# Patient Record
Sex: MALE | Race: WHITE | Employment: OTHER | URBAN - METROPOLITAN AREA
[De-identification: names, ages, dates, MRNs, and addresses within clinical notes are randomized per-mention and may not be internally consistent; named-entity substitution may affect disease eponyms.]

---

## 2016-03-30 LAB — COLONOSCOPY, EXTERNAL: NORMAL

## 2017-01-25 ENCOUNTER — HOSPITAL ENCOUNTER (OUTPATIENT)
Dept: CT IMAGING | Age: 82
Discharge: HOME | End: 2017-01-25
Attending: INTERNAL MEDICINE
Payer: MEDICARE

## 2017-01-25 DIAGNOSIS — R91.1 LUNG NODULE: ICD-10-CM

## 2017-01-25 LAB — CREAT BLD-MCNC: 1.4 MG/DL (ref 0.6–1.3)

## 2017-01-25 PROCEDURE — 74011250636 HC RX REV CODE- 250/636: Performed by: INTERNAL MEDICINE

## 2017-01-25 PROCEDURE — 71260 CT THORAX DX C+: CPT

## 2017-01-25 PROCEDURE — 82565 ASSAY OF CREATININE: CPT

## 2017-01-25 PROCEDURE — 74011636320 HC RX REV CODE- 636/320: Performed by: INTERNAL MEDICINE

## 2017-01-25 RX ORDER — SODIUM CHLORIDE 9 MG/ML
50 INJECTION, SOLUTION INTRAVENOUS
Status: COMPLETED | OUTPATIENT
Start: 2017-01-25 | End: 2017-01-25

## 2017-01-25 RX ORDER — SODIUM CHLORIDE 0.9 % (FLUSH) 0.9 %
10 SYRINGE (ML) INJECTION
Status: COMPLETED | OUTPATIENT
Start: 2017-01-25 | End: 2017-01-25

## 2017-01-25 RX ADMIN — SODIUM CHLORIDE 50 ML/HR: 900 INJECTION, SOLUTION INTRAVENOUS at 14:41

## 2017-01-25 RX ADMIN — IOPAMIDOL 70 ML: 612 INJECTION, SOLUTION INTRAVENOUS at 14:40

## 2017-01-25 RX ADMIN — Medication 10 ML: at 14:40

## 2017-04-10 LAB — HEMOCCULT STL QL: NORMAL

## 2017-05-23 DIAGNOSIS — R91.1 NODULE OF RIGHT LUNG: ICD-10-CM

## 2017-05-23 DIAGNOSIS — R74.8 ELEVATED CPK: ICD-10-CM

## 2017-05-23 DIAGNOSIS — R79.89 ELEVATED LFTS: ICD-10-CM

## 2017-05-23 DIAGNOSIS — I25.10 ASCVD (ARTERIOSCLEROTIC CARDIOVASCULAR DISEASE): ICD-10-CM

## 2017-05-23 LAB — PSA, EXTERNAL: NORMAL

## 2017-09-13 RX ORDER — DICLOFENAC SODIUM 75 MG/1
TABLET, DELAYED RELEASE ORAL
Qty: 180 TAB | Refills: 3 | Status: SHIPPED | OUTPATIENT
Start: 2017-09-13 | End: 2018-09-09 | Stop reason: SDUPTHER

## 2017-09-13 NOTE — TELEPHONE ENCOUNTER
Requested Prescriptions     Pending Prescriptions Disp Refills    diclofenac EC (VOLTAREN) 75 mg EC tablet [Pharmacy Med Name: DICLOFENAC SOD EC 75 MG TAB] 180 Tab 3     Sig: TAKE 1 TABLET BY MOUTH TWICE A DAY

## 2017-09-20 PROBLEM — N18.9 CKD (CHRONIC KIDNEY DISEASE): Status: ACTIVE | Noted: 2017-09-20

## 2017-09-20 PROBLEM — J30.9 ALLERGIC RHINITIS: Status: ACTIVE | Noted: 2017-09-20

## 2017-09-20 PROBLEM — R97.20 ELEVATED PSA: Status: ACTIVE | Noted: 2017-09-20

## 2017-09-20 PROBLEM — I12.9 HYPERTENSION WITH RENAL DISEASE: Status: ACTIVE | Noted: 2017-09-20

## 2017-09-20 PROBLEM — R60.9 EDEMA: Status: ACTIVE | Noted: 2017-09-20

## 2017-09-20 PROBLEM — E78.5 HYPERLIPIDEMIA: Status: ACTIVE | Noted: 2017-09-20

## 2017-09-20 PROBLEM — R91.1 NODULE OF RIGHT LUNG: Status: ACTIVE | Noted: 2017-09-20

## 2017-09-20 PROBLEM — N40.0 BPH (BENIGN PROSTATIC HYPERPLASIA): Status: ACTIVE | Noted: 2017-09-20

## 2017-09-20 PROBLEM — R74.8 ELEVATED CPK: Status: ACTIVE | Noted: 2017-09-20

## 2017-09-20 PROBLEM — N52.9 ED (ERECTILE DYSFUNCTION): Status: ACTIVE | Noted: 2017-09-20

## 2017-09-20 PROBLEM — M54.9 BACK PAIN: Status: ACTIVE | Noted: 2017-09-20

## 2017-09-20 PROBLEM — I25.10 ASCVD (ARTERIOSCLEROTIC CARDIOVASCULAR DISEASE): Status: ACTIVE | Noted: 2017-09-20

## 2017-09-20 PROBLEM — M19.90 DJD (DEGENERATIVE JOINT DISEASE): Status: ACTIVE | Noted: 2017-09-20

## 2017-09-20 PROBLEM — I50.9 CHF (CONGESTIVE HEART FAILURE) (HCC): Status: ACTIVE | Noted: 2017-09-20

## 2017-09-20 PROBLEM — M35.3 POLYMYALGIA (HCC): Status: ACTIVE | Noted: 2017-09-20

## 2017-09-20 PROBLEM — Z79.899 ON STATIN THERAPY: Status: ACTIVE | Noted: 2017-09-20

## 2017-09-20 PROBLEM — R79.89 ELEVATED LFTS: Status: ACTIVE | Noted: 2017-09-20

## 2017-09-20 PROBLEM — E11.9 DM (DIABETES MELLITUS) (HCC): Status: ACTIVE | Noted: 2017-09-20

## 2017-09-20 RX ORDER — PREDNISONE 5 MG/1
5 TABLET ORAL
COMMUNITY
End: 2017-12-12 | Stop reason: SDUPTHER

## 2017-09-20 RX ORDER — GLUCOSAMINE SULFATE 1500 MG
1000 POWDER IN PACKET (EA) ORAL DAILY
COMMUNITY

## 2017-09-20 RX ORDER — TADALAFIL 5 MG/1
5 TABLET ORAL
COMMUNITY
End: 2018-01-05 | Stop reason: ALTCHOICE

## 2017-09-20 RX ORDER — SILDENAFIL CITRATE 20 MG/1
20 TABLET ORAL
COMMUNITY
End: 2017-12-07 | Stop reason: SDUPTHER

## 2017-10-30 ENCOUNTER — OFFICE VISIT (OUTPATIENT)
Dept: INTERNAL MEDICINE CLINIC | Age: 82
End: 2017-10-30

## 2017-10-30 VITALS
BODY MASS INDEX: 27.95 KG/M2 | DIASTOLIC BLOOD PRESSURE: 82 MMHG | SYSTOLIC BLOOD PRESSURE: 128 MMHG | OXYGEN SATURATION: 98 % | WEIGHT: 224.8 LBS | RESPIRATION RATE: 16 BRPM | HEIGHT: 75 IN | TEMPERATURE: 97.6 F | HEART RATE: 64 BPM

## 2017-10-30 DIAGNOSIS — Z00.00 MEDICARE ANNUAL WELLNESS VISIT, INITIAL: ICD-10-CM

## 2017-10-30 DIAGNOSIS — E11.9 TYPE 2 DIABETES MELLITUS WITHOUT COMPLICATION, WITHOUT LONG-TERM CURRENT USE OF INSULIN (HCC): ICD-10-CM

## 2017-10-30 DIAGNOSIS — I12.9 HYPERTENSION WITH RENAL DISEASE: Primary | ICD-10-CM

## 2017-10-30 DIAGNOSIS — N18.2 STAGE 2 CHRONIC KIDNEY DISEASE: ICD-10-CM

## 2017-10-30 DIAGNOSIS — I25.10 ASCVD (ARTERIOSCLEROTIC CARDIOVASCULAR DISEASE): ICD-10-CM

## 2017-10-30 DIAGNOSIS — E78.2 MIXED HYPERLIPIDEMIA: ICD-10-CM

## 2017-10-30 DIAGNOSIS — M15.9 PRIMARY OSTEOARTHRITIS INVOLVING MULTIPLE JOINTS: ICD-10-CM

## 2017-10-30 DIAGNOSIS — N40.0 BENIGN PROSTATIC HYPERPLASIA WITHOUT LOWER URINARY TRACT SYMPTOMS: ICD-10-CM

## 2017-10-30 DIAGNOSIS — J30.89 CHRONIC NONSEASONAL ALLERGIC RHINITIS DUE TO OTHER ALLERGEN: ICD-10-CM

## 2017-10-30 DIAGNOSIS — Z23 ENCOUNTER FOR IMMUNIZATION: ICD-10-CM

## 2017-10-30 LAB
ALBUMIN SERPL-MCNC: 4 G/DL (ref 3.9–5.4)
ALKALINE PHOS POC: 86 U/L (ref 38–126)
ALT SERPL-CCNC: 30 U/L (ref 9–52)
AST SERPL-CCNC: 29 U/L (ref 14–36)
BUN BLD-MCNC: 25 MG/DL (ref 9–20)
CALCIUM BLD-MCNC: 9.2 MG/DL (ref 8.4–10.2)
CHLORIDE BLD-SCNC: 105 MMOL/L (ref 98–107)
CHOLEST SERPL-MCNC: 176 MG/DL (ref 0–200)
CK (CPK) POC: 325 U/L (ref 30–135)
CO2 POC: 29 MMOL/L (ref 22–32)
CREAT BLD-MCNC: 1.1 MG/DL (ref 0.8–1.5)
EGFR (POC): 61.3
GLUCOSE POC: 112 MG/DL (ref 75–110)
HBA1C MFR BLD HPLC: 6.3 % (ref 4.5–5.7)
HDLC SERPL-MCNC: 48 MG/DL (ref 35–130)
LDL CHOLESTEROL POC: 83.8 MG/DL (ref 0–130)
MICROALBUMIN UR TEST STR-MCNC: NORMAL MG/L (ref 0–20)
POTASSIUM SERPL-SCNC: 4.3 MMOL/L (ref 3.6–5)
PROT SERPL-MCNC: 6.9 G/DL (ref 6.3–8.2)
SODIUM SERPL-SCNC: 144 MMOL/L (ref 137–145)
TCHOL/HDL RATIO (POC): 3.7 (ref 0–4)
TOTAL BILIRUBIN POC: 0.4 MG/DL (ref 0.2–1.3)
TRIGL SERPL-MCNC: 221 MG/DL (ref 0–200)
VLDLC SERPL CALC-MCNC: 44.2 MG/DL

## 2017-10-30 NOTE — PROGRESS NOTES
Chief Complaint   Patient presents with    Hypertension     160/85    Ankle swelling     Diff.  Hearing Rt Ear

## 2017-10-30 NOTE — PATIENT INSTRUCTIONS

## 2017-10-30 NOTE — PROGRESS NOTES
This is an Initial Medicare Annual Wellness Exam (AWV) (Performed 12 months after IPPE or effective date of Medicare Part B enrollment, Once in a lifetime)    I have reviewed the patient's medical history in detail and updated the computerized patient record. He presents today for his initial annual Medicare wellness examination screening questionnaire. He is also here in follow-up of his medical problems include hypertension, diabetes, hyperlipidemia, chronic kidney disease, DJD, ASCVD PVD, allergic rhinitis, BPH, and history congestive heart failure compensated. He is taking his medications trying to follow his diet and trying to remain active and get some exercise. He denies any chest pain shortness of breath palpitations PND orthopnea or any other cardiorespiratory complaints. He denies any headaches or neurologic complaints. He denies any GI/ complaints. There are no other complaints on complete review of systems. History     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    Back pain 9/20/2017    BPH (benign prostatic hyperplasia) 9/20/2017    CHF (congestive heart failure) ([address] 9/20/2017    CKD (chronic kidney disease) 9/20/2017    DM (diabetes mellitus) ([address] 9/20/2017    Story: Diet Controlled    ED (erectile dysfunction) 9/20/2017    Edema 9/20/2017    Elevated CPK 9/20/2017    Comments: History of    Elevated LFTs 9/20/2017    Comments: History of    Elevated PSA 9/20/2017    Hypercholesteremia     Hyperlipidemia 9/20/2017    Hypertension     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    Nodule of right lung 9/20/2017    Story: Right    Polymyalgia ([address] 9/20/2017    Prostate enlargement       Past Surgical History:   Procedure Laterality Date    ABDOMEN SURGERY PROC UNLISTED      hernia repair     Current Outpatient Prescriptions   Medication Sig Dispense Refill    sildenafil (REVATIO) 20 mg tablet Take 20 mg by mouth.  tadalafil (CIALIS) 5 mg tablet Take 5 mg by mouth.  predniSONE (DELTASONE) 5 mg tablet Take 5 mg by mouth.  cholecalciferol (VITAMIN D3) 1,000 unit cap Take 1,000 Units by mouth daily.  DOCOSAHEXANOIC ACID/EPA (FISH OIL PO) Take 1,200 mg by mouth two (2) times a day.  diclofenac EC (VOLTAREN) 75 mg EC tablet TAKE 1 TABLET BY MOUTH TWICE A  Tab 3    furosemide (LASIX) 40 mg tablet Take 40 mg by mouth daily.  simvastatin (ZOCOR) 20 mg tablet Take 20 mg by mouth nightly.  lisinopril (PRINIVIL, ZESTRIL) 5 mg tablet Take 5 mg by mouth daily.  terazosin (HYTRIN) 5 mg capsule Take 5 mg by mouth nightly.  fexofenadine-pseudoephedrine (ALLEGRA-D 24) 180-240 mg per tablet Take 1 Tab by mouth daily.  acetaminophen (TYLENOL) 500 mg tablet Take 500 mg by mouth every six (6) hours as needed. No Known Allergies  History reviewed. No pertinent family history.   Social History   Substance Use Topics    Smoking status: Never Smoker    Smokeless tobacco: Never Used    Alcohol use No     Patient Active Problem List   Diagnosis Code    Elevated LFTs R79.89    Elevated CPK R74.8    Elevated PSA R97.20    ASCVD (arteriosclerotic cardiovascular disease) I25.10    Hyperlipidemia E78.5    DM (diabetes mellitus) ([address] E11.9    Polymyalgia ([facility] M35.3    Hypertension with renal disease I12.9    On statin therapy Z79.899    CHF (congestive heart failure) (HCC) I50.9    CKD (chronic kidney disease) N18.9    DJD (degenerative joint disease) M19.90    Nodule of right lung R91.1    Allergic rhinitis J30.9    ED (erectile dysfunction) N52.9    Back pain M54.9    BPH (benign prostatic hyperplasia) N40.0    Medicare annual wellness visit, initial Z00.00       Depression Risk Factor Screening:     PHQ over the last two weeks 10/30/2017   Little interest or pleasure in doing things Not at all   Feeling down, depressed or hopeless Not at all   Total Score PHQ 2 0     Alcohol Risk Factor Screening: You do not drink alcohol or very rarely. Functional Ability and Level of Safety:     Hearing Loss  Hearing is good. Whisper Test done with abnormal results. Activities of Daily Living  The home contains: no safety equipment. Patient does total self care    Fall Risk  Fall Risk Assessment, last 12 mths 10/30/2017   Able to walk? Yes   Fall in past 12 months? Yes   Fall with injury? No   Number of falls in past 12 months 1   Fall Risk Score 1       Abuse Screen  Patient is not abused    Cognitive Screening   Evaluation of Cognitive Function:  Has your family/caregiver stated any concerns about your memory: no  Normal     ROS:    Constitutional: He denies fevers, weight loss, sweats. Eyes: No blurred or double vision. ENT: No difficulty with swallowing, taste, speech or smell. Neck: no stiffness or swelling  Respiratory: No cough wheezing or shortness of breath. Cardiovascular: Denies chest pain, palpitations, unexplained indigestion or syncope. Gastrointestinal:  No changes in bowel movements, no abdominal pain, no bloating. Genitourinary:  He denies frequency, nocturia or stranguria. Extremities: No joint pain, stiffness or swelling. Neurological:  No numbness, tingling, burring paresthesias or loss of motor strength. No syncope, dizziness or frequent headache  Lymphatic: no adenopathy noted  Hematologic: no easy bruising or bleeding gums  Skin:  No recent rashes or mole changes. Psychiatric/Behavioral:  Negative for depression.       Vitals:    10/30/17 0942 10/30/17 1008   BP: 160/85 128/82   Pulse: 64    Resp: 16    Temp: 97.6 °F (36.4 °C)    TempSrc: Oral    SpO2: 98%    Weight: 224 lb 12.8 oz (102 kg)    Height: 6' 3\" (1.905 m)    PainSc:   5    PainLoc: Ankle         PHYSICAL EXAM:    General appearance - alert, well appearing, and in no distress  Mental status - alert, oriented to person, place, and time  HEENT:  Ears - bilateral TM's and external ear canals clear  Eyes - pupillary responses were normal.  Extraocular muscle function intact. Lids and conjunctiva not injected. Fundoscopic exam revealed sharp disc margins. eye movements intact  Pharynx- clear with teeth in good repair. No masses were noted  Neck - supple without thyromegaly or burit. No JVD noted  Lungs - clear to auscultation and percussion  Cardiac- normal rate, regular rhythm without murmurs. PMI not displaced. No gallop, rub or click  Abdomen - flat, soft, non-tender without palpable organomegaly or mass. No pulsatile mass was felt, and not bruit was heard. Bowel sounds were active  : Circumcised, Testes descended w/o masses  Rectal: Deferred having been done earlier this year  Extremities -  no clubbing cyanosis or edema. No diabetic foot changes noted  Lymphatics - no palpable lymphadenopathy, no hepatosplenomegaly  Hematologic: no petechiae or purpura  Peripheral vascular -Femoral, Dorsalis pedis and posterior tibial pulses felt without difficulty  Skin - no rash or unusual mole change noted  Neurological - Cranial nerves II-XII grossly intact. Motor strength 5/5. DTR's 2+ and symmetric. Station and gait normal.  Normal distal sensation and proprioception all toes both feet  Back exam - full range of motion, no tenderness, palpable spasm or pain on motion  Musculoskeletal - no joint tenderness, deformity or swelling      Patient Care Team   Patient Care Team:  Ivy Siemens, MD as PCP - General (Internal Medicine)    Assessment/Plan   Education and counseling provided:  Are appropriate based on today's review and evaluation    ASSESSMENT:   1. Hypertension with renal disease    2. Type 2 diabetes mellitus without complication, without long-term current use of insulin (HCC)    3. Stage 2 chronic kidney disease    4. Mixed hyperlipidemia    5.  ASCVD (arteriosclerotic cardiovascular disease) 6. Primary osteoarthritis involving multiple joints    7. Chronic nonseasonal allergic rhinitis due to other allergen    8. Benign prostatic hyperplasia without lower urinary tract symptoms    9. Encounter for immunization    10. Medicare annual wellness visit, initial      Impression  1. Hypertension that is controlled we will continue current therapy was initially about the nurse but repeat by me with a manual cuff was normal  2. Diabetes we will see what that status is make further recommendations and adjustments as necessary  3. Chronic kidney disease last numbers reviewed repeat status pending  4. Hyperlipidemia that status is pending we reviewed prior numbers with him  5. ASCVD clinically stable  6. DJD currently stable on present regimen  7. Allergic rhinitis stable  8. BPH currently stable  Medicare annual wellness examination screening questionnaire completed today. The results reviewed with him and his questions were answered. Lifestyle recommendations and modifications discussed and made. Flu shot given today. Labs are pending as noted will make further recommendation based on those. Follow stable continue same and follow-up in 3 months or sooner should there be a problem. PLAN:  .  Orders Placed This Encounter    Influenza virus vaccine ([address]) 72 years and older (26716)    AMB POC LIPID PROFILE    AMB POC HEMOGLOBIN A1C    AMB POC COMPREHENSIVE METABOLIC PANEL    AMB POC CK (CPK)    AMB POC URINE, MICROALBUMIN, SEMIQUANT (1 RESULT)         ATTENTION:   This medical record was transcribed using an electronic medical records system. Although proofread, it may and can contain electronic and spelling errors. Other human spelling and other errors may be present. Corrections may be executed at a later time. Please feel free to contact us for any clarifications as needed. Follow-up Disposition:  Return in about 3 months (around 1/30/2018).       Leann Lowry MD Health Maintenance Due   Topic Date Due    EYE EXAM RETINAL OR DILATED Q1  02/20/1943    DTaP/Tdap/Td series (1 - Tdap) 02/20/1954    ZOSTER VACCINE AGE 60>  12/20/1992    GLAUCOMA SCREENING Q2Y  02/20/1998

## 2017-10-30 NOTE — MR AVS SNAPSHOT
Visit Information Date & Time Provider Department Dept. Phone Encounter #  
 10/30/2017  9:00 AM Sandi Shea, [address] [place] [address] [facility] [phone] [id number] Follow-up Instructions Return in about 3 months (around 1/30/2018). Upcoming Health Maintenance Date Due  
 EYE EXAM RETINAL OR DILATED Q1 [birth year]/20/1943 DTaP/Tdap/Td series (1 - Tdap) 2/20/1954 ZOSTER VACCINE AGE 60> 12/20/1992 GLAUCOMA SCREENING Q2Y 2/20/1998 HEMOGLOBIN A1C Q6M 4/30/2018 FOOT EXAM Q1 10/30/2018 MICROALBUMIN Q1 10/30/2018 LIPID PANEL Q1 10/30/2018 MEDICARE YEARLY EXAM 10/31/2018 COLONOSCOPY 3/30/2026 Allergies as of 10/30/2017  Review Complete On: 10/30/2017 By: Sandi Shea MD  
 No Known Allergies Current Immunizations  Never Reviewed Name Date Influenza High Dose Vaccine PF 10/30/2017 Influenza Vaccine 10/7/2016 Pneumococcal Conjugate (PCV-13) 1/7/2005 Pneumococcal Vaccine (Unspecified Type) 1/7/2005 Rabies Vaccine 3/11/2009 Not reviewed this visit You Were Diagnosed With   
  
 Codes Comments Hypertension with renal disease    -  Primary ICD-10-CM: I12.9 ICD-9-CM: 403.90 Type 2 diabetes mellitus without complication, without long-term current use of insulin (HCC)     ICD-10-CM: E11.9 ICD-9-CM: 250.00 Stage 2 chronic kidney disease     ICD-10-CM: N18.2 ICD-9-CM: 339. 2 Mixed hyperlipidemia     ICD-10-CM: E78.2 ICD-9-CM: 272.2 ASCVD (arteriosclerotic cardiovascular disease)     ICD-10-CM: I25.10 ICD-9-CM: 429.2, 440.9 Primary osteoarthritis involving multiple joints     ICD-10-CM: M15.0 ICD-9-CM: 715.09 Chronic nonseasonal allergic rhinitis due to other allergen     ICD-10-CM: J30.89 ICD-9-CM: 477.8 Benign prostatic hyperplasia without lower urinary tract symptoms     ICD-10-CM: N40.0 ICD-9-CM: 600.00 Encounter for immunization     ICD-10-CM: C59 ICD-9-CM: V03.89   
 Medicare annual wellness visit, initial     ICD-10-CM: Z00.00 ICD-9-CM: V70.0 Vitals BP Pulse Temp Resp Height(growth percentile) Weight(growth percentile) 128/82 64 97.6 °F (36.4 °C) (Oral) 16 6' 3\" (1.905 m) 224 lb 12.8 oz (102 kg) SpO2 BMI Smoking Status 98% 28.1 kg/m2 Never Smoker Vitals History BMI and BSA Data Body Mass Index Body Surface Area  
 28.1 kg/m 2 2.32 m 2 Preferred Pharmacy Pharmacy Name Phone [id number] [address]/ [address] [facility] [phone] Your Updated Medication List  
  
   
This list is accurate as of: 10/30/17 10:11 AM.  Always use your most recent med list.  
  
  
  
  
 acetaminophen 500 mg tablet Commonly known as:  TYLENOL Take 500 mg by mouth every six (6) hours as needed. CIALIS 5 mg tablet Generic drug:  tadalafil Take 5 mg by mouth. diclofenac EC 75 mg EC tablet Commonly known as:  VOLTAREN  
TAKE 1 TABLET BY MOUTH TWICE A DAY  
  
 fexofenadine-pseudoephedrine 180-240 mg per tablet Commonly known as:  ALLEGRA-D 24 Take 1 Tab by mouth daily. FISH OIL PO Take 1,200 mg by mouth two (2) times a day. furosemide 40 mg tablet Commonly known as:  LASIX Take 40 mg by mouth daily. lisinopril 5 mg tablet Commonly known as:  Alexus Loss Take 5 mg by mouth daily. predniSONE 5 mg tablet Commonly known as:  Berle Pouch Take 5 mg by mouth.  
  
 sildenafil 20 mg tablet Commonly known as:  REVATIO Take 20 mg by mouth. simvastatin 20 mg tablet Commonly known as:  ZOCOR Take 20 mg by mouth nightly. terazosin 5 mg capsule Commonly known as:  HYTRIN Take 5 mg by mouth nightly. VITAMIN D3 1,000 unit Cap Generic drug:  cholecalciferol Take 1,000 Units by mouth daily. We Performed the Following ADMIN INFLUENZA VIRUS VAC [ HCPCS] AMB POC CK (CPK) [03471 CPT(R)] AMB POC COMPREHENSIVE METABOLIC PANEL [71380 CPT(R)] AMB POC HEMOGLOBIN A1C [55702 CPT(R)] AMB POC LIPID PROFILE [93211 CPT(R)] AMB POC URINE, MICROALBUMIN, SEMIQUANT (1 RESULT) [93507 CPT(R)] INFLUENZA VIRUS VACCINE, HIGH DOSE SEASONAL, PRESERVATIVE FREE [38071 CPT(R)] Follow-up Instructions Return in about 3 months (around 1/30/2018). Patient Instructions Influenza (Flu) Vaccine (Inactivated or Recombinant): What You Need to Know Why get vaccinated? Influenza (\"flu\") is a contagious disease that spreads around the [facility] every winter, usually between October and May. Flu is caused by influenza viruses and is spread mainly by coughing, sneezing, and close contact. Anyone can get flu. Flu strikes suddenly and can last several days. Symptoms vary by age, but can include: · Fever/chills. · Sore throat. · Muscle aches. · Fatigue. · Cough. · Headache. · Runny or stuffy nose. Flu can also lead to pneumonia and blood infections, and cause diarrhea and seizures in children. If you have a medical condition, such as heart or lung disease, flu can make it worse. Flu is more dangerous for some people. Infants and young children, people 72years of age and older, pregnant women, and people with certain health conditions or a weakened immune system are at greatest risk. Each year thousands of people in the [facility] die from flu, and many more are hospitalized. Flu vaccine can: · Keep you from getting flu. · Make flu less severe if you do get it. · Keep you from spreading flu to your family and other people. Inactivated and recombinant flu vaccines A dose of flu vaccine is recommended every flu season. Children 6 months through 6years of age may need two doses during the same flu season. Everyone else needs only one dose each flu season.  
Some inactivated flu vaccines contain a very small amount of a mercury-based preservative called thimerosal. Studies have not shown thimerosal in vaccines to be harmful, but flu vaccines that do not contain thimerosal are available. There is no live flu virus in flu shots. They cannot cause the flu. There are many flu viruses, and they are always changing. Each year a new flu vaccine is made to protect against three or four viruses that are likely to cause disease in the upcoming flu season. But even when the vaccine doesn't exactly match these viruses, it may still provide some protection. Flu vaccine cannot prevent: · Flu that is caused by a virus not covered by the vaccine. · Illnesses that look like flu but are not. Some people should not get this vaccine Tell the person who is giving you the vaccine: · If you have any severe (life-threatening) allergies. If you ever had a life-threatening allergic reaction after a dose of flu vaccine, or have a severe allergy to any part of this vaccine, you may be advised not to get vaccinated. Most, but not all, types of flu vaccine contain a small amount of egg protein. · If you ever had Guillain-Barré syndrome (also called GBS) Some people with a history of GBS should not get this vaccine. This should be discussed with your doctor. · If you are not feeling well. It is usually okay to get flu vaccine when you have a mild illness, but you might be asked to come back when you feel better. Risks of a vaccine reaction With any medicine, including vaccines, there is a chance of reactions. These are usually mild and go away on their own, but serious reactions are also possible. Most people who get a flu shot do not have any problems with it. Minor problems following a flu shot include: · Soreness, redness, or swelling where the shot was given · Hoarseness · Sore, red or itchy eyes · Cough · Fever · Aches · Headache · Itching · Fatigue If these problems occur, they usually begin soon after the shot and last 1 or 2 days. More serious problems following a flu shot can include the following: · There may be a small increased risk of Guillain-Barré Syndrome (GBS) after inactivated flu vaccine. This risk has been estimated at 1 or 2 additional cases per million people vaccinated. This is much lower than the risk of severe complications from flu, which can be prevented by flu vaccine. · Duard Amabile children who get the flu shot along with pneumococcal vaccine (PCV13) and/or DTaP vaccine at the same time might be slightly more likely to have a seizure caused by fever. Ask your doctor for more information. Tell your doctor if a child who is getting flu vaccine has ever had a seizure Problems that could happen after any injected vaccine: · People sometimes faint after a medical procedure, including vaccination. Sitting or lying down for about 15 minutes can help prevent fainting, and injuries caused by a fall. Tell your doctor if you feel dizzy, or have vision changes or ringing in the ears. · Some people get severe pain in the shoulder and have difficulty moving the arm where a shot was given. This happens very rarely. · Any medication can cause a severe allergic reaction. Such reactions from a vaccine are very rare, estimated at about 1 in a million doses, and would happen within a few minutes to a few hours after the vaccination. As with any medicine, there is a very remote chance of a vaccine causing a serious injury or death. The safety of vaccines is always being monitored. For more information, visit: www.cdc.gov/vaccinesafety/. What if there is a serious reaction? What should I look for? · Look for anything that concerns you, such as signs of a severe allergic reaction, very high fever, or unusual behavior.  
Signs of a severe allergic reaction can include hives, swelling of the face and throat, difficulty breathing, a fast heartbeat, dizziness, and weakness - usually within a few minutes to a few hours after the vaccination. What should I do? · If you think it is a severe allergic reaction or other emergency that can't wait, call  and get the person to the nearest hospital. Otherwise, call your doctor. · Reactions should be reported to the \"Vaccine Adverse Event Reporting System\" (VAERS). Your doctor should file this report, or you can do it yourself through the VAERS website at www.vaers. [facility].gov, or by calling 6-195.628.7318. VAERS does not give medical advice. The National Vaccine Injury Compensation Program 
The National Vaccine Injury Compensation Program (VICP) is a federal program that was created to compensate people who may have been injured by certain vaccines. Persons who believe they may have been injured by a vaccine can learn about the program and about filing a claim by calling 7-621.844.9913 or visiting the [employer] website at www.[employer].gov/vaccinecompensation. There is a time limit to file a claim for compensation. How can I learn more? · Ask your healthcare provider. He or she can give you the vaccine package insert or suggest other sources of information. · Call your local or state health department. · Contact the Centers for Disease Control and Prevention (CDC): 
¨ Call 7-331.871.9191 (1-800-CDC-INFO) or ¨ Visit CDC's website at www.cdc.gov/flu Vaccine Information Statement Inactivated Influenza Vaccine 2015) [address] Walters 404AC-20 [employer] and [employer] Centers for Disease Control and Prevention Many Vaccine Information Statements are available in [place] and other languages. See www.immunize.org/vis. Muchas hojas de información sobre vacunas están disponibles en español y en otros idiomas. Visite www.immunize.org/vis. Care instructions adapted under license by [employer] (which disclaims liability or warranty for this information).  If you have questions about a medical condition or this instruction, always ask your healthcare professional. [place] [address] any warranty or liability for your use of this information. Introducing [facility]! Paris Yepez introduces OrderWithMe patient portal. Now you can access parts of your medical record, email your doctor's office, and request medication refills online. 1. In your internet browser, go to [email or web address] 2. Click on the First Time User? Click Here link in the Sign In box. You will see the New Member Sign Up page. 3. Enter your OrderWithMe Access Code exactly as it appears below. You will not need to use this code after youve completed the sign-up process. If you do not sign up before the expiration date, you must request a new code. · OrderWithMe Access Code: [email or web address] Expires: 1/28/2018  9:05 AM 
 
4. Enter the last four digits of your Social Security Number (xxxx) and Date of Birth (mm/dd/yyyy) as indicated and click Submit. You will be taken to the next sign-up page. 5. Create a OrderWithMe ID. This will be your OrderWithMe login ID and cannot be changed, so think of one that is secure and easy to remember. 6. Create a OrderWithMe password. You can change your password at any time. 7. Enter your Password Reset Question and Answer. This can be used at a later time if you forget your password. 8. Enter your e-mail address. You will receive e-mail notification when new information is available in [address] 9. Click Sign Up. You can now view and download portions of your medical record. 10. Click the Download Summary menu link to download a portable copy of your medical information. If you have questions, please visit the Frequently Asked Questions section of the OrderWithMe website. Remember, OrderWithMe is NOT to be used for urgent needs. For medical emergencies, dial 911. Now available from your iPhone and Android! Please provide this summary of care documentation to your next provider. Your primary care clinician is listed as Arpan. If you have any questions after today's visit, please call [phone].

## 2017-11-06 NOTE — PROGRESS NOTES
Labs are good to continue current treatment. Patient informed. Discussed borderline FBS and Glyco.  Continue to work on diet.

## 2017-12-07 DIAGNOSIS — N52.9 ERECTILE DYSFUNCTION, UNSPECIFIED ERECTILE DYSFUNCTION TYPE: Primary | ICD-10-CM

## 2017-12-08 RX ORDER — SILDENAFIL CITRATE 20 MG/1
TABLET ORAL
Qty: 20 TAB | Refills: 11 | Status: SHIPPED | OUTPATIENT
Start: 2017-12-08 | End: 2019-01-08 | Stop reason: SDUPTHER

## 2017-12-08 NOTE — TELEPHONE ENCOUNTER
Requested Prescriptions     Pending Prescriptions Disp Refills    sildenafil, antihypertensive, (REVATIO) 20 mg tablet [Pharmacy Med Name: *SILDENAFIL 20MG] 20 Tab 11     Sig: TAKE 1 TABLET, ORAL, AS DIRECTED FOR SEXUAL ACTIVITY

## 2017-12-12 DIAGNOSIS — M35.3 POLYMYALGIA RHEUMATICA (HCC): Primary | ICD-10-CM

## 2017-12-12 RX ORDER — PREDNISONE 5 MG/1
TABLET ORAL
Qty: 90 TAB | Refills: 1 | Status: SHIPPED | OUTPATIENT
Start: 2017-12-12 | End: 2018-05-22 | Stop reason: DRUGHIGH

## 2018-01-05 ENCOUNTER — OFFICE VISIT (OUTPATIENT)
Dept: INTERNAL MEDICINE CLINIC | Age: 83
End: 2018-01-05

## 2018-01-05 VITALS
HEART RATE: 98 BPM | OXYGEN SATURATION: 99 % | SYSTOLIC BLOOD PRESSURE: 132 MMHG | HEIGHT: 75 IN | DIASTOLIC BLOOD PRESSURE: 80 MMHG | WEIGHT: 223.2 LBS | BODY MASS INDEX: 27.75 KG/M2

## 2018-01-05 DIAGNOSIS — I25.10 ASCVD (ARTERIOSCLEROTIC CARDIOVASCULAR DISEASE): ICD-10-CM

## 2018-01-05 DIAGNOSIS — I48.0 PAROXYSMAL ATRIAL FIBRILLATION (HCC): ICD-10-CM

## 2018-01-05 DIAGNOSIS — I12.9 HYPERTENSION WITH RENAL DISEASE: ICD-10-CM

## 2018-01-05 DIAGNOSIS — R42 DIZZINESS: Primary | ICD-10-CM

## 2018-01-05 DIAGNOSIS — E11.9 TYPE 2 DIABETES MELLITUS WITHOUT COMPLICATION, WITHOUT LONG-TERM CURRENT USE OF INSULIN (HCC): ICD-10-CM

## 2018-01-05 LAB
BUN BLD-MCNC: 46 MG/DL (ref 9–20)
CALCIUM BLD-MCNC: 9.3 MG/DL (ref 8.4–10.2)
CHLORIDE BLD-SCNC: 103 MMOL/L (ref 98–107)
CO2 POC: 28 MMOL/L (ref 22–32)
CREAT BLD-MCNC: 1.7 MG/DL (ref 0.8–1.5)
EGFR (POC): 36.2
GLUCOSE POC: 141 MG/DL (ref 75–110)
GRAN# POC: 5.3 K/UL (ref 2–7.8)
GRAN% POC: 82.6 % (ref 37–92)
HCT VFR BLD CALC: 37.6 % (ref 37–51)
HGB BLD-MCNC: 12.6 G/DL (ref 12–18)
LY# POC: 0.9 K/UL (ref 0.6–4.1)
LY% POC: 15 % (ref 10–58.5)
MCH RBC QN: 32 PG (ref 26–32)
MCHC RBC-ENTMCNC: 33.5 G/DL (ref 30–36)
MCV RBC: 96 FL (ref 80–97)
MID #, POC: 0.1 K/UL (ref 0–1.8)
MID% POC: 2.4 % (ref 0.1–24)
PLATELET # BLD: 150 K/UL (ref 140–440)
POTASSIUM SERPL-SCNC: 4.5 MMOL/L (ref 3.6–5)
RBC # BLD: 3.93 M/UL (ref 4.2–6.3)
SODIUM SERPL-SCNC: 141 MMOL/L (ref 137–145)
WBC # BLD: 6.3 K/UL (ref 4.1–10.9)

## 2018-01-05 RX ORDER — WARFARIN SODIUM 5 MG/1
5 TABLET ORAL DAILY
Qty: 30 TAB | Status: SHIPPED | OUTPATIENT
Start: 2018-01-05 | End: 2018-02-16 | Stop reason: SDUPTHER

## 2018-01-05 RX ORDER — DIGOXIN 125 MCG
0.12 TABLET ORAL DAILY
Qty: 30 TAB | Status: SHIPPED | OUTPATIENT
Start: 2018-01-05 | End: 2018-02-14 | Stop reason: SDUPTHER

## 2018-01-05 NOTE — MR AVS SNAPSHOT
Visit Information Date & Time Provider Department Dept. Phone Encounter #  
 1/5/2018  3:20 PM Kerline BernalHelen [id number] Follow-up Instructions Return in about 4 days (around 1/9/2018). Follow-up and Disposition History Your Appointments 1/9/2018  1:50 PM  
FOLLOW UP 10 with MD DENY [facility] ([facility] [place]) Appt Note: follow up  a-fib [address] [address] [address] [address] 2/14/2018  8:50 AM  
FOLLOW UP 10 with MD DENY [facility] ([facility] [place]) Appt Note: [address] [address] [phone] Upcoming Health Maintenance Date Due  
 EYE EXAM RETINAL OR DILATED Q1 2/20/1943 DTaP/Tdap/Td series (1 - Tdap) 2/20/1954 ZOSTER VACCINE AGE 60> 12/20/1992 GLAUCOMA SCREENING Q2Y 2/20/1998 HEMOGLOBIN A1C Q6M 4/30/2018 FOOT EXAM Q1 10/30/2018 MICROALBUMIN Q1 10/30/2018 LIPID PANEL Q1 10/30/2018 MEDICARE YEARLY EXAM 10/31/2018 COLONOSCOPY 3/30/2026 Allergies as of 1/5/2018  Review Complete On: 1/5/2018 By: Kerline Bernal MD  
 No Known Allergies Current Immunizations  Never Reviewed Name Date Influenza High Dose Vaccine PF 10/30/2017 Influenza Vaccine 10/7/2016 Pneumococcal Conjugate (PCV-13) 1/7/2005 Pneumococcal Vaccine (Unspecified Type) 1/7/2005 Rabies Vaccine 3/11/2009 Not reviewed this visit You Were Diagnosed With   
  
 Codes Comments Dizziness    -  Primary ICD-10-CM: U65 ICD-9-CM: 780.4 ASCVD (arteriosclerotic cardiovascular disease)     ICD-10-CM: I25.10 ICD-9-CM: 429.2, 440.9 Type 2 diabetes mellitus without complication, without long-term current use of insulin (HCC)     ICD-10-CM: E11.9 ICD-9-CM: 250.00 Hypertension with renal disease     ICD-10-CM: I12.9 ICD-9-CM: 403.90 Paroxysmal atrial fibrillation (HCC)     ICD-10-CM: I48.0 ICD-9-CM: 427.31 Vitals BP Pulse Height(growth percentile) Weight(growth percentile) SpO2 BMI  
 132/80 (BP 1 Location: Left arm, BP Patient Position: Sitting) 98 6' 3\" (1.905 m) 223 lb 3.2 oz (101.2 kg) 99% 27.9 kg/m2 Smoking Status Never Smoker BMI and BSA Data Body Mass Index Body Surface Area  
 27.9 kg/m 2 2.31 m 2 Preferred Pharmacy Pharmacy Name Phone [facility]/PHARMACY #[id number] [facility] [phone] Your Updated Medication List  
  
   
This list is accurate as of: 1/5/18  4:50 PM.  Always use your most recent med list.  
  
  
  
  
 acetaminophen 500 mg tablet Commonly known as:  TYLENOL Take 500 mg by mouth every six (6) hours as needed. diclofenac EC 75 mg EC tablet Commonly known as:  VOLTAREN  
TAKE 1 TABLET BY MOUTH TWICE A DAY  
  
 digoxin 0.125 mg tablet Commonly known as:  LANOXIN Take 1 Tab by mouth daily. fexofenadine-pseudoephedrine 180-240 mg per tablet Commonly known as:  ALLEGRA-D 24 Take 1 Tab by mouth daily. FISH OIL PO Take 1,200 mg by mouth two (2) times a day. furosemide 40 mg tablet Commonly known as:  LASIX Take 40 mg by mouth daily. lisinopril 5 mg tablet Commonly known as:  Carmona Damion Take 5 mg by mouth daily. predniSONE 5 mg tablet Commonly known as:  DELTASONE  
TAKE 1 TABLET DAILY AS DIRECTED  
  
 sildenafil (antihypertensive) 20 mg tablet Commonly known as:  REVATIO  
TAKE 1 TABLET, ORAL, AS DIRECTED FOR SEXUAL ACTIVITY  
  
 simvastatin 20 mg tablet Commonly known as:  ZOCOR Take 20 mg by mouth nightly. terazosin 5 mg capsule Commonly known as:  HYTRIN Take 5 mg by mouth nightly. VITAMIN D3 1,000 unit Cap Generic drug:  cholecalciferol Take 1,000 Units by mouth daily. warfarin 5 mg tablet Commonly known as:  COUMADIN Take 1 Tab by mouth daily. Prescriptions Sent to Pharmacy Refills  
 warfarin (COUMADIN) 5 mg tablet prn Sig: Take 1 Tab by mouth daily. Class: Normal  
 Pharmacy: [facility]/pharmacy #[id number] [address] Ph #: [phone] Route: Oral  
 digoxin (LANOXIN) 0.125 mg tablet prn Sig: Take 1 Tab by mouth daily. Class: Normal  
 Pharmacy: [facility]/pharmacy #[id number] - [address] Ph #: [phone] Route: Oral  
  
We Performed the Following AMB POC BASIC METABOLIC PANEL [62915 CPT(R)] AMB POC COMPLETE CBC,AUTOMATED ENTER [id number] CPT(R)] AMB POC EKG 24HR MONITORING [38956 CPT(R)] AMB POC EKG ROUTINE W/ 12 LEADS, INTER & REP [35535 CPT(R)] Follow-up Instructions Return in about 4 days (around 1/9/2018). To-Do List   
 01/05/2018 ECHO:  2D ECHO COMPLETE ADULT (TTE) W OR WO CONTR   
  
 01/12/2018 2:00 PM  
(Arrive by 1:30 PM) Appointment with ECHOCARDIOGRAM ROOM [facility]; ECHOCARDIOGRAM ROOM [facility] NON-INVASIVE CARD ([phone]) Please be prepared to remove everything from the waist up and put on a gown. Introducing [facility]! [facility] introduces [employer] patient portal. Now you can access parts of your medical record, email your doctor's office, and request medication refills online. 1. In your internet browser, go to [email or web address] 2. Click on the First Time User? Click Here link in the Sign In box. You will see the New Member Sign Up page. 3. Enter your [employer] Access Code exactly as it appears below. You will not need to use this code after youve completed the sign-up process. If you do not sign up before the expiration date, you must request a new code. · [employer] Access Code: [email or web address] Expires: 1/28/2018  8:05 AM 
 
4. Enter the last four digits of your Social Security Number (xxxx) and Date of Birth (mm/dd/yyyy) as indicated and click Submit. You will be taken to the next sign-up page. 5. Create a [employer] ID. This will be your [employer] login ID and cannot be changed, so think of one that is secure and easy to remember. 6. Create a [employer] password. You can change your password at any time. 7. Enter your Password Reset Question and Answer. This can be used at a later time if you forget your password. 8. Enter your e-mail address. You will receive e-mail notification when new information is available in [address] 9. Click Sign Up. You can now view and download portions of your medical record. 10. Click the Download Summary menu link to download a portable copy of your medical information. If you have questions, please visit the Frequently Asked Questions section of the [employer] website. Remember, [employer] is NOT to be used for urgent needs. For medical emergencies, dial 911. Now available from your iPhone and Android! Please provide this summary of care documentation to your next provider. Your primary care clinician is listed as Arpan. If you have any questions after today's visit, please call [phone].

## 2018-01-05 NOTE — PROGRESS NOTES
Chief Complaint   Patient presents with    Dizziness       SUBJECTIVE:    Lori Vargas is a 80 y.o. male who presents today for evaluation of several episodes of dizziness. A few days before Christmas he was cooking in his kitchen became lightheaded diaphoretic and very dizzy and actually had to sit down with a cool washcloth on his forehead for about 20 minutes before he fell back to normal and then he fell off to sleep for a while. He noted that he had not been in any period without eating prior to this occurring and actually had eaten he thinks within the hour or 2 before it started. He noted no associated chest pain palpitations or other cardiorespiratory complaints with this he denied any other associated complaints. He then happened another episode some what after [place] when he felt a little dizzy which was just for a few minutes and it went away but was not associated with movement that he could associate. 2 days ago he had a third episode of significant dizziness and apparently he had his blood sugar checked which was shortly after eating and his blood sugar was greater than 400. He notes no associated visual changes and has no polyuria polydipsia. He has been taking his medications and following his diet and is noted no other complaints. None of these episodes of dizziness have been associated with any chest pain, chest tightness or palpitations. None of been associated with arm or leg weakness. None of been associated with visual symptoms other than the fact that he felt like he was in the past that when he had the first episode was most prolonged episode. He has had no other complaints on complete review of systems. Current Outpatient Prescriptions   Medication Sig Dispense Refill    warfarin (COUMADIN) 5 mg tablet Take 1 Tab by mouth daily. 30 Tab prn    digoxin (LANOXIN) 0.125 mg tablet Take 1 Tab by mouth daily.  30 Tab prn    predniSONE (DELTASONE) 5 mg tablet TAKE 1 TABLET DAILY AS DIRECTED 90 Tab 1    sildenafil, antihypertensive, (REVATIO) 20 mg tablet TAKE 1 TABLET, ORAL, AS DIRECTED FOR SEXUAL ACTIVITY 20 Tab 11    cholecalciferol (VITAMIN D3) 1,000 unit cap Take 1,000 Units by mouth daily.  DOCOSAHEXANOIC ACID/EPA (FISH OIL PO) Take 1,200 mg by mouth two (2) times a day.  diclofenac EC (VOLTAREN) 75 mg EC tablet TAKE 1 TABLET BY MOUTH TWICE A  Tab 3    furosemide (LASIX) 40 mg tablet Take 40 mg by mouth daily.  simvastatin (ZOCOR) 20 mg tablet Take 20 mg by mouth nightly.  lisinopril (PRINIVIL, ZESTRIL) 5 mg tablet Take 5 mg by mouth daily.  terazosin (HYTRIN) 5 mg capsule Take 5 mg by mouth nightly.  fexofenadine-pseudoephedrine (ALLEGRA-D 24) 180-240 mg per tablet Take 1 Tab by mouth daily.  acetaminophen (TYLENOL) 500 mg tablet Take 500 mg by mouth every six (6) hours as needed. 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    Back pain 9/20/2017    BPH (benign prostatic hyperplasia) 9/20/2017    CHF (congestive heart failure) ([facility] 9/20/2017    CKD (chronic kidney disease) 9/20/2017    DM (diabetes mellitus) ([address] 9/20/2017    Story: Diet Controlled    ED (erectile dysfunction) 9/20/2017    Edema 9/20/2017    Elevated CPK 9/20/2017    Comments: History of    Elevated LFTs 9/20/2017    Comments: History of    Elevated PSA 9/20/2017    Hypercholesteremia     Hyperlipidemia 9/20/2017    Hypertension     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    Nodule of right lung 9/20/2017    Story: Right    Polymyalgia ([address] 9/20/2017    Prostate enlargement      Past Surgical History:   Procedure Laterality Date    ABDOMEN SURGERY PROC UNLISTED      hernia repair     No Known Allergies    REVIEW OF SYSTEMS:  General: negative for - chills or fever, or weight loss or gain  ENT: negative for - headaches, nasal congestion or tinnitus  Eyes: no blurred or visual changes  Neck: No stiffness or swollen nodes  Respiratory: negative for - cough, hemoptysis, shortness of breath or wheezing  Cardiovascular : negative for - chest pain, edema, palpitations or shortness of breath  Gastrointestinal: negative for - abdominal pain, blood in stools, heartburn or nausea/vomiting  Genito-Urinary: no dysuria, trouble voiding, or hematuria  Musculoskeletal: negative for - gait disturbance, joint pain, joint stiffness or joint swelling  Neurological: no TIA or stroke symptoms. Episodes of dizziness as described above  Hematologic: no bruises, no bleeding  Lymphatic: no swollen glands  Integument: no lumps, mole changes, nail changes or rash  Endocrine:no malaise/lethargy poly uria or polydipsia or unexpected weight changes        Social History     Social History    Marital status:      Spouse name: N/A    Number of children: N/A    Years of education: N/A     Social History Main Topics    Smoking status: Never Smoker    Smokeless tobacco: Never Used    Alcohol use No    Drug use: No    Sexual activity: No     Other Topics Concern    None     Social History Narrative     History reviewed. No pertinent family history. OBJECTIVE:     Visit Vitals    /80 (BP 1 Location: Left arm, BP Patient Position: Sitting)    Pulse 98    Ht 6' 3\" (1.905 m)    Wt 223 lb 3.2 oz (101.2 kg)    SpO2 99%    BMI 27.9 kg/m2     CONSTITUTIONAL:   well nourished, appears age appropriate  EYES: sclera anicteric, PERRL, EOMI  ENMT:nars clear, moist mucous membranes, pharynx clear  NECK: supple.  Thyroid normal, No JVD or bruits  RESPIRATORY: Chest: clear to ascultation and percussion, normal inspiratory effort  CARDIOVASCULAR: Heart: Irregularly irregular rate and rhythm no murmurs, rubs or gallops, PMI not displaced, No thrills  GASTROINTESTINAL: Abdomen: non distended, soft, non tender, bowel sounds normal  HEMATOLOGIC: no purpura, petechiae or bruising  LYMPHATIC: No lymph node enlargemant  MUSCULOSKELETAL: Extremities: no edema or active synovitis, pulse 1+   INTEGUMENT: No unusual rashes or suspicious skin lesions noted. Nails appear normal.  PERIPHERAL VASCULAR: normal pulses femoral, PT and DP  NEUROLOGIC: non-focal exam, A & O X 3  PSYCHIATRIC:, appropriate affect     ASSESSMENT:   1. Dizziness    2. ASCVD (arteriosclerotic cardiovascular disease)    3. Type 2 diabetes mellitus without complication, without long-term current use of insulin ([address]    4. Hypertension with renal disease    5. Paroxysmal atrial fibrillation (HCC)      Impression  1. Dizziness intermittent episodes as noted probably related to his new onset atrial fibrillation although I cannot be certain that his blood sugar did not play some role in this. 2.  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 Diabetes question whether that could be playing some role in his dizziness but does not seem like his hypoglycemia since these episodes occurred after eating and his blood sugar on one occasion was above 400 by a fingerstick check although his symptoms do not sound consistent with his diabetes being out of control either  4. Hypertension his blood pressures controlled  5. New onset atrial fibrillation I think that is probably because of his problem and based upon this and will evaluate further by checking a 2D echocardiogram as well as schedule a 24-hour Holter for rate but I will go ahead today and place him on Lanoxin 0.125 daily at a low dose and also placing him on Coumadin 5 mg daily I will get him back in 4 days to reevaluate him I did tell him that Coumadin should be taken between 5 and 6 in the afternoon and to start taking that as well as his digoxin today.   I will see him sooner if there is a problem. PLAN:  .  Orders Placed This Encounter    AMB POC COMPLETE CBC,AUTOMATED ENTER    AMB POC BASIC METABOLIC PANEL    AMB POC EKG ROUTINE W/ 12 LEADS, INTER & REP    AMB POC EKG 24HR MONITORING    2D ECHO COMPLETE ADULT (TTE) W OR WO CONTR    warfarin (COUMADIN) 5 mg tablet    digoxin (LANOXIN) 0.125 mg tablet         ATTENTION:   This medical record was transcribed using an electronic medical records system. Although proofread, it may and can contain electronic and spelling errors. Other human spelling and other errors may be present. Corrections may be executed at a later time. Please feel free to contact us for any clarifications as needed. Follow-up Disposition:  Return in about 4 days (around 1/9/2018). Results for orders placed or performed in visit on 01/05/18   AMB POC COMPLETE CBC,AUTOMATED ENTER   Result Value Ref Range    WBC (POC)  4.1 - 10.9 K/uL    RBC (POC)  4.20 - 6.30 M/uL    HGB (POC)  12.0 - 18.0 g/dL    HCT (POC)  37.0 - 51.0 %    MCV (POC)  80.0 - 97.0 fL    MCH (POC)  26.0 - 32.0 pg    MCHC (POC)  30.0 - 36.0 g/dL    PLATELET (POC)  458.8 - 440.0 K/uL    ABS. LYMPHS (POC)  0.6 - 4.1 K/uL    LYMPHOCYTES (POC)  10.0 - 58.5 %    Mid # (POC)  0.0 - 1.8 K/uL    MID% POC  0.1 - 24.0 %    ABS. GRANS (POC)  2.0 - 7.8 K/uL    GRANULOCYTES (POC)  37.0 - 92.0 %   AMB POC BASIC METABOLIC PANEL   Result Value Ref Range    GLUCOSE  75 - 110 mg/dL    BUN  9 - 20 mg/dL    Creatinine (POC)  0.8 - 1.5 mg/dL    Sodium (POC)  137 - 145 MMOL/L    Potassium (POC)  3.6 - 5.0 MMOL/L    CHLORIDE  98 - 107 MMOL/L    CO2  22 - 32 MMOL/L    CALCIUM  8.4 - 10.2 mg/dL    eGFR (POC)         Juvenal Duenas MD    The patient verbalized understanding of the problems and plans as explained.

## 2018-01-08 PROBLEM — N18.30 STAGE 3 CHRONIC KIDNEY DISEASE (HCC): Status: ACTIVE | Noted: 2017-09-20

## 2018-01-08 PROBLEM — M15.9 PRIMARY OSTEOARTHRITIS INVOLVING MULTIPLE JOINTS: Status: ACTIVE | Noted: 2017-09-20

## 2018-01-08 PROBLEM — J30.89 CHRONIC NON-SEASONAL ALLERGIC RHINITIS: Status: ACTIVE | Noted: 2017-09-20

## 2018-01-08 PROBLEM — N18.30 CONTROLLED TYPE 2 DIABETES MELLITUS WITH STAGE 3 CHRONIC KIDNEY DISEASE, WITHOUT LONG-TERM CURRENT USE OF INSULIN (HCC): Status: ACTIVE | Noted: 2017-09-20

## 2018-01-08 PROBLEM — E11.22 CONTROLLED TYPE 2 DIABETES MELLITUS WITH STAGE 3 CHRONIC KIDNEY DISEASE, WITHOUT LONG-TERM CURRENT USE OF INSULIN (HCC): Status: ACTIVE | Noted: 2017-09-20

## 2018-01-08 PROBLEM — E78.2 MIXED HYPERLIPIDEMIA: Status: ACTIVE | Noted: 2017-09-20

## 2018-01-08 NOTE — PROGRESS NOTES
After he is due to see me tomorrow anyway. Scheduled to see Dr. Saleem Cooper 1-9-2018.   Will need STAT BMP and PT.

## 2018-01-08 NOTE — PROGRESS NOTES
Kidney values are significantly worse we will get him back in follow-up this week and draw a stat BMP once he comes in the dorsal I will have the results back to see him.

## 2018-01-09 ENCOUNTER — OFFICE VISIT (OUTPATIENT)
Dept: INTERNAL MEDICINE CLINIC | Age: 83
End: 2018-01-09

## 2018-01-09 VITALS
WEIGHT: 223 LBS | SYSTOLIC BLOOD PRESSURE: 130 MMHG | BODY MASS INDEX: 27.73 KG/M2 | HEIGHT: 75 IN | HEART RATE: 70 BPM | OXYGEN SATURATION: 98 % | RESPIRATION RATE: 18 BRPM | DIASTOLIC BLOOD PRESSURE: 70 MMHG | TEMPERATURE: 98 F

## 2018-01-09 DIAGNOSIS — I48.91 ATRIAL FIBRILLATION, UNSPECIFIED TYPE (HCC): Primary | ICD-10-CM

## 2018-01-09 DIAGNOSIS — N18.30 STAGE 3 CHRONIC KIDNEY DISEASE (HCC): ICD-10-CM

## 2018-01-09 DIAGNOSIS — I12.9 HYPERTENSION WITH RENAL DISEASE: ICD-10-CM

## 2018-01-09 DIAGNOSIS — N18.9 CHRONIC KIDNEY DISEASE, UNSPECIFIED CKD STAGE: ICD-10-CM

## 2018-01-09 DIAGNOSIS — I48.0 PAROXYSMAL ATRIAL FIBRILLATION (HCC): ICD-10-CM

## 2018-01-09 DIAGNOSIS — I25.10 ASCVD (ARTERIOSCLEROTIC CARDIOVASCULAR DISEASE): ICD-10-CM

## 2018-01-09 LAB
ALBUMIN SERPL-MCNC: 4 G/DL (ref 3.9–5.4)
ALKALINE PHOS POC: 50 U/L (ref 38–126)
ALT SERPL-CCNC: 32 U/L (ref 9–52)
AST SERPL-CCNC: 29 U/L (ref 14–36)
BUN BLD-MCNC: 33 MG/DL (ref 9–20)
CALCIUM BLD-MCNC: 9.7 MG/DL (ref 8.4–10.2)
CHLORIDE BLD-SCNC: 102 MMOL/L (ref 98–107)
CO2 POC: 28 MMOL/L (ref 22–32)
CREAT BLD-MCNC: 1.5 MG/DL (ref 0.8–1.5)
EGFR (POC): 42.1
GLUCOSE POC: 135 MG/DL (ref 75–110)
GRAN# POC: 7.6 K/UL (ref 2–7.8)
GRAN% POC: 80.8 % (ref 37–92)
HCT VFR BLD CALC: 36.9 % (ref 37–51)
HGB BLD-MCNC: 12.2 G/DL (ref 12–18)
INR BLD: 1.1
LY# POC: 1.4 K/UL (ref 0.6–4.1)
LY% POC: 16.2 % (ref 10–58.5)
MCH RBC QN: 31.5 PG (ref 26–32)
MCHC RBC-ENTMCNC: 33.2 G/DL (ref 30–36)
MCV RBC: 95 FL (ref 80–97)
MID #, POC: 0.2 K/UL (ref 0–1.8)
MID% POC: 3 % (ref 0.1–24)
PLATELET # BLD: 208 K/UL (ref 140–440)
POTASSIUM SERPL-SCNC: 5.1 MMOL/L (ref 3.6–5)
PROT SERPL-MCNC: 6.9 G/DL (ref 6.3–8.2)
PT POC: 14.3 SECONDS
RBC # BLD: 3.88 M/UL (ref 4.2–6.3)
SODIUM SERPL-SCNC: 142 MMOL/L (ref 137–145)
TOTAL BILIRUBIN POC: 0.6 MG/DL (ref 0.2–1.3)
VALID INTERNAL CONTROL?: YES
WBC # BLD: 9.2 K/UL (ref 4.1–10.9)

## 2018-01-09 RX ORDER — FUROSEMIDE 80 MG/1
TABLET ORAL
Refills: 4 | COMMUNITY
Start: 2018-01-04 | End: 2018-01-09 | Stop reason: ALTCHOICE

## 2018-01-09 NOTE — MR AVS SNAPSHOT
Visit Information Date & Time Provider Department Dept. Phone Encounter #  
 1/9/2018  1:50 PM Ant Rodriguez MD [address] [place] [address] [facility] [phone] [id number] Follow-up Instructions Return in about 1 week (around 1/16/2018). Follow-up and Disposition History Your Appointments 2/14/2018  8:50 AM  
FOLLOW UP 10 with MD PREMA [facility] ([facility] Appt Note: [address] [address] [address] [facility] [address] Upcoming Health Maintenance Date Due  
 EYE EXAM RETINAL OR DILATED Q1 2/20/1943 DTaP/Tdap/Td series (1 - Tdap) 2/20/1954 ZOSTER VACCINE AGE 60> 12/20/1992 GLAUCOMA SCREENING Q2Y 2/20/1998 HEMOGLOBIN A1C Q6M 4/30/2018 FOOT EXAM Q1 10/30/2018 MICROALBUMIN Q1 10/30/2018 LIPID PANEL Q1 10/30/2018 MEDICARE YEARLY EXAM 10/31/2018 COLONOSCOPY 3/30/2026 Allergies as of 1/9/2018  Review Complete On: 1/9/2018 By: Ant Rodriguez MD  
 No Known Allergies Current Immunizations  Never Reviewed Name Date Influenza High Dose Vaccine PF 10/30/2017 Influenza Vaccine 10/7/2016 Pneumococcal Conjugate (PCV-13) 1/7/2005 Pneumococcal Vaccine (Unspecified Type) 1/7/2005 Rabies Vaccine 3/11/2009 Not reviewed this visit You Were Diagnosed With   
  
 Codes Comments Atrial fibrillation, unspecified type ([facility]    -  Primary ICD-10-CM: I48.91 
ICD-9-CM: 427.31 Chronic kidney disease, unspecified CKD stage     ICD-10-CM: N18.9 ICD-9-CM: 901. 9 Paroxysmal atrial fibrillation (HCC)     ICD-10-CM: I48.0 ICD-9-CM: 427.31 Stage 3 chronic kidney disease     ICD-10-CM: N18.3 ICD-9-CM: 585.3 ASCVD (arteriosclerotic cardiovascular disease)     ICD-10-CM: I25.10 ICD-9-CM: 429.2, 440.9 Hypertension with renal disease     ICD-10-CM: I12.9 ICD-9-CM: 403.90 Vitals BP Pulse Temp Resp Height(growth percentile) Weight(growth percentile) 130/70 (BP 1 Location: Right arm, BP Patient Position: Sitting) 70 98 °F (36.7 °C) (Oral) 18 6' 3\" (1.905 m) 223 lb (101.2 kg) SpO2 BMI Smoking Status 98% 27.87 kg/m2 Never Smoker BMI and BSA Data Body Mass Index Body Surface Area  
 27.87 kg/m 2 2.31 m 2 Preferred Pharmacy Pharmacy Name Phone [facility]/PHARMACY #[id number] [facility] [place]/ [facility] [phone] Your Updated Medication List  
  
   
This list is accurate as of: 1/9/18  3:40 PM.  Always use your most recent med list.  
  
  
  
  
 acetaminophen 500 mg tablet Commonly known as:  TYLENOL Take 500 mg by mouth every six (6) hours as needed. diclofenac EC 75 mg EC tablet Commonly known as:  VOLTAREN  
TAKE 1 TABLET BY MOUTH TWICE A DAY  
  
 digoxin 0.125 mg tablet Commonly known as:  LANOXIN Take 1 Tab by mouth daily. fexofenadine-pseudoephedrine 180-240 mg per tablet Commonly known as:  ALLEGRA-D 24 Take 1 Tab by mouth daily. FISH OIL PO Take 1,200 mg by mouth two (2) times a day. furosemide 40 mg tablet Commonly known as:  LASIX Take 40 mg by mouth daily. lisinopril 5 mg tablet Commonly known as:  [employer] Take 5 mg by mouth daily. predniSONE 5 mg tablet Commonly known as:  DELTASONE  
TAKE 1 TABLET DAILY AS DIRECTED  
  
 sildenafil (antihypertensive) 20 mg tablet Commonly known as:  REVATIO  
TAKE 1 TABLET, ORAL, AS DIRECTED FOR SEXUAL ACTIVITY  
  
 simvastatin 20 mg tablet Commonly known as:  ZOCOR Take 20 mg by mouth nightly. terazosin 5 mg capsule Commonly known as:  HYTRIN Take 5 mg by mouth nightly. VITAMIN D3 1,000 unit Cap Generic drug:  cholecalciferol Take 1,000 Units by mouth daily. warfarin 5 mg tablet Commonly known as:  COUMADIN  
 Take 1 Tab by mouth daily. We Performed the Following AMB POC COMPLETE CBC,AUTOMATED ENTER [id number] CPT(R)] AMB POC COMPREHENSIVE METABOLIC PANEL [00236 CPT(R)] AMB POC EKG ROUTINE W/ 12 LEADS, INTER & REP [69162 CPT(R)] AMB POC PT/INR [57395 CPT(R)] REFERRAL TO CARDIOLOGY [CNM18 Custom] Comments:  
 Does not need formal consult. Please schedule 2D echocardiogram and 24-hour Holter monitor. Diagnosis paroxysmal atrial fibrillation Follow-up Instructions Return in about 1 week (around 1/16/2018). To-Do List   
 01/12/2018 2:00 PM  
(Arrive by 1:30 PM) Appointment with ECH[facility]ARDIOGRAM ROOM [facility]; [facility] ([phone]) Please be prepared to remove everything from the waist up and put on a gown. Referral Information Referral ID Referred By Referred To  
  
 [id number] Abdiaziz Hinton MD   
   [address] [facility] [address] Phone: [phone] Fax: [phone] Visits Status Start Date End Date 1 New Request 1/9/18 1/9/19 If your referral has a status of pending review or denied, additional information will be sent to support the outcome of this decision. Introducing [facility]! Noy Epstein introduces [email or web address] patient portal. Now you can access parts of your medical record, email your doctor's office, and request medication refills online. 1. In your internet browser, go to [email or web address] 2. Click on the First Time User? Click Here link in the Sign In box. You will see the New Member Sign Up page. 3. Enter your [employer] Access Code exactly as it appears below. You will not need to use this code after youve completed the sign-up process. If you do not sign up before the expiration date, you must request a new code. · [email or web address] Access Code: [email or web address] Expires: 1/28/2018  8:05 AM 
 
 4. Enter the last four digits of your Social Security Number (xxxx) and Date of Birth (mm/dd/yyyy) as indicated and click Submit. You will be taken to the next sign-up page. 5. Create a [employer] ID. This will be your [employer] login ID and cannot be changed, so think of one that is secure and easy to remember. 6. Create a [employer] password. You can change your password at any time. 7. Enter your Password Reset Question and Answer. This can be used at a later time if you forget your password. 8. Enter your e-mail address. You will receive e-mail notification when new information is available in [address] 9. Click Sign Up. You can now view and download portions of your medical record. 10. Click the Download Summary menu link to download a portable copy of your medical information. If you have questions, please visit the Frequently Asked Questions section of the [employer] website. Remember, [employer] is NOT to be used for urgent needs. For medical emergencies, dial 911. Now available from your iPhone and Android! Please provide this summary of care documentation to your next provider. Your primary care clinician is listed as Arpan. If you have any questions after today's visit, please call [phone].

## 2018-01-09 NOTE — PROGRESS NOTES
Chief Complaint   Patient presents with    Irregular Heart Beat     f/u for afib       SUBJECTIVE:    Live Nettles is a 80 y.o. male who returns in follow-up for his new onset atrial fibrillation, hypertension, CKD with slight deterioration on last check, CHF, and ASCVD. He is taking his medications and actually says he feels a little better since we started the medication he denies any chest pain shortness of breath or cardiorespiratory complaints. He is noted no palpitations. There have been no other cardiovascular complaints. He notes that the swelling in his ankles has now resolved. He has no lightheadedness nor dizziness no other neurologic complaints. And he has no other complaints on complete review of systems. Current Outpatient Prescriptions   Medication Sig Dispense Refill    warfarin (COUMADIN) 5 mg tablet Take 1 Tab by mouth daily. 30 Tab prn    digoxin (LANOXIN) 0.125 mg tablet Take 1 Tab by mouth daily. 30 Tab prn    predniSONE (DELTASONE) 5 mg tablet TAKE 1 TABLET DAILY AS DIRECTED 90 Tab 1    sildenafil, antihypertensive, (REVATIO) 20 mg tablet TAKE 1 TABLET, ORAL, AS DIRECTED FOR SEXUAL ACTIVITY 20 Tab 11    cholecalciferol (VITAMIN D3) 1,000 unit cap Take 1,000 Units by mouth daily.  DOCOSAHEXANOIC ACID/EPA (FISH OIL PO) Take 1,200 mg by mouth two (2) times a day.  diclofenac EC (VOLTAREN) 75 mg EC tablet TAKE 1 TABLET BY MOUTH TWICE A  Tab 3    furosemide (LASIX) 40 mg tablet Take 40 mg by mouth daily.  simvastatin (ZOCOR) 20 mg tablet Take 20 mg by mouth nightly.  lisinopril (PRINIVIL, ZESTRIL) 5 mg tablet Take 5 mg by mouth daily.  terazosin (HYTRIN) 5 mg capsule Take 5 mg by mouth nightly.  fexofenadine-pseudoephedrine (ALLEGRA-D 24) 180-240 mg per tablet Take 1 Tab by mouth daily.  acetaminophen (TYLENOL) 500 mg tablet Take 500 mg by mouth every six (6) hours as needed.          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    Back pain 9/20/2017    BPH (benign prostatic hyperplasia) 9/20/2017    CHF (congestive heart failure) ([facility] 9/20/2017    CKD (chronic kidney disease) 9/20/2017    DM (diabetes mellitus) ([facility] 9/20/2017    Story: Diet Controlled    ED (erectile dysfunction) 9/20/2017    Edema 9/20/2017    Elevated CPK 9/20/2017    Comments: History of    Elevated LFTs 9/20/2017    Comments: History of    Elevated PSA 9/20/2017    Hypercholesteremia     Hyperlipidemia 9/20/2017    Hypertension     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    Nodule of right lung 9/20/2017    Story: Right    Polymyalgia ([facility] 9/20/2017    Prostate enlargement      Past Surgical History:   Procedure Laterality Date    ABDOMEN SURGERY PROC UNLISTED      hernia repair     No Known Allergies    REVIEW OF SYSTEMS:  General: negative for - chills or fever, or weight loss or gain  ENT: negative for - headaches, nasal congestion or tinnitus  Eyes: no blurred or visual changes  Neck: No stiffness or swollen nodes  Respiratory: negative for - cough, hemoptysis, shortness of breath or wheezing  Cardiovascular : negative for - chest pain, edema, palpitations or shortness of breath  Gastrointestinal: negative for - abdominal pain, blood in stools, heartburn or nausea/vomiting  Genito-Urinary: no dysuria, trouble voiding, or hematuria  Musculoskeletal: negative for - gait disturbance, joint pain, joint stiffness or joint swelling  Neurological: no TIA or stroke symptoms  Hematologic: no bruises, no bleeding  Lymphatic: no swollen glands  Integument: no lumps, mole changes, nail changes or rash  Endocrine:no malaise/lethargy poly uria or polydipsia or unexpected weight changes        Social History     Social History    Marital status:      Spouse name: N/A    Number of children: N/A    Years of education: N/A     Social History Main Topics    Smoking status: Never Smoker    Smokeless tobacco: Never Used    Alcohol use No    Drug use: No    Sexual activity: No     Other Topics Concern    None     Social History Narrative     History reviewed. No pertinent family history. OBJECTIVE:     Visit Vitals    /70 (BP 1 Location: Right arm, BP Patient Position: Sitting)    Pulse 70    Temp 98 °F (36.7 °C) (Oral)    Resp 18    Ht 6' 3\" (1.905 m)    Wt 223 lb (101.2 kg)    SpO2 98%    BMI 27.87 kg/m2     CONSTITUTIONAL:   well nourished, appears age appropriate  EYES: sclera anicteric, PERRL, EOMI  ENMT:nars clear, moist mucous membranes, pharynx clear  NECK: supple. Thyroid normal, No JVD or bruits  RESPIRATORY: Chest: clear to ascultation and percussion, normal inspiratory effort  CARDIOVASCULAR: Heart: regular rate and rhythm no murmurs, rubs or gallops, PMI not displaced, No thrills  GASTROINTESTINAL: Abdomen: non distended, soft, non tender, bowel sounds normal  HEMATOLOGIC: no purpura, petechiae or bruising  LYMPHATIC: No lymph node enlargemant  MUSCULOSKELETAL: Extremities: no edema or active synovitis, pulse 1+   INTEGUMENT: No unusual rashes or suspicious skin lesions noted. Nails appear normal.  PERIPHERAL VASCULAR: normal pulses femoral, PT and DP  NEUROLOGIC: non-focal exam, A & O X 3  PSYCHIATRIC:, appropriate affect     ASSESSMENT:   1. Atrial fibrillation, unspecified type ([address]    2. Chronic kidney disease, unspecified CKD stage    3. Paroxysmal atrial fibrillation (HCC)    4. Stage 3 chronic kidney disease    5. ASCVD (arteriosclerotic cardiovascular disease)    6. Hypertension with renal disease      Impression  1. Atrial fibrillation he is now converted to sinus rhythm EKG confirmation of that done today with rate of 60. We will continue Coumadin and digoxin at same dose Coumadin 5 mg daily and digoxin 0.125 daily I will schedule a 2D echocardiogram and 24-hour Holter monitor  2.   CKD that level repeated stat today is improved  3. ASCVD clinically stable  4. Hypertension is controlled  I recheck in 1 week and hopefully will have those results back by then    PLAN:  .  Orders Placed This Encounter    AMB POC PT/INR    AMB POC COMPREHENSIVE METABOLIC PANEL    AMB POC COMPLETE CBC,AUTOMATED ENTER    AMB POC EKG ROUTINE W/ 12 LEADS, INTER & REP    DISCONTD: furosemide (LASIX) 80 mg tablet         ATTENTION:   This medical record was transcribed using an electronic medical records system. Although proofread, it may and can contain electronic and spelling errors. Other human spelling and other errors may be present. Corrections may be executed at a later time. Please feel free to contact us for any clarifications as needed. Follow-up Disposition:  Return in about 1 week (around 1/16/2018). Results for orders placed or performed in visit on 01/09/18   AMB POC PT/INR   Result Value Ref Range    VALID INTERNAL CONTROL POC Yes     Prothrombin time (POC) 14.3 seconds    INR POC 1.1    AMB POC COMPREHENSIVE METABOLIC PANEL   Result Value Ref Range    GLUCOSE  75 - 110 mg/dL    BUN  9 - 20 mg/dL    Creatinine (POC)  0.8 - 1.5 mg/dL    Sodium (POC)  137 - 145 MMOL/L    Potassium (POC)  3.6 - 5.0 MMOL/L    CHLORIDE  98 - 107 MMOL/L    CO2  22 - 32 MMOL/L    CALCIUM  8.4 - 10.2 mg/dL    TOTAL PROTEIN  6.3 - 8.2 g/dL    ALBUMIN  3.9 - 5.4 g/dL    AST (POC)  14 - 36 U/L    ALT (POC)  9 - 52 U/L    ALKALINE PHOS  38 - 126 U/L    TOTAL BILIRUBIN  0.2 - 1.3 mg/dL    eGFR (POC)         Ana Emmanuel MD    The patient verbalized understanding of the problems and plans as explained.

## 2018-01-09 NOTE — PROGRESS NOTES
Kidney values are significantly worse we will get him back in follow-up this week and draw a stat BMP once he comes in the dorsal I will have the results back to see him.   Patient scheduled for f/up 1-9-2018

## 2018-01-09 NOTE — PROGRESS NOTES
Chief Complaint   Patient presents with    Irregular Heart Beat     f/u     1. Have you been to the ER, urgent care clinic since your last visit? Hospitalized since your last visit? No    2. Have you seen or consulted any other health care providers outside of the [address] [place] [address] since your last visit? Include any pap smears or colon screening.  No

## 2018-01-16 ENCOUNTER — OFFICE VISIT (OUTPATIENT)
Dept: INTERNAL MEDICINE CLINIC | Age: 83
End: 2018-01-16

## 2018-01-16 VITALS
DIASTOLIC BLOOD PRESSURE: 72 MMHG | OXYGEN SATURATION: 98 % | SYSTOLIC BLOOD PRESSURE: 138 MMHG | BODY MASS INDEX: 27.13 KG/M2 | HEART RATE: 61 BPM | WEIGHT: 218.2 LBS | RESPIRATION RATE: 16 BRPM | TEMPERATURE: 97.9 F | HEIGHT: 75 IN

## 2018-01-16 DIAGNOSIS — I12.9 HYPERTENSION WITH RENAL DISEASE: ICD-10-CM

## 2018-01-16 DIAGNOSIS — I48.0 PAROXYSMAL ATRIAL FIBRILLATION (HCC): Primary | ICD-10-CM

## 2018-01-16 LAB
INR BLD: 1.1
PT POC: 15.5 SECONDS
VALID INTERNAL CONTROL?: YES

## 2018-01-16 NOTE — PROGRESS NOTES
1. Have you been to the ER, urgent care clinic since your last visit? Hospitalized since your last visit? No    2. Have you seen or consulted any other health care providers outside of the [address] [place] [address] since your last visit? Include any pap smears or colon screening.  No     Chief Complaint   Patient presents with    Irregular Heart Beat     1 week f/u       Not Fasting

## 2018-01-16 NOTE — MR AVS SNAPSHOT
[address] [facility] [address] [address] [phone] Patient: Espinoza Tamayo MRN: [id number] Visit Information Date & Time Provider Department Dept. Phone Encounter #  
 1/16/2018  1:50 PM Cecilia Goldberg MD [address] [place] [address] [phone] [id number] Follow-up Instructions Return in about 8 days (around 1/24/2018). Your Appointments 2/14/2018  8:50 AM  
FOLLOW UP 10 with Cecilia Goldberg MD  
[facility] ([facility]) Appt Note: [address] [address] [address] [facility] [address] Upcoming Health Maintenance Date Due  
 EYE EXAM RETINAL OR DILATED Q1 2/20/1943 DTaP/Tdap/Td series (1 - Tdap) 2/20/1954 ZOSTER VACCINE AGE 60> 12/20/1992 GLAUCOMA SCREENING Q2Y 2/20/1998 HEMOGLOBIN A1C Q6M 4/30/2018 FOOT EXAM Q1 10/30/2018 MICROALBUMIN Q1 10/30/2018 LIPID PANEL Q1 10/30/2018 MEDICARE YEARLY EXAM 10/31/2018 COLONOSCOPY 3/30/2026 Allergies as of 1/16/2018  Review Complete On: 1/16/2018 By: Cecilia Goldberg MD  
 No Known Allergies Current Immunizations  Never Reviewed Name Date Influenza High Dose Vaccine PF 10/30/2017 Influenza Vaccine 10/7/2016 Pneumococcal Conjugate (PCV-13) 1/7/2005 Pneumococcal Vaccine (Unspecified Type) 1/7/2005 Rabies Vaccine 3/11/2009 Not reviewed this visit You Were Diagnosed With   
  
 Codes Comments Paroxysmal atrial fibrillation (HCC)    -  Primary ICD-10-CM: I48.0 ICD-9-CM: 427.31 Hypertension with renal disease     ICD-10-CM: I12.9 ICD-9-CM: 403.90 Vitals BP Pulse Temp Resp Height(growth percentile) Weight(growth percentile) 138/72 61 97.9 °F (36.6 °C) (Oral) 16 6' 3\" (1.905 m) 218 lb 3.2 oz (99 kg) SpO2 BMI Smoking Status Pt made aware   Verbalized understanding    98% 27.27 kg/m2 Never Smoker Vitals History BMI and BSA Data Body Mass Index Body Surface Area  
 27.27 kg/m 2 2.29 m 2 Preferred Pharmacy Pharmacy Name Phone [id number] [phone] Your Updated Medication List  
  
   
This list is accurate as of: 1/16/18  2:49 PM.  Always use your most recent med list.  
  
  
  
  
 acetaminophen 500 mg tablet Commonly known as:  TYLENOL Take 500 mg by mouth every six (6) hours as needed. diclofenac EC 75 mg EC tablet Commonly known as:  VOLTAREN  
TAKE 1 TABLET BY MOUTH TWICE A DAY  
  
 digoxin 0.125 mg tablet Commonly known as:  LANOXIN Take 1 Tab by mouth daily. fexofenadine-pseudoephedrine 180-240 mg per tablet Commonly known as:  ALLEGRA-D 24 Take 1 Tab by mouth daily. FISH OIL PO Take 1,200 mg by mouth two (2) times a day. furosemide 40 mg tablet Commonly known as:  LASIX Take 40 mg by mouth daily. lisinopril 5 mg tablet Commonly known as:  [place] Take 5 mg by mouth daily. predniSONE 5 mg tablet Commonly known as:  DELTASONE  
TAKE 1 TABLET DAILY AS DIRECTED  
  
 sildenafil (antihypertensive) 20 mg tablet Commonly known as:  REVATIO  
TAKE 1 TABLET, ORAL, AS DIRECTED FOR SEXUAL ACTIVITY  
  
 simvastatin 20 mg tablet Commonly known as:  ZOCOR Take 20 mg by mouth nightly. terazosin 5 mg capsule Commonly known as:  HYTRIN Take 5 mg by mouth nightly. VITAMIN D3 1,000 unit Cap Generic drug:  cholecalciferol Take 1,000 Units by mouth daily. warfarin 5 mg tablet Commonly known as:  COUMADIN Take 1 Tab by mouth daily. We Performed the Following AMB POC PT/INR [80260 CPT(R)] DIGOXIN [55467 CPT(R)] Follow-up Instructions Return in about 8 days (around 1/24/2018). Patient Instructions Atrial Fibrillation: Care Instructions Your Care Instructions Atrial fibrillation is an irregular and often fast heartbeat. Treating this condition is important for several reasons. It can cause blood clots, which can travel from your heart to your brain and cause a stroke. If you have a fast heartbeat, you may feel lightheaded, dizzy, and weak. An irregular heartbeat can also increase your risk for heart failure. Atrial fibrillation is often the result of another heart condition, such as high blood pressure or coronary artery disease. Making changes to improve your heart condition will help you stay healthy and active. Follow-up care is a key part of your treatment and safety. Be sure to make and go to all appointments, and call your doctor if you are having problems. It's also a good idea to know your test results and keep a list of the medicines you take. How can you care for yourself at home? Medicines ? · Take your medicines exactly as prescribed. Call your doctor if you think you are having a problem with your medicine. You will get more details on the specific medicines your doctor prescribes. ? · If your doctor has given you a blood thinner to prevent a stroke, be sure you get instructions about how to take your medicine safely. Blood thinners can cause serious bleeding problems. ? · Do not take any vitamins, over-the-counter drugs, or herbal products without talking to your doctor first. ? Lifestyle changes ? · Do not smoke. Smoking can increase your chance of a stroke and heart attack. If you need help quitting, talk to your doctor about stop-smoking programs and medicines. These can increase your chances of quitting for good. ? · Eat a heart-healthy diet. ? · Stay at a healthy weight. Lose weight if you need to.  
? · Limit alcohol to 2 drinks a day for men and 1 drink a day for women. Too much alcohol can cause health problems. ? · Avoid colds and flu. Get a pneumococcal vaccine shot. If you have had one before, ask your doctor whether you need another dose. Get a flu shot every year. If you must be around people with colds or flu, wash your hands often. Activity ? · If your doctor recommends it, get more exercise. Walking is a good choice. Bit by bit, increase the amount you walk every day. Try for at least 30 minutes on most days of the week. You also may want to swim, bike, or do other activities. Your doctor may suggest that you join a cardiac rehabilitation program so that you can have help increasing your physical activity safely. ? · Start light exercise if your doctor says it is okay. Even a small amount will help you get stronger, have more energy, and manage stress. Walking is an easy way to get exercise. Start out by walking a little more than you did in the hospital. Gradually increase the amount you walk. ? · When you exercise, watch for signs that your heart is working too hard. You are pushing too hard if you cannot talk while you are exercising. If you become short of breath or dizzy or have chest pain, sit down and rest immediately. ? · Check your pulse regularly. Place two fingers on the artery at the palm side of your wrist, in line with your thumb. If your heartbeat seems uneven or fast, talk to your doctor. When should you call for help? Call 911 anytime you think you may need emergency care. For example, call if: 
? · You have symptoms of a heart attack. These may include: ¨ Chest pain or pressure, or a strange feeling in the chest. 
¨ Sweating. ¨ Shortness of breath. ¨ Nausea or vomiting. ¨ Pain, pressure, or a strange feeling in the back, neck, jaw, or upper belly or in one or both shoulders or arms. ¨ Lightheadedness or sudden weakness. ¨ A fast or irregular heartbeat.  
After you call 911, the  may tell you to chew 1 adult-strength or 2 to 4 low-dose aspirin. Wait for an ambulance. Do not try to drive yourself. ? · You have symptoms of a stroke. These may include: 
¨ Sudden numbness, tingling, weakness, or loss of movement in your face, arm, or leg, especially on only one side of your body. ¨ Sudden vision changes. ¨ Sudden trouble speaking. ¨ Sudden confusion or trouble understanding simple statements. ¨ Sudden problems with walking or balance. ¨ A sudden, severe headache that is different from past headaches. ? · You passed out (lost consciousness). ?Call your doctor now or seek immediate medical care if: 
? · You have new or increased shortness of breath. ? · You feel dizzy or lightheaded, or you feel like you may faint. ? · Your heart rate becomes irregular. ? · You can feel your heart flutter in your chest or skip heartbeats. Tell your doctor if these symptoms are new or worse. ? Watch closely for changes in your health, and be sure to contact your doctor if you have any problems. Where can you learn more? Go to http://isra-rhonda.info/. Enter U020 in the search box to learn more about \"Atrial Fibrillation: Care Instructions. \" Current as of: September 21, 2016 Content Version: 11.4 © 0031-6362 [employer]. Care instructions adapted under license by [employer] (which disclaims liability or warranty for this information). If you have questions about a medical condition or this instruction, always ask your healthcare professional. [place] [address] any warranty or liability for your use of this information. Introducing [facility]! [facility] introduces [employer] patient portal. Now you can access parts of your medical record, email your doctor's office, and request medication refills online. 1. In your internet browser, go to [email or web address] 2. Click on the First Time User? Click Here link in the Sign In box.  You will see the New Member Sign Up page. 3. Enter your [employer] Access Code exactly as it appears below. You will not need to use this code after youve completed the sign-up process. If you do not sign up before the expiration date, you must request a new code. · [employer] Access Code: [email or web address] Expires: 1/28/2018  8:05 AM 
 
4. Enter the last four digits of your Social Security Number (xxxx) and Date of Birth (mm/dd/yyyy) as indicated and click Submit. You will be taken to the next sign-up page. 5. Create a [employer] ID. This will be your [employer] login ID and cannot be changed, so think of one that is secure and easy to remember. 6. Create a [employer] password. You can change your password at any time. 7. Enter your Password Reset Question and Answer. This can be used at a later time if you forget your password. 8. Enter your e-mail address. You will receive e-mail notification when new information is available in [address] 9. Click Sign Up. You can now view and download portions of your medical record. 10. Click the Download Summary menu link to download a portable copy of your medical information. If you have questions, please visit the Frequently Asked Questions section of the [employer] website. Remember, [employer] is NOT to be used for urgent needs. For medical emergencies, dial 911. Now available from your iPhone and Android! Please provide this summary of care documentation to your next provider. Your primary care clinician is listed as [facility]. If you have any questions after today's visit, please call [phone].

## 2018-01-16 NOTE — PROGRESS NOTES
Chief Complaint   Patient presents with    Irregular Heart Beat     1 week f/u    Chronic Kidney Disease     1 week f/u       SUBJECTIVE:    Colby Acuna is a 80 y.o. male who returns in follow-up of his paroxysmal atrial fibrillation hypertension. He has been taken the Coumadin and the digoxin has had no palpitations, lightheadedness, dizziness, chest pain, shortness of breath other cardiorespiratory or neurologic complaints. He denies any other complaints and seems to be doing well. He is scheduled to get the echocardiogram and Holter monitor later this week. Current Outpatient Prescriptions   Medication Sig Dispense Refill    warfarin (COUMADIN) 5 mg tablet Take 1 Tab by mouth daily. 30 Tab prn    digoxin (LANOXIN) 0.125 mg tablet Take 1 Tab by mouth daily. 30 Tab prn    predniSONE (DELTASONE) 5 mg tablet TAKE 1 TABLET DAILY AS DIRECTED 90 Tab 1    sildenafil, antihypertensive, (REVATIO) 20 mg tablet TAKE 1 TABLET, ORAL, AS DIRECTED FOR SEXUAL ACTIVITY 20 Tab 11    cholecalciferol (VITAMIN D3) 1,000 unit cap Take 1,000 Units by mouth daily.  DOCOSAHEXANOIC ACID/EPA (FISH OIL PO) Take 1,200 mg by mouth two (2) times a day.  diclofenac EC (VOLTAREN) 75 mg EC tablet TAKE 1 TABLET BY MOUTH TWICE A  Tab 3    furosemide (LASIX) 40 mg tablet Take 40 mg by mouth daily.  simvastatin (ZOCOR) 20 mg tablet Take 20 mg by mouth nightly.  lisinopril (PRINIVIL, ZESTRIL) 5 mg tablet Take 5 mg by mouth daily.  terazosin (HYTRIN) 5 mg capsule Take 5 mg by mouth nightly.  fexofenadine-pseudoephedrine (ALLEGRA-D 24) 180-240 mg per tablet Take 1 Tab by mouth daily.  acetaminophen (TYLENOL) 500 mg tablet Take 500 mg by mouth every six (6) hours as needed. 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    Back pain 9/20/2017    BPH (benign prostatic hyperplasia) 9/20/2017    CHF (congestive heart failure) ([facility] 9/20/2017    CKD (chronic kidney disease) 9/20/2017    DM (diabetes mellitus) ([facility] 9/20/2017    Story: Diet Controlled    ED (erectile dysfunction) 9/20/2017    Edema 9/20/2017    Elevated CPK 9/20/2017    Comments: History of    Elevated LFTs 9/20/2017    Comments: History of    Elevated PSA 9/20/2017    Hypercholesteremia     Hyperlipidemia 9/20/2017    Hypertension     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    Nodule of right lung 9/20/2017    Story: Right    Polymyalgia ([facility] 9/20/2017    Prostate enlargement      Past Surgical History:   Procedure Laterality Date    ABDOMEN SURGERY PROC UNLISTED      hernia repair     No Known Allergies    REVIEW OF SYSTEMS:  General: negative for - chills or fever, or weight loss or gain  ENT: negative for - headaches, nasal congestion or tinnitus  Eyes: no blurred or visual changes  Neck: No stiffness or swollen nodes  Respiratory: negative for - cough, hemoptysis, shortness of breath or wheezing  Cardiovascular : negative for - chest pain, edema, palpitations or shortness of breath  Gastrointestinal: negative for - abdominal pain, blood in stools, heartburn or nausea/vomiting  Genito-Urinary: no dysuria, trouble voiding, or hematuria  Musculoskeletal: negative for - gait disturbance, joint pain, joint stiffness or joint swelling  Neurological: no TIA or stroke symptoms  Hematologic: no bruises, no bleeding  Lymphatic: no swollen glands  Integument: no lumps, mole changes, nail changes or rash  Endocrine:no malaise/lethargy poly uria or polydipsia or unexpected weight changes        Social History     Social History    Marital status:      Spouse name: N/A    Number of children: N/A    Years of education: N/A     Social History Main Topics    Smoking status: Never Smoker    Smokeless tobacco: Never Used    Alcohol use No    Drug use: No    Sexual activity: No     Other Topics Concern    None     Social History Narrative     History reviewed. No pertinent family history. OBJECTIVE:     Visit Vitals    /72    Pulse 61    Temp 97.9 °F (36.6 °C) (Oral)    Resp 16    Ht 6' 3\" (1.905 m)    Wt 218 lb 3.2 oz (99 kg)    SpO2 98%    BMI 27.27 kg/m2     CONSTITUTIONAL:   well nourished, appears age appropriate  EYES: sclera anicteric, PERRL, EOMI  ENMT:nars clear, moist mucous membranes, pharynx clear  NECK: supple. Thyroid normal, No JVD or bruits  RESPIRATORY: Chest: clear to ascultation and percussion, normal inspiratory effort  CARDIOVASCULAR: Heart: regular rate and rhythm no murmurs, rubs or gallops, PMI not displaced, No thrills  GASTROINTESTINAL: Abdomen: non distended, soft, non tender, bowel sounds normal  HEMATOLOGIC: no purpura, petechiae or bruising  LYMPHATIC: No lymph node enlargemant  MUSCULOSKELETAL: Extremities: no edema or active synovitis, pulse 1+   INTEGUMENT: No unusual rashes or suspicious skin lesions noted. Nails appear normal.  PERIPHERAL VASCULAR: normal pulses femoral, PT and DP  NEUROLOGIC: non-focal exam, A & O X 3  PSYCHIATRIC:, appropriate affect     ASSESSMENT:   1. Paroxysmal atrial fibrillation (HCC)    2. Hypertension with renal disease      Impression  1. PAF currently seems to be in sinus rhythm  2. Hypertension seems to be control  We will check his digoxin level and his pro time today. Since he is not due to get the Holter monitor and echocardiogram done until later this week I will check him back again in 8 days at which time we can reevaluate his atrial fibrillation and hopefully review his results of his tests. PLAN:  .  Orders Placed This Encounter    DIGOXIN    AMB POC PT/INR         ATTENTION:   This medical record was transcribed using an electronic medical records system. Although proofread, it may and can contain electronic and spelling errors. Other human spelling and other errors may be present. Corrections may be executed at a later time. Please feel free to contact us for any clarifications as needed. Follow-up Disposition:  Return in about 8 days (around 1/24/2018). No results found for any visits on 01/16/18. Nancy Peña MD    The patient verbalized understanding of the problems and plans as explained.

## 2018-01-16 NOTE — PATIENT INSTRUCTIONS
Atrial Fibrillation: Care Instructions  Your Care Instructions    Atrial fibrillation is an irregular and often fast heartbeat. Treating this condition is important for several reasons. It can cause blood clots, which can travel from your heart to your brain and cause a stroke. If you have a fast heartbeat, you may feel lightheaded, dizzy, and weak. An irregular heartbeat can also increase your risk for heart failure. Atrial fibrillation is often the result of another heart condition, such as high blood pressure or coronary artery disease. Making changes to improve your heart condition will help you stay healthy and active. Follow-up care is a key part of your treatment and safety. Be sure to make and go to all appointments, and call your doctor if you are having problems. It's also a good idea to know your test results and keep a list of the medicines you take. How can you care for yourself at home? Medicines  ? · Take your medicines exactly as prescribed. Call your doctor if you think you are having a problem with your medicine. You will get more details on the specific medicines your doctor prescribes. ? · If your doctor has given you a blood thinner to prevent a stroke, be sure you get instructions about how to take your medicine safely. Blood thinners can cause serious bleeding problems. ? · Do not take any vitamins, over-the-counter drugs, or herbal products without talking to your doctor first.   ? Lifestyle changes  ? · Do not smoke. Smoking can increase your chance of a stroke and heart attack. If you need help quitting, talk to your doctor about stop-smoking programs and medicines. These can increase your chances of quitting for good. ? · Eat a heart-healthy diet. ? · Stay at a healthy weight. Lose weight if you need to.   ? · Limit alcohol to 2 drinks a day for men and 1 drink a day for women. Too much alcohol can cause health problems. ? · Avoid colds and flu.  Get a pneumococcal vaccine shot. If you have had one before, ask your doctor whether you need another dose. Get a flu shot every year. If you must be around people with colds or flu, wash your hands often. Activity  ? · If your doctor recommends it, get more exercise. Walking is a good choice. Bit by bit, increase the amount you walk every day. Try for at least 30 minutes on most days of the week. You also may want to swim, bike, or do other activities. Your doctor may suggest that you join a cardiac rehabilitation program so that you can have help increasing your physical activity safely. ? · Start light exercise if your doctor says it is okay. Even a small amount will help you get stronger, have more energy, and manage stress. Walking is an easy way to get exercise. Start out by walking a little more than you did in the hospital. Gradually increase the amount you walk. ? · When you exercise, watch for signs that your heart is working too hard. You are pushing too hard if you cannot talk while you are exercising. If you become short of breath or dizzy or have chest pain, sit down and rest immediately. ? · Check your pulse regularly. Place two fingers on the artery at the palm side of your wrist, in line with your thumb. If your heartbeat seems uneven or fast, talk to your doctor. When should you call for help? Call 911 anytime you think you may need emergency care. For example, call if:  ? · You have symptoms of a heart attack. These may include:  ¨ Chest pain or pressure, or a strange feeling in the chest.  ¨ Sweating. ¨ Shortness of breath. ¨ Nausea or vomiting. ¨ Pain, pressure, or a strange feeling in the back, neck, jaw, or upper belly or in one or both shoulders or arms. ¨ Lightheadedness or sudden weakness. ¨ A fast or irregular heartbeat. After you call 911, the  may tell you to chew 1 adult-strength or 2 to 4 low-dose aspirin. Wait for an ambulance. Do not try to drive yourself.    ? · You have symptoms of a stroke. These may include:  ¨ Sudden numbness, tingling, weakness, or loss of movement in your face, arm, or leg, especially on only one side of your body. ¨ Sudden vision changes. ¨ Sudden trouble speaking. ¨ Sudden confusion or trouble understanding simple statements. ¨ Sudden problems with walking or balance. ¨ A sudden, severe headache that is different from past headaches. ? · You passed out (lost consciousness). ?Call your doctor now or seek immediate medical care if:  ? · You have new or increased shortness of breath. ? · You feel dizzy or lightheaded, or you feel like you may faint. ? · Your heart rate becomes irregular. ? · You can feel your heart flutter in your chest or skip heartbeats. Tell your doctor if these symptoms are new or worse. ? Watch closely for changes in your health, and be sure to contact your doctor if you have any problems. Where can you learn more? Go to http://isra-rhonda.info/. Enter U020 in the search box to learn more about \"Atrial Fibrillation: Care Instructions. \"  Current as of: September 21, 2016  Content Version: 11.4  © 0572-1824 [employer]. Care instructions adapted under license by [employer] (which disclaims liability or warranty for this information). If you have questions about a medical condition or this instruction, always ask your healthcare professional. [address] any warranty or liability for your use of this information.

## 2018-01-17 ENCOUNTER — TELEPHONE ANTICOAG (OUTPATIENT)
Dept: INTERNAL MEDICINE CLINIC | Age: 83
End: 2018-01-17

## 2018-01-17 LAB — DIGOXIN SERPL-MCNC: 0.6 NG/ML (ref 0.5–0.9)

## 2018-01-17 NOTE — PROGRESS NOTES
Anticoagulation Episode Summary     Current INR goal    Next INR check 1/24/2018   INR from last check 1.1 (1/16/2018)   Weekly max dose    Target end date    INR check location    Preferred lab    Send INR reminders to     Indications   Paroxysmal atrial fibrillation ([facility] (Primary) [I48.0]           Comments            Informed patient that INR was low at 1.1. Dr. Daniela Jones recommends increasing the dose to 7.5 mg on Monday, Wednesday and Friday ; and 5 mg all other days. Recheck 1- as previously scheduled.

## 2018-01-17 NOTE — PROGRESS NOTES
INR is very low so as he still taken the Coumadin and if so what dose she taken daily. Discussed with patient. Will increase his dose per Dr. SILVERMAN [facility] to 7.5 mg on Monday, Wednesday and Friday; 5 mg all other days. F/up 1- as scheduled.

## 2018-01-24 ENCOUNTER — OFFICE VISIT (OUTPATIENT)
Dept: INTERNAL MEDICINE CLINIC | Age: 83
End: 2018-01-24

## 2018-01-24 VITALS
OXYGEN SATURATION: 97 % | SYSTOLIC BLOOD PRESSURE: 122 MMHG | DIASTOLIC BLOOD PRESSURE: 72 MMHG | RESPIRATION RATE: 16 BRPM | TEMPERATURE: 97.7 F | HEART RATE: 61 BPM | HEIGHT: 75 IN | WEIGHT: 217.4 LBS | BODY MASS INDEX: 27.03 KG/M2

## 2018-01-24 DIAGNOSIS — E11.22 CONTROLLED TYPE 2 DIABETES MELLITUS WITH STAGE 3 CHRONIC KIDNEY DISEASE, WITHOUT LONG-TERM CURRENT USE OF INSULIN (HCC): ICD-10-CM

## 2018-01-24 DIAGNOSIS — I12.9 HYPERTENSION WITH RENAL DISEASE: Primary | ICD-10-CM

## 2018-01-24 DIAGNOSIS — I48.0 PAROXYSMAL ATRIAL FIBRILLATION (HCC): ICD-10-CM

## 2018-01-24 DIAGNOSIS — N18.30 CONTROLLED TYPE 2 DIABETES MELLITUS WITH STAGE 3 CHRONIC KIDNEY DISEASE, WITHOUT LONG-TERM CURRENT USE OF INSULIN (HCC): ICD-10-CM

## 2018-01-24 DIAGNOSIS — N18.30 STAGE 3 CHRONIC KIDNEY DISEASE (HCC): ICD-10-CM

## 2018-01-24 LAB
INR BLD: 1.2
PT POC: 16.1 SECONDS
VALID INTERNAL CONTROL?: YES

## 2018-01-24 NOTE — PATIENT INSTRUCTIONS
Atrial Fibrillation: Care Instructions  Your Care Instructions    Atrial fibrillation is an irregular and often fast heartbeat. Treating this condition is important for several reasons. It can cause blood clots, which can travel from your heart to your brain and cause a stroke. If you have a fast heartbeat, you may feel lightheaded, dizzy, and weak. An irregular heartbeat can also increase your risk for heart failure. Atrial fibrillation is often the result of another heart condition, such as high blood pressure or coronary artery disease. Making changes to improve your heart condition will help you stay healthy and active. Follow-up care is a key part of your treatment and safety. Be sure to make and go to all appointments, and call your doctor if you are having problems. It's also a good idea to know your test results and keep a list of the medicines you take. How can you care for yourself at home? Medicines  ? · Take your medicines exactly as prescribed. Call your doctor if you think you are having a problem with your medicine. You will get more details on the specific medicines your doctor prescribes. ? · If your doctor has given you a blood thinner to prevent a stroke, be sure you get instructions about how to take your medicine safely. Blood thinners can cause serious bleeding problems. ? · Do not take any vitamins, over-the-counter drugs, or herbal products without talking to your doctor first.   ? Lifestyle changes  ? · Do not smoke. Smoking can increase your chance of a stroke and heart attack. If you need help quitting, talk to your doctor about stop-smoking programs and medicines. These can increase your chances of quitting for good. ? · Eat a heart-healthy diet. ? · Stay at a healthy weight. Lose weight if you need to.   ? · Limit alcohol to 2 drinks a day for men and 1 drink a day for women. Too much alcohol can cause health problems. ? · Avoid colds and flu.  Get a pneumococcal vaccine shot. If you have had one before, ask your doctor whether you need another dose. Get a flu shot every year. If you must be around people with colds or flu, wash your hands often. Activity  ? · If your doctor recommends it, get more exercise. Walking is a good choice. Bit by bit, increase the amount you walk every day. Try for at least 30 minutes on most days of the week. You also may want to swim, bike, or do other activities. Your doctor may suggest that you join a cardiac rehabilitation program so that you can have help increasing your physical activity safely. ? · Start light exercise if your doctor says it is okay. Even a small amount will help you get stronger, have more energy, and manage stress. Walking is an easy way to get exercise. Start out by walking a little more than you did in the hospital. Gradually increase the amount you walk. ? · When you exercise, watch for signs that your heart is working too hard. You are pushing too hard if you cannot talk while you are exercising. If you become short of breath or dizzy or have chest pain, sit down and rest immediately. ? · Check your pulse regularly. Place two fingers on the artery at the palm side of your wrist, in line with your thumb. If your heartbeat seems uneven or fast, talk to your doctor. When should you call for help? Call 911 anytime you think you may need emergency care. For example, call if:  ? · You have symptoms of a heart attack. These may include:  ¨ Chest pain or pressure, or a strange feeling in the chest.  ¨ Sweating. ¨ Shortness of breath. ¨ Nausea or vomiting. ¨ Pain, pressure, or a strange feeling in the back, neck, jaw, or upper belly or in one or both shoulders or arms. ¨ Lightheadedness or sudden weakness. ¨ A fast or irregular heartbeat. After you call 911, the  may tell you to chew 1 adult-strength or 2 to 4 low-dose aspirin. Wait for an ambulance. Do not try to drive yourself.    ? · You have symptoms of a stroke. These may include:  ¨ Sudden numbness, tingling, weakness, or loss of movement in your face, arm, or leg, especially on only one side of your body. ¨ Sudden vision changes. ¨ Sudden trouble speaking. ¨ Sudden confusion or trouble understanding simple statements. ¨ Sudden problems with walking or balance. ¨ A sudden, severe headache that is different from past headaches. ? · You passed out (lost consciousness). ?Call your doctor now or seek immediate medical care if:  ? · You have new or increased shortness of breath. ? · You feel dizzy or lightheaded, or you feel like you may faint. ? · Your heart rate becomes irregular. ? · You can feel your heart flutter in your chest or skip heartbeats. Tell your doctor if these symptoms are new or worse. ? Watch closely for changes in your health, and be sure to contact your doctor if you have any problems. Where can you learn more? Go to http://isra-rhonda.info/. Enter U020 in the search box to learn more about \"Atrial Fibrillation: Care Instructions. \"  Current as of: September 21, 2016  Content Version: 11.4  © 8242-9914 [employer]. Care instructions adapted under license by [employer] (which disclaims liability or warranty for this information). If you have questions about a medical condition or this instruction, always ask your healthcare professional. [address] any warranty or liability for your use of this information.

## 2018-01-24 NOTE — PROGRESS NOTES
1. Have you been to the ER, urgent care clinic since your last visit? Hospitalized since your last visit? No    2. Have you seen or consulted any other health care providers outside of the [address] [place] [address] since your last visit? Include any pap smears or colon screening.  No     Chief Complaint   Patient presents with    Irregular Heart Beat     1 week f/u       Not fasting

## 2018-01-24 NOTE — PROGRESS NOTES
Chief Complaint   Patient presents with    Irregular Heart Beat     1 week f/u       SUBJECTIVE:    Marisabel Goddard is a 80 y.o. male who returns in follow-up for his atrial fibrillation, hypertension, chronic kidney disease and other medical problems. He actually feels quite well since he has been on the medication for his atrial fibrillation and he is noted no palpitations, lightheadedness, dizziness, chest pain, shortness of breath other cardiorespiratory neurologic complaints. He is taking increased dose of Coumadin now. He does note his sugars real high after eating his Catherine meal but he has been watching his diet since then has lost quite a bit of weight and was know what his sugars done. He denies any other problems. Current Outpatient Prescriptions   Medication Sig Dispense Refill    warfarin (COUMADIN) 5 mg tablet Take 1 Tab by mouth daily. 30 Tab prn    digoxin (LANOXIN) 0.125 mg tablet Take 1 Tab by mouth daily. 30 Tab prn    predniSONE (DELTASONE) 5 mg tablet TAKE 1 TABLET DAILY AS DIRECTED 90 Tab 1    sildenafil, antihypertensive, (REVATIO) 20 mg tablet TAKE 1 TABLET, ORAL, AS DIRECTED FOR SEXUAL ACTIVITY 20 Tab 11    cholecalciferol (VITAMIN D3) 1,000 unit cap Take 1,000 Units by mouth daily.  DOCOSAHEXANOIC ACID/EPA (FISH OIL PO) Take 1,200 mg by mouth two (2) times a day.  diclofenac EC (VOLTAREN) 75 mg EC tablet TAKE 1 TABLET BY MOUTH TWICE A  Tab 3    furosemide (LASIX) 40 mg tablet Take 40 mg by mouth daily.  simvastatin (ZOCOR) 20 mg tablet Take 20 mg by mouth nightly.  lisinopril (PRINIVIL, ZESTRIL) 5 mg tablet Take 5 mg by mouth daily.  terazosin (HYTRIN) 5 mg capsule Take 5 mg by mouth nightly.  fexofenadine-pseudoephedrine (ALLEGRA-D 24) 180-240 mg per tablet Take 1 Tab by mouth daily.  acetaminophen (TYLENOL) 500 mg tablet Take 500 mg by mouth every six (6) hours as needed.          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    Back pain 9/20/2017    BPH (benign prostatic hyperplasia) 9/20/2017    CHF (congestive heart failure) ([facility] 9/20/2017    CKD (chronic kidney disease) 9/20/2017    DM (diabetes mellitus) ([facility] 9/20/2017    Story: Diet Controlled    ED (erectile dysfunction) 9/20/2017    Edema 9/20/2017    Elevated CPK 9/20/2017    Comments: History of    Elevated LFTs 9/20/2017    Comments: History of    Elevated PSA 9/20/2017    Hypercholesteremia     Hyperlipidemia 9/20/2017    Hypertension     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    Nodule of right lung 9/20/2017    Story: Right    Polymyalgia ([facility] 9/20/2017    Prostate enlargement      Past Surgical History:   Procedure Laterality Date    ABDOMEN SURGERY PROC UNLISTED      hernia repair     No Known Allergies    REVIEW OF SYSTEMS:  General: negative for - chills or fever, or weight loss or gain  ENT: negative for - headaches, nasal congestion or tinnitus  Eyes: no blurred or visual changes  Neck: No stiffness or swollen nodes  Respiratory: negative for - cough, hemoptysis, shortness of breath or wheezing  Cardiovascular : negative for - chest pain, edema, palpitations or shortness of breath  Gastrointestinal: negative for - abdominal pain, blood in stools, heartburn or nausea/vomiting  Genito-Urinary: no dysuria, trouble voiding, or hematuria  Musculoskeletal: negative for - gait disturbance, joint pain, joint stiffness or joint swelling  Neurological: no TIA or stroke symptoms  Hematologic: no bruises, no bleeding  Lymphatic: no swollen glands  Integument: no lumps, mole changes, nail changes or rash  Endocrine:no malaise/lethargy poly uria or polydipsia or unexpected weight changes        Social History     Social History    Marital status:      Spouse name: N/A    Number of children: N/A    Years of education: N/A     Social History Main Topics    Smoking status: Never Smoker    Smokeless tobacco: Never Used    Alcohol use No    Drug use: No    Sexual activity: No     Other Topics Concern    None     Social History Narrative     History reviewed. No pertinent family history. OBJECTIVE:     Visit Vitals    /72 (BP 1 Location: Left arm, BP Patient Position: Sitting)    Pulse 61    Temp 97.7 °F (36.5 °C) (Oral)    Resp 16    Ht 6' 3\" (1.905 m)    Wt 217 lb 6.4 oz (98.6 kg)    SpO2 97%    BMI 27.17 kg/m2     CONSTITUTIONAL:   well nourished, appears age appropriate  EYES: sclera anicteric, PERRL, EOMI  ENMT:nars clear, moist mucous membranes, pharynx clear  NECK: supple. Thyroid normal, No JVD or bruits  RESPIRATORY: Chest: clear to ascultation and percussion, normal inspiratory effort  CARDIOVASCULAR: Heart: regular rate and rhythm no murmurs, rubs or gallops, PMI not displaced, No thrills  GASTROINTESTINAL: Abdomen: non distended, soft, non tender, bowel sounds normal  HEMATOLOGIC: no purpura, petechiae or bruising  LYMPHATIC: No lymph node enlargemant  MUSCULOSKELETAL: Extremities: no edema or active synovitis, pulse 1+   INTEGUMENT: No unusual rashes or suspicious skin lesions noted. Nails appear normal.  PERIPHERAL VASCULAR: normal pulses femoral, PT and DP  NEUROLOGIC: non-focal exam, A & O X 3  PSYCHIATRIC:, appropriate affect     ASSESSMENT:   1. Hypertension with renal disease    2. Stage 3 chronic kidney disease    3. Paroxysmal atrial fibrillation (HCC)    4. 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 Atrial fibrillation seems to be in sinus rhythm today INRs pending continue Lanoxin  3. CKD status.   4.  Diabetes we will see what the blood sugars at 3 hours postprandial  Recheck schedule III weeks which time he has his normal morning fasting pulmonary see him sooner should it be a problem or should we need to check lab studies prior to that. PLAN:  .  Orders Placed This Encounter    AMB POC BASIC METABOLIC PANEL    AMB POC PT/INR         ATTENTION:   This medical record was transcribed using an electronic medical records system. Although proofread, it may and can contain electronic and spelling errors. Other human spelling and other errors may be present. Corrections may be executed at a later time. Please feel free to contact us for any clarifications as needed. Follow-up Disposition:  Return in about 3 weeks (around 2/14/2018). No results found for any visits on 01/24/18. Windy Mackay MD    The patient verbalized understanding of the problems and plans as explained.

## 2018-01-24 NOTE — MR AVS SNAPSHOT
[address] [facility] [address] [address] [phone] Patient: Live Nettles MRN: [id number] JBR:[id number] Visit Information Date & Time Provider Department Dept. Phone Encounter #  
 1/24/2018  2:40 PM [phone] [id number] Follow-up Instructions Return in about 3 weeks (around 2/14/2018). Follow-up and Disposition History Your Appointments 2/14/2018  8:50 AM  
FOLLOW UP 10 with MD DAVIE Norwood [facility] ([facility]) Appt Note: [address] [address] [address] [facility] [address] Upcoming Health Maintenance Date Due  
 EYE EXAM RETINAL OR DILATED Q1 2/20/1943 DTaP/Tdap/Td series (1 - Tdap) 2/20/1954 ZOSTER VACCINE AGE 60> 12/20/1992 GLAUCOMA SCREENING Q2Y 2/20/1998 HEMOGLOBIN A1C Q6M 4/30/2018 FOOT EXAM Q1 10/30/2018 MICROALBUMIN Q1 10/30/2018 LIPID PANEL Q1 10/30/2018 MEDICARE YEARLY EXAM 10/31/2018 COLONOSCOPY 3/30/2026 Allergies as of 1/24/2018  Review Complete On: 1/24/2018 By: Saurabh Pandya MD  
 No Known Allergies Current Immunizations  Never Reviewed Name Date Influenza High Dose Vaccine PF 10/30/2017 Influenza Vaccine 10/7/2016 Pneumococcal Conjugate (PCV-13) 1/7/2005 Pneumococcal Vaccine (Unspecified Type) 1/7/2005 Rabies Vaccine 3/11/2009 Not reviewed this visit You Were Diagnosed With   
  
 Codes Comments Hypertension with renal disease    -  Primary ICD-10-CM: I12.9 ICD-9-CM: 403.90 Stage 3 chronic kidney disease     ICD-10-CM: N18.3 ICD-9-CM: 332. 3 Paroxysmal atrial fibrillation (HCC)     ICD-10-CM: I48.0 ICD-9-CM: 427.31  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, without long-term current use of insulin (HCC)     ICD-10-CM: E11.22, N18.3 ICD-9-CM: 250.40, 585.3 Vitals BP Pulse Temp Resp Height(growth percentile) Weight(growth percentile) 122/72 (BP 1 Location: Left arm, BP Patient Position: Sitting) 61 97.7 °F (36.5 °C) (Oral) 16 6' 3\" (1.905 m) 217 lb 6.4 oz (98.6 kg) SpO2 BMI Smoking Status 97% 27.17 kg/m2 Never Smoker Vitals History BMI and BSA Data Body Mass Index Body Surface Area  
 27.17 kg/m 2 2.28 m 2 Preferred Pharmacy Pharmacy Name Phone [id number] [phone] Your Updated Medication List  
  
   
This list is accurate as of: 1/24/18  4:02 PM.  Always use your most recent med list.  
  
  
  
  
 acetaminophen 500 mg tablet Commonly known as:  TYLENOL Take 500 mg by mouth every six (6) hours as needed. diclofenac EC 75 mg EC tablet Commonly known as:  VOLTAREN  
TAKE 1 TABLET BY MOUTH TWICE A DAY  
  
 digoxin 0.125 mg tablet Commonly known as:  LANOXIN Take 1 Tab by mouth daily. fexofenadine-pseudoephedrine 180-240 mg per tablet Commonly known as:  ALLEGRA-D 24 Take 1 Tab by mouth daily. FISH OIL PO Take 1,200 mg by mouth two (2) times a day. furosemide 40 mg tablet Commonly known as:  LASIX Take 40 mg by mouth daily. lisinopril 5 mg tablet Commonly known as:  Jose Genie Take 5 mg by mouth daily. predniSONE 5 mg tablet Commonly known as:  DELTASONE  
TAKE 1 TABLET DAILY AS DIRECTED  
  
 sildenafil (antihypertensive) 20 mg tablet Commonly known as:  REVATIO  
TAKE 1 TABLET, ORAL, AS DIRECTED FOR SEXUAL ACTIVITY  
  
 simvastatin 20 mg tablet Commonly known as:  ZOCOR Take 20 mg by mouth nightly. terazosin 5 mg capsule Commonly known as:  HYTRIN Take 5 mg by mouth nightly. VITAMIN D3 1,000 unit Cap Generic drug:  cholecalciferol Take 1,000 Units by mouth daily. warfarin 5 mg tablet Commonly known as:  COUMADIN Take 1 Tab by mouth daily. We Performed the Following AMB POC BASIC METABOLIC PANEL [82635 CPT(R)] AMB POC PT/INR [97103 CPT(R)] Follow-up Instructions Return in about 3 weeks (around 2/14/2018). Patient Instructions Atrial Fibrillation: Care Instructions Your Care Instructions Atrial fibrillation is an irregular and often fast heartbeat. Treating this condition is important for several reasons. It can cause blood clots, which can travel from your heart to your brain and cause a stroke. If you have a fast heartbeat, you may feel lightheaded, dizzy, and weak. An irregular heartbeat can also increase your risk for heart failure. Atrial fibrillation is often the result of another heart condition, such as high blood pressure or coronary artery disease. Making changes to improve your heart condition will help you stay healthy and active. Follow-up care is a key part of your treatment and safety. Be sure to make and go to all appointments, and call your doctor if you are having problems. It's also a good idea to know your test results and keep a list of the medicines you take. How can you care for yourself at home? Medicines ? · Take your medicines exactly as prescribed. Call your doctor if you think you are having a problem with your medicine. You will get more details on the specific medicines your doctor prescribes. ? · If your doctor has given you a blood thinner to prevent a stroke, be sure you get instructions about how to take your medicine safely. Blood thinners can cause serious bleeding problems. ? · Do not take any vitamins, over-the-counter drugs, or herbal products without talking to your doctor first. ? Lifestyle changes ? · Do not smoke.  Smoking can increase your chance of a stroke and heart attack. If you need help quitting, talk to your doctor about stop-smoking programs and medicines. These can increase your chances of quitting for good. ? · Eat a heart-healthy diet. ? · Stay at a healthy weight. Lose weight if you need to.  
? · Limit alcohol to 2 drinks a day for men and 1 drink a day for women. Too much alcohol can cause health problems. ? · Avoid colds and flu. Get a pneumococcal vaccine shot. If you have had one before, ask your doctor whether you need another dose. Get a flu shot every year. If you must be around people with colds or flu, wash your hands often. Activity ? · If your doctor recommends it, get more exercise. Walking is a good choice. Bit by bit, increase the amount you walk every day. Try for at least 30 minutes on most days of the week. You also may want to swim, bike, or do other activities. Your doctor may suggest that you join a cardiac rehabilitation program so that you can have help increasing your physical activity safely. ? · Start light exercise if your doctor says it is okay. Even a small amount will help you get stronger, have more energy, and manage stress. Walking is an easy way to get exercise. Start out by walking a little more than you did in the hospital. Gradually increase the amount you walk. ? · When you exercise, watch for signs that your heart is working too hard. You are pushing too hard if you cannot talk while you are exercising. If you become short of breath or dizzy or have chest pain, sit down and rest immediately. ? · Check your pulse regularly. Place two fingers on the artery at the palm side of your wrist, in line with your thumb. If your heartbeat seems uneven or fast, talk to your doctor. When should you call for help? Call 911 anytime you think you may need emergency care. For example, call if: 
? · You have symptoms of a heart attack. These may include: ¨ Chest pain or pressure, or a strange feeling in the chest. 
¨ Sweating. ¨ Shortness of breath. ¨ Nausea or vomiting. ¨ Pain, pressure, or a strange feeling in the back, neck, jaw, or upper belly or in one or both shoulders or arms. ¨ Lightheadedness or sudden weakness. ¨ A fast or irregular heartbeat. After you call 911, the  may tell you to chew 1 adult-strength or 2 to 4 low-dose aspirin. Wait for an ambulance. Do not try to drive yourself. ? · You have symptoms of a stroke. These may include: 
¨ Sudden numbness, tingling, weakness, or loss of movement in your face, arm, or leg, especially on only one side of your body. ¨ Sudden vision changes. ¨ Sudden trouble speaking. ¨ Sudden confusion or trouble understanding simple statements. ¨ Sudden problems with walking or balance. ¨ A sudden, severe headache that is different from past headaches. ? · You passed out (lost consciousness). ?Call your doctor now or seek immediate medical care if: 
? · You have new or increased shortness of breath. ? · You feel dizzy or lightheaded, or you feel like you may faint. ? · Your heart rate becomes irregular. ? · You can feel your heart flutter in your chest or skip heartbeats. Tell your doctor if these symptoms are new or worse. ? Watch closely for changes in your health, and be sure to contact your doctor if you have any problems. Where can you learn more? Go to http://isra-rhonda.info/. Enter U020 in the search box to learn more about \"Atrial Fibrillation: Care Instructions. \" Current as of: September 21, 2016 Content Version: 11.4 © 1865-4336 [employer]. Care instructions adapted under license by [employer] (which disclaims liability or warranty for this information). If you have questions about a medical condition or this instruction, always ask your healthcare professional. [address] any warranty or liability for your use of this information. Patient Instructions History Introducing [facility]! Alessandro Michelle introduces [employer] patient portal. Now you can access parts of your medical record, email your doctor's office, and request medication refills online. 1. In your internet browser, go to [email or web address] 2. Click on the First Time User? Click Here link in the Sign In box. You will see the New Member Sign Up page. 3. Enter your [employer] Access Code exactly as it appears below. You will not need to use this code after youve completed the sign-up process. If you do not sign up before the expiration date, you must request a new code. · [employer] Access Code: [email or web address] Expires: 1/28/2018  8:05 AM 
 
4. Enter the last four digits of your Social Security Number (xxxx) and Date of Birth (mm/dd/yyyy) as indicated and click Submit. You will be taken to the next sign-up page. 5. Create a BrightLocker ID. This will be your Bright[employer] login ID and cannot be changed, so think of one that is secure and easy to remember. 6. Create a [employer] password. You can change your password at any time. 7. Enter your Password Reset Question and Answer. This can be used at a later time if you forget your password. 8. Enter your e-mail address. You will receive e-mail notification when new information is available in [address] 9. Click Sign Up. You can now view and download portions of your medical record. 10. Click the Download Summary menu link to download a portable copy of your medical information. If you have questions, please visit the Frequently Asked Questions section of the [employer] website. Remember, [employer] is NOT to be used for urgent needs. For medical emergencies, dial 911. Now available from your iPhone and Android! Please provide this summary of care documentation to your next provider. Your primary care clinician is listed as Arpan. If you have any questions after today's visit, please call [phone].

## 2018-01-25 ENCOUNTER — TELEPHONE ANTICOAG (OUTPATIENT)
Dept: INTERNAL MEDICINE CLINIC | Age: 83
End: 2018-01-25

## 2018-01-25 DIAGNOSIS — I48.0 PAROXYSMAL ATRIAL FIBRILLATION (HCC): Primary | ICD-10-CM

## 2018-01-25 NOTE — PROGRESS NOTES
Anticoagulation Episode Summary     Current INR goal    Next INR check 2/14/2018   INR from last check 1.2 (1/24/2018)   Weekly max dose    Target end date    INR check location    Preferred lab    Send INR reminders to     Indications   Paroxysmal atrial fibrillation ([facility] (Primary) [I48.0]           Comments            Informed patient that his INR was still low at 1.2. Need to increase his blood thinner dose to 7.5 mg everyday except for 5 mg on Tuesday and Friday. F/up as scheduled.

## 2018-01-25 NOTE — PROGRESS NOTES
INR still a little low so I need to know his Coumadin dose to be congested. Informed patient that his INR was 1.2. Need to increase his blood thinner dose to 7.5 mg everyday except for 5 mg on Tuesday and Friday. F/up as scheduled.

## 2018-02-14 ENCOUNTER — OFFICE VISIT (OUTPATIENT)
Dept: INTERNAL MEDICINE CLINIC | Age: 83
End: 2018-02-14

## 2018-02-14 VITALS
BODY MASS INDEX: 27.01 KG/M2 | WEIGHT: 217.2 LBS | SYSTOLIC BLOOD PRESSURE: 132 MMHG | DIASTOLIC BLOOD PRESSURE: 70 MMHG | HEART RATE: 73 BPM | OXYGEN SATURATION: 98 % | HEIGHT: 75 IN

## 2018-02-14 DIAGNOSIS — I48.0 PAROXYSMAL ATRIAL FIBRILLATION (HCC): ICD-10-CM

## 2018-02-14 DIAGNOSIS — I12.9 HYPERTENSION WITH RENAL DISEASE: Primary | ICD-10-CM

## 2018-02-14 DIAGNOSIS — I48.91 ATRIAL FIBRILLATION, UNSPECIFIED TYPE (HCC): Primary | ICD-10-CM

## 2018-02-14 DIAGNOSIS — I25.10 ASCVD (ARTERIOSCLEROTIC CARDIOVASCULAR DISEASE): ICD-10-CM

## 2018-02-14 DIAGNOSIS — N18.30 CONTROLLED TYPE 2 DIABETES MELLITUS WITH STAGE 3 CHRONIC KIDNEY DISEASE, WITHOUT LONG-TERM CURRENT USE OF INSULIN (HCC): ICD-10-CM

## 2018-02-14 DIAGNOSIS — L03.115 CELLULITIS OF RIGHT LOWER EXTREMITY: ICD-10-CM

## 2018-02-14 DIAGNOSIS — M35.3 POLYMYALGIA (HCC): ICD-10-CM

## 2018-02-14 DIAGNOSIS — I50.30 DIASTOLIC CONGESTIVE HEART FAILURE, UNSPECIFIED CONGESTIVE HEART FAILURE CHRONICITY: ICD-10-CM

## 2018-02-14 DIAGNOSIS — E11.22 CONTROLLED TYPE 2 DIABETES MELLITUS WITH STAGE 3 CHRONIC KIDNEY DISEASE, WITHOUT LONG-TERM CURRENT USE OF INSULIN (HCC): ICD-10-CM

## 2018-02-14 DIAGNOSIS — E78.2 MIXED HYPERLIPIDEMIA: ICD-10-CM

## 2018-02-14 DIAGNOSIS — N18.30 STAGE 3 CHRONIC KIDNEY DISEASE (HCC): ICD-10-CM

## 2018-02-14 DIAGNOSIS — M15.9 PRIMARY OSTEOARTHRITIS INVOLVING MULTIPLE JOINTS: ICD-10-CM

## 2018-02-14 LAB
INR BLD: 1.2
PT POC: 16.1 SECONDS
VALID INTERNAL CONTROL?: YES

## 2018-02-14 RX ORDER — DIGOXIN 125 MCG
0.12 TABLET ORAL DAILY
Qty: 30 TAB | Status: SHIPPED | OUTPATIENT
Start: 2018-02-14 | End: 2019-02-20 | Stop reason: SDUPTHER

## 2018-02-14 RX ORDER — AMOXICILLIN AND CLAVULANATE POTASSIUM 875; 125 MG/1; MG/1
1 TABLET, FILM COATED ORAL EVERY 12 HOURS
Qty: 20 TAB | Refills: 0 | Status: SHIPPED | OUTPATIENT
Start: 2018-02-14 | End: 2018-02-26 | Stop reason: ALTCHOICE

## 2018-02-14 NOTE — PROGRESS NOTES
Live Nettles is a 80 y.o. male presenting for Hypertension (3 mo fu)  . 1. Have you been to the ER, urgent care clinic since your last visit? Hospitalized since your last visit? No    2. Have you seen or consulted any other health care providers outside of the [address] [place] [address] since your last visit? Include any pap smears or colon screening. No    Fall Risk Assessment, last 12 mths 1/24/2018   Able to walk? Yes   Fall in past 12 months? No   Fall with injury? -   Number of falls in past 12 months -   Fall Risk Score -         No flowsheet data found. PHQ over the last two weeks 1/24/2018   Little interest or pleasure in doing things Not at all   Feeling down, depressed or hopeless Not at all   Total Score PHQ 2 0       There are no discontinued medications.

## 2018-02-14 NOTE — PATIENT INSTRUCTIONS
Atrial Fibrillation: Care Instructions  Your Care Instructions    Atrial fibrillation is an irregular and often fast heartbeat. Treating this condition is important for several reasons. It can cause blood clots, which can travel from your heart to your brain and cause a stroke. If you have a fast heartbeat, you may feel lightheaded, dizzy, and weak. An irregular heartbeat can also increase your risk for heart failure. Atrial fibrillation is often the result of another heart condition, such as high blood pressure or coronary artery disease. Making changes to improve your heart condition will help you stay healthy and active. Follow-up care is a key part of your treatment and safety. Be sure to make and go to all appointments, and call your doctor if you are having problems. It's also a good idea to know your test results and keep a list of the medicines you take. How can you care for yourself at home? Medicines  ? · Take your medicines exactly as prescribed. Call your doctor if you think you are having a problem with your medicine. You will get more details on the specific medicines your doctor prescribes. ? · If your doctor has given you a blood thinner to prevent a stroke, be sure you get instructions about how to take your medicine safely. Blood thinners can cause serious bleeding problems. ? · Do not take any vitamins, over-the-counter drugs, or herbal products without talking to your doctor first.   ? Lifestyle changes  ? · Do not smoke. Smoking can increase your chance of a stroke and heart attack. If you need help quitting, talk to your doctor about stop-smoking programs and medicines. These can increase your chances of quitting for good. ? · Eat a heart-healthy diet. ? · Stay at a healthy weight. Lose weight if you need to.   ? · Limit alcohol to 2 drinks a day for men and 1 drink a day for women. Too much alcohol can cause health problems. ? · Avoid colds and flu.  Get a pneumococcal vaccine shot. If you have had one before, ask your doctor whether you need another dose. Get a flu shot every year. If you must be around people with colds or flu, wash your hands often. Activity  ? · If your doctor recommends it, get more exercise. Walking is a good choice. Bit by bit, increase the amount you walk every day. Try for at least 30 minutes on most days of the week. You also may want to swim, bike, or do other activities. Your doctor may suggest that you join a cardiac rehabilitation program so that you can have help increasing your physical activity safely. ? · Start light exercise if your doctor says it is okay. Even a small amount will help you get stronger, have more energy, and manage stress. Walking is an easy way to get exercise. Start out by walking a little more than you did in the hospital. Gradually increase the amount you walk. ? · When you exercise, watch for signs that your heart is working too hard. You are pushing too hard if you cannot talk while you are exercising. If you become short of breath or dizzy or have chest pain, sit down and rest immediately. ? · Check your pulse regularly. Place two fingers on the artery at the palm side of your wrist, in line with your thumb. If your heartbeat seems uneven or fast, talk to your doctor. When should you call for help? Call 911 anytime you think you may need emergency care. For example, call if:  ? · You have symptoms of a heart attack. These may include:  ¨ Chest pain or pressure, or a strange feeling in the chest.  ¨ Sweating. ¨ Shortness of breath. ¨ Nausea or vomiting. ¨ Pain, pressure, or a strange feeling in the back, neck, jaw, or upper belly or in one or both shoulders or arms. ¨ Lightheadedness or sudden weakness. ¨ A fast or irregular heartbeat. After you call 911, the  may tell you to chew 1 adult-strength or 2 to 4 low-dose aspirin. Wait for an ambulance. Do not try to drive yourself.    ? · You have symptoms of a stroke. These may include:  ¨ Sudden numbness, tingling, weakness, or loss of movement in your face, arm, or leg, especially on only one side of your body. ¨ Sudden vision changes. ¨ Sudden trouble speaking. ¨ Sudden confusion or trouble understanding simple statements. ¨ Sudden problems with walking or balance. ¨ A sudden, severe headache that is different from past headaches. ? · You passed out (lost consciousness). ?Call your doctor now or seek immediate medical care if:  ? · You have new or increased shortness of breath. ? · You feel dizzy or lightheaded, or you feel like you may faint. ? · Your heart rate becomes irregular. ? · You can feel your heart flutter in your chest or skip heartbeats. Tell your doctor if these symptoms are new or worse. ? Watch closely for changes in your health, and be sure to contact your doctor if you have any problems. Where can you learn more? Go to http://isra-rhonda.info/. Enter U020 in the search box to learn more about \"Atrial Fibrillation: Care Instructions. \"  Current as of: September 21, 2016  Content Version: 11.4  © 6330-0868 [employer]. Care instructions adapted under license by [employer] (which disclaims liability or warranty for this information). If you have questions about a medical condition or this instruction, always ask your healthcare professional. [address] any warranty or liability for your use of this information.

## 2018-02-14 NOTE — MR AVS SNAPSHOT
[address] [facility] 
 
 
 [address] [address] [phone] Patient: Colby Acuna MRN: [id number] LA[birth year] Visit Information Date & Time Provider Department Dept. Phone Encounter #  
 2018  8:50 AM Alan Larson MD [facility] [phone] [id number] Follow-up Instructions Return in about 3 months (around 2018). Follow-up and Disposition History Your Appointments 2018  1:30 PM  
FOLLOW UP 10 with MD DENY Vaughan [facility] ([facility]) Appt Note: 10 day follow up [address] [address] [address] [facility] [phone] 2018 10:00 AM  
FOLLOW UP 10 with MD PREMA Vaughan [facility] ([facility]) Appt Note: [address] [id number] [phone] Upcoming Health Maintenance Date Due  
 EYE EXAM RETINAL OR DILATED Q1 1943 DTaP/Tdap/Td series (1 - Tdap) 1954 ZOSTER VACCINE AGE 60> 1992 GLAUCOMA SCREENING Q2Y 1998 HEMOGLOBIN A1C Q6M 2018 MICROALBUMIN Q1 10/30/2018 MEDICARE YEARLY EXAM 10/31/2018 FOOT EXAM Q1 2019 LIPID PANEL Q1 2019 COLONOSCOPY 3/30/2026 Allergies as of 2018  Review Complete On: 2018 By: Alan Larson MD  
 No Known Allergies Current Immunizations  Never Reviewed Name Date Influenza High Dose Vaccine PF 10/30/2017 Influenza Vaccine 10/7/2016 Pneumococcal Conjugate (PCV-13) 2005 Pneumococcal Vaccine (Unspecified Type) 2005 Rabies Vaccine 3/11/2009 Not reviewed this visit You Were Diagnosed With   
  
 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, without long-term current use of insulin (HCC)     ICD-10-CM: E11.22, N18.3 ICD-9-CM: 250.40, 585.3 Mixed hyperlipidemia     ICD-10-CM: E78.2 ICD-9-CM: 272.2 Paroxysmal atrial fibrillation (HCC)     ICD-10-CM: I48.0 ICD-9-CM: 427.31 Primary osteoarthritis involving multiple joints     ICD-10-CM: M15.0 ICD-9-CM: 715.09 Stage 3 chronic kidney disease     ICD-10-CM: N18.3 ICD-9-CM: 585.3 ASCVD (arteriosclerotic cardiovascular disease)     ICD-10-CM: I25.10 ICD-9-CM: 429.2, 440.9 Diastolic congestive heart failure, unspecified congestive heart failure chronicity (HCC)     ICD-10-CM: I50.30 ICD-9-CM: 428.30, 428.0 Polymyalgia ([address]     ICD-10-CM: M35.3 ICD-9-CM: 088 Cellulitis of right lower extremity     ICD-10-CM: L03.115 ICD-9-CM: 077. 6 Vitals BP Pulse Height(growth percentile) Weight(growth percentile) SpO2 BMI  
 132/70 (BP 1 Location: Left arm, BP Patient Position: Sitting) 73 6' 3\" (1.905 m) 217 lb 3.2 oz (98.5 kg) 98% 27.15 kg/m2 Smoking Status Never Smoker BMI and BSA Data Body Mass Index Body Surface Area  
 27.15 kg/m 2 2.28 m 2 Preferred Pharmacy Pharmacy Name Phone [facility]/PHARMACY #[id number] Leita Shone, [facility] [phone] Your Updated Medication List  
  
   
This list is accurate as of: 2/14/18 10:27 AM.  Always use your most recent med list.  
  
  
  
  
 acetaminophen 500 mg tablet Commonly known as:  TYLENOL Take 500 mg by mouth every six (6) hours as needed. amoxicillin-clavulanate 875-125 mg per tablet Commonly known as:  AUGMENTIN Take 1 Tab by mouth every twelve (12) hours. diclofenac EC 75 mg EC tablet Commonly known as:  VOLTAREN  
TAKE 1 TABLET BY MOUTH TWICE A DAY  
  
 digoxin 0.125 mg tablet Commonly known as:  LANOXIN Take 1 Tab by mouth daily. fexofenadine-pseudoephedrine 180-240 mg per tablet Commonly known as:  ALLEGRA-D 24 Take 1 Tab by mouth daily. FISH OIL PO Take 1,200 mg by mouth two (2) times a day. furosemide 40 mg tablet Commonly known as:  LASIX Take 40 mg by mouth daily. lisinopril 5 mg tablet Commonly known as:  Susana Fair Take 5 mg by mouth daily. predniSONE 5 mg tablet Commonly known as:  DELTASONE  
TAKE 1 TABLET DAILY AS DIRECTED  
  
 sildenafil (antihypertensive) 20 mg tablet Commonly known as:  REVATIO  
TAKE 1 TABLET, ORAL, AS DIRECTED FOR SEXUAL ACTIVITY  
  
 simvastatin 20 mg tablet Commonly known as:  ZOCOR Take 20 mg by mouth nightly. terazosin 5 mg capsule Commonly known as:  HYTRIN Take 5 mg by mouth nightly. VITAMIN D3 1,000 unit Cap Generic drug:  cholecalciferol Take 1,000 Units by mouth daily. warfarin 5 mg tablet Commonly known as:  COUMADIN Take 1 Tab by mouth daily. Prescriptions Sent to Pharmacy Refills  
 amoxicillin-clavulanate (AUGMENTIN) 875-125 mg per tablet 0 Sig: Take 1 Tab by mouth every twelve (12) hours. Class: Normal  
 Pharmacy: [facility]/pharmacy #[id number] - [address] Ph #: [phone] Route: Oral  
  
We Performed the Following AMB POC COMPLETE CBC,AUTOMATED ENTER C1274609 CPT(R)] AMB POC COMPREHENSIVE METABOLIC PANEL [11044 CPT(R)] AMB POC HEMOGLOBIN A1C [86896 CPT(R)] AMB POC LIPID PROFILE [37917 CPT(R)] AMB POC PT/INR [96410 CPT(R)]  DIABETES FOOT EXAM [7 Custom] Follow-up Instructions Return in about 3 months (around 5/14/2018). Patient Instructions Atrial Fibrillation: Care Instructions Your Care Instructions Atrial fibrillation is an irregular and often fast heartbeat. Treating this condition is important for several reasons.  It can cause blood clots, which can travel from your heart to your brain and cause a stroke. If you have a fast heartbeat, you may feel lightheaded, dizzy, and weak. An irregular heartbeat can also increase your risk for heart failure. Atrial fibrillation is often the result of another heart condition, such as high blood pressure or coronary artery disease. Making changes to improve your heart condition will help you stay healthy and active. Follow-up care is a key part of your treatment and safety. Be sure to make and go to all appointments, and call your doctor if you are having problems. It's also a good idea to know your test results and keep a list of the medicines you take. How can you care for yourself at home? Medicines ? · Take your medicines exactly as prescribed. Call your doctor if you think you are having a problem with your medicine. You will get more details on the specific medicines your doctor prescribes. ? · If your doctor has given you a blood thinner to prevent a stroke, be sure you get instructions about how to take your medicine safely. Blood thinners can cause serious bleeding problems. ? · Do not take any vitamins, over-the-counter drugs, or herbal products without talking to your doctor first. ? Lifestyle changes ? · Do not smoke. Smoking can increase your chance of a stroke and heart attack. If you need help quitting, talk to your doctor about stop-smoking programs and medicines. These can increase your chances of quitting for good. ? · Eat a heart-healthy diet. ? · Stay at a healthy weight. Lose weight if you need to.  
? · Limit alcohol to 2 drinks a day for men and 1 drink a day for women. Too much alcohol can cause health problems. ? · Avoid colds and flu. Get a pneumococcal vaccine shot. If you have had one before, ask your doctor whether you need another dose. Get a flu shot every year. If you must be around people with colds or flu, wash your hands often. Activity ? · If your doctor recommends it, get more exercise. Walking is a good choice. Bit by bit, increase the amount you walk every day. Try for at least 30 minutes on most days of the week. You also may want to swim, bike, or do other activities. Your doctor may suggest that you join a cardiac rehabilitation program so that you can have help increasing your physical activity safely. ? · Start light exercise if your doctor says it is okay. Even a small amount will help you get stronger, have more energy, and manage stress. Walking is an easy way to get exercise. Start out by walking a little more than you did in the hospital. Gradually increase the amount you walk. ? · When you exercise, watch for signs that your heart is working too hard. You are pushing too hard if you cannot talk while you are exercising. If you become short of breath or dizzy or have chest pain, sit down and rest immediately. ? · Check your pulse regularly. Place two fingers on the artery at the palm side of your wrist, in line with your thumb. If your heartbeat seems uneven or fast, talk to your doctor. When should you call for help? Call 911 anytime you think you may need emergency care. For example, call if: 
? · You have symptoms of a heart attack. These may include: ¨ Chest pain or pressure, or a strange feeling in the chest. 
¨ Sweating. ¨ Shortness of breath. ¨ Nausea or vomiting. ¨ Pain, pressure, or a strange feeling in the back, neck, jaw, or upper belly or in one or both shoulders or arms. ¨ Lightheadedness or sudden weakness. ¨ A fast or irregular heartbeat. After you call 911, the  may tell you to chew 1 adult-strength or 2 to 4 low-dose aspirin. Wait for an ambulance. Do not try to drive yourself. ? · You have symptoms of a stroke. These may include: 
¨ Sudden numbness, tingling, weakness, or loss of movement in your face, arm, or leg, especially on only one side of your body. ¨ Sudden vision changes. ¨ Sudden trouble speaking. ¨ Sudden confusion or trouble understanding simple statements. ¨ Sudden problems with walking or balance. ¨ A sudden, severe headache that is different from past headaches. ? · You passed out (lost consciousness). ?Call your doctor now or seek immediate medical care if: 
? · You have new or increased shortness of breath. ? · You feel dizzy or lightheaded, or you feel like you may faint. ? · Your heart rate becomes irregular. ? · You can feel your heart flutter in your chest or skip heartbeats. Tell your doctor if these symptoms are new or worse. ? Watch closely for changes in your health, and be sure to contact your doctor if you have any problems. Where can you learn more? Go to http://isra-rhonda.info/. Enter U020 in the search box to learn more about \"Atrial Fibrillation: Care Instructions. \" Current as of: September 21, 2016 Content Version: 11.4 © 9022-1371 [employer]. Care instructions adapted under license by [employer] (which disclaims liability or warranty for this information). If you have questions about a medical condition or this instruction, always ask your healthcare professional. [place] [address] any warranty or liability for your use of this information. Introducing [facility]! [address] introduces [employer] patient portal. Now you can access parts of your medical record, email your doctor's office, and request medication refills online. 1. In your internet browser, go to [email or web address] 2. Click on the First Time User? Click Here link in the Sign In box. You will see the New Member Sign Up page. 3. Enter your [employer] Access Code exactly as it appears below. You will not need to use this code after youve completed the sign-up process. If you do not sign up before the expiration date, you must request a new code. · [employer] Access Code: [email or web address] Expires: 5/15/2018 10:27 AM 
 
4. Enter the last four digits of your Social Security Number (xxxx) and Date of Birth (mm/dd/yyyy) as indicated and click Submit. You will be taken to the next sign-up page. 5. Create a [employer]Control ID. This will be your [employer] login ID and cannot be changed, so think of one that is secure and easy to remember. 6. Create a [employer] password. You can change your password at any time. 7. Enter your Password Reset Question and Answer. This can be used at a later time if you forget your password. 8. Enter your e-mail address. You will receive e-mail notification when new information is available in [address] 9. Click Sign Up. You can now view and download portions of your medical record. 10. Click the Download Summary menu link to download a portable copy of your medical information. If you have questions, please visit the Frequently Asked Questions section of the [employer]ov[employer] website. Remember, [employer] is NOT to be used for urgent needs. For medical emergencies, dial 911. Now available from your iPhone and Android! Please provide this summary of care documentation to your next provider. Your primary care clinician is listed as Arpan. If you have any questions after today's visit, please call [phone].

## 2018-02-14 NOTE — TELEPHONE ENCOUNTER
Requested Prescriptions     Pending Prescriptions Disp Refills    digoxin (LANOXIN) 0.125 mg tablet 30 Tab prn     Sig: Take 1 Tab by mouth daily.

## 2018-02-14 NOTE — PROGRESS NOTES
Chief Complaint   Patient presents with    Hypertension     3 mo fu       SUBJECTIVE:    Kayren Klinefelter is a 80 y.o. male returns in follow-up of his medical problems include hypertension, diabetes, hyperlipidemia, atrial fibrillation, ASCVD, history of CHF compensated, CKD stage III, and DJD. He also has a past history of polymyalgia rheumatica but has not had a problem for some time. He currently has developed some problems with some swelling in the right lower extremity and is turned a bit red and become a little tense. This been this way for about 2 weeks. Actually the swelling is less now and the first started he said. He notes no history of trauma. He denies any fevers or chills. He denies any chest pain, shortness of breath, palpitations or cardiorespiratory complaints. He denies any GI/ complaints. There are no change of his chronic arthritic complaints. He has no headaches or neurologic complaints. He has no other complaints on complete review of systems. He is taking all his medications as well as trying to follow his diet and get some exercise. Current Outpatient Prescriptions   Medication Sig Dispense Refill    amoxicillin-clavulanate (AUGMENTIN) 875-125 mg per tablet Take 1 Tab by mouth every twelve (12) hours. 20 Tab 0    warfarin (COUMADIN) 5 mg tablet Take 1 Tab by mouth daily. 30 Tab prn    digoxin (LANOXIN) 0.125 mg tablet Take 1 Tab by mouth daily. 30 Tab prn    predniSONE (DELTASONE) 5 mg tablet TAKE 1 TABLET DAILY AS DIRECTED 90 Tab 1    sildenafil, antihypertensive, (REVATIO) 20 mg tablet TAKE 1 TABLET, ORAL, AS DIRECTED FOR SEXUAL ACTIVITY 20 Tab 11    cholecalciferol (VITAMIN D3) 1,000 unit cap Take 1,000 Units by mouth daily.  DOCOSAHEXANOIC ACID/EPA (FISH OIL PO) Take 1,200 mg by mouth two (2) times a day.       diclofenac EC (VOLTAREN) 75 mg EC tablet TAKE 1 TABLET BY MOUTH TWICE A  Tab 3    furosemide (LASIX) 40 mg tablet Take 40 mg by mouth daily.        simvastatin (ZOCOR) 20 mg tablet Take 20 mg by mouth nightly.  lisinopril (PRINIVIL, ZESTRIL) 5 mg tablet Take 5 mg by mouth daily.  terazosin (HYTRIN) 5 mg capsule Take 5 mg by mouth nightly.  fexofenadine-pseudoephedrine (ALLEGRA-D 24) 180-240 mg per tablet Take 1 Tab by mouth daily.  acetaminophen (TYLENOL) 500 mg tablet Take 500 mg by mouth every six (6) hours as needed. 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    Back pain 9/20/2017    BPH (benign prostatic hyperplasia) 9/20/2017    CHF (congestive heart failure) ([facility] [address] 9/20/2017    CKD (chronic kidney disease) 9/20/2017    DM (diabetes mellitus) ([facility] [address] 9/20/2017    Story: Diet Controlled    ED (erectile dysfunction) 9/20/2017    Edema 9/20/2017    Elevated CPK 9/20/2017    Comments: History of    Elevated LFTs 9/20/2017    Comments: History of    Elevated PSA 9/20/2017    Hypercholesteremia     Hyperlipidemia 9/20/2017    Hypertension     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    Nodule of right lung 9/20/2017    Story: Right    Polymyalgia ([facility] [address] 9/20/2017    Prostate enlargement      Past Surgical History:   Procedure Laterality Date    ABDOMEN SURGERY PROC UNLISTED      hernia repair     No Known Allergies    REVIEW OF SYSTEMS:  General: negative for - chills or fever, or weight loss or gain  ENT: negative for - headaches, nasal congestion or tinnitus  Eyes: no blurred or visual changes  Neck: No stiffness or swollen nodes  Respiratory: negative for - cough, hemoptysis, shortness of breath or wheezing  Cardiovascular : negative for - chest pain, edema, palpitations or shortness of breath  Gastrointestinal: negative for - abdominal pain, blood in stools, heartburn or nausea/vomiting  Genito-Urinary: no dysuria, trouble voiding, or hematuria  Musculoskeletal: negative for - gait disturbance, joint stiffness or joint swelling. Possible chronic unchanged joint pains  Neurological: no TIA or stroke symptoms  Hematologic: no bruises, no bleeding  Lymphatic: no swollen glands  Integument: no lumps, mole changes, nail changes or rash. Swelling and redness of his right lower extremity  Endocrine:no malaise/lethargy poly uria or polydipsia or unexpected weight changes        Social History     Social History    Marital status:      Spouse name: N/A    Number of children: N/A    Years of education: N/A     Social History Main Topics    Smoking status: Never Smoker    Smokeless tobacco: Never Used    Alcohol use No    Drug use: No    Sexual activity: No     Other Topics Concern    None     Social History Narrative     History reviewed. No pertinent family history. OBJECTIVE:     Visit Vitals    /70 (BP 1 Location: Left arm, BP Patient Position: Sitting)    Pulse 73    Ht 6' 3\" (1.905 m)    Wt 217 lb 3.2 oz (98.5 kg)    SpO2 98%    BMI 27.15 kg/m2     CONSTITUTIONAL:   well nourished, appears age appropriate  EYES: sclera anicteric, PERRL, EOMI  ENMT:nars clear, moist mucous membranes, pharynx clear  NECK: supple. Thyroid normal, No JVD or bruits  RESPIRATORY: Chest: clear to ascultation and percussion, normal inspiratory effort  CARDIOVASCULAR: Heart: regular rate and rhythm no murmurs, rubs or gallops, PMI not displaced, No thrills  GASTROINTESTINAL: Abdomen: non distended, soft, non tender, bowel sounds normal  HEMATOLOGIC: no purpura, petechiae or bruising  LYMPHATIC: No lymph node enlargemant  MUSCULOSKELETAL: Extremities: no edema or active synovitis, pulse 1+. Normal appearance with no diabetic foot changes  INTEGUMENT: No unusual rashes or suspicious skin lesions noted.  Nails appear normal..  Erythroderma right lower extremity was soft tissue swelling consistent with a cellulitis right lower extremity  PERIPHERAL VASCULAR: normal pulses femoral, PT and DP  NEUROLOGIC: non-focal exam, A & O X 3. Normal distal sensation and proprioception all toes both feet. PSYCHIATRIC:, appropriate affect     ASSESSMENT:   1. Hypertension with renal disease    2. 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, without long-term current use of insulin ([facility] [address]    3. Mixed hyperlipidemia    4. Paroxysmal atrial fibrillation (HCC)    5. Primary osteoarthritis involving multiple joints    6. Stage 3 chronic kidney disease    7. ASCVD (arteriosclerotic cardiovascular disease)    8. Diastolic congestive heart failure, unspecified congestive heart failure chronicity ([facility] [address]    9. Polymyalgia ([facility] [address]    10. Cellulitis of right lower extremity      Pression  1. Hypertension that is controlled continue current therapy reviewed with him  2. Diabetes repeat status pending reviewed prior labs will make adjustments if necessary  3. Hyperlipidemia repeat status pending will adjust medicines if needed  4. A. fib INR pending today rate is controlled  5. DJD that seems stable  6. CKD stage III repeat status pending will make adjustments if needed  7. ASCVD stable  8. Compensated CHF stable  9. History of polymyalgia rheumatica without recurrent symptoms  10. Cellulitis right lower extremity we will treat this with Augmentin for 10 days and recheck him at that time. I will call with above lab and make further recommendations adjustments. I will recheck him in 3 months regarding his multiple medical issues as noted above but is noted 10 days for follow-up of cellulitis. PLAN:  .  Orders Placed This Encounter    AMB POC LIPID PROFILE    AMB POC HEMOGLOBIN A1C    AMB POC COMPREHENSIVE METABOLIC PANEL    AMB POC COMPLETE CBC,AUTOMATED ENTER    AMB POC PT/INR    amoxicillin-clavulanate (AUGMENTIN) 875-125 mg per tablet         ATTENTION:   This medical record was transcribed using an electronic medical records system.   Although proofread, it may and can contain electronic and spelling errors. Other human spelling and other errors may be present. Corrections may be executed at a later time. Please feel free to contact us for any clarifications as needed. Follow-up Disposition:  Return in about 3 months (around 5/14/2018). No results found for any visits on 02/14/18. Colleen Jeffery MD    The patient verbalized understanding of the problems and plans as explained.

## 2018-02-15 LAB
ALBUMIN SERPL-MCNC: 3.8 G/DL (ref 3.9–5.4)
ALKALINE PHOS POC: 58 U/L (ref 38–126)
ALT SERPL-CCNC: 25 U/L (ref 9–52)
AST SERPL-CCNC: 24 U/L (ref 14–36)
BUN BLD-MCNC: 23 MG/DL (ref 9–20)
BUN BLD-MCNC: 26 MG/DL (ref 9–20)
CALCIUM BLD-MCNC: 9.3 MG/DL (ref 8.4–10.2)
CALCIUM BLD-MCNC: 9.4 MG/DL (ref 8.4–10.2)
CHLORIDE BLD-SCNC: 103 MMOL/L (ref 98–107)
CHLORIDE BLD-SCNC: 103 MMOL/L (ref 98–107)
CHOLEST SERPL-MCNC: 155 MG/DL (ref 0–200)
CO2 POC: 29 MMOL/L (ref 22–32)
CO2 POC: 30 MMOL/L (ref 22–32)
CREAT BLD-MCNC: 1.1 MG/DL (ref 0.8–1.5)
CREAT BLD-MCNC: 1.3 MG/DL (ref 0.8–1.5)
EGFR (POC): 50.1
EGFR (POC): 61.3
GLUCOSE POC: 121 MG/DL (ref 75–110)
GLUCOSE POC: 121 MG/DL (ref 75–110)
GRAN# POC: 4 K/UL (ref 2–7.8)
GRAN% POC: 69.5 % (ref 37–92)
HBA1C MFR BLD HPLC: 6.2 % (ref 4.5–5.7)
HCT VFR BLD CALC: 36.2 % (ref 37–51)
HDLC SERPL-MCNC: 43 MG/DL (ref 35–130)
HGB BLD-MCNC: 12.1 G/DL (ref 12–18)
LDL CHOLESTEROL POC: 80 MG/DL (ref 0–130)
LY# POC: 1.5 K/UL (ref 0.6–4.1)
LY% POC: 27 % (ref 10–58.5)
MCH RBC QN: 31.4 PG (ref 26–32)
MCHC RBC-ENTMCNC: 33.5 G/DL (ref 30–36)
MCV RBC: 94 FL (ref 80–97)
MID #, POC: 0.1 K/UL (ref 0–1.8)
MID% POC: 3.5 % (ref 0.1–24)
PLATELET # BLD: 296 K/UL (ref 140–440)
POTASSIUM SERPL-SCNC: 4.4 MMOL/L (ref 3.6–5)
POTASSIUM SERPL-SCNC: 5.2 MMOL/L (ref 3.6–5)
PROT SERPL-MCNC: 6.9 G/DL (ref 6.3–8.2)
RBC # BLD: 3.85 M/UL (ref 4.2–6.3)
SODIUM SERPL-SCNC: 141 MMOL/L (ref 137–145)
SODIUM SERPL-SCNC: 143 MMOL/L (ref 137–145)
TCHOL/HDL RATIO (POC): 3.6 (ref 0–4)
TOTAL BILIRUBIN POC: 0.5 MG/DL (ref 0.2–1.3)
TRIGL SERPL-MCNC: 160 MG/DL (ref 0–200)
VLDLC SERPL CALC-MCNC: 32 MG/DL
WBC # BLD: 5.6 K/UL (ref 4.1–10.9)

## 2018-02-15 NOTE — PROGRESS NOTES
INR has not changed and I think he has his Coumadin dose and digoxin dosing mixed so that needs to be reviewed in detail with him. And let me know.

## 2018-02-16 ENCOUNTER — TELEPHONE (OUTPATIENT)
Dept: INTERNAL MEDICINE CLINIC | Age: 83
End: 2018-02-16

## 2018-02-16 DIAGNOSIS — I48.91 ATRIAL FIBRILLATION, UNSPECIFIED TYPE (HCC): Primary | ICD-10-CM

## 2018-02-16 RX ORDER — WARFARIN SODIUM 5 MG/1
TABLET ORAL
Qty: 145 TAB | Status: SHIPPED | OUTPATIENT
Start: 2018-02-16 | End: 2018-02-19 | Stop reason: SDUPTHER

## 2018-02-16 NOTE — TELEPHONE ENCOUNTER
Requested Prescriptions     Pending Prescriptions Disp Refills    warfarin (COUMADIN) 5 mg tablet 145 Tab prn     Sig: Take one and a half tablets Monday, Wednesday and Friday; and 5 mg all other days.

## 2018-02-19 DIAGNOSIS — I48.91 ATRIAL FIBRILLATION, UNSPECIFIED TYPE (HCC): ICD-10-CM

## 2018-02-19 RX ORDER — WARFARIN SODIUM 5 MG/1
TABLET ORAL
Qty: 145 TAB | Status: SHIPPED | OUTPATIENT
Start: 2018-02-19 | End: 2018-05-09 | Stop reason: SDUPTHER

## 2018-02-19 NOTE — PROGRESS NOTES
INR has not changed and I think he has his Coumadin dose and digoxin dosing mixed so that needs to be reviewed in detail with him. And let me know. This was reviewed in detail.

## 2018-02-26 ENCOUNTER — OFFICE VISIT (OUTPATIENT)
Dept: INTERNAL MEDICINE CLINIC | Age: 83
End: 2018-02-26

## 2018-02-26 DIAGNOSIS — L03.115 CELLULITIS OF RIGHT LOWER EXTREMITY: Primary | ICD-10-CM

## 2018-02-26 DIAGNOSIS — I12.9 HYPERTENSION WITH RENAL DISEASE: ICD-10-CM

## 2018-02-26 DIAGNOSIS — I48.0 PAROXYSMAL ATRIAL FIBRILLATION (HCC): ICD-10-CM

## 2018-02-26 LAB
BUN BLD-MCNC: 31 MG/DL (ref 9–20)
CALCIUM BLD-MCNC: 9.4 MG/DL (ref 8.4–10.2)
CHLORIDE BLD-SCNC: 105 MMOL/L (ref 98–107)
CO2 POC: 30 MMOL/L (ref 22–32)
CREAT BLD-MCNC: 1.3 MG/DL (ref 0.8–1.5)
EGFR (POC): 49.8
GLUCOSE POC: 139 MG/DL (ref 75–110)
INR BLD: 1.1
POTASSIUM SERPL-SCNC: 5.1 MMOL/L (ref 3.6–5)
PT POC: 14.5 SECONDS
SODIUM SERPL-SCNC: 140 MMOL/L (ref 137–145)
VALID INTERNAL CONTROL?: YES

## 2018-02-26 NOTE — PATIENT INSTRUCTIONS
Cellulitis: Care Instructions  Your Care Instructions    Cellulitis is a skin infection. It often occurs after a break in the skin from a scrape, cut, bite, or puncture, or after a rash. The doctor has checked you carefully, but problems can develop later. If you notice any problems or new symptoms, get medical treatment right away. Follow-up care is a key part of your treatment and safety. Be sure to make and go to all appointments, and call your doctor if you are having problems. It's also a good idea to know your test results and keep a list of the medicines you take. How can you care for yourself at home? · Take your antibiotics as directed. Do not stop taking them just because you feel better. You need to take the full course of antibiotics. · Prop up the infected area on pillows to reduce pain and swelling. Try to keep the area above the level of your heart as often as you can. · If your doctor told you how to care for your wound, follow your doctor's instructions. If you did not get instructions, follow this general advice:  ¨ Wash the wound with clean water 2 times a day. Don't use hydrogen peroxide or alcohol, which can slow healing. ¨ You may cover the wound with a thin layer of petroleum jelly, such as Vaseline, and a nonstick bandage. ¨ Apply more petroleum jelly and replace the bandage as needed. · Be safe with medicines. Take pain medicines exactly as directed. ¨ If the doctor gave you a prescription medicine for pain, take it as prescribed. ¨ If you are not taking a prescription pain medicine, ask your doctor if you can take an over-the-counter medicine. To prevent cellulitis in the future  · Try to prevent cuts, scrapes, or other injuries to your skin. Cellulitis most often occurs where there is a break in the skin. · If you get a scrape, cut, mild burn, or bite, wash the wound with clean water as soon as you can to help avoid infection.  Don't use hydrogen peroxide or alcohol, which can slow healing. · If you have swelling in your legs (edema), support stockings and good skin care may help prevent leg sores and cellulitis. · Take care of your feet, especially if you have diabetes or other conditions that increase the risk of infection. Wear shoes and socks. Do not go barefoot. If you have athlete's foot or other skin problems on your feet, talk to your doctor about how to treat them. When should you call for help? Call your doctor now or seek immediate medical care if:  ? · You have signs that your infection is getting worse, such as:  ¨ Increased pain, swelling, warmth, or redness. ¨ Red streaks leading from the area. ¨ Pus draining from the area. ¨ A fever. ? · You get a rash. ? Watch closely for changes in your health, and be sure to contact your doctor if:  ? · You are not getting better after 1 day (24 hours). ? · You do not get better as expected. Where can you learn more? Go to http://isra-rhonda.info/. Brenna Giraldo in the search box to learn more about \"Cellulitis: Care Instructions. \"  Current as of: October 13, 2016  Content Version: 11.4  © 7336-3413 [employer]. Care instructions adapted under license by [employer] (which disclaims liability or warranty for this information). If you have questions about a medical condition or this instruction, always ask your healthcare professional. [place] [address] any warranty or liability for your use of this information.

## 2018-02-26 NOTE — PROGRESS NOTES
No chief complaint on file. SUBJECTIVE:    Espinoza Tamayo is a 80 y.o. male who returns in follow-up for his cellulitis, paroxysmal fibrillation, hypertension, CKD. He notes that the swelling in his right leg is gotten better. He still has some discoloration but was before. He is noted no fevers or chills and he has finished his antibiotic 2 days ago. He is taking his other medication he claims to be taken his Coumadin and digoxin correctly now as he had them mixed up before. He denies any chest pain, palpitations, shortness of breath other cardiorespiratory complaints. There are no other complaints on complete review of systems. Current Outpatient Prescriptions   Medication Sig Dispense Refill    warfarin (COUMADIN) 5 mg tablet Take one and a half tablets Monday, Wednesday and Friday; and 5 mg all other days. 145 Tab prn    digoxin (LANOXIN) 0.125 mg tablet Take 1 Tab by mouth daily. 30 Tab prn    predniSONE (DELTASONE) 5 mg tablet TAKE 1 TABLET DAILY AS DIRECTED 90 Tab 1    sildenafil, antihypertensive, (REVATIO) 20 mg tablet TAKE 1 TABLET, ORAL, AS DIRECTED FOR SEXUAL ACTIVITY 20 Tab 11    cholecalciferol (VITAMIN D3) 1,000 unit cap Take 1,000 Units by mouth daily.  DOCOSAHEXANOIC ACID/EPA (FISH OIL PO) Take 1,200 mg by mouth two (2) times a day.  diclofenac EC (VOLTAREN) 75 mg EC tablet TAKE 1 TABLET BY MOUTH TWICE A  Tab 3    furosemide (LASIX) 40 mg tablet Take 40 mg by mouth daily.  simvastatin (ZOCOR) 20 mg tablet Take 20 mg by mouth nightly.  lisinopril (PRINIVIL, ZESTRIL) 5 mg tablet Take 5 mg by mouth daily.  terazosin (HYTRIN) 5 mg capsule Take 5 mg by mouth nightly.  fexofenadine-pseudoephedrine (ALLEGRA-D 24) 180-240 mg per tablet Take 1 Tab by mouth daily.  acetaminophen (TYLENOL) 500 mg tablet Take 500 mg by mouth every six (6) hours as needed.          Past Medical History:   Diagnosis Date    Allergic rhinitis 9/20/2017  Arthritis     ASCVD (arteriosclerotic cardiovascular disease) 9/20/2017    Story:  Old ASMI by EKG    Back pain 9/20/2017    BPH (benign prostatic hyperplasia) 9/20/2017    CHF (congestive heart failure) ([facility] [address] 9/20/2017    CKD (chronic kidney disease) 9/20/2017    DM (diabetes mellitus) ([facility] [address] 9/20/2017    Story: Diet Controlled    ED (erectile dysfunction) 9/20/2017    Edema 9/20/2017    Elevated CPK 9/20/2017    Comments: History of    Elevated LFTs 9/20/2017    Comments: History of    Elevated PSA 9/20/2017    Hypercholesteremia     Hyperlipidemia 9/20/2017    Hypertension     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    Nodule of right lung 9/20/2017    Story: Right    [facility] 9/20/2017    Prostate enlargement      Past Surgical History:   Procedure Laterality Date    ABDOMEN SURGERY PROC UNLISTED      hernia repair     No Known Allergies    REVIEW OF SYSTEMS:  General: negative for - chills or fever, or weight loss or gain  ENT: negative for - headaches, nasal congestion or tinnitus  Eyes: no blurred or visual changes  Neck: No stiffness or swollen nodes  Respiratory: negative for - cough, hemoptysis, shortness of breath or wheezing  Cardiovascular : negative for - chest pain, edema, palpitations or shortness of breath  Gastrointestinal: negative for - abdominal pain, blood in stools, heartburn or nausea/vomiting  Genito-Urinary: no dysuria, trouble voiding, or hematuria  Musculoskeletal: negative for - gait disturbance, joint pain, joint stiffness or joint swelling  Neurological: no TIA or stroke symptoms  Hematologic: no bruises, no bleeding  Lymphatic: no swollen glands  Integument: no lumps, mole changes, nail changes or rash  Endocrine:no malaise/lethargy poly uria or polydipsia or unexpected weight changes        Social History     Social History    Marital status:      Spouse name: N/A    Number of children: N/A    Years of education: N/A     Social History Main Topics    Smoking status: Never Smoker    Smokeless tobacco: Never Used    Alcohol use No    Drug use: No    Sexual activity: No     Other Topics Concern    None     Social History Narrative     History reviewed. No pertinent family history. OBJECTIVE:     There were no vitals taken for this visit. CONSTITUTIONAL:   well nourished, appears age appropriate  EYES: sclera anicteric, PERRL, EOMI  ENMT:nares clear, moist mucous membranes, pharynx clear  NECK: supple. Thyroid normal, No JVD or bruits  RESPIRATORY: Chest: clear to ascultation and percussion, normal inspiratory effort  CARDIOVASCULAR: Heart: regular rate and rhythm no murmurs, rubs or gallops, PMI not displaced, No thrills  GASTROINTESTINAL: Abdomen: non distended, soft, non tender, bowel sounds normal  HEMATOLOGIC: no purpura, petechiae or bruising  LYMPHATIC: No lymph node enlargemant  MUSCULOSKELETAL: Extremities: no edema or active synovitis, pulse 1+   INTEGUMENT: No unusual rashes or suspicious skin lesions noted. Nails appear normal.  Some stasis changes right lower extremity  PERIPHERAL VASCULAR: normal pulses femoral, PT and DP  NEUROLOGIC: non-focal exam, A & O X 3  PSYCHIATRIC:, appropriate affect     ASSESSMENT:   1. Cellulitis of right lower extremity    2. Paroxysmal atrial fibrillation (HCC)    3. Hypertension with renal disease      Impression  1. Cellulitis right lower extremity resolved with venous stasis changes  2. Proximal atrial fibrillation remains in sinus rhythm  3. Hypertension that is controlled we will see what his labs look like make further recommendations tentative follow-up set up for 1 month although I may need to see him sooner. PLAN:  .  Orders Placed This Encounter    DIGOXIN    AMB POC BASIC METABOLIC PANEL    AMB POC PT/INR         ATTENTION:   This medical record was transcribed using an electronic medical records system. Although proofread, it may and can contain electronic and spelling errors. Other human spelling and other errors may be present. Corrections may be executed at a later time. Please feel free to contact us for any clarifications as needed. Follow-up Disposition:  Return in about 4 weeks (around 3/26/2018). No results found for any visits on 02/26/18. Kerline Bernal MD    The patient verbalized understanding of the problems and plans as explained.

## 2018-02-26 NOTE — MR AVS SNAPSHOT
[facility] 
 
 
 [address] [id number] [phone] Patient: Taniya Jacobs MRN: [id number] [id number] Visit Information Date & Time Provider Department Dept. Phone Encounter #  
 2/26/2018  1:30 PM Sulma Rosenberg, 20 [facility] [phone] [id number] Follow-up Instructions Return in about 4 weeks (around 3/26/2018). Follow-up and Disposition History Your Appointments 3/26/2018  1:30 PM  
FOLLOW UP 10 with MD PREMA Otero [facility] ([address]) Appt Note: 1 month follow up   pt  
 [address] [id number] [address] [address] 5/16/2018 10:00 AM  
FOLLOW UP 10 with Sulma Rosenberg MD  
[facility] ([facility]) Appt Note: [address] [id number] [phone] Upcoming Health Maintenance Date Due  
 EYE EXAM RETINAL OR DILATED Q1 2/20/1943 DTaP/Tdap/Td series (1 - Tdap) 2/20/1954 ZOSTER VACCINE AGE 60> 12/20/1992 GLAUCOMA SCREENING Q2Y 2/20/1998 HEMOGLOBIN A1C Q6M 8/14/2018 MICROALBUMIN Q1 10/30/2018 MEDICARE YEARLY EXAM 10/31/2018 FOOT EXAM Q1 2/14/2019 LIPID PANEL Q1 2/14/2019 COLONOSCOPY 3/30/2026 Allergies as of 2/26/2018  Review Complete On: 2/26/2018 By: Sulma Rosenberg MD  
 No Known Allergies Current Immunizations  Never Reviewed Name Date Influenza High Dose Vaccine PF 10/30/2017 Influenza Vaccine 10/7/2016 Pneumococcal Conjugate (PCV-13) 1/7/2005 Pneumococcal Vaccine (Unspecified Type) 1/7/2005 Rabies Vaccine 3/11/2009 Not reviewed this visit You Were Diagnosed With   
  
 Codes Comments Cellulitis of right lower extremity    -  Primary ICD-10-CM: V48.549 ICD-9-CM: 682.6 Paroxysmal atrial fibrillation (HCC)     ICD-10-CM: I48.0 ICD-9-CM: 427.31 Hypertension with renal disease     ICD-10-CM: I12.9 ICD-9-CM: 403.90 Vitals Smoking Status Never Smoker Preferred Pharmacy Pharmacy Name Phone [id number] [address] [facility] [phone] Your Updated Medication List  
  
   
This list is accurate as of 2/26/18  2:08 PM.  Always use your most recent med list.  
  
  
  
  
 acetaminophen 500 mg tablet Commonly known as:  TYLENOL Take 500 mg by mouth every six (6) hours as needed. diclofenac EC 75 mg EC tablet Commonly known as:  VOLTAREN  
TAKE 1 TABLET BY MOUTH TWICE A DAY  
  
 digoxin 0.125 mg tablet Commonly known as:  LANOXIN Take 1 Tab by mouth daily. fexofenadine-pseudoephedrine 180-240 mg per tablet Commonly known as:  ALLEGRA-D 24 Take 1 Tab by mouth daily. FISH OIL PO Take 1,200 mg by mouth two (2) times a day. furosemide 40 mg tablet Commonly known as:  LASIX Take 40 mg by mouth daily. lisinopril 5 mg tablet Commonly known as:  Jane Best Take 5 mg by mouth daily. predniSONE 5 mg tablet Commonly known as:  DELTASONE  
TAKE 1 TABLET DAILY AS DIRECTED  
  
 sildenafil (antihypertensive) 20 mg tablet Commonly known as:  REVATIO  
TAKE 1 TABLET, ORAL, AS DIRECTED FOR SEXUAL ACTIVITY  
  
 simvastatin 20 mg tablet Commonly known as:  ZOCOR Take 20 mg by mouth nightly. terazosin 5 mg capsule Commonly known as:  HYTRIN Take 5 mg by mouth nightly. VITAMIN D3 1,000 unit Cap Generic drug:  cholecalciferol Take 1,000 Units by mouth daily. warfarin 5 mg tablet Commonly known as:  COUMADIN Take one and a half tablets Monday, Wednesday and Friday; and 5 mg all other days. We Performed the Following AMB POC BASIC METABOLIC PANEL [39617 CPT(R)] AMB POC PT/INR [50295 CPT(R)] DIGOXIN [96569 CPT(R)] Follow-up Instructions Return in about 4 weeks (around 3/26/2018). Patient Instructions Cellulitis: Care Instructions Your Care Instructions Cellulitis is a skin infection. It often occurs after a break in the skin from a scrape, cut, bite, or puncture, or after a rash. The doctor has checked you carefully, but problems can develop later. If you notice any problems or new symptoms, get medical treatment right away. Follow-up care is a key part of your treatment and safety. Be sure to make and go to all appointments, and call your doctor if you are having problems. It's also a good idea to know your test results and keep a list of the medicines you take. How can you care for yourself at home? · Take your antibiotics as directed. Do not stop taking them just because you feel better. You need to take the full course of antibiotics. · Prop up the infected area on pillows to reduce pain and swelling. Try to keep the area above the level of your heart as often as you can. · If your doctor told you how to care for your wound, follow your doctor's instructions. If you did not get instructions, follow this general advice: ¨ Wash the wound with clean water 2 times a day. Don't use hydrogen peroxide or alcohol, which can slow healing. ¨ You may cover the wound with a thin layer of petroleum jelly, such as Vaseline, and a nonstick bandage. ¨ Apply more petroleum jelly and replace the bandage as needed. · Be safe with medicines. Take pain medicines exactly as directed. ¨ If the doctor gave you a prescription medicine for pain, take it as prescribed. ¨ If you are not taking a prescription pain medicine, ask your doctor if you can take an over-the-counter medicine. To prevent cellulitis in the future · Try to prevent cuts, scrapes, or other injuries to your skin. Cellulitis most often occurs where there is a break in the skin. · If you get a scrape, cut, mild burn, or bite, wash the wound with clean water as soon as you can to help avoid infection. Don't use hydrogen peroxide or alcohol, which can slow healing. · If you have swelling in your legs (edema), support stockings and good skin care may help prevent leg sores and cellulitis. · Take care of your feet, especially if you have diabetes or other conditions that increase the risk of infection. Wear shoes and socks. Do not go barefoot. If you have athlete's foot or other skin problems on your feet, talk to your doctor about how to treat them. When should you call for help? Call your doctor now or seek immediate medical care if: 
? · You have signs that your infection is getting worse, such as: 
¨ Increased pain, swelling, warmth, or redness. ¨ Red streaks leading from the area. ¨ Pus draining from the area. ¨ A fever. ? · You get a rash. ? Watch closely for changes in your health, and be sure to contact your doctor if: 
? · You are not getting better after 1 day (24 hours). ? · You do not get better as expected. Where can you learn more? Go to http://isra-rhonda.info/. Lou Pritchett in the search box to learn more about \"Cellulitis: Care Instructions. \" Current as of: October 13, 2016 Content Version: 11.4 © 4949-8406 [employer]. Care instructions adapted under license by [employer] (which disclaims liability or warranty for this information). If you have questions about a medical condition or this instruction, always ask your healthcare professional. [place] [address] any warranty or liability for your use of this information. Patient Instructions History Introducing [facility]! [employer] introduces [employer] patient portal. Now you can access parts of your medical record, email your doctor's office, and request medication refills online.    
 
1. In your internet browser, go to [email or web address] 2. Click on the First Time User? Click Here link in the Sign In box. You will see the New Member Sign Up page. 3. Enter your [employer] Access Code exactly as it appears below. You will not need to use this code after youve completed the sign-up process. If you do not sign up before the expiration date, you must request a new code. · [employer] Access Code: [email or web address] Expires: 5/15/2018 10:27 AM 
 
4. Enter the last four digits of your Social Security Number (xxxx) and Date of Birth (mm/dd/yyyy) as indicated and click Submit. You will be taken to the next sign-up page. 5. Create a [employer] ID. This will be your [employer] login ID and cannot be changed, so think of one that is secure and easy to remember. 6. Create a [employer] password. You can change your password at any time. 7. Enter your Password Reset Question and Answer. This can be used at a later time if you forget your password. 8. Enter your e-mail address. You will receive e-mail notification when new information is available in [address] 9. Click Sign Up. You can now view and download portions of your medical record. 10. Click the Download Summary menu link to download a portable copy of your medical information. If you have questions, please visit the Frequently Asked Questions section of the [employer] website. Remember, [employer] is NOT to be used for urgent needs. For medical emergencies, dial 911. Now available from your iPhone and Android! Please provide this summary of care documentation to your next provider. Your primary care clinician is listed as [employer]. If you have any questions after today's visit, please call [phone].

## 2018-02-27 ENCOUNTER — TELEPHONE ANTICOAG (OUTPATIENT)
Dept: INTERNAL MEDICINE CLINIC | Age: 83
End: 2018-02-27

## 2018-02-27 DIAGNOSIS — I48.0 PAROXYSMAL ATRIAL FIBRILLATION (HCC): Primary | ICD-10-CM

## 2018-02-27 LAB — DIGOXIN SERPL-MCNC: 0.6 NG/ML (ref 0.5–0.9)

## 2018-02-27 NOTE — PROGRESS NOTES
Anticoagulation Episode Summary     Current INR goal    Next INR check 3/26/2018   INR from last check 1.1 (2/26/2018)   Weekly max dose    Target end date    INR check location    Preferred lab    Send INR reminders to     Indications   [facility] (Primary) [I48.0]           Comments            Informed patient that INR was 1.1. Dr. Daniela Jones wants to increase his blood thinner dose to 7.5 mg everyday except for 5 mg on Monday and Friday. F/up as scheduled.

## 2018-03-26 ENCOUNTER — OFFICE VISIT (OUTPATIENT)
Dept: INTERNAL MEDICINE CLINIC | Age: 83
End: 2018-03-26

## 2018-03-26 VITALS
OXYGEN SATURATION: 98 % | WEIGHT: 220.8 LBS | TEMPERATURE: 97.9 F | RESPIRATION RATE: 20 BRPM | DIASTOLIC BLOOD PRESSURE: 60 MMHG | HEART RATE: 61 BPM | SYSTOLIC BLOOD PRESSURE: 124 MMHG | HEIGHT: 75 IN | BODY MASS INDEX: 27.45 KG/M2

## 2018-03-26 DIAGNOSIS — I48.0 PAROXYSMAL ATRIAL FIBRILLATION (HCC): ICD-10-CM

## 2018-03-26 DIAGNOSIS — I50.30 DIASTOLIC CONGESTIVE HEART FAILURE, UNSPECIFIED CONGESTIVE HEART FAILURE CHRONICITY: ICD-10-CM

## 2018-03-26 DIAGNOSIS — M35.3 POLYMYALGIA (HCC): Primary | ICD-10-CM

## 2018-03-26 LAB
INR BLD: 1.2
PT POC: 16.1 SECONDS
VALID INTERNAL CONTROL?: YES

## 2018-03-26 RX ORDER — DM/PE/ACETAMINOPHEN/CHLORPHENR 10-5-325-2
2000 TABLET, SEQUENTIAL ORAL DAILY
COMMUNITY

## 2018-03-26 NOTE — PROGRESS NOTES
1. Have you been to the ER, urgent care clinic since your last visit? Hospitalized since your last visit? No    2. Have you seen or consulted any other health care providers outside of the [address] [place] [address] since your last visit? Include any pap smears or colon screening. Yes, Dermatologist, Dr. Monalisa Dash, to remove cancerous lesion on left cheek. Chief Complaint   Patient presents with    Hypertension     1 mo.  f/u

## 2018-03-26 NOTE — PATIENT INSTRUCTIONS
Atrial Fibrillation: Care Instructions  Your Care Instructions    Atrial fibrillation is an irregular and often fast heartbeat. Treating this condition is important for several reasons. It can cause blood clots, which can travel from your heart to your brain and cause a stroke. If you have a fast heartbeat, you may feel lightheaded, dizzy, and weak. An irregular heartbeat can also increase your risk for heart failure. Atrial fibrillation is often the result of another heart condition, such as high blood pressure or coronary artery disease. Making changes to improve your heart condition will help you stay healthy and active. Follow-up care is a key part of your treatment and safety. Be sure to make and go to all appointments, and call your doctor if you are having problems. It's also a good idea to know your test results and keep a list of the medicines you take. How can you care for yourself at home? Medicines  ? · Take your medicines exactly as prescribed. Call your doctor if you think you are having a problem with your medicine. You will get more details on the specific medicines your doctor prescribes. ? · If your doctor has given you a blood thinner to prevent a stroke, be sure you get instructions about how to take your medicine safely. Blood thinners can cause serious bleeding problems. ? · Do not take any vitamins, over-the-counter drugs, or herbal products without talking to your doctor first.   ? Lifestyle changes  ? · Do not smoke. Smoking can increase your chance of a stroke and heart attack. If you need help quitting, talk to your doctor about stop-smoking programs and medicines. These can increase your chances of quitting for good. ? · Eat a heart-healthy diet. ? · Stay at a healthy weight. Lose weight if you need to.   ? · Limit alcohol to 2 drinks a day for men and 1 drink a day for women. Too much alcohol can cause health problems. ? · Avoid colds and flu.  Get a pneumococcal vaccine shot. If you have had one before, ask your doctor whether you need another dose. Get a flu shot every year. If you must be around people with colds or flu, wash your hands often. Activity  ? · If your doctor recommends it, get more exercise. Walking is a good choice. Bit by bit, increase the amount you walk every day. Try for at least 30 minutes on most days of the week. You also may want to swim, bike, or do other activities. Your doctor may suggest that you join a cardiac rehabilitation program so that you can have help increasing your physical activity safely. ? · Start light exercise if your doctor says it is okay. Even a small amount will help you get stronger, have more energy, and manage stress. Walking is an easy way to get exercise. Start out by walking a little more than you did in the hospital. Gradually increase the amount you walk. ? · When you exercise, watch for signs that your heart is working too hard. You are pushing too hard if you cannot talk while you are exercising. If you become short of breath or dizzy or have chest pain, sit down and rest immediately. ? · Check your pulse regularly. Place two fingers on the artery at the palm side of your wrist, in line with your thumb. If your heartbeat seems uneven or fast, talk to your doctor. When should you call for help? Call 911 anytime you think you may need emergency care. For example, call if:  ? · You have symptoms of a heart attack. These may include:  ¨ Chest pain or pressure, or a strange feeling in the chest.  ¨ Sweating. ¨ Shortness of breath. ¨ Nausea or vomiting. ¨ Pain, pressure, or a strange feeling in the back, neck, jaw, or upper belly or in one or both shoulders or arms. ¨ Lightheadedness or sudden weakness. ¨ A fast or irregular heartbeat. After you call 911, the  may tell you to chew 1 adult-strength or 2 to 4 low-dose aspirin. Wait for an ambulance. Do not try to drive yourself.    ? · You have symptoms of a stroke. These may include:  ¨ Sudden numbness, tingling, weakness, or loss of movement in your face, arm, or leg, especially on only one side of your body. ¨ Sudden vision changes. ¨ Sudden trouble speaking. ¨ Sudden confusion or trouble understanding simple statements. ¨ Sudden problems with walking or balance. ¨ A sudden, severe headache that is different from past headaches. ? · You passed out (lost consciousness). ?Call your doctor now or seek immediate medical care if:  ? · You have new or increased shortness of breath. ? · You feel dizzy or lightheaded, or you feel like you may faint. ? · Your heart rate becomes irregular. ? · You can feel your heart flutter in your chest or skip heartbeats. Tell your doctor if these symptoms are new or worse. ? Watch closely for changes in your health, and be sure to contact your doctor if you have any problems. Where can you learn more? Go to http://isra-rhonda.info/. Enter U020 in the search box to learn more about \"Atrial Fibrillation: Care Instructions. \"  Current as of: September 21, 2016  Content Version: 11.4  © 8554-0692 [employer]. Care instructions adapted under license by [employer] (which disclaims liability or warranty for this information). If you have questions about a medical condition or this instruction, always ask your healthcare professional. [address] any warranty or liability for your use of this information.

## 2018-03-26 NOTE — MR AVS SNAPSHOT
[facility] 
 
 
 [address] [id number] [phone] Patient: Espinoza Tamayo MRN: [id number] SEA:[id number] Visit Information Date & Time Provider Department Dept. Phone Encounter #  
 3/26/2018  1:30 PM Cecilia Goldberg, [facility] [phone] [id number] Follow-up Instructions Return in about 1 month (around 4/26/2018). Follow-up and Disposition History Your Appointments 4/24/2018  1:40 PM  
FOLLOW UP 10 with MD PREMA Ayala [facility] ([address]) Appt Note: 1 month follow up [address] [phone] [address] [facility] [address] 5/16/2018 10:00 AM  
FOLLOW UP 10 with Cecilia Goldberg MD  
[facility] ([facility]) Appt Note: [address] [address] [phone] Upcoming Health Maintenance Date Due  
 EYE EXAM RETINAL OR DILATED Q1 2/20/1943 DTaP/Tdap/Td series (1 - Tdap) 2/20/1954 ZOSTER VACCINE AGE 60> 12/20/1992 GLAUCOMA SCREENING Q2Y 2/20/1998 HEMOGLOBIN A1C Q6M 8/14/2018 MICROALBUMIN Q1 10/30/2018 MEDICARE YEARLY EXAM 10/31/2018 FOOT EXAM Q1 2/14/2019 LIPID PANEL Q1 2/14/2019 COLONOSCOPY 3/30/2026 Allergies as of 3/26/2018  Review Complete On: 3/26/2018 By: Cecilia Goldberg MD  
 No Known Allergies Current Immunizations  Never Reviewed Name Date Influenza High Dose Vaccine PF 10/30/2017 Influenza Vaccine 10/7/2016 Pneumococcal Conjugate (PCV-13) 1/7/2005 Pneumococcal Vaccine (Unspecified Type) 1/7/2005 Rabies Vaccine 3/11/2009 Not reviewed this visit You Were Diagnosed With   
  
 Codes Comments Polymyalgia ([facility] [address]    -  Primary ICD-10-CM: M35.3 ICD-9-CM: 524 Paroxysmal atrial fibrillation (HCC)     ICD-10-CM: I48.0 ICD-9-CM: 427.31 Diastolic congestive heart failure, unspecified congestive heart failure chronicity (HCC)     ICD-10-CM: I50.30 ICD-9-CM: 428.30, 428.0 Vitals BP Pulse Temp Resp Height(growth percentile) Weight(growth percentile) 124/60 (BP 1 Location: Left arm, BP Patient Position: Sitting) 61 97.9 °F (36.6 °C) (Oral) 20 6' 3\" (1.905 m) 220 lb 12.8 oz (100.2 kg) SpO2 BMI Smoking Status 98% 27.6 kg/m2 Never Smoker Vitals History BMI and BSA Data Body Mass Index Body Surface Area  
 27.6 kg/m 2 2.3 m 2 Preferred Pharmacy Pharmacy Name Phone [id number] JAMES Kelly/ [facility] [phone] Your Updated Medication List  
  
   
This list is accurate as of 3/26/18  3:03 PM.  Always use your most recent med list.  
  
  
  
  
 acetaminophen 500 mg tablet Commonly known as:  TYLENOL Take 500 mg by mouth every six (6) hours as needed. diclofenac EC 75 mg EC tablet Commonly known as:  VOLTAREN  
TAKE 1 TABLET BY MOUTH TWICE A DAY  
  
 digoxin 0.125 mg tablet Commonly known as:  LANOXIN Take 1 Tab by mouth daily. fexofenadine-pseudoephedrine 180-240 mg per tablet Commonly known as:  ALLEGRA-D 24 Take 1 Tab by mouth daily. FISH OIL PO Take 1,200 mg by mouth two (2) times a day. furosemide 40 mg tablet Commonly known as:  LASIX Take 40 mg by mouth daily. glucosamine 1,000 mg Tab Take 2,000 mg by mouth daily. lisinopril 5 mg tablet Commonly known as:  Queen [place] Take 5 mg by mouth daily. MULTIVITAMIN 50 PLUS Tab tablet Generic drug:  multivitamins-minerals-lutein Take 1 Tab by mouth daily. predniSONE 5 mg tablet Commonly known as:  DELTASONE  
TAKE 1 TABLET DAILY AS DIRECTED  
  
 sildenafil (antihypertensive) 20 mg tablet Commonly known as:  REVATIO TAKE 1 TABLET, ORAL, AS DIRECTED FOR SEXUAL ACTIVITY  
  
 simvastatin 20 mg tablet Commonly known as:  ZOCOR Take 20 mg by mouth nightly. terazosin 5 mg capsule Commonly known as:  HYTRIN Take 5 mg by mouth nightly. VITAMIN D3 1,000 unit Cap Generic drug:  cholecalciferol Take 1,000 Units by mouth daily. warfarin 5 mg tablet Commonly known as:  COUMADIN Take one and a half tablets Monday, Wednesday and Friday; and 5 mg all other days. We Performed the Following AMB POC PT/INR [73504 CPT(R)] AMB POC SEDIMENTATION RATE, ERYTHROCYTE; NON AUTO [65737 CPT(R)] Follow-up Instructions Return in about 1 month (around 4/26/2018). Patient Instructions Atrial Fibrillation: Care Instructions Your Care Instructions Atrial fibrillation is an irregular and often fast heartbeat. Treating this condition is important for several reasons. It can cause blood clots, which can travel from your heart to your brain and cause a stroke. If you have a fast heartbeat, you may feel lightheaded, dizzy, and weak. An irregular heartbeat can also increase your risk for heart failure. Atrial fibrillation is often the result of another heart condition, such as high blood pressure or coronary artery disease. Making changes to improve your heart condition will help you stay healthy and active. Follow-up care is a key part of your treatment and safety. Be sure to make and go to all appointments, and call your doctor if you are having problems. It's also a good idea to know your test results and keep a list of the medicines you take. How can you care for yourself at home? Medicines ? · Take your medicines exactly as prescribed. Call your doctor if you think you are having a problem with your medicine. You will get more details on the specific medicines your doctor prescribes.   
? · If your doctor has given you a blood thinner to prevent a stroke, be sure you get instructions about how to take your medicine safely. Blood thinners can cause serious bleeding problems. ? · Do not take any vitamins, over-the-counter drugs, or herbal products without talking to your doctor first. ? Lifestyle changes ? · Do not smoke. Smoking can increase your chance of a stroke and heart attack. If you need help quitting, talk to your doctor about stop-smoking programs and medicines. These can increase your chances of quitting for good. ? · Eat a heart-healthy diet. ? · Stay at a healthy weight. Lose weight if you need to.  
? · Limit alcohol to 2 drinks a day for men and 1 drink a day for women. Too much alcohol can cause health problems. ? · Avoid colds and flu. Get a pneumococcal vaccine shot. If you have had one before, ask your doctor whether you need another dose. Get a flu shot every year. If you must be around people with colds or flu, wash your hands often. Activity ? · If your doctor recommends it, get more exercise. Walking is a good choice. Bit by bit, increase the amount you walk every day. Try for at least 30 minutes on most days of the week. You also may want to swim, bike, or do other activities. Your doctor may suggest that you join a cardiac rehabilitation program so that you can have help increasing your physical activity safely. ? · Start light exercise if your doctor says it is okay. Even a small amount will help you get stronger, have more energy, and manage stress. Walking is an easy way to get exercise. Start out by walking a little more than you did in the hospital. Gradually increase the amount you walk. ? · When you exercise, watch for signs that your heart is working too hard. You are pushing too hard if you cannot talk while you are exercising. If you become short of breath or dizzy or have chest pain, sit down and rest immediately. ? · Check your pulse regularly.  Place two fingers on the artery at the palm side of your wrist, in line with your thumb. If your heartbeat seems uneven or fast, talk to your doctor. When should you call for help? Call 911 anytime you think you may need emergency care. For example, call if: 
? · You have symptoms of a heart attack. These may include: ¨ Chest pain or pressure, or a strange feeling in the chest. 
¨ Sweating. ¨ Shortness of breath. ¨ Nausea or vomiting. ¨ Pain, pressure, or a strange feeling in the back, neck, jaw, or upper belly or in one or both shoulders or arms. ¨ Lightheadedness or sudden weakness. ¨ A fast or irregular heartbeat. After you call 911, the  may tell you to chew 1 adult-strength or 2 to 4 low-dose aspirin. Wait for an ambulance. Do not try to drive yourself. ? · You have symptoms of a stroke. These may include: 
¨ Sudden numbness, tingling, weakness, or loss of movement in your face, arm, or leg, especially on only one side of your body. ¨ Sudden vision changes. ¨ Sudden trouble speaking. ¨ Sudden confusion or trouble understanding simple statements. ¨ Sudden problems with walking or balance. ¨ A sudden, severe headache that is different from past headaches. ? · You passed out (lost consciousness). ?Call your doctor now or seek immediate medical care if: 
? · You have new or increased shortness of breath. ? · You feel dizzy or lightheaded, or you feel like you may faint. ? · Your heart rate becomes irregular. ? · You can feel your heart flutter in your chest or skip heartbeats. Tell your doctor if these symptoms are new or worse. ? Watch closely for changes in your health, and be sure to contact your doctor if you have any problems. Where can you learn more? Go to http://isra-rhonda.info/. Enter U020 in the search box to learn more about \"Atrial Fibrillation: Care Instructions. \" Current as of: September 21, 2016 Content Version: 11.4 © 1896-2660 Healthwise, Incorporated. Care instructions adapted under license by [employer] (which disclaims liability or warranty for this information). If you have questions about a medical condition or this instruction, always ask your healthcare professional. [address] any warranty or liability for your use of this information. Patient Instructions History Introducing [facility]! [facility] introduces SPEEDELO patient portal. Now you can access parts of your medical record, email your doctor's office, and request medication refills online. 1. In your internet browser, go to [email or web address] 2. Click on the First Time User? Click Here link in the Sign In box. You will see the New Member Sign Up page. 3. Enter your SPEEDELO Access Code exactly as it appears below. You will not need to use this code after youve completed the sign-up process. If you do not sign up before the expiration date, you must request a new code. · SPEEDELO Access Code: [email or web address] Expires: 5/15/2018 11:27 AM 
 
4. Enter the last four digits of your Social Security Number (xxxx) and Date of Birth (mm/dd/yyyy) as indicated and click Submit. You will be taken to the next sign-up page. 5. Create a SPEEDELO ID. This will be your SPEEDELO login ID and cannot be changed, so think of one that is secure and easy to remember. 6. Create a SPEEDELO password. You can change your password at any time. 7. Enter your Password Reset Question and Answer. This can be used at a later time if you forget your password. 8. Enter your e-mail address. You will receive e-mail notification when new information is available in [address] 9. Click Sign Up. You can now view and download portions of your medical record. 10. Click the Download Summary menu link to download a portable copy of your medical information. If you have questions, please visit the Frequently Asked Questions section of the [employer] website. Remember, [employer] is NOT to be used for urgent needs. For medical emergencies, dial 911. Now available from your iPhone and Android! Please provide this summary of care documentation to your next provider. Your primary care clinician is listed as Arpan. If you have any questions after today's visit, please call [phone].

## 2018-03-26 NOTE — PROGRESS NOTES
Chief Complaint   Patient presents with    Hypertension     1 mo. f/u       SUBJECTIVE:    Lizzy Duran is a 80 y.o. male who returns in follow-up for his atrial fibrillation, polymyalgia rheumatica, hypertension, and CHF. He is taking his medications and trying to follow his diet and try to get some exercise regular. He denies any chest pain, palpitations, shortness of breath, PND, orthopnea or other cardiovascular complaints. He denies any GI/ complaints. There are no other complaints on complete review of systems. Current Outpatient Prescriptions   Medication Sig Dispense Refill    multivitamins-minerals-lutein (MULTIVITAMIN 50 PLUS) tab tablet Take 1 Tab by mouth daily.  glucosamine 1,000 mg tab Take 2,000 mg by mouth daily.  warfarin (COUMADIN) 5 mg tablet Take one and a half tablets Monday, Wednesday and Friday; and 5 mg all other days. 145 Tab prn    digoxin (LANOXIN) 0.125 mg tablet Take 1 Tab by mouth daily. 30 Tab prn    predniSONE (DELTASONE) 5 mg tablet TAKE 1 TABLET DAILY AS DIRECTED 90 Tab 1    sildenafil, antihypertensive, (REVATIO) 20 mg tablet TAKE 1 TABLET, ORAL, AS DIRECTED FOR SEXUAL ACTIVITY 20 Tab 11    cholecalciferol (VITAMIN D3) 1,000 unit cap Take 1,000 Units by mouth daily.  DOCOSAHEXANOIC ACID/EPA (FISH OIL PO) Take 1,200 mg by mouth two (2) times a day.  diclofenac EC (VOLTAREN) 75 mg EC tablet TAKE 1 TABLET BY MOUTH TWICE A  Tab 3    furosemide (LASIX) 40 mg tablet Take 40 mg by mouth daily.  simvastatin (ZOCOR) 20 mg tablet Take 20 mg by mouth nightly.  lisinopril (PRINIVIL, ZESTRIL) 5 mg tablet Take 5 mg by mouth daily.  terazosin (HYTRIN) 5 mg capsule Take 5 mg by mouth nightly.  fexofenadine-pseudoephedrine (ALLEGRA-D 24) 180-240 mg per tablet Take 1 Tab by mouth daily.  acetaminophen (TYLENOL) 500 mg tablet Take 500 mg by mouth every six (6) hours as needed.          Past Medical History: Diagnosis Date    Allergic rhinitis 9/20/2017    Arthritis     ASCVD (arteriosclerotic cardiovascular disease) 9/20/2017    Story:  Old ASMI by EKG    Back pain 9/20/2017    BPH (benign prostatic hyperplasia) 9/20/2017    CHF (congestive heart failure) ([facility] [address] 9/20/2017    CKD (chronic kidney disease) 9/20/2017    DM (diabetes mellitus) ([facility] [address] 9/20/2017    Story: Diet Controlled    ED (erectile dysfunction) 9/20/2017    Edema 9/20/2017    Elevated CPK 9/20/2017    Comments: History of    Elevated LFTs 9/20/2017    Comments: History of    Elevated PSA 9/20/2017    Hypercholesteremia     Hyperlipidemia 9/20/2017    Hypertension     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    Nodule of right lung 9/20/2017    Story: Right    Polymyalgia ([facility] [address] 9/20/2017    Prostate enlargement      Past Surgical History:   Procedure Laterality Date    ABDOMEN SURGERY PROC UNLISTED      hernia repair     No Known Allergies    REVIEW OF SYSTEMS:  General: negative for - chills or fever, or weight loss or gain  ENT: negative for - headaches, nasal congestion or tinnitus  Eyes: no blurred or visual changes  Neck: No stiffness or swollen nodes  Respiratory: negative for - cough, hemoptysis, shortness of breath or wheezing  Cardiovascular : negative for - chest pain, edema, palpitations or shortness of breath  Gastrointestinal: negative for - abdominal pain, blood in stools, heartburn or nausea/vomiting  Genito-Urinary: no dysuria, trouble voiding, or hematuria  Musculoskeletal: negative for - gait disturbance, joint pain, joint stiffness or joint swelling  Neurological: no TIA or stroke symptoms  Hematologic: no bruises, no bleeding  Lymphatic: no swollen glands  Integument: no lumps, mole changes, nail changes or rash  Endocrine:no malaise/lethargy poly uria or polydipsia or unexpected weight changes        Social History     Social History    Marital status:      Spouse name: N/A    Number of children: N/A    Years of education: N/A     Social History Main Topics    Smoking status: Never Smoker    Smokeless tobacco: Never Used    Alcohol use No    Drug use: No    Sexual activity: No     Other Topics Concern    None     Social History Narrative     History reviewed. No pertinent family history. OBJECTIVE:     Visit Vitals    /60 (BP 1 Location: Left arm, BP Patient Position: Sitting)    Pulse 61    Temp 97.9 °F (36.6 °C) (Oral)    Resp 20    Ht 6' 3\" (1.905 m)    Wt 220 lb 12.8 oz (100.2 kg)    SpO2 98%    BMI 27.6 kg/m2     CONSTITUTIONAL:   well nourished, appears age appropriate  EYES: sclera anicteric, PERRL, EOMI  ENMT:nares clear, moist mucous membranes, pharynx clear  NECK: supple. Thyroid normal, No JVD or bruits  RESPIRATORY: Chest: clear to ascultation and percussion, normal inspiratory effort  CARDIOVASCULAR: Heart: regular rate and rhythm no murmurs, rubs or gallops, PMI not displaced, No thrills  GASTROINTESTINAL: Abdomen: non distended, soft, non tender, bowel sounds normal  HEMATOLOGIC: no purpura, petechiae or bruising  LYMPHATIC: No lymph node enlargemant  MUSCULOSKELETAL: Extremities: no edema or active synovitis, pulse 1+   INTEGUMENT: No unusual rashes or suspicious skin lesions noted. Nails appear normal.  PERIPHERAL VASCULAR: normal pulses femoral, PT and DP  NEUROLOGIC: non-focal exam, A & O X 3  PSYCHIATRIC:, appropriate affect     ASSESSMENT:   1. Polymyalgia (HCC)    2. Paroxysmal atrial fibrillation ([address]    3. Diastolic congestive heart failure, unspecified congestive heart failure chronicity (HCC)      Impression  1. Polymyalgia rheumatica repeat sed rate pending prednisone dose currently 5 mg  2. PAF seem to be in sinus rhythm today we will see what his PT/INR look like  3.   diastolic CHF seems to be compensated  4. Hypertension control adequate  I will call the lab make further recommendations.   Follow-up scheduled again for 1 month or sooner should there be a problem peer    PLAN:  .  Orders Placed This Encounter    AMB POC PT/INR    AMB POC SEDIMENTATION RATE, ERYTHROCYTE; NON AUTO (SED RATE)    multivitamins-minerals-lutein (MULTIVITAMIN 50 PLUS) tab tablet    glucosamine 1,000 mg tab         ATTENTION:   This medical record was transcribed using an electronic medical records system. Although proofread, it may and can contain electronic and spelling errors. Other human spelling and other errors may be present. Corrections may be executed at a later time. Please feel free to contact us for any clarifications as needed. Follow-up Disposition:  Return in about 1 month (around 4/26/2018). No results found for any visits on 03/26/18. Hanna Falcon MD    The patient verbalized understanding of the problems and plans as explained.

## 2018-03-27 ENCOUNTER — TELEPHONE (OUTPATIENT)
Dept: INTERNAL MEDICINE CLINIC | Age: 83
End: 2018-03-27

## 2018-03-27 ENCOUNTER — TELEPHONE ANTICOAG (OUTPATIENT)
Dept: INTERNAL MEDICINE CLINIC | Age: 83
End: 2018-03-27

## 2018-03-27 DIAGNOSIS — I48.0 PAROXYSMAL ATRIAL FIBRILLATION (HCC): Primary | ICD-10-CM

## 2018-03-27 LAB — ERYTHROCYTE [SEDIMENTATION RATE] IN BLOOD: 47 MM/HR (ref 0–10)

## 2018-03-27 NOTE — PROGRESS NOTES
INR is still low so what is his Coumadin dose. Called patient to verify his blood thinner dose. Dr. Keo Lo recommends increasing this to 7.5 mg daily and f/up as scheduled.

## 2018-03-27 NOTE — PROGRESS NOTES
Anticoagulation Episode Summary     Current INR goal 2.0-3.0   Next INR check 4/24/2018   INR from last check 1. 2! (3/26/2018)   Weekly max dose    Target end date    INR check location Clinic Lab   Preferred lab    Send INR reminders to     Indications   Paroxysmal atrial fibrillation ([facility] [address] (Primary) [I48.0]           Comments          Anticoagulation Care Providers     Provider Role Specialty Phone number    Saurabh Pandya MD [facility] Medicine [phone]        Informed patient that his INR was still low at 1.2. Dr. Sunita Starks recommends increasing his blood thinner dose to 7.5 mg daily and f/up as scheduled.

## 2018-04-01 DIAGNOSIS — I10 ESSENTIAL HYPERTENSION: Primary | ICD-10-CM

## 2018-04-02 RX ORDER — FUROSEMIDE 80 MG/1
TABLET ORAL
Qty: 90 TAB | Refills: 3 | Status: SHIPPED | OUTPATIENT
Start: 2018-04-02 | End: 2019-03-22 | Stop reason: SDUPTHER

## 2018-04-02 NOTE — TELEPHONE ENCOUNTER
RX refill request from the patient/pharmacy. Patient last seen 03- with labs, and next appt. scheduled for 04-.

## 2018-04-24 ENCOUNTER — OFFICE VISIT (OUTPATIENT)
Dept: INTERNAL MEDICINE CLINIC | Age: 83
End: 2018-04-24

## 2018-04-24 VITALS
WEIGHT: 216.2 LBS | OXYGEN SATURATION: 98 % | TEMPERATURE: 98.3 F | DIASTOLIC BLOOD PRESSURE: 68 MMHG | RESPIRATION RATE: 16 BRPM | BODY MASS INDEX: 26.88 KG/M2 | HEIGHT: 75 IN | SYSTOLIC BLOOD PRESSURE: 130 MMHG | HEART RATE: 64 BPM

## 2018-04-24 DIAGNOSIS — I12.9 HYPERTENSION WITH RENAL DISEASE: ICD-10-CM

## 2018-04-24 DIAGNOSIS — I48.0 PAROXYSMAL ATRIAL FIBRILLATION (HCC): ICD-10-CM

## 2018-04-24 DIAGNOSIS — M35.3 POLYMYALGIA RHEUMATICA (HCC): Primary | ICD-10-CM

## 2018-04-24 DIAGNOSIS — I50.32 CHRONIC DIASTOLIC CONGESTIVE HEART FAILURE (HCC): ICD-10-CM

## 2018-04-24 LAB
BUN BLD-MCNC: 34 MG/DL (ref 9–20)
CALCIUM BLD-MCNC: 9.2 MG/DL (ref 8.4–10.2)
CHLORIDE BLD-SCNC: 104 MMOL/L (ref 98–107)
CO2 POC: 29 MMOL/L (ref 22–32)
CREAT BLD-MCNC: 1.4 MG/DL (ref 0.8–1.5)
EGFR (POC): 45.5
ERYTHROCYTE [SEDIMENTATION RATE] IN BLOOD: 33 MM/HR (ref 0–10)
GLUCOSE POC: 157 MG/DL (ref 75–110)
INR BLD: 1.5
POTASSIUM SERPL-SCNC: 4.8 MMOL/L (ref 3.6–5)
PT POC: 20.6 SECONDS
SODIUM SERPL-SCNC: 143 MMOL/L (ref 137–145)
VALID INTERNAL CONTROL?: YES

## 2018-04-24 NOTE — PROGRESS NOTES
1. Have you been to the ER, urgent care clinic since your last visit? Hospitalized since your last visit? No    2. Have you seen or consulted any other health care providers outside of the [address] [place] [address] since your last visit? Include any pap smears or colon screening. Yes, 04-, Dr. Luis Duggan, follow up of skin lesion removal.      Chief Complaint   Patient presents with    Hypertension     1 mo.  f/u

## 2018-04-24 NOTE — PROGRESS NOTES
Chief Complaint   Patient presents with    Hypertension     1 mo. f/u       SUBJECTIVE:    Judy Garcia is a 80 y.o. male who returns in follow-up for his atrial fibrillation, polymyalgia rheumatica, hypertension, and CHF. He is taking his medications and trying to follow his diet and try to get some exercise regularly. He denies any chest pain, palpitations, shortness of breath, PND, orthopnea or other cardiovascular complaints. He denies any GI/ complaints. There are no other complaints on complete review of systems. Current Outpatient Prescriptions   Medication Sig Dispense Refill    furosemide (LASIX) 80 mg tablet TAKE 1 TABLET BY MOUTH EVERY DAY 90 Tab 3    multivitamins-minerals-lutein (MULTIVITAMIN 50 PLUS) tab tablet Take 1 Tab by mouth daily.  glucosamine 1,000 mg tab Take 2,000 mg by mouth daily.  warfarin (COUMADIN) 5 mg tablet Take one and a half tablets Monday, Wednesday and Friday; and 5 mg all other days. (Patient taking differently: Take one and a half tablets every day.) 145 Tab prn    digoxin (LANOXIN) 0.125 mg tablet Take 1 Tab by mouth daily. 30 Tab prn    predniSONE (DELTASONE) 5 mg tablet TAKE 1 TABLET DAILY AS DIRECTED 90 Tab 1    sildenafil, antihypertensive, (REVATIO) 20 mg tablet TAKE 1 TABLET, ORAL, AS DIRECTED FOR SEXUAL ACTIVITY 20 Tab 11    cholecalciferol (VITAMIN D3) 1,000 unit cap Take 1,000 Units by mouth daily.  DOCOSAHEXANOIC ACID/EPA (FISH OIL PO) Take 1,200 mg by mouth two (2) times a day.  diclofenac EC (VOLTAREN) 75 mg EC tablet TAKE 1 TABLET BY MOUTH TWICE A  Tab 3    simvastatin (ZOCOR) 20 mg tablet Take 20 mg by mouth nightly.  lisinopril (PRINIVIL, ZESTRIL) 5 mg tablet Take 5 mg by mouth daily.  terazosin (HYTRIN) 5 mg capsule Take 5 mg by mouth nightly.  fexofenadine-pseudoephedrine (ALLEGRA-D 24) 180-240 mg per tablet Take 1 Tab by mouth daily.         acetaminophen (TYLENOL) 500 mg tablet Take 500 mg by mouth every six (6) hours as needed. 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    Back pain 9/20/2017    BPH (benign prostatic hyperplasia) 9/20/2017    CHF (congestive heart failure) ([facility] 9/20/2017    CKD (chronic kidney disease) 9/20/2017    DM (diabetes mellitus) ([facility] 9/20/2017    Story: Diet Controlled    ED (erectile dysfunction) 9/20/2017    Edema 9/20/2017    Elevated CPK 9/20/2017    Comments: History of    Elevated LFTs 9/20/2017    Comments: History of    Elevated PSA 9/20/2017    Hypercholesteremia     Hyperlipidemia 9/20/2017    Hypertension     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    Nodule of right lung 9/20/2017    Story: Right    Polymyalgia ([facility] 9/20/2017    Prostate enlargement      Past Surgical History:   Procedure Laterality Date    ABDOMEN SURGERY PROC UNLISTED      hernia repair     No Known Allergies    REVIEW OF SYSTEMS:  General: negative for - chills or fever, or weight loss or gain  ENT: negative for - headaches, nasal congestion or tinnitus  Eyes: no blurred or visual changes  Neck: No stiffness or swollen nodes  Respiratory: negative for - cough, hemoptysis, shortness of breath or wheezing  Cardiovascular : negative for - chest pain, edema, palpitations or shortness of breath  Gastrointestinal: negative for - abdominal pain, blood in stools, heartburn or nausea/vomiting  Genito-Urinary: no dysuria, trouble voiding, or hematuria  Musculoskeletal: negative for - gait disturbance, joint pain, joint stiffness or joint swelling  Neurological: no TIA or stroke symptoms  Hematologic: no bruises, no bleeding  Lymphatic: no swollen glands  Integument: no lumps, mole changes, nail changes or rash  Endocrine:no malaise/lethargy poly uria or polydipsia or unexpected weight changes        Social History     Social History    Marital status:      Spouse name: N/A    Number of children: N/A    Years of education: N/A     Social History Main Topics    Smoking status: Never Smoker    Smokeless tobacco: Never Used    Alcohol use No    Drug use: No    Sexual activity: No     Other Topics Concern    None     Social History Narrative     History reviewed. No pertinent family history. OBJECTIVE:     Visit Vitals    /68 (BP 1 Location: Left arm, BP Patient Position: Sitting)    Pulse 64    Temp 98.3 °F (36.8 °C) (Oral)    Resp 16    Ht 6' 3\" (1.905 m)    Wt 216 lb 3.2 oz (98.1 kg)    SpO2 98%    BMI 27.02 kg/m2     CONSTITUTIONAL:   well nourished, appears age appropriate  EYES: sclera anicteric, PERRL, EOMI  ENMT:nares clear, moist mucous membranes, pharynx clear  NECK: supple. Thyroid normal, No JVD or bruits  RESPIRATORY: Chest: clear to ascultation and percussion, normal inspiratory effort  CARDIOVASCULAR: Heart: regular rate and rhythm no murmurs, rubs or gallops, PMI not displaced, No thrills  GASTROINTESTINAL: Abdomen: non distended, soft, non tender, bowel sounds normal  HEMATOLOGIC: no purpura, petechiae or bruising  LYMPHATIC: No lymph node enlargemant  MUSCULOSKELETAL: Extremities: no edema or active synovitis, pulse 1+   INTEGUMENT: No unusual rashes or suspicious skin lesions noted. Nails appear normal.  PERIPHERAL VASCULAR: normal pulses femoral, PT and DP  NEUROLOGIC: non-focal exam, A & O X 3  PSYCHIATRIC:, appropriate affect     ASSESSMENT:   1. Polymyalgia rheumatica ([address]    2. Chronic diastolic congestive heart failure ([address]    3. Hypertension with renal disease    4. Paroxysmal atrial fibrillation (HCC)      Impression  1. Polymyalgia rheumatica repeat sed rate pending prednisone dose currently 5 mg  2. PAF seem to be in sinus rhythm today we will see what his PT/INR look like  3. Diastolic CHF seems to be compensated  4.   Hypertension control adequate  I will call with the lab results and make further recommendations. Follow-up scheduled again for 1 month or sooner should there be a problem. Advance care planning discussed with him and he has something written down at home which he will get us a copy of. PLAN:  .  Orders Placed This Encounter    AMB POC SEDIMENTATION RATE, ERYTHROCYTE; NON AUTO    AMB POC BASIC METABOLIC PANEL    AMB POC PT/INR         ATTENTION:   This medical record was transcribed using an electronic medical records system. Although proofread, it may and can contain electronic and spelling errors. Other human spelling and other errors may be present. Corrections may be executed at a later time. Please feel free to contact us for any clarifications as needed. Follow-up Disposition:  Return in about 4 weeks (around 5/22/2018). No results found for any visits on 04/24/18. Samantha Mittal MD    The patient verbalized understanding of the problems and plans as explained.

## 2018-04-24 NOTE — PATIENT INSTRUCTIONS
Atrial Fibrillation: Care Instructions  Your Care Instructions    Atrial fibrillation is an irregular and often fast heartbeat. Treating this condition is important for several reasons. It can cause blood clots, which can travel from your heart to your brain and cause a stroke. If you have a fast heartbeat, you may feel lightheaded, dizzy, and weak. An irregular heartbeat can also increase your risk for heart failure. Atrial fibrillation is often the result of another heart condition, such as high blood pressure or coronary artery disease. Making changes to improve your heart condition will help you stay healthy and active. Follow-up care is a key part of your treatment and safety. Be sure to make and go to all appointments, and call your doctor if you are having problems. It's also a good idea to know your test results and keep a list of the medicines you take. How can you care for yourself at home? Medicines  ? · Take your medicines exactly as prescribed. Call your doctor if you think you are having a problem with your medicine. You will get more details on the specific medicines your doctor prescribes. ? · If your doctor has given you a blood thinner to prevent a stroke, be sure you get instructions about how to take your medicine safely. Blood thinners can cause serious bleeding problems. ? · Do not take any vitamins, over-the-counter drugs, or herbal products without talking to your doctor first.   ? Lifestyle changes  ? · Do not smoke. Smoking can increase your chance of a stroke and heart attack. If you need help quitting, talk to your doctor about stop-smoking programs and medicines. These can increase your chances of quitting for good. ? · Eat a heart-healthy diet. ? · Stay at a healthy weight. Lose weight if you need to.   ? · Limit alcohol to 2 drinks a day for men and 1 drink a day for women. Too much alcohol can cause health problems. ? · Avoid colds and flu.  Get a pneumococcal vaccine shot. If you have had one before, ask your doctor whether you need another dose. Get a flu shot every year. If you must be around people with colds or flu, wash your hands often. Activity  ? · If your doctor recommends it, get more exercise. Walking is a good choice. Bit by bit, increase the amount you walk every day. Try for at least 30 minutes on most days of the week. You also may want to swim, bike, or do other activities. Your doctor may suggest that you join a cardiac rehabilitation program so that you can have help increasing your physical activity safely. ? · Start light exercise if your doctor says it is okay. Even a small amount will help you get stronger, have more energy, and manage stress. Walking is an easy way to get exercise. Start out by walking a little more than you did in the hospital. Gradually increase the amount you walk. ? · When you exercise, watch for signs that your heart is working too hard. You are pushing too hard if you cannot talk while you are exercising. If you become short of breath or dizzy or have chest pain, sit down and rest immediately. ? · Check your pulse regularly. Place two fingers on the artery at the palm side of your wrist, in line with your thumb. If your heartbeat seems uneven or fast, talk to your doctor. When should you call for help? Call 911 anytime you think you may need emergency care. For example, call if:  ? · You have symptoms of a heart attack. These may include:  ¨ Chest pain or pressure, or a strange feeling in the chest.  ¨ Sweating. ¨ Shortness of breath. ¨ Nausea or vomiting. ¨ Pain, pressure, or a strange feeling in the back, neck, jaw, or upper belly or in one or both shoulders or arms. ¨ Lightheadedness or sudden weakness. ¨ A fast or irregular heartbeat. After you call 911, the  may tell you to chew 1 adult-strength or 2 to 4 low-dose aspirin. Wait for an ambulance. Do not try to drive yourself.    ? · You have symptoms of a stroke. These may include:  ¨ Sudden numbness, tingling, weakness, or loss of movement in your face, arm, or leg, especially on only one side of your body. ¨ Sudden vision changes. ¨ Sudden trouble speaking. ¨ Sudden confusion or trouble understanding simple statements. ¨ Sudden problems with walking or balance. ¨ A sudden, severe headache that is different from past headaches. ? · You passed out (lost consciousness). ?Call your doctor now or seek immediate medical care if:  ? · You have new or increased shortness of breath. ? · You feel dizzy or lightheaded, or you feel like you may faint. ? · Your heart rate becomes irregular. ? · You can feel your heart flutter in your chest or skip heartbeats. Tell your doctor if these symptoms are new or worse. ? Watch closely for changes in your health, and be sure to contact your doctor if you have any problems. Where can you learn more? Go to http://isra-rhonda.info/. Enter U020 in the search box to learn more about \"Atrial Fibrillation: Care Instructions. \"  Current as of: September 21, 2016  Content Version: 11.4  © 6227-0275 [employer]. Care instructions adapted under license by [employer] (which disclaims liability or warranty for this information). If you have questions about a medical condition or this instruction, always ask your healthcare professional. [address] any warranty or liability for your use of this information.

## 2018-04-24 NOTE — MR AVS SNAPSHOT
[facility] 
 
 
 [address] [address] [phone] Patient: Jordan Murray MRN: [id number] EMILE:[id number] Visit Information Date & Time Provider Department Dept. Phone Encounter #  
 4/24/2018  1:40 PM Karolina Willis, [address] [facility] [id number] Your Appointments 5/16/2018 10:00 AM  
FOLLOW UP 10 with MD PREMA Iglesias [facility] ([facility]) Appt Note: [address] [address] [address] [address] Upcoming Health Maintenance Date Due  
 EYE EXAM RETINAL OR DILATED Q1 2/20/1943 DTaP/Tdap/Td series (1 - Tdap) 2/20/1954 ZOSTER VACCINE AGE 60> 12/20/1992 GLAUCOMA SCREENING Q2Y 2/20/1998 HEMOGLOBIN A1C Q6M 8/14/2018 MICROALBUMIN Q1 10/30/2018 MEDICARE YEARLY EXAM 10/31/2018 FOOT EXAM Q1 2/14/2019 LIPID PANEL Q1 2/14/2019 COLONOSCOPY 3/30/2026 Allergies as of 4/24/2018  Review Complete On: 4/24/2018 By: Karolina Willis MD  
 No Known Allergies Current Immunizations  Never Reviewed Name Date Influenza High Dose Vaccine PF 10/30/2017 Influenza Vaccine 10/7/2016 Pneumococcal Conjugate (PCV-13) 1/7/2005 Pneumococcal Vaccine (Unspecified Type) 1/7/2005 Rabies Vaccine 3/11/2009 Not reviewed this visit You Were Diagnosed With   
  
 Codes Comments Polymyalgia rheumatica (HCC)    -  Primary ICD-10-CM: M35.3 ICD-9-CM: 400 Chronic diastolic congestive heart failure (HCC)     ICD-10-CM: I50.32 
ICD-9-CM: 428.32, 428.0 Hypertension with renal disease     ICD-10-CM: I12.9 ICD-9-CM: 403.90 Vitals BP Pulse Temp Resp Height(growth percentile) Weight(growth percentile)  130/68 (BP 1 Location: Left arm, BP Patient Position: Sitting) 64 98.3 °F (36.8 °C) (Oral) 16 6' 3\" (1.905 m) 216 lb 3.2 oz (98.1 kg) SpO2 BMI Smoking Status 98% 27.02 kg/m2 Never Smoker Vitals History BMI and BSA Data Body Mass Index Body Surface Area  
 27.02 kg/m 2 2.28 m 2 Preferred Pharmacy Pharmacy Name Phone [phone] Your Updated Medication List  
  
   
This list is accurate as of 4/24/18  2:30 PM.  Always use your most recent med list.  
  
  
  
  
 acetaminophen 500 mg tablet Commonly known as:  TYLENOL Take 500 mg by mouth every six (6) hours as needed. diclofenac EC 75 mg EC tablet Commonly known as:  VOLTAREN  
TAKE 1 TABLET BY MOUTH TWICE A DAY  
  
 digoxin 0.125 mg tablet Commonly known as:  LANOXIN Take 1 Tab by mouth daily. fexofenadine-pseudoephedrine 180-240 mg per tablet Commonly known as:  ALLEGRA-D 24 Take 1 Tab by mouth daily. FISH OIL PO Take 1,200 mg by mouth two (2) times a day. furosemide 80 mg tablet Commonly known as:  LASIX TAKE 1 TABLET BY MOUTH EVERY DAY  
  
 glucosamine 1,000 mg Tab Take 2,000 mg by mouth daily. lisinopril 5 mg tablet Commonly known as:  [place] Take 5 mg by mouth daily. MULTIVITAMIN 50 PLUS Tab tablet Generic drug:  multivitamins-minerals-lutein Take 1 Tab by mouth daily. predniSONE 5 mg tablet Commonly known as:  DELTASONE  
TAKE 1 TABLET DAILY AS DIRECTED  
  
 sildenafil (antihypertensive) 20 mg tablet Commonly known as:  REVATIO  
TAKE 1 TABLET, ORAL, AS DIRECTED FOR SEXUAL ACTIVITY  
  
 simvastatin 20 mg tablet Commonly known as:  ZOCOR Take 20 mg by mouth nightly. terazosin 5 mg capsule Commonly known as:  HYTRIN Take 5 mg by mouth nightly. VITAMIN D3 1,000 unit Cap Generic drug:  cholecalciferol Take 1,000 Units by mouth daily. warfarin 5 mg tablet Commonly known as:  COUMADIN Take one and a half tablets Monday, Wednesday and Friday; and 5 mg all other days. Introducing [facility]! [facility] introduces [employer] patient portal. Now you can access parts of your medical record, email your doctor's office, and request medication refills online. 1. In your internet browser, go to [email or web address] 2. Click on the First Time User? Click Here link in the Sign In box. You will see the New Member Sign Up page. 3. Enter your [employer] Access Code exactly as it appears below. You will not need to use this code after youve completed the sign-up process. If you do not sign up before the expiration date, you must request a new code. · [employer] Access Code: [email or web address] Expires: 5/15/2018 11:27 AM 
 
4. Enter the last four digits of your Social Security Number (xxxx) and Date of Birth (mm/dd/yyyy) as indicated and click Submit. You will be taken to the next sign-up page. 5. Create a [employer] ID. This will be your [employer] login ID and cannot be changed, so think of one that is secure and easy to remember. 6. Create a [employer] password. You can change your password at any time. 7. Enter your Password Reset Question and Answer. This can be used at a later time if you forget your password. 8. Enter your e-mail address. You will receive e-mail notification when new information is available in [address]. 9. Click Sign Up. You can now view and download portions of your medical record. 10. Click the Download Summary menu link to download a portable copy of your medical information. If you have questions, please visit the Frequently Asked Questions section of the [employer] website. Remember, [employer] is NOT to be used for urgent needs. For medical emergencies, dial 911. Now available from your iPhone and Android! Please provide this summary of care documentation to your next provider. Your primary care clinician is listed as [facility]. If you have any questions after today's visit, please call [phone].

## 2018-04-25 ENCOUNTER — TELEPHONE ANTICOAG (OUTPATIENT)
Dept: INTERNAL MEDICINE CLINIC | Age: 83
End: 2018-04-25

## 2018-04-25 DIAGNOSIS — I48.0 PAROXYSMAL ATRIAL FIBRILLATION (HCC): Primary | ICD-10-CM

## 2018-04-25 NOTE — PROGRESS NOTES
Anticoagulation Episode Summary     Current INR goal 2.0-3.0   Next INR check 5/16/2018   INR from last check 1.5! (4/24/2018)   Weekly max warfarin dose    Target end date    INR check location Clinic Lab   Preferred lab    Send INR reminders to     Indications   [facility]oxysmal atrial fibrillation ([facility] (Primary) [I48.0]           Comments          Anticoagulation Care Providers     Provider Role Specialty Phone number    Herminio Javier MD [facility] Internal Medicine [phone]        Informed patient that PT/INR okay; continue 7.5 mg daily and f/up as scheduled.

## 2018-04-25 NOTE — PROGRESS NOTES
INR is okay so no treatment change. Informed patient to continue the blood thinner dose at 7.5 mg daily.

## 2018-05-07 ENCOUNTER — OFFICE VISIT (OUTPATIENT)
Dept: INTERNAL MEDICINE CLINIC | Age: 83
End: 2018-05-07

## 2018-05-07 VITALS
HEIGHT: 75 IN | BODY MASS INDEX: 26.91 KG/M2 | SYSTOLIC BLOOD PRESSURE: 112 MMHG | HEART RATE: 80 BPM | OXYGEN SATURATION: 96 % | RESPIRATION RATE: 16 BRPM | WEIGHT: 216.4 LBS | DIASTOLIC BLOOD PRESSURE: 60 MMHG | TEMPERATURE: 98.7 F

## 2018-05-07 DIAGNOSIS — L03.115 CELLULITIS OF RIGHT LOWER EXTREMITY: Primary | ICD-10-CM

## 2018-05-07 LAB
GRAN# POC: 11.5 K/UL (ref 2–7.8)
GRAN% POC: 92 % (ref 37–92)
HCT VFR BLD CALC: 33.4 % (ref 37–51)
HGB BLD-MCNC: 11.3 G/DL (ref 12–18)
LY# POC: 0.7 K/UL (ref 0.6–4.1)
LY% POC: 5.7 % (ref 10–58.5)
MCH RBC QN: 31.4 PG (ref 26–32)
MCHC RBC-ENTMCNC: 33.8 G/DL (ref 30–36)
MCV RBC: 93 FL (ref 80–97)
MID #, POC: 0.2 K/UL (ref 0–1.8)
MID% POC: 2.3 % (ref 0.1–24)
PLATELET # BLD: 174 K/UL (ref 140–440)
RBC # BLD: 3.6 M/UL (ref 4.2–6.3)
WBC # BLD: 12.4 K/UL (ref 4.1–10.9)

## 2018-05-07 RX ORDER — MINERAL OIL
ENEMA (ML) RECTAL
COMMUNITY
End: 2020-04-07 | Stop reason: ALTCHOICE

## 2018-05-07 RX ORDER — DEXTROMETHORPHAN HYDROBROMIDE, GUAIFENESIN 5; 100 MG/5ML; MG/5ML
650 LIQUID ORAL EVERY 8 HOURS
COMMUNITY

## 2018-05-07 RX ORDER — AMOXICILLIN AND CLAVULANATE POTASSIUM 875; 125 MG/1; MG/1
1 TABLET, FILM COATED ORAL EVERY 12 HOURS
Qty: 20 TAB | Refills: 0 | Status: SHIPPED | OUTPATIENT
Start: 2018-05-07 | End: 2018-06-01 | Stop reason: ALTCHOICE

## 2018-05-07 NOTE — PROGRESS NOTES
Subjective:   Calvin Ruiz is a 80 y.o. male      Chief Complaint   Patient presents with   Juan Zafaror in the garden while crossing over a wire fence, got caught on the fence and fell, right foot, ankle, and leg very swollen with redness extending to knee, also having spells of chills after fall        History of present illness: He presents today after injuring his right leg when he had a fall tripping over a fence in his garden and then yesterday had some problems with shaking chills on 2 occasions but has had no shaking chills today. He does note a days noted some redness of his right leg. He notes no associated pain in his right leg. He is noted no fevers today. He denies any other particular complaints. 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, without long-term current use of insulin ([facility]) E11.22, N18.3    Hypertension with renal disease I12.9    On statin therapy Z79.899    CHF (congestive heart failure) ([facility]) I50.9    Stage 3 chronic kidney disease N18.3    Primary osteoarthritis involving multiple joints M15.0    Nodule of right lung R91.1    Chronic non-seasonal allergic rhinitis J30.89    ED (erectile dysfunction) N52.9    Back pain M54.9    BPH (benign prostatic hyperplasia) N40.0    Medicare annual wellness visit, initial Z00.00    Dizziness R42    Paroxysmal atrial fibrillation (HCC) I48.0    Cellulitis of right lower extremity L03.115    Polymyalgia rheumatica ([facility]) M35.3      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    Back pain 9/20/2017    BPH (benign prostatic hyperplasia) 9/20/2017    CHF (congestive heart failure) ([facility] 9/20/2017    CKD (chronic kidney disease) 9/20/2017    DM (diabetes mellitus) ([facility] 9/20/2017    Story: Diet Controlled    ED (erectile dysfunction) 9/20/2017    Edema 9/20/2017    Elevated CPK 9/20/2017    Comments: History of    Elevated LFTs 9/20/2017    Comments: History of    Elevated PSA 9/20/2017    Hypercholesteremia     Hyperlipidemia 9/20/2017    Hypertension     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    Nodule of right lung 9/20/2017    Story: Right    Polymyalgia ([facility] 9/20/2017    Prostate enlargement       No Known Allergies   History reviewed. No pertinent family history. Social History     Social History    Marital status:      Spouse name: N/A    Number of children: N/A    Years of education: N/A     Occupational History    Not on file. Social History Main Topics    Smoking status: Never Smoker    Smokeless tobacco: Never Used    Alcohol use No    Drug use: No    Sexual activity: No     Other Topics Concern    Not on file     Social History Narrative     Prior to Admission medications    Medication Sig Start Date End Date Taking? Authorizing Provider   fexofenadine (ALLEGRA) 180 mg tablet Take  by mouth. Yes Historical Provider   acetaminophen (TYLENOL ARTHRITIS PAIN) 650 mg TbER Take 650 mg by mouth every eight (8) hours. Yes Historical Provider   amoxicillin-clavulanate (AUGMENTIN) 875-125 mg per tablet Take 1 Tab by mouth every twelve (12) hours. 5/7/18  Yes Ezekiel Connelly MD   furosemide (LASIX) 80 mg tablet TAKE 1 TABLET BY MOUTH EVERY DAY 4/2/18  Yes Ezekiel Connelly MD   multivitamins-minerals-lutein (MULTIVITAMIN 50 PLUS) tab tablet Take 1 Tab by mouth daily. Yes Historical Provider   glucosamine 1,000 mg tab Take 2,000 mg by mouth daily. Yes Historical Provider   warfarin (COUMADIN) 5 mg tablet Take one and a half tablets Monday, Wednesday and Friday; and 5 mg all other days.   Patient taking differently: Take one and a half tablets every day. 2/19/18  Yes Chrissy Mccarthy MD   digoxin (LANOXIN) 0.125 mg tablet Take 1 Tab by mouth daily. 2/14/18  Yes Chrissy Mccarthy MD   predniSONE (DELTASONE) 5 mg tablet TAKE 1 TABLET DAILY AS DIRECTED 12/12/17  Yes Chrissy Mccarthy MD   sildenafil, antihypertensive, (REVATIO) 20 mg tablet TAKE 1 TABLET, ORAL, AS DIRECTED FOR SEXUAL ACTIVITY 12/8/17  Yes Chrissy Mccarthy MD   cholecalciferol (VITAMIN D3) 1,000 unit cap Take 1,000 Units by mouth daily. Yes Historical Provider   DOCOSAHEXANOIC ACID/EPA (FISH OIL PO) Take 1,200 mg by mouth two (2) times a day. Yes Historical Provider   diclofenac EC (VOLTAREN) 75 mg EC tablet TAKE 1 TABLET BY MOUTH TWICE A DAY 9/13/17  Yes Chrissy Mccarthy MD   simvastatin (ZOCOR) 20 mg tablet Take 20 mg by mouth nightly. Yes Gio Barba MD   lisinopril (PRINIVIL, ZESTRIL) 5 mg tablet Take 5 mg by mouth daily. Yes Gio Barba MD   terazosin (HYTRIN) 5 mg capsule Take 5 mg by mouth nightly. Yes Gio Barba MD        Review of Systems              Constitutional:  He denies fever, weight loss, sweats or fatigue. Positive for chills ×2 yesterday              EYES: No blurred or double vision,               ENT: no nasal congestion, no headache or dizziness. No difficulty with               swallowing, taste, speech or smell. Respiratory:  No cough, wheezing or shortness of breath. No sputum production. Cardiac:  Denies chest pain, palpitations, unexplained indigestion, syncope, edema, PND or orthopnea. GI:  No changes in bowel movements, no abdominal pain, no bloating, anorexia, nausea, vomiting or heartburn. :  No frequency or dysuria. Denies incontinence or sexual dysfunction. Extremities:  No joint pain, stiffness or swelling  Back:.no pain or soreness  Skin:  No recent rashes or mole changes. Redness right lower extremity  Neurological:  No numbness, tingling, burning paresthesias or loss of motor strength.   No syncope, dizziness, frequent headaches or memory loss. Hematologic:  No easy bruising  Lymphatic: No lymph node enlargement    Objective:     Vitals:    05/07/18 1456   BP: 112/60   Pulse: 80   Resp: 16   Temp: 98.7 °F (37.1 °C)   TempSrc: Oral   SpO2: 96%   Weight: 216 lb 6.4 oz (98.2 kg)   Height: 6' 3\" (1.905 m)   PainSc:   0 - No pain       Body mass index is 27.05 kg/(m^2). Physical Examination:              General Appearance:  Well-developed, well-nourished, no acute distress. HEENT:      Ears:  The TMs and ear canals were clear. Eyes:  The pupillary responses were normal.  Extraocular muscle function intact. Lids and conjunctiva not injected. Funduscopic exam revealed sharp disc margins. Nares: Clear w/o edema or erythema  Pharynx:  Clear with teeth in good repair. No masses were noted. Neck:  Supple without thyromegaly or adenopathy. No JVD noted. No carotid                bruits. Lungs:  Clear to auscultation and percussion. Cardiac:  Regular rate and rhythm without murmur. PMI not displaced. No gallop, rub or click. Abdominal: Soft, non-tender, no hepata-spleenomegally or masses  Extremities:  No clubbing, cyanosis or edema. Erythroderma right lower extremity from about 10 minutes to below knee distally with slight increased temperature  Skin:  No rash or unusual mole changes noted. Lymph Nodes:  None felt in the cervical, supraclavicular, axillary or inguinal region. Neurological: . DTRs 2+ and symmetric. Station and gait normal.   Hematologic:   No purpura or petechiae        Assessment/Plan:         1. Cellulitis of right lower extremity        Impressions/Plan:  Impression  1. Cellulitis right lower extremity white count done stat is only 12,002 at this point I will place him on Augmentin 875 twice daily for 10 days I told make sure he gets the first dose and as soon as he picks it up from the pharmacy today.   Recheck as previously scheduled for his other medical problems. Orders Placed This Encounter    AMB POC COMPLETE CBC,AUTOMATED ENTER    fexofenadine (ALLEGRA) 180 mg tablet    acetaminophen (TYLENOL ARTHRITIS PAIN) 650 mg TbER    amoxicillin-clavulanate (AUGMENTIN) 875-125 mg per tablet       Follow-up Disposition:  Return in about 1 week (around 5/14/2018). Results for orders placed or performed in visit on 05/07/18   AMB POC COMPLETE CBC,AUTOMATED ENTER   Result Value Ref Range    WBC (POC) 12.4 (A) 4.1 - 10.9 K/uL    RBC (POC) 3.60 (A) 4.20 - 6.30 M/uL    HGB (POC) 11.3 (A) 12.0 - 18.0 g/dL    HCT (POC) 33.4 (A) 37.0 - 51.0 %    MCV (POC) 93.0 80.0 - 97.0 fL    MCH (POC) 31.4 26.0 - 32.0 pg    MCHC (POC) 33.8 30.0 - 36.0 g/dL    PLATELET (POC) 500.2 140.0 - 440.0 K/uL    ABS. LYMPHS (POC) 0.7 0.6 - 4.1 K/uL    LYMPHOCYTES (POC) 5.7 (A) 10.0 - 58.5 %    Mid # (POC) 0.2 0.0 - 1.8 K/uL    MID% POC 2.3 0.1 - 24.0 %    ABS. GRANS (POC) 11.5 (A) 2.0 - 7.8 K/uL    GRANULOCYTES (POC) 92.0 37.0 - 92.0 %         Tawana Simmons MD    The patient was given after the visit summary the patient verbalized an understanding of the plans and problems as explained.

## 2018-05-07 NOTE — ACP (ADVANCE CARE PLANNING)
Discussed advanced care medical directives. Patient does not have any. Information given at last visit for patient to review.

## 2018-05-07 NOTE — PROGRESS NOTES
1. Have you been to the ER, urgent care clinic since your last visit? Hospitalized since your last visit? No    2. Have you seen or consulted any other health care providers outside of the [address] [place] [address] since your last visit? Include any pap smears or colon screening.  No       Chief Complaint   Patient presents with   Zahida Smith in the garden while crossing over a wire fence, got caught on the fence and fell, right foot, ankle, and leg very swollen with redness extending to knee, also having spells of chills after fall

## 2018-05-07 NOTE — PATIENT INSTRUCTIONS
Cellulitis: Care Instructions  Your Care Instructions    Cellulitis is a skin infection. It often occurs after a break in the skin from a scrape, cut, bite, or puncture, or after a rash. The doctor has checked you carefully, but problems can develop later. If you notice any problems or new symptoms, get medical treatment right away. Follow-up care is a key part of your treatment and safety. Be sure to make and go to all appointments, and call your doctor if you are having problems. It's also a good idea to know your test results and keep a list of the medicines you take. How can you care for yourself at home? · Take your antibiotics as directed. Do not stop taking them just because you feel better. You need to take the full course of antibiotics. · Prop up the infected area on pillows to reduce pain and swelling. Try to keep the area above the level of your heart as often as you can. · If your doctor told you how to care for your wound, follow your doctor's instructions. If you did not get instructions, follow this general advice:  ¨ Wash the wound with clean water 2 times a day. Don't use hydrogen peroxide or alcohol, which can slow healing. ¨ You may cover the wound with a thin layer of petroleum jelly, such as Vaseline, and a nonstick bandage. ¨ Apply more petroleum jelly and replace the bandage as needed. · Be safe with medicines. Take pain medicines exactly as directed. ¨ If the doctor gave you a prescription medicine for pain, take it as prescribed. ¨ If you are not taking a prescription pain medicine, ask your doctor if you can take an over-the-counter medicine. To prevent cellulitis in the future  · Try to prevent cuts, scrapes, or other injuries to your skin. Cellulitis most often occurs where there is a break in the skin. · If you get a scrape, cut, mild burn, or bite, wash the wound with clean water as soon as you can to help avoid infection.  Don't use hydrogen peroxide or alcohol, which can slow healing. · If you have swelling in your legs (edema), support stockings and good skin care may help prevent leg sores and cellulitis. · Take care of your feet, especially if you have diabetes or other conditions that increase the risk of infection. Wear shoes and socks. Do not go barefoot. If you have athlete's foot or other skin problems on your feet, talk to your doctor about how to treat them. When should you call for help? Call your doctor now or seek immediate medical care if:  ? · You have signs that your infection is getting worse, such as:  ¨ Increased pain, swelling, warmth, or redness. ¨ Red streaks leading from the area. ¨ Pus draining from the area. ¨ A fever. ? · You get a rash. ? Watch closely for changes in your health, and be sure to contact your doctor if:  ? · You are not getting better after 1 day (24 hours). ? · You do not get better as expected. Where can you learn more? Go to http://isra-rhonda.info/. Archana Rosario in the search box to learn more about \"Cellulitis: Care Instructions. \"  Current as of: October 13, 2016  Content Version: 11.4  © 0699-9790 [employer]. Care instructions adapted under license by [employer] (which disclaims liability or warranty for this information). If you have questions about a medical condition or this instruction, always ask your healthcare professional. [place] [address] any warranty or liability for your use of this information.

## 2018-05-07 NOTE — MR AVS SNAPSHOT
[facility] 
 
 
 [address] [address] [phone] Patient: Debbi Montes De Oca MRN: [id number] Visit Information Date & Time Provider Department Dept. Phone Encounter #  
 5/7/2018  2:10 PM Erna Clement, [facility] [phone] [id number] Follow-up Instructions Return in about 1 week (around 5/14/2018). Follow-up and Disposition History Your Appointments 5/16/2018 10:00 AM  
FOLLOW UP 10 with MD PREMA Pineda [facility] ([address]) Appt Note: [address] [address] [address] [facility] [id number] Upcoming Health Maintenance Date Due  
 EYE EXAM RETINAL OR DILATED Q1 2/20/1943 DTaP/Tdap/Td series (1 - Tdap) 2/20/1954 ZOSTER VACCINE AGE 60> 12/20/1992 GLAUCOMA SCREENING Q2Y 2/20/1998 Influenza Age 5 to Adult 8/1/2018 HEMOGLOBIN A1C Q6M 8/14/2018 MICROALBUMIN Q1 10/30/2018 MEDICARE YEARLY EXAM 10/31/2018 FOOT EXAM Q1 2/14/2019 LIPID PANEL Q1 2/14/2019 COLONOSCOPY 3/30/2026 Allergies as of 5/7/2018  Review Complete On: 5/7/2018 By: Erna Clement MD  
 No Known Allergies Current Immunizations  Never Reviewed Name Date Influenza High Dose Vaccine PF 10/30/2017 Influenza Vaccine 10/7/2016 Pneumococcal Conjugate (PCV-13) 1/7/2005 Pneumococcal Vaccine (Unspecified Type) 1/7/2005 Rabies Vaccine 3/11/2009 Not reviewed this visit You Were Diagnosed With   
  
 Codes Comments Cellulitis of right lower extremity    -  Primary ICD-10-CM: S59.736 ICD-9-CM: 508. 6 Vitals BP Pulse Temp Resp Height(growth percentile) Weight(growth percentile)  112/60 (BP 1 Location: Left arm, BP Patient Position: Sitting) 80 98.7 °F (37.1 °C) (Oral) 16 6' 3\" (1.905 m) 216 lb 6.4 oz (98.2 kg) SpO2 BMI Smoking Status 96% 27.05 kg/m2 Never Smoker Vitals History BMI and BSA Data Body Mass Index Body Surface Area  
 27.05 kg/m 2 2.28 m 2 Preferred Pharmacy Pharmacy Name Phone [phone] Your Updated Medication List  
  
   
This list is accurate as of 5/7/18  4:00 PM.  Always use your most recent med list.  
  
  
  
  
 amoxicillin-clavulanate 875-125 mg per tablet Commonly known as:  AUGMENTIN Take 1 Tab by mouth every twelve (12) hours. diclofenac EC 75 mg EC tablet Commonly known as:  VOLTAREN  
TAKE 1 TABLET BY MOUTH TWICE A DAY  
  
 digoxin 0.125 mg tablet Commonly known as:  LANOXIN Take 1 Tab by mouth daily. fexofenadine 180 mg tablet Commonly known as:  Wilburt Saunas Take  by mouth. FISH OIL PO Take 1,200 mg by mouth two (2) times a day. furosemide 80 mg tablet Commonly known as:  LASIX TAKE 1 TABLET BY MOUTH EVERY DAY  
  
 glucosamine 1,000 mg Tab Take 2,000 mg by mouth daily. lisinopril 5 mg tablet Commonly known as:  Marisabel Bills Take 5 mg by mouth daily. MULTIVITAMIN 50 PLUS Tab tablet Generic drug:  multivitamins-minerals-lutein Take 1 Tab by mouth daily. predniSONE 5 mg tablet Commonly known as:  DELTASONE  
TAKE 1 TABLET DAILY AS DIRECTED  
  
 sildenafil (antihypertensive) 20 mg tablet Commonly known as:  REVATIO  
TAKE 1 TABLET, ORAL, AS DIRECTED FOR SEXUAL ACTIVITY  
  
 simvastatin 20 mg tablet Commonly known as:  ZOCOR Take 20 mg by mouth nightly. terazosin 5 mg capsule Commonly known as:  HYTRIN Take 5 mg by mouth nightly. TYLENOL ARTHRITIS PAIN 650 mg Megan [place] Generic drug:  acetaminophen Take 650 mg by mouth every eight (8) hours. VITAMIN D3 1,000 unit Cap Generic drug:  cholecalciferol Take 1,000 Units by mouth daily. warfarin 5 mg tablet Commonly known as:  COUMADIN Take one and a half tablets Monday, Wednesday and Friday; and 5 mg all other days. Prescriptions Sent to Pharmacy Refills  
 amoxicillin-clavulanate (AUGMENTIN) 875-125 mg per tablet 0 Sig: Take 1 Tab by mouth every twelve (12) hours. Class: Normal  
 Pharmacy: [facility]/pharmacy #[id number] - [address]  #: [phone] Route: Oral  
  
We Performed the Following AMB POC COMPLETE CBC,AUTOMATED ENTER [id number] CPT(R)] Follow-up Instructions Return in about 1 week (around 5/14/2018). Patient Instructions Cellulitis: Care Instructions Your Care Instructions Cellulitis is a skin infection. It often occurs after a break in the skin from a scrape, cut, bite, or puncture, or after a rash. The doctor has checked you carefully, but problems can develop later. If you notice any problems or new symptoms, get medical treatment right away. Follow-up care is a key part of your treatment and safety. Be sure to make and go to all appointments, and call your doctor if you are having problems. It's also a good idea to know your test results and keep a list of the medicines you take. How can you care for yourself at home? · Take your antibiotics as directed. Do not stop taking them just because you feel better. You need to take the full course of antibiotics. · Prop up the infected area on pillows to reduce pain and swelling. Try to keep the area above the level of your heart as often as you can. · If your doctor told you how to care for your wound, follow your doctor's instructions. If you did not get instructions, follow this general advice: ¨ Wash the wound with clean water 2 times a day. Don't use hydrogen peroxide or alcohol, which can slow healing. ¨ You may cover the wound with a thin layer of petroleum jelly, such as Vaseline, and a nonstick bandage. ¨ Apply more petroleum jelly and replace the bandage as needed. · Be safe with medicines. Take pain medicines exactly as directed. ¨ If the doctor gave you a prescription medicine for pain, take it as prescribed. ¨ If you are not taking a prescription pain medicine, ask your doctor if you can take an over-the-counter medicine. To prevent cellulitis in the future · Try to prevent cuts, scrapes, or other injuries to your skin. Cellulitis most often occurs where there is a break in the skin. · If you get a scrape, cut, mild burn, or bite, wash the wound with clean water as soon as you can to help avoid infection. Don't use hydrogen peroxide or alcohol, which can slow healing. · If you have swelling in your legs (edema), support stockings and good skin care may help prevent leg sores and cellulitis. · Take care of your feet, especially if you have diabetes or other conditions that increase the risk of infection. Wear shoes and socks. Do not go barefoot. If you have athlete's foot or other skin problems on your feet, talk to your doctor about how to treat them. When should you call for help? Call your doctor now or seek immediate medical care if: 
? · You have signs that your infection is getting worse, such as: 
¨ Increased pain, swelling, warmth, or redness. ¨ Red streaks leading from the area. ¨ Pus draining from the area. ¨ A fever. ? · You get a rash. ? Watch closely for changes in your health, and be sure to contact your doctor if: 
? · You are not getting better after 1 day (24 hours). ? · You do not get better as expected. Where can you learn more? Go to http://isra-rhonda.info/. Viviane Vasyl in the search box to learn more about \"Cellulitis: Care Instructions. \" Current as of: October 13, 2016 Content Version: 11.4 © 0833-4032 [employer]. Care instructions adapted under license by [employer] (which disclaims liability or warranty for this information). If you have questions about a medical condition or this instruction, always ask your healthcare professional. [address] any warranty or liability for your use of this information. Patient Instructions History Introducing [facility]! [employer] introduces [email or web address] patient portal. Now you can access parts of your medical record, email your doctor's office, and request medication refills online. 1. In your internet browser, go to [email or web address] 2. Click on the First Time User? Click Here link in the Sign In box. You will see the New Member Sign Up page. 3. Enter your [email or web address] Access Code exactly as it appears below. You will not need to use this code after youve completed the sign-up process. If you do not sign up before the expiration date, you must request a new code. · [email or web address] Access Code: [email or web address] Expires: 5/15/2018 11:27 AM 
 
4. Enter the last four digits of your Social Security Number (xxxx) and Date of Birth (mm/dd/yyyy) as indicated and click Submit. You will be taken to the next sign-up page. 5. Create a [email or web address] ID. This will be your [email or web address] login ID and cannot be changed, so think of one that is secure and easy to remember. 6. Create a [email or web address] password. You can change your password at any time. 7. Enter your Password Reset Question and Answer. This can be used at a later time if you forget your password. 8. Enter your e-mail address. You will receive e-mail notification when new information is available in [address] 9. Click Sign Up. You can now view and download portions of your medical record. 10. Click the Download Summary menu link to download a portable copy of your medical information. If you have questions, please visit the Frequently Asked Questions section of the [employer] website. Remember, [employer] is NOT to be used for urgent needs. For medical emergencies, dial 911. Now available from your iPhone and Android! Please provide this summary of care documentation to your next provider. Your primary care clinician is listed as [facility]. If you have any questions after today's visit, please call [phone].

## 2018-05-09 DIAGNOSIS — I48.91 ATRIAL FIBRILLATION, UNSPECIFIED TYPE (HCC): ICD-10-CM

## 2018-05-09 RX ORDER — WARFARIN SODIUM 5 MG/1
TABLET ORAL
Qty: 145 TAB | Refills: 3 | Status: SHIPPED | OUTPATIENT
Start: 2018-05-09 | End: 2019-04-24 | Stop reason: SDUPTHER

## 2018-05-16 ENCOUNTER — OFFICE VISIT (OUTPATIENT)
Dept: INTERNAL MEDICINE CLINIC | Age: 83
End: 2018-05-16

## 2018-05-16 VITALS
TEMPERATURE: 97.8 F | HEART RATE: 73 BPM | RESPIRATION RATE: 16 BRPM | BODY MASS INDEX: 26.71 KG/M2 | SYSTOLIC BLOOD PRESSURE: 118 MMHG | OXYGEN SATURATION: 96 % | HEIGHT: 75 IN | WEIGHT: 214.8 LBS | DIASTOLIC BLOOD PRESSURE: 62 MMHG

## 2018-05-16 DIAGNOSIS — M15.9 PRIMARY OSTEOARTHRITIS INVOLVING MULTIPLE JOINTS: ICD-10-CM

## 2018-05-16 DIAGNOSIS — E11.22 CONTROLLED TYPE 2 DIABETES MELLITUS WITH STAGE 3 CHRONIC KIDNEY DISEASE, WITHOUT LONG-TERM CURRENT USE OF INSULIN (HCC): ICD-10-CM

## 2018-05-16 DIAGNOSIS — L03.115 CELLULITIS OF RIGHT LOWER EXTREMITY: ICD-10-CM

## 2018-05-16 DIAGNOSIS — N40.0 BENIGN PROSTATIC HYPERPLASIA, UNSPECIFIED WHETHER LOWER URINARY TRACT SYMPTOMS PRESENT: Primary | ICD-10-CM

## 2018-05-16 DIAGNOSIS — N18.30 CONTROLLED TYPE 2 DIABETES MELLITUS WITH STAGE 3 CHRONIC KIDNEY DISEASE, WITHOUT LONG-TERM CURRENT USE OF INSULIN (HCC): ICD-10-CM

## 2018-05-16 DIAGNOSIS — I50.32 CHRONIC DIASTOLIC CONGESTIVE HEART FAILURE (HCC): ICD-10-CM

## 2018-05-16 DIAGNOSIS — I12.9 HYPERTENSION WITH RENAL DISEASE: Primary | ICD-10-CM

## 2018-05-16 DIAGNOSIS — N18.30 STAGE 3 CHRONIC KIDNEY DISEASE (HCC): ICD-10-CM

## 2018-05-16 DIAGNOSIS — I48.0 PAROXYSMAL ATRIAL FIBRILLATION (HCC): ICD-10-CM

## 2018-05-16 DIAGNOSIS — I25.10 ASCVD (ARTERIOSCLEROTIC CARDIOVASCULAR DISEASE): ICD-10-CM

## 2018-05-16 DIAGNOSIS — E78.2 MIXED HYPERLIPIDEMIA: ICD-10-CM

## 2018-05-16 DIAGNOSIS — M35.3 POLYMYALGIA RHEUMATICA (HCC): ICD-10-CM

## 2018-05-16 LAB
ALBUMIN SERPL-MCNC: 3.4 G/DL (ref 3.9–5.4)
ALKALINE PHOS POC: 60 U/L (ref 38–126)
ALT SERPL-CCNC: 35 U/L (ref 9–52)
AST SERPL-CCNC: 24 U/L (ref 14–36)
BUN BLD-MCNC: 26 MG/DL (ref 9–20)
CALCIUM BLD-MCNC: 9 MG/DL (ref 8.4–10.2)
CHLORIDE BLD-SCNC: 104 MMOL/L (ref 98–107)
CHOLEST SERPL-MCNC: 129 MG/DL (ref 0–200)
CK (CPK) POC: 117 U/L (ref 30–135)
CO2 POC: 28 MMOL/L (ref 22–32)
CREAT BLD-MCNC: 1.1 MG/DL (ref 0.8–1.5)
EGFR (POC): 60.9
ERYTHROCYTE [SEDIMENTATION RATE] IN BLOOD: 98 MM/HR (ref 0–10)
GLUCOSE POC: 129 MG/DL (ref 75–110)
GRAN# POC: 6.7 K/UL (ref 2–7.8)
GRAN% POC: 79.8 % (ref 37–92)
HBA1C MFR BLD HPLC: 6.3 % (ref 4.5–5.7)
HCT VFR BLD CALC: 33.1 % (ref 37–51)
HDLC SERPL-MCNC: 33 MG/DL (ref 35–130)
HGB BLD-MCNC: 11.1 G/DL (ref 12–18)
INR BLD: 1.4
LDL CHOLESTEROL POC: 57 MG/DL (ref 0–130)
LY# POC: 1.4 K/UL (ref 0.6–4.1)
LY% POC: 17 % (ref 10–58.5)
MCH RBC QN: 31.1 PG (ref 26–32)
MCHC RBC-ENTMCNC: 33.6 G/DL (ref 30–36)
MCV RBC: 93 FL (ref 80–97)
MID #, POC: 0.2 K/UL (ref 0–1.8)
MID% POC: 3.2 % (ref 0.1–24)
PLATELET # BLD: 317 K/UL (ref 140–440)
POTASSIUM SERPL-SCNC: 4.7 MMOL/L (ref 3.6–5)
PROT SERPL-MCNC: 6.5 G/DL (ref 6.3–8.2)
PT POC: 19.2 SECONDS
RBC # BLD: 3.57 M/UL (ref 4.2–6.3)
SODIUM SERPL-SCNC: 142 MMOL/L (ref 137–145)
TCHOL/HDL RATIO (POC): 3.9 (ref 0–4)
TOTAL BILIRUBIN POC: 0.5 MG/DL (ref 0.2–1.3)
TRIGL SERPL-MCNC: 195 MG/DL (ref 0–200)
VALID INTERNAL CONTROL?: YES
VLDLC SERPL CALC-MCNC: 39 MG/DL
WBC # BLD: 8.3 K/UL (ref 4.1–10.9)

## 2018-05-16 RX ORDER — TERAZOSIN 2 MG/1
CAPSULE ORAL
Qty: 90 CAP | Refills: 3 | Status: SHIPPED | OUTPATIENT
Start: 2018-05-16 | End: 2018-06-01 | Stop reason: ALTCHOICE

## 2018-05-16 NOTE — PATIENT INSTRUCTIONS
Atrial Fibrillation: Care Instructions  Your Care Instructions    Atrial fibrillation is an irregular and often fast heartbeat. Treating this condition is important for several reasons. It can cause blood clots, which can travel from your heart to your brain and cause a stroke. If you have a fast heartbeat, you may feel lightheaded, dizzy, and weak. An irregular heartbeat can also increase your risk for heart failure. Atrial fibrillation is often the result of another heart condition, such as high blood pressure or coronary artery disease. Making changes to improve your heart condition will help you stay healthy and active. Follow-up care is a key part of your treatment and safety. Be sure to make and go to all appointments, and call your doctor if you are having problems. It's also a good idea to know your test results and keep a list of the medicines you take. How can you care for yourself at home? Medicines  ? · Take your medicines exactly as prescribed. Call your doctor if you think you are having a problem with your medicine. You will get more details on the specific medicines your doctor prescribes. ? · If your doctor has given you a blood thinner to prevent a stroke, be sure you get instructions about how to take your medicine safely. Blood thinners can cause serious bleeding problems. ? · Do not take any vitamins, over-the-counter drugs, or herbal products without talking to your doctor first.   ? Lifestyle changes  ? · Do not smoke. Smoking can increase your chance of a stroke and heart attack. If you need help quitting, talk to your doctor about stop-smoking programs and medicines. These can increase your chances of quitting for good. ? · Eat a heart-healthy diet. ? · Stay at a healthy weight. Lose weight if you need to.   ? · Limit alcohol to 2 drinks a day for men and 1 drink a day for women. Too much alcohol can cause health problems. ? · Avoid colds and flu.  Get a pneumococcal vaccine shot. If you have had one before, ask your doctor whether you need another dose. Get a flu shot every year. If you must be around people with colds or flu, wash your hands often. Activity  ? · If your doctor recommends it, get more exercise. Walking is a good choice. Bit by bit, increase the amount you walk every day. Try for at least 30 minutes on most days of the week. You also may want to swim, bike, or do other activities. Your doctor may suggest that you join a cardiac rehabilitation program so that you can have help increasing your physical activity safely. ? · Start light exercise if your doctor says it is okay. Even a small amount will help you get stronger, have more energy, and manage stress. Walking is an easy way to get exercise. Start out by walking a little more than you did in the hospital. Gradually increase the amount you walk. ? · When you exercise, watch for signs that your heart is working too hard. You are pushing too hard if you cannot talk while you are exercising. If you become short of breath or dizzy or have chest pain, sit down and rest immediately. ? · Check your pulse regularly. Place two fingers on the artery at the palm side of your wrist, in line with your thumb. If your heartbeat seems uneven or fast, talk to your doctor. When should you call for help? Call 911 anytime you think you may need emergency care. For example, call if:  ? · You have symptoms of a heart attack. These may include:  ¨ Chest pain or pressure, or a strange feeling in the chest.  ¨ Sweating. ¨ Shortness of breath. ¨ Nausea or vomiting. ¨ Pain, pressure, or a strange feeling in the back, neck, jaw, or upper belly or in one or both shoulders or arms. ¨ Lightheadedness or sudden weakness. ¨ A fast or irregular heartbeat. After you call 911, the  may tell you to chew 1 adult-strength or 2 to 4 low-dose aspirin. Wait for an ambulance. Do not try to drive yourself.    ? · You have symptoms of a stroke. These may include:  ¨ Sudden numbness, tingling, weakness, or loss of movement in your face, arm, or leg, especially on only one side of your body. ¨ Sudden vision changes. ¨ Sudden trouble speaking. ¨ Sudden confusion or trouble understanding simple statements. ¨ Sudden problems with walking or balance. ¨ A sudden, severe headache that is different from past headaches. ? · You passed out (lost consciousness). ?Call your doctor now or seek immediate medical care if:  ? · You have new or increased shortness of breath. ? · You feel dizzy or lightheaded, or you feel like you may faint. ? · Your heart rate becomes irregular. ? · You can feel your heart flutter in your chest or skip heartbeats. Tell your doctor if these symptoms are new or worse. ? Watch closely for changes in your health, and be sure to contact your doctor if you have any problems. Where can you learn more? Go to http://isra-rhonda.info/. Enter U020 in the search box to learn more about \"Atrial Fibrillation: Care Instructions. \"  Current as of: September 21, 2016  Content Version: 11.4  © 4723-3464 [employer]. Care instructions adapted under license by [employer] (which disclaims liability or warranty for this information). If you have questions about a medical condition or this instruction, always ask your healthcare professional. [address] any warranty or liability for your use of this information.

## 2018-05-16 NOTE — PROGRESS NOTES
1. Have you been to the ER, urgent care clinic since your last visit? Hospitalized since your last visit? No    2. Have you seen or consulted any other health care providers outside of the [facility] since your last visit? Include any pap smears or colon screening. No     Chief Complaint   Patient presents with    Hypertension     3 mo.  f/u    Diabetes    Chronic Kidney Disease    Cholesterol Problem       Fasting

## 2018-05-16 NOTE — MR AVS SNAPSHOT
[facility] 
 
 
 [address] [id number] [phone] Patient: Leticia Nino MRN: [id number] ILV:5/17/2045 Visit Information Date & Time Provider Department Dept. Phone Encounter #  
 5/16/2018 10:00 AM Cleveland Bucio, [facility] [phone] [id number] Follow-up Instructions Return in about 3 months (around 8/16/2018). Follow-up and Disposition History Your Appointments 6/4/2018  2:10 PM  
FOLLOW UP 10 with MD PREMA Stephenson [facility] ([address]) Appt Note: 3 week follow up [address] [address] [address] [address] 8/16/2018  9:50 AM  
FOLLOW UP 10 with MD PREMA Stephenson [facility] ([address]) Appt Note: [address] [address] [phone] Upcoming Health Maintenance Date Due  
 EYE EXAM RETINAL OR DILATED Q1 2/20/1943 DTaP/Tdap/Td series (1 - Tdap) 2/20/1954 ZOSTER VACCINE AGE 60> 12/20/1992 GLAUCOMA SCREENING Q2Y 2/20/1998 Influenza Age 5 to Adult 8/1/2018 HEMOGLOBIN A1C Q6M 8/14/2018 MICROALBUMIN Q1 10/30/2018 MEDICARE YEARLY EXAM 10/31/2018 FOOT EXAM Q1 2/14/2019 LIPID PANEL Q1 2/14/2019 COLONOSCOPY 3/30/2026 Allergies as of 5/16/2018  Review Complete On: 5/16/2018 By: Cleveland Bucio MD  
 No Known Allergies Current Immunizations  Never Reviewed Name Date Influenza High Dose Vaccine PF 10/30/2017 Influenza Vaccine 10/7/2016 Pneumococcal Conjugate (PCV-13) 1/7/2005 Pneumococcal Vaccine (Unspecified Type) 1/7/2005 Rabies Vaccine 3/11/2009 Not reviewed this visit You Were Diagnosed With   
  
 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 
ICD-9-CM: 428.32, 428.0 Polymyalgia rheumatica (HCC)     ICD-10-CM: M35.3 ICD-9-CM: 985 Cellulitis of right lower extremity     ICD-10-CM: L03.115 ICD-9-CM: 408. 6 Vitals BP Pulse Temp Resp Height(growth percentile) Weight(growth percentile)  
 118/62 (BP 1 Location: Left arm, BP Patient Position: Sitting) 73 97.8 °F (36.6 °C) (Oral) 16 6' 3\" (1.905 m) 214 lb 12.8 oz (97.4 kg) SpO2 BMI Smoking Status 96% 26.85 kg/m2 Never Smoker Vitals History BMI and BSA Data Body Mass Index Body Surface Area  
 26.85 kg/m 2 2.27 m 2 Preferred Pharmacy Pharmacy Name Phone [id number] [phone] Your Updated Medication List  
  
   
This list is accurate as of 5/16/18 11:12 AM.  Always use your most recent med list.  
  
  
  
  
 amoxicillin-clavulanate 875-125 mg per tablet Commonly known as:  AUGMENTIN Take 1 Tab by mouth every twelve (12) hours. diclofenac EC 75 mg EC tablet Commonly known as:  VOLTAREN  
TAKE 1 TABLET BY MOUTH TWICE A DAY  
  
 digoxin 0.125 mg tablet Commonly known as:  LANOXIN Take 1 Tab by mouth daily. fexofenadine 180 mg tablet Commonly known as:  Lennis Kimbrough Take  by mouth. FISH OIL PO Take 1,200 mg by mouth two (2) times a day. furosemide 80 mg tablet Commonly known as:  LASIX TAKE 1 TABLET BY MOUTH EVERY DAY  
  
 glucosamine 1,000 mg Tab Take 2,000 mg by mouth daily. lisinopril 5 mg tablet Commonly known as:  Mumtaz Peek Take 5 mg by mouth daily. MULTIVITAMIN 50 PLUS Tab tablet Generic drug:  multivitamins-minerals-lutein Take 1 Tab by mouth daily. predniSONE 5 mg tablet Commonly known as:  DELTASONE  
TAKE 1 TABLET DAILY AS DIRECTED  
  
 sildenafil (antihypertensive) 20 mg tablet Commonly known as:  REVATIO  
TAKE 1 TABLET, ORAL, AS DIRECTED FOR SEXUAL ACTIVITY  
  
 simvastatin 20 mg tablet Commonly known as:  ZOCOR Take 20 mg by mouth nightly. terazosin 5 mg capsule Commonly known as:  HYTRIN Take 5 mg by mouth nightly. TYLENOL ARTHRITIS PAIN 650 mg Zhao James Generic drug:  acetaminophen Take 650 mg by mouth every eight (8) hours. VITAMIN D3 1,000 unit Cap Generic drug:  cholecalciferol Take 1,000 Units by mouth daily. warfarin 5 mg tablet Commonly known as:  COUMADIN Take one and a half tablets every day. We Performed the Following AMB POC CK (CPK) [59876 CPT(R)] AMB POC COMPLETE CBC,AUTOMATED ENTER L3999626 CPT(R)] AMB POC COMPREHENSIVE METABOLIC PANEL [00130 CPT(R)] AMB POC HEMOGLOBIN A1C [67326 CPT(R)] AMB POC LIPID PROFILE [05490 CPT(R)] AMB POC PT/INR [60821 CPT(R)] AMB POC SEDIMENTATION RATE, ERYTHROCYTE; NON AUTO [72566 CPT(R)] Follow-up Instructions Return in about 3 months (around 8/16/2018). Patient Instructions Atrial Fibrillation: Care Instructions Your Care Instructions Atrial fibrillation is an irregular and often fast heartbeat. Treating this condition is important for several reasons. It can cause blood clots, which can travel from your heart to your brain and cause a stroke.  If you have a fast heartbeat, you may feel lightheaded, dizzy, and weak. An irregular heartbeat can also increase your risk for heart failure. Atrial fibrillation is often the result of another heart condition, such as high blood pressure or coronary artery disease. Making changes to improve your heart condition will help you stay healthy and active. Follow-up care is a key part of your treatment and safety. Be sure to make and go to all appointments, and call your doctor if you are having problems. It's also a good idea to know your test results and keep a list of the medicines you take. How can you care for yourself at home? Medicines ? · Take your medicines exactly as prescribed. Call your doctor if you think you are having a problem with your medicine. You will get more details on the specific medicines your doctor prescribes. ? · If your doctor has given you a blood thinner to prevent a stroke, be sure you get instructions about how to take your medicine safely. Blood thinners can cause serious bleeding problems. ? · Do not take any vitamins, over-the-counter drugs, or herbal products without talking to your doctor first. ? Lifestyle changes ? · Do not smoke. Smoking can increase your chance of a stroke and heart attack. If you need help quitting, talk to your doctor about stop-smoking programs and medicines. These can increase your chances of quitting for good. ? · Eat a heart-healthy diet. ? · Stay at a healthy weight. Lose weight if you need to.  
? · Limit alcohol to 2 drinks a day for men and 1 drink a day for women. Too much alcohol can cause health problems. ? · Avoid colds and flu. Get a pneumococcal vaccine shot. If you have had one before, ask your doctor whether you need another dose. Get a flu shot every year. If you must be around people with colds or flu, wash your hands often. Activity ? · If your doctor recommends it, get more exercise.  Walking is a good choice. Bit by bit, increase the amount you walk every day. Try for at least 30 minutes on most days of the week. You also may want to swim, bike, or do other activities. Your doctor may suggest that you join a cardiac rehabilitation program so that you can have help increasing your physical activity safely. ? · Start light exercise if your doctor says it is okay. Even a small amount will help you get stronger, have more energy, and manage stress. Walking is an easy way to get exercise. Start out by walking a little more than you did in the hospital. Gradually increase the amount you walk. ? · When you exercise, watch for signs that your heart is working too hard. You are pushing too hard if you cannot talk while you are exercising. If you become short of breath or dizzy or have chest pain, sit down and rest immediately. ? · Check your pulse regularly. Place two fingers on the artery at the palm side of your wrist, in line with your thumb. If your heartbeat seems uneven or fast, talk to your doctor. When should you call for help? Call 911 anytime you think you may need emergency care. For example, call if: 
? · You have symptoms of a heart attack. These may include: ¨ Chest pain or pressure, or a strange feeling in the chest. 
¨ Sweating. ¨ Shortness of breath. ¨ Nausea or vomiting. ¨ Pain, pressure, or a strange feeling in the back, neck, jaw, or upper belly or in one or both shoulders or arms. ¨ Lightheadedness or sudden weakness. ¨ A fast or irregular heartbeat. After you call 911, the  may tell you to chew 1 adult-strength or 2 to 4 low-dose aspirin. Wait for an ambulance. Do not try to drive yourself. ? · You have symptoms of a stroke. These may include: 
¨ Sudden numbness, tingling, weakness, or loss of movement in your face, arm, or leg, especially on only one side of your body. ¨ Sudden vision changes. ¨ Sudden trouble speaking. ¨ Sudden confusion or trouble understanding simple statements. ¨ Sudden problems with walking or balance. ¨ A sudden, severe headache that is different from past headaches. ? · You passed out (lost consciousness). ?Call your doctor now or seek immediate medical care if: 
? · You have new or increased shortness of breath. ? · You feel dizzy or lightheaded, or you feel like you may faint. ? · Your heart rate becomes irregular. ? · You can feel your heart flutter in your chest or skip heartbeats. Tell your doctor if these symptoms are new or worse. ? Watch closely for changes in your health, and be sure to contact your doctor if you have any problems. Where can you learn more? Go to http://isra-rhonda.info/. Enter U020 in the search box to learn more about \"Atrial Fibrillation: Care Instructions. \" Current as of: September 21, 2016 Content Version: 11.4 © 7235-3187 [employer]. Care instructions adapted under license by [employer] (which disclaims liability or warranty for this information). If you have questions about a medical condition or this instruction, always ask your healthcare professional. [place] [address] any warranty or liability for your use of this information. Patient Instructions History Introducing [facility]! Yanique Heredia introduces [employer] patient portal. Now you can access parts of your medical record, email your doctor's office, and request medication refills online. 1. In your internet browser, go to [email or web address] 2. Click on the First Time User? Click Here link in the Sign In box. You will see the New Member Sign Up page. 3. Enter your [employer] Access Code exactly as it appears below. You will not need to use this code after youve completed the sign-up process. If you do not sign up before the expiration date, you must request a new code. · [employer] Access Code: [email or web address] Expires: 8/14/2018 11:12 AM 
 
4. Enter the last four digits of your Social Security Number (xxxx) and Date of Birth (mm/dd/yyyy) as indicated and click Submit. You will be taken to the next sign-up page. 5. Create a [employer] ID. This will be your [employer] login ID and cannot be changed, so think of one that is secure and easy to remember. 6. Create a [employer] password. You can change your password at any time. 7. Enter your Password Reset Question and Answer. This can be used at a later time if you forget your password. 8. Enter your e-mail address. You will receive e-mail notification when new information is available in [address] 9. Click Sign Up. You can now view and download portions of your medical record. 10. Click the Download Summary menu link to download a portable copy of your medical information. If you have questions, please visit the Frequently Asked Questions section of the [employer] website. Remember, [employer] is NOT to be used for urgent needs. For medical emergencies, dial 911. Now available from your iPhone and Android! Please provide this summary of care documentation to your next provider. Your primary care clinician is listed as [employer]. If you have any questions after today's visit, please call [phone].

## 2018-05-16 NOTE — PROGRESS NOTES
Chief Complaint   Patient presents with    Hypertension     3 mo. f/u    Diabetes    Chronic Kidney Disease    Cholesterol Problem       SUBJECTIVE:    Helena Lee is a 80 y.o. male who returns in follow-up for his medical problems to include hypertension, diabetes, hyperlipidemia, paroxysmal atrial fibrillation, CHF, ASCVD, CKD stage III, DJD, polymyalgia rheumatica and recent cellulitis of his right lower extremity. He does have 1 more day of his antibiotic and then will be finished with that and he notes that his leg is feeling better but he still quite red but is not warm now. He denies any chest pain, shortness breath, palpitations or cardiorespiratory complaints. He denies any GI or  complaints. He denies any headaches, dizziness or neurologic complaints. He has no current arthritic complaints and then no other complaints on complete review of systems. He has taken his medications and trying to follow his diet and get some exercise. Current Outpatient Prescriptions   Medication Sig Dispense Refill    warfarin (COUMADIN) 5 mg tablet Take one and a half tablets every day. 145 Tab 3    fexofenadine (ALLEGRA) 180 mg tablet Take  by mouth.  acetaminophen (TYLENOL ARTHRITIS PAIN) 650 mg TbER Take 650 mg by mouth every eight (8) hours.  amoxicillin-clavulanate (AUGMENTIN) 875-125 mg per tablet Take 1 Tab by mouth every twelve (12) hours. 20 Tab 0    furosemide (LASIX) 80 mg tablet TAKE 1 TABLET BY MOUTH EVERY DAY 90 Tab 3    multivitamins-minerals-lutein (MULTIVITAMIN 50 PLUS) tab tablet Take 1 Tab by mouth daily.  glucosamine 1,000 mg tab Take 2,000 mg by mouth daily.  digoxin (LANOXIN) 0.125 mg tablet Take 1 Tab by mouth daily.  30 Tab prn    predniSONE (DELTASONE) 5 mg tablet TAKE 1 TABLET DAILY AS DIRECTED 90 Tab 1    sildenafil, antihypertensive, (REVATIO) 20 mg tablet TAKE 1 TABLET, ORAL, AS DIRECTED FOR SEXUAL ACTIVITY 20 Tab 11    cholecalciferol (VITAMIN D3) 1,000 unit cap Take 1,000 Units by mouth daily.  DOCOSAHEXANOIC ACID/EPA (FISH OIL PO) Take 1,200 mg by mouth two (2) times a day.  diclofenac EC (VOLTAREN) 75 mg EC tablet TAKE 1 TABLET BY MOUTH TWICE A  Tab 3    simvastatin (ZOCOR) 20 mg tablet Take 20 mg by mouth nightly.  lisinopril (PRINIVIL, ZESTRIL) 5 mg tablet Take 5 mg by mouth daily.  terazosin (HYTRIN) 5 mg capsule Take 5 mg by mouth nightly. 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    Back pain 9/20/2017    BPH (benign prostatic hyperplasia) 9/20/2017    CHF (congestive heart failure) ([facility] [address] 9/20/2017    CKD (chronic kidney disease) 9/20/2017    DM (diabetes mellitus) ([facility] [address] 9/20/2017    Story: Diet Controlled    ED (erectile dysfunction) 9/20/2017    Edema 9/20/2017    Elevated CPK 9/20/2017    Comments: History of    Elevated LFTs 9/20/2017    Comments: History of    Elevated PSA 9/20/2017    Hypercholesteremia     Hyperlipidemia 9/20/2017    Hypertension     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    Nodule of right lung 9/20/2017    Story: Right    Polymyalgia ([facility] [address] 9/20/2017    Prostate enlargement      Past Surgical History:   Procedure Laterality Date    ABDOMEN SURGERY PROC UNLISTED      hernia repair     No Known Allergies    REVIEW OF SYSTEMS:  General: negative for - chills or fever, or weight loss or gain  ENT: negative for - headaches, nasal congestion or tinnitus  Eyes: no blurred or visual changes  Neck: No stiffness or swollen nodes  Respiratory: negative for - cough, hemoptysis, shortness of breath or wheezing  Cardiovascular : negative for - chest pain, edema, palpitations or shortness of breath  Gastrointestinal: negative for - abdominal pain, blood in stools, heartburn or nausea/vomiting  Genito-Urinary: no dysuria, trouble voiding, or hematuria  Musculoskeletal: negative for - gait disturbance, joint pain, joint stiffness or joint swelling  Neurological: no TIA or stroke symptoms  Hematologic: no bruises, no bleeding  Lymphatic: no swollen glands  Integument: no lumps, mole changes, nail changes or rash. Residual redness to his right lower extremity  Endocrine:no malaise/lethargy poly uria or polydipsia or unexpected weight changes        Social History     Social History    Marital status:      Spouse name: N/A    Number of children: N/A    Years of education: N/A     Social History Main Topics    Smoking status: Never Smoker    Smokeless tobacco: Never Used    Alcohol use No    Drug use: No    Sexual activity: No     Other Topics Concern    None     Social History Narrative     History reviewed. No pertinent family history. OBJECTIVE:     Visit Vitals    /62 (BP 1 Location: Left arm, BP Patient Position: Sitting)    Pulse 73    Temp 97.8 °F (36.6 °C) (Oral)    Resp 16    Ht 6' 3\" (1.905 m)    Wt 214 lb 12.8 oz (97.4 kg)    SpO2 96%    BMI 26.85 kg/m2     CONSTITUTIONAL:   well nourished, appears age appropriate  EYES: sclera anicteric, PERRL, EOMI  ENMT:nares clear, moist mucous membranes, pharynx clear  NECK: supple. Thyroid normal, No JVD or bruits  RESPIRATORY: Chest: clear to ascultation and percussion, normal inspiratory effort  CARDIOVASCULAR: Heart: regular rate and rhythm no murmurs, rubs or gallops, PMI not displaced, No thrills  GASTROINTESTINAL: Abdomen: non distended, soft, non tender, bowel sounds normal  HEMATOLOGIC: no purpura, petechiae or bruising  LYMPHATIC: No lymph node enlargemant  MUSCULOSKELETAL: Extremities: no edema or active synovitis, pulse 1+   INTEGUMENT: No unusual rashes or suspicious skin lesions noted.  Nails appear normal.  Chronic stasis changes right lower extremity  PERIPHERAL VASCULAR: normal pulses femoral, PT and DP  NEUROLOGIC: non-focal exam, A & O X 3  PSYCHIATRIC:, appropriate affect     ASSESSMENT:   1. Hypertension with renal disease    2. 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, without long-term current use of insulin ([address]    3. Mixed hyperlipidemia    4. Paroxysmal atrial fibrillation (HCC)    5. Primary osteoarthritis involving multiple joints    6. Stage 3 chronic kidney disease    7. ASCVD (arteriosclerotic cardiovascular disease)    8. Chronic diastolic congestive heart failure ([address]    9. Polymyalgia rheumatica ([facility] [address]    10. Cellulitis of right lower extremity      Impression  1. Hypertension that is controlled continue current therapy reviewed with him  2. Diabetes repeat status pending reviewed prior labs make adjustments as necessary  3. Hyperlipidemia repeat status pending reviewed prior labs with him  4. A. fib INR pending will continue monthly INR  5. DJD that is stable  6. CKD stage III stable  7. ASCVD clinically stable continue aspirin daily  8. Diastolic CHF compensated continue current therapy  9. Follow melodramatic repeat sed rate pending  10. Cellulitis right lower extremity that appears to be resolved I will check a CBC but I think at this point he can take his last dose of Augmentin and then try stopping antibiotics and see how we do  I will recheck him in 2 weeks with his polymyalgia rheumatica will continue monthly pro times and I will see him in 3 months was other medical problems. I will see him sooner should they be a problem. PLAN:  .  Orders Placed This Encounter    AMB POC COMPLETE CBC,AUTOMATED ENTER    AMB POC COMPREHENSIVE METABOLIC PANEL    AMB POC HEMOGLOBIN A1C    AMB POC LIPID PROFILE    AMB POC CK (CPK)    AMB POC SEDIMENTATION RATE, ERYTHROCYTE; NON AUTO    AMB POC PT/INR         ATTENTION:   This medical record was transcribed using an electronic medical records system. Although proofread, it may and can contain electronic and spelling errors.   Other human spelling and other errors may be present. Corrections may be executed at a later time. Please feel free to contact us for any clarifications as needed. Follow-up Disposition:  Return in about 3 months (around 8/16/2018). No results found for any visits on 05/16/18. Clifton Krabbe, MD    The patient verbalized understanding of the problems and plans as explained.

## 2018-05-16 NOTE — TELEPHONE ENCOUNTER
RX refill request from the patient/pharmacy. Patient last seen 05- with labs, and next appt. to be scheduled for f/up after today's visit.

## 2018-05-18 ENCOUNTER — TELEPHONE ANTICOAG (OUTPATIENT)
Dept: INTERNAL MEDICINE CLINIC | Age: 83
End: 2018-05-18

## 2018-05-18 DIAGNOSIS — I48.0 PAROXYSMAL ATRIAL FIBRILLATION (HCC): Primary | ICD-10-CM

## 2018-05-18 RX ORDER — SIMVASTATIN 20 MG/1
TABLET, FILM COATED ORAL
Qty: 90 TAB | Refills: 3 | Status: SHIPPED | OUTPATIENT
Start: 2018-05-18 | End: 2018-08-16 | Stop reason: ALTCHOICE

## 2018-05-18 RX ORDER — LISINOPRIL 5 MG/1
TABLET ORAL
Qty: 90 TAB | Refills: 3 | Status: SHIPPED | OUTPATIENT
Start: 2018-05-18 | End: 2019-05-07 | Stop reason: SDUPTHER

## 2018-05-18 NOTE — PROGRESS NOTES
INR is okay so continue current treatment. Informed patient to continue the same dose of blood thinner at 7.5 mg daily.

## 2018-05-18 NOTE — TELEPHONE ENCOUNTER
Requested Prescriptions     Pending Prescriptions Disp Refills    lisinopril (PRINIVIL, ZESTRIL) 5 mg tablet [Pharmacy Med Name: LISINOPRIL 5 MG TABLET] 90 Tab 3     Sig: TAKE 1 TABLET EVERY DAY    simvastatin (ZOCOR) 20 mg tablet [Pharmacy Med Name: SIMVASTATIN 20 MG TABLET] 90 Tab 3     Sig: TAKE 1 TABLET EVERY DAY       Patient Last Seen:  05- with labs      Next appointment:  06-

## 2018-05-21 NOTE — PROGRESS NOTES
Labs are remarkable for a markedly decreased HDL which is significantly down from before any markedly increased sed rate from his baseline so double check and make sure those are not entered as Aranesp if they are not then I need to know is he on prednisone so consent with putting back on prednisone. Will increase the dose if he is on it.

## 2018-05-22 ENCOUNTER — TELEPHONE ANTICOAG (OUTPATIENT)
Dept: INTERNAL MEDICINE CLINIC | Age: 83
End: 2018-05-22

## 2018-05-22 DIAGNOSIS — I48.0 PAROXYSMAL ATRIAL FIBRILLATION (HCC): Primary | ICD-10-CM

## 2018-05-22 NOTE — PROGRESS NOTES
Labs are remarkable for a markedly decreased HDL which is significantly down from before any markedly increased sed rate from his baseline so double check and make sure those are not entered as Aranesp if they are not then I need to know is he on prednisone so consent with putting back on prednisone. Will increase the dose if he is on it. Dr. Jose A Stephen wants patient to increase his prednisone to 20 mg daily.   F/up next week instead of as scheduled on 6-4-2018

## 2018-05-22 NOTE — TELEPHONE ENCOUNTER
Requested Prescriptions     Pending Prescriptions Disp Refills    predniSONE (DELTASONE) 20 mg tablet 30 Tab 2     Sig: Take 1 Tab by mouth daily.

## 2018-05-22 NOTE — PROGRESS NOTES
Anticoagulation Episode Summary     Current INR goal 2.0-3.0   Next INR check 6/4/2018   INR from last check No new INR was available at last check   Weekly max warfarin dose    Target end date    INR check location [facility]   Preferred lab    Send INR reminders to     Indications   Paroxysmal [facility] fibrillation ([facility] (Primary) [I48.0]           Comments          Anticoagulation Care Providers     Provider Role Specialty Phone number    Kings Rice MD Responsible Internal Medicine [phone]        Informed patient that PT/INR okay, continue the same dose of blood thinner at 7.5 mg daily and f/up as scheduled.

## 2018-05-23 RX ORDER — PREDNISONE 20 MG/1
20 TABLET ORAL DAILY
Qty: 30 TAB | Refills: 2 | Status: SHIPPED | OUTPATIENT
Start: 2018-05-23 | End: 2018-11-12 | Stop reason: SDUPTHER

## 2018-06-01 ENCOUNTER — OFFICE VISIT (OUTPATIENT)
Dept: INTERNAL MEDICINE CLINIC | Age: 83
End: 2018-06-01

## 2018-06-01 VITALS
TEMPERATURE: 98.7 F | HEART RATE: 77 BPM | SYSTOLIC BLOOD PRESSURE: 118 MMHG | BODY MASS INDEX: 26.24 KG/M2 | RESPIRATION RATE: 16 BRPM | WEIGHT: 211 LBS | DIASTOLIC BLOOD PRESSURE: 66 MMHG | HEIGHT: 75 IN | OXYGEN SATURATION: 97 %

## 2018-06-01 DIAGNOSIS — M35.3 POLYMYALGIA RHEUMATICA (HCC): Primary | ICD-10-CM

## 2018-06-01 DIAGNOSIS — M15.9 PRIMARY OSTEOARTHRITIS INVOLVING MULTIPLE JOINTS: ICD-10-CM

## 2018-06-01 LAB — ERYTHROCYTE [SEDIMENTATION RATE] IN BLOOD: 46 MM/HR (ref 0–10)

## 2018-06-01 NOTE — PROGRESS NOTES
Chief Complaint   Patient presents with    Hypertension     sed rate follow up    Diabetes    Chronic Kidney Disease     1. Have you been to the ER, urgent care clinic since your last visit? Hospitalized since your last visit? No    2. Have you seen or consulted any other health care providers outside of the [facility] since your last visit? Include any pap smears or colon screening.  No

## 2018-06-01 NOTE — PATIENT INSTRUCTIONS
Polymyalgia Rheumatica: Care Instructions  Your Care Instructions  Polymyalgia rheumatica causes pain and swelling in joints and muscles, mainly in the hips, neck, and shoulders. Pain and swelling may be worse in the morning. This condition can occur quickly and often lasts for a year or two. Your doctor will treat you with medicine to reduce swelling. Your symptoms should get much better in 1 to 3 days and go away in 2 to 4 weeks. Still, you may need to take medicine to prevent it from coming back. You may be on the medicine for 1 to 2 years or longer. Some people who have this also get giant cell arteritis (temporal arteritis), which causes swelling of some blood vessels in the head. Tell your doctor if you have any headaches, jaw pain, or tightness or tenderness along the temple or scalp. This condition can cause blindness if it is not treated. Tell your doctor if you have problems with your vision, including blurring or seeing double. Follow-up care is a key part of your treatment and safety. Be sure to make and go to all appointments, and call your doctor if you are having problems. It's also a good idea to know your test results and keep a list of the medicines you take. How can you care for yourself at home? · Take your medicines exactly as prescribed. Call your doctor if you think you are having a problem with your medicine. You may get medicines to reduce pain and to keep your bones from getting thin. · Take anti-inflammatory medicines to reduce pain, if your doctor recommends them. These include ibuprofen (Advil, Motrin) and naproxen (Aleve). Read and follow all instructions on the label. · Eat a healthy diet. Make sure to drink milk and eat dairy products, such as low-fat cheese and yogurt. Ask your doctor how much calcium you need. If you cannot eat dairy products or you do not get enough calcium from food, you may take pills. · Do gentle weight lifting to protect your bones.  This is very important for women who have gone through menopause. · Do not smoke or allow others to smoke around you. If you need help quitting, talk to your doctor about stop-smoking programs and medicines. These can increase your chances of quitting for good. When should you call for help? Call your doctor now or seek immediate medical care if:  ? · You have a headache, jaw pain, or problems seeing. ? Watch closely for changes in your health, and be sure to contact your doctor if:  ? · Your joint and muscle pain or stiffness gets worse. ? · You have side effects from your corticosteroid medicine, such as:  ¨ Signs of diabetes (feeling thirsty all the time, needing to urinate often). ¨ Signs of infection (fever, chills, cough, burning during urination, severe sore throat, or skin infection). ¨ A large weight gain. ¨ Mood changes. ¨ Trouble sleeping. ¨ Bruising easily. ? · You have any other problems with your medicine. ? · You do not get better as expected. Where can you learn more? Go to http://isra-rhonda.info/. Enter M396 in the search box to learn more about \"Polymyalgia Rheumatica: Care Instructions. \"  Current as of: October 31, 2016  Content Version: 11.4  © 2599-7217 [employer]. Care instructions adapted under license by [employer] (which disclaims liability or warranty for this information). If you have questions about a medical condition or this instruction, always ask your healthcare professional. [place] [address] any warranty or liability for your use of this information.

## 2018-06-01 NOTE — MR AVS SNAPSHOT
[address] [address] [phone] Patient: Kirsten Sosa MRN: [id number] Visit Information Date & Time Provider Department Dept. Phone Encounter #  
 6/1/2018  3:00 PM Lety Estrada Melody [facility] [id number] Follow-up Instructions Return in about 2 weeks (around 6/15/2018). Follow-up and Disposition History Your Appointments 6/15/2018  2:20 PM  
FOLLOW UP 10 with MD DEMETRIUS Estrada [facility] ([facility]) Appt Note: 3 week follow up; 2 week follow up [address] [phone] [address] [facility] [phone] 8/16/2018  9:50 AM  
FOLLOW UP 10 with MD PREMA Estrada [facility] ([facility]) Appt Note: [address] [id number] [phone] Upcoming Health Maintenance Date Due  
 EYE EXAM RETINAL OR DILATED Q1 2/20/1943 DTaP/Tdap/Td series (1 - Tdap) 2/20/1954 ZOSTER VACCINE AGE 60> 12/20/1992 GLAUCOMA SCREENING Q2Y 2/20/1998 Influenza Age 5 to Adult 8/1/2018 MICROALBUMIN Q1 10/30/2018 MEDICARE YEARLY EXAM 10/31/2018 HEMOGLOBIN A1C Q6M 11/16/2018 FOOT EXAM Q1 2/14/2019 LIPID PANEL Q1 5/16/2019 COLONOSCOPY 3/30/2026 Allergies as of 6/1/2018  Review Complete On: 6/1/2018 By: Jules Sims MD  
 No Known Allergies Current Immunizations  Never Reviewed Name Date Influenza High Dose Vaccine PF 10/30/2017 Influenza Vaccine 10/7/2016 Pneumococcal Conjugate (PCV-13) 1/7/2005 Pneumococcal Vaccine (Unspecified Type) 1/7/2005 Rabies Vaccine 3/11/2009 Not reviewed this visit You Were Diagnosed With   
  
 Codes Comments Polymyalgia rheumatica (HCC)    -  Primary ICD-10-CM: M35.3 ICD-9-CM: 342 Primary osteoarthritis involving multiple joints     ICD-10-CM: M15.0 ICD-9-CM: 715.09 Vitals BP Pulse Temp Resp Height(growth percentile) Weight(growth percentile)  
 118/66 (BP 1 Location: Left arm, BP Patient Position: Sitting) 77 98.7 °F (37.1 °C) (Oral) 16 6' 3\" (1.905 m) 211 lb (95.7 kg) SpO2 BMI Smoking Status 97% 26.37 kg/m2 Never Smoker Vitals History BMI and BSA Data Body Mass Index Body Surface Area  
 26.37 kg/m 2 2.25 m 2 Preferred Pharmacy Pharmacy Name Phone [id number] JAMES Gonzales/ [facility] [phone] Your Updated Medication List  
  
   
This list is accurate as of 6/1/18  4:22 PM.  Always use your most recent med list.  
  
  
  
  
 diclofenac EC 75 mg EC tablet Commonly known as:  VOLTAREN  
TAKE 1 TABLET BY MOUTH TWICE A DAY  
  
 digoxin 0.125 mg tablet Commonly known as:  LANOXIN Take 1 Tab by mouth daily. fexofenadine 180 mg tablet Commonly known as:  Wylie Mayor Take  by mouth. FISH OIL PO Take 1,200 mg by mouth two (2) times a day. furosemide 80 mg tablet Commonly known as:  LASIX TAKE 1 TABLET BY MOUTH EVERY DAY  
  
 glucosamine 1,000 mg Tab Take 2,000 mg by mouth daily. lisinopril 5 mg tablet Commonly known as:  PRINIVIL, ZESTRIL  
TAKE 1 TABLET EVERY DAY  
  
 MULTIVITAMIN 50 PLUS Tab tablet Generic drug:  multivitamins-minerals-lutein Take 1 Tab by mouth daily. predniSONE 20 mg tablet Commonly known as:  Beckie Ada Take 1 Tab by mouth daily. sildenafil (antihypertensive) 20 mg tablet Commonly known as:  REVATIO  
TAKE 1 TABLET, ORAL, AS DIRECTED FOR SEXUAL ACTIVITY  
  
 simvastatin 20 mg tablet Commonly known as:  ZOCOR  
TAKE 1 TABLET EVERY DAY  
  
 terazosin 5 mg capsule Commonly known as:  HYTRIN  
 Take 5 mg by mouth nightly. TYLENOL ARTHRITIS PAIN 650 mg [facility] Generic drug:  acetaminophen Take 650 mg by mouth every eight (8) hours. VITAMIN D3 1,000 unit Cap Generic drug:  cholecalciferol Take 1,000 Units by mouth daily. warfarin 5 mg tablet Commonly known as:  COUMADIN Take one and a half tablets every day. We Performed the Following AMB POC SEDIMENTATION RATE, ERYTHROCYTE; NON AUTO [96964 CPT(R)] Follow-up Instructions Return in about 2 weeks (around 6/15/2018). Patient Instructions Polymyalgia Rheumatica: Care Instructions Your Care Instructions Polymyalgia rheumatica causes pain and swelling in joints and muscles, mainly in the hips, neck, and shoulders. Pain and swelling may be worse in the morning. This condition can occur quickly and often lasts for a year or two. Your doctor will treat you with medicine to reduce swelling. Your symptoms should get much better in 1 to 3 days and go away in 2 to 4 weeks. Still, you may need to take medicine to prevent it from coming back. You may be on the medicine for 1 to 2 years or longer. Some people who have this also get giant cell arteritis (temporal arteritis), which causes swelling of some blood vessels in the head. Tell your doctor if you have any headaches, jaw pain, or tightness or tenderness along the temple or scalp. This condition can cause blindness if it is not treated. Tell your doctor if you have problems with your vision, including blurring or seeing double. Follow-up care is a key part of your treatment and safety. Be sure to make and go to all appointments, and call your doctor if you are having problems. It's also a good idea to know your test results and keep a list of the medicines you take. How can you care for yourself at home? · Take your medicines exactly as prescribed.  Call your doctor if you think you are having a problem with your medicine. You may get medicines to reduce pain and to keep your bones from getting thin. · Take anti-inflammatory medicines to reduce pain, if your doctor recommends them. These include ibuprofen (Advil, Motrin) and naproxen (Aleve). Read and follow all instructions on the label. · Eat a healthy diet. Make sure to drink milk and eat dairy products, such as low-fat cheese and yogurt. Ask your doctor how much calcium you need. If you cannot eat dairy products or you do not get enough calcium from food, you may take pills. · Do gentle weight lifting to protect your bones. This is very important for women who have gone through menopause. · Do not smoke or allow others to smoke around you. If you need help quitting, talk to your doctor about stop-smoking programs and medicines. These can increase your chances of quitting for good. When should you call for help? Call your doctor now or seek immediate medical care if: 
? · You have a headache, jaw pain, or problems seeing. ? Watch closely for changes in your health, and be sure to contact your doctor if: 
? · Your joint and muscle pain or stiffness gets worse. ? · You have side effects from your corticosteroid medicine, such as: ¨ Signs of diabetes (feeling thirsty all the time, needing to urinate often). ¨ Signs of infection (fever, chills, cough, burning during urination, severe sore throat, or skin infection). ¨ A large weight gain. ¨ Mood changes. ¨ Trouble sleeping. ¨ Bruising easily. ? · You have any other problems with your medicine. ? · You do not get better as expected. Where can you learn more? Go to http://isra-rhonda.info/. Enter M396 in the search box to learn more about \"Polymyalgia Rheumatica: Care Instructions. \" Current as of: October 31, 2016 Content Version: 11.4 © 4427-9556 Healthwise, Incorporated.  Care instructions adapted under license by [address] (which disclaims liability or warranty for this information). If you have questions about a medical condition or this instruction, always ask your healthcare professional. [address] any warranty or liability for your use of this information. Patient Instructions History Introducing [facility]! Ramirez Gaby introduces Prevently patient portal. Now you can access parts of your medical record, email your doctor's office, and request medication refills online. 1. In your internet browser, go to [email or web address] 2. Click on the First Time User? Click Here link in the Sign In box. You will see the New Member Sign Up page. 3. Enter your Prevently Access Code exactly as it appears below. You will not need to use this code after youve completed the sign-up process. If you do not sign up before the expiration date, you must request a new code. · Prevently Access Code: [email or web address] Expires: 8/14/2018 11:12 AM 
 
4. Enter the last four digits of your Social Security Number (xxxx) and Date of Birth (mm/dd/yyyy) as indicated and click Submit. You will be taken to the next sign-up page. 5. Create a Prevently ID. This will be your Prevently login ID and cannot be changed, so think of one that is secure and easy to remember. 6. Create a Prevently password. You can change your password at any time. 7. Enter your Password Reset Question and Answer. This can be used at a later time if you forget your password. 8. Enter your e-mail address. You will receive e-mail notification when new information is available in [address] 9. Click Sign Up. You can now view and download portions of your medical record. 10. Click the Download Summary menu link to download a portable copy of your medical information.  
 
If you have questions, please visit the Frequently Asked Questions section of the [employer]. Remember, [employer] is NOT to be used for urgent needs. For medical emergencies, dial 911. Now available from your iPhone and Android! Please provide this summary of care documentation to your next provider. Your primary care clinician is listed as Arpan. If you have any questions after today's visit, please call [phone].

## 2018-06-01 NOTE — PROGRESS NOTES
Chief Complaint   Patient presents with    Hypertension     sed rate follow up    Diabetes    Chronic Kidney Disease       SUBJECTIVE:    Nigel Barajas is a 80 y.o. male who presents today in follow-up of his polymyalgia rheumatica and arthritis he generally seems to be feeling better and that it would increase to prednisone back to 20 mg because as we had been decreasing and he seemed to be doing well until the sed rate suddenly was elevated on his last visit. He denies any chest pain, shortness breath, palpitations or cardiorespiratory complaints. He denies any GI/ claims. Current Outpatient Prescriptions   Medication Sig Dispense Refill    predniSONE (DELTASONE) 20 mg tablet Take 1 Tab by mouth daily. 30 Tab 2    lisinopril (PRINIVIL, ZESTRIL) 5 mg tablet TAKE 1 TABLET EVERY DAY 90 Tab 3    warfarin (COUMADIN) 5 mg tablet Take one and a half tablets every day. 145 Tab 3    fexofenadine (ALLEGRA) 180 mg tablet Take  by mouth.  acetaminophen (TYLENOL ARTHRITIS PAIN) 650 mg TbER Take 650 mg by mouth every eight (8) hours.  furosemide (LASIX) 80 mg tablet TAKE 1 TABLET BY MOUTH EVERY DAY 90 Tab 3    multivitamins-minerals-lutein (MULTIVITAMIN 50 PLUS) tab tablet Take 1 Tab by mouth daily.  glucosamine 1,000 mg tab Take 2,000 mg by mouth daily.  digoxin (LANOXIN) 0.125 mg tablet Take 1 Tab by mouth daily. 30 Tab prn    sildenafil, antihypertensive, (REVATIO) 20 mg tablet TAKE 1 TABLET, ORAL, AS DIRECTED FOR SEXUAL ACTIVITY 20 Tab 11    cholecalciferol (VITAMIN D3) 1,000 unit cap Take 1,000 Units by mouth daily.  DOCOSAHEXANOIC ACID/EPA (FISH OIL PO) Take 1,200 mg by mouth two (2) times a day.  diclofenac EC (VOLTAREN) 75 mg EC tablet TAKE 1 TABLET BY MOUTH TWICE A  Tab 3    terazosin (HYTRIN) 5 mg capsule Take 5 mg by mouth nightly.         simvastatin (ZOCOR) 20 mg tablet TAKE 1 TABLET EVERY DAY 90 Tab 3     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    Back pain 9/20/2017    BPH (benign prostatic hyperplasia) 9/20/2017    CHF (congestive heart failure) ([facility] [facility] 9/20/2017    CKD (chronic kidney disease) 9/20/2017    DM (diabetes mellitus) ([facility] 9/20/2017    Story: Diet Controlled    ED (erectile dysfunction) 9/20/2017    Edema 9/20/2017    Elevated CPK 9/20/2017    Comments: History of    Elevated LFTs 9/20/2017    Comments: History of    Elevated PSA 9/20/2017    Hypercholesteremia     Hyperlipidemia 9/20/2017    Hypertension     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    Nodule of right lung 9/20/2017    Story: Right    Polymyalgia ([facility] 9/20/2017    Prostate enlargement      Past Surgical History:   Procedure Laterality Date    ABDOMEN SURGERY PROC UNLISTED      hernia repair     No Known Allergies    REVIEW OF SYSTEMS:  General: negative for - chills or fever, or weight loss or gain  ENT: negative for - headaches, nasal congestion or tinnitus  Eyes: no blurred or visual changes  Neck: No stiffness or swollen nodes  Respiratory: negative for - cough, hemoptysis, shortness of breath or wheezing  Cardiovascular : negative for - chest pain, edema, palpitations or shortness of breath  Gastrointestinal: negative for - abdominal pain, blood in stools, heartburn or nausea/vomiting  Genito-Urinary: no dysuria, trouble voiding, or hematuria  Musculoskeletal: negative for - gait disturbance, joint pain, joint stiffness or joint swelling  Neurological: no TIA or stroke symptoms  Hematologic: no bruises, no bleeding  Lymphatic: no swollen glands  Integument: no lumps, mole changes, nail changes or rash  Endocrine:no malaise/lethargy poly uria or polydipsia or unexpected weight changes        Social History     Social History    Marital status:      Spouse name: N/A    Number of children: N/A    Years of education: N/A     Social History Main Topics    Smoking status: Never Smoker    Smokeless tobacco: Never Used    Alcohol use No    Drug use: No    Sexual activity: No     Other Topics Concern    None     Social History Narrative     History reviewed. No pertinent family history. OBJECTIVE:     Visit Vitals    /66 (BP 1 Location: Left arm, BP Patient Position: Sitting)    Pulse 77    Temp 98.7 °F (37.1 °C) (Oral)    Resp 16    Ht 6' 3\" (1.905 m)    Wt 211 lb (95.7 kg)    SpO2 97%    BMI 26.37 kg/m2     CONSTITUTIONAL:   well nourished, appears age appropriate  EYES: sclera anicteric, PERRL, EOMI  ENMT:nares clear, moist mucous membranes, pharynx clear  NECK: supple. Thyroid normal, No JVD or bruits  RESPIRATORY: Chest: clear to ascultation and percussion, normal inspiratory effort  CARDIOVASCULAR: Heart: regular rate and rhythm no murmurs, rubs or gallops, PMI not displaced, No thrills  GASTROINTESTINAL: Abdomen: non distended, soft, non tender, bowel sounds normal  HEMATOLOGIC: no purpura, petechiae or bruising  LYMPHATIC: No lymph node enlargemant  MUSCULOSKELETAL: Extremities: no edema or active synovitis, pulse 1+   INTEGUMENT: No unusual rashes or suspicious skin lesions noted. Nails appear normal.  PERIPHERAL VASCULAR: normal pulses femoral, PT and DP  NEUROLOGIC: non-focal exam, A & O X 3  PSYCHIATRIC:, appropriate affect     ASSESSMENT:   1. Polymyalgia rheumatica ([address]    2. Primary osteoarthritis involving multiple joints      Impression  1. Polymyalgia rheumatica we will see what the sed rate looks like today prednisone dose is 20 daily  2. DJD that seems to be stable  Other problems seem to be currently stable today  I will recheck him in 2 weeks or sooner should they be a problem. PLAN:  .  Orders Placed This Encounter    AMB POC SEDIMENTATION RATE, ERYTHROCYTE; NON AUTO         ATTENTION:   This medical record was transcribed using an electronic medical records system.   Although proofread, it may and can contain electronic and spelling errors. Other human spelling and other errors may be present. Corrections may be executed at a later time. Please feel free to contact us for any clarifications as needed. Follow-up Disposition:  Return in about 2 weeks (around 6/15/2018). No results found for any visits on 06/01/18. Cleveland Bucio MD    The patient verbalized understanding of the problems and plans as explained.

## 2018-06-04 ENCOUNTER — TELEPHONE (OUTPATIENT)
Dept: INTERNAL MEDICINE CLINIC | Age: 83
End: 2018-06-04

## 2018-06-04 NOTE — TELEPHONE ENCOUNTER
----- Message from Isabell Melgar MD sent at 6/1/2018  5:35 PM EDT -----  Sed rate is improved so continue current treatment.

## 2018-06-09 DIAGNOSIS — M35.3 POLYMYALGIA RHEUMATICA (HCC): ICD-10-CM

## 2018-06-11 RX ORDER — PREDNISONE 5 MG/1
TABLET ORAL
Qty: 90 TAB | Refills: 1 | Status: SHIPPED | OUTPATIENT
Start: 2018-06-11 | End: 2018-07-13 | Stop reason: ALTCHOICE

## 2018-06-11 NOTE — TELEPHONE ENCOUNTER
Requested Prescriptions     Pending Prescriptions Disp Refills    predniSONE (DELTASONE) 5 mg tablet [Pharmacy Med Name: PREDNISONE 5 MG TABLET] 90 Tab 1     Sig: TAKE 1 TABLET DAILY AS DIRECTED       Patient Last Seen:  06- with labs    Next appointment:  06-

## 2018-06-15 ENCOUNTER — OFFICE VISIT (OUTPATIENT)
Dept: INTERNAL MEDICINE CLINIC | Age: 83
End: 2018-06-15

## 2018-06-15 VITALS
OXYGEN SATURATION: 96 % | WEIGHT: 212 LBS | HEART RATE: 69 BPM | TEMPERATURE: 97.7 F | SYSTOLIC BLOOD PRESSURE: 130 MMHG | HEIGHT: 75 IN | BODY MASS INDEX: 26.36 KG/M2 | RESPIRATION RATE: 16 BRPM | DIASTOLIC BLOOD PRESSURE: 74 MMHG

## 2018-06-15 DIAGNOSIS — N18.30 STAGE 3 CHRONIC KIDNEY DISEASE (HCC): ICD-10-CM

## 2018-06-15 DIAGNOSIS — M35.3 POLYMYALGIA RHEUMATICA (HCC): Primary | ICD-10-CM

## 2018-06-15 DIAGNOSIS — I12.9 HYPERTENSION WITH RENAL DISEASE: ICD-10-CM

## 2018-06-15 NOTE — PATIENT INSTRUCTIONS

## 2018-06-15 NOTE — PROGRESS NOTES
1. Have you been to the ER, urgent care clinic since your last visit? Hospitalized since your last visit? No    2. Have you seen or consulted any other health care providers outside of the [address] [place] [address] since your last visit? Include any pap smears or colon screening. Yes, Dr. 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     Chief Complaint   Patient presents with    Other     PMR follow-up

## 2018-06-15 NOTE — PROGRESS NOTES
Chief Complaint   Patient presents with    Other     PMR follow-up       SUBJECTIVE:    Jose Serrano is a 80 y.o. male who presents today in follow-up of his polymyalgia rheumatica, htn, ckd and arthritis he generally seems to be feeling better since we would increase to prednisone back to 20 mg daily. He denies any chest pain, shortness breath, palpitations or cardiorespiratory complaints. He denies any GI/ claims. His strength seems to be better he has no other arthritic complaints at the present time. There are no other complaints on complete review of systems. Current Outpatient Prescriptions   Medication Sig Dispense Refill    predniSONE (DELTASONE) 20 mg tablet Take 1 Tab by mouth daily. 30 Tab 2    lisinopril (PRINIVIL, ZESTRIL) 5 mg tablet TAKE 1 TABLET EVERY DAY 90 Tab 3    simvastatin (ZOCOR) 20 mg tablet TAKE 1 TABLET EVERY DAY 90 Tab 3    warfarin (COUMADIN) 5 mg tablet Take one and a half tablets every day. 145 Tab 3    fexofenadine (ALLEGRA) 180 mg tablet Take  by mouth.  acetaminophen (TYLENOL ARTHRITIS PAIN) 650 mg TbER Take 650 mg by mouth every eight (8) hours.  furosemide (LASIX) 80 mg tablet TAKE 1 TABLET BY MOUTH EVERY DAY 90 Tab 3    multivitamins-minerals-lutein (MULTIVITAMIN 50 PLUS) tab tablet Take 1 Tab by mouth daily.  glucosamine 1,000 mg tab Take 2,000 mg by mouth daily.  digoxin (LANOXIN) 0.125 mg tablet Take 1 Tab by mouth daily. 30 Tab prn    sildenafil, antihypertensive, (REVATIO) 20 mg tablet TAKE 1 TABLET, ORAL, AS DIRECTED FOR SEXUAL ACTIVITY 20 Tab 11    cholecalciferol (VITAMIN D3) 1,000 unit cap Take 1,000 Units by mouth daily.  DOCOSAHEXANOIC ACID/EPA (FISH OIL PO) Take 1,200 mg by mouth two (2) times a day.  diclofenac EC (VOLTAREN) 75 mg EC tablet TAKE 1 TABLET BY MOUTH TWICE A  Tab 3    terazosin (HYTRIN) 5 mg capsule Take 5 mg by mouth nightly.         predniSONE (DELTASONE) 5 mg tablet TAKE 1 TABLET DAILY AS DIRECTED (Patient not taking: Reported on 6/15/2018) 90 Tab 1     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    Back pain 9/20/2017    BPH (benign prostatic hyperplasia) 9/20/2017    CHF (congestive heart failure) ([address] 9/20/2017    CKD (chronic kidney disease) 9/20/2017    DM (diabetes mellitus) ([address] 9/20/2017    Story: Diet Controlled    ED (erectile dysfunction) 9/20/2017    Edema 9/20/2017    Elevated CPK 9/20/2017    Comments: History of    Elevated LFTs 9/20/2017    Comments: History of    Elevated PSA 9/20/2017    Hypercholesteremia     Hyperlipidemia 9/20/2017    Hypertension     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    Nodule of right lung 9/20/2017    Story: Right    Polymyalgia ([address] 9/20/2017    Prostate enlargement      Past Surgical History:   Procedure Laterality Date    ABDOMEN SURGERY PROC UNLISTED      hernia repair    HX HEENT  06/12/2018    Dr. 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     No Known Allergies    REVIEW OF SYSTEMS:  General: negative for - chills or fever, or weight loss or gain  ENT: negative for - headaches, nasal congestion or tinnitus  Eyes: no blurred or visual changes  Neck: No stiffness or swollen nodes  Respiratory: negative for - cough, hemoptysis, shortness of breath or wheezing  Cardiovascular : negative for - chest pain, edema, palpitations or shortness of breath  Gastrointestinal: negative for - abdominal pain, blood in stools, heartburn or nausea/vomiting  Genito-Urinary: no dysuria, trouble voiding, or hematuria  Musculoskeletal: negative for - gait disturbance, joint pain, joint stiffness or joint swelling  Neurological: no TIA or stroke symptoms  Hematologic: no bruises, no bleeding  Lymphatic: no swollen glands  Integument: no lumps, mole changes, nail changes or rash  Endocrine:no malaise/lethargy poly uria or polydipsia or unexpected weight changes        Social History     Social History    Marital status:      Spouse name: N/A    Number of children: N/A    Years of education: N/A     Social History Main Topics    Smoking status: Never Smoker    Smokeless tobacco: Never Used    Alcohol use No    Drug use: No    Sexual activity: No     Other Topics Concern    None     Social History Narrative     History reviewed. No pertinent family history. OBJECTIVE:     Visit Vitals    /74 (BP 1 Location: Left arm, BP Patient Position: Sitting)    Pulse 69    Temp 97.7 °F (36.5 °C) (Oral)    Resp 16    Ht 6' 3\" (1.905 m)    Wt 212 lb (96.2 kg)    SpO2 96%    BMI 26.5 kg/m2     CONSTITUTIONAL:   well nourished, appears age appropriate  EYES: sclera anicteric, PERRL, EOMI  ENMT:nares clear, moist mucous membranes, pharynx clear  NECK: supple. Thyroid normal, No JVD or bruits  RESPIRATORY: Chest: clear to ascultation and percussion, normal inspiratory effort  CARDIOVASCULAR: Heart: regular rate and rhythm no murmurs, rubs or gallops, PMI not displaced, No thrills  GASTROINTESTINAL: Abdomen: non distended, soft, non tender, bowel sounds normal  HEMATOLOGIC: no purpura, petechiae or bruising  LYMPHATIC: No lymph node enlargemant  MUSCULOSKELETAL: Extremities: no edema or active synovitis, pulse 1+   INTEGUMENT: No unusual rashes or suspicious skin lesions noted. Nails appear normal.  PERIPHERAL VASCULAR: normal pulses femoral, PT and DP  NEUROLOGIC: non-focal exam, A & O X 3  PSYCHIATRIC:, appropriate affect     ASSESSMENT:   1. Polymyalgia rheumatica (HCC)    2. Stage 3 chronic kidney disease    3. Hypertension with renal disease      Impression  1. Polymyalgia rheumatica we will see what the sed rate looks like today, prednisone dose is 20 daily  2. DJD that seems to be stable  3. Hypertension that is controlled  4.   CKD repeat status pending  Other problems seem to be currently stable today  I will recheck him in 2 weeks or sooner should they be a problem. PLAN:  .  Orders Placed This Encounter    AMB POC BASIC METABOLIC PANEL    AMB POC SEDIMENTATION RATE, ERYTHROCYTE; NON AUTO         ATTENTION:   This medical record was transcribed using an electronic medical records system. Although proofread, it may and can contain electronic and spelling errors. Other human spelling and other errors may be present. Corrections may be executed at a later time. Please feel free to contact us for any clarifications as needed. Follow-up Disposition:  Return in about 2 weeks (around 6/29/2018). No results found for any visits on 06/15/18. Celia Levine MD    The patient verbalized understanding of the problems and plans as explained.

## 2018-06-15 NOTE — MR AVS SNAPSHOT
[facility] 
 
 
 [place] [address] [id number] [phone] Patient: Teodora Goddard MRN: [id number] SJB:[id number] Visit Information Date & Time Provider Department Dept. Phone Encounter #  
 6/15/2018  2:20 PM Clifton Krabbe, 20 [facility] [phone] [id number] Follow-up Instructions Return in about 2 weeks (around 6/29/2018). Follow-up and Disposition History Your Appointments 6/29/2018  2:00 PM  
FOLLOW UP 10 with Clifton Krabbe, MD [facility] ([address]) Appt Note: 2 week follow up [place] [address] [address] [address] [address] 8/16/2018  9:50 AM  
FOLLOW UP 10 with Clifton Krabbe, MD  
[facility] ([facility]) Appt Note: [address] [address] [phone] Upcoming Health Maintenance Date Due  
 EYE EXAM RETINAL OR DILATED Q1 2/20/1943 DTaP/Tdap/Td series (1 - Tdap) 2/20/1954 ZOSTER VACCINE AGE 60> 12/20/1992 GLAUCOMA SCREENING Q2Y 2/20/1998 Influenza Age 5 to Adult 8/1/2018 MICROALBUMIN Q1 10/30/2018 MEDICARE YEARLY EXAM 10/31/2018 HEMOGLOBIN A1C Q6M 11/16/2018 FOOT EXAM Q1 2/14/2019 LIPID PANEL Q1 5/16/2019 COLONOSCOPY 3/30/2026 Allergies as of 6/15/2018  Review Complete On: 6/15/2018 By: Clifton Krabbe, MD  
 No Known Allergies Current Immunizations  Never Reviewed Name Date Influenza High Dose Vaccine PF 10/30/2017 Influenza Vaccine 10/7/2016 Pneumococcal Conjugate (PCV-13) 1/7/2005 Pneumococcal Vaccine (Unspecified Type) 1/7/2005 Rabies Vaccine 3/11/2009 Not reviewed this visit You Were Diagnosed With   
  
 Codes Comments Polymyalgia rheumatica (HCC)    -  Primary ICD-10-CM: M35.3 ICD-9-CM: 842 Stage 3 chronic kidney disease     ICD-10-CM: N18.3 ICD-9-CM: 289. 3 Hypertension with renal disease     ICD-10-CM: I12.9 ICD-9-CM: 403.90 Vitals BP Pulse Temp Resp Height(growth percentile) Weight(growth percentile) 130/74 (BP 1 Location: Left arm, BP Patient Position: Sitting) 69 97.7 °F (36.5 °C) (Oral) 16 6' 3\" (1.905 m) 212 lb (96.2 kg) SpO2 BMI Smoking Status 96% 26.5 kg/m2 Never Smoker Vitals History BMI and BSA Data Body Mass Index Body Surface Area  
 26.5 kg/m 2 2.26 m 2 Preferred Pharmacy Pharmacy Name Phone [id number] J[facility]man/ [facility] [phone] Your Updated Medication List  
  
   
This list is accurate as of 6/15/18  3:23 PM.  Always use your most recent med list.  
  
  
  
  
 diclofenac EC 75 mg EC tablet Commonly known as:  VOLTAREN  
TAKE 1 TABLET BY MOUTH TWICE A DAY  
  
 digoxin 0.125 mg tablet Commonly known as:  LANOXIN Take 1 Tab by mouth daily. fexofenadine 180 mg tablet Commonly known as:  Antoni Perks Take  by mouth. FISH OIL PO Take 1,200 mg by mouth two (2) times a day. furosemide 80 mg tablet Commonly known as:  LASIX TAKE 1 TABLET BY MOUTH EVERY DAY  
  
 glucosamine 1,000 mg Tab Take 2,000 mg by mouth daily. lisinopril 5 mg tablet Commonly known as:  PRINIVIL, ZESTRIL  
TAKE 1 TABLET EVERY DAY  
  
 MULTIVITAMIN 50 PLUS Tab tablet Generic drug:  multivitamins-minerals-lutein Take 1 Tab by mouth daily. * predniSONE 20 mg tablet Commonly known as:  Dottie Gong Take 1 Tab by mouth daily. * predniSONE 5 mg tablet Commonly known as:  DELTASONE  
TAKE 1 TABLET DAILY AS DIRECTED  
  
 sildenafil (antihypertensive) 20 mg tablet Commonly known as:  REVATIO  
TAKE 1 TABLET, ORAL, AS DIRECTED FOR SEXUAL ACTIVITY  
  
 simvastatin 20 mg tablet Commonly known as:  ZOCOR  
 TAKE 1 TABLET EVERY DAY  
  
 terazosin 5 mg capsule Commonly known as:  HYTRIN Take 5 mg by mouth nightly. TYLENOL ARTHRITIS PAIN 650 mg Maite Wyatt Generic drug:  acetaminophen Take 650 mg by mouth every eight (8) hours. VITAMIN D3 1,000 unit Cap Generic drug:  cholecalciferol Take 1,000 Units by mouth daily. warfarin 5 mg tablet Commonly known as:  COUMADIN Take one and a half tablets every day. * Notice: This list has 2 medication(s) that are the same as other medications prescribed for you. Read the directions carefully, and ask your doctor or other care provider to review them with you. We Performed the Following AMB POC BASIC METABOLIC PANEL [53296 CPT(R)] AMB POC SEDIMENTATION RATE, ERYTHROCYTE; NON AUTO [06386 CPT(R)] Follow-up Instructions Return in about 2 weeks (around 6/29/2018). Patient Instructions Low Sodium Diet (2,000 Milligram): Care Instructions Your Care Instructions Too much sodium causes your body to hold on to extra water. This can raise your blood pressure and force your heart and kidneys to work harder. In very serious cases, this could cause you to be put in the hospital. It might even be life-threatening. By limiting sodium, you will feel better and lower your risk of serious problems. The most common source of sodium is salt. People get most of the salt in their diet from canned, prepared, and packaged foods. Fast food and restaurant meals also are very high in sodium. Your doctor will probably limit your sodium to less than 2,000 milligrams (mg) a day. This limit counts all the sodium in prepared and packaged foods and any salt you add to your food. Follow-up care is a key part of your treatment and safety. Be sure to make and go to all appointments, and call your doctor if you are having problems. It's also a good idea to know your test results and keep a list of the medicines you take. How can you care for yourself at home? Read food labels · Read labels on cans and food packages. The labels tell you how much sodium is in each serving. Make sure that you look at the serving size. If you eat more than the serving size, you have eaten more sodium. · Food labels also tell you the Percent Daily Value for sodium. Choose products with low Percent Daily Values for sodium. · Be aware that sodium can come in forms other than salt, including monosodium glutamate (MSG), sodium citrate, and sodium bicarbonate (baking soda). MSG is often added to Asian food. When you eat out, you can sometimes ask for food without MSG or added salt. Buy low-sodium foods · Buy foods that are labeled \"unsalted\" (no salt added), \"sodium-free\" (less than 5 mg of sodium per serving), or \"low-sodium\" (less than 140 mg of sodium per serving). Foods labeled \"reduced-sodium\" and \"light sodium\" may still have too much sodium. Be sure to read the label to see how much sodium you are getting. · Buy fresh vegetables, or frozen vegetables without added sauces. Buy low-sodium versions of canned vegetables, soups, and other canned goods. Prepare low-sodium meals · Cut back on the amount of salt you use in cooking. This will help you adjust to the taste. Do not add salt after cooking. One teaspoon of salt has about 2,300 mg of sodium. · Take the salt shaker off the table. · Flavor your food with garlic, lemon juice, onion, vinegar, herbs, and spices. Do not use soy sauce, lite soy sauce, steak sauce, onion salt, garlic salt, celery salt, mustard, or ketchup on your food. · Use low-sodium salad dressings, sauces, and ketchup. Or make your own salad dressings and sauces without adding salt. · Use less salt (or none) when recipes call for it. You can often use half the salt a recipe calls for without losing flavor. Other foods such as rice, pasta, and grains do not need added salt. · Rinse canned vegetables, and cook them in fresh water. This removes some-but not all-of the salt. · Avoid water that is naturally high in sodium or that has been treated with water softeners, which add sodium. Call your local water company to find out the sodium content of your water supply. If you buy bottled water, read the label and choose a sodium-free brand. Avoid high-sodium foods · Avoid eating: ¨ Smoked, cured, salted, and canned meat, fish, and poultry. ¨ Ham, romero, hot dogs, and luncheon meats. ¨ Regular, hard, and processed cheese and regular peanut butter. ¨ Crackers with salted tops, and other salted snack foods such as pretzels, chips, and salted popcorn. ¨ Frozen prepared meals, unless labeled low-sodium. ¨ Canned and dried soups, broths, and bouillon, unless labeled sodium-free or low-sodium. ¨ Canned vegetables, unless labeled sodium-free or low-sodium. ¨ Western Deja fries, pizza, tacos, and other fast foods. ¨ Pickles, olives, ketchup, and other condiments, especially soy sauce, unless labeled sodium-free or low-sodium. Where can you learn more? Go to http://isra-rhonda.info/. Enter N733 in the search box to learn more about \"Low Sodium Diet (2,000 Milligram): Care Instructions. \" Current as of: May 12, 2017 Content Version: 11.4 © 7621-4051 [employer]. Care instructions adapted under license by [employer] (which disclaims liability or warranty for this information). If you have questions about a medical condition or this instruction, always ask your healthcare professional. [place] [address] any warranty or liability for your use of this information. Patient Instructions History Introducing \[facility]\"" & HEALTH SERVICES! [employer] introduces be2 patient portal. Now you can access parts of your medical record, email your doctor's office, and request medication refills online.    
 
1. In your internet browser, go to [email or web address] 2. Click on the First Time User? Click Here link in the Sign In box. You will see the New Member Sign Up page. 3. Enter your Star.me Access Code exactly as it appears below. You will not need to use this code after youve completed the sign-up process. If you do not sign up before the expiration date, you must request a new code. · Star.me Access Code: [email or web address] Expires: 8/14/2018 11:12 AM 
 
4. Enter the last four digits of your Social Security Number (xxxx) and Date of Birth (mm/dd/yyyy) as indicated and click Submit. You will be taken to the next sign-up page. 5. Create a Star.me ID. This will be your Star.me login ID and cannot be changed, so think of one that is secure and easy to remember. 6. Create a Star.me password. You can change your password at any time. 7. Enter your Password Reset Question and Answer. This can be used at a later time if you forget your password. 8. Enter your e-mail address. You will receive e-mail notification when new information is available in [address] 9. Click Sign Up. You can now view and download portions of your medical record. 10. Click the Download Summary menu link to download a portable copy of your medical information. If you have questions, please visit the Frequently Asked Questions section of the [email or web address] website. Remember, [email or web address] is NOT to be used for urgent needs. For medical emergencies, dial 911. Now available from your iPhone and Android! Please provide this summary of care documentation to your next provider. Your primary care clinician is listed as Arpan. If you have any questions after today's visit, please call [phone].

## 2018-06-20 LAB
BUN BLD-MCNC: 40 MG/DL (ref 9–20)
CALCIUM BLD-MCNC: 9.6 MG/DL (ref 8.4–10.2)
CHLORIDE BLD-SCNC: 101 MMOL/L (ref 98–107)
CO2 POC: 29 MMOL/L (ref 22–32)
CREAT BLD-MCNC: 1.3 MG/DL (ref 0.8–1.5)
EGFR (POC): 49.8
ERYTHROCYTE [SEDIMENTATION RATE] IN BLOOD: 38 MM/HR (ref 0–10)
GLUCOSE POC: 227 MG/DL (ref 75–110)
POTASSIUM SERPL-SCNC: 5.4 MMOL/L (ref 3.6–5)
SODIUM SERPL-SCNC: 140 MMOL/L (ref 137–145)

## 2018-06-29 ENCOUNTER — OFFICE VISIT (OUTPATIENT)
Dept: INTERNAL MEDICINE CLINIC | Age: 83
End: 2018-06-29

## 2018-06-29 VITALS
HEART RATE: 71 BPM | HEIGHT: 75 IN | RESPIRATION RATE: 17 BRPM | SYSTOLIC BLOOD PRESSURE: 110 MMHG | BODY MASS INDEX: 26.31 KG/M2 | DIASTOLIC BLOOD PRESSURE: 70 MMHG | OXYGEN SATURATION: 97 % | WEIGHT: 211.6 LBS

## 2018-06-29 DIAGNOSIS — N18.30 STAGE 3 CHRONIC KIDNEY DISEASE (HCC): ICD-10-CM

## 2018-06-29 DIAGNOSIS — I12.9 HYPERTENSION WITH RENAL DISEASE: Primary | ICD-10-CM

## 2018-06-29 DIAGNOSIS — M35.3 POLYMYALGIA RHEUMATICA (HCC): ICD-10-CM

## 2018-06-29 LAB
BUN BLD-MCNC: 37 MG/DL (ref 9–20)
CALCIUM BLD-MCNC: 9.5 MG/DL (ref 8.4–10.2)
CHLORIDE BLD-SCNC: 102 MMOL/L (ref 98–107)
CO2 POC: 25 MMOL/L (ref 22–32)
CREAT BLD-MCNC: 1.3 MG/DL (ref 0.8–1.5)
EGFR (POC): 49.8
ERYTHROCYTE [SEDIMENTATION RATE] IN BLOOD: 38 MM/HR (ref 0–10)
GLUCOSE POC: 287 MG/DL (ref 75–110)
POTASSIUM SERPL-SCNC: 5.3 MMOL/L (ref 3.6–5)
SODIUM SERPL-SCNC: 139 MMOL/L (ref 137–145)

## 2018-06-29 NOTE — PATIENT INSTRUCTIONS

## 2018-06-29 NOTE — PROGRESS NOTES
Chief Complaint   Patient presents with    Hypertension     2  week follow up     Diabetes    Chronic Kidney Disease     1. Have you been to the ER, urgent care clinic since your last visit? Hospitalized since your last visit? No    2. Have you seen or consulted any other health care providers outside of the [facility] since your last visit? Include any pap smears or colon screening.  No

## 2018-06-29 NOTE — PROGRESS NOTES
Chief Complaint   Patient presents with    Hypertension     2  week follow up     Diabetes    Chronic Kidney Disease       SUBJECTIVE:    Marybel Alaniz is a 80 y.o. male who presents today in follow-up of his polymyalgia rheumatica, HTN, CKD and arthritis. He generally seems to be feeling better since we increased the prednisone back to 20 mg daily. He denies any chest pain, shortness breath, palpitations or cardiorespiratory complaints. He denies any GI/ claims. His strength seems to be better he has no other arthritic complaints at the present time. There are no other complaints on complete review of systems. Current Outpatient Prescriptions   Medication Sig Dispense Refill    predniSONE (DELTASONE) 5 mg tablet TAKE 1 TABLET DAILY AS DIRECTED 90 Tab 1    predniSONE (DELTASONE) 20 mg tablet Take 1 Tab by mouth daily. 30 Tab 2    lisinopril (PRINIVIL, ZESTRIL) 5 mg tablet TAKE 1 TABLET EVERY DAY 90 Tab 3    simvastatin (ZOCOR) 20 mg tablet TAKE 1 TABLET EVERY DAY 90 Tab 3    warfarin (COUMADIN) 5 mg tablet Take one and a half tablets every day. 145 Tab 3    fexofenadine (ALLEGRA) 180 mg tablet Take  by mouth.  acetaminophen (TYLENOL ARTHRITIS PAIN) 650 mg TbER Take 650 mg by mouth every eight (8) hours.  furosemide (LASIX) 80 mg tablet TAKE 1 TABLET BY MOUTH EVERY DAY 90 Tab 3    multivitamins-minerals-lutein (MULTIVITAMIN 50 PLUS) tab tablet Take 1 Tab by mouth daily.  glucosamine 1,000 mg tab Take 2,000 mg by mouth daily.  digoxin (LANOXIN) 0.125 mg tablet Take 1 Tab by mouth daily. 30 Tab prn    sildenafil, antihypertensive, (REVATIO) 20 mg tablet TAKE 1 TABLET, ORAL, AS DIRECTED FOR SEXUAL ACTIVITY 20 Tab 11    cholecalciferol (VITAMIN D3) 1,000 unit cap Take 1,000 Units by mouth daily.  DOCOSAHEXANOIC ACID/EPA (FISH OIL PO) Take 1,200 mg by mouth two (2) times a day.       diclofenac EC (VOLTAREN) 75 mg EC tablet TAKE 1 TABLET BY MOUTH TWICE A  Tab 3  terazosin (HYTRIN) 5 mg capsule Take 5 mg by mouth nightly. 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    Back pain 9/20/2017    BPH (benign prostatic hyperplasia) 9/20/2017    CHF (congestive heart failure) ([facility] [address] 9/20/2017    CKD (chronic kidney disease) 9/20/2017    DM (diabetes mellitus) ([facility] [address] 9/20/2017    Story: Diet Controlled    ED (erectile dysfunction) 9/20/2017    Edema 9/20/2017    Elevated CPK 9/20/2017    Comments: History of    Elevated LFTs 9/20/2017    Comments: History of    Elevated PSA 9/20/2017    Hypercholesteremia     Hyperlipidemia 9/20/2017    Hypertension     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    Nodule of right lung 9/20/2017    Story: Right    Polymyalgia ([facility] [address] 9/20/2017    Prostate enlargement      Past Surgical History:   Procedure Laterality Date    ABDOMEN SURGERY PROC UNLISTED      hernia repair    HX HEENT  06/12/2018    Dr. 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     No Known Allergies    REVIEW OF SYSTEMS:  General: negative for - chills or fever, or weight loss or gain  ENT: negative for - headaches, nasal congestion or tinnitus  Eyes: no blurred or visual changes  Neck: No stiffness or swollen nodes  Respiratory: negative for - cough, hemoptysis, shortness of breath or wheezing  Cardiovascular : negative for - chest pain, edema, palpitations or shortness of breath  Gastrointestinal: negative for - abdominal pain, blood in stools, heartburn or nausea/vomiting  Genito-Urinary: no dysuria, trouble voiding, or hematuria  Musculoskeletal: negative for - gait disturbance, joint pain, joint stiffness or joint swelling  Neurological: no TIA or stroke symptoms  Hematologic: no bruises, no bleeding  Lymphatic: no swollen glands  Integument: no lumps, mole changes, nail changes or rash  Endocrine:no malaise/lethargy poly uria or polydipsia or unexpected weight changes        Social History     Social History    Marital status:      Spouse name: N/A    Number of children: N/A    Years of education: N/A     Social History Main Topics    Smoking status: Never Smoker    Smokeless tobacco: Never Used    Alcohol use No    Drug use: No    Sexual activity: No     Other Topics Concern    None     Social History Narrative     History reviewed. No pertinent family history. OBJECTIVE:     Visit Vitals    /70 (BP 1 Location: Left arm, BP Patient Position: Sitting)    Pulse 71    Resp 17    Ht 6' 3\" (1.905 m)    Wt 211 lb 9.6 oz (96 kg)    SpO2 97%    BMI 26.45 kg/m2     CONSTITUTIONAL:   well nourished, appears age appropriate  EYES: sclera anicteric, PERRL, EOMI  ENMT:nares clear, moist mucous membranes, pharynx clear  NECK: supple. Thyroid normal, No JVD or bruits  RESPIRATORY: Chest: clear to ascultation and percussion, normal inspiratory effort  CARDIOVASCULAR: Heart: regular rate and rhythm no murmurs, rubs or gallops, PMI not displaced, No thrills  GASTROINTESTINAL: Abdomen: non distended, soft, non tender, bowel sounds normal  HEMATOLOGIC: no purpura, petechiae or bruising  LYMPHATIC: No lymph node enlargemant  MUSCULOSKELETAL: Extremities: no edema or active synovitis, pulse 1+   INTEGUMENT: No unusual rashes or suspicious skin lesions noted. Nails appear normal.  PERIPHERAL VASCULAR: normal pulses femoral, PT and DP  NEUROLOGIC: non-focal exam, A & O X 3  PSYCHIATRIC:, appropriate affect     ASSESSMENT:   1. Hypertension with renal disease    2. Polymyalgia rheumatica (HCC)    3. Stage 3 chronic kidney disease      Impression  1. Polymyalgia rheumatica we will see what the sed rate looks like today, prednisone dose is 20 daily  2. DJD that seems to be stable  3. Hypertension that is controlled  4.   CKD repeat status pending  Other problems seem to be currently stable today  I will recheck him in 2 weeks or sooner should they be a problem. PLAN:  .  Orders Placed This Encounter    AMB POC BASIC METABOLIC PANEL    AMB POC SEDIMENTATION RATE, ERYTHROCYTE; NON AUTO         ATTENTION:   This medical record was transcribed using an electronic medical records system. Although proofread, it may and can contain electronic and spelling errors. Other human spelling and other errors may be present. Corrections may be executed at a later time. Please feel free to contact us for any clarifications as needed. Follow-up Disposition:  Return in about 2 weeks (around 7/13/2018). No results found for any visits on 06/29/18. Amparo Gómez MD    The patient verbalized understanding of the problems and plans as explained.

## 2018-06-29 NOTE — MR AVS SNAPSHOT
[address] [facility] 
 
 
 [address] [address] [phone] Patient: Kristie Diehl MRN: [id number] FFU:[id number] Visit Information Date & Time Provider Department Dept. Phone Encounter #  
 6/29/2018  2:00 PM Celia Levine 102 [facility] [phone] [id number] Follow-up Instructions Return in about 2 weeks (around 7/13/2018). Follow-up and Disposition History Your Appointments 7/13/2018  1:50 PM  
FOLLOW UP 10 with MD DAVIE Root [facility] ([facility]) Appt Note: 2 week follow up [place] [address] [address] [address] [facility] [address] 8/16/2018  9:50 AM  
FO[facility] UP 10 [facility] Appt Note: [address] [address] [phone] Upcoming Health Maintenance Date Due  
 EYE EXAM RETINAL OR DILATED Q1 2/20/1943 DTaP/Tdap/Td series (1 - Tdap) 2/20/1954 ZOSTER VACCINE AGE 60> 12/20/1992 GLAUCOMA SCREENING Q2Y 2/20/1998 Influenza Age 5 to Adult 8/1/2018 MICROALBUMIN Q1 10/30/2018 MEDICARE YEARLY EXAM 10/31/2018 HEMOGLOBIN A1C Q6M 11/16/2018 FOOT EXAM Q1 2/14/2019 LIPID PANEL Q1 5/16/2019 COLONOSCOPY 3/30/2026 Allergies as of 6/29/2018  Review Complete On: 6/29/2018 By: Celia Levine MD  
 No Known Allergies Current Immunizations  Never Reviewed Name Date Influenza High Dose Vaccine PF 10/30/2017 Influenza Vaccine 10/7/2016 Pneumococcal Conjugate (PCV-13) 1/7/2005 Pneumococcal Vaccine (Unspecified Type) 1/7/2005 Rabies Vaccine 3/11/2009 Not reviewed this visit You Were Diagnosed With   
  
 Codes Comments Hypertension with renal disease    -  Primary ICD-10-CM: I12.9 ICD-9-CM: 403.90 Polymyalgia rheumatica (HCC)     ICD-10-CM: M35.3 ICD-9-CM: 684 Stage 3 chronic kidney disease     ICD-10-CM: N18.3 ICD-9-CM: 160. 3 Vitals BP Pulse Resp Height(growth percentile) Weight(growth percentile) SpO2  
 110/70 (BP 1 Location: Left arm, BP Patient Position: Sitting) 71 17 6' 3\" (1.905 m) 211 lb 9.6 oz (96 kg) 97% BMI Smoking Status 26.45 kg/m2 Never Smoker Vitals History BMI and BSA Data Body Mass Index Body Surface Area  
 26.45 kg/m 2 2.25 m 2 Preferred Pharmacy Pharmacy Name Phone [facility]/PHARMACY #[id number] [facility]/ [address] [facility] [phone] Your Updated Medication List  
  
   
This list is accurate as of 6/29/18  3:11 PM.  Always use your most recent med list.  
  
  
  
  
 diclofenac EC 75 mg EC tablet Commonly known as:  VOLTAREN  
TAKE 1 TABLET BY MOUTH TWICE A DAY  
  
 digoxin 0.125 mg tablet Commonly known as:  LANOXIN Take 1 Tab by mouth daily. fexofenadine 180 mg tablet Commonly known as:  Slaton Murray Take  by mouth. FISH OIL PO Take 1,200 mg by mouth two (2) times a day. furosemide 80 mg tablet Commonly known as:  LASIX TAKE 1 TABLET BY MOUTH EVERY DAY  
  
 glucosamine 1,000 mg Tab Take 2,000 mg by mouth daily. lisinopril 5 mg tablet Commonly known as:  PRINIVIL, ZESTRIL  
TAKE 1 TABLET EVERY DAY  
  
 MULTIVITAMIN 50 PLUS Tab tablet Generic drug:  multivitamins-minerals-lutein Take 1 Tab by mouth daily. * predniSONE 20 mg tablet Commonly known as:  Brionna Norlander Take 1 Tab by mouth daily. * predniSONE 5 mg tablet Commonly known as:  DELTASONE  
TAKE 1 TABLET DAILY AS DIRECTED  
  
 sildenafil (antihypertensive) 20 mg tablet Commonly known as:  REVATIO  
TAKE 1 TABLET, ORAL, AS DIRECTED FOR SEXUAL ACTIVITY  
  
 simvastatin 20 mg tablet Commonly known as:  ZOCOR  
TAKE 1 TABLET EVERY DAY  
  
 terazosin 5 mg capsule Commonly known as:  HYTRIN Take 5 mg by mouth nightly. TYLENOL ARTHRITIS PAIN 650 mg Robesonia Kobus Generic drug:  acetaminophen Take 650 mg by mouth every eight (8) hours. VITAMIN D3 1,000 unit Cap Generic drug:  cholecalciferol Take 1,000 Units by mouth daily. warfarin 5 mg tablet Commonly known as:  COUMADIN Take one and a half tablets every day. * Notice: This list has 2 medication(s) that are the same as other medications prescribed for you. Read the directions carefully, and ask your doctor or other care provider to review them with you. We Performed the Following AMB POC BASIC METABOLIC PANEL [99132 CPT(R)] AMB POC SEDIMENTATION RATE, ERYTHROCYTE; NON AUTO [94849 CPT(R)] Follow-up Instructions Return in about 2 weeks (around 7/13/2018). Patient Instructions Low Sodium Diet (2,000 Milligram): Care Instructions Your Care Instructions Too much sodium causes your body to hold on to extra water. This can raise your blood pressure and force your heart and kidneys to work harder. In very serious cases, this could cause you to be put in the hospital. It might even be life-threatening. By limiting sodium, you will feel better and lower your risk of serious problems. The most common source of sodium is salt. People get most of the salt in their diet from canned, prepared, and packaged foods. Fast food and restaurant meals also are very high in sodium. Your doctor will probably limit your sodium to less than 2,000 milligrams (mg) a day. This limit counts all the sodium in prepared and packaged foods and any salt you add to your food. Follow-up care is a key part of your treatment and safety. Be sure to make and go to all appointments, and call your doctor if you are having problems. It's also a good idea to know your test results and keep a list of the medicines you take. How can you care for yourself at home? Read food labels · Read labels on cans and food packages. The labels tell you how much sodium is in each serving. Make sure that you look at the serving size. If you eat more than the serving size, you have eaten more sodium. · Food labels also tell you the Percent Daily Value for sodium. Choose products with low Percent Daily Values for sodium. · Be aware that sodium can come in forms other than salt, including monosodium glutamate (MSG), sodium citrate, and sodium bicarbonate (baking soda). MSG is often added to Asian food. When you eat out, you can sometimes ask for food without MSG or added salt. Buy low-sodium foods · Buy foods that are labeled \"unsalted\" (no salt added), \"sodium-free\" (less than 5 mg of sodium per serving), or \"low-sodium\" (less than 140 mg of sodium per serving). Foods labeled \"reduced-sodium\" and \"light sodium\" may still have too much sodium. Be sure to read the label to see how much sodium you are getting. · Buy fresh vegetables, or frozen vegetables without added sauces. Buy low-sodium versions of canned vegetables, soups, and other canned goods. Prepare low-sodium meals · Cut back on the amount of salt you use in cooking. This will help you adjust to the taste. Do not add salt after cooking. One teaspoon of salt has about 2,300 mg of sodium. · Take the salt shaker off the table. · Flavor your food with garlic, lemon juice, onion, vinegar, herbs, and spices. Do not use soy sauce, lite soy sauce, steak sauce, onion salt, garlic salt, celery salt, mustard, or ketchup on your food. · Use low-sodium salad dressings, sauces, and ketchup. Or make your own salad dressings and sauces without adding salt. · Use less salt (or none) when recipes call for it. You can often use half the salt a recipe calls for without losing flavor. Other foods such as rice, pasta, and grains do not need added salt. · Rinse canned vegetables, and cook them in fresh water.  This removes some-but not all-of the salt. · Avoid water that is naturally high in sodium or that has been treated with water softeners, which add sodium. Call your local water company to find out the sodium content of your water supply. If you buy bottled water, read the label and choose a sodium-free brand. Avoid high-sodium foods · Avoid eating: ¨ Smoked, cured, salted, and canned meat, fish, and poultry. ¨ Ham, romero, hot dogs, and luncheon meats. ¨ Regular, hard, and processed cheese and regular peanut butter. ¨ Crackers with salted tops, and other salted snack foods such as pretzels, chips, and salted popcorn. ¨ Frozen prepared meals, unless labeled low-sodium. ¨ Canned and dried soups, broths, and bouillon, unless labeled sodium-free or low-sodium. ¨ Canned vegetables, unless labeled sodium-free or low-sodium. ¨ Western Deja fries, pizza, tacos, and other fast foods. ¨ Pickles, olives, ketchup, and other condiments, especially soy sauce, unless labeled sodium-free or low-sodium. Where can you learn more? Go to http://isra-rhonda.info/. Enter M291 in the search box to learn more about \"Low Sodium Diet (2,000 Milligram): Care Instructions. \" Current as of: May 12, 2017 Content Version: 11.4 © 2872-7967 [employer] (ProCiv). Care instructions adapted under license by [employer] (FIG) (which disclaims liability or warranty for this information). If you have questions about a medical condition or this instruction, always ask your healthcare professional. [place] [address] any warranty or liability for your use of this information. Patient Instructions History Introducing [facility]! [facility] introduces 8hands patient portal. Now you can access parts of your medical record, email your doctor's office, and request medication refills online. 1. In your internet browser, go to [email or web address] 2. Click on the First Time User? Click Here link in the Sign In box. You will see the New Member Sign Up page. 3. Enter your Unique Microguides Access Code exactly as it appears below. You will not need to use this code after youve completed the sign-up process. If you do not sign up before the expiration date, you must request a new code. · Unique Microguides Access Code: [email or web address] Expires: 8/14/2018 11:12 AM 
 
4. Enter the last four digits of your Social Security Number (xxxx) and Date of Birth (mm/dd/yyyy) as indicated and click Submit. You will be taken to the next sign-up page. 5. Create a Unique Microguides ID. This will be your Unique Microguides login ID and cannot be changed, so think of one that is secure and easy to remember. 6. Create a Unique Microguides password. You can change your password at any time. 7. Enter your Password Reset Question and Answer. This can be used at a later time if you forget your password. 8. Enter your e-mail address. You will receive e-mail notification when new information is available in [address] 9. Click Sign Up. You can now view and download portions of your medical record. 10. Click the Download Summary menu link to download a portable copy of your medical information. If you have questions, please visit the Frequently Asked Questions section of the Unique Microguides website. Remember, Unique Microguides is NOT to be used for urgent needs. For medical emergencies, dial 911. Now available from your iPhone and Android! Please provide this summary of care documentation to your next provider. Your primary care clinician is listed as Arpan. If you have any questions after today's visit, please call [phone].

## 2018-07-02 ENCOUNTER — TELEPHONE (OUTPATIENT)
Dept: INTERNAL MEDICINE CLINIC | Age: 83
End: 2018-07-02

## 2018-07-02 NOTE — TELEPHONE ENCOUNTER
Patient states he was supposed to get a rx for his URI symptoms when he was here on Friday. Discussed with Navarro More and he gave verbal order for Tran, take as directed. Called to [facility] pharmacy.

## 2018-07-09 ENCOUNTER — TELEPHONE (OUTPATIENT)
Dept: INTERNAL MEDICINE CLINIC | Age: 83
End: 2018-07-09

## 2018-07-09 NOTE — TELEPHONE ENCOUNTER
Patient informed to continue the Prednisone at 20 mg daily. Patient wants to discuss why he continues to be on such a high dose of Prednisone. States he will discuss at f/up.

## 2018-07-09 NOTE — TELEPHONE ENCOUNTER
----- Message from Amy Veloz MD sent at 7/6/2018  6:12 PM EDT -----  Lab is stable so continue current treatment.

## 2018-07-13 ENCOUNTER — OFFICE VISIT (OUTPATIENT)
Dept: INTERNAL MEDICINE CLINIC | Age: 83
End: 2018-07-13

## 2018-07-13 VITALS
HEIGHT: 75 IN | SYSTOLIC BLOOD PRESSURE: 140 MMHG | RESPIRATION RATE: 15 BRPM | DIASTOLIC BLOOD PRESSURE: 70 MMHG | OXYGEN SATURATION: 98 % | HEART RATE: 60 BPM | WEIGHT: 208.2 LBS | BODY MASS INDEX: 25.89 KG/M2

## 2018-07-13 DIAGNOSIS — N18.30 STAGE 3 CHRONIC KIDNEY DISEASE (HCC): ICD-10-CM

## 2018-07-13 DIAGNOSIS — I12.9 HYPERTENSION WITH RENAL DISEASE: ICD-10-CM

## 2018-07-13 DIAGNOSIS — M35.3 POLYMYALGIA RHEUMATICA (HCC): Primary | ICD-10-CM

## 2018-07-13 LAB — ERYTHROCYTE [SEDIMENTATION RATE] IN BLOOD: 35 MM/HR (ref 0–10)

## 2018-07-13 NOTE — PATIENT INSTRUCTIONS

## 2018-07-13 NOTE — MR AVS SNAPSHOT
[address] [facility] 
 
 
 [address] [address] [phone] Patient: Nathaniel Noble MRN: [id number] SNY:[birth year] Visit Information Date & Time Provider Department Dept. Phone Encounter #  
 7/13/2018  1:50 PM Dayton Patterson MD [facility] [id number] Follow-up Instructions Return in about 2 weeks (around 7/27/2018). Your Appointments 7/27/2018  1:50 PM  
FOLLOW UP 10 with MD [facility] ([facility]) Appt Note: 2 week follow up [address] [address] [address] [address] 8/16/2018  9:50 AM  
FOLLOW UP 10 with MD PREMA [facility] ([facility]) Appt Note: [address] [address] [phone] Upcoming Health Maintenance Date Due  
 EYE EXAM RETINAL OR DILATED Q1 2/20[birth year]1943 DTaP/Tdap/Td series (1 - Tdap) 2/20/1954 ZOSTER VACCINE AGE 60> 12/20/1992 GLAUCOMA SCREENING Q2Y 2/20/1998 Influenza Age 5 to Adult 8/1/2018 MICROALBUMIN Q1 10/30/2018 MEDICARE YEARLY EXAM 10/31/2018 HEMOGLOBIN A1C Q6M 11/16/2018 FOOT EXAM Q1 2/14/2019 LIPID PANEL Q1 5/16/2019 COLONOSCOPY 3/30/2026 Allergies as of 7/13/2018  Review Complete On: 7/13/2018 By: Dayton Patterson MD  
 No Known Allergies Current Immunizations  Never Reviewed Name Date Influenza High Dose Vaccine PF 10/30/2017 Influenza Vaccine 10/7/2016 Pneumococcal Conjugate (PCV-13) 1/7/2005 Pneumococcal Vaccine (Unspecified Type) 1/7/2005 Rabies Vaccine 3/11/2009 Not reviewed this visit You Were Diagnosed With   
  
 Codes Comments Polymyalgia rheumatica (HCC)    -  Primary ICD-10-CM: M35.3 ICD-9-CM: 801 Hypertension with renal disease     ICD-10-CM: I12.9 ICD-9-CM: 403.90 Stage 3 chronic kidney disease     ICD-10-CM: N18.3 ICD-9-CM: 935. 3 Vitals BP Pulse Resp Height(growth percentile) Weight(growth percentile) SpO2  
 140/70 (BP 1 Location: Left arm, BP Patient Position: Sitting) 60 15 6' 3\" (1.905 m) 208 lb 3.2 oz (94.4 kg) 98% BMI Smoking Status 26.02 kg/m2 Never Smoker Vitals History BMI and BSA Data Body Mass Index Body Surface Area 26.02 kg/m 2 2.24 m 2 Preferred Pharmacy Pharmacy Name Phone [facility]/PHARMACY #[id number] [address]/ [address]. [facility] [phone] Your Updated Medication List  
  
   
This list is accurate as of 7/13/18  3:02 PM.  Always use your most recent med list.  
  
  
  
  
 diclofenac EC 75 mg EC tablet Commonly known as:  VOLTAREN  
TAKE 1 TABLET BY MOUTH TWICE A DAY  
  
 digoxin 0.125 mg tablet Commonly known as:  LANOXIN Take 1 Tab by mouth daily. fexofenadine 180 mg tablet Commonly known as:  Vanessa Record Take  by mouth. FISH OIL PO Take 1,200 mg by mouth two (2) times a day. furosemide 80 mg tablet Commonly known as:  LASIX TAKE 1 TABLET BY MOUTH EVERY DAY  
  
 glucosamine 1,000 mg Tab Take 2,000 mg by mouth daily. lisinopril 5 mg tablet Commonly known as:  PRINIVIL, ZESTRIL  
TAKE 1 TABLET EVERY DAY  
  
 MULTIVITAMIN 50 PLUS Tab tablet Generic drug:  multivitamins-minerals-lutein Take 1 Tab by mouth daily. predniSONE 20 mg tablet Commonly known as:  Harvinder Sprawls Take 1 Tab by mouth daily. sildenafil (antihypertensive) 20 mg tablet Commonly known as:  REVATIO  
TAKE 1 TABLET, ORAL, AS DIRECTED FOR SEXUAL ACTIVITY  
  
 simvastatin 20 mg tablet Commonly known as:  ZOCOR  
TAKE 1 TABLET EVERY DAY  
  
 terazosin 5 mg capsule Commonly known as:  HYTRIN Take 5 mg by mouth nightly. TYLENOL ARTHRITIS PAIN 650 mg Chery Jones Generic drug:  acetaminophen Take 650 mg by mouth every eight (8) hours. VITAMIN D3 1,000 unit Cap Generic drug:  cholecalciferol Take 1,000 Units by mouth daily. warfarin 5 mg tablet Commonly known as:  COUMADIN Take one and a half tablets every day. We Performed the Following METABOLIC PANEL, BASIC [42858 CPT(R)] SED RATE (ESR) [id number] CPT(R)] Follow-up Instructions Return in about 2 weeks (around 7/27/2018). Patient Instructions Low Sodium Diet (2,000 Milligram): Care Instructions Your Care Instructions Too much sodium causes your body to hold on to extra water. This can raise your blood pressure and force your heart and kidneys to work harder. In very serious cases, this could cause you to be put in the hospital. It might even be life-threatening. By limiting sodium, you will feel better and lower your risk of serious problems. The most common source of sodium is salt. People get most of the salt in their diet from canned, prepared, and packaged foods. Fast food and restaurant meals also are very high in sodium. Your doctor will probably limit your sodium to less than 2,000 milligrams (mg) a day. This limit counts all the sodium in prepared and packaged foods and any salt you add to your food. Follow-up care is a key part of your treatment and safety. Be sure to make and go to all appointments, and call your doctor if you are having problems. It's also a good idea to know your test results and keep a list of the medicines you take. How can you care for yourself at home? Read food labels · Read labels on cans and food packages. The labels tell you how much sodium is in each serving. Make sure that you look at the serving size. If you eat more than the serving size, you have eaten more sodium. · Food labels also tell you the Percent Daily Value for sodium.  Choose products with low Percent Daily Values for sodium. · Be aware that sodium can come in forms other than salt, including monosodium glutamate (MSG), sodium citrate, and sodium bicarbonate (baking soda). MSG is often added to Asian food. When you eat out, you can sometimes ask for food without MSG or added salt. Buy low-sodium foods · Buy foods that are labeled \"unsalted\" (no salt added), \"sodium-free\" (less than 5 mg of sodium per serving), or \"low-sodium\" (less than 140 mg of sodium per serving). Foods labeled \"reduced-sodium\" and \"light sodium\" may still have too much sodium. Be sure to read the label to see how much sodium you are getting. · Buy fresh vegetables, or frozen vegetables without added sauces. Buy low-sodium versions of canned vegetables, soups, and other canned goods. Prepare low-sodium meals · Cut back on the amount of salt you use in cooking. This will help you adjust to the taste. Do not add salt after cooking. One teaspoon of salt has about 2,300 mg of sodium. · Take the salt shaker off the table. · Flavor your food with garlic, lemon juice, onion, vinegar, herbs, and spices. Do not use soy sauce, lite soy sauce, steak sauce, onion salt, garlic salt, celery salt, mustard, or ketchup on your food. · Use low-sodium salad dressings, sauces, and ketchup. Or make your own salad dressings and sauces without adding salt. · Use less salt (or none) when recipes call for it. You can often use half the salt a recipe calls for without losing flavor. Other foods such as rice, pasta, and grains do not need added salt. · Rinse canned vegetables, and cook them in fresh water. This removes some-but not all-of the salt. · Avoid water that is naturally high in sodium or that has been treated with water softeners, which add sodium. Call your local water company to find out the sodium content of your water supply. If you buy bottled water, read the label and choose a sodium-free brand. Avoid high-sodium foods · Avoid eating: ¨ Smoked, cured, salted, and canned meat, fish, and poultry. ¨ Ham, romero, hot dogs, and luncheon meats. ¨ Regular, hard, and processed cheese and regular peanut butter. ¨ Crackers with salted tops, and other salted snack foods such as pretzels, chips, and salted popcorn. ¨ Frozen prepared meals, unless labeled low-sodium. ¨ Canned and dried soups, broths, and bouillon, unless labeled sodium-free or low-sodium. ¨ Canned vegetables, unless labeled sodium-free or low-sodium. ¨ Western Deja fries, pizza, tacos, and other fast foods. ¨ Pickles, olives, ketchup, and other condiments, especially soy sauce, unless labeled sodium-free or low-sodium. Where can you learn more? Go to http://isra-rhonda.info/. Enter I447 in the search box to learn more about \"Low Sodium Diet (2,000 Milligram): Care Instructions. \" Current as of: May 12, 2017 Content Version: 11.7 © 6041-9078 [employer]. Care instructions adapted under license by [employer]ight (which disclaims liability or warranty for this information). If you have questions about a medical condition or this instruction, always ask your healthcare professional. [address] any warranty or liability for your use of this information. Introducing [facility]! [employer] introduces Mertado patient portal. Now you can access parts of your medical record, email your doctor's office, and request medication refills online. 1. In your internet browser, go to [email or web address] 2. Click on the First Time User? Click Here link in the Sign In box. You will see the New Member Sign Up page. 3. Enter your Mertado Access Code exactly as it appears below. You will not need to use this code after youve completed the sign-up process. If you do not sign up before the expiration date, you must request a new code. · clipkit Access Code: [email or web address] Expires: 8/14/2018 11:12 AM 
 
4. Enter the last four digits of your Social Security Number (xxxx) and Date of Birth (mm/dd/yyyy) as indicated and click Submit. You will be taken to the next sign-up page. 5. Create a clipkit ID. This will be your clipkit login ID and cannot be changed, so think of one that is secure and easy to remember. 6. Create a clipkit password. You can change your password at any time. 7. Enter your Password Reset Question and Answer. This can be used at a later time if you forget your password. 8. Enter your e-mail address. You will receive e-mail notification when new information is available in [address] 9. Click Sign Up. You can now view and download portions of your medical record. 10. Click the Download Summary menu link to download a portable copy of your medical information. If you have questions, please visit the Frequently Asked Questions section of the clipkit website. Remember, clipkit is NOT to be used for urgent needs. For medical emergencies, dial 911. Now available from your iPhone and Android! Please provide this summary of care documentation to your next provider. Your primary care clinician is listed as Arpan. If you have any questions after today's visit, please call [phone].

## 2018-07-13 NOTE — PROGRESS NOTES
Chief Complaint   Patient presents with    Hypertension     2 week follow up     Diabetes    Chronic Kidney Disease       SUBJECTIVE:    Chante Glover is a 80 y.o. male who presents today in follow-up of his polymyalgia rheumatica, HTN, CKD and arthritis. He generally seems to be feeling better since we increased the prednisone back to 20 mg daily. He denies any chest pain, shortness breath, palpitations or cardiorespiratory complaints. He denies any GI/ claims. His strength seems to be better he has no other arthritic complaints at the present time. There are no other complaints on complete review of systems. Current Outpatient Prescriptions   Medication Sig Dispense Refill    predniSONE (DELTASONE) 20 mg tablet Take 1 Tab by mouth daily. 30 Tab 2    lisinopril (PRINIVIL, ZESTRIL) 5 mg tablet TAKE 1 TABLET EVERY DAY 90 Tab 3    simvastatin (ZOCOR) 20 mg tablet TAKE 1 TABLET EVERY DAY 90 Tab 3    warfarin (COUMADIN) 5 mg tablet Take one and a half tablets every day. 145 Tab 3    fexofenadine (ALLEGRA) 180 mg tablet Take  by mouth.  acetaminophen (TYLENOL ARTHRITIS PAIN) 650 mg TbER Take 650 mg by mouth every eight (8) hours.  furosemide (LASIX) 80 mg tablet TAKE 1 TABLET BY MOUTH EVERY DAY 90 Tab 3    multivitamins-minerals-lutein (MULTIVITAMIN 50 PLUS) tab tablet Take 1 Tab by mouth daily.  glucosamine 1,000 mg tab Take 2,000 mg by mouth daily.  digoxin (LANOXIN) 0.125 mg tablet Take 1 Tab by mouth daily. 30 Tab prn    sildenafil, antihypertensive, (REVATIO) 20 mg tablet TAKE 1 TABLET, ORAL, AS DIRECTED FOR SEXUAL ACTIVITY 20 Tab 11    cholecalciferol (VITAMIN D3) 1,000 unit cap Take 1,000 Units by mouth daily.  DOCOSAHEXANOIC ACID/EPA (FISH OIL PO) Take 1,200 mg by mouth two (2) times a day.  diclofenac EC (VOLTAREN) 75 mg EC tablet TAKE 1 TABLET BY MOUTH TWICE A  Tab 3    terazosin (HYTRIN) 5 mg capsule Take 5 mg by mouth nightly.          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    Back pain 9/20/2017    BPH (benign prostatic hyperplasia) 9/20/2017    CHF (congestive heart failure) ([facility] [address] 9/20/2017    CKD (chronic kidney disease) 9/20/2017    DM (diabetes mellitus) ([facility] 9/20/2017    Story: Diet Controlled    ED (erectile dysfunction) 9/20/2017    Edema 9/20/2017    Elevated CPK 9/20/2017    Comments: History of    Elevated LFTs 9/20/2017    Comments: History of    Elevated PSA 9/20/2017    Hypercholesteremia     Hyperlipidemia 9/20/2017    Hypertension     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    Nodule of right lung 9/20/2017    Story: Right    Polymyalgia ([facility] 9/20/2017    Prostate enlargement      Past Surgical History:   Procedure Laterality Date    ABDOMEN SURGERY PROC UNLISTED      hernia repair    HX HEENT  06/12/2018    Dr. 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     No Known Allergies    REVIEW OF SYSTEMS:  General: negative for - chills or fever, or weight loss or gain  ENT: negative for - headaches, nasal congestion or tinnitus  Eyes: no blurred or visual changes  Neck: No stiffness or swollen nodes  Respiratory: negative for - cough, hemoptysis, shortness of breath or wheezing  Cardiovascular : negative for - chest pain, edema, palpitations or shortness of breath  Gastrointestinal: negative for - abdominal pain, blood in stools, heartburn or nausea/vomiting  Genito-Urinary: no dysuria, trouble voiding, or hematuria  Musculoskeletal: negative for - gait disturbance, joint pain, joint stiffness or joint swelling  Neurological: no TIA or stroke symptoms  Hematologic: no bruises, no bleeding  Lymphatic: no swollen glands  Integument: no lumps, mole changes, nail changes or rash  Endocrine:no malaise/lethargy poly uria or polydipsia or unexpected weight changes        Social History     Social History    Marital status:      Spouse name: N/A    Number of children: N/A    Years of education: N/A     Social History Main Topics    Smoking status: Never Smoker    Smokeless tobacco: Never Used    Alcohol use No    Drug use: No    Sexual activity: No     Other Topics Concern    None     Social History Narrative     History reviewed. No pertinent family history. OBJECTIVE:     Visit Vitals    /70 (BP 1 Location: Left arm, BP Patient Position: Sitting)    Pulse 60    Resp 15    Ht 6' 3\" (1.905 m)    Wt 208 lb 3.2 oz (94.4 kg)    SpO2 98%    BMI 26.02 kg/m2     CONSTITUTIONAL:   well nourished, appears age appropriate  EYES: sclera anicteric, PERRL, EOMI  ENMT:nares clear, moist mucous membranes, pharynx clear  NECK: supple. Thyroid normal, No JVD or bruits  RESPIRATORY: Chest: clear to ascultation and percussion, normal inspiratory effort  CARDIOVASCULAR: Heart: regular rate and rhythm no murmurs, rubs or gallops, PMI not displaced, No thrills  GASTROINTESTINAL: Abdomen: non distended, soft, non tender, bowel sounds normal  HEMATOLOGIC: no purpura, petechiae or bruising  LYMPHATIC: No lymph node enlargemant  MUSCULOSKELETAL: Extremities: no edema or active synovitis, pulse 1+   INTEGUMENT: No unusual rashes or suspicious skin lesions noted. Nails appear normal.  PERIPHERAL VASCULAR: normal pulses femoral, PT and DP  NEUROLOGIC: non-focal exam, A & O X 3  PSYCHIATRIC:, appropriate affect     ASSESSMENT:   1. Polymyalgia rheumatica ([address]    2. Hypertension with renal disease    3. Stage 3 chronic kidney disease      Impression  1. Polymyalgia rheumatica we will see what the sed rate looks like today, prednisone dose is 20 daily  2. DJD that seems to be stable  3. Hypertension that is controlled  4. CKD repeat status pending  Other problems seem to be currently stable today  I will recheck him in 2 weeks or sooner should there be a problem.     PLAN:  .  Orders Placed This Encounter    SED RATE (ESR)    METABOLIC PANEL, BASIC         ATTENTION:   This medical record was transcribed using an electronic medical records system. Although proofread, it may and can contain electronic and spelling errors. Other human spelling and other errors may be present. Corrections may be executed at a later time. Please feel free to contact us for any clarifications as needed. Follow-up Disposition:  Return in about 2 weeks (around 7/27/2018). No results found for any visits on 07/13/18. Rodrigo Norwood MD    The patient verbalized understanding of the problems and plans as explained.

## 2018-07-13 NOTE — PROGRESS NOTES
Chief Complaint   Patient presents with    Hypertension     2 week follow up     Diabetes    Chronic Kidney Disease     1. Have you been to the ER, urgent care clinic since your last visit? Hospitalized since your last visit? No    2. Have you seen or consulted any other health care providers outside of the [address] [place] [address] since your last visit? Include any pap smears or colon screening.  No

## 2018-07-14 LAB
BUN SERPL-MCNC: 28 MG/DL (ref 8–27)
BUN/CREAT SERPL: 24 (ref 10–24)
CALCIUM SERPL-MCNC: 9.6 MG/DL (ref 8.6–10.2)
CHLORIDE SERPL-SCNC: 104 MMOL/L (ref 96–106)
CO2 SERPL-SCNC: 22 MMOL/L (ref 20–29)
CREAT SERPL-MCNC: 1.18 MG/DL (ref 0.76–1.27)
GLUCOSE SERPL-MCNC: 170 MG/DL (ref 65–99)
POTASSIUM SERPL-SCNC: 5.1 MMOL/L (ref 3.5–5.2)
SODIUM SERPL-SCNC: 141 MMOL/L (ref 134–144)

## 2018-07-16 ENCOUNTER — TELEPHONE (OUTPATIENT)
Dept: INTERNAL MEDICINE CLINIC | Age: 83
End: 2018-07-16

## 2018-07-16 NOTE — TELEPHONE ENCOUNTER
----- Message from Brandon Garcia MD sent at 7/13/2018  6:02 PM EDT -----  Sed rate is only a little bit lower so I would still not decrease his prednisone at this point.

## 2018-07-27 ENCOUNTER — OFFICE VISIT (OUTPATIENT)
Dept: INTERNAL MEDICINE CLINIC | Age: 83
End: 2018-07-27

## 2018-07-27 VITALS
WEIGHT: 212 LBS | RESPIRATION RATE: 16 BRPM | BODY MASS INDEX: 26.36 KG/M2 | OXYGEN SATURATION: 97 % | SYSTOLIC BLOOD PRESSURE: 122 MMHG | HEART RATE: 83 BPM | HEIGHT: 75 IN | DIASTOLIC BLOOD PRESSURE: 65 MMHG

## 2018-07-27 DIAGNOSIS — M35.3 POLYMYALGIA RHEUMATICA (HCC): ICD-10-CM

## 2018-07-27 DIAGNOSIS — I12.9 HYPERTENSION WITH RENAL DISEASE: Primary | ICD-10-CM

## 2018-07-27 DIAGNOSIS — N18.30 STAGE 3 CHRONIC KIDNEY DISEASE (HCC): ICD-10-CM

## 2018-07-27 LAB — ERYTHROCYTE [SEDIMENTATION RATE] IN BLOOD: 36 MM/HR (ref 0–10)

## 2018-07-27 NOTE — PROGRESS NOTES
Reviewed record in preparation for visit and have obtained necessary documentation. Identified pt with two pt identifiers(name and ). Chief Complaint Patient presents with  Hypertension 2 week f/u Vitals:  
 18 1437 BP: 122/65 Pulse: 83 Resp: 16 SpO2: 97% Weight: 212 lb (96.2 kg) Height: 6' 3\" (1.905 m) PainSc:   0 - No pain Coordination of Care Questionnaire: 
:  
 
1) Have you been to an emergency room, urgent care clinic since your last visit? no  
Hospitalized since your last visit? no          
 
2) Have you seen or consulted any other health care providers outside of [address] [place] [address] since your last visit? no  (Include any pap smears or colon screenings in this section.) Health Maintenance Due Topic Date Due  
 EYE EXAM RETINAL OR DILATED Q1  1943  
 DTaP/Tdap/Td series (1 - Tdap) 1954  ZOSTER VACCINE AGE 60>  1992  GLAUCOMA SCREENING Q2Y  1998 Eye exam overdue Dtap, feels its up to date, done here Zoster Vaccine, does not want Kirit Strickland, RN

## 2018-07-27 NOTE — PROGRESS NOTES
Chief Complaint Patient presents with  Hypertension 2 week f/u SUBJECTIVE: 
 
Andrés Garcia is a 80 y.o. male who presents today in follow-up of his polymyalgia rheumatica, HTN, CKD and arthritis. He generally seems to be feeling better since we increased the prednisone back to 20 mg QD. He denies any chest pain, shortness breath, palpitations or cardiorespiratory complaints. He denies any GI/ claims. His strength seems to be better he has no other arthritic complaints at the present time. There are no other complaints on complete review of systems. Current Outpatient Prescriptions Medication Sig Dispense Refill  predniSONE (DELTASONE) 20 mg tablet Take 1 Tab by mouth daily. 30 Tab 2  
 lisinopril (PRINIVIL, ZESTRIL) 5 mg tablet TAKE 1 TABLET EVERY DAY 90 Tab 3  
 simvastatin (ZOCOR) 20 mg tablet TAKE 1 TABLET EVERY DAY 90 Tab 3  warfarin (COUMADIN) 5 mg tablet Take one and a half tablets every day. 145 Tab 3  
 fexofenadine (ALLEGRA) 180 mg tablet Take  by mouth.  acetaminophen (TYLENOL ARTHRITIS PAIN) 650 mg TbER Take 650 mg by mouth every eight (8) hours.  furosemide (LASIX) 80 mg tablet TAKE 1 TABLET BY MOUTH EVERY DAY 90 Tab 3  
 multivitamins-minerals-lutein (MULTIVITAMIN 50 PLUS) tab tablet Take 1 Tab by mouth daily.  glucosamine 1,000 mg tab Take 2,000 mg by mouth daily.  digoxin (LANOXIN) 0.125 mg tablet Take 1 Tab by mouth daily. 30 Tab prn  sildenafil, antihypertensive, (REVATIO) 20 mg tablet TAKE 1 TABLET, ORAL, AS DIRECTED FOR SEXUAL ACTIVITY 20 Tab 11  
 cholecalciferol (VITAMIN D3) 1,000 unit cap Take 1,000 Units by mouth daily.  DOCOSAHEXANOIC ACID/EPA (FISH OIL PO) Take 1,200 mg by mouth two (2) times a day.  diclofenac EC (VOLTAREN) 75 mg EC tablet TAKE 1 TABLET BY MOUTH TWICE A  Tab 3  
 terazosin (HYTRIN) 5 mg capsule Take 5 mg by mouth nightly. Past Medical History:  
Diagnosis Date  Allergic rhinitis 9/20/2017  Arthritis  ASCVD (arteriosclerotic cardiovascular disease) 9/20/2017 Story: Old ASMI by EKG  Back pain 9/20/2017  BPH (benign prostatic hyperplasia) 9/20/2017  CHF (congestive heart failure) ([facility] [address] 9/20/2017  CKD (chronic kidney disease) 9/20/2017  DM (diabetes mellitus) ([facility] 9/20/2017 Story: Diet Controlled  ED (erectile dysfunction) 9/20/2017  Edema 9/20/2017  Elevated CPK 9/20/2017 Comments: History of  Elevated LFTs 9/20/2017 Comments: History of  Elevated PSA 9/20/2017  Hypercholesteremia  Hyperlipidemia 9/20/2017  Hypertension  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  Nodule of right lung 9/20/2017 Story: Right  Polymyalgia ([facility] 9/20/2017  Prostate enlargement Past Surgical History:  
Procedure Laterality Date  ABDOMEN SURGERY PROC UNLISTED    
 hernia repair  HX HEENT  06/12/2018 Dr. 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 
General: negative for - chills or fever, or weight loss or gain ENT: negative for - headaches, nasal congestion or tinnitus Eyes: no blurred or visual changes Neck: No stiffness or swollen nodes Respiratory: negative for - cough, hemoptysis, shortness of breath or wheezing Cardiovascular : negative for - chest pain, edema, palpitations or shortness of breath Gastrointestinal: negative for - abdominal pain, blood in stools, heartburn or nausea/vomiting Genito-Urinary: no dysuria, trouble voiding, or hematuria Musculoskeletal: negative for - gait disturbance, joint pain, joint stiffness or joint swelling Neurological: no TIA or stroke symptoms Hematologic: no bruises, no bleeding Lymphatic: no swollen glands Integument: no lumps, mole changes, nail changes or rash Endocrine:no malaise/lethargy poly uria or polydipsia or unexpected weight changes Social History Social History  Marital status:  Spouse name: N/A  
 Number of children: N/A  
 Years of education: N/A Social History Main Topics  Smoking status: Never Smoker  Smokeless tobacco: Never Used  Alcohol use No  
 Drug use: No  
 Sexual activity: No  
 
Other Topics Concern  None Social History Narrative History reviewed. No pertinent family history. OBJECTIVE:  
 
Visit Vitals  /65 (BP 1 Location: Left arm, BP Patient Position: Sitting)  Pulse 83  Resp 16  
 Ht 6' 3\" (1.905 m)  Wt 212 lb (96.2 kg)  SpO2 97%  BMI 26.5 kg/m2 CONSTITUTIONAL:   well nourished, appears age appropriate EYES: sclera anicteric, PERRL, EOMI 
ENMT:nares clear, moist mucous membranes, pharynx clear NECK: supple. Thyroid normal, No JVD or bruits RESPIRATORY: Chest: clear to ascultation and percussion, normal inspiratory effort CARDIOVASCULAR: Heart: regular rate and rhythm no murmurs, rubs or gallops, PMI not displaced, No thrills GASTROINTESTINAL: Abdomen: non distended, soft, non tender, bowel sounds normal 
HEMATOLOGIC: no purpura, petechiae or bruising LYMPHATIC: No lymph node enlargemant MUSCULOSKELETAL: Extremities: no edema or active synovitis, pulse 1+ INTEGUMENT: No unusual rashes or suspicious skin lesions noted. Nails appear normal. 
PERIPHERAL VASCULAR: normal pulses femoral, PT and DP NEUROLOGIC: non-focal exam, A & O X 3 PSYCHIATRIC:, appropriate affect ASSESSMENT:  
1. Hypertension with renal disease 2. Stage 3 chronic kidney disease 3. Polymyalgia rheumatica ([facility] Impression 1. Polymyalgia rheumatica we will see what the sed rate looks like today, prednisone dose is 20 daily 2. DJD that seems to be stable 3. Hypertension that is controlled 4. CKD repeat status pending Other problems seem to be currently stable today I will recheck him in 2 weeks or sooner should there be a problem. PLAN: 
. Orders Placed This Encounter  SED RATE (ESR)  METABOLIC PANEL, BASIC  
 
 
 
ATTENTION:  
This medical record was transcribed using an electronic medical records system. Although proofread, it may and can contain electronic and spelling errors. Other human spelling and other errors may be present. Corrections may be executed at a later time. Please feel free to contact us for any clarifications as needed. Follow-up Disposition: 
Return in about 2 weeks (around 8/10/2018). No results found for any visits on 07/27/18. Amy Veloz MD 
 
The patient verbalized understanding of the problems and plans as explained.

## 2018-07-27 NOTE — MR AVS SNAPSHOT
[facility] 
 
 
 [address] [id number] [phone] Patient: Chante Glover MRN: [id number] PHQ:6/10/2027 Visit Information Date & Time Provider Department Dept. Phone Encounter #  
 7/27/2018  1:50 PM Linda Jarrett MD [facility] [id number] Follow-up Instructions Return in about 2 weeks (around 8/10/2018). Follow-up and Disposition History Your Appointments 8/10/2018  1:50 PM  
FO[facility] UP 10 [facility] ([address]) Appt Note: 2 week follow up [address] [phone] . [facility] [id number] 8/16/2018  9:50 AM  
FOLLOW UP 10 with MD PREMA Celestin [facility] ([address]) Appt Note: [address] [address] [phone] Upcoming Health Maintenance Date Due  
 EYE EXAM RETINAL OR DILATED Q1 2/20/1943 DTaP/Tdap/Td series (1 - Tdap) 2/20/1954 ZOSTER VACCINE AGE 60> 12/20/1992 GLAUCOMA SCREENING Q2Y 2/20/1998 Influenza Age 5 to Adult 8/1/2018 MICROALBUMIN Q1 10/30/2018 MEDICARE YEARLY EXAM 10/31/2018 HEMOGLOBIN A1C Q6M 11/16/2018 FOOT EXAM Q1 2/14/2019 LIPID PANEL Q1 5/16/2019 COLONOSCOPY 3/30/2026 Allergies as of 7/27/2018  Review Complete On: 7/27/2018 By: Linda Jarrett MD  
 No Known Allergies Current Immunizations  Never Reviewed Name Date Influenza High Dose Vaccine PF 10/30/2017 Influenza Vaccine 10/7/2016 Pneumococcal Conjugate (PCV-13) 1/7/2005 Pneumococcal Vaccine (Unspecified Type) 1/7/2005 Rabies Vaccine 3/11/2009 Not reviewed this visit You Were Diagnosed With   
  
 Codes Comments Hypertension with renal disease    -  Primary ICD-10-CM: I12.9 ICD-9-CM: 403.90 Stage 3 chronic kidney disease     ICD-10-CM: N18.3 ICD-9-CM: 570. 3 Polymyalgia rheumatica (HCC)     ICD-10-CM: M35.3 ICD-9-CM: 308 Vitals BP Pulse Resp Height(growth percentile) Weight(growth percentile) SpO2  
 122/65 (BP 1 Location: Left arm, BP Patient Position: Sitting) 83 16 6' 3\" (1.905 m) 212 lb (96.2 kg) 97% BMI Smoking Status 26.5 kg/m2 Never Smoker BMI and BSA Data Body Mass Index Body Surface Area  
 26.5 kg/m 2 2.26 m 2 Preferred Pharmacy Pharmacy Name Phone [id number] [address] [address]/ [address] [facility] [phone] Your Updated Medication List  
  
   
This list is accurate as of 7/27/18  3:04 PM.  Always use your most recent med list.  
  
  
  
  
 diclofenac EC 75 mg EC tablet Commonly known as:  VOLTAREN  
TAKE 1 TABLET BY MOUTH TWICE A DAY  
  
 digoxin 0.125 mg tablet Commonly known as:  LANOXIN Take 1 Tab by mouth daily. fexofenadine 180 mg tablet Commonly known as:  [place] Take  by mouth. FISH OIL PO Take 1,200 mg by mouth two (2) times a day. furosemide 80 mg tablet Commonly known as:  LASIX TAKE 1 TABLET BY MOUTH EVERY DAY  
  
 glucosamine 1,000 mg Tab Take 2,000 mg by mouth daily. lisinopril 5 mg tablet Commonly known as:  PRINIVIL, ZESTRIL  
TAKE 1 TABLET EVERY DAY  
  
 MULTIVITAMIN 50 PLUS Tab tablet Generic drug:  multivitamins-minerals-lutein Take 1 Tab by mouth daily. predniSONE 20 mg tablet Commonly known as:  H[place]ace David Take 1 Tab by mouth daily. sildenafil (antihypertensive) 20 mg tablet Commonly known as:  REVATIO  
TAKE 1 TABLET, ORAL, AS DIRECTED FOR SEXUAL ACTIVITY  
  
 simvastatin 20 mg tablet Commonly known as:  ZOCOR  
TAKE 1 TABLET EVERY DAY  
  
 terazosin 5 mg capsule Commonly known as:  HYTRIN Take 5 mg by mouth nightly. TYLENOL ARTHRITIS PAIN 650 mg [place] Generic drug:  acetaminophen Take 650 mg by mouth every eight (8) hours. VITAMIN D3 1,000 unit Cap Generic drug:  cholecalciferol Take 1,000 Units by mouth daily. warfarin 5 mg tablet Commonly known as:  COUMADIN Take one and a half tablets every day. We Performed the Following METABOLIC PANEL, BASIC [65983 CPT(R)] SED RATE (ESR) [id number] CPT(R)] Follow-up Instructions Return in about 2 weeks (around 8/10/2018). Patient Instructions Low Sodium Diet (2,000 Milligram): Care Instructions Your Care Instructions Too much sodium causes your body to hold on to extra water. This can raise your blood pressure and force your heart and kidneys to work harder. In very serious cases, this could cause you to be put in the hospital. It might even be life-threatening. By limiting sodium, you will feel better and lower your risk of serious problems. The most common source of sodium is salt. People get most of the salt in their diet from canned, prepared, and packaged foods. Fast food and restaurant meals also are very high in sodium. Your doctor will probably limit your sodium to less than 2,000 milligrams (mg) a day. This limit counts all the sodium in prepared and packaged foods and any salt you add to your food. Follow-up care is a key part of your treatment and safety. Be sure to make and go to all appointments, and call your doctor if you are having problems. It's also a good idea to know your test results and keep a list of the medicines you take. How can you care for yourself at home? Read food labels · Read labels on cans and food packages. The labels tell you how much sodium is in each serving. Make sure that you look at the serving size. If you eat more than the serving size, you have eaten more sodium. · Food labels also tell you the Percent Daily Value for sodium. Choose products with low Percent Daily Values for sodium. · Be aware that sodium can come in forms other than salt, including monosodium glutamate (MSG), sodium citrate, and sodium bicarbonate (baking soda). MSG is often added to Asian food. When you eat out, you can sometimes ask for food without MSG or added salt. Buy low-sodium foods · Buy foods that are labeled \"unsalted\" (no salt added), \"sodium-free\" (less than 5 mg of sodium per serving), or \"low-sodium\" (less than 140 mg of sodium per serving). Foods labeled \"reduced-sodium\" and \"light sodium\" may still have too much sodium. Be sure to read the label to see how much sodium you are getting. · Buy fresh vegetables, or frozen vegetables without added sauces. Buy low-sodium versions of canned vegetables, soups, and other canned goods. Prepare low-sodium meals · Cut back on the amount of salt you use in cooking. This will help you adjust to the taste. Do not add salt after cooking. One teaspoon of salt has about 2,300 mg of sodium. · Take the salt shaker off the table. · Flavor your food with garlic, lemon juice, onion, vinegar, herbs, and spices. Do not use soy sauce, lite soy sauce, steak sauce, onion salt, garlic salt, celery salt, mustard, or ketchup on your food. · Use low-sodium salad dressings, sauces, and ketchup. Or make your own salad dressings and sauces without adding salt. · Use less salt (or none) when recipes call for it. You can often use half the salt a recipe calls for without losing flavor. Other foods such as rice, pasta, and grains do not need added salt. · Rinse canned vegetables, and cook them in fresh water. This removes some-but not all-of the salt. · Avoid water that is naturally high in sodium or that has been treated with water softeners, which add sodium. Call your local water company to find out the sodium content of your water supply. If you buy bottled water, read the label and choose a sodium-free brand. Avoid high-sodium foods · Avoid eating: ¨ Smoked, cured, salted, and canned meat, fish, and poultry. ¨ Ham, romero, hot dogs, and luncheon meats. ¨ Regular, hard, and processed cheese and regular peanut butter. ¨ Crackers with salted tops, and other salted snack foods such as pretzels, chips, and salted popcorn. ¨ Frozen prepared meals, unless labeled low-sodium. ¨ Canned and dried soups, broths, and bouillon, unless labeled sodium-free or low-sodium. ¨ Canned vegetables, unless labeled sodium-free or low-sodium. ¨ [place] fries, pizza, tacos, and other fast foods. ¨ Pickles, olives, ketchup, and other condiments, especially soy sauce, unless labeled sodium-free or low-sodium. Where can you learn more? Go to http://isra-rhonda.info/. Enter Z385 in the search box to learn more about \"Low Sodium Diet (2,000 Milligram): Care Instructions. \" Current as of: May 12, 2017 Content Version: 11.7 © 2495-9479 [employer]. Care instructions adapted under license by [employer] (which disclaims liability or warranty for this information). If you have questions about a medical condition or this instruction, always ask your healthcare professional. [place] [address] any warranty or liability for your use of this information. Patient Instructions History Introducing [facility]! Edy Bermeo introduces [employer] patient portal. Now you can access parts of your medical record, email your doctor's office, and request medication refills online. 1. In your internet browser, go to [email or web address] 2. Click on the First Time User? Click Here link in the Sign In box. You will see the New Member Sign Up page. 3. Enter your [employer] Access Code exactly as it appears below. You will not need to use this code after youve completed the sign-up process. If you do not sign up before the expiration date, you must request a new code. · [facility] Access Code: [email or web address] Expires: 8/14/2018 11:12 AM 
 
4. Enter the last four digits of your Social Security Number (xxxx) and Date of Birth (mm/dd/yyyy) as indicated and click Submit. You will be taken to the next sign-up page. 5. Create a [facility] ID. This will be your [facility] login ID and cannot be changed, so think of one that is secure and easy to remember. 6. Create a [facility] password. You can change your password at any time. 7. Enter your Password Reset Question and Answer. This can be used at a later time if you forget your password. 8. Enter your e-mail address. You will receive e-mail notification when new information is available in [address] 9. Click Sign Up. You can now view and download portions of your medical record. 10. Click the Download Summary menu link to download a portable copy of your medical information. If you have questions, please visit the Frequently Asked Questions section of the [employer] website. Remember, [facility] is NOT to be used for urgent needs. For medical emergencies, dial 911. Now available from your iPhone and Android! Please provide this summary of care documentation to your next provider. Your primary care clinician is listed as Arpan. If you have any questions after today's visit, please call [phone].

## 2018-07-27 NOTE — PATIENT INSTRUCTIONS

## 2018-07-28 LAB
BUN SERPL-MCNC: 32 MG/DL (ref 8–27)
BUN/CREAT SERPL: 24 (ref 10–24)
CALCIUM SERPL-MCNC: 9.2 MG/DL (ref 8.6–10.2)
CHLORIDE SERPL-SCNC: 103 MMOL/L (ref 96–106)
CO2 SERPL-SCNC: 25 MMOL/L (ref 20–29)
CREAT SERPL-MCNC: 1.34 MG/DL (ref 0.76–1.27)
GLUCOSE SERPL-MCNC: 232 MG/DL (ref 65–99)
POTASSIUM SERPL-SCNC: 4.8 MMOL/L (ref 3.5–5.2)
SODIUM SERPL-SCNC: 142 MMOL/L (ref 134–144)

## 2018-07-30 ENCOUNTER — TELEPHONE (OUTPATIENT)
Dept: INTERNAL MEDICINE CLINIC | Age: 83
End: 2018-07-30

## 2018-07-30 NOTE — TELEPHONE ENCOUNTER
----- Message from Jostin Oden MD sent at 7/30/2018 12:19 PM EDT -----  Sed rate remains elevated but stable so no change in treatment.

## 2018-07-30 NOTE — TELEPHONE ENCOUNTER
Advised patient that kidney function without change; Sed rate stable. Need to continue Prednisone 20 mg daily.

## 2018-08-10 ENCOUNTER — OFFICE VISIT (OUTPATIENT)
Dept: INTERNAL MEDICINE CLINIC | Age: 83
End: 2018-08-10

## 2018-08-10 VITALS
OXYGEN SATURATION: 97 % | WEIGHT: 210.4 LBS | RESPIRATION RATE: 16 BRPM | BODY MASS INDEX: 26.16 KG/M2 | DIASTOLIC BLOOD PRESSURE: 60 MMHG | HEIGHT: 75 IN | HEART RATE: 85 BPM | SYSTOLIC BLOOD PRESSURE: 100 MMHG

## 2018-08-10 DIAGNOSIS — N18.30 STAGE 3 CHRONIC KIDNEY DISEASE (HCC): ICD-10-CM

## 2018-08-10 DIAGNOSIS — I12.9 HYPERTENSION WITH RENAL DISEASE: Primary | ICD-10-CM

## 2018-08-10 DIAGNOSIS — M35.3 POLYMYALGIA RHEUMATICA (HCC): ICD-10-CM

## 2018-08-10 LAB — ERYTHROCYTE [SEDIMENTATION RATE] IN BLOOD: 34 MM/HR (ref 0–10)

## 2018-08-10 NOTE — PROGRESS NOTES
Chief Complaint   Patient presents with    Hypertension     2 week follow up     Diabetes    Chronic Kidney Disease       SUBJECTIVE:    Lilibeth Palacios is a 80 y.o. male who presents today in follow-up of his polymyalgia rheumatica, HTN, CKD and arthritis. He generally seems to be feeling better since we increased the prednisone back to 20 mg QD. He denies any chest pain, shortness breath, palpitations or cardiorespiratory complaints. He denies any GI/ claims. His strength seems to be better he has no other arthritic complaints at the present time. There are no other complaints on complete review of systems. Current Outpatient Prescriptions   Medication Sig Dispense Refill    predniSONE (DELTASONE) 20 mg tablet Take 1 Tab by mouth daily. 30 Tab 2    lisinopril (PRINIVIL, ZESTRIL) 5 mg tablet TAKE 1 TABLET EVERY DAY 90 Tab 3    simvastatin (ZOCOR) 20 mg tablet TAKE 1 TABLET EVERY DAY 90 Tab 3    warfarin (COUMADIN) 5 mg tablet Take one and a half tablets every day. 145 Tab 3    fexofenadine (ALLEGRA) 180 mg tablet Take  by mouth.  acetaminophen (TYLENOL ARTHRITIS PAIN) 650 mg TbER Take 650 mg by mouth every eight (8) hours.  furosemide (LASIX) 80 mg tablet TAKE 1 TABLET BY MOUTH EVERY DAY 90 Tab 3    multivitamins-minerals-lutein (MULTIVITAMIN 50 PLUS) tab tablet Take 1 Tab by mouth daily.  glucosamine 1,000 mg tab Take 2,000 mg by mouth daily.  digoxin (LANOXIN) 0.125 mg tablet Take 1 Tab by mouth daily. 30 Tab prn    sildenafil, antihypertensive, (REVATIO) 20 mg tablet TAKE 1 TABLET, ORAL, AS DIRECTED FOR SEXUAL ACTIVITY 20 Tab 11    cholecalciferol (VITAMIN D3) 1,000 unit cap Take 1,000 Units by mouth daily.  DOCOSAHEXANOIC ACID/EPA (FISH OIL PO) Take 1,200 mg by mouth two (2) times a day.  diclofenac EC (VOLTAREN) 75 mg EC tablet TAKE 1 TABLET BY MOUTH TWICE A  Tab 3    terazosin (HYTRIN) 5 mg capsule Take 5 mg by mouth nightly.          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    Back pain 9/20/2017    BPH (benign prostatic hyperplasia) 9/20/2017    CHF (congestive heart failure) ([facility] [address] 9/20/2017    CKD (chronic kidney disease) 9/20/2017    DM (diabetes mellitus) ([facility] [address] 9/20/2017    Story: Diet Controlled    ED (erectile dysfunction) 9/20/2017    Edema 9/20/2017    Elevated CPK 9/20/2017    Comments: History of    Elevated LFTs 9/20/2017    Comments: History of    Elevated PSA 9/20/2017    Hypercholesteremia     Hyperlipidemia 9/20/2017    Hypertension     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    Nodule of right lung 9/20/2017    Story: Right    [facility] 9/20/2017    Prostate enlargement      Past Surgical History:   Procedure Laterality Date    ABDOMEN SURGERY PROC UNLISTED      hernia repair    HX HEENT  06/12/2018    Dr. 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     No Known Allergies    REVIEW OF SYSTEMS:  General: negative for - chills or fever, or weight loss or gain  ENT: negative for - headaches, nasal congestion or tinnitus  Eyes: no blurred or visual changes  Neck: No stiffness or swollen nodes  Respiratory: negative for - cough, hemoptysis, shortness of breath or wheezing  Cardiovascular : negative for - chest pain, edema, palpitations or shortness of breath  Gastrointestinal: negative for - abdominal pain, blood in stools, heartburn or nausea/vomiting  Genito-Urinary: no dysuria, trouble voiding, or hematuria  Musculoskeletal: negative for - gait disturbance, joint pain, joint stiffness or joint swelling  Neurological: no TIA or stroke symptoms  Hematologic: no bruises, no bleeding  Lymphatic: no swollen glands  Integument: no lumps, mole changes, nail changes or rash  Endocrine:no malaise/lethargy poly uria or polydipsia or unexpected weight changes        Social History Social History    Marital status:      Spouse name: N/A    Number of children: N/A    Years of education: N/A     Social History Main Topics    Smoking status: Never Smoker    Smokeless tobacco: Never Used    Alcohol use No    Drug use: No    Sexual activity: No     Other Topics Concern    None     Social History Narrative     History reviewed. No pertinent family history. OBJECTIVE:     Visit Vitals    /60 (BP 1 Location: Left arm, BP Patient Position: Sitting)    Pulse 85    Resp 16    Ht 6' 3\" (1.905 m)    Wt 210 lb 6.4 oz (95.4 kg)    SpO2 97%    BMI 26.3 kg/m2     CONSTITUTIONAL:   well nourished, appears age appropriate  EYES: sclera anicteric, PERRL, EOMI  ENMT:nares clear, moist mucous membranes, pharynx clear  NECK: supple. Thyroid normal, No JVD or bruits  RESPIRATORY: Chest: clear to ascultation and percussion, normal inspiratory effort  CARDIOVASCULAR: Heart: regular rate and rhythm no murmurs, rubs or gallops, PMI not displaced, No thrills  GASTROINTESTINAL: Abdomen: non distended, soft, non tender, bowel sounds normal  HEMATOLOGIC: no purpura, petechiae or bruising  LYMPHATIC: No lymph node enlargemant  MUSCULOSKELETAL: Extremities: no edema or active synovitis, pulse 1+   INTEGUMENT: No unusual rashes or suspicious skin lesions noted. Nails appear normal.  PERIPHERAL VASCULAR: normal pulses femoral, PT and DP  NEUROLOGIC: non-focal exam, A & O X 3  PSYCHIATRIC:, appropriate affect     ASSESSMENT:   1. Hypertension with renal disease    2. Stage 3 chronic kidney disease    3. Polymyalgia rheumatica (HCC)      Impression  1. Polymyalgia rheumatica we will see what the sed rate looks like today, prednisone dose is 20 daily  2. DJD that seems to be stable  3. Hypertension that is controlled  4. CKD repeat status pending  Other problems seem to be currently stable today  I will recheck him in 2 weeks or sooner should there be a problem.     PLAN:  .  Orders Placed This Encounter    METABOLIC PANEL, BASIC    SED RATE (ESR)         ATTENTION:   This medical record was transcribed using an electronic medical records system. Although proofread, it may and can contain electronic and spelling errors. Other human spelling and other errors may be present. Corrections may be executed at a later time. Please feel free to contact us for any clarifications as needed. Follow-up Disposition:  Return in about 2 weeks (around 8/24/2018). No results found for any visits on 08/10/18. Kosta Troncoso MD    The patient verbalized understanding of the problems and plans as explained.

## 2018-08-10 NOTE — MR AVS SNAPSHOT
[address] [facility] 
 
 
 [address] [id number] [phone] Patient: Misbah Bradford MRN: [id number] WDK:7/86/2001 Visit Information Date & Time Provider Department Dept. Phone Encounter #  
 8/10/2018  1:50 PM Ricky Espinoza MD [facility] [phone] [id number] Follow-up Instructions Return in about 2 weeks (around 8/24/2018). Follow-up and Disposition History Your Appointments 8/16/2018  9:50 AM  
FOLLOW UP 10 with MD PREMA [facility] ([address]) Appt Note: [address] [address] [address] [facility] [address]  
  
    
 8/29/2018  3:30 PM  
FOLLOW UP 10 with MD PREMA [facility] ([address]) Appt Note: 2 week follow up; 2 week follow up [address] [id number] [phone] Upcoming Health Maintenance Date Due  
 EYE EXAM RETINAL OR DILATED Q1 2/20/1943 DTaP/Tdap/Td series (1 - Tdap) 2/20/1954 ZOSTER VACCINE AGE 60> 12/20/1992 GLAUCOMA SCREENING Q2Y 2/20/1998 Influenza Age 5 to Adult 8/1/2018 MICROALBUMIN Q1 10/30/2018 MEDICARE YEARLY EXAM 10/31/2018 HEMOGLOBIN A1C Q6M 11/16/2018 FOOT EXAM Q1 2/14/2019 LIPID PANEL Q1 5/16/2019 COLONOSCOPY 3/30/2026 Allergies as of 8/10/2018  Review Complete On: 8/10/2018 By: Ricky Espinoza MD  
 No Known Allergies Current Immunizations  Never Reviewed Name Date Influenza High Dose Vaccine PF 10/30/2017 Influenza Vaccine 10/7/2016 Pneumococcal Conjugate (PCV-13) 1/7/2005 Pneumococcal Vaccine (Unspecified Type) 1/7/2005 Rabies Vaccine 3/11/2009 Not reviewed this visit You Were Diagnosed With   
  
 Codes Comments Hypertension with renal disease    -  Primary ICD-10-CM: I12.9 ICD-9-CM: 403.90 Stage 3 chronic kidney disease     ICD-10-CM: N18.3 ICD-9-CM: 418. 3 Polymyalgia rheumatica (HCC)     ICD-10-CM: M35.3 ICD-9-CM: 620 Vitals BP Pulse Resp Height(growth percentile) Weight(growth percentile) SpO2  
 100/60 (BP 1 Location: Left arm, BP Patient Position: Sitting) 85 16 6' 3\" (1.905 m) 210 lb 6.4 oz (95.4 kg) 97% BMI Smoking Status 26.3 kg/m2 Never Smoker Vitals History BMI and BSA Data Body Mass Index Body Surface Area  
 26.3 kg/m 2 2.25 m 2 Preferred Pharmacy Pharmacy Name Phone [facility]/PHARMACY #[id number] J[facility] [place]/ [facility] [phone] Your Updated Medication List  
  
   
This list is accurate as of 8/10/18  3:03 PM.  Always use your most recent med list.  
  
  
  
  
 diclofenac EC 75 mg EC tablet Commonly known as:  VOLTAREN  
TAKE 1 TABLET BY MOUTH TWICE A DAY  
  
 digoxin 0.125 mg tablet Commonly known as:  LANOXIN Take 1 Tab by mouth daily. fexofenadine 180 mg tablet Commonly known as:  Wandalee Simms Take  by mouth. FISH OIL PO Take 1,200 mg by mouth two (2) times a day. furosemide 80 mg tablet Commonly known as:  LASIX TAKE 1 TABLET BY MOUTH EVERY DAY  
  
 glucosamine 1,000 mg Tab Take 2,000 mg by mouth daily. lisinopril 5 mg tablet Commonly known as:  PRINIVIL, ZESTRIL  
TAKE 1 TABLET EVERY DAY  
  
 MULTIVITAMIN 50 PLUS Tab tablet Generic drug:  multivitamins-minerals-lutein Take 1 Tab by mouth daily. predniSONE 20 mg tablet Commonly known as:  Orlean Aas Take 1 Tab by mouth daily. sildenafil (antihypertensive) 20 mg tablet Commonly known as:  REVATIO  
TAKE 1 TABLET, ORAL, AS DIRECTED FOR SEXUAL ACTIVITY  
  
 simvastatin 20 mg tablet Commonly known as:  ZOCOR  
TAKE 1 TABLET EVERY DAY  
  
 terazosin 5 mg capsule Commonly known as:  HYTRIN Take 5 mg by mouth nightly. TYLENOL ARTHRITIS PAIN 650 mg Chery Jones Generic drug:  acetaminophen Take 650 mg by mouth every eight (8) hours. VITAMIN D3 1,000 unit Cap Generic drug:  cholecalciferol Take 1,000 Units by mouth daily. warfarin 5 mg tablet Commonly known as:  COUMADIN Take one and a half tablets every day. We Performed the Following METABOLIC PANEL, BASIC [21596 CPT(R)] SED RATE (ESR) [id number] CPT(R)] Follow-up Instructions Return in about 2 weeks (around 8/24/2018). Patient Instructions Low Sodium Diet (2,000 Milligram): Care Instructions Your Care Instructions Too much sodium causes your body to hold on to extra water. This can raise your blood pressure and force your heart and kidneys to work harder. In very serious cases, this could cause you to be put in the hospital. It might even be life-threatening. By limiting sodium, you will feel better and lower your risk of serious problems. The most common source of sodium is salt. People get most of the salt in their diet from canned, prepared, and packaged foods. Fast food and restaurant meals also are very high in sodium. Your doctor will probably limit your sodium to less than 2,000 milligrams (mg) a day. This limit counts all the sodium in prepared and packaged foods and any salt you add to your food. Follow-up care is a key part of your treatment and safety. Be sure to make and go to all appointments, and call your doctor if you are having problems. It's also a good idea to know your test results and keep a list of the medicines you take. How can you care for yourself at home? Read food labels · Read labels on cans and food packages. The labels tell you how much sodium is in each serving. Make sure that you look at the serving size. If you eat more than the serving size, you have eaten more sodium. · Food labels also tell you the Percent Daily Value for sodium. Choose products with low Percent Daily Values for sodium. · Be aware that sodium can come in forms other than salt, including monosodium glutamate (MSG), sodium citrate, and sodium bicarbonate (baking soda). MSG is often added to Asian food. When you eat out, you can sometimes ask for food without MSG or added salt. Buy low-sodium foods · Buy foods that are labeled \"unsalted\" (no salt added), \"sodium-free\" (less than 5 mg of sodium per serving), or \"low-sodium\" (less than 140 mg of sodium per serving). Foods labeled \"reduced-sodium\" and \"light sodium\" may still have too much sodium. Be sure to read the label to see how much sodium you are getting. · Buy fresh vegetables, or frozen vegetables without added sauces. Buy low-sodium versions of canned vegetables, soups, and other canned goods. Prepare low-sodium meals · Cut back on the amount of salt you use in cooking. This will help you adjust to the taste. Do not add salt after cooking. One teaspoon of salt has about 2,300 mg of sodium. · Take the salt shaker off the table. · Flavor your food with garlic, lemon juice, onion, vinegar, herbs, and spices. Do not use soy sauce, lite soy sauce, steak sauce, onion salt, garlic salt, celery salt, mustard, or ketchup on your food. · Use low-sodium salad dressings, sauces, and ketchup. Or make your own salad dressings and sauces without adding salt. · Use less salt (or none) when recipes call for it. You can often use half the salt a recipe calls for without losing flavor. Other foods such as rice, pasta, and grains do not need added salt. · Rinse canned vegetables, and cook them in fresh water. This removes some-but not all-of the salt. · Avoid water that is naturally high in sodium or that has been treated with water softeners, which add sodium. Call your local water company to find out the sodium content of your water supply.  If you buy bottled water, read the label and choose a sodium-free brand. Avoid high-sodium foods · Avoid eating: ¨ Smoked, cured, salted, and canned meat, fish, and poultry. ¨ Ham, romero, hot dogs, and luncheon meats. ¨ Regular, hard, and processed cheese and regular peanut butter. ¨ Crackers with salted tops, and other salted snack foods such as pretzels, chips, and salted popcorn. ¨ Frozen prepared meals, unless labeled low-sodium. ¨ Canned and dried soups, broths, and bouillon, unless labeled sodium-free or low-sodium. ¨ Canned vegetables, unless labeled sodium-free or low-sodium. ¨ Western Deja fries, pizza, tacos, and other fast foods. ¨ Pickles, olives, ketchup, and other condiments, especially soy sauce, unless labeled sodium-free or low-sodium. Where can you learn more? Go to http://isra-rhonda.info/. Enter Q439 in the search box to learn more about \"Low Sodium Diet (2,000 Milligram): Care Instructions. \" Current as of: May 12, 2017 Content Version: 11.7 © 9663-9573 EeBria. Care instructions adapted under license by [employer] (which disclaims liability or warranty for this information). If you have questions about a medical condition or this instruction, always ask your healthcare professional. [place] [address] any warranty or liability for your use of this information. Patient Instructions History Introducing [facility] & HEALTH SERVICES! [facility] introduces FixMeStick patient portal. Now you can access parts of your medical record, email your doctor's office, and request medication refills online. 1. In your internet browser, go to [email or web address] 2. Click on the First Time User? Click Here link in the Sign In box. You will see the New Member Sign Up page. 3. Enter your FixMeStick Access Code exactly as it appears below. You will not need to use this code after youve completed the sign-up process.  If you do not sign up before the expiration date, you must request a new code. · [employer] Access Code: [email or web address] Expires: 8/14/2018 11:12 AM 
 
4. Enter the last four digits of your Social Security Number (xxxx) and Date of Birth (mm/dd/yyyy) as indicated and click Submit. You will be taken to the next sign-up page. 5. Create a [employer] ID. This will be your [employer] login ID and cannot be changed, so think of one that is secure and easy to remember. 6. Create a [employer] password. You can change your password at any time. 7. Enter your Password Reset Question and Answer. This can be used at a later time if you forget your password. 8. Enter your e-mail address. You will receive e-mail notification when new information is available in [address] 9. Click Sign Up. You can now view and download portions of your medical record. 10. Click the Download Summary menu link to download a portable copy of your medical information. If you have questions, please visit the Frequently Asked Questions section of the [employer] website. Remember, [employer] is NOT to be used for urgent needs. For medical emergencies, dial 911. Now available from your iPhone and Android! Please provide this summary of care documentation to your next provider. Your primary care clinician is listed as Arpan. If you have any questions after today's visit, please call [phone].

## 2018-08-10 NOTE — PATIENT INSTRUCTIONS

## 2018-08-10 NOTE — PROGRESS NOTES
Chief Complaint   Patient presents with    Hypertension     2 week follow up     Diabetes    Chronic Kidney Disease     1. Have you been to the ER, urgent care clinic since your last visit? Hospitalized since your last visit? No    2. Have you seen or consulted any other health care providers outside of the [address] [place] [address] since your last visit? Include any pap smears or colon screening.  No

## 2018-08-11 LAB
BUN SERPL-MCNC: 36 MG/DL (ref 8–27)
BUN/CREAT SERPL: 27 (ref 10–24)
CALCIUM SERPL-MCNC: 9.6 MG/DL (ref 8.6–10.2)
CHLORIDE SERPL-SCNC: 100 MMOL/L (ref 96–106)
CO2 SERPL-SCNC: 25 MMOL/L (ref 20–29)
CREAT SERPL-MCNC: 1.33 MG/DL (ref 0.76–1.27)
GLUCOSE SERPL-MCNC: 292 MG/DL (ref 65–99)
POTASSIUM SERPL-SCNC: 5.1 MMOL/L (ref 3.5–5.2)
SODIUM SERPL-SCNC: 138 MMOL/L (ref 134–144)

## 2018-08-16 ENCOUNTER — OFFICE VISIT (OUTPATIENT)
Dept: INTERNAL MEDICINE CLINIC | Age: 83
End: 2018-08-16

## 2018-08-16 VITALS
BODY MASS INDEX: 25.84 KG/M2 | HEIGHT: 75 IN | OXYGEN SATURATION: 98 % | RESPIRATION RATE: 16 BRPM | DIASTOLIC BLOOD PRESSURE: 60 MMHG | HEART RATE: 71 BPM | SYSTOLIC BLOOD PRESSURE: 102 MMHG | WEIGHT: 207.8 LBS

## 2018-08-16 DIAGNOSIS — I12.9 HYPERTENSION WITH RENAL DISEASE: Primary | ICD-10-CM

## 2018-08-16 DIAGNOSIS — I48.0 PAROXYSMAL ATRIAL FIBRILLATION (HCC): ICD-10-CM

## 2018-08-16 DIAGNOSIS — E78.2 MIXED HYPERLIPIDEMIA: ICD-10-CM

## 2018-08-16 DIAGNOSIS — I50.32 CHRONIC DIASTOLIC CONGESTIVE HEART FAILURE (HCC): ICD-10-CM

## 2018-08-16 DIAGNOSIS — E11.22 CONTROLLED TYPE 2 DIABETES MELLITUS WITH STAGE 3 CHRONIC KIDNEY DISEASE, WITHOUT LONG-TERM CURRENT USE OF INSULIN (HCC): ICD-10-CM

## 2018-08-16 DIAGNOSIS — N40.0 BENIGN PROSTATIC HYPERPLASIA WITHOUT LOWER URINARY TRACT SYMPTOMS: ICD-10-CM

## 2018-08-16 DIAGNOSIS — J30.89 CHRONIC NON-SEASONAL ALLERGIC RHINITIS: ICD-10-CM

## 2018-08-16 DIAGNOSIS — I25.10 ASCVD (ARTERIOSCLEROTIC CARDIOVASCULAR DISEASE): ICD-10-CM

## 2018-08-16 DIAGNOSIS — M35.3 POLYMYALGIA RHEUMATICA (HCC): ICD-10-CM

## 2018-08-16 DIAGNOSIS — M15.9 PRIMARY OSTEOARTHRITIS INVOLVING MULTIPLE JOINTS: ICD-10-CM

## 2018-08-16 DIAGNOSIS — N18.30 CONTROLLED TYPE 2 DIABETES MELLITUS WITH STAGE 3 CHRONIC KIDNEY DISEASE, WITHOUT LONG-TERM CURRENT USE OF INSULIN (HCC): ICD-10-CM

## 2018-08-16 DIAGNOSIS — N18.30 STAGE 3 CHRONIC KIDNEY DISEASE (HCC): ICD-10-CM

## 2018-08-16 LAB
GRAN# POC: 5.5 K/UL (ref 2–7.8)
GRAN% POC: 72.3 % (ref 37–92)
HCT VFR BLD CALC: 36.7 % (ref 37–51)
HGB BLD-MCNC: 12.2 G/DL (ref 12–18)
INR BLD: 1.5
LY# POC: 1.8 K/UL (ref 0.6–4.1)
LY% POC: 24.8 % (ref 10–58.5)
MCH RBC QN: 31.1 PG (ref 26–32)
MCHC RBC-ENTMCNC: 33.3 G/DL (ref 30–36)
MCV RBC: 93 FL (ref 80–97)
MID #, POC: 0.2 K/UL (ref 0–1.8)
MID% POC: 2.9 % (ref 0.1–24)
PLATELET # BLD: 222 K/UL (ref 140–440)
PT POC: 20.6 SECONDS
RBC # BLD: 3.93 M/UL (ref 4.2–6.3)
VALID INTERNAL CONTROL?: YES
WBC # BLD: 7.5 K/UL (ref 4.1–10.9)

## 2018-08-16 NOTE — MR AVS SNAPSHOT
303 [facility] 
 
 
 [address] [address] [phone] Patient: Lilibeth Palacios MRN: [id number] BIM:[birth year] Visit Information Date & Time Provider Department Dept. Phone Encounter #  
 8/16/2018  9:50 AM Scottie Wyman MD [facility] [id number] Follow-up Instructions Return in about 3 months (around 11/16/2018). Your Appointments 8/29/2018  3:30 PM  
FOLLOW UP 10 with MD [facility] Appt Note: 2 week follow up; 2 week follow up [address] [address] [address] [facility] [address]  
  
    
 11/21/2018 10:00 AM  
FOLLOW UP 10 with MD [facility]IE [facility] Appt Note: 3 MO FLP   yearly [address] [address] [phone] Upcoming Health Maintenance Date Due  
 EYE EXAM RETINAL OR DILATED Q1 2[birth year]20/1943 DTaP/Tdap/Td series (1 - Tdap) 2/20/1954 ZOSTER VACCINE AGE 60> 12/20/1992 GLAUCOMA SCREENING Q2Y 2/20/1998 Influenza Age 5 to Adult 8/1/2018 MICROALBUMIN Q1 10/30/2018 MEDICARE YEARLY EXAM 10/31/2018 HEMOGLOBIN A1C Q6M 11/16/2018 FOOT EXAM Q1 2/14/2019 LIPID PANEL Q1 5/16/2019 COLONOSCOPY 3/30/2026 Allergies as of 8/16/2018  Review Complete On: 8/16/2018 By: Scottie Wyman MD  
 No Known Allergies Current Immunizations  Never Reviewed Name Date Influenza High Dose Vaccine PF 10/30/2017 Influenza Vaccine 10/7/2016 Pneumococcal Conjugate (PCV-13) 1/7/2005 Pneumococcal Vaccine (Unspecified Type) 1/7/2005 Rabies Vaccine 3/11/2009 Not reviewed this visit You Were Diagnosed With   
  
 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 3 Benign prostatic hyperplasia without lower urinary tract symptoms     ICD-10-CM: N40.0 ICD-9-CM: 600.00 Chronic non-seasonal allergic rhinitis     ICD-10-CM: J30.89 ICD-9-CM: 477.9 Chronic diastolic congestive heart failure (HCC)     ICD-10-CM: I50.32 
ICD-9-CM: 428.32, 428.0 Polymyalgia rheumatica (HCC)     ICD-10-CM: M35.3 ICD-9-CM: 234 Vitals BP Pulse Resp Height(growth percentile) Weight(growth percentile) SpO2  
 102/60 (BP 1 Location: Left arm, BP Patient Position: Sitting) 71 16 6' 3\" (1.905 m) 207 lb 12.8 oz (94.3 kg) 98% BMI Smoking Status 25.97 kg/m2 Never Smoker Vitals History BMI and BSA Data Body Mass Index Body Surface Area  
 25.97 kg/m 2 2.23 m 2 Preferred Pharmacy Pharmacy Name Phone [facility]/PHARMACY #[id number] [facility] [phone] Your Updated Medication List  
  
   
This list is accurate as of 8/16/18 10:48 AM.  Always use your most recent med list.  
  
  
  
  
 diclofenac EC 75 mg EC tablet Commonly known as:  VOLTAREN  
TAKE 1 TABLET BY MOUTH TWICE A DAY  
  
 digoxin 0.125 mg tablet Commonly known as:  LANOXIN Take 1 Tab by mouth daily. fexofenadine 180 mg tablet Commonly known as:  Elizabeth Neighbors Take  by mouth.   
  
 FISH OIL PO  
 Take 1,200 mg by mouth two (2) times a day. furosemide 80 mg tablet Commonly known as:  LASIX TAKE 1 TABLET BY MOUTH EVERY DAY  
  
 glucosamine 1,000 mg Tab Take 2,000 mg by mouth daily. lisinopril 5 mg tablet Commonly known as:  PRINIVIL, ZESTRIL  
TAKE 1 TABLET EVERY DAY  
  
 MULTIVITAMIN 50 PLUS Tab tablet Generic drug:  multivitamins-minerals-lutein Take 1 Tab by mouth daily. predniSONE 20 mg tablet Commonly known as:  Bartlett Stair Take 1 Tab by mouth daily. sildenafil (antihypertensive) 20 mg tablet Commonly known as:  REVATIO  
TAKE 1 TABLET, ORAL, AS DIRECTED FOR SEXUAL ACTIVITY  
  
 terazosin 5 mg capsule Commonly known as:  HYTRIN Take 5 mg by mouth nightly. TYLENOL ARTHRITIS PAIN 650 mg Dontrell Keas Generic drug:  acetaminophen Take 650 mg by mouth every eight (8) hours. VITAMIN D3 1,000 unit Cap Generic drug:  cholecalciferol Take 1,000 Units by mouth daily. warfarin 5 mg tablet Commonly known as:  COUMADIN Take one and a half tablets every day. We Performed the Following AMB POC COMPLETE CBC,AUTOMATED ENTER [id number] CPT(R)] AMB POC PT/INR [68311 CPT(R)] CK [id number] CPT(R)] HEMOGLOBIN A1C WITH EAG [98994 CPT(R)] LIPID PANEL [74858 CPT(R)] METABOLIC PANEL, COMPREHENSIVE [86795 CPT(R)] Follow-up Instructions Return in about 3 months (around 11/16/2018). Patient Instructions Arthritis: Care Instructions Your Care Instructions Arthritis, also called osteoarthritis, is a breakdown of the cartilage that cushions your joints. When the cartilage wears down, your bones rub against each other. This causes pain and stiffness. Many people have some arthritis as they age. Arthritis most often affects the joints of the spine, hands, hips, knees, or feet. You can take simple measures to protect your joints, ease your pain, and help you stay active. Follow-up care is a key part of your treatment and safety. Be sure to make and go to all appointments, and call your doctor if you are having problems. It's also a good idea to know your test results and keep a list of the medicines you take. How can you care for yourself at home? · Stay at a healthy weight. Being overweight puts extra strain on your joints. · Talk to your doctor or physical therapist about exercises that will help ease joint pain. ¨ Stretch. You may enjoy gentle forms of yoga to help keep your joints and muscles flexible. ¨ Walk instead of jog. Other types of exercise that are less stressful on the joints include riding a bicycle, swimming, ashley chi, or water exercise. ¨ Lift weights. Strong muscles help reduce stress on your joints. Stronger thigh muscles, for example, take some of the stress off of the knees and hips. Learn the right way to lift weights so you do not make joint pain worse. · Take your medicines exactly as prescribed. Call your doctor if you think you are having a problem with your medicine. · Take pain medicines exactly as directed. ¨ If the doctor gave you a prescription medicine for pain, take it as prescribed. ¨ If you are not taking a prescription pain medicine, ask your doctor if you can take an over-the-counter medicine. · Use a cane, crutch, walker, or another device if you need help to get around. These can help rest your joints. You also can use other things to make life easier, such as a higher toilet seat and padded handles on kitchen utensils. · Do not sit in low chairs, which can make it hard to get up. · Put heat or cold on your sore joints as needed. Use whichever helps you most. You also can take turns with hot and cold packs. ¨ Apply heat 2 or 3 times a day for 20 to 30 minutes-using a heating pad, hot shower, or hot pack-to relieve pain and stiffness.  
¨ Put ice or a cold pack on your sore joint for 10 to 20 minutes at a time. Put a thin cloth between the ice and your skin. When should you call for help? Call your doctor now or seek immediate medical care if: 
  · You have sudden swelling, warmth, or pain in any joint.  
  · You have joint pain and a fever or rash.  
  · You have such bad pain that you cannot use a joint.  
 Watch closely for changes in your health, and be sure to contact your doctor if: 
  · You have mild joint symptoms that continue even with more than 6 weeks of care at home.  
  · You have stomach pain or other problems with your medicine. Where can you learn more? Go to http://isra-rhonda.info/. Enter D656 in the search box to learn more about \"Arthritis: Care Instructions. \" Current as of: October 10, 2017 Content Version: 11.7 © 3957-9590 Finalta. Care instructions adapted under license by [employer] (which disclaims liability or warranty for this information). If you have questions about a medical condition or this instruction, always ask your healthcare professional. [place] [address] any warranty or liability for your use of this information. Introducing [facility] & HEALTH SERVICES! [facility] introduces [employer] patient portal. Now you can access parts of your medical record, email your doctor's office, and request medication refills online. 1. In your internet browser, go to [email or web address] 2. Click on the First Time User? Click Here link in the Sign In box. You will see the New Member Sign Up page. 3. Enter your [employer] Access Code exactly as it appears below. You will not need to use this code after youve completed the sign-up process. If you do not sign up before the expiration date, you must request a new code. · [employer] Access Code: [email or web address] Expires: 11/14/2018 10:48 AM 
 
4.  Enter the last four digits of your Social Security Number (xxxx) and Date of Birth (mm/dd/yyyy) as indicated and click Submit. You will be taken to the next sign-up page. 5. Create a Everbridge ID. This will be your Everbridge login ID and cannot be changed, so think of one that is secure and easy to remember. 6. Create a Everbridge password. You can change your password at any time. 7. Enter your Password Reset Question and Answer. This can be used at a later time if you forget your password. 8. Enter your e-mail address. You will receive e-mail notification when new information is available in [address] 9. Click Sign Up. You can now view and download portions of your medical record. 10. Click the Download Summary menu link to download a portable copy of your medical information. If you have questions, please visit the Frequently Asked Questions section of the Everbridge website. Remember, Everbridge is NOT to be used for urgent needs. For medical emergencies, dial 911. Now available from your iPhone and Android! Please provide this summary of care documentation to your next provider. Your primary care clinician is listed as Arpan. If you have any questions after today's visit, please call [phone].

## 2018-08-16 NOTE — PROGRESS NOTES
Chief Complaint   Patient presents with    Hypertension     3 month follow up     Diabetes     1. Have you been to the ER, urgent care clinic since your last visit? Hospitalized since your last visit? No    2. Have you seen or consulted any other health care providers outside of the [address] [place] [address] since your last visit? Include any pap smears or colon screening.  No     Fasting

## 2018-08-16 NOTE — PROGRESS NOTES
Chief Complaint   Patient presents with    Hypertension     3 month follow up     Diabetes       SUBJECTIVE:    Abelino Campos is a 80 y.o. male who returns in follow-up of his medical problems include hypertension, diabetes, hyperlipidemia, ASCVD, paroxysmal atrial fibrillation, compensated CHF, allergic rhinitis, CKD stage III, Marcia myalgia rheumatica and other medical problems. He is taking his medications and trying to follow his diet and get some exercise. His prednisone dose is currently at 20 mg for this polymyalgia rheumatica and sed rate was 34 on August 10. He currently denies any chest pain, shortness breath, palpitations or cardiorespiratory complaints. He denies any GI or  complaints. He denies any headaches, dizziness or neurological planes. He has no current arthritic complaints and then no other complaints on complete review of systems. Current Outpatient Prescriptions   Medication Sig Dispense Refill    predniSONE (DELTASONE) 20 mg tablet Take 1 Tab by mouth daily. 30 Tab 2    lisinopril (PRINIVIL, ZESTRIL) 5 mg tablet TAKE 1 TABLET EVERY DAY 90 Tab 3    warfarin (COUMADIN) 5 mg tablet Take one and a half tablets every day. 145 Tab 3    fexofenadine (ALLEGRA) 180 mg tablet Take  by mouth.  acetaminophen (TYLENOL ARTHRITIS PAIN) 650 mg TbER Take 650 mg by mouth every eight (8) hours.  furosemide (LASIX) 80 mg tablet TAKE 1 TABLET BY MOUTH EVERY DAY 90 Tab 3    multivitamins-minerals-lutein (MULTIVITAMIN 50 PLUS) tab tablet Take 1 Tab by mouth daily.  glucosamine 1,000 mg tab Take 2,000 mg by mouth daily.  digoxin (LANOXIN) 0.125 mg tablet Take 1 Tab by mouth daily. 30 Tab prn    sildenafil, antihypertensive, (REVATIO) 20 mg tablet TAKE 1 TABLET, ORAL, AS DIRECTED FOR SEXUAL ACTIVITY 20 Tab 11    cholecalciferol (VITAMIN D3) 1,000 unit cap Take 1,000 Units by mouth daily.       DOCOSAHEXANOIC ACID/EPA (FISH OIL PO) Take 1,200 mg by mouth two (2) times a day.      diclofenac EC (VOLTAREN) 75 mg EC tablet TAKE 1 TABLET BY MOUTH TWICE A  Tab 3    terazosin (HYTRIN) 5 mg capsule Take 5 mg by mouth nightly. 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    Back pain 9/20/2017    BPH (benign prostatic hyperplasia) 9/20/2017    CHF (congestive heart failure) ([facility] 9/20/2017    CKD (chronic kidney disease) 9/20/2017    DM (diabetes mellitus) ([facility] 9/20/2017    Story: Diet Controlled    ED (erectile dysfunction) 9/20/2017    Edema 9/20/2017    Elevated CPK 9/20/2017    Comments: History of    Elevated LFTs 9/20/2017    Comments: History of    Elevated PSA 9/20/2017    Hypercholesteremia     Hyperlipidemia 9/20/2017    Hypertension     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    Nodule of right lung 9/20/2017    Story: Right    Polymyalgia ([facility] 9/20/2017    Prostate enlargement      Past Surgical History:   Procedure Laterality Date    ABDOMEN SURGERY PROC UNLISTED      hernia repair    HX HEENT  06/12/2018    Dr. 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     No Known Allergies    REVIEW OF SYSTEMS:  General: negative for - chills or fever, or weight loss or gain  ENT: negative for - headaches, nasal congestion or tinnitus  Eyes: no blurred or visual changes  Neck: No stiffness or swollen nodes  Respiratory: negative for - cough, hemoptysis, shortness of breath or wheezing  Cardiovascular : negative for - chest pain, edema, palpitations or shortness of breath  Gastrointestinal: negative for - abdominal pain, blood in stools, heartburn or nausea/vomiting  Genito-Urinary: no dysuria, trouble voiding, or hematuria  Musculoskeletal: negative for - gait disturbance, joint pain, joint stiffness or joint swelling  Neurological: no TIA or stroke symptoms  Hematologic: no bruises, no bleeding  Lymphatic: no swollen glands  Integument: no lumps, mole changes, nail changes or rash  Endocrine:no malaise/lethargy poly uria or polydipsia or unexpected weight changes        Social History     Social History    Marital status:      Spouse name: N/A    Number of children: N/A    Years of education: N/A     Social History Main Topics    Smoking status: Never Smoker    Smokeless tobacco: Never Used    Alcohol use No    Drug use: No    Sexual activity: No     Other Topics Concern    None     Social History Narrative     History reviewed. No pertinent family history. OBJECTIVE:     Visit Vitals    /60 (BP 1 Location: Left arm, BP Patient Position: Sitting)    Pulse 71    Resp 16    Ht 6' 3\" (1.905 m)    Wt 207 lb 12.8 oz (94.3 kg)    SpO2 98%    BMI 25.97 kg/m2     CONSTITUTIONAL:   well nourished, appears age appropriate  EYES: sclera anicteric, PERRL, EOMI  ENMT:nares clear, moist mucous membranes, pharynx clear  NECK: supple. Thyroid normal, No JVD or bruits  RESPIRATORY: Chest: clear to ascultation and percussion, normal inspiratory effort  CARDIOVASCULAR: Heart: regular rate and rhythm no murmurs, rubs or gallops, PMI not displaced, No thrills  GASTROINTESTINAL: Abdomen: non distended, soft, non tender, bowel sounds normal  HEMATOLOGIC: no purpura, petechiae or bruising  LYMPHATIC: No lymph node enlargemant  MUSCULOSKELETAL: Extremities: no edema or active synovitis, pulse 1+   INTEGUMENT: No unusual rashes or suspicious skin lesions noted. Nails appear normal.  PERIPHERAL VASCULAR: normal pulses femoral, PT and DP  NEUROLOGIC: non-focal exam, A & O X 3  PSYCHIATRIC:, appropriate affect     ASSESSMENT:   1. Hypertension with renal disease    2. Mixed hyperlipidemia    3. 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, without long-term current use of insulin ([address]    4. ASCVD (arteriosclerotic cardiovascular disease)    5. Paroxysmal atrial fibrillation (HCC)    6. Primary osteoarthritis involving multiple joints    7. Stage 3 chronic kidney disease    8. Benign prostatic hyperplasia without lower urinary tract symptoms    9. Chronic non-seasonal allergic rhinitis    10. Chronic diastolic congestive heart failure ([address]    11. Polymyalgia rheumatica (HCC)      Impression  1 hypertension that is controlled to continue current therapy reviewed with him  2. Hyperlipidemia prior labs reviewed and repeat status pending and I will adjust if needed. 3.  Diabetes repeat status is pending and I reviewed prior labs and I will adjust medicines if needed. 4.  ASCVD clinically stable continue aspirin daily  5. Paroxysmal atrial fibrillation pro time pending today  6. DJD that is stable  7. CKD stage III repeat status is pending  8. BPH currently asymptomatic  9. Allergic rhinitis stable  10. Chronic diastolic CHF now compensated  11. Polymyalgia rheumatica as noted recent sed rate 34 and prednisone dose is 10  I will call with lab results and make further recommendations or adjustments as necessary. Follow-up as scheduled again for 3 months was general medical problems and 2 weeks for his polymyalgia rheumatica. PLAN:  .  Orders Placed This Encounter    METABOLIC PANEL, COMPREHENSIVE    LIPID PANEL    CK    HEMOGLOBIN A1C WITH EAG    AMB POC PT/INR    AMB POC COMPLETE CBC,AUTOMATED ENTER         ATTENTION:   This medical record was transcribed using an electronic medical records system. Although proofread, it may and can contain electronic and spelling errors. Other human spelling and other errors may be present. Corrections may be executed at a later time. Please feel free to contact us for any clarifications as needed. Follow-up Disposition:  Return in about 3 months (around 11/16/2018). No results found for any visits on 08/16/18. George Montgomery MD    The patient verbalized understanding of the problems and plans as explained.

## 2018-08-16 NOTE — PATIENT INSTRUCTIONS
Arthritis: Care Instructions  Your Care Instructions  Arthritis, also called osteoarthritis, is a breakdown of the cartilage that cushions your joints. When the cartilage wears down, your bones rub against each other. This causes pain and stiffness. Many people have some arthritis as they age. Arthritis most often affects the joints of the spine, hands, hips, knees, or feet. You can take simple measures to protect your joints, ease your pain, and help you stay active. Follow-up care is a key part of your treatment and safety. Be sure to make and go to all appointments, and call your doctor if you are having problems. It's also a good idea to know your test results and keep a list of the medicines you take. How can you care for yourself at home? · Stay at a healthy weight. Being overweight puts extra strain on your joints. · Talk to your doctor or physical therapist about exercises that will help ease joint pain. ¨ Stretch. You may enjoy gentle forms of yoga to help keep your joints and muscles flexible. ¨ Walk instead of jog. Other types of exercise that are less stressful on the joints include riding a bicycle, swimming, ashley chi, or water exercise. ¨ Lift weights. Strong muscles help reduce stress on your joints. Stronger thigh muscles, for example, take some of the stress off of the knees and hips. Learn the right way to lift weights so you do not make joint pain worse. · Take your medicines exactly as prescribed. Call your doctor if you think you are having a problem with your medicine. · Take pain medicines exactly as directed. ¨ If the doctor gave you a prescription medicine for pain, take it as prescribed. ¨ If you are not taking a prescription pain medicine, ask your doctor if you can take an over-the-counter medicine. · Use a cane, crutch, walker, or another device if you need help to get around. These can help rest your joints.  You also can use other things to make life easier, such as a higher toilet seat and padded handles on kitchen utensils. · Do not sit in low chairs, which can make it hard to get up. · Put heat or cold on your sore joints as needed. Use whichever helps you most. You also can take turns with hot and cold packs. ¨ Apply heat 2 or 3 times a day for 20 to 30 minutes-using a heating pad, hot shower, or hot pack-to relieve pain and stiffness. ¨ Put ice or a cold pack on your sore joint for 10 to 20 minutes at a time. Put a thin cloth between the ice and your skin. When should you call for help? Call your doctor now or seek immediate medical care if:    · You have sudden swelling, warmth, or pain in any joint.     · You have joint pain and a fever or rash.     · You have such bad pain that you cannot use a joint.    Watch closely for changes in your health, and be sure to contact your doctor if:    · You have mild joint symptoms that continue even with more than 6 weeks of care at home.     · You have stomach pain or other problems with your medicine. Where can you learn more? Go to http://isra-rhonda.info/. Enter O785 in the search box to learn more about \"Arthritis: Care Instructions. \"  Current as of: October 10, 2017  Content Version: 11.7  © 4940-1653 [employer]. Care instructions adapted under license by [employer] (which disclaims liability or warranty for this information). If you have questions about a medical condition or this instruction, always ask your healthcare professional. [place] [address] any warranty or liability for your use of this information.

## 2018-08-17 LAB
ALBUMIN SERPL-MCNC: 3.9 G/DL (ref 3.5–4.7)
ALBUMIN/GLOB SERPL: 1.6 {RATIO} (ref 1.2–2.2)
ALP SERPL-CCNC: 50 IU/L (ref 39–117)
ALT SERPL-CCNC: 21 IU/L (ref 0–44)
AST SERPL-CCNC: 24 IU/L (ref 0–40)
BILIRUB SERPL-MCNC: 0.3 MG/DL (ref 0–1.2)
BUN SERPL-MCNC: 26 MG/DL (ref 8–27)
BUN/CREAT SERPL: 21 (ref 10–24)
CALCIUM SERPL-MCNC: 9.4 MG/DL (ref 8.6–10.2)
CHLORIDE SERPL-SCNC: 100 MMOL/L (ref 96–106)
CHOLEST SERPL-MCNC: 199 MG/DL (ref 100–199)
CK SERPL-CCNC: 127 U/L (ref 24–204)
CO2 SERPL-SCNC: 26 MMOL/L (ref 20–29)
CREAT SERPL-MCNC: 1.22 MG/DL (ref 0.76–1.27)
EST. AVERAGE GLUCOSE BLD GHB EST-MCNC: 174 MG/DL
GLOBULIN SER CALC-MCNC: 2.5 G/DL (ref 1.5–4.5)
GLUCOSE SERPL-MCNC: 156 MG/DL (ref 65–99)
HBA1C MFR BLD: 7.7 % (ref 4.8–5.6)
HDLC SERPL-MCNC: 39 MG/DL
LDLC SERPL CALC-MCNC: 101 MG/DL (ref 0–99)
POTASSIUM SERPL-SCNC: 4.3 MMOL/L (ref 3.5–5.2)
PROT SERPL-MCNC: 6.4 G/DL (ref 6–8.5)
SODIUM SERPL-SCNC: 140 MMOL/L (ref 134–144)
TRIGL SERPL-MCNC: 294 MG/DL (ref 0–149)
VLDLC SERPL CALC-MCNC: 59 MG/DL (ref 5–40)

## 2018-08-17 NOTE — PROGRESS NOTES
His blood sugar, glycohemoglobin and triglycerides are all elevated now so I think we need medication to treat diabetes now as he has previously been on diet only. The prednisone could certainly be playing a role in this point we need to start him on metformin 500 mg daily.

## 2018-08-20 ENCOUNTER — TELEPHONE ANTICOAG (OUTPATIENT)
Dept: INTERNAL MEDICINE CLINIC | Age: 83
End: 2018-08-20

## 2018-08-20 ENCOUNTER — TELEPHONE (OUTPATIENT)
Dept: INTERNAL MEDICINE CLINIC | Age: 83
End: 2018-08-20

## 2018-08-20 DIAGNOSIS — I48.0 PAROXYSMAL ATRIAL FIBRILLATION (HCC): Primary | ICD-10-CM

## 2018-08-20 RX ORDER — METFORMIN HYDROCHLORIDE 500 MG/1
500 TABLET ORAL
Qty: 30 TAB | Refills: 5 | Status: SHIPPED | OUTPATIENT
Start: 2018-08-20 | End: 2018-11-06 | Stop reason: SDUPTHER

## 2018-08-20 NOTE — TELEPHONE ENCOUNTER
Per order from Dr. Anni Orellana on lab result notes 08-, patient to start Metformin 500mg daily.  Order sent to [facility] per patient request.

## 2018-08-20 NOTE — PROGRESS NOTES
His blood sugar, glycohemoglobin and triglycerides are all elevated now so I think we need medication to treat diabetes now as he has previously been on diet only. The prednisone could certainly be playing a role in this point we need to start him on metformin 500 mg daily. Patient informed.   Per verbal order from Dr. Leeroy Douglas, called in Metformin 500mg daily to [facility].

## 2018-08-20 NOTE — TELEPHONE ENCOUNTER
----- Message from Ene Motley MD sent at 8/16/2018  4:48 PM EDT -----  INR is okay so continue current treatment.

## 2018-08-20 NOTE — PROGRESS NOTES
Anticoagulation Episode Summary     Current INR goal 2.0-3.0   Next INR check 8/29/2018   INR from last check 1.5! (8/16/2018)   Weekly max warfarin dose    Target end date    INR check location [facility]   Preferred lab    Send INR reminders to     Indications   Paroxysmal atrial fibrillation ([facility] (Primary) [I48.0]           Comments          Anticoagulation Care Providers     Provider Role Specialty Phone number    Fredy Alcocer MD Responsible Internal Medicine [phone]        Patient informed INR is good and to continue current treatment of blood thinner which is 7.5mg daily. Follow-up as scheduled.

## 2018-08-20 NOTE — PROGRESS NOTES
His blood sugar, glycohemoglobin and triglycerides are all elevated now so I think we need medication to treat diabetes now as he has previously been on diet only. The prednisone could certainly be playing a role in this point we need to start him on metformin 500 mg daily. Patient informed.   Per verbal order from Dr. Joe Torres, called in Metformin 500mg daily to [facility].

## 2018-08-29 ENCOUNTER — OFFICE VISIT (OUTPATIENT)
Dept: INTERNAL MEDICINE CLINIC | Age: 83
End: 2018-08-29

## 2018-08-29 VITALS
DIASTOLIC BLOOD PRESSURE: 60 MMHG | SYSTOLIC BLOOD PRESSURE: 124 MMHG | HEIGHT: 75 IN | BODY MASS INDEX: 25.71 KG/M2 | WEIGHT: 206.8 LBS | OXYGEN SATURATION: 97 % | HEART RATE: 64 BPM | RESPIRATION RATE: 17 BRPM

## 2018-08-29 DIAGNOSIS — N18.30 STAGE 3 CHRONIC KIDNEY DISEASE (HCC): ICD-10-CM

## 2018-08-29 DIAGNOSIS — M35.3 POLYMYALGIA RHEUMATICA (HCC): Primary | ICD-10-CM

## 2018-08-29 DIAGNOSIS — E11.22 CONTROLLED TYPE 2 DIABETES MELLITUS WITH STAGE 3 CHRONIC KIDNEY DISEASE, WITHOUT LONG-TERM CURRENT USE OF INSULIN (HCC): ICD-10-CM

## 2018-08-29 DIAGNOSIS — I12.9 HYPERTENSION WITH RENAL DISEASE: ICD-10-CM

## 2018-08-29 DIAGNOSIS — N18.30 CONTROLLED TYPE 2 DIABETES MELLITUS WITH STAGE 3 CHRONIC KIDNEY DISEASE, WITHOUT LONG-TERM CURRENT USE OF INSULIN (HCC): ICD-10-CM

## 2018-08-29 LAB
BUN BLD-MCNC: 38 MG/DL (ref 9–20)
CALCIUM BLD-MCNC: 9.4 MG/DL (ref 8.4–10.2)
CHLORIDE BLD-SCNC: 101 MMOL/L (ref 98–107)
CO2 POC: 27 MMOL/L (ref 22–32)
CREAT BLD-MCNC: 1.2 MG/DL (ref 0.8–1.5)
EGFR (POC): 54.8
GLUCOSE POC: 150 MG/DL (ref 75–110)
POTASSIUM SERPL-SCNC: 5 MMOL/L (ref 3.6–5)
SODIUM SERPL-SCNC: 138 MMOL/L (ref 137–145)

## 2018-08-29 NOTE — MR AVS SNAPSHOT
[facility] 
 
 
 [address] [id number] [phone] Patient: Adina Sanchez MRN: [id number] Visit Information Date & Time Provider Department Dept. Phone Encounter #  
 8/29/2018  3:30 PM Margy Richey  [phone] [id number] Follow-up Instructions Return in about 2 weeks (around 9/12/2018). Follow-up and Disposition History Your Appointments 9/17/2018  2:40 PM  
FOLLOW UP 10 with MD DENY Richey [facility] ([facility]) Appt Note: 2 week follow up [address] [id number] [address] [facility] [phone]  
  
    
 11/21/2018 10:00 AM  
FOLLOW UP 10 with MD PREMA Richey [facility] ([facility]) Appt Note: 3 MO FLP   yearly [address] [id number] [phone] Upcoming Health Maintenance Date Due  
 EYE EXAM RETINAL OR DILATED Q1 2/20/1943 DTaP/Tdap/Td series (1 - Tdap) 2/20/1954 ZOSTER VACCINE AGE 60> 12/20/1992 GLAUCOMA SCREENING Q2Y 2/20/1998 Influenza Age 5 to Adult 8/1/2018 MICROALBUMIN Q1 10/30/2018 MEDICARE YEARLY EXAM 10/31/2018 FOOT EXAM Q1 2/14/2019 HEMOGLOBIN A1C Q6M 2/16/2019 LIPID PANEL Q1 8/16/2019 COLONOSCOPY 3/30/2026 Allergies as of 8/29/2018  Review Complete On: 8/29/2018 By: Amy Veloz MD  
 No Known Allergies Current Immunizations  Never Reviewed Name Date Influenza High Dose Vaccine PF 10/30/2017 Influenza Vaccine 10/7/2016 Pneumococcal Conjugate (PCV-13) 1/7/2005 Pneumococcal Vaccine (Unspecified Type) 1/7/2005 Rabies Vaccine 3/11/2009 Not reviewed this visit You Were Diagnosed With   
  
 Codes Comments Polymyalgia rheumatica (HCC)    -  Primary ICD-10-CM: M35.3 ICD-9-CM: 129 Hypertension with renal disease     ICD-10-CM: I12.9 ICD-9-CM: 403.90 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, without long-term current use of insulin (HCC)     ICD-10-CM: E11.22, N18.3 ICD-9-CM: 250.40, 585.3 Stage 3 chronic kidney disease     ICD-10-CM: N18.3 ICD-9-CM: 861. 3 Vitals BP Pulse Resp Height(growth percentile) Weight(growth percentile) SpO2  
 124/60 (BP 1 Location: Left arm, BP Patient Position: Sitting) 64 17 6' 3\" (1.905 m) 206 lb 12.8 oz (93.8 kg) 97% BMI Smoking Status 25.85 kg/m2 Never Smoker BMI and BSA Data Body Mass Index Body Surface Area  
 25.85 kg/m 2 2.23 m 2 Preferred Pharmacy Pharmacy Name Phone [id number] [phone] Your Updated Medication List  
  
   
This list is accurate as of 8/29/18  5:03 PM.  Always use your most recent med list.  
  
  
  
  
 diclofenac EC 75 mg EC tablet Commonly known as:  VOLTAREN  
TAKE 1 TABLET BY MOUTH TWICE A DAY  
  
 digoxin 0.125 mg tablet Commonly known as:  LANOXIN Take 1 Tab by mouth daily. fexofenadine 180 mg tablet Commonly known as:  Ben [place] Take  by mouth. FISH OIL PO Take 1,200 mg by mouth two (2) times a day. furosemide 80 mg tablet Commonly known as:  LASIX TAKE 1 TABLET BY MOUTH EVERY DAY  
  
 glucosamine 1,000 mg Tab Take 2,000 mg by mouth daily. lisinopril 5 mg tablet Commonly known as:  PRINIVIL, ZESTRIL  
TAKE 1 TABLET EVERY DAY  
  
 metFORMIN 500 mg tablet Commonly known as:  GLUCOPHAGE Take 1 Tab by mouth daily (with breakfast). MULTIVITAMIN 50 PLUS Tab tablet Generic drug:  multivitamins-minerals-lutein Take 1 Tab by mouth daily. predniSONE 20 mg tablet Commonly known as:  Annabel Yoo  
 Take 1 Tab by mouth daily. sildenafil (antihypertensive) 20 mg tablet Commonly known as:  REVATIO  
TAKE 1 TABLET, ORAL, AS DIRECTED FOR SEXUAL ACTIVITY  
  
 terazosin 5 mg capsule Commonly known as:  HYTRIN Take 5 mg by mouth nightly. TYLENOL ARTHRITIS PAIN 650 mg Fatuma Reardon Generic drug:  acetaminophen Take 650 mg by mouth every eight (8) hours. VITAMIN D3 1,000 unit Cap Generic drug:  cholecalciferol Take 1,000 Units by mouth daily. warfarin 5 mg tablet Commonly known as:  COUMADIN Take one and a half tablets every day. We Performed the Following AMB POC BASIC METABOLIC PANEL [44230 CPT(R)] AMB POC SEDIMENTATION RATE, ERYTHROCYTE; NON AUTO [77819 CPT(R)] Follow-up Instructions Return in about 2 weeks (around 9/12/2018). Patient Instructions Arthritis: Care Instructions Your Care Instructions Arthritis, also called osteoarthritis, is a breakdown of the cartilage that cushions your joints. When the cartilage wears down, your bones rub against each other. This causes pain and stiffness. Many people have some arthritis as they age. Arthritis most often affects the joints of the spine, hands, hips, knees, or feet. You can take simple measures to protect your joints, ease your pain, and help you stay active. Follow-up care is a key part of your treatment and safety. Be sure to make and go to all appointments, and call your doctor if you are having problems. It's also a good idea to know your test results and keep a list of the medicines you take. How can you care for yourself at home? · Stay at a healthy weight. Being overweight puts extra strain on your joints. · Talk to your doctor or physical therapist about exercises that will help ease joint pain. ¨ Stretch. You may enjoy gentle forms of yoga to help keep your joints and muscles flexible. ¨ Walk instead of jog.  Other types of exercise that are less stressful on the joints include riding a bicycle, swimming, ashley chi, or water exercise. ¨ Lift weights. Strong muscles help reduce stress on your joints. Stronger thigh muscles, for example, take some of the stress off of the knees and hips. Learn the right way to lift weights so you do not make joint pain worse. · Take your medicines exactly as prescribed. Call your doctor if you think you are having a problem with your medicine. · Take pain medicines exactly as directed. ¨ If the doctor gave you a prescription medicine for pain, take it as prescribed. ¨ If you are not taking a prescription pain medicine, ask your doctor if you can take an over-the-counter medicine. · Use a cane, crutch, walker, or another device if you need help to get around. These can help rest your joints. You also can use other things to make life easier, such as a higher toilet seat and padded handles on kitchen utensils. · Do not sit in low chairs, which can make it hard to get up. · Put heat or cold on your sore joints as needed. Use whichever helps you most. You also can take turns with hot and cold packs. ¨ Apply heat 2 or 3 times a day for 20 to 30 minutes-using a heating pad, hot shower, or hot pack-to relieve pain and stiffness. ¨ Put ice or a cold pack on your sore joint for 10 to 20 minutes at a time. Put a thin cloth between the ice and your skin. When should you call for help? Call your doctor now or seek immediate medical care if: 
  · You have sudden swelling, warmth, or pain in any joint.  
  · You have joint pain and a fever or rash.  
  · You have such bad pain that you cannot use a joint.  
 Watch closely for changes in your health, and be sure to contact your doctor if: 
  · You have mild joint symptoms that continue even with more than 6 weeks of care at home.  
  · You have stomach pain or other problems with your medicine. Where can you learn more? Go to http://isra-rhonda.info/. Enter Q465 in the search box to learn more about \"Arthritis: Care Instructions. \" Current as of: October 10, 2017 Content Version: 11.7 © 4022-9292 [employer]. Care instructions adapted under license by [employer] (which disclaims liability or warranty for this information). If you have questions about a medical condition or this instruction, always ask your healthcare professional. [facility] any warranty or liability for your use of this information. Patient Instructions History Introducing [facility]! [address] [facility] introduces BetaVersity patient portal. Now you can access parts of your medical record, email your doctor's office, and request medication refills online. 1. In your internet browser, go to [email or web address] [email or web address] 2. Click on the First Time User? Click Here link in the Sign In box. You will see the New Member Sign Up page. 3. Enter your BetaVersity Access Code exactly as it appears below. You will not need to use this code after youve completed the sign-up process. If you do not sign up before the expiration date, you must request a new code. · BetaVersity Access Code: [email or web address] Expires: 11/14/2018 10:48 AM 
 
4. Enter the last four digits of your Social Security Number (xxxx) and Date of Birth (mm/dd/yyyy) as indicated and click Submit. You will be taken to the next sign-up page. 5. Create a BetaVersity ID. This will be your BetaVersity login ID and cannot be changed, so think of one that is secure and easy to remember. 6. Create a Beta[employer] password. You can change your password at any time. 7. Enter your Password Reset Question and Answer. This can be used at a later time if you forget your password. 8. Enter your e-mail address. You will receive e-mail notification when new information is available in [address] 9. Click Sign Up. You can now view and download portions of your medical record. 10. Click the Download Summary menu link to download a portable copy of your medical information. If you have questions, please visit the Frequently Asked Questions section of the [employer] website. Remember, [employer] is NOT to be used for urgent needs. For medical emergencies, dial 911. Now available from your iPhone and Android! Please provide this summary of care documentation to your next provider. Your primary care clinician is listed as Arpan. If you have any questions after today's visit, please call [phone].

## 2018-08-29 NOTE — PROGRESS NOTES
Chief Complaint Patient presents with  Hypertension 2 week follow up  Diabetes SUBJECTIVE: 
 
Sherry Cristina is a 80 y.o. male who presents today in follow-up of his polymyalgia rheumatica, HTN, CKD and arthritis. He generally seems to be feeling better since we increased the prednisone back to 20 mg QD. He denies any chest pain, shortness breath, palpitations or cardiorespiratory complaints. He denies any GI/ claims. His strength seems to be better he has no other arthritic complaints at the present time. There are no other complaints on complete review of systems except as sugars gone up a little bit running a little above 200 now. Current Outpatient Prescriptions Medication Sig Dispense Refill  metFORMIN (GLUCOPHAGE) 500 mg tablet Take 1 Tab by mouth daily (with breakfast). 30 Tab 5  predniSONE (DELTASONE) 20 mg tablet Take 1 Tab by mouth daily. 30 Tab 2  
 lisinopril (PRINIVIL, ZESTRIL) 5 mg tablet TAKE 1 TABLET EVERY DAY 90 Tab 3  warfarin (COUMADIN) 5 mg tablet Take one and a half tablets every day. 145 Tab 3  
 fexofenadine (ALLEGRA) 180 mg tablet Take  by mouth.  acetaminophen (TYLENOL ARTHRITIS PAIN) 650 mg TbER Take 650 mg by mouth every eight (8) hours.  furosemide (LASIX) 80 mg tablet TAKE 1 TABLET BY MOUTH EVERY DAY 90 Tab 3  
 multivitamins-minerals-lutein (MULTIVITAMIN 50 PLUS) tab tablet Take 1 Tab by mouth daily.  glucosamine 1,000 mg tab Take 2,000 mg by mouth daily.  digoxin (LANOXIN) 0.125 mg tablet Take 1 Tab by mouth daily. 30 Tab prn  sildenafil, antihypertensive, (REVATIO) 20 mg tablet TAKE 1 TABLET, ORAL, AS DIRECTED FOR SEXUAL ACTIVITY 20 Tab 11  
 cholecalciferol (VITAMIN D3) 1,000 unit cap Take 1,000 Units by mouth daily.  DOCOSAHEXANOIC ACID/EPA (FISH OIL PO) Take 1,200 mg by mouth two (2) times a day.     
 diclofenac EC (VOLTAREN) 75 mg EC tablet TAKE 1 TABLET BY MOUTH TWICE A  Tab 3  
  terazosin (HYTRIN) 5 mg capsule Take 5 mg by mouth nightly. Past Medical History:  
Diagnosis Date  Allergic rhinitis 9/20/2017  Arthritis  ASCVD (arteriosclerotic cardiovascular disease) 9/20/2017 Story: Old ASMI by EKG  Back pain 9/20/2017  BPH (benign prostatic hyperplasia) 9/20/2017  CHF (congestive heart failure) ([address] 9/20/2017  CKD (chronic kidney disease) 9/20/2017  Diabetic acetonemia ([address]  DM (diabetes mellitus) ([address] 9/20/2017 Story: Diet Controlled  ED (erectile dysfunction) 9/20/2017  Edema 9/20/2017  Elevated CPK 9/20/2017 Comments: History of  Elevated LFTs 9/20/2017 Comments: History of  Elevated PSA 9/20/2017  Hypercholesteremia  Hyperlipidemia 9/20/2017  Hypertension  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  Nodule of right lung 9/20/2017 Story: Right  Polymyalgia ([address] 9/20/2017  Prostate enlargement Past Surgical History:  
Procedure Laterality Date  ABDOMEN SURGERY PROC UNLISTED    
 hernia repair  HX HEENT  06/12/2018 Dr. 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 
General: negative for - chills or fever, or weight loss or gain ENT: negative for - headaches, nasal congestion or tinnitus Eyes: no blurred or visual changes Neck: No stiffness or swollen nodes Respiratory: negative for - cough, hemoptysis, shortness of breath or wheezing Cardiovascular : negative for - chest pain, edema, palpitations or shortness of breath Gastrointestinal: negative for - abdominal pain, blood in stools, heartburn or nausea/vomiting Genito-Urinary: no dysuria, trouble voiding, or hematuria Musculoskeletal: negative for - gait disturbance, joint pain, joint stiffness or joint swelling Neurological: no TIA or stroke symptoms Hematologic: no bruises, no bleeding Lymphatic: no swollen glands Integument: no lumps, mole changes, nail changes or rash Endocrine:no malaise/lethargy poly uria or polydipsia or unexpected weight changes Social History Social History  Marital status:  Spouse name: N/A  
 Number of children: N/A  
 Years of education: N/A Social History Main Topics  Smoking status: Never Smoker  Smokeless tobacco: Never Used  Alcohol use No  
 Drug use: No  
 Sexual activity: No  
 
Other Topics Concern  None Social History Narrative History reviewed. No pertinent family history. OBJECTIVE:  
 
Visit Vitals  /60 (BP 1 Location: Left arm, BP Patient Position: Sitting)  Pulse 64  Resp 17  Ht 6' 3\" (1.905 m)  Wt 206 lb 12.8 oz (93.8 kg)  SpO2 97%  BMI 25.85 kg/m2 CONSTITUTIONAL:   well nourished, appears age appropriate EYES: sclera anicteric, PERRL, EOMI 
ENMT:nares clear, moist mucous membranes, pharynx clear NECK: supple. Thyroid normal, No JVD or bruits RESPIRATORY: Chest: clear to ascultation and percussion, normal inspiratory effort CARDIOVASCULAR: Heart: regular rate and rhythm no murmurs, rubs or gallops, PMI not displaced, No thrills GASTROINTESTINAL: Abdomen: non distended, soft, non tender, bowel sounds normal 
HEMATOLOGIC: no purpura, petechiae or bruising LYMPHATIC: No lymph node enlargemant MUSCULOSKELETAL: Extremities: no edema or active synovitis, pulse 1+ INTEGUMENT: No unusual rashes or suspicious skin lesions noted. Nails appear normal. 
PERIPHERAL VASCULAR: normal pulses femoral, PT and DP NEUROLOGIC: non-focal exam, A & O X 3 PSYCHIATRIC:, appropriate affect ASSESSMENT:  
1. Polymyalgia rheumatica ([address] 2. Hypertension with renal disease 3. 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 Stage 3 chronic kidney disease Impression 1. Polymyalgia rheumatica we will see what the sed rate looks like today, prednisone dose is 20 daily and hope we can decrease it so 2. DJD that seems to be stable 3. Hypertension that is controlled 4. CKD repeat status pending 5. Diabetes mellitus we will see what the blood sugar looks like and see if we need to make any adjustments. Other problems seem to be currently stable today I will recheck him in 2 weeks or sooner should there be a problem. PLAN: 
. Orders Placed This Encounter  SED RATE (ESR)  METABOLIC PANEL, BASIC  
 
 
 
ATTENTION:  
This medical record was transcribed using an electronic medical records system. Although proofread, it may and can contain electronic and spelling errors. Other human spelling and other errors may be present. Corrections may be executed at a later time. Please feel free to contact us for any clarifications as needed. Follow-up Disposition: 
Return in about 2 weeks (around 9/12/2018). No results found for any visits on 08/29/18. Cesilia Proctor MD 
 
The patient verbalized understanding of the problems and plans as explained.

## 2018-08-29 NOTE — PATIENT INSTRUCTIONS
Arthritis: Care Instructions Your Care Instructions Arthritis, also called osteoarthritis, is a breakdown of the cartilage that cushions your joints. When the cartilage wears down, your bones rub against each other. This causes pain and stiffness. Many people have some arthritis as they age. Arthritis most often affects the joints of the spine, hands, hips, knees, or feet. You can take simple measures to protect your joints, ease your pain, and help you stay active. Follow-up care is a key part of your treatment and safety. Be sure to make and go to all appointments, and call your doctor if you are having problems. It's also a good idea to know your test results and keep a list of the medicines you take. How can you care for yourself at home? · Stay at a healthy weight. Being overweight puts extra strain on your joints. · Talk to your doctor or physical therapist about exercises that will help ease joint pain. ¨ Stretch. You may enjoy gentle forms of yoga to help keep your joints and muscles flexible. ¨ Walk instead of jog. Other types of exercise that are less stressful on the joints include riding a bicycle, swimming, ashley chi, or water exercise. ¨ Lift weights. Strong muscles help reduce stress on your joints. Stronger thigh muscles, for example, take some of the stress off of the knees and hips. Learn the right way to lift weights so you do not make joint pain worse. · Take your medicines exactly as prescribed. Call your doctor if you think you are having a problem with your medicine. · Take pain medicines exactly as directed. ¨ If the doctor gave you a prescription medicine for pain, take it as prescribed. ¨ If you are not taking a prescription pain medicine, ask your doctor if you can take an over-the-counter medicine. · Use a cane, crutch, walker, or another device if you need help to get around. These can help rest your joints.  You also can use other things to make life easier, such as a higher toilet seat and padded handles on kitchen utensils. · Do not sit in low chairs, which can make it hard to get up. · Put heat or cold on your sore joints as needed. Use whichever helps you most. You also can take turns with hot and cold packs. ¨ Apply heat 2 or 3 times a day for 20 to 30 minutes-using a heating pad, hot shower, or hot pack-to relieve pain and stiffness. ¨ Put ice or a cold pack on your sore joint for 10 to 20 minutes at a time. Put a thin cloth between the ice and your skin. When should you call for help? Call your doctor now or seek immediate medical care if: 
  · You have sudden swelling, warmth, or pain in any joint.  
  · You have joint pain and a fever or rash.  
  · You have such bad pain that you cannot use a joint.  
 Watch closely for changes in your health, and be sure to contact your doctor if: 
  · You have mild joint symptoms that continue even with more than 6 weeks of care at home.  
  · You have stomach pain or other problems with your medicine. Where can you learn more? Go to http://isra-rhonda.info/. Enter N993 in the search box to learn more about \"Arthritis: Care Instructions. \" Current as of: October 10, 2017 Content Version: 11.7 © 3993-0514 [employer]. Care instructions adapted under license by [employer] (which disclaims liability or warranty for this information). If you have questions about a medical condition or this instruction, always ask your healthcare professional. [place] [address] any warranty or liability for your use of this information.

## 2018-08-29 NOTE — PROGRESS NOTES
Chief Complaint Patient presents with  Hypertension 2 week follow up  Diabetes 1. Have you been to the ER, urgent care clinic since your last visit? Hospitalized since your last visit? No 
 
2. Have you seen or consulted any other health care providers outside of the [address] [place] [address] since your last visit? Include any pap smears or colon screening.  No

## 2018-08-30 LAB — ERYTHROCYTE [SEDIMENTATION RATE] IN BLOOD: 35 MM/HR (ref 0–10)

## 2018-08-31 ENCOUNTER — TELEPHONE (OUTPATIENT)
Dept: INTERNAL MEDICINE CLINIC | Age: 83
End: 2018-08-31

## 2018-08-31 NOTE — TELEPHONE ENCOUNTER
----- Message from Loyda Suazo MD sent at 8/30/2018 10:59 AM EDT -----  Sed rate is essentially unchanged with the last one being 29  Being 35 so no change in treatment.

## 2018-08-31 NOTE — TELEPHONE ENCOUNTER
----- Message from Ricky Espinoza MD sent at 8/30/2018 10:53 AM EDT -----  Kidney function is stable but the sed rate is still pending at this point

## 2018-09-10 RX ORDER — DICLOFENAC SODIUM 75 MG/1
TABLET, DELAYED RELEASE ORAL
Qty: 180 TAB | Refills: 3 | Status: SHIPPED | OUTPATIENT
Start: 2018-09-10 | End: 2019-07-29 | Stop reason: SDUPTHER

## 2018-09-17 ENCOUNTER — OFFICE VISIT (OUTPATIENT)
Dept: INTERNAL MEDICINE CLINIC | Age: 83
End: 2018-09-17

## 2018-09-17 VITALS
HEART RATE: 64 BPM | BODY MASS INDEX: 24.99 KG/M2 | SYSTOLIC BLOOD PRESSURE: 120 MMHG | HEIGHT: 75 IN | WEIGHT: 201 LBS | OXYGEN SATURATION: 98 % | DIASTOLIC BLOOD PRESSURE: 72 MMHG

## 2018-09-17 DIAGNOSIS — N18.30 STAGE 3 CHRONIC KIDNEY DISEASE (HCC): ICD-10-CM

## 2018-09-17 DIAGNOSIS — I12.9 HYPERTENSION WITH RENAL DISEASE: ICD-10-CM

## 2018-09-17 DIAGNOSIS — E11.22 CONTROLLED TYPE 2 DIABETES MELLITUS WITH STAGE 3 CHRONIC KIDNEY DISEASE, WITHOUT LONG-TERM CURRENT USE OF INSULIN (HCC): ICD-10-CM

## 2018-09-17 DIAGNOSIS — N18.30 CONTROLLED TYPE 2 DIABETES MELLITUS WITH STAGE 3 CHRONIC KIDNEY DISEASE, WITHOUT LONG-TERM CURRENT USE OF INSULIN (HCC): ICD-10-CM

## 2018-09-17 DIAGNOSIS — M35.3 POLYMYALGIA RHEUMATICA (HCC): Primary | ICD-10-CM

## 2018-09-17 LAB — ERYTHROCYTE [SEDIMENTATION RATE] IN BLOOD: 42 MM/HR (ref 0–10)

## 2018-09-17 NOTE — PROGRESS NOTES
Chief Complaint Patient presents with  Follow-up 2 week followup SUBJECTIVE: 
 
Vic White is a 80 y.o. male who returns in follow-up for his medical problems include polymyalgia rheumatica, CKD stage III, hypertension and diabetes. He notes his blood sugars been running a little high since he has been on the prednisone. He notes no polyuria polydipsia visual symptoms. He does note his weight is gone down a little bit. He denies any chest pain, shortness breath, palpitations or cardiorespiratory complaints. He does feel like his energy and strength is better on the prednisone. He notes no other complaints on complete review of systems. Current Outpatient Prescriptions Medication Sig Dispense Refill  diclofenac EC (VOLTAREN) 75 mg EC tablet TAKE 1 TABLET BY MOUTH TWICE A  Tab 3  
 metFORMIN (GLUCOPHAGE) 500 mg tablet Take 1 Tab by mouth daily (with breakfast). 30 Tab 5  predniSONE (DELTASONE) 20 mg tablet Take 1 Tab by mouth daily. 30 Tab 2  
 lisinopril (PRINIVIL, ZESTRIL) 5 mg tablet TAKE 1 TABLET EVERY DAY 90 Tab 3  warfarin (COUMADIN) 5 mg tablet Take one and a half tablets every day. 145 Tab 3  
 fexofenadine (ALLEGRA) 180 mg tablet Take  by mouth.  acetaminophen (TYLENOL ARTHRITIS PAIN) 650 mg TbER Take 650 mg by mouth every eight (8) hours.  furosemide (LASIX) 80 mg tablet TAKE 1 TABLET BY MOUTH EVERY DAY 90 Tab 3  
 multivitamins-minerals-lutein (MULTIVITAMIN 50 PLUS) tab tablet Take 1 Tab by mouth daily.  glucosamine 1,000 mg tab Take 2,000 mg by mouth daily.  digoxin (LANOXIN) 0.125 mg tablet Take 1 Tab by mouth daily. 30 Tab prn  sildenafil, antihypertensive, (REVATIO) 20 mg tablet TAKE 1 TABLET, ORAL, AS DIRECTED FOR SEXUAL ACTIVITY 20 Tab 11  
 cholecalciferol (VITAMIN D3) 1,000 unit cap Take 1,000 Units by mouth daily.  DOCOSAHEXANOIC ACID/EPA (FISH OIL PO) Take 1,200 mg by mouth two (2) times a day.  terazosin (HYTRIN) 5 mg capsule Take 5 mg by mouth nightly. Past Medical History:  
Diagnosis Date  Allergic rhinitis 9/20/2017  Arthritis  ASCVD (arteriosclerotic cardiovascular disease) 9/20/2017 Story: Old ASMI by EKG  Back pain 9/20/2017  BPH (benign prostatic hyperplasia) 9/20/2017  CHF (congestive heart failure) ([address] 9/20/2017  CKD (chronic kidney disease) 9/20/2017  Diabetic acetonemia ([address]  DM (diabetes mellitus) ([address] 9/20/2017 Story: Diet Controlled  ED (erectile dysfunction) 9/20/2017  Edema 9/20/2017  Elevated CPK 9/20/2017 Comments: History of  Elevated LFTs 9/20/2017 Comments: History of  Elevated PSA 9/20/2017  Hypercholesteremia  Hyperlipidemia 9/20/2017  Hypertension  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  Nodule of right lung 9/20/2017 Story: Right  Polymyalgia ([address] 9/20/2017  Prostate enlargement Past Surgical History:  
Procedure Laterality Date  ABDOMEN SURGERY PROC UNLISTED    
 hernia repair  HX HEENT  06/12/2018 Dr. 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  
 2 lesions on head No Known Allergies REVIEW OF SYSTEMS: 
General: negative for - chills or fever, or weight loss or gain ENT: negative for - headaches, nasal congestion or tinnitus Eyes: no blurred or visual changes Neck: No stiffness or swollen nodes Respiratory: negative for - cough, hemoptysis, shortness of breath or wheezing Cardiovascular : negative for - chest pain, edema, palpitations or shortness of breath Gastrointestinal: negative for - abdominal pain, blood in stools, heartburn or nausea/vomiting Genito-Urinary: no dysuria, trouble voiding, or hematuria Musculoskeletal: negative for - gait disturbance, joint pain, joint stiffness or joint swelling Neurological: no TIA or stroke symptoms Hematologic: no bruises, no bleeding Lymphatic: no swollen glands Integument: no lumps, mole changes, nail changes or rash Endocrine:no malaise/lethargy poly uria or polydipsia or unexpected weight changes Social History Social History  Marital status:  Spouse name: N/A  
 Number of children: N/A  
 Years of education: N/A Social History Main Topics  Smoking status: Never Smoker  Smokeless tobacco: Never Used  Alcohol use No  
 Drug use: No  
 Sexual activity: No  
 
Other Topics Concern  None Social History Narrative History reviewed. No pertinent family history. OBJECTIVE:  
 
Visit Vitals  /72 (BP 1 Location: Left arm, BP Patient Position: Sitting)  Pulse 64  Ht 6' 3\" (1.905 m)  Wt 201 lb (91.2 kg)  SpO2 98%  BMI 25.12 kg/m2 CONSTITUTIONAL:   well nourished, appears age appropriate EYES: sclera anicteric, PERRL, EOMI 
ENMT:nares clear, moist mucous membranes, pharynx clear NECK: supple. Thyroid normal, No JVD or bruits RESPIRATORY: Chest: clear to ascultation and percussion, normal inspiratory effort CARDIOVASCULAR: Heart: regular rate and rhythm no murmurs, rubs or gallops, PMI not displaced, No thrills GASTROINTESTINAL: Abdomen: non distended, soft, non tender, bowel sounds normal 
HEMATOLOGIC: no purpura, petechiae or bruising LYMPHATIC: No lymph node enlargemant MUSCULOSKELETAL: Extremities: no edema or active synovitis, pulse 1+ INTEGUMENT: No unusual rashes or suspicious skin lesions noted. Nails appear normal. 
PERIPHERAL VASCULAR: normal pulses femoral, PT and DP NEUROLOGIC: non-focal exam, A & O X 3 PSYCHIATRIC:, appropriate affect ASSESSMENT:  
1. Polymyalgia rheumatica ([address] 2. Hypertension with renal disease 3. 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 Stage 3 chronic kidney disease Impression 1. Polymyalgia rheumatica we will see what the sed rate looks like today and hopefully be able to taper the prednisone a little 2. Hypertension that is controlled so continue current therapy reviewed with him 3. Diabetes I reviewed his blood sugar she has been testing at home and noted to him that the prednisone can make the blood sugar global 
3. CKD stage III repeat status pending I will call the lab and make further recommendations or adjustments if necessary. Follow-up as scheduled again for about 3-4 weeks or sooner should there be a problem. PLAN: 
. Orders Placed This Encounter  METABOLIC PANEL, BASIC  
 AMB POC SEDIMENTATION RATE, ERYTHROCYTE; NON AUTO  
 
 
 
ATTENTION:  
This medical record was transcribed using an electronic medical records system. Although proofread, it may and can contain electronic and spelling errors. Other human spelling and other errors may be present. Corrections may be executed at a later time. Please feel free to contact us for any clarifications as needed. Follow-up Disposition: 
Return in about 4 weeks (around 10/15/2018). No results found for any visits on 09/17/18. Domenico Webb MD 
 
The patient verbalized understanding of the problems and plans as explained.

## 2018-09-17 NOTE — MR AVS SNAPSHOT
[facility] 
 
 
 [place] [address] [address] [phone] Patient: Abdulaziz Cason MRN: [id number] Visit Information Date & Time Provider Department Dept. Phone Encounter #  
 9/17/2018  2:40 PM Latisha Damon 84 [phone] [id number] Follow-up Instructions Return in about 4 weeks (around 10/15/2018). Your Appointments 10/19/2018  1:00 PM  
FOLLOW UP 10 with MD DAVIE Damon [facility] (Courtney Larve) Appt Note: 4 week follow up [place] [address] [phone] [address] [address]  
  
    
 11/21/2018 10:00 AM  
FOLLOW UP 10 with MD DEMETRIUS Damon [facility] (Courtney Larve) Appt Note: 3 MO FLP   yearly [place] [address] [id number] [phone] Upcoming Health Maintenance Date Due  
 EYE EXAM RETINAL OR DILATED Q1 2/20/1943 DTaP/Tdap/Td series (1 - Tdap) 2/20/1954 ZOSTER VACCINE AGE 60> 12/20/1992 GLAUCOMA SCREENING Q2Y 2/20/1998 Influenza Age 5 to Adult 8/1/2018 MICROALBUMIN Q1 10/30/2018 MEDICARE YEARLY EXAM 10/31/2018 FOOT EXAM Q1 2/14/2019 HEMOGLOBIN A1C Q6M 2/16/2019 LIPID PANEL Q1 8/16/2019 COLONOSCOPY 3/30/2026 Allergies as of 9/17/2018  Review Complete On: 9/17/2018 By: Luis Enrique Hoover MD  
 No Known Allergies Current Immunizations  Never Reviewed Name Date Influenza High Dose Vaccine PF 10/30/2017 Influenza Vaccine 10/7/2016 Pneumococcal Conjugate (PCV-13) 1/7/2005 Pneumococcal Vaccine (Unspecified Type) 1/7/2005 Rabies Vaccine 3/11/2009 Not reviewed this visit You Were Diagnosed With   
  
 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 3 Vitals BP Pulse Height(growth percentile) Weight(growth percentile) SpO2 BMI  
 120/72 (BP 1 Location: Left arm, BP Patient Position: Sitting) 64 6' 3\" (1.905 m) 201 lb (91.2 kg) 98% 25.12 kg/m2 Smoking Status Never Smoker BMI and BSA Data Body Mass Index Body Surface Area  
 25.12 kg/m 2 2.2 m 2 Preferred Pharmacy Pharmacy Name Phone [id number] [phone] Your Updated Medication List  
  
   
This list is accurate as of 9/17/18  3:46 PM.  Always use your most recent med list.  
  
  
  
  
 diclofenac EC 75 mg EC tablet Commonly known as:  VOLTAREN  
TAKE 1 TABLET BY MOUTH TWICE A DAY  
  
 digoxin 0.125 mg tablet Commonly known as:  LANOXIN Take 1 Tab by mouth daily. fexofenadine 180 mg tablet Commonly known as:  Claudette Frankel Take  by mouth. FISH OIL PO Take 1,200 mg by mouth two (2) times a day. furosemide 80 mg tablet Commonly known as:  LASIX TAKE 1 TABLET BY MOUTH EVERY DAY  
  
 glucosamine 1,000 mg Tab Take 2,000 mg by mouth daily. lisinopril 5 mg tablet Commonly known as:  PRINIVIL, ZESTRIL  
TAKE 1 TABLET EVERY DAY  
  
 metFORMIN 500 mg tablet Commonly known as:  GLUCOPHAGE Take 1 Tab by mouth daily (with breakfast). MULTIVITAMIN 50 PLUS Tab tablet Generic drug:  multivitamins-minerals-lutein Take 1 Tab by mouth daily. predniSONE 20 mg tablet Commonly known as:  Berle Pouch Take 1 Tab by mouth daily. sildenafil (antihypertensive) 20 mg tablet Commonly known as:  REVATIO TAKE 1 TABLET, ORAL, AS DIRECTED FOR SEXUAL ACTIVITY  
  
 terazosin 5 mg capsule Commonly known as:  HYTRIN Take 5 mg by mouth nightly. TYLENOL ARTHRITIS PAIN 650 mg Pamila Virginia Generic drug:  acetaminophen Take 650 mg by mouth every eight (8) hours. VITAMIN D3 1,000 unit Cap Generic drug:  cholecalciferol Take 1,000 Units by mouth daily. warfarin 5 mg tablet Commonly known as:  COUMADIN Take one and a half tablets every day. We Performed the Following AMB POC SEDIMENTATION RATE, ERYTHROCYTE; NON AUTO [19516 CPT(R)] METABOLIC PANEL, BASIC [84367 CPT(R)] Follow-up Instructions Return in about 4 weeks (around 10/15/2018). Patient Instructions Arthritis: Care Instructions Your Care Instructions Arthritis, also called osteoarthritis, is a breakdown of the cartilage that cushions your joints. When the cartilage wears down, your bones rub against each other. This causes pain and stiffness. Many people have some arthritis as they age. Arthritis most often affects the joints of the spine, hands, hips, knees, or feet. You can take simple measures to protect your joints, ease your pain, and help you stay active. Follow-up care is a key part of your treatment and safety. Be sure to make and go to all appointments, and call your doctor if you are having problems. It's also a good idea to know your test results and keep a list of the medicines you take. How can you care for yourself at home? · Stay at a healthy weight. Being overweight puts extra strain on your joints. · Talk to your doctor or physical therapist about exercises that will help ease joint pain. ¨ Stretch. You may enjoy gentle forms of yoga to help keep your joints and muscles flexible. ¨ Walk instead of jog. Other types of exercise that are less stressful on the joints include riding a bicycle, swimming, ashley chi, or water exercise. ¨ Lift weights. Strong muscles help reduce stress on your joints. Stronger thigh muscles, for example, take some of the stress off of the knees and hips. Learn the right way to lift weights so you do not make joint pain worse. · Take your medicines exactly as prescribed. Call your doctor if you think you are having a problem with your medicine. · Take pain medicines exactly as directed. ¨ If the doctor gave you a prescription medicine for pain, take it as prescribed. ¨ If you are not taking a prescription pain medicine, ask your doctor if you can take an over-the-counter medicine. · Use a cane, crutch, walker, or another device if you need help to get around. These can help rest your joints. You also can use other things to make life easier, such as a higher toilet seat and padded handles on kitchen utensils. · Do not sit in low chairs, which can make it hard to get up. · Put heat or cold on your sore joints as needed. Use whichever helps you most. You also can take turns with hot and cold packs. ¨ Apply heat 2 or 3 times a day for 20 to 30 minutes-using a heating pad, hot shower, or hot pack-to relieve pain and stiffness. ¨ Put ice or a cold pack on your sore joint for 10 to 20 minutes at a time. Put a thin cloth between the ice and your skin. When should you call for help? Call your doctor now or seek immediate medical care if: 
  · You have sudden swelling, warmth, or pain in any joint.  
  · You have joint pain and a fever or rash.  
  · You have such bad pain that you cannot use a joint.  
 Watch closely for changes in your health, and be sure to contact your doctor if: 
  · You have mild joint symptoms that continue even with more than 6 weeks of care at home.  
  · You have stomach pain or other problems with your medicine. Where can you learn more? Go to http://isra-rhonda.info/. Enter A825 in the search box to learn more about \"Arthritis: Care Instructions. \" 
 Current as of: October 10, 2017 Content Version: 11.7 © 9889-8460 Care at Hand, Desk. Care instructions adapted under license by [employer] (which disclaims liability or warranty for this information). If you have questions about a medical condition or this instruction, always ask your healthcare professional. [address] any warranty or liability for your use of this information. Introducing [facility]! [employer] introduces [employer] patient portal. Now you can access parts of your medical record, email your doctor's office, and request medication refills online. 1. In your internet browser, go to [email or web address] 2. Click on the First Time User? Click Here link in the Sign In box. You will see the New Member Sign Up page. 3. Enter your [employer] Access Code exactly as it appears below. You will not need to use this code after youve completed the sign-up process. If you do not sign up before the expiration date, you must request a new code. · [employer] Access Code: [email or web address] Expires: 11/14/2018 10:48 AM 
 
4. Enter the last four digits of your Social Security Number (xxxx) and Date of Birth (mm/dd/yyyy) as indicated and click Submit. You will be taken to the next sign-up page. 5. Create a [employer] ID. This will be your [employer] login ID and cannot be changed, so think of one that is secure and easy to remember. 6. Create a [employer] password. You can change your password at any time. 7. Enter your Password Reset Question and Answer. This can be used at a later time if you forget your password. 8. Enter your e-mail address. You will receive e-mail notification when new information is available in [address] 9. Click Sign Up. You can now view and download portions of your medical record. 10. Click the Download Summary menu link to download a portable copy of your medical information. If you have questions, please visit the Frequently Asked Questions section of the [employer] website. Remember, [employer] is NOT to be used for urgent needs. For medical emergencies, dial 911. Now available from your iPhone and Android! Please provide this summary of care documentation to your next provider. Your primary care clinician is listed as Arpan. If you have any questions after today's visit, please call [phone].

## 2018-09-17 NOTE — PROGRESS NOTES
Alfonso Wei presents with Chief Complaint Patient presents with  Follow-up 2 week followup Patient here for a 2week followup. Doing well. Is down 5 lbs from last visit. 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 1. Have you been to the ER, urgent care clinic since your last visit? Hospitalized since your last visit? No 
 
2. Have you seen or consulted any other health care providers outside of the [address] [place] [address] since your last visit? Include any pap smears or colon screening. Yes Where: Dr Ivis Roberts Reason for visit: Skin lesion

## 2018-09-17 NOTE — PATIENT INSTRUCTIONS
Arthritis: Care Instructions Your Care Instructions Arthritis, also called osteoarthritis, is a breakdown of the cartilage that cushions your joints. When the cartilage wears down, your bones rub against each other. This causes pain and stiffness. Many people have some arthritis as they age. Arthritis most often affects the joints of the spine, hands, hips, knees, or feet. You can take simple measures to protect your joints, ease your pain, and help you stay active. Follow-up care is a key part of your treatment and safety. Be sure to make and go to all appointments, and call your doctor if you are having problems. It's also a good idea to know your test results and keep a list of the medicines you take. How can you care for yourself at home? · Stay at a healthy weight. Being overweight puts extra strain on your joints. · Talk to your doctor or physical therapist about exercises that will help ease joint pain. ¨ Stretch. You may enjoy gentle forms of yoga to help keep your joints and muscles flexible. ¨ Walk instead of jog. Other types of exercise that are less stressful on the joints include riding a bicycle, swimming, ashley chi, or water exercise. ¨ Lift weights. Strong muscles help reduce stress on your joints. Stronger thigh muscles, for example, take some of the stress off of the knees and hips. Learn the right way to lift weights so you do not make joint pain worse. · Take your medicines exactly as prescribed. Call your doctor if you think you are having a problem with your medicine. · Take pain medicines exactly as directed. ¨ If the doctor gave you a prescription medicine for pain, take it as prescribed. ¨ If you are not taking a prescription pain medicine, ask your doctor if you can take an over-the-counter medicine. · Use a cane, crutch, walker, or another device if you need help to get around. These can help rest your joints.  You also can use other things to make life easier, such as a higher toilet seat and padded handles on kitchen utensils. · Do not sit in low chairs, which can make it hard to get up. · Put heat or cold on your sore joints as needed. Use whichever helps you most. You also can take turns with hot and cold packs. ¨ Apply heat 2 or 3 times a day for 20 to 30 minutes-using a heating pad, hot shower, or hot pack-to relieve pain and stiffness. ¨ Put ice or a cold pack on your sore joint for 10 to 20 minutes at a time. Put a thin cloth between the ice and your skin. When should you call for help? Call your doctor now or seek immediate medical care if: 
  · You have sudden swelling, warmth, or pain in any joint.  
  · You have joint pain and a fever or rash.  
  · You have such bad pain that you cannot use a joint.  
 Watch closely for changes in your health, and be sure to contact your doctor if: 
  · You have mild joint symptoms that continue even with more than 6 weeks of care at home.  
  · You have stomach pain or other problems with your medicine. Where can you learn more? Go to http://isra-rhonda.info/. Enter L272 in the search box to learn more about \"Arthritis: Care Instructions. \" Current as of: October 10, 2017 Content Version: 11.7 © 6614-8743 [employer]. Care instructions adapted under license by [employer] (which disclaims liability or warranty for this information). If you have questions about a medical condition or this instruction, always ask your healthcare professional. [place] [address] any warranty or liability for your use of this information.

## 2018-09-18 LAB
BUN SERPL-MCNC: 35 MG/DL (ref 8–27)
BUN/CREAT SERPL: 21 (ref 10–24)
CALCIUM SERPL-MCNC: 9.9 MG/DL (ref 8.6–10.2)
CHLORIDE SERPL-SCNC: 100 MMOL/L (ref 96–106)
CO2 SERPL-SCNC: 27 MMOL/L (ref 20–29)
CREAT SERPL-MCNC: 1.64 MG/DL (ref 0.76–1.27)
GLUCOSE SERPL-MCNC: 127 MG/DL (ref 65–99)
POTASSIUM SERPL-SCNC: 5.7 MMOL/L (ref 3.5–5.2)
SODIUM SERPL-SCNC: 139 MMOL/L (ref 134–144)

## 2018-09-24 NOTE — PROGRESS NOTES
Sed rate remains elevated however he is asymptomatic with his current dose of prednisone so I would not change.

## 2018-09-25 ENCOUNTER — TELEPHONE (OUTPATIENT)
Dept: INTERNAL MEDICINE CLINIC | Age: 83
End: 2018-09-25

## 2018-09-25 NOTE — TELEPHONE ENCOUNTER
Patient informed to continue the Prednisone dose at 20 mg daily per Dr. Meenu Caldwell. Sed rate remains elevated but he states patient is asymptomatic.

## 2018-09-25 NOTE — TELEPHONE ENCOUNTER
----- Message from Fredy Alcocer MD sent at 9/24/2018  5:42 PM EDT -----  Sed rate remains elevated however he is asymptomatic with his current dose of prednisone so I would not change.

## 2018-10-19 ENCOUNTER — OFFICE VISIT (OUTPATIENT)
Dept: INTERNAL MEDICINE CLINIC | Age: 83
End: 2018-10-19

## 2018-10-19 VITALS
OXYGEN SATURATION: 96 % | HEART RATE: 79 BPM | HEIGHT: 75 IN | SYSTOLIC BLOOD PRESSURE: 111 MMHG | DIASTOLIC BLOOD PRESSURE: 67 MMHG | WEIGHT: 201.4 LBS | BODY MASS INDEX: 25.04 KG/M2 | RESPIRATION RATE: 16 BRPM | TEMPERATURE: 98 F

## 2018-10-19 DIAGNOSIS — I48.0 PAROXYSMAL ATRIAL FIBRILLATION (HCC): ICD-10-CM

## 2018-10-19 DIAGNOSIS — N18.30 STAGE 3 CHRONIC KIDNEY DISEASE (HCC): ICD-10-CM

## 2018-10-19 DIAGNOSIS — Z23 ENCOUNTER FOR IMMUNIZATION: ICD-10-CM

## 2018-10-19 DIAGNOSIS — M15.9 PRIMARY OSTEOARTHRITIS INVOLVING MULTIPLE JOINTS: ICD-10-CM

## 2018-10-19 DIAGNOSIS — I12.9 HYPERTENSION WITH RENAL DISEASE: Primary | ICD-10-CM

## 2018-10-19 DIAGNOSIS — M35.3 POLYMYALGIA RHEUMATICA (HCC): ICD-10-CM

## 2018-10-19 LAB
ERYTHROCYTE [SEDIMENTATION RATE] IN BLOOD: 50 MM/HR (ref 0–10)
GRAN# POC: ABNORMAL K/UL (ref 2–7.8)
GRAN% POC: 88.2 % (ref 37–92)
HCT VFR BLD CALC: 35.3 % (ref 37–51)
HGB BLD-MCNC: 11.9 G/DL (ref 12–18)
INR BLD: 1.7
LY# POC: 0.8 K/UL (ref 0.6–4.1)
LY% POC: 10.2 % (ref 10–58.5)
MCH RBC QN: 31.7 PG (ref 26–32)
MCHC RBC-ENTMCNC: 33.6 G/DL (ref 30–36)
MCV RBC: 94 FL (ref 80–97)
MID #, POC: 0.1 K/UL (ref 0–1.8)
MID% POC: 1.6 % (ref 0.1–24)
PLATELET # BLD: 246 K/UL (ref 140–440)
PT POC: 22.2 SECONDS
RBC # BLD: 3.75 M/UL (ref 4.2–6.3)
VALID INTERNAL CONTROL?: YES
WBC # BLD: 8.5 K/UL (ref 4.1–10.9)

## 2018-10-19 NOTE — PROGRESS NOTES
Yenni Faith is a 80 y.o. male Chief Complaint Patient presents with  Diabetes 1. Have you been to the ER, urgent care clinic since your last visit? Hospitalized since your last visit? No  
 
2. Have you seen or consulted any other health care providers outside of the [address] [place] [address] since your last visit? Include any pap smears or colon screening. No  
Visit Vitals /67 Pulse 79 Temp 98 °F (36.7 °C) (Oral) Resp 16 Ht 6' 3\" (1.905 m) Wt 201 lb 6.4 oz (91.4 kg) SpO2 96% BMI 25.17 kg/m²

## 2018-10-19 NOTE — PROGRESS NOTES
Chief Complaint Patient presents with  Diabetes SUBJECTIVE: 
 
Earnestine Posadas is a 80 y.o. male who returns in follow-up for his polymyalgia rheumatica, hypertension, DJD, CKD and other medical problems. He notes has been feeling a little lightheaded at times lately and just does not have as much energy as he normally has. He denies any chest pain, shortness of breath, palpitations, PND, orthopnea or any other cardiorespiratory complaints. He does note that the lightheadedness quickly goes away. He has no other neurologic complaints. His arthritis seems to be stable and there are no other complaints on complete review of systems. Current Outpatient Medications Medication Sig Dispense Refill  diclofenac EC (VOLTAREN) 75 mg EC tablet TAKE 1 TABLET BY MOUTH TWICE A  Tab 3  
 metFORMIN (GLUCOPHAGE) 500 mg tablet Take 1 Tab by mouth daily (with breakfast). 30 Tab 5  predniSONE (DELTASONE) 20 mg tablet Take 1 Tab by mouth daily. 30 Tab 2  
 lisinopril (PRINIVIL, ZESTRIL) 5 mg tablet TAKE 1 TABLET EVERY DAY 90 Tab 3  warfarin (COUMADIN) 5 mg tablet Take one and a half tablets every day. 145 Tab 3  
 fexofenadine (ALLEGRA) 180 mg tablet Take  by mouth.  acetaminophen (TYLENOL ARTHRITIS PAIN) 650 mg TbER Take 650 mg by mouth every eight (8) hours.  furosemide (LASIX) 80 mg tablet TAKE 1 TABLET BY MOUTH EVERY DAY 90 Tab 3  
 multivitamins-minerals-lutein (MULTIVITAMIN 50 PLUS) tab tablet Take 1 Tab by mouth daily.  glucosamine 1,000 mg tab Take 2,000 mg by mouth daily.  digoxin (LANOXIN) 0.125 mg tablet Take 1 Tab by mouth daily. 30 Tab prn  sildenafil, antihypertensive, (REVATIO) 20 mg tablet TAKE 1 TABLET, ORAL, AS DIRECTED FOR SEXUAL ACTIVITY 20 Tab 11  
 cholecalciferol (VITAMIN D3) 1,000 unit cap Take 1,000 Units by mouth daily.  DOCOSAHEXANOIC ACID/EPA (FISH OIL PO) Take 1,200 mg by mouth two (2) times a day.  terazosin (HYTRIN) 5 mg capsule Take 5 mg by mouth nightly. Past Medical History:  
Diagnosis Date  Allergic rhinitis 9/20/2017  Arthritis  ASCVD (arteriosclerotic cardiovascular disease) 9/20/2017 Story: Old ASMI by EKG  Back pain 9/20/2017  BPH (benign prostatic hyperplasia) 9/20/2017  CHF (congestive heart failure) ([address] 9/20/2017  CKD (chronic kidney disease) 9/20/2017  Diabetic acetonemia ([address]  DM (diabetes mellitus) ([address] 9/20/2017 Story: Diet Controlled  ED (erectile dysfunction) 9/20/2017  Edema 9/20/2017  Elevated CPK 9/20/2017 Comments: History of  Elevated LFTs 9/20/2017 Comments: History of  Elevated PSA 9/20/2017  Hypercholesteremia  Hyperlipidemia 9/20/2017  Hypertension  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  Nodule of right lung 9/20/2017 Story: Right  Polymyalgia ([address] 9/20/2017  Prostate enlargement Past Surgical History:  
Procedure Laterality Date  ABDOMEN SURGERY PROC UNLISTED    
 hernia repair  HX HEENT  06/12/2018 Dr. 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  
 2 lesions on head No Known Allergies REVIEW OF SYSTEMS: 
General: negative for - chills or fever, or weight loss or gain ENT: negative for - headaches, nasal congestion or tinnitus Eyes: no blurred or visual changes Neck: No stiffness or swollen nodes Respiratory: negative for - cough, hemoptysis, shortness of breath or wheezing Cardiovascular : negative for - chest pain, edema, palpitations or shortness of breath Gastrointestinal: negative for - abdominal pain, blood in stools, heartburn or nausea/vomiting Genito-Urinary: no dysuria, trouble voiding, or hematuria Musculoskeletal: negative for - gait disturbance, joint pain, joint stiffness or joint swelling Neurological: no TIA or stroke symptoms Hematologic: no bruises, no bleeding Lymphatic: no swollen glands Integument: no lumps, mole changes, nail changes or rash Endocrine:no malaise/lethargy poly uria or polydipsia or unexpected weight changes Social History Socioeconomic History  Marital status:  Spouse name: Not on file  Number of children: Not on file  Years of education: Not on file  Highest education level: Not on file Social Needs  Financial resource strain: Not on file  Food insecurity - worry: Not on file  Food insecurity - inability: Not on file  Transportation needs - medical: Not on file  Transportation needs - non-medical: Not on file Occupational History  Not on file Tobacco Use  Smoking status: Never Smoker  Smokeless tobacco: Never Used Substance and Sexual Activity  Alcohol use: No  
 Drug use: No  
 Sexual activity: No  
Other Topics Concern  Not on file Social History Narrative  Not on file History reviewed. No pertinent family history. OBJECTIVE:  
 
Visit Vitals /67 Pulse 79 Temp 98 °F (36.7 °C) (Oral) Resp 16 Ht 6' 3\" (1.905 m) Wt 201 lb 6.4 oz (91.4 kg) SpO2 96% BMI 25.17 kg/m² CONSTITUTIONAL:   well nourished, appears age appropriate EYES: sclera anicteric, PERRL, EOMI 
ENMT:nares clear, moist mucous membranes, pharynx clear NECK: supple. Thyroid normal, No JVD or bruits RESPIRATORY: Chest: clear to ascultation and percussion, normal inspiratory effort CARDIOVASCULAR: Heart: regular rate and rhythm no murmurs, rubs or gallops, PMI not displaced, No thrills GASTROINTESTINAL: Abdomen: non distended, soft, non tender, bowel sounds normal 
HEMATOLOGIC: no purpura, petechiae or bruising LYMPHATIC: No lymph node enlargemant MUSCULOSKELETAL: Extremities: no edema or active synovitis, pulse 1+ INTEGUMENT: No unusual rashes or suspicious skin lesions noted.  Nails appear normal. 
PERIPHERAL VASCULAR: normal pulses femoral, PT and DP 
 NEUROLOGIC: non-focal exam, A & O X 3 PSYCHIATRIC:, appropriate affect ASSESSMENT:  
1. Hypertension with renal disease 2. Paroxysmal atrial fibrillation (HCC) 3. Stage 3 chronic kidney disease ([address] 4. Polymyalgia rheumatica ([facility] 5. Primary osteoarthritis involving multiple joints 6. Encounter for immunization Impression 1. Hypertension that is controlled although a little lower than he has been running I will decrease his Lasix from 80 daily to 40 daily. 2.  Paroxysmal atrial fibrillation PT/INR pending and I am checking a CBC in light of his lightheadedness 3. CKD stage III repeat status is pending 4. Polymyalgia rheumatica sed rate is pending 5. DJD that is stable I will call with lab results and make further adjustments and recommendations of the need to. As noted Lasix is decreased to 40 mg daily. I will recheck him again in a month at which time he is due for his yearly comprehensive evaluation. Flu shot given today. PLAN: 
. Orders Placed This Encounter  Influenza Vaccine Inactivated (IIV)(FLUAD), Subunit, Adjuvanted, IM, (01059)  METABOLIC PANEL, BASIC  
 AMB POC COMPLETE CBC,AUTOMATED ENTER  AMB POC SEDIMENTATION RATE, ERYTHROCYTE; NON AUTO  AMB POC PT/INR  
 
 
 
ATTENTION:  
This medical record was transcribed using an electronic medical records system. Although proofread, it may and can contain electronic and spelling errors. Other human spelling and other errors may be present. Corrections may be executed at a later time. Please feel free to contact us for any clarifications as needed. Follow-up Disposition: 
Return in about 4 weeks (around 11/16/2018). No results found for any visits on 10/19/18. Martha Burkett MD 
 
The patient verbalized understanding of the problems and plans as explained.

## 2018-10-19 NOTE — PATIENT INSTRUCTIONS
Arthritis: Care Instructions Your Care Instructions Arthritis, also called osteoarthritis, is a breakdown of the cartilage that cushions your joints. When the cartilage wears down, your bones rub against each other. This causes pain and stiffness. Many people have some arthritis as they age. Arthritis most often affects the joints of the spine, hands, hips, knees, or feet. You can take simple measures to protect your joints, ease your pain, and help you stay active. Follow-up care is a key part of your treatment and safety. Be sure to make and go to all appointments, and call your doctor if you are having problems. It's also a good idea to know your test results and keep a list of the medicines you take. How can you care for yourself at home? · Stay at a healthy weight. Being overweight puts extra strain on your joints. · Talk to your doctor or physical therapist about exercises that will help ease joint pain. ? Stretch. You may enjoy gentle forms of yoga to help keep your joints and muscles flexible. ? Walk instead of jog. Other types of exercise that are less stressful on the joints include riding a bicycle, swimming, ashley chi, or water exercise. ? Lift weights. Strong muscles help reduce stress on your joints. Stronger thigh muscles, for example, take some of the stress off of the knees and hips. Learn the right way to lift weights so you do not make joint pain worse. · Take your medicines exactly as prescribed. Call your doctor if you think you are having a problem with your medicine. · Take pain medicines exactly as directed. ? If the doctor gave you a prescription medicine for pain, take it as prescribed. ? If you are not taking a prescription pain medicine, ask your doctor if you can take an over-the-counter medicine. · Use a cane, crutch, walker, or another device if you need help to get around. These can help rest your joints.  You also can use other things to make life easier, such as a higher toilet seat and padded handles on kitchen utensils. · Do not sit in low chairs, which can make it hard to get up. · Put heat or cold on your sore joints as needed. Use whichever helps you most. You also can take turns with hot and cold packs. ? Apply heat 2 or 3 times a day for 20 to 30 minutesusing a heating pad, hot shower, or hot packto relieve pain and stiffness. ? Put ice or a cold pack on your sore joint for 10 to 20 minutes at a time. Put a thin cloth between the ice and your skin. When should you call for help? Call your doctor now or seek immediate medical care if: 
  · You have sudden swelling, warmth, or pain in any joint.  
  · You have joint pain and a fever or rash.  
  · You have such bad pain that you cannot use a joint.  
 Watch closely for changes in your health, and be sure to contact your doctor if: 
  · You have mild joint symptoms that continue even with more than 6 weeks of care at home.  
  · You have stomach pain or other problems with your medicine. Where can you learn more? Go to http://isra-rhonda.info/. Enter L906 in the search box to learn more about \"Arthritis: Care Instructions. \" Current as of: June 11, 2018 Content Version: 11.8 © 3200-2123 [employer]. Care instructions adapted under license by [employer] (which disclaims liability or warranty for this information). If you have questions about a medical condition or this instruction, always ask your healthcare professional. [place] [address] any warranty or liability for your use of this information.

## 2018-10-20 LAB
BUN SERPL-MCNC: 37 MG/DL (ref 8–27)
BUN/CREAT SERPL: 27 (ref 10–24)
CALCIUM SERPL-MCNC: 9.4 MG/DL (ref 8.6–10.2)
CHLORIDE SERPL-SCNC: 98 MMOL/L (ref 96–106)
CO2 SERPL-SCNC: 26 MMOL/L (ref 20–29)
CREAT SERPL-MCNC: 1.38 MG/DL (ref 0.76–1.27)
GLUCOSE SERPL-MCNC: 247 MG/DL (ref 65–99)
POTASSIUM SERPL-SCNC: 5.2 MMOL/L (ref 3.5–5.2)
SODIUM SERPL-SCNC: 139 MMOL/L (ref 134–144)

## 2018-10-24 ENCOUNTER — TELEPHONE (OUTPATIENT)
Dept: INTERNAL MEDICINE CLINIC | Age: 83
End: 2018-10-24

## 2018-10-24 NOTE — TELEPHONE ENCOUNTER
----- Message from Jag Fisher MD sent at 10/24/2018 12:38 PM EDT -----  Labs are stable so no treatment changes needed.

## 2018-11-06 RX ORDER — METFORMIN HYDROCHLORIDE 500 MG/1
500 TABLET ORAL
Qty: 90 TAB | Refills: 3 | Status: SHIPPED | OUTPATIENT
Start: 2018-11-06 | End: 2020-05-14

## 2018-11-12 RX ORDER — PREDNISONE 20 MG/1
TABLET ORAL
Qty: 30 TAB | Refills: 2 | Status: SHIPPED | OUTPATIENT
Start: 2018-11-12 | End: 2019-01-31

## 2018-11-12 NOTE — TELEPHONE ENCOUNTER
RX refill request from the patient/pharmacy. Patient last seen 10- with labs, and next appt. scheduled for 11-  Requested Prescriptions     Pending Prescriptions Disp Refills    predniSONE (DELTASONE) 20 mg tablet [Pharmacy Med Name: PREDNISONE 20 MG TABLET] 30 Tab 2     Sig: TAKE 1 TABLET BY MOUTH EVERY DAY   .

## 2018-11-20 NOTE — PROGRESS NOTES
This is a Subsequent Medicare Annual Wellness Visit providing Personalized Prevention Plan Services (PPPS) (Performed 12 months after initial AWV and PPPS ) I have reviewed the patient's medical history in detail and updated the computerized patient record. He presents today for his Medicare subsequent annual wellness examination screening questionnaire. He is also here in follow-up of his multiple medical problems include hypertension, diabetes, hyperlipidemia, atherosclerotic coronary vascular disease, paroxysmal atrial fibrillation, compensated CHF, BPH, allergic rhinitis, CKD stage III, polymyalgia rheumatica and other medical problems. He is taking his medications and trying to follow his diet and try to get some exercise regular. He denies any chest pain, shortness of breath, palpitations, PND, orthopnea or cardiorespiratory complaints. He denies any GI or  complaint. He denies any headaches, dizziness or neurologic complaint. He has some chronic unchanged arthritic complaints but no active arthritic complaints and there are no other complaints on complete review of systems. History Past Medical History:  
Diagnosis Date  Allergic rhinitis 9/20/2017  Arthritis  ASCVD (arteriosclerotic cardiovascular disease) 9/20/2017 Story: Old ASMI by EKG  Back pain 9/20/2017  BPH (benign prostatic hyperplasia) 9/20/2017  CHF (congestive heart failure) ([facility] [address] 9/20/2017  CKD (chronic kidney disease) 9/20/2017  Diabetic acetonemia ([facility] [address]  DM (diabetes mellitus) ([facility] [address] 9/20/2017 Story: Diet Controlled  ED (erectile dysfunction) 9/20/2017  Edema 9/20/2017  Elevated CPK 9/20/2017 Comments: History of  Elevated LFTs 9/20/2017 Comments: History of  Elevated PSA 9/20/2017  Hypercholesteremia  Hyperlipidemia 9/20/2017  Hypertension  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  Nodule of right lung 9/20/2017 Story: Right  Polymyalgia ([address] 9/20/2017  Prostate enlargement Past Surgical History:  
Procedure Laterality Date  ABDOMEN SURGERY PROC UNLISTED    
 hernia repair  HX HEENT  06/12/2018 Dr. 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  
 2 lesions on head Social History Tobacco Use  Smoking status: Never Smoker  Smokeless tobacco: Never Used Substance Use Topics  Alcohol use: No  
 Drug use: No  
 
Current Outpatient Medications Medication Sig Dispense Refill  predniSONE (DELTASONE) 20 mg tablet TAKE 1 TABLET BY MOUTH EVERY DAY 30 Tab 2  
 metFORMIN (GLUCOPHAGE) 500 mg tablet Take 1 Tab by mouth daily (with breakfast). 90 Tab 3  
 diclofenac EC (VOLTAREN) 75 mg EC tablet TAKE 1 TABLET BY MOUTH TWICE A  Tab 3  
 lisinopril (PRINIVIL, ZESTRIL) 5 mg tablet TAKE 1 TABLET EVERY DAY 90 Tab 3  warfarin (COUMADIN) 5 mg tablet Take one and a half tablets every day. 145 Tab 3  
 fexofenadine (ALLEGRA) 180 mg tablet Take  by mouth.  acetaminophen (TYLENOL ARTHRITIS PAIN) 650 mg TbER Take 650 mg by mouth every eight (8) hours.  furosemide (LASIX) 80 mg tablet TAKE 1 TABLET BY MOUTH EVERY DAY 90 Tab 3  
 multivitamins-minerals-lutein (MULTIVITAMIN 50 PLUS) tab tablet Take 1 Tab by mouth daily.  glucosamine 1,000 mg tab Take 2,000 mg by mouth daily.  digoxin (LANOXIN) 0.125 mg tablet Take 1 Tab by mouth daily. 30 Tab prn  sildenafil, antihypertensive, (REVATIO) 20 mg tablet TAKE 1 TABLET, ORAL, AS DIRECTED FOR SEXUAL ACTIVITY 20 Tab 11  
 cholecalciferol (VITAMIN D3) 1,000 unit cap Take 1,000 Units by mouth daily.  DOCOSAHEXANOIC ACID/EPA (FISH OIL PO) Take 1,200 mg by mouth two (2) times a day.  terazosin (HYTRIN) 5 mg capsule Take 5 mg by mouth nightly. No Known Allergies History reviewed. No pertinent family history. Patient Active Problem List  
 Diagnosis  Paroxysmal atrial fibrillation (HCC)  ASCVD (arteriosclerotic cardiovascular disease) Story: Old ASMI by EKG  Mixed hyperlipidemia  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, without long-term current use of insulin ([facility] [address] Story: Diet Controlled  Hypertension with renal disease  Stage 3 chronic kidney disease ([facility] [address]  Primary osteoarthritis involving multiple joints  Chronic non-seasonal allergic rhinitis  BPH (benign prostatic hyperplasia)  Polymyalgia rheumatica ([facility] [address]  Cellulitis of right lower extremity  Dizziness  Medicare annual wellness visit, subsequent  Elevated LFTs Comments: History of  Elevated CPK Comments: History of  Elevated PSA  CHF (congestive heart failure) ([facility] [address]  Nodule of right lung Story: Right  ED (erectile dysfunction)  Back pain Patient Care Team: 
Rhona Thakkar MD as PCP - General (Internal Medicine) Depression Risk Factor Screening: PHQ over the last two weeks 11/21/2018 Little interest or pleasure in doing things Not at all Feeling down, depressed, irritable, or hopeless Not at all Total Score PHQ 2 0 Alcohol Risk Factor Screening: You do not drink alcohol or very rarely. Functional Ability and Level of Safety:  
 
Fall Risk Fall Risk Assessment, last 12 mths 11/21/2018 Able to walk? Yes Fall in past 12 months? No  
Fall with injury? -  
Number of falls in past 12 months - Fall Risk Score - Hearing Loss  
mild Activities of Daily Living Self-care. ADL Assessment 4/24/2018 Feeding yourself No Help Needed Getting from bed to chair No Help Needed Getting dressed No Help Needed Bathing or showering No Help Needed Walk across the room (includes cane/walker) No Help Needed Using the telphone No Help Needed Taking your medications No Help Needed Preparing meals No Help Needed Managing money (expenses/bills) No Help Needed Moderately strenuous housework (laundry) No Help Needed Shopping for personal items (toiletries/medicines) No Help Needed Shopping for groceries No Help Needed Driving No Help Needed Climbing a flight of stairs No Help Needed Getting to places beyond walking distances No Help Needed Abuse Screen Patient is not abused Social History Social History Narrative  Not on file Review of Systems ROS: 
 
Constitutional: He denies fevers, weight loss, sweats. Eyes: No blurred or double vision. ENT: No difficulty with swallowing, taste, speech or smell. Neck: no stiffness or swelling Respiratory: No cough wheezing or shortness of breath. Cardiovascular: Denies chest pain, palpitations, unexplained indigestion or syncope. Gastrointestinal:  No changes in bowel movements, no abdominal pain, no bloating. Genitourinary:  He denies frequency, nocturia or stranguria. Extremities: No change of his chronic joint pain, stiffness or swelling. Neurological:  No numbness, tingling, burring paresthesias or loss of motor strength. No syncope, dizziness or frequent headache Lymphatic: no adenopathy noted Hematologic: no easy bruising or bleeding gums Skin:  No recent rashes or mole changes. Psychiatric/Behavioral:  Negative for depression. Physical Examination Evaluation of Cognitive Function: 
Mood/affect:  happy Appearance: age appropriate Family member/caregiver input: none Visit Vitals /60 (BP 1 Location: Left arm, BP Patient Position: Sitting) Pulse 77 Resp 16 Ht 6' 3\" (1.905 m) Wt 205 lb (93 kg) SpO2 98% BMI 25.62 kg/m² Vitals:  
 11/21/18 1019 BP: 110/60 Pulse: 77 Resp: 16 SpO2: 98% Weight: 205 lb (93 kg) Height: 6' 3\" (1.905 m) PainSc:   0 - No pain PHYSICAL EXAM: 
 
General appearance - alert, well appearing, and in no distress Mental status - alert, oriented to person, place, and time HEENT: 
Ears - bilateral TM's and external ear canals clear Eyes - pupillary responses were normal.  Extraocular muscle function intact. Lids and conjunctiva not injected. Fundoscopic exam revealed sharp disc margins. eye movements intact Pharynx- clear with teeth in good repair. No masses were noted Neck - supple without thyromegaly or burit. No JVD noted Lungs - clear to auscultation and percussion Cardiac- normal rate, regular rhythm without murmurs. PMI not displaced. No gallop, rub or click Abdomen - flat, soft, non-tender without palpable organomegaly or mass. No pulsatile mass was felt, and not bruit was heard. Bowel sounds were active : Circumcised, Testes descended w/o masses Rectal: normal sphincter tone, prostate 2+ enlarged smooth and nontender, no masses, stool brown and hemacult negative Extremities -  no clubbing cyanosis or edema Lymphatics - no palpable lymphadenopathy, no hepatosplenomegaly Hematologic: no petechiae or purpura Peripheral vascular -Femoral, Dorsalis pedis and posterior tibial pulses felt without difficulty Skin - no rash or unusual mole change noted Neurological - Cranial nerves II-XII grossly intact. Motor strength 5/5. DTR's 2+ and symmetric. Station and gait normal 
Back exam - full range of motion, no tenderness, palpable spasm or pain on motion Musculoskeletal - no joint tenderness, deformity or swelling Results for orders placed or performed in visit on 11/21/18 AMB POC COMPLETE CBC,AUTOMATED ENTER Result Value Ref Range WBC (POC)  4.1 - 10.9 K/uL  
 RBC (POC)  4.20 - 6.30 M/uL  
 HGB (POC)  12.0 - 18.0 g/dL HCT (POC)  37.0 - 51.0 %  
 MCV (POC)  80.0 - 97.0 fL  
 MCH (POC)  26.0 - 32.0 pg  
 MCHC (POC)  30.0 - 36.0 g/dL PLATELET (POC)  713.3 - 440.0 K/uL  
 ABS. LYMPHS (POC)  0.6 - 4.1 K/uL LYMPHOCYTES (POC)  10.0 - 58.5 % Mid # (POC)  0.0 - 1.8 K/uL MID% POC  0.1 - 24.0 %  
 ABS. GRANS (POC)  2.0 - 7.8 K/uL GRANULOCYTES (POC)  37.0 - 92.0 % AMB POC URINALYSIS DIP STICK AUTO W/ MICRO Result Value Ref Range Color (UA POC) Clarity (UA POC) Glucose (UA POC)  Negative Bilirubin (UA POC)  Negative Ketones (UA POC)  Negative Specific gravity (UA POC)  1.010 - 1.025 Blood (UA POC)  Negative pH (UA POC)  5.0 - 7.0 Protein (UA POC)  Negative Urobilinogen (UA POC)  0.2 - 1 Nitrites (UA POC)  Negative Leukocyte esterase (UA POC)  Negative Epithelial cells (UA POC) Mucus (UA POC) WBCs (UA POC) RBCs (UA POC) Casts (UA POC)  Negative Crystals (UA POC)  Negative Clue Cells (UA POC) Trichomonas (UA POC) Yeast (UA POC) Bacteria (UA POC)  Negative URINE CULT COMMENT (UA POC) AMB POC URINE, MICROALBUMIN, SEMIQUANT (1 RESULT) Result Value Ref Range Microalbumin urine (POC)  0 - 20 MG/L  
AMB POC SEDIMENTATION RATE, ERYTHROCYTE; NON AUTO Result Value Ref Range ESR (POC)  0 - 10 mm/hr Advice/Referrals/Counseling Education and counseling provided: 
Are appropriate based on today's review and evaluation End-of-Life planning (with patient's consent) Pneumococcal Vaccine Influenza Vaccine Colorectal cancer screening tests Assessment/Plan ASSESSMENT:  
1. Hypertension with renal disease 2. 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, without long-term current use of insulin ([address] 3. Mixed hyperlipidemia 4. Paroxysmal atrial fibrillation (HCC) 5. ASCVD (arteriosclerotic cardiovascular disease) 6. Primary osteoarthritis involving multiple joints 7. Stage 3 chronic kidney disease ([address] 8. Benign prostatic hyperplasia without lower urinary tract symptoms 9. Chronic non-seasonal allergic rhinitis 10. Chronic diastolic congestive heart failure ([address] 11. Elevated PSA 12. Polymyalgia rheumatica ([address] 13. Medicare annual wellness visit, subsequent Impression 1. Hypertension that is controlled continue current therapy reviewed with him 2. Diabetes repeat status pending and probably reviewed on make further recommendations or adjustments are necessary. 3   Hyperlipidemia prior lab reviewed repeat status pending adjust if needed. 4.  Atrial fibrillation EKG done today reveals normal sinus rhythm continue chronic digoxin and Coumadin 5. ASCVD clinically stable and EKG today shows no acute process 6. DJD that is stable 7. CKD stage III repeat status is pending 8. BPH currently asymptomatic and no change in exam 
9. Allergic rhinitis stable 10. Compensated CHF stable today 11. Prior elevated PSA repeat status pending 12. Polymyalgia rheumatica sed rate is pending he is still on prednisone and will see if we need to adjust based upon today's lab. Medicare subsequent annual wellness examination screening questionnaire was completed today. The results were reviewed with him and his questions were answered. Lifestyle recommendations and modifications discussed and made. I will call the lab results and make further recommendations or adjustments if necessary. Follow-up as scheduled again for 1 month regarding spelled melodramatic in 3 months regarding his other medical problems. PLAN: 
. Orders Placed This Encounter  T4, FREE  
 METABOLIC PANEL, COMPREHENSIVE  
 TSH 3RD GENERATION  
 LIPID PANEL  
 CK  
 HEMOGLOBIN A1C WITH EAG  
 PROTHROMBIN TIME + INR  
 PROSTATE SPECIFIC AG  
 AMB POC COMPLETE CBC,AUTOMATED ENTER  AMB POC URINALYSIS DIP STICK AUTO W/ MICRO  AMB POC URINE, MICROALBUMIN, SEMIQUANT (1 RESULT)  AMB POC SEDIMENTATION RATE, ERYTHROCYTE; NON AUTO  AMB POC EKG ROUTINE W/ 12 LEADS, INTER & REP  
 
 
 
ATTENTION:  
This medical record was transcribed using an electronic medical records system.   Although proofread, it may and can contain electronic and spelling errors. Other human spelling and other errors may be present. Corrections may be executed at a later time. Please feel free to contact us for any clarifications as needed. Follow-up Disposition: 
Return in about 3 months (around 2/21/2019). Viktor García MD 
 
Recommended healthy diet low in carbohydrates, fats, sodium and cholesterol. Recommended regular cardiovascular exercise 3-6 times per week for 30-60 minutes daily.

## 2018-11-21 ENCOUNTER — OFFICE VISIT (OUTPATIENT)
Dept: INTERNAL MEDICINE CLINIC | Age: 83
End: 2018-11-21

## 2018-11-21 VITALS
DIASTOLIC BLOOD PRESSURE: 60 MMHG | SYSTOLIC BLOOD PRESSURE: 110 MMHG | WEIGHT: 205 LBS | HEIGHT: 75 IN | RESPIRATION RATE: 16 BRPM | BODY MASS INDEX: 25.49 KG/M2 | OXYGEN SATURATION: 98 % | HEART RATE: 77 BPM

## 2018-11-21 DIAGNOSIS — I12.9 HYPERTENSION WITH RENAL DISEASE: Primary | ICD-10-CM

## 2018-11-21 DIAGNOSIS — E78.2 MIXED HYPERLIPIDEMIA: ICD-10-CM

## 2018-11-21 DIAGNOSIS — M15.9 PRIMARY OSTEOARTHRITIS INVOLVING MULTIPLE JOINTS: ICD-10-CM

## 2018-11-21 DIAGNOSIS — N18.30 STAGE 3 CHRONIC KIDNEY DISEASE (HCC): ICD-10-CM

## 2018-11-21 DIAGNOSIS — R97.20 ELEVATED PSA: ICD-10-CM

## 2018-11-21 DIAGNOSIS — N18.30 CONTROLLED TYPE 2 DIABETES MELLITUS WITH STAGE 3 CHRONIC KIDNEY DISEASE, WITHOUT LONG-TERM CURRENT USE OF INSULIN (HCC): ICD-10-CM

## 2018-11-21 DIAGNOSIS — Z00.00 MEDICARE ANNUAL WELLNESS VISIT, SUBSEQUENT: ICD-10-CM

## 2018-11-21 DIAGNOSIS — M35.3 POLYMYALGIA RHEUMATICA (HCC): ICD-10-CM

## 2018-11-21 DIAGNOSIS — I48.0 PAROXYSMAL ATRIAL FIBRILLATION (HCC): ICD-10-CM

## 2018-11-21 DIAGNOSIS — J30.89 CHRONIC NON-SEASONAL ALLERGIC RHINITIS: ICD-10-CM

## 2018-11-21 DIAGNOSIS — I25.10 ASCVD (ARTERIOSCLEROTIC CARDIOVASCULAR DISEASE): ICD-10-CM

## 2018-11-21 DIAGNOSIS — E11.22 CONTROLLED TYPE 2 DIABETES MELLITUS WITH STAGE 3 CHRONIC KIDNEY DISEASE, WITHOUT LONG-TERM CURRENT USE OF INSULIN (HCC): ICD-10-CM

## 2018-11-21 DIAGNOSIS — I50.32 CHRONIC DIASTOLIC CONGESTIVE HEART FAILURE (HCC): ICD-10-CM

## 2018-11-21 DIAGNOSIS — N40.0 BENIGN PROSTATIC HYPERPLASIA WITHOUT LOWER URINARY TRACT SYMPTOMS: ICD-10-CM

## 2018-11-21 LAB
BACTERIA UA POCT, BACTPOCT: NORMAL
BILIRUB UR QL STRIP: NEGATIVE
CASTS UA POCT: NORMAL
CLUE CELLS, CLUEPOCT: NEGATIVE
CRYSTALS UA POCT, CRYSPOCT: NEGATIVE
EPITHELIAL CELLS POCT: NEGATIVE
ERYTHROCYTE [SEDIMENTATION RATE] IN BLOOD: 37 MM/HR (ref 0–10)
GLUCOSE UR-MCNC: NEGATIVE MG/DL
GRAN# POC: 4.8 K/UL (ref 2–7.8)
GRAN% POC: 70.8 % (ref 37–92)
HCT VFR BLD CALC: 37.9 % (ref 37–51)
HGB BLD-MCNC: 12.7 G/DL (ref 12–18)
KETONES P FAST UR STRIP-MCNC: NEGATIVE MG/DL
LY# POC: 1.7 K/UL (ref 0.6–4.1)
LY% POC: 25.7 % (ref 10–58.5)
MCH RBC QN: 32.2 PG (ref 26–32)
MCHC RBC-ENTMCNC: 33.5 G/DL (ref 30–36)
MCV RBC: 96 FL (ref 80–97)
MICROALBUMIN UR TEST STR-MCNC: NEGATIVE MG/L (ref 0–20)
MID #, POC: 0.2 K/UL (ref 0–1.8)
MID% POC: 3.5 % (ref 0.1–24)
MUCUS UA POCT, MUCPOCT: NORMAL
PH UR STRIP: 6.5 [PH] (ref 5–7)
PLATELET # BLD: 231 K/UL (ref 140–440)
PROT UR QL STRIP: NEGATIVE
RBC # BLD: 3.94 M/UL (ref 4.2–6.3)
RBC UA POCT, RBCPOCT: 0
SP GR UR STRIP: 1.01 (ref 1.01–1.02)
TRICH UA POCT, TRICHPOC: NEGATIVE
UA UROBILINOGEN AMB POC: NORMAL (ref 0.2–1)
URINALYSIS CLARITY POC: CLEAR
URINALYSIS COLOR POC: YELLOW
URINE BLOOD POC: NEGATIVE
URINE CULT COMMENT, POCT: NORMAL
URINE LEUKOCYTES POC: NEGATIVE
URINE NITRITES POC: NEGATIVE
WBC # BLD: 6.7 K/UL (ref 4.1–10.9)
WBC UA POCT, WBCPOCT: NORMAL
YEAST UA POCT, YEASTPOC: NEGATIVE

## 2018-11-21 NOTE — PROGRESS NOTES
Chief Complaint Patient presents with [facility] Annual Wellness Visit 1. Have you been to the ER, urgent care clinic since your last visit? Hospitalized since your last visit? No 
 
2. Have you seen or consulted any other health care providers outside of the [facility] since your last visit? Include any pap smears or colon screening. No 
Visit Vitals /60 (BP 1 Location: Left arm, BP Patient Position: Sitting) Pulse 77 Resp 16 Ht 6' 3\" (1.905 m) Wt 205 lb (93 kg) SpO2 98% BMI 25.62 kg/m²

## 2018-11-21 NOTE — PATIENT INSTRUCTIONS
Arthritis: Care Instructions Your Care Instructions Arthritis, also called osteoarthritis, is a breakdown of the cartilage that cushions your joints. When the cartilage wears down, your bones rub against each other. This causes pain and stiffness. Many people have some arthritis as they age. Arthritis most often affects the joints of the spine, hands, hips, knees, or feet. You can take simple measures to protect your joints, ease your pain, and help you stay active. Follow-up care is a key part of your treatment and safety. Be sure to make and go to all appointments, and call your doctor if you are having problems. It's also a good idea to know your test results and keep a list of the medicines you take. How can you care for yourself at home? · Stay at a healthy weight. Being overweight puts extra strain on your joints. · Talk to your doctor or physical therapist about exercises that will help ease joint pain. ? Stretch. You may enjoy gentle forms of yoga to help keep your joints and muscles flexible. ? Walk instead of jog. Other types of exercise that are less stressful on the joints include riding a bicycle, swimming, ashley chi, or water exercise. ? Lift weights. Strong muscles help reduce stress on your joints. Stronger thigh muscles, for example, take some of the stress off of the knees and hips. Learn the right way to lift weights so you do not make joint pain worse. · Take your medicines exactly as prescribed. Call your doctor if you think you are having a problem with your medicine. · Take pain medicines exactly as directed. ? If the doctor gave you a prescription medicine for pain, take it as prescribed. ? If you are not taking a prescription pain medicine, ask your doctor if you can take an over-the-counter medicine. · Use a cane, crutch, walker, or another device if you need help to get around. These can help rest your joints.  You also can use other things to make life easier, such as a higher toilet seat and padded handles on kitchen utensils. · Do not sit in low chairs, which can make it hard to get up. · Put heat or cold on your sore joints as needed. Use whichever helps you most. You also can take turns with hot and cold packs. ? Apply heat 2 or 3 times a day for 20 to 30 minutesusing a heating pad, hot shower, or hot packto relieve pain and stiffness. ? Put ice or a cold pack on your sore joint for 10 to 20 minutes at a time. Put a thin cloth between the ice and your skin. When should you call for help? Call your doctor now or seek immediate medical care if: 
  · You have sudden swelling, warmth, or pain in any joint.  
  · You have joint pain and a fever or rash.  
  · You have such bad pain that you cannot use a joint.  
 Watch closely for changes in your health, and be sure to contact your doctor if: 
  · You have mild joint symptoms that continue even with more than 6 weeks of care at home.  
  · You have stomach pain or other problems with your medicine. Where can you learn more? Go to http://isra-rhonda.info/. Enter X907 in the search box to learn more about \"Arthritis: Care Instructions. \" Current as of: June 11, 2018 Content Version: 11.8 © 1597-7073 [employer]. Care instructions adapted under license by [employer] (which disclaims liability or warranty for this information). If you have questions about a medical condition or this instruction, always ask your healthcare professional. [place] [address] any warranty or liability for your use of this information.

## 2018-11-22 LAB
ALBUMIN SERPL-MCNC: 4.2 G/DL (ref 3.5–4.7)
ALBUMIN/GLOB SERPL: 1.7 {RATIO} (ref 1.2–2.2)
ALP SERPL-CCNC: 48 IU/L (ref 39–117)
ALT SERPL-CCNC: 23 IU/L (ref 0–44)
AST SERPL-CCNC: 24 IU/L (ref 0–40)
BILIRUB SERPL-MCNC: 0.3 MG/DL (ref 0–1.2)
BUN SERPL-MCNC: 26 MG/DL (ref 8–27)
BUN/CREAT SERPL: 21 (ref 10–24)
CALCIUM SERPL-MCNC: 9.5 MG/DL (ref 8.6–10.2)
CHLORIDE SERPL-SCNC: 104 MMOL/L (ref 96–106)
CHOLEST SERPL-MCNC: 222 MG/DL (ref 100–199)
CK SERPL-CCNC: 110 U/L (ref 24–204)
CO2 SERPL-SCNC: 29 MMOL/L (ref 20–29)
CREAT SERPL-MCNC: 1.23 MG/DL (ref 0.76–1.27)
EST. AVERAGE GLUCOSE BLD GHB EST-MCNC: 169 MG/DL
GLOBULIN SER CALC-MCNC: 2.5 G/DL (ref 1.5–4.5)
GLUCOSE SERPL-MCNC: 150 MG/DL (ref 65–99)
HBA1C MFR BLD: 7.5 % (ref 4.8–5.6)
HDLC SERPL-MCNC: 48 MG/DL
INR PPP: 1.4 (ref 0.8–1.2)
LDLC SERPL CALC-MCNC: 116 MG/DL (ref 0–99)
POTASSIUM SERPL-SCNC: 4.3 MMOL/L (ref 3.5–5.2)
PROT SERPL-MCNC: 6.7 G/DL (ref 6–8.5)
PROTHROMBIN TIME: 14.1 SEC (ref 9.1–12)
PSA SERPL-MCNC: 6.3 NG/ML (ref 0–4)
SODIUM SERPL-SCNC: 145 MMOL/L (ref 134–144)
T4 FREE SERPL-MCNC: 1.14 NG/DL (ref 0.82–1.77)
TRIGL SERPL-MCNC: 290 MG/DL (ref 0–149)
TSH SERPL DL<=0.005 MIU/L-ACNC: 1.33 UIU/ML (ref 0.45–4.5)
VLDLC SERPL CALC-MCNC: 58 MG/DL (ref 5–40)

## 2018-11-23 ENCOUNTER — TELEPHONE ANTICOAG (OUTPATIENT)
Dept: INTERNAL MEDICINE CLINIC | Age: 83
End: 2018-11-23

## 2018-11-23 DIAGNOSIS — I48.0 PAROXYSMAL ATRIAL FIBRILLATION (HCC): Primary | ICD-10-CM

## 2018-11-23 NOTE — PROGRESS NOTES
INR is low so I need to know his dose of Coumadin to be able to adjusted. Sed rate is still up some issues still taking prednisone 20 every day. All the labs are stable.

## 2018-11-23 NOTE — PROGRESS NOTES
Anticoagulation Summary  As of 2018    INR goal:   2.0-3.0   TTR:   0.0 % (8 mo)   INR used for dosin.4! (2018)   Warfarin maintenance plan:   10 mg (5 mg x 2) on Mon, Fri; 7.5 mg (5 mg x 1.5) all other days   Weekly warfarin total:   57.5 mg   Plan last modified:   Leonor Robb RN (2018)   Next INR check:   2018   Target end date:       Indications    Paroxysmal atrial fibrillation ([facility] [address] (Primary) [I48.0]             Anticoagulation Episode Summary     INR check location:   Clinic Lab    Preferred lab:       Send INR reminders to:       Comments:         Anticoagulation Care Providers     Provider Role Specialty Phone number    Beltran Mcclain MD Responsible Internal Medicine [phone]        Advised patient that his INR was 1.4. Need to increase his blood thinner dose to 10 mg on Monday and Friday; 7.5 mg all other days. F/up as scheduled.

## 2018-11-27 ENCOUNTER — TELEPHONE (OUTPATIENT)
Dept: INTERNAL MEDICINE CLINIC | Age: 83
End: 2018-11-27

## 2018-11-27 NOTE — TELEPHONE ENCOUNTER
----- Message from Sameera Busch MD sent at 11/23/2018 11:13 AM EST -----  INR is low so I need to know his dose of Coumadin to be able to adjusted. Sed rate is still up some issues still taking prednisone 20 every day. All the labs are stable.

## 2018-12-20 NOTE — PROGRESS NOTES
Chief Complaint   Patient presents with    Hypertension     ONE MONTH FOLLOW UP        SUBJECTIVE:    Samanta Guillermo is a 80 y.o. male who returns in follow-up for his medical problems to include polymyalgia rheumatica CKD stage III, paroxysmal atrial fibrillation, CHF compensated and other medical problems. He is taking his medications and trying to follow his diet and seems to be getting along okay he does note a little stiffness in his ankles but nothing that seems to be severe. He is getting around okay. He denies any fevers or chills. He denies any chest pain, shortness breath, palpitations, PND, orthopnea or other cardiovascular complaints except some swelling in his ankles left greater than right. He notes no other complaints with his arthritis and is getting around better with the prednisone that he was before      Current Outpatient Medications   Medication Sig Dispense Refill    predniSONE (DELTASONE) 20 mg tablet TAKE 1 TABLET BY MOUTH EVERY DAY 30 Tab 2    metFORMIN (GLUCOPHAGE) 500 mg tablet Take 1 Tab by mouth daily (with breakfast). 90 Tab 3    diclofenac EC (VOLTAREN) 75 mg EC tablet TAKE 1 TABLET BY MOUTH TWICE A  Tab 3    lisinopril (PRINIVIL, ZESTRIL) 5 mg tablet TAKE 1 TABLET EVERY DAY 90 Tab 3    warfarin (COUMADIN) 5 mg tablet Take one and a half tablets every day. (Patient taking differently: Take one and a half tablets every day.) 145 Tab 3    fexofenadine (ALLEGRA) 180 mg tablet Take  by mouth.  acetaminophen (TYLENOL ARTHRITIS PAIN) 650 mg TbER Take 650 mg by mouth every eight (8) hours.  furosemide (LASIX) 80 mg tablet TAKE 1 TABLET BY MOUTH EVERY DAY 90 Tab 3    multivitamins-minerals-lutein (MULTIVITAMIN 50 PLUS) tab tablet Take 1 Tab by mouth daily.  glucosamine 1,000 mg tab Take 2,000 mg by mouth daily.  digoxin (LANOXIN) 0.125 mg tablet Take 1 Tab by mouth daily.  30 Tab prn    sildenafil, antihypertensive, (REVATIO) 20 mg tablet TAKE 1 TABLET, ORAL, AS DIRECTED FOR SEXUAL ACTIVITY 20 Tab 11    cholecalciferol (VITAMIN D3) 1,000 unit cap Take 1,000 Units by mouth daily.  DOCOSAHEXANOIC ACID/EPA (FISH OIL PO) Take 1,200 mg by mouth two (2) times a day.  terazosin (HYTRIN) 5 mg capsule Take 5 mg by mouth nightly. Past Medical History:   Diagnosis Date    Allergic rhinitis 9/20/2017    Arthritis     ASCVD (arteriosclerotic cardiovascular disease) 9/20/2017    Story: Old ASMI by EKG    Back pain 9/20/2017    BPH (benign prostatic hyperplasia) 9/20/2017    CHF (congestive heart failure) ([address] 9/20/2017    CKD (chronic kidney disease) 9/20/2017    Diabetic acetonemia ([address]     DM (diabetes mellitus) ([address] 9/20/2017    Story: Diet Controlled    ED (erectile dysfunction) 9/20/2017    Edema 9/20/2017    Elevated CPK 9/20/2017    Comments: History of    Elevated LFTs 9/20/2017    Comments: History of    Elevated PSA 9/20/2017    Hypercholesteremia     Hyperlipidemia 9/20/2017    Hypertension     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    Nodule of right lung 9/20/2017    Story: Right    Polymyalgia ([address] 9/20/2017    Prostate enlargement      Past Surgical History:   Procedure Laterality Date    ABDOMEN SURGERY PROC UNLISTED      hernia repair    HX HEENT  06/12/2018    Dr. 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   Mild swelling in ankles left greater than right  Gastrointestinal: negative for - abdominal pain, blood in stools, heartburn or nausea/vomiting  Genito-Urinary: no dysuria, trouble voiding, or hematuria  Musculoskeletal: negative for - gait disturbance, joint stiffness or joint swelling. Positive improvement of joint pain with stiffness in his feet  Neurological: no TIA or stroke symptoms  Hematologic: no bruises, no bleeding  Lymphatic: no swollen glands  Integument: no lumps, mole changes, nail changes or rash  Endocrine:no malaise/lethargy poly uria or polydipsia or unexpected weight changes        Social History     Socioeconomic History    Marital status:      Spouse name: Not on file    Number of children: Not on file    Years of education: Not on file    Highest education level: Not on file   Tobacco Use    Smoking status: Never Smoker    Smokeless tobacco: Never Used   Substance and Sexual Activity    Alcohol use: No    Drug use: No    Sexual activity: No     History reviewed. No pertinent family history. OBJECTIVE:     Visit Vitals  /60 (BP 1 Location: Left arm, BP Patient Position: Sitting)   Pulse 77   Resp 18   Ht 6' 3\" (1.905 m)   Wt 204 lb (92.5 kg)   SpO2 96%   BMI 25.50 kg/m²     CONSTITUTIONAL:   well nourished, appears age appropriate  EYES: sclera anicteric, PERRL, EOMI  ENMT:nares clear, moist mucous membranes, pharynx clear  NECK: supple. Thyroid normal, No JVD or bruits  RESPIRATORY: Chest: clear to ascultation and percussion, normal inspiratory effort  CARDIOVASCULAR: Heart: regular rate and rhythm no murmurs, rubs or gallops, PMI not displaced, No thrills. 1+ edema left leg  GASTROINTESTINAL: Abdomen: non distended, soft, non tender, bowel sounds normal  HEMATOLOGIC: no purpura, petechiae or bruising  LYMPHATIC: No lymph node enlargemant  MUSCULOSKELETAL: Extremities: no active synovitis, pulse 1+   INTEGUMENT: No unusual rashes or suspicious skin lesions noted.  Nails appear normal.  PERIPHERAL VASCULAR: normal pulses femoral, PT and DP  NEUROLOGIC: non-focal exam, A & O X 3  PSYCHIATRIC:, appropriate affect     ASSESSMENT:   1. Polymyalgia rheumatica (HCC)    2. Paroxysmal atrial fibrillation ([facility]    3. Hypertension with renal disease    4. Stage 3 chronic kidney disease ([facility]    5. 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 Polymyalgia rheumatica prednisone dose currently 20 we will see the sed rate looks like  2. Paroxysmal atrial fibrillation that is stable INR is pending  3. Hypertension that is controlled  4. CKD repeat status pending  5. Diabetes we will see what that status is on prednisone 20  I recheck him again myself in 1 month    PLAN:  .  Orders Placed This Encounter    PROTHROMBIN TIME + INR    METABOLIC PANEL, BASIC    AMB POC SEDIMENTATION RATE, ERYTHROCYTE; NON AUTO         ATTENTION:   This medical record was transcribed using an electronic medical records system. Although proofread, it may and can contain electronic and spelling errors. Other human spelling and other errors may be present. Corrections may be executed at a later time. Please feel free to contact us for any clarifications as needed. Follow-up Disposition:  Return in about 4 weeks (around 1/18/2019). No results found for any visits on 12/21/18. Jag Fisher MD    The patient verbalized understanding of the problems and plans as explained.

## 2018-12-21 ENCOUNTER — OFFICE VISIT (OUTPATIENT)
Dept: INTERNAL MEDICINE CLINIC | Age: 83
End: 2018-12-21

## 2018-12-21 VITALS
HEART RATE: 77 BPM | SYSTOLIC BLOOD PRESSURE: 132 MMHG | DIASTOLIC BLOOD PRESSURE: 60 MMHG | RESPIRATION RATE: 18 BRPM | WEIGHT: 204 LBS | OXYGEN SATURATION: 96 % | BODY MASS INDEX: 25.36 KG/M2 | HEIGHT: 75 IN

## 2018-12-21 DIAGNOSIS — I12.9 HYPERTENSION WITH RENAL DISEASE: ICD-10-CM

## 2018-12-21 DIAGNOSIS — N18.30 CONTROLLED TYPE 2 DIABETES MELLITUS WITH STAGE 3 CHRONIC KIDNEY DISEASE, WITHOUT LONG-TERM CURRENT USE OF INSULIN (HCC): ICD-10-CM

## 2018-12-21 DIAGNOSIS — E11.22 CONTROLLED TYPE 2 DIABETES MELLITUS WITH STAGE 3 CHRONIC KIDNEY DISEASE, WITHOUT LONG-TERM CURRENT USE OF INSULIN (HCC): ICD-10-CM

## 2018-12-21 DIAGNOSIS — N18.30 STAGE 3 CHRONIC KIDNEY DISEASE (HCC): ICD-10-CM

## 2018-12-21 DIAGNOSIS — I48.0 PAROXYSMAL ATRIAL FIBRILLATION (HCC): ICD-10-CM

## 2018-12-21 DIAGNOSIS — M35.3 POLYMYALGIA RHEUMATICA (HCC): Primary | ICD-10-CM

## 2018-12-21 LAB — ERYTHROCYTE [SEDIMENTATION RATE] IN BLOOD: 33 MM/HR (ref 0–10)

## 2018-12-21 NOTE — PATIENT INSTRUCTIONS
Arthritis: Care Instructions  Your Care Instructions  Arthritis, also called osteoarthritis, is a breakdown of the cartilage that cushions your joints. When the cartilage wears down, your bones rub against each other. This causes pain and stiffness. Many people have some arthritis as they age. Arthritis most often affects the joints of the spine, hands, hips, knees, or feet. You can take simple measures to protect your joints, ease your pain, and help you stay active. Follow-up care is a key part of your treatment and safety. Be sure to make and go to all appointments, and call your doctor if you are having problems. It's also a good idea to know your test results and keep a list of the medicines you take. How can you care for yourself at home? · Stay at a healthy weight. Being overweight puts extra strain on your joints. · Talk to your doctor or physical therapist about exercises that will help ease joint pain. ? Stretch. You may enjoy gentle forms of yoga to help keep your joints and muscles flexible. ? Walk instead of jog. Other types of exercise that are less stressful on the joints include riding a bicycle, swimming, ashley chi, or water exercise. ? Lift weights. Strong muscles help reduce stress on your joints. Stronger thigh muscles, for example, take some of the stress off of the knees and hips. Learn the right way to lift weights so you do not make joint pain worse. · Take your medicines exactly as prescribed. Call your doctor if you think you are having a problem with your medicine. · Take pain medicines exactly as directed. ? If the doctor gave you a prescription medicine for pain, take it as prescribed. ? If you are not taking a prescription pain medicine, ask your doctor if you can take an over-the-counter medicine. · Use a cane, crutch, walker, or another device if you need help to get around. These can help rest your joints.  You also can use other things to make life easier, such as a higher toilet seat and padded handles on kitchen utensils. · Do not sit in low chairs, which can make it hard to get up. · Put heat or cold on your sore joints as needed. Use whichever helps you most. You also can take turns with hot and cold packs. ? Apply heat 2 or 3 times a day for 20 to 30 minutesusing a heating pad, hot shower, or hot packto relieve pain and stiffness. ? Put ice or a cold pack on your sore joint for 10 to 20 minutes at a time. Put a thin cloth between the ice and your skin. When should you call for help? Call your doctor now or seek immediate medical care if:    · You have sudden swelling, warmth, or pain in any joint.     · You have joint pain and a fever or rash.     · You have such bad pain that you cannot use a joint.    Watch closely for changes in your health, and be sure to contact your doctor if:    · You have mild joint symptoms that continue even with more than 6 weeks of care at home.     · You have stomach pain or other problems with your medicine. Where can you learn more? Go to http://isra-rhonda.info/. Enter Y202 in the search box to learn more about \"Arthritis: Care Instructions. \"  Current as of: June 11, 2018  Content Version: 11.8  © 9146-7282 [employer]. Care instructions adapted under license by [employer] (which disclaims liability or warranty for this information). If you have questions about a medical condition or this instruction, always ask your healthcare professional. [place] [address] any warranty or liability for your use of this information.

## 2018-12-21 NOTE — PROGRESS NOTES
Chief Complaint   Patient presents with    Hypertension     ONE MONTH FOLLOW UP      1. Have you been to the ER, urgent care clinic since your last visit? Hospitalized since your last visit? No    2. Have you seen or consulted any other health care providers outside of the [address] [place] [address] since your last visit? Include any pap smears or colon screening.  No  Visit Vitals  /60 (BP 1 Location: Left arm, BP Patient Position: Sitting)   Pulse 77   Resp 18   Ht 6' 3\" (1.905 m)   Wt 204 lb (92.5 kg)   SpO2 96%   BMI 25.50 kg/m²

## 2018-12-22 LAB
BUN SERPL-MCNC: 30 MG/DL (ref 8–27)
BUN/CREAT SERPL: 26 (ref 10–24)
CALCIUM SERPL-MCNC: 9.6 MG/DL (ref 8.6–10.2)
CHLORIDE SERPL-SCNC: 103 MMOL/L (ref 96–106)
CO2 SERPL-SCNC: 24 MMOL/L (ref 20–29)
CREAT SERPL-MCNC: 1.15 MG/DL (ref 0.76–1.27)
GLUCOSE SERPL-MCNC: 200 MG/DL (ref 65–99)
INR PPP: 2.1 (ref 0.8–1.2)
POTASSIUM SERPL-SCNC: 5.3 MMOL/L (ref 3.5–5.2)
PROTHROMBIN TIME: 20.7 SEC (ref 9.1–12)
SODIUM SERPL-SCNC: 141 MMOL/L (ref 134–144)

## 2018-12-24 NOTE — PROGRESS NOTES
INR is good, sed rate is still elevated but overall improved with less try decreasing the prednisone to 10 mg a day. Blood sugar is high and that should improve with a decrease of the prednisone.

## 2018-12-26 ENCOUNTER — TELEPHONE ANTICOAG (OUTPATIENT)
Dept: INTERNAL MEDICINE CLINIC | Age: 83
End: 2018-12-26

## 2018-12-26 DIAGNOSIS — I48.0 PAROXYSMAL ATRIAL FIBRILLATION (HCC): Primary | ICD-10-CM

## 2018-12-26 NOTE — PROGRESS NOTES
Anticoagulation Episode Summary     Current INR goal:   2.0-3.0   TTR:   1.6 % (9 mo)   Next INR check:   1/21/2019   INR from last check:   2.1 (12/21/2018)   Weekly max warfarin dose:      Target end date:      INR check location:   Clinic Lab   Preferred lab:      Send INR reminders to: Indications    Paroxysmal atrial fibrillation ([facility] (Primary) [I48.0]           Comments:            Anticoagulation Care Providers     Provider Role Specialty Phone number    Linda Villalta MD [facility] [phone]          Patient informed that Dr. Gurmeet Rodriguez reviewed INR result and stated INR is good. Continue blood thinner at 10mg on Mon and Fri; 7.5mg on all other days. To follow up as scheduled.

## 2018-12-26 NOTE — PROGRESS NOTES
Patient informed that Dr. Julia Borja reviewed labs and stated INR is good, sed rate is still elevated but overall improved with let's try decreasing the prednisone to 10 mg a day.  Blood sugar is high and that should improve with a decrease of the prednisone. Patient states will begin Prednisone 10mg tomorrow because he has already taken it today. He is to return for follow up on 01-.

## 2019-01-01 NOTE — PROGRESS NOTES
INR is essentially not elevated also what Coumadin dose is he really taking. Discussed with Dr. Terri Tijerina and wants patient to increase his blood thinner dose to 7.5 mg everyday except for 5 mg on Monday and Friday. no

## 2019-01-08 DIAGNOSIS — N52.9 ERECTILE DYSFUNCTION, UNSPECIFIED ERECTILE DYSFUNCTION TYPE: ICD-10-CM

## 2019-01-08 RX ORDER — SILDENAFIL CITRATE 20 MG/1
TABLET ORAL
Qty: 20 TAB | Refills: 11 | Status: SHIPPED | OUTPATIENT
Start: 2019-01-08 | End: 2020-03-17 | Stop reason: ALTCHOICE

## 2019-01-08 NOTE — TELEPHONE ENCOUNTER
RX refill request from the patient/pharmacy. Patient last seen 12- with labs, and next appt. scheduled for 12-  Requested Prescriptions     Pending Prescriptions Disp Refills    sildenafil, antihypertensive, (REVATIO) 20 mg tablet [Pharmacy Med Name: *SILDENAFIL 20MG] 20 Tab 11     Sig: TAKE 1 TABLET, ORAL, AS DIRECTED FOR SEXUAL ACTIVITY   .

## 2019-01-21 ENCOUNTER — OFFICE VISIT (OUTPATIENT)
Dept: INTERNAL MEDICINE CLINIC | Age: 84
End: 2019-01-21

## 2019-01-21 VITALS
HEIGHT: 75 IN | OXYGEN SATURATION: 97 % | HEART RATE: 93 BPM | RESPIRATION RATE: 18 BRPM | SYSTOLIC BLOOD PRESSURE: 120 MMHG | DIASTOLIC BLOOD PRESSURE: 60 MMHG | BODY MASS INDEX: 25.44 KG/M2 | WEIGHT: 204.6 LBS

## 2019-01-21 DIAGNOSIS — N18.30 CONTROLLED TYPE 2 DIABETES MELLITUS WITH STAGE 3 CHRONIC KIDNEY DISEASE, WITHOUT LONG-TERM CURRENT USE OF INSULIN (HCC): ICD-10-CM

## 2019-01-21 DIAGNOSIS — I48.0 PAROXYSMAL ATRIAL FIBRILLATION (HCC): ICD-10-CM

## 2019-01-21 DIAGNOSIS — I12.9 HYPERTENSION WITH RENAL DISEASE: ICD-10-CM

## 2019-01-21 DIAGNOSIS — I50.32 CHRONIC DIASTOLIC CONGESTIVE HEART FAILURE (HCC): ICD-10-CM

## 2019-01-21 DIAGNOSIS — E11.22 CONTROLLED TYPE 2 DIABETES MELLITUS WITH STAGE 3 CHRONIC KIDNEY DISEASE, WITHOUT LONG-TERM CURRENT USE OF INSULIN (HCC): ICD-10-CM

## 2019-01-21 DIAGNOSIS — N18.30 STAGE 3 CHRONIC KIDNEY DISEASE (HCC): ICD-10-CM

## 2019-01-21 DIAGNOSIS — M35.3 POLYMYALGIA RHEUMATICA (HCC): Primary | ICD-10-CM

## 2019-01-21 NOTE — PATIENT INSTRUCTIONS

## 2019-01-21 NOTE — PROGRESS NOTES
Chief Complaint Patient presents with  Hypertension 1 month follow up  Diabetes  Chronic Kidney Disease SUBJECTIVE: 
 
Cici Alfaro is a 80 y.o. male who returns in f/u of his medical problems to include PMR, HTN, CKD, PAF, DM and other problems. On his last visit 1 month ago he was doing well so his Prednisone was decreased to 10 mg despite his ESR of 33. Since the decrease in prednisone he is noted no increase of his symptoms. His energy and his legs seem to be the same he is noted pains. He denies any chest pain, shortness of breath, palpitations, PND, orthopnea or other cardiorespiratory complaints. He denies any GI or  complaints. He denies any headaches, dizziness or neurologic complaints. There are no current arthritic complaints and he has no other complaints on complete review of systems. Current Outpatient Medications Medication Sig Dispense Refill  sildenafil, antihypertensive, (REVATIO) 20 mg tablet TAKE 1 TABLET, ORAL, AS DIRECTED FOR SEXUAL ACTIVITY 20 Tab 11  
 predniSONE (DELTASONE) 20 mg tablet TAKE 1 TABLET BY MOUTH EVERY DAY (Patient taking differently: TAKE 1.5 TABLET BY MOUTH EVERY DAY) 30 Tab 2  
 metFORMIN (GLUCOPHAGE) 500 mg tablet Take 1 Tab by mouth daily (with breakfast). 90 Tab 3  
 diclofenac EC (VOLTAREN) 75 mg EC tablet TAKE 1 TABLET BY MOUTH TWICE A  Tab 3  
 lisinopril (PRINIVIL, ZESTRIL) 5 mg tablet TAKE 1 TABLET EVERY DAY 90 Tab 3  warfarin (COUMADIN) 5 mg tablet Take one and a half tablets every day. (Patient taking differently: Take one and a half tablets every day.) 145 Tab 3  
 fexofenadine (ALLEGRA) 180 mg tablet Take  by mouth.  acetaminophen (TYLENOL ARTHRITIS PAIN) 650 mg TbER Take 650 mg by mouth every eight (8) hours.  furosemide (LASIX) 80 mg tablet TAKE 1 TABLET BY MOUTH EVERY DAY 90 Tab 3  
 multivitamins-minerals-lutein (MULTIVITAMIN 50 PLUS) tab tablet Take 1 Tab by mouth daily.  glucosamine 1,000 mg tab Take 2,000 mg by mouth daily.  digoxin (LANOXIN) 0.125 mg tablet Take 1 Tab by mouth daily. 30 Tab prn  cholecalciferol (VITAMIN D3) 1,000 unit cap Take 1,000 Units by mouth daily.  DOCOSAHEXANOIC ACID/EPA (FISH OIL PO) Take 1,200 mg by mouth two (2) times a day.  terazosin (HYTRIN) 5 mg capsule Take 5 mg by mouth nightly. Past Medical History:  
Diagnosis Date  Allergic rhinitis 9/20/2017  Arthritis  ASCVD (arteriosclerotic cardiovascular disease) 9/20/2017 Story: Old ASMI by EKG  Back pain 9/20/2017  BPH (benign prostatic hyperplasia) 9/20/2017  CHF (congestive heart failure) ([facility] [address] 9/20/2017  CKD (chronic kidney disease) 9/20/2017  Diabetic acetonemia ([facility] [address]  DM (diabetes mellitus) ([facility] [address] 9/20/2017 Story: Diet Controlled  ED (erectile dysfunction) 9/20/2017  Edema 9/20/2017  Elevated CPK 9/20/2017 Comments: History of  Elevated LFTs 9/20/2017 Comments: History of  Elevated PSA 9/20/2017  Hypercholesteremia  Hyperlipidemia 9/20/2017  Hypertension  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  Nodule of right lung 9/20/2017 Story: Right  Polymyalgia ([address] 9/20/2017  Prostate enlargement Past Surgical History:  
Procedure Laterality Date  ABDOMEN SURGERY PROC UNLISTED    
 hernia repair  HX HEENT  06/12/2018 Dr. 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  
 2 lesions on head No Known Allergies REVIEW OF SYSTEMS: 
General: negative for - chills or fever, or weight loss or gain ENT: negative for - headaches, nasal congestion or tinnitus Eyes: no blurred or visual changes Neck: No stiffness or swollen nodes Respiratory: negative for - cough, hemoptysis, shortness of breath or wheezing Cardiovascular : negative for - chest pain, edema, palpitations or shortness of breath Gastrointestinal: negative for - abdominal pain, blood in stools, heartburn or nausea/vomiting Genito-Urinary: no dysuria, trouble voiding, or hematuria Musculoskeletal: negative for - gait disturbance, joint pain, joint stiffness or joint swelling Neurological: no TIA or stroke symptoms Hematologic: no bruises, no bleeding Lymphatic: no swollen glands Integument: no lumps, mole changes, nail changes or rash Endocrine:no malaise/lethargy poly uria or polydipsia or unexpected weight changes Social History Socioeconomic History  Marital status:  Spouse name: Not on file  Number of children: Not on file  Years of education: Not on file  Highest education level: Not on file Tobacco Use  Smoking status: Never Smoker  Smokeless tobacco: Never Used Substance and Sexual Activity  Alcohol use: No  
 Drug use: No  
 Sexual activity: No  
 
History reviewed. No pertinent family history. OBJECTIVE:  
 
Visit Vitals /60 (BP 1 Location: Left arm, BP Patient Position: Sitting) Pulse 93 Resp 18 Ht 6' 3\" (1.905 m) Wt 204 lb 9.6 oz (92.8 kg) SpO2 97% BMI 25.57 kg/m² CONSTITUTIONAL:   well nourished, appears age appropriate EYES: sclera anicteric, PERRL, EOMI 
ENMT:nares clear, moist mucous membranes, pharynx clear NECK: supple. Thyroid normal, No JVD or bruits RESPIRATORY: Chest: clear to ascultation and percussion, normal inspiratory effort CARDIOVASCULAR: Heart: regular rate and rhythm no murmurs, rubs or gallops, PMI not displaced, No thrills GASTROINTESTINAL: Abdomen: non distended, soft, non tender, bowel sounds normal 
HEMATOLOGIC: no purpura, petechiae or bruising LYMPHATIC: No lymph node enlargemant MUSCULOSKELETAL: Extremities: no edema or active synovitis, pulse 1+ INTEGUMENT: No unusual rashes or suspicious skin lesions noted.  Nails appear normal. 
PERIPHERAL VASCULAR: normal pulses femoral, PT and DP 
 NEUROLOGIC: non-focal exam, A & O X 3 PSYCHIATRIC:, appropriate affect ASSESSMENT:  
1. Polymyalgia rheumatica ([facility] [address] 2. Hypertension with renal disease 3. Stage 3 chronic kidney disease ([facility] [address] 4. 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, without long-term current use of insulin ([facility] [address] 5. Paroxysmal atrial fibrillation ([facility] [address] 6. Chronic diastolic congestive heart failure ([facility] [address] Impression 1. PMR we will see what the status is of his sed rate with a decrease prednisone dose to 10 mg symptoms have not increased with the decreased dose 2. Hypertension that is controlled to continue current therapy reviewed with him 3. CKD stage III repeat status is pending 4. Diabetes repeat status is pending 5. Paroxysmal atrial fibrillation seems to be stable 6. Diastolic CHF compensated I will call the lab and make further recommendations or adjustments if necessary. Follow-up as scheduled again for 1 month at which time he is to come in for his other medical problems and fasting blood work. PLAN: 
. Orders Placed This Encounter  PROTHROMBIN TIME + INR  METABOLIC PANEL, BASIC  
 
 
 
ATTENTION:  
This medical record was transcribed using an electronic medical records system. Although proofread, it may and can contain electronic and spelling errors. Other human spelling and other errors may be present. Corrections may be executed at a later time. Please feel free to contact us for any clarifications as needed. Follow-up Disposition: 
Return in about 4 weeks (around 2/18/2019). No results found for any visits on 01/21/19. Abraham Acosta MD 
 
The patient verbalized understanding of the problems and plans as explained.

## 2019-01-21 NOTE — PROGRESS NOTES
Chief Complaint Patient presents with  Hypertension 1 month follow up  Diabetes  Chronic Kidney Disease 1. Have you been to the ER, urgent care clinic since your last visit? Hospitalized since your last visit? No 
 
2. Have you seen or consulted any other health care providers outside of the [address] [place] [address] since your last visit? Include any pap smears or colon screening. No 
Visit Vitals /60 (BP 1 Location: Left arm, BP Patient Position: Sitting) Pulse 93 Resp 18 Ht 6' 3\" (1.905 m) Wt 204 lb 9.6 oz (92.8 kg) SpO2 97% BMI 25.57 kg/m²

## 2019-01-21 NOTE — TELEPHONE ENCOUNTER
Requested Prescriptions     Pending Prescriptions Disp Refills    glucose blood VI test strips (ACCU-CHEK SMARTVIEW TEST STRIP) strip 100 Strip      Sig: Use once daily

## 2019-01-22 ENCOUNTER — TELEPHONE ANTICOAG (OUTPATIENT)
Dept: INTERNAL MEDICINE CLINIC | Age: 84
End: 2019-01-22

## 2019-01-22 DIAGNOSIS — I48.0 PAROXYSMAL ATRIAL FIBRILLATION (HCC): Primary | ICD-10-CM

## 2019-01-22 LAB
BUN SERPL-MCNC: 25 MG/DL (ref 8–27)
BUN/CREAT SERPL: 16 (ref 10–24)
CALCIUM SERPL-MCNC: 9.5 MG/DL (ref 8.6–10.2)
CHLORIDE SERPL-SCNC: 102 MMOL/L (ref 96–106)
CO2 SERPL-SCNC: 23 MMOL/L (ref 20–29)
CREAT SERPL-MCNC: 1.54 MG/DL (ref 0.76–1.27)
GLUCOSE SERPL-MCNC: 256 MG/DL (ref 65–99)
INR PPP: 1.8 (ref 0.8–1.2)
POTASSIUM SERPL-SCNC: 4.9 MMOL/L (ref 3.5–5.2)
PROTHROMBIN TIME: 18.1 SEC (ref 9.1–12)
SODIUM SERPL-SCNC: 141 MMOL/L (ref 134–144)

## 2019-01-22 NOTE — PROGRESS NOTES
Labs are stable except the blood sugar was slightly up but that was not fasting so no change in treatment needed. Patient informed.

## 2019-01-22 NOTE — PROGRESS NOTES
Anesthesia ROS/Med Hx      Overall Review of Systems Comments:  Lumbar stenosis    Anesthesia Plan  ASA Status: 2  Anesthesia Type: General  Preferred Airway Type: ETT  Maintenance: TIVA  Reviewed: Lab Results, Pregnancy Test Results, Pre-Induction Reassessment, Patient Summary, DNR Status, Beta Blocker Status, NPO Status, Allergies, Problem List, Medications and Past Med History  The proposed anesthetic plan, including its risks and benefits, have been discussed with the Patient - along with the risks and benefits of alternatives.  Questions were encouraged and answered and the patient and/or representative agrees to proceed.  Informed Consent for Blood: Consented  Blood Products: Not Anticipated      Physical Exam   Labs are stable except the blood sugar was slightly up but that was not fasting so no change in treatment needed.

## 2019-01-31 RX ORDER — PREDNISONE 10 MG/1
30 TABLET ORAL DAILY
Qty: 90 TAB | Refills: 3 | Status: SHIPPED | OUTPATIENT
Start: 2019-01-31 | End: 2019-07-29 | Stop reason: SDUPTHER

## 2019-01-31 NOTE — TELEPHONE ENCOUNTER
Pt calling stating that he is no longer able to cut his prednisone pills. He takes a total of 30mg daily and has 20mg pills(1.5 pills daily). Per Dr. Lindsay Solomon ok to call in 10mg tablets and pt is to take 3 tablets daily to equal 30mg daily. Pt verbalized understanding and script sent to [facility] @ [address] [facility]

## 2019-02-08 RX ORDER — PREDNISONE 20 MG/1
TABLET ORAL
Qty: 30 TAB | Refills: 2 | Status: SHIPPED | OUTPATIENT
Start: 2019-02-08 | End: 2019-04-02 | Stop reason: ALTCHOICE

## 2019-02-08 NOTE — TELEPHONE ENCOUNTER
RX refill request from the patient/pharmacy. Patient last seen 12- with labs, and next appt. scheduled for 02-  Requested Prescriptions     Pending Prescriptions Disp Refills    predniSONE (DELTASONE) 20 mg tablet [Pharmacy Med Name: PREDNISONE 20 MG TABLET] 30 Tab 2     Sig: TAKE 1 TABLET BY MOUTH EVERY DAY   .

## 2019-02-20 DIAGNOSIS — I48.91 ATRIAL FIBRILLATION, UNSPECIFIED TYPE (HCC): ICD-10-CM

## 2019-02-20 RX ORDER — DIGOXIN 125 MCG
TABLET ORAL
Qty: 90 TAB | Refills: 3 | Status: SHIPPED | OUTPATIENT
Start: 2019-02-20 | End: 2019-05-14 | Stop reason: SDUPTHER

## 2019-02-20 NOTE — TELEPHONE ENCOUNTER
RX refill request from the patient/pharmacy. Patient last seen 01- with labs, and next appt. scheduled for 02-  Requested Prescriptions     Pending Prescriptions Disp Refills    digoxin (LANOXIN) 0.125 mg tablet [Pharmacy Med Name: DIGOXIN 125 MCG TABLET] 90 Tab 3     Sig: TAKE 1 1000 [facility]   .

## 2019-02-25 NOTE — PROGRESS NOTES
Chief Complaint Patient presents with  Hypertension 3 month follow up  Cholesterol Problem  Diabetes  Chronic Kidney Disease SUBJECTIVE: 
 
Corinne Peterson is a 80 y.o. male who returns in follow-up for hypertension, diabetes, hyperlipidemia, CKD, polymyalgia rheumatica, compensated CHF, paroxysmal atrial fibrillation, ASCVD and other medical problems. He is taking his medications and trying to follow his diet and trying to get some exercise on a regular basis. He currently denies any chest pain, shortness of breath, palpitations, PND, orthopnea or cardiorespiratory complaints of any type. He denies any GI or  complaints. He denies any headaches, dizziness or neurological planes. There are no current arthritic complaints and he has no other complaints on complete review of systems. Current Outpatient Medications Medication Sig Dispense Refill  digoxin (LANOXIN) 0.125 mg tablet TAKE 1 TABLET EVERY DAY 90 Tab 3  predniSONE (DELTASONE) 20 mg tablet TAKE 1 TABLET BY MOUTH EVERY DAY 30 Tab 2  predniSONE (DELTASONE) 10 mg tablet Take 30 mg by mouth daily. 3 tablets daily. 90 Tab 3  
 glucose blood VI test strips (ACCU-CHEK SMARTVIEW TEST STRIP) strip Use once daily 100 Strip prn  sildenafil, antihypertensive, (REVATIO) 20 mg tablet TAKE 1 TABLET, ORAL, AS DIRECTED FOR SEXUAL ACTIVITY 20 Tab 11  
 metFORMIN (GLUCOPHAGE) 500 mg tablet Take 1 Tab by mouth daily (with breakfast). 90 Tab 3  
 diclofenac EC (VOLTAREN) 75 mg EC tablet TAKE 1 TABLET BY MOUTH TWICE A  Tab 3  
 lisinopril (PRINIVIL, ZESTRIL) 5 mg tablet TAKE 1 TABLET EVERY DAY 90 Tab 3  warfarin (COUMADIN) 5 mg tablet Take one and a half tablets every day. (Patient taking differently: Take one and a half tablets every day.) 145 Tab 3  
 fexofenadine (ALLEGRA) 180 mg tablet Take  by mouth.  acetaminophen (TYLENOL ARTHRITIS PAIN) 650 mg TbER Take 650 mg by mouth every eight (8) hours.  furosemide (LASIX) 80 mg tablet TAKE 1 TABLET BY MOUTH EVERY DAY 90 Tab 3  
 multivitamins-minerals-lutein (MULTIVITAMIN 50 PLUS) tab tablet Take 1 Tab by mouth daily.  glucosamine 1,000 mg tab Take 2,000 mg by mouth daily.  cholecalciferol (VITAMIN D3) 1,000 unit cap Take 1,000 Units by mouth daily.  DOCOSAHEXANOIC ACID/EPA (FISH OIL PO) Take 1,200 mg by mouth two (2) times a day.  terazosin (HYTRIN) 5 mg capsule Take 5 mg by mouth nightly. Past Medical History:  
Diagnosis Date  Allergic rhinitis 9/20/2017  Arthritis  ASCVD (arteriosclerotic cardiovascular disease) 9/20/2017 Story: Old ASMI by EKG  Back pain 9/20/2017  BPH (benign prostatic hyperplasia) 9/20/2017  CHF (congestive heart failure) ([address] 9/20/2017  CKD (chronic kidney disease) 9/20/2017  Diabetic acetonemia ([address]  DM (diabetes mellitus) ([address] 9/20/2017 Story: Diet Controlled  ED (erectile dysfunction) 9/20/2017  Edema 9/20/2017  Elevated CPK 9/20/2017 Comments: History of  Elevated LFTs 9/20/2017 Comments: History of  Elevated PSA 9/20/2017  Hypercholesteremia  Hyperlipidemia 9/20/2017  Hypertension  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  Nodule of right lung 9/20/2017 Story: Right  Polymyalgia ([address] 9/20/2017  Prostate enlargement Past Surgical History:  
Procedure Laterality Date  ABDOMEN SURGERY PROC UNLISTED    
 hernia repair  HX HEENT  06/12/2018 Dr. 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  
 2 lesions on head No Known Allergies REVIEW OF SYSTEMS: 
General: negative for - chills or fever, or weight loss or gain ENT: negative for - headaches, nasal congestion or tinnitus Eyes: no blurred or visual changes Neck: No stiffness or swollen nodes Respiratory: negative for - cough, hemoptysis, shortness of breath or wheezing Cardiovascular : negative for - chest pain, edema, palpitations or shortness of breath Gastrointestinal: negative for - abdominal pain, blood in stools, heartburn or nausea/vomiting Genito-Urinary: no dysuria, trouble voiding, or hematuria Musculoskeletal: negative for - gait disturbance, joint pain, joint stiffness or joint swelling Neurological: no TIA or stroke symptoms Hematologic: no bruises, no bleeding Lymphatic: no swollen glands Integument: no lumps, mole changes, nail changes or rash Endocrine:no malaise/lethargy poly uria or polydipsia or unexpected weight changes Social History Socioeconomic History  Marital status:  Spouse name: Not on file  Number of children: Not on file  Years of education: Not on file  Highest education level: Not on file Tobacco Use  Smoking status: Never Smoker  Smokeless tobacco: Never Used Substance and Sexual Activity  Alcohol use: No  
 Drug use: No  
 Sexual activity: No  
 
History reviewed. No pertinent family history. OBJECTIVE:  
 
Visit Vitals /74 Pulse 60 Temp 97.5 °F (36.4 °C) (Oral) Resp 19 Ht 6' 3\" (1.905 m) Wt 208 lb 9.6 oz (94.6 kg) SpO2 96% BMI 26.07 kg/m² CONSTITUTIONAL:   well nourished, appears age appropriate EYES: sclera anicteric, PERRL, EOMI 
ENMT:nares clear, moist mucous membranes, pharynx clear NECK: supple. Thyroid normal, No JVD or bruits RESPIRATORY: Chest: clear to ascultation and percussion, normal inspiratory effort CARDIOVASCULAR: Heart: regular rate and rhythm no murmurs, rubs or gallops, PMI not displaced, No thrills GASTROINTESTINAL: Abdomen: non distended, soft, non tender, bowel sounds normal 
HEMATOLOGIC: no purpura, petechiae or bruising LYMPHATIC: No lymph node enlargemant MUSCULOSKELETAL: Extremities: no edema or active synovitis, pulse 1+ INTEGUMENT: No unusual rashes or suspicious skin lesions noted.  Nails appear normal.  No diabetic foot changes noted. PERIPHERAL VASCULAR: normal pulses femoral, PT and DP NEUROLOGIC: non-focal exam, A & O X 3. Normal distal sensation and proprioception all toes both feet. PSYCHIATRIC:, appropriate affect ASSESSMENT:  
1. Hypertension with renal disease 2. 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, without long-term current use of insulin ([facility] 3. Mixed hyperlipidemia 4. ASCVD (arteriosclerotic cardiovascular disease) 5. Paroxysmal atrial fibrillation (HCC) 6. Stage 3 chronic kidney disease ([facility] 7. Polymyalgia rheumatica ([facility] 8. Chronic diastolic congestive heart failure ([facility] Impression 1. Hypertension that is controlled to continue current therapy reviewed with him. 2.  Diabetes repeat status is pending and prior labs reviewed no make adjustments if necessary. 3.  Hyperlipidemia prior lab reviewed and repeat status pending and I will adjust if needed. 4.  ASCVD clinically stable continue aspirin daily 5. Paroxysmal atrial fibrillation currently stable taking Coumadin daily 6. CKD stage III repeat status pending 7. Polymyalgia rheumatica stable sed rate pending 8. CHF stable I will call the lab and make further recommendations or adjustments if necessary. Follow-up as scheduled for 3 months regarding his general medical problems but 1 month regarding his polymyalgia rheumatica and A. fib. PLAN: 
. Orders Placed This Encounter  HEMOGLOBIN A1C WITH EAG  
 SED RATE (ESR) ([facility])  METABOLIC PANEL, COMPREHENSIVE ([facility])  LIPID PANEL ([facility])  CK ([facility]) ATTENTION:  
This medical record was transcribed using an electronic medical records system. Although proofread, it may and can contain electronic and spelling errors. Other human spelling and other errors may be present. Corrections may be executed at a later time. Please feel free to contact us for any clarifications as needed. Follow-up Disposition: 
Return in about 3 months (around 5/26/2019). No results found for any visits on 02/26/19. Gilma Camacho MD 
 
The patient verbalized understanding of the problems and plans as explained.

## 2019-02-26 ENCOUNTER — OFFICE VISIT (OUTPATIENT)
Dept: INTERNAL MEDICINE CLINIC | Age: 84
End: 2019-02-26

## 2019-02-26 VITALS
BODY MASS INDEX: 25.94 KG/M2 | RESPIRATION RATE: 19 BRPM | HEART RATE: 60 BPM | WEIGHT: 208.6 LBS | TEMPERATURE: 97.5 F | DIASTOLIC BLOOD PRESSURE: 74 MMHG | OXYGEN SATURATION: 96 % | HEIGHT: 75 IN | SYSTOLIC BLOOD PRESSURE: 138 MMHG

## 2019-02-26 DIAGNOSIS — N18.30 STAGE 3 CHRONIC KIDNEY DISEASE (HCC): ICD-10-CM

## 2019-02-26 DIAGNOSIS — N18.30 CONTROLLED TYPE 2 DIABETES MELLITUS WITH STAGE 3 CHRONIC KIDNEY DISEASE, WITHOUT LONG-TERM CURRENT USE OF INSULIN (HCC): ICD-10-CM

## 2019-02-26 DIAGNOSIS — I25.10 ASCVD (ARTERIOSCLEROTIC CARDIOVASCULAR DISEASE): ICD-10-CM

## 2019-02-26 DIAGNOSIS — I48.0 PAROXYSMAL ATRIAL FIBRILLATION (HCC): ICD-10-CM

## 2019-02-26 DIAGNOSIS — I12.9 HYPERTENSION WITH RENAL DISEASE: Primary | ICD-10-CM

## 2019-02-26 DIAGNOSIS — E78.2 MIXED HYPERLIPIDEMIA: ICD-10-CM

## 2019-02-26 DIAGNOSIS — E11.22 CONTROLLED TYPE 2 DIABETES MELLITUS WITH STAGE 3 CHRONIC KIDNEY DISEASE, WITHOUT LONG-TERM CURRENT USE OF INSULIN (HCC): ICD-10-CM

## 2019-02-26 DIAGNOSIS — M35.3 POLYMYALGIA RHEUMATICA (HCC): ICD-10-CM

## 2019-02-26 DIAGNOSIS — I50.32 CHRONIC DIASTOLIC CONGESTIVE HEART FAILURE (HCC): ICD-10-CM

## 2019-02-26 LAB
A-G RATIO,AGRAT: 1.5 RATIO
ALBUMIN SERPL-MCNC: 3.9 G/DL (ref 3.9–5.4)
ALP SERPL-CCNC: 55 U/L (ref 38–126)
ALT SERPL-CCNC: 24 U/L (ref 9–52)
ANION GAP SERPL CALC-SCNC: 9 MMOL/L
AST SERPL W P-5'-P-CCNC: 26 U/L (ref 14–36)
BILIRUB SERPL-MCNC: 0.3 MG/DL (ref 0.2–1.3)
BUN SERPL-MCNC: 27 MG/DL (ref 9–20)
BUN/CREATININE RATIO,BUCR: 25 RATIO
CALCIUM SERPL-MCNC: 9.7 MG/DL (ref 8.4–10.2)
CHLORIDE SERPL-SCNC: 106 MMOL/L (ref 98–107)
CHOL/HDL RATIO,CHHD: 4 RATIO (ref 0–4)
CHOLEST SERPL-MCNC: 188 MG/DL (ref 0–200)
CK SERPL-CCNC: 114 U/L (ref 30–135)
CO2 SERPL-SCNC: 28 MMOL/L (ref 22–32)
CREAT SERPL-MCNC: 1.1 MG/DL (ref 0.8–1.5)
GLOBULIN,GLOB: 2.6
GLUCOSE SERPL-MCNC: 139 MG/DL (ref 75–110)
HDLC SERPL-MCNC: 45 MG/DL (ref 35–130)
LDL/HDL RATIO,LDHD: 2 RATIO
LDLC SERPL CALC-MCNC: 93 MG/DL (ref 0–130)
POTASSIUM SERPL-SCNC: 4.8 MMOL/L (ref 3.6–5)
PROT SERPL-MCNC: 6.5 G/DL (ref 6.3–8.2)
SED RATE (ESR): 33 MM/HR (ref 0–10)
SODIUM SERPL-SCNC: 143 MMOL/L (ref 137–145)
TRIGL SERPL-MCNC: 249 MG/DL (ref 0–200)
VLDLC SERPL CALC-MCNC: 50 MG/DL

## 2019-02-26 RX ORDER — NEBULIZER AND COMPRESSOR
EACH MISCELLANEOUS
Qty: 1 EACH | Refills: 0 | Status: SHIPPED | OUTPATIENT
Start: 2019-02-26 | End: 2020-03-17 | Stop reason: ALTCHOICE

## 2019-02-26 NOTE — PROGRESS NOTES
Chief Complaint Patient presents with  Hypertension 3 month follow up  Cholesterol Problem  Diabetes  Chronic Kidney Disease Visit Vitals /78 (BP 1 Location: Left arm, BP Patient Position: Sitting) Pulse 60 Temp 97.5 °F (36.4 °C) (Oral) Resp 19 Ht 6' 3\" (1.905 m) Wt 208 lb 9.6 oz (94.6 kg) SpO2 96% BMI 26.07 kg/m² 1. Have you been to the ER, urgent care clinic since your last visit? Hospitalized since your last visit? No 
 
2. Have you seen or consulted any other health care providers outside of the [address] [place] [address] since your last visit? Include any pap smears or colon screening.  No

## 2019-02-26 NOTE — PATIENT INSTRUCTIONS
Arthritis: Care Instructions Your Care Instructions Arthritis, also called osteoarthritis, is a breakdown of the cartilage that cushions your joints. When the cartilage wears down, your bones rub against each other. This causes pain and stiffness. Many people have some arthritis as they age. Arthritis most often affects the joints of the spine, hands, hips, knees, or feet. You can take simple measures to protect your joints, ease your pain, and help you stay active. Follow-up care is a key part of your treatment and safety. Be sure to make and go to all appointments, and call your doctor if you are having problems. It's also a good idea to know your test results and keep a list of the medicines you take. How can you care for yourself at home? · Stay at a healthy weight. Being overweight puts extra strain on your joints. · Talk to your doctor or physical therapist about exercises that will help ease joint pain. ? Stretch. You may enjoy gentle forms of yoga to help keep your joints and muscles flexible. ? Walk instead of jog. Other types of exercise that are less stressful on the joints include riding a bicycle, swimming, ashley chi, or water exercise. ? Lift weights. Strong muscles help reduce stress on your joints. Stronger thigh muscles, for example, take some of the stress off of the knees and hips. Learn the right way to lift weights so you do not make joint pain worse. · Take your medicines exactly as prescribed. Call your doctor if you think you are having a problem with your medicine. · Take pain medicines exactly as directed. ? If the doctor gave you a prescription medicine for pain, take it as prescribed. ? If you are not taking a prescription pain medicine, ask your doctor if you can take an over-the-counter medicine. · Use a cane, crutch, walker, or another device if you need help to get around. These can help rest your joints.  You also can use other things to make life easier, such as a higher toilet seat and padded handles on kitchen utensils. · Do not sit in low chairs, which can make it hard to get up. · Put heat or cold on your sore joints as needed. Use whichever helps you most. You also can take turns with hot and cold packs. ? Apply heat 2 or 3 times a day for 20 to 30 minutesusing a heating pad, hot shower, or hot packto relieve pain and stiffness. ? Put ice or a cold pack on your sore joint for 10 to 20 minutes at a time. Put a thin cloth between the ice and your skin. When should you call for help? Call your doctor now or seek immediate medical care if: 
  · You have sudden swelling, warmth, or pain in any joint.  
  · You have joint pain and a fever or rash.  
  · You have such bad pain that you cannot use a joint.  
 Watch closely for changes in your health, and be sure to contact your doctor if: 
  · You have mild joint symptoms that continue even with more than 6 weeks of care at home.  
  · You have stomach pain or other problems with your medicine. Where can you learn more? Go to http://isra-rhonda.info/. Enter C827 in the search box to learn more about \"Arthritis: Care Instructions. \" Current as of: [place] 10, 2018 Content Version: 11.9 © 5294-9258 [employer]. Care instructions adapted under license by [employer] (which disclaims liability or warranty for this information). If you have questions about a medical condition or this instruction, always ask your healthcare professional. [place] [address] any warranty or liability for your use of this information.

## 2019-02-26 NOTE — TELEPHONE ENCOUNTER
RX refill request from the patient/pharmacy. Patient last seen 02- with labs, and next appt. scheduled for 04-  Requested Prescriptions     Pending Prescriptions Disp Refills    Nebulizer & Compressor machine 1 Each 0     Sig: Use daily as directed. Alex Borrego

## 2019-02-27 ENCOUNTER — TELEPHONE ANTICOAG (OUTPATIENT)
Dept: INTERNAL MEDICINE CLINIC | Age: 84
End: 2019-02-27

## 2019-02-27 DIAGNOSIS — I48.0 PAROXYSMAL ATRIAL FIBRILLATION (HCC): Primary | ICD-10-CM

## 2019-02-27 LAB
EST. AVERAGE GLUCOSE BLD GHB EST-MCNC: 166 MG/DL
HBA1C MFR BLD: 7.4 % (ref 4.8–5.6)
INR PPP: 1.7 (ref 0.8–1.2)
PROTHROMBIN TIME: 16.9 SEC (ref 9.1–12)

## 2019-02-27 NOTE — PROGRESS NOTES
Anticoagulation Summary  As of 2019    INR goal:   2.0-3.0   TTR:   4.4 % (11.2 mo)   INR used for dosin.7! (2019)   Warfarin maintenance plan:   10 mg (5 mg x 2) on Mon, Fri; 7.5 mg (5 mg x 1.5) all other days   Weekly warfarin total:   57.5 mg   Plan last modified:   Christian Khan RN (2018)   Next INR check:   2019   Target end date:       Indications    Paroxysmal atrial fibrillation ([facility] (Primary) [I48.0]             Anticoagulation Episode Summary     INR check location:   Clinic Lab    Preferred lab:       Send INR reminders to:       Comments:         Anticoagulation Care Providers     Provider Role Specialty Phone number    Chastity Quigley MD [facility] [phone]        Informed patient that PT/INR okay; continue the same dose of blood thinner which is 10 mg on Monday and Friday; 7.5 mg all other days and f/up as scheduled.

## 2019-03-22 DIAGNOSIS — I10 ESSENTIAL HYPERTENSION: ICD-10-CM

## 2019-03-22 RX ORDER — FUROSEMIDE 80 MG/1
TABLET ORAL
Qty: 90 TAB | Refills: 3 | Status: SHIPPED | OUTPATIENT
Start: 2019-03-22 | End: 2020-03-09

## 2019-03-22 NOTE — TELEPHONE ENCOUNTER
RX refill request from the patient/pharmacy. Patient last seen 02- with labs, and next appt. scheduled for 04-  Requested Prescriptions     Pending Prescriptions Disp Refills    furosemide (LASIX) 80 mg tablet [Pharmacy Med Name: FUROSEMIDE 80 MG TABLET] 90 Tab 3     Sig: TAKE 1 TABLET BY MOUTH EVERY DAY   .

## 2019-04-02 ENCOUNTER — OFFICE VISIT (OUTPATIENT)
Dept: INTERNAL MEDICINE CLINIC | Age: 84
End: 2019-04-02

## 2019-04-02 VITALS
SYSTOLIC BLOOD PRESSURE: 128 MMHG | WEIGHT: 206.8 LBS | TEMPERATURE: 97.6 F | HEART RATE: 80 BPM | BODY MASS INDEX: 25.71 KG/M2 | HEIGHT: 75 IN | DIASTOLIC BLOOD PRESSURE: 74 MMHG | OXYGEN SATURATION: 97 %

## 2019-04-02 DIAGNOSIS — M35.3 POLYMYALGIA RHEUMATICA (HCC): Primary | ICD-10-CM

## 2019-04-02 DIAGNOSIS — N18.30 CONTROLLED TYPE 2 DIABETES MELLITUS WITH STAGE 3 CHRONIC KIDNEY DISEASE, WITHOUT LONG-TERM CURRENT USE OF INSULIN (HCC): ICD-10-CM

## 2019-04-02 DIAGNOSIS — N18.30 STAGE 3 CHRONIC KIDNEY DISEASE (HCC): ICD-10-CM

## 2019-04-02 DIAGNOSIS — I50.32 CHRONIC DIASTOLIC CONGESTIVE HEART FAILURE (HCC): ICD-10-CM

## 2019-04-02 DIAGNOSIS — E11.22 CONTROLLED TYPE 2 DIABETES MELLITUS WITH STAGE 3 CHRONIC KIDNEY DISEASE, WITHOUT LONG-TERM CURRENT USE OF INSULIN (HCC): ICD-10-CM

## 2019-04-02 DIAGNOSIS — I12.9 HYPERTENSION WITH RENAL DISEASE: ICD-10-CM

## 2019-04-02 LAB
ANION GAP SERPL CALC-SCNC: 18 MMOL/L
BUN SERPL-MCNC: 36 MG/DL (ref 9–20)
CALCIUM SERPL-MCNC: 9.6 MG/DL (ref 8.4–10.2)
CHLORIDE SERPL-SCNC: 99 MMOL/L (ref 98–107)
CO2 SERPL-SCNC: 26 MMOL/L (ref 22–32)
CREAT SERPL-MCNC: 1.5 MG/DL (ref 0.8–1.5)
GLUCOSE SERPL-MCNC: 195 MG/DL (ref 75–110)
POTASSIUM SERPL-SCNC: 5 MMOL/L (ref 3.6–5)
SED RATE (ESR): 26 MM/HR (ref 0–10)
SODIUM SERPL-SCNC: 143 MMOL/L (ref 137–145)

## 2019-04-02 NOTE — PROGRESS NOTES
Chief Complaint Patient presents with  Myalgia  
  1 month follow up  Irregular Heart Beat SUBJECTIVE: 
 
Chante Glover is a 80 y.o. male who returns in follow-up for his polymyalgia rheumatica, CHF, hypertension, CKD and other medical problems. He seems to be doing well is taking his medications and trying to follow his diet and trying to remain physically active. He currently denies any chest pain, shortness of breath, palpitations, PND, orthopnea or other cardiorespiratory complaints. There are no GI or  complaints. He has no other current complaints on complete review of systems. Current Outpatient Medications Medication Sig Dispense Refill  furosemide (LASIX) 80 mg tablet TAKE 1 TABLET BY MOUTH EVERY DAY 90 Tab 3  
 Nebulizer & Compressor machine Use daily as directed. 1 Each 0  
 digoxin (LANOXIN) 0.125 mg tablet TAKE 1 TABLET EVERY DAY 90 Tab 3  predniSONE (DELTASONE) 10 mg tablet Take 30 mg by mouth daily. 3 tablets daily. 90 Tab 3  
 glucose blood VI test strips (ACCU-CHEK SMARTVIEW TEST STRIP) strip Use once daily 100 Strip prn  sildenafil, antihypertensive, (REVATIO) 20 mg tablet TAKE 1 TABLET, ORAL, AS DIRECTED FOR SEXUAL ACTIVITY 20 Tab 11  
 metFORMIN (GLUCOPHAGE) 500 mg tablet Take 1 Tab by mouth daily (with breakfast). 90 Tab 3  
 diclofenac EC (VOLTAREN) 75 mg EC tablet TAKE 1 TABLET BY MOUTH TWICE A  Tab 3  
 lisinopril (PRINIVIL, ZESTRIL) 5 mg tablet TAKE 1 TABLET EVERY DAY 90 Tab 3  warfarin (COUMADIN) 5 mg tablet Take one and a half tablets every day. (Patient taking differently: Take one and a half tablets every day. Indications: 2 tablets Monday and Fridays and 1.5 tablets all other days) 145 Tab 3  
 fexofenadine (ALLEGRA) 180 mg tablet Take  by mouth.  acetaminophen (TYLENOL ARTHRITIS PAIN) 650 mg TbER Take 650 mg by mouth every eight (8) hours.     
 multivitamins-minerals-lutein (MULTIVITAMIN 50 PLUS) tab tablet Take 1 Tab by mouth daily.  glucosamine 1,000 mg tab Take 2,000 mg by mouth daily.  cholecalciferol (VITAMIN D3) 1,000 unit cap Take 1,000 Units by mouth daily.  DOCOSAHEXANOIC ACID/EPA (FISH OIL PO) Take 1,200 mg by mouth two (2) times a day.  terazosin (HYTRIN) 5 mg capsule Take 5 mg by mouth nightly. Past Medical History:  
Diagnosis Date  Allergic rhinitis 9/20/2017  Arthritis  ASCVD (arteriosclerotic cardiovascular disease) 9/20/2017 Story: Old ASMI by EKG  Back pain 9/20/2017  BPH (benign prostatic hyperplasia) 9/20/2017  CHF (congestive heart failure) ([facility] 9/20/2017  CKD (chronic kidney disease) 9/20/2017  Diabetic acetonemia ([facility]  DM (diabetes mellitus) ([facility] 9/20/2017 Story: Diet Controlled  ED (erectile dysfunction) 9/20/2017  Edema 9/20/2017  Elevated CPK 9/20/2017 Comments: History of  Elevated LFTs 9/20/2017 Comments: History of  Elevated PSA 9/20/2017  Hypercholesteremia  Hyperlipidemia 9/20/2017  Hypertension  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  Nodule of right lung 9/20/2017 Story: Right  Polymyalgia ([address] 9/20/2017  Prostate enlargement Past Surgical History:  
Procedure Laterality Date  ABDOMEN SURGERY PROC UNLISTED    
 hernia repair  HX HEENT  06/12/2018 Dr. 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  
 2 lesions on head No Known Allergies REVIEW OF SYSTEMS: 
General: negative for - chills or fever, or weight loss or gain ENT: negative for - headaches, nasal congestion or tinnitus Eyes: no blurred or visual changes Neck: No stiffness or swollen nodes Respiratory: negative for - cough, hemoptysis, shortness of breath or wheezing Cardiovascular : negative for - chest pain, edema, palpitations or shortness of breath Gastrointestinal: negative for - abdominal pain, blood in stools, heartburn or nausea/vomiting Genito-Urinary: no dysuria, trouble voiding, or hematuria Musculoskeletal: negative for - gait disturbance, joint pain, joint stiffness or joint swelling Neurological: no TIA or stroke symptoms Hematologic: no bruises, no bleeding Lymphatic: no swollen glands Integument: no lumps, mole changes, nail changes or rash Endocrine:no malaise/lethargy poly uria or polydipsia or unexpected weight changes Social History Socioeconomic History  Marital status:  Spouse name: Not on file  Number of children: Not on file  Years of education: Not on file  Highest education level: Not on file Tobacco Use  Smoking status: Never Smoker  Smokeless tobacco: Never Used Substance and Sexual Activity  Alcohol use: No  
 Drug use: No  
 Sexual activity: Never History reviewed. No pertinent family history. OBJECTIVE:  
 
Visit Vitals /74 (BP 1 Location: Left arm, BP Patient Position: Sitting) Pulse 80 Temp 97.6 °F (36.4 °C) Ht 6' 3\" (1.905 m) Wt 206 lb 12.8 oz (93.8 kg) SpO2 97% BMI 25.85 kg/m² CONSTITUTIONAL:   well nourished, appears age appropriate EYES: sclera anicteric, PERRL, EOMI 
ENMT:nares clear, moist mucous membranes, pharynx clear NECK: supple. Thyroid normal, No JVD or bruits RESPIRATORY: Chest: clear to ascultation and percussion, normal inspiratory effort CARDIOVASCULAR: Heart: regular rate and rhythm no murmurs, rubs or gallops, PMI not displaced, No thrills GASTROINTESTINAL: Abdomen: non distended, soft, non tender, bowel sounds normal 
HEMATOLOGIC: no purpura, petechiae or bruising LYMPHATIC: No lymph node enlargemant MUSCULOSKELETAL: Extremities: no edema or active synovitis, pulse 1+ INTEGUMENT: No unusual rashes or suspicious skin lesions noted. Nails appear normal. 
PERIPHERAL VASCULAR: normal pulses femoral, PT and DP NEUROLOGIC: non-focal exam, A & O X 3 PSYCHIATRIC:, appropriate affect ASSESSMENT:  
1. Polymyalgia rheumatica ([facility] [address] 2. Chronic diastolic congestive heart failure ([facility] [address] 3. Hypertension with renal disease 4. Stage 3 chronic kidney disease ([facility] [address] 5. 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, without long-term current use of insulin ([facility] [address] Impression 1. Polymyalgia rheumatica now on prednisone at 10 mg daily we will see what the sed rate looks like today. 2.  Chronic diastolic CHF seems to be compensated 3. Hypertension that is controlled 4. CKD stage III repeat status pending 5. Diabetes we will see what the blood sugar is on the prednisone I will recheck him myself again in 1 month or sooner should there be a problem. PLAN: 
. Orders Placed This Encounter  SED RATE (ESR) ([facility])  METABOLIC PANEL, BASIC ([facility]) ATTENTION:  
This medical record was transcribed using an electronic medical records system. Although proofread, it may and can contain electronic and spelling errors. Other human spelling and other errors may be present. Corrections may be executed at a later time. Please feel free to contact us for any clarifications as needed. Follow-up and Dispositions · Return in about 1 month (around 4/30/2019). No results found for any visits on 04/02/19. Fransisca Ambrosio MD 
 
The patient verbalized understanding of the problems and plans as explained.

## 2019-04-02 NOTE — PROGRESS NOTES
Identified pt with two pt identifiers(name and ). Reviewed record in preparation for visit and have obtained necessary documentation. Chief Complaint Patient presents with  Myalgia  
  1 month follow up  Irregular Heart Beat Health Maintenance Due Topic  
 EYE EXAM RETINAL OR DILATED  DTaP/Tdap/Td series (1 - Tdap)  Shingrix Vaccine Age 50> (1 of 2)  GLAUCOMA SCREENING Q2Y  Pneumococcal 65+ years (2 of 2 - PCV13) Coordination of Care Questionnaire: 
:  
1) Have you been to an emergency room, urgent care, or hospitalized since your last visit? If yes, where when, and reason for visit? no  
 
 
2. Have seen or consulted any other health care provider since your last visit? If yes, where when, and reason for visit? NO 
 
 
3) Do you have an Advanced Directive/ Living Will in place? NO If yes, do we have a copy on file NO If no, would you like information YES Patient is accompanied by self I have received verbal consent from Collette Mu to discuss any/all medical information while they are present in the room.

## 2019-04-02 NOTE — PATIENT INSTRUCTIONS
Arthritis: Care Instructions Your Care Instructions Arthritis, also called osteoarthritis, is a breakdown of the cartilage that cushions your joints. When the cartilage wears down, your bones rub against each other. This causes pain and stiffness. Many people have some arthritis as they age. Arthritis most often affects the joints of the spine, hands, hips, knees, or feet. You can take simple measures to protect your joints, ease your pain, and help you stay active. Follow-up care is a key part of your treatment and safety. Be sure to make and go to all appointments, and call your doctor if you are having problems. It's also a good idea to know your test results and keep a list of the medicines you take. How can you care for yourself at home? · Stay at a healthy weight. Being overweight puts extra strain on your joints. · Talk to your doctor or physical therapist about exercises that will help ease joint pain. ? Stretch. You may enjoy gentle forms of yoga to help keep your joints and muscles flexible. ? Walk instead of jog. Other types of exercise that are less stressful on the joints include riding a bicycle, swimming, ashley chi, or water exercise. ? Lift weights. Strong muscles help reduce stress on your joints. Stronger thigh muscles, for example, take some of the stress off of the knees and hips. Learn the right way to lift weights so you do not make joint pain worse. · Take your medicines exactly as prescribed. Call your doctor if you think you are having a problem with your medicine. · Take pain medicines exactly as directed. ? If the doctor gave you a prescription medicine for pain, take it as prescribed. ? If you are not taking a prescription pain medicine, ask your doctor if you can take an over-the-counter medicine. · Use a cane, crutch, walker, or another device if you need help to get around. These can help rest your joints.  You also can use other things to make life easier, such as a higher toilet seat and padded handles on kitchen utensils. · Do not sit in low chairs, which can make it hard to get up. · Put heat or cold on your sore joints as needed. Use whichever helps you most. You also can take turns with hot and cold packs. ? Apply heat 2 or 3 times a day for 20 to 30 minutesusing a heating pad, hot shower, or hot packto relieve pain and stiffness. ? Put ice or a cold pack on your sore joint for 10 to 20 minutes at a time. Put a thin cloth between the ice and your skin. When should you call for help? Call your doctor now or seek immediate medical care if: 
  · You have sudden swelling, warmth, or pain in any joint.  
  · You have joint pain and a fever or rash.  
  · You have such bad pain that you cannot use a joint.  
 Watch closely for changes in your health, and be sure to contact your doctor if: 
  · You have mild joint symptoms that continue even with more than 6 weeks of care at home.  
  · You have stomach pain or other problems with your medicine. Where can you learn more? Go to http://isra-rhonda.info/. Enter J660 in the search box to learn more about \"Arthritis: Care Instructions. \" Current as of: Be[place] 10, 2018 Content Version: 11.9 © 6318-5385 [employer]. Care instructions adapted under license by [employer] (which disclaims liability or warranty for this information). If you have questions about a medical condition or this instruction, always ask your healthcare professional. [place] [address] any warranty or liability for your use of this information.

## 2019-04-03 NOTE — PROGRESS NOTES
Sed rate is a little better and kidney function is stable with blood sugars a little up so watch diet although other that was not a fasting sample

## 2019-04-30 NOTE — PROGRESS NOTES
Chief Complaint Patient presents with  Hypertension 1 month follow up  Diabetes  CHF  Chronic Kidney Disease SUBJECTIVE: 
 
Estrada Wisdom is a 80 y.o. male who returns in follow-up for his medical problems to include hypertension, CHF, diabetes, polymyalgia rheumatica, CKD stage III and other medical problems. He is taking his medications and generally seems to be doing quite well. He currently denies any chest pain, shortness of breath, palpitations, PND, orthopnea or other cardiovascular complaints. He denies any GI or  complaints. He feels like his strength and energy is better and his legs since he has been on the prednisone. He has taken all of his medicine. There are no other complaints. Current Outpatient Medications Medication Sig Dispense Refill  warfarin (COUMADIN) 5 mg tablet Take 2 tablets on Monday and Friday; and one and a half tablets all other days. 145 Tab 3  furosemide (LASIX) 80 mg tablet TAKE 1 TABLET BY MOUTH EVERY DAY 90 Tab 3  
 Nebulizer & Compressor machine Use daily as directed. 1 Each 0  
 digoxin (LANOXIN) 0.125 mg tablet TAKE 1 TABLET EVERY DAY 90 Tab 3  predniSONE (DELTASONE) 10 mg tablet Take 30 mg by mouth daily. 3 tablets daily. 90 Tab 3  
 glucose blood VI test strips (ACCU-CHEK SMARTVIEW TEST STRIP) strip Use once daily 100 Strip prn  sildenafil, antihypertensive, (REVATIO) 20 mg tablet TAKE 1 TABLET, ORAL, AS DIRECTED FOR SEXUAL ACTIVITY 20 Tab 11  
 metFORMIN (GLUCOPHAGE) 500 mg tablet Take 1 Tab by mouth daily (with breakfast). 90 Tab 3  
 diclofenac EC (VOLTAREN) 75 mg EC tablet TAKE 1 TABLET BY MOUTH TWICE A  Tab 3  
 lisinopril (PRINIVIL, ZESTRIL) 5 mg tablet TAKE 1 TABLET EVERY DAY 90 Tab 3  
 fexofenadine (ALLEGRA) 180 mg tablet Take  by mouth.  acetaminophen (TYLENOL ARTHRITIS PAIN) 650 mg TbER Take 650 mg by mouth every eight (8) hours.  multivitamins-minerals-lutein (MULTIVITAMIN 50 PLUS) tab tablet Take 1 Tab by mouth daily.  glucosamine 1,000 mg tab Take 2,000 mg by mouth daily.  cholecalciferol (VITAMIN D3) 1,000 unit cap Take 1,000 Units by mouth daily.  DOCOSAHEXANOIC ACID/EPA (FISH OIL PO) Take 1,200 mg by mouth two (2) times a day.  terazosin (HYTRIN) 5 mg capsule Take 5 mg by mouth nightly. Past Medical History:  
Diagnosis Date  Allergic rhinitis 9/20/2017  Arthritis  ASCVD (arteriosclerotic cardiovascular disease) 9/20/2017 Story: Old ASMI by EKG  Back pain 9/20/2017  BPH (benign prostatic hyperplasia) 9/20/2017  CHF (congestive heart failure) ([facility] [address] 9/20/2017  CKD (chronic kidney disease) 9/20/2017  Diabetic acetonemia ([facility] [address]  DM (diabetes mellitus) ([facility] [address] 9/20/2017 Story: Diet Controlled  ED (erectile dysfunction) 9/20/2017  Edema 9/20/2017  Elevated CPK 9/20/2017 Comments: History of  Elevated LFTs 9/20/2017 Comments: History of  Elevated PSA 9/20/2017  Hypercholesteremia  Hyperlipidemia 9/20/2017  Hypertension  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  Nodule of right lung 9/20/2017 Story: Right  Polymyalgia ([address] 9/20/2017  Prostate enlargement Past Surgical History:  
Procedure Laterality Date  ABDOMEN SURGERY PROC UNLISTED    
 hernia repair  HX HEENT  06/12/2018 Dr. 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  
 2 lesions on head No Known Allergies REVIEW OF SYSTEMS: 
General: negative for - chills or fever, or weight loss or gain ENT: negative for - headaches, nasal congestion or tinnitus Eyes: no blurred or visual changes Neck: No stiffness or swollen nodes Respiratory: negative for - cough, hemoptysis, shortness of breath or wheezing Cardiovascular : negative for - chest pain, edema, palpitations or shortness of breath Gastrointestinal: negative for - abdominal pain, blood in stools, heartburn or nausea/vomiting Genito-Urinary: no dysuria, trouble voiding, or hematuria Musculoskeletal: negative for - gait disturbance, joint pain, joint stiffness or joint swelling Neurological: no TIA or stroke symptoms Hematologic: no bruises, no bleeding Lymphatic: no swollen glands Integument: no lumps, mole changes, nail changes or rash Endocrine:no malaise/lethargy poly uria or polydipsia or unexpected weight changes Social History Socioeconomic History  Marital status:  Spouse name: Not on file  Number of children: Not on file  Years of education: Not on file  Highest education level: Not on file Tobacco Use  Smoking status: Never Smoker  Smokeless tobacco: Never Used Substance and Sexual Activity  Alcohol use: No  
 Drug use: No  
 Sexual activity: Never History reviewed. No pertinent family history. OBJECTIVE:  
 
Visit Vitals /62 (BP 1 Location: Left arm, BP Patient Position: Sitting) Pulse 88 Temp 97.7 °F (36.5 °C) (Oral) Resp 18 Ht 6' 3\" (1.905 m) Wt 201 lb 12.8 oz (91.5 kg) SpO2 97% BMI 25.22 kg/m² CONSTITUTIONAL:   well nourished, appears age appropriate EYES: sclera anicteric, PERRL, EOMI 
ENMT:nares clear, moist mucous membranes, pharynx clear NECK: supple. Thyroid normal, No JVD or bruits RESPIRATORY: Chest: clear to ascultation and percussion, normal inspiratory effort CARDIOVASCULAR: Heart: regular rate and rhythm no murmurs, rubs or gallops, PMI not displaced, No thrills GASTROINTESTINAL: Abdomen: non distended, soft, non tender, bowel sounds normal 
HEMATOLOGIC: no purpura, petechiae or bruising LYMPHATIC: No lymph node enlargemant MUSCULOSKELETAL: Extremities: no edema or active synovitis, pulse 1+ INTEGUMENT: No unusual rashes or suspicious skin lesions noted.  Nails appear normal. 
 PERIPHERAL VASCULAR: normal pulses femoral, PT and DP NEUROLOGIC: non-focal exam, A & O X 3 PSYCHIATRIC:, appropriate affect ASSESSMENT:  
1. Polymyalgia rheumatica ([facility] [address] 2. Chronic diastolic congestive heart failure ([facility] [address] 3. Paroxysmal atrial fibrillation (HCC) 4. Stage 3 chronic kidney disease ([facility] [address] 5. Hypertension with renal disease Impression 1. Polymyalgia rheumatica repeat sed rate pending we will see if we need to adjust prednisone 2. Diastolic CHF compensated 3. Paroxysmal atrial fibrillation stable 4. CKD stage III repeat status pending 5   Hypertension that is controlled I will recheck him again myself in 1 month or sooner should to be a problem. PLAN: 
. Orders Placed This Encounter  PROTHROMBIN TIME + INR  
 SED RATE (ESR) ([facility])  [facility]IC [facility] ATTENTION:  
This medical record was transcribed using an electronic medical records system. Although proofread, it may and can contain electronic and spelling errors. Other human spelling and other errors may be present. Corrections may be executed at a later time. Please feel free to contact us for any clarifications as needed. Follow-up and Dispositions · Return in about 1 month (around 5/29/2019). No results found for any visits on 05/01/19. Goyo Keating MD 
 
The patient verbalized understanding of the problems and plans as explained.

## 2019-05-01 ENCOUNTER — OFFICE VISIT (OUTPATIENT)
Dept: INTERNAL MEDICINE CLINIC | Age: 84
End: 2019-05-01

## 2019-05-01 VITALS
BODY MASS INDEX: 25.09 KG/M2 | SYSTOLIC BLOOD PRESSURE: 120 MMHG | DIASTOLIC BLOOD PRESSURE: 62 MMHG | HEIGHT: 75 IN | WEIGHT: 201.8 LBS | OXYGEN SATURATION: 97 % | HEART RATE: 88 BPM | RESPIRATION RATE: 18 BRPM | TEMPERATURE: 97.7 F

## 2019-05-01 DIAGNOSIS — I48.0 PAROXYSMAL ATRIAL FIBRILLATION (HCC): ICD-10-CM

## 2019-05-01 DIAGNOSIS — I12.9 HYPERTENSION WITH RENAL DISEASE: ICD-10-CM

## 2019-05-01 DIAGNOSIS — I50.32 CHRONIC DIASTOLIC CONGESTIVE HEART FAILURE (HCC): ICD-10-CM

## 2019-05-01 DIAGNOSIS — M35.3 POLYMYALGIA RHEUMATICA (HCC): Primary | ICD-10-CM

## 2019-05-01 DIAGNOSIS — N18.30 STAGE 3 CHRONIC KIDNEY DISEASE (HCC): ICD-10-CM

## 2019-05-01 LAB
ANION GAP SERPL CALC-SCNC: 14 MMOL/L
BUN SERPL-MCNC: 35 MG/DL (ref 9–20)
CALCIUM SERPL-MCNC: 9.7 MG/DL (ref 8.4–10.2)
CHLORIDE SERPL-SCNC: 103 MMOL/L (ref 98–107)
CO2 SERPL-SCNC: 24 MMOL/L (ref 22–32)
CREAT SERPL-MCNC: 1.4 MG/DL (ref 0.8–1.5)
GLUCOSE SERPL-MCNC: 135 MG/DL (ref 75–110)
POTASSIUM SERPL-SCNC: 5.1 MMOL/L (ref 3.6–5)
SED RATE (ESR): 30 MM/HR (ref 0–10)
SODIUM SERPL-SCNC: 141 MMOL/L (ref 137–145)

## 2019-05-01 NOTE — PATIENT INSTRUCTIONS

## 2019-05-01 NOTE — PROGRESS NOTES
Yakov Yousif  Identified pt with two pt identifiers(name and ). Chief Complaint Patient presents with  Hypertension 1 month follow up  Diabetes  CHF  Chronic Kidney Disease 1. Have you been to the ER, urgent care clinic since your last visit? Hospitalized since your last visit? No 
 
2. Have you seen or consulted any other health care providers outside of the [address] [place] [address] since your last visit? Include any pap smears or colon screening. No 
 
 
Health Maintenance Topics with due status: Overdue Topic Date Due  
 EYE EXAM RETINAL OR DILATED 1943 DTaP/Tdap/Td series 1954 Shingrix Vaccine Age 50> 1983 GLAUCOMA SCREENING Q2Y 1998 Pneumococcal 65+ years 2006 Health Maintenance Topics with due status: Not Due Topic Last Completion Date COLONOSCOPY 2016 Influenza Age 5 to Adult 10/19/2018 MEDICARE YEARLY EXAM 2018 MICROALBUMIN Q1 2018 FOOT EXAM Q1 2019 HEMOGLOBIN A1C Q6M 2019 LIPID PANEL Q1 2019 Medication reconciliation up to date and corrected with patient at this time. Today's provider has been notified of reason for visit, vitals and flowsheets obtained on patients. Reviewed record in preparation for visit, huddled with provider and have obtained necessary documentation. Wt Readings from Last 3 Encounters:  
19 201 lb 12.8 oz (91.5 kg) 19 206 lb 12.8 oz (93.8 kg) 19 208 lb 9.6 oz (94.6 kg) Temp Readings from Last 3 Encounters:  
19 97.7 °F (36.5 °C) (Oral) 19 97.6 °F (36.4 °C)  
19 97.5 °F (36.4 °C) (Oral) BP Readings from Last 3 Encounters:  
19 120/62  
19 128/74  
19 138/74 Pulse Readings from Last 3 Encounters:  
19 88  
19 80  
19 60 Vitals:  
 19 1350 BP: 120/62 Pulse: 88 Resp: 18 Temp: 97.7 °F (36.5 °C) TempSrc: Oral  
 SpO2: 97% Weight: 201 lb 12.8 oz (91.5 kg) Height: 6' 3\" (1.905 m) PainSc:   0 - No pain Learning Assessment: 
:  
 
Learning Assessment 1/9/2018 PRIMARY LEARNER Patient HIGHEST LEVEL OF EDUCATION - PRIMARY LEARNER  GRADUATED HIGH SCHOOL OR GED  
BARRIERS PRIMARY LEARNER NONE  
CO-LEARNER CAREGIVER No  
PRIMARY LANGUAGE ENGLISH  
LEARNER PREFERENCE PRIMARY READING  
ANSWERED BY patient RELATIONSHIP SELF Depression Screening: 
:  
 
3 most recent PHQ Screens 4/2/2019 Little interest or pleasure in doing things Not at all Feeling down, depressed, irritable, or hopeless Not at all Total Score PHQ 2 0 No flowsheet data found. Fall Risk Assessment: 
:  
 
Fall Risk Assessment, last 12 mths 4/2/2019 Able to walk? Yes Fall in past 12 months? Yes Fall with injury? No  
Number of falls in past 12 months 1 Fall Risk Score 1 Abuse Screening: 
:  
 
Abuse Screening Questionnaire 4/24/2018 Do you ever feel afraid of your partner? Moshe Mcgovern Are you in a relationship with someone who physically or mentally threatens you? Moshe Mcgovern Is it safe for you to go home? Y  
 
 
ADL Screening: 
:  
 

## 2019-05-02 ENCOUNTER — TELEPHONE ANTICOAG (OUTPATIENT)
Dept: INTERNAL MEDICINE CLINIC | Age: 84
End: 2019-05-02

## 2019-05-02 DIAGNOSIS — I48.0 PAROXYSMAL ATRIAL FIBRILLATION (HCC): Primary | ICD-10-CM

## 2019-05-02 LAB
INR PPP: 1.5 (ref 0.8–1.2)
PROTHROMBIN TIME: 15.4 SEC (ref 9.1–12)

## 2019-05-02 NOTE — PROGRESS NOTES
Anticoagulation Summary  As of 2019    INR goal:   2.0-3.0   TTR:   3.7 % (1.1 y)   INR used for dosin.5! (2019)   Warfarin maintenance plan:   10 mg (5 mg x 2) every Mon, Fri; 7.5 mg (5 mg x 1.5) all other days   Weekly warfarin total:   57.5 mg   Plan last modified:   Bry Simental RN (2018)   Next INR check:      Target end date:       Indications    Paroxysmal atrial fibrillation ([facility] [address] (Primary) [I48.0]             Anticoagulation Episode Summary     INR check location:   Clinic Lab    Preferred lab:       Send INR reminders to:       Comments:         Anticoagulation Care Providers     Provider Role Specialty Phone number    Jarrett Aranda MD [facility] [phone]        Informed patient per Dr. John Fallon that INR was a little low and to make sure he doesn't miss any doses of Coumadin. Pt will continue current schedule of 10mg Mon and Fri and 7.5mg all other days. Patient verbalized understanding of information discussed  with no further questions at this time.

## 2019-05-02 NOTE — PROGRESS NOTES
Sed rate and kidney function without significant change.   INR is a little low so make sure no doses of Coumadin have been missed

## 2019-05-02 NOTE — PROGRESS NOTES
Called and spoke to patient Two pt identifiers confirmed Informed patient per Dr. Navarro Mathews that sed rate and kidney function were without significant change. Also informed patient per Dr. Briceño More that INR is a little low and to make sure he doesn't skip any doses of Coumadin. Patient verbalized understanding of information discussed  with no further questions at this time.

## 2019-05-07 DIAGNOSIS — N40.0 BENIGN PROSTATIC HYPERPLASIA, UNSPECIFIED WHETHER LOWER URINARY TRACT SYMPTOMS PRESENT: ICD-10-CM

## 2019-05-07 RX ORDER — LISINOPRIL 5 MG/1
TABLET ORAL
Qty: 90 TAB | Refills: 3 | Status: SHIPPED | OUTPATIENT
Start: 2019-05-07 | End: 2020-04-09

## 2019-05-07 RX ORDER — SIMVASTATIN 20 MG/1
TABLET, FILM COATED ORAL
Qty: 90 TAB | Refills: 3 | Status: SHIPPED | OUTPATIENT
Start: 2019-05-07 | End: 2020-05-14

## 2019-05-07 RX ORDER — TERAZOSIN 2 MG/1
CAPSULE ORAL
Qty: 90 CAP | Refills: 3 | Status: SHIPPED | OUTPATIENT
Start: 2019-05-07 | End: 2020-05-14

## 2019-05-07 NOTE — TELEPHONE ENCOUNTER
RX refill request from the patient/pharmacy. Patient last seen 05- with labs, and next appt. scheduled for 06-  Requested Prescriptions     Pending Prescriptions Disp Refills    terazosin (HYTRIN) 2 mg capsule [Pharmacy Med Name: TERAZOSIN 2 MG CAPSULE] 90 Cap 3     Sig: TAKE ONE CAPSULE BY MOUTH DAILY    simvastatin (ZOCOR) 20 mg tablet [Pharmacy Med Name: SIMVASTATIN 20 MG TABLET] 90 Tab 3     Sig: TAKE 1 TABLET EVERY DAY    lisinopril (PRINIVIL, ZESTRIL) 5 mg tablet [Pharmacy Med Name: LISINOPRIL 5 MG TABLET] 90 Tab 3     Sig: TAKE 1 TABLET EVERY DAY   .

## 2019-05-14 DIAGNOSIS — I48.91 ATRIAL FIBRILLATION, UNSPECIFIED TYPE (HCC): ICD-10-CM

## 2019-05-14 RX ORDER — DIGOXIN 125 MCG
TABLET ORAL
Qty: 90 TAB | Refills: 0 | Status: SHIPPED | OUTPATIENT
Start: 2019-05-14 | End: 2019-08-22 | Stop reason: SDUPTHER

## 2019-05-14 NOTE — TELEPHONE ENCOUNTER
PCP: Jaiden Maldonado MD    Last appt: 5/1/2019  Future Appointments   Date Time Provider Helen Butcher   6/11/2019  9:10 AM Jaiden Maldonado MD [facility]VA SCHED       Requested Prescriptions     Pending Prescriptions Disp Refills    digoxin (LANOXIN) 0.125 mg tablet [Pharmacy Med Name: DIGOXIN 125 MCG TABLET] 90 Tab 0     Sig: TAKE 1 TABLET BY MOUTH EVERY DAY

## 2019-06-10 NOTE — PROGRESS NOTES
Chief Complaint   Patient presents with    Hypertension     3 month follow up    CHF    Diabetes    Chronic Kidney Disease       SUBJECTIVE:    Freddie Tobin is a 80 y.o. male who returns in follow-up for his medical problems to include hypertension, diabetes, hyperlipidemia, CHF, ASCVD, CKD stage III, DJD and other medical problems including current polymyalgia rheumatica for which he is currently still on prednisone 10 mg a day. He currently denies any chest pain, shortness of breath, palpitations, PND, orthopnea or other cardiorespiratory complaints. He denies any GI or  complaints. He denies any headaches, dizziness or neurologic complaints. There are no current changes of his chronic arthritic complaints and no other complaints on complete review of systems. Current Outpatient Medications   Medication Sig Dispense Refill    digoxin (LANOXIN) 0.125 mg tablet TAKE 1 TABLET BY MOUTH EVERY DAY 90 Tab 0    terazosin (HYTRIN) 2 mg capsule TAKE ONE CAPSULE BY MOUTH DAILY 5mg 90 Cap 3    simvastatin (ZOCOR) 20 mg tablet TAKE 1 TABLET EVERY DAY 90 Tab 3    lisinopril (PRINIVIL, ZESTRIL) 5 mg tablet TAKE 1 TABLET EVERY DAY 90 Tab 3    warfarin (COUMADIN) 5 mg tablet Take 2 tablets on Monday and Friday; and one and a half tablets all other days. 145 Tab 3    furosemide (LASIX) 80 mg tablet TAKE 1 TABLET BY MOUTH EVERY DAY 90 Tab 3    Nebulizer & Compressor machine Use daily as directed. 1 Each 0    predniSONE (DELTASONE) 10 mg tablet Take 30 mg by mouth daily. 3 tablets daily. 90 Tab 3    glucose blood VI test strips (ACCU-CHEK SMARTVIEW TEST STRIP) strip Use once daily 100 Strip prn    sildenafil, antihypertensive, (REVATIO) 20 mg tablet TAKE 1 TABLET, ORAL, AS DIRECTED FOR SEXUAL ACTIVITY 20 Tab 11    metFORMIN (GLUCOPHAGE) 500 mg tablet Take 1 Tab by mouth daily (with breakfast).  90 Tab 3    diclofenac EC (VOLTAREN) 75 mg EC tablet TAKE 1 TABLET BY MOUTH TWICE A  Tab 3    fexofenadine (ALLEGRA) 180 mg tablet Take  by mouth.  acetaminophen (TYLENOL ARTHRITIS PAIN) 650 mg TbER Take 650 mg by mouth every eight (8) hours.  multivitamins-minerals-lutein (MULTIVITAMIN 50 PLUS) tab tablet Take 1 Tab by mouth daily.  glucosamine 1,000 mg tab Take 2,000 mg by mouth daily.  cholecalciferol (VITAMIN D3) 1,000 unit cap Take 1,000 Units by mouth daily.  DOCOSAHEXANOIC ACID/EPA (FISH OIL PO) Take 1,200 mg by mouth two (2) times a day. 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    Back pain 9/20/2017    BPH (benign prostatic hyperplasia) 9/20/2017    CHF (congestive heart failure) ([address] 9/20/2017    CKD (chronic kidney disease) 9/20/2017    Diabetic acetonemia ([address]     DM (diabetes mellitus) ([facility] 9/20/2017    Story: Diet Controlled    ED (erectile dysfunction) 9/20/2017    Edema 9/20/2017    Elevated CPK 9/20/2017    Comments: History of    Elevated LFTs 9/20/2017    Comments: History of    Elevated PSA 9/20/2017    Hypercholesteremia     Hyperlipidemia 9/20/2017    Hypertension     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    Nodule of right lung 9/20/2017    Story: Right    Polymyalgia ([address] 9/20/2017    Prostate enlargement      Past Surgical History:   Procedure Laterality Date    ABDOMEN SURGERY PROC UNLISTED      hernia repair    HX HEENT  06/12/2018    Dr. 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    HX MALIGNANT SKIN LESION EXCISION  09/2018    2 lesions on head     No Known Allergies    REVIEW OF SYSTEMS:  General: negative for - chills or fever, or weight loss or gain  ENT: negative for - headaches, nasal congestion or tinnitus  Eyes: no blurred or visual changes  Neck: No stiffness or swollen nodes  Respiratory: negative for - cough, hemoptysis, shortness of breath or wheezing  Cardiovascular : negative for - chest pain, edema, palpitations or shortness of breath  Gastrointestinal: negative for - abdominal pain, blood in stools, heartburn or nausea/vomiting  Genito-Urinary: no dysuria, trouble voiding, or hematuria  Musculoskeletal: negative for - gait disturbance, joint pain, joint stiffness or joint swelling  Neurological: no TIA or stroke symptoms  Hematologic: no bruises, no bleeding  Lymphatic: no swollen glands  Integument: no lumps, mole changes, nail changes or rash  Endocrine:no malaise/lethargy poly uria or polydipsia or unexpected weight changes        Social History     Socioeconomic History    Marital status:      Spouse name: Not on file    Number of children: Not on file    Years of education: Not on file    Highest education level: Not on file   Tobacco Use    Smoking status: Never Smoker    Smokeless tobacco: Never Used   Substance and Sexual Activity    Alcohol use: No    Drug use: No    Sexual activity: Never     History reviewed. No pertinent family history. OBJECTIVE:     Visit Vitals  /64 (BP 1 Location: Left arm, BP Patient Position: Sitting)   Pulse (!) 52   Temp 97.7 °F (36.5 °C) (Oral)   Resp 19   Ht 6' 3\" (1.905 m)   Wt 201 lb 9.6 oz (91.4 kg)   SpO2 98%   BMI 25.20 kg/m²     CONSTITUTIONAL:   well nourished, appears age appropriate  EYES: sclera anicteric, PERRL, EOMI  ENMT:nares clear, moist mucous membranes, pharynx clear  NECK: supple.  Thyroid normal, No JVD or bruits  RESPIRATORY: Chest: clear to ascultation and percussion, normal inspiratory effort  CARDIOVASCULAR: Heart: regular rate and rhythm no murmurs, rubs or gallops, PMI not displaced, No thrills  GASTROINTESTINAL: Abdomen: non distended, soft, non tender, bowel sounds normal  HEMATOLOGIC: no purpura, petechiae or bruising  LYMPHATIC: No lymph node enlargemant  MUSCULOSKELETAL: Extremities: no edema or active synovitis, pulse 1+   INTEGUMENT: No unusual rashes or suspicious skin lesions noted. Nails appear normal.  PERIPHERAL VASCULAR: normal pulses femoral, PT and DP  NEUROLOGIC: non-focal exam, A & O X 3  PSYCHIATRIC:, appropriate affect     ASSESSMENT:   1. Hypertension with renal disease    2. 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, without long-term current use of insulin ([facility]    3. Mixed hyperlipidemia    4. Paroxysmal atrial fibrillation (HCC)    5. Primary osteoarthritis involving multiple joints    6. Stage 3 chronic kidney disease ([facility]    7. ASCVD (arteriosclerotic cardiovascular disease)    8. Polymyalgia rheumatica (HCC)      Impression  1. Hypertension is controlled to continue current therapy reviewed with him. 2.  Diabetes repeat status pending and prior lab reviewed and I will make adjustments if necessary. 3.  Hyperlipidemia prior lab reviewed and repeat status pending I will adjust if needed. 4.  Paroxysmal atrial fibrillation currently stable INR is pending  5. DJD that is currently stable  6. CKD stage III repeat status pending  7. ASCVD clinically stable continue aspirin daily  8. Polymyalgia rheumatica sed rate pending and current dose of prednisone 10 mg daily  I will call with lab and make further recommendations or adjustments if necessary. Follow-up scheduled for 3 months or sooner should there be a problem. In the interim I will see him monthly regarding his polymyalgia rheumatica and to able to taper his prednisone. PLAN:  .  Orders Placed This Encounter    HEMOGLOBIN A1C WITH EAG    SED RATE (ESR) ([facility])    METABOLIC PANEL, COMPREHENSIVE ([place] [facility])    LIPID PANEL ([facility])    CK ([facility])    PROTHROMBIN TIME + INR         ATTENTION:   This medical record was transcribed using an electronic medical records system. Although proofread, it may and can contain electronic and spelling errors. Other human spelling and other errors may be present.   Corrections may be executed at a later time. Please feel free to contact us for any clarifications as needed. Follow-up and Dispositions    · Return in about 3 months (around 9/11/2019). No results found for any visits on 06/11/19. Jostin Oden MD    The patient verbalized understanding of the problems and plans as explained.

## 2019-06-11 ENCOUNTER — OFFICE VISIT (OUTPATIENT)
Dept: INTERNAL MEDICINE CLINIC | Age: 84
End: 2019-06-11

## 2019-06-11 VITALS
BODY MASS INDEX: 25.07 KG/M2 | SYSTOLIC BLOOD PRESSURE: 132 MMHG | OXYGEN SATURATION: 98 % | RESPIRATION RATE: 19 BRPM | DIASTOLIC BLOOD PRESSURE: 64 MMHG | HEIGHT: 75 IN | HEART RATE: 52 BPM | TEMPERATURE: 97.7 F | WEIGHT: 201.6 LBS

## 2019-06-11 DIAGNOSIS — N18.30 STAGE 3 CHRONIC KIDNEY DISEASE (HCC): ICD-10-CM

## 2019-06-11 DIAGNOSIS — N18.30 CONTROLLED TYPE 2 DIABETES MELLITUS WITH STAGE 3 CHRONIC KIDNEY DISEASE, WITHOUT LONG-TERM CURRENT USE OF INSULIN (HCC): ICD-10-CM

## 2019-06-11 DIAGNOSIS — E11.22 CONTROLLED TYPE 2 DIABETES MELLITUS WITH STAGE 3 CHRONIC KIDNEY DISEASE, WITHOUT LONG-TERM CURRENT USE OF INSULIN (HCC): ICD-10-CM

## 2019-06-11 DIAGNOSIS — E78.2 MIXED HYPERLIPIDEMIA: ICD-10-CM

## 2019-06-11 DIAGNOSIS — M35.3 POLYMYALGIA RHEUMATICA (HCC): ICD-10-CM

## 2019-06-11 DIAGNOSIS — I25.10 ASCVD (ARTERIOSCLEROTIC CARDIOVASCULAR DISEASE): ICD-10-CM

## 2019-06-11 DIAGNOSIS — M15.9 PRIMARY OSTEOARTHRITIS INVOLVING MULTIPLE JOINTS: ICD-10-CM

## 2019-06-11 DIAGNOSIS — I48.0 PAROXYSMAL ATRIAL FIBRILLATION (HCC): ICD-10-CM

## 2019-06-11 DIAGNOSIS — I12.9 HYPERTENSION WITH RENAL DISEASE: Primary | ICD-10-CM

## 2019-06-11 LAB
A-G RATIO,AGRAT: 1.5 RATIO
ALBUMIN SERPL-MCNC: 3.8 G/DL (ref 3.9–5.4)
ALP SERPL-CCNC: 60 U/L (ref 38–126)
ALT SERPL-CCNC: 30 U/L (ref 9–52)
ANION GAP SERPL CALC-SCNC: 6 MMOL/L
AST SERPL W P-5'-P-CCNC: 28 U/L (ref 14–36)
BILIRUB SERPL-MCNC: 0.4 MG/DL (ref 0.2–1.3)
BUN SERPL-MCNC: 22 MG/DL (ref 9–20)
BUN/CREATININE RATIO,BUCR: 20 RATIO
CALCIUM SERPL-MCNC: 9.4 MG/DL (ref 8.4–10.2)
CHLORIDE SERPL-SCNC: 104 MMOL/L (ref 98–107)
CHOL/HDL RATIO,CHHD: 5 RATIO (ref 0–4)
CHOLEST SERPL-MCNC: 194 MG/DL (ref 0–200)
CK SERPL-CCNC: 160 U/L (ref 30–135)
CO2 SERPL-SCNC: 31 MMOL/L (ref 22–32)
CREAT SERPL-MCNC: 1.1 MG/DL (ref 0.8–1.5)
GLOBULIN,GLOB: 2.6
GLUCOSE SERPL-MCNC: 126 MG/DL (ref 75–110)
HDLC SERPL-MCNC: 40 MG/DL (ref 35–130)
LDL/HDL RATIO,LDHD: 3 RATIO
LDLC SERPL CALC-MCNC: 101 MG/DL (ref 0–130)
POTASSIUM SERPL-SCNC: 4.4 MMOL/L (ref 3.6–5)
PROT SERPL-MCNC: 6.4 G/DL (ref 6.3–8.2)
SED RATE (ESR): 28 MM/HR (ref 0–10)
SODIUM SERPL-SCNC: 141 MMOL/L (ref 137–145)
TRIGL SERPL-MCNC: 264 MG/DL (ref 0–200)
VLDLC SERPL CALC-MCNC: 53 MG/DL

## 2019-06-11 NOTE — PROGRESS NOTES
Chief Complaint   Patient presents with    Hypertension     3 month follow up    CHF    Diabetes    Chronic Kidney Disease     Visit Vitals  /64 (BP 1 Location: Left arm, BP Patient Position: Sitting)   Pulse (!) 52   Temp 97.7 °F (36.5 °C) (Oral)   Resp 19   Ht 6' 3\" (1.905 m)   Wt 201 lb 9.6 oz (91.4 kg)   SpO2 98%   BMI 25.20 kg/m²     1. Have you been to the ER, urgent care clinic since your last visit? Hospitalized since your last visit? No    2. Have you seen or consulted any other health care providers outside of the [address] [place] [address] since your last visit? Include any pap smears or colon screening.  No

## 2019-06-11 NOTE — PATIENT INSTRUCTIONS
Arthritis: Care Instructions Your Care Instructions Arthritis, also called osteoarthritis, is a breakdown of the cartilage that cushions your joints. When the cartilage wears down, your bones rub against each other. This causes pain and stiffness. Many people have some arthritis as they age. Arthritis most often affects the joints of the spine, hands, hips, knees, or feet. You can take simple measures to protect your joints, ease your pain, and help you stay active. Follow-up care is a key part of your treatment and safety. Be sure to make and go to all appointments, and call your doctor if you are having problems. It's also a good idea to know your test results and keep a list of the medicines you take. How can you care for yourself at home? · Stay at a healthy weight. Being overweight puts extra strain on your joints. · Talk to your doctor or physical therapist about exercises that will help ease joint pain. ? Stretch. You may enjoy gentle forms of yoga to help keep your joints and muscles flexible. ? Walk instead of jog. Other types of exercise that are less stressful on the joints include riding a bicycle, swimming, ashley chi, or water exercise. ? Lift weights. Strong muscles help reduce stress on your joints. Stronger thigh muscles, for example, take some of the stress off of the knees and hips. Learn the right way to lift weights so you do not make joint pain worse. · Take your medicines exactly as prescribed. Call your doctor if you think you are having a problem with your medicine. · Take pain medicines exactly as directed. ? If the doctor gave you a prescription medicine for pain, take it as prescribed. ? If you are not taking a prescription pain medicine, ask your doctor if you can take an over-the-counter medicine. · Use a cane, crutch, walker, or another device if you need help to get around. These can help rest your joints.  You also can use other things to make life easier, such as a higher toilet seat and padded handles on kitchen utensils. · Do not sit in low chairs, which can make it hard to get up. · Put heat or cold on your sore joints as needed. Use whichever helps you most. You also can take turns with hot and cold packs. ? Apply heat 2 or 3 times a day for 20 to 30 minutesusing a heating pad, hot shower, or hot packto relieve pain and stiffness. ? Put ice or a cold pack on your sore joint for 10 to 20 minutes at a time. Put a thin cloth between the ice and your skin. When should you call for help? Call your doctor now or seek immediate medical care if: 
  · You have sudden swelling, warmth, or pain in any joint.  
  · You have joint pain and a fever or rash.  
  · You have such bad pain that you cannot use a joint.  
 Watch closely for changes in your health, and be sure to contact your doctor if: 
  · You have mild joint symptoms that continue even with more than 6 weeks of care at home.  
  · You have stomach pain or other problems with your medicine. Where can you learn more? Go to http://isra-rhonda.info/. Enter N450 in the search box to learn more about \"Arthritis: Care Instructions. \" Current as of: [place] 10, 2018 Content Version: 11.9 © 8939-3129 [employer]. Care instructions adapted under license by [employer] (which disclaims liability or warranty for this information). If you have questions about a medical condition or this instruction, always ask your healthcare professional. [place] [address] any warranty or liability for your use of this information.

## 2019-06-12 LAB
EST. AVERAGE GLUCOSE BLD GHB EST-MCNC: 157 MG/DL
HBA1C MFR BLD: 7.1 % (ref 4.8–5.6)
INR PPP: 1.9 (ref 0.8–1.2)
PROTHROMBIN TIME: 19.2 SEC (ref 9.1–12)

## 2019-06-14 ENCOUNTER — TELEPHONE ANTICOAG (OUTPATIENT)
Dept: INTERNAL MEDICINE CLINIC | Age: 84
End: 2019-06-14

## 2019-06-14 DIAGNOSIS — I48.0 PAROXYSMAL ATRIAL FIBRILLATION (HCC): Primary | ICD-10-CM

## 2019-06-14 NOTE — PROGRESS NOTES
Advised patient to continue his blood thinner dose at 10 mg on Monday and Friday; 7.5 mg all other days. Also to continue his current dose of prednisone at 10 mg daily. F/up as scheduled.

## 2019-06-14 NOTE — PROGRESS NOTES
Anticoagulation Summary  As of 2019    INR goal:   2.0-3.0   TTR:   3.3 % (1.2 y)   INR used for dosin. 9! (2019)   Warfarin maintenance plan:   10 mg (5 mg x 2) every Mon, Fri; 7.5 mg (5 mg x 1.5) all other days   Weekly warfarin total:   57.5 mg   Plan last modified:   Radha Mar RN (2018)   Next INR check:   7/10/2019   Target end date:       Indications    Paroxysmal atrial fibrillation ([facility] (Primary) [I48.0]             Anticoagulation Episode Summary     INR check location:   Clinic Lab    Preferred lab:       Send INR reminders to:       Comments:         Anticoagulation Care Providers     Provider Role Specialty Phone number    Bekah Oshea MD [facility] [phone]        Informed patient that PT/INR okay; continue the same dose of blood thinner which is 10 mg on Monday and Friday; 7.5 mg all other days. F/up as scheduled.

## 2019-07-09 NOTE — PROGRESS NOTES
Chief Complaint   Patient presents with    Hypertension     1 mo fu       SUBJECTIVE:    Daniel Aranda is a 80 y.o. male who returns in follow-up for his polymyalgia rheumatica, hypertension, CHF, CKD and other medical problems. He is taking his medications and trying to follow his diet and trying to remain physically active and get some exercise. He currently denies any chest pain, shortness of breath, palpitations, PND, orthopnea or other cardiorespiratory complaints. He notes no GI or  complaints. He feels overall he has legs are stronger now that he is on the prednisone for his polymyalgia he is currently taking 10 mg daily. He has no other complaints on complete review of systems. Current Outpatient Medications   Medication Sig Dispense Refill    digoxin (LANOXIN) 0.125 mg tablet TAKE 1 TABLET BY MOUTH EVERY DAY 90 Tab 0    terazosin (HYTRIN) 2 mg capsule TAKE ONE CAPSULE BY MOUTH DAILY 5mg 90 Cap 3    simvastatin (ZOCOR) 20 mg tablet TAKE 1 TABLET EVERY DAY 90 Tab 3    lisinopril (PRINIVIL, ZESTRIL) 5 mg tablet TAKE 1 TABLET EVERY DAY 90 Tab 3    warfarin (COUMADIN) 5 mg tablet Take 2 tablets on Monday and Friday; and one and a half tablets all other days. 145 Tab 3    furosemide (LASIX) 80 mg tablet TAKE 1 TABLET BY MOUTH EVERY DAY 90 Tab 3    Nebulizer & Compressor machine Use daily as directed. 1 Each 0    predniSONE (DELTASONE) 10 mg tablet Take 30 mg by mouth daily. 3 tablets daily. 90 Tab 3    glucose blood VI test strips (ACCU-CHEK SMARTVIEW TEST STRIP) strip Use once daily 100 Strip prn    sildenafil, antihypertensive, (REVATIO) 20 mg tablet TAKE 1 TABLET, ORAL, AS DIRECTED FOR SEXUAL ACTIVITY 20 Tab 11    metFORMIN (GLUCOPHAGE) 500 mg tablet Take 1 Tab by mouth daily (with breakfast). 90 Tab 3    diclofenac EC (VOLTAREN) 75 mg EC tablet TAKE 1 TABLET BY MOUTH TWICE A  Tab 3    fexofenadine (ALLEGRA) 180 mg tablet Take  by mouth.       acetaminophen (TYLENOL ARTHRITIS PAIN) 650 mg TbER Take 650 mg by mouth every eight (8) hours.  multivitamins-minerals-lutein (MULTIVITAMIN 50 PLUS) tab tablet Take 1 Tab by mouth daily.  glucosamine 1,000 mg tab Take 2,000 mg by mouth daily.  cholecalciferol (VITAMIN D3) 1,000 unit cap Take 1,000 Units by mouth daily.  DOCOSAHEXANOIC ACID/EPA (FISH OIL PO) Take 1,200 mg by mouth two (2) times a day. 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    Back pain 9/20/2017    BPH (benign prostatic hyperplasia) 9/20/2017    CHF (congestive heart failure) ([facility] 9/20/2017    CKD (chronic kidney disease) 9/20/2017    Diabetic acetonemia ([facility]     DM (diabetes mellitus) ([facility] 9/20/2017    Story: Diet Controlled    ED (erectile dysfunction) 9/20/2017    Edema 9/20/2017    Elevated CPK 9/20/2017    Comments: History of    Elevated LFTs 9/20/2017    Comments: History of    Elevated PSA 9/20/2017    Hypercholesteremia     Hyperlipidemia 9/20/2017    Hypertension     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    Nodule of right lung 9/20/2017    Story: Right    Polymyalgia ([facility] 9/20/2017    Prostate enlargement      Past Surgical History:   Procedure Laterality Date    ABDOMEN SURGERY PROC UNLISTED      hernia repair    HX HEENT  06/12/2018    Dr. 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    HX MALIGNANT SKIN LESION EXCISION  09/2018    2 lesions on head     No Known Allergies    REVIEW OF SYSTEMS:  General: negative for - chills or fever, or weight loss or gain  ENT: negative for - headaches, nasal congestion or tinnitus  Eyes: no blurred or visual changes  Neck: No stiffness or swollen nodes  Respiratory: negative for - cough, hemoptysis, shortness of breath or wheezing  Cardiovascular : negative for - chest pain, edema, palpitations or shortness of breath  Gastrointestinal: negative for - abdominal pain, blood in stools, heartburn or nausea/vomiting  Genito-Urinary: no dysuria, trouble voiding, or hematuria  Musculoskeletal: negative for - gait disturbance, joint pain, joint stiffness or joint swelling  Neurological: no TIA or stroke symptoms  Hematologic: no bruises, no bleeding  Lymphatic: no swollen glands  Integument: no lumps, mole changes, nail changes or rash  Endocrine:no malaise/lethargy poly uria or polydipsia or unexpected weight changes        Social History     Socioeconomic History    Marital status:      Spouse name: Not on file    Number of children: Not on file    Years of education: Not on file    Highest education level: Not on file   Tobacco Use    Smoking status: Never Smoker    Smokeless tobacco: Never Used   Substance and Sexual Activity    Alcohol use: No    Drug use: No    Sexual activity: Never     History reviewed. No pertinent family history. OBJECTIVE:     Visit Vitals  /72 (BP 1 Location: Left arm, BP Patient Position: Sitting)   Pulse 78   Temp 97.5 °F (36.4 °C) (Oral)   Resp 20   Ht 6' 3\" (1.905 m)   Wt 204 lb 6.4 oz (92.7 kg)   SpO2 95%   BMI 25.55 kg/m²     CONSTITUTIONAL:   well nourished, appears age appropriate  EYES: sclera anicteric, PERRL, EOMI  ENMT:nares clear, moist mucous membranes, pharynx clear  NECK: supple. Thyroid normal, No JVD or bruits  RESPIRATORY: Chest: clear to ascultation and percussion, normal inspiratory effort  CARDIOVASCULAR: Heart: regular rate and rhythm no murmurs, rubs or gallops, PMI not displaced, No thrills  GASTROINTESTINAL: Abdomen: non distended, soft, non tender, bowel sounds normal  HEMATOLOGIC: no purpura, petechiae or bruising  LYMPHATIC: No lymph node enlargemant  MUSCULOSKELETAL: Extremities: no edema or active synovitis, pulse 1+   INTEGUMENT: No unusual rashes or suspicious skin lesions noted.  Nails appear normal.  PERIPHERAL VASCULAR: normal pulses femoral, PT and DP  NEUROLOGIC: non-focal exam, A & O X 3  PSYCHIATRIC:, appropriate affect     ASSESSMENT:   1. Polymyalgia rheumatica ([address]    2. Hypertension with renal disease    3. 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, without long-term current use of insulin (HCC)    4. Stage 3 chronic kidney disease (HCC)      Impression  1. Polymyalgia rheumatica seems to be doing well we will see what the sed rate is and see if we can adjust the prednisone  2. Hypertension that is controlled so continue current therapy  3. Diabetes repeat status pending a prior lab review not make adjustments if necessary. 4 CKD stage III repeat status pending  I will call with lab results and make further recommendations or adjustments if necessary. Follow-up scheduled again for 1 month or sooner if there is a problem. He is to come in fasting next month for follow-up of his other medical problems. PLAN:  .  Orders Placed This Encounter    SED RATE (ESR) ([facility])    METABOLIC PANEL, BASIC [facility]         ATTENTION:   This medical record was transcribed using an electronic medical records system. Although proofread, it may and can contain electronic and spelling errors. Other human spelling and other errors may be present. Corrections may be executed at a later time. Please feel free to contact us for any clarifications as needed. Follow-up and Dispositions    · Return in about 1 month (around 8/7/2019). No results found for any visits on 07/10/19. Cesilia Perdomo MD    The patient verbalized understanding of the problems and plans as explained.

## 2019-07-10 ENCOUNTER — OFFICE VISIT (OUTPATIENT)
Dept: INTERNAL MEDICINE CLINIC | Age: 84
End: 2019-07-10

## 2019-07-10 VITALS
WEIGHT: 204.4 LBS | DIASTOLIC BLOOD PRESSURE: 72 MMHG | RESPIRATION RATE: 20 BRPM | HEIGHT: 75 IN | OXYGEN SATURATION: 95 % | TEMPERATURE: 97.5 F | SYSTOLIC BLOOD PRESSURE: 118 MMHG | HEART RATE: 78 BPM | BODY MASS INDEX: 25.41 KG/M2

## 2019-07-10 DIAGNOSIS — N18.30 CONTROLLED TYPE 2 DIABETES MELLITUS WITH STAGE 3 CHRONIC KIDNEY DISEASE, WITHOUT LONG-TERM CURRENT USE OF INSULIN (HCC): ICD-10-CM

## 2019-07-10 DIAGNOSIS — E11.22 CONTROLLED TYPE 2 DIABETES MELLITUS WITH STAGE 3 CHRONIC KIDNEY DISEASE, WITHOUT LONG-TERM CURRENT USE OF INSULIN (HCC): ICD-10-CM

## 2019-07-10 DIAGNOSIS — I12.9 HYPERTENSION WITH RENAL DISEASE: ICD-10-CM

## 2019-07-10 DIAGNOSIS — M35.3 POLYMYALGIA RHEUMATICA (HCC): Primary | ICD-10-CM

## 2019-07-10 DIAGNOSIS — N18.30 STAGE 3 CHRONIC KIDNEY DISEASE (HCC): ICD-10-CM

## 2019-07-10 LAB
ANION GAP SERPL CALC-SCNC: 12 MMOL/L
BUN SERPL-MCNC: 33 MG/DL (ref 9–20)
CALCIUM SERPL-MCNC: 9.8 MG/DL (ref 8.4–10.2)
CHLORIDE SERPL-SCNC: 102 MMOL/L (ref 98–107)
CO2 SERPL-SCNC: 25 MMOL/L (ref 22–32)
CREAT SERPL-MCNC: 1.4 MG/DL (ref 0.8–1.5)
GLUCOSE SERPL-MCNC: 198 MG/DL (ref 75–110)
POTASSIUM SERPL-SCNC: 5 MMOL/L (ref 3.6–5)
SED RATE (ESR): 50 MM/HR (ref 0–10)
SODIUM SERPL-SCNC: 139 MMOL/L (ref 137–145)

## 2019-07-10 NOTE — PROGRESS NOTES
Ainsley Fisher is a 80 y.o. male presenting for Hypertension (1 mo fu)  . 1. Have you been to the ER, urgent care clinic since your last visit? Hospitalized since your last visit? No    2. Have you seen or consulted any other health care providers outside of the [address] [place] [address] since your last visit? Include any pap smears or colon screening. Dermatologist    Fall Risk Assessment, last 12 mths 6/11/2019   Able to walk? Yes   Fall in past 12 months? No   Fall with injury? -   Number of falls in past 12 months -   Fall Risk Score -         Abuse Screening Questionnaire 7/10/2019   Do you ever feel afraid of your partner? N   Are you in a relationship with someone who physically or mentally threatens you? N   Is it safe for you to go home? Y       3 most recent PHQ Screens 6/11/2019   Little interest or pleasure in doing things Not at all   Feeling down, depressed, irritable, or hopeless Not at all   Total Score PHQ 2 0       There are no discontinued medications.

## 2019-07-10 NOTE — PATIENT INSTRUCTIONS
Arthritis: Care Instructions Your Care Instructions Arthritis, also called osteoarthritis, is a breakdown of the cartilage that cushions your joints. When the cartilage wears down, your bones rub against each other. This causes pain and stiffness. Many people have some arthritis as they age. Arthritis most often affects the joints of the spine, hands, hips, knees, or feet. You can take simple measures to protect your joints, ease your pain, and help you stay active. Follow-up care is a key part of your treatment and safety. Be sure to make and go to all appointments, and call your doctor if you are having problems. It's also a good idea to know your test results and keep a list of the medicines you take. How can you care for yourself at home? · Stay at a healthy weight. Being overweight puts extra strain on your joints. · Talk to your doctor or physical therapist about exercises that will help ease joint pain. ? Stretch. You may enjoy gentle forms of yoga to help keep your joints and muscles flexible. ? Walk instead of jog. Other types of exercise that are less stressful on the joints include riding a bicycle, swimming, ashley chi, or water exercise. ? Lift weights. Strong muscles help reduce stress on your joints. Stronger thigh muscles, for example, take some of the stress off of the knees and hips. Learn the right way to lift weights so you do not make joint pain worse. · Take your medicines exactly as prescribed. Call your doctor if you think you are having a problem with your medicine. · Take pain medicines exactly as directed. ? If the doctor gave you a prescription medicine for pain, take it as prescribed. ? If you are not taking a prescription pain medicine, ask your doctor if you can take an over-the-counter medicine. · Use a cane, crutch, walker, or another device if you need help to get around. These can help rest your joints.  You also can use other things to make life easier, such as a higher toilet seat and padded handles on kitchen utensils. · Do not sit in low chairs, which can make it hard to get up. · Put heat or cold on your sore joints as needed. Use whichever helps you most. You also can take turns with hot and cold packs. ? Apply heat 2 or 3 times a day for 20 to 30 minutesusing a heating pad, hot shower, or hot packto relieve pain and stiffness. ? Put ice or a cold pack on your sore joint for 10 to 20 minutes at a time. Put a thin cloth between the ice and your skin. When should you call for help? Call your doctor now or seek immediate medical care if: 
  · You have sudden swelling, warmth, or pain in any joint.  
  · You have joint pain and a fever or rash.  
  · You have such bad pain that you cannot use a joint.  
 Watch closely for changes in your health, and be sure to contact your doctor if: 
  · You have mild joint symptoms that continue even with more than 6 weeks of care at home.  
  · You have stomach pain or other problems with your medicine. Where can you learn more? Go to http://isra-rhonda.info/. Enter E377 in the search box to learn more about \"Arthritis: Care Instructions. \" Current as of: [place] 10, 2018 Content Version: 11.9 © 4574-8095 [employer]. Care instructions adapted under license by [employer] (which disclaims liability or warranty for this information). If you have questions about a medical condition or this instruction, always ask your healthcare professional. [place] [address] any warranty or liability for your use of this information.

## 2019-07-11 NOTE — PROGRESS NOTES
Called and spoke to patient  Two pt identifiers confirmed  Informed patient per Dr. Cat Tidwell that sed rate is elevated but all other labs are stable. Advised patient per Dr. Cat Tidwell that he would not make any changes to his predinsone at this time. Patient verbalized understanding of information discussed  with no further questions at this time.

## 2019-07-29 RX ORDER — DICLOFENAC SODIUM 75 MG/1
TABLET, DELAYED RELEASE ORAL
Qty: 180 TAB | Refills: 3 | Status: SHIPPED | OUTPATIENT
Start: 2019-07-29 | End: 2019-07-29 | Stop reason: SDUPTHER

## 2019-07-29 RX ORDER — DICLOFENAC SODIUM 75 MG/1
TABLET, DELAYED RELEASE ORAL
Qty: 180 TAB | Refills: 3 | Status: SHIPPED | OUTPATIENT
Start: 2019-07-29 | End: 2020-09-14

## 2019-07-29 RX ORDER — PREDNISONE 10 MG/1
TABLET ORAL
Qty: 90 TAB | Refills: 3 | Status: SHIPPED | OUTPATIENT
Start: 2019-07-29 | End: 2020-01-20 | Stop reason: SDUPTHER

## 2019-07-29 NOTE — TELEPHONE ENCOUNTER
RX refill request from the patient/pharmacy. Patient last seen 07- with labs, and next appt. scheduled for 08-  Requested Prescriptions     Pending Prescriptions Disp Refills    diclofenac EC (VOLTAREN) 75 mg EC tablet 180 Tab 3     Sig: TAKE 1 TABLET BY MOUTH TWICE A DAY   .

## 2019-07-29 NOTE — TELEPHONE ENCOUNTER
RX refill request from the patient/pharmacy. Patient last seen 07- with labs, and next appt. scheduled for 08-  Requested Prescriptions     Pending Prescriptions Disp Refills    predniSONE (DELTASONE) 10 mg tablet 90 Tab 3     Sig: Taking one tablet daily as directed. Allayne Mariza

## 2019-08-09 NOTE — PROGRESS NOTES
Chief Complaint   Patient presents with    Hypertension     1 month follow up       SUBJECTIVE:    Brandy Gunn is a 80 y.o. male who returns in follow-up for his medical problems to include polymyalgia rheumatica, hypertension, chronic kidney disease, paroxysmal atrial fibrillation and other medical problems. He is taking his medication trying to follow his diet he does note it is energy in his legs is better since he is been on the prednisone which is currently now 10 mg. He denies any chest pain, shortness breath, palpitations, PND, orthopnea or other cardiorespiratory complaints. There are no GI or  complaints. He has no other complaints. Current Outpatient Medications   Medication Sig Dispense Refill    diclofenac EC (VOLTAREN) 75 mg EC tablet TAKE 1 TABLET BY MOUTH TWICE A  Tab 3    predniSONE (DELTASONE) 10 mg tablet Taking one tablet daily as directed. 90 Tab 3    digoxin (LANOXIN) 0.125 mg tablet TAKE 1 TABLET BY MOUTH EVERY DAY 90 Tab 0    terazosin (HYTRIN) 2 mg capsule TAKE ONE CAPSULE BY MOUTH DAILY 5mg 90 Cap 3    simvastatin (ZOCOR) 20 mg tablet TAKE 1 TABLET EVERY DAY 90 Tab 3    lisinopril (PRINIVIL, ZESTRIL) 5 mg tablet TAKE 1 TABLET EVERY DAY 90 Tab 3    warfarin (COUMADIN) 5 mg tablet Take 2 tablets on Monday and Friday; and one and a half tablets all other days. 145 Tab 3    furosemide (LASIX) 80 mg tablet TAKE 1 TABLET BY MOUTH EVERY DAY 90 Tab 3    Nebulizer & Compressor machine Use daily as directed. 1 Each 0    glucose blood VI test strips (ACCU-CHEK SMARTVIEW TEST STRIP) strip Use once daily 100 Strip prn    sildenafil, antihypertensive, (REVATIO) 20 mg tablet TAKE 1 TABLET, ORAL, AS DIRECTED FOR SEXUAL ACTIVITY 20 Tab 11    metFORMIN (GLUCOPHAGE) 500 mg tablet Take 1 Tab by mouth daily (with breakfast). 90 Tab 3    fexofenadine (ALLEGRA) 180 mg tablet Take  by mouth.       acetaminophen (TYLENOL ARTHRITIS PAIN) 650 mg TbER Take 650 mg by mouth every eight (8) hours.  multivitamins-minerals-lutein (MULTIVITAMIN 50 PLUS) tab tablet Take 1 Tab by mouth daily.  glucosamine 1,000 mg tab Take 2,000 mg by mouth daily.  cholecalciferol (VITAMIN D3) 1,000 unit cap Take 1,000 Units by mouth daily.  DOCOSAHEXANOIC ACID/EPA (FISH OIL PO) Take 1,200 mg by mouth two (2) times a day. 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    Back pain 9/20/2017    BPH (benign prostatic hyperplasia) 9/20/2017    CHF (congestive heart failure) ([facility] 9/20/2017    CKD (chronic kidney disease) 9/20/2017    Diabetic acetonemia ([facility]     DM (diabetes mellitus) ([facility] [facility] 9/20/2017    Story: Diet Controlled    ED (erectile dysfunction) 9/20/2017    Edema 9/20/2017    Elevated CPK 9/20/2017    Comments: History of    Elevated LFTs 9/20/2017    Comments: History of    Elevated PSA 9/20/2017    Hypercholesteremia     Hyperlipidemia 9/20/2017    Hypertension     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    Nodule of right lung 9/20/2017    Story: Right    Polymyalgia ([facility] [address] 9/20/2017    Prostate enlargement      Past Surgical History:   Procedure Laterality Date    ABDOMEN SURGERY PROC UNLISTED      hernia repair    HX HEENT  06/12/2018    Dr. 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    HX MALIGNANT SKIN LESION EXCISION  09/2018    2 lesions on head     No Known Allergies    REVIEW OF SYSTEMS:  General: negative for - chills or fever, or weight loss or gain  ENT: negative for - headaches, nasal congestion or tinnitus  Eyes: no blurred or visual changes  Neck: No stiffness or swollen nodes  Respiratory: negative for - cough, hemoptysis, shortness of breath or wheezing  Cardiovascular : negative for - chest pain, edema, palpitations or shortness of breath  Gastrointestinal: negative for - abdominal pain, blood in stools, heartburn or nausea/vomiting  Genito-Urinary: no dysuria, trouble voiding, or hematuria  Musculoskeletal: negative for - gait disturbance, joint pain, joint stiffness or joint swelling. Legs are stronger  Neurological: no TIA or stroke symptoms  Hematologic: no bruises, no bleeding  Lymphatic: no swollen glands  Integument: no lumps, mole changes, nail changes or rash  Endocrine:no malaise/lethargy poly uria or polydipsia or unexpected weight changes        Social History     Socioeconomic History    Marital status:      Spouse name: Not on file    Number of children: Not on file    Years of education: Not on file    Highest education level: Not on file   Tobacco Use    Smoking status: Never Smoker    Smokeless tobacco: Never Used   Substance and Sexual Activity    Alcohol use: No    Drug use: No    Sexual activity: Never     History reviewed. No pertinent family history. OBJECTIVE:     Visit Vitals  /60 (BP 1 Location: Left arm, BP Patient Position: Sitting)   Pulse 64   Temp 97.9 °F (36.6 °C) (Oral)   Resp 19   Ht 6' 3\" (1.905 m)   Wt 200 lb 3.2 oz (90.8 kg)   SpO2 96%   BMI 25.02 kg/m²     CONSTITUTIONAL:   well nourished, appears age appropriate  EYES: sclera anicteric, PERRL, EOMI  ENMT:nares clear, moist mucous membranes, pharynx clear  NECK: supple. Thyroid normal, No JVD or bruits  RESPIRATORY: Chest: clear to ascultation and percussion, normal inspiratory effort  CARDIOVASCULAR: Heart: regular rate and rhythm no murmurs, rubs or gallops, PMI not displaced, No thrills  GASTROINTESTINAL: Abdomen: non distended, soft, non tender, bowel sounds normal  HEMATOLOGIC: no purpura, petechiae or bruising  LYMPHATIC: No lymph node enlargemant  MUSCULOSKELETAL: Extremities: no edema or active synovitis, pulse 1+   INTEGUMENT: No unusual rashes or suspicious skin lesions noted.  Nails appear normal.  PERIPHERAL VASCULAR: normal pulses femoral, PT and DP  NEUROLOGIC: non-focal exam, A & O X 3  PSYCHIATRIC:, appropriate affect     ASSESSMENT:   1. Polymyalgia rheumatica ([address]    2. Hypertension with renal disease    3. Paroxysmal atrial fibrillation (HCC)    4. Stage 3 chronic kidney disease (HCC)      Impression  1. Polymyalgia rheumatica repeat sed rate pending currently on prednisone to 10 daily  2. Hypertension that is controlled  3. Paroxysmal atrial fibrillation INR pending  4. CKD stage III repeat status pending  I will recheck him next month for 3-month evaluation of his other medical problems. I will see him sooner if need to based on today's lab. I will call with lab results. PLAN:  .  Orders Placed This Encounter    PROTHROMBIN TIME + INR    SED RATE (ESR) ([facility])    METABOLIC PANEL, BASIC ([facility])         ATTENTION:   This medical record was transcribed using an electronic medical records system. Although proofread, it may and can contain electronic and spelling errors. Other human spelling and other errors may be present. Corrections may be executed at a later time. Please feel free to contact us for any clarifications as needed. No results found for any visits on 08/12/19. Janki Reis MD    The patient verbalized understanding of the problems and plans as explained.

## 2019-08-12 ENCOUNTER — OFFICE VISIT (OUTPATIENT)
Dept: INTERNAL MEDICINE CLINIC | Age: 84
End: 2019-08-12

## 2019-08-12 VITALS
BODY MASS INDEX: 24.89 KG/M2 | RESPIRATION RATE: 19 BRPM | TEMPERATURE: 97.9 F | HEART RATE: 64 BPM | DIASTOLIC BLOOD PRESSURE: 60 MMHG | OXYGEN SATURATION: 96 % | HEIGHT: 75 IN | SYSTOLIC BLOOD PRESSURE: 114 MMHG | WEIGHT: 200.2 LBS

## 2019-08-12 DIAGNOSIS — N18.30 STAGE 3 CHRONIC KIDNEY DISEASE (HCC): ICD-10-CM

## 2019-08-12 DIAGNOSIS — M35.3 POLYMYALGIA RHEUMATICA (HCC): Primary | ICD-10-CM

## 2019-08-12 DIAGNOSIS — I12.9 HYPERTENSION WITH RENAL DISEASE: ICD-10-CM

## 2019-08-12 DIAGNOSIS — I48.0 PAROXYSMAL ATRIAL FIBRILLATION (HCC): ICD-10-CM

## 2019-08-12 LAB
ANION GAP SERPL CALC-SCNC: 12 MMOL/L
BUN SERPL-MCNC: 32 MG/DL (ref 9–20)
CALCIUM SERPL-MCNC: 9.4 MG/DL (ref 8.4–10.2)
CHLORIDE SERPL-SCNC: 102 MMOL/L (ref 98–107)
CO2 SERPL-SCNC: 29 MMOL/L (ref 22–32)
CREAT SERPL-MCNC: 1.4 MG/DL (ref 0.8–1.5)
GLUCOSE SERPL-MCNC: 164 MG/DL (ref 75–110)
POTASSIUM SERPL-SCNC: 5.6 MMOL/L (ref 3.6–5)
SED RATE (ESR): 30 MM/HR (ref 0–10)
SODIUM SERPL-SCNC: 143 MMOL/L (ref 137–145)

## 2019-08-12 NOTE — PROGRESS NOTES
Chief Complaint   Patient presents with    Hypertension     1 month follow up     1. Have you been to the ER, urgent care clinic since your last visit? No   Hospitalized since your last visit? No     2. Have you seen or consulted any other health care providers outside of the [address] [place] [address] since your last visit?   No

## 2019-08-12 NOTE — PATIENT INSTRUCTIONS
Arthritis: Care Instructions Your Care Instructions Arthritis, also called osteoarthritis, is a breakdown of the cartilage that cushions your joints. When the cartilage wears down, your bones rub against each other. This causes pain and stiffness. Many people have some arthritis as they age. Arthritis most often affects the joints of the spine, hands, hips, knees, or feet. You can take simple measures to protect your joints, ease your pain, and help you stay active. Follow-up care is a key part of your treatment and safety. Be sure to make and go to all appointments, and call your doctor if you are having problems. It's also a good idea to know your test results and keep a list of the medicines you take. How can you care for yourself at home? · Stay at a healthy weight. Being overweight puts extra strain on your joints. · Talk to your doctor or physical therapist about exercises that will help ease joint pain. ? Stretch. You may enjoy gentle forms of yoga to help keep your joints and muscles flexible. ? Walk instead of jog. Other types of exercise that are less stressful on the joints include riding a bicycle, swimming, ashley chi, or water exercise. ? Lift weights. Strong muscles help reduce stress on your joints. Stronger thigh muscles, for example, take some of the stress off of the knees and hips. Learn the right way to lift weights so you do not make joint pain worse. · Take your medicines exactly as prescribed. Call your doctor if you think you are having a problem with your medicine. · Take pain medicines exactly as directed. ? If the doctor gave you a prescription medicine for pain, take it as prescribed. ? If you are not taking a prescription pain medicine, ask your doctor if you can take an over-the-counter medicine. · Use a cane, crutch, walker, or another device if you need help to get around. These can help rest your joints.  You also can use other things to make life easier, such as a higher toilet seat and padded handles on kitchen utensils. · Do not sit in low chairs, which can make it hard to get up. · Put heat or cold on your sore joints as needed. Use whichever helps you most. You also can take turns with hot and cold packs. ? Apply heat 2 or 3 times a day for 20 to 30 minutesusing a heating pad, hot shower, or hot packto relieve pain and stiffness. ? Put ice or a cold pack on your sore joint for 10 to 20 minutes at a time. Put a thin cloth between the ice and your skin. When should you call for help? Call your doctor now or seek immediate medical care if: 
  · You have sudden swelling, warmth, or pain in any joint.  
  · You have joint pain and a fever or rash.  
  · You have such bad pain that you cannot use a joint.  
 Watch closely for changes in your health, and be sure to contact your doctor if: 
  · You have mild joint symptoms that continue even with more than 6 weeks of care at home.  
  · You have stomach pain or other problems with your medicine. Where can you learn more? Go to http://isra-rhonda.info/. Enter N904 in the search box to learn more about \"Arthritis: Care Instructions. \" Current as of: April 1, 2019 Content Version: 12.1 © 5331-9453 [employer]. Care instructions adapted under license by [employer] (which disclaims liability or warranty for this information). If you have questions about a medical condition or this instruction, always ask your healthcare professional. [place] [address] any warranty or liability for your use of this information.

## 2019-08-13 LAB
INR PPP: 1.6 (ref 0.8–1.2)
PROTHROMBIN TIME: 16.4 SEC (ref 9.1–12)

## 2019-08-13 NOTE — PROGRESS NOTES
Kidney blood test is stable and sed rate is a little better but still elevated so continue prednisone at 10 mg daily. INR is adequate so continue current treatment with Coumadin.

## 2019-08-14 ENCOUNTER — TELEPHONE ANTICOAG (OUTPATIENT)
Dept: INTERNAL MEDICINE CLINIC | Age: 84
End: 2019-08-14

## 2019-08-14 DIAGNOSIS — I48.0 PAROXYSMAL ATRIAL FIBRILLATION (HCC): Primary | ICD-10-CM

## 2019-08-14 NOTE — PROGRESS NOTES
Anticoagulation Episode Summary     Current INR goal:   2.0-3.0   TTR:   2.9 % (1.4 y)   Next INR check:   9/13/2019   INR from last check:   1.6! (8/12/2019)   Weekly max warfarin dose:      Target end date:      INR check location:   Clinic Lab   Preferred lab:      Send INR reminders to: Indications    Paroxysmal atrial fibrillation ([address] (Primary) [I48.0]           Comments:            Anticoagulation Care Providers     Provider Role Specialty Phone number    Shaneka Henderson MD [facility] Internal Medicine [phone]        Elizabeth Lever is adequate so continue current treatment with Coumadin.

## 2019-08-22 DIAGNOSIS — I48.91 ATRIAL FIBRILLATION, UNSPECIFIED TYPE (HCC): ICD-10-CM

## 2019-08-22 RX ORDER — DIGOXIN 125 MCG
TABLET ORAL
Qty: 90 TAB | Refills: 3 | Status: SHIPPED | OUTPATIENT
Start: 2019-08-22 | End: 2020-08-21 | Stop reason: SDUPTHER

## 2019-08-22 NOTE — TELEPHONE ENCOUNTER
RX refill request from the patient/pharmacy. Patient last seen 08- with labs, and next appt. scheduled for 09-  Requested Prescriptions     Pending Prescriptions Disp Refills    digoxin (LANOXIN) 0.125 mg tablet [Pharmacy Med Name: DIGOXIN 125 MCG TABLET] 90 Tab 3     Sig: TAKE 1 TABLET BY MOUTH EVERY DAY   .

## 2019-09-10 NOTE — PROGRESS NOTES
Chief Complaint   Patient presents with    Diabetes     3 Month f/up    Hypertension    CHF    Myalgia     PMR    Benign Prostatic Hypertrophy    Irregular Heart Beat    Hip Pain     left       SUBJECTIVE:    Jenae Quintana is a 80 y.o. male who returns in follow-up for his hypertension, diabetes, hyperlipidemia, compensated CHF, paroxysmal atrial fibrillation, DJD, polymyalgia rheumatica and other medical problems. He is taking his medications and trying to follow his diet and remains physically active. He was doing some weeding recently and he thinks he may have stepped wrong strength in his left hip was bothering him although that has gotten better now. It still bothers him quite a bit. He currently denies any chest pain, shortness of breath, palpitations, PND, orthopnea or other cardiorespiratory complaints. There are no GI or  complaints. He has no other arthritic complaints. He has no headaches, dizziness or neurological planes. There are no other complaints on complete review of systems. Current Outpatient Medications   Medication Sig Dispense Refill    digoxin (LANOXIN) 0.125 mg tablet TAKE 1 TABLET BY MOUTH EVERY DAY 90 Tab 3    diclofenac EC (VOLTAREN) 75 mg EC tablet TAKE 1 TABLET BY MOUTH TWICE A  Tab 3    predniSONE (DELTASONE) 10 mg tablet Taking one tablet daily as directed. 90 Tab 3    terazosin (HYTRIN) 2 mg capsule TAKE ONE CAPSULE BY MOUTH DAILY 5mg 90 Cap 3    simvastatin (ZOCOR) 20 mg tablet TAKE 1 TABLET EVERY DAY 90 Tab 3    lisinopril (PRINIVIL, ZESTRIL) 5 mg tablet TAKE 1 TABLET EVERY DAY 90 Tab 3    warfarin (COUMADIN) 5 mg tablet Take 2 tablets on Monday and Friday; and one and a half tablets all other days. 145 Tab 3    furosemide (LASIX) 80 mg tablet TAKE 1 TABLET BY MOUTH EVERY DAY 90 Tab 3    Nebulizer & Compressor machine Use daily as directed.  1 Each 0    glucose blood VI test strips (ACCU-CHEK SMARTVIEW TEST STRIP) strip Use once daily 100 Strip prn    sildenafil, antihypertensive, (REVATIO) 20 mg tablet TAKE 1 TABLET, ORAL, AS DIRECTED FOR SEXUAL ACTIVITY 20 Tab 11    metFORMIN (GLUCOPHAGE) 500 mg tablet Take 1 Tab by mouth daily (with breakfast). 90 Tab 3    fexofenadine (ALLEGRA) 180 mg tablet Take  by mouth.  acetaminophen (TYLENOL ARTHRITIS PAIN) 650 mg TbER Take 650 mg by mouth every eight (8) hours.  multivitamins-minerals-lutein (MULTIVITAMIN 50 PLUS) tab tablet Take 1 Tab by mouth daily.  glucosamine 1,000 mg tab Take 2,000 mg by mouth daily.  cholecalciferol (VITAMIN D3) 1,000 unit cap Take 1,000 Units by mouth daily.  DOCOSAHEXANOIC ACID/EPA (FISH OIL PO) Take 1,200 mg by mouth two (2) times a day. 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    Back pain 9/20/2017    BPH (benign prostatic hyperplasia) 9/20/2017    CHF (congestive heart failure) ([address] 9/20/2017    CKD (chronic kidney disease) 9/20/2017    Diabetic acetonemia ([address]     DM (diabetes mellitus) ([address] 9/20/2017    Story: Diet Controlled    ED (erectile dysfunction) 9/20/2017    Edema 9/20/2017    Elevated CPK 9/20/2017    Comments: History of    Elevated LFTs 9/20/2017    Comments: History of    Elevated PSA 9/20/2017    Hypercholesteremia     Hyperlipidemia 9/20/2017    Hypertension     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    Nodule of right lung 9/20/2017    Story: Right    Polymyalgia ([address] 9/20/2017    Prostate enlargement      Past Surgical History:   Procedure Laterality Date    ABDOMEN SURGERY PROC UNLISTED      hernia repair    HX HEENT  06/12/2018    Dr. Riki Solomon, surgery to remove cancerous tissue from Left cheek    HX MALIGNANT SKIN LESION EXCISION  09/2018    2 lesions on head     No Known Allergies    REVIEW OF SYSTEMS:  General: negative for - chills or fever, or weight loss or gain  ENT: negative for - headaches, nasal congestion or tinnitus  Eyes: no blurred or visual changes  Neck: No stiffness or swollen nodes  Respiratory: negative for - cough, hemoptysis, shortness of breath or wheezing  Cardiovascular : negative for - chest pain, edema, palpitations or shortness of breath  Gastrointestinal: negative for - abdominal pain, blood in stools, heartburn or nausea/vomiting  Genito-Urinary: no dysuria, trouble voiding, or hematuria  Musculoskeletal: negative for - gait disturbance, change of his chronic joint pain, joint stiffness or joint swelling except recent exacerbation of left hip which is actually getting better now  Neurological: no TIA or stroke symptoms  Hematologic: no bruises, no bleeding  Lymphatic: no swollen glands  Integument: no lumps, mole changes, nail changes or rash  Endocrine:no malaise/lethargy poly uria or polydipsia or unexpected weight changes        Social History     Socioeconomic History    Marital status:      Spouse name: Not on file    Number of children: Not on file    Years of education: Not on file    Highest education level: Not on file   Tobacco Use    Smoking status: Never Smoker    Smokeless tobacco: Never Used   Substance and Sexual Activity    Alcohol use: No    Drug use: No    Sexual activity: Never     History reviewed. No pertinent family history. OBJECTIVE:     Visit Vitals  /76 (BP 1 Location: Right arm, BP Patient Position: Sitting)   Pulse 60   Temp 97.6 °F (36.4 °C)   Resp 16   Ht 6' 3\" (1.905 m)   Wt 196 lb (88.9 kg)   SpO2 98%   BMI 24.50 kg/m²     CONSTITUTIONAL:   well nourished, appears age appropriate  EYES: sclera anicteric, PERRL, EOMI  ENMT:nares clear, moist mucous membranes, pharynx clear  NECK: supple.  Thyroid normal, No JVD or bruits  RESPIRATORY: Chest: clear to ascultation and percussion, normal inspiratory effort  CARDIOVASCULAR: Heart: regular rate and rhythm no murmurs, rubs or gallops, PMI not displaced, No thrills  GASTROINTESTINAL: Abdomen: non distended, soft, non tender, bowel sounds normal  HEMATOLOGIC: no purpura, petechiae or bruising  LYMPHATIC: No lymph node enlargemant  MUSCULOSKELETAL: Extremities: no edema or active synovitis, pulse 1+   INTEGUMENT: No unusual rashes or suspicious skin lesions noted. Nails appear normal.  PERIPHERAL VASCULAR: normal pulses femoral, PT and DP  NEUROLOGIC: non-focal exam, A & O X 3  PSYCHIATRIC:, appropriate affect     ASSESSMENT:   1. Hypertension with renal disease    2. 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, without long-term current use of insulin ([facility]    3. Mixed hyperlipidemia    4. Polymyalgia rheumatica ([facility]    5. ASCVD (arteriosclerotic cardiovascular disease)    6. Stage 3 chronic kidney disease (HCC)    7. Paroxysmal atrial fibrillation ([facility]    8. Primary osteoarthritis involving multiple joints      Impression  1. Hypertension that is controlled so continue current therapy reviewed with him. 2.  Diabetes repeat status pending and prior lab reviewed and I will make adjustments if necessary. 3.  Hyperlipidemia prior lab reviewed and repeat status pending and I will adjust if needed. 4.  Polymyalgia rheumatica seems to be stable currently still on prednisone at 10 mg daily repeat sed rate pending  5. ASCVD clinically stable continue aspirin daily  6. CKD stage III repeat status pending  7. Paroxysmal atrial fibrillation on chronic Coumadin INR pending  8   DJD stable except for the left hip. That is getting better so he does not think he needs a shot at the present time. He would notify me if it does. I will call with lab results and make further recommendations or adjustments if necessary. Follow-up in 3 months for his regular problems but monthly for his A. fib and polymyalgia.     PLAN:  .  Orders Placed This Encounter    HEMOGLOBIN A1C WITH EAG    SED RATE (ESR) ([facility]    METABOLIC PANEL, COMPREHENSIVE ([facility])    LIPID PANEL ([facility])    CK ([facility])    PROTHROMBIN TIME + INR         ATTENTION:   This medical record was transcribed using an electronic medical records system. Although proofread, it may and can contain electronic and spelling errors. Other human spelling and other errors may be present. Corrections may be executed at a later time. Please feel free to contact us for any clarifications as needed. Follow-up and Dispositions    · Return in about 3 months (around 12/11/2019). No results found for any visits on 09/11/19. Joshua Malcolm MD    The patient verbalized understanding of the problems and plans as explained.

## 2019-09-11 ENCOUNTER — OFFICE VISIT (OUTPATIENT)
Dept: INTERNAL MEDICINE CLINIC | Age: 84
End: 2019-09-11

## 2019-09-11 VITALS
HEIGHT: 75 IN | HEART RATE: 60 BPM | SYSTOLIC BLOOD PRESSURE: 138 MMHG | TEMPERATURE: 97.6 F | WEIGHT: 196 LBS | OXYGEN SATURATION: 98 % | BODY MASS INDEX: 24.37 KG/M2 | DIASTOLIC BLOOD PRESSURE: 76 MMHG | RESPIRATION RATE: 16 BRPM

## 2019-09-11 DIAGNOSIS — E11.22 CONTROLLED TYPE 2 DIABETES MELLITUS WITH STAGE 3 CHRONIC KIDNEY DISEASE, WITHOUT LONG-TERM CURRENT USE OF INSULIN (HCC): ICD-10-CM

## 2019-09-11 DIAGNOSIS — N18.30 STAGE 3 CHRONIC KIDNEY DISEASE (HCC): ICD-10-CM

## 2019-09-11 DIAGNOSIS — E78.2 MIXED HYPERLIPIDEMIA: ICD-10-CM

## 2019-09-11 DIAGNOSIS — I25.10 ASCVD (ARTERIOSCLEROTIC CARDIOVASCULAR DISEASE): ICD-10-CM

## 2019-09-11 DIAGNOSIS — N18.30 CONTROLLED TYPE 2 DIABETES MELLITUS WITH STAGE 3 CHRONIC KIDNEY DISEASE, WITHOUT LONG-TERM CURRENT USE OF INSULIN (HCC): ICD-10-CM

## 2019-09-11 DIAGNOSIS — M35.3 POLYMYALGIA RHEUMATICA (HCC): ICD-10-CM

## 2019-09-11 DIAGNOSIS — I12.9 HYPERTENSION WITH RENAL DISEASE: Primary | ICD-10-CM

## 2019-09-11 DIAGNOSIS — I48.0 PAROXYSMAL ATRIAL FIBRILLATION (HCC): ICD-10-CM

## 2019-09-11 DIAGNOSIS — M15.9 PRIMARY OSTEOARTHRITIS INVOLVING MULTIPLE JOINTS: ICD-10-CM

## 2019-09-11 LAB
A-G RATIO,AGRAT: 1.4 RATIO
ALBUMIN SERPL-MCNC: 3.9 G/DL (ref 3.9–5.4)
ALP SERPL-CCNC: 57 U/L (ref 38–126)
ALT SERPL-CCNC: 22 U/L (ref 0–50)
ANION GAP SERPL CALC-SCNC: 5 MMOL/L
AST SERPL W P-5'-P-CCNC: 25 U/L (ref 14–36)
BILIRUB SERPL-MCNC: 0.3 MG/DL (ref 0.2–1.3)
BUN SERPL-MCNC: 32 MG/DL (ref 9–20)
BUN/CREATININE RATIO,BUCR: 27 RATIO
CALCIUM SERPL-MCNC: 9.3 MG/DL (ref 8.4–10.2)
CHLORIDE SERPL-SCNC: 107 MMOL/L (ref 98–107)
CHOL/HDL RATIO,CHHD: 5 RATIO (ref 0–4)
CHOLEST SERPL-MCNC: 200 MG/DL (ref 0–200)
CK SERPL-CCNC: 77 U/L (ref 30–135)
CO2 SERPL-SCNC: 30 MMOL/L (ref 22–32)
CREAT SERPL-MCNC: 1.2 MG/DL (ref 0.8–1.5)
GLOBULIN,GLOB: 2.8
GLUCOSE SERPL-MCNC: 133 MG/DL (ref 75–110)
HDLC SERPL-MCNC: 41 MG/DL (ref 35–130)
LDL/HDL RATIO,LDHD: 2 RATIO
LDLC SERPL CALC-MCNC: 95 MG/DL (ref 0–130)
POTASSIUM SERPL-SCNC: 4.5 MMOL/L (ref 3.6–5)
PROT SERPL-MCNC: 6.7 G/DL (ref 6.3–8.2)
SED RATE (ESR): 33 MM/HR (ref 0–10)
SODIUM SERPL-SCNC: 142 MMOL/L (ref 137–145)
TRIGL SERPL-MCNC: 319 MG/DL (ref 0–200)
VLDLC SERPL CALC-MCNC: 64 MG/DL

## 2019-09-11 NOTE — PROGRESS NOTES
Chief Complaint   Patient presents with    Diabetes     3 Month f/up    Hypertension    CHF    Myalgia     PMR    Benign Prostatic Hypertrophy    Irregular Heart Beat    Hip Pain     left       1. Have you been to the ER, urgent care clinic since your last visit? Hospitalized since your last visit? No    2. Have you seen or consulted any other health care providers outside of the [address] [place] [address] since your last visit? Include any pap smears or colon screening.  No

## 2019-09-11 NOTE — PATIENT INSTRUCTIONS

## 2019-09-12 LAB
EST. AVERAGE GLUCOSE BLD GHB EST-MCNC: 146 MG/DL
HBA1C MFR BLD: 6.7 % (ref 4.8–5.6)
INR PPP: 1.2 (ref 0.8–1.2)
PROTHROMBIN TIME: 12.9 SEC (ref 9.1–12)

## 2019-09-13 ENCOUNTER — TELEPHONE ANTICOAG (OUTPATIENT)
Dept: INTERNAL MEDICINE CLINIC | Age: 84
End: 2019-09-13

## 2019-09-13 DIAGNOSIS — I48.0 PAROXYSMAL ATRIAL FIBRILLATION (HCC): Primary | ICD-10-CM

## 2019-09-13 NOTE — PROGRESS NOTES
Labs stable except INR low, ? Missed doses. Discussed with patient and has not missed any doses. Discussed with Dr. Rand Dorman and he recommends taking 15 mg of his blood thinner today and then changing his regimen to 10 mg on Monday, Wednesday and Friday; and 7.5 mg all other days and f/up as scheduled.

## 2019-09-13 NOTE — PROGRESS NOTES
Anticoagulation Summary  As of 2019    INR goal:   2.0-3.0   TTR:   2.7 % (1.5 y)   INR used for dosin. 2! (2019)   Warfarin maintenance plan:   10 mg (5 mg x 2) every Mon, Wed, Fri; 7.5 mg (5 mg x 1.5) all other days   Weekly warfarin total:   60 mg   Plan last modified:   Jossie Falcon RN (2019)   Next INR check:   10/23/2019   Target end date:       Indications    Paroxysmal atrial fibrillation ([facility] (Primary) [I48.0]             Anticoagulation Episode Summary     INR check location:   Clinic Lab    Preferred lab:       Send INR reminders to:       Comments:         Anticoagulation Care Providers     Provider Role Specialty Phone number    Jenifer Jj MD [facility] [phone]        Informed patient that PT/INR was slightly low 1.2. Dr. Steve Muller recommends 15 mg today then change to 10 mg on Monday, Wednesday and Friday and 7.5 mg all other days and f/up as scheduled.

## 2019-09-23 PROBLEM — Z00.00 MEDICARE ANNUAL WELLNESS VISIT, SUBSEQUENT: Status: RESOLVED | Noted: 2017-10-30 | Resolved: 2019-09-23

## 2019-10-07 NOTE — PROGRESS NOTES
Dante Delgadillo is a 80 y.o. male presenting for Dizziness  . 1. Have you been to the ER, urgent care clinic since your last visit? Hospitalized since your last visit? No    2. Have you seen or consulted any other health care providers outside of the [address] [place] [address] since your last visit? Include any pap smears or colon screening. No    Fall Risk Assessment, last 12 mths 10/30/2017   Able to walk? Yes   Fall in past 12 months? Yes   Fall with injury? No   Number of falls in past 12 months 1   Fall Risk Score 1         No flowsheet data found. PHQ over the last two weeks 10/30/2017   Little interest or pleasure in doing things Not at all   Feeling down, depressed or hopeless Not at all   Total Score PHQ 2 0       There are no discontinued medications. [Use of Plain Language] : use of plain language [Adequate] : adequate [None] : none

## 2019-10-22 NOTE — PROGRESS NOTES
Chief Complaint   Patient presents with    CHF     1 month f/up    Irregular Heart Beat    Myalgia     PMR    Hip Pain     left side       SUBJECTIVE:    Joel Mcfarland is a 80 y.o. male who returns in follow-up for his hypertension, polymyalgia rheumatica, compensated CHF, CKD, and other medical problems. He is taking his medications and trying to follow his diet and remains physically active. He does note some left hip pain today after being out working the yard with a weed eating for 3 days in a row. He is tried diclofenac without benefit. He has not had any falls. He did not step wrong. He is having no problem walking except for pain related issues. He has no chest pain, shortness of breath, palpitations, PND, orthopnea or other cardiorespiratory complaints. There are no GI or  complaints. He has no other complaints on complete review of systems. Current Outpatient Medications   Medication Sig Dispense Refill    methylPREDNISolone acetate (DEPO-MEDROL) 40 mg/mL injection 1 mL by IntraMUSCular route once for 1 dose. 1 Vial 0    digoxin (LANOXIN) 0.125 mg tablet TAKE 1 TABLET BY MOUTH EVERY DAY 90 Tab 3    diclofenac EC (VOLTAREN) 75 mg EC tablet TAKE 1 TABLET BY MOUTH TWICE A  Tab 3    predniSONE (DELTASONE) 10 mg tablet Taking one tablet daily as directed. 90 Tab 3    terazosin (HYTRIN) 2 mg capsule TAKE ONE CAPSULE BY MOUTH DAILY 5mg 90 Cap 3    simvastatin (ZOCOR) 20 mg tablet TAKE 1 TABLET EVERY DAY 90 Tab 3    lisinopril (PRINIVIL, ZESTRIL) 5 mg tablet TAKE 1 TABLET EVERY DAY 90 Tab 3    furosemide (LASIX) 80 mg tablet TAKE 1 TABLET BY MOUTH EVERY DAY 90 Tab 3    Nebulizer & Compressor machine Use daily as directed.  1 Each 0    glucose blood VI test strips (ACCU-CHEK SMARTVIEW TEST STRIP) strip Use once daily 100 Strip prn    sildenafil, antihypertensive, (REVATIO) 20 mg tablet TAKE 1 TABLET, ORAL, AS DIRECTED FOR SEXUAL ACTIVITY 20 Tab 11    metFORMIN (GLUCOPHAGE) 500 mg tablet Take 1 Tab by mouth daily (with breakfast). 90 Tab 3    fexofenadine (ALLEGRA) 180 mg tablet Take  by mouth.  acetaminophen (TYLENOL ARTHRITIS PAIN) 650 mg TbER Take 650 mg by mouth every eight (8) hours.  multivitamins-minerals-lutein (MULTIVITAMIN 50 PLUS) tab tablet Take 1 Tab by mouth daily.  glucosamine 1,000 mg tab Take 2,000 mg by mouth daily.  cholecalciferol (VITAMIN D3) 1,000 unit cap Take 1,000 Units by mouth daily.  DOCOSAHEXANOIC ACID/EPA (FISH OIL PO) Take 1,200 mg by mouth two (2) times a day.  warfarin (COUMADIN) 5 mg tablet Take 2 tablets on Monday and Friday; and one and a half tablets all other days. (Patient taking differently: Taking 2 tablets on Monday, Wednesday and Friday; one and a half all other days. ) 145 Tab 3     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    Back pain 9/20/2017    BPH (benign prostatic hyperplasia) 9/20/2017    CHF (congestive heart failure) ([address] 9/20/2017    CKD (chronic kidney disease) 9/20/2017    Diabetic acetonemia ([address]     DM (diabetes mellitus) ([address] 9/20/2017    Story: Diet Controlled    ED (erectile dysfunction) 9/20/2017    Edema 9/20/2017    Elevated CPK 9/20/2017    Comments: History of    Elevated LFTs 9/20/2017    Comments: History of    Elevated PSA 9/20/2017    Hypercholesteremia     Hyperlipidemia 9/20/2017    Hypertension     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    Nodule of right lung 9/20/2017    Story: Right    Polymyalgia ([address] 9/20/2017    Prostate enlargement      Past Surgical History:   Procedure Laterality Date    ABDOMEN SURGERY PROC UNLISTED      hernia repair    HX HEENT  06/12/2018    Dr. Shahid Kwong, surgery to remove cancerous tissue from Left cheek    HX MALIGNANT SKIN LESION EXCISION  09/2018    2 lesions on head     No Known Allergies    REVIEW OF SYSTEMS:  General: negative for - chills or fever, or weight loss or gain  ENT: negative for - headaches, nasal congestion or tinnitus  Eyes: no blurred or visual changes  Neck: No stiffness or swollen nodes  Respiratory: negative for - cough, hemoptysis, shortness of breath or wheezing  Cardiovascular : negative for - chest pain, edema, palpitations or shortness of breath  Gastrointestinal: negative for - abdominal pain, blood in stools, heartburn or nausea/vomiting  Genito-Urinary: no dysuria, trouble voiding, or hematuria  Musculoskeletal: negative for - gait disturbance, joint pain, joint stiffness or joint swelling except left hip as noted  Neurological: no TIA or stroke symptoms  Hematologic: no bruises, no bleeding  Lymphatic: no swollen glands  Integument: no lumps, mole changes, nail changes or rash  Endocrine:no malaise/lethargy poly uria or polydipsia or unexpected weight changes        Social History     Socioeconomic History    Marital status:      Spouse name: Not on file    Number of children: Not on file    Years of education: Not on file    Highest education level: Not on file   Tobacco Use    Smoking status: Never Smoker    Smokeless tobacco: Never Used   Substance and Sexual Activity    Alcohol use: No    Drug use: No    Sexual activity: Never     History reviewed. No pertinent family history. OBJECTIVE:     Visit Vitals  /64 (BP 1 Location: Left arm, BP Patient Position: Sitting)   Pulse 76   Temp 98 °F (36.7 °C)   Resp 18   Ht 6' 3\" (1.905 m)   Wt 198 lb (89.8 kg)   SpO2 97%   BMI 24.75 kg/m²     CONSTITUTIONAL:   well nourished, appears age appropriate  EYES: sclera anicteric, PERRL, EOMI  ENMT:nares clear, moist mucous membranes, pharynx clear  NECK: supple.  Thyroid normal, No JVD or bruits  RESPIRATORY: Chest: clear to ascultation and percussion, normal inspiratory effort  CARDIOVASCULAR: Heart: regular rate and rhythm no murmurs, rubs or gallops, PMI not displaced, No thrills  GASTROINTESTINAL: Abdomen: non distended, soft, non tender, bowel sounds normal  HEMATOLOGIC: no purpura, petechiae or bruising  LYMPHATIC: No lymph node enlargemant  MUSCULOSKELETAL: Extremities: no edema or active synovitis, pulse 1+. Left hip tender to palpation with increased discomfort on range of motion without joint effusion, erythema or increased temperature  INTEGUMENT: No unusual rashes or suspicious skin lesions noted. Nails appear normal.  PERIPHERAL VASCULAR: normal pulses femoral, PT and DP  NEUROLOGIC: non-focal exam, A & O X 3  PSYCHIATRIC:, appropriate affect     ASSESSMENT:   1. Polymyalgia rheumatica ([facility]    2. Hypertension with renal disease    3. Stage 3 chronic kidney disease ([facility]    4. Primary osteoarthritis involving multiple joints    5. Primary osteoarthritis of left hip      Impression  1. Hypertension that is controlled  2. Polymyalgia rheumatica repeat sed rate pending currently on prednisone to 10 mg a day  3. CKD stage III repeat status pending  4. DJD stable except for the left hip  5. DJD of left hip we discussed treatment options and elected to go with injection. After informed consent and Betadine scrub the left hip is entered laterally injected with Depo-Medrol 40 mg and cc lidocaine which tolerated quite well. Recheck in 1 month or sooner should to be a problem. I will call with lab results. PLAN:  .  Orders Placed This Encounter    DRAIN/INJECT LARGE JOINT/BURSA    SED RATE (ESR) ([facility])    METABOLIC PANEL, BASIC ([facility])    methylPREDNISolone acetate (DEPO-MEDROL) 40 mg/mL injection         ATTENTION:   This medical record was transcribed using an electronic medical records system. Although proofread, it may and can contain electronic and spelling errors. Other human spelling and other errors may be present. Corrections may be executed at a later time.   Please feel free to contact us for any clarifications as needed. Follow-up and Dispositions    · Return in about 4 weeks (around 11/20/2019). No results found for any visits on 10/23/19. Leobardo Jensen MD    The patient verbalized understanding of the problems and plans as explained.

## 2019-10-23 ENCOUNTER — OFFICE VISIT (OUTPATIENT)
Dept: INTERNAL MEDICINE CLINIC | Age: 84
End: 2019-10-23

## 2019-10-23 VITALS
WEIGHT: 198 LBS | HEART RATE: 76 BPM | OXYGEN SATURATION: 97 % | SYSTOLIC BLOOD PRESSURE: 116 MMHG | RESPIRATION RATE: 18 BRPM | BODY MASS INDEX: 24.62 KG/M2 | DIASTOLIC BLOOD PRESSURE: 64 MMHG | HEIGHT: 75 IN | TEMPERATURE: 98 F

## 2019-10-23 DIAGNOSIS — M15.9 PRIMARY OSTEOARTHRITIS INVOLVING MULTIPLE JOINTS: ICD-10-CM

## 2019-10-23 DIAGNOSIS — N18.30 STAGE 3 CHRONIC KIDNEY DISEASE (HCC): ICD-10-CM

## 2019-10-23 DIAGNOSIS — I12.9 HYPERTENSION WITH RENAL DISEASE: ICD-10-CM

## 2019-10-23 DIAGNOSIS — M35.3 POLYMYALGIA RHEUMATICA (HCC): Primary | ICD-10-CM

## 2019-10-23 DIAGNOSIS — M16.12 PRIMARY OSTEOARTHRITIS OF LEFT HIP: ICD-10-CM

## 2019-10-23 LAB
ANION GAP SERPL CALC-SCNC: 9 MMOL/L
BUN SERPL-MCNC: 32 MG/DL (ref 9–20)
CALCIUM SERPL-MCNC: 9.7 MG/DL (ref 8.4–10.2)
CHLORIDE SERPL-SCNC: 105 MMOL/L (ref 98–107)
CO2 SERPL-SCNC: 28 MMOL/L (ref 22–32)
CREAT SERPL-MCNC: 1.4 MG/DL (ref 0.8–1.5)
GLUCOSE SERPL-MCNC: 135 MG/DL (ref 75–110)
POTASSIUM SERPL-SCNC: 5.4 MMOL/L (ref 3.6–5)
SED RATE (ESR): 28 MM/HR (ref 0–10)
SODIUM SERPL-SCNC: 142 MMOL/L (ref 137–145)

## 2019-10-23 RX ORDER — METHYLPREDNISOLONE ACETATE 40 MG/ML
40 INJECTION, SUSPENSION INTRA-ARTICULAR; INTRALESIONAL; INTRAMUSCULAR; SOFT TISSUE ONCE
Qty: 1 VIAL | Refills: 0
Start: 2019-10-23 | End: 2019-10-23

## 2019-10-23 NOTE — PROGRESS NOTES
Chief Complaint   Patient presents with    CHF     1 month f/up    Irregular Heart Beat    Myalgia     PMR    Hip Pain     left side     1. Have you been to the ER, urgent care clinic since your last visit? Hospitalized since your last visit? No    2. Have you seen or consulted any other health care providers outside of the [address] [place] [address] since your last visit? Include any pap smears or colon screening.  No

## 2019-10-23 NOTE — PATIENT INSTRUCTIONS
Arthritis: Care Instructions Your Care Instructions Arthritis, also called osteoarthritis, is a breakdown of the cartilage that cushions your joints. When the cartilage wears down, your bones rub against each other. This causes pain and stiffness. Many people have some arthritis as they age. Arthritis most often affects the joints of the spine, hands, hips, knees, or feet. You can take simple measures to protect your joints, ease your pain, and help you stay active. Follow-up care is a key part of your treatment and safety. Be sure to make and go to all appointments, and call your doctor if you are having problems. It's also a good idea to know your test results and keep a list of the medicines you take. How can you care for yourself at home? · Stay at a healthy weight. Being overweight puts extra strain on your joints. · Talk to your doctor or physical therapist about exercises that will help ease joint pain. ? Stretch. You may enjoy gentle forms of yoga to help keep your joints and muscles flexible. ? Walk instead of jog. Other types of exercise that are less stressful on the joints include riding a bicycle, swimming, ashley chi, or water exercise. ? Lift weights. Strong muscles help reduce stress on your joints. Stronger thigh muscles, for example, take some of the stress off of the knees and hips. Learn the right way to lift weights so you do not make joint pain worse. · Take your medicines exactly as prescribed. Call your doctor if you think you are having a problem with your medicine. · Take pain medicines exactly as directed. ? If the doctor gave you a prescription medicine for pain, take it as prescribed. ? If you are not taking a prescription pain medicine, ask your doctor if you can take an over-the-counter medicine. · Use a cane, crutch, walker, or another device if you need help to get around. These can help rest your joints.  You also can use other things to make life easier, such as a higher toilet seat and padded handles on kitchen utensils. · Do not sit in low chairs, which can make it hard to get up. · Put heat or cold on your sore joints as needed. Use whichever helps you most. You also can take turns with hot and cold packs. ? Apply heat 2 or 3 times a day for 20 to 30 minutesusing a heating pad, hot shower, or hot packto relieve pain and stiffness. ? Put ice or a cold pack on your sore joint for 10 to 20 minutes at a time. Put a thin cloth between the ice and your skin. When should you call for help? Call your doctor now or seek immediate medical care if: 
  · You have sudden swelling, warmth, or pain in any joint.  
  · You have joint pain and a fever or rash.  
  · You have such bad pain that you cannot use a joint.  
 Watch closely for changes in your health, and be sure to contact your doctor if: 
  · You have mild joint symptoms that continue even with more than 6 weeks of care at home.  
  · You have stomach pain or other problems with your medicine. Where can you learn more? Go to http://isra-rhonda.info/. Enter Y060 in the search box to learn more about \"Arthritis: Care Instructions. \" Current as of: April 1, 2019 Content Version: 12.2 © 3254-6587 [employer]. Care instructions adapted under license by [employer] (which disclaims liability or warranty for this information). If you have questions about a medical condition or this instruction, always ask your healthcare professional. [place] [address] any warranty or liability for your use of this information.

## 2019-10-25 ENCOUNTER — TELEPHONE ANTICOAG (OUTPATIENT)
Dept: INTERNAL MEDICINE CLINIC | Age: 84
End: 2019-10-25

## 2019-10-25 DIAGNOSIS — I48.0 PAROXYSMAL ATRIAL FIBRILLATION (HCC): Primary | ICD-10-CM

## 2019-10-25 NOTE — PROGRESS NOTES
Anticoagulation Summary  As of 10/25/2019    INR goal:   2.0-3.0   TTR:   2.7 % (1.5 y)   INR used for dosing:      Warfarin maintenance plan:   10 mg (5 mg x 2) every Mon, Wed, Fri; 7.5 mg (5 mg x 1.5) all other days   Weekly warfarin total:   60 mg   Plan last modified:   Jefry Yo RN (10/25/2019)   Next INR check:   11/25/2019   Target end date:       Indications    Paroxysmal atrial fibrillation ([facility] [address] (Primary) [I48.0]             Anticoagulation Episode Summary     INR check location:   Clinic Lab    Preferred lab:       Send INR reminders to:       Comments:         Anticoagulation Care Providers     Provider Role Specialty Phone number    Hannah Travis MD Responsible Internal Medicine [phone]        Informed patient that PT/INR okay; Continue the same dose of blood thinner which is 2 tabs Monday, Wednesday and Friday; 1 and a half tabs all other days. F/up as scheduled.

## 2019-11-12 RX ORDER — METFORMIN HYDROCHLORIDE 500 MG/1
TABLET ORAL
Qty: 90 TAB | Refills: 1 | Status: SHIPPED | OUTPATIENT
Start: 2019-11-12 | End: 2019-11-25 | Stop reason: ALTCHOICE

## 2019-11-12 NOTE — TELEPHONE ENCOUNTER
PCP: Doug Nascimento MD    Last appt: 10/23/2019  Future Appointments   Date Time Provider Helen Butcher   11/25/2019 10:30 AM Doug Nascimento MD 3 Jayden Donovan   12/18/2019  1:10 PM Doug Nascimento MD 3 Jayden Donovan       Requested Prescriptions     Pending Prescriptions Disp Refills    metFORMIN (GLUCOPHAGE) 500 mg tablet [Pharmacy Med Name: METFORMIN  MG TABLET] 90 Tab 1     Sig: TAKE 1 TABLET BY MOUTH EVERY DAY IN THE MORNING WITH BREAKFAST

## 2019-11-21 ENCOUNTER — TELEPHONE (OUTPATIENT)
Dept: INTERNAL MEDICINE CLINIC | Age: 84
End: 2019-11-21

## 2019-11-21 PROBLEM — Z13.39 ALCOHOL SCREENING: Status: ACTIVE | Noted: 2019-11-21

## 2019-11-21 NOTE — PROGRESS NOTES
This is a Subsequent Medicare Annual Wellness Visit providing Personalized Prevention Plan Services (PPPS) (Performed 12 months after initial AWV and PPPS )    I have reviewed the patient's medical history in detail and updated the computerized patient record. He presents today for his Medicare subsequent annual wellness examination and screening questionnaire. He is also in follow-up of his multiple chronic medical problems include hypertension, diabetes, hyperlipidemia, CHF compensated, paroxysmal atrial fibrillation, ASCVD, DJD, polymyalgia rheumatica, allergic rhinitis, BPH and other medical problems. In addition to his chronic problems he has an acute problem of yesterday and having a near syncopal episode while he was standing at the kitchen sink cleaning up dishes he felt like he was in the past side and that episode lasted for about an hour where he became very diaphoretic and so it is close completely. During that time he did not notice any focal weakness. He noted no evidence of headache or visual symptoms or speech impediment. He noted no palpitations or chest pain or any other cardiovascular complaints. He notes after about an hour his symptoms completely went away and he had no further symptoms. He did shortly after the episode have a large bowel movement and had some abdominal pain prior to that but did not have the abdominal pain while he had a near syncopal episode. He denies any chest pain, shortness of breath, palpitations or cardiorespiratory complaints of any other type. There are no other GI or  complaints. He notes no headaches, dizziness or other neurologic complaints. He has no change of his chronic arthritic complaints and there are no other complaints on complete review of systems. History     Past Medical History:   Diagnosis Date    Allergic rhinitis 9/20/2017    Arthritis     ASCVD (arteriosclerotic cardiovascular disease) 9/20/2017    Story:  Old NATHALY by EKG    Back pain 9/20/2017    BPH (benign prostatic hyperplasia) 9/20/2017    CHF (congestive heart failure) ([facility] 9/20/2017    CKD (chronic kidney disease) 9/20/2017    Diabetic acetonemia (HCC)     DM (diabetes mellitus) ([facility] 9/20/2017    Story: Diet Controlled    ED (erectile dysfunction) 9/20/2017    Edema 9/20/2017    Elevated CPK 9/20/2017    Comments: History of    Elevated LFTs 9/20/2017    Comments: History of    Elevated PSA 9/20/2017    Hypercholesteremia     Hyperlipidemia 9/20/2017    Hypertension     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    Nodule of right lung 9/20/2017    Story: Right    Polymyalgia ([facility] 9/20/2017    Prostate enlargement       Past Surgical History:   Procedure Laterality Date    ABDOMEN SURGERY PROC UNLISTED      hernia repair    HX HEENT  06/12/2018    Dr. 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 90 Tab 3    terazosin (HYTRIN) 2 mg capsule TAKE ONE CAPSULE BY MOUTH DAILY 5mg 90 Cap 3    simvastatin (ZOCOR) 20 mg tablet TAKE 1 TABLET EVERY DAY 90 Tab 3    lisinopril (PRINIVIL, ZESTRIL) 5 mg tablet TAKE 1 TABLET EVERY DAY 90 Tab 3    warfarin (COUMADIN) 5 mg tablet Take 2 tablets on Monday and Friday; and one and a half tablets all other days. (Patient taking differently: Taking 2 tablets on Monday, Wednesday and Friday; one and a half all other days. ) 145 Tab 3    furosemide (LASIX) 80 mg tablet TAKE 1 TABLET BY MOUTH EVERY DAY 90 Tab 3    Nebulizer & Compressor machine Use daily as directed. 1 Each 0    glucose blood VI test strips (ACCU-CHEK SMARTVIEW TEST STRIP) strip Use once daily 100 Strip prn    sildenafil, antihypertensive, (REVATIO) 20 mg tablet TAKE 1 TABLET, ORAL, AS DIRECTED FOR SEXUAL ACTIVITY 20 Tab 11    metFORMIN (GLUCOPHAGE) 500 mg tablet Take 1 Tab by mouth daily (with breakfast). 90 Tab 3    fexofenadine (ALLEGRA) 180 mg tablet Take  by mouth.  acetaminophen (TYLENOL ARTHRITIS PAIN) 650 mg TbER Take 650 mg by mouth every eight (8) hours.  multivitamins-minerals-lutein (MULTIVITAMIN 50 PLUS) tab tablet Take 1 Tab by mouth daily.  glucosamine 1,000 mg tab Take 2,000 mg by mouth daily.  cholecalciferol (VITAMIN D3) 1,000 unit cap Take 1,000 Units by mouth daily.  DOCOSAHEXANOIC ACID/EPA (FISH OIL PO) Take 1,200 mg by mouth two (2) times a day. No Known Allergies  History reviewed. No pertinent family history. Patient Active Problem List    Diagnosis    Paroxysmal atrial fibrillation (HCC)    ASCVD (arteriosclerotic cardiovascular disease)     Story:  Old ASMI by EKG      Mixed hyperlipidemia    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, without long-term current use of insulin (HCC)     Story: Diet Controlled      Hypertension with renal disease    Stage 3 chronic kidney disease (HCC)    Primary osteoarthritis involving multiple joints    Polymyalgia rheumatica (HCC)    Chronic non-seasonal allergic rhinitis    BPH (benign prostatic hyperplasia)    Near syncope    Alcohol screening    Primary osteoarthritis of left hip    Cellulitis of right lower extremity    Dizziness    Medicare annual wellness visit, subsequent    Elevated LFTs     Comments: History of      Elevated CPK     Comments: History of      Elevated PSA    CHF (congestive heart failure) (HCC)    Nodule of right lung     Story: Right      ED (erectile dysfunction)    Back pain       Patient Care Team:  Antione Jacobo MD as PCP - General (Internal Medicine)  Antione Jacobo MD as PCP - [facility]    Depression Risk Factor Screening:     3 most recent PHQ Screens 11/25/2019   Little interest or pleasure in doing things Not at all   Feeling down, depressed, irritable, or hopeless Not at all   Total Score PHQ 2 0     Alcohol Risk Factor Screening: You do not drink alcohol or very rarely. Functional Ability and Level of Safety:     Fall Risk     Fall Risk Assessment, last 12 mths 11/25/2019   Able to walk? Yes   Fall in past 12 months? No   Fall with injury? -   Number of falls in past 12 months -   Fall Risk Score -       Hearing Loss   mild    Activities of Daily Living   Self-care. ADL Assessment 11/25/2019   Feeding yourself No Help Needed   Getting from bed to chair No Help Needed   Getting dressed No Help Needed   Bathing or showering No Help Needed   Walk across the room (includes cane/walker) No Help Needed   Using the telphone No Help Needed   Taking your medications No Help Needed   Preparing meals No Help Needed   Managing money (expenses/bills) No Help Needed   Moderately strenuous housework (laundry) No Help Needed   Shopping for personal items (toiletries/medicines) No Help Needed   Shopping for groceries No Help Needed   Driving No Help Needed   Climbing a flight of stairs No Help Needed   Getting to places beyond walking distances No Help Needed       Abuse Screen   Patient is not abused    Social History     Patient does not qualify to have social determinant information on file (likely too young). Social History Narrative    Not on file       Review of Systems      ROS:    Constitutional: He denies fevers, weight loss, sweats. Eyes: No blurred or double vision. ENT: No difficulty with swallowing, taste, speech or smell.   Neck: no stiffness or swelling  Respiratory: No cough wheezing or shortness of breath. Cardiovascular: Denies chest pain, palpitations, unexplained indigestion or syncope. Gastrointestinal:  No changes in bowel movements, no abdominal pain, no bloating. Genitourinary:  He denies frequency, nocturia or stranguria. Extremities: No joint pain, stiffness or swelling. Neurological:  No numbness, tingling, burring paresthesias or loss of motor strength. No syncope, dizziness or frequent headache. Near syncopal episode as described above that occurred yesterday without symptoms since  Lymphatic: no adenopathy noted  Hematologic: no easy bruising or bleeding gums  Skin:  No recent rashes or mole changes. Psychiatric/Behavioral:  Negative for depression. Physical Examination     Evaluation of Cognitive Function:  Mood/affect:  happy  Appearance: age appropriate  Family member/caregiver input: none    Visit Vitals  /80 (BP 1 Location: Left arm, BP Patient Position: Sitting)   Pulse 90   Temp 97.7 °F (36.5 °C) (Oral)   Resp 19   Ht 6' 3\" (1.905 m)   Wt 197 lb 9.6 oz (89.6 kg)   SpO2 97%   BMI 24.70 kg/m²     Vitals:    11/25/19 1112   BP: 148/80   Pulse: 90   Resp: 19   Temp: 97.7 °F (36.5 °C)   TempSrc: Oral   SpO2: 97%   Weight: 197 lb 9.6 oz (89.6 kg)   Height: 6' 3\" (1.905 m)   PainSc:   0 - No pain        PHYSICAL EXAM:    General appearance - alert, well appearing, and in no distress  Mental status - alert, oriented to person, place, and time  HEENT:  Ears - bilateral TM's and external ear canals clear  Eyes - pupillary responses were normal.  Extraocular muscle function intact. Lids and conjunctiva not injected. Fundoscopic exam revealed sharp disc margins. eye movements intact  Pharynx- clear with teeth in good repair. No masses were noted  Neck - supple without thyromegaly or burit. No JVD noted  Lungs - clear to auscultation and percussion  Cardiac- normal rate, regular rhythm without murmurs. PMI not displaced.   No gallop, rub or click  Abdomen - flat, soft, non-tender without palpable organomegaly or mass. No pulsatile mass was felt, and not bruit was heard. Bowel sounds were active  : Circumcised, Testes descended w/o masses  Rectal: normal sphincter tone, prostate normal, no masses, stool brown and hemacult negative  Extremities -  no clubbing cyanosis or edema  Lymphatics - no palpable lymphadenopathy, no hepatosplenomegaly  Hematologic: no petechiae or purpura  Peripheral vascular -Femoral, Dorsalis pedis and posterior tibial pulses felt without difficulty  Skin - no rash or unusual mole change noted  Neurological - Cranial nerves II-XII grossly intact. Motor strength 5/5. DTR's 2+ and symmetric. Station and gait normal  Back exam - full range of motion, no tenderness, palpable spasm or pain on motion  Musculoskeletal - no joint tenderness, deformity or swelling        Results for orders placed or performed in visit on 10/23/19   SED RATE (ESR)   Result Value Ref Range    Sed rate (ESR) 28 (H) 0 - 10 mm/hr   METABOLIC PANEL, BASIC   Result Value Ref Range    Glucose 135 (H) 75 - 110 mg/dL    BUN 32.0 (H) 9.0 - 20.0 mg/dL    Creatinine 1.4 0.8 - 1.5 mg/dL    Sodium 142 137 - 145 mmol/L    Potassium 5.4 (H) 3.6 - 5.0 mmol/L    Chloride 105 98 - 107 mmol/L    CO2 28.0 22.0 - 32.0 mmol/L    Calcium 9.7 8.4 - 10.2 mg/dl    Anion gap 9 mmol/L    GFR est AA 58 <60 mL/min/1.73m2    GFR est non-AA 48 <60 mL/min/1.73m2       Advice/Referrals/Counseling   Education and counseling provided:  Are appropriate based on today's review and evaluation  End-of-Life planning (with patient's consent)  Pneumococcal Vaccine  Influenza Vaccine  Colorectal cancer screening tests      Assessment/Plan     ASSESSMENT:   1. Hypertension with renal disease    2. 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, without long-term current use of insulin ([address]    3. Mixed hyperlipidemia    4. ASCVD (arteriosclerotic cardiovascular disease)    5.  Paroxysmal atrial fibrillation ([facility] [address]    6. Primary osteoarthritis involving multiple joints    7. Stage [facility]    8. Benign prostatic hyperplasia without lower urinary tract symptoms    9. Chronic non-seasonal allergic rhinitis    10. Polymyalgia rheumatica ([facility] [address]    11. Chronic diastolic congestive [facility] failure [facility]    12. Alcohol screening    13. Medicare annual wellness visit, subsequent    14. Near syncope      Pression  1. Hypertension that is controlled so continue current therapy reviewed with him. 2.  Diabetes mellitus repeat status pending a prior lab review not make adjustments if necessary. 3.  Hyperlipidemia prior lab reviewed and repeat status pending I will adjust if needed. 4   ASCVD clinically stable continue aspirin daily  5. Paroxysmal atrial fibrillation EKG obtained today does reveal atrial fibrillation or some pauses but nothing more than 2 seconds. I am concerned that his near syncope was related to this. 6. DJD that is stable  7. CKD stage III repeat status pending  8. BPH currently asymptomatic  9. Allergic rhinitis stable  10. Polymyalgia rheumatica repeat sed rate pending  11. Diastolic CHF currently compensated  12. Annual alcohol screening is done he no longer drinks alcohol. I did caution regarding drinking more than 2 drinks per day in males with increased cardiovascular risk as well as increased risk of liver disease and other GI problems  13. Near-syncope I am concerned that this was related to cardiac rhythm disturbance. EKG obtained reveals atrial fibrillation with rate controlled with 2 pauses both less than 2 seconds I am going to set him up for 24-hour Holter  Medicare subsequent annual wellness examination screening questionnaire is completed today. The results were reviewed with him and his questions were answered. Lifestyle recommendations modifications discussed and made.   I will call with lab results and make further recommendations or adjustments if necessary. Follow-up in 2 weeks or sooner should the need to based upon any symptoms. High complexity decision making this patient today with near syncopal episode yesterday lasting for about an hour which sounds to me was cardiac dysrhythmia related. 40 minutes spent on this high complexity office visit today not including the time spent on Medicare wellness examination. PLAN:  .  Orders Placed This Encounter    CBC WITH AUTOMATED DIFF    METABOLIC PANEL, COMPREHENSIVE ([facility])    LIPID PANEL ([facility])    CK ([facility])    HEMOGLOBIN A1C W/O EAG ([facility])    T4, FREE ([facility])    TSH 3RD GENERATION ([facility])    URINALYSIS W/O MICRO ([facility])    URINE, MICROALBUMIN, SEMIQUANTITATIVE ([facility])    SED RATE (ESR) ([facility])    AMB POC EKG ROUTINE W/ 12 LEADS, INTER & REP    AMB POC EKG 24HR MONITORING    DISCONTD: rivaroxaban (XARELTO) 20 mg tab tablet         ATTENTION:   This medical record was transcribed using an electronic medical records system. Although proofread, it may and can contain electronic and spelling errors. Other human spelling and other errors may be present. Corrections may be executed at a later time. Please feel free to contact us for any clarifications as needed. Follow-up and Dispositions    · Return TBD. Tino Hunter MD    Recommended healthy diet low in carbohydrates, fats, sodium and cholesterol. Recommended regular cardiovascular exercise 3-6 times per week for 30-60 minutes daily. Current Outpatient Medications   Medication Sig Dispense Refill    digoxin (LANOXIN) 0.125 mg tablet TAKE 1 TABLET BY MOUTH EVERY DAY 90 Tab 3    diclofenac EC (VOLTAREN) 75 mg EC tablet TAKE 1 TABLET BY MOUTH TWICE A  Tab 3    predniSONE (DELTASONE) 10 mg tablet Taking one tablet daily as directed.  90 Tab 3    terazosin (HYTRIN) 2 mg capsule TAKE ONE CAPSULE BY MOUTH DAILY 5mg 90 Cap 3    simvastatin (ZOCOR) 20 mg tablet TAKE 1 TABLET EVERY DAY 90 Tab 3    lisinopril (PRINIVIL, ZESTRIL) 5 mg tablet TAKE 1 TABLET EVERY DAY 90 Tab 3    warfarin (COUMADIN) 5 mg tablet Take 2 tablets on Monday and Friday; and one and a half tablets all other days. (Patient taking differently: Taking 2 tablets on Monday, Wednesday and Friday; one and a half all other days. ) 145 Tab 3    furosemide (LASIX) 80 mg tablet TAKE 1 TABLET BY MOUTH EVERY DAY 90 Tab 3    Nebulizer & Compressor machine Use daily as directed. 1 Each 0    glucose blood VI test strips (ACCU-CHEK SMARTVIEW TEST STRIP) strip Use once daily 100 Strip prn    sildenafil, antihypertensive, (REVATIO) 20 mg tablet TAKE 1 TABLET, ORAL, AS DIRECTED FOR SEXUAL ACTIVITY 20 Tab 11    metFORMIN (GLUCOPHAGE) 500 mg tablet Take 1 Tab by mouth daily (with breakfast). 90 Tab 3    fexofenadine (ALLEGRA) 180 mg tablet Take  by mouth.  acetaminophen (TYLENOL ARTHRITIS PAIN) 650 mg TbER Take 650 mg by mouth every eight (8) hours.  multivitamins-minerals-lutein (MULTIVITAMIN 50 PLUS) tab tablet Take 1 Tab by mouth daily.  glucosamine 1,000 mg tab Take 2,000 mg by mouth daily.  cholecalciferol (VITAMIN D3) 1,000 unit cap Take 1,000 Units by mouth daily.  DOCOSAHEXANOIC ACID/EPA (FISH OIL PO) Take 1,200 mg by mouth two (2) times a day. No results found for any visits on 11/25/19. Verbal and written instructions (see AVS) provided. Patient expresses understanding of diagnosis and treatment plan.     Juli Medrano MD

## 2019-11-25 ENCOUNTER — OFFICE VISIT (OUTPATIENT)
Dept: INTERNAL MEDICINE CLINIC | Age: 84
End: 2019-11-25

## 2019-11-25 VITALS
DIASTOLIC BLOOD PRESSURE: 76 MMHG | HEART RATE: 90 BPM | OXYGEN SATURATION: 97 % | HEIGHT: 75 IN | SYSTOLIC BLOOD PRESSURE: 138 MMHG | WEIGHT: 197.6 LBS | RESPIRATION RATE: 19 BRPM | BODY MASS INDEX: 24.57 KG/M2 | TEMPERATURE: 97.7 F

## 2019-11-25 DIAGNOSIS — I25.10 ASCVD (ARTERIOSCLEROTIC CARDIOVASCULAR DISEASE): ICD-10-CM

## 2019-11-25 DIAGNOSIS — E11.22 CONTROLLED TYPE 2 DIABETES MELLITUS WITH STAGE 3 CHRONIC KIDNEY DISEASE, WITHOUT LONG-TERM CURRENT USE OF INSULIN (HCC): ICD-10-CM

## 2019-11-25 DIAGNOSIS — I50.32 CHRONIC DIASTOLIC CONGESTIVE HEART FAILURE (HCC): ICD-10-CM

## 2019-11-25 DIAGNOSIS — I12.9 HYPERTENSION WITH RENAL DISEASE: Primary | ICD-10-CM

## 2019-11-25 DIAGNOSIS — N18.30 STAGE 3 CHRONIC KIDNEY DISEASE (HCC): ICD-10-CM

## 2019-11-25 DIAGNOSIS — N18.30 CONTROLLED TYPE 2 DIABETES MELLITUS WITH STAGE 3 CHRONIC KIDNEY DISEASE, WITHOUT LONG-TERM CURRENT USE OF INSULIN (HCC): ICD-10-CM

## 2019-11-25 DIAGNOSIS — M15.9 PRIMARY OSTEOARTHRITIS INVOLVING MULTIPLE JOINTS: ICD-10-CM

## 2019-11-25 DIAGNOSIS — E78.2 MIXED HYPERLIPIDEMIA: ICD-10-CM

## 2019-11-25 DIAGNOSIS — J30.89 CHRONIC NON-SEASONAL ALLERGIC RHINITIS: ICD-10-CM

## 2019-11-25 DIAGNOSIS — Z13.39 ALCOHOL SCREENING: ICD-10-CM

## 2019-11-25 DIAGNOSIS — Z00.00 MEDICARE ANNUAL WELLNESS VISIT, SUBSEQUENT: ICD-10-CM

## 2019-11-25 DIAGNOSIS — M35.3 POLYMYALGIA RHEUMATICA (HCC): ICD-10-CM

## 2019-11-25 DIAGNOSIS — I48.0 PAROXYSMAL ATRIAL FIBRILLATION (HCC): ICD-10-CM

## 2019-11-25 DIAGNOSIS — R55 NEAR SYNCOPE: ICD-10-CM

## 2019-11-25 DIAGNOSIS — N40.0 BENIGN PROSTATIC HYPERPLASIA WITHOUT LOWER URINARY TRACT SYMPTOMS: ICD-10-CM

## 2019-11-25 LAB
A-G RATIO,AGRAT: 1.2 RATIO
ALBUMIN SERPL-MCNC: 3.8 G/DL (ref 3.9–5.4)
ALP SERPL-CCNC: 66 U/L (ref 38–126)
ALT SERPL-CCNC: 21 U/L (ref 0–50)
ANION GAP SERPL CALC-SCNC: 6 MMOL/L
AST SERPL W P-5'-P-CCNC: 25 U/L (ref 14–36)
BILIRUB SERPL-MCNC: 0.3 MG/DL (ref 0.2–1.3)
BILIRUB UR QL: NEGATIVE
BUN SERPL-MCNC: 22 MG/DL (ref 9–20)
BUN/CREATININE RATIO,BUCR: 22 RATIO
CALCIUM SERPL-MCNC: 9.8 MG/DL (ref 8.4–10.2)
CHLORIDE SERPL-SCNC: 107 MMOL/L (ref 98–107)
CHOL/HDL RATIO,CHHD: 4 RATIO (ref 0–4)
CHOLEST SERPL-MCNC: 197 MG/DL (ref 0–200)
CK SERPL-CCNC: 60 U/L (ref 30–135)
CLARITY: CLEAR
CO2 SERPL-SCNC: 31 MMOL/L (ref 22–32)
COLOR UR: ABNORMAL
CREAT SERPL-MCNC: 1 MG/DL (ref 0.8–1.5)
GLOBULIN,GLOB: 3.2
GLUCOSE 24H UR-MRATE: ABNORMAL G/(24.H)
GLUCOSE SERPL-MCNC: 128 MG/DL (ref 75–110)
HDLC SERPL-MCNC: 48 MG/DL (ref 35–130)
HGB UR QL STRIP: NEGATIVE
KETONES UR QL STRIP.AUTO: NEGATIVE
LDL/HDL RATIO,LDHD: 2 RATIO
LDLC SERPL CALC-MCNC: 101 MG/DL (ref 0–130)
LEUKOCYTE ESTERASE: NEGATIVE
MICROALBUMIN, URINE: 20 MG/L (ref 0–20)
NITRITE UR QL STRIP.AUTO: NEGATIVE
PH UR STRIP: 6 [PH] (ref 5–7)
POTASSIUM SERPL-SCNC: 4.4 MMOL/L (ref 3.6–5)
PROT SERPL-MCNC: 7 G/DL (ref 6.3–8.2)
PROT UR STRIP-MCNC: ABNORMAL MG/DL
RBC #/AREA URNS HPF: 0 #/HPF
SED RATE (ESR): 32 MM/HR (ref 0–10)
SODIUM SERPL-SCNC: 144 MMOL/L (ref 137–145)
SP GR UR REFRACTOMETRY: 1.02 (ref 1–1.03)
TRIGL SERPL-MCNC: 242 MG/DL (ref 0–200)
UROBILINOGEN UR QL STRIP.AUTO: NEGATIVE
VLDLC SERPL CALC-MCNC: 48 MG/DL
WBC URNS QL MICRO: 0 #/HPF

## 2019-11-25 NOTE — PATIENT INSTRUCTIONS
Allergies: Care Instructions Your Care Instructions Allergies occur when your body's defense system (immune system) overreacts to certain substances. The immune system treats a harmless substance as if it were a harmful germ or virus. Many things can cause this overreaction, including pollens, medicine, food, dust, animal dander, and mold. Allergies can be mild or severe. Mild allergies can be managed with home treatment. But medicine may be needed to prevent problems. Managing your allergies is an important part of staying healthy. Your doctor may suggest that you have allergy testing to help find out what is causing your allergies. When you know what things trigger your symptoms, you can avoid them. This can prevent allergy symptoms and other health problems. For severe allergies that cause reactions that affect your whole body (anaphylactic reactions), your doctor may prescribe a shot of epinephrine to carry with you in case you have a severe reaction. Learn how to give yourself the shot and keep it with you at all times. Make sure it is not . Follow-up care is a key part of your treatment and safety. Be sure to make and go to all appointments, and call your doctor if you are having problems. It's also a good idea to know your test results and keep a list of the medicines you take. How can you care for yourself at home? · If you have been told by your doctor that dust or dust mites are causing your allergy, decrease the dust around your bed: 
? Wash sheets, pillowcases, and other bedding in hot water every week. ? Use dust-proof covers for pillows, duvets, and mattresses. Avoid plastic covers because they tear easily and do not \"breathe. \" Wash as instructed on the label. ? Do not use any blankets and pillows that you do not need. ? Use blankets that you can wash in your washing machine. ? Consider removing drapes and carpets, which attract and hold dust, from your bedroom. · If you are allergic to house dust and mites, do not use home humidifiers. Your doctor can suggest ways you can control dust and mites. · Look for signs of cockroaches. Cockroaches cause allergic reactions. Use cockroach baits to get rid of them. Then, clean your home well. Cockroaches like areas where grocery bags, newspapers, empty bottles, or cardboard boxes are stored. Do not keep these inside your home, and keep trash and food containers sealed. Seal off any spots where cockroaches might enter your home. · If you are allergic to mold, get rid of furniture, rugs, and drapes that smell musty. Check for mold in the bathroom. · If you are allergic to outdoor pollen or mold spores, use air-conditioning. Change or clean all filters every month. Keep windows closed. · If you are allergic to pollen, stay inside when pollen counts are high. Use a vacuum  with a HEPA filter or a double-thickness filter at least two times each week. · Stay inside when air pollution is bad. Avoid paint fumes, perfumes, and other strong odors. · Avoid conditions that make your allergies worse. Stay away from smoke. Do not smoke or let anyone else smoke in your house. Do not use fireplaces or wood-burning stoves. · If you are allergic to your pets, change the air filter in your furnace every month. Use high-efficiency filters. · If you are allergic to pet dander, keep pets outside or out of your bedroom. Old carpet and cloth furniture can hold a lot of animal dander. You may need to replace them. When should you call for help? Give an epinephrine shot if: 
  · You think you are having a severe allergic reaction.  
  · You have symptoms in more than one body area, such as mild nausea and an itchy mouth.  
 After giving an epinephrine shot call 911, even if you feel better. 
 Call 911 if: 
  · You have symptoms of a severe allergic reaction. These may include: 
? Sudden raised, red areas (hives) all over your body. ? Swelling of the throat, mouth, lips, or tongue. ? Trouble breathing. ? Passing out (losing consciousness). Or you may feel very lightheaded or suddenly feel weak, confused, or restless.  
  · You have been given an epinephrine shot, even if you feel better.  
 Call your doctor now or seek immediate medical care if: 
  · You have symptoms of an allergic reaction, such as: ? A rash or hives (raised, red areas on the skin). ? Itching. ? Swelling. ? Belly pain, nausea, or vomiting.  
 Watch closely for changes in your health, and be sure to contact your doctor if: 
  · You do not get better as expected. Where can you learn more? Go to http://isra-rhonda.info/. Enter R419 in the search box to learn more about \"Allergies: Care Instructions. \" Current as of: April 7, 2019 Content Version: 12.2 © 2760-7045 [employer]. Care instructions adapted under license by [employer] (which disclaims liability or warranty for this information). If you have questions about a medical condition or this instruction, always ask your healthcare professional. [place] [address] any warranty or liability for your use of this information.

## 2019-11-25 NOTE — PROGRESS NOTES
Chief Complaint   Patient presents with   Lanelle Delay Annual Wellness Visit     Visit Vitals  /80 (BP 1 Location: Left arm, BP Patient Position: Sitting)   Pulse 90   Temp 97.7 °F (36.5 °C) (Oral)   Resp 19   Ht 6' 3\" (1.905 m)   Wt 197 lb 9.6 oz (89.6 kg)   SpO2 97%   BMI 24.70 kg/m²     1. Have you been to the ER, urgent care clinic since your last visit? Hospitalized since your last visit? No    2. Have you seen or consulted any other health care providers outside of the [address] [place] [address] since your last visit? Include any pap smears or colon screening.  No

## 2019-11-26 LAB
BASOPHILS # BLD AUTO: 0 X10E3/UL (ref 0–0.2)
BASOPHILS NFR BLD AUTO: 1 %
EOSINOPHIL # BLD AUTO: 0 X10E3/UL (ref 0–0.4)
EOSINOPHIL NFR BLD AUTO: 0 %
ERYTHROCYTE [DISTWIDTH] IN BLOOD BY AUTOMATED COUNT: 12.2 % (ref 12.3–15.4)
HCT VFR BLD AUTO: 37.8 % (ref 37.5–51)
HGB BLD-MCNC: 12.9 G/DL (ref 13–17.7)
IMM GRANULOCYTES # BLD AUTO: 0 X10E3/UL (ref 0–0.1)
IMM GRANULOCYTES NFR BLD AUTO: 0 %
LYMPHOCYTES # BLD AUTO: 2 X10E3/UL (ref 0.7–3.1)
LYMPHOCYTES NFR BLD AUTO: 27 %
MCH RBC QN AUTO: 31.5 PG (ref 26.6–33)
MCHC RBC AUTO-ENTMCNC: 34.1 G/DL (ref 31.5–35.7)
MCV RBC AUTO: 92 FL (ref 79–97)
MONOCYTES # BLD AUTO: 0.7 X10E3/UL (ref 0.1–0.9)
MONOCYTES NFR BLD AUTO: 9 %
NEUTROPHILS # BLD AUTO: 4.7 X10E3/UL (ref 1.4–7)
NEUTROPHILS NFR BLD AUTO: 63 %
PLATELET # BLD AUTO: 209 X10E3/UL (ref 150–450)
RBC # BLD AUTO: 4.1 X10E6/UL (ref 4.14–5.8)
T4 FREE SERPL-MCNC: 1 NG/DL (ref 0.58–2.3)
TSH SERPL DL<=0.05 MIU/L-ACNC: 1.01 UIU/ML (ref 0.34–5.6)
WBC # BLD AUTO: 7.4 X10E3/UL (ref 3.4–10.8)

## 2019-11-27 LAB — HBA1C MFR BLD HPLC: 6.1 % (ref 4–5.7)

## 2019-11-29 NOTE — PROGRESS NOTES
Called and spoke to patient  Two pt identifiers confirmed  Informed patient per Dr. Medina Stage that labs are stable and to continue current treatment. Patient verbalized understanding of information discussed  with no further questions at this time.

## 2019-12-17 NOTE — PROGRESS NOTES
Chief Complaint   Patient presents with    Hypertension     1 month follow up    CHF    Diabetes       SUBJECTIVE:    Louisa Zazueta is a 80 y.o. male who returns in follow-up for his hypertension, CHF, polymyalgia rheumatica, paroxysmal atrial fibrillation, CKD and other medical problems. In addition to his chronic problems he has an acute problem of a sore area over the distal aspect of his right second toe that will not seem to heal up. He will develop a scab and the scab go away and then it comes back. He notes no history of trauma. He is notes no significant pain associate with this. Current Outpatient Medications   Medication Sig Dispense Refill    mupirocin (BACTROBAN) 2 % ointment Apply  to affected area three (3) times daily. 22 g 0    digoxin (LANOXIN) 0.125 mg tablet TAKE 1 TABLET BY MOUTH EVERY DAY 90 Tab 3    diclofenac EC (VOLTAREN) 75 mg EC tablet TAKE 1 TABLET BY MOUTH TWICE A  Tab 3    predniSONE (DELTASONE) 10 mg tablet Taking one tablet daily as directed. 90 Tab 3    terazosin (HYTRIN) 2 mg capsule TAKE ONE CAPSULE BY MOUTH DAILY 5mg 90 Cap 3    simvastatin (ZOCOR) 20 mg tablet TAKE 1 TABLET EVERY DAY 90 Tab 3    lisinopril (PRINIVIL, ZESTRIL) 5 mg tablet TAKE 1 TABLET EVERY DAY 90 Tab 3    warfarin (COUMADIN) 5 mg tablet Take 2 tablets on Monday and Friday; and one and a half tablets all other days. (Patient taking differently: Taking 2 tablets on Monday, Wednesday and Friday; one and a half all other days. ) 145 Tab 3    furosemide (LASIX) 80 mg tablet TAKE 1 TABLET BY MOUTH EVERY DAY 90 Tab 3    Nebulizer & Compressor machine Use daily as directed. 1 Each 0    glucose blood VI test strips (ACCU-CHEK SMARTVIEW TEST STRIP) strip Use once daily 100 Strip prn    sildenafil, antihypertensive, (REVATIO) 20 mg tablet TAKE 1 TABLET, ORAL, AS DIRECTED FOR SEXUAL ACTIVITY 20 Tab 11    metFORMIN (GLUCOPHAGE) 500 mg tablet Take 1 Tab by mouth daily (with breakfast).  90 Tab 3    fexofenadine (ALLEGRA) 180 mg tablet Take  by mouth.  acetaminophen (TYLENOL ARTHRITIS PAIN) 650 mg TbER Take 650 mg by mouth every eight (8) hours.  multivitamins-minerals-lutein (MULTIVITAMIN 50 PLUS) tab tablet Take 1 Tab by mouth daily.  glucosamine 1,000 mg tab Take 2,000 mg by mouth daily.  cholecalciferol (VITAMIN D3) 1,000 unit cap Take 1,000 Units by mouth daily.  DOCOSAHEXANOIC ACID/EPA (FISH OIL PO) Take 1,200 mg by mouth two (2) times a day. 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    Back pain 9/20/2017    BPH (benign prostatic hyperplasia) 9/20/2017    CHF (congestive heart failure) ([address] 9/20/2017    CKD (chronic kidney disease) 9/20/2017    Diabetic acetonemia ([address]     DM (diabetes mellitus) ([address] 9/20/2017    Story: Diet Controlled    ED (erectile dysfunction) 9/20/2017    Edema 9/20/2017    Elevated CPK 9/20/2017    Comments: History of    Elevated LFTs 9/20/2017    Comments: History of    Elevated PSA 9/20/2017    Hypercholesteremia     Hyperlipidemia 9/20/2017    Hypertension     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    Nodule of right lung 9/20/2017    Story: Right    Polymyalgia ([address] 9/20/2017    Prostate enlargement      Past Surgical History:   Procedure Laterality Date    ABDOMEN SURGERY PROC UNLISTED      hernia repair    HX HEENT  06/12/2018    Dr. 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    HX MALIGNANT SKIN LESION EXCISION  09/2018    2 lesions on head     No Known Allergies    REVIEW OF SYSTEMS:  General: negative for - chills or fever, or weight loss or gain  ENT: negative for - headaches, nasal congestion or tinnitus  Eyes: no blurred or visual changes  Neck: No stiffness or swollen nodes  Respiratory: negative for - cough, hemoptysis, shortness of breath or wheezing  Cardiovascular : negative for - chest pain, edema, palpitations or shortness of breath  Gastrointestinal: negative for - abdominal pain, blood in stools, heartburn or nausea/vomiting  Genito-Urinary: no dysuria, trouble voiding, or hematuria  Musculoskeletal: negative for - gait disturbance, joint pain, joint stiffness or joint swelling  Neurological: no TIA or stroke symptoms  Hematologic: no bruises, no bleeding  Lymphatic: no swollen glands  Integument: no lumps, mole changes, nail changes or rash. So area right second toe  Endocrine:no malaise/lethargy poly uria or polydipsia or unexpected weight changes        Social History     Socioeconomic History    Marital status:      Spouse name: Not on file    Number of children: Not on file    Years of education: Not on file    Highest education level: Not on file   Tobacco Use    Smoking status: Never Smoker    Smokeless tobacco: Never Used   Substance and Sexual Activity    Alcohol use: No    Drug use: No    Sexual activity: Never     History reviewed. No pertinent family history. OBJECTIVE:     Visit Vitals  /74 (BP 1 Location: Left arm, BP Patient Position: Sitting)   Pulse 72   Temp 98 °F (36.7 °C) (Oral)   Resp 19   Ht 6' 3\" (1.905 m)   Wt 202 lb 9.6 oz (91.9 kg)   SpO2 96%   BMI 25.32 kg/m²     CONSTITUTIONAL:   well nourished, appears age appropriate  EYES: sclera anicteric, PERRL, EOMI  ENMT:nares clear, moist mucous membranes, pharynx clear  NECK: supple.  Thyroid normal, No JVD or bruits  RESPIRATORY: Chest: clear to ascultation and percussion, normal inspiratory effort  CARDIOVASCULAR: Heart: regular rate and rhythm no murmurs, rubs or gallops, PMI not displaced, No thrills  GASTROINTESTINAL: Abdomen: non distended, soft, non tender, bowel sounds normal  HEMATOLOGIC: no purpura, petechiae or bruising  LYMPHATIC: No lymph node enlargemant  MUSCULOSKELETAL: Extremities: no edema or active synovitis, pulse 1+   INTEGUMENT: No unusual rashes or suspicious skin lesions noted. Nails appear normal.  Tip of the right second toe has an abraded area with mild amount of serous drainage but no evidence of purulence and there is no erythema the right noted toe. PERIPHERAL VASCULAR: normal pulses femoral, PT and DP  NEUROLOGIC: non-focal exam, A & O X 3  PSYCHIATRIC:, appropriate affect     ASSESSMENT:   1. Polymyalgia rheumatica ([address]    2. Hypertension with renal disease    3. Stage 3 chronic kidney disease (HCC)    4. Paroxysmal atrial fibrillation ([address]    5. Cellulitis of toe of right foot      Impression  1. Polymyalgia rheumatica we will check the sed rate continue currently with prednisone at 10 mg daily  2. Hypertension that is controlled  3. CKD stage III repeat status pending  4   Paroxysmal atrial fibrillation that is stable  5   Cellulitis right second toe this is limited and superficial so we will try topical Bactroban  Recheck in 1 month or sooner if there is a problem. I will call with lab results. PLAN:  .  Orders Placed This Encounter    SED RATE (ESR) ([facility])    METABOLIC PANEL, BASIC ([facility])    mupirocin (BACTROBAN) 2 % ointment         ATTENTION:   This medical record was transcribed using an electronic medical records system. Although proofread, it may and can contain electronic and spelling errors. Other human spelling and other errors may be present. Corrections may be executed at a later time. Please feel free to contact us for any clarifications as needed. Follow-up and Dispositions    · Return in about 4 weeks (around 1/15/2020). No results found for any visits on 12/18/19. Tino Hunter MD    The patient verbalized understanding of the problems and plans as explained.

## 2019-12-18 ENCOUNTER — OFFICE VISIT (OUTPATIENT)
Dept: INTERNAL MEDICINE CLINIC | Age: 84
End: 2019-12-18

## 2019-12-18 VITALS
TEMPERATURE: 98 F | HEIGHT: 75 IN | SYSTOLIC BLOOD PRESSURE: 160 MMHG | WEIGHT: 202.6 LBS | HEART RATE: 72 BPM | OXYGEN SATURATION: 96 % | BODY MASS INDEX: 25.19 KG/M2 | RESPIRATION RATE: 19 BRPM | DIASTOLIC BLOOD PRESSURE: 74 MMHG

## 2019-12-18 DIAGNOSIS — N18.30 STAGE 3 CHRONIC KIDNEY DISEASE (HCC): ICD-10-CM

## 2019-12-18 DIAGNOSIS — M35.3 POLYMYALGIA RHEUMATICA (HCC): Primary | ICD-10-CM

## 2019-12-18 DIAGNOSIS — L03.031 CELLULITIS OF TOE OF RIGHT FOOT: ICD-10-CM

## 2019-12-18 DIAGNOSIS — I12.9 HYPERTENSION WITH RENAL DISEASE: ICD-10-CM

## 2019-12-18 DIAGNOSIS — I48.0 PAROXYSMAL ATRIAL FIBRILLATION (HCC): ICD-10-CM

## 2019-12-18 LAB
ANION GAP SERPL CALC-SCNC: 9 MMOL/L
BUN SERPL-MCNC: 29 MG/DL (ref 9–20)
CALCIUM SERPL-MCNC: 9.4 MG/DL (ref 8.4–10.2)
CHLORIDE SERPL-SCNC: 103 MMOL/L (ref 98–107)
CO2 SERPL-SCNC: 29 MMOL/L (ref 22–32)
CREAT SERPL-MCNC: 1.2 MG/DL (ref 0.8–1.5)
GLUCOSE SERPL-MCNC: 150 MG/DL (ref 75–110)
POTASSIUM SERPL-SCNC: 5.2 MMOL/L (ref 3.6–5)
SED RATE (ESR): 43 MM/HR (ref 0–10)
SODIUM SERPL-SCNC: 141 MMOL/L (ref 137–145)

## 2019-12-18 RX ORDER — MUPIROCIN 20 MG/G
OINTMENT TOPICAL 3 TIMES DAILY
Qty: 22 G | Refills: 0 | Status: SHIPPED | OUTPATIENT
Start: 2019-12-18 | End: 2020-03-09

## 2019-12-18 NOTE — PATIENT INSTRUCTIONS
Arthritis: Care Instructions Your Care Instructions Arthritis, also called osteoarthritis, is a breakdown of the cartilage that cushions your joints. When the cartilage wears down, your bones rub against each other. This causes pain and stiffness. Many people have some arthritis as they age. Arthritis most often affects the joints of the spine, hands, hips, knees, or feet. You can take simple measures to protect your joints, ease your pain, and help you stay active. Follow-up care is a key part of your treatment and safety. Be sure to make and go to all appointments, and call your doctor if you are having problems. It's also a good idea to know your test results and keep a list of the medicines you take. How can you care for yourself at home? · Stay at a healthy weight. Being overweight puts extra strain on your joints. · Talk to your doctor or physical therapist about exercises that will help ease joint pain. ? Stretch. You may enjoy gentle forms of yoga to help keep your joints and muscles flexible. ? Walk instead of jog. Other types of exercise that are less stressful on the joints include riding a bicycle, swimming, ashley chi, or water exercise. ? Lift weights. Strong muscles help reduce stress on your joints. Stronger thigh muscles, for example, take some of the stress off of the knees and hips. Learn the right way to lift weights so you do not make joint pain worse. · Take your medicines exactly as prescribed. Call your doctor if you think you are having a problem with your medicine. · Take pain medicines exactly as directed. ? If the doctor gave you a prescription medicine for pain, take it as prescribed. ? If you are not taking a prescription pain medicine, ask your doctor if you can take an over-the-counter medicine. · Use a cane, crutch, walker, or another device if you need help to get around. These can help rest your joints.  You also can use other things to make life easier, such as a higher toilet seat and padded handles on kitchen utensils. · Do not sit in low chairs, which can make it hard to get up. · Put heat or cold on your sore joints as needed. Use whichever helps you most. You also can take turns with hot and cold packs. ? Apply heat 2 or 3 times a day for 20 to 30 minutesusing a heating pad, hot shower, or hot packto relieve pain and stiffness. ? Put ice or a cold pack on your sore joint for 10 to 20 minutes at a time. Put a thin cloth between the ice and your skin. When should you call for help? Call your doctor now or seek immediate medical care if: 
  · You have sudden swelling, warmth, or pain in any joint.  
  · You have joint pain and a fever or rash.  
  · You have such bad pain that you cannot use a joint.  
 Watch closely for changes in your health, and be sure to contact your doctor if: 
  · You have mild joint symptoms that continue even with more than 6 weeks of care at home.  
  · You have stomach pain or other problems with your medicine. Where can you learn more? Go to http://isra-rhonda.info/. Enter D692 in the search box to learn more about \"Arthritis: Care Instructions. \" Current as of: April 1, 2019 Content Version: 12.2 © 2218-4920 [employer]. Care instructions adapted under license by [employer] (which disclaims liability or warranty for this information). If you have questions about a medical condition or this instruction, always ask your healthcare professional. [place] [address] any warranty or liability for your use of this information.

## 2019-12-18 NOTE — PROGRESS NOTES
Chief Complaint   Patient presents with    Hypertension     1 month follow up    CHF    Diabetes     Visit Vitals  /74 (BP 1 Location: Left arm, BP Patient Position: Sitting)   Pulse 72   Temp 98 °F (36.7 °C) (Oral)   Resp 19   Ht 6' 3\" (1.905 m)   Wt 202 lb 9.6 oz (91.9 kg)   SpO2 96%   BMI 25.32 kg/m²     1. Have you been to the ER, urgent care clinic since your last visit? Hospitalized since your last visit? No    2. Have you seen or consulted any other health care providers outside of the [address] [place] [address] since your last visit? Include any pap smears or colon screening.  No

## 2019-12-21 PROBLEM — Z00.00 MEDICARE ANNUAL WELLNESS VISIT, SUBSEQUENT: Status: RESOLVED | Noted: 2017-10-30 | Resolved: 2019-12-21

## 2020-01-14 ENCOUNTER — OFFICE VISIT (OUTPATIENT)
Dept: INTERNAL MEDICINE CLINIC | Age: 85
End: 2020-01-14

## 2020-01-14 VITALS
HEART RATE: 93 BPM | OXYGEN SATURATION: 99 % | RESPIRATION RATE: 19 BRPM | WEIGHT: 200.8 LBS | SYSTOLIC BLOOD PRESSURE: 130 MMHG | TEMPERATURE: 98 F | HEIGHT: 75 IN | DIASTOLIC BLOOD PRESSURE: 72 MMHG | BODY MASS INDEX: 24.97 KG/M2

## 2020-01-14 DIAGNOSIS — L03.116 CELLULITIS OF LEFT LOWER EXTREMITY: Primary | ICD-10-CM

## 2020-01-14 RX ORDER — SULFAMETHOXAZOLE AND TRIMETHOPRIM 800; 160 MG/1; MG/1
1 TABLET ORAL 2 TIMES DAILY
Qty: 20 TAB | Refills: 0 | Status: SHIPPED | OUTPATIENT
Start: 2020-01-14 | End: 2020-01-20 | Stop reason: SDUPTHER

## 2020-01-14 NOTE — PROGRESS NOTES
Chief Complaint   Patient presents with    Ankle Injury     left ankle redness, pain and lesion with pus     Visit Vitals  /72 (BP 1 Location: Left arm, BP Patient Position: Sitting)   Pulse 93   Temp 98 °F (36.7 °C) (Oral)   Resp 19   Ht 6' 3\" (1.905 m)   Wt 200 lb 12.8 oz (91.1 kg)   SpO2 99%   BMI 25.10 kg/m²     1. Have you been to the ER, urgent care clinic since your last visit? Hospitalized since your last visit? No    2. Have you seen or consulted any other health care providers outside of the [address] [place] [address] since your last visit? Include any pap smears or colon screening.  No

## 2020-01-14 NOTE — PATIENT INSTRUCTIONS

## 2020-01-14 NOTE — PROGRESS NOTES
Subjective:   Collette Mu is a 80 y.o. male      Chief Complaint   Patient presents with    Ankle Injury     left ankle redness, pain and lesion with pus        History of present illness: He presents today complaining of some redness and pain over the medial aspect of his left ankle where he has an area that is draining some yellow type drainage. He has been treating it with topical Bactroban but that has not been effective. He notes it is been present about 2 weeks. He notes no fevers or chills. There is no streaking up the leg and notes no pain in the left groin area. Patient Active Problem List   Diagnosis Code    Elevated LFTs R94.5    Elevated CPK R74.8    Elevated PSA R97.20    ASCVD (arteriosclerotic cardiovascular disease) I25.10    Mixed hyperlipidemia E78.2    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, without long-term current use of insulin ([facility]) E11.22, N18.3    Hypertension with renal disease I12.9    CHF (congestive heart failure) ([facility]) I50.9    Stage 3 chronic kidney disease ([facility]) N18.3    Primary osteoarthritis involving multiple joints M15.0    Nodule of right lung R91.1    Chronic non-seasonal allergic rhinitis J30.89    ED (erectile dysfunction) N52.9    Back pain M54.9    BPH (benign prostatic hyperplasia) N40.0    Dizziness R42    Paroxysmal atrial fibrillation ([facility]) I48.0    Cellulitis of toe of right foot L03.031    Polymyalgia rheumatica ([facility]) M35.3    Primary osteoarthritis of left hip M16.12    Alcohol screening Z13.39    Near syncope R55    Cellulitis of left lower extremity L03.116      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    Back pain 9/20/2017    BPH (benign prostatic hyperplasia) 9/20/2017    [facility]est[facility]) [facility] 9/20/2017    CKD (chronic kidney disease) 9/20/2017    Diabetic acetonemia ([facility]     DM (diabetes mellitus) ([facility] 9/20/2017    Story: Diet Controlled    ED (erectile dysfunction) 9/20/2017    Edema 9/20/2017    Elevated CPK 9/20/2017    Comments: History of    Elevated LFTs 9/20/2017    Comments: History of    Elevated PSA 9/20/2017    Hypercholesteremia     Hyperlipidemia 9/20/2017    Hypertension     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    Nodule of right lung 9/20/2017    Story: Right    Polym[facility]algia ([facility] 9/20/2017    Prostate enlargement       No Known Allergies   History reviewed. No pertinent family history.    Social History     Socioeconomic History    Marital status:      Spouse name: Not on file    Number of children: Not on file    Years of education: Not on file    Highest education level: Not on file   Occupational History    Not on file   Social Needs    Financial resource strain: Not on file    Food insecurity:     Worry: Not on file     Inability: Not on file    Transportation needs:     Medical: Not on file     Non-medical: Not on file   Tobacco Use    Smoking status: Never Smoker    Smokeless tobacco: Never Used   Substance and Sexual Activity    Alcohol use: No    Drug use: No    Sexual activity: Never   Lifestyle    Physical activity:     Days per week: Not on file     Minutes per session: Not on file    Stress: Not on file   Relationships    Social connections:     Talks on phone: Not on file     Gets together: Not on file     Attends Taoism service: Not on file     Active member of club or organization: Not on file     Attends meetings of clubs or organizations: Not on file     Relationship status: Not on file    Intimate partner violence:     Fear of current or ex partner: Not on file     Emotionally abused: Not on file     Physically abused: Not on file     Forced sexual activity: Not on file   Other Topics Concern    Not on file   Social History Narrative    Not on file     Prior to Admission medications    Medication Sig Start Date End Date Taking? Authorizing Provider   trimethoprim-sulfamethoxazole (BACTRIM DS, SEPTRA DS) 160-800 mg per tablet Take 1 Tab by mouth two (2) times a day. 1/14/20  Yes MD stewart Stanfordrocin [facility]) 2 % ointment Apply  to affected area three (3) times daily. 12/18/19  Yes Brent Lindquist MD   digoxin (LANOXIN) 0.125 mg tablet TAKE 1 TABLET BY MOUTH EVERY DAY 8/22/19  Yes Brent Lindquist MD   diclofenac EC (VOLTAREN) 75 mg EC tablet TAKE 1 TABLET BY MOUTH TWICE A DAY 7/29/19  Yes Brent Lindquist MD   predniSONE (DELTASONE) 10 mg tablet Taking one tablet daily as directed. 7/29/19  Yes Brent Lindquist MD   terazosin (HYTRIN) 2 mg capsule TAKE ONE CAPSULE BY MOUTH DAILY 5mg 5/7/19  Yes Brent Lindquist MD   simvastatin (ZOCOR) 20 mg tablet TAKE 1 TABLET EVERY DAY 5/7/19  Yes Brent Lindquist MD   lisinopril (PRINIVIL, ZESTRIL) 5 mg tablet TAKE 1 TABLET EVERY DAY 5/7/19  Yes Brent Lindquist MD   warfarin (COUMADIN) 5 mg tablet Take 2 tablets on Monday and Friday; and one and a half tablets all other days. Patient taking differently: Taking 2 tablets on Monday, Wednesday and Friday; one and a half all other days. 4/24/19  Yes Brent Lindquist MD   furosemide (LASIX) 80 mg tablet TAKE 1 TABLET BY MOUTH EVERY DAY 3/22/19  Yes Brent Lindquist MD   Nebulizer & Compressor machine Use daily as directed. 2/26/19  Yes Brent Lindquist MD   glucose blood VI test strips (ACCU-CHEK SMARTVIEW TEST STRIP) strip Use once daily 1/21/19  Yes Brent Lindquist MD   sildenafil, antihypertensive, (REVATIO) 20 mg tablet TAKE 1 TABLET, ORAL, AS DIRECTED FOR SEXUAL ACTIVITY 1/8/19  Yes Brent Lindquist MD   metFORMIN (GLUCOPHAGE) 500 mg tablet Take 1 Tab by mouth daily (with breakfast). 11/6/18  Yes Brent Lindquist MD   fexofenadine (ALLEGRA) 180 mg tablet Take  by mouth.    Yes Provider, Historical   acetaminophen (TYLENOL ARTHRITIS PAIN) 650 mg TbER Take 650 mg by mouth every eight (8) hours. Yes Provider, Historical   multivitamins-minerals-lutein (MULTIVITAMIN 50 PLUS) tab tablet Take 1 Tab by mouth daily. Yes Provider, Historical   glucosamine 1,000 mg tab Take 2,000 mg by mouth daily. Yes Provider, Historical   cholecalciferol (VITAMIN D3) 1,000 unit cap Take 1,000 Units by mouth daily. Yes Provider, Historical   DOCOSAHEXANOIC ACID/EPA (FISH OIL PO) Take 1,200 mg by mouth two (2) times a day. Yes Provider, Historical        Review of Systems              Constitutional:  He denies fever, weight loss, sweats or fatigue. EYES: No blurred or double vision,               ENT: no nasal congestion, no headache or dizziness. No difficulty with               swallowing, taste, speech or smell. Respiratory:  No cough, wheezing or shortness of breath. No sputum production. Cardiac:  Denies chest pain, palpitations, unexplained indigestion, syncope, edema, PND or orthopnea. GI:  No changes in bowel movements, no abdominal pain, no bloating, anorexia, nausea, vomiting or heartburn. :  No frequency or dysuria. Denies incontinence or sexual dysfunction. Extremities:  No joint pain, stiffness or swelling  Back:.no pain or soreness  Skin:  No recent rashes or mole changes except red painful area left ankle medially with some drainage  Neurological:  No numbness, tingling, burning paresthesias or loss of motor strength. No syncope, dizziness, frequent headaches or memory loss. Hematologic:  No easy bruising  Lymphatic: No lymph node enlargement    Objective:     Vitals:    01/14/20 1441   BP: 130/72   Pulse: 93   Resp: 19   Temp: 98 °F (36.7 °C)   TempSrc: Oral   SpO2: 99%   Weight: 200 lb 12.8 oz (91.1 kg)   Height: 6' 3\" (1.905 m)   PainSc:   0 - No pain       Body mass index is 25.1 kg/m². Physical Examination:              General Appearance:  Well-developed, well-nourished, no acute distress.              HEENT: Ears:  The TMs and ear canals were clear. Eyes:  The pupillary responses were normal.  Extraocular muscle function intact. Lids and conjunctiva not injected. Funduscopic exam revealed sharp disc margins. Nares: Clear w/o edema or erythema  Pharynx:  Clear with teeth in good repair. No masses were noted. Neck:  Supple without thyromegaly or adenopathy. No JVD noted. No carotid                bruits. Lungs:  Clear to auscultation and percussion. Cardiac:  Regular rate and rhythm without murmur. PMI not displaced. No gallop, rub or click. Abdominal: Soft, non-tender, no hepata-spleenomegally or masses  Extremities:  No clubbing, cyanosis or edema. Skin:  No rash or unusual mole changes noted. Left medial ankle with erythematous slightly irregular circular area about 10 to 12 cm with a central area with some superficial serous drainage with no true purulence. Lymph Nodes:  None felt in the cervical, supraclavicular, axillary or inguinal region. Neurological: . DTRs 2+ and symmetric. Station and gait normal.   Hematologic:   No purpura or petechiae        Assessment/Plan:         1. Cellulitis of left lower extremity        Impressions/Plan:  Impression  1. Cellulitis left ankle with secondary drainage culture obtained I will place him on Bactrim DS twice daily for 10 days and he does have a follow-up appointment for his routine evaluation on the 20th so we will see how he is doing at that time. I will see him sooner if there is a problem. Orders Placed This Encounter    CULTURE, BODY FLUID W GRAM STAIN    trimethoprim-sulfamethoxazole (BACTRIM DS, SEPTRA DS) 160-800 mg per tablet       Follow-up and Dispositions    · Return in about 6 days (around 1/20/2020). No results found for any visits on 01/14/20. Dayton Patterson MD    The patient was given after the visit summary the patient verbalized an understanding of the plans and problems as explained.

## 2020-01-17 LAB — BACTERIA FLD CULT: ABNORMAL

## 2020-01-18 NOTE — PROGRESS NOTES
Chief Complaint   Patient presents with    Hypertension     1 month follow up    CHF    Diabetes       SUBJECTIVE:    Andrés Garcia is a 80 y.o. male who returns in follow-up for his hypertension, CHF, polymyalgia rheumatica, paroxysmal atrial fibrillation, CKD and other medical problems as well as recent cellulitis/ ulcer distal R 2nd toe and on 1/14 seen for cellulitis L ankle and bactrim started. He does note that the cellulitis is improved he notes no history of trauma. He is notes no significant pain associated with this. He denies any chest pain, shortness breath, palpitations, PND, orthopnea or cardiorespiratory complaints except for continued swelling in his ankles left greater than right. He denies any GI or  complaints. He notes no headaches, dizziness or neurologic complaints. He has no change of his chronic arthritic complaints. Current Outpatient Medications   Medication Sig Dispense Refill    trimethoprim-sulfamethoxazole (BACTRIM DS, SEPTRA DS) 160-800 mg per tablet Take 1 Tab by mouth two (2) times a day. 20 Tab 0    mupirocin (BACTROBAN) 2 % ointment Apply  to affected area three (3) times daily. 22 g 0    digoxin (LANOXIN) 0.125 mg tablet TAKE 1 TABLET BY MOUTH EVERY DAY 90 Tab 3    diclofenac EC (VOLTAREN) 75 mg EC tablet TAKE 1 TABLET BY MOUTH TWICE A  Tab 3    predniSONE (DELTASONE) 10 mg tablet Taking one tablet daily as directed. 90 Tab 3    terazosin (HYTRIN) 2 mg capsule TAKE ONE CAPSULE BY MOUTH DAILY 5mg 90 Cap 3    simvastatin (ZOCOR) 20 mg tablet TAKE 1 TABLET EVERY DAY 90 Tab 3    lisinopril (PRINIVIL, ZESTRIL) 5 mg tablet TAKE 1 TABLET EVERY DAY 90 Tab 3    warfarin (COUMADIN) 5 mg tablet Take 2 tablets on Monday and Friday; and one and a half tablets all other days. (Patient taking differently: Taking 2 tablets on Monday, Wednesday and Friday; one and a half all other days. ) 145 Tab 3    furosemide (LASIX) 80 mg tablet TAKE 1 TABLET BY MOUTH EVERY DAY 90 Tab 3    Nebulizer & Compressor machine Use daily as directed. 1 Each 0    glucose blood VI test strips (ACCU-CHEK SMARTVIEW TEST STRIP) strip Use once daily 100 Strip prn    sildenafil, antihypertensive, (REVATIO) 20 mg tablet TAKE 1 TABLET, ORAL, AS DIRECTED FOR SEXUAL ACTIVITY 20 Tab 11    metFORMIN (GLUCOPHAGE) 500 mg tablet Take 1 Tab by mouth daily (with breakfast). 90 Tab 3    fexofenadine (ALLEGRA) 180 mg tablet Take  by mouth.  acetaminophen (TYLENOL ARTHRITIS PAIN) 650 mg TbER Take 650 mg by mouth every eight (8) hours.  multivitamins-minerals-lutein (MULTIVITAMIN 50 PLUS) tab tablet Take 1 Tab by mouth daily.  glucosamine 1,000 mg tab Take 2,000 mg by mouth daily.  cholecalciferol (VITAMIN D3) 1,000 unit cap Take 1,000 Units by mouth daily.  DOCOSAHEXANOIC ACID/EPA (FISH OIL PO) Take 1,200 mg by mouth two (2) times a day. 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    Back pain 9/20/2017    BPH (benign prostatic hyperplasia) 9/20/2017    CHF (congestive heart failure) ([address] 9/20/2017    CKD (chronic kidney disease) 9/20/2017    Diabetic acetonemia ([address]     DM (diabetes mellitus) ([address] 9/20/2017    Story: Diet Controlled    ED (erectile dysfunction) 9/20/2017    Edema 9/20/2017    Elevated CPK 9/20/2017    Comments: History of    Elevated LFTs 9/20/2017    Comments: History of    Elevated PSA 9/20/2017    Hypercholesteremia     Hyperlipidemia 9/20/2017    Hypertension     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    Nodule of right lung 9/20/2017    Story: Right    Polymyalgia ([address] 9/20/2017    Prostate enlargement      Past Surgical History:   Procedure Laterality Date    ABDOMEN SURGERY PROC UNLISTED      hernia repair    HX HEENT  06/12/2018    Dr. Ariana Van, surgery to remove cancerous tissue from Left cheek    HX MALIGNANT SKIN LESION EXCISION  09/2018    2 lesions on head     No Known Allergies    REVIEW OF SYSTEMS:  General: negative for - chills or fever, or weight loss or gain  ENT: negative for - headaches, nasal congestion or tinnitus  Eyes: no blurred or visual changes  Neck: No stiffness or swollen nodes  Respiratory: negative for - cough, hemoptysis, shortness of breath or wheezing  Cardiovascular : negative for - chest pain, edema, palpitations or shortness of breath  Gastrointestinal: negative for - abdominal pain, blood in stools, heartburn or nausea/vomiting  Genito-Urinary: no dysuria, trouble voiding, or hematuria  Musculoskeletal: negative for - gait disturbance, joint pain, joint stiffness or joint swelling  Neurological: no TIA or stroke symptoms  Hematologic: no bruises, no bleeding  Lymphatic: no swollen glands  Integument: no lumps, mole changes, nail changes or rash. Sore area tip of right second toe improved and erythema and drainage left ankle improved but not resolved  Endocrine:no malaise/lethargy poly uria or polydipsia or unexpected weight changes        Social History     Socioeconomic History    Marital status:      Spouse name: Not on file    Number of children: Not on file    Years of education: Not on file    Highest education level: Not on file   Tobacco Use    Smoking status: Never Smoker    Smokeless tobacco: Never Used   Substance and Sexual Activity    Alcohol use: No    Drug use: No    Sexual activity: Never     History reviewed. No pertinent family history. OBJECTIVE:     Visit Vitals  /70   Pulse 86   Temp 97.8 °F (36.6 °C) (Oral)   Resp 18   Ht 6' 3\" (1.905 m)   Wt 198 lb (89.8 kg)   SpO2 97%   BMI 24.75 kg/m²     CONSTITUTIONAL:   well nourished, appears age appropriate  EYES: sclera anicteric, PERRL, EOMI  ENMT:nares clear, moist mucous membranes, pharynx clear  NECK: supple.  Thyroid normal, No JVD or bruits  RESPIRATORY: Chest: clear to ascultation and percussion, normal inspiratory effort  CARDIOVASCULAR: Heart: regular rate and rhythm no murmurs, rubs or gallops, PMI not displaced, No thrills  GASTROINTESTINAL: Abdomen: non distended, soft, non tender, bowel sounds normal  HEMATOLOGIC: no purpura, petechiae or bruising  LYMPHATIC: No lymph node enlargemant  MUSCULOSKELETAL: Extremities: no active synovitis, pulse 1+. 2+ edema left lower extremity  INTEGUMENT: No unusual rashes or suspicious skin lesions noted. Nails appear normal.  Tip of the right second toe has an abraded area with mild amount of serous drainage but no evidence of purulence and there is no erythema. Left ankle does have a small area that is superficial draining what appears to be more of a serous type fluid with marked surrounding erythema although this is slightly improved from what it was 6 days ago  PERIPHERAL VASCULAR: normal pulses femoral, PT and DP  NEUROLOGIC: non-focal exam, A & O X 3  PSYCHIATRIC:, appropriate affect     ASSESSMENT:   1. Polymyalgia rheumatica ([facility]    2. Hypertension with renal disease    3. Stage 3 chronic kidney disease ([facility]    4. 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, without long-term current use of insulin ([facility])    5. Paroxysmal atrial fibrillation (HCC)    6. Cellulitis of left lower extremity    7. Cellulitis of toe of right foot    8. Chronic diastolic congestive heart failure (HCC)      Impression  1. Polymyalgia rheumatica we will check the sed rate and decrease prednisone to 5 mg daily as his symptoms seem to have resolved  2. Hypertension that is controlled  3. CKD stage III repeat status pending  4   Paroxysmal atrial fibrillation that is stable  5   Cellulitis right second toe this is limited and superficial so we will continue topical Bactroban  6.   Cellulitis left ankle we will initial another 10 days of Bactrim which would make a total of 20 days treatment I will recheck him in 2 weeks which would be at the end of that treatment. He is instructed in local care  7. CHF some of the edema in his leg could be related to the prednisone and thus decreasing that to 5 mg may help. Recheck in 1 month or sooner if there is a problem. I will call with lab results. PLAN:  .  Orders Placed This Encounter    CBC WITH AUTOMATED DIFF    METABOLIC PANEL, BASIC    SED RATE (ESR)    trimethoprim-sulfamethoxazole (BACTRIM DS, SEPTRA DS) 160-800 mg per tablet         ATTENTION:   This medical record was transcribed using an electronic medical records system. Although proofread, it may and can contain electronic and spelling errors. Other human spelling and other errors may be present. Corrections may be executed at a later time. Please feel free to contact us for any clarifications as needed. Follow-up and Dispositions    · Return in about 2 weeks (around 2/3/2020). No results found for any visits on 01/20/20. Mary Joaquin MD    The patient verbalized understanding of the problems and plans as explained.

## 2020-01-20 ENCOUNTER — OFFICE VISIT (OUTPATIENT)
Dept: INTERNAL MEDICINE CLINIC | Age: 85
End: 2020-01-20

## 2020-01-20 VITALS
HEART RATE: 86 BPM | RESPIRATION RATE: 18 BRPM | SYSTOLIC BLOOD PRESSURE: 138 MMHG | TEMPERATURE: 97.8 F | OXYGEN SATURATION: 97 % | DIASTOLIC BLOOD PRESSURE: 70 MMHG | HEIGHT: 75 IN | BODY MASS INDEX: 24.62 KG/M2 | WEIGHT: 198 LBS

## 2020-01-20 DIAGNOSIS — I12.9 HYPERTENSION WITH RENAL DISEASE: ICD-10-CM

## 2020-01-20 DIAGNOSIS — L03.116 CELLULITIS OF LEFT LOWER EXTREMITY: ICD-10-CM

## 2020-01-20 DIAGNOSIS — N18.30 CONTROLLED TYPE 2 DIABETES MELLITUS WITH STAGE 3 CHRONIC KIDNEY DISEASE, WITHOUT LONG-TERM CURRENT USE OF INSULIN (HCC): ICD-10-CM

## 2020-01-20 DIAGNOSIS — I50.32 CHRONIC DIASTOLIC CONGESTIVE HEART FAILURE (HCC): ICD-10-CM

## 2020-01-20 DIAGNOSIS — L03.031 CELLULITIS OF TOE OF RIGHT FOOT: ICD-10-CM

## 2020-01-20 DIAGNOSIS — N18.30 STAGE 3 CHRONIC KIDNEY DISEASE (HCC): ICD-10-CM

## 2020-01-20 DIAGNOSIS — E11.22 CONTROLLED TYPE 2 DIABETES MELLITUS WITH STAGE 3 CHRONIC KIDNEY DISEASE, WITHOUT LONG-TERM CURRENT USE OF INSULIN (HCC): ICD-10-CM

## 2020-01-20 DIAGNOSIS — M35.3 POLYMYALGIA RHEUMATICA (HCC): Primary | ICD-10-CM

## 2020-01-20 DIAGNOSIS — I48.0 PAROXYSMAL ATRIAL FIBRILLATION (HCC): ICD-10-CM

## 2020-01-20 RX ORDER — PREDNISONE 10 MG/1
TABLET ORAL
Qty: 90 TAB | Refills: 3 | Status: SHIPPED | OUTPATIENT
Start: 2020-01-20 | End: 2020-03-09

## 2020-01-20 RX ORDER — SULFAMETHOXAZOLE AND TRIMETHOPRIM 800; 160 MG/1; MG/1
1 TABLET ORAL 2 TIMES DAILY
Qty: 20 TAB | Refills: 0 | Status: SHIPPED | OUTPATIENT
Start: 2020-01-20 | End: 2020-02-17 | Stop reason: ALTCHOICE

## 2020-01-20 NOTE — PROGRESS NOTES
Chief Complaint   Patient presents with    Hypertension     1 month follow up    CHF    Diabetes     Visit Vitals  /68 (BP 1 Location: Left arm, BP Patient Position: Sitting)   Pulse 86   Temp 97.8 °F (36.6 °C) (Oral)   Resp 18   Ht 6' 3\" (1.905 m)   Wt 198 lb (89.8 kg)   SpO2 97%   BMI 24.75 kg/m²     1. Have you been to the ER, urgent care clinic since your last visit? Hospitalized since your last visit? No    2. Have you seen or consulted any other health care providers outside of the [address] [place] [address] since your last visit? Include any pap smears or colon screening.  No

## 2020-01-20 NOTE — PATIENT INSTRUCTIONS

## 2020-01-21 LAB
BASOPHILS # BLD AUTO: 0 X10E3/UL (ref 0–0.2)
BASOPHILS NFR BLD AUTO: 0 %
BUN SERPL-MCNC: 36 MG/DL (ref 8–27)
BUN/CREAT SERPL: 23 (ref 10–24)
CALCIUM SERPL-MCNC: 9.7 MG/DL (ref 8.6–10.2)
CHLORIDE SERPL-SCNC: 98 MMOL/L (ref 96–106)
CO2 SERPL-SCNC: 25 MMOL/L (ref 20–29)
CREAT SERPL-MCNC: 1.56 MG/DL (ref 0.76–1.27)
EOSINOPHIL # BLD AUTO: 0 X10E3/UL (ref 0–0.4)
EOSINOPHIL NFR BLD AUTO: 0 %
ERYTHROCYTE [DISTWIDTH] IN BLOOD BY AUTOMATED COUNT: 11.8 % (ref 11.6–15.4)
ERYTHROCYTE [SEDIMENTATION RATE] IN BLOOD BY WESTERGREN METHOD: 27 MM/HR (ref 0–30)
GLUCOSE SERPL-MCNC: 196 MG/DL (ref 65–99)
HCT VFR BLD AUTO: 36.3 % (ref 37.5–51)
HGB BLD-MCNC: 12 G/DL (ref 13–17.7)
IMM GRANULOCYTES # BLD AUTO: 0.1 X10E3/UL (ref 0–0.1)
IMM GRANULOCYTES NFR BLD AUTO: 1 %
LYMPHOCYTES # BLD AUTO: 0.9 X10E3/UL (ref 0.7–3.1)
LYMPHOCYTES NFR BLD AUTO: 9 %
MCH RBC QN AUTO: 30.8 PG (ref 26.6–33)
MCHC RBC AUTO-ENTMCNC: 33.1 G/DL (ref 31.5–35.7)
MCV RBC AUTO: 93 FL (ref 79–97)
MONOCYTES # BLD AUTO: 0.5 X10E3/UL (ref 0.1–0.9)
MONOCYTES NFR BLD AUTO: 4 %
NEUTROPHILS # BLD AUTO: 8.9 X10E3/UL (ref 1.4–7)
NEUTROPHILS NFR BLD AUTO: 86 %
PLATELET # BLD AUTO: 295 X10E3/UL (ref 150–450)
POTASSIUM SERPL-SCNC: 5.6 MMOL/L (ref 3.5–5.2)
RBC # BLD AUTO: 3.9 X10E6/UL (ref 4.14–5.8)
SODIUM SERPL-SCNC: 140 MMOL/L (ref 134–144)
WBC # BLD AUTO: 10.4 X10E3/UL (ref 3.4–10.8)

## 2020-01-21 NOTE — PROGRESS NOTES
Sed rate is better and all the labs are stable so decrease prednisone to 5 mg daily as discussed at visit. Discuss with patient.

## 2020-01-21 NOTE — PROGRESS NOTES
Sed rate is better and all the labs are stable so decrease prednisone to 5 mg daily as discussed at visit.

## 2020-01-24 NOTE — TELEPHONE ENCOUNTER
RX refill request from the patient/pharmacy. Patient last seen 01- with labs, and next appt. scheduled for 02-  Requested Prescriptions     Pending Prescriptions Disp Refills    glucose blood VI test strips (ACCU-CHEK SMARTVIEW TEST STRIP) strip [[facility] STRIP] 100 Strip PRN     Sig: USE ONCE DAILY E11.9   .

## 2020-01-31 NOTE — PROGRESS NOTES
Chief Complaint   Patient presents with    Wound Infection     ankle (2 week fu)       SUBJECTIVE:    Erin Chauhan is a 80 y.o. male who returns in follow-up for his left ankle cellulitis, CHF, diabetes, CKD stage III and other medical problems. He has been on antibiotics now for treatment of his left ankle cellulitis with open wound that is superficial now for total of 4 weeks of Bactrim which according to sensitivities of the staph aureus is appropriately sensitive to Bactrim however he has not responded to this therapy. He still notes the area is open and draining. He notes some swelling in ankle but no more than usual.  He denies any fevers or chills. He denies any chest pain, shortness of breath, palpitations, PND, orthopnea or other cardiorespiratory complaints. There are no other complaints noted. Current Outpatient Medications   Medication Sig Dispense Refill    amoxicillin-clavulanate (AUGMENTIN) 875-125 mg per tablet Take 1 Tab by mouth every twelve (12) hours. 28 Tab 0    glucose blood VI test strips (ACCU-CHEK SMARTVIEW TEST STRIP) strip USE ONCE DAILY E11.9 100 Strip PRN    trimethoprim-sulfamethoxazole (BACTRIM DS, SEPTRA DS) 160-800 mg per tablet Take 1 Tab by mouth two (2) times a day. 20 Tab 0    predniSONE (DELTASONE) 10 mg tablet Taking one half tablet daily as directed. 90 Tab 3    mupirocin (BACTROBAN) 2 % ointment Apply  to affected area three (3) times daily. 22 g 0    digoxin (LANOXIN) 0.125 mg tablet TAKE 1 TABLET BY MOUTH EVERY DAY 90 Tab 3    diclofenac EC (VOLTAREN) 75 mg EC tablet TAKE 1 TABLET BY MOUTH TWICE A  Tab 3    terazosin (HYTRIN) 2 mg capsule TAKE ONE CAPSULE BY MOUTH DAILY 5mg 90 Cap 3    simvastatin (ZOCOR) 20 mg tablet TAKE 1 TABLET EVERY DAY 90 Tab 3    lisinopril (PRINIVIL, ZESTRIL) 5 mg tablet TAKE 1 TABLET EVERY DAY 90 Tab 3    warfarin (COUMADIN) 5 mg tablet Take 2 tablets on Monday and Friday; and one and a half tablets all other days. (Patient taking differently: Taking 2 tablets on Monday, Wednesday and Friday; one and a half all other days. ) 145 Tab 3    furosemide (LASIX) 80 mg tablet TAKE 1 TABLET BY MOUTH EVERY DAY 90 Tab 3    Nebulizer & Compressor machine Use daily as directed. 1 Each 0    sildenafil, antihypertensive, (REVATIO) 20 mg tablet TAKE 1 TABLET, ORAL, AS DIRECTED FOR SEXUAL ACTIVITY 20 Tab 11    metFORMIN (GLUCOPHAGE) 500 mg tablet Take 1 Tab by mouth daily (with breakfast). 90 Tab 3    fexofenadine (ALLEGRA) 180 mg tablet Take  by mouth.  acetaminophen (TYLENOL ARTHRITIS PAIN) 650 mg TbER Take 650 mg by mouth every eight (8) hours.  multivitamins-minerals-lutein (MULTIVITAMIN 50 PLUS) tab tablet Take 1 Tab by mouth daily.  glucosamine 1,000 mg tab Take 2,000 mg by mouth daily.  cholecalciferol (VITAMIN D3) 1,000 unit cap Take 1,000 Units by mouth daily.  DOCOSAHEXANOIC ACID/EPA (FISH OIL PO) Take 1,200 mg by mouth two (2) times a day. 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    Back pain 9/20/2017    BPH (benign prostatic hyperplasia) 9/20/2017    CHF (congestive heart failure) ([address] 9/20/2017    CKD (chronic kidney disease) 9/20/2017    Diabetic acetonemia ([address]     DM (diabetes mellitus) ([facility] 9/20/2017    Story: Diet Controlled    ED (erectile dysfunction) 9/20/2017    Edema 9/20/2017    Elevated CPK 9/20/2017    Comments: History of    Elevated LFTs 9/20/2017    Comments: History of    Elevated PSA 9/20/2017    Hypercholesteremia     Hyperlipidemia 9/20/2017    Hypertension     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    Nodule of right lung 9/20/2017    Story: Right    Polymyalgia ([address] 9/20/2017    Prostate enlargement      Past Surgical History:   Procedure Laterality Date    ABDOMEN SURGERY PROC UNLISTED      hernia repair    HX HEENT  06/12/2018    Dr. 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    HX MALIGNANT SKIN LESION EXCISION  09/2018    2 lesions on head     No Known Allergies    REVIEW OF SYSTEMS:  General: negative for - chills or fever, or weight loss or gain  ENT: negative for - headaches, nasal congestion or tinnitus  Eyes: no blurred or visual changes  Neck: No stiffness or swollen nodes  Respiratory: negative for - cough, hemoptysis, shortness of breath or wheezing  Cardiovascular : negative for - chest pain, edema, palpitations or shortness of breath  Gastrointestinal: negative for - abdominal pain, blood in stools, heartburn or nausea/vomiting  Genito-Urinary: no dysuria, trouble voiding, or hematuria  Musculoskeletal: negative for - gait disturbance, joint pain, joint stiffness or joint swelling  Neurological: no TIA or stroke symptoms  Hematologic: no bruises, no bleeding  Lymphatic: no swollen glands  Integument: no lumps, mole changes, nail changes or rash except medial left ankle area of erythema with a central area of superficial excoriation with drainage  Endocrine:no malaise/lethargy poly uria or polydipsia or unexpected weight changes        Social History     Socioeconomic History    Marital status:      Spouse name: Not on file    Number of children: Not on file    Years of education: Not on file    Highest education level: Not on file   Tobacco Use    Smoking status: Never Smoker    Smokeless tobacco: Never Used   Substance and Sexual Activity    Alcohol use: No    Drug use: No    Sexual activity: Never     History reviewed. No pertinent family history.     OBJECTIVE:     Visit Vitals  /80 (BP 1 Location: Left arm, BP Patient Position: Sitting)   Pulse 78   Temp 97.8 °F (36.6 °C) (Oral)   Resp 16   Ht 6' 3\" (1.905 m)   Wt 198 lb 9.6 oz (90.1 kg)   SpO2 99%   BMI 24.82 kg/m²     CONSTITUTIONAL:   well nourished, appears age appropriate  EYES: sclera anicteric, PERRL, EOMI  ENMT:nares clear, moist mucous membranes, pharynx clear  NECK: supple. Thyroid normal, No JVD or bruits  RESPIRATORY: Chest: clear to ascultation and percussion, normal inspiratory effort  CARDIOVASCULAR: Heart: regular rate and rhythm no murmurs, rubs or gallops, PMI not displaced, No thrills  GASTROINTESTINAL: Abdomen: non distended, soft, non tender, bowel sounds normal  HEMATOLOGIC: no purpura, petechiae or bruising  LYMPHATIC: No lymph node enlargemant  MUSCULOSKELETAL: Extremities: no edema or active synovitis, pulse 1+   INTEGUMENT: No unusual rashes or suspicious skin lesions noted. Nails appear normal.  Left ankle has a 2 cm superficial excoriated area with some mild drainage which previous culture revealed he has methicillin sensitive staph aureus as well as surrounding erythema noted. PERIPHERAL VASCULAR: normal pulses femoral, PT and DP  NEUROLOGIC: non-focal exam, A & O X 3  PSYCHIATRIC:, appropriate affect     ASSESSMENT:   1. Cellulitis of left lower extremity    2. Chronic diastolic congestive heart failure ([address]    3. Polymyalgia rheumatica ([address]    4. Hypertension with renal disease    5. Paroxysmal atrial fibrillation (HCC)      Impression  1. Cellulitis left lower extremity has not responded to 4 weeks of Bactrim and compared to sensitivities should have so we will try switching to Augmentin which is appropriate since to see if that was effective. I will reassess him in 2 weeks  2. Diastolic CHF seems compensated  3. Polymyalgia rheumatica stable currently on prednisone 5 mg daily  4. Hypertension that is controlled  5. Atrial fibrillation stable  At this point we will switch to Augmentin 875 twice daily for 2 weeks and reassess at that time. I will see him sooner should it be a problem. The area is cleansed and sterilely dressed today.     PLAN:  .  Orders Placed This Encounter    amoxicillin-clavulanate (AUGMENTIN) 875-125 mg per tablet         ATTENTION:   This medical record was transcribed using an electronic medical records system. Although proofread, it may and can contain electronic and spelling errors. Other human spelling and other errors may be present. Corrections may be executed at a later time. Please feel free to contact us for any clarifications as needed. Follow-up and Dispositions    · Return in about 2 weeks (around 2/17/2020). No results found for any visits on 02/03/20. Etta Gifford MD    The patient verbalized understanding of the problems and plans as explained.

## 2020-02-03 ENCOUNTER — OFFICE VISIT (OUTPATIENT)
Dept: INTERNAL MEDICINE CLINIC | Age: 85
End: 2020-02-03

## 2020-02-03 VITALS
WEIGHT: 198.6 LBS | TEMPERATURE: 97.8 F | HEART RATE: 78 BPM | RESPIRATION RATE: 16 BRPM | DIASTOLIC BLOOD PRESSURE: 80 MMHG | SYSTOLIC BLOOD PRESSURE: 118 MMHG | HEIGHT: 75 IN | OXYGEN SATURATION: 99 % | BODY MASS INDEX: 24.69 KG/M2

## 2020-02-03 DIAGNOSIS — I48.0 PAROXYSMAL ATRIAL FIBRILLATION (HCC): ICD-10-CM

## 2020-02-03 DIAGNOSIS — I50.32 CHRONIC DIASTOLIC CONGESTIVE HEART FAILURE (HCC): ICD-10-CM

## 2020-02-03 DIAGNOSIS — M35.3 POLYMYALGIA RHEUMATICA (HCC): ICD-10-CM

## 2020-02-03 DIAGNOSIS — L03.116 CELLULITIS OF LEFT LOWER EXTREMITY: Primary | ICD-10-CM

## 2020-02-03 DIAGNOSIS — I12.9 HYPERTENSION WITH RENAL DISEASE: ICD-10-CM

## 2020-02-03 RX ORDER — AMOXICILLIN AND CLAVULANATE POTASSIUM 875; 125 MG/1; MG/1
1 TABLET, FILM COATED ORAL EVERY 12 HOURS
Qty: 28 TAB | Refills: 0 | Status: SHIPPED | OUTPATIENT
Start: 2020-02-03 | End: 2020-02-17 | Stop reason: SDUPTHER

## 2020-02-03 NOTE — PATIENT INSTRUCTIONS
Arthritis: Care Instructions Your Care Instructions Arthritis, also called osteoarthritis, is a breakdown of the cartilage that cushions your joints. When the cartilage wears down, your bones rub against each other. This causes pain and stiffness. Many people have some arthritis as they age. Arthritis most often affects the joints of the spine, hands, hips, knees, or feet. You can take simple measures to protect your joints, ease your pain, and help you stay active. Follow-up care is a key part of your treatment and safety. Be sure to make and go to all appointments, and call your doctor if you are having problems. It's also a good idea to know your test results and keep a list of the medicines you take. How can you care for yourself at home? · Stay at a healthy weight. Being overweight puts extra strain on your joints. · Talk to your doctor or physical therapist about exercises that will help ease joint pain. ? Stretch. You may enjoy gentle forms of yoga to help keep your joints and muscles flexible. ? Walk instead of jog. Other types of exercise that are less stressful on the joints include riding a bicycle, swimming, ashley chi, or water exercise. ? Lift weights. Strong muscles help reduce stress on your joints. Stronger thigh muscles, for example, take some of the stress off of the knees and hips. Learn the right way to lift weights so you do not make joint pain worse. · Take your medicines exactly as prescribed. Call your doctor if you think you are having a problem with your medicine. · Take pain medicines exactly as directed. ? If the doctor gave you a prescription medicine for pain, take it as prescribed. ? If you are not taking a prescription pain medicine, ask your doctor if you can take an over-the-counter medicine. · Use a cane, crutch, walker, or another device if you need help to get around. These can help rest your joints.  You also can use other things to make life easier, such as a higher toilet seat and padded handles on kitchen utensils. · Do not sit in low chairs, which can make it hard to get up. · Put heat or cold on your sore joints as needed. Use whichever helps you most. You also can take turns with hot and cold packs. ? Apply heat 2 or 3 times a day for 20 to 30 minutesusing a heating pad, hot shower, or hot packto relieve pain and stiffness. ? Put ice or a cold pack on your sore joint for 10 to 20 minutes at a time. Put a thin cloth between the ice and your skin. When should you call for help? Call your doctor now or seek immediate medical care if: 
  · You have sudden swelling, warmth, or pain in any joint.  
  · You have joint pain and a fever or rash.  
  · You have such bad pain that you cannot use a joint.  
 Watch closely for changes in your health, and be sure to contact your doctor if: 
  · You have mild joint symptoms that continue even with more than 6 weeks of care at home.  
  · You have stomach pain or other problems with your medicine. Where can you learn more? Go to http://isra-rhonda.info/. Enter D700 in the search box to learn more about \"Arthritis: Care Instructions. \" Current as of: April 1, 2019 Content Version: 12.2 © 7718-0425 [employer]. Care instructions adapted under license by [employer] (which disclaims liability or warranty for this information). If you have questions about a medical condition or this instruction, always ask your healthcare professional. [place] [address] any warranty or liability for your use of this information.

## 2020-02-03 NOTE — PROGRESS NOTES
Lilibeth Palacios is a 80 y.o. male presenting for Wound Infection (ankle (2 week fu))  . 1. Have you been to the ER, urgent care clinic since your last visit? Hospitalized since your last visit? No    2. Have you seen or consulted any other health care providers outside of the [address] [place] [address] since your last visit? Include any pap smears or colon screening. No    Fall Risk Assessment, last 12 mths 1/20/2020   Able to walk? Yes   Fall in past 12 months? No   Fall with injury? -   Number of falls in past 12 months -   Fall Risk Score -         Abuse Screening Questionnaire 11/25/2019   Do you ever feel afraid of your partner? N   Are you in a relationship with someone who physically or mentally threatens you? N   Is it safe for you to go home? Y       3 most recent PHQ Screens 1/20/2020   Little interest or pleasure in doing things Not at all   Feeling down, depressed, irritable, or hopeless Not at all   Total Score PHQ 2 0       There are no discontinued medications.

## 2020-02-06 DIAGNOSIS — I48.91 ATRIAL FIBRILLATION, UNSPECIFIED TYPE (HCC): ICD-10-CM

## 2020-02-06 RX ORDER — WARFARIN SODIUM 5 MG/1
TABLET ORAL
Qty: 145 TAB | Refills: 3 | Status: SHIPPED | OUTPATIENT
Start: 2020-02-06 | End: 2020-04-17

## 2020-02-15 NOTE — PROGRESS NOTES
Chief Complaint   Patient presents with    Skin Infection     2 week f/up    Myalgia     PMR    Hypertension    Diabetes    Chronic Kidney Disease    Irregular Heart Beat       SUBJECTIVE:    Misbah Bradford is a 80 y.o. male returns in follow-up regarding the cellulitis of his left ankle as well as follow-up of his hypertension, diabetes, hyperlipidemia, atrial fibrillation, CKD, polymyalgia rheumatica and other medical problems. He recently has been evaluated and treated for cellulitis of the left ankle initially having 4 weeks of Bactrim which did not seem to cleared up and then 2 weeks ago when he was seen he was placed on Augmentin he feels like is improved on that. He still soaking with Epsom salts on a regular basis and using a drawing salve on it. He denies any significant associated pain. He denies any chest pain, shortness of breath, palpitations, PND, orthopnea or cardiorespiratory complaints. He has no GI or  complaints. He has no headaches, dizziness or neurologic complaints. He has no other complaints on complete review of systems except his chronic arthritic complaints which are unchanged. Current Outpatient Medications   Medication Sig Dispense Refill    amoxicillin-clavulanate (AUGMENTIN) 875-125 mg per tablet Take 1 Tab by mouth every twelve (12) hours. 28 Tab 0    warfarin (COUMADIN) 5 mg tablet Taking 2 tablets on Monday, Wednesday and Friday; one and a half all other days. 145 Tab 3    glucose blood VI test strips (ACCU-CHEK SMARTVIEW TEST STRIP) strip USE ONCE DAILY E11.9 100 Strip PRN    predniSONE (DELTASONE) 10 mg tablet Taking one half tablet daily as directed. 90 Tab 3    mupirocin (BACTROBAN) 2 % ointment Apply  to affected area three (3) times daily.  22 g 0    digoxin (LANOXIN) 0.125 mg tablet TAKE 1 TABLET BY MOUTH EVERY DAY 90 Tab 3    diclofenac EC (VOLTAREN) 75 mg EC tablet TAKE 1 TABLET BY MOUTH TWICE A  Tab 3    terazosin (HYTRIN) 2 mg capsule TAKE ONE CAPSULE BY MOUTH DAILY 5mg 90 Cap 3    simvastatin (ZOCOR) 20 mg tablet TAKE 1 TABLET EVERY DAY 90 Tab 3    lisinopril (PRINIVIL, ZESTRIL) 5 mg tablet TAKE 1 TABLET EVERY DAY 90 Tab 3    furosemide (LASIX) 80 mg tablet TAKE 1 TABLET BY MOUTH EVERY DAY 90 Tab 3    Nebulizer & Compressor machine Use daily as directed. 1 Each 0    sildenafil, antihypertensive, (REVATIO) 20 mg tablet TAKE 1 TABLET, ORAL, AS DIRECTED FOR SEXUAL ACTIVITY 20 Tab 11    metFORMIN (GLUCOPHAGE) 500 mg tablet Take 1 Tab by mouth daily (with breakfast). 90 Tab 3    fexofenadine (ALLEGRA) 180 mg tablet Take  by mouth.  acetaminophen (TYLENOL ARTHRITIS PAIN) 650 mg TbER Take 650 mg by mouth every eight (8) hours.  multivitamins-minerals-lutein (MULTIVITAMIN 50 PLUS) tab tablet Take 1 Tab by mouth daily.  glucosamine 1,000 mg tab Take 2,000 mg by mouth daily.  cholecalciferol (VITAMIN D3) 1,000 unit cap Take 1,000 Units by mouth daily.  DOCOSAHEXANOIC ACID/EPA (FISH OIL PO) Take 1,200 mg by mouth two (2) times a day. 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    Back pain 9/20/2017    BPH (benign prostatic hyperplasia) 9/20/2017    CHF (congestive heart failure) ([address] 9/20/2017    CKD (chronic kidney disease) 9/20/2017    Diabetic acetonemia [facility]     DM (diabetes mellitus) ([facility] 9/20/2017    Story: Diet Controlled    ED (erectile dysfunction) 9/20/2017    Edema 9/20/2017    Elevated CPK 9/20/2017    Comments: History of    Elevated LFTs 9/20/2017    Comments: History of    Elevated PSA 9/20/2017    Hypercholesteremia     Hyperlipidemia 9/20/2017    Hypertension     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    Nodule of right lung 9/20/2017    Story: Right    Polymyalgia ([address] 9/20/2017    Prostate enlargement      Past Surgical History:   Procedure Laterality Date    ABDOMEN SURGERY PROC UNLISTED      hernia repair    HX HEENT  06/12/2018    Dr. 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    HX MALIGNANT SKIN LESION EXCISION  09/2018    2 lesions on head     No Known Allergies    REVIEW OF SYSTEMS:  General: negative for - chills or fever, or weight loss or gain  ENT: negative for - headaches, nasal congestion or tinnitus  Eyes: no blurred or visual changes  Neck: No stiffness or swollen nodes  Respiratory: negative for - cough, hemoptysis, shortness of breath or wheezing  Cardiovascular : negative for - chest pain, edema, palpitations or shortness of breath  Gastrointestinal: negative for - abdominal pain, blood in stools, heartburn or nausea/vomiting  Genito-Urinary: no dysuria, trouble voiding, or hematuria  Musculoskeletal: negative for - gait disturbance, joint pain, joint stiffness or joint swelling  Neurological: no TIA or stroke symptoms  Hematologic: no bruises, no bleeding  Lymphatic: no swollen glands  Integument: no lumps, mole changes, nail changes or rash except infection left ankle which is improving  Endocrine:no malaise/lethargy poly uria or polydipsia or unexpected weight changes        Social History     Socioeconomic History    Marital status:      Spouse name: Not on file    Number of children: Not on file    Years of education: Not on file    Highest education level: Not on file   Tobacco Use    Smoking status: Never Smoker    Smokeless tobacco: Never Used   Substance and Sexual Activity    Alcohol use: No    Drug use: No    Sexual activity: Never     History reviewed. No pertinent family history.     OBJECTIVE:     Visit Vitals  /76 (BP 1 Location: Right arm, BP Patient Position: Sitting)   Pulse 84   Temp 97.9 °F (36.6 °C)   Resp 16   Wt 203 lb (92.1 kg)   SpO2 97%   BMI 25.37 kg/m²     CONSTITUTIONAL:   well nourished, appears age appropriate  EYES: sclera anicteric, PERRL, EOMI  ENMT:yosvany clear, moist mucous membranes, pharynx clear  NECK: supple. Thyroid normal, No JVD or bruits  RESPIRATORY: Chest: clear to ascultation and percussion, normal inspiratory effort  CARDIOVASCULAR: Heart: regular rate and rhythm no murmurs, rubs or gallops, PMI not displaced, No thrills  GASTROINTESTINAL: Abdomen: non distended, soft, non tender, bowel sounds normal  HEMATOLOGIC: no purpura, petechiae or bruising  LYMPHATIC: No lymph node enlargemant  MUSCULOSKELETAL: Extremities: no edema or active synovitis, pulse 1+   INTEGUMENT: No unusual rashes or suspicious skin lesions noted. Nails appear normal.  Grayish drainage from area on the left medial ankle  PERIPHERAL VASCULAR: normal pulses femoral, PT and DP  NEUROLOGIC: non-focal exam, A & O X 3  PSYCHIATRIC:, appropriate affect     ASSESSMENT:   1. Cellulitis of left lower extremity    2. Chronic diastolic congestive heart failure ([address]    3. Polymyalgia rheumatica ([address]    4. Hypertension with renal disease    5. Paroxysmal atrial fibrillation (HCC)    6. Mixed hyperlipidemia    7. 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 Cellulitis left lower extremity seems to be responding to current treatment so I renewed Augmentin for another 2 weeks and he will continue local care  2. Diastolic CHF compensated  3. Polymyalgia rheumatica sed rate is pending  4. Hypertension is controlled  5. Paroxysmal atrial fibrillation INR pending  6. Hyperlipidemia that status is pending  7. Diabetes mellitus A1c is pending  I will call with lab results and make further recommendations or adjustments if necessary. Follow-up in 2 weeks regarding the cellulitis and 1 month regarding his other medical problems as well as 3 months regarding his multiple medical problems.     PLAN:  .  Orders Placed This Encounter    CBC WITH AUTOMATED DIFF    SED RATE (ESR) ([facility])    METABOLIC PANEL, BASIC ([facility])    PROTHROMBIN TIME + INR    LIPID PANEL ([facility])    HEMOGLOBIN A1C W/O EAG ([facility])    amoxicillin-clavulanate (AUGMENTIN) 875-125 mg per tablet         ATTENTION:   This medical record was transcribed using an electronic medical records system. Although proofread, it may and can contain electronic and spelling errors. Other human spelling and other errors may be present. Corrections may be executed at a later time. Please feel free to contact us for any clarifications as needed. Follow-up and Dispositions    · Return in about 2 weeks (around 3/2/2020). No results found for any visits on 02/17/20. Ricky Espinoza MD    The patient verbalized understanding of the problems and plans as explained.

## 2020-02-17 ENCOUNTER — OFFICE VISIT (OUTPATIENT)
Dept: INTERNAL MEDICINE CLINIC | Age: 85
End: 2020-02-17

## 2020-02-17 VITALS
WEIGHT: 203 LBS | BODY MASS INDEX: 25.37 KG/M2 | HEART RATE: 84 BPM | RESPIRATION RATE: 16 BRPM | SYSTOLIC BLOOD PRESSURE: 132 MMHG | DIASTOLIC BLOOD PRESSURE: 76 MMHG | TEMPERATURE: 97.9 F | OXYGEN SATURATION: 97 %

## 2020-02-17 DIAGNOSIS — E11.22 CONTROLLED TYPE 2 DIABETES MELLITUS WITH STAGE 3 CHRONIC KIDNEY DISEASE, WITHOUT LONG-TERM CURRENT USE OF INSULIN (HCC): ICD-10-CM

## 2020-02-17 DIAGNOSIS — E78.2 MIXED HYPERLIPIDEMIA: ICD-10-CM

## 2020-02-17 DIAGNOSIS — I48.0 PAROXYSMAL ATRIAL FIBRILLATION (HCC): ICD-10-CM

## 2020-02-17 DIAGNOSIS — I12.9 HYPERTENSION WITH RENAL DISEASE: ICD-10-CM

## 2020-02-17 DIAGNOSIS — N18.30 CONTROLLED TYPE 2 DIABETES MELLITUS WITH STAGE 3 CHRONIC KIDNEY DISEASE, WITHOUT LONG-TERM CURRENT USE OF INSULIN (HCC): ICD-10-CM

## 2020-02-17 DIAGNOSIS — I50.32 CHRONIC DIASTOLIC CONGESTIVE HEART FAILURE (HCC): ICD-10-CM

## 2020-02-17 DIAGNOSIS — L03.116 CELLULITIS OF LEFT LOWER EXTREMITY: Primary | ICD-10-CM

## 2020-02-17 DIAGNOSIS — M35.3 POLYMYALGIA RHEUMATICA (HCC): ICD-10-CM

## 2020-02-17 LAB
ANION GAP SERPL CALC-SCNC: 13 MMOL/L
BUN SERPL-MCNC: 34 MG/DL (ref 9–20)
CALCIUM SERPL-MCNC: 9.5 MG/DL (ref 8.4–10.2)
CHLORIDE SERPL-SCNC: 102 MMOL/L (ref 98–107)
CHOL/HDL RATIO,CHHD: 4 RATIO (ref 0–4)
CHOLEST SERPL-MCNC: 183 MG/DL (ref 0–200)
CO2 SERPL-SCNC: 29 MMOL/L (ref 22–32)
CREAT SERPL-MCNC: 1.4 MG/DL (ref 0.8–1.5)
GLUCOSE SERPL-MCNC: 125 MG/DL (ref 75–110)
HDLC SERPL-MCNC: 42 MG/DL (ref 35–130)
LDL/HDL RATIO,LDHD: 2 RATIO
LDLC SERPL CALC-MCNC: 74 MG/DL (ref 0–130)
POTASSIUM SERPL-SCNC: 5 MMOL/L (ref 3.6–5)
SED RATE (ESR): 52 MM/HR (ref 0–10)
SODIUM SERPL-SCNC: 144 MMOL/L (ref 137–145)
TRIGL SERPL-MCNC: 337 MG/DL (ref 0–200)
VLDLC SERPL CALC-MCNC: 67 MG/DL

## 2020-02-17 RX ORDER — AMOXICILLIN AND CLAVULANATE POTASSIUM 875; 125 MG/1; MG/1
1 TABLET, FILM COATED ORAL EVERY 12 HOURS
Qty: 28 TAB | Refills: 0 | Status: SHIPPED | OUTPATIENT
Start: 2020-02-17 | End: 2020-03-03 | Stop reason: ALTCHOICE

## 2020-02-17 NOTE — PROGRESS NOTES
Chief Complaint   Patient presents with    Skin Infection     2 week f/up    Myalgia     PMR    Hypertension    Diabetes    Chronic Kidney Disease    Irregular Heart Beat     1. Have you been to the ER, urgent care clinic since your last visit? Hospitalized since your last visit? No    2. Have you seen or consulted any other health care providers outside of the [address] [place] [address] since your last visit? Include any pap smears or colon screening.  No

## 2020-02-17 NOTE — PATIENT INSTRUCTIONS

## 2020-02-18 LAB
BASOPHILS # BLD AUTO: 0 X10E3/UL (ref 0–0.2)
BASOPHILS NFR BLD AUTO: 1 %
EOSINOPHIL # BLD AUTO: 0 X10E3/UL (ref 0–0.4)
EOSINOPHIL NFR BLD AUTO: 0 %
ERYTHROCYTE [DISTWIDTH] IN BLOOD BY AUTOMATED COUNT: 12.7 % (ref 11.6–15.4)
EST. AVERAGE GLUCOSE BLD GHB EST-MCNC: 140 MG/DL
HBA1C MFR BLD: 6.5 % (ref 4.8–5.6)
HCT VFR BLD AUTO: 35 % (ref 37.5–51)
HGB BLD-MCNC: 11.5 G/DL (ref 13–17.7)
IMM GRANULOCYTES # BLD AUTO: 0 X10E3/UL (ref 0–0.1)
IMM GRANULOCYTES NFR BLD AUTO: 0 %
INR PPP: 1.4 (ref 0.8–1.2)
LYMPHOCYTES # BLD AUTO: 1.1 X10E3/UL (ref 0.7–3.1)
LYMPHOCYTES NFR BLD AUTO: 14 %
MCH RBC QN AUTO: 30.8 PG (ref 26.6–33)
MCHC RBC AUTO-ENTMCNC: 32.9 G/DL (ref 31.5–35.7)
MCV RBC AUTO: 94 FL (ref 79–97)
MONOCYTES # BLD AUTO: 0.5 X10E3/UL (ref 0.1–0.9)
MONOCYTES NFR BLD AUTO: 6 %
NEUTROPHILS # BLD AUTO: 6.5 X10E3/UL (ref 1.4–7)
NEUTROPHILS NFR BLD AUTO: 79 %
PLATELET # BLD AUTO: 228 X10E3/UL (ref 150–450)
PROTHROMBIN TIME: 14.3 SEC (ref 9.1–12)
RBC # BLD AUTO: 3.73 X10E6/UL (ref 4.14–5.8)
WBC # BLD AUTO: 8.2 X10E3/UL (ref 3.4–10.8)

## 2020-02-18 NOTE — PROGRESS NOTES
Labs are okay so we will not make any change in treatment. Sed rate is a little higher but that could be related to the infection in his ankle.

## 2020-02-19 ENCOUNTER — TELEPHONE ANTICOAG (OUTPATIENT)
Dept: INTERNAL MEDICINE CLINIC | Age: 85
End: 2020-02-19

## 2020-02-19 DIAGNOSIS — I48.0 PAROXYSMAL ATRIAL FIBRILLATION (HCC): Primary | ICD-10-CM

## 2020-02-19 NOTE — PROGRESS NOTES
Labs are okay so we will not make any change in treatment. Sed rate is a little higher but that could be related to the infection in his ankle. Lab discussed with patient. Advised to continue the same dose of blood thinner which is 10 mg on Monday, Wednesday and Friday; 7.5 mg all other days. Current dose of prednisone is 5 mg daily.

## 2020-02-19 NOTE — PROGRESS NOTES
Anticoagulation Summary  As of 2020    INR goal:   2.0-3.0   TTR:   2.1 % (1.9 y)   INR used for dosin.4! (2020)   Warfarin maintenance plan:   10 mg (5 mg x 2) every Mon, Wed, Fri; 7.5 mg (5 mg x 1.5) all other days   Weekly warfarin total:   60 mg   Plan last modified:   Delvin Meza RN (10/25/2019)   Next INR check:   3/3/2020   Target end date:       Indications    Paroxysmal atrial fibrillation ([facility] (Primary) [I48.0]             Anticoagulation Episode Summary     INR check location:   Clinic Lab    Preferred lab:       Send INR reminders to:       Comments:         Anticoagulation Care Providers     Provider Role Specialty Phone number    Kristine Nguyen MD [facility] Internal Medicine [phone]        Informed patient that PT/INR was okay; need to continue the blood thinner dose at 10 mg on Monday, Wednesday and Friday; and 7.5 mg all other days. F/up as scheduled.

## 2020-03-03 ENCOUNTER — HOSPITAL ENCOUNTER (INPATIENT)
Age: 85
LOS: 6 days | Discharge: HOME | DRG: 603 | End: 2020-03-09
Attending: INTERNAL MEDICINE | Admitting: INTERNAL MEDICINE
Payer: MEDICARE

## 2020-03-03 ENCOUNTER — OFFICE VISIT (OUTPATIENT)
Dept: INTERNAL MEDICINE CLINIC | Age: 85
End: 2020-03-03

## 2020-03-03 ENCOUNTER — APPOINTMENT (OUTPATIENT)
Dept: GENERAL RADIOLOGY | Age: 85
DRG: 603 | End: 2020-03-03
Attending: INTERNAL MEDICINE
Payer: MEDICARE

## 2020-03-03 VITALS
BODY MASS INDEX: 25.5 KG/M2 | WEIGHT: 205.1 LBS | TEMPERATURE: 97.9 F | HEIGHT: 75 IN | RESPIRATION RATE: 19 BRPM | HEART RATE: 86 BPM | SYSTOLIC BLOOD PRESSURE: 114 MMHG | DIASTOLIC BLOOD PRESSURE: 78 MMHG | OXYGEN SATURATION: 95 %

## 2020-03-03 DIAGNOSIS — L03.116 CELLULITIS OF LEFT LOWER EXTREMITY: Primary | ICD-10-CM

## 2020-03-03 DIAGNOSIS — L03.119 CELLULITIS OF LOWER EXTREMITY, UNSPECIFIED LATERALITY: ICD-10-CM

## 2020-03-03 DIAGNOSIS — N18.30 CONTROLLED TYPE 2 DIABETES MELLITUS WITH STAGE 3 CHRONIC KIDNEY DISEASE, WITHOUT LONG-TERM CURRENT USE OF INSULIN (HCC): ICD-10-CM

## 2020-03-03 DIAGNOSIS — I48.0 PAROXYSMAL ATRIAL FIBRILLATION (HCC): ICD-10-CM

## 2020-03-03 DIAGNOSIS — N40.0 BENIGN PROSTATIC HYPERPLASIA WITHOUT LOWER URINARY TRACT SYMPTOMS: ICD-10-CM

## 2020-03-03 DIAGNOSIS — I12.9 HYPERTENSION WITH RENAL DISEASE: ICD-10-CM

## 2020-03-03 DIAGNOSIS — I50.32 CHRONIC DIASTOLIC CONGESTIVE HEART FAILURE (HCC): ICD-10-CM

## 2020-03-03 DIAGNOSIS — I25.10 ASCVD (ARTERIOSCLEROTIC CARDIOVASCULAR DISEASE): ICD-10-CM

## 2020-03-03 DIAGNOSIS — M35.3 POLYMYALGIA RHEUMATICA (HCC): ICD-10-CM

## 2020-03-03 DIAGNOSIS — E11.22 CONTROLLED TYPE 2 DIABETES MELLITUS WITH STAGE 3 CHRONIC KIDNEY DISEASE, WITHOUT LONG-TERM CURRENT USE OF INSULIN (HCC): ICD-10-CM

## 2020-03-03 DIAGNOSIS — L03.116 CELLULITIS OF LEFT LOWER EXTREMITY: ICD-10-CM

## 2020-03-03 DIAGNOSIS — N18.30 STAGE 3 CHRONIC KIDNEY DISEASE (HCC): ICD-10-CM

## 2020-03-03 PROBLEM — L03.90 CELLULITIS: Status: ACTIVE | Noted: 2020-03-03

## 2020-03-03 LAB
ALBUMIN SERPL-MCNC: 3 G/DL (ref 3.5–5)
ALBUMIN/GLOB SERPL: 0.6 {RATIO} (ref 1.1–2.2)
ALP SERPL-CCNC: 75 U/L (ref 45–117)
ALT SERPL-CCNC: 31 U/L (ref 12–78)
ANION GAP SERPL CALC-SCNC: 5 MMOL/L (ref 5–15)
APPEARANCE UR: CLEAR
AST SERPL-CCNC: 28 U/L (ref 15–37)
BACTERIA URNS QL MICRO: NEGATIVE /HPF
BASOPHILS # BLD: 0 K/UL (ref 0–0.1)
BASOPHILS NFR BLD: 0 % (ref 0–1)
BILIRUB SERPL-MCNC: 0.3 MG/DL (ref 0.2–1)
BILIRUB UR QL: NEGATIVE
BUN SERPL-MCNC: 33 MG/DL (ref 6–20)
BUN/CREAT SERPL: 22 (ref 12–20)
CALCIUM SERPL-MCNC: 8.9 MG/DL (ref 8.5–10.1)
CHLORIDE SERPL-SCNC: 105 MMOL/L (ref 97–108)
CO2 SERPL-SCNC: 28 MMOL/L (ref 21–32)
COLOR UR: ABNORMAL
CREAT SERPL-MCNC: 1.5 MG/DL (ref 0.7–1.3)
DIFFERENTIAL METHOD BLD: ABNORMAL
EOSINOPHIL # BLD: 0 K/UL (ref 0–0.4)
EOSINOPHIL NFR BLD: 0 % (ref 0–7)
EPITH CASTS URNS QL MICRO: ABNORMAL /LPF
ERYTHROCYTE [DISTWIDTH] IN BLOOD BY AUTOMATED COUNT: 14.4 % (ref 11.5–14.5)
GLOBULIN SER CALC-MCNC: 4.8 G/DL (ref 2–4)
GLUCOSE BLD STRIP.AUTO-MCNC: 167 MG/DL (ref 65–100)
GLUCOSE BLD STRIP.AUTO-MCNC: 235 MG/DL (ref 65–100)
GLUCOSE SERPL-MCNC: 174 MG/DL (ref 65–100)
GLUCOSE UR STRIP.AUTO-MCNC: NEGATIVE MG/DL
HCT VFR BLD AUTO: 34.1 % (ref 36.6–50.3)
HGB BLD-MCNC: 10.9 G/DL (ref 12.1–17)
HGB UR QL STRIP: NEGATIVE
IMM GRANULOCYTES # BLD AUTO: 0.1 K/UL (ref 0–0.04)
IMM GRANULOCYTES NFR BLD AUTO: 1 % (ref 0–0.5)
INR PPP: 1.6 (ref 0.9–1.1)
KETONES UR QL STRIP.AUTO: NEGATIVE MG/DL
LEUKOCYTE ESTERASE UR QL STRIP.AUTO: NEGATIVE
LYMPHOCYTES # BLD: 1.5 K/UL (ref 0.8–3.5)
LYMPHOCYTES NFR BLD: 9 % (ref 12–49)
MCH RBC QN AUTO: 30.6 PG (ref 26–34)
MCHC RBC AUTO-ENTMCNC: 32 G/DL (ref 30–36.5)
MCV RBC AUTO: 95.8 FL (ref 80–99)
MONOCYTES # BLD: 0.9 K/UL (ref 0–1)
MONOCYTES NFR BLD: 6 % (ref 5–13)
NEUTS SEG # BLD: 13.1 K/UL (ref 1.8–8)
NEUTS SEG NFR BLD: 84 % (ref 32–75)
NITRITE UR QL STRIP.AUTO: NEGATIVE
NRBC # BLD: 0 K/UL (ref 0–0.01)
NRBC BLD-RTO: 0 PER 100 WBC
PH UR STRIP: 5 [PH] (ref 5–8)
PLATELET # BLD AUTO: 205 K/UL (ref 150–400)
PMV BLD AUTO: 9.9 FL (ref 8.9–12.9)
POTASSIUM SERPL-SCNC: 4.1 MMOL/L (ref 3.5–5.1)
PROT SERPL-MCNC: 7.8 G/DL (ref 6.4–8.2)
PROT UR STRIP-MCNC: ABNORMAL MG/DL
PROTHROMBIN TIME: 16 SEC (ref 9–11.1)
RBC # BLD AUTO: 3.56 M/UL (ref 4.1–5.7)
RBC #/AREA URNS HPF: ABNORMAL /HPF
SERVICE CMNT-IMP: ABNORMAL
SERVICE CMNT-IMP: ABNORMAL
SODIUM SERPL-SCNC: 138 MMOL/L (ref 136–145)
SP GR UR REFRACTOMETRY: 1.02 (ref 1–1.03)
UROBILINOGEN UR QL STRIP.AUTO: 0.2 EU/DL (ref 0.2–1)
WBC # BLD AUTO: 15.6 K/UL (ref 4.1–11.1)
WBC URNS QL MICRO: ABNORMAL /HPF

## 2020-03-03 PROCEDURE — 71045 X-RAY EXAM CHEST 1 VIEW: CPT

## 2020-03-03 PROCEDURE — 87040 BLOOD CULTURE FOR BACTERIA: CPT

## 2020-03-03 PROCEDURE — 85025 COMPLETE CBC W/AUTO DIFF WBC: CPT

## 2020-03-03 PROCEDURE — 65270000015 HC RM PRIVATE ONCOLOGY

## 2020-03-03 PROCEDURE — 85610 PROTHROMBIN TIME: CPT

## 2020-03-03 PROCEDURE — 36415 COLL VENOUS BLD VENIPUNCTURE: CPT

## 2020-03-03 PROCEDURE — 74011250637 HC RX REV CODE- 250/637: Performed by: INTERNAL MEDICINE

## 2020-03-03 PROCEDURE — 82962 GLUCOSE BLOOD TEST: CPT

## 2020-03-03 PROCEDURE — 74011000250 HC RX REV CODE- 250: Performed by: INTERNAL MEDICINE

## 2020-03-03 PROCEDURE — 81001 URINALYSIS AUTO W/SCOPE: CPT

## 2020-03-03 PROCEDURE — 80053 COMPREHEN METABOLIC PANEL: CPT

## 2020-03-03 PROCEDURE — 93005 ELECTROCARDIOGRAM TRACING: CPT

## 2020-03-03 PROCEDURE — 74011250636 HC RX REV CODE- 250/636: Performed by: INTERNAL MEDICINE

## 2020-03-03 RX ORDER — WARFARIN SODIUM 5 MG/1
5 TABLET ORAL EVERY EVENING
Status: DISCONTINUED | OUTPATIENT
Start: 2020-03-03 | End: 2020-03-05

## 2020-03-03 RX ORDER — PRAVASTATIN SODIUM 40 MG/1
40 TABLET ORAL
Status: DISCONTINUED | OUTPATIENT
Start: 2020-03-03 | End: 2020-03-09 | Stop reason: HOSPADM

## 2020-03-03 RX ORDER — TERAZOSIN 1 MG/1
2 CAPSULE ORAL DAILY
Status: DISCONTINUED | OUTPATIENT
Start: 2020-03-04 | End: 2020-03-09 | Stop reason: HOSPADM

## 2020-03-03 RX ORDER — PREDNISONE 5 MG/1
2.5 TABLET ORAL
Status: DISCONTINUED | OUTPATIENT
Start: 2020-03-04 | End: 2020-03-06

## 2020-03-03 RX ORDER — METFORMIN HYDROCHLORIDE 500 MG/1
500 TABLET ORAL
Status: DISCONTINUED | OUTPATIENT
Start: 2020-03-04 | End: 2020-03-05

## 2020-03-03 RX ORDER — THERA TABS 400 MCG
1 TAB ORAL DAILY
Status: DISCONTINUED | OUTPATIENT
Start: 2020-03-04 | End: 2020-03-09 | Stop reason: HOSPADM

## 2020-03-03 RX ORDER — LISINOPRIL 5 MG/1
5 TABLET ORAL DAILY
Status: DISCONTINUED | OUTPATIENT
Start: 2020-03-04 | End: 2020-03-09 | Stop reason: HOSPADM

## 2020-03-03 RX ORDER — ZOLPIDEM TARTRATE 5 MG/1
5 TABLET ORAL
Status: DISCONTINUED | OUTPATIENT
Start: 2020-03-03 | End: 2020-03-09 | Stop reason: HOSPADM

## 2020-03-03 RX ORDER — BUMETANIDE 0.25 MG/ML
2 INJECTION INTRAMUSCULAR; INTRAVENOUS EVERY 12 HOURS
Status: DISCONTINUED | OUTPATIENT
Start: 2020-03-03 | End: 2020-03-09 | Stop reason: HOSPADM

## 2020-03-03 RX ORDER — ADHESIVE BANDAGE
30 BANDAGE TOPICAL DAILY PRN
Status: DISCONTINUED | OUTPATIENT
Start: 2020-03-03 | End: 2020-03-09 | Stop reason: HOSPADM

## 2020-03-03 RX ORDER — SODIUM CHLORIDE 0.9 % (FLUSH) 0.9 %
5-40 SYRINGE (ML) INJECTION EVERY 8 HOURS
Status: DISCONTINUED | OUTPATIENT
Start: 2020-03-03 | End: 2020-03-09 | Stop reason: HOSPADM

## 2020-03-03 RX ORDER — MELATONIN
1000 DAILY
Status: DISCONTINUED | OUTPATIENT
Start: 2020-03-04 | End: 2020-03-09 | Stop reason: HOSPADM

## 2020-03-03 RX ORDER — LORATADINE 10 MG/1
10 TABLET ORAL DAILY
Status: DISCONTINUED | OUTPATIENT
Start: 2020-03-04 | End: 2020-03-09 | Stop reason: HOSPADM

## 2020-03-03 RX ORDER — SODIUM CHLORIDE 0.9 % (FLUSH) 0.9 %
5-40 SYRINGE (ML) INJECTION AS NEEDED
Status: DISCONTINUED | OUTPATIENT
Start: 2020-03-03 | End: 2020-03-09 | Stop reason: HOSPADM

## 2020-03-03 RX ORDER — ACETAMINOPHEN 325 MG/1
650 TABLET ORAL
Status: DISCONTINUED | OUTPATIENT
Start: 2020-03-03 | End: 2020-03-09 | Stop reason: HOSPADM

## 2020-03-03 RX ORDER — LEVOFLOXACIN 5 MG/ML
500 INJECTION, SOLUTION INTRAVENOUS EVERY 24 HOURS
Status: DISCONTINUED | OUTPATIENT
Start: 2020-03-03 | End: 2020-03-03

## 2020-03-03 RX ORDER — LEVOFLOXACIN 5 MG/ML
750 INJECTION, SOLUTION INTRAVENOUS EVERY OTHER DAY
Status: DISCONTINUED | OUTPATIENT
Start: 2020-03-03 | End: 2020-03-04

## 2020-03-03 RX ORDER — GLUCOSAMINE SULFATE 1500 MG
1000 POWDER IN PACKET (EA) ORAL DAILY
Status: DISCONTINUED | OUTPATIENT
Start: 2020-03-04 | End: 2020-03-03

## 2020-03-03 RX ORDER — IBUPROFEN 600 MG/1
600 TABLET ORAL 3 TIMES DAILY
Status: DISCONTINUED | OUTPATIENT
Start: 2020-03-03 | End: 2020-03-09 | Stop reason: HOSPADM

## 2020-03-03 RX ORDER — DIGOXIN 125 MCG
0.12 TABLET ORAL DAILY
Status: DISCONTINUED | OUTPATIENT
Start: 2020-03-04 | End: 2020-03-09 | Stop reason: HOSPADM

## 2020-03-03 RX ADMIN — VANCOMYCIN HYDROCHLORIDE 2000 MG: 10 INJECTION, POWDER, LYOPHILIZED, FOR SOLUTION INTRAVENOUS at 23:09

## 2020-03-03 RX ADMIN — WARFARIN SODIUM 5 MG: 5 TABLET ORAL at 21:04

## 2020-03-03 RX ADMIN — Medication 10 ML: at 23:09

## 2020-03-03 RX ADMIN — PRAVASTATIN SODIUM 40 MG: 40 TABLET ORAL at 21:34

## 2020-03-03 RX ADMIN — LEVOFLOXACIN 750 MG: 5 INJECTION, SOLUTION INTRAVENOUS at 21:05

## 2020-03-03 RX ADMIN — IBUPROFEN 600 MG: 600 TABLET, FILM COATED ORAL at 21:34

## 2020-03-03 RX ADMIN — BUMETANIDE 2 MG: 0.25 INJECTION INTRAMUSCULAR; INTRAVENOUS at 21:35

## 2020-03-03 NOTE — PATIENT INSTRUCTIONS

## 2020-03-03 NOTE — PROGRESS NOTES
Chief Complaint   Patient presents with    Skin Infection     2 week follow up     Visit Vitals  /78 (BP 1 Location: Left arm, BP Patient Position: Sitting)   Pulse 86   Temp 97.9 °F (36.6 °C) (Oral)   Resp 19   Ht 6' 3\" (1.905 m)   Wt 205 lb 1.6 oz (93 kg)   SpO2 95%   BMI 25.64 kg/m²       1. Have you been to the ER, urgent care clinic since your last visit? Hospitalized since your last visit? No    2. Have you seen or consulted any other health care providers outside of the [address] [place] [address] since your last visit? Include any pap smears or colon screening.  No

## 2020-03-03 NOTE — PROGRESS NOTES
Chief Complaint   Patient presents with    Skin Infection     2 week follow up       SUBJECTIVE:    Jeffrey Brand is a 80 y.o. male returns in follow-up regarding the cellulitis of his left ankle as well as follow-up of his hypertension, diabetes, atrial fibrillation, CKD, polymyalgia rheumatica and other medical problems. He recently has been evaluated and treated for cellulitis of the left ankle initially having 4 weeks of Bactrim which did not seem to clear it up and then 4 weeks ago when he was seen he was placed on Augmentin and he feels like is improved on that initially therefore was continue when he was last seen 2 weeks ago however since that time he has now developed an open area on the left lower extremity as well as developing some redness in the right leg. Catalina Avila He is still soaking with Epsom salts on a regular basis and using a drawing salve on it. He denies any significant associated pain. He had some shaking chills this morning when he awakened. He denies any chest pain, shortness of breath, palpitations, PND, orthopnea or cardiorespiratory complaints. He has no GI or  complaints. He has no headaches, dizziness or neurologic complaints. He has no other complaints on complete review of systems except his chronic arthritic complaints which are unchanged. Current Outpatient Medications   Medication Sig Dispense Refill    warfarin (COUMADIN) 5 mg tablet Taking 2 tablets on Monday, Wednesday and Friday; one and a half all other days. 145 Tab 3    glucose blood VI test strips (ACCU-CHEK SMARTVIEW TEST STRIP) strip USE ONCE DAILY E11.9 100 Strip PRN    predniSONE (DELTASONE) 10 mg tablet Taking one half tablet daily as directed. 90 Tab 3    mupirocin (BACTROBAN) 2 % ointment Apply  to affected area three (3) times daily.  22 g 0    digoxin (LANOXIN) 0.125 mg tablet TAKE 1 TABLET BY MOUTH EVERY DAY 90 Tab 3    diclofenac EC (VOLTAREN) 75 mg EC tablet TAKE 1 TABLET BY MOUTH TWICE A  Tab 3    terazosin (HYTRIN) 2 mg capsule TAKE ONE CAPSULE BY MOUTH DAILY 5mg 90 Cap 3    simvastatin (ZOCOR) 20 mg tablet TAKE 1 TABLET EVERY DAY 90 Tab 3    lisinopril (PRINIVIL, ZESTRIL) 5 mg tablet TAKE 1 TABLET EVERY DAY 90 Tab 3    furosemide (LASIX) 80 mg tablet TAKE 1 TABLET BY MOUTH EVERY DAY 90 Tab 3    Nebulizer & Compressor machine Use daily as directed. 1 Each 0    sildenafil, antihypertensive, (REVATIO) 20 mg tablet TAKE 1 TABLET, ORAL, AS DIRECTED FOR SEXUAL ACTIVITY 20 Tab 11    metFORMIN (GLUCOPHAGE) 500 mg tablet Take 1 Tab by mouth daily (with breakfast). 90 Tab 3    fexofenadine (ALLEGRA) 180 mg tablet Take  by mouth.  acetaminophen (TYLENOL ARTHRITIS PAIN) 650 mg TbER Take 650 mg by mouth every eight (8) hours.  multivitamins-minerals-lutein (MULTIVITAMIN 50 PLUS) tab tablet Take 1 Tab by mouth daily.  glucosamine 1,000 mg tab Take 2,000 mg by mouth daily.  cholecalciferol (VITAMIN D3) 1,000 unit cap Take 1,000 Units by mouth daily.  DOCOSAHEXANOIC ACID/EPA (FISH OIL PO) Take 1,200 mg by mouth two (2) times a day. 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    Back pain 9/20/2017    BPH (benign prostatic hyperplasia) 9/20/2017    CHF (congestive heart failure) ([facility] 9/20/2017    CKD (chronic kidney disease) 9/20/2017    Diabetic acetonemia ([facility]     DM (diabetes mellitus) ([facility] 9/20/2017    Story: Diet Controlled    ED (erectile dysfunction) 9/20/2017    Edema 9/20/2017    Elevated CPK 9/20/2017    Comments: History of    Elevated LFTs 9/20/2017    Comments: History of    Elevated PSA 9/20/2017    Hypercholesteremia     Hyperlipidemia 9/20/2017    Hypertension     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    Nodule of right lung 9/20/2017    Story: Right    Polymyalgia ([facility] 9/20/2017    Prostate enlargement Past Surgical History:   Procedure Laterality Date    ABDOMEN SURGERY PROC UNLISTED      hernia repair    HX HEENT  06/12/2018    Dr. 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 Positive for chills this morning  ENT: negative for - headaches, nasal congestion or tinnitus  Eyes: no blurred or visual changes  Neck: No stiffness or swollen nodes  Respiratory: negative for - cough, hemoptysis, shortness of breath or wheezing  Cardiovascular : negative for - chest pain, edema, palpitations or shortness of breath  Gastrointestinal: negative for - abdominal pain, blood in stools, heartburn or nausea/vomiting  Genito-Urinary: no dysuria, trouble voiding, or hematuria  Musculoskeletal: negative for - gait disturbance, joint pain, joint stiffness or joint swelling  Neurological: no TIA or stroke symptoms  Hematologic: no bruises, no bleeding  Lymphatic: no swollen glands  Integument: no lumps, mole changes, nail changes or rash except infection left ankle which although initially improving now has developed some mild ulceration. Now with edema right lower extremity and redness  Endocrine:no malaise/lethargy poly uria or polydipsia or unexpected weight changes        Social History     Socioeconomic History    Marital status:      Spouse name: Not on file    Number of children: Not on file    Years of education: Not on file    Highest education level: Not on file   Tobacco Use    Smoking status: Never Smoker    Smokeless tobacco: Never Used   Substance and Sexual Activity    Alcohol use: No    Drug use: No    Sexual activity: Never     History reviewed. No pertinent family history.     OBJECTIVE:     Visit Vitals  /78 (BP 1 Location: Left arm, BP Patient Position: Sitting)   Pulse 86   Temp 97.9 °F (36.6 °C) (Oral) Resp 19   Ht 6' 3\" (1.905 m)   Wt 205 lb 1.6 oz (93 kg)   SpO2 95%   BMI 25.64 kg/m²     CONSTITUTIONAL:   well nourished, appears age appropriate  EYES: sclera anicteric, PERRL, EOMI  ENMT:nares clear, moist mucous membranes, pharynx clear  NECK: supple. Thyroid normal, No JVD or bruits  RESPIRATORY: Chest: clear to ascultation and percussion, normal inspiratory effort  CARDIOVASCULAR: Heart: regular rate and rhythm no murmurs, rubs or gallops, PMI not displaced, No thrills  GASTROINTESTINAL: Abdomen: non distended, soft, non tender, bowel sounds normal  HEMATOLOGIC: no purpura, petechiae or bruising  LYMPHATIC: No lymph node enlargemant  MUSCULOSKELETAL: Extremities: no edema or active synovitis, pulse 1+   INTEGUMENT: No unusual rashes or suspicious skin lesions noted. Nails appear normal.  Medial left leg just above the ankle now with a superficial excoriated early ulcer type appearance and some serous drainage which is sent for culture. Erythema right lower extremity consistent with cellulitis. PERIPHERAL VASCULAR: normal pulses femoral, PT and DP  NEUROLOGIC: non-focal exam, A & O X 3  PSYCHIATRIC:, appropriate affect     ASSESSMENT:   1. Cellulitis of left lower extremity    2. Chronic diastolic congestive heart failure ([address]    3. Polymyalgia rheumatica (HCC)    4. Paroxysmal atrial fibrillation ([address]    5. Hypertension with renal disease      Impression  1. Cellulitis bilateral lower extremity now getting worse I think this warrants hospitalization IV antibiotics  2. Diastolic CHF less than adequately compensated  3. Polymyalgia rheumatica sed rate is pending  4. Hypertension is controlled  5. Paroxysmal atrial fibrillation INR pending  Hospitalization see admit note for details  . Orders Placed This Encounter    CULTURE, BODY FLUID W GRAM STAIN         ATTENTION:   This medical record was transcribed using an electronic medical records system.   Although proofread, it may and can contain electronic and spelling errors. Other human spelling and other errors may be present. Corrections may be executed at a later time. Please feel free to contact us for any clarifications as needed. Follow-up and Dispositions    · Return TBD. No results found for any visits on 03/03/20. Saji Kerr MD    The patient verbalized understanding of the problems and plans as explained.

## 2020-03-03 NOTE — H&P
ADMISSION NOTE    NAME:  Hermilo Rosenbaum   :   1933   MRN:   [id number]     Date/Time:  3/3/2020 5:10 PM  Subjective:   CHIEF COMPLAINT: Redness right leg, ulcer left leg, chills this morning    HISTORY OF PRESENT ILLNESS:     Cheryle Robb is a 80 y.o.  white male who presents with increasing redness right lower extremity as well as chills this morning and now development of a open draining area on the left lower extremity where he previously had a cellulitis. He recently has been evaluated and treated for cellulitis of the left ankle initially having 4 weeks of Bactrim which did not seem to clear it up and then 4 weeks ago when he was seen he was placed on Augmentin and he felt like is improved on that initially therefore he was continued when he was last seen 2 weeks ago; however since that time he has now developed an open area on the left lower extremity as well as developing some redness in the right leg. Braxton Simmons He is still soaking with Epsom salts on a regular basis and using a \"drawing salve\" on it. He denies any significant associated pain. He had some shaking chills this morning when he awakened. He denies any chest pain, shortness of breath, palpitations, PND, orthopnea or cardiorespiratory complaints. He has no GI or  complaints. He has no headaches, dizziness or neurologic complaints. He has no other complaints on complete review of systems except his chronic arthritic complaints which are unchanged. Past Medical History:   Diagnosis Date    Allergic rhinitis 2017    Arthritis     ASCVD (arteriosclerotic cardiovascular disease) 2017    Story:  Old ASMI by EKG    Back pain 2017    BPH (benign prostatic hyperplasia) 2017    CHF [facility]congestive heart failure) [facility] [address] 2017    CKD (chronic kidney disease) 2017    Diabetic acetonemia ([facility] [address]     DM (diabetes mellitus) ([facility] [address] 2017    Story: Diet Controlled    ED (erectile dysfunction) 2017    Edema 2017    Elevated CPK 9/20/2017    Comments: History of    Elevated LFTs 9/20/2017    Comments: History of    Elevated PSA 9/20/2017    Hypercholesteremia     Hyperlipidemia 9/20/2017    Hypertension     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    Nodule of right lung 9/20/2017    Story: Right    Polymyalgia ([address] 9/20/2017    Prostate enlargement         Past Surgical History:   Procedure Laterality Date    ABDOMEN SURGERY PROC UNLISTED      hernia repair    HX HEENT  06/12/2018    Dr. 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 No Known Allergies     Prior to Admission medications    Medication Sig Start Date End Date Taking? Authorizing Provider   warfarin (COUMADIN) 5 mg tablet Taking 2 tablets on Monday, Wednesday and Friday; one and a half all other days. 2/6/20   Sravani Medina MD   glucose blood VI test strips (ACCU-CHEK SMARTVIEW TEST STRIP) strip USE ONCE DAILY E11.9 1/24/20   Sravani Medina MD   predniSONE (DELTASONE) 10 mg tablet Taking one half tablet daily as directed. 1/20/20   Sravani Medina MD   mupirocin [facility]) 2 % ointment Apply  to affected area three (3) times daily.  12/18/19   Sravani Medina MD   digoxin (LANOXIN) 0.125 mg tablet TAKE 1 TABLET BY MOUTH EVERY DAY 8/22/19   Sravani Medina MD   diclofenac EC (VOLTAREN) 75 mg EC tablet TAKE 1 TABLET BY MOUTH TWICE A DAY 7/29/19   Sravani Medina MD   terazosin (HYTRIN) 2 mg capsule TAKE ONE CAPSULE BY MOUTH DAILY 5mg 5/7/19   Sravani Medina MD   simvastatin (ZOCOR) 20 mg tablet TAKE 1 TABLET EVERY DAY 5/7/19   Sravani Medina MD   lisinopril (PRINIVIL, ZESTRIL) 5 mg tablet TAKE 1 TABLET EVERY DAY 5/7/19   Sravani Medina MD furosemide (LASIX) 80 mg tablet TAKE 1 TABLET BY MOUTH EVERY DAY 3/22/19   Jarrett Aranda MD   Nebulizer & Compressor machine Use daily as directed. 2/26/19   Jarrett Aranda MD   sildenafil, antihypertensive, (REVATIO) 20 mg tablet TAKE 1 TABLET, ORAL, AS DIRECTED FOR SEXUAL ACTIVITY 1/8/19   Jarrett Aranda MD   metFORMIN (GLUCOPHAGE) 500 mg tablet Take 1 Tab by mouth daily (with breakfast). 11/6/18   Jarrett Aranda MD   fexofenadine (ALLEGRA) 180 mg tablet Take  by mouth. Provider, Historical   acetaminophen (TYLENOL ARTHRITIS PAIN) 650 mg TbER Take 650 mg by mouth every eight (8) hours. Provider, Historical   multivitamins-minerals-lutein (MULTIVITAMIN 50 PLUS) tab tablet Take 1 Tab by mouth daily. Provider, Historical   glucosamine 1,000 mg tab Take 2,000 mg by mouth daily. Provider, Historical   cholecalciferol (VITAMIN D3) 1,000 unit cap Take 1,000 Units by mouth daily. Provider, Historical   DOCOSAHEXANOIC ACID/EPA (FISH OIL PO) Take 1,200 mg by mouth two (2) times a day. Provider, Historical       REVIEW OF SYSTEMS:     Constitutional:  negative for fevers,  anorexia and weight loss. Positive for chills  Eyes:   negative for visual disturbance and irritation  ENT:   negative for hearing loss, tinnitus, nasal congestion, epistaxis, sore throat  Neck:              negative for stiffness or swollen glands  Respiratory:  negative for cough, hemoptysis, pleurisy/chest pain, wheezing or dyspnea on exertion  Cards:   negative for chest pain, palpitations, orthopnea, PND. Positive for increasing lower extremity edema  GI:   negative for dysphagia, nausea, vomiting, diarrhea, constipation and abdominal pain  Genitourinary: negative for frequency, dysuria and hematuria  Integument:  negative for rash and pruritus.   Positive increasing redness right lower extremity and now left lower extremity where there was recent cellulitis has developed a superficial area of drainage  Hematologic:  negative for easy bruising and bleeding  Lymphatic:      negative for swollen lymph nodes or night sweats  Musculoskel: negative for myalgias, arthralgias, back pain and muscle weakness  Neurological:  negative for headaches, dizziness, vertigo, memory problems and gait problems  Behavl/Psych:  negative for anxiety, depression and illegal drug usage      Objective:   VITALS:    Visit Vitals  /59 (BP 1 Location: Left arm, BP Patient Position: At rest)   Pulse 72   Temp 98.1 °F (36.7 °C)   Resp 18   SpO2 98%       PHYSICAL EXAM:   General:    Alert, cooperative, no distress, appears stated age. Head:   Normocephalic, without obvious abnormality, atraumatic. Eyes:   Conjunctivae/corneas clear. PERRL  Nose:  Nares normal. No drainage or sinus tenderness. Throat:    Lips, mucosa, and tongue normal.  No Thrush  Neck:  Supple, symmetrical,  no adenopathy, thyroid: non tender    no carotid bruit and no JVD. Back:    Symmetric,  No CVA tenderness. Lungs:   Clear to auscultation bilaterally. No Wheezing or Rhonchi. No rales. Chest wall:  No tenderness or deformity. No Accessory muscle use. Heart:   Regular rate and rhythm,  no murmur, rub or gallop. Abdomen:   Soft, non-tender. Not distended. Bowel sounds normal. No masses  Extremities: Extremities normal, atraumatic, No cyanosis. No edema. No clubbing  Skin:     Texture, turgor normal. No rashes or lesions. Not Jaundiced. Erythema right lower extremity consistent with cellulitis. Discoloration left lower extremity consistent with venous stasis now with superficial area of drainage medially consistent with stasis ulceration development. Lymph nodes: Cervical, supraclavicular normal.  Psych:  Good insight. Not depressed. Not anxious or agitated. Neurologic: EOMs intact. No facial asymmetry. No aphasia or slurred speech. Normal   strength, Alert and oriented X 3.        LAB DATA REVIEWED:    No results found for this or any previous visit (from the past 24 hour(s)). Assessment/Plan:      Principal Problem:    Cellulitis of left lower extremity (1/14/2020)    Active Problems:    ASCVD (arteriosclerotic cardiovascular disease) (9/20/2017)      Overview: Story: Old TYRELLI by EKG      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, without long-term current use of insulin ([facility] [address] (9/20/2017)      Overview: Story: Diet Controlled      Hypertension with renal disease (9/20/2017)      Stage 3 chronic kidney disease ([facility] [address] (9/20/2017)      Paroxysmal atrial fibrillation ([facility] [address] (1/5/2018)      Polymyalgia rheumatica ([facility] [address] (4/24/2018)      CHF (congestive heart failure) ([facility] [address] (9/20/2017)      Cellulitis (3/3/2020)       ___________________________________________________  PLAN:    Risk of deterioration:  []Low    [x]Moderate  []High    1. Start vancomycin and Levaquin  2. Wound care evaluation for left leg ulceration  3. On p.o. Lasix at home change to IV Bumex to help with fluid in both lower extremities  4. Decrease prednisone dose from 5 mg daily to 2-1/2 and hopefully taper him off of this  5. Continue Coumadin follow daily INR  6. Continue low-dose lisinopril  7. Continue metformin and follow blood sugar with sliding scale coverage if needed  8.   Continue other home medications    Prophylaxis:  []Lovenox  [x]Coumadin  []Hep SQ  []SCDs  []H2B/PPI    Disposition:  [x]Home w/ Family   []HH PT,OT,RN   []SNF/LTC   []SAH/Rehab    Discussed Code Status:    [x]Full Code      []DNR     ___________________________________________________    Care Plan discussed with:    [x]Patient   [x]Familydaughter    ___________________________________________________  Admitting Physician: Brock Hernandez MD

## 2020-03-04 LAB
ANION GAP SERPL CALC-SCNC: 4 MMOL/L (ref 5–15)
ATRIAL RATE: 75 BPM
BUN SERPL-MCNC: 27 MG/DL (ref 6–20)
BUN/CREAT SERPL: 21 (ref 12–20)
CALCIUM SERPL-MCNC: 8.5 MG/DL (ref 8.5–10.1)
CALCULATED P AXIS, ECG09: 26 DEGREES
CALCULATED R AXIS, ECG10: -36 DEGREES
CALCULATED T AXIS, ECG11: 84 DEGREES
CHLORIDE SERPL-SCNC: 108 MMOL/L (ref 97–108)
CO2 SERPL-SCNC: 28 MMOL/L (ref 21–32)
CREAT SERPL-MCNC: 1.31 MG/DL (ref 0.7–1.3)
DIAGNOSIS, 93000: NORMAL
ERYTHROCYTE [DISTWIDTH] IN BLOOD BY AUTOMATED COUNT: 14.1 % (ref 11.5–14.5)
GLUCOSE BLD STRIP.AUTO-MCNC: 125 MG/DL (ref 65–100)
GLUCOSE BLD STRIP.AUTO-MCNC: 145 MG/DL (ref 65–100)
GLUCOSE BLD STRIP.AUTO-MCNC: 236 MG/DL (ref 65–100)
GLUCOSE BLD STRIP.AUTO-MCNC: 244 MG/DL (ref 65–100)
GLUCOSE SERPL-MCNC: 111 MG/DL (ref 65–100)
HCT VFR BLD AUTO: 31.3 % (ref 36.6–50.3)
HGB BLD-MCNC: 9.9 G/DL (ref 12.1–17)
INR PPP: 1.7 (ref 0.9–1.1)
MCH RBC QN AUTO: 30.2 PG (ref 26–34)
MCHC RBC AUTO-ENTMCNC: 31.6 G/DL (ref 30–36.5)
MCV RBC AUTO: 95.4 FL (ref 80–99)
NRBC # BLD: 0 K/UL (ref 0–0.01)
NRBC BLD-RTO: 0 PER 100 WBC
P-R INTERVAL, ECG05: 354 MS
PLATELET # BLD AUTO: 185 K/UL (ref 150–400)
PMV BLD AUTO: 10.2 FL (ref 8.9–12.9)
POTASSIUM SERPL-SCNC: 3.8 MMOL/L (ref 3.5–5.1)
PROTHROMBIN TIME: 16.9 SEC (ref 9–11.1)
Q-T INTERVAL, ECG07: 398 MS
QRS DURATION, ECG06: 116 MS
QTC CALCULATION (BEZET), ECG08: 444 MS
RBC # BLD AUTO: 3.28 M/UL (ref 4.1–5.7)
SERVICE CMNT-IMP: ABNORMAL
SODIUM SERPL-SCNC: 140 MMOL/L (ref 136–145)
VENTRICULAR RATE, ECG03: 75 BPM
WBC # BLD AUTO: 11.3 K/UL (ref 4.1–11.1)

## 2020-03-04 PROCEDURE — 82962 GLUCOSE BLOOD TEST: CPT

## 2020-03-04 PROCEDURE — 85027 COMPLETE CBC AUTOMATED: CPT

## 2020-03-04 PROCEDURE — 65270000015 HC RM PRIVATE ONCOLOGY

## 2020-03-04 PROCEDURE — 74011000250 HC RX REV CODE- 250: Performed by: INTERNAL MEDICINE

## 2020-03-04 PROCEDURE — 80048 BASIC METABOLIC PNL TOTAL CA: CPT

## 2020-03-04 PROCEDURE — 77030041076 HC DRSG AG OPTICELL MDII -A

## 2020-03-04 PROCEDURE — 77030040718 HC DRSG HYDRGEL SKINTEGRITY MDII -A

## 2020-03-04 PROCEDURE — 85610 PROTHROMBIN TIME: CPT

## 2020-03-04 PROCEDURE — 74011250637 HC RX REV CODE- 250/637: Performed by: INTERNAL MEDICINE

## 2020-03-04 PROCEDURE — 74011636637 HC RX REV CODE- 636/637: Performed by: INTERNAL MEDICINE

## 2020-03-04 PROCEDURE — 74011250636 HC RX REV CODE- 250/636: Performed by: INTERNAL MEDICINE

## 2020-03-04 PROCEDURE — 36415 COLL VENOUS BLD VENIPUNCTURE: CPT

## 2020-03-04 RX ORDER — LEVOFLOXACIN 5 MG/ML
500 INJECTION, SOLUTION INTRAVENOUS EVERY 24 HOURS
Status: DISCONTINUED | OUTPATIENT
Start: 2020-03-04 | End: 2020-03-05 | Stop reason: DRUGHIGH

## 2020-03-04 RX ADMIN — IBUPROFEN 600 MG: 600 TABLET, FILM COATED ORAL at 17:05

## 2020-03-04 RX ADMIN — BUMETANIDE 2 MG: 0.25 INJECTION INTRAMUSCULAR; INTRAVENOUS at 21:10

## 2020-03-04 RX ADMIN — LISINOPRIL 5 MG: 5 TABLET ORAL at 09:02

## 2020-03-04 RX ADMIN — DIGOXIN 0.12 MG: 125 TABLET ORAL at 09:02

## 2020-03-04 RX ADMIN — LORATADINE 10 MG: 10 TABLET ORAL at 09:02

## 2020-03-04 RX ADMIN — Medication 10 ML: at 17:05

## 2020-03-04 RX ADMIN — BUMETANIDE 2 MG: 0.25 INJECTION INTRAMUSCULAR; INTRAVENOUS at 09:03

## 2020-03-04 RX ADMIN — WARFARIN SODIUM 5 MG: 5 TABLET ORAL at 17:05

## 2020-03-04 RX ADMIN — VITAMIN D, TAB 1000IU (100/BT) 1 TABLET: 25 TAB at 09:03

## 2020-03-04 RX ADMIN — Medication 10 ML: at 21:09

## 2020-03-04 RX ADMIN — VANCOMYCIN HYDROCHLORIDE 1000 MG: 1 INJECTION, POWDER, LYOPHILIZED, FOR SOLUTION INTRAVENOUS at 17:05

## 2020-03-04 RX ADMIN — METFORMIN HYDROCHLORIDE 500 MG: 500 TABLET ORAL at 09:02

## 2020-03-04 RX ADMIN — IBUPROFEN 600 MG: 600 TABLET, FILM COATED ORAL at 09:03

## 2020-03-04 RX ADMIN — IBUPROFEN 600 MG: 600 TABLET, FILM COATED ORAL at 21:09

## 2020-03-04 RX ADMIN — LEVOFLOXACIN 500 MG: 5 INJECTION, SOLUTION INTRAVENOUS at 21:10

## 2020-03-04 RX ADMIN — PRAVASTATIN SODIUM 40 MG: 40 TABLET ORAL at 21:09

## 2020-03-04 RX ADMIN — THERA TABS 1 TABLET: TAB at 09:02

## 2020-03-04 RX ADMIN — PREDNISONE 2.5 MG: 5 TABLET ORAL at 09:03

## 2020-03-04 NOTE — PROGRESS NOTES
Oncology End of Shift Note      Bedside shift change report given to Hector Nice RN (incoming nurse) by Jason Martinez (outgoing nurse) on [facility]. Report included the following information SBAR, Kardex and MAR. Shift Summary: Hourly rounding completed, patient tolerated care well and is resting comfortably in bed. Patient wound dressed. Issues for Physician to Address:       Patient on Cardiac Monitoring?     [] Yes  [x] No    Rhythm:          Shift Events        [place]

## 2020-03-04 NOTE — PROGRESS NOTES
KENYETTA:   1) Home with [facility] RN   2) Home with f.u appts   3) Pt will need 2ND IM letter at time of discharge     Reason for Admission:  Cellulitis                    RUR Score: 14 LOW                   Plan for utilizing home health:  Per recommendation     PCP: First and Last name:  Dr. John Fallon    Name of Practice:     Are you a current patient: Yes/No: Yes    Approximate date of last visit: Yesterday 3/3/2020- pt was a direct admit from the office                     Current Advanced Directive/Advance Care Plan: None on file, pt states his son is his only child and his NOK. Pt is interested in arranging AMD- CM contacted pastoral care to assist.                          Transition of Care Plan:                    Pt is a 80year old,  male, admitted with cellulitis. Pt was alert and oriented when meeting with dtr in law, dtr in [address] [place] [address] and CM, confirming address, emergency contact and PCP. Pt states he lives with his Antoinette Hernandez friend Pat\" in a one level home (4-5 steps to enter) and is completely independent. Pt states he normally cares for his lady friend. Pt states he has access to a rolling walker and a built in shower chair. Pt states he drives and has not had HH or been to SNF/IPR in the past. Pt's preferred pharmacy is the [facility], pt states no problems affording or accessing medications. Pt's dtr in law will pick him up at time of discharge and as needed. CM will continue to follow and remain available for support as needed. Care Management Interventions  PCP Verified by CM:  Yes  Mode of Transport at Discharge: Self  Transition of Care Consult (CM Consult): Discharge Planning  MyChart Signup: No  Discharge Durable Medical Equipment: No  Physical Therapy Consult: No  Occupational Therapy Consult: No  Speech Therapy Consult: No  Current Support Network: Lives with Spouse  Confirm Follow Up Transport: Family  The Patient and/or Patient Representative was Provided with a Choice of Provider and Agrees with the Discharge Plan?: Yes  Freedom of Choice List was Provided with Basic Dialogue that Supports the Patient's Individualized Plan of Care/Goals, Treatment Preferences and Shares the Quality Data Associated with the Providers?: Yes  Discharge Location  Discharge Placement: Home with home health     CHARANJIT Acevedo, [address] [facility]    [phone]

## 2020-03-04 NOTE — PROGRESS NOTES
PROGRESS NOTE    NAME:  Buzz Kinney   :   1933   MRN:   [id number]     Date/Time:  3/4/2020 5:39 AM  Subjective:   History:  Chart reviewed and patient seen and examined and D/W his nurse and all events noted. He presented with increasing redness right lower extremity as well as chills on the morning of admission and now development of a open draining area on the left lower extremity where he previously had a cellulitis. He recently had been evaluated and treated for cellulitis of the left ankle initially having 4 weeks of Bactrim which did not seem to clear it up and then 4 weeks PTA when he was seen he was placed on Augmentin and he felt like he improved on that initially therefore he was continued when he was last seen 2 weeks PTA; however since that time he has now developed an open area on the left lower extremity as well as developing some redness in the right leg. Carrie Adames is still soaking with Epsom salts on a regular basis and using a \"drawing salve\" on it. Prince Marvin denies any significant associated pain.  He had some shaking chills the morning of admission when he awakened. Prince Marvin currently denies any chest pain, shortness of breath, palpitations, PND, orthopnea or cardiorespiratory complaints and has had good diuresis with IV Bumex since admission. Prince Marvin has no GI or  complaints.  He has no headaches, dizziness or neurologic complaints.  He has no other complaints on complete review of systems except his chronic arthritic complaints which are unchanged.       Medications reviewed:  Current Facility-Administered Medications   Medication Dose Route Frequency    acetaminophen (TYLENOL) tablet 650 mg  650 mg Oral Q8H PRN    ibuprofen (MOTRIN) tablet 600 mg  600 mg Oral TID    digoxin (LANOXIN) tablet 0.125 mg  0.125 mg Oral DAILY    loratadine (CLARITIN) tablet 10 mg  10 mg Oral DAILY    bumetanide (BUMEX) injection 2 mg  2 mg IntraVENous Q12H    lisinopril (PRINIVIL, ZESTRIL) tablet 5 mg  5 mg Oral DAILY  metFORMIN (GLUCOPHAGE) tablet 500 mg  500 mg Oral DAILY WITH BREAKFAST    therapeutic multivitamin (THERAGRAN) tablet 1 Tab  1 Tab Oral DAILY    predniSONE (DELTASONE) tablet 2.5 mg  2.5 mg Oral DAILY WITH BREAKFAST    pravastatin (PRAVACHOL) tablet 40 mg  40 mg Oral QHS    terazosin (HYTRIN) capsule 2 mg  2 mg Oral DAILY    warfarin (COUMADIN) tablet 5 mg  5 mg Oral QPM    sodium chloride (NS) flush 5-40 mL  5-40 mL IntraVENous Q8H    sodium chloride (NS) flush 5-40 mL  5-40 mL IntraVENous PRN    zolpidem (AMBIEN) tablet 5 mg  5 mg Oral QHS PRN    magnesium hydroxide (MILK OF MAGNESIA) 400 mg/5 mL oral suspension 30 mL  30 mL Oral DAILY PRN    cholecalciferol (VITAMIN D3) (1000 Units /25 mcg) tablet 1 Tab  1,000 Units Oral DAILY    vancomycin (VANCOCIN) 1,000 mg in 0.9% sodium chloride (MBP/ADV) 250 mL  1,000 mg IntraVENous Q18H    levoFLOXacin (LEVAQUIN) 750 mg in D5W IVPB  750 mg IntraVENous EVERY OTHER DAY        Objective:   Vitals:  Visit Vitals  /54   Pulse 72   Temp 98.1 °F (36.7 °C)   Resp 18   SpO2 97%      O2 Device: Room air Temp (24hrs), Av.2 °F (36.8 °C), Min:97.9 °F (36.6 °C), Max:98.6 °F (37 °C)      Last 24hr Input/Output:    Intake/Output Summary (Last 24 hours) at 3/4/2020 0563  Last data filed at 3/3/2020 2303  Gross per 24 hour   Intake    Output 900 ml   Net -900 ml        PHYSICAL EXAM:  General:     Alert, cooperative, no distress, appears stated age. Head:    Normocephalic, without obvious abnormality, atraumatic. Eyes:    Conjunctivae/corneas clear. PERRLA  Nose:   Nares normal. No drainage or sinus tenderness. Throat:     Lips, mucosa, and tongue normal.  No Thrush  Neck:   Supple, symmetrical,  no adenopathy, thyroid: non tender     no carotid bruit and no JVD. Back:     Symmetric,  No CVA tenderness. Lungs:    Clear to auscultation bilaterally. No Wheezing or Rhonchi. No rales. Heart:    Regular rate and rhythm,  no murmur, rub or gallop.   Abdomen: Soft, non-tender. Not distended. Bowel sounds normal. No masses  Extremities:  Extremities erythema RLE c/w cellulitis and stasis changes LLE with open area medially currently superficial, atraumatic, No cyanosis. 1+ edema. No clubbing  Lymph nodes:  Cervical, supraclavicular normal.  Neurologic:  Normal strength, Alert and oriented X 3. Skin:                 No rash      Lab Data Reviewed:    Recent Results (from the past 24 hour(s))   GLUCOSE, POC    Collection Time: 03/03/20  5:28 PM   Result Value Ref Range    Glucose (POC) 167 (H) 65 - 100 mg/dL    Performed by [facility] PCT    PROTHROMBIN TIME + INR    Collection Time: 03/03/20  5:52 PM   Result Value Ref Range    INR 1.6 (H) 0.9 - 1.1      Prothrombin time 16.0 (H) 9.0 - 20.9 sec   METABOLIC PANEL, COMPREHENSIVE    Collection Time: 03/03/20  6:32 PM   Result Value Ref Range    Sodium 138 136 - 145 mmol/L    Potassium 4.1 3.5 - 5.1 mmol/L    Chloride 105 97 - 108 mmol/L    CO2 28 21 - 32 mmol/L    Anion gap 5 5 - 15 mmol/L    Glucose 174 (H) 65 - 100 mg/dL    BUN 33 (H) 6 - 20 MG/DL    Creatinine 1.50 (H) 0.70 - 1.30 MG/DL    BUN/Creatinine ratio 22 (H) 12 - 20      GFR est AA 54 (L) >60 ml/min/1.73m2    GFR est non-AA 44 (L) >60 ml/min/1.73m2    Calcium 8.9 8.5 - 10.1 MG/DL    Bilirubin, total 0.3 0.2 - 1.0 MG/DL    ALT (SGPT) 31 12 - 78 U/L    AST (SGOT) 28 15 - 37 U/L    Alk.  phosphatase 75 45 - 117 U/L    Protein, total 7.8 6.4 - 8.2 g/dL    Albumin 3.0 (L) 3.5 - 5.0 g/dL    Globulin 4.8 (H) 2.0 - 4.0 g/dL    A-G Ratio 0.6 (L) 1.1 - 2.2     CBC WITH AUTOMATED DIFF    Collection Time: 03/03/20  6:32 PM   Result Value Ref Range    WBC 15.6 (H) 4.1 - 11.1 K/uL    RBC 3.56 (L) 4.10 - 5.70 M/uL    HGB 10.9 (L) 12.1 - 17.0 g/dL    HCT 34.1 (L) 36.6 - 50.3 %    MCV 95.8 80.0 - 99.0 FL    MCH 30.6 26.0 - 34.0 PG    MCHC 32.0 30.0 - 36.5 g/dL    RDW 14.4 11.5 - 14.5 %    PLATELET 893 985 - 327 K/uL    MPV 9.9 8.9 - 12.9 FL    NRBC 0.0 0  WBC ABSOLUTE NRBC 0.00 0.00 - 0.01 K/uL    NEUTROPHILS 84 (H) 32 - 75 %    LYMPHOCYTES 9 (L) 12 - 49 %    MONOCYTES 6 5 - 13 %    EOSINOPHILS 0 0 - 7 %    BASOPHILS 0 0 - 1 %    IMMATURE GRANULOCYTES 1 (H) 0.0 - 0.5 %    ABS. NEUTROPHILS 13.1 (H) 1.8 - 8.0 K/UL    ABS. LYMPHOCYTES 1.5 0.8 - 3.5 K/UL    ABS. MONOCYTES 0.9 0.0 - 1.0 K/UL    ABS. EOSINOPHILS 0.0 0.0 - 0.4 K/UL    ABS. BASOPHILS 0.0 0.0 - 0.1 K/UL    ABS. IMM.  GRANS. 0.1 (H) 0.00 - 0.04 K/UL    DF AUTOMATED     URINALYSIS W/MICROSCOPIC    Collection Time: 03/03/20  8:00 PM   Result Value Ref Range    Color YELLOW/STRAW      Appearance CLEAR CLEAR      Specific gravity 1.024 1.003 - 1.030      pH (UA) 5.0 5.0 - 8.0      Protein TRACE (A) NEG mg/dL    Glucose NEGATIVE  NEG mg/dL    Ketone NEGATIVE  NEG mg/dL    Bilirubin NEGATIVE  NEG      Blood NEGATIVE  NEG      Urobilinogen 0.2 0.2 - 1.0 EU/dL    Nitrites NEGATIVE  NEG      Leukocyte Esterase NEGATIVE  NEG      WBC 0-4 /hpf    RBC 0-5 /hpf    Epithelial cells FEW /lpf    Bacteria NEGATIVE  /hpf   GLUCOSE, POC    Collection Time: 03/03/20  9:30 PM   Result Value Ref Range    Glucose (POC) 235 (H) 65 - 100 mg/dL    Performed by Elaine Olson (PCT)    METABOLIC PANEL, BASIC    Collection Time: 03/04/20  3:59 AM   Result Value Ref Range    Sodium 140 136 - 145 mmol/L    Potassium 3.8 3.5 - 5.1 mmol/L    Chloride 108 97 - 108 mmol/L    CO2 28 21 - 32 mmol/L    Anion gap 4 (L) 5 - 15 mmol/L    Glucose 111 (H) 65 - 100 mg/dL    BUN 27 (H) 6 - 20 MG/DL    Creatinine 1.31 (H) 0.70 - 1.30 MG/DL    BUN/Creatinine ratio 21 (H) 12 - 20      GFR est AA >60 >60 ml/min/1.73m2    GFR est non-AA 52 (L) >60 ml/min/1.73m2    Calcium 8.5 8.5 - 10.1 MG/DL   CBC W/O DIFF    Collection Time: 03/04/20  3:59 AM   Result Value Ref Range    WBC 11.3 (H) 4.1 - 11.1 K/uL    RBC 3.28 (L) 4.10 - 5.70 M/uL    HGB 9.9 (L) 12.1 - 17.0 g/dL    HCT 31.3 (L) 36.6 - 50.3 %    MCV 95.4 80.0 - 99.0 FL    MCH 30.2 26.0 - 34.0 PG    MCHC 31.6 30.0 - 36.5 g/dL    RDW 14.1 11.5 - 14.5 %    PLATELET 968 005 - 009 K/uL    MPV 10.2 8.9 - 12.9 FL    NRBC 0.0 0  WBC    ABSOLUTE NRBC 0.00 0.00 - 0.01 K/uL   PROTHROMBIN TIME + INR    Collection Time: 03/04/20  3:59 AM   Result Value Ref Range    INR 1.7 (H) 0.9 - 1.1      Prothrombin time 16.9 (H) 9.0 - 11.1 sec         Assessment/Plan:     Principal Problem:    Cellulitis of left lower extremity (1/14/2020)    Active Problems:    ASCVD (arteriosclerotic cardiovascular disease) (9/20/2017)      Overview: Story: Old ASMI by EKG      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, without long-term current use of insulin ([facility] [address] (9/20/2017)      Overview: Story: Diet Controlled      Hypertension with renal disease (9/20/2017)      Stage 3 chronic kidney disease ([facility] [address] (9/20/2017)      Paroxysmal atrial fibrillation ([facility] [address] (1/5/2018)      Polymyalgia rheumatica ([facility] [address] (4/24/2018)      CHF (congestive heart failure) ([facility] [address] (9/20/2017)      Cellulitis (3/3/2020)       ___________________________________________________  PLAN:    1. Continue vancomycin and Levaquin with DAILY Levaquin per my orders  2. Wound care evaluation for left leg ulceration  3. On p.o. Lasix at home changed to IV Bumex to help with fluid in both lower extremities, Follow I & O  4.  Decreased prednisone dose from 5 mg daily to 2-1/2 and hopefully taper him off of this  5. Continue Coumadin follow daily INR, 1.7 today  6. Continue low-dose lisinopril  7. Continue metformin and follow blood sugar with sliding scale coverage if needed  8. Continue other home medications  9. Follow renal function, Cr 1.5 adm to 1.31 today  10.  Follow WBC, 15.6 adm to 11.3 now      40 minutes spent in direct care of this patient today      ___________________________________________________    Attending Physician: Anni Ott MD

## 2020-03-04 NOTE — WOUND CARE
Wound care nurse consult from Dr Melissa Robbins for chronic LLE wound. Patient is a 81 y/o CM admitted for LLE cellulitis and a right LE venous stasis ulceration. Past Medical History:  
Diagnosis Date  Allergic rhinitis 9/20/2017  Arthritis  ASCVD (arteriosclerotic cardiovascular disease) 9/20/2017 Story: Old ASMI by EKG  Back pain 9/20/2017  BPH (benign prostatic hyperplasia) 9/20/2017  CHF (congestive heart failure) ([address] 9/20/2017  CKD (chronic kidney disease) 9/20/2017  Diabetic acetonemia ([address]  DM (diabetes mellitus) ([address] 9/20/2017 Story: Diet Controlled  ED (erectile dysfunction) 9/20/2017  Edema 9/20/2017  Elevated CPK 9/20/2017 Comments: History of  Elevated LFTs 9/20/2017 Comments: History of  Elevated PSA 9/20/2017  Hypercholesteremia  Hyperlipidemia 9/20/2017  Hypertension  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  Nodule of right lung 9/20/2017 Story: Right  Polymyalgia ([address] 9/20/2017  Prostate enlargement Past Surgical History:  
Procedure Laterality Date  ABDOMEN SURGERY PROC UNLISTED    
 hernia repair  HX HEENT  06/12/2018 Dr. 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  
 2 lesions on head Patient states Dr Melissa Robbins has been trying to heal up the LLE for over a month with abx's to no avail. Patient has edema to BLE's and cellulitis to RLE - no wounds to RLE. Left medial ankle wound: W[place] nurse spoke with Dr Melissa Robbins who agrees with getting BLE AVERY's for possible BLE compression wraps and follow-up at [facility] for wound heeling of LLE wound. WOUND POA CONDITIONS Wound Leg lower Left;Medial venous stasis ulcer 03/04/20 (Active) Dressing Status Removed 3/4/2020  2:05 PM  
Dressing Type Xeroform;Dry dressing 3/4/2020  2:05 PM  
Non-staged Wound Description Partial thickness 3/4/2020  2:05 PM  
 Wound Length (cm) 8 cm 3/4/2020  2:05 PM  
Wound Width (cm) 5 cm 3/4/2020  2:05 PM  
Wound Depth (cm) 0.2 cm 3/4/2020  2:05 PM  
Wound Surface Area (cm^2) 40 cm^2 3/4/2020  2:05 PM  
Wound Volume (cm^3) 8 cm^3 3/4/2020  2:05 PM  
Condition of [place] 3/4/2020  2:05 PM  
Condition of Edges Open 3/4/2020  2:05 PM  
Drainage Amount Small 3/4/2020  2:05 PM  
Drainage Color Serous 3/4/2020  2:05 PM  
Wound Odor None 3/4/2020  2:05 PM  
Emelina-wound Assessment Blanchable erythema;Edema 3/4/2020  2:05 PM  
Cleansing and Cleansing Agents  Dermal wound cleanser 3/4/2020  2:05 PM  
Dressing Changed Changed/New 3/4/2020  2:05 PM  
Dressing Type Applied Wound gel;Silver products;Dry dressing 3/4/2020  2:05 PM  
Procedure Tolerated Well 3/4/2020  2:05 PM  
Number of days: 0 Recommend: AVERY's to BLE with modified x3 layer compression wraps if compressible LLE wound: daily, cleanse with dermal wound cleanser, wipe wound clean with 4x4. Cover wound with Xeroform gauze, ABD pad and secure with gauze kinsey. Elevate BLE's in and out of bed Float heels Plan:  nurse to check AVERY results and place compression wraps if possible tomorrow.  
Melissa Otero RN, [employer]

## 2020-03-04 NOTE — PROGRESS NOTES
Pharmacy Automatic Renal Dosing Protocol - Antimicrobials    Indication for Antimicrobials: SSTI    Current Regimen of Each Antimicrobial:  Levofloxacin 750 mg Q48H  (Start Date 3/3; Day # 1)  Vancomycin 2000 mg x 1 then 1000 mg Q18H (Start Date 3/3, Day 1)    Previous Antimicrobial Therapy:    Vancomycin Goal Level: 10-15 mcg/ml    Vancomycin Levels  Date Dose & Interval Measured (mcg/mL) Steady State (mcg/mL)                       Date & time of next level:     Significant Cultures:     Radiology / Imaging results: (X-ray, CT scan or MRI):       Labs:  Recent Labs     20  1832   WBC 15.6*     Temp (24hrs), Av °F (36.7 °C), Min:97.9 °F (36.6 °C), Max:98.1 °F (36.7 °C)      Paralysis, amputations, malnutrition:   Creatinine Clearance (mL/min) or Dialysis:     Impression/Plan:   Antibiotics started as above  BMP daily      Pharmacy will follow daily and adjust medications as appropriate for renal function and/or serum levels. Thank you,  Helen Nazario, [facility]      Recommended duration of therapy  http://[facility]/[place]/[facility]/Pharmacy/Clinical%20Companion/Duration%20of%20ABX%20therapy. docx    Renal Dosing  http://[facility]/[place]/[facility]/Pharmacy/Clinical%20Companion/Renal%[id number]. pdf

## 2020-03-04 NOTE — PROGRESS NOTES
Bedside and Verbal shift change report given to [address] [place] [address] (oncoming nurse) by Turner Goss RN (offgoing nurse). Report included the following information SBAR, Kardex, Intake/Output, MAR, Recent Results and Cardiac Rhythm NSR. Patient rested well this night sleeping at long intervals, no complaints voiced, no distress noted.

## 2020-03-05 ENCOUNTER — APPOINTMENT (OUTPATIENT)
Dept: NON INVASIVE DIAGNOSTICS | Age: 85
DRG: 603 | End: 2020-03-05
Attending: INTERNAL MEDICINE
Payer: MEDICARE

## 2020-03-05 ENCOUNTER — APPOINTMENT (OUTPATIENT)
Dept: VASCULAR SURGERY | Age: 85
DRG: 603 | End: 2020-03-05
Attending: INTERNAL MEDICINE
Payer: MEDICARE

## 2020-03-05 PROBLEM — I50.32 DIASTOLIC CHF, CHRONIC (HCC): Status: ACTIVE | Noted: 2020-03-05

## 2020-03-05 PROBLEM — I50.9 CHF (CONGESTIVE HEART FAILURE) (HCC): Status: RESOLVED | Noted: 2017-09-20 | Resolved: 2020-03-05

## 2020-03-05 LAB
ANION GAP SERPL CALC-SCNC: 4 MMOL/L (ref 5–15)
AV VELOCITY RATIO: 0.57
AV VTI RATIO: 0.7
BUN SERPL-MCNC: 21 MG/DL (ref 6–20)
BUN/CREAT SERPL: 18 (ref 12–20)
CALCIUM SERPL-MCNC: 8.9 MG/DL (ref 8.5–10.1)
CHLORIDE SERPL-SCNC: 105 MMOL/L (ref 97–108)
CO2 SERPL-SCNC: 30 MMOL/L (ref 21–32)
CREAT SERPL-MCNC: 1.2 MG/DL (ref 0.7–1.3)
ECHO AO ROOT DIAM: 3.42 CM
ECHO AV AREA PEAK VELOCITY: 1.8 CM2
ECHO AV AREA VTI: 2.1 CM2
ECHO AV MEAN GRADIENT: 11.2 MMHG
ECHO AV MEAN VELOCITY: 1.6 M/S
ECHO AV PEAK GRADIENT: 23 MMHG
ECHO AV PEAK VELOCITY: 239.67 CM/S
ECHO AV VTI: 41.95 CM
ECHO EST RA PRESSURE: 10 MMHG
ECHO LA AREA 4C: 23.4 CM2
ECHO LA MAJOR AXIS: 3.91 CM
ECHO LA TO AORTIC ROOT RATIO: 1.14
ECHO LA VOL 4C: 67.97 ML (ref 18–58)
ECHO LA VOLUME INDEX A4C: 31.04 ML/M2 (ref 16–28)
ECHO LV E' LATERAL VELOCITY: 7.39 CM/S
ECHO LV E' SEPTAL VELOCITY: 4.8 CM/S
ECHO LV EDV A4C: 133 ML
ECHO LV EDV INDEX A4C: 60.7 ML/M2
ECHO LV EJECTION FRACTION A4C: 49 %
ECHO LV ESV A4C: 67.4 ML
ECHO LV ESV INDEX A4C: 30.8 ML/M2
ECHO LV INTERNAL DIMENSION DIASTOLIC: 3.33 CM (ref 4.2–5.9)
ECHO LV INTERNAL DIMENSION SYSTOLIC: 2.95 CM
ECHO LV IVSD: 1.06 CM (ref 0.6–1)
ECHO LV MASS 2D: 149.2 G (ref 88–224)
ECHO LV MASS INDEX 2D: 68.1 G/M2 (ref 49–115)
ECHO LV POSTERIOR WALL DIASTOLIC: 1.39 CM (ref 0.6–1)
ECHO LVOT CARDIAC OUTPUT: 6.7 L/MIN
ECHO LVOT DIAM: 2.02 CM
ECHO LVOT PEAK GRADIENT: 7.4 MMHG
ECHO LVOT PEAK VELOCITY: 135.61 CM/S
ECHO LVOT SV: 89.5 ML
ECHO LVOT VTI: 27.81 CM
ECHO MV A VELOCITY: 76.56 CM/S
ECHO MV AREA PHT: 6.5 CM2
ECHO MV AREA VTI: 3.9 CM2
ECHO MV E DECELERATION TIME (DT): 133.2 MS
ECHO MV E VELOCITY: 66.68 CM/S
ECHO MV E/A RATIO: 0.87
ECHO MV E/E' LATERAL: 9.02
ECHO MV E/E' RATIO (AVERAGED): 11.46
ECHO MV E/E' SEPTAL: 13.89
ECHO MV MAX VELOCITY: 99.93 CM/S
ECHO MV MEAN GRADIENT: 1.8 MMHG
ECHO MV MEAN INFLOW VELOCITY: 0.62 M/S
ECHO MV PEAK GRADIENT: 4 MMHG
ECHO MV PRESSURE HALF TIME (PHT): 33.8 MS
ECHO MV VTI: 22.96 CM
ECHO PULMONARY ARTERY SYSTOLIC PRESSURE (PASP): 36.5 MMHG
ECHO PV MAX VELOCITY: 111.71 CM/S
ECHO PV MEAN GRADIENT: 2.6 MMHG
ECHO PV PEAK GRADIENT: 5 MMHG
ECHO PV VTI: 20.32 CM
ECHO RA AREA 4C: 16.41 CM2
ECHO RIGHT VENTRICULAR SYSTOLIC PRESSURE (RVSP): 36.5 MMHG
ECHO TV REGURGITANT MAX VELOCITY: 257.3 CM/S
ECHO TV REGURGITANT PEAK GRADIENT: 26.5 MMHG
ERYTHROCYTE [DISTWIDTH] IN BLOOD BY AUTOMATED COUNT: 13.7 % (ref 11.5–14.5)
GLUCOSE BLD STRIP.AUTO-MCNC: 120 MG/DL (ref 65–100)
GLUCOSE BLD STRIP.AUTO-MCNC: 128 MG/DL (ref 65–100)
GLUCOSE BLD STRIP.AUTO-MCNC: 135 MG/DL (ref 65–100)
GLUCOSE BLD STRIP.AUTO-MCNC: 218 MG/DL (ref 65–100)
GLUCOSE SERPL-MCNC: 138 MG/DL (ref 65–100)
HCT VFR BLD AUTO: 32.4 % (ref 36.6–50.3)
HGB BLD-MCNC: 10.2 G/DL (ref 12.1–17)
INR PPP: 1.5 (ref 0.9–1.1)
LVFS 2D: 11.5 %
LVOT MG: 4.54 MMHG
LVOT MV: 1.03 CM/S
MCH RBC QN AUTO: 30.2 PG (ref 26–34)
MCHC RBC AUTO-ENTMCNC: 31.5 G/DL (ref 30–36.5)
MCV RBC AUTO: 95.9 FL (ref 80–99)
MV DEC SLOPE: 5.01
NRBC # BLD: 0 K/UL (ref 0–0.01)
NRBC BLD-RTO: 0 PER 100 WBC
PLATELET # BLD AUTO: 218 K/UL (ref 150–400)
PMV BLD AUTO: 9.6 FL (ref 8.9–12.9)
POTASSIUM SERPL-SCNC: 3.9 MMOL/L (ref 3.5–5.1)
PROTHROMBIN TIME: 15.2 SEC (ref 9–11.1)
PULMONARY ARTERY END DIASTOLIC PRESSURE: 20.3 MMHG
PULMONARY ARTERY MEAN PRESURE: 25.7 MMHG
PV END DIASTOLIC VELOCITY: 1.6 MMHG
RBC # BLD AUTO: 3.38 M/UL (ref 4.1–5.7)
SERVICE CMNT-IMP: ABNORMAL
SODIUM SERPL-SCNC: 139 MMOL/L (ref 136–145)
WBC # BLD AUTO: 10.9 K/UL (ref 4.1–11.1)

## 2020-03-05 PROCEDURE — 85610 PROTHROMBIN TIME: CPT

## 2020-03-05 PROCEDURE — 36415 COLL VENOUS BLD VENIPUNCTURE: CPT

## 2020-03-05 PROCEDURE — 74011250637 HC RX REV CODE- 250/637: Performed by: INTERNAL MEDICINE

## 2020-03-05 PROCEDURE — 80048 BASIC METABOLIC PNL TOTAL CA: CPT

## 2020-03-05 PROCEDURE — 74011250636 HC RX REV CODE- 250/636: Performed by: INTERNAL MEDICINE

## 2020-03-05 PROCEDURE — 74011000250 HC RX REV CODE- 250: Performed by: INTERNAL MEDICINE

## 2020-03-05 PROCEDURE — 74011636637 HC RX REV CODE- 636/637: Performed by: INTERNAL MEDICINE

## 2020-03-05 PROCEDURE — 85027 COMPLETE CBC AUTOMATED: CPT

## 2020-03-05 PROCEDURE — 93922 UPR/L XTREMITY ART 2 LEVELS: CPT

## 2020-03-05 PROCEDURE — 65270000015 HC RM PRIVATE ONCOLOGY

## 2020-03-05 PROCEDURE — 82962 GLUCOSE BLOOD TEST: CPT

## 2020-03-05 PROCEDURE — 93306 TTE W/DOPPLER COMPLETE: CPT

## 2020-03-05 RX ORDER — WARFARIN 7.5 MG/1
7.5 TABLET ORAL EVERY EVENING
Status: DISCONTINUED | OUTPATIENT
Start: 2020-03-05 | End: 2020-03-09 | Stop reason: HOSPADM

## 2020-03-05 RX ORDER — LEVOFLOXACIN 750 MG/1
750 TABLET ORAL
Status: DISCONTINUED | OUTPATIENT
Start: 2020-03-05 | End: 2020-03-09 | Stop reason: HOSPADM

## 2020-03-05 RX ORDER — METFORMIN HYDROCHLORIDE 500 MG/1
500 TABLET ORAL 2 TIMES DAILY WITH MEALS
Status: DISCONTINUED | OUTPATIENT
Start: 2020-03-05 | End: 2020-03-09 | Stop reason: HOSPADM

## 2020-03-05 RX ADMIN — DIGOXIN 0.12 MG: 125 TABLET ORAL at 09:14

## 2020-03-05 RX ADMIN — VITAMIN D, TAB 1000IU (100/BT) 1 TABLET: 25 TAB at 09:16

## 2020-03-05 RX ADMIN — VANCOMYCIN HYDROCHLORIDE 1000 MG: 1 INJECTION, POWDER, LYOPHILIZED, FOR SOLUTION INTRAVENOUS at 09:14

## 2020-03-05 RX ADMIN — BUMETANIDE 2 MG: 0.25 INJECTION INTRAMUSCULAR; INTRAVENOUS at 22:11

## 2020-03-05 RX ADMIN — Medication 10 ML: at 22:12

## 2020-03-05 RX ADMIN — IBUPROFEN 600 MG: 600 TABLET, FILM COATED ORAL at 09:14

## 2020-03-05 RX ADMIN — Medication 10 ML: at 14:00

## 2020-03-05 RX ADMIN — BUMETANIDE 2 MG: 0.25 INJECTION INTRAMUSCULAR; INTRAVENOUS at 09:14

## 2020-03-05 RX ADMIN — METFORMIN HYDROCHLORIDE 500 MG: 500 TABLET ORAL at 09:14

## 2020-03-05 RX ADMIN — IBUPROFEN 600 MG: 600 TABLET, FILM COATED ORAL at 17:35

## 2020-03-05 RX ADMIN — IBUPROFEN 600 MG: 600 TABLET, FILM COATED ORAL at 22:11

## 2020-03-05 RX ADMIN — LORATADINE 10 MG: 10 TABLET ORAL at 09:14

## 2020-03-05 RX ADMIN — THERA TABS 1 TABLET: TAB at 09:14

## 2020-03-05 RX ADMIN — METFORMIN HYDROCHLORIDE 500 MG: 500 TABLET ORAL at 17:35

## 2020-03-05 RX ADMIN — WARFARIN SODIUM 7.5 MG: 7.5 TABLET ORAL at 17:34

## 2020-03-05 RX ADMIN — LISINOPRIL 5 MG: 5 TABLET ORAL at 09:14

## 2020-03-05 RX ADMIN — TERAZOSIN HYDROCHLORIDE 2 MG: 1 CAPSULE ORAL at 22:11

## 2020-03-05 RX ADMIN — PRAVASTATIN SODIUM 40 MG: 40 TABLET ORAL at 22:11

## 2020-03-05 RX ADMIN — LEVOFLOXACIN 750 MG: 750 TABLET, FILM COATED ORAL at 22:11

## 2020-03-05 RX ADMIN — PREDNISONE 2.5 MG: 5 TABLET ORAL at 09:14

## 2020-03-05 NOTE — PROGRESS NOTES
Bedside and Verbal shift change report given to [address] [place] [address] (oncoming nurse) by Eri Kemp RN (offgoing nurse). Report included the following information SBAR, Kardex, Intake/Output, MAR and Recent Results.  Patient rested well this night sleeping at long intervals, no complaints voiced

## 2020-03-05 NOTE — ACP (ADVANCE CARE PLANNING)
Responded to request by Care Manager to assist with Advance Medical Directive (AMD) in Oncology unit. Consulted with patients nurse Sonny Fox). The patient did not have family/friends present. Explained document to patient. Patient indicated that he has an interest in completing an AMD document. The patient indicated that when his daughter-in-law comes this afternoon, he will discuss it with her. He will call for a  when/if he decides to proceed. Rev.  Antonio Fontana EdD MDiv     For  Assistance Page [phone])

## 2020-03-05 NOTE — INTERDISCIPLINARY ROUNDS
Oncology Interdisciplinary rounds were held today to discuss patient plan of care and outcomes. The following members were present: Nursing, Physician, Case Management, Pharmacy, and PT/OT Actual Length of Stay: 2 DRG GLOS: 3.3 Expected Length of Stay: 3d 7h 
 
 
 
               Plan            Discharge Home with [address]

## 2020-03-05 NOTE — PROGRESS NOTES
Spiritual Care Assessment/Progress Note  [facility]      NAME: Erin Chauhan      MRN: [id number]  AGE: 80 y.o.  SEX: male  Mosque Affiliation: Rastafarian   Language: English     3/5/2020     Total Time (in minutes): 42     Spiritual Assessment begun in [facility] MEDICAL ONCOLOGY through conversation with:         [x]Patient        [] Family    [] Friend(s)        Reason for Consult: Advance medical directive consult     Spiritual beliefs: (Please include comment if needed)     [] Identifies with a lm tradition:         [x] Supported by a lm community:            [] Claims no spiritual orientation:           [] Seeking spiritual identity:                [] Adheres to an individual form of spirituality:           [] Not able to assess:                           Identified resources for coping:      [x] Prayer                               [] Music                  [] Guided Imagery     [x] Family/friends                 [] Pet visits     [] Devotional reading                         [] Unknown     [] Other:                                         Interventions offered during this visit: (See comments for more details)    Patient Interventions: Advance medical directive consult, Affirmation of lm, Prayer (assurance of), Mosque beliefs/image of God discussed           Plan of Care:     [] Support spiritual and/or cultural needs    [] Support AMD and/or advance care planning process      [] Support grieving process   [] Coordinate Rites and/or Rituals    [] Coordination with community clergy   [x] No spiritual needs identified at this time   [] Detailed Plan of Care below (See Comments)  [] Make referral to Music Therapy  [] Make referral to Pet Therapy     [] Make referral to Addiction services  [] Make referral to [facility]  [] Make referral to Spiritual Care Partner  [] No future visits requested        [x] Follow up visits as needed     Comments: Responded to request by Care Manager to assist with Advance Medical Directive (AMD) in Oncology unit. Consulted with patients nurse Araseli Farooq). The patient did not have family/friends present. Explained document to patient. Patient indicated that he has an interest in completing an AMD document. The patient indicated that when his daughter-in-law comes this afternoon, he will discuss it with her. He will call for a  when/if he decides to proceed. The patient did not provide any specific indication that there are any spiritual needs at this time. The patient stated that he has a good relation with the Man upstairs. Provided assurance to the patient that he will be kept in prayer. Advised of  availability. Chaplains will follow as able and/or needed. Rev.  Pedro Pablo Larios EdD  MDiv      For  Assistance Page [phone])

## 2020-03-05 NOTE — WOUND CARE
Wound care nurse placed a x3 layer modified compression wrap to LLE today. RLE still has erythema and being treated for cellulitis. Can apply compression wrap if needed to RLE but MD still watching. See previous note today for full AVERY results. Interpretation Summary  
  
· The right resting AVERY is normal. Right AVERY is 1.23. 
· The left resting AVERY is normal. Left AVERY is 1.37. 
· Right toe PPG is normal. 
· Left toe PPG is normal.  
 
 
 
 
Recommend: LLE 3 layer compression wrap needs to be changed by Wed 3/11 or Thurs 3/12 of next week. Patient being sent home with dressing change if [facility] needs to change it. [facility] will need to change LLE compression wrap if unable to get patient to [facility] by 3/11 or 3/12. Patient needs follow-up at [facility] for LLE chronic venous stasis ulcer. LLE dressing change 3/11 or 3/12: 
 
Remove compression wrap, wash leg and foot with soap and water and apply lotion to intact skin. Clean wound with dermal wound cleanser spray and 4x4. Cover wound with Xeroform gauze, ABD pad and apply 3 layer compression wrap. Plan: Patient will be d/c'd home with [facility] and follow-up at [facility] for wound healing.  
 
 
Kristan Zambrano RN, [employer]

## 2020-03-05 NOTE — WOUND CARE
AVERY results: 
 
Interpretation Summary · The right resting AVERY is normal. Right AVERY is 1.23. 
· The left resting AVERY is normal. Left AVERY is 1.37. 
· Right toe PPG is normal. 
· Left toe PPG is normal. 
   
Lower Extremity Arterial Findings AVERY The right resting AVERY is normal. The left resting AVERY is normal. PVR waveforms in the right lower thigh are normal. PVR waveforms in the right calf are normal. PVR waveforms in the right ankle are normal. PVR waveforms at the right metatarsal region are normal. PVR waveforms in the left lower thigh are normal. PVR waveforms in the left calf are normal. PVR waveforms in the left ankle are normal. PVR waveforms of the left metatarsal region are normal. Right toe PPG is normal. Left toe PPG is normal.  
Arterial Pressure Measurements Right Left Brachial  mmHg 162 mmHg Post Tibial  mmHg 222 mmHg Tibial/DP  mmHg 189 mmHg AVERY 1.23   
  
 1.37 Toe Pressure 126 mmHg 179 mmHg TBI 0.78   
  
 1.1 Patient can have BLE compression wraps applied today. Plan:  nurse to place x3 layer compression wraps this after noon. Patient can follow up at [facility] for wound healing and will need [facility] for dressing change next week if unable to get in to [facility].  
 
Melissa Otero RN, [employer]

## 2020-03-05 NOTE — PROGRESS NOTES
Bedside shift change report given to Jed Byrne (oncoming nurse) by Milka Santiago (offgoing nurse). Report included the following information SBAR, Kardex and MAR.

## 2020-03-05 NOTE — PROGRESS NOTES
Pharmacy Automatic Renal Dosing Protocol - Antimicrobials    Indication for Antimicrobials: SSTI    Current Regimen of Each Antimicrobial:  Levofloxacin 500mg IV q24h  (Start Date 3/3; Day # 3)  Vancomycin 2000 mg x 1 then 1000 mg Q18H (Start Date 3/3, Day 3)    Previous Antimicrobial Therapy:    Vancomycin Goal Level: 10-15 mcg/ml    Vancomycin Levels  Date Dose & Interval Measured (mcg/mL) Steady State (mcg/mL)                       Date & time of next level:  before 3am dose    Significant Cultures:   3/3: Blood = NGSF (pending)    Radiology / Imaging results: (X-ray, CT scan or MRI): none significant      Labs:  Recent Labs     20  0531 20  0359 20  1832   CREA 1.20 1.31* 1.50*   BUN 21* 27* 33*   WBC 10.9 11.3* 15.6*     Temp (24hrs), Av.8 °F (37.1 °C), Min:97.3 °F (36.3 °C), Max:99.6 °F (37.6 °C)    Paralysis, amputations, malnutrition: none mentioned    Creatinine Clearance (mL/min) or Dialysis: 52 ml/min    Impression/Plan:   - Continue Vancomycin 1gm IV q18h  - Vancomycin Trough before 3am dose tomorrow  - Change Levofloxacin to 750mg ORAL q24h for improved renal function  - Daily BMP  - LOT = TBD     Pharmacy will follow daily and adjust medications as appropriate for renal function and/or serum levels. Thank you,  Meenu Briceño RPH      Recommended duration of therapy  http://[facility]/Pharmacy/Clinical%20Companion/Duration%20of%20ABX%20therapy. docx    Renal Dosing  http://[facility]/[place]/[facility]/Pharmacy/Clinical%20Companion/Renal%[id number]. pdf

## 2020-03-05 NOTE — PROGRESS NOTES
PROGRESS NOTE    NAME:  Vic White   :   1933   MRN:   [id number]     Date/Time:  3/5/2020 6:57 AM  Subjective:   History:  Chart reviewed and patient seen and examined and D/W his nurse this AM and all events noted. He presented with increasing redness right lower extremity as well as chills on the morning of admission and now development of a open draining area on the left lower extremity where he previously had a cellulitis. He recently had been evaluated and treated for cellulitis of the left ankle initially having 4 weeks of Bactrim which did not seem to clear it up and then 4 weeks PTA when he was seen he was placed on Augmentin and he felt like he improved on that initially therefore he was continued when he was last seen 2 weeks PTA; however since that time he has now developed an open area on the left lower extremity as well as developing some redness in the right leg. Faustina Rcohe was still soaking with Epsom salts on a regular basis and using a \"drawing salve\" on it. Carolina Wright denies any significant associated pain.  He had some shaking chills the morning of admission when he awakened. Carolina Wright has had no F/C since antibiotics started. He currently denies any chest pain, shortness of breath, palpitations, PND, orthopnea or cardiorespiratory complaints and has had good diuresis with IV Bumex since admission. Carolina Wright has no GI or  complaints.  He has no headaches, dizziness or neurologic complaints.  He has no other complaints on complete review of systems except his chronic arthritic complaints which are unchanged.       Medications reviewed:  Current Facility-Administered Medications   Medication Dose Route Frequency    levoFLOXacin (LEVAQUIN) 500 mg in D5W IVPB  500 mg IntraVENous Q24H    acetaminophen (TYLENOL) tablet 650 mg  650 mg Oral Q8H PRN    ibuprofen (MOTRIN) tablet 600 mg  600 mg Oral TID    digoxin (LANOXIN) tablet 0.125 mg  0.125 mg Oral DAILY    loratadine (CLARITIN) tablet 10 mg  10 mg Oral DAILY  bumetanide (BUMEX) injection 2 mg  2 mg IntraVENous Q12H    lisinopril (PRINIVIL, ZESTRIL) tablet 5 mg  5 mg Oral DAILY    metFORMIN (GLUCOPHAGE) tablet 500 mg  500 mg Oral DAILY WITH BREAKFAST    therapeutic multivitamin (THERAGRAN) tablet 1 Tab  1 Tab Oral DAILY    predniSONE (DELTASONE) tablet 2.5 mg  2.5 mg Oral DAILY WITH BREAKFAST    pravastatin (PRAVACHOL) tablet 40 mg  40 mg Oral QHS    terazosin (HYTRIN) capsule 2 mg  2 mg Oral DAILY    warfarin (COUMADIN) tablet 5 mg  5 mg Oral QPM    sodium chloride (NS) flush 5-40 mL  5-40 mL IntraVENous Q8H    sodium chloride (NS) flush 5-40 mL  5-40 mL IntraVENous PRN    zolpidem (AMBIEN) tablet 5 mg  5 mg Oral QHS PRN    magnesium hydroxide (MILK OF MAGNESIA) 400 mg/5 mL oral suspension 30 mL  30 mL Oral DAILY PRN    cholecalciferol (VITAMIN D3) (1000 Units /25 mcg) tablet 1 Tab  1,000 Units Oral DAILY    vancomycin (VANCOCIN) 1,000 mg in 0.9% sodium chloride (MBP/ADV) 250 mL  1,000 mg IntraVENous Q18H        Objective:   Vitals:  Visit Vitals  /69 (BP 1 Location: Left arm, BP Patient Position: At rest)   Pulse 73   Temp 97.3 °F (36.3 °C)   Resp 16   Wt 199 lb 4.7 oz (90.4 kg)   SpO2 97%   BMI 24.91 kg/m²      O2 Device: Room air Temp (24hrs), Av.7 °F (37.1 °C), Min:97.3 °F (36.3 °C), Max:99.2 °F (37.3 °C)      Last 24hr Input/Output:  No intake or output data in the 24 hours ending 20 0657     PHYSICAL EXAM:  General:     Alert, cooperative, no distress, appears stated age. Head:    Normocephalic, without obvious abnormality, atraumatic. Eyes:    Conjunctivae/corneas clear. PERRLA  Nose:   Nares normal. No drainage or sinus tenderness. Throat:     Lips, mucosa, and tongue normal.  No Thrush  Neck:   Supple, symmetrical,  no adenopathy, thyroid: non tender     no carotid bruit and no JVD. Back:     Symmetric,  No CVA tenderness. Lungs:    Clear to auscultation bilaterally. No Wheezing or Rhonchi. No rales.   Heart:    Regular rate and rhythm,  no murmur, rub or gallop. Abdomen:    Soft, non-tender. Not distended. Bowel sounds normal. No masses  Extremities:  Extremities erythema RLE c/w cellulitis slightly improved and stasis changes LLE with open area medially currently superficial (Currently wrapped), atraumatic, No cyanosis. 1+ edema. No clubbing  Lymph nodes:  Cervical, supraclavicular normal.  Neurologic:  Normal strength, Alert and oriented X 3.    Skin:                 No rash      Lab Data Reviewed:    Recent Results (from the past 24 hour(s))   GLUCOSE, POC    Collection Time: 03/04/20  8:55 AM   Result Value Ref Range    Glucose (POC) 244 (H) 65 - 100 mg/dL    Performed by [employer]    GLUCOSE, POC    Collection Time: 03/04/20 11:11 AM   Result Value Ref Range    Glucose (POC) 125 (H) 65 - 100 mg/dL    Performed by [employer]    GLUCOSE, POC    Collection Time: 03/04/20  4:43 PM   Result Value Ref Range    Glucose (POC) 145 (H) 65 - 100 mg/dL    Performed by [employer]    GLUCOSE, POC    Collection Time: 03/04/20  8:40 PM   Result Value Ref Range    Glucose (POC) 236 (H) 65 - 100 mg/dL    Performed by No Morales PCT    PROTHROMBIN TIME + INR    Collection Time: 03/05/20  5:31 AM   Result Value Ref Range    INR 1.5 (H) 0.9 - 1.1      Prothrombin time 15.2 (H) 9.0 - 26.4 sec   METABOLIC PANEL, BASIC    Collection Time: 03/05/20  5:31 AM   Result Value Ref Range    Sodium 139 136 - 145 mmol/L    Potassium 3.9 3.5 - 5.1 mmol/L    Chloride 105 97 - 108 mmol/L    CO2 30 21 - 32 mmol/L    Anion gap 4 (L) 5 - 15 mmol/L    Glucose 138 (H) 65 - 100 mg/dL    BUN 21 (H) 6 - 20 MG/DL    Creatinine 1.20 0.70 - 1.30 MG/DL    BUN/Creatinine ratio 18 12 - 20      GFR est AA >60 >60 ml/min/1.73m2    GFR est non-AA 57 (L) >60 ml/min/1.73m2    Calcium 8.9 8.5 - 10.1 MG/DL   CBC W/O DIFF    Collection Time: 03/05/20  5:31 AM   Result Value Ref Range    WBC 10.9 4.1 - 11.1 K/uL    RBC 3.38 (L) 4.10 - 5.70 M/uL    HGB 10.2 (L) 12.1 - 17.0 g/dL    HCT 32.4 (L) 36.6 - 50.3 %    MCV 95.9 80.0 - 99.0 FL    MCH 30.2 26.0 - 34.0 PG    MCHC 31.5 30.0 - 36.5 g/dL    RDW 13.7 11.5 - 14.5 %    PLATELET 283 530 - 929 K/uL    MPV 9.6 8.9 - 12.9 FL    NRBC 0.0 0  WBC    ABSOLUTE NRBC 0.00 0.00 - 0.01 K/uL         Assessment/Plan:     Principal Problem:    Cellulitis of left lower extremity (1/14/2020)    Active Problems:    ASCVD (arteriosclerotic cardiovascular disease) (9/20/2017)      Overview: Story: Old ASMI by EKG      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 [address] (9/20/2017)      Overview: Story: Diet Controlled      Hypertension with renal disease (9/20/2017)      Stage 3 chronic kidney disease ([facility] [address] (9/20/2017)      Paroxysmal atrial fibrillation ([facility] [address] (1/5/2018)      Polymyalgia rheumatica ([facility] [address] (4/24/2018)      CHF (congestive heart failure) ([facility] [address] (9/20/2017)      Cellulitis (3/3/2020)       ___________________________________________________  PLAN:    1. Continue Vancomycin and Levaquin with DAILY Levaquin per my orders  2. Wound care evaluation for left leg ulceration done yesterday and I D/W wound care nurse  3. On p.o. Lasix at home changed to IV Bumex to help with fluid in both lower extremities, Follow I & O  4.  Decreased prednisone dose from 5 mg daily to 2-1/2 and hopefully taper him off of this  5. Continue Coumadin follow daily INR, 1.5 today  6. Continue low-dose lisinopril  7. Continue metformin and follow blood sugar with sliding scale coverage if needed  8. Continue other home medications  9. Follow renal function, Cr 1.5 adm to 1.20 today  10. Follow WBC, 15.6 adm to 11.3 yesterday and 10.9 now  11.  Check arterial dopplers although this is most likely a venous stasis abnormality          ___________________________________________________    Attending Physician: Ivy Siemens, MD

## 2020-03-06 LAB
ANION GAP SERPL CALC-SCNC: 5 MMOL/L (ref 5–15)
BACTERIA SPEC AEROBE CULT: ABNORMAL
BACTERIA SPEC AEROBE CULT: ABNORMAL
BUN SERPL-MCNC: 25 MG/DL (ref 6–20)
BUN/CREAT SERPL: 22 (ref 12–20)
CALCIUM SERPL-MCNC: 8.7 MG/DL (ref 8.5–10.1)
CHLORIDE SERPL-SCNC: 104 MMOL/L (ref 97–108)
CO2 SERPL-SCNC: 30 MMOL/L (ref 21–32)
CREAT SERPL-MCNC: 1.12 MG/DL (ref 0.7–1.3)
DATE LAST DOSE: NORMAL
ERYTHROCYTE [DISTWIDTH] IN BLOOD BY AUTOMATED COUNT: 13.3 % (ref 11.5–14.5)
GLUCOSE BLD STRIP.AUTO-MCNC: 125 MG/DL (ref 65–100)
GLUCOSE BLD STRIP.AUTO-MCNC: 134 MG/DL (ref 65–100)
GLUCOSE BLD STRIP.AUTO-MCNC: 203 MG/DL (ref 65–100)
GLUCOSE BLD STRIP.AUTO-MCNC: 217 MG/DL (ref 65–100)
GLUCOSE SERPL-MCNC: 135 MG/DL (ref 65–100)
HCT VFR BLD AUTO: 31.1 % (ref 36.6–50.3)
HGB BLD-MCNC: 10.1 G/DL (ref 12.1–17)
INR PPP: 1.5 (ref 0.9–1.1)
MCH RBC QN AUTO: 30 PG (ref 26–34)
MCHC RBC AUTO-ENTMCNC: 32.5 G/DL (ref 30–36.5)
MCV RBC AUTO: 92.3 FL (ref 80–99)
NRBC # BLD: 0 K/UL (ref 0–0.01)
NRBC BLD-RTO: 0 PER 100 WBC
PLATELET # BLD AUTO: 245 K/UL (ref 150–400)
PMV BLD AUTO: 9.3 FL (ref 8.9–12.9)
POTASSIUM SERPL-SCNC: 3.6 MMOL/L (ref 3.5–5.1)
PROTHROMBIN TIME: 15.1 SEC (ref 9–11.1)
RBC # BLD AUTO: 3.37 M/UL (ref 4.1–5.7)
REPORTED DOSE,DOSE: NORMAL UNITS
REPORTED DOSE/TIME,TMG: 300
SERVICE CMNT-IMP: ABNORMAL
SODIUM SERPL-SCNC: 139 MMOL/L (ref 136–145)
SPECIMEN STATUS REPORT, ROLRST: NORMAL
VANCOMYCIN TROUGH SERPL-MCNC: 9.2 UG/ML (ref 5–10)
WBC # BLD AUTO: 7.8 K/UL (ref 4.1–11.1)

## 2020-03-06 PROCEDURE — 36415 COLL VENOUS BLD VENIPUNCTURE: CPT

## 2020-03-06 PROCEDURE — 74011250636 HC RX REV CODE- 250/636: Performed by: INTERNAL MEDICINE

## 2020-03-06 PROCEDURE — 74011000250 HC RX REV CODE- 250: Performed by: INTERNAL MEDICINE

## 2020-03-06 PROCEDURE — 74011250637 HC RX REV CODE- 250/637: Performed by: INTERNAL MEDICINE

## 2020-03-06 PROCEDURE — 80048 BASIC METABOLIC PNL TOTAL CA: CPT

## 2020-03-06 PROCEDURE — 65270000015 HC RM PRIVATE ONCOLOGY

## 2020-03-06 PROCEDURE — 82962 GLUCOSE BLOOD TEST: CPT

## 2020-03-06 PROCEDURE — 85027 COMPLETE CBC AUTOMATED: CPT

## 2020-03-06 PROCEDURE — 80202 ASSAY OF VANCOMYCIN: CPT

## 2020-03-06 PROCEDURE — 85610 PROTHROMBIN TIME: CPT

## 2020-03-06 RX ORDER — PREDNISONE 5 MG/1
2.5 TABLET ORAL EVERY OTHER DAY
Status: DISCONTINUED | OUTPATIENT
Start: 2020-03-07 | End: 2020-03-09 | Stop reason: HOSPADM

## 2020-03-06 RX ADMIN — METFORMIN HYDROCHLORIDE 500 MG: 500 TABLET ORAL at 17:36

## 2020-03-06 RX ADMIN — Medication 10 ML: at 21:07

## 2020-03-06 RX ADMIN — LORATADINE 10 MG: 10 TABLET ORAL at 08:47

## 2020-03-06 RX ADMIN — METFORMIN HYDROCHLORIDE 500 MG: 500 TABLET ORAL at 08:48

## 2020-03-06 RX ADMIN — TERAZOSIN HYDROCHLORIDE 2 MG: 1 CAPSULE ORAL at 21:06

## 2020-03-06 RX ADMIN — BUMETANIDE 2 MG: 0.25 INJECTION INTRAMUSCULAR; INTRAVENOUS at 08:47

## 2020-03-06 RX ADMIN — WARFARIN SODIUM 7.5 MG: 7.5 TABLET ORAL at 17:36

## 2020-03-06 RX ADMIN — IBUPROFEN 600 MG: 600 TABLET, FILM COATED ORAL at 08:47

## 2020-03-06 RX ADMIN — IBUPROFEN 600 MG: 600 TABLET, FILM COATED ORAL at 21:06

## 2020-03-06 RX ADMIN — BUMETANIDE 2 MG: 0.25 INJECTION INTRAMUSCULAR; INTRAVENOUS at 21:07

## 2020-03-06 RX ADMIN — IBUPROFEN 600 MG: 600 TABLET, FILM COATED ORAL at 17:36

## 2020-03-06 RX ADMIN — LEVOFLOXACIN 750 MG: 750 TABLET, FILM COATED ORAL at 21:06

## 2020-03-06 RX ADMIN — VITAMIN D, TAB 1000IU (100/BT) 1 TABLET: 25 TAB at 08:47

## 2020-03-06 RX ADMIN — Medication 10 ML: at 05:45

## 2020-03-06 RX ADMIN — VANCOMYCIN HYDROCHLORIDE 1000 MG: 1 INJECTION, POWDER, LYOPHILIZED, FOR SOLUTION INTRAVENOUS at 21:07

## 2020-03-06 RX ADMIN — THERA TABS 1 TABLET: TAB at 08:48

## 2020-03-06 RX ADMIN — LISINOPRIL 5 MG: 5 TABLET ORAL at 08:47

## 2020-03-06 RX ADMIN — DIGOXIN 0.12 MG: 125 TABLET ORAL at 08:48

## 2020-03-06 RX ADMIN — PRAVASTATIN SODIUM 40 MG: 40 TABLET ORAL at 21:06

## 2020-03-06 RX ADMIN — VANCOMYCIN HYDROCHLORIDE 1000 MG: 1 INJECTION, POWDER, LYOPHILIZED, FOR SOLUTION INTRAVENOUS at 02:58

## 2020-03-06 NOTE — PROGRESS NOTES
Oncology End of Shift Note      Bedside shift change report given to Yoni Luevano RN (incoming nurse) by Radha Pineda RN (outgoing nurse) on [facility]. Report included the following information SBAR, Kardex, MAR and Recent Results. Shift Summary:   Patient slept comfortably throughout night. BG within normal range. Antibiotics administered. Labs drawn. No complaints of discomfort or pain. Issues for Physician to Address:       Patient on Cardiac Monitoring?     [] Yes  [x] No    Rhythm:          Shift Events      Radha Pineda RN

## 2020-03-06 NOTE — PROGRESS NOTES
PROGRESS NOTE    NAME:  Collette Mu   :   1933   MRN:   [id number]     Date/Time:  3/6/2020 6:58 AM  Subjective:   History:  Chart reviewed and patient seen and examined and D/W his nurse this AM and all events noted. He presented with increasing redness right lower extremity as well as chills on the morning of admission and now development of a open draining area on the left lower extremity where he previously had a cellulitis. He recently had been evaluated and treated for cellulitis of the left ankle initially having 4 weeks of Bactrim which did not seem to clear it up and then 4 weeks PTA when he was seen he was placed on Augmentin and he felt like he improved on that initially therefore he was continued when he was last seen 2 weeks PTA; however since that time he has now developed an open area on the left lower extremity as well as developing some redness in the right leg. Malathi Mororw was still soaking with Epsom salts on a regular basis and using a \"drawing salve\" on it. Alicia Vazquez denies any significant associated pain.  He had some shaking chills the morning of admission when he awakened. Alicia Vazquez has had no F/C since antibiotics started. He currently denies any chest pain, shortness of breath, palpitations, PND, orthopnea or cardiorespiratory complaints and has had good diuresis with IV Bumex since admission. Alicia Vazquez has no GI or  complaints.  He has no headaches, dizziness or neurologic complaints.  He has no other complaints on complete review of systems except his chronic arthritic complaints which are unchanged.       Medications reviewed:  Current Facility-Administered Medications   Medication Dose Route Frequency    metFORMIN (GLUCOPHAGE) tablet 500 mg  500 mg Oral BID WITH MEALS    warfarin (COUMADIN) tablet 7.5 mg  7.5 mg Oral QPM    levoFLOXacin (LEVAQUIN) tablet 750 mg  750 mg Oral QHS    acetaminophen (TYLENOL) tablet 650 mg  650 mg Oral Q8H PRN    ibuprofen (MOTRIN) tablet 600 mg  600 mg Oral TID    digoxin (LANOXIN) tablet 0.125 mg  0.125 mg Oral DAILY    loratadine (CLARITIN) tablet 10 mg  10 mg Oral DAILY    bumetanide (BUMEX) injection 2 mg  2 mg IntraVENous Q12H    lisinopril (PRINIVIL, ZESTRIL) tablet 5 mg  5 mg Oral DAILY    therapeutic multivitamin (THERAGRAN) tablet 1 Tab  1 Tab Oral DAILY    predniSONE (DELTASONE) tablet 2.5 mg  2.5 mg Oral DAILY WITH BREAKFAST    pravastatin (PRAVACHOL) tablet 40 mg  40 mg Oral QHS    terazosin (HYTRIN) capsule 2 mg  2 mg Oral DAILY    sodium chloride (NS) flush 5-40 mL  5-40 mL IntraVENous Q8H    sodium chloride (NS) flush 5-40 mL  5-40 mL IntraVENous PRN    zolpidem (AMBIEN) tablet 5 mg  5 mg Oral QHS PRN    magnesium hydroxide (MILK OF MAGNESIA) 400 mg/5 mL oral suspension 30 mL  30 mL Oral DAILY PRN    cholecalciferol (VITAMIN D3) (1000 Units /25 mcg) tablet 1 Tab  1,000 Units Oral DAILY    vancomycin (VANCOCIN) 1,000 mg in 0.9% sodium chloride (MBP/ADV) 250 mL  1,000 mg IntraVENous Q18H        Objective:   Vitals:  Visit Vitals  /77 (BP 1 Location: Left arm, BP Patient Position: At rest)   Pulse 79   Temp 98.1 °F (36.7 °C)   Resp 18   Ht 6' 3\" (1.905 m)   Wt 199 lb (90.3 kg)   SpO2 96%   BMI 24.87 kg/m²      O2 Device: Room air Temp (24hrs), Av.7 °F (37.1 °C), Min:98.1 °F (36.7 °C), Max:99.6 °F (37.6 °C)      Last 24hr Input/Output:    Intake/Output Summary (Last 24 hours) at 3/6/2020 0658  Last data filed at 3/5/2020 1741  Gross per 24 hour   Intake 460 ml   Output 430 ml   Net 30 ml        PHYSICAL EXAM:  General:     Alert, cooperative, no distress, appears stated age. Head:    Normocephalic, without obvious abnormality, atraumatic. Eyes:    Conjunctivae/corneas clear. PERRLA  Nose:   Nares normal. No drainage or sinus tenderness. Throat:     Lips, mucosa, and tongue normal.  No Thrush  Neck:   Supple, symmetrical,  no adenopathy, thyroid: non tender     no carotid bruit and no JVD.   Back:     Symmetric,  No CVA tenderness. Lungs:    Clear to auscultation bilaterally. No Wheezing or Rhonchi. No rales. Heart:    Regular rate and rhythm,  no murmur, rub or gallop. Abdomen:    Soft, non-tender. Not distended. Bowel sounds normal. No masses  Extremities:  Extremities erythema RLE c/w cellulitis slightly improved and stasis changes LLE with open area medially currently superficial (Currently wrapped), atraumatic, No cyanosis. 1+ edema. No clubbing  Lymph nodes:  Cervical, supraclavicular normal.  Neurologic:  Normal strength, Alert and oriented X 3.    Skin:                 No rash      Lab Data Reviewed:    Recent Results (from the past 24 hour(s))   ANKLE BRACHIAL INDEX    Collection Time: 03/05/20  8:36 AM   Result Value Ref Range    Left arm  mmHg    Left posterior tibial 222 mmHg    Left anterior tibial 189 mmHg    Left toe pressure 179 mmHg    Right arm  mmHg    Right posterior tibial 199 mmHg    Right anterior tibial 187 mmHg    Right toe pressure 126 mmHg    Left AVERY 1.37     Right AVERY 1.23     Left TBI 1.10     Right TBI 0.78    ECHO ADULT COMPLETE    Collection Time: 03/05/20  8:58 AM   Result Value Ref Range    Right Atrial Area 4C 16.41 cm2    LV E' Lateral Velocity 7.39 cm/s    LV E' Septal Velocity 4.80 cm/s    Ao Root D 3.42 cm    Aortic Valve Systolic Peak Velocity 513.56 cm/s    AoV VTI 41.95 cm    Aortic Valve Area by Continuity of Peak Velocity 1.8 cm2    Aortic Valve Area by Continuity of VTI 2.1 cm2    AoV PG 23.0 mmHg    LVIDd 3.33 (A) 4.2 - 5.9 cm    LVPWd 1.39 (A) 0.6 - 1.0 cm    LVIDs 2.95 cm    IVSd 1.06 (A) 0.6 - 1.0 cm    LV ES Vol A4C 67.4 mL    LVOT d 2.02 cm    LVOT Peak Velocity 135.61 cm/s    LVOT Peak Gradient 7.4 mmHg    LVOT VTI 27.81 cm    MVA (PHT) 6.5 cm2    MV A Darci 76.56 cm/s    MV E Darci 66.68 cm/s    MV E/A 0.87     MV Mean Gradient 1.8 mmHg    Mitral Valve Annulus Velocity Time Integral 22.96 cm    Left Atrium to Aortic Root Ratio 1.14     Pulmonic Valve Systolic Mean Gradient 2.6 mmHg    Pulmonic Valve Systolic Velocity Time Integral 20.32 cm    Aortic Valve Systolic Mean Gradient 30.6 mmHg    LV Ejection Fraction MOD 4C 49 %    LVOT Cardiac Output 6.7 L/min    LA Vol 4C 67.97 (A) 18 - 58 mL    LA Area 4C 23.4 cm2    LV Mass .2 88 - 224 g    LV Mass AL Index 68.1 49 - 115 g/m2    E/E' lateral 9.02     E/E' septal 13.89     RVSP 36.5 mmHg    MV Peak Gradient 4.0 mmHg    E/E' ratio (averaged) 11.46     LV ED Vol A4C 133.0 mL    Est. RA Pressure 10.0 mmHg    Mitral Valve E Wave Deceleration Time 133.2 ms    Mitral Valve Pressure Half-time 33.8 ms    Left Atrium Major Axis 3.91 cm    Triscuspid Valve Regurgitation Peak Gradient 26.5 mmHg    Pulmonic Valve Max Velocity 111.71 cm/s    Mitral Valve Max Velocity 99.93 cm/s    MVA VTI 3.9 cm2    LVOT SV 89.5 ml    TR Max Velocity 257.30 cm/s    PASP 36.5 mmHg    LA Vol Index 31.04 16 - 28 ml/m2    LVED Vol Index A4C 60.7 mL/m2    LVES Vol Index A4C 30.8 mL/m2    Left Ventricular Fractional Shortening by 2D 34.164395838 %    Left Ventricular Outflow Tract Mean Gradient 4.8523262464711 mmHg    Left Ventricular Outflow Tract Mean Velocity 9.127193465 cm/s    PV End Diastolic Velocity 1.6 mmHg    Mitral Valve Deceleration [place] 3.9388807545722     Mitral valve mean inflow velocity 0.11054092859561 m/s    AV Velocity Ratio 0.57     AV VTI Ratio 0.7     Aortic valve mean velocity 4.5217073820337 m/s    Pulmonary Artery Mean Presure 25.7 mmHg    PI End Diastolic Pressure 95.6 mmHg    PV peak gradient 5.0 mmHg   GLUCOSE, POC    Collection Time: 03/05/20  9:08 AM   Result Value Ref Range    Glucose (POC) 218 (H) 65 - 100 mg/dL    Performed by [employer]    GLUCOSE, POC    Collection Time: 03/05/20 11:35 AM   Result Value Ref Range    Glucose (POC) 120 (H) 65 - 100 mg/dL    Performed by [employer]    GLUCOSE, POC    Collection Time: 03/05/20  4:55 PM   Result Value Ref Range    Glucose (POC) 128 (H) 65 - 100 mg/dL    Performed by [facility] (CON) PCT    GLUCOSE, POC    Collection Time: 03/05/20  9:19 PM   Result Value Ref Range    Glucose (POC) 135 (H) 65 - 100 mg/dL    Performed by Leyla Fletcher RN    PROTHROMBIN TIME + INR    Collection Time: 03/06/20  3:23 AM   Result Value Ref Range    INR 1.5 (H) 0.9 - 1.1      Prothrombin time 15.1 (H) 9.0 - 85.5 sec   METABOLIC PANEL, BASIC    Collection Time: 03/06/20  3:23 AM   Result Value Ref Range    Sodium 139 136 - 145 mmol/L    Potassium 3.6 3.5 - 5.1 mmol/L    Chloride 104 97 - 108 mmol/L    CO2 30 21 - 32 mmol/L    Anion gap 5 5 - 15 mmol/L    Glucose 135 (H) 65 - 100 mg/dL    BUN 25 (H) 6 - 20 MG/DL    Creatinine 1.12 0.70 - 1.30 MG/DL    BUN/Creatinine ratio 22 (H) 12 - 20      GFR est AA >60 >60 ml/min/1.73m2    GFR est non-AA >60 >60 ml/min/1.73m2    Calcium 8.7 8.5 - 10.1 MG/DL   CBC W/O DIFF    Collection Time: 03/06/20  3:23 AM   Result Value Ref Range    WBC 7.8 4.1 - 11.1 K/uL    RBC 3.37 (L) 4.10 - 5.70 M/uL    HGB 10.1 (L) 12.1 - 17.0 g/dL    HCT 31.1 (L) 36.6 - 50.3 %    MCV 92.3 80.0 - 99.0 FL    MCH 30.0 26.0 - 34.0 PG    MCHC 32.5 30.0 - 36.5 g/dL    RDW 13.3 11.5 - 14.5 %    PLATELET 210 934 - 739 K/uL    MPV 9.3 8.9 - 12.9 FL    NRBC 0.0 0  WBC    ABSOLUTE NRBC 0.00 0.00 - 0.01 K/uL         Assessment/Plan:     Principal Problem:    Cellulitis of left lower extremity (1/14/2020)    Active Problems:    ASCVD (arteriosclerotic cardiovascular disease) (9/20/2017)      Overview: Story:  Old ASMI by EKG      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, without long-term current use of insulin ([address] (9/20/2017)      Overview: Story: Diet Controlled      Hypertension with renal disease (9/20/2017)      Stage 3 chronic kidney disease ([address] (9/20/2017)      Paroxysmal atrial fibrillation ([address] (1/5/2018)      Polymyalgia rheumatica ([address] (4/24/2018)      Cellulitis (7/1/1349)      Diastolic CHF, chronic ([address] (3/5/2020)      Overview: Echo 7//9/64  - mild diastolic dysfunction with EFx 45%       ___________________________________________________  PLAN:    1. Continue Vancomycin D # 4 and Levaquin with DAILY Levaquin now at 750 PO  2. Wound care evaluation for left leg ulceration done and wrapped yesterdayyesterday and I D/W wound care nurse  3. On p.o. Lasix at home changed to IV Bumex to help with fluid in both lower extremities, Follow I & O, recording not done accurately  4. Decreased prednisone dose from 5 mg daily to 2-1/2 and hopefully taper him off of this now go to QOD  5. Continue Coumadin follow daily INR, 1.5 today so higher dose Coumadin at 7.5 again  6. Continue low-dose lisinopril  7. Continue metformin and follow blood sugar with sliding scale coverage if needed  8. Continue other home medications  9. Follow renal function, Cr 1.5 adm to 1.12 today  10. Follow WBC, 15.6 adm to 10.9 yesterday and 7. 8now  11. Check of arterial dopplers normal AVERY so this is a venous stasis abnormality  12.  Echo mild diastolic dysfunction with mildly reduced EFx at 45%          ___________________________________________________    Attending Physician: George Montgomery MD

## 2020-03-06 NOTE — PROGRESS NOTES
Bedside shift change report given to  Pat Morales (oncoming nurse) by mariah (offgoing nurse). Report included the following information SBAR, Kardex, ED Summary, Intake/Output, MAR and Recent Results. acquired patient at 1500. No complaints of pain. Family at bedside.

## 2020-03-06 NOTE — PROGRESS NOTES
Bedside shift change report given to Pat Morales (oncoming nurse) by Bee Nevarez (offgoing nurse). Report included the following information SBAR, Kardex and MAR.

## 2020-03-07 LAB
ANION GAP SERPL CALC-SCNC: 5 MMOL/L (ref 5–15)
BUN SERPL-MCNC: 33 MG/DL (ref 6–20)
BUN/CREAT SERPL: 27 (ref 12–20)
CALCIUM SERPL-MCNC: 9 MG/DL (ref 8.5–10.1)
CHLORIDE SERPL-SCNC: 106 MMOL/L (ref 97–108)
CO2 SERPL-SCNC: 30 MMOL/L (ref 21–32)
CREAT SERPL-MCNC: 1.23 MG/DL (ref 0.7–1.3)
ERYTHROCYTE [DISTWIDTH] IN BLOOD BY AUTOMATED COUNT: 13.4 % (ref 11.5–14.5)
GLUCOSE BLD STRIP.AUTO-MCNC: 126 MG/DL (ref 65–100)
GLUCOSE BLD STRIP.AUTO-MCNC: 137 MG/DL (ref 65–100)
GLUCOSE BLD STRIP.AUTO-MCNC: 142 MG/DL (ref 65–100)
GLUCOSE BLD STRIP.AUTO-MCNC: 144 MG/DL (ref 65–100)
GLUCOSE SERPL-MCNC: 141 MG/DL (ref 65–100)
HCT VFR BLD AUTO: 31.8 % (ref 36.6–50.3)
HGB BLD-MCNC: 10.2 G/DL (ref 12.1–17)
INR PPP: 1.6 (ref 0.9–1.1)
LEFT ABI: 1.37
LEFT ANTERIOR TIBIAL: 189 MMHG
LEFT ARM BP: 162 MMHG
LEFT POSTERIOR TIBIAL: 222 MMHG
LEFT TBI: 1.1
LEFT TOE PRESSURE: 179 MMHG
MCH RBC QN AUTO: 29.9 PG (ref 26–34)
MCHC RBC AUTO-ENTMCNC: 32.1 G/DL (ref 30–36.5)
MCV RBC AUTO: 93.3 FL (ref 80–99)
NRBC # BLD: 0 K/UL (ref 0–0.01)
NRBC BLD-RTO: 0 PER 100 WBC
PLATELET # BLD AUTO: 294 K/UL (ref 150–400)
PMV BLD AUTO: 9.2 FL (ref 8.9–12.9)
POTASSIUM SERPL-SCNC: 3.7 MMOL/L (ref 3.5–5.1)
PROTHROMBIN TIME: 16 SEC (ref 9–11.1)
RBC # BLD AUTO: 3.41 M/UL (ref 4.1–5.7)
RIGHT ABI: 1.23
RIGHT ANTERIOR TIBIAL: 187 MMHG
RIGHT ARM BP: 159 MMHG
RIGHT POSTERIOR TIBIAL: 199 MMHG
RIGHT TBI: 0.78
RIGHT TOE PRESSURE: 126 MMHG
SERVICE CMNT-IMP: ABNORMAL
SODIUM SERPL-SCNC: 141 MMOL/L (ref 136–145)
WBC # BLD AUTO: 7.5 K/UL (ref 4.1–11.1)

## 2020-03-07 PROCEDURE — 85027 COMPLETE CBC AUTOMATED: CPT

## 2020-03-07 PROCEDURE — 82962 GLUCOSE BLOOD TEST: CPT

## 2020-03-07 PROCEDURE — 36415 COLL VENOUS BLD VENIPUNCTURE: CPT

## 2020-03-07 PROCEDURE — 74011250636 HC RX REV CODE- 250/636: Performed by: INTERNAL MEDICINE

## 2020-03-07 PROCEDURE — 74011000250 HC RX REV CODE- 250: Performed by: INTERNAL MEDICINE

## 2020-03-07 PROCEDURE — 80048 BASIC METABOLIC PNL TOTAL CA: CPT

## 2020-03-07 PROCEDURE — 74011636637 HC RX REV CODE- 636/637: Performed by: INTERNAL MEDICINE

## 2020-03-07 PROCEDURE — 65270000015 HC RM PRIVATE ONCOLOGY

## 2020-03-07 PROCEDURE — 74011250637 HC RX REV CODE- 250/637: Performed by: INTERNAL MEDICINE

## 2020-03-07 PROCEDURE — 85610 PROTHROMBIN TIME: CPT

## 2020-03-07 RX ADMIN — BUMETANIDE 2 MG: 0.25 INJECTION INTRAMUSCULAR; INTRAVENOUS at 08:57

## 2020-03-07 RX ADMIN — Medication 10 ML: at 14:00

## 2020-03-07 RX ADMIN — PRAVASTATIN SODIUM 40 MG: 40 TABLET ORAL at 21:21

## 2020-03-07 RX ADMIN — METFORMIN HYDROCHLORIDE 500 MG: 500 TABLET ORAL at 16:38

## 2020-03-07 RX ADMIN — IBUPROFEN 600 MG: 600 TABLET, FILM COATED ORAL at 08:57

## 2020-03-07 RX ADMIN — DIGOXIN 0.12 MG: 125 TABLET ORAL at 08:56

## 2020-03-07 RX ADMIN — LEVOFLOXACIN 750 MG: 750 TABLET, FILM COATED ORAL at 21:21

## 2020-03-07 RX ADMIN — IBUPROFEN 600 MG: 600 TABLET, FILM COATED ORAL at 16:38

## 2020-03-07 RX ADMIN — THERA TABS 1 TABLET: TAB at 08:57

## 2020-03-07 RX ADMIN — LISINOPRIL 5 MG: 5 TABLET ORAL at 08:56

## 2020-03-07 RX ADMIN — VANCOMYCIN HYDROCHLORIDE 1250 MG: 10 INJECTION, POWDER, LYOPHILIZED, FOR SOLUTION INTRAVENOUS at 16:38

## 2020-03-07 RX ADMIN — METFORMIN HYDROCHLORIDE 500 MG: 500 TABLET ORAL at 08:57

## 2020-03-07 RX ADMIN — IBUPROFEN 600 MG: 600 TABLET, FILM COATED ORAL at 21:21

## 2020-03-07 RX ADMIN — Medication 10 ML: at 05:48

## 2020-03-07 RX ADMIN — VITAMIN D, TAB 1000IU (100/BT) 1 TABLET: 25 TAB at 08:57

## 2020-03-07 RX ADMIN — WARFARIN SODIUM 7.5 MG: 7.5 TABLET ORAL at 17:33

## 2020-03-07 RX ADMIN — Medication 10 ML: at 21:19

## 2020-03-07 RX ADMIN — TERAZOSIN HYDROCHLORIDE 2 MG: 1 CAPSULE ORAL at 21:24

## 2020-03-07 RX ADMIN — BUMETANIDE 2 MG: 0.25 INJECTION INTRAMUSCULAR; INTRAVENOUS at 21:18

## 2020-03-07 RX ADMIN — LORATADINE 10 MG: 10 TABLET ORAL at 08:57

## 2020-03-07 RX ADMIN — PREDNISONE 2.5 MG: 5 TABLET ORAL at 10:06

## 2020-03-07 NOTE — PROGRESS NOTES
Oncology End of Shift Note      Bedside shift change report given to Danny Calvillo RN (incoming nurse) by Brunilda Snow RN (outgoing nurse) on [facility]. Report included the following information SBAR, Kardex, MAR and Recent Results. Shift Summary:  Patient requested urinal at beginning of shift. Lab work completed, including vancomycin trough at beginning of shift. No complaints of pain or discomfort. BG at normal level. Patient slept comfortably throughout night. Issues for Physician to Address:       Patient on Cardiac Monitoring?     [] Yes  [x] No    Rhythm:          Shift Events      Brunilda Snow RN

## 2020-03-07 NOTE — PROGRESS NOTES
PROGRESS NOTE    NAME:  Lilibeth Palacios   :   1933   MRN:   [id number]     Date/Time:  3/7/2020 8:14 AM  Subjective:   History:  Chart reviewed and patient seen and examined and D/W his nurse this AM and all events noted. He presented with increasing redness right lower extremity as well as chills on the morning of admission and development of a open draining area on the left lower extremity where he previously had a cellulitis. He recently had been evaluated and treated for cellulitis of the left ankle initially having 4 weeks of Bactrim which did not seem to clear it up and then 4 weeks PTA when he was seen he was placed on Augmentin and he felt like he improved on that initially therefore he was continued when he was last seen 2 weeks PTA; however since that time he has now developed an open area on the left lower extremity as well as developing some redness in the right leg. Milton Goodpasture was still soaking with Epsom salts on a regular basis and using a \"drawing salve\" on it. [facility] denies any significant associated pain.  He had some shaking chills the morning of admission when he awakened.     [facility] has had no F/C since antibiotics started. He currently denies any chest pain, shortness of breath, palpitations, PND, orthopnea or cardiorespiratory complaints and has had good diuresis with IV Bumex since admission. [facility] has no GI or  complaints.  He has no headaches, dizziness or neurologic complaints.  He has no other complaints on complete review of systems except his chronic arthritic complaints which are unchanged.       Medications reviewed:  Current Facility-Administered Medications   Medication Dose Route Frequency    vancomycin (VANCOCIN) 1,250 mg in 0.9% sodium chloride 250 mL IVPB  1,250 mg IntraVENous Q18H    predniSONE (DELTASONE) tablet 2.5 mg  2.5 mg Oral EVERY OTHER DAY    metFORMIN (GLUCOPHAGE) tablet 500 mg  500 mg Oral BID WITH MEALS    warfarin (COUMADIN) tablet 7.5 mg  7.5 mg Oral QPM    levoFLOXacin (LEVAQUIN) tablet 750 mg  750 mg Oral QHS    acetaminophen (TYLENOL) tablet 650 mg  650 mg Oral Q8H PRN    ibuprofen (MOTRIN) tablet 600 mg  600 mg Oral TID    digoxin (LANOXIN) tablet 0.125 mg  0.125 mg Oral DAILY    loratadine (CLARITIN) tablet 10 mg  10 mg Oral DAILY    bumetanide (BUMEX) injection 2 mg  2 mg IntraVENous Q12H    lisinopril (PRINIVIL, ZESTRIL) tablet 5 mg  5 mg Oral DAILY    therapeutic multivitamin (THERAGRAN) tablet 1 Tab  1 Tab Oral DAILY    pravastatin (PRAVACHOL) tablet 40 mg  40 mg Oral QHS    terazosin (HYTRIN) capsule 2 mg  2 mg Oral DAILY    sodium chloride (NS) flush 5-40 mL  5-40 mL IntraVENous Q8H    sodium chloride (NS) flush 5-40 mL  5-40 mL IntraVENous PRN    zolpidem (AMBIEN) tablet 5 mg  5 mg Oral QHS PRN    magnesium hydroxide (MILK OF MAGNESIA) 400 mg/5 mL oral suspension 30 mL  30 mL Oral DAILY PRN    cholecalciferol (VITAMIN D3) (1000 Units /25 mcg) tablet 1 Tab  1,000 Units Oral DAILY        Objective:   Vitals:  Visit Vitals  /73   Pulse (!) 55   Temp 98.1 °F (36.7 °C)   Resp 18   Ht 6' 3\" (1.905 m)   Wt 189 lb 14.4 oz (86.1 kg)   SpO2 98%   BMI 23.74 kg/m²      O2 Device: Room air Temp (24hrs), Av.8 °F (37.1 °C), Min:98.1 °F (36.7 °C), Max:99.9 °F (37.7 °C)      Last 24hr Input/Output:    Intake/Output Summary (Last 24 hours) at 3/7/2020 0814  Last data filed at 3/6/2020 2126  Gross per 24 hour   Intake 220 ml   Output 1175 ml   Net -955 ml        PHYSICAL EXAM:  General:     Alert, cooperative, no distress, appears stated age. Head:    Normocephalic, without obvious abnormality, atraumatic. Eyes:    Conjunctivae/corneas clear. PERRLA  Nose:   Nares normal. No drainage or sinus tenderness. Throat:     Lips, mucosa, and tongue normal.  No Thrush  Neck:   Supple, symmetrical,  no adenopathy, thyroid: non tender     no carotid bruit and no JVD. Back:     Symmetric,  No CVA tenderness. Lungs:    Clear to auscultation bilaterally. No Wheezing or Rhonchi. No rales. Heart:    Regular rate and rhythm,  no murmur, rub or gallop. Abdomen:    Soft, non-tender. Not distended. Bowel sounds normal. No masses  Extremities:  Extremities erythema RLE c/w cellulitis slightly improved and stasis changes LLE with open area medially currently superficial (Currently wrapped), atraumatic, No cyanosis. 1+ edema. No clubbing  Lymph nodes:  Cervical, supraclavicular normal.  Neurologic:  Normal strength, Alert and oriented X 3.    Skin:                 No rash      Lab Data Reviewed:    Recent Results (from the past 24 hour(s))   GLUCOSE, POC    Collection Time: 03/06/20 11:11 AM   Result Value Ref Range    Glucose (POC) 217 (H) 65 - 100 mg/dL    Performed by Neftaly Sage    GLUCOSE, POC    Collection Time: 03/06/20  4:19 PM   Result Value Ref Range    Glucose (POC) 125 (H) 65 - 100 mg/dL    Performed by Jace Carias, TROUGH    Collection Time: 03/06/20  8:54 PM   Result Value Ref Range    Vancomycin,trough 9.2 5.0 - 10.0 ug/mL    Reported dose date: 20200306      Reported dose time: 0300      Reported dose: 1GM IV Q18H UNITS   GLUCOSE, POC    Collection Time: 03/06/20  9:20 PM   Result Value Ref Range    Glucose (POC) 134 (H) 65 - 100 mg/dL    Performed by Dilip Hernadez PCT    CBC W/O DIFF    Collection Time: 03/07/20  3:26 AM   Result Value Ref Range    WBC 7.5 4.1 - 11.1 K/uL    RBC 3.41 (L) 4.10 - 5.70 M/uL    HGB 10.2 (L) 12.1 - 17.0 g/dL    HCT 31.8 (L) 36.6 - 50.3 %    MCV 93.3 80.0 - 99.0 FL    MCH 29.9 26.0 - 34.0 PG    MCHC 32.1 30.0 - 36.5 g/dL    RDW 13.4 11.5 - 14.5 %    PLATELET 901 058 - 465 K/uL    MPV 9.2 8.9 - 12.9 FL    NRBC 0.0 0  WBC    ABSOLUTE NRBC 0.00 0.00 - 7.63 K/uL   METABOLIC PANEL, BASIC    Collection Time: 03/07/20  3:26 AM   Result Value Ref Range    Sodium 141 136 - 145 mmol/L    Potassium 3.7 3.5 - 5.1 mmol/L    Chloride 106 97 - 108 mmol/L    CO2 30 21 - 32 mmol/L    Anion gap 5 5 - 15 mmol/L    Glucose 141 (H) 65 - 100 mg/dL    BUN 33 (H) 6 - 20 MG/DL    Creatinine 1.23 0.70 - 1.30 MG/DL    BUN/Creatinine ratio 27 (H) 12 - 20      GFR est AA >60 >60 ml/min/1.73m2    GFR est non-AA 56 (L) >60 ml/min/1.73m2    Calcium 9.0 8.5 - 10.1 MG/DL   PROTHROMBIN TIME + INR    Collection Time: 03/07/20  3:26 AM   Result Value Ref Range    INR 1.6 (H) 0.9 - 1.1      Prothrombin time 16.0 (H) 9.0 - 11.1 sec         Assessment/Plan:     Principal Problem:    Cellulitis of left lower extremity (1/14/2020)    Active Problems:    ASCVD (arteriosclerotic cardiovascular disease) (9/20/2017)      Overview: Story: Old ASMI by EKG      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, without long-term current use of insulin ([facility] (9/20/2017)      Overview: Story: Diet Controlled      Hypertension with renal disease (9/20/2017)      Stage 3 chronic kidney disease ([address] (9/20/2017)      Paroxysmal atrial fibrillation ([address] (1/5/2018)      Polymyalgia rheumatica ([address] (4/24/2018)      Cellulitis (9/3/3148)      Diastolic CHF, chronic ([facility] [address] (3/5/2020)      Overview: Echo 4//9/59  - mild diastolic dysfunction with EFx 45%       ___________________________________________________  PLAN:    1. Continue Vancomycin D # 5 and Levaquin with DAILY Levaquin now at 750 PO  2. Wound care evaluation for left leg ulceration done and wrapped on 3/5 and I D/W wound care nurse  3. On p.o. Lasix at home changed to IV Bumex to help with fluid in both lower extremities, Follow I & O, recording not done accurately; however weights recorded indicate 15# decrease from admission  4. Decreased prednisone dose from 5 mg daily to 2-1/2 and hopefully taper him off of this now go to every other day starting with last daily dose on 3/5  5. Continue Coumadin follow daily INR, 1.6 today so higher dose Coumadin at 7.5 again  6. Continue low-dose lisinopril  7. Continue metformin and follow blood sugar with sliding scale coverage if needed  8. Continue other home medications  9. Follow renal function, Cr 1.5 adm to 1.23 today, Pharmarcy dosing vancomycin  10. Follow WBC, 15.6 adm to 10.9 and 7.5 now  11. Check of arterial dopplers - normal AVERY so this is a venous stasis abnormality  12.  Echo mild diastolic dysfunction with mildly reduced EFx at 45%          ___________________________________________________    Attending Physician: Myrtha Bence, MD

## 2020-03-07 NOTE — PROGRESS NOTES
Pharmacy Automatic Renal Dosing Protocol - Antimicrobials    Vancomycin Goal Level: 10-15 mcg/ml     Vancomycin Levels  Date Dose & Interval Measured (mcg/mL) Steady State (mcg/mL)   3/6 @ 20:54 1000 mg Q18H 9.2 9.1                           Impression/Plan:   - Vancomycin trough resulted subtherapeutic at 9.1 mcg/ml.  Dose adjusted to 1250 mg IV every 18 hours (predicted trough 11.4 mcg/ml; )     Thanks,  Terri Michelle, PharmD

## 2020-03-07 NOTE — PROGRESS NOTES
Problem: Falls - Risk of  Goal: *Absence of Falls  Description  Document Angelica Ashley Fall Risk and appropriate interventions in the flowsheet.   Outcome: Progressing Towards Goal  Note: Fall Risk Interventions:            Medication Interventions: Patient to call before getting OOB

## 2020-03-07 NOTE — PROGRESS NOTES
Oncology End of Shift Note      Bedside shift change report given to Jerrod Olson RN (incoming nurse) by Kana French RN (outgoing nurse) on [facility]. Report included the following information SBAR, Kardex and ED Summary. Shift Summary: Pt had shift without issue. ABX continued, blood sugar within desired limits. Pt up without assistance. Pt voided 1100. Issues for Physician to Address:       Patient on Cardiac Monitoring?     [] Yes  [x] No    Rhythm:              Kana French RN

## 2020-03-08 LAB
ANION GAP SERPL CALC-SCNC: 5 MMOL/L (ref 5–15)
BUN SERPL-MCNC: 35 MG/DL (ref 6–20)
BUN/CREAT SERPL: 25 (ref 12–20)
CALCIUM SERPL-MCNC: 9 MG/DL (ref 8.5–10.1)
CHLORIDE SERPL-SCNC: 106 MMOL/L (ref 97–108)
CO2 SERPL-SCNC: 29 MMOL/L (ref 21–32)
CREAT SERPL-MCNC: 1.39 MG/DL (ref 0.7–1.3)
ERYTHROCYTE [DISTWIDTH] IN BLOOD BY AUTOMATED COUNT: 13.4 % (ref 11.5–14.5)
GLUCOSE BLD STRIP.AUTO-MCNC: 115 MG/DL (ref 65–100)
GLUCOSE BLD STRIP.AUTO-MCNC: 121 MG/DL (ref 65–100)
GLUCOSE BLD STRIP.AUTO-MCNC: 133 MG/DL (ref 65–100)
GLUCOSE BLD STRIP.AUTO-MCNC: 198 MG/DL (ref 65–100)
GLUCOSE SERPL-MCNC: 130 MG/DL (ref 65–100)
HCT VFR BLD AUTO: 33.9 % (ref 36.6–50.3)
HGB BLD-MCNC: 10.8 G/DL (ref 12.1–17)
INR PPP: 1.6 (ref 0.9–1.1)
MCH RBC QN AUTO: 29.7 PG (ref 26–34)
MCHC RBC AUTO-ENTMCNC: 31.9 G/DL (ref 30–36.5)
MCV RBC AUTO: 93.1 FL (ref 80–99)
NRBC # BLD: 0 K/UL (ref 0–0.01)
NRBC BLD-RTO: 0 PER 100 WBC
PLATELET # BLD AUTO: 314 K/UL (ref 150–400)
PMV BLD AUTO: 9.1 FL (ref 8.9–12.9)
POTASSIUM SERPL-SCNC: 3.7 MMOL/L (ref 3.5–5.1)
PROTHROMBIN TIME: 16.1 SEC (ref 9–11.1)
RBC # BLD AUTO: 3.64 M/UL (ref 4.1–5.7)
SERVICE CMNT-IMP: ABNORMAL
SODIUM SERPL-SCNC: 140 MMOL/L (ref 136–145)
WBC # BLD AUTO: 8.4 K/UL (ref 4.1–11.1)

## 2020-03-08 PROCEDURE — 74011250637 HC RX REV CODE- 250/637: Performed by: INTERNAL MEDICINE

## 2020-03-08 PROCEDURE — 65270000015 HC RM PRIVATE ONCOLOGY

## 2020-03-08 PROCEDURE — 85027 COMPLETE CBC AUTOMATED: CPT

## 2020-03-08 PROCEDURE — 85610 PROTHROMBIN TIME: CPT

## 2020-03-08 PROCEDURE — 74011250636 HC RX REV CODE- 250/636: Performed by: INTERNAL MEDICINE

## 2020-03-08 PROCEDURE — 80048 BASIC METABOLIC PNL TOTAL CA: CPT

## 2020-03-08 PROCEDURE — 36415 COLL VENOUS BLD VENIPUNCTURE: CPT

## 2020-03-08 PROCEDURE — 82962 GLUCOSE BLOOD TEST: CPT

## 2020-03-08 PROCEDURE — 74011000250 HC RX REV CODE- 250: Performed by: INTERNAL MEDICINE

## 2020-03-08 RX ADMIN — VITAMIN D, TAB 1000IU (100/BT) 1 TABLET: 25 TAB at 08:32

## 2020-03-08 RX ADMIN — LORATADINE 10 MG: 10 TABLET ORAL at 08:33

## 2020-03-08 RX ADMIN — LISINOPRIL 5 MG: 5 TABLET ORAL at 08:33

## 2020-03-08 RX ADMIN — LEVOFLOXACIN 750 MG: 750 TABLET, FILM COATED ORAL at 21:26

## 2020-03-08 RX ADMIN — METFORMIN HYDROCHLORIDE 500 MG: 500 TABLET ORAL at 17:15

## 2020-03-08 RX ADMIN — Medication 10 ML: at 14:00

## 2020-03-08 RX ADMIN — WARFARIN SODIUM 7.5 MG: 7.5 TABLET ORAL at 17:15

## 2020-03-08 RX ADMIN — BUMETANIDE 2 MG: 0.25 INJECTION INTRAMUSCULAR; INTRAVENOUS at 21:28

## 2020-03-08 RX ADMIN — IBUPROFEN 600 MG: 600 TABLET, FILM COATED ORAL at 17:15

## 2020-03-08 RX ADMIN — Medication 10 ML: at 21:30

## 2020-03-08 RX ADMIN — DIGOXIN 0.12 MG: 125 TABLET ORAL at 08:32

## 2020-03-08 RX ADMIN — PRAVASTATIN SODIUM 40 MG: 40 TABLET ORAL at 21:26

## 2020-03-08 RX ADMIN — THERA TABS 1 TABLET: TAB at 08:33

## 2020-03-08 RX ADMIN — IBUPROFEN 600 MG: 600 TABLET, FILM COATED ORAL at 08:34

## 2020-03-08 RX ADMIN — METFORMIN HYDROCHLORIDE 500 MG: 500 TABLET ORAL at 08:33

## 2020-03-08 RX ADMIN — IBUPROFEN 600 MG: 600 TABLET, FILM COATED ORAL at 21:26

## 2020-03-08 RX ADMIN — VANCOMYCIN HYDROCHLORIDE 1250 MG: 10 INJECTION, POWDER, LYOPHILIZED, FOR SOLUTION INTRAVENOUS at 09:04

## 2020-03-08 RX ADMIN — BUMETANIDE 2 MG: 0.25 INJECTION INTRAMUSCULAR; INTRAVENOUS at 08:34

## 2020-03-08 RX ADMIN — TERAZOSIN HYDROCHLORIDE 2 MG: 1 CAPSULE ORAL at 21:27

## 2020-03-08 NOTE — PROGRESS NOTES
PROGRESS NOTE    NAME:  Miya Stubbs   :   1933   MRN:   [id number]     Date/Time:  3/8/2020 10:03 AM  Subjective:   History:  Chart reviewed and patient seen and examined and D/W his nurse this AM and all events noted. He presented with increasing redness right lower extremity as well as chills on the morning of admission and development of a open draining area on the left lower extremity where he previously had a cellulitis. He recently had been evaluated and treated for cellulitis of the left ankle initially having 4 weeks of Bactrim which did not seem to clear it up and then 4 weeks PTA when he was seen he was placed on Augmentin and he felt like he improved on that initially therefore he was continued when he was last seen 2 weeks PTA; however since that time he has now developed an open area on the left lower extremity as well as developing some redness in the right leg. [address] was still soaking with Epsom salts on a regular basis and using a \"drawing salve\" on it. Raffi Saul denies any significant associated pain.  He had some shaking chills the morning of admission when he awakened. Culture of wound L leg obtained at office on day of admission returned MSSA which was appropriately sensitive to both Bactrim and Augmentin.       He has had no F/C since antibiotics started. He currently denies any chest pain, shortness of breath, palpitations, PND, orthopnea or cardiorespiratory complaints and has had good diuresis with IV Bumex since admission. Raffi Saul has no GI or  complaints.  He has no headaches, dizziness or neurologic complaints.  He has no other complaints on complete review of systems except his chronic arthritic complaints which are unchanged.       Medications reviewed:  Current Facility-Administered Medications   Medication Dose Route Frequency    vancomycin (VANCOCIN) 1,250 mg in 0.9% sodium chloride 250 mL IVPB  1,250 mg IntraVENous Q18H    predniSONE (DELTASONE) tablet 2.5 mg  2.5 mg Oral EVERY OTHER DAY    metFORMIN (GLUCOPHAGE) tablet 500 mg  500 mg Oral BID WITH MEALS    warfarin (COUMADIN) tablet 7.5 mg  7.5 mg Oral QPM    levoFLOXacin (LEVAQUIN) tablet 750 mg  750 mg Oral QHS    acetaminophen (TYLENOL) tablet 650 mg  650 mg Oral Q8H PRN    ibuprofen (MOTRIN) tablet 600 mg  600 mg Oral TID    digoxin (LANOXIN) tablet 0.125 mg  0.125 mg Oral DAILY    loratadine (CLARITIN) tablet 10 mg  10 mg Oral DAILY    bumetanide (BUMEX) injection 2 mg  2 mg IntraVENous Q12H    lisinopril (PRINIVIL, ZESTRIL) tablet 5 mg  5 mg Oral DAILY    therapeutic multivitamin (THERAGRAN) tablet 1 Tab  1 Tab Oral DAILY    pravastatin (PRAVACHOL) tablet 40 mg  40 mg Oral QHS    terazosin (HYTRIN) capsule 2 mg  2 mg Oral DAILY    sodium chloride (NS) flush 5-40 mL  5-40 mL IntraVENous Q8H    sodium chloride (NS) flush 5-40 mL  5-40 mL IntraVENous PRN    zolpidem (AMBIEN) tablet 5 mg  5 mg Oral QHS PRN    magnesium hydroxide (MILK OF MAGNESIA) 400 mg/5 mL oral suspension 30 mL  30 mL Oral DAILY PRN    cholecalciferol (VITAMIN D3) (1000 Units /25 mcg) tablet 1 Tab  1,000 Units Oral DAILY        Objective:   Vitals:  Visit Vitals  /64 (BP 1 Location: Left arm)   Pulse 62   Temp 98.1 °F (36.7 °C)   Resp 18   Ht 6' 3\" (1.905 m)   Wt 189 lb 14.4 oz (86.1 kg)   SpO2 97%   BMI 23.74 kg/m²      O2 Device: Room air Temp (24hrs), Av.3 °F (36.8 °C), Min:98.1 °F (36.7 °C), Max:98.5 °F (36.9 °C)      Last 24hr Input/Output:    Intake/Output Summary (Last 24 hours) at 3/8/2020 1003  Last data filed at 3/8/2020 0736  Gross per 24 hour   Intake    Output 1050 ml   Net -1050 ml        PHYSICAL EXAM:  General:     Alert, cooperative, no distress, appears stated age. Head:    Normocephalic, without obvious abnormality, atraumatic. Eyes:    Conjunctivae/corneas clear. PERRLA  Nose:   Nares normal. No drainage or sinus tenderness.   Throat:     Lips, mucosa, and tongue normal.  No Thrush  Neck:   Supple, symmetrical,  no adenopathy, thyroid: non tender     no carotid bruit and no JVD. Back:     Symmetric,  No CVA tenderness. Lungs:    Clear to auscultation bilaterally. No Wheezing or Rhonchi. No rales. Heart:    Regular rate and rhythm,  no murmur, rub or gallop. Abdomen:    Soft, non-tender. Not distended. Bowel sounds normal. No masses  Extremities:  Extremities erythema RLE c/w cellulitis slightly improved and stasis changes LLE with open area medially currently superficial (Currently wrapped), atraumatic, No cyanosis. Trace edema. No clubbing  Lymph nodes:  Cervical, supraclavicular normal.  Neurologic:  Normal strength, Alert and oriented X 3.    Skin:                 No rash      Lab Data Reviewed:    Recent Results (from the past 24 hour(s))   GLUCOSE, POC    Collection Time: 03/07/20 11:43 AM   Result Value Ref Range    Glucose (POC) 137 (H) 65 - 100 mg/dL    Performed by Magaly Palomares    GLUCOSE, POC    Collection Time: 03/07/20  4:51 PM   Result Value Ref Range    Glucose (POC) 126 (H) 65 - 100 mg/dL    Performed by Jayden Clark (PCT)    GLUCOSE, POC    Collection Time: 03/07/20  9:14 PM   Result Value Ref Range    Glucose (POC) 142 (H) 65 - 100 mg/dL    Performed by Jayden Clark (PCT)    PROTHROMBIN TIME + INR    Collection Time: 03/08/20  3:43 AM   Result Value Ref Range    INR 1.6 (H) 0.9 - 1.1      Prothrombin time 16.1 (H) 9.0 - 07.4 sec   METABOLIC PANEL, BASIC    Collection Time: 03/08/20  3:43 AM   Result Value Ref Range    Sodium 140 136 - 145 mmol/L    Potassium 3.7 3.5 - 5.1 mmol/L    Chloride 106 97 - 108 mmol/L    CO2 29 21 - 32 mmol/L    Anion gap 5 5 - 15 mmol/L    Glucose 130 (H) 65 - 100 mg/dL    BUN 35 (H) 6 - 20 MG/DL    Creatinine 1.39 (H) 0.70 - 1.30 MG/DL    BUN/Creatinine ratio 25 (H) 12 - 20      GFR est AA 59 (L) >60 ml/min/1.73m2    GFR est non-AA 48 (L) >60 ml/min/1.73m2    Calcium 9.0 8.5 - 10.1 MG/DL   CBC W/O DIFF    Collection Time: 03/08/20  3:43 AM   Result Value Ref Range    WBC 8.4 4.1 - 11.1 K/uL    RBC 3.64 (L) 4.10 - 5.70 M/uL    HGB 10.8 (L) 12.1 - 17.0 g/dL    HCT 33.9 (L) 36.6 - 50.3 %    MCV 93.1 80.0 - 99.0 FL    MCH 29.7 26.0 - 34.0 PG    MCHC 31.9 30.0 - 36.5 g/dL    RDW 13.4 11.5 - 14.5 %    PLATELET 468 092 - 795 K/uL    MPV 9.1 8.9 - 12.9 FL    NRBC 0.0 0  WBC    ABSOLUTE NRBC 0.00 0.00 - 0.01 K/uL   GLUCOSE, POC    Collection Time: 03/08/20  6:59 AM   Result Value Ref Range    Glucose (POC) 133 (H) 65 - 100 mg/dL    Performed by Bill Parson (RN)      Culture LLE obtained at office on day of admission     Moderate growth    Comment:   Based on susceptibility to oxacillin this isolate would be   susceptible to:   *Penicillinase-stable penicillins, such as:     Cloxacillin, Dicloxacillin, Nafcillin   *Beta-lactam combination agents, such as:     Amoxicillin-clavulanic acid, Ampicillin-sulbactam,     Piperacillin-tazobactam   *Oral cephems, such as:     Cefaclor, Cefdinir, Cefpodoxime, Cefprozil, Cefuroxime,     Cephalexin, Loracarbef   *Parenteral cephems, such as:     Cefazolin, Cefepime, Cefotaxime, Cefotetan, Ceftaroline,     Ceftizoxime, Ceftriaxone, Cefuroxime   *Carbapenems, such as:     Doripenem, Ertapenem, Imipenem, Meropenem    Susceptibility     Staphylococcus aureus     Antibiotic Interpretation Value Method Comment   Ciprofloxacin ($) Susceptible S ug/mL Not Specified    Clindamycin ($) Susceptible S ug/mL Not Specified    Erythromycin ($$$$) Resistant R ug/mL Not Specified    Gentamicin ($) Susceptible S ug/mL Not Specified    Levofloxacin ($) Susceptible S ug/mL Not Specified    Linezolid ($$$$$) Susceptible S ug/mL Not Specified    Moxifloxacin ($$$$) Susceptible S ug/mL Not Specified    Oxacillin Susceptible S ug/mL Not Specified    Penicillin Resistant R ug/mL Not Specified    Quinupristin Dalfopr Synd Susceptible S ug/mL Not Specified    Rifampin ($$$$) Susceptible S ug/mL Not Specified    Tetracycline Susceptible S ug/mL Not Specified Trimeth-Sulfamethoxa Susceptible S ug/mL Not Specified    Vancomycin ($) Susceptible S ug/mL Not Specified          Assessment/Plan:     Principal Problem:    Cellulitis of left lower extremity (1/14/2020)    Active Problems:    ASCVD (arteriosclerotic cardiovascular disease) (9/20/2017)      Overview: Story: Old ASMI by EKG      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, without long-term current use of insulin ([address] (9/20/2017)      Overview: Story: Diet Controlled      Hypertension with renal disease (9/20/2017)      Stage 3 chronic kidney disease ([address] (9/20/2017)      Paroxysmal atrial fibrillation ([address] (1/5/2018)      Polymyalgia rheumatica ([facility] (4/24/2018)      Cellulitis (1/2/5830)      Diastolic CHF, chronic ([facility] (3/5/2020)      Overview: Echo 5//5/89  - mild diastolic dysfunction with EFx 45%       ___________________________________________________  PLAN:    1. Continue Vancomycin D # 6 and Levaquin with DAILY Levaquin now at 750 PO, Will complete 7 days Vanc tomorrow and if stable then D/C to home on PO Levaquin  2. Wound care evaluation for left leg ulceration done and wrapped on 3/5 and I D/W wound care nurse  3. On p.o. Lasix at home changed to IV Bumex to help with fluid in both lower extremities, Follow I & O, recording not done accurately; however weights recorded indicate 15# decrease from admission  4. Decreased prednisone dose from 5 mg daily to 2-1/2 and hopefully taper him off of this now go to every other day starting with last daily dose on 3/5 and will D/C after tomorrow AM s dose  5. Continue Coumadin follow daily INR, 1.6 today so higher dose Coumadin at 7.5 again  6. Continue low-dose lisinopril  7. Continue metformin and follow blood sugar with sliding scale coverage if needed  8. Continue other home medications  9. Follow renal function, Cr 1.5 adm to 1.39 today, Pharmarcy dosing vancomycin  10. Follow WBC, 15.6 adm to 10.9 and 8.4 now  11. Check of arterial dopplers - normal AVERY so this is a venous stasis abnormality  12.  Echo mild diastolic dysfunction with mildly reduced EFx at 45%          ___________________________________________________    Attending Physician: Dedrick Rodriguez MD

## 2020-03-08 NOTE — PROGRESS NOTES
Problem: Diabetes Self-Management  Goal: *Incorporating nutritional management into lifestyle  Description  Describe effect of type, amount and timing of food on blood glucose; list 3 methods for planning meals.   Outcome: Progressing Towards Goal     Problem: Patient Education: Go to Patient Education Activity  Goal: Patient/Family Education  Outcome: Progressing Towards Goal

## 2020-03-08 NOTE — PROGRESS NOTES
Bedside and Verbal shift change report given to Pauline Romero (oncoming nurse) by Savage Santos RN (offgoing nurse). Report included the following information SBAR, Kardex, Procedure Summary, Intake/Output, MAR and Recent Results.  Patient rested well this night sleeping at long intervals, no complaints voiced, no distress noted

## 2020-03-08 NOTE — PROGRESS NOTES
Oncology End of Shift Note      Bedside shift change report given to Crispin Rodriguez RN (incoming nurse) by Karolina Mccarty RN (outgoing nurse) on [facility]. Report included the following information SBAR, Kardex and ED Summary. Shift Summary:  Pt still getting ABX, MD rounded and plans on DC tomorrow. Issues for Physician to Address:       Patient on Cardiac Monitoring?     [] Yes  [] No    Rhythm:              Karolina Mccarty RN

## 2020-03-09 VITALS
RESPIRATION RATE: 18 BRPM | OXYGEN SATURATION: 97 % | DIASTOLIC BLOOD PRESSURE: 58 MMHG | HEIGHT: 75 IN | TEMPERATURE: 97.2 F | WEIGHT: 188.27 LBS | BODY MASS INDEX: 23.41 KG/M2 | SYSTOLIC BLOOD PRESSURE: 110 MMHG | HEART RATE: 94 BPM

## 2020-03-09 LAB
ANION GAP SERPL CALC-SCNC: 4 MMOL/L (ref 5–15)
BACTERIA SPEC CULT: NORMAL
BUN SERPL-MCNC: 39 MG/DL (ref 6–20)
BUN/CREAT SERPL: 22 (ref 12–20)
CALCIUM SERPL-MCNC: 9.2 MG/DL (ref 8.5–10.1)
CHLORIDE SERPL-SCNC: 104 MMOL/L (ref 97–108)
CO2 SERPL-SCNC: 30 MMOL/L (ref 21–32)
CREAT SERPL-MCNC: 1.79 MG/DL (ref 0.7–1.3)
ERYTHROCYTE [DISTWIDTH] IN BLOOD BY AUTOMATED COUNT: 13.3 % (ref 11.5–14.5)
GLUCOSE BLD STRIP.AUTO-MCNC: 151 MG/DL (ref 65–100)
GLUCOSE BLD STRIP.AUTO-MCNC: 183 MG/DL (ref 65–100)
GLUCOSE BLD STRIP.AUTO-MCNC: 205 MG/DL (ref 65–100)
GLUCOSE SERPL-MCNC: 133 MG/DL (ref 65–100)
HCT VFR BLD AUTO: 34.5 % (ref 36.6–50.3)
HGB BLD-MCNC: 11 G/DL (ref 12.1–17)
INR PPP: 1.7 (ref 0.9–1.1)
MCH RBC QN AUTO: 29.9 PG (ref 26–34)
MCHC RBC AUTO-ENTMCNC: 31.9 G/DL (ref 30–36.5)
MCV RBC AUTO: 93.8 FL (ref 80–99)
NRBC # BLD: 0 K/UL (ref 0–0.01)
NRBC BLD-RTO: 0 PER 100 WBC
PLATELET # BLD AUTO: 305 K/UL (ref 150–400)
PMV BLD AUTO: 8.7 FL (ref 8.9–12.9)
POTASSIUM SERPL-SCNC: 3.7 MMOL/L (ref 3.5–5.1)
PROTHROMBIN TIME: 17 SEC (ref 9–11.1)
RBC # BLD AUTO: 3.68 M/UL (ref 4.1–5.7)
SERVICE CMNT-IMP: ABNORMAL
SERVICE CMNT-IMP: NORMAL
SODIUM SERPL-SCNC: 138 MMOL/L (ref 136–145)
WBC # BLD AUTO: 9.2 K/UL (ref 4.1–11.1)

## 2020-03-09 PROCEDURE — 74011250636 HC RX REV CODE- 250/636: Performed by: INTERNAL MEDICINE

## 2020-03-09 PROCEDURE — 74011636637 HC RX REV CODE- 636/637: Performed by: INTERNAL MEDICINE

## 2020-03-09 PROCEDURE — 74011000250 HC RX REV CODE- 250: Performed by: INTERNAL MEDICINE

## 2020-03-09 PROCEDURE — 82962 GLUCOSE BLOOD TEST: CPT

## 2020-03-09 PROCEDURE — 74011250637 HC RX REV CODE- 250/637: Performed by: INTERNAL MEDICINE

## 2020-03-09 PROCEDURE — 80048 BASIC METABOLIC PNL TOTAL CA: CPT

## 2020-03-09 PROCEDURE — 85027 COMPLETE CBC AUTOMATED: CPT

## 2020-03-09 PROCEDURE — 36415 COLL VENOUS BLD VENIPUNCTURE: CPT

## 2020-03-09 PROCEDURE — 85610 PROTHROMBIN TIME: CPT

## 2020-03-09 RX ORDER — LEVOFLOXACIN 500 MG/1
500 TABLET, FILM COATED ORAL
Qty: 7 TAB | Refills: 0 | Status: SHIPPED | OUTPATIENT
Start: 2020-03-09 | End: 2020-03-19

## 2020-03-09 RX ORDER — BUMETANIDE 2 MG/1
2 TABLET ORAL DAILY
Qty: 30 TAB | Status: SHIPPED | OUTPATIENT
Start: 2020-03-09 | End: 2020-04-16 | Stop reason: ALTCHOICE

## 2020-03-09 RX ADMIN — VITAMIN D, TAB 1000IU (100/BT) 1 TABLET: 25 TAB at 08:23

## 2020-03-09 RX ADMIN — LISINOPRIL 5 MG: 5 TABLET ORAL at 08:23

## 2020-03-09 RX ADMIN — DIGOXIN 0.12 MG: 125 TABLET ORAL at 08:23

## 2020-03-09 RX ADMIN — VANCOMYCIN HYDROCHLORIDE 1250 MG: 10 INJECTION, POWDER, LYOPHILIZED, FOR SOLUTION INTRAVENOUS at 04:10

## 2020-03-09 RX ADMIN — PREDNISONE 2.5 MG: 5 TABLET ORAL at 09:31

## 2020-03-09 RX ADMIN — LORATADINE 10 MG: 10 TABLET ORAL at 08:23

## 2020-03-09 RX ADMIN — BUMETANIDE 2 MG: 0.25 INJECTION INTRAMUSCULAR; INTRAVENOUS at 08:23

## 2020-03-09 RX ADMIN — IBUPROFEN 600 MG: 600 TABLET, FILM COATED ORAL at 08:23

## 2020-03-09 RX ADMIN — METFORMIN HYDROCHLORIDE 500 MG: 500 TABLET ORAL at 08:23

## 2020-03-09 RX ADMIN — THERA TABS 1 TABLET: TAB at 08:23

## 2020-03-09 RX ADMIN — Medication 10 ML: at 06:38

## 2020-03-09 NOTE — DISCHARGE SUMMARY
[address]     Name:  Edda Clark  MR#:  [id number]  :  1933  ACCOUNT #:  [de-identified]  ADMIT DATE:  2020  DISCHARGE DATE:  2020    FINAL DIAGNOSES:  1. Cellulitis left lower extremity greater than right left lower extremity, both involved. 2.  Superficial venous stasis ulcer left lower extremity. 3.  Venous stasis disease. 4.  Atherosclerotic cardiovascular disease, old anteroseptal myocardial infarction. 5.  Type 2 diabetes with stage III chronic kidney disease. 6.  Hypertension. 7.  Chronic kidney disease, stage III. 8.  Paroxysmal atrial fibrillation. 9.  Polymyalgia rheumatica. 10.  Diastolic congestive heart failure chronic echo this admission, mild diastolic dysfunction, ejection fraction 45%. CONSULTATION:  Wound care speciality. PROCEDURES:  Wound care. For details of admission history, please see admit note. HOSPITAL COURSE:  Briefly, the patient is an 51-year-old white male who presented to the office on the day of admission with development of a superficial venous stasis ulcer median aspect of the left lower extremity just above the ankle after having received a full course of treatment of IV antibiotics initially with bacterium for 4 weeks and then with Augmentin for 4 weeks and despite this treatment, he developed an ulcer. He was thus admitted to the hospital and started on IV vancomycin as well as Levaquin. Culture did return methicillin sensitive Staph aureus and he was continued on Vancomycin for full 7 days during the hospitalization. His Levaquin was switched to p.o., at this point he also was seen by Wound Care and wound care dressing changes were done and they have wrapped his leg with recommended followup for dressing change on the  or  of this month. He also had arterial Dopplers which showed no evidence of arterial insufficiency.   He did have an echocardiogram as noted with EF of 45% and some diastolic dysfunction. His diuretic was changed from Lasix to Bumex and given that IV and did have diureses of 15 pounds, his steroids were tapered and discontinued and it was felt at this point that maximum hospital benefit had been achieved. He will be discharged home with the following medial changes. His Lasix will be stopped and replaced with Bumex 2 mg daily, Bactroban will be stopped. His prednisone has been discontinued. He will also be on Levaquin 500 mg daily for an additional 7 days. DISCHARGE MEDICATIONS:  His other medicines are the same as per admission, which consists of fish oil tablets 1 two times daily, glucosamine 2000 mg daily, sildenafil 20 mg p.r.n., Zocor 20 mg daily, Tylenol Arthritis 650 mg every 8 hours as needed, vitamin D3 1000 units daily, diclofenac 75 mg b.i.d., Lanoxin 0.125 daily, Allegra 180 mg daily, Lisinopril 5 mg daily, metformin 500 mg daily, multivitamin with lutein 1 daily, Hytrin 2 mg daily, Coumadin 5 mg daily. DISCHARGE INSTRUCTIONS:  He will have followup by me in the office in 2-3 days.       MD JULIO CESAR Jorge/[email or web address]  D:  03/09/2020 7:59  T:  03/09/2020 13:54  JOB #:  [id number]  CC:  [id number]

## 2020-03-09 NOTE — PROGRESS NOTES
KENYETTA:   1) Home with f.u appts     9:24 AM- CM attempted to meet with pt again regarding 2ND IM letter. Pt was still in the bathroom and stated he was dizzy. CM contacted RN and Tech to assist with pt care. 8:50 AM- D/C order noted. F.U appts on AVS. CM attempted to meet with pt to provide 2ND IM letter- pt was in the bathroom. CM will come back to attempt again. Care Management Interventions  PCP Verified by CM:  Yes  Mode of Transport at Discharge: Self  Transition of Care Consult (CM Consult): Discharge Planning  MyChart Signup: No  Discharge Durable Medical Equipment: No  Physical Therapy Consult: No  Occupational Therapy Consult: No  Speech Therapy Consult: No  Current Support Network: Family Lives Nearby  Confirm Follow Up Transport: Family  The Patient and/or Patient Representative was Provided with a Choice of Provider and Agrees with the Discharge Plan?: Yes  Freedom of Choice List was Provided with Basic Dialogue that Supports the Patient's Individualized Plan of Care/Goals, Treatment Preferences and Shares the Quality Data Associated with the Providers?: Yes  Discharge Location  Discharge Placement: Home with family assistance     CHARANJIT Arellano, [address] [facility]    [phone]

## 2020-03-09 NOTE — PROGRESS NOTES
Bedside and Verbal shift change report given to [address] [place] [address] (oncoming nurse) by Do Jones RN (offgoing nurse). Report included the following information SBAR, Kardex, Intake/Output, MAR and Recent Results.  Patient rested well this night sleeping at long intervals, no complaints voiced, no distress noted

## 2020-03-09 NOTE — DISCHARGE INSTRUCTIONS
[address]  [phone]      Patient Discharge Instructions    Johnny Faulkner / [id number] : 1933    Admitted 3/3/2020 Discharged: 3/9/2020     Principal Problem:    Cellulitis of left lower extremity (2020)    Active Problems:    ASCVD (arteriosclerotic cardiovascular disease) (2017)      Overview: Story: Old ASMI by EKG      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, without long-term current use of insulin ([address] (2017)      Overview: Story: Diet Controlled      Hypertension with renal disease (2017)      Stage 3 chronic kidney disease ([address] (2017)      Paroxysmal atrial fibrillation ([address] (2018)      Polymyalgia rheumatica ([address] (2018)      Cellulitis (2191)      Diastolic CHF, chronic ([address] (3/5/2020)      Overview: Echo 0//3/80  - mild diastolic dysfunction with EFx 45%          No Known Allergies    · It is important that you take the medication exactly as they are prescribed. · Do not take other medications without consulting your doctor. What to do at Next Level of Care    Disposition: Home    Recommended diet: Diabetic    Recommended activity: Usual. Keep legs elevated when sitting    Wound Care:  LLE dressing change 3/11 or 3/12:  Remove compression wrap, wash leg and foot with soap and water and apply lotion to intact skin. Clean wound with dermal wound cleanser spray and 4x4. Cover wound with Xeroform gauze, ABD pad and apply 3 layer compression wrap. Information obtained by :  I understand that if any problems occur once I am at home I am to contact my physician. I understand and acknowledge receipt of the instructions indicated above.                                                                                                                                            Physician's or R.N.'s Signature Date/Time                                                                                                                                              Patient or Representative Signature                                                          Date/Time

## 2020-03-09 NOTE — INTERDISCIPLINARY ROUNDS
Oncology Interdisciplinary rounds were held today to discuss patient plan of care and outcomes. The following members were present: Nursing, Physician, Case Management, Pharmacy, and PT/OT Actual Length of Stay: 6 DRG GLOS: 3.3 Expected Length of Stay: 3d 7h 
 
 
 
               Plan            Discharge Home with follow up appointments. Discharge possible 3/9/2020

## 2020-03-09 NOTE — ROUTINE PROCESS
The following appointments have been successfully scheduled: 
 
Date/time Wednesday, March 11, 2020 11:00 AM 
Patient  Ashley Webb 02/20/1933 ([address] [phone] Department [facility]-MAIN OFFICE-PCP Appointment type Any Provider Denver Mack

## 2020-03-10 ENCOUNTER — PATIENT OUTREACH (OUTPATIENT)
Dept: INTERNAL MEDICINE CLINIC | Age: 85
End: 2020-03-10

## 2020-03-10 PROBLEM — R79.89 ELEVATED LFTS: Status: RESOLVED | Noted: 2017-09-20 | Resolved: 2020-03-10

## 2020-03-10 PROBLEM — L97.221 VENOUS STASIS ULCER OF LEFT CALF LIMITED TO BREAKDOWN OF SKIN WITHOUT VARICOSE VEINS (HCC): Status: ACTIVE | Noted: 2020-03-10

## 2020-03-10 PROBLEM — I87.2 VENOUS STASIS ULCER OF LEFT CALF LIMITED TO BREAKDOWN OF SKIN WITHOUT VARICOSE VEINS (HCC): Status: ACTIVE | Noted: 2020-03-10

## 2020-03-10 PROBLEM — R74.8 ELEVATED CPK: Status: RESOLVED | Noted: 2017-09-20 | Resolved: 2020-03-10

## 2020-03-10 NOTE — PROGRESS NOTES
Transitions Of Care [facility], [facility] 3/3-3/9/20)       HPI:  Yoni García is a 80y.o. year old male who is here for a follow up visit for hospitalization transition of care. He was last seen by me on 3/3/2020. Discharged on: 3/9/2020    Diagnosis in hospital:  1. Cellulitis with stasis ulcer left lower extremity  2. Cellulitis right lower extremity  3. Venous stasis disease  4. 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 Diastolic CHF with mild diastolic dysfunction on echo this hospitalization EF 45%  6. Diabetes mellitus  7. CKD stage III  8. Hypertension  9. Paroxysmal atrial fibrillation  10. Polymyalgia rheumatica    Complications in hospital: No complication    Medication changes: Discontinued Lasix and prednisone. Start on Bumex 2 mg daily and Levaquin 500 daily for 7 days. Discontinue Bactroban ointment that he was using on his leg ulcer as he is now being followed by wound care center    Discharge Summary reviewed. Today 3/11/2020      He reports the following: Since he was discharged he has been doing well however in talking to him he did not  a prescription for Bumex and thus has been taking the Lasix and he did go back to taking the prednisone because apparently the nurses at the time of discharge did not review his medication list with him. He only took to 1 dose of prednisone yesterday. He denies any chest pain, shortness of breath, palpitations, PND, orthopnea or other cardiorespiratory complaints. There are no GI or  complaints. He notes no headaches, dizziness or neurologic complaints. He has no current active arthritic complaints and generally feels well. He did note a little swelling in his ankles yesterday when he is outside and up on a lot but all the swelling went down when he raises legs when he sat down and raise them in the recliner.   He is due to see the wound care clinic doctor tomorrow to have his dressing changed on his left leg          Visit Vitals  BP 122/68   Pulse 69   Temp 97.9 °F (36.6 °C) (Oral)   Resp 19   Ht 6' 3\" (1.905 m)   Wt 195 lb 6.4 oz (88.6 kg)   SpO2 97%   BMI 24.42 kg/m²       Historical Data    Past Medical History:   Diagnosis Date    Allergic rhinitis 9/20/2017    Arthritis     ASCVD (arteriosclerotic cardiovascular disease) 9/20/2017    Story: Old ASMI by EKG    Back pain 9/20/2017    BPH (benign prostatic hyperplasia) 9/20/2017    CHF (congestive heart failure) ([address] 9/20/2017    CKD (chronic kidney disease) 9/20/2017    Diabetic acetonemia [address]     DM (diabetes mellitus) ([address] 9/20/2017    Story: Diet Controlled    ED (erectile dysfunction) 9/20/2017    Edema 9/20/2017    Elevated CPK 9/20/2017    Comments: History of    Elevated LFTs 9/20/2017    Comments: History of    Elevated PSA 9/20/2017    Hypercholesteremia     Hyperlipidemia 9/20/2017    Hypertension     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    Nodule of right lung 9/20/2017    Story: Right    Polymyalgia ([address] 9/20/2017    Prostate enlargement        Past Surgical History:   Procedure Laterality Date    ABDOMEN SURGERY PROC UNLISTED      hernia repair    HX HEENT  06/12/2018    Dr. 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 30 Tab prn    levoFLOXacin (LEVAQUIN) 500 mg tablet Take 1 Tab by mouth nightly. 7 Tab 0    warfarin (COUMADIN) 5 mg tablet Taking 2 tablets on Monday, Wednesday and Friday; one and a half all other days.  145 Tab 3    glucose blood VI test strips (ACCU-CHEK SMARTVIEW TEST STRIP) strip USE ONCE DAILY E11.9 100 Strip PRN    digoxin (LANOXIN) 0.125 mg tablet TAKE 1 TABLET BY MOUTH EVERY DAY 90 Tab 3    diclofenac EC (VOLTAREN) 75 mg EC tablet TAKE 1 TABLET BY MOUTH TWICE A  Tab 3    terazosin (HYTRIN) 2 mg capsule TAKE ONE CAPSULE BY MOUTH DAILY 5mg 90 Cap 3    simvastatin (ZOCOR) 20 mg tablet TAKE 1 TABLET EVERY DAY 90 Tab 3    lisinopril (PRINIVIL, ZESTRIL) 5 mg tablet TAKE 1 TABLET EVERY DAY 90 Tab 3    Nebulizer & Compressor machine Use daily as directed. 1 Each 0    sildenafil, antihypertensive, (REVATIO) 20 mg tablet TAKE 1 TABLET, ORAL, AS DIRECTED FOR SEXUAL ACTIVITY 20 Tab 11    metFORMIN (GLUCOPHAGE) 500 mg tablet Take 1 Tab by mouth daily (with breakfast). 90 Tab 3    fexofenadine (ALLEGRA) 180 mg tablet Take  by mouth.  acetaminophen (TYLENOL ARTHRITIS PAIN) 650 mg TbER Take 650 mg by mouth every eight (8) hours.  multivitamins-minerals-lutein (MULTIVITAMIN 50 PLUS) tab tablet Take 1 Tab by mouth daily.  glucosamine 1,000 mg tab Take 2,000 mg by mouth daily.  cholecalciferol (VITAMIN D3) 1,000 unit cap Take 1,000 Units by mouth daily.  DOCOSAHEXANOIC ACID/EPA (FISH OIL PO) Take 1,200 mg by mouth two (2) times a day. No facility-administered encounter medications on file as of 3/11/2020.          No Known Allergies     Social History     Socioeconomic History    Marital status:      Spouse name: Not on file    Number of children: Not on file    Years of education: Not on file    Highest education level: Not on file   Occupational History    Not on file   Social Needs    Financial resource strain: Not on file    Food insecurity     Worry: Not on file     Inability: Not on file    Transportation needs     Medical: Not on file     Non-medical: Not on file   Tobacco Use    Smoking status: Never Smoker    Smokeless tobacco: Never Used   Substance and Sexual Activity    Alcohol use: No    Drug use: No    Sexual activity: Never   Lifestyle    Physical activity     Days per week: Not on file     Minutes per session: Not on file    Stress: Not on file   Relationships    Social connections     Talks on phone: Not on file     Gets together: Not on file     Attends Alevism service: Not on file     Active member of club or organization: Not on file     Attends meetings of clubs or organizations: Not on file     Relationship status: Not on file    Intimate partner violence     Fear of current or ex partner: Not on file     Emotionally abused: Not on file     Physically abused: Not on file     Forced sexual activity: Not on file   Other Topics Concern    Not on file   Social History Narrative    Not on file      REVIEW OF SYSTEMS:  General: negative for - chills or fever  ENT: negative for - headaches, nasal congestion or tinnitus  Eyes: no blurred or visual changes  Neck: No stiffness or swollen nodes  Respiratory: negative for - cough, hemoptysis, shortness of breath or wheezing  Cardiovascular : negative for - chest pain, palpitations or shortness of breath. Some swelling in legs when up on them  Gastrointestinal: negative for - abdominal pain, blood in stools, heartburn or nausea/vomiting  Genito-Urinary: no dysuria, trouble voiding, or hematuria  Musculoskeletal: negative for - gait disturbance, joint pain, joint stiffness or joint swelling  Neurological: no TIA or stroke symptoms  Hematologic: no bruises, no bleeding  Lymphatic: no swollen glands  Integument: no lumps, mole changes, nail changes or rash  Endocrine:no malaise/lethargy poly uria or polydipsia or unexpected weight changes      Visit Vitals  /68   Pulse 69   Temp 97.9 °F (36.6 °C) (Oral)   Resp 19   Ht 6' 3\" (1.905 m)   Wt 195 lb 6.4 oz (88.6 kg)   SpO2 97%   BMI 24.42 kg/m²     CONSTITUTIONAL: well , well nourished, appears age appropriate  HEAD: normocephalic, atraumatic  EYES: sclera anicteric, PERRL, EOMI  ENMT:moist mucous membranes, pharynx clear          Nares: w/o erythema or edema  NECK: supple.  Thyroid normal, No JVD or bruits  RESPIRATORY: Chest: clear to ascultation and percussion   CARDIOVASCULAR: Heart: regular rate and rhythm no murmurs, rubs or gallops, PMI not displaced, no thrill. 1+ peripheral edema  GASTROINTESTINAL: Abdomen: non distended, soft, non-tender, bowel sounds normal  HEMATOLOGIC: no petechiae or purpura  LYMPHATIC: no lymphadenopathy  MUSCULOSKELETAL: Extremities: no edema or active synovitis, pulse 1+   BACK; no point or CVAT  INTEGUMENT: No unusual rashes or suspicious skin lesions noted. Nails appear normal.  Left leg with dressed wrapping on it  NEUROLOGIC: non-focal exam   MENTAL STATUS: alert and oriented, appropriate affect   PSYCHIATRIC: normal affect    ASSESSMENT:   1. Cellulitis of left lower extremity    2. Venous stasis ulcer of left calf limited to breakdown of skin without varicose veins (HCC)    3. Diastolic CHF, chronic ([facility]    4. 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, without long-term current use of insulin (HCC)    5. Paroxysmal atrial fibrillation ([facility]    6. Hypertension with renal disease    7. ASCVD (arteriosclerotic cardiovascular disease)    8. Stage 3 chronic kidney disease (HCC)      Impression  1. Cellulitis left lower extremity with stasis ulcer wound care follow-up tomorrow resume Levaquin 500 daily for total of 7 days post discharge  2. Venous stasis ulcer as noted Dopplers revealed normal arterial circulation while in the hospital  3. Diastolic CHF asking about a pharmacy  his prescription for Bumex and switch to that rather than Lasix  4. Diabetes we will see what the blood sugar is here today  5   Paroxysmal atrial fibrillation INR pending  6. Hypertension control is adequate  7. ASCVD clinically stable  8. CKD stage III repeat status pending  Follow-up scheduled for 1 week or sooner should to be a problem. I will call with lab results. All the above was discussed with his daughter-in-law present with him today.     PLAN:  .  Orders Placed This Encounter    CBC WITH AUTOMATED DIFF    METABOLIC PANEL, BASIC ([facility])    PROTHROMBIN TIME + INR         ATTENTION: This medical record was transcribed using an electronic medical records system. Although proofread, it may and can contain electronic and spelling errors. Other human spelling and other errors may be present. Corrections may be executed at a later time. Please feel free to contact us for any clarifications as needed. Follow-up and Dispositions    · Return in about 1 week (around 3/18/2020). Jayde Brennan MD    Orders Placed This Encounter    CBC WITH AUTOMATED DIFF    METABOLIC PANEL, BASIC ([facility])   [employer] + INR          No results found for any visits on 03/11/20. I have reviewed the patient's medical history in detail and updated the computerized patient record. We had a prolonged discussion about these complex clinical issues and went over the various important aspects to consider. All questions were answered. Advised him to call back or return to office if symptoms do not improve, change in nature, or persist.    He was given an after visit summary or informed of [facility] which includes patient instructions, diagnoses, current medications, & vitals. He expressed understanding with the diagnosis and plan.

## 2020-03-10 NOTE — PROGRESS NOTES
KENYETTA:   1) Home with Outpatient Wound Care Clinic appt   2) Home with Dr. Joe maki appt     8:51 AM- CM received a phone call from pt's dtr requesting to arrange [facility] services instead of going to the outpatient wound care clinic on the 12th. CM advised consult was for outpatient wound care clinic or home health if the wound care clinic could not see him and we could not arrange [facility] now that pt has left AMA. CM advised dtr to contact Dr. Joe Torres for assistance. Care Management Interventions  PCP Verified by CM:  Yes  Mode of Transport at Discharge: Self  Transition of Care Consult (CM Consult): Discharge Planning  MyChart Signup: No  Discharge Durable Medical Equipment: No  Physical Therapy Consult: No  Occupational Therapy Consult: No  Speech Therapy Consult: No  Current Support Network: Family Lives Nearby  Confirm Follow Up Transport: Family  The Patient and/or Patient Representative was Provided with a Choice of Provider and Agrees with the Discharge Plan?: Yes  Freedom of Choice List was Provided with Basic Dialogue that Supports the Patient's Individualized Plan of Care/Goals, Treatment Preferences and Shares the Quality Data Associated with the Providers?: Yes  Discharge Location  Discharge Placement: Home with family assistance    CHARANJIT Jennings, [address] [facility]    [phone]

## 2020-03-11 ENCOUNTER — OFFICE VISIT (OUTPATIENT)
Dept: INTERNAL MEDICINE CLINIC | Age: 85
End: 2020-03-11

## 2020-03-11 ENCOUNTER — PATIENT OUTREACH (OUTPATIENT)
Dept: INTERNAL MEDICINE CLINIC | Age: 85
End: 2020-03-11

## 2020-03-11 VITALS
HEIGHT: 75 IN | DIASTOLIC BLOOD PRESSURE: 68 MMHG | TEMPERATURE: 97.9 F | HEART RATE: 69 BPM | BODY MASS INDEX: 24.29 KG/M2 | WEIGHT: 195.4 LBS | OXYGEN SATURATION: 97 % | RESPIRATION RATE: 19 BRPM | SYSTOLIC BLOOD PRESSURE: 122 MMHG

## 2020-03-11 DIAGNOSIS — I12.9 HYPERTENSION WITH RENAL DISEASE: ICD-10-CM

## 2020-03-11 DIAGNOSIS — E11.22 CONTROLLED TYPE 2 DIABETES MELLITUS WITH STAGE 3 CHRONIC KIDNEY DISEASE, WITHOUT LONG-TERM CURRENT USE OF INSULIN (HCC): ICD-10-CM

## 2020-03-11 DIAGNOSIS — I48.0 PAROXYSMAL ATRIAL FIBRILLATION (HCC): ICD-10-CM

## 2020-03-11 DIAGNOSIS — N18.30 STAGE 3 CHRONIC KIDNEY DISEASE (HCC): ICD-10-CM

## 2020-03-11 DIAGNOSIS — I87.2 VENOUS STASIS ULCER OF LEFT CALF LIMITED TO BREAKDOWN OF SKIN WITHOUT VARICOSE VEINS (HCC): ICD-10-CM

## 2020-03-11 DIAGNOSIS — N18.30 CONTROLLED TYPE 2 DIABETES MELLITUS WITH STAGE 3 CHRONIC KIDNEY DISEASE, WITHOUT LONG-TERM CURRENT USE OF INSULIN (HCC): ICD-10-CM

## 2020-03-11 DIAGNOSIS — I50.32 DIASTOLIC CHF, CHRONIC (HCC): ICD-10-CM

## 2020-03-11 DIAGNOSIS — L97.221 VENOUS STASIS ULCER OF LEFT CALF LIMITED TO BREAKDOWN OF SKIN WITHOUT VARICOSE VEINS (HCC): ICD-10-CM

## 2020-03-11 DIAGNOSIS — I25.10 ASCVD (ARTERIOSCLEROTIC CARDIOVASCULAR DISEASE): ICD-10-CM

## 2020-03-11 DIAGNOSIS — L03.116 CELLULITIS OF LEFT LOWER EXTREMITY: Primary | ICD-10-CM

## 2020-03-11 LAB
ANION GAP SERPL CALC-SCNC: 15 MMOL/L
BUN SERPL-MCNC: 61 MG/DL (ref 9–20)
CALCIUM SERPL-MCNC: 9.8 MG/DL (ref 8.4–10.2)
CHLORIDE SERPL-SCNC: 99 MMOL/L (ref 98–107)
CO2 SERPL-SCNC: 29 MMOL/L (ref 22–32)
CREAT SERPL-MCNC: 2.3 MG/DL (ref 0.8–1.5)
GLUCOSE SERPL-MCNC: 113 MG/DL (ref 75–110)
POTASSIUM SERPL-SCNC: 4.6 MMOL/L (ref 3.6–5)
SODIUM SERPL-SCNC: 143 MMOL/L (ref 137–145)

## 2020-03-11 NOTE — PROGRESS NOTES
Chief Complaint   Patient presents with   [place] 3/3-3/9/20     Visit Vitals  /62 (BP 1 Location: Left arm, BP Patient Position: Sitting)   Pulse 69   Temp 97.9 °F (36.6 °C) (Oral)   Resp 19   Ht 6' 3\" (1.905 m)   Wt 195 lb 6.4 oz (88.6 kg)   SpO2 97%   BMI 24.42 kg/m²     1. Have you been to the ER, urgent care clinic since your last visit? Hospitalized since your last visit? ED [facility] 3/3-3/9/20    2. Have you seen or consulted any other health care providers outside of the [address] [place] [address] since your last visit? Include any pap smears or colon screening.  No

## 2020-03-11 NOTE — PATIENT INSTRUCTIONS

## 2020-03-12 ENCOUNTER — HOSPITAL ENCOUNTER (OUTPATIENT)
Dept: WOUND CARE | Age: 85
Discharge: HOME | End: 2020-03-12
Payer: MEDICARE

## 2020-03-12 ENCOUNTER — TELEPHONE (OUTPATIENT)
Dept: WOUND CARE | Age: 85
End: 2020-03-12

## 2020-03-12 VITALS
WEIGHT: 196 LBS | HEART RATE: 88 BPM | SYSTOLIC BLOOD PRESSURE: 111 MMHG | HEIGHT: 75 IN | BODY MASS INDEX: 24.37 KG/M2 | TEMPERATURE: 97.6 F | RESPIRATION RATE: 16 BRPM | DIASTOLIC BLOOD PRESSURE: 60 MMHG

## 2020-03-12 DIAGNOSIS — Z51.89 ENCOUNTER FOR WOUND CARE: Primary | ICD-10-CM

## 2020-03-12 DIAGNOSIS — L97.511 DIABETIC ULCER OF TOE OF RIGHT FOOT ASSOCIATED WITH TYPE 2 DIABETES MELLITUS, LIMITED TO BREAKDOWN OF SKIN (HCC): ICD-10-CM

## 2020-03-12 DIAGNOSIS — E11.621 DIABETIC ULCER OF TOE OF RIGHT FOOT ASSOCIATED WITH TYPE 2 DIABETES MELLITUS, LIMITED TO BREAKDOWN OF SKIN (HCC): ICD-10-CM

## 2020-03-12 PROBLEM — L03.031 CELLULITIS OF TOE OF RIGHT FOOT: Status: RESOLVED | Noted: 2018-02-14 | Resolved: 2020-03-12

## 2020-03-12 PROBLEM — L97.922 NON-PRESSURE CHRONIC ULCER OF LEFT LOWER LEG WITH FAT LAYER EXPOSED (HCC): Status: ACTIVE | Noted: 2020-03-12

## 2020-03-12 PROBLEM — L03.116 CELLULITIS OF LEFT LOWER EXTREMITY: Status: RESOLVED | Noted: 2020-01-14 | Resolved: 2020-03-12

## 2020-03-12 PROBLEM — L03.90 CELLULITIS: Status: RESOLVED | Noted: 2020-03-03 | Resolved: 2020-03-12

## 2020-03-12 PROBLEM — R60.0 BILATERAL LEG EDEMA: Status: ACTIVE | Noted: 2020-03-12

## 2020-03-12 PROBLEM — L97.509 DIABETIC ULCER OF TOE (HCC): Status: ACTIVE | Noted: 2020-03-12

## 2020-03-12 LAB
BASOPHILS # BLD AUTO: 0.1 X10E3/UL (ref 0–0.2)
BASOPHILS NFR BLD AUTO: 1 %
EOSINOPHIL # BLD AUTO: 0.1 X10E3/UL (ref 0–0.4)
EOSINOPHIL NFR BLD AUTO: 1 %
ERYTHROCYTE [DISTWIDTH] IN BLOOD BY AUTOMATED COUNT: 13 % (ref 11.6–15.4)
HCT VFR BLD AUTO: 34.1 % (ref 37.5–51)
HGB BLD-MCNC: 11.5 G/DL (ref 13–17.7)
IMM GRANULOCYTES # BLD AUTO: 0.4 X10E3/UL (ref 0–0.1)
IMM GRANULOCYTES NFR BLD AUTO: 3 %
INR PPP: 1.4 (ref 0.8–1.2)
LYMPHOCYTES # BLD AUTO: 1.5 X10E3/UL (ref 0.7–3.1)
LYMPHOCYTES NFR BLD AUTO: 11 %
MCH RBC QN AUTO: 30.7 PG (ref 26.6–33)
MCHC RBC AUTO-ENTMCNC: 33.7 G/DL (ref 31.5–35.7)
MCV RBC AUTO: 91 FL (ref 79–97)
MONOCYTES # BLD AUTO: 1.1 X10E3/UL (ref 0.1–0.9)
MONOCYTES NFR BLD AUTO: 9 %
NEUTROPHILS # BLD AUTO: 9.8 X10E3/UL (ref 1.4–7)
NEUTROPHILS NFR BLD AUTO: 75 %
PLATELET # BLD AUTO: 332 X10E3/UL (ref 150–450)
PROTHROMBIN TIME: 14.4 SEC (ref 9.1–12)
RBC # BLD AUTO: 3.75 X10E6/UL (ref 4.14–5.8)
WBC # BLD AUTO: 12.9 X10E3/UL (ref 3.4–10.8)

## 2020-03-12 PROCEDURE — 11042 DBRDMT SUBQ TIS 1ST 20SQCM/<: CPT

## 2020-03-12 NOTE — WOUND CARE
03/12/20 4902 Wound Leg lower Left;Medial #1 Date First Assessed/Time First Assessed: 03/12/20 0836   Present on Hospital Admission: Yes  Wound Approximate Age at First Assessment (Weeks): 8 weeks  Location: Leg lower  Wound Location Orientation: Left;Medial  Wound Description: #1 Dressing Type Applied Xeroform;ABD pad;Compression Wrap/Venous Stasis 
(A&D todry skin, 3 layer to BLE)

## 2020-03-12 NOTE — TELEPHONE ENCOUNTER
Outpatient Wound Care:  Order for Bilateral Venous Duplex Studies faxed to [facility] ([phone]), per Dr. Faustino Morelos. Copy of order given to patient and family.

## 2020-03-12 NOTE — WOUND CARE
03/12/20 1327 Wound Leg lower Left;Medial #1 Date First Assessed/Time First Assessed: 03/12/20 0836   Present on Hospital Admission: Yes  Wound Approximate Age at First Assessment (Weeks): 8 weeks  Location: Leg lower  Wound Location Orientation: Left;Medial  Wound Description: #1 Dressing Status Removed Dressing Type Xeroform;ABD pad Non-staged Wound Description Full thickness Wound Length (cm) 3 cm Wound Width (cm) 2.3 cm Wound Depth (cm) 0.1 cm Wound Surface Area (cm^2) 6.9 cm^2 Wound Volume (cm^3) 0.69 cm^3 Condition of Base Pink;Slough Condition of Edges Open Drainage Amount Moderate Drainage Color Serosanguinous Wound Odor None Emelina-wound Assessment Intact Cleansing and Cleansing Agents  Normal saline; Soap and water Wound Toe (Comment  which one) Plantar 2nd toe #2 Date First Assessed/Time First Assessed: 03/12/20 0842   Present on Hospital Admission: Yes  Primary Wound Type: Diabetic Ulcer  Location: Toe (Comment  which one)  Wound Location Orientation: Plantar  Wound Description: 2nd toe #2 Dressing Type Open to air Wound Length (cm) 0.2 cm Wound Width (cm) 0.2 cm Wound Depth (cm) 0.2 cm Wound Surface Area (cm^2) 0.04 cm^2 Wound Volume (cm^3) 0.01 cm^3 Condition of Base Pink Condition of Edges Calloused; Open Drainage Amount Moderate Drainage Color Serosanguinous Wound Odor None Cleansing and Cleansing Agents  Normal saline Visit Vitals /60 Pulse 88 Temp 97.6 °F (36.4 °C) Resp 16 Ht 6' 3\" (1.905 m) Wt 88.9 kg (196 lb) BMI 24.50 kg/m² LLE Peripheral Vascular Capillary Refill: Less than/equal to 3 seconds (03/12/20 0832) Color: Appropriate for race (03/12/20 5105) Temperature: Warm (03/12/20 0406) Sensation: Present (03/12/20 1046) Pedal Pulse: Palpable;Doppler (03/12/20 2028) Circumference of Calf (cm): 36.4 cm (03/12/20 0832) Location of Measurement (Calf): Mid  (03/12/20 1147) Circumference of Ankle (cm): 22 cm (03/12/20 0832) Location of Measurement (Ankle): Upper  (03/12/20 0693) RLE Peripheral Vascular Capillary Refill: Less than/equal to 3 seconds (03/12/20 0832) Color: Appropriate for race(hemosidrin staining) (03/12/20 3633) Temperature: Warm (03/12/20 1178) Sensation: Present (03/12/20 1501) Pedal Pulse: Palpable;Doppler (03/12/20 4843) Circumference of Calf (cm): 35.5 cm (03/12/20 0832) Location of Measurement (Calf): Mid  (03/12/20 7368) Circumference of Ankle (cm): 23.4 cm (03/12/20 0832) Location of Measurement (Ankle): Upper  (03/12/20 5455)

## 2020-03-12 NOTE — H&P
[address]  HISTORY AND PHYSICAL    Name:  Katarzyna Koenig  MR#:  [id number]  :  1933  ACCOUNT #:  [de-identified]  ADMIT DATE:  2020    HISTORY OF PRESENT ILLNESS:  The patient is an 59-year-old man referred by Dr. Mirna Dailey to the [address] [place] [address] regarding wound in left lower extremity. The patient reports that he has had the wound for several months. He received oral antibiotics, but eventually developed a cellulitis, which required hospitalization. He was hospitalized from 2020 to 2020. He received IV antibiotics including vancomycin for methicillin-sensitive Staphylococcus aureus. The patient also had echocardiogram showing left ventricular ejection fraction of 65% with diastolic dysfunction. He had been on furosemide, but was changed to bumetanide p.o. The patient delayed picking up prescription for a few days after discharge, but has now started the bumetanide. It does produce a diuresis. The patient reports that he does drink fairly large amounts of water and other fluid over the course of the day. The patient has history of diabetes mellitus. He reports that typically his diabetes had been well controlled, his blood sugar is around 110. He has recently had blood sugars in the high 100s felt related to taking prednisone. He is now off prednisone. The patient is ambulatory. He does not have shortness of breath at rest or with exertion. He is able to lie flat at night in bed for sleeping. He does not sleep in a chair. The patient does not have history of anginal chest pain, coronary intervention, or MI. He is reported to have history of paroxysmal atrial fibrillation and does take Coumadin. The patient had a triple layer compression wrap placed on the left leg prior to discharge from the hospital.  The patient has noted swelling of the right leg also.         PAST MEDICAL HISTORY:  Includes prostatic hypertrophy, diabetes mellitus, diastolic congestive heart failure, hypertension, hyperlipidemia, polymyalgia rheumatica, osteoarthritis, and chronic kidney disease, stage III. PHYSICAL EXAMINATION:  GENERAL:  The patient is an alert man, in no acute distress. VITAL SIGNS:  Blood pressure 111/60, pulse 88, respirations 16, temperature 97. 6. HEAD AND NECK:  No jaundice, mass, or thyromegaly. LUNGS:  Clear bilaterally without rales, rhonchi, or wheezes. HEART:  Regular without murmur, gallop, or rub. ABDOMEN:  Soft and nontender. NEUROLOGIC:  The patient is alert and oriented. He moves all extremities equally. Facial movement is symmetrical.  Speech is normal.    EXTREMITIES:  Examination of the lower extremities revealed 1+ pitting edema present to the knee level bilaterally. TOn the right, the edema continues into the lower leg. There is a 2+ right dorsalis pedis pulse. No ulcers are present on the right lower leg, but there is hyperpigmentation of the lower half of the right lower leg. The patient has hammertoe deformities of the right foot. In particular, the right second toe has hammertoe deformity with a callus at the tip of the toe in a central punctate opening. There is no erythema though there is slight tenderness to the tip of the second toe. The patient has elongated nails on the right foot also. On the left side, there is wrinkling of the skin in the area that had been covered by the elastic compression dressing. There is a palpable left DP pulse. There is an open ulcer, which is 3.0 x 2.3 x 0.1 cm with slough on its surface on the distal anteromedial aspect of the left lower leg. Just proximal to that there is a scabbed area, which is about a centimeter across without active open wound. Regarding the right foot, I explained to the patient and his family that the hammertoe deformity is creating a pressure, against the tip of the second toe in the bottom of shoe creating callus and a small ulcer. The patient also has elongated nails. The patient knows Dr. Michael Fountain and I have recommended that he make an appointment to see Dr. Ignacia Connolly as soon as possible regarding appropriate footwear and measures to offload the impact against the right second toe as well as manage his nails. I have recommended the patient to reduce fluid intake and drink a moderate amount of fluid with meals and very little fluid between meals as he does have pitting edema and he is taking a diuretic. Effect of swelling on wound formation and wound healing was reviewed. I carried out debridement of the wound, distal anterolateral aspect left lower leg after application of topical lidocaine gel. The risks versus benefit have been reviewed, the patient agreed. Using a 5-mm ring curette, a sharp excisional debridement was carried out of the wound. Slough and granulation tissue were removed down into the subcutaneous layer. Dimensions of the wound following debridement were 3.0 x 2.3 x 0.2 cm. Hemostasis was reached with compression of the site. IMPRESSION AND RECOMMENDATION:  For the right leg which is edematous and has had cellulitis recently, I recommended placement of a triple layer compression wrap from base of toes to upper calf with 50% stretch. For the left lower leg and associated wound, I recommended placement of Xeroform over the wound and a triple layer compression wrap from base of toes to proximal calf with 50% stretch. I have ordered bilateral venous duplex scans to assess for venous valvular insufficiency as a potential contributing factor to leg edema and ulceration. The patient will follow up in the [address] [place] [address] in one week. FINAL DIAGNOSES:  Nonpressure ulcer, left lower leg. Bilateral leg edema. Callus and ulcer, right second toe related to diabetes mellitus.             Elian Romero MD      [id number]  D:  03/12/2020 9:41  T:  03/12/2020 14:08  JOB #:  [id number]

## 2020-03-12 NOTE — DISCHARGE INSTRUCTIONS
Discharge Instructions/Wound Care Orders  [facility]  [address] [facility], [address] [facility]  Telephone: [phone]    NAME:  Abelino Campos Sr. YOB: 1933  MEDICAL RECORD NUMBER:  [id number]  DATE:  3/12/2020      WOUND CARE ORDERS: Left Lower Leg wound-Cleanse wound w/normal saline & gauze. Apply Xeroform gauze to wound, cover with gauze,  and 3 layer compression wrap.    3 layer compression wrap to Right Lower leg as well. Right 2nd toe- Cleanse with Normal Saline & gauze (or soap and water) and Leave open to air. Patient to follow up with his Podiatrist, Dr. Nanda Oneal, regarding Right second toe, and regular diabetic foot care. Venous Duplex studies-Bilateral Legs, to be scheduled with [facility]. Follow up with Dr. Mg Fong in 1 week. TREATMENT ORDERS:    Elevate leg(s) above the level of the heart when sitting. Avoid prolonged standing in one place. Do no get dressing/wrap wet. Follow Diet as prescribed:   [] Diet as tolerated: [x] Diabetic Diet:Low Carbohydrate [x] No Added Salt:  [] Increase Protein: [x] Other:Avoid drinking excess liquids. Minimize drinking liquids between meals. Return Appointment:  [x] Return Appointment: With Dr. Mg Fong in 1 [facility])  [x]  Call your Podiatrist, Dr. Nanda Oneal, regarding Right 2nd toe, and regular diabetic foot care. [x]  Venous studies (Right and Left Legs) to be scheduled with [facility]      Electronically signed Phoebe White RN on 3/12/2020     [address] [place] [address] Information: Should you experience any significant changes in your wound(s) or have questions about your wound care, please contact the [address] [place] [address] at [address] [place] [address] Street 8:00 am - 4:30. If you need help with your wound outside these hours and cannot wait until we are again available, contact your PCP or go to the hospital emergency room.      PLEASE NOTE: IF YOU ARE UNABLE TO OBTAIN WOUND SUPPLIES, CONTINUE TO USE THE SUPPLIES YOU HAVE AVAILABLE UNTIL YOU ARE ABLE TO REACH US. IT IS MOST IMPORTANT TO KEEP THE WOUND COVERED AT ALL TIMES.      Physician Signature:_______________________    Date: ___________ Time:  ____________

## 2020-03-12 NOTE — PROGRESS NOTES
Large slow and kidney function is slightly abnormal so I need to know his exact dose of what he was taking as far as a diuretic which I think was Lasix as a switching to Bumex at the visit. I also need to know what is Coumadin dose was.

## 2020-03-13 ENCOUNTER — TELEPHONE (OUTPATIENT)
Dept: INTERNAL MEDICINE CLINIC | Age: 85
End: 2020-03-13

## 2020-03-13 NOTE — TELEPHONE ENCOUNTER
Called regarding his blood sugar running higher than usual.  States he is trying to follow his diet and taking his Metformin 500 mg daily. Recently took steroids. Advised the patient to keep a dairy for the next few days and call back so we can review the readings.

## 2020-03-16 ENCOUNTER — PATIENT OUTREACH (OUTPATIENT)
Dept: INTERNAL MEDICINE CLINIC | Age: 85
End: 2020-03-16

## 2020-03-16 NOTE — PROGRESS NOTES
Chief Complaint   Patient presents with    Skin Infection     1 week f/u    Hypertension       SUBJECTIVE:    Shelton Meng is a 80 y.o. male who returns in follow-up regarding his cellulitis bilateral lower extremity with superficial ulcer left lower extremity as well as congestive heart failure, ASCVD, paroxysmal atrial fibrillation, polymyalgia rheumatica currently in remission, CKD and other medical problems. He is currently being seen in the wound care clinic and both legs are wrapped and he is noted no significant pain. The wrapping on the right seems to be a little snug but not causing any discomfort. He denies any fevers or chills. He has completed his Augmentin. He denies any chest pain, shortness of breath, palpitations, PND, orthopnea or other cardiorespiratory complaints. He notes no GI or  complaints. He notes no headaches, dizziness or neurologic complaints. He has no change of his chronic arthritic complaints and there are no other complaints noted. Current Outpatient Medications   Medication Sig Dispense Refill    cyanocobalamin (VITAMIN B12) 500 mcg tablet Take 500 mcg by mouth daily.  bumetanide (BUMEX) 2 mg tablet Take 1 Tab by mouth daily. 30 Tab prn    levoFLOXacin (LEVAQUIN) 500 mg tablet Take 1 Tab by mouth nightly. 7 Tab 0    warfarin (COUMADIN) 5 mg tablet Taking 2 tablets on Monday, Wednesday and Friday; one and a half all other days.  145 Tab 3    glucose blood VI test strips (ACCU-CHEK SMARTVIEW TEST STRIP) strip USE ONCE DAILY E11.9 100 Strip PRN    digoxin (LANOXIN) 0.125 mg tablet TAKE 1 TABLET BY MOUTH EVERY DAY 90 Tab 3    diclofenac EC (VOLTAREN) 75 mg EC tablet TAKE 1 TABLET BY MOUTH TWICE A  Tab 3    terazosin (HYTRIN) 2 mg capsule TAKE ONE CAPSULE BY MOUTH DAILY 5mg 90 Cap 3    simvastatin (ZOCOR) 20 mg tablet TAKE 1 TABLET EVERY DAY 90 Tab 3    lisinopril (PRINIVIL, ZESTRIL) 5 mg tablet TAKE 1 TABLET EVERY DAY 90 Tab 3    metFORMIN (GLUCOPHAGE) 500 mg tablet Take 1 Tab by mouth daily (with breakfast). 90 Tab 3    fexofenadine (ALLEGRA) 180 mg tablet Take  by mouth.  acetaminophen (TYLENOL ARTHRITIS PAIN) 650 mg TbER Take 650 mg by mouth every eight (8) hours.  multivitamins-minerals-lutein (MULTIVITAMIN 50 PLUS) tab tablet Take 1 Tab by mouth daily.  glucosamine 1,000 mg tab Take 2,000 mg by mouth daily.  cholecalciferol (VITAMIN D3) 1,000 unit cap Take 1,000 Units by mouth daily.  DOCOSAHEXANOIC ACID/EPA (FISH OIL PO) Take 1,200 mg by mouth two (2) times a day. 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    Back pain 9/20/2017    BPH (benign prostatic hyperplasia) 9/20/2017    CHF (congestive heart failure) ([address] 9/20/2017    CKD (chronic kidney disease) 9/20/2017    Diabetic acetonemia ([address]     DM (diabetes mellitus) ([address] 9/20/2017    Story: Diet Controlled    ED (erectile dysfunction) 9/20/2017    Edema 9/20/2017    Elevated CPK 9/20/2017    Comments: History of    Elevated LFTs 9/20/2017    Comments: History of    Elevated PSA 9/20/2017    Hypercholesteremia     Hyperlipidemia 9/20/2017    Hypertension     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    Nodule of right lung 9/20/2017    Story: Right    Polymyalgia ([address] 9/20/2017    Prostate enlargement      Past Surgical History:   Procedure Laterality Date    ABDOMEN SURGERY PROC UNLISTED      hernia repair    HX HEENT  06/12/2018    Dr. 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    HX MALIGNANT SKIN LESION EXCISION  09/2018    2 lesions on head     No Known Allergies    REVIEW OF SYSTEMS:  General: negative for - chills or fever, or weight loss or gain  ENT: negative for - headaches, nasal congestion or tinnitus  Eyes: no blurred or visual changes  Neck: No stiffness or swollen nodes  Respiratory: negative for - cough, hemoptysis, shortness of breath or wheezing  Cardiovascular : negative for - chest pain, edema, palpitations or shortness of breath  Gastrointestinal: negative for - abdominal pain, blood in stools, heartburn or nausea/vomiting  Genito-Urinary: no dysuria, trouble voiding, or hematuria  Musculoskeletal: negative for - gait disturbance, joint pain, joint stiffness or joint swelling  Neurological: no TIA or stroke symptoms  Hematologic: no bruises, no bleeding  Lymphatic: no swollen glands  Integument: no lumps, mole changes, nail changes or rash. Both legs were wrapped at the wound care clinic on his visit there on the 12th with follow-up scheduled for 2 days from now  Endocrine:no malaise/lethargy poly uria or polydipsia or unexpected weight changes        Social History     Socioeconomic History    Marital status:      Spouse name: Not on file    Number of children: Not on file    Years of education: Not on file    Highest education level: Not on file   Tobacco Use    Smoking status: Never Smoker    Smokeless tobacco: Never Used   Substance and Sexual Activity    Alcohol use: No    Drug use: No    Sexual activity: Never     History reviewed. No pertinent family history. OBJECTIVE:     Visit Vitals  BP 98/62 (BP 1 Location: Left arm, BP Patient Position: Sitting)   Pulse 94   Temp 98.1 °F (36.7 °C) (Oral)   Ht 6' 3\" (1.905 m)   Wt 192 lb 14.4 oz (87.5 kg)   SpO2 99%   BMI 24.11 kg/m²     CONSTITUTIONAL:   well nourished, appears age appropriate  EYES: sclera anicteric, PERRL, EOMI  ENMT:nares clear, moist mucous membranes, pharynx clear  NECK: supple.  Thyroid normal, No JVD or bruits  RESPIRATORY: Chest: clear to ascultation and percussion, normal inspiratory effort  CARDIOVASCULAR: Heart: regular rate and rhythm no murmurs, rubs or gallops, PMI not displaced, No thrills  GASTROINTESTINAL: Abdomen: non distended, soft, non tender, bowel sounds normal  HEMATOLOGIC: no purpura, petechiae or bruising  LYMPHATIC: No lymph node enlargemant  MUSCULOSKELETAL: Extremities: no edema or active synovitis, pulse 1+   INTEGUMENT: No unusual rashes or suspicious skin lesions noted. Nails appear normal.  Wrapping over both lower extremities  PERIPHERAL VASCULAR: normal pulses femoral, PT and DP  NEUROLOGIC: non-focal exam, A & O X 3  PSYCHIATRIC:, appropriate affect     ASSESSMENT:   1. Non-pressure chronic ulcer of left lower leg with fat layer exposed ([address]    2. Diastolic CHF, chronic (HCC)    3. Stage 3 chronic kidney disease (HCC)    4. Paroxysmal atrial fibrillation ([address]    5. Hypertension with renal disease    6. 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 Pressure ulcer left leg markedly improved according to a picture that he has daughter-in-law showed me compared to what it was initially. Follow-up with wound care in 2 days. Currently off antibiotics and I will not renew them unless Dr. Monisha Tong feels the need to be renewed when he sees him back in 2 days with the leg unwrapped  2   Diastolic CHF seems to be compensated weight overall is down 12 pounds we will continue current diuretic reviewed with he and his daughter-in-law  3. CKD stage III repeat status is pending  4   Paroxysmal atrial fibrillation INR is pending  5   Hypertension that is controlled although little on the low side 110/64 by my check  6. Diabetes I reviewed his most recent A1c of 6.5 although his blood sugars been running a little high we will have to keep a close eye on that. He is now off prednisone. I did fill out a form in quite extensive detail regarding his diabetic foot exam and need for specialized shoes to hopefully prevent further ulcerations  Follow-up scheduled again for 2 weeks with me he is to keep follow-up with wound care clinic.   I will see him sooner if there is a problem. I will call with lab results. All of this discussed in detail with his daughter-in-law present with him today. 25 minutes spent in direct care of this patient today with greater than 50% in counseling coordination of care. PLAN:  .  Orders Placed This Encounter    PROTHROMBIN TIME + INR    METABOLIC PANEL, BASIC ([facility])    CBC WITH AUTOMATED DIFF    cyanocobalamin (VITAMIN B12) 500 mcg tablet         ATTENTION:   This medical record was transcribed using an electronic medical records system. Although proofread, it may and can contain electronic and spelling errors. Other human spelling and other errors may be present. Corrections may be executed at a later time. Please feel free to contact us for any clarifications as needed. Follow-up and Dispositions    · Return in about 2 weeks (around 3/31/2020). No results found for any visits on 03/17/20. Mary Joaquin MD    The patient verbalized understanding of the problems and plans as explained.

## 2020-03-17 ENCOUNTER — OFFICE VISIT (OUTPATIENT)
Dept: INTERNAL MEDICINE CLINIC | Age: 85
End: 2020-03-17

## 2020-03-17 VITALS
HEART RATE: 94 BPM | BODY MASS INDEX: 23.99 KG/M2 | TEMPERATURE: 98.1 F | DIASTOLIC BLOOD PRESSURE: 62 MMHG | HEIGHT: 75 IN | OXYGEN SATURATION: 99 % | WEIGHT: 192.9 LBS | SYSTOLIC BLOOD PRESSURE: 98 MMHG

## 2020-03-17 DIAGNOSIS — E11.22 CONTROLLED TYPE 2 DIABETES MELLITUS WITH STAGE 3 CHRONIC KIDNEY DISEASE, WITHOUT LONG-TERM CURRENT USE OF INSULIN (HCC): ICD-10-CM

## 2020-03-17 DIAGNOSIS — I12.9 HYPERTENSION WITH RENAL DISEASE: ICD-10-CM

## 2020-03-17 DIAGNOSIS — L97.922 NON-PRESSURE CHRONIC ULCER OF LEFT LOWER LEG WITH FAT LAYER EXPOSED (HCC): Primary | ICD-10-CM

## 2020-03-17 DIAGNOSIS — I48.0 PAROXYSMAL ATRIAL FIBRILLATION (HCC): ICD-10-CM

## 2020-03-17 DIAGNOSIS — N18.30 STAGE 3 CHRONIC KIDNEY DISEASE (HCC): ICD-10-CM

## 2020-03-17 DIAGNOSIS — N18.30 CONTROLLED TYPE 2 DIABETES MELLITUS WITH STAGE 3 CHRONIC KIDNEY DISEASE, WITHOUT LONG-TERM CURRENT USE OF INSULIN (HCC): ICD-10-CM

## 2020-03-17 DIAGNOSIS — I50.32 DIASTOLIC CHF, CHRONIC (HCC): ICD-10-CM

## 2020-03-17 LAB
ANION GAP SERPL CALC-SCNC: 12 MMOL/L
BUN SERPL-MCNC: 52 MG/DL (ref 9–20)
CALCIUM SERPL-MCNC: 9.8 MG/DL (ref 8.4–10.2)
CHLORIDE SERPL-SCNC: 105 MMOL/L (ref 98–107)
CO2 SERPL-SCNC: 29 MMOL/L (ref 22–32)
CREAT SERPL-MCNC: 1.8 MG/DL (ref 0.8–1.5)
GLUCOSE SERPL-MCNC: 136 MG/DL (ref 75–110)
POTASSIUM SERPL-SCNC: 4.8 MMOL/L (ref 3.6–5)
SODIUM SERPL-SCNC: 146 MMOL/L (ref 137–145)

## 2020-03-17 RX ORDER — LANOLIN ALCOHOL/MO/W.PET/CERES
500 CREAM (GRAM) TOPICAL DAILY
COMMUNITY

## 2020-03-17 NOTE — PATIENT INSTRUCTIONS

## 2020-03-17 NOTE — PROGRESS NOTES
Chief Complaint   Patient presents with    Skin Infection     1 week f/u    Hypertension     1. Have you been to the ER, urgent care clinic since your last visit? Hospitalized since your last visit? No    2. Have you seen or consulted any other health care providers outside of the [address] [place] [address] since your last visit? Include any pap smears or colon screening. Yes, seen  Dr. Faustino Epstein on 3/16/2020,  From [facility], podiatrist, sent paperwork for orthotic shoes.

## 2020-03-18 LAB
BASOPHILS # BLD AUTO: 0.1 X10E3/UL (ref 0–0.2)
BASOPHILS NFR BLD AUTO: 1 %
EOSINOPHIL # BLD AUTO: 0.1 X10E3/UL (ref 0–0.4)
EOSINOPHIL NFR BLD AUTO: 1 %
ERYTHROCYTE [DISTWIDTH] IN BLOOD BY AUTOMATED COUNT: 13 % (ref 11.6–15.4)
HCT VFR BLD AUTO: 35.7 % (ref 37.5–51)
HGB BLD-MCNC: 11.4 G/DL (ref 13–17.7)
IMM GRANULOCYTES # BLD AUTO: 0.1 X10E3/UL (ref 0–0.1)
IMM GRANULOCYTES NFR BLD AUTO: 1 %
INR PPP: 1.5 (ref 0.8–1.2)
LYMPHOCYTES # BLD AUTO: 1.5 X10E3/UL (ref 0.7–3.1)
LYMPHOCYTES NFR BLD AUTO: 18 %
MCH RBC QN AUTO: 29.7 PG (ref 26.6–33)
MCHC RBC AUTO-ENTMCNC: 31.9 G/DL (ref 31.5–35.7)
MCV RBC AUTO: 93 FL (ref 79–97)
MONOCYTES # BLD AUTO: 0.6 X10E3/UL (ref 0.1–0.9)
MONOCYTES NFR BLD AUTO: 8 %
NEUTROPHILS # BLD AUTO: 6.1 X10E3/UL (ref 1.4–7)
NEUTROPHILS NFR BLD AUTO: 71 %
PLATELET # BLD AUTO: 281 X10E3/UL (ref 150–450)
PROTHROMBIN TIME: 15.6 SEC (ref 9.1–12)
RBC # BLD AUTO: 3.84 X10E6/UL (ref 4.14–5.8)
WBC # BLD AUTO: 8.4 X10E3/UL (ref 3.4–10.8)

## 2020-03-18 NOTE — PROGRESS NOTES
Labs stable except INR just a little bit low so I need to know his Coumadin dose to just side a little otherwise continue current treatment as discussed at visit

## 2020-03-19 ENCOUNTER — TELEPHONE ANTICOAG (OUTPATIENT)
Dept: INTERNAL MEDICINE CLINIC | Age: 85
End: 2020-03-19

## 2020-03-19 ENCOUNTER — HOSPITAL ENCOUNTER (OUTPATIENT)
Dept: WOUND CARE | Age: 85
Discharge: HOME | End: 2020-03-19
Payer: MEDICARE

## 2020-03-19 VITALS
SYSTOLIC BLOOD PRESSURE: 113 MMHG | HEART RATE: 85 BPM | RESPIRATION RATE: 16 BRPM | DIASTOLIC BLOOD PRESSURE: 55 MMHG | TEMPERATURE: 97.9 F

## 2020-03-19 DIAGNOSIS — I48.0 PAROXYSMAL ATRIAL FIBRILLATION (HCC): Primary | ICD-10-CM

## 2020-03-19 PROCEDURE — 29581 APPL MULTLAYER CMPRN SYS LEG: CPT

## 2020-03-19 NOTE — WOUND CARE
03/19/20 1221 Wound Leg lower Left;Medial #1 Date First Assessed/Time First Assessed: 03/12/20 0836   Present on Hospital Admission: Yes  Wound Approximate Age at First Assessment (Weeks): 8 weeks  Location: Leg lower  Wound Location Orientation: Left;Medial  Wound Description: #1 Dressing Type Applied Xeroform;Gauze; Compression Wrap/Venous Stasis Wound Toe (Comment  which one) Plantar 2nd toe #2 Date First Assessed/Time First Assessed: 03/12/20 0842   Present on Hospital Admission: Yes  Primary Wound Type: Diabetic Ulcer  Location: Toe (Comment  which one)  Wound Location Orientation: Plantar  Wound Description: 2nd toe #2 Dressing Type Applied Adhesive wound dressing (Band-Aid) Rich Duron) Multilayer Compression Wrap 
 (Not Unna) Below the Knee NAME:  Vic White Sr. YOB: 1933 MEDICAL RECORD NUMBER:  [id number] DATE:  3/19/2020 Applied moisturizing agent to dry skin as needed. Applied primary and secondary dressing as ordered. Applied multilayered dressing below the knee to right lower leg. Applied multilayered dressing below the knee to left lower leg. Instructed patient/caregiver not to remove dressing and to keep it clean and dry. Instructed patient/caregiver on complications to report to provider, such as pain, numbness in toes, heavy drainage, and slippage of dressing. Instructed patient on purpose of compression dressing and on activity and exercise recommendations.  
 
 
Electronically signed by Dante Hdez RN on 3/19/2020 at 12:22 PM

## 2020-03-19 NOTE — PROGRESS NOTES
Labs stable except INR just a little bit low so I need to know his Coumadin dose to just side a little otherwise continue current treatment as discussed at visit   Advised patient's daughter in law to change the blood thinner dose to 10 mg on Monday, Wednesday, Friday and Saturday; 7.5 mg all other days and f/up as scheduled.

## 2020-03-19 NOTE — PROGRESS NOTES
Anticoagulation Summary  As of 3/19/2020    INR goal:   2.0-3.0   TTR:   2.1 % (1.9 y)   INR used for dosin.5! (3/17/2020)   Warfarin maintenance plan:   7.5 mg (5 mg x 1.5) every Sun, Tue, Thu; 10 mg (5 mg x 2) all other days   Weekly warfarin total:   62.5 mg   Plan last modified:   Kalyn Correia RN (3/19/2020)   Next INR check:   3/31/2020   Target end date:       Indications    Paroxysmal atrial fibrillation ([facility] (Primary) [I48.0]             Anticoagulation Episode Summary     INR check location:   Clinic Lab    Preferred lab:       Send INR reminders to:       Comments:         Anticoagulation Care Providers     Provider Role Specialty Phone number    Keyla Nunez MD Responsible Internal Medicine [phone]        Informed patient's daughter in law that the INR was 1.5. Need to adjust his blood thinner dose to 10 mg on Monday, Wednesday, Friday and Saturday and continue the 7.5 mg all other days and f/up as scheduled.

## 2020-03-19 NOTE — WOUND CARE
03/19/20 1107 Wound Toe (Comment  which one) Plantar 2nd toe #2 Date First Assessed/Time First Assessed: 03/12/20 0842   Present on Hospital Admission: Yes  Primary Wound Type: Diabetic Ulcer  Location: Toe (Comment  which one)  Wound Location Orientation: Plantar  Wound Description: 2nd toe #2 Dressing Status Removed Dressing Type  
(bandaid) Wound Length (cm) 0.4 cm Wound Width (cm) 0.3 cm Wound Depth (cm) 0.1 cm Wound Surface Area (cm^2) 0.12 cm^2 Wound Volume (cm^3) 0.01 cm^3 Change in Wound Size % -200 Condition of Base Pink Condition of Edges Calloused Drainage Amount Moderate Drainage Color Serosanguinous Wound Odor None Cleansing and Cleansing Agents  Normal saline Visit Vitals /55 Pulse 85 Temp 97.9 °F (36.6 °C) Resp 16

## 2020-03-19 NOTE — PROGRESS NOTES
HISTORY OF PRESENT ILLNESS:  The patient is an 44-year-old man referred by Dr. Rishi Whatley to the [address] [place] [address] regarding wound in left lower extremity. The patient reports that he has had the wound for several months. He received oral antibiotics, but eventually developed a cellulitis, which required hospitalization. He was hospitalized from 03/03/2020 to 03/09/2020. He received IV antibiotics including vancomycin for methicillin-sensitive Staphylococcus aureus.     The patient also had echocardiogram showing left ventricular ejection fraction of 44% with diastolic dysfunction. He had been on furosemide, but was changed to bumetanide p.o. The patient delayed picking up prescription for a few days after discharge, but has now started the bumetanide. It does produce a diuresis.     The patient reports that he had been drinking fairly large amounts of water and other fluid over the course of the day. He is trying to reduce intake.     The patient has history of diabetes mellitus. He reports that typically his diabetes had been well controlled, his blood sugar is around 110. He has recently had blood sugars in the high 100s felt related to taking prednisone. He is now off prednisone.     The patient is ambulatory. He does not have shortness of breath at rest or with exertion. He is able to lie flat at night in bed for sleeping. He does not sleep in a chair. The patient does not have history of anginal chest pain, coronary intervention, or MI. He is reported to have history of paroxysmal atrial fibrillation and does take Coumadin. The patient saw Dr Ivania Hogan for right toe callus and deformity and bilateral foot care.     Current dressings started 3/12/2020:      Right  Leg:  triple layer compression wrap from base of toes to upper calf with 50% stretch.     For the left lower leg and associated wound, Xeroform over the wound and a triple layer compression wrap from base of toes to proximal calf with 50% stretch.           PAST MEDICAL HISTORY:  Includes prostatic hypertrophy, diabetes mellitus, diastolic congestive heart failure, hypertension, hyperlipidemia, polymyalgia rheumatica, osteoarthritis, and chronic kidney disease, stage III.     PHYSICAL EXAMINATION:  GENERAL:  The patient is an alert man, in no acute distress. EXTREMITIES:  Examination of the lower extremities revealed trace pitting edema present to the knee level bilaterally.       TOn the right, there is a 2+ right dorsalis pedis pulse. No ulcers are present on the right lower leg, but there is hyperpigmentation of the lower half of the right lower leg. The patient has hammertoe deformities of the right foot. In particular, the right second toe has hammertoe deformity with a callus at the tip of the toe . On the left side, there is wrinkling of the skin in the area that had been covered by the elastic compression dressing. There is a palpable left DP pulse. There is an open ulcer, which is 2.2 x 1.7 x 0.1 cm with mild slough on its surface on the distal anteromedial aspect of the left lower leg. Just proximal to that there is a scabbed area, which is about a centimeter across without active open wound.     I have recommended the patient to reduce fluid intake and drink a moderate amount of fluid with meals and very little fluid between meals as he does have pitting edema and he is taking a diuretic.   Effect of swelling on wound formation and wound healing was reviewed.     IMPRESSION AND RECOMMENDATION:  For the right leg, I recommended placement of a triple layer compression wrap from base of toes to upper calf with 50% stretch.     For the left lower leg and associated wound, I recommended placement of Xeroform over the wound and a triple layer compression wrap from base of toes to proximal calf with 50% stretch.     Venous duplex pending.     The patient will follow up in the [address] [place] [address] in one week for nurse visit; see me in 2 weeks.     FINAL DIAGNOSES:  Nonpressure ulcer, left lower leg. Bilateral leg edema.   Callus and ulcer, right second toe related to diabetes mellitus.                 Jung Campo MD

## 2020-03-26 ENCOUNTER — HOSPITAL ENCOUNTER (OUTPATIENT)
Dept: WOUND CARE | Age: 85
Discharge: HOME | End: 2020-03-26
Payer: MEDICARE

## 2020-03-26 VITALS — DIASTOLIC BLOOD PRESSURE: 60 MMHG | RESPIRATION RATE: 16 BRPM | TEMPERATURE: 97.8 F | SYSTOLIC BLOOD PRESSURE: 127 MMHG

## 2020-03-26 PROCEDURE — 29581 APPL MULTLAYER CMPRN SYS LEG: CPT

## 2020-03-26 NOTE — WOUND CARE
03/26/20 1333 Wound Leg lower Left;Medial #1 Date First Assessed/Time First Assessed: 03/12/20 0836   Present on Hospital Admission: Yes  Wound Approximate Age at First Assessment (Weeks): 8 weeks  Location: Leg lower  Wound Location Orientation: Left;Medial  Wound Description: #1 Dressing Status Removed Dressing Type Compression Wrap/Venous Stasis; Xeroform;Gauze Non-staged Wound Description Full thickness Wound Length (cm) 1 cm Wound Width (cm) 0.7 cm Wound Depth (cm) 0.1 cm Wound Surface Area (cm^2) 0.7 cm^2 Wound Volume (cm^3) 0.07 cm^3 Change in Wound Size % 89.86 Condition of Base Pink Condition of Edges Open Drainage Amount Scant Drainage Color Sanguinous Wound Odor None Emelina-wound Assessment Intact; Other (Comment) (Fragile, Hemosiderin staining) Cleansing and Cleansing Agents  Soap and water;Normal saline Wound Toe (Comment  which one) Plantar;Right 2nd toe #2 Date First Assessed/Time First Assessed: 03/12/20 0842   Present on Hospital Admission: Yes  Primary Wound Type: Diabetic Ulcer  Location: Toe (Comment  which one)  Wound Location Orientation: Plantar;Right  Wound Description: 2nd toe #2 Dressing Status Removed Dressing Type Adhesive wound dressing (Band-Aid) Wound Length (cm) 0 cm Wound Width (cm) 0 cm Wound Depth (cm) 0 cm Wound Surface Area (cm^2) 0 cm^2 Wound Volume (cm^3) 0 cm^3 Change in Wound Size % 100 Condition of Base Epithelializing (No open wound noted.) Condition of Edges Open Drainage Amount None Wound Odor None Cleansing and Cleansing Agents  Soap and water;Normal saline

## 2020-03-26 NOTE — WOUND CARE
03/26/20 1333 Wound Leg lower Left;Medial #1 Date First Assessed/Time First Assessed: 03/12/20 0836   Present on Hospital Admission: Yes  Wound Approximate Age at First Assessment (Weeks): 8 weeks  Location: Leg lower  Wound Location Orientation: Left;Medial  Wound Description: #1 Dressing Status Removed Dressing Type Compression Wrap/Venous Stasis; Xeroform;Gauze Non-staged Wound Description Full thickness Wound Length (cm) 1 cm Wound Width (cm) 0.7 cm Wound Depth (cm) 0.1 cm Wound Surface Area (cm^2) 0.7 cm^2 Wound Volume (cm^3) 0.07 cm^3 Change in Wound Size % 89.86 Condition of Base Pink Condition of Edges Open Drainage Amount Scant Drainage Color Sanguinous Wound Odor None Emelina-wound Assessment Intact; Other (Comment) (Hemosiderin staining) Cleansing and Cleansing Agents  Soap and water;Normal saline Wound Toe (Comment  which one) Plantar;Right 2nd toe #2 Date First Assessed/Time First Assessed: 03/12/20 0842   Present on Hospital Admission: Yes  Primary Wound Type: Diabetic Ulcer  Location: Toe (Comment  which one)  Wound Location Orientation: Plantar;Right  Wound Description: 2nd toe #2 Dressing Status Removed

## 2020-03-26 NOTE — WOUND CARE
Multilayer Compression Wrap 
 (Not Unna) Below the Knee NAME:  Sherry Cristina Sr. YOB: 1933 MEDICAL RECORD NUMBER:  [id number] DATE:  3/26/2020 Nurse Visit Appointment for Dressing Change: 
 
Removed old Multilayer wrap if indicated and wash leg with mild soap/water. Applied moisturizing agent to dry skin as needed. Applied primary and secondary dressing as ordered. Applied multilayered dressing below the knee to right lower leg. Applied multilayered dressing below the knee to left lower leg. Instructed patient/caregiver not to remove dressing and to keep it clean and dry. Instructed patient/caregiver on complications to report to provider, such as pain, numbness in toes, heavy drainage, and slippage of dressing.  
 
 
Electronically signed by Imani Dash RN on 3/26/2020 at 2:00pm

## 2020-03-30 NOTE — PROGRESS NOTES
Chief Complaint   Patient presents with    Skin Infection     2 week f/up    Hypertension    Diabetes    Anticoagulation    Myalgia     PMR       SUBJECTIVE:    Leigh Painter Sr. is a 80 y.o. male who returns in follow-up of his cellulitis/ulcer left lower extremity as well as cellulitis right lower extremity and also in follow-up of his hypertension, diabetes, paroxysmal atrial fibrillation and congestive heart failure. When he was most recently seen here his weight was down 12 pounds and he was being followed by the wound care clinic he had completed his antibiotic at that time which was not resumed and according to the wound care clinic his foot has been doing very well. He does note he is getting his wound dressings changed once weekly on Thursdays which is to be done this week on April 2. He denies any fevers or chills. He has been eating better. He does not note any shortness of breath, palpitations, PND, orthopnea or cardiorespiratory complaints. He notes no GI or  complaints. There are no other complaints noted. He is taking all of his medications and trying to watch the salt in his diet. Current Outpatient Medications   Medication Sig Dispense Refill    cyanocobalamin (VITAMIN B12) 500 mcg tablet Take 500 mcg by mouth daily.  bumetanide (BUMEX) 2 mg tablet Take 1 Tab by mouth daily. 30 Tab prn    warfarin (COUMADIN) 5 mg tablet Taking 2 tablets on Monday, Wednesday and Friday; one and a half all other days. (Patient taking differently: Taking 2 tablets on Monday, Wednesday and Friday and Saturday. Taking One and a half all other days. ) 145 Tab 3    glucose blood VI test strips (ACCU-CHEK SMARTVIEW TEST STRIP) strip USE ONCE DAILY E11.9 100 Strip PRN    digoxin (LANOXIN) 0.125 mg tablet TAKE 1 TABLET BY MOUTH EVERY DAY 90 Tab 3    diclofenac EC (VOLTAREN) 75 mg EC tablet TAKE 1 TABLET BY MOUTH TWICE A  Tab 3    terazosin (HYTRIN) 2 mg capsule TAKE ONE CAPSULE BY MOUTH DAILY 5mg 90 Cap 3    simvastatin (ZOCOR) 20 mg tablet TAKE 1 TABLET EVERY DAY 90 Tab 3    lisinopril (PRINIVIL, ZESTRIL) 5 mg tablet TAKE 1 TABLET EVERY DAY 90 Tab 3    metFORMIN (GLUCOPHAGE) 500 mg tablet Take 1 Tab by mouth daily (with breakfast). 90 Tab 3    fexofenadine (ALLEGRA) 180 mg tablet Take  by mouth.  acetaminophen (TYLENOL ARTHRITIS PAIN) 650 mg TbER Take 650 mg by mouth every eight (8) hours.  multivitamins-minerals-lutein (MULTIVITAMIN 50 PLUS) tab tablet Take 1 Tab by mouth daily.  glucosamine 1,000 mg tab Take 2,000 mg by mouth daily.  cholecalciferol (VITAMIN D3) 1,000 unit cap Take 1,000 Units by mouth daily.  DOCOSAHEXANOIC ACID/EPA (FISH OIL PO) Take 1,200 mg by mouth two (2) times a day. 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    Back pain 9/20/2017    BPH (benign prostatic hyperplasia) 9/20/2017    CHF (congestive heart failure) ([address] 9/20/2017    CKD (chronic kidney disease) 9/20/2017    Diabetic acetonemia ([address]     DM (diabetes mellitus) ([address] 9/20/2017    Story: Diet Controlled    ED (erectile dysfunction) 9/20/2017    Edema 9/20/2017    Elevated CPK 9/20/2017    Comments: History of    Elevated LFTs 9/20/2017    Comments: History of    Elevated PSA 9/20/2017    Hypercholesteremia     Hyperlipidemia 9/20/2017    Hypertension     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    Nodule of right lung 9/20/2017    Story: Right    Polymyalgia ([address] 9/20/2017    Prostate enlargement      Past Surgical History:   Procedure Laterality Date    ABDOMEN SURGERY PROC UNLISTED      hernia repair    HX HEENT  06/12/2018    Dr. 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    HX MALIGNANT SKIN LESION EXCISION  09/2018    2 lesions on head     No Known Allergies    REVIEW OF SYSTEMS:  General: negative for - chills or fever, or weight loss or gain  ENT: negative for - headaches, nasal congestion or tinnitus  Eyes: no blurred or visual changes  Neck: No stiffness or swollen nodes  Respiratory: negative for - cough, hemoptysis, shortness of breath or wheezing  Cardiovascular : negative for - chest pain, edema, palpitations or shortness of breath  Gastrointestinal: negative for - abdominal pain, blood in stools, heartburn or nausea/vomiting  Genito-Urinary: no dysuria, trouble voiding, or hematuria  Musculoskeletal: negative for - gait disturbance, joint pain, joint stiffness or joint swelling  Neurological: no TIA or stroke symptoms  Hematologic: no bruises, no bleeding  Lymphatic: no swollen glands  Integument: no lumps, mole changes, nail changes or rash. Both lower extremities wrapped  Endocrine:no malaise/lethargy poly uria or polydipsia or unexpected weight changes        Social History     Socioeconomic History    Marital status:      Spouse name: Not on file    Number of children: Not on file    Years of education: Not on file    Highest education level: Not on file   Tobacco Use    Smoking status: Never Smoker    Smokeless tobacco: Never Used   Substance and Sexual Activity    Alcohol use: No    Drug use: No    Sexual activity: Never     No family history on file. OBJECTIVE:     Visit Vitals  /66 (BP 1 Location: Left arm, BP Patient Position: Sitting)   Pulse 76   Temp 97.7 °F (36.5 °C)   Resp 18   Wt 200 lb 3.2 oz (90.8 kg)   SpO2 98%   BMI 25.02 kg/m²     CONSTITUTIONAL:   well nourished, appears age appropriate  EYES: sclera anicteric, PERRL, EOMI  ENMT:nares clear, moist mucous membranes, pharynx clear  NECK: supple.  Thyroid normal, No JVD or bruits  RESPIRATORY: Chest: clear to ascultation and percussion, normal inspiratory effort  CARDIOVASCULAR: Heart: regular rate and rhythm no murmurs, rubs or gallops, PMI not displaced, No thrills  GASTROINTESTINAL: Abdomen: non distended, soft, non tender, bowel sounds normal  HEMATOLOGIC: no purpura, petechiae or bruising  LYMPHATIC: No lymph node enlargemant  MUSCULOSKELETAL: Extremities: no edema or active synovitis, pulse 1+   INTEGUMENT: No unusual rashes or suspicious skin lesions noted. Nails appear normal.  Wrapping both lower extremities thus wrapping is not removed as it will be done at the wound care clinic  PERIPHERAL VASCULAR: normal pulses femoral, PT and DP  NEUROLOGIC: non-focal exam, A & O X 3  PSYCHIATRIC:, appropriate affect     ASSESSMENT:   1. Diastolic CHF, chronic ([address]    2. Non-pressure chronic ulcer of left lower leg with fat layer exposed ([address]    3. Hypertension with renal disease    4. Paroxysmal atrial fibrillation (HCC)    5. 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 Diastolic CHF compensated weight is back up 8 pounds but still down 4 pounds overall  2. Nonpressure ulcer left lower extremity continue wound care  3. Hypertension is controlled  4. Paroxysmal atrial fibrillation stable  5. Diabetes mellitus I reviewed his blood sugars that he has kept on a once or twice daily basis since he last saw me and the highest blood sugar he has is 165 so overall they look good  25 minutes spent in direct care of this patient today with greater than 50% in counseling coordination of care all his questions were answered. I will call with lab results. PLAN:  .  Orders Placed This Encounter    CBC WITH AUTOMATED DIFF    PROTHROMBIN TIME + INR    METABOLIC PANEL, BASIC ([facility])         ATTENTION:   This medical record was transcribed using an electronic medical records system. Although proofread, it may and can contain electronic and spelling errors. Other human spelling and other errors may be present. Corrections may be executed at a later time.   Please feel free to contact us for any clarifications as needed. Follow-up and Dispositions    · Return in about 2 years (around 3/31/2022). No results found for any visits on 03/31/20. Kosta Troncoso MD    The patient verbalized understanding of the problems and plans as explained.

## 2020-03-31 ENCOUNTER — OFFICE VISIT (OUTPATIENT)
Dept: INTERNAL MEDICINE CLINIC | Age: 85
End: 2020-03-31

## 2020-03-31 VITALS
TEMPERATURE: 97.7 F | RESPIRATION RATE: 18 BRPM | DIASTOLIC BLOOD PRESSURE: 66 MMHG | HEART RATE: 76 BPM | WEIGHT: 200.2 LBS | BODY MASS INDEX: 25.02 KG/M2 | SYSTOLIC BLOOD PRESSURE: 118 MMHG | OXYGEN SATURATION: 98 %

## 2020-03-31 DIAGNOSIS — E11.22 CONTROLLED TYPE 2 DIABETES MELLITUS WITH STAGE 3 CHRONIC KIDNEY DISEASE, WITHOUT LONG-TERM CURRENT USE OF INSULIN (HCC): ICD-10-CM

## 2020-03-31 DIAGNOSIS — L97.922 NON-PRESSURE CHRONIC ULCER OF LEFT LOWER LEG WITH FAT LAYER EXPOSED (HCC): ICD-10-CM

## 2020-03-31 DIAGNOSIS — I48.0 PAROXYSMAL ATRIAL FIBRILLATION (HCC): ICD-10-CM

## 2020-03-31 DIAGNOSIS — N18.30 CONTROLLED TYPE 2 DIABETES MELLITUS WITH STAGE 3 CHRONIC KIDNEY DISEASE, WITHOUT LONG-TERM CURRENT USE OF INSULIN (HCC): ICD-10-CM

## 2020-03-31 DIAGNOSIS — I50.32 DIASTOLIC CHF, CHRONIC (HCC): Primary | ICD-10-CM

## 2020-03-31 DIAGNOSIS — I12.9 HYPERTENSION WITH RENAL DISEASE: ICD-10-CM

## 2020-03-31 LAB
ANION GAP SERPL CALC-SCNC: 14 MMOL/L
BUN SERPL-MCNC: 37 MG/DL (ref 9–20)
CALCIUM SERPL-MCNC: 9.2 MG/DL (ref 8.4–10.2)
CHLORIDE SERPL-SCNC: 103 MMOL/L (ref 98–107)
CO2 SERPL-SCNC: 26 MMOL/L (ref 22–32)
CREAT SERPL-MCNC: 1.6 MG/DL (ref 0.8–1.5)
GLUCOSE SERPL-MCNC: 140 MG/DL (ref 75–110)
POTASSIUM SERPL-SCNC: 5.5 MMOL/L (ref 3.6–5)
SODIUM SERPL-SCNC: 143 MMOL/L (ref 137–145)

## 2020-03-31 NOTE — PROGRESS NOTES
Chief Complaint   Patient presents with    Skin Infection     2 week f/up    Hypertension    Diabetes    Anticoagulation    Myalgia     PMR     1. Have you been to the ER, urgent care clinic since your last visit? Hospitalized since your last visit? Has been to the Wound Care clinic near [facility]    2. Have you seen or consulted any other health care providers outside of the [address] [place] [address] since your last visit? Include any pap smears or colon screening.  no

## 2020-03-31 NOTE — PATIENT INSTRUCTIONS

## 2020-04-01 LAB
BASOPHILS # BLD AUTO: 0 X10E3/UL (ref 0–0.2)
BASOPHILS NFR BLD AUTO: 1 %
EOSINOPHIL # BLD AUTO: 0.1 X10E3/UL (ref 0–0.4)
EOSINOPHIL NFR BLD AUTO: 2 %
ERYTHROCYTE [DISTWIDTH] IN BLOOD BY AUTOMATED COUNT: 14.3 % (ref 11.6–15.4)
HCT VFR BLD AUTO: 33.9 % (ref 37.5–51)
HGB BLD-MCNC: 11.1 G/DL (ref 13–17.7)
IMM GRANULOCYTES # BLD AUTO: 0 X10E3/UL (ref 0–0.1)
IMM GRANULOCYTES NFR BLD AUTO: 0 %
INR PPP: 2.5 (ref 0.8–1.2)
LYMPHOCYTES # BLD AUTO: 1.5 X10E3/UL (ref 0.7–3.1)
LYMPHOCYTES NFR BLD AUTO: 21 %
MCH RBC QN AUTO: 29.9 PG (ref 26.6–33)
MCHC RBC AUTO-ENTMCNC: 32.7 G/DL (ref 31.5–35.7)
MCV RBC AUTO: 91 FL (ref 79–97)
MONOCYTES # BLD AUTO: 0.6 X10E3/UL (ref 0.1–0.9)
MONOCYTES NFR BLD AUTO: 9 %
NEUTROPHILS # BLD AUTO: 4.8 X10E3/UL (ref 1.4–7)
NEUTROPHILS NFR BLD AUTO: 67 %
PLATELET # BLD AUTO: 179 X10E3/UL (ref 150–450)
PROTHROMBIN TIME: 25.1 SEC (ref 9.1–12)
RBC # BLD AUTO: 3.71 X10E6/UL (ref 4.14–5.8)
WBC # BLD AUTO: 7.1 X10E3/UL (ref 3.4–10.8)

## 2020-04-02 ENCOUNTER — TELEPHONE ANTICOAG (OUTPATIENT)
Dept: INTERNAL MEDICINE CLINIC | Age: 85
End: 2020-04-02

## 2020-04-02 ENCOUNTER — HOSPITAL ENCOUNTER (OUTPATIENT)
Dept: WOUND CARE | Age: 85
Discharge: HOME | End: 2020-04-02
Payer: MEDICARE

## 2020-04-02 VITALS
DIASTOLIC BLOOD PRESSURE: 61 MMHG | HEART RATE: 85 BPM | RESPIRATION RATE: 16 BRPM | SYSTOLIC BLOOD PRESSURE: 113 MMHG | TEMPERATURE: 97.3 F

## 2020-04-02 DIAGNOSIS — I48.0 PAROXYSMAL ATRIAL FIBRILLATION (HCC): Primary | ICD-10-CM

## 2020-04-02 PROCEDURE — 29581 APPL MULTLAYER CMPRN SYS LEG: CPT

## 2020-04-02 NOTE — PROGRESS NOTES
Labs stable so continue current treatment. Advised to continue his blood thinner dose at 10 mg on Monday, Wednesday, Friday and Saturday; 7.5 mg all other days and f/up as scheduled.

## 2020-04-02 NOTE — PROGRESS NOTES
Anticoagulation Summary  As of 2020    INR goal:   2.0-3.0   TTR:   3.0 % (2 y)   INR used for dosin.5 (3/31/2020)   Warfarin maintenance plan:   7.5 mg (5 mg x 1.5) every Sun, Tue, Thu; 10 mg (5 mg x 2) all other days   Weekly warfarin total:   62.5 mg   Plan last modified:   Helyn Leyden, RN (2020)   Next INR check:   2020   Target end date:       Indications    Paroxysmal atrial fibrillation ([facility] (Primary) [I48.0]             Anticoagulation Episode Summary     INR check location:   Clinic Lab    Preferred lab:       Send INR reminders to:       Comments:         Anticoagulation Care Providers     Provider Role Specialty Phone number    Dakota Gallardo MD Responsible Internal Medicine [phone]        Informed patient that PT/INR okay; continue the same dose of blood thinner which is 10 mg Monday, Wednesday, Friday and Saturday; 7.5 mg all other days and f/up as scheduled.

## 2020-04-02 NOTE — WOUND CARE
04/02/20 1110 Wound Leg lower Left;Medial #1 Date First Assessed/Time First Assessed: 03/12/20 0836   Present on Hospital Admission: Yes  Wound Approximate Age at First Assessment (Weeks): 8 weeks  Location: Leg lower  Wound Location Orientation: Left;Medial  Wound Description: #1 Dressing Status Removed Dressing Type Compression Wrap/Venous Stasis; Xeroform Wound Length (cm) 0.1 cm Wound Width (cm) 0.1 cm Wound Depth (cm) 0.1 cm Wound Surface Area (cm^2) 0.01 cm^2 Wound Volume (cm^3) 0 cm^3 Change in Wound Size % 99.86 Condition of Base  
(scabbed) Condition of Edges Closed Drainage Amount None Wound Odor None Cleansing and Cleansing Agents  Soap and water [REMOVED] Wound Toe (Comment  which one) Plantar;Right 2nd toe #2 Final Assessment Date/Final Assessment Time: 04/02/20 1113  Date First Assessed/Time First Assessed: 03/12/20 0842   Present on Hospital Admission: Yes  Primary Wound Type: Diabetic Ulcer  Location: Toe (Comment  which one)  Wound Location Orientation. .. Dressing Status Removed Dressing Type  
(open to air) Wound Length (cm) 0 cm Wound Width (cm) 0 cm Wound Depth (cm) 0 cm Wound Surface Area (cm^2) 0 cm^2 Wound Volume (cm^3) 0 cm^3 Change in Wound Size % 100 Drainage Amount None Wound Odor None Cleansing and Cleansing Agents  Soap and water Visit Vitals /61 (BP 1 Location: Right arm) Pulse 85 Temp 97.3 °F (36.3 °C)

## 2020-04-02 NOTE — PROGRESS NOTES
HISTORY OF PRESENT ILLNESS:  The patient is an 59-year-old man referred by Dr. Neda Skiff to the [address] [place] [address] regarding wound in left lower extremity.  The patient reports that he has had the wound for several months. Radha Álvarez received oral antibiotics, but eventually developed a cellulitis, which required hospitalization. Radha Álvarez was hospitalized from 03/03/2020 to 03/09/2020. Radha Álvarez received IV antibiotics including vancomycin for methicillin-sensitive Staphylococcus aureus.     The patient also had echocardiogram showing left ventricular ejection fraction of 86% with diastolic dysfunction. Radha Álvarez had been on furosemide, but was changed to bumetanide p.o.  The patient delayed picking up prescription for a few days after discharge, but has now started the bumetanide.  It does produce a diuresis.     The patient reports that he had been drinking fairly large amounts of water and other fluid over the course of the day.   He is trying to reduce intake.     The patient has history of diabetes mellitus. Radha Álvarez reports that typically his diabetes had been well controlled, his blood sugar is around 110.  He has recently had blood sugars in the high 100s felt related to taking prednisone. Radha Álvarez is now off prednisone.     The patient is ambulatory. Radha Álvarez does not have shortness of breath at rest or with exertion.  He is able to lie flat at night in bed for sleeping. Radha Álvarez does not sleep in a chair.  The patient does not have history of anginal chest pain, coronary intervention, or MI.  He is reported to have history of paroxysmal atrial fibrillation and does take Coumadin.     The patient saw Dr Meena Tran for right toe callus and deformity and bilateral foot care.     Current dressings started 3/12/2020:       Right  Leg:  triple layer compression wrap from base of toes to upper calf with 50% stretch.     For the left lower leg and associated wound, Xeroform over the wound and a triple layer compression wrap from base of toes to proximal calf with 50% stretch.           PAST MEDICAL HISTORY:  Includes prostatic hypertrophy, diabetes mellitus, diastolic congestive heart failure, hypertension, hyperlipidemia, polymyalgia rheumatica, osteoarthritis, and chronic kidney disease, stage III.     PHYSICAL EXAMINATION:  GENERAL:  The patient is an alert man, in no acute distress.     EXTREMITIES:  Examination of the lower extremities revealed trace edema present to the knee level bilaterally.       TOn the right, there is a 2+ right dorsalis pedis and PT pulse.  No ulcers are present on the right lower leg, but there is hyperpigmentation of the lower half of the right lower leg.  The patient has hammertoe deformities of the right foot.  In particular, the right second toe has hammertoe deformity with a callus at the tip of the toe .     On the left side, there is wrinkling of the skin in the area that had been covered by the elastic compression dressing. There is a 2+  palpable left DP and PT pulse. There is an open ulcer, which is 0.1 x 0.1 x 0.1 cm with dry scab on its surface on the distal anteromedial aspect of the left lower leg.       I have recommended the patient to reduce fluid intake and drink a moderate amount of fluid with meals and very little fluid between meals as he does have pitting edema and he is taking a diuretic.  Effect of swelling on wound formation and wound healing was reviewed.     IMPRESSION AND RECOMMENDATION:  For the right leg, I recommended placement of a triple layer compression wrap from base of toes to upper calf with 50% stretch.     For the left lower leg and associated wound, I recommended placement of Xeroform over the wound and a triple layer compression wrap from base of toes to proximal calf with 50% stretch.     Venous duplex pending.     Patient to get 15-20 mm Hg stockings before next visit, bring to visit.       The patient will follow up in the [address] [place] [address] in one week.     FINAL DIAGNOSES:  Nonpressure ulcer, left lower leg.  Bilateral leg edema.           A70.738, R60.0        Keaton Yan MD

## 2020-04-02 NOTE — DISCHARGE INSTRUCTIONS
Discharge Instructions/Wound Care Orders  [facility]  [address]  [place], [address] [facility]  Telephone: [phone]    NAME:  Renu Young Sr. YOB: 1933  MEDICAL RECORD NUMBER:  [id number]  DATE:  4/2/2020      WOUND CARE ORDERS:Left lower leg wound and right 2nd toe wound - cleanse with saline, apply Xeroform, cover with ABD, apply 3 layer compression wrap to bilateral lower legs, applying with slightly reduced stretch/compression. Follow-up with Dr. Leonela Mueller in 1 week. Patient to purchase knee high compression stockings, 15-20 mmHg, and bring compression stockings to next appointment. TREATMENT ORDERS:    Elevate leg(s) above the level of the heart when sitting. Avoid prolonged standing in one place. Do no get dressing/wrap wet. Follow Diet as prescribed:   [] Diet as tolerated: [x]  Diabetic Diet [x] No Added Salt  [] Increase Protein: [] Other:                 Return Appointment:  [x] Return Appointment: Dr. Leonela Mueller in 1 Week. Purchase knee high compression stockings, 15-20 mmHg, and bring to your appointment. Electronically signed Sharifa Tirado RN on 4/2/2020 at 11:30 AM     [address]: Should you experience any significant changes in your wound(s) or have questions about your wound care, please contact the [address] [place] [address] at [address] [place] [address] 8:00 am - 4:30. If you need help with your wound outside these hours and cannot wait until we are again available, contact your PCP or go to the hospital emergency room. PLEASE NOTE: IF YOU ARE UNABLE TO OBTAIN WOUND SUPPLIES, CONTINUE TO USE THE SUPPLIES YOU HAVE AVAILABLE UNTIL YOU ARE ABLE TO REACH US. IT IS MOST IMPORTANT TO KEEP THE WOUND COVERED AT ALL TIMES.      Physician Signature:_______________________    Date: ___________ Time:  ____________

## 2020-04-07 ENCOUNTER — OFFICE VISIT (OUTPATIENT)
Dept: INTERNAL MEDICINE CLINIC | Age: 85
End: 2020-04-07

## 2020-04-07 VITALS
HEIGHT: 75 IN | BODY MASS INDEX: 24.59 KG/M2 | OXYGEN SATURATION: 98 % | RESPIRATION RATE: 18 BRPM | TEMPERATURE: 98.1 F | WEIGHT: 197.8 LBS | DIASTOLIC BLOOD PRESSURE: 56 MMHG | SYSTOLIC BLOOD PRESSURE: 120 MMHG | HEART RATE: 77 BPM

## 2020-04-07 DIAGNOSIS — L97.221 VENOUS STASIS ULCER OF LEFT CALF LIMITED TO BREAKDOWN OF SKIN WITHOUT VARICOSE VEINS (HCC): ICD-10-CM

## 2020-04-07 DIAGNOSIS — N18.30 STAGE 3 CHRONIC KIDNEY DISEASE (HCC): ICD-10-CM

## 2020-04-07 DIAGNOSIS — I48.0 PAROXYSMAL ATRIAL FIBRILLATION (HCC): ICD-10-CM

## 2020-04-07 DIAGNOSIS — M35.3 POLYMYALGIA RHEUMATICA (HCC): ICD-10-CM

## 2020-04-07 DIAGNOSIS — N18.30 CONTROLLED TYPE 2 DIABETES MELLITUS WITH STAGE 3 CHRONIC KIDNEY DISEASE, WITHOUT LONG-TERM CURRENT USE OF INSULIN (HCC): ICD-10-CM

## 2020-04-07 DIAGNOSIS — E11.22 CONTROLLED TYPE 2 DIABETES MELLITUS WITH STAGE 3 CHRONIC KIDNEY DISEASE, WITHOUT LONG-TERM CURRENT USE OF INSULIN (HCC): ICD-10-CM

## 2020-04-07 DIAGNOSIS — I50.32 DIASTOLIC CHF, CHRONIC (HCC): ICD-10-CM

## 2020-04-07 DIAGNOSIS — I87.2 VENOUS STASIS ULCER OF LEFT CALF LIMITED TO BREAKDOWN OF SKIN WITHOUT VARICOSE VEINS (HCC): ICD-10-CM

## 2020-04-07 DIAGNOSIS — I12.9 HYPERTENSION WITH RENAL DISEASE: Primary | ICD-10-CM

## 2020-04-07 LAB
ANION GAP SERPL CALC-SCNC: 12 MMOL/L
BUN SERPL-MCNC: 33 MG/DL (ref 9–20)
CALCIUM SERPL-MCNC: 9.6 MG/DL (ref 8.4–10.2)
CHLORIDE SERPL-SCNC: 103 MMOL/L (ref 98–107)
CO2 SERPL-SCNC: 28 MMOL/L (ref 22–32)
CREAT SERPL-MCNC: 1.5 MG/DL (ref 0.8–1.5)
GLUCOSE SERPL-MCNC: 148 MG/DL (ref 75–110)
POTASSIUM SERPL-SCNC: 5.1 MMOL/L (ref 3.6–5)
SODIUM SERPL-SCNC: 143 MMOL/L (ref 137–145)

## 2020-04-07 NOTE — PROGRESS NOTES
Chief Complaint   Patient presents with    Skin Infection     2w f/u       SUBJECTIVE:    Paris Bray. is a 80 y.o. male who returns in follow-up for his cellulitis, hypertension, CKD stage III, polymyalgia rheumatica, chronic diastolic CHF, pressure ulcer left lower leg and other medical problems. He still followed in the wound care clinic regarding the pressure ulcer and actually has an appointment to see them again later this week and he is hoping that he will be able to keep the wrapping offloaded at that point. He denies any chest pain, shortness of breath, palpitations, PND, orthopnea or other cardiorespiratory complaints. There are no GI or  complaints. He notes no headaches, dizziness or neurologic complaints. There are no current arthritic complaints and no other complaints on complete review of systems. Current Outpatient Medications   Medication Sig Dispense Refill    cyanocobalamin (VITAMIN B12) 500 mcg tablet Take 500 mcg by mouth daily.  bumetanide (BUMEX) 2 mg tablet Take 1 Tab by mouth daily. 30 Tab prn    warfarin (COUMADIN) 5 mg tablet Taking 2 tablets on Monday, Wednesday and Friday; one and a half all other days. (Patient taking differently: Taking 2 tablets on Monday, Wednesday and Friday and Saturday. Taking One and a half all other days. ) 145 Tab 3    glucose blood VI test strips (ACCU-CHEK SMARTVIEW TEST STRIP) strip USE ONCE DAILY E11.9 100 Strip PRN    digoxin (LANOXIN) 0.125 mg tablet TAKE 1 TABLET BY MOUTH EVERY DAY 90 Tab 3    diclofenac EC (VOLTAREN) 75 mg EC tablet TAKE 1 TABLET BY MOUTH TWICE A  Tab 3    terazosin (HYTRIN) 2 mg capsule TAKE ONE CAPSULE BY MOUTH DAILY 5mg 90 Cap 3    simvastatin (ZOCOR) 20 mg tablet TAKE 1 TABLET EVERY DAY 90 Tab 3    lisinopril (PRINIVIL, ZESTRIL) 5 mg tablet TAKE 1 TABLET EVERY DAY 90 Tab 3    metFORMIN (GLUCOPHAGE) 500 mg tablet Take 1 Tab by mouth daily (with breakfast).  90 Tab 3    acetaminophen (TYLENOL ARTHRITIS PAIN) 650 mg TbER Take 650 mg by mouth every eight (8) hours.  multivitamins-minerals-lutein (MULTIVITAMIN 50 PLUS) tab tablet Take 1 Tab by mouth daily.  glucosamine 1,000 mg tab Take 2,000 mg by mouth daily.  cholecalciferol (VITAMIN D3) 1,000 unit cap Take 1,000 Units by mouth daily.  DOCOSAHEXANOIC ACID/EPA (FISH OIL PO) Take 1,200 mg by mouth two (2) times a day. 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    Back pain 9/20/2017    BPH (benign prostatic hyperplasia) 9/20/2017    CHF (congestive heart failure) ([facility] [address] 9/20/2017    CKD (chronic kidney disease) 9/20/2017    Diabetic acetonemia ([facility]     DM (diabetes mellitus) ([facility] [address] 9/20/2017    Story: Diet Controlled    ED (erectile dysfunction) 9/20/2017    Edema 9/20/2017    Elevated CPK 9/20/2017    Comments: History of    Elevated LFTs 9/20/2017    Comments: History of    Elevated PSA 9/20/2017    Hypercholesteremia     Hyperlipidemia 9/20/2017    Hypertension     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    Nodule of right lung 9/20/2017    Story: Right    Polymyalgia ([facility] [address] 9/20/2017    Prostate enlargement      Past Surgical History:   Procedure Laterality Date    ABDOMEN SURGERY PROC UNLISTED      hernia repair    HX HEENT  06/12/2018    Dr. Ginny Kennedy, surgery to remove cancerous tissue from Left cheek    HX MALIGNANT SKIN LESION EXCISION  09/2018    2 lesions on head     No Known Allergies    REVIEW OF SYSTEMS:  General: negative for - chills or fever, or weight loss or gain  ENT: negative for - headaches, nasal congestion or tinnitus  Eyes: no blurred or visual changes  Neck: No stiffness or swollen nodes  Respiratory: negative for - cough, hemoptysis, shortness of breath or wheezing  Cardiovascular : negative for - chest pain, edema, palpitations or shortness of breath  Gastrointestinal: negative for - abdominal pain, blood in stools, heartburn or nausea/vomiting  Genito-Urinary: no dysuria, trouble voiding, or hematuria  Musculoskeletal: negative for - gait disturbance, joint pain, joint stiffness or joint swelling  Neurological: no TIA or stroke symptoms  Hematologic: no bruises, no bleeding  Lymphatic: no swollen glands  Integument: no lumps, mole changes, nail changes or rash  Endocrine:no malaise/lethargy poly uria or polydipsia or unexpected weight changes        Social History     Socioeconomic History    Marital status:      Spouse name: Not on file    Number of children: Not on file    Years of education: Not on file    Highest education level: Not on file   Tobacco Use    Smoking status: Never Smoker    Smokeless tobacco: Never Used   Substance and Sexual Activity    Alcohol use: No    Drug use: No    Sexual activity: Never     History reviewed. No pertinent family history. OBJECTIVE:     Visit Vitals  /56 (BP 1 Location: Left arm, BP Patient Position: Sitting)   Pulse 77   Temp 98.1 °F (36.7 °C) (Oral)   Resp 18   Ht 6' 3\" (1.905 m)   Wt 197 lb 12.8 oz (89.7 kg)   SpO2 98%   BMI 24.72 kg/m²     CONSTITUTIONAL:   well nourished, appears age appropriate  EYES: sclera anicteric, PERRL, EOMI  ENMT:nares clear, moist mucous membranes, pharynx clear  NECK: supple. Thyroid normal, No JVD or bruits  RESPIRATORY: Chest: clear to ascultation and percussion, normal inspiratory effort  CARDIOVASCULAR: Heart: regular rate and rhythm no murmurs, rubs or gallops, PMI not displaced, No thrills  GASTROINTESTINAL: Abdomen: non distended, soft, non tender, bowel sounds normal  HEMATOLOGIC: no purpura, petechiae or bruising  LYMPHATIC: No lymph node enlargemant  MUSCULOSKELETAL: Extremities: no edema or active synovitis, pulse 1+   INTEGUMENT: No unusual rashes or suspicious skin lesions noted.  Nails appear normal.  PERIPHERAL VASCULAR: normal pulses femoral, PT and DP  NEUROLOGIC: non-focal exam, A & O X 3  PSYCHIATRIC:, appropriate affect     ASSESSMENT:   1. Hypertension with renal disease    2. Paroxysmal atrial fibrillation (HCC)    3. Stage 3 chronic kidney disease ([facility]    4. 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, without long-term current use of insulin ([facility] [address]    5. Polymyalgia rheumatica ([facility] [address] [facility]    6. Venous stasis ulcer of left calf limited to breakdown of skin without varicose veins (HCC)    7. Diastolic CHF, chronic (HCC)      Impression  1. Hypertension that is controlled so continue current therapy reviewed with him. 2.  Paroxysmal atrial fibrillation INR pending  3. CKD stage III repeat status pending  4. Diabetes blood sugars been running okay by his checks at home and blood sugar is pending today on his BMP  5. PMR now off of prednisone  6. Venous stasis ulcer follow-up with wound care clinic in near healing according to patient  7. Diastolic CHF compensated  I will call with lab results and make further recommendations or adjustments if necessary. Follow stable continue same and follow-up in 1 month. PLAN:  .  Orders Placed This Encounter    [facility], [facility]IC [facility]    PROTHROMBIN TIME + INR         ATTENTION:   This medical record was transcribed using an electronic medical records system. Although proofread, it may and can contain electronic and spelling errors. Other human spelling and other errors may be present. Corrections may be executed at a later time. Please feel free to contact us for any clarifications as needed. Follow-up and Dispositions    · Return in about 4 weeks (around 5/5/2020). No results found for any visits on 04/07/20. Liliya Albright MD    The patient verbalized understanding of the problems and plans as explained.

## 2020-04-07 NOTE — PROGRESS NOTES
Chief Complaint   Patient presents with    Skin Infection     2w f/u     1. Have you been to the ER, urgent care clinic since your last visit? Hospitalized since your last visit? No    2. Have you seen or consulted any other health care providers outside of the [address] [place] [address] since your last visit? Include any pap smears or colon screening. Continued care with  Dr. Leigh Perry, wound care.

## 2020-04-07 NOTE — PATIENT INSTRUCTIONS

## 2020-04-08 ENCOUNTER — TELEPHONE ANTICOAG (OUTPATIENT)
Dept: INTERNAL MEDICINE CLINIC | Age: 85
End: 2020-04-08

## 2020-04-08 DIAGNOSIS — I48.0 PAROXYSMAL ATRIAL FIBRILLATION (HCC): Primary | ICD-10-CM

## 2020-04-08 LAB
INR PPP: 2.7 (ref 0.8–1.2)
PROTHROMBIN TIME: 26.8 SEC (ref 9.1–12)

## 2020-04-08 NOTE — PROGRESS NOTES
Pts daughter in law Shantal(JOANNA) called and lab results were given. Informed Shantal per Dr. Fonseca that lab work is stable but since he is still complaining of dizziness and weakness to hold lisinopril. Patient verbalized understanding of information discussed  with no further questions at this time.

## 2020-04-08 NOTE — PROGRESS NOTES
Anticoagulation Summary  As of 2020    INR goal:   2.0-3.0   TTR:   4.0 % (2 y)   INR used for dosin.7 (2020)   Warfarin maintenance plan:   7.5 mg (5 mg x 1.5) every Sun, Tue, Thu; 10 mg (5 mg x 2) all other days   Weekly warfarin total:   62.5 mg   Plan last modified:   Pamela Anglin RN (2020)   Next INR check:   2020   Target end date:       Indications    Paroxysmal atrial fibrillation ([facility] [address] (Primary) [I48.0]             Anticoagulation Episode Summary     INR check location:   Clinic Lab    Preferred lab:       Send INR reminders to:       Comments:         Anticoagulation Care Providers     Provider Role Specialty Phone number    Shanel Peck MD Responsible Internal Medicine [phone]        Per Dr. Inocencia Walker INR is good and to continue current dosage.   Pt is taking coumadin 10mg on Monday, Wednesday, Friday and Saturday and 7.5mg all other days

## 2020-04-08 NOTE — PROGRESS NOTES
Also confirmed with Helga Worthington that Mr. Fransico Blakely is taking coumadin 10mg on Monday,wednesday and Friday and 7.5mg all other days. Patient verbalized understanding of information discussed  with no further questions at this time.

## 2020-04-09 ENCOUNTER — HOSPITAL ENCOUNTER (OUTPATIENT)
Dept: WOUND CARE | Age: 85
Discharge: HOME | End: 2020-04-09
Payer: MEDICARE

## 2020-04-09 VITALS
RESPIRATION RATE: 16 BRPM | DIASTOLIC BLOOD PRESSURE: 83 MMHG | TEMPERATURE: 97.4 F | HEART RATE: 80 BPM | SYSTOLIC BLOOD PRESSURE: 142 MMHG

## 2020-04-09 PROCEDURE — 99212 OFFICE O/P EST SF 10 MIN: CPT

## 2020-04-09 NOTE — PROGRESS NOTES
Daughter calling in about Luzma Smallwood [id number]. She said Dr had put her on 10MG of prednisone until her appt today but she had to cancel and reschedule. Daughter is wondering if mom still should be taking the 10MG until her appt next week Tuesday   HISTORY OF PRESENT ILLNESS:  The patient is an 70-year-old man referred by Dr. Mae Doss to the [address] [place] [address] regarding wound in left lower extremity.  The patient reports that he has had the wound for several months. Raffaele Cheek received oral antibiotics, but eventually developed a cellulitis, which required hospitalization. Raffaele Cheek was hospitalized from 03/03/2020 to 03/09/2020. Raffaele Cheek received IV antibiotics including vancomycin for methicillin-sensitive Staphylococcus aureus.     The patient also had echocardiogram showing left ventricular ejection fraction of 71% with diastolic dysfunction. Raffaele Cheek had been on furosemide, but was changed to bumetanide p.o.  The patient delayed picking up prescription for a few days after discharge, but has now started the bumetanide.  It does produce a diuresis.     The patient reports that he had been drinking fairly large amounts of water and other fluid over the course of the day. Raffaele Cheek is trying to reduce intake.     The patient has history of diabetes mellitus.  He reports that typically his diabetes had been well controlled, his blood sugar is around 110.  He has recently had blood sugars in the high 100s felt related to taking prednisone. Raffaele Cheek is now off prednisone.     The patient is ambulatory.  He does not have shortness of breath at rest or with exertion.  He is able to lie flat at night in bed for sleeping. Raffaele Cheek does not sleep in a chair.  The patient does not have history of anginal chest pain, coronary intervention, or MI.  He is reported to have history of paroxysmal atrial fibrillation and does take Coumadin.     The patient saw Dr My Rubio for right toe callus and deformity and bilateral foot care.     Current dressings started 3/12/2020:       Right  Leg:  triple layer compression wrap from base of toes to upper calf with 50% stretch.     For the left lower leg and associated wound, Xeroform over the wound and a triple layer compression wrap from base of toes to proximal calf with 50% stretch.           PAST MEDICAL HISTORY:  Includes prostatic hypertrophy, diabetes mellitus, diastolic congestive heart failure, hypertension, hyperlipidemia, polymyalgia rheumatica, osteoarthritis, and chronic kidney disease, stage III.     PHYSICAL EXAMINATION:  GENERAL:  The patient is an alert man, in no acute distress.     EXTREMITIES:  Examination of the lower extremities revealed trace pitting edema extending above the knee level bilaterally.       On the right, there is a 2+ right dorsalis pedis and PT pulse.  No ulcers are present on the right lower leg, but there is hyperpigmentation of the lower half of the right lower leg.       On the left side, there is a 2+  palpable left DP and PT pulse. There is a small dry scab on the distal anteromedial aspect of the left lower leg.       I have recommended the patient continue to limit fluid intake and drink a moderate amount of fluid with meals and very little fluid between meals as he does have pitting edema and he is taking a diuretic.  Effect of swelling on wound formation and wound healing was reviewed.     IMPRESSION AND RECOMMENDATION:      Ulcer healed. He has 15-20 mm Hg stockings.  The patient was instructed to wear the stockings at all times except when in bed or bathing.     He will follow up if any ulcer recurrence.        FINAL DIAGNOSES:  Nonpressure ulcer, left lower leg.  Bilateral leg edema.           M46.409, R60.0        Elisa Carroll MD

## 2020-04-09 NOTE — DISCHARGE INSTRUCTIONS
Discharge Instructions/Wound Care Orders  [facility]  [address], [address] [facility]  Telephone: [phone]    NAME:  Ariel Burroughs Sr. YOB: 1933  MEDICAL RECORD NUMBER:  [id number]  DATE:  4/9/2020       WOUND CARE ORDERS: Your wound has healed. No wound dressing required. Apply knee high Compression stockings to both legs (compression strength-15-20 mmHg). TREATMENT ORDERS:    Elevate leg(s) above the level of the heart when sitting. Avoid prolonged standing in one place. Wear Compression stockings (15-20 mmHg strength) EVERY day. Apply stockings first thing, every morning. Remove at bedtime. Follow Diet as prescribed:   [] Diet as tolerated: [] Calorie Diabetic Diet: [x] No Added Salt:  [] Increase Protein: [x] Other:Avoid drinking excess fluids. Return Appointment:  [x] Return Appointment: No Follow up appointment needed at this time. Your wound has healed. Congratulations! [] Ordered tests:      Electronically signed Ambrose Kdid RN on 4/9/2020 at 11:39 AM     [address] [place] [address] Information: Should you experience any significant changes in your wound(s) or have questions about your wound care, please contact the [address] [place] [address] at [address] [place] [address] Street 8:00 am - 4:30. If you need help with your wound outside these hours and cannot wait until we are again available, contact your PCP or go to the hospital emergency room. PLEASE NOTE: IF YOU ARE UNABLE TO OBTAIN WOUND SUPPLIES, CONTINUE TO USE THE SUPPLIES YOU HAVE AVAILABLE UNTIL YOU ARE ABLE TO REACH US. IT IS MOST IMPORTANT TO KEEP THE WOUND COVERED AT ALL TIMES.      Physician Signature:_______________________    Date: ___________ Time:  ____________

## 2020-04-09 NOTE — WOUND CARE
04/09/20 1132 Wound Leg lower Left;Medial #1 Date First Assessed/Time First Assessed: 03/12/20 0836   Present on Hospital Admission: Yes  Wound Approximate Age at First Assessment (Weeks): 8 weeks  Location: Leg lower  Wound Location Orientation: Left;Medial  Wound Description: #1 Dressing Status Removed Dressing Type Compression Wrap/Venous Stasis Non-staged Wound Description  
(wound appears to be healed) Wound Length (cm) 0 cm Wound Width (cm) 0 cm Wound Depth (cm) 0 cm Wound Surface Area (cm^2) 0 cm^2 Wound Volume (cm^3) 0 cm^3 Change in Wound Size % 100 Condition of Edges Closed Drainage Amount None Wound Odor None Emelina-wound Assessment Intact Cleansing and Cleansing Agents  Soap and water Visit Vitals /83 (BP 1 Location: Right arm) Pulse 80 Temp 97.4 °F (36.3 °C)

## 2020-04-16 RX ORDER — FUROSEMIDE 80 MG/1
TABLET ORAL
Qty: 90 TAB | Refills: 3 | Status: SHIPPED | OUTPATIENT
Start: 2020-04-16 | End: 2020-11-11 | Stop reason: ALTCHOICE

## 2020-04-16 NOTE — TELEPHONE ENCOUNTER
RX refill request from the patient/pharmacy. Patient last seen 04- with labs, and next appt. scheduled for 05-  Requested Prescriptions     Pending Prescriptions Disp Refills    furosemide (LASIX) 80 mg tablet [Pharmacy Med Name: FUROSEMIDE 80 MG TABLET] 90 Tab 3     Sig: TAKE 1 TABLET BY MOUTH EVERY DAY   .

## 2020-04-17 DIAGNOSIS — I48.91 ATRIAL FIBRILLATION, UNSPECIFIED TYPE (HCC): ICD-10-CM

## 2020-04-17 RX ORDER — WARFARIN SODIUM 5 MG/1
TABLET ORAL
Qty: 145 TAB | Status: SHIPPED | OUTPATIENT
Start: 2020-04-17 | End: 2020-05-14

## 2020-04-21 NOTE — TELEPHONE ENCOUNTER
.  PCP: Thaddeus Rodriguez MD    Last appt: 4/7/2020  Future Appointments   Date Time Provider Helen Butcher   5/8/2020 10:30 AM Thaddeus Rodriguez MD [address] Donovan       Requested Prescriptions     Pending Prescriptions Disp Refills    glucose blood VI test strips (Accu-Chek SmartView Test Strip) strip 100 Strip PRN     Sig: USE ONCE DAILY E11.9       Prior labs and Blood pressures:  BP Readings from Last 3 Encounters:   04/09/20 142/83   04/07/20 120/56   04/02/20 113/61     Lab Results   Component Value Date/Time    Sodium 143 04/07/2020 02:53 PM    Potassium 5.1 (H) 04/07/2020 02:53 PM    Chloride 103 04/07/2020 02:53 PM    CO2 28.0 04/07/2020 02:53 PM    Anion gap 12 04/07/2020 02:53 PM    Glucose 148 (H) 04/07/2020 02:53 PM    BUN 33.0 (H) 04/07/2020 02:53 PM    Creatinine 1.5 04/07/2020 02:53 PM    BUN/Creatinine ratio 22 (H) 03/09/2020 03:26 AM    GFR est AA 54 04/07/2020 02:53 PM    GFR est non-AA 44 04/07/2020 02:53 PM    Calcium 9.6 04/07/2020 02:53 PM     Lab Results   Component Value Date/Time    Hemoglobin A1c 6.5 (H) 02/17/2020 02:35 PM    Hemoglobin A1c (POC) 6.3 (A) 05/16/2018 10:56 AM     Lab Results   Component Value Date/Time    Cholesterol, total 183 02/17/2020 02:06 PM    Cholesterol (POC) 129 05/16/2018 10:56 AM    HDL Cholesterol 42 02/17/2020 02:06 PM    HDL Cholesterol (POC) 33 (A) 05/16/2018 10:56 AM    LDL Cholesterol (POC) 57 05/16/2018 10:56 AM    LDL, calculated 74 02/17/2020 02:06 PM    VLDL, calculated 58 (H) 11/21/2018 11:13 AM    VLDL 67 02/17/2020 02:06 PM    Triglyceride 337 (H) 02/17/2020 02:06 PM    Triglycerides (POC) 195 05/16/2018 10:56 AM    CHOL/HDL Ratio 4 02/17/2020 02:06 PM     No results found for: LUPE Ku    Lab Results   Component Value Date/Time    TSH 1.330 11/21/2018 11:13 AM    TSH, 3rd generation 1.01 11/25/2019 12:10 PM

## 2020-05-04 RX ORDER — INSULIN PUMP SYRINGE, 3 ML
EACH MISCELLANEOUS
Qty: 1 KIT | Refills: 0 | Status: SHIPPED | OUTPATIENT
Start: 2020-05-04

## 2020-05-04 NOTE — TELEPHONE ENCOUNTER
Pts daughter in law called and stated Mr. Peters needed a new glucometer and wanted to test his blood sugar twice daily instead of once daily. Per Dr. Inocencia Walker patient can test blood sugar twice daily. New glucometer and test strips were sent to [facility] and [place].     PCP: Shanel Peck MD    Last appt: 4/7/2020  Future Appointments   Date Time Provider Helen Leyvaisti   5/8/2020 10:30 AM [facility]       Requested Prescriptions     Pending Prescriptions Disp Refills    Blood-Glucose Meter monitoring kit 1 Kit 0     Sig: Use to test blood sugar twice daily  DX:E11.22    glucose blood VI test strips (blood glucose test) strip 100 Strip 1     Sig: Use to test blood sugar twice daily  DX:E11.22

## 2020-05-07 NOTE — PROGRESS NOTES
Chief Complaint   Patient presents with    Hypertension     1 month follow up    CHF    Diabetes       SUBJECTIVE:    June Cooney is a 80 y.o. male who returns in follow-up for his medical problems include hypertension, diabetes, hyperlipidemia, paroxysmal atrial fibrillation, ASCVD, DJD, CKD stage III, polymyalgia rheumatica, recent venous stasis ulcer and other multiple medical problems. He is taking his medications and trying to follow his diet and is very physically active and working in his garden. He does note some right shoulder pain with no history of trauma but is to the point where he would like to get a cortisone shot in today. He denies any GI or  complaints. He notes no headaches, dizziness or neurologic complaints. He has no current active arthritic complaints other than the shoulder. He denies any chest pain, shortness of breath, palpitations, PND, orthopnea or cardiorespiratory complaints. He has no other complaints on complete review of systems. Current Outpatient Medications   Medication Sig Dispense Refill    methylPREDNISolone acetate (DEPO-MEDROL) 40 mg/mL injection 1 mL by IntraMUSCular route once for 1 dose. 1 Vial 0    Blood-Glucose Meter monitoring kit Use to test blood sugar twice daily  DX:E11.22 1 Kit 0    glucose blood VI test strips (blood glucose test) strip Use to test blood sugar twice daily  DX:E11.22 100 Strip 1    glucose blood VI test strips (Accu-Chek SmartView Test Strip) strip USE ONCE DAILY E11.9 100 Strip PRN    warfarin (COUMADIN) 5 mg tablet Taking 2 tablets on Monday, Wednesday and Friday and Saturday. Taking One and a half all other days. 145 Tab prn    furosemide (LASIX) 80 mg tablet TAKE 1 TABLET BY MOUTH EVERY DAY 90 Tab 3    cyanocobalamin (VITAMIN B12) 500 mcg tablet Take 500 mcg by mouth daily.       digoxin (LANOXIN) 0.125 mg tablet TAKE 1 TABLET BY MOUTH EVERY DAY 90 Tab 3    diclofenac EC (VOLTAREN) 75 mg EC tablet TAKE 1 TABLET BY MOUTH TWICE A  Tab 3    terazosin (HYTRIN) 2 mg capsule TAKE ONE CAPSULE BY MOUTH DAILY 5mg 90 Cap 3    simvastatin (ZOCOR) 20 mg tablet TAKE 1 TABLET EVERY DAY 90 Tab 3    metFORMIN (GLUCOPHAGE) 500 mg tablet Take 1 Tab by mouth daily (with breakfast). 90 Tab 3    acetaminophen (TYLENOL ARTHRITIS PAIN) 650 mg TbER Take 650 mg by mouth every eight (8) hours.  multivitamins-minerals-lutein (MULTIVITAMIN 50 PLUS) tab tablet Take 1 Tab by mouth daily.  glucosamine 1,000 mg tab Take 2,000 mg by mouth daily.  cholecalciferol (VITAMIN D3) 1,000 unit cap Take 1,000 Units by mouth daily.  DOCOSAHEXANOIC ACID/EPA (FISH OIL PO) Take 1,200 mg by mouth two (2) times a day. 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    Back pain 9/20/2017    BPH (benign prostatic hyperplasia) 9/20/2017    CHF (congestive heart failure) ([address] 9/20/2017    CKD (chronic kidney disease) 9/20/2017    Diabetic acetonemia ([address]     DM (diabetes mellitus) ([address] 9/20/2017    Story: Diet Controlled    ED (erectile dysfunction) 9/20/2017    Edema 9/20/2017    Elevated CPK 9/20/2017    Comments: History of    Elevated LFTs 9/20/2017    Comments: History of    Elevated PSA 9/20/2017    Hypercholesteremia     Hyperlipidemia 9/20/2017    Hypertension     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    Nodule of right lung 9/20/2017    Story: Right    Polymyalgia ([address] 9/20/2017    Prostate enlargement      Past Surgical History:   Procedure Laterality Date    ABDOMEN SURGERY PROC UNLISTED      hernia repair    HX HEENT  06/12/2018    Dr. Enoc Tineo, surgery to remove cancerous tissue from Left cheek    HX MALIGNANT SKIN LESION EXCISION  09/2018    2 lesions on head     No Known Allergies    REVIEW OF SYSTEMS:  General: negative for - chills or fever, or weight loss or gain  ENT: negative for - headaches, nasal congestion or tinnitus  Eyes: no blurred or visual changes  Neck: No stiffness or swollen nodes  Respiratory: negative for - cough, hemoptysis, shortness of breath or wheezing  Cardiovascular : negative for - chest pain, edema, palpitations or shortness of breath  Gastrointestinal: negative for - abdominal pain, blood in stools, heartburn or nausea/vomiting  Genito-Urinary: no dysuria, trouble voiding, or hematuria  Musculoskeletal: negative for - gait disturbance, joint pain, joint stiffness or joint swelling except right shoulder pain  Neurological: no TIA or stroke symptoms  Hematologic: no bruises, no bleeding  Lymphatic: no swollen glands  Integument: no lumps, mole changes, nail changes or rash  Endocrine:no malaise/lethargy poly uria or polydipsia or unexpected weight changes        Social History     Socioeconomic History    Marital status:      Spouse name: Not on file    Number of children: Not on file    Years of education: Not on file    Highest education level: Not on file   Tobacco Use    Smoking status: Never Smoker    Smokeless tobacco: Never Used   Substance and Sexual Activity    Alcohol use: No    Drug use: No    Sexual activity: Never     History reviewed. No pertinent family history. OBJECTIVE:     Visit Vitals  /82   Pulse 76   Temp 97.8 °F (36.6 °C) (Oral)   Resp 19   Ht 6' 3\" (1.905 m)   Wt 200 lb 5.8 oz (90.9 kg)   SpO2 98%   BMI 25.04 kg/m²     CONSTITUTIONAL:   well nourished, appears age appropriate  EYES: sclera anicteric, PERRL, EOMI  ENMT:nares clear, moist mucous membranes, pharynx clear  NECK: supple.  Thyroid normal, No JVD or bruits  RESPIRATORY: Chest: clear to ascultation and percussion, normal inspiratory effort  CARDIOVASCULAR: Heart: regular rate and rhythm no murmurs, rubs or gallops, PMI not displaced, No thrills, no peripheral edema  GASTROINTESTINAL: Abdomen: non distended, soft, non tender, bowel sounds normal  HEMATOLOGIC: no purpura, petechiae or bruising  LYMPHATIC: No lymph node enlargemant  MUSCULOSKELETAL: Extremities: no active synovitis, pulse 1+ right shoulder tender to palpation with increased discomfort on range of motion with no joint effusion, erythema increased temperature. INTEGUMENT: No unusual rashes or suspicious skin lesions noted. Nails appear normal.  PERIPHERAL VASCULAR: normal pulses femoral, PT and DP  NEUROLOGIC: non-focal exam, A & O X 3  PSYCHIATRIC:, appropriate affect     ASSESSMENT:   1. Hypertension with renal disease    2. 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, without long-term current use of insulin ([address]    3. Mixed hyperlipidemia    4. Diastolic CHF, chronic (HCC)    5. Paroxysmal atrial fibrillation ([address]    6. Primary osteoarthritis involving multiple joints    7. Stage 3 chronic kidney disease ([facility]    8. Polymyalgia rheumatica ([facility]    9. Venous stasis ulcer of left calf limited to breakdown of skin without varicose veins (HCC)    10. Primary osteoarthritis of right shoulder      Pression  1. Hypertension that is controlled on recheck by me so continue current therapy reviewed with him. 2.  Diabetes repeat status pending and prior lab review not make adjustments if necessary. 3.  Hyperlipidemia prior lab reviewed and repeat status pending and I will adjust if needed. 4.  Diastolic CHF compensated  5. Paroxysmal atrial fibrillation INR pending currently in sinus rhythm  6. DJD stable except for the right shoulder  7. CKD stage III repeat status pending  8   Polymyalgia rheumatica currently asymptomatic and off prednisone  9. Venous stasis ulcer left leg now resolved he is wearing support stockings now  10. DJD of right shoulder. We discussed treatment options and per his request elected to go with injection.   After informed consent and Betadine scrub we entered the shoulder posteriorly injected with Depo-Medrol 40 mg a cc lidocaine which he tolerated quite well. I will call with lab results and make further recommendations or adjustments if necessary. Follow-up scheduled again for 3 months with fasting blood work for 1 month regarding his polymyalgia rheumatica, atrial fibrillation, and venous stasis disease as well as CHF. PLAN:  .  Orders Placed This Encounter    DRAIN/INJECT LARGE JOINT/BURSA    PROTHROMBIN TIME + INR    CBC WITH AUTOMATED DIFF    METABOLIC PANEL, COMPREHENSIVE ([facility])    LIPID PANEL ([facility])    CK ([facility])    methylPREDNISolone acetate (DEPO-MEDROL) 40 mg/mL injection         ATTENTION:   This medical record was transcribed using an electronic medical records system. Although proofread, it may and can contain electronic and spelling errors. Other human spelling and other errors may be present. Corrections may be executed at a later time. Please feel free to contact us for any clarifications as needed. Follow-up and Dispositions    · Return in about 4 weeks (around 6/5/2020). No results found for any visits on 05/08/20. Virginia Thomas MD    The patient verbalized understanding of the problems and plans as explained.

## 2020-05-08 ENCOUNTER — OFFICE VISIT (OUTPATIENT)
Dept: INTERNAL MEDICINE CLINIC | Age: 85
End: 2020-05-08

## 2020-05-08 VITALS
TEMPERATURE: 97.8 F | WEIGHT: 200.36 LBS | SYSTOLIC BLOOD PRESSURE: 138 MMHG | HEART RATE: 76 BPM | HEIGHT: 75 IN | BODY MASS INDEX: 24.91 KG/M2 | OXYGEN SATURATION: 98 % | RESPIRATION RATE: 19 BRPM | DIASTOLIC BLOOD PRESSURE: 82 MMHG

## 2020-05-08 DIAGNOSIS — L97.221 VENOUS STASIS ULCER OF LEFT CALF LIMITED TO BREAKDOWN OF SKIN WITHOUT VARICOSE VEINS (HCC): ICD-10-CM

## 2020-05-08 DIAGNOSIS — M19.011 PRIMARY OSTEOARTHRITIS OF RIGHT SHOULDER: ICD-10-CM

## 2020-05-08 DIAGNOSIS — E11.22 CONTROLLED TYPE 2 DIABETES MELLITUS WITH STAGE 3 CHRONIC KIDNEY DISEASE, WITHOUT LONG-TERM CURRENT USE OF INSULIN (HCC): ICD-10-CM

## 2020-05-08 DIAGNOSIS — M15.9 PRIMARY OSTEOARTHRITIS INVOLVING MULTIPLE JOINTS: ICD-10-CM

## 2020-05-08 DIAGNOSIS — I12.9 HYPERTENSION WITH RENAL DISEASE: Primary | ICD-10-CM

## 2020-05-08 DIAGNOSIS — N18.30 CONTROLLED TYPE 2 DIABETES MELLITUS WITH STAGE 3 CHRONIC KIDNEY DISEASE, WITHOUT LONG-TERM CURRENT USE OF INSULIN (HCC): ICD-10-CM

## 2020-05-08 DIAGNOSIS — N18.30 STAGE 3 CHRONIC KIDNEY DISEASE (HCC): ICD-10-CM

## 2020-05-08 DIAGNOSIS — I50.32 DIASTOLIC CHF, CHRONIC (HCC): ICD-10-CM

## 2020-05-08 DIAGNOSIS — E78.2 MIXED HYPERLIPIDEMIA: ICD-10-CM

## 2020-05-08 DIAGNOSIS — I48.0 PAROXYSMAL ATRIAL FIBRILLATION (HCC): ICD-10-CM

## 2020-05-08 DIAGNOSIS — I87.2 VENOUS STASIS ULCER OF LEFT CALF LIMITED TO BREAKDOWN OF SKIN WITHOUT VARICOSE VEINS (HCC): ICD-10-CM

## 2020-05-08 DIAGNOSIS — M35.3 POLYMYALGIA RHEUMATICA (HCC): ICD-10-CM

## 2020-05-08 LAB
A-G RATIO,AGRAT: 1.5 RATIO
ALBUMIN SERPL-MCNC: 4.1 G/DL (ref 3.9–5.4)
ALP SERPL-CCNC: 73 U/L (ref 38–126)
ALT SERPL-CCNC: 26 U/L (ref 0–50)
ANION GAP SERPL CALC-SCNC: 7 MMOL/L
AST SERPL W P-5'-P-CCNC: 30 U/L (ref 14–36)
BILIRUB SERPL-MCNC: 0.3 MG/DL (ref 0.2–1.3)
BUN SERPL-MCNC: 24 MG/DL (ref 9–20)
BUN/CREATININE RATIO,BUCR: 18 RATIO
CALCIUM SERPL-MCNC: 9.7 MG/DL (ref 8.4–10.2)
CHLORIDE SERPL-SCNC: 105 MMOL/L (ref 98–107)
CHOL/HDL RATIO,CHHD: 4 RATIO (ref 0–4)
CHOLEST SERPL-MCNC: 150 MG/DL (ref 0–200)
CK SERPL-CCNC: 77 U/L (ref 30–135)
CO2 SERPL-SCNC: 29 MMOL/L (ref 22–32)
CREAT SERPL-MCNC: 1.3 MG/DL (ref 0.8–1.5)
GLOBULIN,GLOB: 2.8
GLUCOSE SERPL-MCNC: 82 MG/DL (ref 75–110)
HDLC SERPL-MCNC: 36 MG/DL (ref 35–130)
LDL/HDL RATIO,LDHD: 2 RATIO
LDLC SERPL CALC-MCNC: 64 MG/DL (ref 0–130)
POTASSIUM SERPL-SCNC: 5.1 MMOL/L (ref 3.6–5)
PROT SERPL-MCNC: 6.9 G/DL (ref 6.3–8.2)
SODIUM SERPL-SCNC: 141 MMOL/L (ref 137–145)
TRIGL SERPL-MCNC: 250 MG/DL (ref 0–200)
VLDLC SERPL CALC-MCNC: 50 MG/DL

## 2020-05-08 RX ORDER — METHYLPREDNISOLONE ACETATE 40 MG/ML
40 INJECTION, SUSPENSION INTRA-ARTICULAR; INTRALESIONAL; INTRAMUSCULAR; SOFT TISSUE ONCE
Qty: 1 VIAL | Refills: 0
Start: 2020-05-08 | End: 2020-05-08

## 2020-05-08 NOTE — PROGRESS NOTES
Chief Complaint   Patient presents with    Hypertension     1 month follow up    CHF    Diabetes     Visit Vitals  /80 (BP 1 Location: Left arm, BP Patient Position: Sitting)   Pulse 76   Temp 97.8 °F (36.6 °C) (Oral)   Resp 19   Ht 6' 3\" (1.905 m)   Wt 200 lb 5.8 oz (90.9 kg)   SpO2 98%   BMI 25.04 kg/m²     1. Have you been to the ER, urgent care clinic since your last visit? Hospitalized since your last visit? No    2. Have you seen or consulted any other health care providers outside of the [address] [place] [address] since your last visit? Include any pap smears or colon screening.  No

## 2020-05-08 NOTE — PATIENT INSTRUCTIONS
Arthritis: Care Instructions Your Care Instructions Arthritis, also called osteoarthritis, is a breakdown of the cartilage that cushions your joints. When the cartilage wears down, your bones rub against each other. This causes pain and stiffness. Many people have some arthritis as they age. Arthritis most often affects the joints of the spine, hands, hips, knees, or feet. You can take simple measures to protect your joints, ease your pain, and help you stay active. Follow-up care is a key part of your treatment and safety. Be sure to make and go to all appointments, and call your doctor if you are having problems. It's also a good idea to know your test results and keep a list of the medicines you take. How can you care for yourself at home? · Stay at a healthy weight. Being overweight puts extra strain on your joints. · Talk to your doctor or physical therapist about exercises that will help ease joint pain. ? Stretch. You may enjoy gentle forms of yoga to help keep your joints and muscles flexible. ? Walk instead of jog. Other types of exercise that are less stressful on the joints include riding a bicycle, swimming, ashley chi, or water exercise. ? Lift weights. Strong muscles help reduce stress on your joints. Stronger thigh muscles, for example, take some of the stress off of the knees and hips. Learn the right way to lift weights so you do not make joint pain worse. · Take your medicines exactly as prescribed. Call your doctor if you think you are having a problem with your medicine. · Take pain medicines exactly as directed. ? If the doctor gave you a prescription medicine for pain, take it as prescribed. ? If you are not taking a prescription pain medicine, ask your doctor if you can take an over-the-counter medicine. · Use a cane, crutch, walker, or another device if you need help to get around. These can help rest your joints.  You also can use other things to make life easier, such as a higher toilet seat and padded handles on kitchen utensils. · Do not sit in low chairs, which can make it hard to get up. · Put heat or cold on your sore joints as needed. Use whichever helps you most. You also can take turns with hot and cold packs. ? Apply heat 2 or 3 times a day for 20 to 30 minutesusing a heating pad, hot shower, or hot packto relieve pain and stiffness. ? Put ice or a cold pack on your sore joint for 10 to 20 minutes at a time. Put a thin cloth between the ice and your skin. When should you call for help? Call your doctor now or seek immediate medical care if: 
  · You have sudden swelling, warmth, or pain in any joint.  
  · You have joint pain and a fever or rash.  
  · You have such bad pain that you cannot use a joint.  
 Watch closely for changes in your health, and be sure to contact your doctor if: 
  · You have mild joint symptoms that continue even with more than 6 weeks of care at home.  
  · You have stomach pain or other problems with your medicine. Where can you learn more? Go to http://isra-rhonda.info/ Enter L755 in the search box to learn more about \"Arthritis: Care Instructions. \" Current as of: December 8, 2019Content Version: 12.4 © 2797-9754 [employer]. Care instructions adapted under license by [employer] (which disclaims liability or warranty for this information). If you have questions about a medical condition or this instruction, always ask your healthcare professional. [place] [address] any warranty or liability for your use of this information.

## 2020-05-09 LAB
BASOPHILS # BLD AUTO: 0 X10E3/UL (ref 0–0.2)
BASOPHILS NFR BLD AUTO: 0 %
EOSINOPHIL # BLD AUTO: 0.1 X10E3/UL (ref 0–0.4)
EOSINOPHIL NFR BLD AUTO: 1 %
ERYTHROCYTE [DISTWIDTH] IN BLOOD BY AUTOMATED COUNT: 12.7 % (ref 11.6–15.4)
HCT VFR BLD AUTO: 35.1 % (ref 37.5–51)
HGB BLD-MCNC: 11.4 G/DL (ref 13–17.7)
IMM GRANULOCYTES # BLD AUTO: 0 X10E3/UL (ref 0–0.1)
IMM GRANULOCYTES NFR BLD AUTO: 0 %
INR PPP: 2.9 (ref 0.8–1.2)
LYMPHOCYTES # BLD AUTO: 1.7 X10E3/UL (ref 0.7–3.1)
LYMPHOCYTES NFR BLD AUTO: 25 %
MCH RBC QN AUTO: 30.2 PG (ref 26.6–33)
MCHC RBC AUTO-ENTMCNC: 32.5 G/DL (ref 31.5–35.7)
MCV RBC AUTO: 93 FL (ref 79–97)
MONOCYTES # BLD AUTO: 0.6 X10E3/UL (ref 0.1–0.9)
MONOCYTES NFR BLD AUTO: 9 %
NEUTROPHILS # BLD AUTO: 4.5 X10E3/UL (ref 1.4–7)
NEUTROPHILS NFR BLD AUTO: 65 %
PLATELET # BLD AUTO: 214 X10E3/UL (ref 150–450)
PROTHROMBIN TIME: 28.7 SEC (ref 9.1–12)
RBC # BLD AUTO: 3.78 X10E6/UL (ref 4.14–5.8)
WBC # BLD AUTO: 7 X10E3/UL (ref 3.4–10.8)

## 2020-05-11 ENCOUNTER — TELEPHONE ANTICOAG (OUTPATIENT)
Dept: INTERNAL MEDICINE CLINIC | Age: 85
End: 2020-05-11

## 2020-05-11 DIAGNOSIS — I48.0 PAROXYSMAL ATRIAL FIBRILLATION (HCC): Primary | ICD-10-CM

## 2020-05-11 NOTE — PROGRESS NOTES
Anticoagulation Summary  As of 2020    INR goal:   2.0-3.0   TTR:   7.9 % (2.1 y)   INR used for dosin.9 (2020)   Warfarin maintenance plan:   7.5 mg (5 mg x 1.5) every Sun, Tue, Thu; 10 mg (5 mg x 2) all other days   Weekly warfarin total:   62.5 mg   Plan last modified:   Justo Andrade RN (2020)   Next INR check:   2020   Target end date:       Indications    Paroxysmal atrial fibrillation ([address] (Primary) [I48.0]             Anticoagulation Episode Summary     INR check location:   Clinic Lab    Preferred lab:       Send INR reminders to:       Comments:         Anticoagulation Care Providers     Provider Role Specialty Phone number    Clayton Hou MD Responsible Internal Medicine [phone]        Informed patient that PT/INR okay; continue the same dose of blood thinner which is 10 mg on Monday, Wednesday, Friday and Saturday; 7.5 mg all other days and f/up as scheduled.

## 2020-05-14 DIAGNOSIS — I48.91 ATRIAL FIBRILLATION, UNSPECIFIED TYPE (HCC): ICD-10-CM

## 2020-05-14 DIAGNOSIS — N40.0 BENIGN PROSTATIC HYPERPLASIA, UNSPECIFIED WHETHER LOWER URINARY TRACT SYMPTOMS PRESENT: ICD-10-CM

## 2020-05-14 RX ORDER — METFORMIN HYDROCHLORIDE 500 MG/1
TABLET ORAL
Qty: 90 TAB | Refills: 1 | Status: SHIPPED | OUTPATIENT
Start: 2020-05-14 | End: 2020-10-30

## 2020-05-14 RX ORDER — LISINOPRIL 5 MG/1
TABLET ORAL
Qty: 90 TAB | Refills: 1 | Status: SHIPPED | OUTPATIENT
Start: 2020-05-14 | End: 2020-07-08 | Stop reason: ALTCHOICE

## 2020-05-14 RX ORDER — WARFARIN SODIUM 5 MG/1
TABLET ORAL
Qty: 145 TAB | Refills: 1 | Status: SHIPPED | OUTPATIENT
Start: 2020-05-14 | End: 2021-01-03

## 2020-05-14 RX ORDER — SIMVASTATIN 20 MG/1
TABLET, FILM COATED ORAL
Qty: 90 TAB | Refills: 1 | Status: SHIPPED | OUTPATIENT
Start: 2020-05-14 | End: 2020-10-30

## 2020-05-14 RX ORDER — TERAZOSIN 2 MG/1
CAPSULE ORAL
Qty: 90 CAP | Refills: 1 | Status: SHIPPED | OUTPATIENT
Start: 2020-05-14 | End: 2020-10-30

## 2020-05-14 NOTE — TELEPHONE ENCOUNTER
RX refill request from the patient/pharmacy. Patient last seen 05- with labs, and next appt. scheduled for 06-  Requested Prescriptions     Pending Prescriptions Disp Refills    lisinopriL (PRINIVIL, ZESTRIL) 5 mg tablet [Pharmacy Med Name: LISINOPRIL 5 MG TABLET] 90 Tab 1     Sig: TAKE 1 TABLET EVERY DAY    metFORMIN (GLUCOPHAGE) 500 mg tablet [Pharmacy Med Name: METFORMIN  MG TABLET] 90 Tab 1     Sig: TAKE 1 TABLET BY MOUTH EVERY DAY IN THE MORNING WITH BREAKFAST    warfarin (COUMADIN) 5 mg tablet [Pharmacy Med Name: WARFARIN SODIUM 5 MG TABLET] 145 Tab 1     Sig: TAKE 2 TABLETS ON MONDAY AND FRIDAY AND ONE AND A HALF TABLETS ALL OTHER DAYS    terazosin (HYTRIN) 2 mg capsule [Pharmacy Med Name: TERAZOSIN 2 MG CAPSULE] 90 Cap 1     Sig: TAKE ONE CAPSULE BY MOUTH DAILY    simvastatin (ZOCOR) 20 mg tablet [Pharmacy Med Name: SIMVASTATIN 20 MG TABLET] 90 Tab 1     Sig: TAKE 1 TABLET EVERY DAY   .

## 2020-05-18 ENCOUNTER — TELEPHONE (OUTPATIENT)
Dept: INTERNAL MEDICINE CLINIC | Age: 85
End: 2020-05-18

## 2020-05-18 NOTE — TELEPHONE ENCOUNTER
Patient's Daughter in law called and said they are cancelling the patient's appointment to the vascular surgeon due to the patient does not want to go and he is wearing compression and diabetic sock and his wound has healed. He is feeling fine.

## 2020-06-05 NOTE — PROGRESS NOTES
Chief Complaint   Patient presents with    Hypertension     1 month follow up    Diabetes    CHF       SUBJECTIVE:    June Cooney is a 80 y.o. male who returns in follow-up for his medical problems to include atrial fibrillation, hypertension, CKD, DJD, polymyalgia rheumatica and other medical problems. He is taking his medications and trying to remain physically active but he stopped his arthritis is flaring up quite a bit to the point where he is noting some bilateral knee discomfort which she previously had shots in the right knee several times before and is also noting bilateral discomfort in his shoulders with the left is greater than the right it hurts with range of motion. He has been taking his diclofenac twice a day plus he also takes Tylenol arthritis 2 twice a day. He notes no history of falls or trauma. He denies any chest pain, shortness of breath, palpitations, PND, orthopnea or cardiorespiratory complaints. There are no GI or  complaints. He notes no other complaints. Current Outpatient Medications   Medication Sig Dispense Refill    methylPREDNISolone acetate (DEPO-MEDROL) 40 mg/mL injection 1 mL by IntraMUSCular route once for 1 dose. 1 Vial 0    methylPREDNISolone acetate (DEPO-MEDROL) 40 mg/mL injection 1 mL by IntraMUSCular route once for 1 dose.  1 Vial 0    lisinopriL (PRINIVIL, ZESTRIL) 5 mg tablet TAKE 1 TABLET EVERY DAY 90 Tab 1    metFORMIN (GLUCOPHAGE) 500 mg tablet TAKE 1 TABLET BY MOUTH EVERY DAY IN THE MORNING WITH BREAKFAST 90 Tab 1    warfarin (COUMADIN) 5 mg tablet TAKE 2 TABLETS ON MONDAY AND FRIDAY AND ONE AND A HALF TABLETS ALL OTHER DAYS 145 Tab 1    terazosin (HYTRIN) 2 mg capsule TAKE ONE CAPSULE BY MOUTH DAILY 90 Cap 1    simvastatin (ZOCOR) 20 mg tablet TAKE 1 TABLET EVERY DAY 90 Tab 1    Blood-Glucose Meter monitoring kit Use to test blood sugar twice daily  DX:E11.22 1 Kit 0    glucose blood VI test strips (blood glucose test) strip Use to test blood sugar twice daily  DX:E11.22 100 Strip 1    glucose blood VI test strips (Accu-Chek SmartView Test Strip) strip USE ONCE DAILY E11.9 100 Strip PRN    furosemide (LASIX) 80 mg tablet TAKE 1 TABLET BY MOUTH EVERY DAY 90 Tab 3    cyanocobalamin (VITAMIN B12) 500 mcg tablet Take 500 mcg by mouth daily.  digoxin (LANOXIN) 0.125 mg tablet TAKE 1 TABLET BY MOUTH EVERY DAY 90 Tab 3    diclofenac EC (VOLTAREN) 75 mg EC tablet TAKE 1 TABLET BY MOUTH TWICE A  Tab 3    acetaminophen (TYLENOL ARTHRITIS PAIN) 650 mg TbER Take 650 mg by mouth every eight (8) hours.  multivitamins-minerals-lutein (MULTIVITAMIN 50 PLUS) tab tablet Take 1 Tab by mouth daily.  glucosamine 1,000 mg tab Take 2,000 mg by mouth daily.  cholecalciferol (VITAMIN D3) 1,000 unit cap Take 1,000 Units by mouth daily.  DOCOSAHEXANOIC ACID/EPA (FISH OIL PO) Take 1,200 mg by mouth two (2) times a day. 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    Back pain 9/20/2017    BPH (benign prostatic hyperplasia) 9/20/2017    CHF (congestive heart failure) ([facility] [facility] 9/20/2017    CKD (chronic kidney disease) 9/20/2017    Diabetic acetonemia ([facility] [address] [facility]     DM (diabetes mellitus) ([facility] [address] 9/20/2017    Story: Diet Controlled    ED (erectile dysfunction) 9/20/2017    Edema 9/20/2017    Elevated CPK 9/20/2017    Comments: History of    Elevated LFTs 9/20/2017    Comments: History of    Elevated PSA 9/20/2017    Hypercholesteremia     Hyperlipidemia 9/20/2017    Hypertension     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    Nodule of right lung 9/20/2017    Story: Right    Polymyalgia ([facility] [address] 9/20/2017    Prostate enlargement      Past Surgical History:   Procedure Laterality Date    ABDOMEN SURGERY PROC UNLISTED      hernia repair    HX HEENT  06/12/2018    Dr. Savannah Cervantes, surgery to remove cancerous tissue from Left cheek    HX MALIGNANT SKIN LESION EXCISION  09/2018    2 lesions on head     No Known Allergies    REVIEW OF SYSTEMS:  General: negative for - chills or fever, or weight loss or gain  ENT: negative for - headaches, nasal congestion or tinnitus  Eyes: no blurred or visual changes  Neck: No stiffness or swollen nodes  Respiratory: negative for - cough, hemoptysis, shortness of breath or wheezing  Cardiovascular : negative for - chest pain, edema, palpitations or shortness of breath  Gastrointestinal: negative for - abdominal pain, blood in stools, heartburn or nausea/vomiting  Genito-Urinary: no dysuria, trouble voiding, or hematuria  Musculoskeletal: negative for - gait disturbance, joint pain, joint stiffness or joint swelling  Neurological: no TIA or stroke symptoms  Hematologic: no bruises, no bleeding  Lymphatic: no swollen glands  Integument: no lumps, mole changes, nail changes or rash  Endocrine:no malaise/lethargy poly uria or polydipsia or unexpected weight changes        Social History     Socioeconomic History    Marital status:      Spouse name: Not on file    Number of children: Not on file    Years of education: Not on file    Highest education level: Not on file   Tobacco Use    Smoking status: Never Smoker    Smokeless tobacco: Never Used   Substance and Sexual Activity    Alcohol use: No    Drug use: No    Sexual activity: Never     History reviewed. No pertinent family history. OBJECTIVE:     Visit Vitals  /74 (BP 1 Location: Left arm, BP Patient Position: Sitting)   Pulse 73   Temp 99 °F (37.2 °C) (Oral)   Resp 19   Ht 6' 3\" (1.905 m)   Wt 201 lb 4.8 oz (91.3 kg)   SpO2 95%   BMI 25.16 kg/m²     CONSTITUTIONAL:   well nourished, appears age appropriate  EYES: sclera anicteric, PERRL, EOMI  ENMT:nares clear, moist mucous membranes, pharynx clear  NECK: supple.  Thyroid normal, No JVD or bruits  RESPIRATORY: Chest: clear to ascultation and percussion, normal inspiratory effort  CARDIOVASCULAR: Heart: regular rate and rhythm no murmurs, rubs or gallops, PMI not displaced, No thrills, no peripheral edema  GASTROINTESTINAL: Abdomen: non distended, soft, non tender, bowel sounds normal  HEMATOLOGIC: no purpura, petechiae or bruising  LYMPHATIC: No lymph node enlargemant  MUSCULOSKELETAL: Extremities: no active synovitis, pulse 1+   INTEGUMENT: No unusual rashes or suspicious skin lesions noted. Nails appear normal.  PERIPHERAL VASCULAR: normal pulses femoral, PT and DP  NEUROLOGIC: non-focal exam, A & O X 3  PSYCHIATRIC:, appropriate affect     ASSESSMENT:   1. Diastolic CHF, chronic (HCC)    2. Paroxysmal atrial fibrillation ([address]    3. ASCVD (arteriosclerotic cardiovascular disease)    4. Polymyalgia rheumatica ([facility] [address]    5. Venous stasis ulcer of left calf limited to breakdown of skin without varicose veins (HCC)    6. Primary osteoarthritis of left shoulder    7. Primary osteoarthritis of left knee      Impression  1. Diastolic CHF currently compensated  2. Paroxysmal atrial fibrillation INR pending  3. ASCVD clinically stable  4. Polymyalgia rheumatica currently asymptomatic and off of steroids  5. Venous stasis ulcer resolved I will support stockings on  6. DJD of left shoulder we discussed treatment options and per his request elected to go with injection after informed consent and Betadine scrub left shoulder centered posteriorly injected with Depo-Medrol 40 mg cc lidocaine which tolerated quite well. 7. DJD of left knee. After discussion of treatment options we elected to go with injection. After informed consent and Betadine scrub the left knee is intermittently injected with Depo-Medrol 40 mg cc lidocaine which he tolerated quite well. I will recheck him again myself in 1 month or sooner should to be a problem. I will call with lab results in the interim.     PLAN:  .  Orders Placed This Encounter    DRAIN/INJECT LARGE JOINT/BURSA    DRAIN/INJECT LARGE JOINT/BURSA    PROTHROMBIN TIME + INR    methylPREDNISolone acetate (DEPO-MEDROL) 40 mg/mL injection    methylPREDNISolone acetate (DEPO-MEDROL) 40 mg/mL injection         ATTENTION:   This medical record was transcribed using an electronic medical records system. Although proofread, it may and can contain electronic and spelling errors. Other human spelling and other errors may be present. Corrections may be executed at a later time. Please feel free to contact us for any clarifications as needed. Follow-up and Dispositions    · Return in about 4 weeks (around 7/6/2020). No results found for any visits on 06/08/20. Checo Salas MD    The patient verbalized understanding of the problems and plans as explained.

## 2020-06-08 ENCOUNTER — OFFICE VISIT (OUTPATIENT)
Dept: INTERNAL MEDICINE CLINIC | Age: 85
End: 2020-06-08

## 2020-06-08 VITALS
DIASTOLIC BLOOD PRESSURE: 74 MMHG | OXYGEN SATURATION: 95 % | HEIGHT: 75 IN | RESPIRATION RATE: 19 BRPM | SYSTOLIC BLOOD PRESSURE: 156 MMHG | BODY MASS INDEX: 25.03 KG/M2 | TEMPERATURE: 99 F | HEART RATE: 73 BPM | WEIGHT: 201.3 LBS

## 2020-06-08 DIAGNOSIS — M35.3 POLYMYALGIA RHEUMATICA (HCC): ICD-10-CM

## 2020-06-08 DIAGNOSIS — I87.2 VENOUS STASIS ULCER OF LEFT CALF LIMITED TO BREAKDOWN OF SKIN WITHOUT VARICOSE VEINS (HCC): ICD-10-CM

## 2020-06-08 DIAGNOSIS — L97.221 VENOUS STASIS ULCER OF LEFT CALF LIMITED TO BREAKDOWN OF SKIN WITHOUT VARICOSE VEINS (HCC): ICD-10-CM

## 2020-06-08 DIAGNOSIS — I25.10 ASCVD (ARTERIOSCLEROTIC CARDIOVASCULAR DISEASE): ICD-10-CM

## 2020-06-08 DIAGNOSIS — I50.32 DIASTOLIC CHF, CHRONIC (HCC): Primary | ICD-10-CM

## 2020-06-08 DIAGNOSIS — M19.012 PRIMARY OSTEOARTHRITIS OF LEFT SHOULDER: ICD-10-CM

## 2020-06-08 DIAGNOSIS — I48.0 PAROXYSMAL ATRIAL FIBRILLATION (HCC): ICD-10-CM

## 2020-06-08 DIAGNOSIS — M17.12 PRIMARY OSTEOARTHRITIS OF LEFT KNEE: ICD-10-CM

## 2020-06-08 RX ORDER — METHYLPREDNISOLONE ACETATE 40 MG/ML
40 INJECTION, SUSPENSION INTRA-ARTICULAR; INTRALESIONAL; INTRAMUSCULAR; SOFT TISSUE ONCE
Qty: 1 VIAL | Refills: 0
Start: 2020-06-08 | End: 2020-06-08

## 2020-06-08 NOTE — PATIENT INSTRUCTIONS
Arthritis: Care Instructions Overview Arthritis, also called osteoarthritis, is a breakdown of the cartilage that cushions your joints. When the cartilage wears down, your bones rub against each other. This causes pain and stiffness. Many people have some arthritis as they age. Arthritis most often affects the joints of the spine, hands, hips, knees, or feet. Arthritis never goes away completely. But medicine and home treatment can help reduce pain and help you stay active. Follow-up care is a key part of your treatment and safety. Be sure to make and go to all appointments, and call your doctor if you are having problems. It's also a good idea to know your test results and keep a list of the medicines you take. How can you care for yourself at home? · Stay at a healthy weight. Being overweight puts extra strain on your joints. · Talk to your doctor or physical therapist about exercises that will help ease joint pain. ? Stretch. You may enjoy gentle forms of yoga to help keep your joints and muscles flexible. ? Walk instead of jog. Other types of exercise that are less stressful on the joints include riding a bike, swimming, ashley chi, or water exercise. ? Lift weights. Strong muscles help reduce stress on your joints. Stronger thigh muscles, for example, take some of the stress off of the knees and hips. Learn the right way to lift weights so you don't make joint pain worse. · Take your medicines exactly as prescribed. Call your doctor if you think you are having a problem with your medicine. · Take pain medicines exactly as directed. ? If the doctor gave you a prescription medicine for pain, take it as prescribed. ? If you are not taking a prescription pain medicine, ask your doctor if you can take an over-the-counter medicine. · Use a cane, crutch, walker, or another device if you need help to get around. These can help rest your joints.  You also can use other things to make life easier, such as a higher toilet seat and padded handles on kitchen utensils. · Do not sit in low chairs. They can make it hard to get up. · Put heat or cold on your sore joints as needed. Use whichever helps you most. You can also switch between hot and cold packs. ? Apply heat 2 or 3 times a day for 20 to 30 minutesusing a heating pad, hot shower, or hot packto relieve pain and stiffness. But don't use heat on a swollen joint. ? Put ice or a cold pack on your sore joint for 10 to 20 minutes at a time. Put a thin cloth between the ice and your skin. When should you call for help? Call your doctor now or seek immediate medical care if: 
· You have sudden swelling, warmth, or pain in any joint. · You have joint pain and a fever or rash. · You have such bad pain that you cannot use a joint. Watch closely for changes in your health, and be sure to contact your doctor if: 
· You have mild joint symptoms that continue even with more than 6 weeks of care at home. · You have stomach pain or other problems with your medicine. Where can you learn more? Go to http://isra-rhonda.info/ Enter K592 in the search box to learn more about \"Arthritis: Care Instructions. \" Current as of: December 9, 2019               Content Version: 12.5 © 5485-7913 [employer]. Care instructions adapted under license by [employer] (which disclaims liability or warranty for this information). If you have questions about a medical condition or this instruction, always ask your healthcare professional. [place] [address] any warranty or liability for your use of this information.

## 2020-06-08 NOTE — PROGRESS NOTES
Chief Complaint   Patient presents with    Hypertension     1 month follow up    Diabetes    CHF     Visit Vitals  /74 (BP 1 Location: Left arm, BP Patient Position: Sitting)   Pulse 73   Temp 99 °F (37.2 °C) (Oral)   Resp 19   Ht 6' 3\" (1.905 m)   Wt 201 lb 4.8 oz (91.3 kg)   SpO2 95%   BMI 25.16 kg/m²     1. Have you been to the ER, urgent care clinic since your last visit? Hospitalized since your last visit? No    2. Have you seen or consulted any other health care providers outside of the [address] [place] [address] since your last visit? Include any pap smears or colon screening.  No

## 2020-06-09 ENCOUNTER — TELEPHONE ANTICOAG (OUTPATIENT)
Dept: INTERNAL MEDICINE CLINIC | Age: 85
End: 2020-06-09

## 2020-06-09 DIAGNOSIS — I48.0 PAROXYSMAL ATRIAL FIBRILLATION (HCC): Primary | ICD-10-CM

## 2020-06-09 LAB
INR PPP: 2.6 (ref 0.8–1.2)
PROTHROMBIN TIME: 26.7 SEC (ref 9.1–12)

## 2020-06-09 NOTE — PROGRESS NOTES
Anticoagulation Summary  As of 2020    INR goal:   2.0-3.0   TTR:   11.6 % (2.1 y)   INR used for dosin.6 (2020)   Warfarin maintenance plan:   7.5 mg (5 mg x 1.5) every Sun, Tue, Thu; 10 mg (5 mg x 2) all other days   Weekly warfarin total:   62.5 mg   Plan last modified:   Bhavin Cordon RN (2020)   Next INR check:   2020   Target end date:       Indications    Paroxysmal atrial fibrillation ([facility] [address] (Primary) [I48.0]             Anticoagulation Episode Summary     INR check location:   Clinic Lab    Preferred lab:       Send INR reminders to:       Comments:         Anticoagulation Care Providers     Provider Role Specialty Phone number    Yesica Garcia MD [facility] Internal Medicine [phone]        Informed patient that PT/INR okay; continue the same dose of blood thinner which is 10 mg on Monday, Wednesday, Friday and Saturday; 7.5 mg all other days and f/up as scheduled.

## 2020-06-09 NOTE — PROGRESS NOTES
Good so continue current Coumadin therapy. Advised to continue the same dose of blood thinner which is 10 mg on Monday, Wednesday, Friday, and Saturday; 7.5 mg all other days. F/up as scheduled.

## 2020-06-24 RX ORDER — BLOOD SUGAR DIAGNOSTIC
STRIP MISCELLANEOUS
Qty: 100 STRIP | Status: SHIPPED | OUTPATIENT
Start: 2020-06-24 | End: 2021-09-13

## 2020-06-24 NOTE — TELEPHONE ENCOUNTER
RX refill request from the patient/pharmacy. Patient last seen 06- with labs, and next appt. scheduled for 07-  Requested Prescriptions     Pending Prescriptions Disp Refills    glucose blood VI test strips (Accu-Chek SmartView Test Strip) strip 100 Strip PRN     Sig: USE ONCE DAILY E11.9   .

## 2020-07-02 ENCOUNTER — TELEPHONE (OUTPATIENT)
Dept: INTERNAL MEDICINE CLINIC | Age: 85
End: 2020-07-02

## 2020-07-02 NOTE — TELEPHONE ENCOUNTER
Daughter in law \"sunny bello\" called regarding some information she heard regarding Metformin that Mr. Kady Ho takes and states she went on web MD and there was a cancer causing agent in the medication. Advised her to call the pharmacy for further information.

## 2020-07-07 NOTE — PROGRESS NOTES
Chief Complaint   Patient presents with    Hypertension     1 month f/u    Diabetes       SUBJECTIVE:    Kong Noble is a 80 y.o. male who returns in follow-up for his hypertension, CKD stage III, polymyalgia rheumatica, paroxysmal atrial fibrillation, chronic diastolic CHF compensated and other medical problems. He had a severe flare of his joint symptoms this past weekend to the point where he could not even actually walk up the stairs because everything was just so sore and stiff so he was started back on prednisone 20 mg a day and feels dramatically better now. He does not really note that his joints were swollen although his right knee is still quite bothersome and he would like to get a cortisone shot in that. He has had no falls on the knee. He does not lock up or give out on him. He notes no chest pain, shortness of breath, palpitations, PND, orthopnea or cardiorespiratory complaints. He notes no GI or  complaints. He has no other complaints on complete review of systems. Current Outpatient Medications   Medication Sig Dispense Refill    predniSONE (DELTASONE) 10 mg tablet Take 20 mg by mouth once.  methylPREDNISolone acetate (DEPO-MEDROL) 40 mg/mL injection 1 mL by IntraMUSCular route once for 1 dose.  1 Vial 0    glucose blood VI test strips (Accu-Chek SmartView Test Strip) strip USE ONCE DAILY E11.9 100 Strip PRN    metFORMIN (GLUCOPHAGE) 500 mg tablet TAKE 1 TABLET BY MOUTH EVERY DAY IN THE MORNING WITH BREAKFAST 90 Tab 1    warfarin (COUMADIN) 5 mg tablet TAKE 2 TABLETS ON MONDAY AND FRIDAY AND ONE AND A HALF TABLETS ALL OTHER DAYS 145 Tab 1    terazosin (HYTRIN) 2 mg capsule TAKE ONE CAPSULE BY MOUTH DAILY 90 Cap 1    simvastatin (ZOCOR) 20 mg tablet TAKE 1 TABLET EVERY DAY 90 Tab 1    Blood-Glucose Meter monitoring kit Use to test blood sugar twice daily  DX:E11.22 1 Kit 0    glucose blood VI test strips (blood glucose test) strip Use to test blood sugar twice daily  DX:E11.22 100 Strip 1    furosemide (LASIX) 80 mg tablet TAKE 1 TABLET BY MOUTH EVERY DAY 90 Tab 3    cyanocobalamin (VITAMIN B12) 500 mcg tablet Take 500 mcg by mouth daily.  digoxin (LANOXIN) 0.125 mg tablet TAKE 1 TABLET BY MOUTH EVERY DAY 90 Tab 3    diclofenac EC (VOLTAREN) 75 mg EC tablet TAKE 1 TABLET BY MOUTH TWICE A  Tab 3    acetaminophen (TYLENOL ARTHRITIS PAIN) 650 mg TbER Take 650 mg by mouth every eight (8) hours.  multivitamins-minerals-lutein (MULTIVITAMIN 50 PLUS) tab tablet Take 1 Tab by mouth daily.  glucosamine 1,000 mg tab Take 2,000 mg by mouth daily.  cholecalciferol (VITAMIN D3) 1,000 unit cap Take 1,000 Units by mouth daily.  DOCOSAHEXANOIC ACID/EPA (FISH OIL PO) Take 1,200 mg by mouth two (2) times a day. 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    Back pain 9/20/2017    BPH (benign prostatic hyperplasia) 9/20/2017    CHF (congestive heart failure) ([address] 9/20/2017    CKD (chronic kidney disease) 9/20/2017    Diabetic acetonemia ([address]     DM (diabetes mellitus) ([address] 9/20/2017    Story: Diet Controlled    ED (erectile dysfunction) 9/20/2017    Edema 9/20/2017    Elevated CPK 9/20/2017    Comments: History of    Elevated LFTs 9/20/2017    Comments: History of    Elevated PSA 9/20/2017    Hypercholesteremia     Hyperlipidemia 9/20/2017    Hypertension     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    Nodule of right lung 9/20/2017    Story: Right    Polymyalgia ([address] 9/20/2017    Prostate enlargement      Past Surgical History:   Procedure Laterality Date    ABDOMEN SURGERY PROC UNLISTED      hernia repair    HX HEENT  06/12/2018    Dr. 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    HX MALIGNANT SKIN LESION EXCISION  09/2018    2 lesions on head     No Known Allergies    REVIEW OF SYSTEMS:  General: negative for - chills or fever, or weight loss or gain  ENT: negative for - headaches, nasal congestion or tinnitus  Eyes: no blurred or visual changes  Neck: No stiffness or swollen nodes  Respiratory: negative for - cough, hemoptysis, shortness of breath or wheezing  Cardiovascular : negative for - chest pain, edema, palpitations or shortness of breath  Gastrointestinal: negative for - abdominal pain, blood in stools, heartburn or nausea/vomiting  Genito-Urinary: no dysuria, trouble voiding, or hematuria  Musculoskeletal: negative for - gait disturbance or joint swelling. Still has right knee pain but had severe joint and muscle aches and pains and weakness this week and now much better on prednisone 20 a day  Neurological: no TIA or stroke symptoms  Hematologic: no bruises, no bleeding  Lymphatic: no swollen glands  Integument: no lumps, mole changes, nail changes or rash  Endocrine:no malaise/lethargy poly uria or polydipsia or unexpected weight changes        Social History     Socioeconomic History    Marital status:      Spouse name: Not on file    Number of children: Not on file    Years of education: Not on file    Highest education level: Not on file   Tobacco Use    Smoking status: Never Smoker    Smokeless tobacco: Never Used   Substance and Sexual Activity    Alcohol use: No    Drug use: No    Sexual activity: Never     History reviewed. No pertinent family history. OBJECTIVE:     Visit Vitals  /78   Pulse 70   Temp 98.4 °F (36.9 °C) (Oral)   Ht 6' 3\" (1.905 m)   Wt 199 lb 1.6 oz (90.3 kg)   SpO2 97%   BMI 24.89 kg/m²     CONSTITUTIONAL:   well nourished, appears age appropriate  EYES: sclera anicteric, PERRL, EOMI  ENMT:nares clear, moist mucous membranes, pharynx clear  NECK: supple.  Thyroid normal, No JVD or bruits  RESPIRATORY: Chest: clear to ascultation and percussion, normal inspiratory effort  CARDIOVASCULAR: Heart: regular rate and rhythm no murmurs, rubs or gallops, PMI not displaced, No thrills, no peripheral edema  GASTROINTESTINAL: Abdomen: non distended, soft, non tender, bowel sounds normal  HEMATOLOGIC: no purpura, petechiae or bruising  LYMPHATIC: No lymph node enlargemant  MUSCULOSKELETAL: Extremities: no active synovitis, pulse 1+. Right knee has some discomfort to palpation but no joint effusion erythema increased temperature and range of motion is normal.  INTEGUMENT: No unusual rashes or suspicious skin lesions noted. Nails appear normal.  PERIPHERAL VASCULAR: normal pulses femoral, PT and DP  NEUROLOGIC: non-focal exam, A & O X 3  PSYCHIATRIC:, appropriate affect     ASSESSMENT:   1. Hypertension with renal disease    2. Diastolic CHF, chronic ([facility] [address]    3. ASCVD (arteriosclerotic cardiovascular disease)    4. Paroxysmal atrial fibrillation (HCC)    5. Polymyalgia rheumatica ([facility] [address]    6. Primary osteoarthritis of right knee      Impression  1. Hypertension that is controlled on recheck so continue current therapy. 2.  Diastolic CHF compensated  3. ASCVD clinically stable  4. Paroxysmal atrial fibrillation INR pending appears to be in sinus rhythm today  5. Polymyalgia rheumatica seems to be recurred so we will check a sed rate and continue prednisone at 20 mg a day for a total of 1 week and then decrease to 10 daily  6. DJD right knee we discussed treatment options and per his request elected to go with injection. After informed consent and Betadine scrub the right knee is entered medially injected with Depo-Medrol 40 mg cc lidocaine which he tolerated quite well. I will recheck a myself again in 1 month or sooner if there is a problem.     PLAN:  .  Orders Placed This Encounter    DRAIN/INJECT LARGE JOINT/BURSA    METABOLIC PANEL, BASIC ([facility])    PROTHROMBIN TIME + INR    SED RATE (ESR) ([facility])    predniSONE (DELTASONE) 10 mg tablet    methylPREDNISolone acetate (DEPO-MEDROL) 40 mg/mL injection ATTENTION:   This medical record was transcribed using an electronic medical records system. Although proofread, it may and can contain electronic and spelling errors. Other human spelling and other errors may be present. Corrections may be executed at a later time. Please feel free to contact us for any clarifications as needed. Follow-up and Dispositions    · Return in about 4 weeks (around 8/5/2020). No results found for any visits on 07/08/20. Leobardo Jensen MD    The patient verbalized understanding of the problems and plans as explained.

## 2020-07-08 ENCOUNTER — OFFICE VISIT (OUTPATIENT)
Dept: INTERNAL MEDICINE CLINIC | Age: 85
End: 2020-07-08

## 2020-07-08 VITALS
WEIGHT: 199.1 LBS | DIASTOLIC BLOOD PRESSURE: 78 MMHG | SYSTOLIC BLOOD PRESSURE: 136 MMHG | TEMPERATURE: 98.4 F | HEART RATE: 70 BPM | BODY MASS INDEX: 24.76 KG/M2 | OXYGEN SATURATION: 97 % | HEIGHT: 75 IN

## 2020-07-08 DIAGNOSIS — M17.11 PRIMARY OSTEOARTHRITIS OF RIGHT KNEE: ICD-10-CM

## 2020-07-08 DIAGNOSIS — I48.0 PAROXYSMAL ATRIAL FIBRILLATION (HCC): ICD-10-CM

## 2020-07-08 DIAGNOSIS — I50.32 DIASTOLIC CHF, CHRONIC (HCC): ICD-10-CM

## 2020-07-08 DIAGNOSIS — I12.9 HYPERTENSION WITH RENAL DISEASE: Primary | ICD-10-CM

## 2020-07-08 DIAGNOSIS — I25.10 ASCVD (ARTERIOSCLEROTIC CARDIOVASCULAR DISEASE): ICD-10-CM

## 2020-07-08 DIAGNOSIS — M35.3 POLYMYALGIA RHEUMATICA (HCC): ICD-10-CM

## 2020-07-08 LAB
ANION GAP SERPL CALC-SCNC: 10 MMOL/L
BUN SERPL-MCNC: 33 MG/DL (ref 9–20)
CALCIUM SERPL-MCNC: 9.5 MG/DL (ref 8.4–10.2)
CHLORIDE SERPL-SCNC: 103 MMOL/L (ref 98–107)
CO2 SERPL-SCNC: 27 MMOL/L (ref 22–32)
CREAT SERPL-MCNC: 1.3 MG/DL (ref 0.8–1.5)
GLUCOSE SERPL-MCNC: 259 MG/DL (ref 75–110)
POTASSIUM SERPL-SCNC: 5.4 MMOL/L (ref 3.6–5)
SED RATE (ESR): 66 MM/HR (ref 0–10)
SODIUM SERPL-SCNC: 140 MMOL/L (ref 137–145)

## 2020-07-08 RX ORDER — PREDNISONE 10 MG/1
10 TABLET ORAL 2 TIMES DAILY
Qty: 60 TAB | Refills: 3 | Status: SHIPPED | OUTPATIENT
Start: 2020-07-08 | End: 2020-11-08

## 2020-07-08 RX ORDER — PREDNISONE 10 MG/1
20 TABLET ORAL ONCE
COMMUNITY
Start: 2020-04-15 | End: 2020-08-10 | Stop reason: ALTCHOICE

## 2020-07-08 RX ORDER — METHYLPREDNISOLONE ACETATE 40 MG/ML
40 INJECTION, SUSPENSION INTRA-ARTICULAR; INTRALESIONAL; INTRAMUSCULAR; SOFT TISSUE ONCE
Qty: 1 VIAL | Refills: 0
Start: 2020-07-08 | End: 2020-07-08

## 2020-07-08 NOTE — PATIENT INSTRUCTIONS

## 2020-07-08 NOTE — PROGRESS NOTES
Chief Complaint   Patient presents with    Hypertension     1 month f/u    Diabetes       1. Have you been to the ER, urgent care clinic since your last visit? Hospitalized since your last visit? No    2. Have you seen or consulted any other health care providers outside of the [address] [place] [address] since your last visit? Include any pap smears or colon screening.  No

## 2020-07-09 LAB
INR PPP: 2.3 (ref 0.8–1.2)
PROTHROMBIN TIME: 23.5 SEC (ref 9.1–12)

## 2020-07-13 ENCOUNTER — TELEPHONE ANTICOAG (OUTPATIENT)
Dept: INTERNAL MEDICINE CLINIC | Age: 85
End: 2020-07-13

## 2020-07-13 ENCOUNTER — DOCUMENTATION ONLY (OUTPATIENT)
Dept: WOUND CARE | Age: 85
End: 2020-07-13

## 2020-07-13 DIAGNOSIS — I48.0 PAROXYSMAL ATRIAL FIBRILLATION (HCC): Primary | ICD-10-CM

## 2020-07-13 NOTE — PROGRESS NOTES
Anticoagulation Summary  As of 2020    INR goal:   2.0-3.0   TTR:   14.9 % (2.2 y)   INR used for dosin.3 (2020)   Warfarin maintenance plan:   7.5 mg (5 mg x 1.5) every Sun, Tue, Thu; 10 mg (5 mg x 2) all other days   Weekly warfarin total:   62.5 mg   Plan last modified:   Bryanna Mckinley RN (2020)   Next INR check:   2020   Target end date:       Indications    Paroxysmal atrial fibrillation ([facility] [address] (Primary) [I48.0]             Anticoagulation Episode Summary     INR check location:   Clinic Lab    Preferred lab:       Send INR reminders to:       Comments:         Anticoagulation Care Providers     Provider Role Specialty Phone number    Amparo Rojas MD Responsible Internal Medicine [phone]        Informed patient that PT/INR okay; continue the same dose of blood thinner which is 10 mg on Monday,  and Saturday; 7.5 mg all other days and f/up as scheduled. Spouse

## 2020-07-13 NOTE — PROGRESS NOTES
INR OK, ESR eleveted so Prednisone as prescribed and f/u 2 weeks rather than waiting a month. Patient advised. Scheduled to f/up 7-. Needs to continue the same dose of blood thinner which is 10 mg on Monday, Wednesday, Friday and Saturday; 7.5 mg all other days. F/up as scheduled.

## 2020-07-13 NOTE — PROGRESS NOTES
Wound Care    The patient had venous duplex scan on 5/21/2020. This showed no reflux at right and left saphenofemoral junctions, but on both right and left sides did show reflux and varcosities originating in the greater saphenous veins  mid thigh and continuing distally. The patient was last seen in the [address] [place] [address] on 4/9/2020, at which time his left leg ulcer was healed. The presence of reflux and varicosities indicates higher risk of future leg ulceration, but does not require intervention unless he has recurrent ulcers which fail to respond to standard conservative management.     Maxim Boles MD

## 2020-07-24 NOTE — PROGRESS NOTES
Chief Complaint   Patient presents with    Hypertension    Irregular Heart Beat    Other     Joint flare up- Pt c/o it is gettng a lot better after taking Prednisone        SUBJECTIVE:    Anisha Tam is a 80 y.o. male who returns in follow-up for his polymyalgia rheumatica, CHF, paroxysmal atrial fibrillation, venous stasis disease, CKD stage III and other medical problems. He did sustain a small open sore on his right leg is not sure if he struck it on anything but he has not noted any significant purulent drainage. He denies any chest pain, shortness of breath, palpitations, PND, orthopnea or cardiorespiratory complaints. He has no GI or  complaints. He has no other complaints on complete review systems. Current Outpatient Medications   Medication Sig Dispense Refill    predniSONE (DELTASONE) 10 mg tablet Take 20 mg by mouth once.  predniSONE (DELTASONE) 10 mg tablet Take 10 mg by mouth two (2) times a day. 60 Tab 3    glucose blood VI test strips (Accu-Chek SmartView Test Strip) strip USE ONCE DAILY E11.9 100 Strip PRN    metFORMIN (GLUCOPHAGE) 500 mg tablet TAKE 1 TABLET BY MOUTH EVERY DAY IN THE MORNING WITH BREAKFAST 90 Tab 1    warfarin (COUMADIN) 5 mg tablet TAKE 2 TABLETS ON MONDAY AND FRIDAY AND ONE AND A HALF TABLETS ALL OTHER DAYS 145 Tab 1    terazosin (HYTRIN) 2 mg capsule TAKE ONE CAPSULE BY MOUTH DAILY 90 Cap 1    simvastatin (ZOCOR) 20 mg tablet TAKE 1 TABLET EVERY DAY 90 Tab 1    Blood-Glucose Meter monitoring kit Use to test blood sugar twice daily  DX:E11.22 1 Kit 0    glucose blood VI test strips (blood glucose test) strip Use to test blood sugar twice daily  DX:E11.22 100 Strip 1    furosemide (LASIX) 80 mg tablet TAKE 1 TABLET BY MOUTH EVERY DAY 90 Tab 3    cyanocobalamin (VITAMIN B12) 500 mcg tablet Take 500 mcg by mouth daily.       digoxin (LANOXIN) 0.125 mg tablet TAKE 1 TABLET BY MOUTH EVERY DAY 90 Tab 3    diclofenac EC (VOLTAREN) 75 mg EC tablet TAKE 1 TABLET BY MOUTH TWICE A  Tab 3    acetaminophen (TYLENOL ARTHRITIS PAIN) 650 mg TbER Take 650 mg by mouth every eight (8) hours.  multivitamins-minerals-lutein (MULTIVITAMIN 50 PLUS) tab tablet Take 1 Tab by mouth daily.  glucosamine 1,000 mg tab Take 2,000 mg by mouth daily.  cholecalciferol (VITAMIN D3) 1,000 unit cap Take 1,000 Units by mouth daily.  DOCOSAHEXANOIC ACID/EPA (FISH OIL PO) Take 1,200 mg by mouth two (2) times a day. 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    Back pain 9/20/2017    BPH (benign prostatic hyperplasia) 9/20/2017    CHF (congestive heart failure) ([address] 9/20/2017    CKD (chronic kidney disease) 9/20/2017    Diabetic acetonemia ([address]     DM (diabetes mellitus) ([address] 9/20/2017    Story: Diet Controlled    ED (erectile dysfunction) 9/20/2017    Edema 9/20/2017    Elevated CPK 9/20/2017    Comments: History of    Elevated LFTs 9/20/2017    Comments: History of    Elevated PSA 9/20/2017    Hypercholesteremia     Hyperlipidemia 9/20/2017    Hypertension     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    Nodule of right lung 9/20/2017    Story: Right    Polymyalgia ([address] 9/20/2017    Prostate enlargement      Past Surgical History:   Procedure Laterality Date    ABDOMEN SURGERY PROC UNLISTED      hernia repair    HX HEENT  06/12/2018    Dr. Doris Vega, surgery to remove cancerous tissue from Left cheek    HX MALIGNANT SKIN LESION EXCISION  09/2018    2 lesions on head     No Known Allergies    REVIEW OF SYSTEMS:  General: negative for - chills or fever, or weight loss or gain  ENT: negative for - headaches, nasal congestion or tinnitus  Eyes: no blurred or visual changes  Neck: No stiffness or swollen nodes  Respiratory: negative for - cough, hemoptysis, shortness of breath or wheezing  Cardiovascular : negative for - chest pain, edema, palpitations or shortness of breath  Gastrointestinal: negative for - abdominal pain, blood in stools, heartburn or nausea/vomiting  Genito-Urinary: no dysuria, trouble voiding, or hematuria  Musculoskeletal: negative for - gait disturbance, joint pain, joint stiffness or joint swelling  Neurological: no TIA or stroke symptoms  Hematologic: no bruises, no bleeding  Lymphatic: no swollen glands  Integument: no lumps, mole changes, nail changes or rash. Small open sore right anterior shin  Endocrine:no malaise/lethargy poly uria or polydipsia or unexpected weight changes        Social History     Socioeconomic History    Marital status:      Spouse name: Not on file    Number of children: Not on file    Years of education: Not on file    Highest education level: Not on file   Tobacco Use    Smoking status: Never Smoker    Smokeless tobacco: Never Used   Substance and Sexual Activity    Alcohol use: No    Drug use: No    Sexual activity: Never     History reviewed. No pertinent family history. OBJECTIVE:     Visit Vitals  /70 (BP 1 Location: Left arm, BP Patient Position: Sitting)   Pulse 65   Temp 97.9 °F (36.6 °C) (Oral)   Resp 16   Ht 6' 3\" (1.905 m)   Wt 197 lb 12.8 oz (89.7 kg)   SpO2 97%   BMI 24.72 kg/m²     CONSTITUTIONAL:   well nourished, appears age appropriate  EYES: sclera anicteric, PERRL, EOMI  ENMT:nares clear, moist mucous membranes, pharynx clear  NECK: supple.  Thyroid normal, No JVD or bruits  RESPIRATORY: Chest: clear to ascultation and percussion, normal inspiratory effort  CARDIOVASCULAR: Heart: regular rate and rhythm no murmurs, rubs or gallops, PMI not displaced, No thrills, no peripheral edema  GASTROINTESTINAL: Abdomen: non distended, soft, non tender, bowel sounds normal  HEMATOLOGIC: no purpura, petechiae or bruising  LYMPHATIC: No lymph node enlargemant  MUSCULOSKELETAL: Extremities: no active synovitis, pulse 1+   INTEGUMENT: No unusual rashes or suspicious skin lesions noted. Nails appear normal.  Superficial excoriation right anterior shin not infected  PERIPHERAL VASCULAR: normal pulses femoral, PT and DP  NEUROLOGIC: non-focal exam, A & O X 3  PSYCHIATRIC:, appropriate affect     ASSESSMENT:   1. Polymyalgia rheumatica (HCC)    2. Paroxysmal atrial fibrillation ([facility] [address]    3. Diastolic CHF, chronic ([facility] [address]    4. Encounter for immunization      Impression  1. Polymyalgia rheumatica we will see what the sed rate looks like. Prednisone dose is currently 10 mg a day. 2.  Paroxysmal atrial fibrillation INR is pending currently in sinus rhythm  3. CHF compensated  4. CKD status pending  5. Immunization updated with Tdap  I will recheck a myself again in 2 weeks or sooner should to be a problem. PLAN:  .  Orders Placed This Encounter    Tetanus, diphtheria toxoids and acellular pertussis (TDAP) vaccine, in individuals >=7 years, IM    PROTHROMBIN TIME + INR    SED RATE (ESR) ([facility])    METABOLIC PANEL, BASIC ([facility])         ATTENTION:   This medical record was transcribed using an electronic medical records system. Although proofread, it may and can contain electronic and spelling errors. Other human spelling and other errors may be present. Corrections may be executed at a later time. Please feel free to contact us for any clarifications as needed. Follow-up and Dispositions    · Return in about 2 weeks (around 8/10/2020). No results found for any visits on 07/27/20. Irlanda Kilpatrick MD    The patient verbalized understanding of the problems and plans as explained.

## 2020-07-27 ENCOUNTER — OFFICE VISIT (OUTPATIENT)
Dept: INTERNAL MEDICINE CLINIC | Age: 85
End: 2020-07-27

## 2020-07-27 VITALS
BODY MASS INDEX: 24.59 KG/M2 | TEMPERATURE: 97.9 F | OXYGEN SATURATION: 97 % | DIASTOLIC BLOOD PRESSURE: 70 MMHG | HEIGHT: 75 IN | SYSTOLIC BLOOD PRESSURE: 138 MMHG | HEART RATE: 65 BPM | WEIGHT: 197.8 LBS | RESPIRATION RATE: 16 BRPM

## 2020-07-27 DIAGNOSIS — I48.0 PAROXYSMAL ATRIAL FIBRILLATION (HCC): ICD-10-CM

## 2020-07-27 DIAGNOSIS — I50.32 DIASTOLIC CHF, CHRONIC (HCC): ICD-10-CM

## 2020-07-27 DIAGNOSIS — Z23 ENCOUNTER FOR IMMUNIZATION: ICD-10-CM

## 2020-07-27 DIAGNOSIS — M35.3 POLYMYALGIA RHEUMATICA (HCC): Primary | ICD-10-CM

## 2020-07-27 LAB
ANION GAP SERPL CALC-SCNC: 10 MMOL/L
BUN SERPL-MCNC: 33 MG/DL (ref 9–20)
CALCIUM SERPL-MCNC: 9.7 MG/DL (ref 8.4–10.2)
CHLORIDE SERPL-SCNC: 106 MMOL/L (ref 98–107)
CO2 SERPL-SCNC: 24 MMOL/L (ref 22–32)
CREAT SERPL-MCNC: 1.5 MG/DL (ref 0.8–1.5)
GLUCOSE SERPL-MCNC: 177 MG/DL (ref 75–110)
POTASSIUM SERPL-SCNC: 5.4 MMOL/L (ref 3.6–5)
SED RATE (ESR): 40 MM/HR (ref 0–10)
SODIUM SERPL-SCNC: 140 MMOL/L (ref 137–145)

## 2020-07-27 NOTE — PROGRESS NOTES
Identified pt with two pt identifiers(name and ). Reviewed record in preparation for visit and have obtained necessary documentation. Chief Complaint   Patient presents with    Hypertension    Irregular Heart Beat    Other     Joint flare up- Pt c/o it is gettng a lot better after taking Prednisone         Health Maintenance Due   Topic    Eye Exam Retinal or Dilated     DTaP/Tdap/Td series (1 - Tdap)    Shingrix Vaccine Age 50> (1 of 2)    GLAUCOMA SCREENING Q2Y        DTap: Pt would ta to get one today    Eye Exam: Pt will have to schedule an appt. Shingrix vaccine: Pt denied. ADV CARE PLAN: Pt has a living will. Visit Vitals  /70 (BP 1 Location: Left arm, BP Patient Position: Sitting)   Pulse 65   Temp 97.9 °F (36.6 °C) (Oral)   Resp 16   Ht 6' 3\" (1.905 m)   Wt 197 lb 12.8 oz (89.7 kg)   SpO2 97%   BMI 24.72 kg/m²         Coordination of Care Questionnaire:  :   1) Have you been to an emergency room, urgent care, or hospitalized since your last visit? If yes, where when, and reason for visit? NO    2. Have seen or consulted any other health care provider since your last visit? If yes, where when, and reason for visit? NO      Patient is accompanied by self I have received verbal consent from Juan Damian. to discuss any/all medical information while they are present in the room.

## 2020-07-27 NOTE — PROGRESS NOTES
After obtaining consent, and per verbal order from Dr. Christy Delacruz, patient received Tdap  vaccine given by Mor Frost LPN in Left Deltoid. Tetanus Vaccine 0.5 mL IM now. Patient was observed for 10 minutes post injection. Patient tolerated injection well. VIS given.

## 2020-07-27 NOTE — PATIENT INSTRUCTIONS

## 2020-07-28 LAB
INR PPP: 2 (ref 0.8–1.2)
PROTHROMBIN TIME: 20.6 SEC (ref 9.1–12)

## 2020-07-30 ENCOUNTER — TELEPHONE ANTICOAG (OUTPATIENT)
Dept: INTERNAL MEDICINE CLINIC | Age: 85
End: 2020-07-30

## 2020-07-30 DIAGNOSIS — I48.0 PAROXYSMAL ATRIAL FIBRILLATION (HCC): Primary | ICD-10-CM

## 2020-07-30 NOTE — PROGRESS NOTES
Anticoagulation Summary  As of 2020    INR goal:   2.0-3.0   TTR:   16.8 % (2.3 y)   INR used for dosin.0 (2020)   Warfarin maintenance plan:   7.5 mg (5 mg x 1.5) every Sun, Tue, Thu; 10 mg (5 mg x 2) all other days   Weekly warfarin total:   62.5 mg   Plan last modified:   Breana Nath RN (2020)   Next INR check:   8/10/2020   Target end date:       Indications    Paroxysmal atrial fibrillation ([facility] [address] (Primary) [I48.0]             Anticoagulation Episode Summary     INR check location:   Clinic Lab    Preferred lab:       Send INR reminders to:       Comments:         Anticoagulation Care Providers     Provider Role Specialty Phone number    Wai Serrano MD Responsible Internal Medicine [phone]        Per Dr. Radha King INR is good and continue current dosage. Pt is taking coumadin 10mg on Monday,wednesday,friday and Saturday and 7.5mg all other days.

## 2020-07-30 NOTE — PROGRESS NOTES
Called and spoke to patient  Two pt identifiers confirmed  Informed patient per Dr. Joey Hyde that sed rate is better and INR is ok and to continue current treatment. Confirmed with patient he is taking Coumadin 10mg Monday,wednesday,friday and Saturday and 7.5mg all other days. Patient verbalized understanding of information discussed  with no further questions at this time.

## 2020-08-07 NOTE — PROGRESS NOTES
See below      Chief Complaint   Patient presents with    Hypertension     3 month follow up    CHF    Diabetes       SUBJECTIVE:    Philly Barry is a 80 y.o. male who returns in follow-up of his medical problems include hypertension, diabetes, hyperlipidemia, congestive heart failure, ASCVD, paroxysmal atrial fibrillation, polymyalgia rheumatica, CKD stage III, DJD and other medical problems. He is taking his medications and trying to follow his diet and try and remain physically active. He currently denies any chest pain, shortness of breath, palpitations, PND, orthopnea or cardiorespiratory complaints. He notes no GI or  complaints. He notes no headaches, dizziness or neurologic complaints. His arthritic complaints seem to be stable and his polymyalgia seems to be under control now with the prednisone at 10 mg a day. Current Outpatient Medications   Medication Sig Dispense Refill    predniSONE (DELTASONE) 10 mg tablet Take 10 mg by mouth two (2) times a day. 60 Tab 3    glucose blood VI test strips (Accu-Chek SmartView Test Strip) strip USE ONCE DAILY E11.9 100 Strip PRN    metFORMIN (GLUCOPHAGE) 500 mg tablet TAKE 1 TABLET BY MOUTH EVERY DAY IN THE MORNING WITH BREAKFAST 90 Tab 1    warfarin (COUMADIN) 5 mg tablet TAKE 2 TABLETS ON MONDAY AND FRIDAY AND ONE AND A HALF TABLETS ALL OTHER DAYS 145 Tab 1    terazosin (HYTRIN) 2 mg capsule TAKE ONE CAPSULE BY MOUTH DAILY 90 Cap 1    simvastatin (ZOCOR) 20 mg tablet TAKE 1 TABLET EVERY DAY 90 Tab 1    Blood-Glucose Meter monitoring kit Use to test blood sugar twice daily  DX:E11.22 1 Kit 0    glucose blood VI test strips (blood glucose test) strip Use to test blood sugar twice daily  DX:E11.22 100 Strip 1    furosemide (LASIX) 80 mg tablet TAKE 1 TABLET BY MOUTH EVERY DAY 90 Tab 3    cyanocobalamin (VITAMIN B12) 500 mcg tablet Take 500 mcg by mouth daily.       digoxin (LANOXIN) 0.125 mg tablet TAKE 1 TABLET BY MOUTH EVERY DAY 90 Tab 3  diclofenac EC (VOLTAREN) 75 mg EC tablet TAKE 1 TABLET BY MOUTH TWICE A  Tab 3    acetaminophen (TYLENOL ARTHRITIS PAIN) 650 mg TbER Take 650 mg by mouth every eight (8) hours.  multivitamins-minerals-lutein (MULTIVITAMIN 50 PLUS) tab tablet Take 1 Tab by mouth daily.  glucosamine 1,000 mg tab Take 2,000 mg by mouth daily.  cholecalciferol (VITAMIN D3) 1,000 unit cap Take 1,000 Units by mouth daily.  DOCOSAHEXANOIC ACID/EPA (FISH OIL PO) Take 1,200 mg by mouth two (2) times a day. 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    Back pain 9/20/2017    BPH (benign prostatic hyperplasia) 9/20/2017    CHF (congestive heart failure) ([address] 9/20/2017    CKD (chronic kidney disease) 9/20/2017    Diabetic acetonemia ([address]     DM (diabetes mellitus) ([address] 9/20/2017    Story: Diet Controlled    ED (erectile dysfunction) 9/20/2017    Edema 9/20/2017    Elevated CPK 9/20/2017    Comments: History of    Elevated LFTs 9/20/2017    Comments: History of    Elevated PSA 9/20/2017    Hypercholesteremia     Hyperlipidemia 9/20/2017    Hypertension     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    Nodule of right lung 9/20/2017    Story: Right    Polymyalgia ([address] 9/20/2017    Prostate enlargement      Past Surgical History:   Procedure Laterality Date    ABDOMEN SURGERY PROC UNLISTED      hernia repair    HX HEENT  06/12/2018    Dr. 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    HX MALIGNANT SKIN LESION EXCISION  09/2018    2 lesions on head     No Known Allergies    REVIEW OF SYSTEMS:  General: negative for - chills or fever, or weight loss or gain  ENT: negative for - headaches, nasal congestion or tinnitus  Eyes: no blurred or visual changes  Neck: No stiffness or swollen nodes  Respiratory: negative for - cough, hemoptysis, shortness of breath or wheezing  Cardiovascular : negative for - chest pain, edema, palpitations or shortness of breath  Gastrointestinal: negative for - abdominal pain, blood in stools, heartburn or nausea/vomiting  Genito-Urinary: no dysuria, trouble voiding, or hematuria  Musculoskeletal: negative for - gait disturbance, joint pain, joint stiffness or joint swelling  Neurological: no TIA or stroke symptoms  Hematologic: no bruises, no bleeding  Lymphatic: no swollen glands  Integument: no lumps, mole changes, nail changes or rash  Endocrine:no malaise/lethargy poly uria or polydipsia or unexpected weight changes        Social History     Socioeconomic History    Marital status:      Spouse name: Not on file    Number of children: Not on file    Years of education: Not on file    Highest education level: Not on file   Tobacco Use    Smoking status: Never Smoker    Smokeless tobacco: Never Used   Substance and Sexual Activity    Alcohol use: No    Drug use: No    Sexual activity: Never     History reviewed. No pertinent family history. OBJECTIVE:     Visit Vitals  /82 (BP 1 Location: Left arm, BP Patient Position: Sitting)   Pulse 63   Temp 97.8 °F (36.6 °C) (Oral)   Resp 19   Ht 6' 3\" (1.905 m)   Wt 194 lb 11.2 oz (88.3 kg)   SpO2 96%   BMI 24.34 kg/m²     CONSTITUTIONAL:   well nourished, appears age appropriate  EYES: sclera anicteric, PERRL, EOMI  ENMT:nares clear, moist mucous membranes, pharynx clear  NECK: supple.  Thyroid normal, No JVD or bruits  RESPIRATORY: Chest: clear to ascultation and percussion, normal inspiratory effort  CARDIOVASCULAR: Heart: regular rate and rhythm no murmurs, rubs or gallops, PMI not displaced, No thrills, no peripheral edema  GASTROINTESTINAL: Abdomen: non distended, soft, non tender, bowel sounds normal  HEMATOLOGIC: no purpura, petechiae or bruising  LYMPHATIC: No lymph node enlargemant  MUSCULOSKELETAL: Extremities: no active synovitis, pulse 1+ INTEGUMENT: No unusual rashes or suspicious skin lesions noted. Nails appear normal.  PERIPHERAL VASCULAR: normal pulses femoral, PT and DP  NEUROLOGIC: non-focal exam, A & O X 3  PSYCHIATRIC:, appropriate affect     ASSESSMENT:   1. Hypertension with renal disease    2. 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, without long-term current use of insulin ([address]    3. Mixed hyperlipidemia    4. Diastolic CHF, chronic (HCC)    5. Paroxysmal atrial fibrillation ([address]    6. Primary osteoarthritis involving multiple joints    7. Stage 3 chronic kidney disease ([address]    8. ASCVD (arteriosclerotic cardiovascular disease)    9. Polymyalgia rheumatica ([facility] [address]    10. Chronic non-seasonal allergic rhinitis      Impression  1. Hypertension that is controlled so continue current therapy reviewed with him. 2.  Diabetes mellitus repeat status pending a prior lab review not make adjustments if necessary. 3   Hyperlipidemia prior lab reviewed and repeat status pending I will adjust if needed. 4   Diastolic CHF compensated  5. Paroxysmal atrial fibrillation on Coumadin INR pending  6. DJD that is stable  7. CKD stage III repeat status pending  8. ASCVD clinically stable continue aspirin daily  9. Polymyalgia rheumatica sed rate is pending on prednisone 10 daily. 10   Allergic rhinitis stable  I will call with lab results and make further recommendations or adjustments if necessary. Follow-up scheduled again for 2 weeks regarding his polymyalgia. 3 months regarding his other medical problems. PLAN:  .  Orders Placed This Encounter    SED RATE (ESR) ([facility])    METABOLIC PANEL, COMPREHENSIVE ([facility])    LIPID PANEL ([facility])    CK ([facility])    HEMOGLOBIN A1C W/O EAG ([facility])    PROTHROMBIN TIME + INR         ATTENTION:   This medical record was transcribed using an electronic medical records system.   Although proofread, it may and can contain electronic and spelling errors. Other human spelling and other errors may be present. Corrections may be executed at a later time. Please feel free to contact us for any clarifications as needed. Follow-up and Dispositions    · Return in about 3 months (around 11/10/2020). No results found for any visits on 08/10/20. Dorothy Feng MD    The patient verbalized understanding of the problems and plans as explained.

## 2020-08-10 ENCOUNTER — OFFICE VISIT (OUTPATIENT)
Dept: INTERNAL MEDICINE CLINIC | Age: 85
End: 2020-08-10
Payer: MEDICARE

## 2020-08-10 VITALS
TEMPERATURE: 97.8 F | HEART RATE: 63 BPM | HEIGHT: 75 IN | DIASTOLIC BLOOD PRESSURE: 82 MMHG | RESPIRATION RATE: 19 BRPM | BODY MASS INDEX: 24.21 KG/M2 | OXYGEN SATURATION: 96 % | WEIGHT: 194.7 LBS | SYSTOLIC BLOOD PRESSURE: 142 MMHG

## 2020-08-10 DIAGNOSIS — E11.22 CONTROLLED TYPE 2 DIABETES MELLITUS WITH STAGE 3 CHRONIC KIDNEY DISEASE, WITHOUT LONG-TERM CURRENT USE OF INSULIN (HCC): ICD-10-CM

## 2020-08-10 DIAGNOSIS — I12.9 HYPERTENSION WITH RENAL DISEASE: Primary | ICD-10-CM

## 2020-08-10 DIAGNOSIS — I48.0 PAROXYSMAL ATRIAL FIBRILLATION (HCC): ICD-10-CM

## 2020-08-10 DIAGNOSIS — I25.10 ASCVD (ARTERIOSCLEROTIC CARDIOVASCULAR DISEASE): ICD-10-CM

## 2020-08-10 DIAGNOSIS — N18.30 STAGE 3 CHRONIC KIDNEY DISEASE (HCC): ICD-10-CM

## 2020-08-10 DIAGNOSIS — M35.3 POLYMYALGIA RHEUMATICA (HCC): ICD-10-CM

## 2020-08-10 DIAGNOSIS — E78.2 MIXED HYPERLIPIDEMIA: ICD-10-CM

## 2020-08-10 DIAGNOSIS — N18.30 CONTROLLED TYPE 2 DIABETES MELLITUS WITH STAGE 3 CHRONIC KIDNEY DISEASE, WITHOUT LONG-TERM CURRENT USE OF INSULIN (HCC): ICD-10-CM

## 2020-08-10 DIAGNOSIS — J30.89 CHRONIC NON-SEASONAL ALLERGIC RHINITIS: ICD-10-CM

## 2020-08-10 DIAGNOSIS — I50.32 DIASTOLIC CHF, CHRONIC (HCC): ICD-10-CM

## 2020-08-10 DIAGNOSIS — M15.9 PRIMARY OSTEOARTHRITIS INVOLVING MULTIPLE JOINTS: ICD-10-CM

## 2020-08-10 LAB
A-G RATIO,AGRAT: 1.3 RATIO
ALBUMIN SERPL-MCNC: 3.8 G/DL (ref 3.9–5.4)
ALP SERPL-CCNC: 77 U/L (ref 38–126)
ALT SERPL-CCNC: 23 U/L (ref 0–50)
ANION GAP SERPL CALC-SCNC: 9 MMOL/L
AST SERPL W P-5'-P-CCNC: 24 U/L (ref 14–36)
BILIRUB SERPL-MCNC: 0.4 MG/DL (ref 0.2–1.3)
BUN SERPL-MCNC: 24 MG/DL (ref 9–20)
BUN/CREATININE RATIO,BUCR: 22 RATIO
CALCIUM SERPL-MCNC: 9.6 MG/DL (ref 8.4–10.2)
CHLORIDE SERPL-SCNC: 104 MMOL/L (ref 98–107)
CHOL/HDL RATIO,CHHD: 4 RATIO (ref 0–4)
CHOLEST SERPL-MCNC: 174 MG/DL (ref 0–200)
CK SERPL-CCNC: 50 U/L (ref 30–135)
CO2 SERPL-SCNC: 29 MMOL/L (ref 22–32)
CREAT SERPL-MCNC: 1.1 MG/DL (ref 0.8–1.5)
GLOBULIN,GLOB: 3
GLUCOSE SERPL-MCNC: 133 MG/DL (ref 75–110)
HBA1C MFR BLD HPLC: 7.1 % (ref 4–5.7)
HDLC SERPL-MCNC: 45 MG/DL (ref 35–130)
LDL/HDL RATIO,LDHD: 2 RATIO
LDLC SERPL CALC-MCNC: 77 MG/DL (ref 0–130)
POTASSIUM SERPL-SCNC: 4.4 MMOL/L (ref 3.6–5)
PROT SERPL-MCNC: 6.8 G/DL (ref 6.3–8.2)
SED RATE (ESR): 48 MM/HR (ref 0–10)
SODIUM SERPL-SCNC: 142 MMOL/L (ref 137–145)
TRIGL SERPL-MCNC: 259 MG/DL (ref 0–200)
VLDLC SERPL CALC-MCNC: 52 MG/DL

## 2020-08-10 PROCEDURE — 80053 COMPREHEN METABOLIC PANEL: CPT | Performed by: INTERNAL MEDICINE

## 2020-08-10 PROCEDURE — 85651 RBC SED RATE NONAUTOMATED: CPT | Performed by: INTERNAL MEDICINE

## 2020-08-10 PROCEDURE — 82550 ASSAY OF CK (CPK): CPT | Performed by: INTERNAL MEDICINE

## 2020-08-10 PROCEDURE — G8536 NO DOC ELDER MAL SCRN: HCPCS | Performed by: INTERNAL MEDICINE

## 2020-08-10 PROCEDURE — G8427 DOCREV CUR MEDS BY ELIG CLIN: HCPCS | Performed by: INTERNAL MEDICINE

## 2020-08-10 PROCEDURE — G8420 CALC BMI NORM PARAMETERS: HCPCS | Performed by: INTERNAL MEDICINE

## 2020-08-10 PROCEDURE — 99214 OFFICE O/P EST MOD 30 MIN: CPT | Performed by: INTERNAL MEDICINE

## 2020-08-10 PROCEDURE — 1101F PT FALLS ASSESS-DOCD LE1/YR: CPT | Performed by: INTERNAL MEDICINE

## 2020-08-10 PROCEDURE — 83036 HEMOGLOBIN GLYCOSYLATED A1C: CPT | Performed by: INTERNAL MEDICINE

## 2020-08-10 PROCEDURE — 80061 LIPID PANEL: CPT | Performed by: INTERNAL MEDICINE

## 2020-08-10 PROCEDURE — G8510 SCR DEP NEG, NO PLAN REQD: HCPCS | Performed by: INTERNAL MEDICINE

## 2020-08-10 NOTE — PROGRESS NOTES
Chief Complaint   Patient presents with    Hypertension     3 month follow up    CHF    Diabetes     Visit Vitals  /82 (BP 1 Location: Left arm, BP Patient Position: Sitting)   Pulse 63   Temp 97.8 °F (36.6 °C) (Oral)   Resp 19   Ht 6' 3\" (1.905 m)   Wt 194 lb 11.2 oz (88.3 kg)   SpO2 96%   BMI 24.34 kg/m²     1. Have you been to the ER, urgent care clinic since your last visit? Hospitalized since your last visit? No    2. Have you seen or consulted any other health care providers outside of the [address] [place] [address] since your last visit? Include any pap smears or colon screening.  No

## 2020-08-10 NOTE — PATIENT INSTRUCTIONS
Arthritis: Care Instructions Overview Arthritis, also called osteoarthritis, is a breakdown of the cartilage that cushions your joints. When the cartilage wears down, your bones rub against each other. This causes pain and stiffness. Many people have some arthritis as they age. Arthritis most often affects the joints of the spine, hands, hips, knees, or feet. Arthritis never goes away completely. But medicine and home treatment can help reduce pain and help you stay active. Follow-up care is a key part of your treatment and safety. Be sure to make and go to all appointments, and call your doctor if you are having problems. It's also a good idea to know your test results and keep a list of the medicines you take. How can you care for yourself at home? · Stay at a healthy weight. Being overweight puts extra strain on your joints. · Talk to your doctor or physical therapist about exercises that will help ease joint pain. ? Stretch. You may enjoy gentle forms of yoga to help keep your joints and muscles flexible. ? Walk instead of jog. Other types of exercise that are less stressful on the joints include riding a bike, swimming, ashley chi, or water exercise. ? Lift weights. Strong muscles help reduce stress on your joints. Stronger thigh muscles, for example, take some of the stress off of the knees and hips. Learn the right way to lift weights so you don't make joint pain worse. · Take your medicines exactly as prescribed. Call your doctor if you think you are having a problem with your medicine. · Take pain medicines exactly as directed. ? If the doctor gave you a prescription medicine for pain, take it as prescribed. ? If you are not taking a prescription pain medicine, ask your doctor if you can take an over-the-counter medicine. · Use a cane, crutch, walker, or another device if you need help to get around. These can help rest your joints.  You also can use other things to make life easier, such as a higher toilet seat and padded handles on kitchen utensils. · Do not sit in low chairs. They can make it hard to get up. · Put heat or cold on your sore joints as needed. Use whichever helps you most. You can also switch between hot and cold packs. ? Apply heat 2 or 3 times a day for 20 to 30 minutesusing a heating pad, hot shower, or hot packto relieve pain and stiffness. But don't use heat on a swollen joint. ? Put ice or a cold pack on your sore joint for 10 to 20 minutes at a time. Put a thin cloth between the ice and your skin. When should you call for help? Call your doctor now or seek immediate medical care if: 
· You have sudden swelling, warmth, or pain in any joint. · You have joint pain and a fever or rash. · You have such bad pain that you cannot use a joint. Watch closely for changes in your health, and be sure to contact your doctor if: 
· You have mild joint symptoms that continue even with more than 6 weeks of care at home. · You have stomach pain or other problems with your medicine. Where can you learn more? Go to http://www.gray.com/ Enter S598 in the search box to learn more about \"Arthritis: Care Instructions. \" Current as of: December 9, 2019               Content Version: 12.5 © 7563-8639 [employer]. Care instructions adapted under license by [employer] (which disclaims liability or warranty for this information). If you have questions about a medical condition or this instruction, always ask your healthcare professional. [place] [address] any warranty or liability for your use of this information.

## 2020-08-11 LAB
INR PPP: 1.2 (ref 0.8–1.2)
PROTHROMBIN TIME: 12.5 SEC (ref 9.1–12)

## 2020-08-11 NOTE — PROGRESS NOTES
Glycohemoglobin has gone up so watch diet. Other labs are stable. INR is only 1.2 so question still taking Coumadin.

## 2020-08-12 ENCOUNTER — TELEPHONE ANTICOAG (OUTPATIENT)
Dept: INTERNAL MEDICINE CLINIC | Age: 85
End: 2020-08-12

## 2020-08-12 DIAGNOSIS — I48.0 PAROXYSMAL ATRIAL FIBRILLATION (HCC): Primary | ICD-10-CM

## 2020-08-12 NOTE — PROGRESS NOTES
Glycohemoglobin has gone up so watch diet. Other labs are stable. INR is only 1.2 so question still taking Coumadin. Discussed with patient and he states he is taking his blood thinner dosage at 10 mg on Monday, Wednesday, Friday and Saturday; 7.5 mg all other days. F/up as scheduled.

## 2020-08-21 DIAGNOSIS — I48.91 ATRIAL FIBRILLATION, UNSPECIFIED TYPE (HCC): ICD-10-CM

## 2020-08-21 RX ORDER — DIGOXIN 125 MCG
TABLET ORAL
Qty: 90 TAB | Refills: 3 | Status: SHIPPED | OUTPATIENT
Start: 2020-08-21 | End: 2021-02-16 | Stop reason: SDUPTHER

## 2020-08-21 NOTE — TELEPHONE ENCOUNTER
RX refill request from the patient/pharmacy. Patient last seen 08- with labs, and next appt. scheduled for 11-  Requested Prescriptions     Pending Prescriptions Disp Refills    digoxin (LANOXIN) 0.125 mg tablet 90 Tab 3     Sig: TAKE 1 TABLET BY MOUTH EVERY DAY   . no

## 2020-08-30 NOTE — PROGRESS NOTES
Chief Complaint   Patient presents with    Hypertension     2 wk follow up    Diabetes     2 wk follow up       SUBJECTIVE:    Brian Collins. is a 80 y.o. male is in follow-up for his polymyalgia rheumatica which he is on prednisone 10 mg daily now, atrial fibrillation for which he is on Coumadin, hypertension, CHF compensated, CKD stage III and other medical problems. He is taking his medications and seems to be doing well. He currently denies any chest pain, shortness of breath, palpitations, PND, orthopnea or other cardiorespiratory complaints. His blood sugar was up to 183 1 day this weekend but yesterday was 148 and another day was 118. He denies any other complaints on complete review of systems and is leg weakness seems to be back to baseline now. Current Outpatient Medications   Medication Sig Dispense Refill    digoxin (LANOXIN) 0.125 mg tablet TAKE 1 TABLET BY MOUTH EVERY DAY 90 Tab 3    predniSONE (DELTASONE) 10 mg tablet Take 10 mg by mouth two (2) times a day. 60 Tab 3    glucose blood VI test strips (Accu-Chek SmartView Test Strip) strip USE ONCE DAILY E11.9 100 Strip PRN    metFORMIN (GLUCOPHAGE) 500 mg tablet TAKE 1 TABLET BY MOUTH EVERY DAY IN THE MORNING WITH BREAKFAST 90 Tab 1    warfarin (COUMADIN) 5 mg tablet TAKE 2 TABLETS ON MONDAY AND FRIDAY AND ONE AND A HALF TABLETS ALL OTHER DAYS 145 Tab 1    terazosin (HYTRIN) 2 mg capsule TAKE ONE CAPSULE BY MOUTH DAILY 90 Cap 1    simvastatin (ZOCOR) 20 mg tablet TAKE 1 TABLET EVERY DAY 90 Tab 1    Blood-Glucose Meter monitoring kit Use to test blood sugar twice daily  DX:E11.22 1 Kit 0    glucose blood VI test strips (blood glucose test) strip Use to test blood sugar twice daily  DX:E11.22 100 Strip 1    furosemide (LASIX) 80 mg tablet TAKE 1 TABLET BY MOUTH EVERY DAY 90 Tab 3    cyanocobalamin (VITAMIN B12) 500 mcg tablet Take 500 mcg by mouth daily.       diclofenac EC (VOLTAREN) 75 mg EC tablet TAKE 1 TABLET BY MOUTH TWICE A  Tab 3    acetaminophen (TYLENOL ARTHRITIS PAIN) 650 mg TbER Take 650 mg by mouth every eight (8) hours.  multivitamins-minerals-lutein (MULTIVITAMIN 50 PLUS) tab tablet Take 1 Tab by mouth daily.  glucosamine 1,000 mg tab Take 2,000 mg by mouth daily.  cholecalciferol (VITAMIN D3) 1,000 unit cap Take 1,000 Units by mouth daily.  DOCOSAHEXANOIC ACID/EPA (FISH OIL PO) Take 1,200 mg by mouth two (2) times a day. 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    Back pain 9/20/2017    BPH (benign prostatic hyperplasia) 9/20/2017    CHF (congestive heart failure) ([address] 9/20/2017    CKD (chronic kidney disease) 9/20/2017    Diabetic acetonemia ([address]     DM (diabetes mellitus) ([facility] 9/20/2017    Story: Diet Controlled    ED (erectile dysfunction) 9/20/2017    Edema 9/20/2017    Elevated CPK 9/20/2017    Comments: History of    Elevated LFTs 9/20/2017    Comments: History of    Elevated PSA 9/20/2017    Hypercholesteremia     Hyperlipidemia 9/20/2017    Hypertension     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    Nodule of right lung 9/20/2017    Story: Right    Polymyalgia ([address] 9/20/2017    Prostate enlargement      Past Surgical History:   Procedure Laterality Date    ABDOMEN SURGERY PROC UNLISTED      hernia repair    HX HEENT  06/12/2018    Dr. 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    HX MALIGNANT SKIN LESION EXCISION  09/2018    2 lesions on head     No Known Allergies    REVIEW OF SYSTEMS:  General: negative for - chills or fever, or weight loss or gain  ENT: negative for - headaches, nasal congestion or tinnitus  Eyes: no blurred or visual changes  Neck: No stiffness or swollen nodes  Respiratory: negative for - cough, hemoptysis, shortness of breath or wheezing  Cardiovascular : negative for - chest pain, edema, palpitations or shortness of breath  Gastrointestinal: negative for - abdominal pain, blood in stools, heartburn or nausea/vomiting  Genito-Urinary: no dysuria, trouble voiding, or hematuria  Musculoskeletal: negative for - gait disturbance, joint pain, joint stiffness or joint swelling  Neurological: no TIA or stroke symptoms  Hematologic: no bruises, no bleeding  Lymphatic: no swollen glands  Integument: no lumps, mole changes, nail changes or rash  Endocrine:no malaise/lethargy poly uria or polydipsia or unexpected weight changes        Social History     Socioeconomic History    Marital status:      Spouse name: Not on file    Number of children: Not on file    Years of education: Not on file    Highest education level: Not on file   Tobacco Use    Smoking status: Never Smoker    Smokeless tobacco: Never Used   Substance and Sexual Activity    Alcohol use: No    Drug use: No    Sexual activity: Never     History reviewed. No pertinent family history. OBJECTIVE:     Visit Vitals  /66 (BP 1 Location: Left arm, BP Patient Position: Sitting)   Pulse 63   Temp 97.6 °F (36.4 °C) (Oral)   Resp 16   Ht 6' 3\" (1.905 m)   Wt 197 lb 14.4 oz (89.8 kg)   SpO2 98%   BMI 24.74 kg/m²     CONSTITUTIONAL:   well nourished, appears age appropriate  EYES: sclera anicteric, PERRL, EOMI  ENMT:nares clear, moist mucous membranes, pharynx clear  NECK: supple. Thyroid normal, No JVD or bruits  RESPIRATORY: Chest: clear to ascultation and percussion, normal inspiratory effort  CARDIOVASCULAR: Heart: regular rate and rhythm no murmurs, rubs or gallops, PMI not displaced, No thrills, no peripheral edema  GASTROINTESTINAL: Abdomen: non distended, soft, non tender, bowel sounds normal  HEMATOLOGIC: no purpura, petechiae or bruising  LYMPHATIC: No lymph node enlargemant  MUSCULOSKELETAL: Extremities: no active synovitis, pulse 1+   INTEGUMENT: No unusual rashes or suspicious skin lesions noted.  Nails appear normal.  PERIPHERAL VASCULAR: normal pulses femoral, PT and DP  NEUROLOGIC: non-focal exam, A & O X 3  PSYCHIATRIC:, appropriate affect     ASSESSMENT:   1. Polymyalgia rheumatica (HCC)    2. Paroxysmal atrial fibrillation (HCC)    3. Diastolic CHF, chronic ([address]    4. Hypertension with renal disease      Impression  1. Polymyalgia rheumatica we will see what the status is of the sed rate and adjust prednisone if possible  2. Paroxysmal atrial fibrillatioINR pending  3. Diastolic CHF compensated  4 hypertension control  5. Diabetes blood sugars seem to be okay other than one episode of elevation in the morning. I will recheck him again myself in 2 weeks or sooner if there is a problem. n    PLAN:  .  Orders Placed This Encounter    PROTHROMBIN TIME + INR    [facility]    METABOLIC PANEL, BASIC ([facility])         ATTENTION:   This medical record was transcribed using an electronic medical records system. Although proofread, it may and can contain electronic and spelling errors. Other human spelling and other errors may be present. Corrections may be executed at a later time. Please feel free to contact us for any clarifications as needed. Follow-up and Dispositions    · Return in about 2 weeks (around 9/14/2020). No results found for any visits on 08/31/20. Dorothy Feng MD    The patient verbalized understanding of the problems and plans as explained.

## 2020-08-31 ENCOUNTER — OFFICE VISIT (OUTPATIENT)
Dept: INTERNAL MEDICINE CLINIC | Age: 85
End: 2020-08-31
Payer: MEDICARE

## 2020-08-31 VITALS
TEMPERATURE: 97.6 F | HEIGHT: 75 IN | RESPIRATION RATE: 16 BRPM | DIASTOLIC BLOOD PRESSURE: 66 MMHG | WEIGHT: 197.9 LBS | HEART RATE: 63 BPM | OXYGEN SATURATION: 98 % | SYSTOLIC BLOOD PRESSURE: 118 MMHG | BODY MASS INDEX: 24.61 KG/M2

## 2020-08-31 DIAGNOSIS — I48.0 PAROXYSMAL ATRIAL FIBRILLATION (HCC): ICD-10-CM

## 2020-08-31 DIAGNOSIS — I50.32 DIASTOLIC CHF, CHRONIC (HCC): ICD-10-CM

## 2020-08-31 DIAGNOSIS — I12.9 HYPERTENSION WITH RENAL DISEASE: ICD-10-CM

## 2020-08-31 DIAGNOSIS — M35.3 POLYMYALGIA RHEUMATICA (HCC): Primary | ICD-10-CM

## 2020-08-31 LAB
ANION GAP SERPL CALC-SCNC: 9 MMOL/L
BUN SERPL-MCNC: 30 MG/DL (ref 9–20)
CALCIUM SERPL-MCNC: 9.6 MG/DL (ref 8.4–10.2)
CHLORIDE SERPL-SCNC: 106 MMOL/L (ref 98–107)
CO2 SERPL-SCNC: 30 MMOL/L (ref 22–32)
CREAT SERPL-MCNC: 1.3 MG/DL (ref 0.8–1.5)
GLUCOSE SERPL-MCNC: 159 MG/DL (ref 75–110)
POTASSIUM SERPL-SCNC: 5.2 MMOL/L (ref 3.6–5)
SODIUM SERPL-SCNC: 145 MMOL/L (ref 137–145)

## 2020-08-31 PROCEDURE — G8427 DOCREV CUR MEDS BY ELIG CLIN: HCPCS | Performed by: INTERNAL MEDICINE

## 2020-08-31 PROCEDURE — G8510 SCR DEP NEG, NO PLAN REQD: HCPCS | Performed by: INTERNAL MEDICINE

## 2020-08-31 PROCEDURE — 80048 BASIC METABOLIC PNL TOTAL CA: CPT | Performed by: INTERNAL MEDICINE

## 2020-08-31 PROCEDURE — G8536 NO DOC ELDER MAL SCRN: HCPCS | Performed by: INTERNAL MEDICINE

## 2020-08-31 PROCEDURE — 85651 RBC SED RATE NONAUTOMATED: CPT | Performed by: INTERNAL MEDICINE

## 2020-08-31 PROCEDURE — 1101F PT FALLS ASSESS-DOCD LE1/YR: CPT | Performed by: INTERNAL MEDICINE

## 2020-08-31 PROCEDURE — G8420 CALC BMI NORM PARAMETERS: HCPCS | Performed by: INTERNAL MEDICINE

## 2020-08-31 PROCEDURE — 99213 OFFICE O/P EST LOW 20 MIN: CPT | Performed by: INTERNAL MEDICINE

## 2020-08-31 NOTE — PATIENT INSTRUCTIONS

## 2020-08-31 NOTE — PROGRESS NOTES
Ivania Moe is a 80 y.o. male     Chief Complaint   Patient presents with    Hypertension     2 wk follow up    Diabetes     2 wk follow up       Visit Vitals  /66 (BP 1 Location: Left arm, BP Patient Position: Sitting)   Pulse 63   Temp 97.6 °F (36.4 °C) (Oral)   Resp 16   Ht 6' 3\" (1.905 m)   Wt 197 lb 14.4 oz (89.8 kg)   SpO2 98%   BMI 24.74 kg/m²       Health Maintenance Due   Topic Date Due    Eye Exam Retinal or Dilated  02/20/1943    Shingrix Vaccine Age 50> (1 of 2) 02/20/1983    GLAUCOMA SCREENING Q2Y  02/20/1998       1. Have you been to the ER, urgent care clinic since your last visit? Hospitalized since your last visit? No    2. Have you seen or consulted any other health care providers outside of the [address] [place] [address] since your last visit? Include any pap smears or colon screening.  No

## 2020-09-01 LAB
INR PPP: 1.7 (ref 0.8–1.2)
PROTHROMBIN TIME: 18.2 SEC (ref 9.1–12)
SED RATE (ESR): 42 MM/HR (ref 0–10)

## 2020-09-08 ENCOUNTER — TELEPHONE ANTICOAG (OUTPATIENT)
Dept: INTERNAL MEDICINE CLINIC | Age: 85
End: 2020-09-08

## 2020-09-08 DIAGNOSIS — I48.0 PAROXYSMAL ATRIAL FIBRILLATION (HCC): Primary | ICD-10-CM

## 2020-09-08 NOTE — PROGRESS NOTES
Anticoagulation Summary  As of 2020    INR goal:   2.0-3.0   TTR:   16.1 % (2.4 y)   INR used for dosin.7! (2020)   Warfarin maintenance plan:   7.5 mg (5 mg x 1.5) every Sun, Tue, Thu; 10 mg (5 mg x 2) all other days   Weekly warfarin total:   62.5 mg   Plan last modified:   Nedra Hernandez RN (2020)   Next INR check:   9/15/2020   Target end date:       Indications    Paroxysmal atrial fibrillation ([facility] (Primary) [I48.0]             Anticoagulation Episode Summary     INR check location:   Clinic Lab    Preferred lab:       Send INR reminders to:       Comments:         Anticoagulation Care Providers     Provider Role Specialty Phone number    Bethany Young MD Responsible Internal Medicine [phone]        Informed patient that PT/INR okay; continue the same dose of blood thinner which is 10 mg on Monday, Wednesday, Friday and Saturday; 7.5 mg all other days and f/up as scheduled.

## 2020-09-08 NOTE — PROGRESS NOTES
Labs are stable so continue current treatment. Advised patient to continue the same dose of blood thinner which is 10 mg on Monday, Wednesday, Friday and Saturday and 7.5 mg all other days. F/up as scheduled.

## 2020-09-14 RX ORDER — DICLOFENAC SODIUM 75 MG/1
TABLET, DELAYED RELEASE ORAL
Qty: 180 TAB | Refills: 2 | Status: SHIPPED | OUTPATIENT
Start: 2020-09-14 | End: 2021-05-14

## 2020-09-14 NOTE — TELEPHONE ENCOUNTER
PCP: Wai Serrano MD    Last appt: 8/31/2020  Future Appointments   Date Time Provider Helen Butcher   9/15/2020  1:50 PM Wai Serrano MD [facility] AMB   11/11/2020  9:10 AM Wai Serrano MD [facility] AMB       Requested Prescriptions     Pending Prescriptions Disp Refills    diclofenac EC (VOLTAREN) 75 mg EC tablet [Pharmacy Med Name: DICLOFENAC SOD EC 75 MG TAB] 180 Tab 2     Sig: TAKE 1 TABLET BY MOUTH TWICE A DAY

## 2020-09-15 ENCOUNTER — OFFICE VISIT (OUTPATIENT)
Dept: INTERNAL MEDICINE CLINIC | Age: 85
End: 2020-09-15
Payer: MEDICARE

## 2020-09-15 VITALS
SYSTOLIC BLOOD PRESSURE: 136 MMHG | BODY MASS INDEX: 24.56 KG/M2 | HEIGHT: 75 IN | DIASTOLIC BLOOD PRESSURE: 72 MMHG | OXYGEN SATURATION: 97 % | WEIGHT: 197.5 LBS | TEMPERATURE: 99.1 F | HEART RATE: 78 BPM | RESPIRATION RATE: 16 BRPM

## 2020-09-15 DIAGNOSIS — I50.32 DIASTOLIC CHF, CHRONIC (HCC): ICD-10-CM

## 2020-09-15 DIAGNOSIS — I48.0 PAROXYSMAL ATRIAL FIBRILLATION (HCC): ICD-10-CM

## 2020-09-15 DIAGNOSIS — N18.30 STAGE 3 CHRONIC KIDNEY DISEASE (HCC): ICD-10-CM

## 2020-09-15 DIAGNOSIS — M35.3 POLYMYALGIA RHEUMATICA (HCC): Primary | ICD-10-CM

## 2020-09-15 LAB
ANION GAP SERPL CALC-SCNC: 10 MMOL/L
BUN SERPL-MCNC: 30 MG/DL (ref 9–20)
CALCIUM SERPL-MCNC: 9.4 MG/DL (ref 8.4–10.2)
CHLORIDE SERPL-SCNC: 105 MMOL/L (ref 98–107)
CO2 SERPL-SCNC: 29 MMOL/L (ref 22–32)
CREAT SERPL-MCNC: 1.3 MG/DL (ref 0.8–1.5)
GLUCOSE SERPL-MCNC: 203 MG/DL (ref 75–110)
POTASSIUM SERPL-SCNC: 5.3 MMOL/L (ref 3.6–5)
SED RATE (ESR): 36 MM/HR (ref 0–10)
SODIUM SERPL-SCNC: 144 MMOL/L (ref 137–145)

## 2020-09-15 PROCEDURE — 1100F PTFALLS ASSESS-DOCD GE2>/YR: CPT | Performed by: INTERNAL MEDICINE

## 2020-09-15 PROCEDURE — G8420 CALC BMI NORM PARAMETERS: HCPCS | Performed by: INTERNAL MEDICINE

## 2020-09-15 PROCEDURE — 80048 BASIC METABOLIC PNL TOTAL CA: CPT | Performed by: INTERNAL MEDICINE

## 2020-09-15 PROCEDURE — 99213 OFFICE O/P EST LOW 20 MIN: CPT | Performed by: INTERNAL MEDICINE

## 2020-09-15 PROCEDURE — G8510 SCR DEP NEG, NO PLAN REQD: HCPCS | Performed by: INTERNAL MEDICINE

## 2020-09-15 PROCEDURE — 85651 RBC SED RATE NONAUTOMATED: CPT | Performed by: INTERNAL MEDICINE

## 2020-09-15 PROCEDURE — G8536 NO DOC ELDER MAL SCRN: HCPCS | Performed by: INTERNAL MEDICINE

## 2020-09-15 PROCEDURE — G8427 DOCREV CUR MEDS BY ELIG CLIN: HCPCS | Performed by: INTERNAL MEDICINE

## 2020-09-15 PROCEDURE — 3288F FALL RISK ASSESSMENT DOCD: CPT | Performed by: INTERNAL MEDICINE

## 2020-09-15 NOTE — PROGRESS NOTES
Chief Complaint   Patient presents with    Diabetes    Hypertension       SUBJECTIVE:    Mely Quintanilla is a 80 y.o. male who returns in follow-up for his polymyalgia rheumatica, atrial fibrillation, CKD stage III, CHF diastolic, and venous stasis disease. He is taking his medication and trying to follow his diet and remain physically active. He currently denies any chest pain, shortness of breath, palpitations, PND, orthopnea or other cardiac respiratory complaints. He notes no GI or  complaints. He notes no headaches, dizziness or neurologic complaints. He has no current active arthritic complaints although he does have his chronic joint aches and pains. There are no other complaints on complete review of systems. Current Outpatient Medications   Medication Sig Dispense Refill    diclofenac EC (VOLTAREN) 75 mg EC tablet TAKE 1 TABLET BY MOUTH TWICE A  Tab 2    digoxin (LANOXIN) 0.125 mg tablet TAKE 1 TABLET BY MOUTH EVERY DAY 90 Tab 3    predniSONE (DELTASONE) 10 mg tablet Take 10 mg by mouth two (2) times a day. 60 Tab 3    glucose blood VI test strips (Accu-Chek SmartView Test Strip) strip USE ONCE DAILY E11.9 100 Strip PRN    metFORMIN (GLUCOPHAGE) 500 mg tablet TAKE 1 TABLET BY MOUTH EVERY DAY IN THE MORNING WITH BREAKFAST 90 Tab 1    warfarin (COUMADIN) 5 mg tablet TAKE 2 TABLETS ON MONDAY AND FRIDAY AND ONE AND A HALF TABLETS ALL OTHER DAYS 145 Tab 1    terazosin (HYTRIN) 2 mg capsule TAKE ONE CAPSULE BY MOUTH DAILY 90 Cap 1    simvastatin (ZOCOR) 20 mg tablet TAKE 1 TABLET EVERY DAY 90 Tab 1    Blood-Glucose Meter monitoring kit Use to test blood sugar twice daily  DX:E11.22 1 Kit 0    glucose blood VI test strips (blood glucose test) strip Use to test blood sugar twice daily  DX:E11.22 100 Strip 1    furosemide (LASIX) 80 mg tablet TAKE 1 TABLET BY MOUTH EVERY DAY 90 Tab 3    cyanocobalamin (VITAMIN B12) 500 mcg tablet Take 500 mcg by mouth daily.       acetaminophen (TYLENOL ARTHRITIS PAIN) 650 mg TbER Take 650 mg by mouth every eight (8) hours.  multivitamins-minerals-lutein (MULTIVITAMIN 50 PLUS) tab tablet Take 1 Tab by mouth daily.  glucosamine 1,000 mg tab Take 2,000 mg by mouth daily.  cholecalciferol (VITAMIN D3) 1,000 unit cap Take 1,000 Units by mouth daily.  DOCOSAHEXANOIC ACID/EPA (FISH OIL PO) Take 1,200 mg by mouth two (2) times a day. 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    Back pain 9/20/2017    BPH (benign prostatic hyperplasia) 9/20/2017    CHF (congestive heart failure) ([place]) 9/20/2017    CKD (chronic kidney disease) 9/20/2017    Diabetic acetonemia ([place])     DM (diabetes mellitus) ([place]) 9/20/2017    Story: Diet Controlled    ED (erectile dysfunction) 9/20/2017    Edema 9/20/2017    Elevated CPK 9/20/2017    Comments: History of    Elevated LFTs 9/20/2017    Comments: History of    Elevated PSA 9/20/2017    Hypercholesteremia     Hyperlipidemia 9/20/2017    Hypertension     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    Nodule of right lung 9/20/2017    Story: Right    Polymyalgia ([place]) 9/20/2017    Prostate enlargement      Past Surgical History:   Procedure Laterality Date    ABDOMEN SURGERY PROC UNLISTED      hernia repair    HX HEENT  06/12/2018    Dr. 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    HX MALIGNANT SKIN LESION EXCISION  09/2018    2 lesions on head     No Known Allergies    REVIEW OF SYSTEMS:  General: negative for - chills or fever, or weight loss or gain  ENT: negative for - headaches, nasal congestion or tinnitus  Eyes: no blurred or visual changes  Neck: No stiffness or swollen nodes  Respiratory: negative for - cough, hemoptysis, shortness of breath or wheezing  Cardiovascular : negative for - chest pain, edema, palpitations or shortness of breath  Gastrointestinal: negative for - abdominal pain, blood in stools, heartburn or nausea/vomiting  Genito-Urinary: no dysuria, trouble voiding, or hematuria  Musculoskeletal: negative for - gait disturbance, joint pain, joint stiffness or joint swelling  Neurological: no TIA or stroke symptoms  Hematologic: no bruises, no bleeding  Lymphatic: no swollen glands  Integument: no lumps, mole changes, nail changes or rash  Endocrine:no malaise/lethargy poly uria or polydipsia or unexpected weight changes        Social History     Socioeconomic History    Marital status:      Spouse name: Not on file    Number of children: Not on file    Years of education: Not on file    Highest education level: Not on file   Tobacco Use    Smoking status: Never Smoker    Smokeless tobacco: Never Used   Substance and Sexual Activity    Alcohol use: No    Drug use: No    Sexual activity: Never     History reviewed. No pertinent family history. OBJECTIVE:     Visit Vitals  /72 (BP 1 Location: Left arm, BP Patient Position: Sitting)   Pulse 78   Temp 99.1 °F (37.3 °C) (Oral)   Resp 16   Ht 6' 3\" (1.905 m)   Wt 197 lb 8 oz (89.6 kg)   SpO2 97%   BMI 24.69 kg/m²     CONSTITUTIONAL:   well nourished, appears age appropriate  EYES: sclera anicteric, PERRL, EOMI  ENMT:nares clear, moist mucous membranes, pharynx clear  NECK: supple. Thyroid normal, No JVD or bruits  RESPIRATORY: Chest: clear to ascultation and percussion, normal inspiratory effort  CARDIOVASCULAR: Heart: regular rate and rhythm no murmurs, rubs or gallops, PMI not displaced, No thrills, no peripheral edema  GASTROINTESTINAL: Abdomen: non distended, soft, non tender, bowel sounds normal  HEMATOLOGIC: no purpura, petechiae or bruising  LYMPHATIC: No lymph node enlargemant  MUSCULOSKELETAL: Extremities: no active synovitis, pulse 1+   INTEGUMENT: No unusual rashes or suspicious skin lesions noted.  Nails appear normal.  PERIPHERAL VASCULAR: normal pulses femoral, PT and DP  NEUROLOGIC: non-focal exam, A & O X 3  PSYCHIATRIC:, appropriate affect     ASSESSMENT:   1. Polymyalgia rheumatica ([address]    2. Diastolic CHF, chronic (HCC)    3. Paroxysmal atrial fibrillation (HCC)    4. Stage 3 chronic kidney disease (HCC)      Impression  1. Polymyalgia rheumatica stable and repeat sed rate pending  2. Diastolic CHF compensated  3. Atrial fibrillation seems to be in sinus rhythm today INR pending  4. CKD stage III repeat status pending  I will call with lab and make further recommendations or adjustments if necessary. Follow-up as scheduled again for 2 weeks or sooner if there is a problem. PLAN:  .  Orders Placed This Encounter    SED RATE (ESR) ([facility])    METABOLIC PANEL, BASIC ([facility])    PROTHROMBIN TIME + INR         ATTENTION:   This medical record was transcribed using an electronic medical records system. Although proofread, it may and can contain electronic and spelling errors. Other human spelling and other errors may be present. Corrections may be executed at a later time. Please feel free to contact us for any clarifications as needed. Follow-up and Dispositions    · Return in about 2 weeks (around 9/29/2020). No results found for any visits on 09/15/20. Raymon Sesay MD    The patient verbalized understanding of the problems and plans as explained.

## 2020-09-15 NOTE — PROGRESS NOTES
HIPAA verified by two patient identifiers. Amber Gilliam is a 80 y.o. male    Chief Complaint   Patient presents with    Diabetes    Hypertension       Visit Vitals  /72 (BP 1 Location: Left arm, BP Patient Position: Sitting)   Pulse 78   Temp 99.1 °F (37.3 °C) (Oral)   Resp 16   Ht 6' 3\" (1.905 m)   Wt 197 lb 8 oz (89.6 kg)   SpO2 97%   BMI 24.69 kg/m²       Pain Scale: 0 - No pain/10  Pain Location:       Health Maintenance Due   Topic Date Due    Eye Exam Retinal or Dilated  02/20/1943    Shingrix Vaccine Age 50> (1 of 2) 02/20/1983    GLAUCOMA SCREENING Q2Y  02/20/1998    Flu Vaccine (1) 09/01/2020         Coordination of Care Questionnaire:  :   1) Have you been to an emergency room, urgent care, or hospitalized since your last visit? If yes, where when, and reason for visit? no       2. Have seen or consulted any other health care provider since your last visit? If yes, where when, and reason for visit? NO      Patient is accompanied by self I have received verbal consent from Amber Gilliam. to discuss any/all medical information while they are present in the room.

## 2020-09-15 NOTE — PATIENT INSTRUCTIONS

## 2020-09-16 LAB
INR PPP: 1.8 (ref 0.8–1.2)
PROTHROMBIN TIME: 18.9 SEC (ref 9.1–12)

## 2020-09-28 NOTE — PROGRESS NOTES
Chief Complaint   Patient presents with    Hypertension     2 week f/u    CHF       SUBJECTIVE:    Samantha Hyatt is a 80 y.o. male who returns in follow-up for his medical problems include hypertension, CHF, atrial fibrillation, polymyalgia rheumatica and other medical problems. He is taking his medications and trying to follow his diet and remain physically active. He currently denies any chest pain, shortness of breath, palpitations, PND, orthopnea or other cardiac or respiratory complaints. He notes no GI or  complaints. He notes no headaches, dizziness or neurologic complaints. There are no current active arthritic complaints in his polymyalgia rheumatica and leg weakness seems to be better with his current dose of prednisone. There are no other complaints on complete review of systems. Current Outpatient Medications   Medication Sig Dispense Refill    diclofenac EC (VOLTAREN) 75 mg EC tablet TAKE 1 TABLET BY MOUTH TWICE A  Tab 2    digoxin (LANOXIN) 0.125 mg tablet TAKE 1 TABLET BY MOUTH EVERY DAY 90 Tab 3    predniSONE (DELTASONE) 10 mg tablet Take 10 mg by mouth two (2) times a day.  60 Tab 3    glucose blood VI test strips (Accu-Chek SmartView Test Strip) strip USE ONCE DAILY E11.9 100 Strip PRN    metFORMIN (GLUCOPHAGE) 500 mg tablet TAKE 1 TABLET BY MOUTH EVERY DAY IN THE MORNING WITH BREAKFAST 90 Tab 1    warfarin (COUMADIN) 5 mg tablet TAKE 2 TABLETS ON MONDAY AND FRIDAY AND ONE AND A HALF TABLETS ALL OTHER DAYS 145 Tab 1    terazosin (HYTRIN) 2 mg capsule TAKE ONE CAPSULE BY MOUTH DAILY 90 Cap 1    simvastatin (ZOCOR) 20 mg tablet TAKE 1 TABLET EVERY DAY 90 Tab 1    Blood-Glucose Meter monitoring kit Use to test blood sugar twice daily  DX:E11.22 1 Kit 0    glucose blood VI test strips (blood glucose test) strip Use to test blood sugar twice daily  DX:E11.22 100 Strip 1    furosemide (LASIX) 80 mg tablet TAKE 1 TABLET BY MOUTH EVERY DAY 90 Tab 3    cyanocobalamin (VITAMIN B12) 500 mcg tablet Take 500 mcg by mouth daily.  acetaminophen (TYLENOL ARTHRITIS PAIN) 650 mg TbER Take 650 mg by mouth every eight (8) hours.  multivitamins-minerals-lutein (MULTIVITAMIN 50 PLUS) tab tablet Take 1 Tab by mouth daily.  glucosamine 1,000 mg tab Take 2,000 mg by mouth daily.  cholecalciferol (VITAMIN D3) 1,000 unit cap Take 1,000 Units by mouth daily.  DOCOSAHEXANOIC ACID/EPA (FISH OIL PO) Take 1,200 mg by mouth two (2) times a day. 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    Back pain 9/20/2017    BPH (benign prostatic hyperplasia) 9/20/2017    CHF (congestive heart failure) ([address] 9/20/2017    CKD (chronic kidney disease) 9/20/2017    Diabetic acetonemia ([address]     DM (diabetes mellitus) ([address] 9/20/2017    Story: Diet Controlled    ED (erectile dysfunction) 9/20/2017    Edema 9/20/2017    Elevated CPK 9/20/2017    Comments: History of    Elevated LFTs 9/20/2017    Comments: History of    Elevated PSA 9/20/2017    Hypercholesteremia     Hyperlipidemia 9/20/2017    Hypertension     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    Nodule of right lung 9/20/2017    Story: Right    Polymyalgia ([address] 9/20/2017    Prostate enlargement      Past Surgical History:   Procedure Laterality Date    ABDOMEN SURGERY PROC UNLISTED      hernia repair    HX HEENT  06/12/2018    Dr. 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    HX MALIGNANT SKIN LESION EXCISION  09/2018    2 lesions on head     No Known Allergies    REVIEW OF SYSTEMS:  General: negative for - chills or fever, or weight loss or gain  ENT: negative for - headaches, nasal congestion or tinnitus  Eyes: no blurred or visual changes  Neck: No stiffness or swollen nodes  Respiratory: negative for - cough, hemoptysis, shortness of breath or wheezing  Cardiovascular : negative for - chest pain, edema, palpitations or shortness of breath  Gastrointestinal: negative for - abdominal pain, blood in stools, heartburn or nausea/vomiting  Genito-Urinary: no dysuria, trouble voiding, or hematuria  Musculoskeletal: negative for - gait disturbance, joint pain, joint stiffness or joint swelling  Neurological: no TIA or stroke symptoms  Hematologic: no bruises, no bleeding  Lymphatic: no swollen glands  Integument: no lumps, mole changes, nail changes or rash  Endocrine:no malaise/lethargy poly uria or polydipsia or unexpected weight changes        Social History     Socioeconomic History    Marital status:      Spouse name: Not on file    Number of children: Not on file    Years of education: Not on file    Highest education level: Not on file   Tobacco Use    Smoking status: Never Smoker    Smokeless tobacco: Never Used   Substance and Sexual Activity    Alcohol use: No    Drug use: No    Sexual activity: Never     History reviewed. No pertinent family history. OBJECTIVE:     Visit Vitals  /68 (BP 1 Location: Left arm, BP Patient Position: Sitting)   Pulse 76   Temp 97.5 °F (36.4 °C) (Oral)   Resp 16   Ht 6' 3\" (1.905 m)   Wt 197 lb 6.4 oz (89.5 kg)   SpO2 97%   BMI 24.67 kg/m²     CONSTITUTIONAL:   well nourished, appears age appropriate  EYES: sclera anicteric, PERRL, EOMI  ENMT:nares clear, moist mucous membranes, pharynx clear  NECK: supple.  Thyroid normal, No JVD or bruits  RESPIRATORY: Chest: clear to ascultation and percussion, normal inspiratory effort  CARDIOVASCULAR: Heart: regular rate and rhythm no murmurs, rubs or gallops, PMI not displaced, No thrills, no peripheral edema  GASTROINTESTINAL: Abdomen: non distended, soft, non tender, bowel sounds normal  HEMATOLOGIC: no purpura, petechiae or bruising  LYMPHATIC: No lymph node enlargemant  MUSCULOSKELETAL: Extremities: no active synovitis, pulse 1+   INTEGUMENT: No unusual rashes or suspicious skin lesions noted. Nails appear normal.  PERIPHERAL VASCULAR: normal pulses femoral, PT and DP  NEUROLOGIC: non-focal exam, A & O X 3  PSYCHIATRIC:, appropriate affect     ASSESSMENT:   1. Polymyalgia rheumatica ([address]    2. Diastolic CHF, chronic ([address]    3. Hypertension with renal disease    4. Paroxysmal atrial fibrillation (HCC)    5. Stage 3 chronic kidney disease (HCC)      Impression  1. Polymyalgia rheumatica repeat sed rate pending continue current prednisone dose pending results of that. 2.  Diastolic CHF compensated  3. Hypertension control adequate  4   Paroxysmal atrial fibrillation rate controlled INR pending  5   CKD stage III repeat status pending  I will call with lab and make further recommendations or adjustments if necessary. Follow-up scheduled again for 2 weeks or sooner if there is a problem. PLAN:  .  Orders Placed This Encounter    PROTHROMBIN TIME + INR    SED RATE (ESR) ([facility])    METABOLIC PANEL, BASIC ([facility])         ATTENTION:   This medical record was transcribed using an electronic medical records system. Although proofread, it may and can contain electronic and spelling errors. Other human spelling and other errors may be present. Corrections may be executed at a later time. Please feel free to contact us for any clarifications as needed. Follow-up and Dispositions    · Return in about 2 weeks (around 10/13/2020). No results found for any visits on 09/29/20. Jr Elise MD    The patient verbalized understanding of the problems and plans as explained.

## 2020-09-29 ENCOUNTER — OFFICE VISIT (OUTPATIENT)
Dept: INTERNAL MEDICINE CLINIC | Age: 85
End: 2020-09-29
Payer: MEDICARE

## 2020-09-29 VITALS
OXYGEN SATURATION: 97 % | WEIGHT: 197.4 LBS | SYSTOLIC BLOOD PRESSURE: 124 MMHG | HEART RATE: 76 BPM | TEMPERATURE: 97.5 F | DIASTOLIC BLOOD PRESSURE: 68 MMHG | RESPIRATION RATE: 16 BRPM | HEIGHT: 75 IN | BODY MASS INDEX: 24.54 KG/M2

## 2020-09-29 DIAGNOSIS — N18.30 STAGE 3 CHRONIC KIDNEY DISEASE (HCC): ICD-10-CM

## 2020-09-29 DIAGNOSIS — I48.0 PAROXYSMAL ATRIAL FIBRILLATION (HCC): ICD-10-CM

## 2020-09-29 DIAGNOSIS — M35.3 POLYMYALGIA RHEUMATICA (HCC): Primary | ICD-10-CM

## 2020-09-29 DIAGNOSIS — I50.32 DIASTOLIC CHF, CHRONIC (HCC): ICD-10-CM

## 2020-09-29 DIAGNOSIS — I12.9 HYPERTENSION WITH RENAL DISEASE: ICD-10-CM

## 2020-09-29 LAB
ANION GAP SERPL CALC-SCNC: 10 MMOL/L
BUN SERPL-MCNC: 31 MG/DL (ref 9–20)
CALCIUM SERPL-MCNC: 9.9 MG/DL (ref 8.4–10.2)
CHLORIDE SERPL-SCNC: 106 MMOL/L (ref 98–107)
CO2 SERPL-SCNC: 30 MMOL/L (ref 22–32)
CREAT SERPL-MCNC: 1.3 MG/DL (ref 0.8–1.5)
GLUCOSE SERPL-MCNC: 190 MG/DL (ref 75–110)
POTASSIUM SERPL-SCNC: 4.7 MMOL/L (ref 3.6–5)
SED RATE (ESR): 40 MM/HR (ref 0–10)
SODIUM SERPL-SCNC: 146 MMOL/L (ref 137–145)

## 2020-09-29 PROCEDURE — 80048 BASIC METABOLIC PNL TOTAL CA: CPT | Performed by: INTERNAL MEDICINE

## 2020-09-29 PROCEDURE — G8536 NO DOC ELDER MAL SCRN: HCPCS | Performed by: INTERNAL MEDICINE

## 2020-09-29 PROCEDURE — 99213 OFFICE O/P EST LOW 20 MIN: CPT | Performed by: INTERNAL MEDICINE

## 2020-09-29 PROCEDURE — G8427 DOCREV CUR MEDS BY ELIG CLIN: HCPCS | Performed by: INTERNAL MEDICINE

## 2020-09-29 PROCEDURE — 85651 RBC SED RATE NONAUTOMATED: CPT | Performed by: INTERNAL MEDICINE

## 2020-09-29 PROCEDURE — 1101F PT FALLS ASSESS-DOCD LE1/YR: CPT | Performed by: INTERNAL MEDICINE

## 2020-09-29 PROCEDURE — G8510 SCR DEP NEG, NO PLAN REQD: HCPCS | Performed by: INTERNAL MEDICINE

## 2020-09-29 PROCEDURE — G8420 CALC BMI NORM PARAMETERS: HCPCS | Performed by: INTERNAL MEDICINE

## 2020-09-29 NOTE — PATIENT INSTRUCTIONS

## 2020-09-29 NOTE — PROGRESS NOTES
HIPAA verified by two patient identifiers. Danny Mckeon is a 80 y.o. male    Chief Complaint   Patient presents with    Hypertension     2 week f/u    CHF       Visit Vitals  /68 (BP 1 Location: Left arm, BP Patient Position: Sitting)   Pulse 76   Temp 97.5 °F (36.4 °C) (Oral)   Resp 16   Ht 6' 3\" (1.905 m)   Wt 197 lb 6.4 oz (89.5 kg)   SpO2 97%   BMI 24.67 kg/m²       Pain Scale: 0 - No pain/10  Pain Location:       Health Maintenance Due   Topic Date Due    Eye Exam Retinal or Dilated  02/20/1943    Shingrix Vaccine Age 50> (1 of 2) 02/20/1983    GLAUCOMA SCREENING Q2Y  02/20/1998    Flu Vaccine (1) 09/01/2020         Coordination of Care Questionnaire:  :   1) Have you been to an emergency room, urgent care, or hospitalized since your last visit? If yes, where when, and reason for visit? no       2. Have seen or consulted any other health care provider since your last visit? If yes, where when, and reason for visit? NO      Patient is accompanied by self I have received verbal consent from Danny Mckeon. to discuss any/all medical information while they are present in the room.

## 2020-09-30 LAB
INR PPP: 1.2 (ref 0.8–1.2)
PROTHROMBIN TIME: 12.4 SEC (ref 9.1–12)

## 2020-10-02 ENCOUNTER — TELEPHONE ANTICOAG (OUTPATIENT)
Dept: INTERNAL MEDICINE CLINIC | Age: 85
End: 2020-10-02

## 2020-10-02 DIAGNOSIS — I48.0 PAROXYSMAL ATRIAL FIBRILLATION (HCC): Primary | ICD-10-CM

## 2020-10-02 NOTE — PROGRESS NOTES
INR low. Sed rate without significant change. Kidney stable. Question dose of Coumadin. Discussed with patient. Has not missed any doses of his blood thinner. Dr. Greyson Mitchell recommends changing his blood thinner dose to 10 mg everyday except 7.5 mg on Sunday and Thursday. F/up as scheduled.

## 2020-10-02 NOTE — PROGRESS NOTES
Anticoagulation Summary  As of 10/2/2020    INR goal:   2.0-3.0   TTR:   15.6 % (2.4 y)   INR used for dosin. 2! (2020)   Warfarin maintenance plan:   7.5 mg (5 mg x 1.5) every Sun, Th; 10 mg (5 mg x 2) all other days   Weekly warfarin total:   65 mg   Plan last modified:   Guilherme Fischer RN (10/2/2020)   Next INR check:   10/13/2020   Target end date:       Indications    Paroxysmal atrial fibrillation ([facility] [address] (Primary) [I48.0]             Anticoagulation Episode Summary     INR check location:   Clinic Lab    Preferred lab:       Send INR reminders to:       Comments:         Anticoagulation Care Providers     Provider Role Specialty Phone number    Dale Bruce MD [facility] Internal Medicine [phone]        Informed patient that PT/INR 1.2. Dr. Galen Ford recommends increasing blood thinner dose to 10 mg everyday except for 7.5 mg on  and Thursday.

## 2020-10-12 NOTE — PROGRESS NOTES
Chief Complaint   Patient presents with    CHF     2 week f/u       SUBJECTIVE:    Lasalle Epley. is a 80 y.o. male who returns in follow-up for his medical problems include polymyalgia rheumatica, DJD, atrial fibrillation, CKD, diastolic CHF compensated and other medical problems. He is taking his medications and seems to be doing well. His energy and strength in his arms and legs seems to be doing well. He currently denies any chest pain, shortness of breath, palpitations, PND, orthopnea or other cardiac respiratory complaints. He notes no GI or  complaints. He notes no headaches, dizziness or neurologic complaints. There are no other current complaints. Current Outpatient Medications   Medication Sig Dispense Refill    diclofenac EC (VOLTAREN) 75 mg EC tablet TAKE 1 TABLET BY MOUTH TWICE A  Tab 2    digoxin (LANOXIN) 0.125 mg tablet TAKE 1 TABLET BY MOUTH EVERY DAY 90 Tab 3    predniSONE (DELTASONE) 10 mg tablet Take 10 mg by mouth two (2) times a day. 60 Tab 3    glucose blood VI test strips (Accu-Chek SmartView Test Strip) strip USE ONCE DAILY E11.9 100 Strip PRN    metFORMIN (GLUCOPHAGE) 500 mg tablet TAKE 1 TABLET BY MOUTH EVERY DAY IN THE MORNING WITH BREAKFAST 90 Tab 1    warfarin (COUMADIN) 5 mg tablet TAKE 2 TABLETS ON MONDAY AND FRIDAY AND ONE AND A HALF TABLETS ALL OTHER DAYS 145 Tab 1    terazosin (HYTRIN) 2 mg capsule TAKE ONE CAPSULE BY MOUTH DAILY 90 Cap 1    simvastatin (ZOCOR) 20 mg tablet TAKE 1 TABLET EVERY DAY 90 Tab 1    Blood-Glucose Meter monitoring kit Use to test blood sugar twice daily  DX:E11.22 1 Kit 0    glucose blood VI test strips (blood glucose test) strip Use to test blood sugar twice daily  DX:E11.22 100 Strip 1    furosemide (LASIX) 80 mg tablet TAKE 1 TABLET BY MOUTH EVERY DAY 90 Tab 3    cyanocobalamin (VITAMIN B12) 500 mcg tablet Take 500 mcg by mouth daily.       acetaminophen (TYLENOL ARTHRITIS PAIN) 650 mg TbER Take 650 mg by mouth every eight (8) hours.  multivitamins-minerals-lutein (MULTIVITAMIN 50 PLUS) tab tablet Take 1 Tab by mouth daily.  glucosamine 1,000 mg tab Take 2,000 mg by mouth daily.  cholecalciferol (VITAMIN D3) 1,000 unit cap Take 1,000 Units by mouth daily.  DOCOSAHEXANOIC ACID/EPA (FISH OIL PO) Take 1,200 mg by mouth two (2) times a day. 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    Back pain 9/20/2017    BPH (benign prostatic hyperplasia) 9/20/2017    CHF (congestive heart failure) ([facility] 9/20/2017    CKD (chronic kidney disease) 9/20/2017    Diabetic acetonemia ([facility]     DM (diabetes mellitus) ([facility] 9/20/2017    Story: Diet Controlled    ED (erectile dysfunction) 9/20/2017    Edema 9/20/2017    Elevated CPK 9/20/2017    Comments: History of    Elevated LFTs 9/20/2017    Comments: History of    Elevated PSA 9/20/2017    Hypercholesteremia     Hyperlipidemia 9/20/2017    Hypertension     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    Nodule of right lung 9/20/2017    Story: Right    Polymyalgia ([facility] 9/20/2017    Prostate enlargement      Past Surgical History:   Procedure Laterality Date    ABDOMEN SURGERY PROC UNLISTED      hernia repair    HX HEENT  06/12/2018    Dr. 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    HX MALIGNANT SKIN LESION EXCISION  09/2018    2 lesions on head     No Known Allergies    REVIEW OF SYSTEMS:  General: negative for - chills or fever, or weight loss or gain  ENT: negative for - headaches, nasal congestion or tinnitus  Eyes: no blurred or visual changes  Neck: No stiffness or swollen nodes  Respiratory: negative for - cough, hemoptysis, shortness of breath or wheezing  Cardiovascular : negative for - chest pain, edema, palpitations or shortness of breath  Gastrointestinal: negative for - abdominal pain, blood in stools, heartburn or nausea/vomiting  Genito-Urinary: no dysuria, trouble voiding, or hematuria  Musculoskeletal: negative for - gait disturbance, joint pain, joint stiffness or joint swelling  Neurological: no TIA or stroke symptoms  Hematologic: no bruises, no bleeding  Lymphatic: no swollen glands  Integument: no lumps, mole changes, nail changes or rash  Endocrine:no malaise/lethargy poly uria or polydipsia or unexpected weight changes        Social History     Socioeconomic History    Marital status:      Spouse name: Not on file    Number of children: Not on file    Years of education: Not on file    Highest education level: Not on file   Tobacco Use    Smoking status: Never Smoker    Smokeless tobacco: Never Used   Substance and Sexual Activity    Alcohol use: No    Drug use: No    Sexual activity: Never     History reviewed. No pertinent family history. OBJECTIVE:     Visit Vitals  /70 (BP 1 Location: Left arm, BP Patient Position: Sitting)   Pulse 82   Temp 98.9 °F (37.2 °C) (Oral)   Resp 20   Ht 6' 3\" (1.905 m)   Wt 201 lb 1.6 oz (91.2 kg)   SpO2 98%   BMI 25.14 kg/m²     CONSTITUTIONAL:   well nourished, appears age appropriate  EYES: sclera anicteric, PERRL, EOMI  ENMT:nares clear, moist mucous membranes, pharynx clear  NECK: supple. Thyroid normal, No JVD or bruits  RESPIRATORY: Chest: clear to ascultation and percussion, normal inspiratory effort  CARDIOVASCULAR: Heart: regular rate and rhythm no murmurs, rubs or gallops, PMI not displaced, No thrills, no peripheral edema  GASTROINTESTINAL: Abdomen: non distended, soft, non tender, bowel sounds normal  HEMATOLOGIC: no purpura, petechiae or bruising  LYMPHATIC: No lymph node enlargemant  MUSCULOSKELETAL: Extremities: no active synovitis, pulse 1+   INTEGUMENT: No unusual rashes or suspicious skin lesions noted.  Nails appear normal.  PERIPHERAL VASCULAR: normal pulses femoral, PT and DP  NEUROLOGIC: non-focal exam, A & O X 3  PSYCHIATRIC:, appropriate affect     ASSESSMENT:   1. Polymyalgia rheumatica (HCC)    2. Stage 3 chronic kidney disease, unspecified whether stage 3a or 3b CKD    3. Diastolic CHF, chronic (HCC)    4. Paroxysmal atrial fibrillation ([address]    5. Needs flu shot      Impression  1. PMR seems to be stable sed rate pending prednisone to 10 daily  2. CKD stage III repeat status pending   3. Diastolic CHF compensated  4. Atrial fibrillation INR pending  Flu shot given today. I will call with lab results and make further recommendations or adjustments if necessary. Follow-up in 1 month or sooner if there is a problem. PLAN:  .  Orders Placed This Encounter    Influenza Vaccine, QUAD, 65 Yrs +  IM  ([facility] [id number] )    PROTHROMBIN TIME + INR    METABOLIC PANEL, BASIC ([facility])    SED RATE (ESR) ([facility])         ATTENTION:   This medical record was transcribed using an electronic medical records system. Although proofread, it may and can contain electronic and spelling errors. Other human spelling and other errors may be present. Corrections may be executed at a later time. Please feel free to contact us for any clarifications as needed. Follow-up and Dispositions    · Return in about 4 weeks (around 11/10/2020). No results found for any visits on 10/13/20. Ricky Espinoza MD    The patient verbalized understanding of the problems and plans as explained.

## 2020-10-13 ENCOUNTER — OFFICE VISIT (OUTPATIENT)
Dept: INTERNAL MEDICINE CLINIC | Age: 85
End: 2020-10-13
Payer: MEDICARE

## 2020-10-13 VITALS
HEART RATE: 82 BPM | BODY MASS INDEX: 25 KG/M2 | SYSTOLIC BLOOD PRESSURE: 133 MMHG | WEIGHT: 201.1 LBS | DIASTOLIC BLOOD PRESSURE: 70 MMHG | TEMPERATURE: 98.9 F | OXYGEN SATURATION: 98 % | RESPIRATION RATE: 20 BRPM | HEIGHT: 75 IN

## 2020-10-13 DIAGNOSIS — M35.3 POLYMYALGIA RHEUMATICA (HCC): Primary | ICD-10-CM

## 2020-10-13 DIAGNOSIS — I48.0 PAROXYSMAL ATRIAL FIBRILLATION (HCC): ICD-10-CM

## 2020-10-13 DIAGNOSIS — Z23 NEEDS FLU SHOT: ICD-10-CM

## 2020-10-13 DIAGNOSIS — I50.32 DIASTOLIC CHF, CHRONIC (HCC): ICD-10-CM

## 2020-10-13 DIAGNOSIS — N18.30 STAGE 3 CHRONIC KIDNEY DISEASE, UNSPECIFIED WHETHER STAGE 3A OR 3B CKD (HCC): ICD-10-CM

## 2020-10-13 LAB
ANION GAP SERPL CALC-SCNC: 13 MMOL/L
BUN SERPL-MCNC: 32 MG/DL (ref 9–20)
CALCIUM SERPL-MCNC: 8.9 MG/DL (ref 8.4–10.2)
CHLORIDE SERPL-SCNC: 104 MMOL/L (ref 98–107)
CO2 SERPL-SCNC: 28 MMOL/L (ref 22–32)
CREAT SERPL-MCNC: 1.4 MG/DL (ref 0.8–1.5)
GLUCOSE SERPL-MCNC: 237 MG/DL (ref 75–110)
POTASSIUM SERPL-SCNC: 5.2 MMOL/L (ref 3.6–5)
SED RATE (ESR): 43 MM/HR (ref 0–10)
SODIUM SERPL-SCNC: 145 MMOL/L (ref 137–145)

## 2020-10-13 PROCEDURE — 99213 OFFICE O/P EST LOW 20 MIN: CPT | Performed by: INTERNAL MEDICINE

## 2020-10-13 PROCEDURE — 80048 BASIC METABOLIC PNL TOTAL CA: CPT | Performed by: INTERNAL MEDICINE

## 2020-10-13 PROCEDURE — G0008 ADMIN INFLUENZA VIRUS VAC: HCPCS

## 2020-10-13 PROCEDURE — 1101F PT FALLS ASSESS-DOCD LE1/YR: CPT | Performed by: INTERNAL MEDICINE

## 2020-10-13 PROCEDURE — 85651 RBC SED RATE NONAUTOMATED: CPT | Performed by: INTERNAL MEDICINE

## 2020-10-13 PROCEDURE — 90694 VACC AIIV4 NO PRSRV 0.5ML IM: CPT

## 2020-10-13 PROCEDURE — G8510 SCR DEP NEG, NO PLAN REQD: HCPCS | Performed by: INTERNAL MEDICINE

## 2020-10-13 PROCEDURE — G8427 DOCREV CUR MEDS BY ELIG CLIN: HCPCS | Performed by: INTERNAL MEDICINE

## 2020-10-13 PROCEDURE — G8417 CALC BMI ABV UP PARAM F/U: HCPCS | Performed by: INTERNAL MEDICINE

## 2020-10-13 PROCEDURE — G8536 NO DOC ELDER MAL SCRN: HCPCS | Performed by: INTERNAL MEDICINE

## 2020-10-13 NOTE — PROGRESS NOTES
HIPAA verified by two patient identifiers. Lester Darling is a 80 y.o. male    Chief Complaint   Patient presents with    CHF     2 week f/u       Visit Vitals  /70 (BP 1 Location: Left arm, BP Patient Position: Sitting)   Pulse 82   Temp 98.9 °F (37.2 °C) (Oral)   Resp 20   Ht 6' 3\" (1.905 m)   Wt 201 lb 1.6 oz (91.2 kg)   SpO2 98%   BMI 25.14 kg/m²       Pain Scale: 0 - No pain/10  Pain Location:       Health Maintenance Due   Topic Date Due    Eye Exam Retinal or Dilated  02/20/1943    Shingrix Vaccine Age 50> (1 of 2) 02/20/1983    GLAUCOMA SCREENING Q2Y  02/20/1998    Flu Vaccine (1) 09/01/2020         Coordination of Care Questionnaire:  :   1) Have you been to an emergency room, urgent care, or hospitalized since your last visit? If yes, where when, and reason for visit? no       2. Have seen or consulted any other health care provider since your last visit? If yes, where when, and reason for visit? NO      Patient is accompanied by self I have received verbal consent from Lester Darling. to discuss any/all medical information while they are present in the room.

## 2020-10-13 NOTE — PROGRESS NOTES
After obtaining written consent and per orders of Dr. Nicole Unger, injection of high dose flu vaccine given by Danya Lizarraga RN, Order and injection/medication verified by Alin Machado. Patient tolerated procedure well. VIS was given to them. No reactions noted.

## 2020-10-13 NOTE — PATIENT INSTRUCTIONS
Arthritis: Care Instructions Overview Arthritis, also called osteoarthritis, is a breakdown of the cartilage that cushions your joints. When the cartilage wears down, your bones rub against each other. This causes pain and stiffness. Many people have some arthritis as they age. Arthritis most often affects the joints of the spine, hands, hips, knees, or feet. Arthritis never goes away completely. But medicine and home treatment can help reduce pain and help you stay active. Follow-up care is a key part of your treatment and safety. Be sure to make and go to all appointments, and call your doctor if you are having problems. It's also a good idea to know your test results and keep a list of the medicines you take. How can you care for yourself at home? · Stay at a healthy weight. Being overweight puts extra strain on your joints. · Talk to your doctor or physical therapist about exercises that will help ease joint pain. ? Stretch. You may enjoy gentle forms of yoga to help keep your joints and muscles flexible. ? Walk instead of jog. Other types of exercise that are less stressful on the joints include riding a bike, swimming, ashley chi, or water exercise. ? Lift weights. Strong muscles help reduce stress on your joints. Stronger thigh muscles, for example, take some of the stress off of the knees and hips. Learn the right way to lift weights so you don't make joint pain worse. · Take your medicines exactly as prescribed. Call your doctor if you think you are having a problem with your medicine. · Take pain medicines exactly as directed. ? If the doctor gave you a prescription medicine for pain, take it as prescribed. ? If you are not taking a prescription pain medicine, ask your doctor if you can take an over-the-counter medicine. · Use a cane, crutch, walker, or another device if you need help to get around. These can help rest your joints.  You also can use other things to make life easier, such as a higher toilet seat and padded handles on kitchen utensils. · Do not sit in low chairs. They can make it hard to get up. · Put heat or cold on your sore joints as needed. Use whichever helps you most. You can also switch between hot and cold packs. ? Apply heat 2 or 3 times a day for 20 to 30 minutesusing a heating pad, hot shower, or hot packto relieve pain and stiffness. But don't use heat on a swollen joint. ? Put ice or a cold pack on your sore joint for 10 to 20 minutes at a time. Put a thin cloth between the ice and your skin. When should you call for help? Call your doctor now or seek immediate medical care if: 
  · You have sudden swelling, warmth, or pain in any joint.  
  · You have joint pain and a fever or rash.  
  · You have such bad pain that you cannot use a joint. Watch closely for changes in your health, and be sure to contact your doctor if: 
  · You have mild joint symptoms that continue even with more than 6 weeks of care at home.  
  · You have stomach pain or other problems with your medicine. Where can you learn more? Go to http://www.gray.com/ Enter C380 in the search box to learn more about \"Arthritis: Care Instructions. \" Current as of: December 9, 2019               Content Version: 12.6 © 9687-0716 [employer]. Care instructions adapted under license by [employer] (which disclaims liability or warranty for this information). If you have questions about a medical condition or this instruction, always ask your healthcare professional. [place] [address] any warranty or liability for your use of this information.

## 2020-10-14 LAB
INR PPP: 2 (ref 0.9–1.2)
PROTHROMBIN TIME: 20.4 SEC (ref 9.1–12)

## 2020-10-16 ENCOUNTER — TELEPHONE ANTICOAG (OUTPATIENT)
Dept: INTERNAL MEDICINE CLINIC | Age: 85
End: 2020-10-16

## 2020-10-16 DIAGNOSIS — I48.0 PAROXYSMAL ATRIAL FIBRILLATION (HCC): Primary | ICD-10-CM

## 2020-10-16 NOTE — PROGRESS NOTES
Labs stable so continue current treatment. Discussed with patient. Advised to continue the same dose of blood thinner which is 10 mg everyday except for 7.5 mg on Sunday and Thursday.

## 2020-10-16 NOTE — PROGRESS NOTES
Anticoagulation Summary  As of 10/16/2020    INR goal:   2.0-3.0   TTR:   15.4 % (2.5 y)   INR used for dosin.0 (10/13/2020)   Warfarin maintenance plan:   7.5 mg (5 mg x 1.5) every Sun, Thu; 10 mg (5 mg x 2) all other days   Weekly warfarin total:   65 mg   Plan last modified:   Jaden Chavez RN (10/2/2020)   Next INR check:   2020   Target end date:       Indications    Paroxysmal atrial fibrillation ([facility] [address] [address] (Primary) [I48.0]             Anticoagulation Episode Summary     INR check location:   Clinic Lab    Preferred lab:       Send INR reminders to:       Comments:         Anticoagulation Care Providers     Provider Role Specialty Phone number    Toribio Manning MD [facility] Internal Medicine [phone]        Informed patient that PT/INR okay; continue the same dose of blood thinner which is 10 mg everyday except for 7.5 mg on  and Thursday.

## 2020-10-30 DIAGNOSIS — N40.0 BENIGN PROSTATIC HYPERPLASIA, UNSPECIFIED WHETHER LOWER URINARY TRACT SYMPTOMS PRESENT: ICD-10-CM

## 2020-10-30 RX ORDER — SIMVASTATIN 20 MG/1
TABLET, FILM COATED ORAL
Qty: 90 TAB | Refills: 1 | Status: SHIPPED | OUTPATIENT
Start: 2020-10-30 | End: 2021-04-26

## 2020-10-30 RX ORDER — TERAZOSIN 2 MG/1
CAPSULE ORAL
Qty: 90 CAP | Refills: 1 | Status: SHIPPED | OUTPATIENT
Start: 2020-10-30 | End: 2021-04-26

## 2020-10-30 RX ORDER — METFORMIN HYDROCHLORIDE 500 MG/1
TABLET ORAL
Qty: 90 TAB | Refills: 1 | Status: SHIPPED | OUTPATIENT
Start: 2020-10-30 | End: 2021-03-23 | Stop reason: SDUPTHER

## 2020-10-30 NOTE — TELEPHONE ENCOUNTER
RX refill request from the patient/pharmacy. Patient last seen 10- with labs, and next appt. scheduled for 11-  Requested Prescriptions     Pending Prescriptions Disp Refills    metFORMIN (GLUCOPHAGE) 500 mg tablet [Pharmacy Med Name: METFORMIN  MG TABLET] 90 Tab 1     Sig: TAKE 1 TABLET BY MOUTH EVERY DAY IN THE MORNING WITH BREAKFAST    terazosin (HYTRIN) 2 mg capsule [Pharmacy Med Name: TERAZOSIN 2 MG CAPSULE] 90 Cap 1     Sig: TAKE 1 CAPSULE BY MOUTH EVERY DAY    simvastatin (ZOCOR) 20 mg tablet [Pharmacy Med Name: SIMVASTATIN 20 MG TABLET] 90 Tab 1     Sig: TAKE 1 TABLET BY MOUTH EVERY DAY   .

## 2020-11-08 RX ORDER — PREDNISONE 10 MG/1
TABLET ORAL
Qty: 60 TAB | Refills: 3 | Status: SHIPPED | OUTPATIENT
Start: 2020-11-08 | End: 2021-01-27

## 2020-11-10 NOTE — PROGRESS NOTES
This is a Subsequent Medicare Annual Wellness Visit providing Personalized Prevention Plan Services (PPPS) (Performed 12 months after initial AWV and PPPS )    I have reviewed the patient's medical history in detail and updated the computerized patient record. He returns today for his Medicare subsequent annual wellness examination and screening questionnaire. He also is here in follow-up of his multiple medical problems include hypertension, diabetes, hyperlipidemia, ASCVD, compensated diastolic CHF, paroxysmal atrial fibrillation on chronic Coumadin therapy, polymyalgia rheumatica on prednisone therapy, history of venous stasis ulcer left leg resolved, BPH, allergic rhinitis, DJD, CKD stage III, prior elevated PSA and other multiple medical problems. He is taking his medications and trying to follow his diet and remain physically active. He currently denies any chest pain, shortness of breath, palpitations, PND, orthopnea or other cardiac or respiratory complaints. He notes no current GI or  complaints. He notes no headaches, dizziness or neurologic complaints. He has no current active arthritic complaints although does have his chronic joint aches and pains. The strength in his legs seems to be good with his current dose of prednisone. He has no other complaints on complete review of systems. History     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    Back pain 9/20/2017    BPH (benign prostatic hyperplasia) 9/20/2017    CHF (congestive heart failure) ([facility] [address] 9/20/2017    CKD (chronic kidney disease) 9/20/2017    Diabetic acetonemia ([facility] [address]     DM (diabetes mellitus) ([facility] [address] 9/20/2017    Story: Diet Controlled    ED (erectile dysfunction) 9/20/2017    Edema 9/20/2017    Elevated CPK 9/20/2017    Comments: History of    Elevated LFTs 9/20/2017    Comments: History of  Elevated PSA 9/20/2017    Hypercholesteremia     Hyperlipidemia 9/20/2017    Hypertension     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    Nodule of right lung 9/20/2017    Story: Right    Polymyalgia ([address] 9/20/2017    Prostate enlargement       Past Surgical History:   Procedure Laterality Date    ABDOMEN SURGERY PROC UNLISTED      hernia repair    HX HEENT  06/12/2018    Dr. 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 60 Tab 3    metFORMIN (GLUCOPHAGE) 500 mg tablet TAKE 1 TABLET BY MOUTH EVERY DAY IN THE MORNING WITH BREAKFAST 90 Tab 1    terazosin (HYTRIN) 2 mg capsule TAKE 1 CAPSULE BY MOUTH EVERY DAY 90 Cap 1    simvastatin (ZOCOR) 20 mg tablet TAKE 1 TABLET BY MOUTH EVERY DAY 90 Tab 1    diclofenac EC (VOLTAREN) 75 mg EC tablet TAKE 1 TABLET BY MOUTH TWICE A  Tab 2    digoxin (LANOXIN) 0.125 mg tablet TAKE 1 TABLET BY MOUTH EVERY DAY 90 Tab 3    glucose blood VI test strips (Accu-Chek SmartView Test Strip) strip USE ONCE DAILY E11.9 100 Strip PRN    warfarin (COUMADIN) 5 mg tablet TAKE 2 TABLETS ON MONDAY AND FRIDAY AND ONE AND A HALF TABLETS ALL OTHER DAYS 145 Tab 1    Blood-Glucose Meter monitoring kit Use to test blood sugar twice daily  DX:E11.22 1 Kit 0    glucose blood VI test strips (blood glucose test) strip Use to test blood sugar twice daily  DX:E11.22 100 Strip 1    cyanocobalamin (VITAMIN B12) 500 mcg tablet Take 500 mcg by mouth daily.       acetaminophen (TYLENOL ARTHRITIS PAIN) 650 mg TbER Take 650 mg by mouth every eight (8) hours.  multivitamins-minerals-lutein (MULTIVITAMIN 50 PLUS) tab tablet Take 1 Tab by mouth daily.  glucosamine 1,000 mg tab Take 2,000 mg by mouth daily.  cholecalciferol (VITAMIN D3) 1,000 unit cap Take 1,000 Units by mouth daily.  DOCOSAHEXANOIC ACID/EPA (FISH OIL PO) Take 1,200 mg by mouth two (2) times a day. No Known Allergies  History reviewed. No pertinent family history. Patient Active Problem List    Diagnosis    Diastolic CHF, chronic (HCC)     Echo 1//3/16  - mild diastolic dysfunction with EFx 45%      Paroxysmal atrial fibrillation (HCC)    ASCVD (arteriosclerotic cardiovascular disease)     Story:  Old ASMI by EKG      Mixed hyperlipidemia    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, without long-term current use of insulin (HCC)     Story: Diet Controlled      Hypertension with renal disease    Stage 3 chronic kidney disease (HCC)    Primary osteoarthritis involving multiple joints    Polymyalgia rheumatica (HCC)    Chronic non-seasonal allergic rhinitis    BPH (benign prostatic hyperplasia)    Primary osteoarthritis of right knee    Primary osteoarthritis of left shoulder    Primary osteoarthritis of left knee    Non-pressure chronic ulcer of left lower leg with fat layer exposed ([address]    Bilateral leg edema    Diabetic ulcer of toe (HCC)     Right second toe      Venous stasis ulcer of left calf limited to breakdown of skin without varicose veins (HCC)    Near syncope    Alcohol screening    Primary osteoarthritis of left hip    Dizziness    Medicare annual wellness visit, subsequent    Elevated PSA    Nodule of right lung     Story: Right      ED (erectile dysfunction)    Back pain       Patient Care Team:  Ernst Lowry MD as PCP - General (Internal Medicine)  Ernst Lowry MD [facility] Empaneled Provider    Depression Risk Factor Screening:     3 most recent PHQ Screens 11/11/2020   Little interest or pleasure in doing things Not at all   Feeling down, depressed, irritable, or hopeless Not at all   Total Score PHQ 2 0     Alcohol Risk Factor Screening: You do not drink alcohol or very rarely. Functional Ability and Level of Safety:     Fall Risk     Fall Risk Assessment, last 12 mths 11/11/2020   Able to walk? Yes   Fall in past 12 months? Yes   Fall with injury? No   Number of falls in past 12 months 2   Fall Risk Score 2       Hearing Loss   mild    Activities of Daily Living   Self-care. ADL Assessment 11/11/2020   Feeding yourself No Help Needed   Getting from bed to chair No Help Needed   Getting dressed No Help Needed   Bathing or showering No Help Needed   Walk across the room (includes cane/walker) No Help Needed   Using the telphone No Help Needed   Taking your medications No Help Needed   Preparing meals No Help Needed   Managing money (expenses/bills) No Help Needed   Moderately strenuous housework (laundry) No Help Needed   Shopping for personal items (toiletries/medicines) No Help Needed   Shopping for groceries No Help Needed   Driving No Help Needed   Climbing a flight of stairs No Help Needed   Getting to places beyond walking distances No Help Needed       Abuse Screen   Patient is not abused    Social History     Social History Narrative    Not on file       Review of Systems      ROS:    Constitutional: He denies fevers, weight loss, sweats. Eyes: No blurred or double vision. ENT: No difficulty with swallowing, taste, speech or smell. Neck: no stiffness or swelling  Respiratory: No cough wheezing or shortness of breath. Cardiovascular: Denies chest pain, palpitations, unexplained indigestion or syncope. Gastrointestinal:  No changes in bowel movements, no abdominal pain, no bloating. Genitourinary:  He denies frequency, nocturia or stranguria. Extremities: No joint pain, stiffness or swelling.   Neurological:  No numbness, tingling, burring paresthesias or loss of motor strength. No syncope, dizziness or frequent headache  Lymphatic: no adenopathy noted  Hematologic: no easy bruising or bleeding gums  Skin:  No recent rashes or mole changes. Psychiatric/Behavioral:  Negative for depression. Physical Examination     Evaluation of Cognitive Function:  Mood/affect:  happy  Appearance: age appropriate  Family member/caregiver input: none    Visit Vitals  BP (!) 146/82 (BP 1 Location: Left arm, BP Patient Position: Sitting)   Pulse 78   Temp 97.8 °F (36.6 °C) (Oral)   Resp 18   Ht 6' 3\" (1.905 m)   Wt 200 lb (90.7 kg)   SpO2 96%   BMI 25.00 kg/m²     Vitals:    11/11/20 0931   BP: (!) 146/82   Pulse: 78   Resp: 18   Temp: 97.8 °F (36.6 °C)   TempSrc: Oral   SpO2: 96%   Weight: 200 lb (90.7 kg)   Height: 6' 3\" (1.905 m)   PainSc:   0 - No pain        PHYSICAL EXAM:    General appearance - alert, well appearing, and in no distress  Mental status - alert, oriented to person, place, and time  HEENT:  Ears - bilateral TM's and external ear canals clear  Eyes - pupillary responses were normal.  Extraocular muscle function intact. Lids and conjunctiva not injected. Fundoscopic exam revealed sharp disc margins. eye movements intact  Pharynx- clear with teeth in good repair. No masses were noted  Neck - supple without thyromegaly or burit. No JVD noted  Lungs - clear to auscultation and percussion  Cardiac- normal rate, regular rhythm without murmurs. PMI not displaced. No gallop, rub or click  Abdomen - flat, soft, non-tender without palpable organomegaly or mass. No pulsatile mass was felt, and not bruit was heard.   Bowel sounds were active  : Circumcised, Testes descended w/o masses  Rectal: normal sphincter tone, prostate normal, no masses, stool brown and hemacult negative  Extremities -  no clubbing cyanosis or edema  Lymphatics - no palpable lymphadenopathy, no hepatosplenomegaly  Hematologic: no petechiae or purpura  Peripheral vascular -Femoral, Dorsalis pedis and posterior tibial pulses felt without difficulty  Skin - no rash or unusual mole change noted  Neurological - Cranial nerves II-XII grossly intact. Motor strength 5/5. DTR's 2+ and symmetric. Station and gait normal  Back exam - full range of motion, no tenderness, palpable spasm or pain on motion  Musculoskeletal - no joint tenderness, deformity or swelling        Results for orders placed or performed in visit on 10/13/20   PROTHROMBIN TIME + INR   Result Value Ref Range    INR 2.0 (H) 0.9 - 1.2    Prothrombin time 20.4 (H) 9.1 - 09.3 sec   METABOLIC PANEL, BASIC   Result Value Ref Range    Glucose 237 (H) 75 - 110 mg/dL    BUN 32.0 (H) 9.0 - 20.0 mg/dL    Creatinine 1.4 0.8 - 1.5 mg/dL    Sodium 145 137 - 145 mmol/L    Potassium 5.2 (H) 3.6 - 5.0 mmol/L    Chloride 104 98 - 107 mmol/L    CO2 28.0 22.0 - 32.0 mmol/L    Calcium 8.9 8.4 - 10.2 mg/dl    Anion gap 13 mmol/L    GFR est AA 58 <60 mL/min/1.73m2    GFR est non-AA 48 <60 mL/min/1.73m2   SED RATE (ESR)   Result Value Ref Range    Sed rate (ESR) 43 (H) 0 - 10 mm/hr       Advice/Referrals/Counseling   Education and counseling provided:  Are appropriate based on today's review and evaluation  End-of-Life planning (with patient's consent)  Pneumococcal Vaccine  Influenza Vaccine  Colorectal cancer screening tests      Assessment/Plan     ASSESSMENT:   1. Hypertension with renal disease    2. 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, without long-term current use of insulin ([facility] [address]    3. Mixed hyperlipidemia    4. ASCVD (arteriosclerotic cardiovascular disease)    5. Stage 3 chronic kidney disease, unspecified whether stage 3a or 3b CKD    6. Primary osteoarthritis involving multiple joints    7. Paroxysmal atrial fibrillation (HCC)    8. Diastolic CHF, chronic (HCC)    9. Chronic non-seasonal allergic rhinitis    10. Benign prostatic hyperplasia without lower urinary tract symptoms    11.  Polymyalgia rheumatica ([facility] [address]    12. Elevated PSA    13. Alcohol screening    14. Medicare annual wellness visit, subsequent      Impression  1. Hypertension blood pressure currently well controlled for his age so I would not adjust medications. 2.  Diabetes mellitus repeat status pending and prior lab review not make adjustments if necessary. 3.  Hyperlipidemia prior lab reviewed and repeat status pending and I will adjust if needed. 4.  ASCVD clinically stable and EKG obtained today reveals no acute process. Continue aspirin daily. 5.  CKD stage III repeat status pending  6. DJD chronic but stable  7. Paroxysmal atrial fibrillation currently in A. fib with rate controlled in the 60s and on Coumadin with INR pending  8. Diastolic CHF compensated  9. Allergic rhinitis stable  10. BPH currently asymptomatic  11   PMR stable on prednisone continue dose and sed rate pending  12. Prior elevated PSA repeat status pending  13. Annual alcohol screening is done at this point he drinks no longer alcohol of any type. I did caution regarding more than 2 drinks per day in males with increased cardiovascular risk and increased risk of liver disease and other GI effects. Medicare subsequent annual wellness examination screening questionnaire was completed today. The results were reviewed with him and his questions were answered. Lifestyle recommendations modifications discussed and made. I will call with lab results and make further recommendations or adjustments if necessary. Follow-up in 3 months or sooner should to be a problem.     PLAN:  .  Orders Placed This Encounter    CBC WITH AUTOMATED DIFF ([facility])    METABOLIC PANEL, COMPREHENSIVE ([facility])    LIPID PANEL ([facility])    CK ([facility])    HEMOGLOBIN A1C W/O EAG ([facility])    T4, FREE ([facility])    TSH 3RD GENERATION ([facility])    URINALYSIS W/O MICRO ([facility])    URINE, MICROALBUMIN, SEMIQUANTITATIVE ([facility])    PROTHROMBIN TIME + INR    SED RATE (ESR) ([facility])    AMB POC EKG ROUTINE W/ 12 LEADS, INTER & REP    bumetanide (BUMEX) 2 mg tablet         ATTENTION:   This medical record was transcribed using an electronic medical records system. Although proofread, it may and can contain electronic and spelling errors. Other human spelling and other errors may be present. Corrections may be executed at a later time. Please feel free to contact us for any clarifications as needed. Follow-up and Dispositions    · Return in about 4 weeks (around 12/9/2020). Julieta Marie MD    Recommended healthy diet low in carbohydrates, fats, sodium and cholesterol. Recommended regular cardiovascular exercise 3-6 times per week for 30-60 minutes daily. Current Outpatient Medications   Medication Sig Dispense Refill    bumetanide (BUMEX) 2 mg tablet TAKE 1 TABLET BY MOUTH EVERY DAY      predniSONE (DELTASONE) 10 mg tablet TAKE 1TAB BY MOUTH TWO (2) TIMES A DAY.  60 Tab 3    metFORMIN (GLUCOPHAGE) 500 mg tablet TAKE 1 TABLET BY MOUTH EVERY DAY IN THE MORNING WITH BREAKFAST 90 Tab 1    terazosin (HYTRIN) 2 mg capsule TAKE 1 CAPSULE BY MOUTH EVERY DAY 90 Cap 1    simvastatin (ZOCOR) 20 mg tablet TAKE 1 TABLET BY MOUTH EVERY DAY 90 Tab 1    diclofenac EC (VOLTAREN) 75 mg EC tablet TAKE 1 TABLET BY MOUTH TWICE A  Tab 2    digoxin (LANOXIN) 0.125 mg tablet TAKE 1 TABLET BY MOUTH EVERY DAY 90 Tab 3    glucose blood VI test strips (Accu-Chek SmartView Test Strip) strip USE ONCE DAILY E11.9 100 Strip PRN    warfarin (COUMADIN) 5 mg tablet TAKE 2 TABLETS ON MONDAY AND FRIDAY AND ONE AND A HALF TABLETS ALL OTHER DAYS 145 Tab 1    Blood-Glucose Meter monitoring kit Use to test blood sugar twice daily  DX:E11.22 1 Kit 0    glucose blood VI test strips (blood glucose test) strip Use to test blood sugar twice daily  DX:E11.22 100 Strip 1    cyanocobalamin (VITAMIN B12) 500 mcg tablet Take 500 mcg by mouth daily.  acetaminophen (TYLENOL ARTHRITIS PAIN) 650 mg TbER Take 650 mg by mouth every eight (8) hours.  multivitamins-minerals-lutein (MULTIVITAMIN 50 PLUS) tab tablet Take 1 Tab by mouth daily.  glucosamine 1,000 mg tab Take 2,000 mg by mouth daily.  cholecalciferol (VITAMIN D3) 1,000 unit cap Take 1,000 Units by mouth daily.  DOCOSAHEXANOIC ACID/EPA (FISH OIL PO) Take 1,200 mg by mouth two (2) times a day. No results found for any visits on 11/11/20. Verbal and written instructions (see AVS) provided. Patient expresses understanding of diagnosis and treatment plan.     Jeremy Bullock MD

## 2020-11-11 ENCOUNTER — OFFICE VISIT (OUTPATIENT)
Dept: INTERNAL MEDICINE CLINIC | Age: 85
End: 2020-11-11
Payer: MEDICARE

## 2020-11-11 VITALS
DIASTOLIC BLOOD PRESSURE: 82 MMHG | HEART RATE: 78 BPM | WEIGHT: 200 LBS | HEIGHT: 75 IN | OXYGEN SATURATION: 96 % | TEMPERATURE: 97.8 F | BODY MASS INDEX: 24.87 KG/M2 | RESPIRATION RATE: 18 BRPM | SYSTOLIC BLOOD PRESSURE: 146 MMHG

## 2020-11-11 DIAGNOSIS — Z13.39 ALCOHOL SCREENING: ICD-10-CM

## 2020-11-11 DIAGNOSIS — I12.9 HYPERTENSION WITH RENAL DISEASE: Primary | ICD-10-CM

## 2020-11-11 DIAGNOSIS — M15.9 PRIMARY OSTEOARTHRITIS INVOLVING MULTIPLE JOINTS: ICD-10-CM

## 2020-11-11 DIAGNOSIS — N18.30 CONTROLLED TYPE 2 DIABETES MELLITUS WITH STAGE 3 CHRONIC KIDNEY DISEASE, WITHOUT LONG-TERM CURRENT USE OF INSULIN (HCC): ICD-10-CM

## 2020-11-11 DIAGNOSIS — Z00.00 MEDICARE ANNUAL WELLNESS VISIT, SUBSEQUENT: ICD-10-CM

## 2020-11-11 DIAGNOSIS — I25.10 ASCVD (ARTERIOSCLEROTIC CARDIOVASCULAR DISEASE): ICD-10-CM

## 2020-11-11 DIAGNOSIS — I50.32 DIASTOLIC CHF, CHRONIC (HCC): ICD-10-CM

## 2020-11-11 DIAGNOSIS — M35.3 POLYMYALGIA RHEUMATICA (HCC): ICD-10-CM

## 2020-11-11 DIAGNOSIS — R97.20 ELEVATED PSA: ICD-10-CM

## 2020-11-11 DIAGNOSIS — E78.2 MIXED HYPERLIPIDEMIA: ICD-10-CM

## 2020-11-11 DIAGNOSIS — N40.0 BENIGN PROSTATIC HYPERPLASIA WITHOUT LOWER URINARY TRACT SYMPTOMS: ICD-10-CM

## 2020-11-11 DIAGNOSIS — I48.0 PAROXYSMAL ATRIAL FIBRILLATION (HCC): ICD-10-CM

## 2020-11-11 DIAGNOSIS — E11.22 CONTROLLED TYPE 2 DIABETES MELLITUS WITH STAGE 3 CHRONIC KIDNEY DISEASE, WITHOUT LONG-TERM CURRENT USE OF INSULIN (HCC): ICD-10-CM

## 2020-11-11 DIAGNOSIS — J30.89 CHRONIC NON-SEASONAL ALLERGIC RHINITIS: ICD-10-CM

## 2020-11-11 DIAGNOSIS — N18.30 STAGE 3 CHRONIC KIDNEY DISEASE, UNSPECIFIED WHETHER STAGE 3A OR 3B CKD (HCC): ICD-10-CM

## 2020-11-11 LAB
A-G RATIO,AGRAT: 1.5 RATIO
ALBUMIN SERPL-MCNC: 4.1 G/DL (ref 3.9–5.4)
ALP SERPL-CCNC: 64 U/L (ref 38–126)
ALT SERPL-CCNC: 27 U/L (ref 0–50)
ANION GAP SERPL CALC-SCNC: 9 MMOL/L
AST SERPL W P-5'-P-CCNC: 31 U/L (ref 14–36)
BILIRUB SERPL-MCNC: 0.4 MG/DL (ref 0.2–1.3)
BILIRUB UR QL: NEGATIVE
BUN SERPL-MCNC: 26 MG/DL (ref 9–20)
BUN/CREATININE RATIO,BUCR: 22 RATIO
CALCIUM SERPL-MCNC: 9.4 MG/DL (ref 8.4–10.2)
CHLORIDE SERPL-SCNC: 107 MMOL/L (ref 98–107)
CHOL/HDL RATIO,CHHD: 3 RATIO (ref 0–4)
CHOLEST SERPL-MCNC: 165 MG/DL (ref 0–200)
CK SERPL-CCNC: 102 U/L (ref 30–135)
CLARITY: CLEAR
CO2 SERPL-SCNC: 31 MMOL/L (ref 22–32)
COLOR UR: ABNORMAL
CREAT SERPL-MCNC: 1.2 MG/DL (ref 0.8–1.5)
ERYTHROCYTE [DISTWIDTH] IN BLOOD BY AUTOMATED COUNT: 13.4 %
GLOBULIN,GLOB: 2.8
GLUCOSE 24H UR-MRATE: NEGATIVE G/(24.H)
GLUCOSE SERPL-MCNC: 130 MG/DL (ref 75–110)
HBA1C MFR BLD HPLC: 7 % (ref 4–5.7)
HCT VFR BLD AUTO: 37.3 % (ref 37–51)
HDLC SERPL-MCNC: 52 MG/DL (ref 35–130)
HGB BLD-MCNC: 12.2 G/DL (ref 12–18)
HGB UR QL STRIP: NEGATIVE
KETONES UR QL STRIP.AUTO: NEGATIVE
LDL/HDL RATIO,LDHD: 1 RATIO
LDLC SERPL CALC-MCNC: 66 MG/DL (ref 0–130)
LEUKOCYTE ESTERASE: NEGATIVE
LYMPHOCYTES ABSOLUTE: 2.2 K/UL (ref 0.6–4.1)
LYMPHOCYTES NFR BLD: 29.5 % (ref 10–58.5)
MCH RBC QN AUTO: 30.9 PG (ref 26–32)
MCHC RBC AUTO-ENTMCNC: 32.7 G/DL (ref 30–36)
MCV RBC AUTO: 94.5 FL (ref 80–97)
MICROALBUMIN, URINE: 50 MG/L (ref 0–20)
MONOCYTES ABS-DIF,2141: 0.6 K/UL (ref 0–1.8)
MONOCYTES NFR BLD: 7.8 % (ref 0.1–24)
NEUTROPHILS # BLD: 62.7 % (ref 37–92)
NEUTROPHILS ABS,2156: 4.8 K/UL (ref 2–7.8)
NITRITE UR QL STRIP.AUTO: NEGATIVE
PH UR STRIP: 6 [PH] (ref 5–7)
PLATELET # BLD AUTO: 186 K/UL (ref 140–440)
POTASSIUM SERPL-SCNC: 4.3 MMOL/L (ref 3.6–5)
PROT SERPL-MCNC: 6.9 G/DL (ref 6.3–8.2)
PROT UR STRIP-MCNC: ABNORMAL MG/DL
RBC # BLD AUTO: 3.95 M/UL (ref 4.2–6.3)
RBC #/AREA URNS HPF: 0 #/HPF
SED RATE (ESR): 41 MM/HR (ref 0–10)
SODIUM SERPL-SCNC: 147 MMOL/L (ref 137–145)
SP GR UR REFRACTOMETRY: 1.02 (ref 1–1.03)
TRIGL SERPL-MCNC: 237 MG/DL (ref 0–200)
UROBILINOGEN UR QL STRIP.AUTO: NEGATIVE
VLDLC SERPL CALC-MCNC: 47 MG/DL
WBC # BLD AUTO: 7.6 K/UL (ref 4.1–10.9)
WBC URNS QL MICRO: 0 #/HPF

## 2020-11-11 PROCEDURE — 85025 COMPLETE CBC W/AUTO DIFF WBC: CPT | Performed by: INTERNAL MEDICINE

## 2020-11-11 PROCEDURE — G8417 CALC BMI ABV UP PARAM F/U: HCPCS | Performed by: INTERNAL MEDICINE

## 2020-11-11 PROCEDURE — 93000 ELECTROCARDIOGRAM COMPLETE: CPT | Performed by: INTERNAL MEDICINE

## 2020-11-11 PROCEDURE — G8510 SCR DEP NEG, NO PLAN REQD: HCPCS | Performed by: INTERNAL MEDICINE

## 2020-11-11 PROCEDURE — G8427 DOCREV CUR MEDS BY ELIG CLIN: HCPCS | Performed by: INTERNAL MEDICINE

## 2020-11-11 PROCEDURE — 81003 URINALYSIS AUTO W/O SCOPE: CPT | Performed by: INTERNAL MEDICINE

## 2020-11-11 PROCEDURE — 1100F PTFALLS ASSESS-DOCD GE2>/YR: CPT | Performed by: INTERNAL MEDICINE

## 2020-11-11 PROCEDURE — 82044 UR ALBUMIN SEMIQUANTITATIVE: CPT | Performed by: INTERNAL MEDICINE

## 2020-11-11 PROCEDURE — G8536 NO DOC ELDER MAL SCRN: HCPCS | Performed by: INTERNAL MEDICINE

## 2020-11-11 PROCEDURE — 99215 OFFICE O/P EST HI 40 MIN: CPT | Performed by: INTERNAL MEDICINE

## 2020-11-11 PROCEDURE — 85651 RBC SED RATE NONAUTOMATED: CPT | Performed by: INTERNAL MEDICINE

## 2020-11-11 PROCEDURE — 80061 LIPID PANEL: CPT | Performed by: INTERNAL MEDICINE

## 2020-11-11 PROCEDURE — 3288F FALL RISK ASSESSMENT DOCD: CPT | Performed by: INTERNAL MEDICINE

## 2020-11-11 PROCEDURE — 83036 HEMOGLOBIN GLYCOSYLATED A1C: CPT | Performed by: INTERNAL MEDICINE

## 2020-11-11 PROCEDURE — 84443 ASSAY THYROID STIM HORMONE: CPT | Performed by: INTERNAL MEDICINE

## 2020-11-11 PROCEDURE — G0439 PPPS, SUBSEQ VISIT: HCPCS | Performed by: INTERNAL MEDICINE

## 2020-11-11 PROCEDURE — 84439 ASSAY OF FREE THYROXINE: CPT | Performed by: INTERNAL MEDICINE

## 2020-11-11 PROCEDURE — 82550 ASSAY OF CK (CPK): CPT | Performed by: INTERNAL MEDICINE

## 2020-11-11 PROCEDURE — 3051F HG A1C>EQUAL 7.0%<8.0%: CPT | Performed by: INTERNAL MEDICINE

## 2020-11-11 PROCEDURE — 80053 COMPREHEN METABOLIC PANEL: CPT | Performed by: INTERNAL MEDICINE

## 2020-11-11 RX ORDER — BUMETANIDE 2 MG/1
TABLET ORAL
COMMUNITY
Start: 2020-08-21 | End: 2021-05-13

## 2020-11-11 NOTE — PROGRESS NOTES
Chief Complaint   Patient presents with   [facility] Annual Wellness Visit     Visit Vitals  BP (!) 146/82 (BP 1 Location: Left arm, BP Patient Position: Sitting)   Pulse 78   Temp 97.8 °F (36.6 °C) (Oral)   Resp 18   Ht 6' 3\" (1.905 m)   Wt 200 lb (90.7 kg)   SpO2 96%   BMI 25.00 kg/m²     1. Have you been to the ER, urgent care clinic since your last visit? Hospitalized since your last visit? No    2. Have you seen or consulted any other health care providers outside of the [address] [place] [address] since your last visit? Include any pap smears or colon screening.  No

## 2020-11-11 NOTE — PATIENT INSTRUCTIONS
Managing Your Allergies: Care Instructions Your Care Instructions Managing your allergies is an important part of staying healthy. Your doctor will help you find out what may be the cause of the allergies. Common causes of symptoms are house dust and dust mites, animal dander, mold, and pollen. As soon as you know what triggers your symptoms, try to avoid those things. This can help prevent allergy symptoms, asthma, and other health problems. Ask your doctor about allergy medicine or immunotherapy. These treatments may help reduce or prevent allergy symptoms. Follow-up care is a key part of your treatment and safety. Be sure to make and go to all appointments, and call your doctor if you are having problems. It's also a good idea to know your test results and keep a list of the medicines you take. How can you care for yourself at home? · If you have been told by your doctor that dust or dust mites are causing your allergy, decrease the dust around your bed: 
? Wash sheets, pillowcases, and other bedding in hot water every week. ? Use dust-proof covers for pillows, duvets, and mattresses. Avoid plastic covers because they tear easily and do not \"breathe. \" Wash as instructed on the label. ? Do not use any blankets and pillows that you do not need. ? Use blankets that you can wash in your washing machine. ? Consider removing drapes and carpets, which attract and hold dust, from your bedroom. · If you are allergic to house dust and mites, do not use home humidifiers. Your doctor can suggest ways you can control dust and mites. · Look for signs of cockroaches. Cockroaches cause allergic reactions. Use cockroach baits to get rid of them. Then, clean your home well. Cockroaches like areas where grocery bags, newspapers, empty bottles, or cardboard boxes are stored. Do not keep these inside your home, and keep trash and food containers sealed. Seal off any spots where cockroaches might enter your home. · If you are allergic to mold, get rid of furniture, rugs, and drapes that smell musty. Check for mold in the bathroom. · If you are allergic to outdoor pollen or mold spores, use air-conditioning. Change or clean all filters every month. Keep windows closed. · If you are allergic to pollen, stay inside when pollen counts are high. Use a vacuum  with a HEPA filter or a double-thickness filter at least two times each week. · Stay inside when air pollution is bad. Avoid paint fumes, perfumes, and other strong odors. · Avoid conditions that make your allergies worse. Stay away from smoke. Do not smoke or let anyone else smoke in your house. Do not use fireplaces or wood-burning stoves. · If you are allergic to your pets, change the air filter in your furnace every month. Use high-efficiency filters. · If you are allergic to pet dander, keep pets outside or out of your bedroom. Old carpet and cloth furniture can hold a lot of animal dander. You may need to replace them. When should you call for help? Give an epinephrine shot if: 
  · You think you are having a severe allergic reaction. After giving an epinephrine shot call 911, even if you feel better. Call 911 if: 
  · You have symptoms of a severe allergic reaction. These may include: 
? Sudden raised, red areas (hives) all over your body. ? Swelling of the throat, mouth, lips, or tongue. ? Trouble breathing. ? Passing out (losing consciousness). Or you may feel very lightheaded or suddenly feel weak, confused, or restless.  
  · You have been given an epinephrine shot, even if you feel better. Call your doctor now or seek immediate medical care if: 
  · You have symptoms of an allergic reaction, such as: ? A rash or hives (raised, red areas on the skin). ? Itching. ? Swelling. ? Belly pain, nausea, or vomiting. Watch closely for changes in your health, and be sure to contact your doctor if: 
  · Your allergies get worse.   · You need help controlling your allergies.  
  · You have questions about allergy testing.  
  · You do not get better as expected. Where can you learn more? Go to http://www.gray.com/ Enter L249 in the search box to learn more about \"Managing Your Allergies: Care Instructions. \" Current as of: June 29, 2020               Content Version: 12.6 © 9725-8863 [email or web address]. Care instructions adapted under license by [employer] (which disclaims liability or warranty for this information). If you have questions about a medical condition or this instruction, always ask your healthcare professional. [place] [address] any warranty or liability for your use of this information.

## 2020-11-12 LAB
INR PPP: 1.9 (ref 0.9–1.2)
PROTHROMBIN TIME: 19.5 SEC (ref 9.1–12)
T4 FREE SERPL-MCNC: 0.93 NG/DL (ref 0.58–2.3)
TSH SERPL DL<=0.05 MIU/L-ACNC: 1.24 UIU/ML (ref 0.34–5.6)

## 2020-11-18 ENCOUNTER — TELEPHONE ANTICOAG (OUTPATIENT)
Dept: INTERNAL MEDICINE CLINIC | Age: 85
End: 2020-11-18

## 2020-11-18 DIAGNOSIS — I48.0 PAROXYSMAL ATRIAL FIBRILLATION (HCC): Primary | ICD-10-CM

## 2020-11-18 NOTE — PROGRESS NOTES
Anticoagulation Summary  As of 2020    INR goal:   2.0-3.0   TTR:   14.9 % (2.6 y)   INR used for dosin.9! (2020)   Warfarin maintenance plan:   7.5 mg (5 mg x 1.5) every Sun, Th; 10 mg (5 mg x 2) all other days   Weekly warfarin total:   65 mg   Plan last modified:   Cherelle Kaur RN (10/2/2020)   Next INR check:   2020   Target end date:       Indications    Paroxysmal atrial fibrillation ([facility] [address] (Primary) [I48.0]             Anticoagulation Episode Summary     INR check location:   Clinic Lab    Preferred lab:       Send INR reminders to:       Comments:         Anticoagulation Care Providers     Provider Role Specialty Phone number    Lauryn Cifuentes MD [facility] [phone]        Informed patient that PT/INR okay; continue the same dose of blood thinner which is 10 mg everyday except for 7.5 mg on  and Thursday. F/up as scheduled.

## 2020-11-18 NOTE — PROGRESS NOTES
Labs OK except slight increased TG so follow diet. Advised patient regarding lab. To continue the same dose of blood thinner which is 10 mg everyday except for 7.5 mg on Sunday and Thursday. F/up as scheduled.

## 2020-12-10 PROBLEM — Z00.00 MEDICARE ANNUAL WELLNESS VISIT, SUBSEQUENT: Status: RESOLVED | Noted: 2017-10-30 | Resolved: 2020-12-10

## 2020-12-10 NOTE — PROGRESS NOTES
Chief Complaint   Patient presents with    Hypertension     1 month follow up    CHF    Diabetes       SUBJECTIVE:    Ralph Holloway is a 80 y.o. male who returns in follow-up for his medical problems include congestive heart failure compensated, atrial fibrillation compensated, ASCVD, hypertension and polymyalgia rheumatica. He is taking his medications and trying to follow his diet and remain physically active. He currently denies any chest pain, shortness of breath, palpitations, PND, orthopnea or other cardiac or respiratory complaints. He notes no GI or  complaints. He notes no headaches, dizziness or neurologic complaints. No current active arthritic complaints and no other complaints on complete review systems. Current Outpatient Medications   Medication Sig Dispense Refill    bumetanide (BUMEX) 2 mg tablet TAKE 1 TABLET BY MOUTH EVERY DAY      predniSONE (DELTASONE) 10 mg tablet TAKE 1TAB BY MOUTH TWO (2) TIMES A DAY. 60 Tab 3    metFORMIN (GLUCOPHAGE) 500 mg tablet TAKE 1 TABLET BY MOUTH EVERY DAY IN THE MORNING WITH BREAKFAST 90 Tab 1    terazosin (HYTRIN) 2 mg capsule TAKE 1 CAPSULE BY MOUTH EVERY DAY 90 Cap 1    simvastatin (ZOCOR) 20 mg tablet TAKE 1 TABLET BY MOUTH EVERY DAY 90 Tab 1    diclofenac EC (VOLTAREN) 75 mg EC tablet TAKE 1 TABLET BY MOUTH TWICE A  Tab 2    digoxin (LANOXIN) 0.125 mg tablet TAKE 1 TABLET BY MOUTH EVERY DAY 90 Tab 3    glucose blood VI test strips (Accu-Chek SmartView Test Strip) strip USE ONCE DAILY E11.9 100 Strip PRN    warfarin (COUMADIN) 5 mg tablet TAKE 2 TABLETS ON MONDAY AND FRIDAY AND ONE AND A HALF TABLETS ALL OTHER DAYS 145 Tab 1    Blood-Glucose Meter monitoring kit Use to test blood sugar twice daily  DX:E11.22 1 Kit 0    glucose blood VI test strips (blood glucose test) strip Use to test blood sugar twice daily  DX:E11.22 100 Strip 1    cyanocobalamin (VITAMIN B12) 500 mcg tablet Take 500 mcg by mouth daily.       acetaminophen (TYLENOL ARTHRITIS PAIN) 650 mg TbER Take 650 mg by mouth every eight (8) hours.  multivitamins-minerals-lutein (MULTIVITAMIN 50 PLUS) tab tablet Take 1 Tab by mouth daily.  glucosamine 1,000 mg tab Take 2,000 mg by mouth daily.  cholecalciferol (VITAMIN D3) 1,000 unit cap Take 1,000 Units by mouth daily.  DOCOSAHEXANOIC ACID/EPA (FISH OIL PO) Take 1,200 mg by mouth two (2) times a day. 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[facility])     DM (diabetes mellitus) ([facility]) 9/20/2017    Story: Diet Controlled    ED (erectile dysfunction) 9/20/2017    Edema 9/20/2017    Elevated CPK 9/20/2017    Comments: History of    Elevated LFTs 9/20/2017    Comments: History of    Elevated PSA 9/20/2017    Hypercholesteremia     Hyperlipidemia 9/20/2017    Hypertension     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    Nodule of right lung 9/20/2017    Story: Right    Polymyalgia ([facility]) 9/20/2017    Prostate enlargement      Past Surgical History:   Procedure Laterality Date    ABDOMEN SURGERY PROC UNLISTED      hernia repair    HX HEENT  06/12/2018    Dr. Arnold Shetty, surgery to remove cancerous tissue from Left cheek    HX MALIGNANT SKIN LESION EXCISION  09/2018    2 lesions on head     No Known Allergies    REVIEW OF SYSTEMS:  General: negative for - chills or fever, or weight loss or gain  ENT: negative for - headaches, nasal congestion or tinnitus  Eyes: no blurred or visual changes  Neck: No stiffness or swollen nodes  Respiratory: negative for - cough, hemoptysis, shortness of breath or wheezing  Cardiovascular : negative for - chest pain, edema, palpitations or shortness of breath  Gastrointestinal: negative for - abdominal pain, blood in stools, heartburn or nausea/vomiting  Genito-Urinary: no dysuria, trouble voiding, or hematuria  Musculoskeletal: negative for - gait disturbance, joint pain, joint stiffness or joint swelling  Neurological: no TIA or stroke symptoms  Hematologic: no bruises, no bleeding  Lymphatic: no swollen glands  Integument: no lumps, mole changes, nail changes or rash  Endocrine:no malaise/lethargy poly uria or polydipsia or unexpected weight changes        Social History     Socioeconomic History    Marital status:      Spouse name: Not on file    Number of children: Not on file    Years of education: Not on file    Highest education level: Not on file   Tobacco Use    Smoking status: Never Smoker    Smokeless tobacco: Never Used   Substance and Sexual Activity    Alcohol use: No    Drug use: No    Sexual activity: Never     History reviewed. No pertinent family history. OBJECTIVE:     Visit Vitals  /82   Pulse 68   Temp 98.3 °F (36.8 °C) (Oral)   Resp 18   Ht 6' 3\" (1.905 m)   Wt 201 lb 4.8 oz (91.3 kg)   SpO2 98%   BMI 25.16 kg/m²     CONSTITUTIONAL:   well nourished, appears age appropriate  EYES: sclera anicteric, PERRL, EOMI  ENMT:nares clear, moist mucous membranes, pharynx clear  NECK: supple. Thyroid normal, No JVD or bruits  RESPIRATORY: Chest: clear to ascultation and percussion, normal inspiratory effort  CARDIOVASCULAR: Heart: regular rate and rhythm no murmurs, rubs or gallops, PMI not displaced, No thrills, no peripheral edema  GASTROINTESTINAL: Abdomen: non distended, soft, non tender, bowel sounds normal  HEMATOLOGIC: no purpura, petechiae or bruising  LYMPHATIC: No lymph node enlargemant  MUSCULOSKELETAL: Extremities: no active synovitis, pulse 1+   INTEGUMENT: No unusual rashes or suspicious skin lesions noted.  Nails appear normal.  PERIPHERAL VASCULAR: normal pulses femoral, PT and DP  NEUROLOGIC: non-focal exam, A & O X 3  PSYCHIATRIC:, appropriate affect     ASSESSMENT:   1. Diastolic CHF, chronic (HCC)    2. Paroxysmal atrial fibrillation ([address]    3. Polymyalgia rheumatica (HCC)      Impression  1. Diastolic CHF compensated continue current treatment. 2.  Paroxysmal atrial fibrillation no recent symptoms INR pending  3. Polymyalgia rheumatica stable with sed rate pending current prednisone dose 10 mg daily  I will recheck him again myself in a month or sooner should it be a problem. I will call with lab results in the interim. PLAN:  .  Orders Placed This Encounter    PROTHROMBIN TIME + INR    SED RATE (ESR) ([facility])         ATTENTION:   This medical record was transcribed using an electronic medical records system. Although proofread, it may and can contain electronic and spelling errors. Other human spelling and other errors may be present. Corrections may be executed at a later time. Please feel free to contact us for any clarifications as needed. Follow-up and Dispositions    · Return in about 4 weeks (around 1/8/2021). No results found for any visits on 12/11/20. Alyx Cano MD    The patient verbalized understanding of the problems and plans as explained.

## 2020-12-11 ENCOUNTER — OFFICE VISIT (OUTPATIENT)
Dept: INTERNAL MEDICINE CLINIC | Age: 85
End: 2020-12-11
Payer: MEDICARE

## 2020-12-11 VITALS
WEIGHT: 201.3 LBS | BODY MASS INDEX: 25.03 KG/M2 | OXYGEN SATURATION: 98 % | TEMPERATURE: 98.3 F | DIASTOLIC BLOOD PRESSURE: 82 MMHG | HEART RATE: 68 BPM | RESPIRATION RATE: 18 BRPM | SYSTOLIC BLOOD PRESSURE: 138 MMHG | HEIGHT: 75 IN

## 2020-12-11 DIAGNOSIS — M35.3 POLYMYALGIA RHEUMATICA (HCC): ICD-10-CM

## 2020-12-11 DIAGNOSIS — I50.32 DIASTOLIC CHF, CHRONIC (HCC): Primary | ICD-10-CM

## 2020-12-11 DIAGNOSIS — I48.0 PAROXYSMAL ATRIAL FIBRILLATION (HCC): ICD-10-CM

## 2020-12-11 LAB — SED RATE (ESR): 30 MM/HR (ref 0–10)

## 2020-12-11 PROCEDURE — 99213 OFFICE O/P EST LOW 20 MIN: CPT | Performed by: INTERNAL MEDICINE

## 2020-12-11 PROCEDURE — G8510 SCR DEP NEG, NO PLAN REQD: HCPCS | Performed by: INTERNAL MEDICINE

## 2020-12-11 PROCEDURE — G8536 NO DOC ELDER MAL SCRN: HCPCS | Performed by: INTERNAL MEDICINE

## 2020-12-11 PROCEDURE — G8427 DOCREV CUR MEDS BY ELIG CLIN: HCPCS | Performed by: INTERNAL MEDICINE

## 2020-12-11 PROCEDURE — 85651 RBC SED RATE NONAUTOMATED: CPT | Performed by: INTERNAL MEDICINE

## 2020-12-11 PROCEDURE — G8417 CALC BMI ABV UP PARAM F/U: HCPCS | Performed by: INTERNAL MEDICINE

## 2020-12-11 PROCEDURE — 1101F PT FALLS ASSESS-DOCD LE1/YR: CPT | Performed by: INTERNAL MEDICINE

## 2020-12-11 NOTE — PATIENT INSTRUCTIONS
Allergies: Care Instructions Your Care Instructions Allergies occur when your body's defense system (immune system) overreacts to certain substances. The immune system treats a harmless substance as if it were a harmful germ or virus. Many things can make this happen. These include pollens, medicine, food, dust, animal dander, and mold. Allergies can be mild or severe. Mild allergies can be managed with home treatment. But medicine may be needed to prevent problems. Managing your allergies is an important part of staying healthy. Your doctor may suggest that you have allergy testing to help find out what is causing your allergies. Severe allergies can cause reactions that affect your whole body (anaphylactic reactions). Your doctor may prescribe a shot of epinephrine to carry with you in case you have a severe reaction. Learn how to give yourself the shot and keep it with you at all times. Make sure it is not . Follow-up care is a key part of your treatment and safety. Be sure to make and go to all appointments, and call your doctor if you are having problems. It's also a good idea to know your test results and keep a list of the medicines you take. How can you care for yourself at home? · If you have been told by your doctor that dust or dust mites are causing your allergy, decrease the dust around your bed: 
? Wash sheets, pillowcases, and other bedding in hot water every week. ? Use dust-proof covers for pillows, duvets, and mattresses. Avoid plastic covers because they tear easily and do not \"breathe. \" Wash as instructed on the label. ? Do not use any blankets and pillows that you do not need. ? Use blankets that you can wash in your washing machine. ? Consider removing drapes and carpets, which attract and hold dust, from your bedroom. · If you are allergic to house dust and mites, do not use home humidifiers. Your doctor can suggest ways you can control dust and mites. · Look for signs of cockroaches. Cockroaches cause allergic reactions. Use cockroach baits to get rid of them. Then, clean your home well. Cockroaches like areas where grocery bags, newspapers, empty bottles, or cardboard boxes are stored. Do not keep these inside your home, and keep trash and food containers sealed. Seal off any spots where cockroaches might enter your home. · If you are allergic to mold, get rid of furniture, rugs, and drapes that smell musty. Check for mold in the bathroom. · If you are allergic to outdoor pollen or mold spores, use air-conditioning. Change or clean all filters every month. Keep windows closed. · If you are allergic to pollen, stay inside when pollen counts are high. Use a vacuum  with a HEPA filter or a double-thickness filter at least two times each week. · Stay inside when air pollution is bad. Avoid paint fumes, perfumes, and other strong odors. · Avoid conditions that make your allergies worse. Stay away from smoke. Do not smoke or let anyone else smoke in your house. Do not use fireplaces or wood-burning stoves. · If you are allergic to your pets, change the air filter in your furnace every month. Use high-efficiency filters. · If you are allergic to pet dander, keep pets outside or out of your bedroom. Old carpet and cloth furniture can hold a lot of animal dander. You may need to replace them. When should you call for help? Give an epinephrine shot if: 
  · You think you are having a severe allergic reaction.  
  · You have symptoms in more than one body area, such as mild nausea and an itchy mouth. After giving an epinephrine shot call 911, even if you feel better. Call 911 if: 
  · You have symptoms of a severe allergic reaction. These may include: 
? Sudden raised, red areas (hives) all over your body. ? Swelling of the throat, mouth, lips, or tongue. ? Trouble breathing. ? Passing out (losing consciousness).  Or you may feel very lightheaded or suddenly feel weak, confused, or restless.  
  · You have been given an epinephrine shot, even if you feel better. Call your doctor now or seek immediate medical care if: 
  · You have symptoms of an allergic reaction, such as: ? A rash or hives (raised, red areas on the skin). ? Itching. ? Swelling. ? Belly pain, nausea, or vomiting. Watch closely for changes in your health, and be sure to contact your doctor if: 
  · You do not get better as expected. Where can you learn more? Go to http://www.gray.com/ Enter C692 in the search box to learn more about \"Allergies: Care Instructions. \" Current as of: June 29, 2020               Content Version: 12.6 © 5525-5802 [employer]. Care instructions adapted under license by [employer] (which disclaims liability or warranty for this information). If you have questions about a medical condition or this instruction, always ask your healthcare professional. [address] any warranty or liability for your use of this information.

## 2020-12-11 NOTE — PROGRESS NOTES
Chief Complaint   Patient presents with    Hypertension     1 month follow up    CHF    Diabetes     Visit Vitals  BP (!) 150/72 (BP 1 Location: Left arm, BP Patient Position: Sitting)   Pulse 68   Temp 98.3 °F (36.8 °C) (Oral)   Resp 18   Ht 6' 3\" (1.905 m)   Wt 201 lb 4.8 oz (91.3 kg)   SpO2 98%   BMI 25.16 kg/m²     1. Have you been to the ER, urgent care clinic since your last visit? Hospitalized since your last visit? No    2. Have you seen or consulted any other health care providers outside of the [address] [place] [address] since your last visit? Include any pap smears or colon screening.  Dr. Estefani Mcleod removed ingrown toenail

## 2020-12-12 LAB
INR PPP: 2.4 (ref 0.9–1.2)
PROTHROMBIN TIME: 24.6 SEC (ref 9.1–12)

## 2020-12-15 ENCOUNTER — TELEPHONE ANTICOAG (OUTPATIENT)
Dept: INTERNAL MEDICINE CLINIC | Age: 85
End: 2020-12-15

## 2020-12-15 DIAGNOSIS — I48.0 PAROXYSMAL ATRIAL FIBRILLATION (HCC): Primary | ICD-10-CM

## 2020-12-15 NOTE — PROGRESS NOTES
Labs stable so continue current treatment. Informed patient that lab work was stable. Continue the same dose of blood thinner which is 10 mg everyday except for 7.5 mg on Sunday and Thursday.

## 2020-12-15 NOTE — PROGRESS NOTES
Anticoagulation Summary  As of 12/15/2020    INR goal:   2.0-3.0   TTR:   16.9 % (2.6 y)   INR used for dosin.4 (2020)   Warfarin maintenance plan:   7.5 mg (5 mg x 1.5) every Sun, Thu; 10 mg (5 mg x 2) all other days   Weekly warfarin total:   65 mg   Plan last modified:   Nelson Grimaldo RN (10/2/2020)   Next INR check:   2021   Target end date:       Indications    Paroxysmal atrial fibrillation ([facility] (Primary) [I48.0]             Anticoagulation Episode Summary     INR check location:   Clinic Lab    Preferred lab:       Send INR reminders to:       Comments:         Anticoagulation Care Providers     Provider Role Specialty Phone number    Alex Morelos MD [facility] Internal Medicine [phone]        Informed patient that PT/INR okay; continue the same dose of blood thinner which is 10 mg everyday except for 7.5 mg on  and Thursday. F/up as scheduled.

## 2021-01-03 DIAGNOSIS — I48.91 ATRIAL FIBRILLATION, UNSPECIFIED TYPE (HCC): ICD-10-CM

## 2021-01-03 RX ORDER — WARFARIN SODIUM 5 MG/1
TABLET ORAL
Qty: 145 TAB | Refills: 1 | Status: SHIPPED | OUTPATIENT
Start: 2021-01-03 | End: 2021-05-14

## 2021-01-10 NOTE — PROGRESS NOTES
Chief Complaint   Patient presents with    Diabetes     2 mon fu    Hypertension       SUBJECTIVE:    Bismark Lee. is a 80 y.o. male who returns in follow-up for his chronic diastolic CHF, paroxysmal atrial fibrillation, hypertension, ASCVD, CKD stage III and other medical problems. He is taking his medications and trying to follow his diet and remain physically active. He currently denies any chest pain, shortness of breath, palpitations, PND, orthopnea or other cardiac or respiratory complaints. He notes no current GI or  complaints. He notes no headaches, dizziness or neurologic complaints. He has no current active arthritic complaints but he does have some chronic joint aches and pains. He has no other complaints on complete review of systems. Current Outpatient Medications   Medication Sig Dispense Refill    warfarin (COUMADIN) 5 mg tablet TAKE 2 TABLETS BY MOUTH ON MONDAY &FRIDAYS AND 1&1/2 TABLET DAILY ON ALL OTHER DAYS 145 Tab 1    bumetanide (BUMEX) 2 mg tablet TAKE 1 TABLET BY MOUTH EVERY DAY      predniSONE (DELTASONE) 10 mg tablet TAKE 1TAB BY MOUTH TWO (2) TIMES A DAY.  60 Tab 3    metFORMIN (GLUCOPHAGE) 500 mg tablet TAKE 1 TABLET BY MOUTH EVERY DAY IN THE MORNING WITH BREAKFAST 90 Tab 1    terazosin (HYTRIN) 2 mg capsule TAKE 1 CAPSULE BY MOUTH EVERY DAY 90 Cap 1    simvastatin (ZOCOR) 20 mg tablet TAKE 1 TABLET BY MOUTH EVERY DAY 90 Tab 1    diclofenac EC (VOLTAREN) 75 mg EC tablet TAKE 1 TABLET BY MOUTH TWICE A  Tab 2    digoxin (LANOXIN) 0.125 mg tablet TAKE 1 TABLET BY MOUTH EVERY DAY 90 Tab 3    glucose blood VI test strips (Accu-Chek SmartView Test Strip) strip USE ONCE DAILY E11.9 100 Strip PRN    Blood-Glucose Meter monitoring kit Use to test blood sugar twice daily  DX:E11.22 1 Kit 0    glucose blood VI test strips (blood glucose test) strip Use to test blood sugar twice daily  DX:E11.22 100 Strip 1    cyanocobalamin (VITAMIN B12) 500 mcg tablet Take 500 mcg by mouth daily.  acetaminophen (TYLENOL ARTHRITIS PAIN) 650 mg TbER Take 650 mg by mouth every eight (8) hours.  multivitamins-minerals-lutein (MULTIVITAMIN 50 PLUS) tab tablet Take 1 Tab by mouth daily.  glucosamine 1,000 mg tab Take 2,000 mg by mouth daily.  cholecalciferol (VITAMIN D3) 1,000 unit cap Take 1,000 Units by mouth daily.  DOCOSAHEXANOIC ACID/EPA (FISH OIL PO) Take 1,200 mg by mouth two (2) times a day. 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    Back pain 9/20/2017    BPH (benign prostatic hyperplasia) 9/20/2017    CHF (congestive heart failure) ([address] 9/20/2017    CKD (chronic kidney disease) 9/20/2017    Diabetic acetonemia ([address]     DM (diabetes mellitus) ([facility] 9/20/2017    Story: Diet Controlled    ED (erectile dysfunction) 9/20/2017    Edema 9/20/2017    Elevated CPK 9/20/2017    Comments: History of    Elevated LFTs 9/20/2017    Comments: History of    Elevated PSA 9/20/2017    Hypercholesteremia     Hyperlipidemia 9/20/2017    Hypertension     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    Nodule of right lung 9/20/2017    Story: Right    Polymyalgia ([address] 9/20/2017    Prostate enlargement      Past Surgical History:   Procedure Laterality Date    HX HEENT  06/12/2018    Dr. 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    HX MALIGNANT SKIN LESION EXCISION  09/2018    2 lesions on head    AL ABDOMEN SURGERY PROC UNLISTED      hernia repair     No Known Allergies    REVIEW OF SYSTEMS:  General: negative for - chills or fever, or weight loss or gain  ENT: negative for - headaches, nasal congestion or tinnitus  Eyes: no blurred or visual changes  Neck: No stiffness or swollen nodes  Respiratory: negative for - cough, hemoptysis, shortness of breath or wheezing  Cardiovascular : negative for - chest pain, edema, palpitations or shortness of breath  Gastrointestinal: negative for - abdominal pain, blood in stools, heartburn or nausea/vomiting  Genito-Urinary: no dysuria, trouble voiding, or hematuria  Musculoskeletal: negative for - gait disturbance, joint pain, joint stiffness or joint swelling  Neurological: no TIA or stroke symptoms  Hematologic: no bruises, no bleeding  Lymphatic: no swollen glands  Integument: no lumps, mole changes, nail changes or rash  Endocrine:no malaise/lethargy poly uria or polydipsia or unexpected weight changes        Social History     Socioeconomic History    Marital status:      Spouse name: Not on file    Number of children: Not on file    Years of education: Not on file    Highest education level: Not on file   Tobacco Use    Smoking status: Never Smoker    Smokeless tobacco: Never Used   Substance and Sexual Activity    Alcohol use: No    Drug use: No    Sexual activity: Never     History reviewed. No pertinent family history. OBJECTIVE:     Visit Vitals  /82   Pulse 73   Temp 98.2 °F (36.8 °C) (Oral)   Resp 18   Ht 6' 3\" (1.905 m)   Wt 202 lb 3.2 oz (91.7 kg)   SpO2 99%   BMI 25.27 kg/m²     CONSTITUTIONAL:   well nourished, appears age appropriate  EYES: sclera anicteric, PERRL, EOMI  ENMT:nares clear, moist mucous membranes, pharynx clear  NECK: supple. Thyroid normal, No JVD or bruits  RESPIRATORY: Chest: clear to ascultation and percussion, normal inspiratory effort  CARDIOVASCULAR: Heart: regular rate and rhythm with 2/6 systolic murmur left sternal border with occasional ectopy consistent with PVCs without rubs or gallops, PMI not displaced, No thrills, no peripheral edema  GASTROINTESTINAL: Abdomen: non distended, soft, non tender, bowel sounds normal  HEMATOLOGIC: no purpura, petechiae or bruising  LYMPHATIC: No lymph node enlargemant  MUSCULOSKELETAL: Extremities: no active synovitis, pulse 1+.  No diabetic foot changes noted INTEGUMENT: No unusual rashes or suspicious skin lesions noted. Nails appear normal.  PERIPHERAL VASCULAR: normal pulses femoral, PT and DP  NEUROLOGIC: non-focal exam, A & O X 3. Mild decreased sensation in all toes both feet with decreased proprioception  PSYCHIATRIC:, appropriate affect     ASSESSMENT:   1. Hypertension with renal disease    2. 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, without long-term current use of insulin ([address]    3. Mixed hyperlipidemia    4. Diastolic CHF, chronic ([address]    5. ASCVD (arteriosclerotic cardiovascular disease)    6. Paroxysmal atrial fibrillation (HCC)    7. Primary osteoarthritis involving multiple joints    8. Stage 3 chronic kidney disease, unspecified whether stage 3a or 3b CKD    9. Polymyalgia rheumatica (HCC)      Impression  1. Hypertension blood pressure initially up by the nurse but repeat by me was adequate although borderline so we will continue current medication. He did have ham this morning for breakfast in the encouraged him to try to cut down the amount of salt in his diet. 2.  Diabetes mellitus blood sugars pending today but this is about 2 hours postprandial.  Glycohemoglobin done in November reviewed. 3.  Hyperlipidemia numbers from November reviewed  4. Diastolic CHF compensated  5. ASCVD clinically stable  6. Paroxysmal atrial fibrillation INR is pending  7. DJD that is stable  8. CKD stage III repeat status pending  9. Polymyalgia rheumatica stable and currently in remission currently on prednisone at 10 mg a day  I will recheck a myself again in 1 month at which time he is to be fasting. I will see him sooner should the be problems. PLAN:  .  Orders Placed This Encounter    PROTHROMBIN TIME + INR    METABOLIC PANEL, COMPREHENSIVE ([facility])         ATTENTION:   This medical record was transcribed using an electronic medical records system.   Although proofread, it may and can contain electronic and spelling errors. Other human spelling and other errors may be present. Corrections may be executed at a later time. Please feel free to contact us for any clarifications as needed. Follow-up and Dispositions    · Return in about 4 weeks (around 2/8/2021). No results found for any visits on 01/11/21. Samara Medrano MD    The patient verbalized understanding of the problems and plans as explained.

## 2021-01-11 ENCOUNTER — OFFICE VISIT (OUTPATIENT)
Dept: INTERNAL MEDICINE CLINIC | Age: 86
End: 2021-01-11
Payer: MEDICARE

## 2021-01-11 VITALS
OXYGEN SATURATION: 99 % | BODY MASS INDEX: 25.14 KG/M2 | WEIGHT: 202.2 LBS | RESPIRATION RATE: 18 BRPM | DIASTOLIC BLOOD PRESSURE: 82 MMHG | TEMPERATURE: 98.2 F | HEIGHT: 75 IN | HEART RATE: 73 BPM | SYSTOLIC BLOOD PRESSURE: 138 MMHG

## 2021-01-11 DIAGNOSIS — E78.2 MIXED HYPERLIPIDEMIA: ICD-10-CM

## 2021-01-11 DIAGNOSIS — I12.9 HYPERTENSION WITH RENAL DISEASE: Primary | ICD-10-CM

## 2021-01-11 DIAGNOSIS — N18.30 CONTROLLED TYPE 2 DIABETES MELLITUS WITH STAGE 3 CHRONIC KIDNEY DISEASE, WITHOUT LONG-TERM CURRENT USE OF INSULIN (HCC): ICD-10-CM

## 2021-01-11 DIAGNOSIS — M35.3 POLYMYALGIA RHEUMATICA (HCC): ICD-10-CM

## 2021-01-11 DIAGNOSIS — M15.9 PRIMARY OSTEOARTHRITIS INVOLVING MULTIPLE JOINTS: ICD-10-CM

## 2021-01-11 DIAGNOSIS — I50.32 DIASTOLIC CHF, CHRONIC (HCC): ICD-10-CM

## 2021-01-11 DIAGNOSIS — N18.30 STAGE 3 CHRONIC KIDNEY DISEASE, UNSPECIFIED WHETHER STAGE 3A OR 3B CKD (HCC): ICD-10-CM

## 2021-01-11 DIAGNOSIS — E11.22 CONTROLLED TYPE 2 DIABETES MELLITUS WITH STAGE 3 CHRONIC KIDNEY DISEASE, WITHOUT LONG-TERM CURRENT USE OF INSULIN (HCC): ICD-10-CM

## 2021-01-11 DIAGNOSIS — I25.10 ASCVD (ARTERIOSCLEROTIC CARDIOVASCULAR DISEASE): ICD-10-CM

## 2021-01-11 DIAGNOSIS — I48.0 PAROXYSMAL ATRIAL FIBRILLATION (HCC): ICD-10-CM

## 2021-01-11 LAB
A-G RATIO,AGRAT: 1.4 RATIO
ALBUMIN SERPL-MCNC: 4.2 G/DL (ref 3.9–5.4)
ALP SERPL-CCNC: 72 U/L (ref 38–126)
ALT SERPL-CCNC: 31 U/L (ref 0–50)
ANION GAP SERPL CALC-SCNC: 7 MMOL/L
AST SERPL W P-5'-P-CCNC: 32 U/L (ref 14–36)
BILIRUB SERPL-MCNC: 0.3 MG/DL (ref 0.2–1.3)
BUN SERPL-MCNC: 29 MG/DL (ref 9–20)
BUN/CREATININE RATIO,BUCR: 22 RATIO
CALCIUM SERPL-MCNC: 9.9 MG/DL (ref 8.4–10.2)
CHLORIDE SERPL-SCNC: 105 MMOL/L (ref 98–107)
CO2 SERPL-SCNC: 32 MMOL/L (ref 22–32)
CREAT SERPL-MCNC: 1.3 MG/DL (ref 0.8–1.5)
GLOBULIN,GLOB: 3.1
GLUCOSE SERPL-MCNC: 168 MG/DL (ref 75–110)
POTASSIUM SERPL-SCNC: 5.6 MMOL/L (ref 3.6–5)
PROT SERPL-MCNC: 7.3 G/DL (ref 6.3–8.2)
SODIUM SERPL-SCNC: 144 MMOL/L (ref 137–145)

## 2021-01-11 PROCEDURE — 99214 OFFICE O/P EST MOD 30 MIN: CPT | Performed by: INTERNAL MEDICINE

## 2021-01-11 PROCEDURE — 1101F PT FALLS ASSESS-DOCD LE1/YR: CPT | Performed by: INTERNAL MEDICINE

## 2021-01-11 PROCEDURE — 80053 COMPREHEN METABOLIC PANEL: CPT | Performed by: INTERNAL MEDICINE

## 2021-01-11 PROCEDURE — G8427 DOCREV CUR MEDS BY ELIG CLIN: HCPCS | Performed by: INTERNAL MEDICINE

## 2021-01-11 PROCEDURE — G8536 NO DOC ELDER MAL SCRN: HCPCS | Performed by: INTERNAL MEDICINE

## 2021-01-11 PROCEDURE — G8417 CALC BMI ABV UP PARAM F/U: HCPCS | Performed by: INTERNAL MEDICINE

## 2021-01-11 PROCEDURE — G8510 SCR DEP NEG, NO PLAN REQD: HCPCS | Performed by: INTERNAL MEDICINE

## 2021-01-11 NOTE — PATIENT INSTRUCTIONS
ACE Inhibitors: Care Instructions Your Care Instructions ACE (angiotensin-converting enzyme) inhibitors lower blood pressure. They also treat heart failure and prevent heart attacks and strokes. They block an enzyme that makes blood vessels narrow. As a result, the blood vessels relax and widen. This lowers blood pressure. These medicines also put more water and salt into the urine. This lowers blood pressure too. These medicines are a good choice for people with diabetes. They don't affect blood sugar levels, and they may protect the kidneys. Examples include: · Benazepril (Lotensin). · Lisinopril (Prinivil, Zestril). · Ramipril (Altace). Before you start taking this medicine, make sure your doctor knows if you take other medicines, especially water pills (diuretics) or potassium tablets. And tell your doctor if you use a salt substitute. You should not take an ACE inhibitor if you are pregnant or planning to become pregnant. You may need regular blood tests. Follow-up care is a key part of your treatment and safety. Be sure to make and go to all appointments, and call your doctor if you are having problems. It's also a good idea to know your test results and keep a list of the medicines you take. How can you care for yourself at home? · Take your medicines exactly as prescribed. Be sure to take your medicine every day. Call your doctor if you think you are having a problem with your medicine. · ACE inhibitors can cause a dry cough. If the cough is bad, talk to your doctor. You may need to try a different medicine. · ACE inhibitors can cause swelling of your lips, tongue, or face. If the swelling is severe, you may need treatment right away. Severe swelling can make it hard to breathe, but this is very rare. · Check with your doctor or pharmacist before you use any other medicines. This includes over-the-counter medicines. Make sure your doctor knows all of the medicines, vitamins, herbal products, and supplements you take. Taking some medicines together can cause problems. When should you call for help? Call your doctor now or seek immediate medical care if: 
  · You have swelling of your lips, tongue, or face.  
  · You think you are having problems with your medicine. Watch closely for changes in your health, and be sure to contact your doctor if you have any problems. Where can you learn more? Go to http://www.gray.com/ Enter S089 in the search box to learn more about \"ACE Inhibitors: Care Instructions. \" Current as of: December 16, 2019               Content Version: 12.6 © 9515-2139 [employer]. Care instructions adapted under license by [employer] (which disclaims liability or warranty for this information). If you have questions about a medical condition or this instruction, always ask your healthcare professional. [address] any warranty or liability for your use of this information.

## 2021-01-11 NOTE — PROGRESS NOTES
HIPAA verified by two patient identifiers. Gigi Khalil is a 80 y.o. male    Chief Complaint   Patient presents with    Diabetes     2 mon fu    Hypertension       Visit Vitals  BP (!) 159/90 (BP 1 Location: Left arm, BP Patient Position: Sitting)   Pulse 73   Temp 98.2 °F (36.8 °C) (Oral)   Resp 18   Ht 6' 3\" (1.905 m)   Wt 202 lb 3.2 oz (91.7 kg)   SpO2 99%   BMI 25.27 kg/m²       Pain Scale: 0 - No pain/10  Pain Location:       Health Maintenance Due   Topic Date Due    Eye Exam Retinal or Dilated  02/20/1943    Shingrix Vaccine Age 50> (1 of 2) 02/20/1983    GLAUCOMA SCREENING Q2Y  02/20/1998         Coordination of Care Questionnaire:  :   1) Have you been to an emergency room, urgent care, or hospitalized since your last visit? If yes, where when, and reason for visit? no       2. Have seen or consulted any other health care provider since your last visit? If yes, where when, and reason for visit? NO      Patient is accompanied by self I have received verbal consent from Gigi Khalil. to discuss any/all medical information while they are present in the room.

## 2021-01-12 LAB
INR PPP: 1.4 (ref 0.9–1.2)
PROTHROMBIN TIME: 14.4 SEC (ref 9.1–12)

## 2021-01-13 ENCOUNTER — TELEPHONE ANTICOAG (OUTPATIENT)
Dept: INTERNAL MEDICINE CLINIC | Age: 86
End: 2021-01-13

## 2021-01-13 DIAGNOSIS — I48.0 PAROXYSMAL ATRIAL FIBRILLATION (HCC): Primary | ICD-10-CM

## 2021-01-13 NOTE — PROGRESS NOTES
Lab okay except for INR low. Question dose. Patient states he is taking his blood thinner dose every day. Per Dr. Lion Hernandez he recommends increasing the dose to 10 mg everyday and f/up lab in 2 weeks.

## 2021-01-13 NOTE — PROGRESS NOTES
Anticoagulation Summary  As of 2021    INR goal:  2.0-3.0   TTR:  17.6 % (2.7 y)   INR used for dosin.4 (2021)   Warfarin maintenance plan:  10 mg (5 mg x 2) every day   Weekly warfarin total:  70 mg   Plan last modified:  Brayan Grajeda RN (2021)   Next INR check:  2021   Target end date:      Indications    Paroxysmal atrial fibrillation ([address] (Primary) [I48.0]             Anticoagulation Episode Summary     INR check location:  Clinic Lab    Preferred lab:      Send INR reminders to:      Comments:        Anticoagulation Care Providers     Provider Role Specialty Phone number    Susan Ayala MD Responsible Internal Medicine [phone]        Informed patient that his INR was low. Per Dr. Cathy Alvares he recommends increasing the blood thinner dose to 10 mg daily and recheck in 2 weeks.

## 2021-01-27 ENCOUNTER — LAB ONLY (OUTPATIENT)
Dept: INTERNAL MEDICINE CLINIC | Age: 86
End: 2021-01-27

## 2021-01-27 DIAGNOSIS — I48.0 PAROXYSMAL ATRIAL FIBRILLATION (HCC): Primary | ICD-10-CM

## 2021-01-27 RX ORDER — PREDNISONE 10 MG/1
TABLET ORAL
Qty: 45 TAB | Refills: 5 | Status: SHIPPED | OUTPATIENT
Start: 2021-01-27 | End: 2021-03-24

## 2021-01-27 NOTE — TELEPHONE ENCOUNTER
RX refill request from the patient/pharmacy. Patient last seen 01- with labs, and next appt. scheduled for 01-  Requested Prescriptions     Pending Prescriptions Disp Refills    predniSONE (DELTASONE) 10 mg tablet [Pharmacy Med Name: PREDNISONE 10 MG TABLET] 45 Tab 5     Sig: TAKE 1/2 TABLET BY MOUTH ONCE DAILY AS DIRECTED   .

## 2021-01-28 LAB
INR PPP: 2.5 (ref 0.9–1.2)
PROTHROMBIN TIME: 26.1 SEC (ref 9.1–12)

## 2021-01-29 ENCOUNTER — TELEPHONE ANTICOAG (OUTPATIENT)
Dept: INTERNAL MEDICINE CLINIC | Age: 86
End: 2021-01-29

## 2021-01-29 DIAGNOSIS — I48.0 PAROXYSMAL ATRIAL FIBRILLATION (HCC): Primary | ICD-10-CM

## 2021-01-29 NOTE — PROGRESS NOTES
Anticoagulation Summary  As of 2021    INR goal:  2.0-3.0   TTR:  18.1 % (2.8 y)   INR used for dosin.5 (2021)   Warfarin maintenance plan:  10 mg (5 mg x 2) every day   Weekly warfarin total:  70 mg   Plan last modified:  Napoleon Laird RN (2021)   Next INR check:  2021   Target end date:      Indications    Paroxysmal atrial fibrillation ([facility] [address] (Primary) [I48.0]             Anticoagulation Episode Summary     INR check location:  Clinic Lab    Preferred lab:      Send INR reminders to:      Comments:        Anticoagulation Care Providers     Provider Role Specialty Phone number    Luis Carlos Delcid MD Responsible Internal Medicine [phone]        Informed patient that PT/INR okay; continue the same dose of blood thinner which is 10 mg daily and f/up as scheduled.

## 2021-01-29 NOTE — PROGRESS NOTES
INR OK so continue same   Advised patient to continue the same dose of blood thinner which is 10 mg everyday and f/up as scheduled.

## 2021-02-16 DIAGNOSIS — I48.91 ATRIAL FIBRILLATION, UNSPECIFIED TYPE (HCC): ICD-10-CM

## 2021-02-16 RX ORDER — DIGOXIN 125 MCG
TABLET ORAL
Qty: 90 TAB | Refills: 0 | Status: SHIPPED | OUTPATIENT
Start: 2021-02-16 | End: 2021-08-18

## 2021-02-16 NOTE — TELEPHONE ENCOUNTER
RX refill request from the patient/pharmacy. Patient last seen 01- with labs, and next appt. scheduled for 04-  Last refill 08-  Requested Prescriptions     Pending Prescriptions Disp Refills    digoxin (LANOXIN) 0.125 mg tablet 90 Tab 0     Sig: TAKE 1 TABLET BY MOUTH EVERY DAY   .

## 2021-03-23 ENCOUNTER — TELEPHONE (OUTPATIENT)
Dept: INTERNAL MEDICINE CLINIC | Age: 86
End: 2021-03-23

## 2021-03-23 RX ORDER — METFORMIN HYDROCHLORIDE 500 MG/1
TABLET ORAL
Qty: 180 TAB | Refills: 1 | Status: SHIPPED | OUTPATIENT
Start: 2021-03-23 | End: 2021-08-16

## 2021-03-23 NOTE — TELEPHONE ENCOUNTER
Patient called to report that his FBS's having been trending upward over the past week. Yesterday his FBS was 179; before lunch 212 and before dinner 163. Continues on his diet and still taking his daily steroid tablet. Discussed with Dr. Fonseca and verbal order given to increase his Metformin 500 mg to BID with breakfast and dinner. New rx sent to patient's pharmacy.

## 2021-03-23 NOTE — TELEPHONE ENCOUNTER
RX refill request from the patient/pharmacy. Patient last seen 01- with labs, and next appt. scheduled for 04-  Requested Prescriptions     Pending Prescriptions Disp Refills    metFORMIN (GLUCOPHAGE) 500 mg tablet 180 Tab 1     Sig: TAKE 1 TABLET BY MOUTH EVERY DAY IN THE MORNING WITH BREAKFAST AND ONE TABLET BEFORE DINNER   .

## 2021-03-24 RX ORDER — PREDNISONE 10 MG/1
TABLET ORAL
Qty: 60 TAB | Refills: 3 | Status: SHIPPED | OUTPATIENT
Start: 2021-03-24 | End: 2021-06-28

## 2021-03-24 NOTE — TELEPHONE ENCOUNTER
RX refill request from the patient/pharmacy. Patient last seen 01- with labs, and next appt. scheduled for 04-  Requested Prescriptions     Pending Prescriptions Disp Refills    predniSONE (DELTASONE) 10 mg tablet [Pharmacy Med Name: PREDNISONE 10 MG TABLET] 60 Tab 3     Sig: TAKE 1TAB BY MOUTH TWO (2) TIMES A DAY. Parvez Villa

## 2021-04-02 ENCOUNTER — OFFICE VISIT (OUTPATIENT)
Dept: INTERNAL MEDICINE CLINIC | Age: 86
End: 2021-04-02
Payer: MEDICARE

## 2021-04-02 VITALS
SYSTOLIC BLOOD PRESSURE: 148 MMHG | OXYGEN SATURATION: 98 % | RESPIRATION RATE: 18 BRPM | WEIGHT: 201.2 LBS | HEIGHT: 75 IN | DIASTOLIC BLOOD PRESSURE: 66 MMHG | BODY MASS INDEX: 25.02 KG/M2 | HEART RATE: 72 BPM | TEMPERATURE: 97.6 F

## 2021-04-02 DIAGNOSIS — I25.10 ASCVD (ARTERIOSCLEROTIC CARDIOVASCULAR DISEASE): ICD-10-CM

## 2021-04-02 DIAGNOSIS — I48.0 PAROXYSMAL ATRIAL FIBRILLATION (HCC): ICD-10-CM

## 2021-04-02 DIAGNOSIS — M15.9 PRIMARY OSTEOARTHRITIS INVOLVING MULTIPLE JOINTS: ICD-10-CM

## 2021-04-02 DIAGNOSIS — E78.2 MIXED HYPERLIPIDEMIA: ICD-10-CM

## 2021-04-02 DIAGNOSIS — I50.32 DIASTOLIC CHF, CHRONIC (HCC): ICD-10-CM

## 2021-04-02 DIAGNOSIS — N18.30 STAGE 3 CHRONIC KIDNEY DISEASE, UNSPECIFIED WHETHER STAGE 3A OR 3B CKD (HCC): ICD-10-CM

## 2021-04-02 DIAGNOSIS — I12.9 HYPERTENSION WITH RENAL DISEASE: Primary | ICD-10-CM

## 2021-04-02 DIAGNOSIS — E11.22 CONTROLLED TYPE 2 DIABETES MELLITUS WITH STAGE 3 CHRONIC KIDNEY DISEASE, WITHOUT LONG-TERM CURRENT USE OF INSULIN (HCC): ICD-10-CM

## 2021-04-02 DIAGNOSIS — N18.30 CONTROLLED TYPE 2 DIABETES MELLITUS WITH STAGE 3 CHRONIC KIDNEY DISEASE, WITHOUT LONG-TERM CURRENT USE OF INSULIN (HCC): ICD-10-CM

## 2021-04-02 DIAGNOSIS — M35.3 POLYMYALGIA RHEUMATICA (HCC): ICD-10-CM

## 2021-04-02 LAB
A-G RATIO,AGRAT: 1.5 RATIO
ALBUMIN SERPL-MCNC: 4.2 G/DL (ref 3.9–5.4)
ALP SERPL-CCNC: 66 U/L (ref 38–126)
ALT SERPL-CCNC: 28 U/L (ref 0–50)
ANION GAP SERPL CALC-SCNC: 7 MMOL/L
AST SERPL W P-5'-P-CCNC: 39 U/L (ref 14–36)
BILIRUB SERPL-MCNC: 0.3 MG/DL (ref 0.2–1.3)
BUN SERPL-MCNC: 34 MG/DL (ref 9–20)
BUN/CREATININE RATIO,BUCR: 24 RATIO
CALCIUM SERPL-MCNC: 9.8 MG/DL (ref 8.4–10.2)
CHLORIDE SERPL-SCNC: 103 MMOL/L (ref 98–107)
CHOL/HDL RATIO,CHHD: 4 RATIO (ref 0–4)
CHOLEST SERPL-MCNC: 192 MG/DL (ref 0–200)
CK SERPL-CCNC: 236 U/L (ref 30–135)
CO2 SERPL-SCNC: 29 MMOL/L (ref 22–32)
CREAT SERPL-MCNC: 1.4 MG/DL (ref 0.8–1.5)
ERYTHROCYTE [SEDIMENTATION RATE] IN BLOOD: 39 MM/HR (ref 0–20)
GLOBULIN,GLOB: 2.8
GLUCOSE SERPL-MCNC: 215 MG/DL (ref 75–110)
HBA1C MFR BLD HPLC: 8.1 % (ref 4–5.7)
HDLC SERPL-MCNC: 47 MG/DL (ref 35–130)
INR PPP: 2.2 (ref 0.9–1.1)
LDL/HDL RATIO,LDHD: 1 RATIO
LDLC SERPL CALC-MCNC: 64 MG/DL (ref 0–130)
POTASSIUM SERPL-SCNC: 5.2 MMOL/L (ref 3.6–5)
PROT SERPL-MCNC: 7 G/DL (ref 6.3–8.2)
PROTHROMBIN TIME: 22.2 SEC (ref 9–11.1)
SODIUM SERPL-SCNC: 139 MMOL/L (ref 137–145)
TRIGL SERPL-MCNC: 407 MG/DL (ref 0–200)
VLDLC SERPL CALC-MCNC: 81 MG/DL

## 2021-04-02 PROCEDURE — G8510 SCR DEP NEG, NO PLAN REQD: HCPCS | Performed by: INTERNAL MEDICINE

## 2021-04-02 PROCEDURE — 82550 ASSAY OF CK (CPK): CPT | Performed by: INTERNAL MEDICINE

## 2021-04-02 PROCEDURE — 80061 LIPID PANEL: CPT | Performed by: INTERNAL MEDICINE

## 2021-04-02 PROCEDURE — G8419 CALC BMI OUT NRM PARAM NOF/U: HCPCS | Performed by: INTERNAL MEDICINE

## 2021-04-02 PROCEDURE — 99214 OFFICE O/P EST MOD 30 MIN: CPT | Performed by: INTERNAL MEDICINE

## 2021-04-02 PROCEDURE — 1101F PT FALLS ASSESS-DOCD LE1/YR: CPT | Performed by: INTERNAL MEDICINE

## 2021-04-02 PROCEDURE — G8536 NO DOC ELDER MAL SCRN: HCPCS | Performed by: INTERNAL MEDICINE

## 2021-04-02 PROCEDURE — 83036 HEMOGLOBIN GLYCOSYLATED A1C: CPT | Performed by: INTERNAL MEDICINE

## 2021-04-02 PROCEDURE — 80050 GENERAL HEALTH PANEL: CPT | Performed by: INTERNAL MEDICINE

## 2021-04-02 PROCEDURE — G8427 DOCREV CUR MEDS BY ELIG CLIN: HCPCS | Performed by: INTERNAL MEDICINE

## 2021-04-02 NOTE — PROGRESS NOTES
Chief Complaint   Patient presents with    Hypertension     3 mon f/u    Irregular Heart Beat    CHF    Diabetes       SUBJECTIVE:    Dennis Lowery is a 80 y.o. male who returns in follow-up of his medical problems include hypertension, diabetes, hyperlipidemia, paroxysmal atrial fibrillation, ASCVD, chronic diastolic CHF, DJD, CKD stage III, polymyalgia rheumatica and other medical problems. He is taking his medications and trying to follow his diet and remain physically active. He currently denies any chest pain, shortness of breath, palpitations, PND, orthopnea or other cardiac or respiratory complaints. He notes no GI or  complaints. He notes no headaches, dizziness or neurologic complaints. He has no current active arthritic complaints although does have some chronic joint aches and pains. There are no other complaints on complete review systems. His blood sugars been running a little better since the second Metformin was added. He did have his list of blood sugars for my review. Current Outpatient Medications   Medication Sig Dispense Refill    predniSONE (DELTASONE) 10 mg tablet TAKE 1TAB BY MOUTH TWO (2) TIMES A DAY.  60 Tab 3    metFORMIN (GLUCOPHAGE) 500 mg tablet TAKE 1 TABLET BY MOUTH EVERY DAY IN THE MORNING WITH BREAKFAST AND ONE TABLET BEFORE DINNER 180 Tab 1    digoxin (LANOXIN) 0.125 mg tablet TAKE 1 TABLET BY MOUTH EVERY DAY 90 Tab 0    warfarin (COUMADIN) 5 mg tablet TAKE 2 TABLETS BY MOUTH ON MONDAY &FRIDAYS AND 1&1/2 TABLET DAILY ON ALL OTHER DAYS 145 Tab 1    bumetanide (BUMEX) 2 mg tablet TAKE 1 TABLET BY MOUTH EVERY DAY      terazosin (HYTRIN) 2 mg capsule TAKE 1 CAPSULE BY MOUTH EVERY DAY 90 Cap 1    simvastatin (ZOCOR) 20 mg tablet TAKE 1 TABLET BY MOUTH EVERY DAY 90 Tab 1    diclofenac EC (VOLTAREN) 75 mg EC tablet TAKE 1 TABLET BY MOUTH TWICE A  Tab 2    glucose blood VI test strips (Accu-Chek SmartView Test Strip) strip USE ONCE DAILY E11.9 100 Strip PRN    Blood-Glucose Meter monitoring kit Use to test blood sugar twice daily  DX:E11.22 1 Kit 0    glucose blood VI test strips (blood glucose test) strip Use to test blood sugar twice daily  DX:E11.22 100 Strip 1    cyanocobalamin (VITAMIN B12) 500 mcg tablet Take 500 mcg by mouth daily.  acetaminophen (TYLENOL ARTHRITIS PAIN) 650 mg TbER Take 650 mg by mouth every eight (8) hours.  multivitamins-minerals-lutein (MULTIVITAMIN 50 PLUS) tab tablet Take 1 Tab by mouth daily.  glucosamine 1,000 mg tab Take 2,000 mg by mouth daily.  cholecalciferol (VITAMIN D3) 1,000 unit cap Take 1,000 Units by mouth daily.  DOCOSAHEXANOIC ACID/EPA (FISH OIL PO) Take 1,200 mg by mouth two (2) times a day. 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    Back pain 9/20/2017    BPH (benign prostatic hyperplasia) 9/20/2017    CHF (congestive heart failure) ([address] 9/20/2017    CKD (chronic kidney disease) 9/20/2017    Diabetic acetonemia ([address]     DM (diabetes mellitus) ([address] 9/20/2017    Story: Diet Controlled    ED (erectile dysfunction) 9/20/2017    Edema 9/20/2017    Elevated CPK 9/20/2017    Comments: History of    Elevated LFTs 9/20/2017    Comments: History of    Elevated PSA 9/20/2017    Hypercholesteremia     Hyperlipidemia 9/20/2017    Hypertension     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    Nodule of right lung 9/20/2017    Story: Right    Polymyalgia ([address] 9/20/2017    Prostate enlargement      Past Surgical History:   Procedure Laterality Date    HX HEENT  06/12/2018    Dr. 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    HX MALIGNANT SKIN LESION EXCISION  09/2018    2 lesions on head    CO ABDOMEN SURGERY PROC UNLISTED      hernia repair     No Known Allergies    REVIEW OF SYSTEMS:  General: negative for - chills or fever, or weight loss or gain  ENT: negative for - headaches, nasal congestion or tinnitus  Eyes: no blurred or visual changes  Neck: No stiffness or swollen nodes  Respiratory: negative for - cough, hemoptysis, shortness of breath or wheezing  Cardiovascular : negative for - chest pain, edema, palpitations or shortness of breath  Gastrointestinal: negative for - abdominal pain, blood in stools, heartburn or nausea/vomiting  Genito-Urinary: no dysuria, trouble voiding, or hematuria  Musculoskeletal: negative for - gait disturbance, joint pain, joint stiffness or joint swelling  Neurological: no TIA or stroke symptoms  Hematologic: no bruises, no bleeding  Lymphatic: no swollen glands  Integument: no lumps, mole changes, nail changes or rash  Endocrine:no malaise/lethargy poly uria or polydipsia or unexpected weight changes        Social History     Socioeconomic History    Marital status:      Spouse name: Not on file    Number of children: Not on file    Years of education: Not on file    Highest education level: Not on file   Tobacco Use    Smoking status: Never Smoker    Smokeless tobacco: Never Used   Substance and Sexual Activity    Alcohol use: No    Drug use: No    Sexual activity: Never     History reviewed. No pertinent family history. OBJECTIVE:     Visit Vitals  BP (!) 148/66 (BP 1 Location: Left upper arm, BP Patient Position: Sitting, BP Cuff Size: Large adult)   Pulse 72   Temp 97.6 °F (36.4 °C) (Oral)   Resp 18   Ht 6' 3\" (1.905 m)   Wt 201 lb 3.2 oz (91.3 kg)   SpO2 98%   BMI 25.15 kg/m²     CONSTITUTIONAL:   well nourished, appears age appropriate  EYES: sclera anicteric, PERRL, EOMI  ENMT:nares clear, moist mucous membranes, pharynx clear  NECK: supple.  Thyroid normal, No JVD or bruits  RESPIRATORY: Chest: clear to ascultation and percussion, normal inspiratory effort  CARDIOVASCULAR: Heart: regular rate and rhythm no murmurs, rubs or gallops, PMI not displaced, No thrills, no peripheral edema  GASTROINTESTINAL: Abdomen: non distended, soft, non tender, bowel sounds normal  HEMATOLOGIC: no purpura, petechiae or bruising  LYMPHATIC: No lymph node enlargemant  MUSCULOSKELETAL: Extremities: no active synovitis, pulse 1+   INTEGUMENT: No unusual rashes or suspicious skin lesions noted. Nails appear normal.  PERIPHERAL VASCULAR: normal pulses femoral, PT and DP  NEUROLOGIC: non-focal exam, A & O X 3  PSYCHIATRIC:, appropriate affect     ASSESSMENT:   1. Hypertension with renal disease    2. 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, without long-term current use of insulin ([address]    3. Mixed hyperlipidemia    4. Diastolic CHF, chronic ([address]    5. ASCVD (arteriosclerotic cardiovascular disease)    6. Paroxysmal atrial fibrillation (HCC)    7. Primary osteoarthritis involving multiple joints    8. Stage 3 chronic kidney disease, unspecified whether stage 3a or 3b CKD ([address]    9. Polymyalgia rheumatica (HCC)      Impression  1. Hypertension that is controlled so continue current medicines blood pressure being a little low but at 80years old I am not prone to adjust that down. 2.  Diabetes I reviewed his blood sugars and they seem to be okay  3. Hyperlipidemia prior lab reviewed repeat status pending I will adjust if needed. 4.  Diastolic CHF compensated  5. ASCVD clinically stable  6. Paroxysmal atrial fibrillation repeat status of INR pending now in sinus rhythm  7. DJD that is stable  8. CKD stage III repeat status pending  9. Polymyalgia rheumatica sed rate pending  I will call the lab and make further recommendations or adjustments if necessary. Follow-up in 1 month for his CHF, atrial fibrillation and PMR in 3 months regarding his other medical problems.     PLAN:  .  Orders Placed This Encounter    PROTHROMBIN TIME + INR    SED RATE (ESR)    METABOLIC PANEL, COMPREHENSIVE ([facility])    LIPID PANEL ([facility])    [facility]    HEMOGLOBIN A1C W/O EAG ([facility])         ATTENTION:   This medical record was transcribed using an electronic medical records system. Although proofread, it may and can contain electronic and spelling errors. Other human spelling and other errors may be present. Corrections may be executed at a later time. Please feel free to contact us for any clarifications as needed. Follow-up and Dispositions    · Return in about 3 months (around 7/2/2021). No results found for any visits on 04/02/21. Verna Lockett MD    The patient verbalized understanding of the problems and plans as explained.

## 2021-04-02 NOTE — PATIENT INSTRUCTIONS

## 2021-04-02 NOTE — PROGRESS NOTES
HIPAA verified by two patient identifiers. Addie Bowman is a 80 y.o. male    Chief Complaint   Patient presents with    Hypertension     3 mon f/u    Irregular Heart Beat    CHF    Diabetes       Visit Vitals  BP (!) 148/66 (BP 1 Location: Left upper arm, BP Patient Position: Sitting, BP Cuff Size: Large adult)   Pulse 72   Temp 97.6 °F (36.4 °C) (Oral)   Resp 18   Ht 6' 3\" (1.905 m)   Wt 201 lb 3.2 oz (91.3 kg)   SpO2 98%   BMI 25.15 kg/m²       Pain Scale: 0 - No pain/10  Pain Location:       Health Maintenance Due   Topic Date Due    Eye Exam Retinal or Dilated  Never done    COVID-19 Vaccine (1) Never done    Shingrix Vaccine Age 50> (1 of 2) Never done         Coordination of Care Questionnaire:  :   1) Have you been to an emergency room, urgent care, or hospitalized since your last visit? If yes, where when, and reason for visit? no       2. Have seen or consulted any other health care provider since your last visit? If yes, where when, and reason for visit? NO      Patient is accompanied by self I have received verbal consent from Addie Bowman. to discuss any/all medical information while they are present in the room.

## 2021-04-07 NOTE — PROGRESS NOTES
Blood sugar, glycol and triglycerides are all elevated so at this point add Amaryl 1 mg daily. Continue to watch diet. Continue current dose of Metformin.

## 2021-04-09 ENCOUNTER — TELEPHONE ANTICOAG (OUTPATIENT)
Dept: INTERNAL MEDICINE CLINIC | Age: 86
End: 2021-04-09

## 2021-04-09 DIAGNOSIS — I48.0 PAROXYSMAL ATRIAL FIBRILLATION (HCC): Primary | ICD-10-CM

## 2021-04-09 RX ORDER — GLIMEPIRIDE 1 MG/1
1 TABLET ORAL
Qty: 90 TAB | Refills: 3 | Status: SHIPPED | OUTPATIENT
Start: 2021-04-09 | End: 2022-02-18

## 2021-04-09 NOTE — PROGRESS NOTES
Blood sugar, glycol and triglycerides are all elevated so at this point add Amaryl 1 mg daily. Continue to watch diet. Continue current dose of Metformin. Discussed with patient. Continue the 10 mg of this blood thinner daily.

## 2021-04-09 NOTE — TELEPHONE ENCOUNTER
RX refill request from the patient/pharmacy. Patient last seen 04- with labs, and next appt. scheduled for 05-  Requested Prescriptions     Pending Prescriptions Disp Refills    glimepiride (AMARYL) 1 mg tablet 90 Tab 3     Sig: Take 1 Tab by mouth Daily (before breakfast).    Rafael Martinez

## 2021-04-09 NOTE — PROGRESS NOTES
Anticoagulation Summary  As of 2021    INR goal:  2.0-3.0   TTR:  23.0 % (3 y)   INR used for dosin.2 (2021)   Warfarin maintenance plan:  10 mg (5 mg x 2) every day   Weekly warfarin total:  70 mg   Plan last modified:  Madeline Uriarte RN (2021)   Next INR check:  5/3/2021   Target end date:      Indications    Paroxysmal atrial fibrillation ([facility] [address] (Primary) [I48.0]             Anticoagulation Episode Summary     INR check location:  Clinic Lab    Preferred lab:      Send INR reminders to:      Comments:        Anticoagulation Care Providers     Provider Role Specialty Phone number    Giulia Rodriguez MD Responsible Internal Medicine [phone]        Informed patient that PT/INR okay; continue the same dose of blood thinner which is 10 mg daily and f/up as scheduled.

## 2021-04-25 DIAGNOSIS — N40.0 BENIGN PROSTATIC HYPERPLASIA, UNSPECIFIED WHETHER LOWER URINARY TRACT SYMPTOMS PRESENT: ICD-10-CM

## 2021-04-26 RX ORDER — TERAZOSIN 2 MG/1
CAPSULE ORAL
Qty: 90 CAP | Refills: 1 | Status: SHIPPED | OUTPATIENT
Start: 2021-04-26 | End: 2021-08-12

## 2021-04-26 RX ORDER — SIMVASTATIN 20 MG/1
TABLET, FILM COATED ORAL
Qty: 90 TAB | Refills: 1 | Status: SHIPPED | OUTPATIENT
Start: 2021-04-26 | End: 2021-08-12

## 2021-04-26 NOTE — TELEPHONE ENCOUNTER
PCP: Doreen Ziegler MD    Last appt: 7/27/2020  Future Appointments   Date Time Provider Helen Butcher   5/3/2021  1:40 PM Doreen Ziegler MD PCA BS AMB   6/2/2021  1:00 PM MD ANA Frazier BS AMB   7/7/2021  9:00 AM Doreen Ziegler MD PCA BS AMB       Last refilled:10/30/20    Requested Prescriptions     Pending Prescriptions Disp Refills    simvastatin (ZOCOR) 20 mg tablet [Pharmacy Med Name: SIMVASTATIN 20 MG TABLET] 90 Tab 1     Sig: TAKE 1 TABLET BY MOUTH EVERY DAY    terazosin (HYTRIN) 2 mg capsule [Pharmacy Med Name: TERAZOSIN 2 MG CAPSULE] 90 Cap 1     Sig: TAKE 1 CAPSULE BY MOUTH EVERY DAY

## 2021-05-02 PROBLEM — E11.621 DIABETIC ULCER OF TOE (HCC): Status: RESOLVED | Noted: 2020-03-12 | Resolved: 2021-05-02

## 2021-05-02 PROBLEM — L97.509 DIABETIC ULCER OF TOE (HCC): Status: RESOLVED | Noted: 2020-03-12 | Resolved: 2021-05-02

## 2021-05-02 PROBLEM — L97.922 NON-PRESSURE CHRONIC ULCER OF LEFT LOWER LEG WITH FAT LAYER EXPOSED (HCC): Status: RESOLVED | Noted: 2020-03-12 | Resolved: 2021-05-02

## 2021-05-02 PROBLEM — I87.2 VENOUS STASIS ULCER OF LEFT CALF LIMITED TO BREAKDOWN OF SKIN WITHOUT VARICOSE VEINS (HCC): Status: RESOLVED | Noted: 2020-03-10 | Resolved: 2021-05-02

## 2021-05-02 PROBLEM — L97.221 VENOUS STASIS ULCER OF LEFT CALF LIMITED TO BREAKDOWN OF SKIN WITHOUT VARICOSE VEINS (HCC): Status: RESOLVED | Noted: 2020-03-10 | Resolved: 2021-05-02

## 2021-05-02 NOTE — PROGRESS NOTES
Chief Complaint   Patient presents with    Hypertension     1 mon f/u    CHF    Chronic Kidney Disease       SUBJECTIVE:    Garret Herrera Sr. is a 80 y.o. male who returns in follow-up for his congestive heart failure, chronic kidney disease, atrial fibrillation, hypertension, diabetes, polymyalgia rheumatica and other medical problems. He is taking his medications and trying to follow his diet and remain physically active. He currently denies any chest pain, shortness of breath, palpitations, PND, orthopnea or other cardiac or respiratory complaints. He notes no current GI or  complaints. He notes no headaches, dizziness or neurologic complaints. He generally feels quite well and actually started working in his garden. Current Outpatient Medications   Medication Sig Dispense Refill    simvastatin (ZOCOR) 20 mg tablet TAKE 1 TABLET BY MOUTH EVERY DAY 90 Tab 1    terazosin (HYTRIN) 2 mg capsule TAKE 1 CAPSULE BY MOUTH EVERY DAY 90 Cap 1    glimepiride (AMARYL) 1 mg tablet Take 1 Tab by mouth Daily (before breakfast). 90 Tab 3    predniSONE (DELTASONE) 10 mg tablet TAKE 1TAB BY MOUTH TWO (2) TIMES A DAY.  60 Tab 3    metFORMIN (GLUCOPHAGE) 500 mg tablet TAKE 1 TABLET BY MOUTH EVERY DAY IN THE MORNING WITH BREAKFAST AND ONE TABLET BEFORE DINNER 180 Tab 1    digoxin (LANOXIN) 0.125 mg tablet TAKE 1 TABLET BY MOUTH EVERY DAY 90 Tab 0    warfarin (COUMADIN) 5 mg tablet TAKE 2 TABLETS BY MOUTH ON MONDAY &FRIDAYS AND 1&1/2 TABLET DAILY ON ALL OTHER DAYS 145 Tab 1    bumetanide (BUMEX) 2 mg tablet TAKE 1 TABLET BY MOUTH EVERY DAY      diclofenac EC (VOLTAREN) 75 mg EC tablet TAKE 1 TABLET BY MOUTH TWICE A  Tab 2    glucose blood VI test strips (Accu-Chek SmartView Test Strip) strip USE ONCE DAILY E11.9 100 Strip PRN    Blood-Glucose Meter monitoring kit Use to test blood sugar twice daily  DX:E11.22 1 Kit 0    glucose blood VI test strips (blood glucose test) strip Use to test blood sugar twice daily  DX:E11.22 100 Strip 1    cyanocobalamin (VITAMIN B12) 500 mcg tablet Take 500 mcg by mouth daily.  acetaminophen (TYLENOL ARTHRITIS PAIN) 650 mg TbER Take 650 mg by mouth every eight (8) hours.  multivitamins-minerals-lutein (MULTIVITAMIN 50 PLUS) tab tablet Take 1 Tab by mouth daily.  glucosamine 1,000 mg tab Take 2,000 mg by mouth daily.  cholecalciferol (VITAMIN D3) 1,000 unit cap Take 1,000 Units by mouth daily.  DOCOSAHEXANOIC ACID/EPA (FISH OIL PO) Take 1,200 mg by mouth two (2) times a day. 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    Back pain 9/20/2017    BPH (benign prostatic hyperplasia) 9/20/2017    CHF (congestive heart failure) ([address] 9/20/2017    CKD (chronic kidney disease) 9/20/2017    Diabetic acetonemia ([address]     DM (diabetes mellitus) ([address] 9/20/2017    Story: Diet Controlled    ED (erectile dysfunction) 9/20/2017    Edema 9/20/2017    Elevated CPK 9/20/2017    Comments: History of    Elevated LFTs 9/20/2017    Comments: History of    Elevated PSA 9/20/2017    Hypercholesteremia     Hyperlipidemia 9/20/2017    Hypertension     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    Nodule of right lung 9/20/2017    Story: Right    Polymyalgia ([address] 9/20/2017    Prostate enlargement      Past Surgical History:   Procedure Laterality Date    HX HEENT  06/12/2018    Dr. 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    HX MALIGNANT SKIN LESION EXCISION  09/2018    2 lesions on head    MT ABDOMEN SURGERY PROC UNLISTED      hernia repair     No Known Allergies    REVIEW OF SYSTEMS:  General: negative for - chills or fever, or weight loss or gain  ENT: negative for - headaches, nasal congestion or tinnitus  Eyes: no blurred or visual changes  Neck: No stiffness or swollen nodes  Respiratory: negative for - cough, hemoptysis, shortness of breath or wheezing  Cardiovascular : negative for - chest pain, edema, palpitations or shortness of breath  Gastrointestinal: negative for - abdominal pain, blood in stools, heartburn or nausea/vomiting  Genito-Urinary: no dysuria, trouble voiding, or hematuria  Musculoskeletal: negative for - gait disturbance, joint pain, joint stiffness or joint swelling  Neurological: no TIA or stroke symptoms  Hematologic: no bruises, no bleeding  Lymphatic: no swollen glands  Integument: no lumps, mole changes, nail changes or rash  Endocrine:no malaise/lethargy poly uria or polydipsia or unexpected weight changes        Social History     Socioeconomic History    Marital status:      Spouse name: Not on file    Number of children: Not on file    Years of education: Not on file    Highest education level: Not on file   Tobacco Use    Smoking status: Never Smoker    Smokeless tobacco: Never Used   Substance and Sexual Activity    Alcohol use: No    Drug use: No    Sexual activity: Never     History reviewed. No pertinent family history. OBJECTIVE:     Visit Vitals  /76   Pulse 78   Temp 98.4 °F (36.9 °C) (Oral)   Resp 18   Ht 6' 3\" (1.905 m)   Wt 205 lb 8 oz (93.2 kg)   SpO2 98%   BMI 25.69 kg/m²     CONSTITUTIONAL:   well nourished, appears age appropriate  EYES: sclera anicteric, PERRL, EOMI  ENMT:nares clear, moist mucous membranes, pharynx clear  NECK: supple.  Thyroid normal, No JVD or bruits  RESPIRATORY: Chest: clear to ascultation and percussion, normal inspiratory effort  CARDIOVASCULAR: Heart: regular rate and rhythm no murmurs, rubs or gallops, PMI not displaced, No thrills, no peripheral edema  GASTROINTESTINAL: Abdomen: non distended, soft, non tender, bowel sounds normal  HEMATOLOGIC: no purpura, petechiae or bruising  LYMPHATIC: No lymph node enlargemant  MUSCULOSKELETAL: Extremities: no active synovitis, pulse 1+   INTEGUMENT: No unusual rashes or suspicious skin lesions noted. Nails appear normal.  PERIPHERAL VASCULAR: normal pulses femoral, PT and DP  NEUROLOGIC: non-focal exam, A & O X 3  PSYCHIATRIC:, appropriate affect     ASSESSMENT:   1. Diastolic CHF, chronic (HCC)    2. Stage 3 chronic kidney disease, unspecified whether stage 3a or 3b CKD (HCC)    3. Paroxysmal atrial fibrillation ([facility]    4. Hypertension with renal disease    5. PMR (polymyalgia rheumatica) ([facility])      Impression  1. Diastolic CHF compensated  2. CKD stage III repeat status pending  3. Atrial fibrillation stable and INR pending  4. Hypertension that is controlled  5. Polymyalgia rheumatica we will see what the sed rate looks like  Follow-up scheduled for 1 month or sooner should the be a problem. PLAN:  .  Orders Placed This Encounter    METABOLIC PANEL, BASIC    PROTHROMBIN TIME + INR    SED RATE (ESR)         ATTENTION:   This medical record was transcribed using an electronic medical records system. Although proofread, it may and can contain electronic and spelling errors. Other human spelling and other errors may be present. Corrections may be executed at a later time. Please feel free to contact us for any clarifications as needed. Follow-up and Dispositions    · Return in about 4 weeks (around 5/31/2021). No results found for any visits on 05/03/21. Candi Ambrocio MD    The patient verbalized understanding of the problems and plans as explained.

## 2021-05-03 ENCOUNTER — OFFICE VISIT (OUTPATIENT)
Dept: INTERNAL MEDICINE CLINIC | Age: 86
End: 2021-05-03
Payer: MEDICARE

## 2021-05-03 VITALS
DIASTOLIC BLOOD PRESSURE: 76 MMHG | TEMPERATURE: 98.4 F | HEIGHT: 75 IN | BODY MASS INDEX: 25.55 KG/M2 | SYSTOLIC BLOOD PRESSURE: 132 MMHG | RESPIRATION RATE: 18 BRPM | OXYGEN SATURATION: 98 % | WEIGHT: 205.5 LBS | HEART RATE: 78 BPM

## 2021-05-03 DIAGNOSIS — I12.9 HYPERTENSION WITH RENAL DISEASE: ICD-10-CM

## 2021-05-03 DIAGNOSIS — M35.3 PMR (POLYMYALGIA RHEUMATICA) (HCC): ICD-10-CM

## 2021-05-03 DIAGNOSIS — N18.30 STAGE 3 CHRONIC KIDNEY DISEASE, UNSPECIFIED WHETHER STAGE 3A OR 3B CKD (HCC): ICD-10-CM

## 2021-05-03 DIAGNOSIS — I50.32 DIASTOLIC CHF, CHRONIC (HCC): Primary | ICD-10-CM

## 2021-05-03 DIAGNOSIS — I48.0 PAROXYSMAL ATRIAL FIBRILLATION (HCC): ICD-10-CM

## 2021-05-03 PROCEDURE — G8427 DOCREV CUR MEDS BY ELIG CLIN: HCPCS | Performed by: INTERNAL MEDICINE

## 2021-05-03 PROCEDURE — G8536 NO DOC ELDER MAL SCRN: HCPCS | Performed by: INTERNAL MEDICINE

## 2021-05-03 PROCEDURE — 99213 OFFICE O/P EST LOW 20 MIN: CPT | Performed by: INTERNAL MEDICINE

## 2021-05-03 PROCEDURE — 1101F PT FALLS ASSESS-DOCD LE1/YR: CPT | Performed by: INTERNAL MEDICINE

## 2021-05-03 PROCEDURE — G8419 CALC BMI OUT NRM PARAM NOF/U: HCPCS | Performed by: INTERNAL MEDICINE

## 2021-05-03 PROCEDURE — G8510 SCR DEP NEG, NO PLAN REQD: HCPCS | Performed by: INTERNAL MEDICINE

## 2021-05-03 NOTE — PROGRESS NOTES
HIPAA verified by two patient identifiers. Terri Roblero is a 80 y.o. male    Chief Complaint   Patient presents with    Hypertension     1 mon f/u    CHF    Chronic Kidney Disease       Visit Vitals  BP (!) 147/69 (BP 1 Location: Left upper arm, BP Patient Position: Sitting, BP Cuff Size: Adult)   Pulse 78   Temp 98.4 °F (36.9 °C) (Oral)   Resp 18   Ht 6' 3\" (1.905 m)   Wt 205 lb 8 oz (93.2 kg)   SpO2 98%   BMI 25.69 kg/m²       Pain Scale: 0 - No pain/10  Pain Location:       Health Maintenance Due   Topic Date Due    Eye Exam Retinal or Dilated  Never done    COVID-19 Vaccine (1) Never done    Shingrix Vaccine Age 50> (1 of 2) Never done         Coordination of Care Questionnaire:  :   1) Have you been to an emergency room, urgent care, or hospitalized since your last visit? If yes, where when, and reason for visit? no       2. Have seen or consulted any other health care provider since your last visit? If yes, where when, and reason for visit? NO      Patient is accompanied by self I have received verbal consent from Terri Roblero. to discuss any/all medical information while they are present in the room.

## 2021-05-03 NOTE — PATIENT INSTRUCTIONS

## 2021-05-04 ENCOUNTER — TELEPHONE ANTICOAG (OUTPATIENT)
Dept: INTERNAL MEDICINE CLINIC | Age: 86
End: 2021-05-04

## 2021-05-04 DIAGNOSIS — I48.0 PAROXYSMAL ATRIAL FIBRILLATION (HCC): Primary | ICD-10-CM

## 2021-05-04 LAB
ANION GAP SERPL CALC-SCNC: 6 MMOL/L (ref 5–15)
BUN SERPL-MCNC: 34 MG/DL (ref 6–20)
BUN/CREAT SERPL: 24 (ref 12–20)
CALCIUM SERPL-MCNC: 8.8 MG/DL (ref 8.5–10.1)
CHLORIDE SERPL-SCNC: 108 MMOL/L (ref 97–108)
CO2 SERPL-SCNC: 26 MMOL/L (ref 21–32)
CREAT SERPL-MCNC: 1.44 MG/DL (ref 0.7–1.3)
ERYTHROCYTE [SEDIMENTATION RATE] IN BLOOD: 47 MM/HR (ref 0–20)
GLUCOSE SERPL-MCNC: 224 MG/DL (ref 65–100)
INR PPP: 2.2 (ref 0.9–1.1)
POTASSIUM SERPL-SCNC: 4.8 MMOL/L (ref 3.5–5.1)
PROTHROMBIN TIME: 22 SEC (ref 9–11.1)
SODIUM SERPL-SCNC: 140 MMOL/L (ref 136–145)

## 2021-05-04 NOTE — PROGRESS NOTES
Anticoagulation Summary  As of 2021    INR goal:  2.0-3.0   TTR:  25.2 % (3 y)   INR used for dosin.2 (5/3/2021)   Warfarin maintenance plan:  10 mg (5 mg x 2) every day   Weekly warfarin total:  70 mg   Plan last modified:  Marjorie Medina RN (2021)   Next INR check:  2021   Target end date:      Indications    Paroxysmal atrial fibrillation ([address] (Primary) [I48.0]             Anticoagulation Episode Summary     INR check location:  Clinic Lab    Preferred lab:      Send INR reminders to:      Comments:        Anticoagulation Care Providers     Provider Role Specialty Phone number    David Melgoza MD Responsible Internal Medicine [phone]        Informed patient that PT/INR okay; continue the same dose of blood thinner which is 10 mg daily and f/up as scheduled.

## 2021-05-04 NOTE — PROGRESS NOTES
INR OK and other labs stable so no change treatment. Spoke to patient regarding. Continue the same dose of blood thinner which is 10 mg daily. F/up as scheduled.

## 2021-05-13 NOTE — TELEPHONE ENCOUNTER
PCP: Mehnaz Enriquez MD    Last appt: 5/3/2021  Future Appointments   Date Time Provider Helen Butcher   6/2/2021  1:00 PM MD ANA uW   7/7/2021  9:00 AM MD ANA Wu       Last refilled:8/21/20    Requested Prescriptions     Pending Prescriptions Disp Refills    bumetanide (BUMEX) 2 mg tablet [Pharmacy Med Name: BUMETANIDE 2 MG TABLET] 90 Tab 1     Sig: TAKE 1 TABLET BY MOUTH EVERY DAY

## 2021-05-14 DIAGNOSIS — I48.91 ATRIAL FIBRILLATION, UNSPECIFIED TYPE (HCC): ICD-10-CM

## 2021-05-14 RX ORDER — WARFARIN SODIUM 5 MG/1
TABLET ORAL
Qty: 145 TAB | Refills: 1 | Status: SHIPPED | OUTPATIENT
Start: 2021-05-14 | End: 2021-10-18

## 2021-05-14 RX ORDER — BUMETANIDE 2 MG/1
TABLET ORAL
Qty: 90 TAB | Refills: 1 | Status: SHIPPED | OUTPATIENT
Start: 2021-05-14 | End: 2021-09-20

## 2021-05-14 RX ORDER — DICLOFENAC SODIUM 75 MG/1
TABLET, DELAYED RELEASE ORAL
Qty: 180 TAB | Refills: 3 | Status: SHIPPED | OUTPATIENT
Start: 2021-05-14 | End: 2022-05-26

## 2021-05-14 NOTE — TELEPHONE ENCOUNTER
RX refill request from the patient/pharmacy. Patient last seen 05- with labs, and next appt. scheduled for 06-  Requested Prescriptions     Pending Prescriptions Disp Refills    diclofenac EC (VOLTAREN) 75 mg EC tablet [Pharmacy Med Name: DICLOFENAC SOD EC 75 MG TAB] 180 Tab 3     Sig: TAKE 1 TABLET BY MOUTH TWICE A DAY    warfarin (COUMADIN) 5 mg tablet [Pharmacy Med Name: WARFARIN SODIUM 5 MG TABLET] 145 Tab 1     Sig: TAKE 2 TABLETS BY MOUTH ON MONDAY &FRIDAYS AND 1&1/2 TABLET DAILY ON ALL OTHER DAYS   .

## 2021-05-20 DIAGNOSIS — Z71.89 ACP (ADVANCE CARE PLANNING): Primary | ICD-10-CM

## 2021-05-24 ENCOUNTER — PATIENT OUTREACH (OUTPATIENT)
Dept: CASE MANAGEMENT | Age: 86
End: 2021-05-24

## 2021-05-24 NOTE — ACP (ADVANCE CARE PLANNING)
Advance Care Planning   Ambulatory ACP Specialist Patient Outreach    Date:  5/24/2021    ACP Specialist:  Brett Mathis LPN    Outreach call to patient in follow-up to ACP Specialist referral from:    [x] PCP  [] Provider   [] Ambulatory Care Management [] Other     For:                  [x] Continued Conversation for ACP decision making / Goals of Care             [] Code Status Discussion             [] Completion of Adv Directive             [] Completion of Portable DNR order             [] Other (Specify)    Date Referral Received: 5/14/21    Today's Outreach:  [x] First   [] Second  [] Third                                           Third outreach made by []  phone  [] email []   [place] [employer] [place]     Intervention:  [x] Spoke with Patient   [] Left VM requesting return call      Outcome: Pt states that he would like ACP documents mailed to him and wishes to complete on his own. Will mail documents and close referral.       Next Step:   [] ACP scheduled conversation  [] Outreach again in one week               [x] Email / Mail [address]  [] Email / Mail Advance Directive   [x]  Closing referral.  Routing closure to referring provider/staff and to ACP Specialist .      Thank you for this referral.

## 2021-06-02 ENCOUNTER — OFFICE VISIT (OUTPATIENT)
Dept: INTERNAL MEDICINE CLINIC | Age: 86
End: 2021-06-02
Payer: MEDICARE

## 2021-06-02 VITALS
SYSTOLIC BLOOD PRESSURE: 142 MMHG | OXYGEN SATURATION: 98 % | HEIGHT: 75 IN | DIASTOLIC BLOOD PRESSURE: 82 MMHG | RESPIRATION RATE: 17 BRPM | HEART RATE: 72 BPM | WEIGHT: 206.5 LBS | BODY MASS INDEX: 25.68 KG/M2 | TEMPERATURE: 98.2 F

## 2021-06-02 DIAGNOSIS — M35.3 POLYMYALGIA RHEUMATICA (HCC): ICD-10-CM

## 2021-06-02 DIAGNOSIS — L97.512 ULCER OF BOTH FEET WITH FAT LAYER EXPOSED (HCC): ICD-10-CM

## 2021-06-02 DIAGNOSIS — I48.0 PAROXYSMAL ATRIAL FIBRILLATION (HCC): ICD-10-CM

## 2021-06-02 DIAGNOSIS — N18.30 STAGE 3 CHRONIC KIDNEY DISEASE, UNSPECIFIED WHETHER STAGE 3A OR 3B CKD (HCC): ICD-10-CM

## 2021-06-02 DIAGNOSIS — I12.9 HYPERTENSION WITH RENAL DISEASE: ICD-10-CM

## 2021-06-02 DIAGNOSIS — L97.522 ULCER OF BOTH FEET WITH FAT LAYER EXPOSED (HCC): ICD-10-CM

## 2021-06-02 DIAGNOSIS — I50.32 DIASTOLIC CHF, CHRONIC (HCC): Primary | ICD-10-CM

## 2021-06-02 LAB
COMMENT, HOLDF: NORMAL
SAMPLES BEING HELD,HOLD: NORMAL

## 2021-06-02 PROCEDURE — G8536 NO DOC ELDER MAL SCRN: HCPCS | Performed by: INTERNAL MEDICINE

## 2021-06-02 PROCEDURE — 99214 OFFICE O/P EST MOD 30 MIN: CPT | Performed by: INTERNAL MEDICINE

## 2021-06-02 PROCEDURE — 1101F PT FALLS ASSESS-DOCD LE1/YR: CPT | Performed by: INTERNAL MEDICINE

## 2021-06-02 PROCEDURE — G8432 DEP SCR NOT DOC, RNG: HCPCS | Performed by: INTERNAL MEDICINE

## 2021-06-02 PROCEDURE — G8419 CALC BMI OUT NRM PARAM NOF/U: HCPCS | Performed by: INTERNAL MEDICINE

## 2021-06-02 PROCEDURE — G8427 DOCREV CUR MEDS BY ELIG CLIN: HCPCS | Performed by: INTERNAL MEDICINE

## 2021-06-02 RX ORDER — SULFAMETHOXAZOLE AND TRIMETHOPRIM 800; 160 MG/1; MG/1
1 TABLET ORAL 2 TIMES DAILY
Qty: 28 TABLET | Refills: 0 | Status: SHIPPED | OUTPATIENT
Start: 2021-06-02 | End: 2021-07-07 | Stop reason: ALTCHOICE

## 2021-06-02 NOTE — PROGRESS NOTES
Chief Complaint   Patient presents with    Hypertension     1 month follow up    CHF    Diabetes       SUBJECTIVE:    Ann Barrett. is a 80 y.o. male returns in follow-up for his medical problems include congestive heart failure, paroxysmal atrial fibrillation, chronic kidney disease stage III, diabetes, polymyalgia rheumatica, in addition to his chronic problems is developed a problem now with what appears to be a small ulcerated area over the dorsum of both great toes where he has hammertoes. He actually has had cut the area of that in his tennis shoes out. He brought in a list of his blood sugars on a regular basis which I reviewed and they were all good except for he did have 1 blood sugar 145 and is not sure what he ate that time. He denies any fevers or chills. He does note that his toes are painful unless he does cut the area out of his shoes. He denies any chest pain, shortness of breath, palpitations, PND, orthopnea or other cardiac or respiratory complaints. He notes no GI or  complaints. He notes no headaches, dizziness or neurologic complaints. He has no current active arthritic complaints. There are no other particular complaints on complete review of systems. He did see 1 podiatrist and he was not pleased with the answer he got there and is curious about seeing another podiatrist regarding his toes. Current Outpatient Medications   Medication Sig Dispense Refill    trimethoprim-sulfamethoxazole (BACTRIM DS, SEPTRA DS) 160-800 mg per tablet Take 1 Tablet by mouth two (2) times a day.  28 Tablet 0    bumetanide (BUMEX) 2 mg tablet TAKE 1 TABLET BY MOUTH EVERY DAY 90 Tab 1    diclofenac EC (VOLTAREN) 75 mg EC tablet TAKE 1 TABLET BY MOUTH TWICE A  Tab 3    warfarin (COUMADIN) 5 mg tablet TAKE 2 TABLETS BY MOUTH ON MONDAY &FRIDAYS AND 1&1/2 TABLET DAILY ON ALL OTHER DAYS 145 Tab 1    simvastatin (ZOCOR) 20 mg tablet TAKE 1 TABLET BY MOUTH EVERY DAY 90 Tab 1    terazosin (HYTRIN) 2 mg capsule TAKE 1 CAPSULE BY MOUTH EVERY DAY 90 Cap 1    glimepiride (AMARYL) 1 mg tablet Take 1 Tab by mouth Daily (before breakfast). 90 Tab 3    predniSONE (DELTASONE) 10 mg tablet TAKE 1TAB BY MOUTH TWO (2) TIMES A DAY. 60 Tab 3    metFORMIN (GLUCOPHAGE) 500 mg tablet TAKE 1 TABLET BY MOUTH EVERY DAY IN THE MORNING WITH BREAKFAST AND ONE TABLET BEFORE DINNER 180 Tab 1    digoxin (LANOXIN) 0.125 mg tablet TAKE 1 TABLET BY MOUTH EVERY DAY 90 Tab 0    glucose blood VI test strips (Accu-Chek SmartView Test Strip) strip USE ONCE DAILY E11.9 100 Strip PRN    Blood-Glucose Meter monitoring kit Use to test blood sugar twice daily  DX:E11.22 1 Kit 0    glucose blood VI test strips (blood glucose test) strip Use to test blood sugar twice daily  DX:E11.22 100 Strip 1    cyanocobalamin (VITAMIN B12) 500 mcg tablet Take 500 mcg by mouth daily.  acetaminophen (TYLENOL ARTHRITIS PAIN) 650 mg TbER Take 650 mg by mouth every eight (8) hours.  multivitamins-minerals-lutein (MULTIVITAMIN 50 PLUS) tab tablet Take 1 Tab by mouth daily.  glucosamine 1,000 mg tab Take 2,000 mg by mouth daily.  cholecalciferol (VITAMIN D3) 1,000 unit cap Take 1,000 Units by mouth daily.  DOCOSAHEXANOIC ACID/EPA (FISH OIL PO) Take 1,200 mg by mouth two (2) times a day. 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    Back pain 9/20/2017    BPH (benign prostatic hyperplasia) 9/20/2017    CHF (congestive heart failure) ([facility] 9/20/2017    CKD (chronic kidney disease) 9/20/2017    Diabetic acetonemia ([facility]     DM (diabetes mellitus) ([facility] 9/20/2017    Story: Diet Controlled    ED (erectile dysfunction) 9/20/2017    Edema 9/20/2017    Elevated CPK 9/20/2017    Comments: History of    Elevated LFTs 9/20/2017    Comments: History of    Elevated PSA 9/20/2017    Hypercholesteremia     Hyperlipidemia 9/20/2017    Hypertension     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    Nodule of right lung 9/20/2017    Story: Right    Polymyalgia ([address] 9/20/2017    Prostate enlargement      Past Surgical History:   Procedure Laterality Date    HX HEENT  06/12/2018    Dr. 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    HX MALIGNANT SKIN LESION EXCISION  09/2018    2 lesions on head    MO ABDOMEN SURGERY PROC UNLISTED      hernia repair     No Known Allergies    REVIEW OF SYSTEMS:  General: negative for - chills or fever, or weight loss or gain  ENT: negative for - headaches, nasal congestion or tinnitus  Eyes: no blurred or visual changes  Neck: No stiffness or swollen nodes  Respiratory: negative for - cough, hemoptysis, shortness of breath or wheezing  Cardiovascular : negative for - chest pain, edema, palpitations or shortness of breath  Gastrointestinal: negative for - abdominal pain, blood in stools, heartburn or nausea/vomiting  Genito-Urinary: no dysuria, trouble voiding, or hematuria  Musculoskeletal: negative for - gait disturbance, joint pain, joint stiffness or joint swelling.   Sore area top of both great toes with some drainage  Neurological: no TIA or stroke symptoms  Hematologic: no bruises, no bleeding  Lymphatic: no swollen glands  Integument: no lumps, mole changes, nail changes or rash  Endocrine:no malaise/lethargy poly uria or polydipsia or unexpected weight changes        Social History     Socioeconomic History    Marital status:      Spouse name: Not on file    Number of children: Not on file    Years of education: Not on file    Highest education level: Not on file   Tobacco Use    Smoking status: Never Smoker    Smokeless tobacco: Never Used   Vaping Use    Vaping Use: Never used   Substance and Sexual Activity    Alcohol use: No    Drug use: No    Sexual activity: Never     Social Determinants of Health     Financial Resource Strain:     Difficulty of Paying Living Expenses:    Food Insecurity:     Worried About Running Out of Food in the Last Year:     [address] in the Last Year:    Transportation Needs:     Lack of Transportation (Medical):  Lack of Transportation (Non-Medical):    Physical Activity:     Days of Exercise per Week:     Minutes of Exercise per Session:    Stress:     Feeling of Stress :    Social Connections:     Frequency of Communication with Friends and Family:     Frequency of Social Gatherings with Friends and Family:     Attends [employer] Services:     Active Member of Clubs or Organizations:     Attends Club or Organization Meetings:     Marital Status:      History reviewed. No pertinent family history. OBJECTIVE:     Visit Vitals  BP (!) 142/82 (BP 1 Location: Left upper arm, BP Patient Position: Sitting, BP Cuff Size: Adult)   Pulse 72   Temp 98.2 °F (36.8 °C) (Temporal)   Resp 17   Ht 6' 3\" (1.905 m)   Wt 206 lb 8 oz (93.7 kg)   SpO2 98%   BMI 25.81 kg/m²     CONSTITUTIONAL:   well nourished, appears age appropriate  EYES: sclera anicteric, PERRL, EOMI  ENMT:nares clear, moist mucous membranes, pharynx clear  NECK: supple. Thyroid normal, No JVD or bruits  RESPIRATORY: Chest: clear to ascultation and percussion, normal inspiratory effort  CARDIOVASCULAR: Heart: regular rate and rhythm no murmurs, rubs or gallops, PMI not displaced, No thrills, no peripheral edema  GASTROINTESTINAL: Abdomen: non distended, soft, non tender, bowel sounds normal  HEMATOLOGIC: no purpura, petechiae or bruising  LYMPHATIC: No lymph node enlargemant  MUSCULOSKELETAL: Extremities: no active synovitis, pulse 1+. Hammertoe of both great toes with a superficial ulceration over the dorsum of each  INTEGUMENT: No unusual rashes or suspicious skin lesions noted.  Nails appear normal.  PERIPHERAL VASCULAR: normal pulses femoral, PT and DP  NEUROLOGIC: non-focal exam, A & O X 3  PSYCHIATRIC:, appropriate affect     ASSESSMENT:   1. Diastolic CHF, chronic (HCC)    2. Stage 3 chronic kidney disease, unspecified whether stage 3a or 3b CKD (HCC)    3. Paroxysmal atrial fibrillation ([address]    4. Hypertension with renal disease    5. Polymyalgia rheumatica ([address]    6. Ulcer of both feet with fat layer exposed ([address]      Impression  1. Diastolic CHF that appears to be compensated weight 206 he is up 1 pound  2. CKD stage III we will see what that status is  3. Atrial fibrillation rate is controlled INR is pending  4. Hypertension that is adequately controlled at 80years old  5. Polymyalgia rheumatica on low-dose prednisone we will see what the sed rate is  6. Ulcer of both great toes which is concerning these appear to be very superficial to present time. I am when I placed him on Bactrim he is using local care. I will get a podiatry opinion from Dr. Laverne Caro. I will recheck a myself again and about 10 days or sooner should the be a problem. We will have to watch his INR closely with the addition of Bactrim. Moderate complexity decision making on this 30-minute office visit today    PLAN:  .  Orders Placed This Encounter    PROTHROMBIN TIME + INR    SED RATE (ESR)    METABOLIC PANEL, BASIC    CBC WITH AUTOMATED DIFF    CBC WITH AUTOMATED DIFF    REFERRAL TO PODIATRY    trimethoprim-sulfamethoxazole (BACTRIM DS, SEPTRA DS) 160-800 mg per tablet         ATTENTION:   This medical record was transcribed using an electronic medical records system. Although proofread, it may and can contain electronic and spelling errors. Other human spelling and other errors may be present. Corrections may be executed at a later time. Please feel free to contact us for any clarifications as needed. Follow-up and Dispositions    · Return in about 9 days (around 6/11/2021). No results found for any visits on 06/02/21.     Esteban Saucedo MD    The patient verbalized understanding of the problems and plans as explained.

## 2021-06-02 NOTE — PROGRESS NOTES
Chief Complaint   Patient presents with    Hypertension     1 month follow up    CHF    Diabetes     Visit Vitals  BP (!) 142/82 (BP 1 Location: Left upper arm, BP Patient Position: Sitting, BP Cuff Size: Adult)   Pulse 72   Temp 98.2 °F (36.8 °C) (Temporal)   Resp 17   Ht 6' 3\" (1.905 m)   Wt 206 lb 8 oz (93.7 kg)   SpO2 98%   BMI 25.81 kg/m²     1. Have you been to the ER, urgent care clinic since your last visit? Hospitalized since your last visit? No    2. Have you seen or consulted any other health care providers outside of the [address] [place] [address] since your last visit? Include any pap smears or colon screening.  No

## 2021-06-02 NOTE — PATIENT INSTRUCTIONS

## 2021-06-03 LAB
ANION GAP SERPL CALC-SCNC: 4 MMOL/L (ref 5–15)
BASOPHILS # BLD: 0 K/UL (ref 0–0.1)
BASOPHILS NFR BLD: 1 % (ref 0–1)
BUN SERPL-MCNC: 37 MG/DL (ref 6–20)
BUN/CREAT SERPL: 22 (ref 12–20)
CALCIUM SERPL-MCNC: 9.3 MG/DL (ref 8.5–10.1)
CHLORIDE SERPL-SCNC: 106 MMOL/L (ref 97–108)
CO2 SERPL-SCNC: 30 MMOL/L (ref 21–32)
CREAT SERPL-MCNC: 1.67 MG/DL (ref 0.7–1.3)
DIFFERENTIAL METHOD BLD: ABNORMAL
EOSINOPHIL # BLD: 0 K/UL (ref 0–0.4)
EOSINOPHIL NFR BLD: 0 % (ref 0–7)
ERYTHROCYTE [DISTWIDTH] IN BLOOD BY AUTOMATED COUNT: 13.3 % (ref 11.5–14.5)
ERYTHROCYTE [SEDIMENTATION RATE] IN BLOOD: 32 MM/HR (ref 0–20)
GLUCOSE SERPL-MCNC: 181 MG/DL (ref 65–100)
HCT VFR BLD AUTO: 40.2 % (ref 36.6–50.3)
HGB BLD-MCNC: 12.6 G/DL (ref 12.1–17)
IMM GRANULOCYTES # BLD AUTO: 0.1 K/UL (ref 0–0.04)
IMM GRANULOCYTES NFR BLD AUTO: 1 % (ref 0–0.5)
INR PPP: 1.5 (ref 0.9–1.1)
LYMPHOCYTES # BLD: 1 K/UL (ref 0.8–3.5)
LYMPHOCYTES NFR BLD: 12 % (ref 12–49)
MCH RBC QN AUTO: 32.1 PG (ref 26–34)
MCHC RBC AUTO-ENTMCNC: 31.3 G/DL (ref 30–36.5)
MCV RBC AUTO: 102.3 FL (ref 80–99)
MONOCYTES # BLD: 0.5 K/UL (ref 0–1)
MONOCYTES NFR BLD: 5 % (ref 5–13)
NEUTS SEG # BLD: 7.2 K/UL (ref 1.8–8)
NEUTS SEG NFR BLD: 81 % (ref 32–75)
NRBC # BLD: 0 K/UL (ref 0–0.01)
NRBC BLD-RTO: 0 PER 100 WBC
PLATELET # BLD AUTO: 196 K/UL (ref 150–400)
PMV BLD AUTO: 11.4 FL (ref 8.9–12.9)
POTASSIUM SERPL-SCNC: 5 MMOL/L (ref 3.5–5.1)
PROTHROMBIN TIME: 15.6 SEC (ref 9–11.1)
RBC # BLD AUTO: 3.93 M/UL (ref 4.1–5.7)
SODIUM SERPL-SCNC: 140 MMOL/L (ref 136–145)
WBC # BLD AUTO: 8.8 K/UL (ref 4.1–11.1)

## 2021-06-07 ENCOUNTER — TELEPHONE ANTICOAG (OUTPATIENT)
Dept: INTERNAL MEDICINE CLINIC | Age: 86
End: 2021-06-07

## 2021-06-07 NOTE — PROGRESS NOTES
Anticoagulation Summary  As of 2021    INR goal:  2.0-3.0   TTR:  25.3 % (3.1 y)   INR used for dosin.5 (2021)   Warfarin maintenance plan:  10 mg (5 mg x 2) every day   Weekly warfarin total:  70 mg   Plan last modified:  Dallas Polk RN (2021)   Next INR check:  2021   Target end date:      Indications    Paroxysmal atrial fibrillation ([facility] (Primary) [I48.0]             Anticoagulation Episode Summary     INR check location:  Clinic Lab    Preferred lab:      Send INR reminders to:      Comments:        Anticoagulation Care Providers     Provider Role Specialty Phone number    Lane Davis MD Responsible Internal Medicine [phone]        Informed patient that PT/INR okay; continue the same dose of blood thinner which is 10 mg daily and f/up as scheduled.

## 2021-06-07 NOTE — PROGRESS NOTES
Labs w/o significant change. Discussed with patient. Advised to continue the same dose of blood thinner which is 10 mg daily. Fup as scheduled.

## 2021-06-10 PROBLEM — L97.521 ULCER OF BOTH FEET, LIMITED TO BREAKDOWN OF SKIN (HCC): Status: ACTIVE | Noted: 2020-03-12

## 2021-06-10 PROBLEM — L97.511 ULCER OF BOTH FEET, LIMITED TO BREAKDOWN OF SKIN (HCC): Status: ACTIVE | Noted: 2020-03-12

## 2021-06-11 ENCOUNTER — OFFICE VISIT (OUTPATIENT)
Dept: INTERNAL MEDICINE CLINIC | Age: 86
End: 2021-06-11
Payer: MEDICARE

## 2021-06-11 VITALS
WEIGHT: 202.6 LBS | RESPIRATION RATE: 16 BRPM | TEMPERATURE: 98.6 F | DIASTOLIC BLOOD PRESSURE: 58 MMHG | OXYGEN SATURATION: 98 % | HEIGHT: 75 IN | BODY MASS INDEX: 25.19 KG/M2 | HEART RATE: 92 BPM | SYSTOLIC BLOOD PRESSURE: 122 MMHG

## 2021-06-11 DIAGNOSIS — L97.511 ULCER OF BOTH FEET, LIMITED TO BREAKDOWN OF SKIN (HCC): Primary | ICD-10-CM

## 2021-06-11 DIAGNOSIS — N18.30 STAGE 3 CHRONIC KIDNEY DISEASE, UNSPECIFIED WHETHER STAGE 3A OR 3B CKD (HCC): ICD-10-CM

## 2021-06-11 DIAGNOSIS — I12.9 HYPERTENSION WITH RENAL DISEASE: ICD-10-CM

## 2021-06-11 DIAGNOSIS — M35.3 POLYMYALGIA RHEUMATICA (HCC): ICD-10-CM

## 2021-06-11 DIAGNOSIS — I48.0 PAROXYSMAL ATRIAL FIBRILLATION (HCC): ICD-10-CM

## 2021-06-11 DIAGNOSIS — L97.521 ULCER OF BOTH FEET, LIMITED TO BREAKDOWN OF SKIN (HCC): Primary | ICD-10-CM

## 2021-06-11 LAB
ANION GAP SERPL CALC-SCNC: 8 MMOL/L (ref 5–15)
BASOPHILS # BLD: 0 K/UL (ref 0–0.1)
BASOPHILS NFR BLD: 0 % (ref 0–1)
BUN SERPL-MCNC: 35 MG/DL (ref 6–20)
BUN/CREAT SERPL: 19 (ref 12–20)
CALCIUM SERPL-MCNC: 9.5 MG/DL (ref 8.5–10.1)
CHLORIDE SERPL-SCNC: 106 MMOL/L (ref 97–108)
CO2 SERPL-SCNC: 22 MMOL/L (ref 21–32)
COMMENT, HOLDF: NORMAL
CREAT SERPL-MCNC: 1.82 MG/DL (ref 0.7–1.3)
DIFFERENTIAL METHOD BLD: ABNORMAL
EOSINOPHIL # BLD: 0 K/UL (ref 0–0.4)
EOSINOPHIL NFR BLD: 0 % (ref 0–7)
ERYTHROCYTE [DISTWIDTH] IN BLOOD BY AUTOMATED COUNT: 13.4 % (ref 11.5–14.5)
ERYTHROCYTE [SEDIMENTATION RATE] IN BLOOD: 43 MM/HR (ref 0–20)
GLUCOSE SERPL-MCNC: 257 MG/DL (ref 65–100)
HCT VFR BLD AUTO: 37.9 % (ref 36.6–50.3)
HGB BLD-MCNC: 11.9 G/DL (ref 12.1–17)
IMM GRANULOCYTES # BLD AUTO: 0.1 K/UL (ref 0–0.04)
IMM GRANULOCYTES NFR BLD AUTO: 1 % (ref 0–0.5)
INR PPP: 3.1 (ref 0.9–1.1)
LYMPHOCYTES # BLD: 0.9 K/UL (ref 0.8–3.5)
LYMPHOCYTES NFR BLD: 11 % (ref 12–49)
MCH RBC QN AUTO: 31.6 PG (ref 26–34)
MCHC RBC AUTO-ENTMCNC: 31.4 G/DL (ref 30–36.5)
MCV RBC AUTO: 100.5 FL (ref 80–99)
MONOCYTES # BLD: 0.5 K/UL (ref 0–1)
MONOCYTES NFR BLD: 5 % (ref 5–13)
NEUTS SEG # BLD: 7.4 K/UL (ref 1.8–8)
NEUTS SEG NFR BLD: 83 % (ref 32–75)
NRBC # BLD: 0 K/UL (ref 0–0.01)
NRBC BLD-RTO: 0 PER 100 WBC
PLATELET # BLD AUTO: 187 K/UL (ref 150–400)
PMV BLD AUTO: 11.1 FL (ref 8.9–12.9)
POTASSIUM SERPL-SCNC: 5 MMOL/L (ref 3.5–5.1)
PROTHROMBIN TIME: 31 SEC (ref 9–11.1)
RBC # BLD AUTO: 3.77 M/UL (ref 4.1–5.7)
SAMPLES BEING HELD,HOLD: NORMAL
SODIUM SERPL-SCNC: 136 MMOL/L (ref 136–145)
WBC # BLD AUTO: 9 K/UL (ref 4.1–11.1)

## 2021-06-11 PROCEDURE — 99213 OFFICE O/P EST LOW 20 MIN: CPT | Performed by: INTERNAL MEDICINE

## 2021-06-11 PROCEDURE — G8427 DOCREV CUR MEDS BY ELIG CLIN: HCPCS | Performed by: INTERNAL MEDICINE

## 2021-06-11 PROCEDURE — G8510 SCR DEP NEG, NO PLAN REQD: HCPCS | Performed by: INTERNAL MEDICINE

## 2021-06-11 PROCEDURE — G8419 CALC BMI OUT NRM PARAM NOF/U: HCPCS | Performed by: INTERNAL MEDICINE

## 2021-06-11 PROCEDURE — 1101F PT FALLS ASSESS-DOCD LE1/YR: CPT | Performed by: INTERNAL MEDICINE

## 2021-06-11 PROCEDURE — G8536 NO DOC ELDER MAL SCRN: HCPCS | Performed by: INTERNAL MEDICINE

## 2021-06-11 NOTE — PROGRESS NOTES
Chief Complaint   Patient presents with    Blood Pressure Check     2 wk follow up       SUBJECTIVE:    Nanette Kyle. is a 80 y.o. male turns in follow-up regarding cellulitis of both great toes the left 1 seems to be essentially healed the right wound has minimal drainage. He does have a few more days of the Bactrim. He did not yet see the podiatrist.  He denies any chest pain, shortness of breath or cardiorespiratory malaise. He notes no fevers or chills. He notes no other complaints. He is also here in follow-up of his congestive heart failure, CKD, polymyalgia rheumatica and because he is on Bactrim I went to check his INR since he is on the Coumadin for A. fib. Current Outpatient Medications   Medication Sig Dispense Refill    trimethoprim-sulfamethoxazole (BACTRIM DS, SEPTRA DS) 160-800 mg per tablet Take 1 Tablet by mouth two (2) times a day. 28 Tablet 0    bumetanide (BUMEX) 2 mg tablet TAKE 1 TABLET BY MOUTH EVERY DAY 90 Tab 1    diclofenac EC (VOLTAREN) 75 mg EC tablet TAKE 1 TABLET BY MOUTH TWICE A  Tab 3    warfarin (COUMADIN) 5 mg tablet TAKE 2 TABLETS BY MOUTH ON MONDAY &FRIDAYS AND 1&1/2 TABLET DAILY ON ALL OTHER DAYS 145 Tab 1    simvastatin (ZOCOR) 20 mg tablet TAKE 1 TABLET BY MOUTH EVERY DAY 90 Tab 1    terazosin (HYTRIN) 2 mg capsule TAKE 1 CAPSULE BY MOUTH EVERY DAY 90 Cap 1    glimepiride (AMARYL) 1 mg tablet Take 1 Tab by mouth Daily (before breakfast). 90 Tab 3    predniSONE (DELTASONE) 10 mg tablet TAKE 1TAB BY MOUTH TWO (2) TIMES A DAY.  60 Tab 3    metFORMIN (GLUCOPHAGE) 500 mg tablet TAKE 1 TABLET BY MOUTH EVERY DAY IN THE MORNING WITH BREAKFAST AND ONE TABLET BEFORE DINNER 180 Tab 1    digoxin (LANOXIN) 0.125 mg tablet TAKE 1 TABLET BY MOUTH EVERY DAY 90 Tab 0    glucose blood VI test strips (Accu-Chek SmartView Test Strip) strip USE ONCE DAILY E11.9 100 Strip PRN    Blood-Glucose Meter monitoring kit Use to test blood sugar twice daily  DX:E11.22 1 Kit 0    glucose blood VI test strips (blood glucose test) strip Use to test blood sugar twice daily  DX:E11.22 100 Strip 1    cyanocobalamin (VITAMIN B12) 500 mcg tablet Take 500 mcg by mouth daily.  acetaminophen (TYLENOL ARTHRITIS PAIN) 650 mg TbER Take 650 mg by mouth every eight (8) hours.  multivitamins-minerals-lutein (MULTIVITAMIN 50 PLUS) tab tablet Take 1 Tab by mouth daily.  glucosamine 1,000 mg tab Take 2,000 mg by mouth daily.  cholecalciferol (VITAMIN D3) 1,000 unit cap Take 1,000 Units by mouth daily.  DOCOSAHEXANOIC ACID/EPA (FISH OIL PO) Take 1,200 mg by mouth two (2) times a day. 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    Back pain 9/20/2017    BPH (benign prostatic hyperplasia) 9/20/2017    CHF (congestive heart failure) ([address] 9/20/2017    CKD (chronic kidney disease) 9/20/2017    Diabetic acetonemia ([address]     DM (diabetes mellitus) ([address] 9/20/2017    Story: Diet Controlled    ED (erectile dysfunction) 9/20/2017    Edema 9/20/2017    Elevated CPK 9/20/2017    Comments: History of    Elevated LFTs 9/20/2017    Comments: History of    Elevated PSA 9/20/2017    Hypercholesteremia     Hyperlipidemia 9/20/2017    Hypertension     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    Nodule of right lung 9/20/2017    Story: Right    Polymyalgia ([address] 9/20/2017    Prostate enlargement      Past Surgical History:   Procedure Laterality Date    HX HEENT  06/12/2018    Dr. 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    HX MALIGNANT SKIN LESION EXCISION  09/2018    2 lesions on head    FL ABDOMEN SURGERY PROC UNLISTED      hernia repair     No Known Allergies    REVIEW OF SYSTEMS:  General: negative for - chills or fever, or weight loss or gain  ENT: negative for - headaches, nasal congestion or tinnitus  Eyes: no blurred or visual changes  Neck: No stiffness or swollen nodes  Respiratory: negative for - cough, hemoptysis, shortness of breath or wheezing  Cardiovascular : negative for - chest pain, edema, palpitations or shortness of breath  Gastrointestinal: negative for - abdominal pain, blood in stools, heartburn or nausea/vomiting  Genito-Urinary: no dysuria, trouble voiding, or hematuria  Musculoskeletal: negative for - gait disturbance, joint pain, joint stiffness or joint swelling  Neurological: no TIA or stroke symptoms  Hematologic: no bruises, no bleeding  Lymphatic: no swollen glands  Integument: no lumps, mole changes, nail changes or rash. Open areas both great toes left appears to be without further drainage right has slight drainage  Endocrine:no malaise/lethargy poly uria or polydipsia or unexpected weight changes        Social History     Socioeconomic History    Marital status:      Spouse name: Not on file    Number of children: Not on file    Years of education: Not on file    Highest education level: Not on file   Tobacco Use    Smoking status: Never Smoker    Smokeless tobacco: Never Used   Vaping Use    Vaping Use: Never used   Substance and Sexual Activity    Alcohol use: No    Drug use: No    Sexual activity: Never     Social Determinants of Health     Financial Resource Strain:     Difficulty of Paying Living Expenses:    Food Insecurity:     Worried About Running Out of Food in the Last Year:     Ran Out of Food in the Last Year:    Transportation Needs:     Lack of Transportation (Medical):      Lack of Transportation (Non-Medical):    Physical Activity:     Days of Exercise per Week:     Minutes of Exercise per Session:    Stress:     Feeling of Stress :    Social Connections:     Frequency of Communication with Friends and Family:     Frequency of Social Gatherings with Friends and Family:     Attends [employer] Services:     Active Member of Clubs or Organizations:  Attends Club or Organization Meetings:     Marital Status:      History reviewed. No pertinent family history. OBJECTIVE:     Visit Vitals  BP (!) 122/58 (BP 1 Location: Left upper arm, BP Patient Position: Sitting, BP Cuff Size: Small adult)   Pulse 92   Temp 98.6 °F (37 °C)   Resp 16   Ht 6' 3\" (1.905 m)   Wt 202 lb 9.6 oz (91.9 kg)   SpO2 98%   BMI 25.32 kg/m²     CONSTITUTIONAL:   well nourished, appears age appropriate  EYES: sclera anicteric, PERRL, EOMI  ENMT:nares clear, moist mucous membranes, pharynx clear  NECK: supple. Thyroid normal, No JVD or bruits  RESPIRATORY: Chest: clear to ascultation and percussion, normal inspiratory effort  CARDIOVASCULAR: Heart: regular rate and rhythm no murmurs, rubs or gallops, PMI not displaced, No thrills, no peripheral edema  GASTROINTESTINAL: Abdomen: non distended, soft, non tender, bowel sounds normal  HEMATOLOGIC: no purpura, petechiae or bruising  LYMPHATIC: No lymph node enlargemant  MUSCULOSKELETAL: Extremities: no active synovitis, pulse 1+   INTEGUMENT: No unusual rashes or suspicious skin lesions noted. Nails appear normal.  Cellulitis appears to be healed over the left great toe with some mild drainage over the right great toe but no erythema calluses still present. Hammertoes a problem  PERIPHERAL VASCULAR: normal pulses femoral, PT and DP  NEUROLOGIC: non-focal exam, A & O X 3  PSYCHIATRIC:, appropriate affect     ASSESSMENT:   1. Ulcer of both feet, limited to breakdown of skin (HCC)    2. Stage 3 chronic kidney disease, unspecified whether stage 3a or 3b CKD (HCC)    3. Paroxysmal atrial fibrillation ([address]    4. Hypertension with renal disease    5. Polymyalgia rheumatica (HCC)      Impression  1. Ulcer of both feet markedly improved complete Bactrim continue podiatry of leg evaluation  2. CKD stage III repeat status pending  3. Paroxysmal atrial fibrillation INR pending  4. Hypertension control adequate  5.   Polymyalgia rheumatica sed rate pending  I will recheck him again myself in about 2 to 3 weeks. I will call with lab results. PLAN:  .  Orders Placed This Encounter    CBC WITH AUTOMATED DIFF    METABOLIC PANEL, BASIC    SED RATE (ESR)    PROTHROMBIN TIME + INR         ATTENTION:   This medical record was transcribed using an electronic medical records system. Although proofread, it may and can contain electronic and spelling errors. Other human spelling and other errors may be present. Corrections may be executed at a later time. Please feel free to contact us for any clarifications as needed. Follow-up and Dispositions    · Return TBD. No results found for any visits on 06/11/21. Goldy Richard MD    The patient verbalized understanding of the problems and plans as explained.

## 2021-06-11 NOTE — PROGRESS NOTES
Ulysses Curd is a 80 y.o. male    Chief Complaint   Patient presents with    Blood Pressure Check     2 wk follow up       Visit Vitals  BP (!) 122/58 (BP 1 Location: Left upper arm, BP Patient Position: Sitting, BP Cuff Size: Small adult)   Pulse 92   Temp 98.6 °F (37 °C)   Resp 16   Ht 6' 3\" (1.905 m)   Wt 202 lb 9.6 oz (91.9 kg)   SpO2 98%   BMI 25.32 kg/m²           1. Have you been to the ER, urgent care clinic since your last visit? Hospitalized since your last visit? No     2. Have you seen or consulted any other health care providers outside of the [address] [place] [address] since your last visit? Include any pap smears or colon screening.  No

## 2021-06-11 NOTE — PATIENT INSTRUCTIONS
Learning About Carbohydrate (Carb) Counting and Eating Out When You Have Diabetes Why plan your meals? Meal planning can be a key part of managing diabetes. Planning meals and snacks with the right balance of carbohydrate, protein, and fat can help you keep your blood sugar at the target level you set with your doctor. You don't have to eat special foods. You can eat what your family eats, including sweets once in a while. But you do have to pay attention to how often you eat and how much you eat of certain foods. You may want to work with a dietitian or a certified diabetes educator. He or she can give you tips and meal ideas and can answer your questions about meal planning. This health professional can also help you reach a healthy weight if that is one of your goals. What should you know about eating carbs? Managing the amount of carbohydrate (carbs) you eat is an important part of healthy meals when you have diabetes. Carbohydrate is found in many foods. · Learn which foods have carbs. And learn the amounts of carbs in different foods. ? Bread, cereal, pasta, and rice have about 15 grams of carbs in a serving. A serving is 1 slice of bread (1 ounce), ½ cup of cooked cereal, or 1/3 cup of cooked pasta or rice. ? Fruits have 15 grams of carbs in a serving. A serving is 1 small fresh fruit, such as an apple or orange; ½ of a banana; ½ cup of cooked or canned fruit; ½ cup of fruit juice; 1 cup of melon or raspberries; or 2 tablespoons of dried fruit. ? Milk and no-sugar-added yogurt have 15 grams of carbs in a serving. A serving is 1 cup of milk or 2/3 cup of no-sugar-added yogurt. ? Starchy vegetables have 15 grams of carbs in a serving. A serving is ½ cup of mashed potatoes or sweet potato; 1 cup winter squash; ½ of a small baked potato; ½ cup of cooked beans; or ½ cup cooked corn or green peas.  
· Learn how much carbs to eat each day and at each meal. A dietitian or CDE can teach you how to keep track of the amount of carbs you eat. This is called carbohydrate counting. · If you are not sure how to count carbohydrate grams, use the Plate Method to plan meals. It is a good, quick way to make sure that you have a balanced meal. It also helps you spread carbs throughout the day. ? Divide your plate by types of foods. Put non-starchy vegetables on half the plate, meat or other protein food on one-quarter of the plate, and a grain or starchy vegetable in the final quarter of the plate. To this you can add a small piece of fruit and 1 cup of milk or yogurt, depending on how many carbs you are supposed to eat at a meal. 
· Try to eat about the same amount of carbs at each meal. Do not \"save up\" your daily allowance of carbs to eat at one meal. 
· Proteins have very little or no carbs per serving. Examples of proteins are beef, chicken, turkey, fish, eggs, tofu, cheese, cottage cheese, and peanut butter. A serving size of meat is 3 ounces, which is about the size of a deck of cards. Examples of meat substitute serving sizes (equal to 1 ounce of meat) are 1/4 cup of cottage cheese, 1 egg, 1 tablespoon of peanut butter, and ½ cup of tofu. How can you eat out and still eat healthy? · Learn to estimate the serving sizes of foods that have carbohydrate. If you measure food at home, it will be easier to estimate the amount in a serving of restaurant food. · If the meal you order has too much carbohydrate (such as potatoes, corn, or baked beans), ask to have a low-carbohydrate food instead. Ask for a salad or green vegetables. · If you use insulin, check your blood sugar before and after eating out to help you plan how much to eat in the future. · If you eat more carbohydrate at a meal than you had planned, take a walk or do other exercise. This will help lower your blood sugar. What are some tips for eating healthy? · Limit saturated fat, such as the fat from meat and dairy products.  This is a healthy choice because people who have diabetes are at higher risk of heart disease. So choose lean cuts of meat and nonfat or low-fat dairy products. Use olive or canola oil instead of butter or shortening when cooking. · Don't skip meals. Your blood sugar may drop too low if you skip meals and take insulin or certain medicines for diabetes. · Check with your doctor before you drink alcohol. Alcohol can cause your blood sugar to drop too low. Alcohol can also cause a bad reaction if you take certain diabetes medicines. Follow-up care is a key part of your treatment and safety. Be sure to make and go to all appointments, and call your doctor if you are having problems. It's also a good idea to know your test results and keep a list of the medicines you take. Where can you learn more? Go to http://www.gray.com/ Enter M179 in the search box to learn more about \"Learning About Carbohydrate (Carb) Counting and Eating Out When You Have Diabetes. \" Current as of: August 31, 2020               Content Version: 12.8 © 2006-2021 [employer]. Care instructions adapted under license by [employer] (which disclaims liability or warranty for this information). If you have questions about a medical condition or this instruction, always ask your healthcare professional. [address] any warranty or liability for your use of this information.

## 2021-06-14 ENCOUNTER — TELEPHONE ANTICOAG (OUTPATIENT)
Dept: INTERNAL MEDICINE CLINIC | Age: 86
End: 2021-06-14

## 2021-06-14 NOTE — PROGRESS NOTES
Labs OK except increased INR so decreased Coumadin to 7.5 on Mondays if currently on 10 on Mon and otherwise continue same. Discussed with patient to change his blood thinner dose to 7.5 mg on Monday and continue the 10 mg all other days and f/up as scheduled.

## 2021-06-14 NOTE — PROGRESS NOTES
Labs OK except increased INR so decreased Coumadin to 7.5 on Mondays if currently on 10 on Mon and otherwise continue same

## 2021-06-28 RX ORDER — PREDNISONE 10 MG/1
TABLET ORAL
Qty: 60 TABLET | Refills: 3 | Status: SHIPPED | OUTPATIENT
Start: 2021-06-28 | End: 2021-10-25

## 2021-06-28 NOTE — TELEPHONE ENCOUNTER
RX refill request from the patient/pharmacy. Patient last seen 06- with labs, and next appt. scheduled for 07-  Requested Prescriptions     Pending Prescriptions Disp Refills    predniSONE (DELTASONE) 10 mg tablet [Pharmacy Med Name: PREDNISONE 10 MG TABLET] 60 Tablet 3     Sig: TAKE 1TAB BY MOUTH TWO (2) TIMES A DAY. Steven Angry

## 2021-07-06 NOTE — PROGRESS NOTES
Chief Complaint   Patient presents with    Hypertension     3 month follow up    CHF    Diabetes    Cholesterol Problem       SUBJECTIVE:    Meghan Yo. is a 80 y.o. male who returns in follow-up of his medical problems include hypertension, diabetes, hyperlipidemia, DJD, paroxysmal atrial fibrillation, polymyalgia rheumatica, ASCVD and other medical problems. He is taking his medications and trying to follow his diet and remains physically active. He currently denies any chest pain, shortness of breath, palpitations, PND, orthopnea or other cardiac or respiratory complaints. He notes no GI or  complaints. He notes no headaches, dizziness or neurologic complaints. He has no change of his chronic arthritic complaints and and no other complaints on the review of systems. Current Outpatient Medications   Medication Sig Dispense Refill    predniSONE (DELTASONE) 10 mg tablet TAKE 1TAB BY MOUTH TWO (2) TIMES A DAY. 60 Tablet 3    bumetanide (BUMEX) 2 mg tablet TAKE 1 TABLET BY MOUTH EVERY DAY 90 Tab 1    diclofenac EC (VOLTAREN) 75 mg EC tablet TAKE 1 TABLET BY MOUTH TWICE A  Tab 3    warfarin (COUMADIN) 5 mg tablet TAKE 2 TABLETS BY MOUTH ON MONDAY &FRIDAYS AND 1&1/2 TABLET DAILY ON ALL OTHER DAYS 145 Tab 1    simvastatin (ZOCOR) 20 mg tablet TAKE 1 TABLET BY MOUTH EVERY DAY 90 Tab 1    terazosin (HYTRIN) 2 mg capsule TAKE 1 CAPSULE BY MOUTH EVERY DAY 90 Cap 1    glimepiride (AMARYL) 1 mg tablet Take 1 Tab by mouth Daily (before breakfast).  90 Tab 3    metFORMIN (GLUCOPHAGE) 500 mg tablet TAKE 1 TABLET BY MOUTH EVERY DAY IN THE MORNING WITH BREAKFAST AND ONE TABLET BEFORE DINNER 180 Tab 1    digoxin (LANOXIN) 0.125 mg tablet TAKE 1 TABLET BY MOUTH EVERY DAY 90 Tab 0    glucose blood VI test strips (Accu-Chek SmartView Test Strip) strip USE ONCE DAILY E11.9 100 Strip PRN    Blood-Glucose Meter monitoring kit Use to test blood sugar twice daily  DX:E11.22 1 Kit 0    glucose blood VI test strips (blood glucose test) strip Use to test blood sugar twice daily  DX:E11.22 100 Strip 1    cyanocobalamin (VITAMIN B12) 500 mcg tablet Take 500 mcg by mouth daily.  acetaminophen (TYLENOL ARTHRITIS PAIN) 650 mg TbER Take 650 mg by mouth every eight (8) hours.  multivitamins-minerals-lutein (MULTIVITAMIN 50 PLUS) tab tablet Take 1 Tab by mouth daily.  glucosamine 1,000 mg tab Take 2,000 mg by mouth daily.  cholecalciferol (VITAMIN D3) 1,000 unit cap Take 1,000 Units by mouth daily.  DOCOSAHEXANOIC ACID/EPA (FISH OIL PO) Take 1,200 mg by mouth two (2) times a day. 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    Back pain 9/20/2017    BPH (benign prostatic hyperplasia) 9/20/2017    CHF (congestive heart failure) ([address] 9/20/2017    CKD (chronic kidney disease) 9/20/2017    Diabetic acetonemia ([address]     DM (diabetes mellitus) ([address] 9/20/2017    Story: Diet Controlled    ED (erectile dysfunction) 9/20/2017    Edema 9/20/2017    Elevated CPK 9/20/2017    Comments: History of    Elevated LFTs 9/20/2017    Comments: History of    Elevated PSA 9/20/2017    Hypercholesteremia     Hyperlipidemia 9/20/2017    Hypertension     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    Nodule of right lung 9/20/2017    Story: Right    Polymyalgia ([address] 9/20/2017    Prostate enlargement      Past Surgical History:   Procedure Laterality Date    HX HEENT  06/12/2018    Dr. 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    HX MALIGNANT SKIN LESION EXCISION  09/2018    2 lesions on head    DE ABDOMEN SURGERY PROC UNLISTED      hernia repair     No Known Allergies    REVIEW OF SYSTEMS:  General: negative for - chills or fever, or weight loss or gain  ENT: negative for - headaches, nasal congestion or tinnitus  Eyes: no blurred or visual changes  Neck: No stiffness or swollen nodes  Respiratory: negative for - cough, hemoptysis, shortness of breath or wheezing  Cardiovascular : negative for - chest pain, edema, palpitations or shortness of breath  Gastrointestinal: negative for - abdominal pain, blood in stools, heartburn or nausea/vomiting  Genito-Urinary: no dysuria, trouble voiding, or hematuria  Musculoskeletal: negative for - gait disturbance, joint pain, joint stiffness or joint swelling  Neurological: no TIA or stroke symptoms  Hematologic: no bruises, no bleeding  Lymphatic: no swollen glands  Integument: no lumps, mole changes, nail changes or rash  Endocrine:no malaise/lethargy poly uria or polydipsia or unexpected weight changes        Social History     Socioeconomic History    Marital status:      Spouse name: Not on file    Number of children: Not on file    Years of education: Not on file    Highest education level: Not on file   Tobacco Use    Smoking status: Never Smoker    Smokeless tobacco: Never Used   Vaping Use    Vaping Use: Never used   Substance and Sexual Activity    Alcohol use: No    Drug use: No    Sexual activity: Never     Social Determinants of Health     Financial Resource Strain:     Difficulty of Paying Living Expenses:    Food Insecurity:     Worried About Running Out of Food in the Last Year:     Ran Out of Food in the Last Year:    Transportation Needs:     Lack of Transportation (Medical):  Lack of Transportation (Non-Medical):    Physical Activity:     Days of Exercise per Week:     Minutes of Exercise per Session:    Stress:     Feeling of Stress :    Social Connections:     Frequency of Communication with Friends and Family:     Frequency of Social Gatherings with Friends and Family:     Attends [employer] Services:     Active Member of Clubs or Organizations:     Attends Club or Organization Meetings:     Marital Status:      History reviewed.  No pertinent family history. OBJECTIVE:     Visit Vitals  /72   Pulse (!) 58   Temp 98 °F (36.7 °C) (Temporal)   Resp 17   Ht 6' 3\" (1.905 m)   Wt 204 lb 4.8 oz (92.7 kg)   SpO2 97%   BMI 25.54 kg/m²     CONSTITUTIONAL:   well nourished, appears age appropriate  EYES: sclera anicteric, PERRL, EOMI  ENMT:nares clear, moist mucous membranes, pharynx clear  NECK: supple. Thyroid normal, No JVD or bruits  RESPIRATORY: Chest: clear to ascultation and percussion, normal inspiratory effort  CARDIOVASCULAR: Heart: regular rate and rhythm no murmurs, rubs or gallops, PMI not displaced, No thrills, no peripheral edema  GASTROINTESTINAL: Abdomen: non distended, soft, non tender, bowel sounds normal  HEMATOLOGIC: no purpura, petechiae or bruising  LYMPHATIC: No lymph node enlargemant  MUSCULOSKELETAL: Extremities: no active synovitis, pulse 1+   INTEGUMENT: No unusual rashes or suspicious skin lesions noted. Nails appear normal.  PERIPHERAL VASCULAR: normal pulses femoral, PT and DP  NEUROLOGIC: non-focal exam, A & O X 3  PSYCHIATRIC:, appropriate affect     ASSESSMENT:   1. Hypertension with renal disease    2. 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, without long-term current use of insulin ([address]    3. Mixed hyperlipidemia    4. Diastolic CHF, chronic (HCC)    5. Paroxysmal atrial fibrillation ([address]    6. Primary osteoarthritis involving multiple joints    7. Stage 3 chronic kidney disease, unspecified whether stage 3a or 3b CKD ([address]    8. ASCVD (arteriosclerotic cardiovascular disease)    9. Polymyalgia rheumatica (HCC)      Impression  1. Hypertension that is well controlled so continue current therapy reviewed with him. 2.  Diabetes repeat status pending and prior lab review not make adjustments if necessary. 3.  Hyperlipidemia prior lab reviewed repeat status pending and I will adjust if needed. 4.  Diastolic CHF that seems to be stable  5. Paroxysmal atrial fibrillation in sinus rhythm now. INR pending  6. DJD chronic but stable  7. CKD stage III repeat status pending  8. ASCVD clinically stable continue aspirin daily  9. Polymyalgia rheumatica sed rate pending  I will call with lab and make further recommendations or adjustments if necessary. Follow-up in 1 month regarding his polymyalgia and A. fib    PLAN:  .  Orders Placed This Encounter    METABOLIC PANEL, COMPREHENSIVE    LIPID PANEL    CK    HEMOGLOBIN A1C WITH EAG    SED RATE (ESR)    PROTHROMBIN TIME + INR         ATTENTION:   This medical record was transcribed using an electronic medical records system. Although proofread, it may and can contain electronic and spelling errors. Other human spelling and other errors may be present. Corrections may be executed at a later time. Please feel free to contact us for any clarifications as needed. Follow-up and Dispositions    · Return in about 3 months (around 10/7/2021). No results found for any visits on 07/07/21. Brent Marie MD    The patient verbalized understanding of the problems and plans as explained.

## 2021-07-07 ENCOUNTER — OFFICE VISIT (OUTPATIENT)
Dept: INTERNAL MEDICINE CLINIC | Age: 86
End: 2021-07-07
Payer: MEDICARE

## 2021-07-07 VITALS
SYSTOLIC BLOOD PRESSURE: 134 MMHG | DIASTOLIC BLOOD PRESSURE: 72 MMHG | HEIGHT: 75 IN | WEIGHT: 204.3 LBS | RESPIRATION RATE: 17 BRPM | HEART RATE: 58 BPM | TEMPERATURE: 98 F | OXYGEN SATURATION: 97 % | BODY MASS INDEX: 25.4 KG/M2

## 2021-07-07 DIAGNOSIS — M35.3 POLYMYALGIA RHEUMATICA (HCC): ICD-10-CM

## 2021-07-07 DIAGNOSIS — I50.32 DIASTOLIC CHF, CHRONIC (HCC): ICD-10-CM

## 2021-07-07 DIAGNOSIS — E11.22 CONTROLLED TYPE 2 DIABETES MELLITUS WITH STAGE 3 CHRONIC KIDNEY DISEASE, WITHOUT LONG-TERM CURRENT USE OF INSULIN (HCC): ICD-10-CM

## 2021-07-07 DIAGNOSIS — I48.0 PAROXYSMAL ATRIAL FIBRILLATION (HCC): ICD-10-CM

## 2021-07-07 DIAGNOSIS — M15.9 PRIMARY OSTEOARTHRITIS INVOLVING MULTIPLE JOINTS: ICD-10-CM

## 2021-07-07 DIAGNOSIS — I25.10 ASCVD (ARTERIOSCLEROTIC CARDIOVASCULAR DISEASE): ICD-10-CM

## 2021-07-07 DIAGNOSIS — N18.30 STAGE 3 CHRONIC KIDNEY DISEASE, UNSPECIFIED WHETHER STAGE 3A OR 3B CKD (HCC): ICD-10-CM

## 2021-07-07 DIAGNOSIS — E78.2 MIXED HYPERLIPIDEMIA: ICD-10-CM

## 2021-07-07 DIAGNOSIS — N18.30 CONTROLLED TYPE 2 DIABETES MELLITUS WITH STAGE 3 CHRONIC KIDNEY DISEASE, WITHOUT LONG-TERM CURRENT USE OF INSULIN (HCC): ICD-10-CM

## 2021-07-07 DIAGNOSIS — I12.9 HYPERTENSION WITH RENAL DISEASE: Primary | ICD-10-CM

## 2021-07-07 LAB
ALBUMIN SERPL-MCNC: 3.7 G/DL (ref 3.5–5)
ALBUMIN/GLOB SERPL: 1.2 {RATIO} (ref 1.1–2.2)
ALP SERPL-CCNC: 59 U/L (ref 45–117)
ALT SERPL-CCNC: 33 U/L (ref 12–78)
ANION GAP SERPL CALC-SCNC: 3 MMOL/L (ref 5–15)
AST SERPL-CCNC: 24 U/L (ref 15–37)
BILIRUB SERPL-MCNC: 0.3 MG/DL (ref 0.2–1)
BUN SERPL-MCNC: 28 MG/DL (ref 6–20)
BUN/CREAT SERPL: 23 (ref 12–20)
CALCIUM SERPL-MCNC: 9.3 MG/DL (ref 8.5–10.1)
CHLORIDE SERPL-SCNC: 107 MMOL/L (ref 97–108)
CHOLEST SERPL-MCNC: 215 MG/DL
CK SERPL-CCNC: 115 U/L (ref 39–308)
CO2 SERPL-SCNC: 30 MMOL/L (ref 21–32)
CREAT SERPL-MCNC: 1.22 MG/DL (ref 0.7–1.3)
ERYTHROCYTE [SEDIMENTATION RATE] IN BLOOD: 34 MM/HR (ref 0–20)
EST. AVERAGE GLUCOSE BLD GHB EST-MCNC: 146 MG/DL
GLOBULIN SER CALC-MCNC: 3.2 G/DL (ref 2–4)
GLUCOSE SERPL-MCNC: 124 MG/DL (ref 65–100)
HBA1C MFR BLD: 6.7 % (ref 4–5.6)
HDLC SERPL-MCNC: 43 MG/DL
HDLC SERPL: 5 {RATIO} (ref 0–5)
INR PPP: 2.3 (ref 0.9–1.1)
LDLC SERPL CALC-MCNC: 101.8 MG/DL (ref 0–100)
POTASSIUM SERPL-SCNC: 4.9 MMOL/L (ref 3.5–5.1)
PROT SERPL-MCNC: 6.9 G/DL (ref 6.4–8.2)
PROTHROMBIN TIME: 23.2 SEC (ref 9–11.1)
SODIUM SERPL-SCNC: 140 MMOL/L (ref 136–145)
TRIGL SERPL-MCNC: 351 MG/DL (ref ?–150)
VLDLC SERPL CALC-MCNC: 70.2 MG/DL

## 2021-07-07 PROCEDURE — G8432 DEP SCR NOT DOC, RNG: HCPCS | Performed by: INTERNAL MEDICINE

## 2021-07-07 PROCEDURE — G8427 DOCREV CUR MEDS BY ELIG CLIN: HCPCS | Performed by: INTERNAL MEDICINE

## 2021-07-07 PROCEDURE — 99214 OFFICE O/P EST MOD 30 MIN: CPT | Performed by: INTERNAL MEDICINE

## 2021-07-07 PROCEDURE — 1101F PT FALLS ASSESS-DOCD LE1/YR: CPT | Performed by: INTERNAL MEDICINE

## 2021-07-07 PROCEDURE — G8536 NO DOC ELDER MAL SCRN: HCPCS | Performed by: INTERNAL MEDICINE

## 2021-07-07 PROCEDURE — G8419 CALC BMI OUT NRM PARAM NOF/U: HCPCS | Performed by: INTERNAL MEDICINE

## 2021-07-07 PROCEDURE — 3052F HG A1C>EQUAL 8.0%<EQUAL 9.0%: CPT | Performed by: INTERNAL MEDICINE

## 2021-07-07 NOTE — PATIENT INSTRUCTIONS
Angina: Care Instructions  Your Care Instructions     You have a problem called angina. Angina happens when there is not enough blood flow to your heart muscle. Angina is a sign of coronary artery disease (CAD). CAD occurs when blood vessels that supply the heart become narrowed. Having CAD increases your risk of a heart attack. Chest pain or pressure is the most common symptom of angina. But some people have other symptoms, like:  · Pain, pressure, or a strange feeling in the back, neck, jaw, or upper belly, or in one or both shoulders or arms. · Shortness of breath. · Nausea or vomiting. · Lightheadedness or sudden weakness. · Fast or irregular heartbeat. Women are somewhat more likely than men to have angina symptoms like shortness of breath, nausea, and back or jaw pain. Angina can be dangerous. That's why it is important to pay attention to your symptoms. Know what is typical for you, learn how to control your symptoms, and understand when you need to get treatment. A change in your usual pattern of symptoms is an emergency. It may mean that you are having a heart attack. The doctor has checked you carefully, but problems can develop later. If you notice any problems or new symptoms, get medical treatment right away. Follow-up care is a key part of your treatment and safety. Be sure to make and go to all appointments, and call your doctor if you are having problems. It's also a good idea to know your test results and keep a list of the medicines you take. How can you care for yourself at home? Medicines    · If your doctor has given you nitroglycerin for angina symptoms, keep it with you at all times. If you have symptoms, sit down and rest, and take the first dose of nitroglycerin as directed. If your symptoms get worse or are not getting better within 5 minutes, call 911 right away. Stay on the phone.  The emergency  will give you further instructions.     · If your doctor advises it, take 1 low-dose aspirin a day to prevent heart attack.     · Be safe with medicines. Take your medicines exactly as prescribed. Call your doctor if you think you are having a problem with your medicine. You will get more details on the specific medicines your doctor prescribes. Lifestyle changes    · Do not smoke. If you need help quitting, talk to your doctor about stop-smoking programs and medicines. These can increase your chances of quitting for good.     · Eat a heart-healthy diet that is low in saturated fat and salt, and is high in fiber. Talk to your doctor or a dietitian about healthy eating.     · Stay at a healthy weight. Or lose weight if you need to. Activity    · Talk to your doctor about a level of activity that is safe for you.     · If an activity causes angina symptoms, stop and rest.   When should you call for help? Call 911 anytime you think you may need emergency care. For example, call if:    · You passed out (lost consciousness).     · You have symptoms of a heart attack. These may include:  ? Chest pain or pressure, or a strange feeling in the chest.  ? Sweating. ? Shortness of breath. ? Nausea or vomiting. ? Pain, pressure, or a strange feeling in the back, neck, jaw, or upper belly or in one or both shoulders or arms. ? Lightheadedness or sudden weakness. ? A fast or irregular heartbeat. After you call 911, the  may tell you to chew 1 adult-strength or 2 to 4 low-dose aspirin. Wait for an ambulance. Do not try to drive yourself.     · You have angina symptoms that do not go away with rest or are not getting better within 5 minutes after you take a dose of nitroglycerin. Call your doctor now if:    · Your angina symptoms seem worse but still follow your typical pattern. You can predict when symptoms will happen, but they may come on sooner, feel worse, or last longer.     · You feel dizzy or lightheaded, or you feel like you may faint.    Watch closely for changes in your health, and be sure to contact your doctor if you have any problems. Where can you learn more? Go to http://www.gray.com/  Enter H129 in the search box to learn more about \"Angina: Care Instructions. \"  Current as of: August 31, 2020               Content Version: 12.8  © 1910-7346 [employer]. Care instructions adapted under license by [employer] (which disclaims liability or warranty for this information). If you have questions about a medical condition or this instruction, always ask your healthcare professional. [address] any warranty or liability for your use of this information.

## 2021-07-07 NOTE — PROGRESS NOTES
Chief Complaint   Patient presents with    Hypertension     3 month follow up    CHF    Diabetes    Cholesterol Problem     Visit Vitals  BP (!) 146/84 (BP 1 Location: Left upper arm, BP Patient Position: Sitting, BP Cuff Size: Adult)   Pulse (!) 58   Temp 98 °F (36.7 °C) (Temporal)   Resp 17   Ht 6' 3\" (1.905 m)   Wt 204 lb 4.8 oz (92.7 kg)   SpO2 97%   BMI 25.54 kg/m²     1. Have you been to the ER, urgent care clinic since your last visit? Hospitalized since your last visit? No    2. Have you seen or consulted any other health care providers outside of the [address] [place] [address] since your last visit? Include any pap smears or colon screening.  No

## 2021-07-09 ENCOUNTER — TELEPHONE ANTICOAG (OUTPATIENT)
Dept: INTERNAL MEDICINE CLINIC | Age: 86
End: 2021-07-09

## 2021-07-09 NOTE — PROGRESS NOTES
INR is okay, sed rate is improved, other lab tests are little better so continue same. Advised to continue the same dose of blood thinner which is 7.5 mg on Monday; and 10 mg all other days and f/up as scheduled.

## 2021-07-09 NOTE — PROGRESS NOTES
Anticoagulation Summary  As of 2021    INR goal:  2.0-3.0   TTR:  26.9 % (3.2 y)   INR used for dosin.3 (2021)   Warfarin maintenance plan:  7.5 mg (5 mg x 1.5) every Mon; 10 mg (5 mg x 2) all other days   Weekly warfarin total:  67.5 mg   Plan last modified:  Gabbie Ling RN (2021)   Next INR check:  2021   Target end date:      Indications    Paroxysmal atrial fibrillation ([facility] (Primary) [I48.0]             Anticoagulation Episode Summary     INR check location:  Clinic Lab    Preferred lab:      Send INR reminders to:      Comments:        Anticoagulation Care Providers     Provider Role Specialty Phone number    Gaurav Christensen MD [facility] [phone]        Informed patient that his PT/INR okay; continue the same dose of blood thinner which is 7.5 mg on Monday; 10 mg all other days and f/up as scheduled.

## 2021-08-09 ENCOUNTER — OFFICE VISIT (OUTPATIENT)
Dept: INTERNAL MEDICINE CLINIC | Age: 86
End: 2021-08-09
Payer: MEDICARE

## 2021-08-09 VITALS
DIASTOLIC BLOOD PRESSURE: 82 MMHG | SYSTOLIC BLOOD PRESSURE: 150 MMHG | HEART RATE: 87 BPM | TEMPERATURE: 98.4 F | WEIGHT: 205.8 LBS | BODY MASS INDEX: 25.59 KG/M2 | HEIGHT: 75 IN | OXYGEN SATURATION: 96 % | RESPIRATION RATE: 18 BRPM

## 2021-08-09 DIAGNOSIS — I50.32 DIASTOLIC CHF, CHRONIC (HCC): ICD-10-CM

## 2021-08-09 DIAGNOSIS — I48.0 PAROXYSMAL ATRIAL FIBRILLATION (HCC): ICD-10-CM

## 2021-08-09 DIAGNOSIS — I12.9 HYPERTENSION WITH RENAL DISEASE: Primary | ICD-10-CM

## 2021-08-09 DIAGNOSIS — N18.30 STAGE 3 CHRONIC KIDNEY DISEASE, UNSPECIFIED WHETHER STAGE 3A OR 3B CKD (HCC): ICD-10-CM

## 2021-08-09 DIAGNOSIS — M35.3 POLYMYALGIA RHEUMATICA (HCC): ICD-10-CM

## 2021-08-09 LAB
ANION GAP SERPL CALC-SCNC: 6 MMOL/L (ref 5–15)
BUN SERPL-MCNC: 28 MG/DL (ref 6–20)
BUN/CREAT SERPL: 22 (ref 12–20)
CALCIUM SERPL-MCNC: 9.5 MG/DL (ref 8.5–10.1)
CHLORIDE SERPL-SCNC: 106 MMOL/L (ref 97–108)
CO2 SERPL-SCNC: 27 MMOL/L (ref 21–32)
CREAT SERPL-MCNC: 1.26 MG/DL (ref 0.7–1.3)
ERYTHROCYTE [SEDIMENTATION RATE] IN BLOOD: 35 MM/HR (ref 0–20)
GLUCOSE SERPL-MCNC: 129 MG/DL (ref 65–100)
INR PPP: 2.9 (ref 0.9–1.1)
POTASSIUM SERPL-SCNC: 5.4 MMOL/L (ref 3.5–5.1)
PROTHROMBIN TIME: 29.3 SEC (ref 9–11.1)
SODIUM SERPL-SCNC: 139 MMOL/L (ref 136–145)

## 2021-08-09 PROCEDURE — 1101F PT FALLS ASSESS-DOCD LE1/YR: CPT | Performed by: INTERNAL MEDICINE

## 2021-08-09 PROCEDURE — G8510 SCR DEP NEG, NO PLAN REQD: HCPCS | Performed by: INTERNAL MEDICINE

## 2021-08-09 PROCEDURE — G8419 CALC BMI OUT NRM PARAM NOF/U: HCPCS | Performed by: INTERNAL MEDICINE

## 2021-08-09 PROCEDURE — 99214 OFFICE O/P EST MOD 30 MIN: CPT | Performed by: INTERNAL MEDICINE

## 2021-08-09 PROCEDURE — G8427 DOCREV CUR MEDS BY ELIG CLIN: HCPCS | Performed by: INTERNAL MEDICINE

## 2021-08-09 PROCEDURE — G8536 NO DOC ELDER MAL SCRN: HCPCS | Performed by: INTERNAL MEDICINE

## 2021-08-09 RX ORDER — LISINOPRIL 5 MG/1
5 TABLET ORAL DAILY
Qty: 30 TABLET | Status: SHIPPED | OUTPATIENT
Start: 2021-08-09 | End: 2022-10-13

## 2021-08-09 NOTE — PROGRESS NOTES
Chief Complaint   Patient presents with    CHF     1 month follow up    Diabetes    Cholesterol Problem     Visit Vitals  BP (!) 168/90 (BP 1 Location: Left upper arm, BP Patient Position: Sitting, BP Cuff Size: Adult)   Pulse 87   Temp 98.4 °F (36.9 °C) (Temporal)   Resp 18   Ht 6' 3\" (1.905 m)   Wt 205 lb 12.8 oz (93.4 kg)   SpO2 96%   BMI 25.72 kg/m²     1. Have you been to the ER, urgent care clinic since your last visit? Hospitalized since your last visit? No    2. Have you seen or consulted any other health care providers outside of the [address] [place] [address] since your last visit? Include any pap smears or colon screening.  Dr. Angelina Le dr

## 2021-08-09 NOTE — PATIENT INSTRUCTIONS
ACE Inhibitors: Care Instructions  Your Care Instructions     ACE (angiotensin-converting enzyme) inhibitors lower blood pressure. They also treat heart failure and prevent heart attacks and strokes. They block an enzyme that makes blood vessels narrow. As a result, the blood vessels relax and widen. This lowers blood pressure. These medicines also put more water and salt into the urine. This lowers blood pressure too. These medicines are a good choice for people with diabetes. They don't affect blood sugar levels, and they may protect the kidneys. Examples include:  · Benazepril (Lotensin). · Lisinopril (Prinivil, Zestril). · Ramipril (Altace). Before you start taking this medicine, make sure your doctor knows if you take other medicines, especially water pills (diuretics) or potassium tablets. And tell your doctor if you use a salt substitute. You should not take an ACE inhibitor if you are pregnant or planning to become pregnant. You may need regular blood tests. Follow-up care is a key part of your treatment and safety. Be sure to make and go to all appointments, and call your doctor if you are having problems. It's also a good idea to know your test results and keep a list of the medicines you take. How can you care for yourself at home? · Take your medicines exactly as prescribed. Be sure to take your medicine every day. Call your doctor if you think you are having a problem with your medicine. · ACE inhibitors can cause a dry cough. If the cough is bad, talk to your doctor. You may need to try a different medicine. · ACE inhibitors can cause swelling of your lips, tongue, or face. If the swelling is severe, you may need treatment right away. Severe swelling can make it hard to breathe, but this is very rare. · Check with your doctor or pharmacist before you use any other medicines. This includes over-the-counter medicines.  Make sure your doctor knows all of the medicines, vitamins, herbal products, and supplements you take. Taking some medicines together can cause problems. When should you call for help? Call your doctor now or seek immediate medical care if:    · You have swelling of your lips, tongue, or face.     · You think you are having problems with your medicine. Watch closely for changes in your health, and be sure to contact your doctor if you have any problems. Where can you learn more? Go to http://www.gray.com/  Enter [id number] in the search box to learn more about \"ACE Inhibitors: Care Instructions. \"  Current as of: August 31, 2020               Content Version: 12.8  © 2006-2021 [employer]. Care instructions adapted under license by [employer] (which disclaims liability or warranty for this information). If you have questions about a medical condition or this instruction, always ask your healthcare professional. [address] any warranty or liability for your use of this information.

## 2021-08-09 NOTE — PROGRESS NOTES
Chief Complaint   Patient presents with    CHF     1 month follow up    Diabetes    Cholesterol Problem       SUBJECTIVE:    Li Alvarez is a 80 y.o. male who returns in follow-up for his congestive heart failure, hypertension, diabetes, paroxysmal atrial fibrillation, polymyalgia rheumatica and other medical problems. He has intermittently been feeling hot recently but nothing seems to be persistent. He denies any chest pain, shortness of breath, palpitations, PND, orthopnea, cough congestion or other cardiorespiratory complaints. He notes no GI or  complaints. He notes no headaches, dizziness or neurologic complaints. He has no change of his chronic arthritic complaints. His weakness in his legs is controlled with low-dose of prednisone. He denies any other complaints on complete review of systems. Current Outpatient Medications   Medication Sig Dispense Refill    lisinopriL (PRINIVIL, ZESTRIL) 5 mg tablet Take 1 Tablet by mouth daily. 30 Tablet prn    predniSONE (DELTASONE) 10 mg tablet TAKE 1TAB BY MOUTH TWO (2) TIMES A DAY. 60 Tablet 3    bumetanide (BUMEX) 2 mg tablet TAKE 1 TABLET BY MOUTH EVERY DAY 90 Tab 1    diclofenac EC (VOLTAREN) 75 mg EC tablet TAKE 1 TABLET BY MOUTH TWICE A  Tab 3    warfarin (COUMADIN) 5 mg tablet TAKE 2 TABLETS BY MOUTH ON MONDAY &FRIDAYS AND 1&1/2 TABLET DAILY ON ALL OTHER DAYS 145 Tab 1    simvastatin (ZOCOR) 20 mg tablet TAKE 1 TABLET BY MOUTH EVERY DAY 90 Tab 1    terazosin (HYTRIN) 2 mg capsule TAKE 1 CAPSULE BY MOUTH EVERY DAY 90 Cap 1    glimepiride (AMARYL) 1 mg tablet Take 1 Tab by mouth Daily (before breakfast).  90 Tab 3    metFORMIN (GLUCOPHAGE) 500 mg tablet TAKE 1 TABLET BY MOUTH EVERY DAY IN THE MORNING WITH BREAKFAST AND ONE TABLET BEFORE DINNER 180 Tab 1    digoxin (LANOXIN) 0.125 mg tablet TAKE 1 TABLET BY MOUTH EVERY DAY 90 Tab 0    glucose blood VI test strips (Accu-Chek SmartView Test Strip) strip USE ONCE DAILY E11.9 100 Strip PRN    Blood-Glucose Meter monitoring kit Use to test blood sugar twice daily  DX:E11.22 1 Kit 0    glucose blood VI test strips (blood glucose test) strip Use to test blood sugar twice daily  DX:E11.22 100 Strip 1    cyanocobalamin (VITAMIN B12) 500 mcg tablet Take 500 mcg by mouth daily.  acetaminophen (TYLENOL ARTHRITIS PAIN) 650 mg TbER Take 650 mg by mouth every eight (8) hours.  multivitamins-minerals-lutein (MULTIVITAMIN 50 PLUS) tab tablet Take 1 Tab by mouth daily.  glucosamine 1,000 mg tab Take 2,000 mg by mouth daily.  cholecalciferol (VITAMIN D3) 1,000 unit cap Take 1,000 Units by mouth daily.  DOCOSAHEXANOIC ACID/EPA (FISH OIL PO) Take 1,200 mg by mouth two (2) times a day. 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    Back pain 9/20/2017    BPH (benign prostatic hyperplasia) 9/20/2017    CHF (congestive heart failure) ([address] 9/20/2017    CKD (chronic kidney disease) 9/20/2017    Diabetic acetonemia ([address]     DM (diabetes mellitus) ([address] 9/20/2017    Story: Diet Controlled    ED (erectile dysfunction) 9/20/2017    Edema 9/20/2017    Elevated CPK 9/20/2017    Comments: History of    Elevated LFTs 9/20/2017    Comments: History of    Elevated PSA 9/20/2017    Hypercholesteremia     Hyperlipidemia 9/20/2017    Hypertension     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    Nodule of right lung 9/20/2017    Story: Right    Polymyalgia ([address] 9/20/2017    Prostate enlargement      Past Surgical History:   Procedure Laterality Date    HX HEENT  06/12/2018    Dr. 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    HX MALIGNANT SKIN LESION EXCISION  09/2018    2 lesions on head    MA ABDOMEN SURGERY PROC UNLISTED      hernia repair     No Known Allergies    REVIEW OF SYSTEMS:  General: negative for - chills or fever, or weight loss or gain  ENT: negative for - headaches, nasal congestion or tinnitus  Eyes: no blurred or visual changes  Neck: No stiffness or swollen nodes  Respiratory: negative for - cough, hemoptysis, shortness of breath or wheezing  Cardiovascular : negative for - chest pain, edema, palpitations or shortness of breath  Gastrointestinal: negative for - abdominal pain, blood in stools, heartburn or nausea/vomiting  Genito-Urinary: no dysuria, trouble voiding, or hematuria  Musculoskeletal: negative for - gait disturbance, joint pain, joint stiffness or joint swelling  Neurological: no TIA or stroke symptoms  Hematologic: no bruises, no bleeding  Lymphatic: no swollen glands  Integument: no lumps, mole changes, nail changes or rash  Endocrine:no malaise/lethargy poly uria or polydipsia or unexpected weight changes        Social History     Socioeconomic History    Marital status:      Spouse name: Not on file    Number of children: Not on file    Years of education: Not on file    Highest education level: Not on file   Tobacco Use    Smoking status: Never Smoker    Smokeless tobacco: Never Used   Vaping Use    Vaping Use: Never used   Substance and Sexual Activity    Alcohol use: No    Drug use: No    Sexual activity: Never     Social Determinants of Health     Financial Resource Strain:     Difficulty of Paying Living Expenses:    Food Insecurity:     Worried About Running Out of Food in the Last Year:     Ran Out of Food in the Last Year:    Transportation Needs:     Lack of Transportation (Medical):      Lack of Transportation (Non-Medical):    Physical Activity:     Days of Exercise per Week:     Minutes of Exercise per Session:    Stress:     Feeling of Stress :    Social Connections:     Frequency of Communication with Friends and Family:     Frequency of Social Gatherings with Friends and Family:     Attends Taoist Services:     Active Member of Clubs or Organizations:  Attends Club or Organization Meetings:     Marital Status:      History reviewed. No pertinent family history. OBJECTIVE:     Visit Vitals  BP (!) 150/82   Pulse 87   Temp 98.4 °F (36.9 °C) (Temporal)   Resp 18   Ht 6' 3\" (1.905 m)   Wt 205 lb 12.8 oz (93.4 kg)   SpO2 96%   BMI 25.72 kg/m²     CONSTITUTIONAL:   well nourished, appears age appropriate  EYES: sclera anicteric, PERRL, EOMI  ENMT:nares clear, moist mucous membranes, pharynx clear  NECK: supple. Thyroid normal, No JVD or bruits  RESPIRATORY: Chest: clear to ascultation and percussion, normal inspiratory effort  CARDIOVASCULAR: Heart: regular rate and rhythm 2/6 systolic murmur left sternal border without rubs or gallops, PMI not displaced, No thrills, no peripheral edema  GASTROINTESTINAL: Abdomen: non distended, soft, non tender, bowel sounds normal  HEMATOLOGIC: no purpura, petechiae or bruising  LYMPHATIC: No lymph node enlargemant  MUSCULOSKELETAL: Extremities: no active synovitis, pulse 1+   INTEGUMENT: No unusual rashes or suspicious skin lesions noted. Nails appear normal.  PERIPHERAL VASCULAR: normal pulses femoral, PT and DP  NEUROLOGIC: non-focal exam, A & O X 3  PSYCHIATRIC:, appropriate affect     ASSESSMENT:   1. Hypertension with renal disease    2. Diastolic CHF, chronic (HCC)    3. Paroxysmal atrial fibrillation (HCC)    4. Stage 3 chronic kidney disease, unspecified whether stage 3a or 3b CKD ([facility]    5. Polymyalgia rheumatica (HCC)      Impression  1. Hypertension that is controlled less than adequate so resume lisinopril 5 mg daily which he was previously on. Continue Bumex. 2.  Diastolic CHF on Bumex BMP pending  3. Paroxysmal atrial fibrillation currently in sinus rhythm in 80s today. 4.  CKD stage III repeat status is pending  5.   Polymyalgia rheumatica repeat sed rate is pending currently on prednisone at 10 mg  I will recheck a myself again in 1 month to reassess his blood pressure and heart failure or sooner should the be a problem. I will call with lab results in the interim. PLAN:  .  Orders Placed This Encounter    METABOLIC PANEL, BASIC    PROTHROMBIN TIME + INR    SED RATE (ESR)    lisinopriL (PRINIVIL, ZESTRIL) 5 mg tablet         ATTENTION:   This medical record was transcribed using an electronic medical records system. Although proofread, it may and can contain electronic and spelling errors. Other human spelling and other errors may be present. Corrections may be executed at a later time. Please feel free to contact us for any clarifications as needed. Follow-up and Dispositions    · Return in about 4 weeks (around 9/6/2021). No results found for any visits on 08/09/21. Trice Delatorre MD    The patient verbalized understanding of the problems and plans as explained.

## 2021-08-11 ENCOUNTER — TELEPHONE ANTICOAG (OUTPATIENT)
Dept: INTERNAL MEDICINE CLINIC | Age: 86
End: 2021-08-11

## 2021-08-11 NOTE — PROGRESS NOTES
Anticoagulation Summary  As of 2021    INR goal:  2.0-3.0   TTR:  28.9 % (3.3 y)   INR used for dosin.9 (2021)   Warfarin maintenance plan:  7.5 mg (5 mg x 1.5) every Mon; 10 mg (5 mg x 2) all other days   Weekly warfarin total:  67.5 mg   Plan last modified:  Augustus Rooney RN (2021)   Next INR check:     Target end date:      Indications    Paroxysmal atrial fibrillation ([facility] (Primary) [I48.0]             Anticoagulation Episode Summary     INR check location:  Clinic Lab    Preferred lab:      Send INR reminders to:      Comments:        Anticoagulation Care Providers     Provider Role Specialty Phone number    Gerri Garay MD Responsible Internal Medicine [phone]        Informed patient that PT/INR okay; continue the same dose of his blood thinner which is 7.5 mg on Monday; 10 mg all other days. F/up as scheduled.

## 2021-08-11 NOTE — PROGRESS NOTES
Labs stable so continue current treatment. Advised patient to continue the same dose of blood thinner which is 7.5 mg on Monday; 10 mg all other days and f/up as scheduled.

## 2021-08-12 DIAGNOSIS — N40.0 BENIGN PROSTATIC HYPERPLASIA, UNSPECIFIED WHETHER LOWER URINARY TRACT SYMPTOMS PRESENT: ICD-10-CM

## 2021-08-12 RX ORDER — TERAZOSIN 2 MG/1
CAPSULE ORAL
Qty: 90 CAPSULE | Refills: 3 | Status: SHIPPED | OUTPATIENT
Start: 2021-08-12 | End: 2022-08-18

## 2021-08-12 RX ORDER — SIMVASTATIN 20 MG/1
TABLET, FILM COATED ORAL
Qty: 90 TABLET | Refills: 3 | Status: SHIPPED | OUTPATIENT
Start: 2021-08-12 | End: 2022-08-18

## 2021-08-12 NOTE — TELEPHONE ENCOUNTER
RX refill request from the patient/pharmacy. Patient last seen 08- with labs, and next appt. scheduled for 09-  Requested Prescriptions     Pending Prescriptions Disp Refills    simvastatin (ZOCOR) 20 mg tablet [Pharmacy Med Name: SIMVASTATIN 20 MG TABLET] 90 Tablet 3     Sig: TAKE 1 TABLET BY MOUTH EVERY DAY    terazosin (HYTRIN) 2 mg capsule [Pharmacy Med Name: TERAZOSIN 2 MG CAPSULE] 90 Capsule 3     Sig: TAKE 1 CAPSULE BY MOUTH EVERY DAY   .

## 2021-08-16 RX ORDER — METFORMIN HYDROCHLORIDE 500 MG/1
TABLET ORAL
Qty: 180 TABLET | Refills: 3 | Status: SHIPPED | OUTPATIENT
Start: 2021-08-16 | End: 2022-08-26

## 2021-08-16 NOTE — TELEPHONE ENCOUNTER
RX refill request from the patient/pharmacy. Patient last seen 08- with labs, and next appt. scheduled for 09-  Requested Prescriptions     Pending Prescriptions Disp Refills    metFORMIN (GLUCOPHAGE) 500 mg tablet [Pharmacy Med Name: METFORMIN  MG TABLET] 180 Tablet 3     Sig: TAKE 1 TABLET BY MOUTH EVERY DAY IN THE MORNING WITH BREAKFAST AND ONE TABLET BEFORE DINNER   .

## 2021-08-18 DIAGNOSIS — I48.91 ATRIAL FIBRILLATION, UNSPECIFIED TYPE (HCC): ICD-10-CM

## 2021-08-18 RX ORDER — DIGOXIN 125 MCG
TABLET ORAL
Qty: 90 TABLET | Refills: 3 | Status: SHIPPED | OUTPATIENT
Start: 2021-08-18 | End: 2022-06-06

## 2021-08-18 NOTE — TELEPHONE ENCOUNTER
RX refill request from the patient/pharmacy. Patient last seen 08- with labs, and next appt. scheduled for 09-  Requested Prescriptions     Pending Prescriptions Disp Refills    digoxin (LANOXIN) 0.125 mg tablet [Pharmacy Med Name: DIGOXIN 125 MCG TABLET] 90 Tablet 3     Sig: TAKE 1 [place]   .

## 2021-09-06 NOTE — PROGRESS NOTES
Chief Complaint   Patient presents with    Hypertension     1 month follow up    CHF    Diabetes       SUBJECTIVE:    Liz Tierney is a 80 y.o. male who returns in follow-up for his medical problems include congestive heart failure, atrial fibrillation, chronic kidney disease, polymyalgia rheumatica and hypertension as well as other medical problems. He had a little lightheaded short period yesterday but has had no problems since that time including working in the garden at which. He currently denies any chest pain, shortness of breath, palpitations, PND, orthopnea or other cardiac or respiratory complaints. He notes no GI or  complaints. He notes no headaches, dizziness or neurologic complaints. He has no current active arthritic complaints but he does have his chronic joint aches and pains. There are no other complaints on complete review of systems. Current Outpatient Medications   Medication Sig Dispense Refill    digoxin (LANOXIN) 0.125 mg tablet TAKE 1 TABLET BY MOUTH EVERY DAY 90 Tablet 3    metFORMIN (GLUCOPHAGE) 500 mg tablet TAKE 1 TABLET BY MOUTH EVERY DAY IN THE MORNING WITH BREAKFAST AND ONE TABLET BEFORE DINNER 180 Tablet 3    simvastatin (ZOCOR) 20 mg tablet TAKE 1 TABLET BY MOUTH EVERY DAY 90 Tablet 3    terazosin (HYTRIN) 2 mg capsule TAKE 1 CAPSULE BY MOUTH EVERY DAY 90 Capsule 3    lisinopriL (PRINIVIL, ZESTRIL) 5 mg tablet Take 1 Tablet by mouth daily. 30 Tablet prn    predniSONE (DELTASONE) 10 mg tablet TAKE 1TAB BY MOUTH TWO (2) TIMES A DAY. 60 Tablet 3    bumetanide (BUMEX) 2 mg tablet TAKE 1 TABLET BY MOUTH EVERY DAY 90 Tab 1    diclofenac EC (VOLTAREN) 75 mg EC tablet TAKE 1 TABLET BY MOUTH TWICE A  Tab 3    warfarin (COUMADIN) 5 mg tablet TAKE 2 TABLETS BY MOUTH ON MONDAY &FRIDAYS AND 1&1/2 TABLET DAILY ON ALL OTHER DAYS 145 Tab 1    glimepiride (AMARYL) 1 mg tablet Take 1 Tab by mouth Daily (before breakfast).  90 Tab 3    glucose blood VI test strips (Accu-Chek SmartView Test Strip) strip USE ONCE DAILY E11.9 100 Strip PRN    Blood-Glucose Meter monitoring kit Use to test blood sugar twice daily  DX:E11.22 1 Kit 0    glucose blood VI test strips (blood glucose test) strip Use to test blood sugar twice daily  DX:E11.22 100 Strip 1    cyanocobalamin (VITAMIN B12) 500 mcg tablet Take 500 mcg by mouth daily.  acetaminophen (TYLENOL ARTHRITIS PAIN) 650 mg TbER Take 650 mg by mouth every eight (8) hours.  multivitamins-minerals-lutein (MULTIVITAMIN 50 PLUS) tab tablet Take 1 Tab by mouth daily.  glucosamine 1,000 mg tab Take 2,000 mg by mouth daily.  cholecalciferol (VITAMIN D3) 1,000 unit cap Take 1,000 Units by mouth daily.  DOCOSAHEXANOIC ACID/EPA (FISH OIL PO) Take 1,200 mg by mouth two (2) times a day. 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    Back pain 9/20/2017    BPH (benign prostatic hyperplasia) 9/20/2017    CHF (congestive heart failure) ([address] 9/20/2017    CKD (chronic kidney disease) 9/20/2017    Diabetic acetonemia ([address]     DM (diabetes mellitus) ([address] 9/20/2017    Story: Diet Controlled    ED (erectile dysfunction) 9/20/2017    Edema 9/20/2017    Elevated CPK 9/20/2017    Comments: History of    Elevated LFTs 9/20/2017    Comments: History of    Elevated PSA 9/20/2017    Hypercholesteremia     Hyperlipidemia 9/20/2017    Hypertension     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    Nodule of right lung 9/20/2017    Story: Right    Polymyalgia ([address] 9/20/2017    Prostate enlargement      Past Surgical History:   Procedure Laterality Date    HX HEENT  06/12/2018    Dr. 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    HX MALIGNANT SKIN LESION EXCISION  09/2018    2 lesions on head    AL ABDOMEN SURGERY PROC UNLISTED      hernia repair     No Known Allergies    REVIEW OF SYSTEMS:  General: negative for - chills or fever, or weight loss or gain  ENT: negative for - headaches, nasal congestion or tinnitus  Eyes: no blurred or visual changes  Neck: No stiffness or swollen nodes  Respiratory: negative for - cough, hemoptysis, shortness of breath or wheezing  Cardiovascular : negative for - chest pain, edema, palpitations or shortness of breath  Gastrointestinal: negative for - abdominal pain, blood in stools, heartburn or nausea/vomiting  Genito-Urinary: no dysuria, trouble voiding, or hematuria  Musculoskeletal: negative for - gait disturbance, joint pain, joint stiffness or joint swelling  Neurological: no TIA or stroke symptoms  Hematologic: no bruises, no bleeding  Lymphatic: no swollen glands  Integument: no lumps, mole changes, nail changes or rash  Endocrine:no malaise/lethargy poly uria or polydipsia or unexpected weight changes        Social History     Socioeconomic History    Marital status:      Spouse name: Not on file    Number of children: Not on file    Years of education: Not on file    Highest education level: Not on file   Tobacco Use    Smoking status: Never Smoker    Smokeless tobacco: Never Used   Vaping Use    Vaping Use: Never used   Substance and Sexual Activity    Alcohol use: No    Drug use: No    Sexual activity: Never     Social Determinants of Health     Financial Resource Strain:     Difficulty of Paying Living Expenses:    Food Insecurity:     Worried About Running Out of Food in the Last Year:     Ran Out of Food in the Last Year:    Transportation Needs:     Lack of Transportation (Medical):      Lack of Transportation (Non-Medical):    Physical Activity:     Days of Exercise per Week:     Minutes of Exercise per Session:    Stress:     Feeling of Stress :    Social Connections:     Frequency of Communication with Friends and Family:     Frequency of Social Gatherings with Friends and Family:     Attends Islam Services:     Active Member of Clubs or Organizations:     Attends Club or Organization Meetings:     Marital Status:      History reviewed. No pertinent family history. OBJECTIVE:     Visit Vitals  /76   Pulse 77   Temp 98.7 °F (37.1 °C) (Oral)   Resp 17   Ht 6' 3\" (1.905 m)   Wt 205 lb 11.2 oz (93.3 kg)   SpO2 95%   BMI 25.71 kg/m²     CONSTITUTIONAL:   well nourished, appears age appropriate  EYES: sclera anicteric, PERRL, EOMI  ENMT:nares clear, moist mucous membranes, pharynx clear  NECK: supple. Thyroid normal, No JVD or bruits  RESPIRATORY: Chest: clear to ascultation and percussion, normal inspiratory effort  CARDIOVASCULAR: Heart: regular rate and rhythm no murmurs, rubs or gallops, PMI not displaced, No thrills, no peripheral edema  GASTROINTESTINAL: Abdomen: non distended, soft, non tender, bowel sounds normal  HEMATOLOGIC: no purpura, petechiae or bruising  LYMPHATIC: No lymph node enlargemant  MUSCULOSKELETAL: Extremities: no active synovitis, pulse 1+   INTEGUMENT: No unusual rashes or suspicious skin lesions noted. Nails appear normal.  PERIPHERAL VASCULAR: normal pulses femoral, PT and DP  NEUROLOGIC: non-focal exam, A & O X 3  PSYCHIATRIC:, appropriate affect     ASSESSMENT:   1. Stage 3 chronic kidney disease, unspecified whether stage 3a or 3b CKD ([facility])    2. Paroxysmal atrial fibrillation ([address]    3. Diastolic CHF, chronic ([address]    4. Hypertension with renal disease    5. PMR (polymyalgia rheumatica) ([facility])      Impression  1. CKD stage III repeat status is pending  2. Paroxysmal atrial fibrillation no recent symptoms INR pending  3. Diastolic CHF compensated  4. Hypertension blood pressure well controlled by my check although up by the nurse  5. Polymyalgia rheumatica sed rate pending  I will call the lab and make further recommendations or adjustments if necessary. Follow-up scheduled for 1 month or sooner should the be a problem.     PLAN:  .  Orders Placed This Encounter    METABOLIC PANEL, BASIC    PROTHROMBIN TIME + INR    SED RATE (ESR)         ATTENTION:   This medical record was transcribed using an electronic medical records system. Although proofread, it may and can contain electronic and spelling errors. Other human spelling and other errors may be present. Corrections may be executed at a later time. Please feel free to contact us for any clarifications as needed. Follow-up and Dispositions    · Return in about 4 weeks (around 10/5/2021). No results found for any visits on 09/07/21. Shahbaz Morgan MD    The patient verbalized understanding of the problems and plans as explained.

## 2021-09-07 ENCOUNTER — OFFICE VISIT (OUTPATIENT)
Dept: INTERNAL MEDICINE CLINIC | Age: 86
End: 2021-09-07
Payer: MEDICARE

## 2021-09-07 VITALS
DIASTOLIC BLOOD PRESSURE: 76 MMHG | HEART RATE: 77 BPM | BODY MASS INDEX: 25.57 KG/M2 | OXYGEN SATURATION: 95 % | TEMPERATURE: 98.7 F | RESPIRATION RATE: 17 BRPM | WEIGHT: 205.7 LBS | HEIGHT: 75 IN | SYSTOLIC BLOOD PRESSURE: 138 MMHG

## 2021-09-07 DIAGNOSIS — M35.3 PMR (POLYMYALGIA RHEUMATICA) (HCC): ICD-10-CM

## 2021-09-07 DIAGNOSIS — I50.32 DIASTOLIC CHF, CHRONIC (HCC): ICD-10-CM

## 2021-09-07 DIAGNOSIS — I48.0 PAROXYSMAL ATRIAL FIBRILLATION (HCC): ICD-10-CM

## 2021-09-07 DIAGNOSIS — N18.30 STAGE 3 CHRONIC KIDNEY DISEASE, UNSPECIFIED WHETHER STAGE 3A OR 3B CKD (HCC): Primary | ICD-10-CM

## 2021-09-07 DIAGNOSIS — I12.9 HYPERTENSION WITH RENAL DISEASE: ICD-10-CM

## 2021-09-07 PROCEDURE — G8419 CALC BMI OUT NRM PARAM NOF/U: HCPCS | Performed by: INTERNAL MEDICINE

## 2021-09-07 PROCEDURE — 1101F PT FALLS ASSESS-DOCD LE1/YR: CPT | Performed by: INTERNAL MEDICINE

## 2021-09-07 PROCEDURE — G8427 DOCREV CUR MEDS BY ELIG CLIN: HCPCS | Performed by: INTERNAL MEDICINE

## 2021-09-07 PROCEDURE — 99213 OFFICE O/P EST LOW 20 MIN: CPT | Performed by: INTERNAL MEDICINE

## 2021-09-07 PROCEDURE — G8432 DEP SCR NOT DOC, RNG: HCPCS | Performed by: INTERNAL MEDICINE

## 2021-09-07 PROCEDURE — G8536 NO DOC ELDER MAL SCRN: HCPCS | Performed by: INTERNAL MEDICINE

## 2021-09-07 NOTE — PROGRESS NOTES
Chief Complaint   Patient presents with    Hypertension     1 month follow up    CHF    Diabetes     Visit Vitals  BP (!) 162/82 (BP 1 Location: Left upper arm, BP Patient Position: Sitting, BP Cuff Size: Adult)   Pulse 77   Temp 98.7 °F (37.1 °C) (Oral)   Resp 17   Ht 6' 3\" (1.905 m)   Wt 205 lb 11.2 oz (93.3 kg)   SpO2 95%   BMI 25.71 kg/m²     1. Have you been to the ER, urgent care clinic since your last visit? Hospitalized since your last visit? No    2. Have you seen or consulted any other health care providers outside of the [address] [place] [address] since your last visit? Include any pap smears or colon screening.  No

## 2021-09-07 NOTE — PATIENT INSTRUCTIONS
ACE Inhibitors: Care Instructions  Your Care Instructions     ACE (angiotensin-converting enzyme) inhibitors lower blood pressure. They also treat heart failure and prevent heart attacks and strokes. They block an enzyme that makes blood vessels narrow. As a result, the blood vessels relax and widen. This lowers blood pressure. These medicines also put more water and salt into the urine. This lowers blood pressure too. These medicines are a good choice for people with diabetes. They don't affect blood sugar levels, and they may protect the kidneys. Examples include:  · Benazepril (Lotensin). · Lisinopril (Prinivil, Zestril). · Ramipril (Altace). Before you start taking this medicine, make sure your doctor knows if you take other medicines, especially water pills (diuretics) or potassium tablets. And tell your doctor if you use a salt substitute. You should not take an ACE inhibitor if you are pregnant or planning to become pregnant. You may need regular blood tests. Follow-up care is a key part of your treatment and safety. Be sure to make and go to all appointments, and call your doctor if you are having problems. It's also a good idea to know your test results and keep a list of the medicines you take. How can you care for yourself at home? · Take your medicines exactly as prescribed. Be sure to take your medicine every day. Call your doctor if you think you are having a problem with your medicine. · ACE inhibitors can cause a dry cough. If the cough is bad, talk to your doctor. You may need to try a different medicine. · ACE inhibitors can cause swelling of your lips, tongue, or face. If the swelling is severe, you may need treatment right away. Severe swelling can make it hard to breathe, but this is very rare. · Check with your doctor or pharmacist before you use any other medicines. This includes over-the-counter medicines.  Make sure your doctor knows all of the medicines, vitamins, herbal products, and supplements you take. Taking some medicines together can cause problems. When should you call for help? Call your doctor now or seek immediate medical care if:    · You have swelling of your lips, tongue, or face.     · You think you are having problems with your medicine. Watch closely for changes in your health, and be sure to contact your doctor if you have any problems. Where can you learn more? Go to http://www.gray.com/  Enter [id number] in the search box to learn more about \"ACE Inhibitors: Care Instructions. \"  Current as of: August 31, 2020               Content Version: 12.8  © 2006-2021 [employer]. Care instructions adapted under license by [employer] (which disclaims liability or warranty for this information). If you have questions about a medical condition or this instruction, always ask your healthcare professional. [address] any warranty or liability for your use of this information.

## 2021-09-08 LAB
ANION GAP SERPL CALC-SCNC: 2 MMOL/L (ref 5–15)
BUN SERPL-MCNC: 32 MG/DL (ref 6–20)
BUN/CREAT SERPL: 22 (ref 12–20)
CALCIUM SERPL-MCNC: 9.6 MG/DL (ref 8.5–10.1)
CHLORIDE SERPL-SCNC: 110 MMOL/L (ref 97–108)
CO2 SERPL-SCNC: 29 MMOL/L (ref 21–32)
CREAT SERPL-MCNC: 1.44 MG/DL (ref 0.7–1.3)
ERYTHROCYTE [SEDIMENTATION RATE] IN BLOOD: 48 MM/HR (ref 0–20)
GLUCOSE SERPL-MCNC: 116 MG/DL (ref 65–100)
INR PPP: 2.5 (ref 0.9–1.1)
POTASSIUM SERPL-SCNC: 5 MMOL/L (ref 3.5–5.1)
PROTHROMBIN TIME: 25.3 SEC (ref 9–11.1)
SODIUM SERPL-SCNC: 141 MMOL/L (ref 136–145)

## 2021-09-10 ENCOUNTER — TELEPHONE ANTICOAG (OUTPATIENT)
Dept: INTERNAL MEDICINE CLINIC | Age: 86
End: 2021-09-10

## 2021-09-10 DIAGNOSIS — I48.0 PAROXYSMAL ATRIAL FIBRILLATION (HCC): Primary | ICD-10-CM

## 2021-09-10 NOTE — PROGRESS NOTES
Anticoagulation Summary  As of 9/10/2021    INR goal:  2.0-3.0   TTR:  30.6 % (3.4 y)   INR used for dosin.5 (2021)   Warfarin maintenance plan:  7.5 mg (5 mg x 1.5) every Mon; 10 mg (5 mg x 2) all other days   Weekly warfarin total:  67.5 mg   No change documented:  Marlon Tobar   Plan last modified:  Venus Edwards RN (2021)   Next INR check:  10/8/2021   Target end date:      Indications    Paroxysmal atrial fibrillation ([facility] [address] (Primary) [I48.0]             Anticoagulation Episode Summary     INR check location:  Clinic Lab    Preferred lab:      Send INR reminders to:      Comments:        Anticoagulation Care Providers     Provider Role Specialty Phone number    Leigh Ann Hilario MD [facility] Internal Medicine [phone]      Labs without significant change and no change in treatment

## 2021-09-13 RX ORDER — BLOOD SUGAR DIAGNOSTIC
STRIP MISCELLANEOUS
Qty: 100 STRIP | Status: SHIPPED | OUTPATIENT
Start: 2021-09-13

## 2021-09-13 NOTE — TELEPHONE ENCOUNTER
RX refill request from the patient/pharmacy. Patient last seen 09- with labs, and next appt. scheduled for 10-  Requested Prescriptions     Pending Prescriptions Disp Refills    glucose blood VI test strips (Accu-Chek Merle Plus test strp) strip [Pharmacy Med Name: ACCU-CHEK MERLE PLUS TEST STRP] 100 Strip PRN     Sig: USE TO TEST BLOOD SUGAR ONCE DAILY   .

## 2021-09-20 RX ORDER — BUMETANIDE 2 MG/1
TABLET ORAL
Qty: 90 TABLET | Refills: 1 | Status: SHIPPED | OUTPATIENT
Start: 2021-09-20 | End: 2022-05-02

## 2021-09-20 NOTE — TELEPHONE ENCOUNTER
RX refill request from the patient/pharmacy. Patient last seen 09- with labs, and next appt. scheduled for 10-  Requested Prescriptions     Pending Prescriptions Disp Refills    bumetanide (BUMEX) 2 mg tablet [Pharmacy Med Name: BUMETANIDE 2 MG TABLET] 90 Tablet 1     Sig: TAKE 1 TABLET BY MOUTH EVERY DAY   .

## 2021-10-07 NOTE — PROGRESS NOTES
Chief Complaint   Patient presents with    Hypertension     3 month    CHF    Diabetes    Chronic Kidney Disease    Cholesterol Problem       SUBJECTIVE:    Jacquelin Martinez Sr. is a 80 y.o. male he returns today in follow-up of his medical problems include hypertension, diabetes, hyperlipidemia, ASCVD, diastolic CHF, paroxysmal atrial fibrillation, history of near syncope, allergic rhinitis, DJD, CKD stage III, polymyalgia rheumatica and other multiple medical problems. He does note some intermittent episodes of dizziness that he is noting to, 1-3 times per week where he will be doing something and he gets dizzy and had to sit down. When I asked him specifically he says that that sometimes is associated with the sensation of his heart beating irregular. He also notes sometimes it occurs when he is looking up for a while. He denies any associated chest pain, PND, orthopnea or other cardiac or respiratory complaints. He denies any GI or  complaints. He notes no headaches and other than the dizziness described above notes no dizziness with head movement. He denies any change of his chronic arthritic complaints and is taking all his medications and trying to follow his diet. The remainder of complete review of systems is negative.       Current Outpatient Medications   Medication Sig Dispense Refill    bumetanide (BUMEX) 2 mg tablet TAKE 1 TABLET BY MOUTH EVERY DAY 90 Tablet 1    glucose blood VI test strips (Accu-Chek Merle Plus test strp) strip USE TO TEST BLOOD SUGAR ONCE DAILY 100 Strip PRN    digoxin (LANOXIN) 0.125 mg tablet TAKE 1 TABLET BY MOUTH EVERY DAY 90 Tablet 3    metFORMIN (GLUCOPHAGE) 500 mg tablet TAKE 1 TABLET BY MOUTH EVERY DAY IN THE MORNING WITH BREAKFAST AND ONE TABLET BEFORE DINNER 180 Tablet 3    simvastatin (ZOCOR) 20 mg tablet TAKE 1 TABLET BY MOUTH EVERY DAY 90 Tablet 3    terazosin (HYTRIN) 2 mg capsule TAKE 1 CAPSULE BY MOUTH EVERY DAY 90 Capsule 3    lisinopriL (PRINIVIL, ZESTRIL) 5 mg tablet Take 1 Tablet by mouth daily. 30 Tablet prn    predniSONE (DELTASONE) 10 mg tablet TAKE 1TAB BY MOUTH TWO (2) TIMES A DAY. 60 Tablet 3    diclofenac EC (VOLTAREN) 75 mg EC tablet TAKE 1 TABLET BY MOUTH TWICE A  Tab 3    warfarin (COUMADIN) 5 mg tablet TAKE 2 TABLETS BY MOUTH ON MONDAY &FRIDAYS AND 1&1/2 TABLET DAILY ON ALL OTHER DAYS 145 Tab 1    glimepiride (AMARYL) 1 mg tablet Take 1 Tab by mouth Daily (before breakfast). 90 Tab 3    Blood-Glucose Meter monitoring kit Use to test blood sugar twice daily  DX:E11.22 1 Kit 0    glucose blood VI test strips (blood glucose test) strip Use to test blood sugar twice daily  DX:E11.22 100 Strip 1    cyanocobalamin (VITAMIN B12) 500 mcg tablet Take 500 mcg by mouth daily.  acetaminophen (TYLENOL ARTHRITIS PAIN) 650 mg TbER Take 650 mg by mouth every eight (8) hours.  multivitamins-minerals-lutein (MULTIVITAMIN 50 PLUS) tab tablet Take 1 Tab by mouth daily.  glucosamine 1,000 mg tab Take 2,000 mg by mouth daily.  cholecalciferol (VITAMIN D3) 1,000 unit cap Take 1,000 Units by mouth daily.  DOCOSAHEXANOIC ACID/EPA (FISH OIL PO) Take 1,200 mg by mouth two (2) times a day. 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    Back pain 9/20/2017    BPH (benign prostatic hyperplasia) 9/20/2017    CHF (congestive heart failure) ([address] 9/20/2017    CKD (chronic kidney disease) 9/20/2017    Diabetic acetonemia ([facility] [address]     DM (diabetes mellitus) ([facility] [address] 9/20/2017    Story: Diet Controlled    ED (erectile dysfunction) 9/20/2017    Edema 9/20/2017    Elevated CPK 9/20/2017    Comments: History of    Elevated LFTs 9/20/2017    Comments: History of    Elevated PSA 9/20/2017    Hypercholesteremia     Hyperlipidemia 9/20/2017    Hypertension     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    Nodule of right lung 9/20/2017    Story: Right    Polymyalgia ([address] 9/20/2017    Prostate enlargement      Past Surgical History:   Procedure Laterality Date    HX HEENT  06/12/2018    Dr. 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    HX MALIGNANT SKIN LESION EXCISION  09/2018    2 lesions on head    OR ABDOMEN SURGERY PROC UNLISTED      hernia repair     No Known Allergies    REVIEW OF SYSTEMS:  General: negative for - chills or fever, or weight loss or gain  ENT: negative for - headaches, nasal congestion or tinnitus  Eyes: no blurred or visual changes  Neck: No stiffness or swollen nodes  Respiratory: negative for - cough, hemoptysis, shortness of breath or wheezing  Cardiovascular : negative for - chest pain, edema, palpitations or shortness of breath; however intermittent dizziness and at times feels like his heart is beating irregular with those  Gastrointestinal: negative for - abdominal pain, blood in stools, heartburn or nausea/vomiting  Genito-Urinary: no dysuria, trouble voiding, or hematuria  Musculoskeletal: negative for - gait disturbance, joint pain, joint stiffness or joint swelling  Neurological: no TIA or stroke symptoms  Hematologic: no bruises, no bleeding  Lymphatic: no swollen glands  Integument: no lumps, mole changes, nail changes or rash  Endocrine:no malaise/lethargy poly uria or polydipsia or unexpected weight changes        Social History     Socioeconomic History    Marital status:      Spouse name: Not on file    Number of children: Not on file    Years of education: Not on file    Highest education level: Not on file   Tobacco Use    Smoking status: Never Smoker    Smokeless tobacco: Never Used   Vaping Use    Vaping Use: Never used   Substance and Sexual Activity    Alcohol use: No    Drug use: No    Sexual activity: Never     Social Determinants of Health     Financial Resource Strain:     Difficulty of Paying Living Expenses: Food Insecurity:     Worried About Running Out of Food in the Last Year:     [address] in the Last Year:    Transportation Needs:     Lack of Transportation (Medical):  Lack of Transportation (Non-Medical):    Physical Activity:     Days of Exercise per Week:     Minutes of Exercise per Session:    Stress:     Feeling of Stress :    Social Connections:     Frequency of Communication with Friends and Family:     Frequency of Social Gatherings with Friends and Family:     Attends [employer] Services:     Active Member of Clubs or Organizations:     Attends Club or Organization Meetings:     Marital Status:      History reviewed. No pertinent family history. OBJECTIVE:     Visit Vitals  /69 (BP 1 Location: Left upper arm, BP Patient Position: Sitting, BP Cuff Size: Adult)   Pulse 68   Temp 98 °F (36.7 °C) (Oral)   Resp 18   Ht 6' 3\" (1.905 m)   Wt 203 lb 9.6 oz (92.4 kg)   SpO2 97%   BMI 25.45 kg/m²     CONSTITUTIONAL:   well nourished, appears age appropriate  EYES: sclera anicteric, PERRL, EOMI  ENMT:nares clear, moist mucous membranes, pharynx clear  NECK: supple. Thyroid normal, No JVD or bruits  RESPIRATORY: Chest: clear to ascultation and percussion, normal inspiratory effort  CARDIOVASCULAR: Heart: regular rate and rhythm no murmurs, rubs or gallops, PMI not displaced, No thrills, no peripheral edema  GASTROINTESTINAL: Abdomen: non distended, soft, non tender, bowel sounds normal  HEMATOLOGIC: no purpura, petechiae or bruising  LYMPHATIC: No lymph node enlargemant  MUSCULOSKELETAL: Extremities: no active synovitis, pulse 1+   INTEGUMENT: No unusual rashes or suspicious skin lesions noted. Nails appear normal.  PERIPHERAL VASCULAR: normal pulses femoral, PT and DP  NEUROLOGIC: non-focal exam, A & O X 3  PSYCHIATRIC:, appropriate affect     ASSESSMENT:   1. Hypertension with renal disease    2.  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, without long-term current use of insulin ([facility] [address]    3. Mixed hyperlipidemia    4. Primary osteoarthritis involving multiple joints    5. Stage 3 chronic kidney disease, unspecified whether stage 3a or 3b CKD ([facility] [address]    6. ASCVD (arteriosclerotic cardiovascular disease)    7. Diastolic CHF, chronic (HCC)    8. Paroxysmal atrial fibrillation (HCC)    9. Polymyalgia rheumatica ([facility] [address]    10. Dizziness    11. Needs flu shot      Impression  1. Hypertension that is controlled so continue current therapy reviewed with him. 2.  Diabetes repeat status pending and prior lab review not make adjustments if necessary. 3.  Hyperlipidemia prior lab reviewed and repeat status pending and I will adjust if needed. 4. DJD that is stable  5. CKD stage III repeat status pending  6. ASCVD clinically that appears to be stable  7. Diastolic CHF that is compensated  8. Paroxysmal atrial fibrillation I am concerned about that with his intermittent episodes of dizziness. EKG obtained today reveals no acute process with atrial fibrillation rate however is in the 50s so concern could be for rate getting too slow at times. INR is pending today. He remains on digoxin and I want to check a level today. I am also going to get a 48-hour Holter monitor to assess for evidence of rapid atrial fib causing his dizziness. 9.  Polymyalgia rheumatica sed rate pending prednisone dose now at 10 mg daily. 10.  Dizziness as noted evaluation above  Flu shot given today. High complexity patient with high complex decision making today. I will recheck a myself again in 1 month or sooner should it be a problem. I will call with today's lab results as well as results of the Holter monitor.     PLAN:  .  Orders Placed This Encounter    Influenza Vaccine, QUAD, 65 Yrs +  IM  (Fluad [id number] )    METABOLIC PANEL, COMPREHENSIVE    LIPID PANEL    CK    HEMOGLOBIN A1C WITH EAG    PROTHROMBIN TIME + INR    SED RATE (ESR)    DIGOXIN    AMB POC EKG ROUTINE W/ 12 LEADS, INTER & REP         ATTENTION:   This medical record was transcribed using an electronic medical records system. Although proofread, it may and can contain electronic and spelling errors. Other human spelling and other errors may be present. Corrections may be executed at a later time. Please feel free to contact us for any clarifications as needed. Follow-up and Dispositions    · Return in about 4 weeks (around 11/5/2021). No results found for any visits on 10/08/21. Amadou Barnes MD    The patient verbalized understanding of the problems and plans as explained.

## 2021-10-08 ENCOUNTER — OFFICE VISIT (OUTPATIENT)
Dept: INTERNAL MEDICINE CLINIC | Age: 86
End: 2021-10-08
Payer: MEDICARE

## 2021-10-08 VITALS
HEART RATE: 68 BPM | WEIGHT: 203.6 LBS | SYSTOLIC BLOOD PRESSURE: 136 MMHG | HEIGHT: 75 IN | BODY MASS INDEX: 25.31 KG/M2 | OXYGEN SATURATION: 97 % | DIASTOLIC BLOOD PRESSURE: 69 MMHG | TEMPERATURE: 98 F | RESPIRATION RATE: 18 BRPM

## 2021-10-08 DIAGNOSIS — Z23 NEEDS FLU SHOT: ICD-10-CM

## 2021-10-08 DIAGNOSIS — R42 DIZZINESS: ICD-10-CM

## 2021-10-08 DIAGNOSIS — N18.30 STAGE 3 CHRONIC KIDNEY DISEASE, UNSPECIFIED WHETHER STAGE 3A OR 3B CKD (HCC): ICD-10-CM

## 2021-10-08 DIAGNOSIS — M15.9 PRIMARY OSTEOARTHRITIS INVOLVING MULTIPLE JOINTS: ICD-10-CM

## 2021-10-08 DIAGNOSIS — I50.32 DIASTOLIC CHF, CHRONIC (HCC): ICD-10-CM

## 2021-10-08 DIAGNOSIS — I25.10 ASCVD (ARTERIOSCLEROTIC CARDIOVASCULAR DISEASE): ICD-10-CM

## 2021-10-08 DIAGNOSIS — I12.9 HYPERTENSION WITH RENAL DISEASE: Primary | ICD-10-CM

## 2021-10-08 DIAGNOSIS — N18.30 CONTROLLED TYPE 2 DIABETES MELLITUS WITH STAGE 3 CHRONIC KIDNEY DISEASE, WITHOUT LONG-TERM CURRENT USE OF INSULIN (HCC): ICD-10-CM

## 2021-10-08 DIAGNOSIS — M35.3 POLYMYALGIA RHEUMATICA (HCC): ICD-10-CM

## 2021-10-08 DIAGNOSIS — E78.2 MIXED HYPERLIPIDEMIA: ICD-10-CM

## 2021-10-08 DIAGNOSIS — I48.0 PAROXYSMAL ATRIAL FIBRILLATION (HCC): ICD-10-CM

## 2021-10-08 DIAGNOSIS — E11.22 CONTROLLED TYPE 2 DIABETES MELLITUS WITH STAGE 3 CHRONIC KIDNEY DISEASE, WITHOUT LONG-TERM CURRENT USE OF INSULIN (HCC): ICD-10-CM

## 2021-10-08 LAB
ALBUMIN SERPL-MCNC: 3.5 G/DL (ref 3.5–5)
ALBUMIN/GLOB SERPL: 1 {RATIO} (ref 1.1–2.2)
ALP SERPL-CCNC: 58 U/L (ref 45–117)
ALT SERPL-CCNC: 35 U/L (ref 12–78)
ANION GAP SERPL CALC-SCNC: 4 MMOL/L (ref 5–15)
AST SERPL-CCNC: 24 U/L (ref 15–37)
BILIRUB SERPL-MCNC: 0.3 MG/DL (ref 0.2–1)
BUN SERPL-MCNC: 33 MG/DL (ref 6–20)
BUN/CREAT SERPL: 24 (ref 12–20)
CALCIUM SERPL-MCNC: 9.4 MG/DL (ref 8.5–10.1)
CHLORIDE SERPL-SCNC: 105 MMOL/L (ref 97–108)
CHOLEST SERPL-MCNC: 231 MG/DL
CK SERPL-CCNC: 67 U/L (ref 39–308)
CO2 SERPL-SCNC: 30 MMOL/L (ref 21–32)
CREAT SERPL-MCNC: 1.35 MG/DL (ref 0.7–1.3)
ERYTHROCYTE [SEDIMENTATION RATE] IN BLOOD: 45 MM/HR (ref 0–20)
EST. AVERAGE GLUCOSE BLD GHB EST-MCNC: 151 MG/DL
GLOBULIN SER CALC-MCNC: 3.5 G/DL (ref 2–4)
GLUCOSE SERPL-MCNC: 128 MG/DL (ref 65–100)
HBA1C MFR BLD: 6.9 % (ref 4–5.6)
HDLC SERPL-MCNC: 37 MG/DL
HDLC SERPL: 6.2 {RATIO} (ref 0–5)
INR PPP: 2.3 (ref 0.9–1.1)
LDLC SERPL CALC-MCNC: ABNORMAL MG/DL (ref 0–100)
LDLC SERPL DIRECT ASSAY-MCNC: 106 MG/DL (ref 0–100)
POTASSIUM SERPL-SCNC: 4.3 MMOL/L (ref 3.5–5.1)
PROT SERPL-MCNC: 7 G/DL (ref 6.4–8.2)
PROTHROMBIN TIME: 22.8 SEC (ref 9–11.1)
SODIUM SERPL-SCNC: 139 MMOL/L (ref 136–145)
TRIGL SERPL-MCNC: 449 MG/DL (ref ?–150)
VLDLC SERPL CALC-MCNC: ABNORMAL MG/DL

## 2021-10-08 PROCEDURE — 93000 ELECTROCARDIOGRAM COMPLETE: CPT | Performed by: INTERNAL MEDICINE

## 2021-10-08 PROCEDURE — G8510 SCR DEP NEG, NO PLAN REQD: HCPCS | Performed by: INTERNAL MEDICINE

## 2021-10-08 PROCEDURE — G8536 NO DOC ELDER MAL SCRN: HCPCS | Performed by: INTERNAL MEDICINE

## 2021-10-08 PROCEDURE — 90694 VACC AIIV4 NO PRSRV 0.5ML IM: CPT | Performed by: INTERNAL MEDICINE

## 2021-10-08 PROCEDURE — G0008 ADMIN INFLUENZA VIRUS VAC: HCPCS | Performed by: INTERNAL MEDICINE

## 2021-10-08 PROCEDURE — 99215 OFFICE O/P EST HI 40 MIN: CPT | Performed by: INTERNAL MEDICINE

## 2021-10-08 PROCEDURE — G8419 CALC BMI OUT NRM PARAM NOF/U: HCPCS | Performed by: INTERNAL MEDICINE

## 2021-10-08 PROCEDURE — 1101F PT FALLS ASSESS-DOCD LE1/YR: CPT | Performed by: INTERNAL MEDICINE

## 2021-10-08 PROCEDURE — G8427 DOCREV CUR MEDS BY ELIG CLIN: HCPCS | Performed by: INTERNAL MEDICINE

## 2021-10-08 NOTE — PATIENT INSTRUCTIONS

## 2021-10-08 NOTE — PROGRESS NOTES
HIPAA verified by two patient identifiers. Gonzales Clayton is a 80 y.o. male    Chief Complaint   Patient presents with    Hypertension     3 month    CHF    Diabetes    Chronic Kidney Disease    Cholesterol Problem       Visit Vitals  /69 (BP 1 Location: Left upper arm, BP Patient Position: Sitting, BP Cuff Size: Adult)   Pulse 68   Temp 98 °F (36.7 °C) (Oral)   Resp 18   Ht 6' 3\" (1.905 m)   Wt 203 lb 9.6 oz (92.4 kg)   SpO2 97%   BMI 25.45 kg/m²       Pain Scale: 0 - No pain/10  Pain Location:       Health Maintenance Due   Topic Date Due    Eye Exam Retinal or Dilated  Never done    COVID-19 Vaccine (1) Never done    Shingrix Vaccine Age 50> (1 of 2) Never done    Flu Vaccine (1) 09/01/2021         Coordination of Care Questionnaire:  :   1) Have you been to an emergency room, urgent care, or hospitalized since your last visit? If yes, where when, and reason for visit? no       2. Have seen or consulted any other health care provider since your last visit? If yes, where when, and reason for visit? NO      Patient is accompanied by self I have received verbal consent from Gonzales Clayton. to discuss any/all medical information while they are present in the room.

## 2021-10-08 NOTE — PROGRESS NOTES
Dariana Abraham Sr.is a 80 y.o. male who is present for routine immunizations. Prior to vaccine administration After obtaining verbal consent and per orders of Dr. Ankush Yadav, injection of flu shots in left deltoid given by Scooter Marshall. Risks and adverse reactions were discussed. The patient was provided the VIS and they were given an opportunity to ask questions, all questions addressed. Order and injection/medication verified by second nurse/ma review by Toño Rowe rn. Patient tolerated procedure well. No reactions noted. Patient was advised to seek medical or call the office with any questions or concerns post vaccination. Patient verbalized understanding.  Scooter Marshall

## 2021-10-11 ENCOUNTER — TELEPHONE ANTICOAG (OUTPATIENT)
Dept: INTERNAL MEDICINE CLINIC | Age: 86
End: 2021-10-11

## 2021-10-11 NOTE — PROGRESS NOTES
Labs stable no changes needed. Patient advised. To continue the same dose of blood thinner which is 7.5 mg on Monday; 10 mg all other days and f/up as scheduled.

## 2021-10-11 NOTE — PROGRESS NOTES
Anticoagulation Summary  As of 10/11/2021    INR goal:  2.0-3.0   TTR:  32.3 % (3.5 y)   INR used for dosin.3 (10/8/2021)   Warfarin maintenance plan:  7.5 mg (5 mg x 1.5) every Mon; 10 mg (5 mg x 2) all other days   Weekly warfarin total:  67.5 mg   Plan last modified:  Brayan Grajeda RN (2021)   Next INR check:  2021   Target end date:      Indications    Paroxysmal atrial fibrillation ([facility] [address] (Primary) [I48.0]             Anticoagulation Episode Summary     INR check location:  Clinic Lab    Preferred lab:      Send INR reminders to:      Comments:        Anticoagulation Care Providers     Provider Role Specialty Phone number    Susan Ayala MD Responsible Internal Medicine [phone]        Informed patient that PT/INR okay; continue the same dose of blood thinner which is 7.5 mg on Monday; 10 mg all other days and f/up as scheduled.

## 2021-10-18 ENCOUNTER — HOSPITAL ENCOUNTER (OUTPATIENT)
Dept: NON INVASIVE DIAGNOSTICS | Age: 86
Discharge: HOME | End: 2021-10-18
Attending: INTERNAL MEDICINE
Payer: MEDICARE

## 2021-10-18 DIAGNOSIS — I48.91 ATRIAL FIBRILLATION, UNSPECIFIED TYPE (HCC): ICD-10-CM

## 2021-10-18 DIAGNOSIS — I48.0 PAROXYSMAL ATRIAL FIBRILLATION (HCC): ICD-10-CM

## 2021-10-18 DIAGNOSIS — R42 DIZZINESS: ICD-10-CM

## 2021-10-18 PROCEDURE — 93225 XTRNL ECG REC<48 HRS REC: CPT

## 2021-10-18 RX ORDER — WARFARIN SODIUM 5 MG/1
TABLET ORAL
Qty: 145 TABLET | Refills: 1 | Status: SHIPPED | OUTPATIENT
Start: 2021-10-18 | End: 2022-05-16

## 2021-10-18 NOTE — TELEPHONE ENCOUNTER
PCP: Selma Chung MD    Last appt: 10/8/2021  Future Appointments   Date Time Provider Helen Butcher   11/24/2021  8:50 AM Selma Chung MD [facility] AMB       Last refilled:5/14/21    Requested Prescriptions     Pending Prescriptions Disp Refills    warfarin (COUMADIN) 5 mg tablet [Pharmacy Med Name: WARFARIN SODIUM 5 MG TABLET] 145 Tablet 1     Sig: TAKE 2 TABLETS BY MOUTH ON MONDAY &FRIDAYS AND 1&1/2 TABLET DAILY ON ALL OTHER DAYS

## 2021-10-22 PROCEDURE — 93227 XTRNL ECG REC<48 HR R&I: CPT | Performed by: INTERNAL MEDICINE

## 2021-10-25 RX ORDER — PREDNISONE 10 MG/1
TABLET ORAL
Qty: 60 TABLET | Refills: 5 | Status: SHIPPED | OUTPATIENT
Start: 2021-10-25 | End: 2022-04-06

## 2021-10-25 NOTE — TELEPHONE ENCOUNTER
RX refill request from the patient/pharmacy. Patient last seen 10- with labs, and next appt. scheduled for 11-  Requested Prescriptions     Pending Prescriptions Disp Refills    predniSONE (DELTASONE) 10 mg tablet [Pharmacy Med Name: PREDNISONE 10 MG TABLET] 60 Tablet 5     Sig: TAKE 1TAB BY MOUTH TWO (2) TIMES A DAY. Eric Fisher

## 2021-10-25 NOTE — PROGRESS NOTES
Holter monitor shows long pauses of up to 3.8 seconds and need to discontinue digoxin. Discussed with patient.

## 2021-11-23 NOTE — PROGRESS NOTES
This is a Subsequent Medicare Annual Wellness Visit providing Personalized Prevention Plan Services (PPPS) (Performed 12 months after initial AWV and PPPS )    I have reviewed the patient's medical history in detail and updated the computerized patient record. He returns today for his Medicare subsequent annual wellness examination and screening questionnaire. He is also in follow-up of his multiple medical problems include hypertension, diabetes, hyperlipidemia, ASCVD, paroxysmal atrial fibrillation, chronic compensated diastolic CHF, BPH, chronic allergic rhinitis, DJD, CKD stage III, prior elevated PSA, polymyalgia rheumatica in remission and other multiple medical problems. He is taking his medications and trying to follow his diet and try to remain physically active. He currently denies any chest pain, shortness of breath, palpitations, PND, orthopnea or other cardiac or respiratory complaints. He notes no current GI or  complaints. He notes no headaches, dizziness or neurologic complaints. He has no change of his chronic arthritic complaints and and no other complaints on complete review of systems. History     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    Back pain 9/20/2017    BPH (benign prostatic hyperplasia) 9/20/2017    CHF (congestive heart failure) ([facility] [address] 9/20/2017    CKD (chronic kidney disease) 9/20/2017    Diabetic acetonemia ([facility] [address]     DM (diabetes mellitus) ([facility] [address] 9/20/2017    Story: Diet Controlled    ED (erectile dysfunction) 9/20/2017    Edema 9/20/2017    Elevated CPK 9/20/2017    Comments: History of    Elevated LFTs 9/20/2017    Comments: History of    Elevated PSA 9/20/2017    Hypercholesteremia     Hyperlipidemia 9/20/2017    Hypertension     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    Nodule of right lung 9/20/2017    Story: Right  Polymyalgia ([facility] [address] 9/20/2017    Prostate enlargement       Past Surgical History:   Procedure Laterality Date    HX HEENT  06/12/2018    Dr. 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 60 Tablet 5    warfarin (COUMADIN) 5 mg tablet TAKE 2 TABLETS BY MOUTH ON MONDAY &FRIDAYS AND 1&1/2 TABLET DAILY ON ALL OTHER DAYS 145 Tablet 1    bumetanide (BUMEX) 2 mg tablet TAKE 1 TABLET BY MOUTH EVERY DAY 90 Tablet 1    glucose blood VI test strips (Accu-Chek Merle Plus test strp) strip USE TO TEST BLOOD SUGAR ONCE DAILY 100 Strip PRN    digoxin (LANOXIN) 0.125 mg tablet TAKE 1 TABLET BY MOUTH EVERY DAY 90 Tablet 3    metFORMIN (GLUCOPHAGE) 500 mg tablet TAKE 1 TABLET BY MOUTH EVERY DAY IN THE MORNING WITH BREAKFAST AND ONE TABLET BEFORE DINNER 180 Tablet 3    simvastatin (ZOCOR) 20 mg tablet TAKE 1 TABLET BY MOUTH EVERY DAY 90 Tablet 3    terazosin (HYTRIN) 2 mg capsule TAKE 1 CAPSULE BY MOUTH EVERY DAY 90 Capsule 3    lisinopriL (PRINIVIL, ZESTRIL) 5 mg tablet Take 1 Tablet by mouth daily. 30 Tablet prn    diclofenac EC (VOLTAREN) 75 mg EC tablet TAKE 1 TABLET BY MOUTH TWICE A  Tab 3    glimepiride (AMARYL) 1 mg tablet Take 1 Tab by mouth Daily (before breakfast).  90 Tab 3    Blood-Glucose Meter monitoring kit Use to test blood sugar twice daily  DX:E11.22 1 Kit 0    glucose blood VI test strips (blood glucose test) strip Use to test blood sugar twice daily  DX:E11.22 100 Strip 1    cyanocobalamin (VITAMIN B12) 500 mcg tablet Take 500 mcg by mouth daily.  acetaminophen (TYLENOL ARTHRITIS PAIN) 650 mg TbER Take 650 mg by mouth every eight (8) hours.  multivitamins-minerals-lutein (MULTIVITAMIN 50 PLUS) tab tablet Take 1 Tab by mouth daily.  glucosamine 1,000 mg tab Take 2,000 mg by mouth daily.  cholecalciferol (VITAMIN D3) 1,000 unit cap Take 1,000 Units by mouth daily.  DOCOSAHEXANOIC ACID/EPA (FISH OIL PO) Take 1,200 mg by mouth two (2) times a day. No Known Allergies  History reviewed. No pertinent family history. Patient Active Problem List    Diagnosis    Diastolic CHF, chronic (HCC)     Echo 8//2/77  - mild diastolic dysfunction with EFx 45%      Paroxysmal atrial fibrillation (HCC)    ASCVD (arteriosclerotic cardiovascular disease)     Story:  Old ASMI by EKG      Mixed hyperlipidemia    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, without long-term current use of insulin (HCC)     Story: Diet Controlled      Hypertension with renal disease    Stage 3 chronic kidney disease (HCC)    Primary osteoarthritis involving multiple joints    Polymyalgia rheumatica (HCC)    Chronic non-seasonal allergic rhinitis    BPH (benign prostatic hyperplasia)    Primary osteoarthritis of right knee    Primary osteoarthritis of left shoulder    Primary osteoarthritis of left knee    Bilateral leg edema    Ulcer of both feet, limited to breakdown of skin (HCC)     Right second toe      Near syncope    Alcohol screening    Primary osteoarthritis of left hip    Dizziness    Medicare annual wellness visit, subsequent    Elevated PSA    Nodule of right lung     Story: Right      ED (erectile dysfunction)    Back pain       Patient Care Team:  Tigre Lawton MD as PCP - General (Internal Medicine)  Tigre Lawton MD as PCP - [facility] Empaneled Provider    Depression Risk Factor Screening:     3 most recent PHQ Screens 11/24/2021   Little interest or pleasure in doing things Not at all   Feeling down, depressed, irritable, or hopeless Not at all   Total Score PHQ 2 0     Alcohol Risk Factor Screening: You do not drink alcohol or very rarely. Functional Ability and Level of Safety:     Fall Risk     Fall Risk Assessment, last 12 mths 11/24/2021   Able to walk? Yes   Fall in past 12 months? 1   Do you feel unsteady? 0   Are you worried about falling 0   Is TUG test greater than 12 seconds? 0   Is the gait abnormal? 0   Number of falls in past 12 months 1   Fall with injury? 0       Hearing Loss   mild    Activities of Daily Living   Self-care. ADL Assessment 11/11/2020   Feeding yourself No Help Needed   Getting from bed to chair No Help Needed   Getting dressed No Help Needed   Bathing or showering No Help Needed   Walk across the room (includes cane/walker) No Help Needed   Using the telphone No Help Needed   Taking your medications No Help Needed   Preparing meals No Help Needed   Managing money (expenses/bills) No Help Needed   Moderately strenuous housework (laundry) No Help Needed   Shopping for personal items (toiletries/medicines) No Help Needed   Shopping for groceries No Help Needed   Driving No Help Needed   Climbing a flight of stairs No Help Needed   Getting to places beyond walking distances No Help Needed       Abuse Screen   Patient is not abused    Social History     Social History Narrative    Not on file       Review of Systems      ROS:    Constitutional: He denies fevers, weight loss, sweats. Eyes: No blurred or double vision. ENT: No difficulty with swallowing, taste, speech or smell. Neck: no stiffness or swelling  Respiratory: No cough wheezing or shortness of breath. Cardiovascular: Denies chest pain, palpitations, unexplained indigestion or syncope. Gastrointestinal:  No changes in bowel movements, no abdominal pain, no bloating. Genitourinary:  He denies frequency, nocturia or stranguria.   Extremities: No joint pain, stiffness or swelling. Neurological:  No numbness, tingling, burring paresthesias or loss of motor strength. No syncope, dizziness or frequent headache  Lymphatic: no adenopathy noted  Hematologic: no easy bruising or bleeding gums  Skin:  No recent rashes or mole changes. Psychiatric/Behavioral:  Negative for depression. Physical Examination     Evaluation of Cognitive Function:  Mood/affect:  happy  Appearance: age appropriate  Family member/caregiver input: None    Vitals:    11/24/21 0909   BP: (!) 142/78   Pulse: 72   Resp: 16   Temp: 98 °F (36.7 °C)   SpO2: 100%   Weight: 208 lb 4.8 oz (94.5 kg)   Height: 6' 3\" (1.905 m)   PainSc:   0 - No pain        PHYSICAL EXAM:    General appearance - alert, well appearing, and in no distress  Mental status - alert, oriented to person, place, and time  HEENT:  Ears - bilateral TM's and external ear canals clear  Eyes - pupillary responses were normal.  Extraocular muscle function intact. Lids and conjunctiva not injected. Fundoscopic exam revealed sharp disc margins. eye movements intact  Pharynx- clear with teeth in good repair. No masses were noted  Neck - supple without thyromegaly or burit. No JVD noted  Lungs - clear to auscultation and percussion  Cardiac- normal rate, regular rhythm without murmurs. PMI not displaced. No gallop, rub or click  Abdomen - flat, soft, non-tender without palpable organomegaly or mass. No pulsatile mass was felt, and not bruit was heard.   Bowel sounds were active  : Circumcised, Testes descended w/o masses  Rectal: normal sphincter tone, prostate 1+ enlarged, smooth and nontender without nodules, no masses, stool brown and hemacult negative  Extremities -  no clubbing cyanosis or edema  Lymphatics - no palpable lymphadenopathy, no hepatosplenomegaly  Hematologic: no petechiae or purpura  Peripheral vascular -Femoral, Dorsalis pedis and posterior tibial pulses felt without difficulty  Skin - no rash or unusual mole change noted  Neurological - Cranial nerves II-XII grossly intact. Motor strength 5/5. DTR's 2+ and symmetric. Station and gait normal  Back exam - full range of motion, no tenderness, palpable spasm or pain on motion  Musculoskeletal - no joint tenderness, deformity or swelling        Results for orders placed or performed in visit on 10/08/21   SED RATE (ESR)   Result Value Ref Range    Sed rate, automated 45 (H) 0 - 20 mm/hr   PROTHROMBIN TIME + INR   Result Value Ref Range    INR 2.3 (H) 0.9 - 1.1      Prothrombin time 22.8 (H) 9.0 - 11.1 sec   HEMOGLOBIN A1C WITH EAG   Result Value Ref Range    Hemoglobin A1c 6.9 (H) 4.0 - 5.6 %    Est. average glucose 151 mg/dL   CK   Result Value Ref Range    CK 67 39 - 308 U/L   LIPID PANEL   Result Value Ref Range    Cholesterol, total 231 (H) <200 MG/DL    Triglyceride 449 (H) <150 MG/DL    HDL Cholesterol 37 MG/DL    LDL, calculated Not calculated due to elevated triglyceride level 0 - 100 MG/DL    VLDL, calculated  MG/DL     Calculation not valid with this patient's other Lipid values. CHOL/HDL Ratio 6.2 (H) 0.0 - 5.0     METABOLIC PANEL, COMPREHENSIVE   Result Value Ref Range    Sodium 139 136 - 145 mmol/L    Potassium 4.3 3.5 - 5.1 mmol/L    Chloride 105 97 - 108 mmol/L    CO2 30 21 - 32 mmol/L    Anion gap 4 (L) 5 - 15 mmol/L    Glucose 128 (H) 65 - 100 mg/dL    BUN 33 (H) 6 - 20 MG/DL    Creatinine 1.35 (H) 0.70 - 1.30 MG/DL    BUN/Creatinine ratio 24 (H) 12 - 20      GFR est AA >60 >60 ml/min/1.73m2    GFR est non-AA 50 (L) >60 ml/min/1.73m2    Calcium 9.4 8.5 - 10.1 MG/DL    Bilirubin, total 0.3 0.2 - 1.0 MG/DL    ALT (SGPT) 35 12 - 78 U/L    AST (SGOT) 24 15 - 37 U/L    Alk.  phosphatase 58 45 - 117 U/L    Protein, total 7.0 6.4 - 8.2 g/dL    Albumin 3.5 3.5 - 5.0 g/dL    Globulin 3.5 2.0 - 4.0 g/dL    A-G Ratio 1.0 (L) 1.1 - 2.2     LDL, DIRECT   Result Value Ref Range    LDL,Direct 106 (H) 0 - 100 mg/dl       Advice/Referrals/Counseling   Education and counseling provided:  Are appropriate based on today's review and evaluation  End-of-Life planning (with patient's consent)  Pneumococcal Vaccine  Influenza Vaccine  Colorectal cancer screening tests      Assessment/Plan     ASSESSMENT:   1. Hypertension with renal disease    2. 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, without long[facility]term [facility]    3. Mixed hyperlipidemia    4. Primary osteoarthritis involving multiple joints    5. Stage 3 chronic kidney disease, unspecified whether stage 3a or 3b CKD ([facility] [address]    6. ASCVD (arteriosclerotic cardiovascular disease)    7. Diastolic CHF, chronic (HCC)    8. Paroxysmal atrial fibrillation (HCC)    9. Benign prostatic hyperplasia without lower urinary tract symptoms    10. Chronic non-seasonal allergic rhinitis    11. Polymyalgia rheumatica ([facility] [address]    12. Elevated PSA    13. Alcohol screening    14. Medicare annual wellness visit, subsequent      Impression  1. Hypertension that is not quite controlled and he recently stopped lisinopril as of asked him to resume lisinopril 5 mg daily and will recheck his blood pressure in a month. 2.  Diabetes repeat status pending a prior lab review not make adjustments if necessary. 3.  Hyperlipidemia prior lab reviewed repeat status pending and I will adjust if needed. 4. DJD that is currently stable   5. CKD stage III repeat status pending  6. ASCVD clinically stable. Continue aspirin daily. EKG obtained today no acute process with rate controlled atrial fibrillation and nonspecific ST-T wave changes. 7.  Diastolic CHF compensated  8. Paroxysmal atrial fibrillation INR pending today. 9.  BPH currently asymptomatic  10. Allergic rhinitis stable  11. Polymyalgia rheumatica I will check his sed rate and see if we need to adjust.  Currently on 10 of prednisone. 12.  Elevated PSA repeat status pending next #13 annual alcohol screening is done he claims not to drink alcohol at all now.   I did spend 5 minutes discussing complications of excessive alcohol use of more than 2 drinks per day in males with increased cardiovascular risk and increased risk of liver disease and other GI effects. He has received his flu shot  He has not taken the Covid vaccine. I had a long discussion with him today regarding the absolute need and importance of getting the Covid vaccine. He has been extremely fortunate has his son and daughter-in-law who lives with both of had Covid and so far he has not had Covid last testing negative on November 18. Medicare subsequent annual wellness examination screening questionnaires completed today. The results were reviewed with him and his questions were answered. Lifestyle recommendations and modifications discussed and made. 40 minutes spent in direct care of this patient today not including time spent on Medicare wellness examination or procedures. Greater than 50% in counseling coordination of care and a lot of that spent on the Covid vaccine recommendations. I will call with lab results and make further recommendations or adjustments if necessary. Follow-up in 1 month for hypertension as well as atrial fib. I will see him sooner should problems arise. PLAN:  .  Orders Placed This Encounter    CBC WITH AUTOMATED DIFF    METABOLIC PANEL, COMPREHENSIVE    CK    LIPID PANEL    T4 (THYROXINE)    TSH 3RD GENERATION    URINALYSIS W/ REFLEX CULTURE    PSA SCREENING (SCREENING)    SED RATE (ESR)    PROTHROMBIN TIME + INR    AMB POC URINE, MICROALBUMIN, SEMIQUANT (1 RESULT)    AMB POC EKG ROUTINE W/ 12 LEADS, INTER & REP         ATTENTION:   This medical record was transcribed using an electronic medical records system. Although proofread, it may and can contain electronic and spelling errors. Other human spelling and other errors may be present. Corrections may be executed at a later time.   Please feel free to contact us for any clarifications as needed. Follow-up and Dispositions    · Return in about 4 weeks (around 12/22/2021). Silvano Berger MD    Recommended healthy diet low in carbohydrates, fats, sodium and cholesterol. Recommended regular cardiovascular exercise 3-6 times per week for 30-60 minutes daily. Current Outpatient Medications   Medication Sig Dispense Refill    predniSONE (DELTASONE) 10 mg tablet TAKE 1TAB BY MOUTH TWO (2) TIMES A DAY. 60 Tablet 5    warfarin (COUMADIN) 5 mg tablet TAKE 2 TABLETS BY MOUTH ON MONDAY &FRIDAYS AND 1&1/2 TABLET DAILY ON ALL OTHER DAYS 145 Tablet 1    bumetanide (BUMEX) 2 mg tablet TAKE 1 TABLET BY MOUTH EVERY DAY 90 Tablet 1    glucose blood VI test strips (Accu-Chek Merle Plus test strp) strip USE TO TEST BLOOD SUGAR ONCE DAILY 100 Strip PRN    digoxin (LANOXIN) 0.125 mg tablet TAKE 1 TABLET BY MOUTH EVERY DAY 90 Tablet 3    metFORMIN (GLUCOPHAGE) 500 mg tablet TAKE 1 TABLET BY MOUTH EVERY DAY IN THE MORNING WITH BREAKFAST AND ONE TABLET BEFORE DINNER 180 Tablet 3    simvastatin (ZOCOR) 20 mg tablet TAKE 1 TABLET BY MOUTH EVERY DAY 90 Tablet 3    terazosin (HYTRIN) 2 mg capsule TAKE 1 CAPSULE BY MOUTH EVERY DAY 90 Capsule 3    lisinopriL (PRINIVIL, ZESTRIL) 5 mg tablet Take 1 Tablet by mouth daily. 30 Tablet prn    diclofenac EC (VOLTAREN) 75 mg EC tablet TAKE 1 TABLET BY MOUTH TWICE A  Tab 3    glimepiride (AMARYL) 1 mg tablet Take 1 Tab by mouth Daily (before breakfast). 90 Tab 3    Blood-Glucose Meter monitoring kit Use to test blood sugar twice daily  DX:E11.22 1 Kit 0    glucose blood VI test strips (blood glucose test) strip Use to test blood sugar twice daily  DX:E11.22 100 Strip 1    cyanocobalamin (VITAMIN B12) 500 mcg tablet Take 500 mcg by mouth daily.  acetaminophen (TYLENOL ARTHRITIS PAIN) 650 mg TbER Take 650 mg by mouth every eight (8) hours.  multivitamins-minerals-lutein (MULTIVITAMIN 50 PLUS) tab tablet Take 1 Tab by mouth daily.       glucosamine 1,000 mg tab Take 2,000 mg by mouth daily.  cholecalciferol (VITAMIN D3) 1,000 unit cap Take 1,000 Units by mouth daily.  DOCOSAHEXANOIC ACID/EPA (FISH OIL PO) Take 1,200 mg by mouth two (2) times a day. No results found for any visits on 11/24/21. Verbal and written instructions (see AVS) provided. Patient expresses understanding of diagnosis and treatment plan.     Checo Salas MD

## 2021-11-24 ENCOUNTER — OFFICE VISIT (OUTPATIENT)
Dept: INTERNAL MEDICINE CLINIC | Age: 86
End: 2021-11-24
Payer: MEDICARE

## 2021-11-24 VITALS
SYSTOLIC BLOOD PRESSURE: 142 MMHG | WEIGHT: 208.3 LBS | HEIGHT: 75 IN | HEART RATE: 72 BPM | DIASTOLIC BLOOD PRESSURE: 78 MMHG | BODY MASS INDEX: 25.9 KG/M2 | TEMPERATURE: 98 F | OXYGEN SATURATION: 100 % | RESPIRATION RATE: 16 BRPM

## 2021-11-24 DIAGNOSIS — M15.9 PRIMARY OSTEOARTHRITIS INVOLVING MULTIPLE JOINTS: ICD-10-CM

## 2021-11-24 DIAGNOSIS — M35.3 POLYMYALGIA RHEUMATICA (HCC): ICD-10-CM

## 2021-11-24 DIAGNOSIS — I25.10 ASCVD (ARTERIOSCLEROTIC CARDIOVASCULAR DISEASE): ICD-10-CM

## 2021-11-24 DIAGNOSIS — R97.20 ELEVATED PSA: ICD-10-CM

## 2021-11-24 DIAGNOSIS — Z13.39 ALCOHOL SCREENING: ICD-10-CM

## 2021-11-24 DIAGNOSIS — N18.30 STAGE 3 CHRONIC KIDNEY DISEASE, UNSPECIFIED WHETHER STAGE 3A OR 3B CKD (HCC): ICD-10-CM

## 2021-11-24 DIAGNOSIS — N18.30 CONTROLLED TYPE 2 DIABETES MELLITUS WITH STAGE 3 CHRONIC KIDNEY DISEASE, WITHOUT LONG-TERM CURRENT USE OF INSULIN (HCC): ICD-10-CM

## 2021-11-24 DIAGNOSIS — E11.22 CONTROLLED TYPE 2 DIABETES MELLITUS WITH STAGE 3 CHRONIC KIDNEY DISEASE, WITHOUT LONG-TERM CURRENT USE OF INSULIN (HCC): ICD-10-CM

## 2021-11-24 DIAGNOSIS — I48.0 PAROXYSMAL ATRIAL FIBRILLATION (HCC): ICD-10-CM

## 2021-11-24 DIAGNOSIS — E78.2 MIXED HYPERLIPIDEMIA: ICD-10-CM

## 2021-11-24 DIAGNOSIS — I50.32 DIASTOLIC CHF, CHRONIC (HCC): ICD-10-CM

## 2021-11-24 DIAGNOSIS — I12.9 HYPERTENSION WITH RENAL DISEASE: Primary | ICD-10-CM

## 2021-11-24 DIAGNOSIS — J30.89 CHRONIC NON-SEASONAL ALLERGIC RHINITIS: ICD-10-CM

## 2021-11-24 DIAGNOSIS — Z00.00 MEDICARE ANNUAL WELLNESS VISIT, SUBSEQUENT: ICD-10-CM

## 2021-11-24 DIAGNOSIS — N40.0 BENIGN PROSTATIC HYPERPLASIA WITHOUT LOWER URINARY TRACT SYMPTOMS: ICD-10-CM

## 2021-11-24 LAB
ALBUMIN SERPL-MCNC: 3.3 G/DL (ref 3.5–5)
ALBUMIN/GLOB SERPL: 1 {RATIO} (ref 1.1–2.2)
ALP SERPL-CCNC: 63 U/L (ref 45–117)
ALT SERPL-CCNC: 48 U/L (ref 12–78)
ANION GAP SERPL CALC-SCNC: 4 MMOL/L (ref 5–15)
APPEARANCE UR: CLEAR
AST SERPL-CCNC: 32 U/L (ref 15–37)
BACTERIA URNS QL MICRO: NEGATIVE /HPF
BASOPHILS # BLD: 0 K/UL (ref 0–0.1)
BASOPHILS NFR BLD: 1 % (ref 0–1)
BILIRUB SERPL-MCNC: 0.2 MG/DL (ref 0.2–1)
BILIRUB UR QL: NEGATIVE
BUN SERPL-MCNC: 28 MG/DL (ref 6–20)
BUN/CREAT SERPL: 23 (ref 12–20)
CALCIUM SERPL-MCNC: 9 MG/DL (ref 8.5–10.1)
CHLORIDE SERPL-SCNC: 108 MMOL/L (ref 97–108)
CHOLEST SERPL-MCNC: 158 MG/DL
CK SERPL-CCNC: 74 U/L (ref 39–308)
CO2 SERPL-SCNC: 27 MMOL/L (ref 21–32)
COLOR UR: NORMAL
CREAT SERPL-MCNC: 1.22 MG/DL (ref 0.7–1.3)
DIFFERENTIAL METHOD BLD: ABNORMAL
EOSINOPHIL # BLD: 0.1 K/UL (ref 0–0.4)
EOSINOPHIL NFR BLD: 3 % (ref 0–7)
EPITH CASTS URNS QL MICRO: NORMAL /LPF
ERYTHROCYTE [DISTWIDTH] IN BLOOD BY AUTOMATED COUNT: 13.8 % (ref 11.5–14.5)
ERYTHROCYTE [SEDIMENTATION RATE] IN BLOOD: 46 MM/HR (ref 0–20)
GLOBULIN SER CALC-MCNC: 3.3 G/DL (ref 2–4)
GLUCOSE SERPL-MCNC: 113 MG/DL (ref 65–100)
GLUCOSE UR STRIP.AUTO-MCNC: NEGATIVE MG/DL
HCT VFR BLD AUTO: 38.3 % (ref 36.6–50.3)
HDLC SERPL-MCNC: 50 MG/DL
HDLC SERPL: 3.2 {RATIO} (ref 0–5)
HGB BLD-MCNC: 11.6 G/DL (ref 12.1–17)
HGB UR QL STRIP: NEGATIVE
HYALINE CASTS URNS QL MICRO: NORMAL /LPF (ref 0–5)
IMM GRANULOCYTES # BLD AUTO: 0 K/UL (ref 0–0.04)
IMM GRANULOCYTES NFR BLD AUTO: 1 % (ref 0–0.5)
INR PPP: 1.7 (ref 0.9–1.1)
KETONES UR QL STRIP.AUTO: NEGATIVE MG/DL
LDLC SERPL CALC-MCNC: 73.8 MG/DL (ref 0–100)
LEUKOCYTE ESTERASE UR QL STRIP.AUTO: NEGATIVE
LYMPHOCYTES # BLD: 1.3 K/UL (ref 0.8–3.5)
LYMPHOCYTES NFR BLD: 26 % (ref 12–49)
MCH RBC QN AUTO: 31.5 PG (ref 26–34)
MCHC RBC AUTO-ENTMCNC: 30.3 G/DL (ref 30–36.5)
MCV RBC AUTO: 104.1 FL (ref 80–99)
MICROALBUMIN UR TEST STR-MCNC: 80 MG/L (ref 0–20)
MONOCYTES # BLD: 0.6 K/UL (ref 0–1)
MONOCYTES NFR BLD: 12 % (ref 5–13)
NEUTS SEG # BLD: 2.9 K/UL (ref 1.8–8)
NEUTS SEG NFR BLD: 57 % (ref 32–75)
NITRITE UR QL STRIP.AUTO: NEGATIVE
NRBC # BLD: 0 K/UL (ref 0–0.01)
NRBC BLD-RTO: 0 PER 100 WBC
PH UR STRIP: 5 [PH] (ref 5–8)
PLATELET # BLD AUTO: 163 K/UL (ref 150–400)
PMV BLD AUTO: 10.2 FL (ref 8.9–12.9)
POTASSIUM SERPL-SCNC: 4.3 MMOL/L (ref 3.5–5.1)
PROT SERPL-MCNC: 6.6 G/DL (ref 6.4–8.2)
PROT UR STRIP-MCNC: NEGATIVE MG/DL
PROTHROMBIN TIME: 17.3 SEC (ref 9–11.1)
PSA SERPL-MCNC: 9.3 NG/ML (ref 0.01–4)
RBC # BLD AUTO: 3.68 M/UL (ref 4.1–5.7)
RBC #/AREA URNS HPF: NORMAL /HPF (ref 0–5)
SODIUM SERPL-SCNC: 139 MMOL/L (ref 136–145)
SP GR UR REFRACTOMETRY: 1.02 (ref 1–1.03)
T4 SERPL-MCNC: 7.3 UG/DL (ref 4.5–12.1)
TRIGL SERPL-MCNC: 171 MG/DL (ref ?–150)
TSH SERPL DL<=0.05 MIU/L-ACNC: 1.32 UIU/ML (ref 0.36–3.74)
UA: UC IF INDICATED,UAUC: NORMAL
UROBILINOGEN UR QL STRIP.AUTO: 0.2 EU/DL (ref 0.2–1)
VLDLC SERPL CALC-MCNC: 34.2 MG/DL
WBC # BLD AUTO: 4.9 K/UL (ref 4.1–11.1)
WBC URNS QL MICRO: NORMAL /HPF (ref 0–4)

## 2021-11-24 PROCEDURE — G8427 DOCREV CUR MEDS BY ELIG CLIN: HCPCS | Performed by: INTERNAL MEDICINE

## 2021-11-24 PROCEDURE — G0439 PPPS, SUBSEQ VISIT: HCPCS | Performed by: INTERNAL MEDICINE

## 2021-11-24 PROCEDURE — G8510 SCR DEP NEG, NO PLAN REQD: HCPCS | Performed by: INTERNAL MEDICINE

## 2021-11-24 PROCEDURE — G8536 NO DOC ELDER MAL SCRN: HCPCS | Performed by: INTERNAL MEDICINE

## 2021-11-24 PROCEDURE — 99215 OFFICE O/P EST HI 40 MIN: CPT | Performed by: INTERNAL MEDICINE

## 2021-11-24 PROCEDURE — G8419 CALC BMI OUT NRM PARAM NOF/U: HCPCS | Performed by: INTERNAL MEDICINE

## 2021-11-24 PROCEDURE — 1101F PT FALLS ASSESS-DOCD LE1/YR: CPT | Performed by: INTERNAL MEDICINE

## 2021-11-24 PROCEDURE — 82044 UR ALBUMIN SEMIQUANTITATIVE: CPT | Performed by: INTERNAL MEDICINE

## 2021-11-24 PROCEDURE — 93000 ELECTROCARDIOGRAM COMPLETE: CPT | Performed by: INTERNAL MEDICINE

## 2021-11-24 NOTE — PATIENT INSTRUCTIONS
Medicare Wellness Visit, Male    The best way to live healthy is to have a lifestyle where you eat a well-balanced diet, exercise regularly, limit alcohol use, and quit all forms of tobacco/nicotine, if applicable. Regular preventive services are another way to keep healthy. Preventive services (vaccines, screening tests, monitoring & exams) can help personalize your care plan, which helps you manage your own care. Screening tests can find health problems at the earliest stages, when they are easiest to treat. [facility] follows the current, evidence-based guidelines published by the [facility] Ren Raghavendra ([facility]) when recommending preventive services for our patients. Because we follow these guidelines, sometimes recommendations change over time as research supports it. (For example, a prostate screening blood test is no longer routinely recommended for men with no symptoms). Of course, you and your doctor may decide to screen more often for some diseases, based on your risk and co-morbidities (chronic disease you are already diagnosed with). Preventive services for you include:  - Medicare offers their members a free annual wellness visit, which is time for you and your primary care provider to discuss and plan for your preventive service needs. Take advantage of this benefit every year!  -All adults over age 72 should receive the recommended pneumonia vaccines. Current USPSTF guidelines recommend a series of two vaccines for the best pneumonia protection.   -All adults should have a flu vaccine yearly and tetanus vaccine every 10 years.  -All adults age 48 and older should receive the shingles vaccines (series of two vaccines).        -All adults age 38-68 who are overweight should have a diabetes screening test once every three years.   -Other screening tests & preventive services for persons with diabetes include: an eye exam to screen for diabetic retinopathy, a kidney function test, a foot exam, and stricter control over your cholesterol.   -Cardiovascular screening for adults with routine risk involves an electrocardiogram (ECG) at intervals determined by the provider.   -Colorectal cancer screening should be done for adults age 54-65 with no increased risk factors for colorectal cancer. There are a number of acceptable methods of screening for this type of cancer. Each test has its own benefits and drawbacks. Discuss with your provider what is most appropriate for you during your annual wellness visit. The different tests include: colonoscopy (considered the best screening method), a fecal occult blood test, a fecal DNA test, and sigmoidoscopy.  -All adults born between [facility] should be screened once for Hepatitis C.  -An Abdominal Aortic Aneurysm (AAA) Screening is recommended for men age 73-68 who has ever smoked in their lifetime. Here is a list of your current Health Maintenance items (your personalized list of preventive services) with a due date:  Health Maintenance Due   Topic Date Due    Eye Exam  Never done    COVID-19 Vaccine (1) Never done    Shingles Vaccine (1 of 2) Never done    Albumin Urine Test  11/11/2021    Annual Well Visit  11/12/2021        Allergies: Care Instructions  Overview     Allergies occur when your body's defense system (immune system) overreacts to certain substances. The immune system treats a harmless substance as if it were a harmful germ or virus. Many things can make this happen. These include pollens, medicine, food, dust, animal dander, and mold. Allergies can be mild or severe. Mild allergies can be managed with home treatment. But medicine may be needed to prevent problems. Managing your allergies is an important part of staying healthy. Your doctor may suggest that you have allergy testing to help find out what is causing your allergies.   Severe allergies can cause reactions that affect your whole body (anaphylactic reactions). Your doctor may prescribe a shot of epinephrine to carry with you in case you have a severe reaction. Learn how to give yourself the shot and keep it with you at all times. Make sure it is not . Follow-up care is a key part of your treatment and safety. Be sure to make and go to all appointments, and call your doctor if you are having problems. It's also a good idea to know your test results and keep a list of the medicines you take. How can you care for yourself at home? · If you have been told by your doctor that dust or dust mites are causing your allergy, decrease the dust around your bed:  ? Wash sheets, pillowcases, and other bedding in hot water every week. ? Use dust-proof covers for pillows, duvets, and mattresses. Avoid plastic covers because they tear easily and do not \"breathe. \" Wash as instructed on the label. ? Do not use any blankets and pillows that you do not need. ? Use blankets that you can wash in your washing machine. ? Consider removing drapes and carpets, which attract and hold dust, from your bedroom. · If you are allergic to house dust and mites, do not use home humidifiers. Your doctor can suggest ways you can control dust and mites. · Look for signs of cockroaches. Cockroaches cause allergic reactions. Use cockroach baits to get rid of them. Then, clean your home well. Cockroaches like areas where grocery bags, newspapers, empty bottles, or cardboard boxes are stored. Do not keep these inside your home, and keep trash and food containers sealed. Seal off any spots where cockroaches might enter your home. · If you are allergic to mold, get rid of furniture, rugs, and drapes that smell musty. Check for mold in the bathroom. · If you are allergic to outdoor pollen or mold spores, use air-conditioning. Change or clean all filters every month. Keep windows closed. · If you are allergic to pollen, stay inside when pollen counts are high. Use a vacuum  with a HEPA filter or a double-thickness filter at least two times each week. · Stay inside when air pollution is bad. Avoid paint fumes, perfumes, and other strong odors. · Avoid conditions that make your allergies worse. Stay away from smoke. Do not smoke or let anyone else smoke in your house. Do not use fireplaces or wood-burning stoves. · If you are allergic to your pets, change the air filter in your furnace every month. Use high-efficiency filters. · If you are allergic to pet dander, keep pets outside or out of your bedroom. Old carpet and cloth furniture can hold a lot of animal dander. You may need to replace them. When should you call for help? Give an epinephrine shot if:    · You think you are having a severe allergic reaction.     · You have symptoms in more than one body area, such as mild nausea and an itchy mouth. After giving an epinephrine shot call 911, even if you feel better. Call 911 if:    · You have symptoms of a severe allergic reaction. These may include:  ? Sudden raised, red areas (hives) all over your body. ? Swelling of the throat, mouth, lips, or tongue. ? Trouble breathing. ? Passing out (losing consciousness). Or you may feel very lightheaded or suddenly feel weak, confused, or restless.     · You have been given an epinephrine shot, even if you feel better. Call your doctor now or seek immediate medical care if:    · You have symptoms of an allergic reaction, such as:  ? A rash or hives (raised, red areas on the skin). ? Itching. ? Swelling. ? Belly pain, nausea, or vomiting. Watch closely for changes in your health, and be sure to contact your doctor if:    · You do not get better as expected. Where can you learn more? Go to http://www.[employer].com/  Enter W171 in the search box to learn more about \"Allergies: Care Instructions. \"  Current as of: February 10, 2021               Content Version: 13.0  © 5368-8228 [employer]. Care instructions adapted under license by [facility] (which disclaims liability or warranty for this information). If you have questions about a medical condition or this instruction, always ask your healthcare professional. [address] any warranty or liability for your use of this information.

## 2021-11-24 NOTE — PROGRESS NOTES
Manuel Butler is a 80 y.o. male    Chief Complaint   Patient presents with    Annual Wellness Visit     FASTING       Visit Vitals  BP (!) 142/78 (BP 1 Location: Left upper arm, BP Patient Position: Sitting, BP Cuff Size: Small adult)   Pulse 72   Temp 98 °F (36.7 °C)   Resp 16   Ht 6' 3\" (1.905 m)   Wt 208 lb 4.8 oz (94.5 kg)   SpO2 100%   BMI 26.04 kg/m²           1. Have you been to the ER, urgent care clinic since your last visit? Hospitalized since your last visit? NO    2. Have you seen or consulted any other health care providers outside of the [address] [place] [address] since your last visit? Include any pap smears or colon screening.  NO

## 2021-11-30 ENCOUNTER — TELEPHONE (OUTPATIENT)
Dept: INTERNAL MEDICINE CLINIC | Age: 86
End: 2021-11-30

## 2021-11-30 ENCOUNTER — HOSPITAL ENCOUNTER (EMERGENCY)
Age: 86
Discharge: HOME | End: 2021-11-30
Attending: STUDENT IN AN ORGANIZED HEALTH CARE EDUCATION/TRAINING PROGRAM
Payer: MEDICARE

## 2021-11-30 ENCOUNTER — APPOINTMENT (OUTPATIENT)
Dept: GENERAL RADIOLOGY | Age: 86
End: 2021-11-30
Attending: STUDENT IN AN ORGANIZED HEALTH CARE EDUCATION/TRAINING PROGRAM
Payer: MEDICARE

## 2021-11-30 VITALS
SYSTOLIC BLOOD PRESSURE: 134 MMHG | OXYGEN SATURATION: 96 % | HEIGHT: 75 IN | TEMPERATURE: 98.5 F | WEIGHT: 203.48 LBS | DIASTOLIC BLOOD PRESSURE: 80 MMHG | BODY MASS INDEX: 25.3 KG/M2 | RESPIRATION RATE: 18 BRPM | HEART RATE: 80 BPM

## 2021-11-30 DIAGNOSIS — U07.1 COVID-19: Primary | ICD-10-CM

## 2021-11-30 PROCEDURE — 71046 X-RAY EXAM CHEST 2 VIEWS: CPT

## 2021-11-30 PROCEDURE — 99283 EMERGENCY DEPT VISIT LOW MDM: CPT

## 2021-11-30 NOTE — TELEPHONE ENCOUNTER
Daughter in law Pepper called regarding patient. Tested positive for Covid and with chills, feeling dizzy, and URI symptoms are getting worse. Referred to ER for further evaluation.

## 2021-11-30 NOTE — TELEPHONE ENCOUNTER
----- Message from Ross Clark sent at 11/30/2021 11:51 AM EST -----  Subject: Message to Provider    QUESTIONS  Information for Provider? pts daughter in law Osvaldo Kaye is calling to inform   PCP pt tested positive for Covid today 11/30/2021. pt is only having cold   symptoms. Osvaldo Kaye is asking if there is any medication the pt should be   taking. please call pt to confirm   ---------------------------------------------------------------------------  --------------  CALL BACK INFO  What is the best way for the office to contact you? OK to leave message on   voicemail  Preferred Call Back Phone Number? [phone]  ---------------------------------------------------------------------------  --------------  SCRIPT ANSWERS  Relationship to Patient? Third Party  Representative Name?  Osvaldo Kaye daughter in law

## 2021-12-01 ENCOUNTER — PATIENT OUTREACH (OUTPATIENT)
Dept: CASE MANAGEMENT | Age: 86
End: 2021-12-01

## 2021-12-01 NOTE — PROGRESS NOTES
Patient contacted regarding COVID-19 diagnosis. Discussed COVID-19 related testing which was available at this time. Test results were positive. Patient informed of results, if available? yes. Ambulatory Care Manager contacted the patient by telephone to perform post discharge assessment. Call within 2 business days of discharge: Yes Verified name and  with patient as identifiers. Provided introduction to self, and explanation of the CTN/ACM role, and reason for call due to risk factors for infection and/or exposure to COVID-19. Symptoms reviewed with patient who verbalized the following symptoms: cough and headache      Due to no new or worsening symptoms encounter was not routed to provider for escalation. Discussed follow-up appointments. If no appointment was previously scheduled, appointment scheduling offered:  yes. [address] follow up appointment(s):   Future Appointments   Date Time Provider Helen Butcher   2021  1:30 PM Hayden Brown MD [facility] with [facility]     Non-BS follow up appointment(s): no    Interventions to address risk factors: Obtained and reviewed discharge summary and/or continuity of care documents     Advance Care Planning:   Does patient have an Advance Directive: not on file. Educated patient about risk for severe COVID-19 due to risk factors according to CDC guidelines. ACM reviewed discharge instructions, medical action plan and red flag symptoms with the patient who verbalized understanding. Discussed COVID vaccination status: no. Education provided on COVID-19 vaccination as appropriate. Discussed exposure protocols and quarantine with CDC Guidelines. Patient was given an opportunity to verbalize any questions and concerns and agrees to contact ACM or health care provider for questions related to their healthcare. Reviewed and educated patient on any new and changed medications related to discharge diagnosis     Was patient discharged with a pulse oximeter?  no Pt states that overall he is feeling pretty well. Cough is sporadic and headache is better. He is scheduled for monoclonal antibody treatment tomorrow at 0900. ACM provided contact information. Plan for follow-up call in 5-7 days based on severity of symptoms and risk factors.

## 2021-12-01 NOTE — PROGRESS NOTES
Labs remarkable for elevated PSAs to start doxycycline 100 twice daily for 2 weeks and get repeat PSA. I do note that he went to the emergency room with positive Covid test make sure he received monoclonal antibodies.   Other labs are okay

## 2021-12-06 ENCOUNTER — TELEPHONE ANTICOAG (OUTPATIENT)
Dept: INTERNAL MEDICINE CLINIC | Age: 86
End: 2021-12-06

## 2021-12-06 RX ORDER — DOXYCYCLINE 100 MG/1
100 CAPSULE ORAL 2 TIMES DAILY
Qty: 28 CAPSULE | Refills: 0 | Status: SHIPPED | OUTPATIENT
Start: 2021-12-06 | End: 2021-12-20

## 2021-12-06 NOTE — PROGRESS NOTES
Anticoagulation Summary  As of 2021    INR goal:  2.0-3.0   TTR:  32.9 % (3.6 y)   INR used for dosin.7 (2021)   Warfarin maintenance plan:  7.5 mg (5 mg x 1.5) every Mon; 10 mg (5 mg x 2) all other days   Weekly warfarin total:  67.5 mg   Plan last modified:  Luz Foley RN (2021)   Next INR check:  2021   Target end date:      Indications    Paroxysmal atrial fibrillation ([address] (Primary) [I48.0]             Anticoagulation Episode Summary     INR check location:  Clinic Lab    Preferred lab:      Send INR reminders to:      Comments:        Anticoagulation Care Providers     Provider Role Specialty Phone number    Sy Peck MD [facility] Internal Medicine [phone]        Informed patient that PT/INR okay; continue the same dose of blood thinner which is 10 mg every day except for 7.5 mg on Monday. F/up as scheduled.

## 2021-12-06 NOTE — TELEPHONE ENCOUNTER
RX refill request from the patient/pharmacy. Patient last seen 11- with labs, and next appt. scheduled for 12-  Requested Prescriptions     Pending Prescriptions Disp Refills    doxycycline (VIBRAMYCIN) 100 mg capsule 28 Capsule 0     Sig: Take 1 Capsule by mouth two (2) times a day for 14 days. Mallory Croft

## 2021-12-06 NOTE — PROGRESS NOTES
Labs remarkable for elevated PSAs to start doxycycline 100 twice daily for 2 weeks and get repeat PSA. I do note that he went to the emergency room with positive Covid test make sure he received monoclonal antibodies. Other labs are okay. Discussed with patient. Rx sent to patient's pharmacy. Scheduled for f/up PSA 12-. Patient states he got  the monoclonal antibodies and is now feeling better.

## 2021-12-08 ENCOUNTER — PATIENT OUTREACH (OUTPATIENT)
Dept: CASE MANAGEMENT | Age: 86
End: 2021-12-08

## 2021-12-08 NOTE — PROGRESS NOTES
Patient resolved from [address] Transitions episode on 12/08/21. Discussed COVID-19 related testing which was available at this time. Test results were positive. Patient informed of results, if available? yes     Patient/family has been provided the following resources and education related to COVID-19:                         Signs, symptoms and red flags related to COVID-19            CDC exposure and quarantine guidelines            [employer] exposure contact - [phone]            Contact for their local Department of Health                 Patient currently reports that the following symptoms have improved:  fever, cough and headache. Pt reports his SaO2 have been 93-96% and his BS have been good. He states he is feeling pretty good. No further outreach scheduled with this CTN/ACM/LPN/HC/ MA. Episode of Care resolved. Patient has this CTN/ACM/LPN/HC/MA contact information if future needs arise.

## 2021-12-21 PROBLEM — U07.1 COVID-19: Status: ACTIVE | Noted: 2021-12-21

## 2021-12-21 NOTE — PROGRESS NOTES
Chief Complaint   Patient presents with    Hypertension     1 month follow up    Elevated PSA       SUBJECTIVE:    Adelaida Ocasio. is a 80 y.o. male who returns in follow-up for his hypertension which was uncontrolled on last visit, recent elevated PSA with urinary tract infection, DJD, polymyalgia rheumatica, congestive heart failure, paroxysmal atrial fibrillation and his recent bout with Covid that he had since he was last here. He did come down with Covid infection around the end of November and received monoclonal antibody treatment which seemed to help quite a bit. He only had a low-grade fever and cough for a few days. He currently notes no cardiorespiratory complaints now including no chest pain, shortness of breath, palpitations, PND, orthopnea. He does note he has little bit of a sore spot over the dorsum of his left great toe but has been treating it with triple antibiotic solution which is working well. He denies any headaches dizziness or neurologic complaints. He does have a little bit of left chest wall pain from where he fell when he lost his balance early 1 morning he hit his self on the bed. He notes no change of his chronic arthritic complaints. There are no other complaints noted on complete review of systems. Current Outpatient Medications   Medication Sig Dispense Refill    predniSONE (DELTASONE) 10 mg tablet TAKE 1TAB BY MOUTH TWO (2) TIMES A DAY.  60 Tablet 5    warfarin (COUMADIN) 5 mg tablet TAKE 2 TABLETS BY MOUTH ON MONDAY &FRIDAYS AND 1&1/2 TABLET DAILY ON ALL OTHER DAYS 145 Tablet 1    bumetanide (BUMEX) 2 mg tablet TAKE 1 TABLET BY MOUTH EVERY DAY 90 Tablet 1    glucose blood VI test strips (Accu-Chek Merle Plus test strp) strip USE TO TEST BLOOD SUGAR ONCE DAILY 100 Strip PRN    digoxin (LANOXIN) 0.125 mg tablet TAKE 1 TABLET BY MOUTH EVERY DAY 90 Tablet 3    metFORMIN (GLUCOPHAGE) 500 mg tablet TAKE 1 TABLET BY MOUTH EVERY DAY IN THE MORNING WITH BREAKFAST AND ONE TABLET BEFORE DINNER 180 Tablet 3    simvastatin (ZOCOR) 20 mg tablet TAKE 1 TABLET BY MOUTH EVERY DAY 90 Tablet 3    terazosin (HYTRIN) 2 mg capsule TAKE 1 CAPSULE BY MOUTH EVERY DAY 90 Capsule 3    lisinopriL (PRINIVIL, ZESTRIL) 5 mg tablet Take 1 Tablet by mouth daily. 30 Tablet prn    diclofenac EC (VOLTAREN) 75 mg EC tablet TAKE 1 TABLET BY MOUTH TWICE A  Tab 3    glimepiride (AMARYL) 1 mg tablet Take 1 Tab by mouth Daily (before breakfast). 90 Tab 3    Blood-Glucose Meter monitoring kit Use to test blood sugar twice daily  DX:E11.22 1 Kit 0    glucose blood VI test strips (blood glucose test) strip Use to test blood sugar twice daily  DX:E11.22 100 Strip 1    cyanocobalamin (VITAMIN B12) 500 mcg tablet Take 500 mcg by mouth daily.  acetaminophen (TYLENOL ARTHRITIS PAIN) 650 mg TbER Take 650 mg by mouth every eight (8) hours.  multivitamins-minerals-lutein (MULTIVITAMIN 50 PLUS) tab tablet Take 1 Tab by mouth daily.  glucosamine 1,000 mg tab Take 2,000 mg by mouth daily.  cholecalciferol (VITAMIN D3) 1,000 unit cap Take 1,000 Units by mouth daily.  DOCOSAHEXANOIC ACID/EPA (FISH OIL PO) Take 1,200 mg by mouth two (2) times a day. 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    Back pain 9/20/2017    BPH (benign prostatic hyperplasia) 9/20/2017    CHF (congestive heart failure) ([facility] 9/20/2017    CKD (chronic kidney disease) 9/20/2017    COVID-19 11/2021    Diabetic acetonemia ([facility]     DM (diabetes mellitus) ([facility] 9/20/2017    Story: Diet Controlled    ED (erectile dysfunction) 9/20/2017    Edema 9/20/2017    Elevated CPK 9/20/2017    Comments: History of    Elevated LFTs 9/20/2017    Comments: History of    Elevated PSA 9/20/2017    Hypercholesteremia     Hyperlipidemia 9/20/2017    Hypertension     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    Nodule of right lung 9/20/2017    Story: Right    Polymyalgia ([address] 9/20/2017    Prostate enlargement      Past Surgical History:   Procedure Laterality Date    HX HEENT  06/12/2018    Dr. 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 Left chest wall pain from where he fell but no shortness of breath  Cardiovascular : negative for - chest pain, edema, palpitations or shortness of breath  Gastrointestinal: negative for - abdominal pain, blood in stools, heartburn or nausea/vomiting  Genito-Urinary: no dysuria, trouble voiding, or hematuria  Musculoskeletal: negative for - gait disturbance, joint pain, joint stiffness or joint swelling  Neurological: no TIA or stroke symptoms  Hematologic: no bruises, no bleeding  Lymphatic: no swollen glands  Integument: no lumps, mole changes, nail changes or rash. Infection of left great toe  Endocrine:no malaise/lethargy poly uria or polydipsia or unexpected weight changes        Social History     Socioeconomic History    Marital status:    Tobacco Use    Smoking status: Never Smoker    Smokeless tobacco: Never Used   Vaping Use    Vaping Use: Never used   Substance and Sexual Activity    Alcohol use: No    Drug use: No    Sexual activity: Never     History reviewed. No pertinent family history.     OBJECTIVE:     Visit Vitals  /76 (BP 1 Location: Left upper arm, BP Patient Position: Sitting, BP Cuff Size: Adult)   Pulse 91   Temp 98.4 °F (36.9 °C) (Oral)   Resp 17   Ht 6' 3\" (1.905 m)   Wt 204 lb (92.5 kg)   SpO2 97%   BMI 25.50 kg/m²     CONSTITUTIONAL:   well nourished, appears age appropriate  EYES: sclera anicteric, PERRL, EOMI  ENMT:nares clear, moist mucous membranes, pharynx clear  NECK: supple. Thyroid normal, No JVD or bruits  RESPIRATORY: Chest: clear to ascultation and percussion, normal inspiratory effort. Mild ecchymosis posterior axillary line lower chest wall on the left side with no obvious bony deformity and minimal discomfort to palpation  CARDIOVASCULAR: Heart: regular rate and rhythm no murmurs, rubs or gallops, PMI not displaced, No thrills, no peripheral edema  GASTROINTESTINAL: Abdomen: non distended, soft, non tender, bowel sounds normal  HEMATOLOGIC: no purpura, petechiae or bruising  LYMPHATIC: No lymph node enlargemant  MUSCULOSKELETAL: Extremities: no active synovitis, pulse 1+   INTEGUMENT: No unusual rashes or suspicious skin lesions noted. Nails appear normal..  Superficial excoriation left great toe over the dorsum of the toe  PERIPHERAL VASCULAR: normal pulses femoral, PT and DP  NEUROLOGIC: non-focal exam, A & O X 3  PSYCHIATRIC:, appropriate affect     ASSESSMENT:   1. Diastolic CHF, chronic ([address]    2. Hypertension with renal disease    3. Paroxysmal atrial fibrillation (HCC)    4. Polymyalgia rheumatica (HCC)    5. COVID-19      Impression  1. Diastolic CHF that seems to be compensated  2. Hypertension that seems to be controlled now so continue lisinopril at 5 mg daily  3. Paroxysmal atrial fibrillation INR is pending  4. Polymyalgia rheumatica sed rate is pending  5. Recent COVID-19 infection seems to have no significant residual.  I will check his CMP and CBC. I will recheck him in a month or sooner should the be a problem. I will call with lab results in interim.     PLAN:  .  Orders Placed This Encounter    PROTHROMBIN TIME + INR    METABOLIC PANEL, COMPREHENSIVE    CBC WITH AUTOMATED DIFF    SED RATE (ESR)         ATTENTION: This medical record was transcribed using an electronic medical records system. Although proofread, it may and can contain electronic and spelling errors. Other human spelling and other errors may be present. Corrections may be executed at a later time. Please feel free to contact us for any clarifications as needed. Follow-up and Dispositions    · Return in about 4 weeks (around 1/19/2022). No results found for any visits on 12/22/21. Wendy Garrison MD    The patient verbalized understanding of the problems and plans as explained.

## 2021-12-22 ENCOUNTER — OFFICE VISIT (OUTPATIENT)
Dept: INTERNAL MEDICINE CLINIC | Age: 86
End: 2021-12-22
Payer: MEDICARE

## 2021-12-22 VITALS
BODY MASS INDEX: 25.36 KG/M2 | OXYGEN SATURATION: 97 % | DIASTOLIC BLOOD PRESSURE: 76 MMHG | HEART RATE: 91 BPM | TEMPERATURE: 98.4 F | RESPIRATION RATE: 17 BRPM | WEIGHT: 204 LBS | HEIGHT: 75 IN | SYSTOLIC BLOOD PRESSURE: 130 MMHG

## 2021-12-22 DIAGNOSIS — I48.0 PAROXYSMAL ATRIAL FIBRILLATION (HCC): ICD-10-CM

## 2021-12-22 DIAGNOSIS — I12.9 HYPERTENSION WITH RENAL DISEASE: ICD-10-CM

## 2021-12-22 DIAGNOSIS — M35.3 POLYMYALGIA RHEUMATICA (HCC): ICD-10-CM

## 2021-12-22 DIAGNOSIS — I50.32 DIASTOLIC CHF, CHRONIC (HCC): Primary | ICD-10-CM

## 2021-12-22 DIAGNOSIS — U07.1 COVID-19: ICD-10-CM

## 2021-12-22 LAB
ALBUMIN SERPL-MCNC: 3.4 G/DL (ref 3.5–5)
ALBUMIN/GLOB SERPL: 0.9 {RATIO} (ref 1.1–2.2)
ALP SERPL-CCNC: 71 U/L (ref 45–117)
ALT SERPL-CCNC: 34 U/L (ref 12–78)
ANION GAP SERPL CALC-SCNC: 10 MMOL/L (ref 5–15)
AST SERPL-CCNC: 22 U/L (ref 15–37)
BASOPHILS # BLD: 0.1 K/UL (ref 0–0.1)
BASOPHILS NFR BLD: 1 % (ref 0–1)
BILIRUB SERPL-MCNC: 0.3 MG/DL (ref 0.2–1)
BUN SERPL-MCNC: 46 MG/DL (ref 6–20)
BUN/CREAT SERPL: 26 (ref 12–20)
CALCIUM SERPL-MCNC: 9.9 MG/DL (ref 8.5–10.1)
CHLORIDE SERPL-SCNC: 103 MMOL/L (ref 97–108)
CO2 SERPL-SCNC: 24 MMOL/L (ref 21–32)
CREAT SERPL-MCNC: 1.77 MG/DL (ref 0.7–1.3)
DIFFERENTIAL METHOD BLD: ABNORMAL
EOSINOPHIL # BLD: 0 K/UL (ref 0–0.4)
EOSINOPHIL NFR BLD: 0 % (ref 0–7)
ERYTHROCYTE [DISTWIDTH] IN BLOOD BY AUTOMATED COUNT: 13.2 % (ref 11.5–14.5)
ERYTHROCYTE [SEDIMENTATION RATE] IN BLOOD: 64 MM/HR (ref 0–20)
GLOBULIN SER CALC-MCNC: 3.8 G/DL (ref 2–4)
GLUCOSE SERPL-MCNC: 217 MG/DL (ref 65–100)
HCT VFR BLD AUTO: 39 % (ref 36.6–50.3)
HGB BLD-MCNC: 12.3 G/DL (ref 12.1–17)
IMM GRANULOCYTES # BLD AUTO: 0.1 K/UL (ref 0–0.04)
IMM GRANULOCYTES NFR BLD AUTO: 1 % (ref 0–0.5)
INR PPP: 3.7 (ref 0.9–1.1)
LYMPHOCYTES # BLD: 1.2 K/UL (ref 0.8–3.5)
LYMPHOCYTES NFR BLD: 14 % (ref 12–49)
MCH RBC QN AUTO: 31.5 PG (ref 26–34)
MCHC RBC AUTO-ENTMCNC: 31.5 G/DL (ref 30–36.5)
MCV RBC AUTO: 99.7 FL (ref 80–99)
MONOCYTES # BLD: 0.5 K/UL (ref 0–1)
MONOCYTES NFR BLD: 5 % (ref 5–13)
NEUTS SEG # BLD: 7.1 K/UL (ref 1.8–8)
NEUTS SEG NFR BLD: 79 % (ref 32–75)
NRBC # BLD: 0 K/UL (ref 0–0.01)
NRBC BLD-RTO: 0 PER 100 WBC
PLATELET # BLD AUTO: 254 K/UL (ref 150–400)
PMV BLD AUTO: 10.5 FL (ref 8.9–12.9)
POTASSIUM SERPL-SCNC: 4.9 MMOL/L (ref 3.5–5.1)
PROT SERPL-MCNC: 7.2 G/DL (ref 6.4–8.2)
PROTHROMBIN TIME: 36.6 SEC (ref 9–11.1)
RBC # BLD AUTO: 3.91 M/UL (ref 4.1–5.7)
SODIUM SERPL-SCNC: 137 MMOL/L (ref 136–145)
WBC # BLD AUTO: 9 K/UL (ref 4.1–11.1)

## 2021-12-22 PROCEDURE — G8510 SCR DEP NEG, NO PLAN REQD: HCPCS | Performed by: INTERNAL MEDICINE

## 2021-12-22 PROCEDURE — G8536 NO DOC ELDER MAL SCRN: HCPCS | Performed by: INTERNAL MEDICINE

## 2021-12-22 PROCEDURE — 99214 OFFICE O/P EST MOD 30 MIN: CPT | Performed by: INTERNAL MEDICINE

## 2021-12-22 PROCEDURE — G8419 CALC BMI OUT NRM PARAM NOF/U: HCPCS | Performed by: INTERNAL MEDICINE

## 2021-12-22 PROCEDURE — G8427 DOCREV CUR MEDS BY ELIG CLIN: HCPCS | Performed by: INTERNAL MEDICINE

## 2021-12-22 PROCEDURE — 1101F PT FALLS ASSESS-DOCD LE1/YR: CPT | Performed by: INTERNAL MEDICINE

## 2021-12-22 NOTE — PROGRESS NOTES
Chief Complaint   Patient presents with    Hypertension     1 month follow up    Elevated PSA     Visit Vitals  /76 (BP 1 Location: Left upper arm, BP Patient Position: Sitting, BP Cuff Size: Adult)   Pulse 91   Temp 98.4 °F (36.9 °C) (Oral)   Resp 17   Ht 6' 3\" (1.905 m)   Wt 204 lb (92.5 kg)   SpO2 97%   BMI 25.50 kg/m²     1. Have you been to the ER, urgent care clinic since your last visit? Hospitalized since your last visit? ED [facility] 11/30/21 DX:covid    2. Have you seen or consulted any other health care providers outside of the [address] [place] [address] since your last visit? Include any pap smears or colon screening.  No

## 2021-12-22 NOTE — PATIENT INSTRUCTIONS
ACE Inhibitors: Care Instructions  Your Care Instructions     ACE (angiotensin-converting enzyme) inhibitors lower blood pressure. They also treat heart failure and prevent heart attacks and strokes. They block an enzyme that makes blood vessels narrow. As a result, the blood vessels relax and widen. This lowers blood pressure. These medicines also put more water and salt into the urine. This lowers blood pressure too. These medicines are a good choice for people with diabetes. They don't affect blood sugar levels, and they may protect the kidneys. Examples include:  · Benazepril (Lotensin). · Lisinopril (Prinivil, Zestril). · Ramipril (Altace). Before you start taking this medicine, make sure your doctor knows if you take other medicines, especially water pills (diuretics) or potassium tablets. And tell your doctor if you use a salt substitute. You should not take an ACE inhibitor if you are pregnant or planning to become pregnant. You may need regular blood tests. Follow-up care is a key part of your treatment and safety. Be sure to make and go to all appointments, and call your doctor if you are having problems. It's also a good idea to know your test results and keep a list of the medicines you take. How can you care for yourself at home? · Take your medicines exactly as prescribed. Be sure to take your medicine every day. Call your doctor if you think you are having a problem with your medicine. · ACE inhibitors can cause a dry cough. If the cough is bad, talk to your doctor. You may need to try a different medicine. · ACE inhibitors can cause swelling of your lips, tongue, or face. If the swelling is severe, you may need treatment right away. Severe swelling can make it hard to breathe, but this is very rare. · Check with your doctor or pharmacist before you use any other medicines. This includes over-the-counter medicines.  Make sure your doctor knows all of the medicines, vitamins, herbal products, and supplements you take. Taking some medicines together can cause problems. When should you call for help? Call your doctor now or seek immediate medical care if:    · You have swelling of your lips, tongue, or face.     · You think you are having problems with your medicine. Watch closely for changes in your health, and be sure to contact your doctor if you have any problems. Where can you learn more? Go to http://www.gray.com/  Enter [id number] in the search box to learn more about \"ACE Inhibitors: Care Instructions. \"  Current as of: April 29, 2021               Content Version: 13.0  © 8542-6222 [employer]. Care instructions adapted under license by [employer] (which disclaims liability or warranty for this information). If you have questions about a medical condition or this instruction, always ask your healthcare professional. [address] any warranty or liability for your use of this information.

## 2021-12-23 ENCOUNTER — TELEPHONE ANTICOAG (OUTPATIENT)
Dept: INTERNAL MEDICINE CLINIC | Age: 86
End: 2021-12-23

## 2021-12-23 DIAGNOSIS — I48.0 PAROXYSMAL ATRIAL FIBRILLATION (HCC): Primary | ICD-10-CM

## 2021-12-23 PROBLEM — Z00.00 MEDICARE ANNUAL WELLNESS VISIT, SUBSEQUENT: Status: RESOLVED | Noted: 2017-10-30 | Resolved: 2021-12-23

## 2021-12-23 NOTE — PROGRESS NOTES
Kidney function a little elevated poss related to his recent Covid infection. INR also elevated so Coumadin for 3 days and then resume usual dose. Recheck INR in about 10 days.   Sed rate elevated which probably also related to recent Covid infection

## 2021-12-23 NOTE — PROGRESS NOTES
Anticoagulation Summary  As of 12/23/2021    INR goal:  2.0-3.0   TTR:  33.3 % (3.7 y)   INR used for dosing:     Warfarin maintenance plan:  7.5 mg (5 mg x 1.5) every Mon; 10 mg (5 mg x 2) all other days   Weekly warfarin total:  67.5 mg   Plan last modified:  Andrei Luis RN (6/14/2021)   Next INR check:  1/3/2022   Target end date:      Indications    Paroxysmal atrial fibrillation ([facility] [address] (Primary) [I48.0]             Anticoagulation Episode Summary     INR check location:  Clinic Lab    Preferred lab:      Send INR reminders to:      Comments:        Anticoagulation Care Providers     Provider Role Specialty Phone number    Chikis Pope MD [facility] Internal Medicine [phone]        Per Dr. Clark Houser pts INR is elevated and hold coumadin for 3 days and resume coumadin at 7.5 mg on Monday and 10 mg all other days.

## 2021-12-23 NOTE — PROGRESS NOTES
Called and spoke to patient  Two pt identifiers confirmed  Informed patient per Dr. Sari Lyons that kidney function is elevated  and well as sed rate but that is probably due to his recent covid infection. Also informed patient per Dr. Sari Lyons that INR is elevated and to hold coumadin for 3 days and then resume at normal dose of coumadin 10 mg daily and 7.5 mg on Monday   Pt has repeat INR scheduled for 1/3/2022  Patient verbalized understanding of information discussed  with no further questions at this time.

## 2022-01-06 ENCOUNTER — LAB ONLY (OUTPATIENT)
Dept: INTERNAL MEDICINE CLINIC | Age: 87
End: 2022-01-06

## 2022-01-06 DIAGNOSIS — I48.0 PAROXYSMAL ATRIAL FIBRILLATION (HCC): Primary | ICD-10-CM

## 2022-01-07 LAB
INR PPP: 2.4 (ref 0.9–1.1)
PROTHROMBIN TIME: 24.5 SEC (ref 9–11.1)

## 2022-01-10 ENCOUNTER — TELEPHONE ANTICOAG (OUTPATIENT)
Dept: INTERNAL MEDICINE CLINIC | Age: 87
End: 2022-01-10

## 2022-01-10 DIAGNOSIS — I48.0 PAROXYSMAL ATRIAL FIBRILLATION (HCC): Primary | ICD-10-CM

## 2022-01-10 NOTE — PROGRESS NOTES
Called and spoke to patient  Two pt identifiers confirmed  Informed patient per Dr. Etta Foley that INR is ok and to continue current dose of coumadin which is 7.5 mg on Monday and 10 mg all other days. Patient verbalized understanding of information discussed  with no further questions at this time.

## 2022-01-10 NOTE — PROGRESS NOTES
Anticoagulation Summary  As of 1/10/2022    INR goal:  2.0-3.0   TTR:  33.4 % (3.7 y)   INR used for dosing:     Warfarin maintenance plan:  7.5 mg (5 mg x 1.5) every Mon; 10 mg (5 mg x 2) all other days   Weekly warfarin total:  67.5 mg   Plan last modified:  Tirso Barber RN (6/14/2021)   Next INR check:  1/19/2022   Target end date:      Indications    Paroxysmal atrial fibrillation ([facility] [address] (Primary) [I48.0]             Anticoagulation Episode Summary     INR check location:  Clinic Lab    Preferred lab:      Send INR reminders to:      Comments:        Anticoagulation Care Providers     Provider Role Specialty Phone number    Salome Johnson MD Responsible Internal Medicine [phone]        Per Dr. Shayla Quintanilla pts INR is ok and continue current dosage of coumadin which is 7.5 mg on Monday and 10 mg all other days.

## 2022-01-18 NOTE — PROGRESS NOTES
Chief Complaint   Patient presents with    Hypertension     1 month follow up    CHF    Diabetes       SUBJECTIVE:    Kenyatta Ramirez. is a 80 y.o. male returns in follow-up for his hypertension, congestive heart failure, atrial fibrillation, polymyalgia rheumatica, CKD her numbers were slightly worse on his last check, diabetes, recent COVID infection as well as diabetic foot ulcers over the dorsum of both great toes. He is taking care of the toes with local care which he has an ointment that he had before when he had an ulcer on his leg that is healing up quite well. He does not know the name of it but it sounds like it may be Santyl. He notes that the right foot has cleared up the left one is almost completely closed and no drainage. He denies any chest pain, shortness of breath, palpitations, PND, orthopnea or other cardiac or respiratory complaints. He notes no GI or  complaints. He notes no headaches, dizziness or neurologic complaints. He does have some arthritic complaints but they seem to be stable and without change. He is try to get some exercise by walking a quite long path of the few hills going to his house. He is doing on a regular basis with his son. He denies any other complaints [employer]. Current Outpatient Medications   Medication Sig Dispense Refill    predniSONE (DELTASONE) 10 mg tablet TAKE 1TAB BY MOUTH TWO (2) TIMES A DAY.  60 Tablet 5    warfarin (COUMADIN) 5 mg tablet TAKE 2 TABLETS BY MOUTH ON MONDAY &FRIDAYS AND 1&1/2 TABLET DAILY ON ALL OTHER DAYS 145 Tablet 1    bumetanide (BUMEX) 2 mg tablet TAKE 1 TABLET BY MOUTH EVERY DAY 90 Tablet 1    glucose blood VI test strips (Accu-Chek Merle Plus test strp) strip USE TO TEST BLOOD SUGAR ONCE DAILY 100 Strip PRN    digoxin (LANOXIN) 0.125 mg tablet TAKE 1 TABLET BY MOUTH EVERY DAY 90 Tablet 3    metFORMIN (GLUCOPHAGE) 500 mg tablet TAKE 1 TABLET BY MOUTH EVERY DAY IN THE MORNING WITH BREAKFAST AND ONE TABLET BEFORE DINNER 180 Tablet 3    simvastatin (ZOCOR) 20 mg tablet TAKE 1 TABLET BY MOUTH EVERY DAY 90 Tablet 3    terazosin (HYTRIN) 2 mg capsule TAKE 1 CAPSULE BY MOUTH EVERY DAY 90 Capsule 3    lisinopriL (PRINIVIL, ZESTRIL) 5 mg tablet Take 1 Tablet by mouth daily. 30 Tablet prn    diclofenac EC (VOLTAREN) 75 mg EC tablet TAKE 1 TABLET BY MOUTH TWICE A  Tab 3    glimepiride (AMARYL) 1 mg tablet Take 1 Tab by mouth Daily (before breakfast). 90 Tab 3    Blood-Glucose Meter monitoring kit Use to test blood sugar twice daily  DX:E11.22 1 Kit 0    glucose blood VI test strips (blood glucose test) strip Use to test blood sugar twice daily  DX:E11.22 100 Strip 1    cyanocobalamin (VITAMIN B12) 500 mcg tablet Take 500 mcg by mouth daily.  acetaminophen (TYLENOL ARTHRITIS PAIN) 650 mg TbER Take 650 mg by mouth every eight (8) hours.  multivitamins-minerals-lutein (MULTIVITAMIN 50 PLUS) tab tablet Take 1 Tab by mouth daily.  glucosamine 1,000 mg tab Take 2,000 mg by mouth daily.  cholecalciferol (VITAMIN D3) 1,000 unit cap Take 1,000 Units by mouth daily.  DOCOSAHEXANOIC ACID/EPA (FISH OIL PO) Take 1,200 mg by mouth two (2) times a day. 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    Back pain 9/20/2017    BPH (benign prostatic hyperplasia) 9/20/2017    CHF (congestive heart failure) ([facility] [address] 9/20/2017    CKD (chronic kidney disease) 9/20/2017    COVID-19 11/2021    Diabetic acetonemia ([facility] [address]     DM (diabetes mellitus) ([facility] [address] 9/20/2017    Story: Diet Controlled    ED (erectile dysfunction) 9/20/2017    Edema 9/20/2017    Elevated CPK 9/20/2017    Comments: History of    Elevated LFTs 9/20/2017    Comments: History of    Elevated PSA 9/20/2017    Hypercholesteremia     Hyperlipidemia 9/20/2017    Hypertension     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    Nodule of right lung 9/20/2017    Story: Right    Polymyalgia ([address] 9/20/2017    Prostate enlargement      Past Surgical History:   Procedure Laterality Date    HX HEENT  06/12/2018    Dr. 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    HX MALIGNANT SKIN LESION EXCISION  09/2018    2 lesions on head    TX ABDOMEN SURGERY PROC UNLISTED      hernia repair     No Known Allergies    REVIEW OF SYSTEMS:  General: negative for - chills or fever, or weight loss or gain  ENT: negative for - headaches, nasal congestion or tinnitus  Eyes: no blurred or visual changes  Neck: No stiffness or swollen nodes  Respiratory: negative for - cough, hemoptysis, shortness of breath or wheezing  Cardiovascular : negative for - chest pain, edema, palpitations or shortness of breath  Gastrointestinal: negative for - abdominal pain, blood in stools, heartburn or nausea/vomiting  Genito-Urinary: no dysuria, trouble voiding, or hematuria  Musculoskeletal: negative for - gait disturbance, joint pain, joint stiffness or joint swelling  Neurological: no TIA or stroke symptoms  Hematologic: no bruises, no bleeding  Lymphatic: no swollen glands  Integument: no lumps, mole changes, nail changes or rash. Ulcers dorsum of both great toes right heel of the left almost healed  Endocrine:no malaise/lethargy poly uria or polydipsia or unexpected weight changes        Social History     Socioeconomic History    Marital status:    Tobacco Use    Smoking status: Never Smoker    Smokeless tobacco: Never Used   Vaping Use    Vaping Use: Never used   Substance and Sexual Activity    Alcohol use: No    Drug use: No    Sexual activity: Never     History reviewed. No pertinent family history.     OBJECTIVE:     Visit Vitals  /70 (BP 1 Location: Left upper arm, BP Patient Position: Sitting, BP Cuff Size: Adult)   Pulse 63   Temp 98.6 °F (37 °C) (Oral)   Resp 17   Ht 6' 3\" (1.905 m)   Wt 207 lb 3.2 oz (94 kg) SpO2 94%   BMI 25.90 kg/m²     CONSTITUTIONAL:   well nourished, appears age appropriate  EYES: sclera anicteric, PERRL, EOMI  ENMT:nares clear, moist mucous membranes, pharynx clear  NECK: supple. Thyroid normal, No JVD or bruits  RESPIRATORY: Chest: clear to ascultation and percussion, normal inspiratory effort  CARDIOVASCULAR: Heart: regular rate and rhythm no murmurs, rubs or gallops, PMI not displaced, No thrills, no peripheral edema  GASTROINTESTINAL: Abdomen: non distended, soft, non tender, bowel sounds normal  HEMATOLOGIC: no purpura, petechiae or bruising  LYMPHATIC: No lymph node enlargemant  MUSCULOSKELETAL: Extremities: no active synovitis, pulse 1+   INTEGUMENT: No unusual rashes or suspicious skin lesions noted. Nails appear normal.  Previous ulcer right great toe completely resolved. Superficial ulcer left great toe nail 5 mm and only very superficial with no surrounding infection. PERIPHERAL VASCULAR: normal pulses femoral, PT and DP  NEUROLOGIC: non-focal exam, A & O X 3. Slight decrease sensation in all toes of both feet. Proprioception is present. PSYCHIATRIC:, appropriate affect     ASSESSMENT:   1. Diastolic CHF, chronic (HCC)    2. Paroxysmal atrial fibrillation (HCC)    3. Stage 3 chronic kidney disease, unspecified whether stage 3a or 3b CKD ([address]    4. 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, without long-term current use of insulin ([address]    5. Polymyalgia rheumatica ([address]    6. COVID-19    7. Ulcer of both feet, limited to breakdown of skin (HCC)      Impression  1. Diastolic CHF that is compensated  2. Paroxysmal atrial fibrillation controlled INR pending  3. CKD stage III repeat status pending numbers were little worse on last check we will see what they are today. 4.  Diabetes blood sugars pending. 5.  Polymyalgia rheumatica sed rate is pending still on prednisone  6.   COVID-19 infection recently he has resolved with no residual from that  7 Bilateral foot ulcers on both great toes right healed and left almost healed  I will recheck a myself again in 1 month or sooner should the be a problem. He will continue local care for his feet which we discussed. PLAN:  .  Orders Placed This Encounter    METABOLIC PANEL, COMPREHENSIVE    CBC WITH AUTOMATED DIFF    PROTHROMBIN TIME + INR    SED RATE (ESR)         ATTENTION:   This medical record was transcribed using an electronic medical records system. Although proofread, it may and can contain electronic and spelling errors. Other human spelling and other errors may be present. Corrections may be executed at a later time. Please feel free to contact us for any clarifications as needed. Follow-up and Dispositions    · Return in about 4 weeks (around 2/16/2022). No results found for any visits on 01/19/22. Eddie Hammond MD    The patient verbalized understanding of the problems and plans as explained.

## 2022-01-19 ENCOUNTER — OFFICE VISIT (OUTPATIENT)
Dept: INTERNAL MEDICINE CLINIC | Age: 87
End: 2022-01-19
Payer: MEDICARE

## 2022-01-19 VITALS
SYSTOLIC BLOOD PRESSURE: 132 MMHG | HEART RATE: 63 BPM | TEMPERATURE: 98.6 F | RESPIRATION RATE: 17 BRPM | BODY MASS INDEX: 25.76 KG/M2 | OXYGEN SATURATION: 94 % | WEIGHT: 207.2 LBS | DIASTOLIC BLOOD PRESSURE: 70 MMHG | HEIGHT: 75 IN

## 2022-01-19 DIAGNOSIS — L97.521 ULCER OF BOTH FEET, LIMITED TO BREAKDOWN OF SKIN (HCC): ICD-10-CM

## 2022-01-19 DIAGNOSIS — N18.30 STAGE 3 CHRONIC KIDNEY DISEASE, UNSPECIFIED WHETHER STAGE 3A OR 3B CKD (HCC): ICD-10-CM

## 2022-01-19 DIAGNOSIS — M35.3 POLYMYALGIA RHEUMATICA (HCC): ICD-10-CM

## 2022-01-19 DIAGNOSIS — L97.511 ULCER OF BOTH FEET, LIMITED TO BREAKDOWN OF SKIN (HCC): ICD-10-CM

## 2022-01-19 DIAGNOSIS — I48.0 PAROXYSMAL ATRIAL FIBRILLATION (HCC): ICD-10-CM

## 2022-01-19 DIAGNOSIS — I50.32 DIASTOLIC CHF, CHRONIC (HCC): Primary | ICD-10-CM

## 2022-01-19 DIAGNOSIS — U07.1 COVID-19: ICD-10-CM

## 2022-01-19 DIAGNOSIS — N18.30 CONTROLLED TYPE 2 DIABETES MELLITUS WITH STAGE 3 CHRONIC KIDNEY DISEASE, WITHOUT LONG-TERM CURRENT USE OF INSULIN (HCC): ICD-10-CM

## 2022-01-19 DIAGNOSIS — E11.22 CONTROLLED TYPE 2 DIABETES MELLITUS WITH STAGE 3 CHRONIC KIDNEY DISEASE, WITHOUT LONG-TERM CURRENT USE OF INSULIN (HCC): ICD-10-CM

## 2022-01-19 LAB
ALBUMIN SERPL-MCNC: 3.5 G/DL (ref 3.5–5)
ALBUMIN/GLOB SERPL: 1 {RATIO} (ref 1.1–2.2)
ALP SERPL-CCNC: 61 U/L (ref 45–117)
ALT SERPL-CCNC: 35 U/L (ref 12–78)
ANION GAP SERPL CALC-SCNC: 6 MMOL/L (ref 5–15)
AST SERPL-CCNC: 22 U/L (ref 15–37)
BASOPHILS # BLD: 0.1 K/UL (ref 0–0.1)
BASOPHILS NFR BLD: 1 % (ref 0–1)
BILIRUB SERPL-MCNC: 0.3 MG/DL (ref 0.2–1)
BUN SERPL-MCNC: 34 MG/DL (ref 6–20)
BUN/CREAT SERPL: 24 (ref 12–20)
CALCIUM SERPL-MCNC: 9.6 MG/DL (ref 8.5–10.1)
CHLORIDE SERPL-SCNC: 106 MMOL/L (ref 97–108)
CO2 SERPL-SCNC: 28 MMOL/L (ref 21–32)
CREAT SERPL-MCNC: 1.41 MG/DL (ref 0.7–1.3)
DIFFERENTIAL METHOD BLD: ABNORMAL
EOSINOPHIL # BLD: 0 K/UL (ref 0–0.4)
EOSINOPHIL NFR BLD: 0 % (ref 0–7)
ERYTHROCYTE [DISTWIDTH] IN BLOOD BY AUTOMATED COUNT: 13.8 % (ref 11.5–14.5)
ERYTHROCYTE [SEDIMENTATION RATE] IN BLOOD: 41 MM/HR (ref 0–20)
GLOBULIN SER CALC-MCNC: 3.5 G/DL (ref 2–4)
GLUCOSE SERPL-MCNC: 203 MG/DL (ref 65–100)
HCT VFR BLD AUTO: 41.3 % (ref 36.6–50.3)
HGB BLD-MCNC: 12.7 G/DL (ref 12.1–17)
IMM GRANULOCYTES # BLD AUTO: 0.1 K/UL (ref 0–0.04)
IMM GRANULOCYTES NFR BLD AUTO: 1 % (ref 0–0.5)
INR PPP: 2.7 (ref 0.9–1.1)
LYMPHOCYTES # BLD: 1 K/UL (ref 0.8–3.5)
LYMPHOCYTES NFR BLD: 10 % (ref 12–49)
MCH RBC QN AUTO: 31.3 PG (ref 26–34)
MCHC RBC AUTO-ENTMCNC: 30.8 G/DL (ref 30–36.5)
MCV RBC AUTO: 101.7 FL (ref 80–99)
MONOCYTES # BLD: 0.5 K/UL (ref 0–1)
MONOCYTES NFR BLD: 5 % (ref 5–13)
NEUTS SEG # BLD: 8.3 K/UL (ref 1.8–8)
NEUTS SEG NFR BLD: 83 % (ref 32–75)
NRBC # BLD: 0 K/UL (ref 0–0.01)
NRBC BLD-RTO: 0 PER 100 WBC
PLATELET # BLD AUTO: 221 K/UL (ref 150–400)
PMV BLD AUTO: 11.2 FL (ref 8.9–12.9)
POTASSIUM SERPL-SCNC: 5 MMOL/L (ref 3.5–5.1)
PROT SERPL-MCNC: 7 G/DL (ref 6.4–8.2)
PROTHROMBIN TIME: 27.4 SEC (ref 9–11.1)
RBC # BLD AUTO: 4.06 M/UL (ref 4.1–5.7)
SODIUM SERPL-SCNC: 140 MMOL/L (ref 136–145)
WBC # BLD AUTO: 10.1 K/UL (ref 4.1–11.1)

## 2022-01-19 PROCEDURE — G8419 CALC BMI OUT NRM PARAM NOF/U: HCPCS | Performed by: INTERNAL MEDICINE

## 2022-01-19 PROCEDURE — G8536 NO DOC ELDER MAL SCRN: HCPCS | Performed by: INTERNAL MEDICINE

## 2022-01-19 PROCEDURE — 99214 OFFICE O/P EST MOD 30 MIN: CPT | Performed by: INTERNAL MEDICINE

## 2022-01-19 PROCEDURE — G8510 SCR DEP NEG, NO PLAN REQD: HCPCS | Performed by: INTERNAL MEDICINE

## 2022-01-19 PROCEDURE — 1101F PT FALLS ASSESS-DOCD LE1/YR: CPT | Performed by: INTERNAL MEDICINE

## 2022-01-19 PROCEDURE — G8427 DOCREV CUR MEDS BY ELIG CLIN: HCPCS | Performed by: INTERNAL MEDICINE

## 2022-01-19 NOTE — PROGRESS NOTES
Chief Complaint   Patient presents with    Hypertension     1 month follow up    CHF    Diabetes     Visit Vitals  /70 (BP 1 Location: Left upper arm, BP Patient Position: Sitting, BP Cuff Size: Adult)   Pulse 63   Temp 98.6 °F (37 °C) (Oral)   Resp 17   Ht 6' 3\" (1.905 m)   Wt 207 lb 3.2 oz (94 kg)   SpO2 94%   BMI 25.90 kg/m²     1. Have you been to the ER, urgent care clinic since your last visit? Hospitalized since your last visit? No    2. Have you seen or consulted any other health care providers outside of the [address] [place] [address] since your last visit? Include any pap smears or colon screening.  No

## 2022-01-19 NOTE — PATIENT INSTRUCTIONS
Arthritis: Care Instructions  Overview     Arthritis, also called osteoarthritis, is a breakdown of the cartilage that cushions your joints. When the cartilage wears down, your bones rub against each other. This causes pain and stiffness. Many people have some arthritis as they age. Arthritis most often affects the joints of the spine, hands, hips, knees, or feet. Arthritis never goes away completely. But medicine and home treatment can help reduce pain and help you stay active. Follow-up care is a key part of your treatment and safety. Be sure to make and go to all appointments, and call your doctor if you are having problems. It's also a good idea to know your test results and keep a list of the medicines you take. How can you care for yourself at home? · Stay at a healthy weight. Being overweight puts extra strain on your joints. · Talk to your doctor or physical therapist about exercises that will help ease joint pain. ? Stretch. You may enjoy gentle forms of yoga to help keep your joints and muscles flexible. ? Walk instead of jog. Other types of exercise that are less stressful on the joints include riding a bike, swimming, ashley chi, or water exercise. ? Lift weights. Strong muscles help reduce stress on your joints. Stronger thigh muscles, for example, take some of the stress off of the knees and hips. Learn the right way to lift weights so you don't make joint pain worse. · Take your medicines exactly as prescribed. Call your doctor if you think you are having a problem with your medicine. · Take pain medicines exactly as directed. ? If the doctor gave you a prescription medicine for pain, take it as prescribed. ? If you are not taking a prescription pain medicine, ask your doctor if you can take an over-the-counter medicine. · Use a cane, crutch, walker, or another device if you need help to get around. These can help rest your joints.  You also can use other things to make life easier, such as a higher toilet seat and padded handles on kitchen utensils. · Do not sit in low chairs. They can make it hard to get up. · Put heat or cold on your sore joints as needed. Use whichever helps you most. You can also switch between hot and cold packs. ? Apply heat 2 or 3 times a day for 20 to 30 minutesusing a heating pad, hot shower, or hot packto relieve pain and stiffness. But don't use heat on a swollen joint. ? Put ice or a cold pack on your sore joint for 10 to 20 minutes at a time. Put a thin cloth between the ice and your skin. When should you call for help? Call your doctor now or seek immediate medical care if:    · You have sudden swelling, warmth, or pain in any joint.     · You have joint pain and a fever or rash.     · You have such bad pain that you cannot use a joint. Watch closely for changes in your health, and be sure to contact your doctor if:    · You have mild joint symptoms that continue even with more than 6 weeks of care at home.     · You have stomach pain or other problems with your medicine. Where can you learn more? Go to http://www.gray.com/  Enter T477 in the search box to learn more about \"Arthritis: Care Instructions. \"  Current as of: April 30, 2021               Content Version: 13.0  © 7321-7066 [employer]. Care instructions adapted under license by [employer] (which disclaims liability or warranty for this information). If you have questions about a medical condition or this instruction, always ask your healthcare professional. [place] [address] any warranty or liability for your use of this information.

## 2022-01-21 ENCOUNTER — TELEPHONE ANTICOAG (OUTPATIENT)
Dept: INTERNAL MEDICINE CLINIC | Age: 87
End: 2022-01-21

## 2022-01-21 DIAGNOSIS — I48.0 PAROXYSMAL ATRIAL FIBRILLATION (HCC): Primary | ICD-10-CM

## 2022-01-21 NOTE — PROGRESS NOTES
INR is okay and kidney numbers are better other labs are okay so continue same. Advised patient to continue the same dose of blood thinner which is 7.5 mg on Monday; 10 mg all other days and f/up as scheduled.

## 2022-01-21 NOTE — PROGRESS NOTES
Anticoagulation Summary  As of 2022    INR goal:  2.0-3.0   TTR:  34.0 % (3.8 y)   INR used for dosin.7 (2022)   Warfarin maintenance plan:  7.5 mg (5 mg x 1.5) every Mon; 10 mg (5 mg x 2) all other days   Weekly warfarin total:  67.5 mg   Plan last modified:  Symone Salcedo RN (2021)   Next INR check:  2022   Target end date:      Indications    Paroxysmal atrial fibrillation ([facility] [address] (Primary) [I48.0]             Anticoagulation Episode Summary     INR check location:  Clinic Lab    Preferred lab:      Send INR reminders to:      Comments:        Anticoagulation Care Providers     Provider Role Specialty Phone number    Ila Smith MD Responsible Internal Medicine [phone]        Informed patient that PT/INR okay; continue the same dose of blood thinner which is 7.5 mg on Monday; 10 mg all other days and f/up as scheduled.

## 2022-02-16 ENCOUNTER — OFFICE VISIT (OUTPATIENT)
Dept: INTERNAL MEDICINE CLINIC | Age: 87
End: 2022-02-16
Payer: MEDICARE

## 2022-02-16 VITALS
BODY MASS INDEX: 25.68 KG/M2 | OXYGEN SATURATION: 97 % | DIASTOLIC BLOOD PRESSURE: 74 MMHG | HEART RATE: 73 BPM | RESPIRATION RATE: 17 BRPM | WEIGHT: 206.5 LBS | TEMPERATURE: 97.8 F | SYSTOLIC BLOOD PRESSURE: 134 MMHG | HEIGHT: 75 IN

## 2022-02-16 DIAGNOSIS — N18.30 CONTROLLED TYPE 2 DIABETES MELLITUS WITH STAGE 3 CHRONIC KIDNEY DISEASE, WITHOUT LONG-TERM CURRENT USE OF INSULIN (HCC): ICD-10-CM

## 2022-02-16 DIAGNOSIS — E78.2 MIXED HYPERLIPIDEMIA: ICD-10-CM

## 2022-02-16 DIAGNOSIS — I48.0 PAROXYSMAL ATRIAL FIBRILLATION (HCC): ICD-10-CM

## 2022-02-16 DIAGNOSIS — M35.3 POLYMYALGIA RHEUMATICA (HCC): ICD-10-CM

## 2022-02-16 DIAGNOSIS — L97.521 ULCER OF BOTH FEET, LIMITED TO BREAKDOWN OF SKIN (HCC): ICD-10-CM

## 2022-02-16 DIAGNOSIS — M15.9 PRIMARY OSTEOARTHRITIS INVOLVING MULTIPLE JOINTS: ICD-10-CM

## 2022-02-16 DIAGNOSIS — I12.9 HYPERTENSION WITH RENAL DISEASE: Primary | ICD-10-CM

## 2022-02-16 DIAGNOSIS — I50.32 DIASTOLIC CHF, CHRONIC (HCC): ICD-10-CM

## 2022-02-16 DIAGNOSIS — E11.22 CONTROLLED TYPE 2 DIABETES MELLITUS WITH STAGE 3 CHRONIC KIDNEY DISEASE, WITHOUT LONG-TERM CURRENT USE OF INSULIN (HCC): ICD-10-CM

## 2022-02-16 DIAGNOSIS — N18.30 STAGE 3 CHRONIC KIDNEY DISEASE, UNSPECIFIED WHETHER STAGE 3A OR 3B CKD (HCC): ICD-10-CM

## 2022-02-16 DIAGNOSIS — I25.10 ASCVD (ARTERIOSCLEROTIC CARDIOVASCULAR DISEASE): ICD-10-CM

## 2022-02-16 DIAGNOSIS — L97.511 ULCER OF BOTH FEET, LIMITED TO BREAKDOWN OF SKIN (HCC): ICD-10-CM

## 2022-02-16 PROBLEM — Z01.810 PRE-OPERATIVE CARDIOVASCULAR EXAMINATION: Status: ACTIVE | Noted: 2022-02-16

## 2022-02-16 PROBLEM — H25.9 AGE-RELATED CATARACT OF BOTH EYES: Status: ACTIVE | Noted: 2022-02-16

## 2022-02-16 PROCEDURE — 99214 OFFICE O/P EST MOD 30 MIN: CPT | Performed by: INTERNAL MEDICINE

## 2022-02-16 PROCEDURE — G8419 CALC BMI OUT NRM PARAM NOF/U: HCPCS | Performed by: INTERNAL MEDICINE

## 2022-02-16 PROCEDURE — G8536 NO DOC ELDER MAL SCRN: HCPCS | Performed by: INTERNAL MEDICINE

## 2022-02-16 PROCEDURE — G8510 SCR DEP NEG, NO PLAN REQD: HCPCS | Performed by: INTERNAL MEDICINE

## 2022-02-16 PROCEDURE — G8427 DOCREV CUR MEDS BY ELIG CLIN: HCPCS | Performed by: INTERNAL MEDICINE

## 2022-02-16 PROCEDURE — 1101F PT FALLS ASSESS-DOCD LE1/YR: CPT | Performed by: INTERNAL MEDICINE

## 2022-02-16 NOTE — PROGRESS NOTES
HPI:   80 y.o.  presents for urgent medical consultation clearance before planned bilateral cataract surgery to be done Dr. Yousif Friend on March 23 on the first time the second 1 to be done 1 to 2 weeks later. He is followed by me regularly for hypertension, diabetes, hyperlipidemia, atherosclerotic coronary vascular disease, paroxysmal atrial fibrillation, compensated CHF and other multiple medical problems. He currently denies any chest pain, shortness of breath, palpitations, PND, orthopnea or other cardiac or respiratory complaints. He is now walking on a regular basis and seems to be improving as far as his stamina. He denies any GI or  complaints. He notes no headaches, dizziness or neurologic complaints. He has no current active arthritic complaints but does have his chronic joint aches and pains that is unchanged. He does note progressive decreased vision particular in the evening and thus the reason for the planned surgery. The remainder of complete review of systems is negative. Patient Active Problem List    Diagnosis    Diastolic CHF, chronic (HCC)     Echo 8//7/77  - mild diastolic dysfunction with EFx 45%      Paroxysmal atrial fibrillation (HCC)    ASCVD (arteriosclerotic cardiovascular disease)     Story:  Old ASMI by EKG      Mixed hyperlipidemia    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, without long-term current use of insulin (HCC)     Story: Diet Controlled      Hypertension with renal disease    Stage 3 chronic kidney disease (HCC)    Primary osteoarthritis involving multiple joints    Polymyalgia rheumatica (HCC)    Chronic non-seasonal allergic rhinitis    BPH (benign prostatic hyperplasia)    Pre-operative cardiovascular examination    Age-related cataract of both eyes    COVID-19     Dx 11/30/2021 Tx Monoclonal Ab      Primary osteoarthritis of right knee    Primary osteoarthritis of left shoulder    Primary osteoarthritis of left knee    Bilateral leg edema    Ulcer of both feet, limited to breakdown of skin (HCC)     Right second toe      Near syncope    Alcohol screening    Primary osteoarthritis of left hip    Dizziness    Elevated PSA    Nodule of right lung     Story: Right      ED (erectile dysfunction)    Back pain       Past Medical History:   Diagnosis Date    Allergic rhinitis 9/20/2017    Arthritis     ASCVD (arteriosclerotic cardiovascular disease) 9/20/2017    Story: Old ASMI by EKG    Back pain 9/20/2017    BPH (benign prostatic hyperplasia) 9/20/2017    CHF (congestive heart failure) ([address] 9/20/2017    CKD (chronic kidney disease) 9/20/2017    COVID-19 11/2021    Diabetic acetonemia ([address]     DM (diabetes mellitus) ([facility] 9/20/2017    Story: Diet Controlled    ED (erectile dysfunction) 9/20/2017    Edema 9/20/2017    Elevated CPK 9/20/2017    Comments: History of    Elevated LFTs 9/20/2017    Comments: History of    Elevated PSA 9/20/2017    Hypercholesteremia     Hyperlipidemia 9/20/2017    Hypertension     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    Nodule of right lung 9/20/2017    Story: Right    Polymyalgia ([address] 9/20/2017    Prostate enlargement        Social History     Tobacco Use    Smoking status: Never Smoker    Smokeless tobacco: Never Used   Vaping Use    Vaping Use: Never used   Substance Use Topics    Alcohol use: No    Drug use: No       History reviewed. No pertinent family history. No Known Allergies    ROS:     Constitutional: He denies fevers, weight loss, sweats. Eyes: No blurred or double vision but decreased vision in the evenings  ENT: No difficulty with swallowing, taste, speech or smell. Neck: no stiffness or swelling  Respiratory: No cough wheezing or shortness of breath. Cardiovascular: Denies chest pain, palpitations, unexplained indigestion or syncope.   Gastrointestinal:  No changes in bowel movements, no abdominal pain, no bloating. Genitourinary:  He denies frequency, nocturia or stranguria. Extremities: No joint pain, stiffness or swelling. Neurological:  No numbness, tingling, burring paresthesias or loss of motor strength. No syncope, dizziness or frequent headache  Lymphatic: no adenopathy noted  Hematologic: no easy bruising or bleeding gums  Skin:  No recent rashes or mole changes. Psychiatric/Behavioral:  Negative for depression. The patient is not nervous/anxious. PE:     Vitals:    02/17/22 1105   BP: 120/80   Pulse: 63   Resp: 17   Temp: 98.4 °F (36.9 °C)   TempSrc: Oral   SpO2: 98%   Weight: 206 lb (93.4 kg)   Height: 6' 3\" (1.905 m)   PainSc:   0 - No pain        General appearance - alert, well appearing, and in no distress  Mental status - alert, oriented to person, place, and time  HEENT:  Ears - bilateral TM's and external ear canals clear  Eyes - pupillary responses were normal.  Extraocular muscle function intact. Lids and conjunctiva not injected. Fundoscopic exam revealed sharp disc margins. eye movements intact  Pharynx- clear with teeth in good repair. No masses were noted  Neck - supple without thyromegaly or burit. No JVD noted  Lungs - clear to auscultation and percussion  Cardiac- normal rate, regular rhythm without murmurs. PMI not displaced. No gallop, rub or click  Abdomen - flat, soft, non-tender without palpable organomegaly or mass. No pulsatile mass was felt, and not bruit was heard. Bowel sounds were active  Extremities -  no clubbing cyanosis or edema  Lymphatics - no palpable lymphadenopathy, no hepatosplenomegaly  Hematologic: no petechiae or purpura  Peripheral vascular -Femoral, Dorsalis pedis and posterior tibial pulses felt without difficulty  Skin - no rash or unusual mole change noted  Neurological - Cranial nerves II-XII grossly intact. Motor strength 5/5. DTR's 2+ and symmetric.   Station and gait normal  Back exam - full range of motion, no tenderness, palpable spasm or pain on motion  Musculoskeletal - no joint tenderness, deformity or swelling      Assessment/Plan:     1. Pre-operative cardiovascular examination    2. Age-related cataract of both eyes, unspecified age-related cataract type    3. Hypertension with renal disease    4. 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, without long-term current use of insulin ([facility] [address]    5. Diastolic CHF, chronic (HCC)    6. Paroxysmal atrial fibrillation ([address]    7. ASCVD (arteriosclerotic cardiovascular disease)      Impression  1. Preoperative cardiovascular examination is completed today. 2.  Bilateral cataracts with surgery scheduled as noted. 3.  Hypertension that is well controlled  4. Diabetes mellitus with last A1c of 7.0 done earlier this month  5. Diastolic CHF compensated  6. Paroxysmal atrial fibrillation on chronic anticoagulation  7. ASCVD clinically stable  He is medically stable for the planned cataract surgery. Copy to Dr. Alin Macias. Health Maintenance reviewed - updated. No orders of the defined types were placed in this encounter. There are no discontinued medications. Current Outpatient Medications   Medication Sig Dispense Refill    predniSONE (DELTASONE) 10 mg tablet TAKE 1TAB BY MOUTH TWO (2) TIMES A DAY.  60 Tablet 5    warfarin (COUMADIN) 5 mg tablet TAKE 2 TABLETS BY MOUTH ON MONDAY &FRIDAYS AND 1&1/2 TABLET DAILY ON ALL OTHER DAYS 145 Tablet 1    bumetanide (BUMEX) 2 mg tablet TAKE 1 TABLET BY MOUTH EVERY DAY 90 Tablet 1    glucose blood VI test strips (Accu-Chek Merle Plus test strp) strip USE TO TEST BLOOD SUGAR ONCE DAILY 100 Strip PRN    digoxin (LANOXIN) 0.125 mg tablet TAKE 1 TABLET BY MOUTH EVERY DAY 90 Tablet 3    metFORMIN (GLUCOPHAGE) 500 mg tablet TAKE 1 TABLET BY MOUTH EVERY DAY IN THE MORNING WITH BREAKFAST AND ONE TABLET BEFORE DINNER 180 Tablet 3    simvastatin (ZOCOR) 20 mg tablet TAKE 1 TABLET BY MOUTH EVERY DAY 90 Tablet 3    terazosin (HYTRIN) 2 mg capsule TAKE 1 CAPSULE BY MOUTH EVERY DAY 90 Capsule 3    lisinopriL (PRINIVIL, ZESTRIL) 5 mg tablet Take 1 Tablet by mouth daily. 30 Tablet prn    diclofenac EC (VOLTAREN) 75 mg EC tablet TAKE 1 TABLET BY MOUTH TWICE A  Tab 3    glimepiride (AMARYL) 1 mg tablet Take 1 Tab by mouth Daily (before breakfast). 90 Tab 3    Blood-Glucose Meter monitoring kit Use to test blood sugar twice daily  DX:E11.22 1 Kit 0    glucose blood VI test strips (blood glucose test) strip Use to test blood sugar twice daily  DX:E11.22 100 Strip 1    cyanocobalamin (VITAMIN B12) 500 mcg tablet Take 500 mcg by mouth daily.  acetaminophen (TYLENOL ARTHRITIS PAIN) 650 mg TbER Take 650 mg by mouth every eight (8) hours.  multivitamins-minerals-lutein (MULTIVITAMIN 50 PLUS) tab tablet Take 1 Tab by mouth daily.  glucosamine 1,000 mg tab Take 2,000 mg by mouth daily.  cholecalciferol (VITAMIN D3) 1,000 unit cap Take 1,000 Units by mouth daily.  DOCOSAHEXANOIC ACID/EPA (FISH OIL PO) Take 1,200 mg by mouth two (2) times a day. No results found for any visits on 02/17/22. Recommended healthy diet low in carbohydrates, fats, sodium and cholesterol. Recommended regular cardiovascular exercise 3-6 times per week for 30-60 minutes daily. Verbal and written instructions (see AVS) provided. Patient expresses understanding of diagnosis and treatment plan.       Tim Muniz MD

## 2022-02-16 NOTE — PATIENT INSTRUCTIONS
ACE Inhibitors: Care Instructions  Your Care Instructions     ACE (angiotensin-converting enzyme) inhibitors lower blood pressure. They also treat heart failure and prevent heart attacks and strokes. They block an enzyme that makes blood vessels narrow. As a result, the blood vessels relax and widen. This lowers blood pressure. These medicines also put more water and salt into the urine. This lowers blood pressure too. These medicines are a good choice for people with diabetes. They don't affect blood sugar levels, and they may protect the kidneys. Examples include:  · Benazepril (Lotensin). · Lisinopril (Prinivil, Zestril). · Ramipril (Altace). Before you start taking this medicine, make sure your doctor knows if you take other medicines, especially water pills (diuretics) or potassium tablets. And tell your doctor if you use a salt substitute. You should not take an ACE inhibitor if you are pregnant or planning to become pregnant. You may need regular blood tests. Follow-up care is a key part of your treatment and safety. Be sure to make and go to all appointments, and call your doctor if you are having problems. It's also a good idea to know your test results and keep a list of the medicines you take. How can you care for yourself at home? · Take your medicines exactly as prescribed. Be sure to take your medicine every day. Call your doctor if you think you are having a problem with your medicine. · ACE inhibitors can cause a dry cough. If the cough is bad, talk to your doctor. You may need to try a different medicine. · ACE inhibitors can cause swelling of your lips, tongue, or face. If the swelling is severe, you may need treatment right away. Severe swelling can make it hard to breathe, but this is very rare. · Check with your doctor or pharmacist before you use any other medicines. This includes over-the-counter medicines.  Make sure your doctor knows all of the medicines, vitamins, herbal products, and supplements you take. Taking some medicines together can cause problems. When should you call for help? Call your doctor now or seek immediate medical care if:    · You have swelling of your lips, tongue, or face.     · You think you are having problems with your medicine. Watch closely for changes in your health, and be sure to contact your doctor if you have any problems. Where can you learn more? Go to http://www.gray.com/  Enter [id number] in the search box to learn more about \"ACE Inhibitors: Care Instructions. \"  Current as of: April 29, 2021               Content Version: 13.0  © 1031-3544 [employer]. Care instructions adapted under license by [employer] (which disclaims liability or warranty for this information). If you have questions about a medical condition or this instruction, always ask your healthcare professional. [address] any warranty or liability for your use of this information.

## 2022-02-16 NOTE — PROGRESS NOTES
Chief Complaint   Patient presents with    Hypertension     3 month follow up    CHF    Diabetes     Visit Vitals  /74 (BP 1 Location: Left upper arm, BP Patient Position: Sitting, BP Cuff Size: Adult)   Pulse 73   Temp 97.8 °F (36.6 °C) (Oral)   Resp 17   Ht 6' 3\" (1.905 m)   Wt 206 lb 8 oz (93.7 kg)   SpO2 97%   BMI 25.81 kg/m²     1. Have you been to the ER, urgent care clinic since your last visit? Hospitalized since your last visit? No    2. Have you seen or consulted any other health care providers outside of the [address] [place] [address] since your last visit? Include any pap smears or colon screening.  Dr. Loan Roth dr

## 2022-02-17 ENCOUNTER — OFFICE VISIT (OUTPATIENT)
Dept: INTERNAL MEDICINE CLINIC | Age: 87
End: 2022-02-17
Payer: MEDICARE

## 2022-02-17 VITALS
WEIGHT: 206 LBS | RESPIRATION RATE: 17 BRPM | DIASTOLIC BLOOD PRESSURE: 80 MMHG | TEMPERATURE: 98.4 F | BODY MASS INDEX: 25.61 KG/M2 | HEART RATE: 63 BPM | HEIGHT: 75 IN | SYSTOLIC BLOOD PRESSURE: 120 MMHG | OXYGEN SATURATION: 98 %

## 2022-02-17 DIAGNOSIS — I12.9 HYPERTENSION WITH RENAL DISEASE: ICD-10-CM

## 2022-02-17 DIAGNOSIS — E11.22 CONTROLLED TYPE 2 DIABETES MELLITUS WITH STAGE 3 CHRONIC KIDNEY DISEASE, WITHOUT LONG-TERM CURRENT USE OF INSULIN (HCC): ICD-10-CM

## 2022-02-17 DIAGNOSIS — H25.9 AGE-RELATED CATARACT OF BOTH EYES, UNSPECIFIED AGE-RELATED CATARACT TYPE: ICD-10-CM

## 2022-02-17 DIAGNOSIS — N18.30 CONTROLLED TYPE 2 DIABETES MELLITUS WITH STAGE 3 CHRONIC KIDNEY DISEASE, WITHOUT LONG-TERM CURRENT USE OF INSULIN (HCC): ICD-10-CM

## 2022-02-17 DIAGNOSIS — I48.0 PAROXYSMAL ATRIAL FIBRILLATION (HCC): ICD-10-CM

## 2022-02-17 DIAGNOSIS — I25.10 ASCVD (ARTERIOSCLEROTIC CARDIOVASCULAR DISEASE): ICD-10-CM

## 2022-02-17 DIAGNOSIS — I50.32 DIASTOLIC CHF, CHRONIC (HCC): ICD-10-CM

## 2022-02-17 DIAGNOSIS — Z01.810 PRE-OPERATIVE CARDIOVASCULAR EXAMINATION: Primary | ICD-10-CM

## 2022-02-17 LAB
ALBUMIN SERPL-MCNC: 3.7 G/DL (ref 3.5–5)
ALBUMIN/GLOB SERPL: 1.1 {RATIO} (ref 1.1–2.2)
ALP SERPL-CCNC: 59 U/L (ref 45–117)
ALT SERPL-CCNC: 36 U/L (ref 12–78)
ANION GAP SERPL CALC-SCNC: 5 MMOL/L (ref 5–15)
AST SERPL-CCNC: 20 U/L (ref 15–37)
BILIRUB SERPL-MCNC: 0.3 MG/DL (ref 0.2–1)
BUN SERPL-MCNC: 38 MG/DL (ref 6–20)
BUN/CREAT SERPL: 27 (ref 12–20)
CALCIUM SERPL-MCNC: 9.8 MG/DL (ref 8.5–10.1)
CHLORIDE SERPL-SCNC: 103 MMOL/L (ref 97–108)
CHOLEST SERPL-MCNC: 248 MG/DL
CK SERPL-CCNC: 96 U/L (ref 39–308)
CO2 SERPL-SCNC: 28 MMOL/L (ref 21–32)
CREAT SERPL-MCNC: 1.42 MG/DL (ref 0.7–1.3)
EST. AVERAGE GLUCOSE BLD GHB EST-MCNC: 154 MG/DL
GLOBULIN SER CALC-MCNC: 3.5 G/DL (ref 2–4)
GLUCOSE SERPL-MCNC: 169 MG/DL (ref 65–100)
HBA1C MFR BLD: 7 % (ref 4–5.6)
HDLC SERPL-MCNC: 42 MG/DL
HDLC SERPL: 5.9 {RATIO} (ref 0–5)
INR PPP: 2.9 (ref 0.9–1.1)
LDLC SERPL CALC-MCNC: ABNORMAL MG/DL (ref 0–100)
POTASSIUM SERPL-SCNC: 4.6 MMOL/L (ref 3.5–5.1)
PROT SERPL-MCNC: 7.2 G/DL (ref 6.4–8.2)
PROTHROMBIN TIME: 29.3 SEC (ref 9–11.1)
SODIUM SERPL-SCNC: 136 MMOL/L (ref 136–145)
TRIGL SERPL-MCNC: 483 MG/DL (ref ?–150)
VLDLC SERPL CALC-MCNC: ABNORMAL MG/DL

## 2022-02-17 PROCEDURE — G8510 SCR DEP NEG, NO PLAN REQD: HCPCS | Performed by: INTERNAL MEDICINE

## 2022-02-17 PROCEDURE — G8536 NO DOC ELDER MAL SCRN: HCPCS | Performed by: INTERNAL MEDICINE

## 2022-02-17 PROCEDURE — G8427 DOCREV CUR MEDS BY ELIG CLIN: HCPCS | Performed by: INTERNAL MEDICINE

## 2022-02-17 PROCEDURE — 3051F HG A1C>EQUAL 7.0%<8.0%: CPT | Performed by: INTERNAL MEDICINE

## 2022-02-17 PROCEDURE — 99214 OFFICE O/P EST MOD 30 MIN: CPT | Performed by: INTERNAL MEDICINE

## 2022-02-17 PROCEDURE — 1101F PT FALLS ASSESS-DOCD LE1/YR: CPT | Performed by: INTERNAL MEDICINE

## 2022-02-17 PROCEDURE — G8419 CALC BMI OUT NRM PARAM NOF/U: HCPCS | Performed by: INTERNAL MEDICINE

## 2022-02-17 NOTE — PROGRESS NOTES
HIPAA verified by two patient identifiers. Jeremy Santana is a 80 y.o. male    Chief Complaint   Patient presents with    Pre-op Exam       Visit Vitals  /80 (BP 1 Location: Left upper arm, BP Patient Position: Sitting, BP Cuff Size: Adult)   Pulse 63   Temp 98.4 °F (36.9 °C) (Oral)   Resp 17   Ht 6' 3\" (1.905 m)   Wt 206 lb (93.4 kg)   SpO2 98%   BMI 25.75 kg/m²       Pain Scale: 0 - No pain/10  Pain Location:       Health Maintenance Due   Topic Date Due    COVID-19 Vaccine (1) Never done    Eye Exam Retinal or Dilated  Never done    Shingrix Vaccine Age 50> (1 of 2) Never done         Coordination of Care Questionnaire:  :   1) Have you been to an emergency room, urgent care, or hospitalized since your last visit? If yes, where when, and reason for visit? no       2. Have seen or consulted any other health care provider since your last visit? If yes, where when, and reason for visit? NO      Patient is accompanied by self I have received verbal consent from Jeremy Diaz to discuss any/all medical information while they are present in the room.

## 2022-02-17 NOTE — PATIENT INSTRUCTIONS
Cataracts: Care Instructions  Overview     A cataract is a clouding of the lens of the eye. The lens focuses light on the retina at the back of the eye. Cataracts block some of the light and make it harder for you to see clearly. Cataracts often develop when you get older. Most cataracts grow slowly. At first, you may just need stronger glasses to help you see better. Later, if the cataracts grow and begin to seriously impair your vision, you can have surgery to remove them. Follow-up care is a key part of your treatment and safety. Be sure to make and go to all appointments, and call your doctor if you are having problems. It's also a good idea to know your test results and keep a list of the medicines you take. How can you care for yourself at home? · Move room lights and use window shades to avoid glare. · Use more lighting or higher-watt bulbs. · Use a magnifying glass for reading. Look for large-print books and other reading material to make reading more enjoyable. · Have your eyes checked regularly, and update your glasses when needed. · Wear sunglasses to block out harmful sunlight. Buy sunglasses that screen out ultraviolet A and B (UVA and UVB) rays. · Do not smoke. Smoking can make cataracts worse. If you need help quitting, talk to your doctor about stop-smoking programs and medicines. These can increase your chances of quitting for good. When should you call for help? Watch closely for changes in your health, and be sure to contact your doctor if:    · Your vision is getting worse.     · You have increasing trouble doing everyday tasks, like driving or reading the newspaper, because of your eyesight. Where can you learn more? Go to http://www.gray.com/  Enter P916 in the search box to learn more about \"Cataracts: Care Instructions. \"  Current as of: April 29, 2021               Content Version: 13.0  © 8473-1817 [employer].    Care instructions adapted under license by [employer] (which disclaims liability or warranty for this information). If you have questions about a medical condition or this instruction, always ask your healthcare professional. [address] any warranty or liability for your use of this information.

## 2022-02-17 NOTE — PROGRESS NOTES
Kidneys are stable, INR is okay, blood sugar and glycohemoglobin are up as her triglycerides so watch diet. The question is could the recent Covid have made these numbers go up so we will have to watch it closely.

## 2022-02-18 LAB — LDLC SERPL DIRECT ASSAY-MCNC: 110 MG/DL (ref 0–100)

## 2022-02-18 RX ORDER — GLIMEPIRIDE 1 MG/1
TABLET ORAL
Qty: 90 TABLET | Refills: 3 | Status: SHIPPED | OUTPATIENT
Start: 2022-02-18 | End: 2022-08-19 | Stop reason: SDUPTHER

## 2022-02-18 NOTE — TELEPHONE ENCOUNTER
RX refill request from the patient/pharmacy. Patient last seen 02- with labs, and next appt. scheduled for 03-  Requested Prescriptions     Pending Prescriptions Disp Refills    glimepiride (AMARYL) 1 mg tablet [Pharmacy Med Name: GLIMEPIRIDE 1 MG TABLET] 90 Tablet 3     Sig: TAKE 1 TABLET BY MOUTH EVERY DAY BEFORE BREAKFAST   .

## 2022-02-21 ENCOUNTER — TELEPHONE ANTICOAG (OUTPATIENT)
Dept: INTERNAL MEDICINE CLINIC | Age: 87
End: 2022-02-21

## 2022-02-21 DIAGNOSIS — I48.0 PAROXYSMAL ATRIAL FIBRILLATION (HCC): Primary | ICD-10-CM

## 2022-02-21 NOTE — PROGRESS NOTES
Kidneys are stable, INR is okay, blood sugar and glycohemoglobin are up as her triglycerides so watch diet. The question is could the recent Covid have made these numbers go up so we will have to watch it closely. Spoke to patient's daughter in law regarding lab. Informed that PT/INR okay; continue the same dose of blood thinner which is 7.5 mg on Monday; 10 mg all other days and f/up as scheduled. Discussed slight increase in the sugar levels. Advised to continue to work on diet.

## 2022-02-21 NOTE — PROGRESS NOTES
Anticoagulation Summary  As of 2022    INR goal:  2.0-3.0   TTR:  35.3 % (3.8 y)   INR used for dosin.9 (2022)   Warfarin maintenance plan:  7.5 mg (5 mg x 1.5) every Mon; 10 mg (5 mg x 2) all other days   Weekly warfarin total:  67.5 mg   Plan last modified:  Marium Waterman RN (2021)   Next INR check:  3/16/2022   Target end date:      Indications    Paroxysmal atrial fibrillation ([facility] [address] (Primary) [I48.0]             Anticoagulation Episode Summary     INR check location:  Clinic Lab    Preferred lab:      Send INR reminders to:      Comments:        Anticoagulation Care Providers     Provider Role Specialty Phone number    Edda Butler MD Responsible Internal Medicine [phone]        Informed patient that PT/INR okay; continue the same dose of blood thinner which is 7.5 mg on Monday; 10 mg all other days and f/up as scheduled.

## 2022-03-01 NOTE — PROGRESS NOTES
Patient felt dizzy and sweaty, RN took blood pressure which was 84/56. MR Brigitte Lucas was paged and informed of the situation. MD Brigitte Lucas wanted patient to have Cardiology consult, placed on telemetry and cardiac blood tests to be drawn. Patient stated that he was not staying in the hospital. Patient was provided education. Patient was given information and contact information on wound care appointment. Patient signed Rios Bruner paperwork. O-L Flap Text: The defect edges were debeveled with a #15 scalpel blade.  Given the location of the defect, shape of the defect and the proximity to free margins an O-L flap was deemed most appropriate.  Using a sterile surgical marker, an appropriate advancement flap was drawn incorporating the defect and placing the expected incisions within the relaxed skin tension lines where possible.    The area thus outlined was incised deep to adipose tissue with a #15 scalpel blade.  The skin margins were undermined to an appropriate distance in all directions utilizing iris scissors.

## 2022-03-08 NOTE — PROGRESS NOTES
Chief Complaint   Patient presents with    Hypertension     3 month follow up    CHF    Diabetes       SUBJECTIVE:    Christy Godoy. is a 80 y.o. male who returns today for his medical problems include hypertension, diabetes, hyperlipidemia, ASCVD, paroxysmal atrial fibrillation, chronic compensated diastolic CHF, polymyalgia rheumatica, DJD, CKD stage renal mild medical problems. He is taking his medications and trying to follow his diet and try and remain physically active. He currently denies any chest pain, shortness of breath, palpitations, PND, orthopnea or other cardiac or respiratory complaints. He has no GI or  complaints. He has no headaches, dizziness or neurologic complaints. There are no current active arthritic complaints and no other complaints on complete view of systems. Current Outpatient Medications   Medication Sig Dispense Refill    predniSONE (DELTASONE) 10 mg tablet TAKE 1TAB BY MOUTH TWO (2) TIMES A DAY. 60 Tablet 5    warfarin (COUMADIN) 5 mg tablet TAKE 2 TABLETS BY MOUTH ON MONDAY &FRIDAYS AND 1&1/2 TABLET DAILY ON ALL OTHER DAYS 145 Tablet 1    bumetanide (BUMEX) 2 mg tablet TAKE 1 TABLET BY MOUTH EVERY DAY 90 Tablet 1    glucose blood VI test strips (Accu-Chek Merle Plus test strp) strip USE TO TEST BLOOD SUGAR ONCE DAILY 100 Strip PRN    digoxin (LANOXIN) 0.125 mg tablet TAKE 1 TABLET BY MOUTH EVERY DAY 90 Tablet 3    metFORMIN (GLUCOPHAGE) 500 mg tablet TAKE 1 TABLET BY MOUTH EVERY DAY IN THE MORNING WITH BREAKFAST AND ONE TABLET BEFORE DINNER 180 Tablet 3    simvastatin (ZOCOR) 20 mg tablet TAKE 1 TABLET BY MOUTH EVERY DAY 90 Tablet 3    terazosin (HYTRIN) 2 mg capsule TAKE 1 CAPSULE BY MOUTH EVERY DAY 90 Capsule 3    lisinopriL (PRINIVIL, ZESTRIL) 5 mg tablet Take 1 Tablet by mouth daily.  30 Tablet prn    diclofenac EC (VOLTAREN) 75 mg EC tablet TAKE 1 TABLET BY MOUTH TWICE A  Tab 3    glimepiride (AMARYL) 1 mg tablet Take 1 Tab by mouth Daily (before breakfast). 90 Tab 3    Blood-Glucose Meter monitoring kit Use to test blood sugar twice daily  DX:E11.22 1 Kit 0    glucose blood VI test strips (blood glucose test) strip Use to test blood sugar twice daily  DX:E11.22 100 Strip 1    cyanocobalamin (VITAMIN B12) 500 mcg tablet Take 500 mcg by mouth daily.  acetaminophen (TYLENOL ARTHRITIS PAIN) 650 mg TbER Take 650 mg by mouth every eight (8) hours.  multivitamins-minerals-lutein (MULTIVITAMIN 50 PLUS) tab tablet Take 1 Tab by mouth daily.  glucosamine 1,000 mg tab Take 2,000 mg by mouth daily.  cholecalciferol (VITAMIN D3) 1,000 unit cap Take 1,000 Units by mouth daily.  DOCOSAHEXANOIC ACID/EPA (FISH OIL PO) Take 1,200 mg by mouth two (2) times a day. 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    Back pain 9/20/2017    BPH (benign prostatic hyperplasia) 9/20/2017    CHF (congestive heart failure) ([address] 9/20/2017    CKD (chronic kidney disease) 9/20/2017    COVID-19 11/2021    Diabetic acetonemia ([address]     DM (diabetes mellitus) ([address] 9/20/2017    Story: Diet Controlled    ED (erectile dysfunction) 9/20/2017    Edema 9/20/2017    Elevated CPK 9/20/2017    Comments: History of    Elevated LFTs 9/20/2017    Comments: History of    Elevated PSA 9/20/2017    Hypercholesteremia     Hyperlipidemia 9/20/2017    Hypertension     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    Nodule of right lung 9/20/2017    Story: Right    Polymyalgia ([address] 9/20/2017    Prostate enlargement      Past Surgical History:   Procedure Laterality Date    HX HEENT  06/12/2018    Dr. Gregor Peace, surgery to remove cancerous tissue from Left cheek    HX MALIGNANT SKIN LESION EXCISION  09/2018    2 lesions on head    NH ABDOMEN SURGERY PROC UNLISTED      hernia repair     No Known Allergies    REVIEW OF SYSTEMS:  General: negative for - chills or fever, or weight loss or gain  ENT: negative for - headaches, nasal congestion or tinnitus  Eyes: no blurred or visual changes  Neck: No stiffness or swollen nodes  Respiratory: negative for - cough, hemoptysis, shortness of breath or wheezing  Cardiovascular : negative for - chest pain, edema, palpitations or shortness of breath  Gastrointestinal: negative for - abdominal pain, blood in stools, heartburn or nausea/vomiting  Genito-Urinary: no dysuria, trouble voiding, or hematuria  Musculoskeletal: negative for - gait disturbance, joint pain, joint stiffness or joint swelling  Neurological: no TIA or stroke symptoms  Hematologic: no bruises, no bleeding  Lymphatic: no swollen glands  Integument: no lumps, mole changes, nail changes or rash  Endocrine:no malaise/lethargy poly uria or polydipsia or unexpected weight changes        Social History     Socioeconomic History    Marital status:    Tobacco Use    Smoking status: Never Smoker    Smokeless tobacco: Never Used   Vaping Use    Vaping Use: Never used   Substance and Sexual Activity    Alcohol use: No    Drug use: No    Sexual activity: Never     History reviewed. No pertinent family history. OBJECTIVE:     Visit Vitals  /74 (BP 1 Location: Left upper arm, BP Patient Position: Sitting, BP Cuff Size: Adult)   Pulse 73   Temp 97.8 °F (36.6 °C) (Oral)   Resp 17   Ht 6' 3\" (1.905 m)   Wt 206 lb 8 oz (93.7 kg)   SpO2 97%   BMI 25.81 kg/m²     CONSTITUTIONAL:   well nourished, appears age appropriate  EYES: sclera anicteric, PERRL, EOMI  ENMT:nares clear, moist mucous membranes, pharynx clear  NECK: supple.  Thyroid normal, No JVD or bruits  RESPIRATORY: Chest: clear to ascultation and percussion, normal inspiratory effort  CARDIOVASCULAR: Heart: regular rate and rhythm no murmurs, rubs or gallops, PMI not displaced, No thrills, no peripheral edema  GASTROINTESTINAL: Abdomen: non distended, soft, non tender, bowel sounds normal  HEMATOLOGIC: no purpura, petechiae or bruising  LYMPHATIC: No lymph node enlargemant  MUSCULOSKELETAL: Extremities: no active synovitis, pulse 1+   INTEGUMENT: No unusual rashes or suspicious skin lesions noted. Nails appear normal.  PERIPHERAL VASCULAR: normal pulses femoral, PT and DP  NEUROLOGIC: non-focal exam, A & O X 3  PSYCHIATRIC:, appropriate affect     ASSESSMENT:   1. Hypertension with renal disease    2. 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, without long-term current use of insulin ([facility]    3. Mixed hyperlipidemia    4. Primary osteoarthritis involving multiple joints    5. Stage 3 chronic kidney disease, unspecified whether stage 3a or 3b CKD (HCC)    6. Paroxysmal atrial fibrillation ([facility]    7. Diastolic CHF, chronic (HCC)    8. ASCVD (arteriosclerotic cardiovascular disease)    9. Polymyalgia rheumatica ([facility]    10. Ulcer of both feet, limited to breakdown of skin ([facility])      Impression  1. Hypertension that is controlled so continue current therapy reviewed with him. 2.  Diabetes repeat status pending and prior lab reviewed and I will adjust if needed. 3 hyperlipidemia prior lab reviewed repeat status pending I will adjust if needed. 4. DJD that is stable  5. CKD stage III repeat status pending  6. Paroxysmal atrial fibrillation currently in sinus rhythm. INR pending  7. Diastolic CHF compensated  8 ASCVD clinically stable continue aspirin daily  9. Polymyalgia rheumatica sed rate pending  10. Prior diabetic foot ulcers now resolved  Follow-up scheduled for 1 month for his atrial fibrillation, congestive heart failure and acute problems and follow-up in 3 months with his chronic problems. I will call with today's lab results.     PLAN:  .  Orders Placed This Encounter    METABOLIC PANEL, COMPREHENSIVE    LIPID PANEL    CK    HEMOGLOBIN A1C WITH EAG    PROTHROMBIN TIME + INR         ATTENTION:   This medical record was transcribed using an electronic medical records system. Although proofread, it may and can contain electronic and spelling errors. Other human spelling and other errors may be present. Corrections may be executed at a later time. Please feel free to contact us for any clarifications as needed. Follow-up and Dispositions    · Return in about 3 months (around 5/16/2022). No results found for any visits on 02/16/22. Bradly Pierre MD    The patient verbalized understanding of the problems and plans as explained. 08-Mar-2022

## 2022-03-16 ENCOUNTER — OFFICE VISIT (OUTPATIENT)
Dept: INTERNAL MEDICINE CLINIC | Age: 87
End: 2022-03-16
Payer: MEDICARE

## 2022-03-16 VITALS
RESPIRATION RATE: 17 BRPM | SYSTOLIC BLOOD PRESSURE: 132 MMHG | HEIGHT: 75 IN | WEIGHT: 209.5 LBS | HEART RATE: 80 BPM | TEMPERATURE: 98.6 F | OXYGEN SATURATION: 96 % | BODY MASS INDEX: 26.05 KG/M2 | DIASTOLIC BLOOD PRESSURE: 66 MMHG

## 2022-03-16 DIAGNOSIS — I48.0 PAROXYSMAL ATRIAL FIBRILLATION (HCC): ICD-10-CM

## 2022-03-16 DIAGNOSIS — S61.411A LACERATION OF SKIN OF RIGHT HAND, INITIAL ENCOUNTER: ICD-10-CM

## 2022-03-16 DIAGNOSIS — N18.30 STAGE 3 CHRONIC KIDNEY DISEASE, UNSPECIFIED WHETHER STAGE 3A OR 3B CKD (HCC): ICD-10-CM

## 2022-03-16 DIAGNOSIS — I50.32 DIASTOLIC CHF, CHRONIC (HCC): Primary | ICD-10-CM

## 2022-03-16 DIAGNOSIS — M35.3 POLYMYALGIA RHEUMATICA (HCC): ICD-10-CM

## 2022-03-16 PROCEDURE — G8427 DOCREV CUR MEDS BY ELIG CLIN: HCPCS | Performed by: INTERNAL MEDICINE

## 2022-03-16 PROCEDURE — G8510 SCR DEP NEG, NO PLAN REQD: HCPCS | Performed by: INTERNAL MEDICINE

## 2022-03-16 PROCEDURE — 99214 OFFICE O/P EST MOD 30 MIN: CPT | Performed by: INTERNAL MEDICINE

## 2022-03-16 PROCEDURE — G8536 NO DOC ELDER MAL SCRN: HCPCS | Performed by: INTERNAL MEDICINE

## 2022-03-16 PROCEDURE — 1101F PT FALLS ASSESS-DOCD LE1/YR: CPT | Performed by: INTERNAL MEDICINE

## 2022-03-16 PROCEDURE — G8419 CALC BMI OUT NRM PARAM NOF/U: HCPCS | Performed by: INTERNAL MEDICINE

## 2022-03-16 NOTE — PATIENT INSTRUCTIONS

## 2022-03-16 NOTE — PROGRESS NOTES
Chief Complaint   Patient presents with    Hypertension     1 month follow up    CHF    Diabetes     Visit Vitals  /66 (BP 1 Location: Left upper arm, BP Patient Position: Sitting, BP Cuff Size: Adult)   Pulse 80   Temp 98.6 °F (37 °C) (Oral)   Resp 17   Ht 6' 3\" (1.905 m)   Wt 209 lb 8 oz (95 kg)   SpO2 96%   BMI 26.19 kg/m²     1. Have you been to the ER, urgent care clinic since your last visit? Hospitalized since your last visit? No    2. Have you seen or consulted any other health care providers outside of the [address] [place] [address] since your last visit? Include any pap smears or colon screening.  No

## 2022-03-16 NOTE — PROGRESS NOTES
Chief Complaint   Patient presents with    Hypertension     1 month follow up    CHF    Diabetes       SUBJECTIVE:    Eldon Stewart. is a 80 y.o. male turns in follow-up for his hypertension, congestive heart failure, CKD stage III, atrial fibrillation, polymyalgia rheumatica and other medical problems in addition to his chronic problems he has an acute problem of a laceration to his right hand over the dorsum of the hand where his dog jumped up on him 3 days ago and he thinks Jalen scratched him. He notes that he did not get bitten. He has had a problem with bleeding particularly at nighttime since he has been on the Coumadin. He actually held his Coumadin for couple days but did take his Coumadin last night and did not have much bleeding last night. He denies any lightheadedness or dizziness. He notes no chest pain, shortness of breath, palpitations, PND, orthopnea or other cardiac or respiratory complaints. He notes no current GI or  complaints. He has no headaches, dizziness or neurologic complaints. He has no change of his chronic arthritic complaints and and no other complaints on complete review of systems. .      Current Outpatient Medications   Medication Sig Dispense Refill    glimepiride (AMARYL) 1 mg tablet TAKE 1 TABLET BY MOUTH EVERY DAY BEFORE BREAKFAST 90 Tablet 3    predniSONE (DELTASONE) 10 mg tablet TAKE 1TAB BY MOUTH TWO (2) TIMES A DAY.  60 Tablet 5    warfarin (COUMADIN) 5 mg tablet TAKE 2 TABLETS BY MOUTH ON MONDAY &FRIDAYS AND 1&1/2 TABLET DAILY ON ALL OTHER DAYS 145 Tablet 1    bumetanide (BUMEX) 2 mg tablet TAKE 1 TABLET BY MOUTH EVERY DAY 90 Tablet 1    glucose blood VI test strips (Accu-Chek Merle Plus test strp) strip USE TO TEST BLOOD SUGAR ONCE DAILY 100 Strip PRN    digoxin (LANOXIN) 0.125 mg tablet TAKE 1 TABLET BY MOUTH EVERY DAY 90 Tablet 3    metFORMIN (GLUCOPHAGE) 500 mg tablet TAKE 1 TABLET BY MOUTH EVERY DAY IN THE MORNING WITH BREAKFAST AND ONE TABLET BEFORE DINNER 180 Tablet 3    simvastatin (ZOCOR) 20 mg tablet TAKE 1 TABLET BY MOUTH EVERY DAY 90 Tablet 3    terazosin (HYTRIN) 2 mg capsule TAKE 1 CAPSULE BY MOUTH EVERY DAY 90 Capsule 3    lisinopriL (PRINIVIL, ZESTRIL) 5 mg tablet Take 1 Tablet by mouth daily. 30 Tablet prn    diclofenac EC (VOLTAREN) 75 mg EC tablet TAKE 1 TABLET BY MOUTH TWICE A  Tab 3    Blood-Glucose Meter monitoring kit Use to test blood sugar twice daily  DX:E11.22 1 Kit 0    glucose blood VI test strips (blood glucose test) strip Use to test blood sugar twice daily  DX:E11.22 100 Strip 1    cyanocobalamin (VITAMIN B12) 500 mcg tablet Take 500 mcg by mouth daily.  acetaminophen (TYLENOL ARTHRITIS PAIN) 650 mg TbER Take 650 mg by mouth every eight (8) hours.  multivitamins-minerals-lutein (MULTIVITAMIN 50 PLUS) tab tablet Take 1 Tab by mouth daily.  glucosamine 1,000 mg tab Take 2,000 mg by mouth daily.  cholecalciferol (VITAMIN D3) 1,000 unit cap Take 1,000 Units by mouth daily.  DOCOSAHEXANOIC ACID/EPA (FISH OIL PO) Take 1,200 mg by mouth two (2) times a day. 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    Back pain 9/20/2017    BPH (benign prostatic hyperplasia) 9/20/2017    CHF (congestive heart failure) ([facility] 9/20/2017    CKD (chronic kidney disease) 9/20/2017    COVID-19 11/2021    Diabetic acetonemia ([facility]     DM (diabetes mellitus) ([facility] 9/20/2017    Story: Diet Controlled    ED (erectile dysfunction) 9/20/2017    Edema 9/20/2017    Elevated CPK 9/20/2017    Comments: History of    Elevated LFTs 9/20/2017    Comments: History of    Elevated PSA 9/20/2017    Hypercholesteremia     Hyperlipidemia 9/20/2017    Hypertension     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    Nodule of right lung 9/20/2017    Story: Right    Polymyalgia ([facility] 9/20/2017    Prostate enlargement      Past Surgical History:   Procedure Laterality Date    HX HEENT  06/12/2018    Dr. Osmel Bradley, surgery to remove cancerous tissue from Left cheek    HX MALIGNANT SKIN LESION EXCISION  09/2018    2 lesions on head    AL ABDOMEN SURGERY PROC UNLISTED      hernia repair     No Known Allergies    REVIEW OF SYSTEMS:  General: negative for - chills or fever, or weight loss or gain  ENT: negative for - headaches, nasal congestion or tinnitus  Eyes: no blurred or visual changes  Neck: No stiffness or swollen nodes  Respiratory: negative for - cough, hemoptysis, shortness of breath or wheezing  Cardiovascular : negative for - chest pain, edema, palpitations or shortness of breath  Gastrointestinal: negative for - abdominal pain, blood in stools, heartburn or nausea/vomiting  Genito-Urinary: no dysuria, trouble voiding, or hematuria  Musculoskeletal: negative for - gait disturbance, joint pain, joint stiffness or joint swelling  Neurological: no TIA or stroke symptoms  Hematologic: no bruises, no bleeding  Lymphatic: no swollen glands  Integument: no lumps, mole changes, nail changes or rash. Laceration to dorsum of right hand  Endocrine:no malaise/lethargy poly uria or polydipsia or unexpected weight changes        Social History     Socioeconomic History    Marital status:    Tobacco Use    Smoking status: Never Smoker    Smokeless tobacco: Never Used   Vaping Use    Vaping Use: Never used   Substance and Sexual Activity    Alcohol use: No    Drug use: No    Sexual activity: Never     History reviewed. No pertinent family history.     OBJECTIVE:     Visit Vitals  /66 (BP 1 Location: Left upper arm, BP Patient Position: Sitting, BP Cuff Size: Adult)   Pulse 80   Temp 98.6 °F (37 °C) (Oral)   Resp 17   Ht 6' 3\" (1.905 m)   Wt 209 lb 8 oz (95 kg)   SpO2 96%   BMI 26.19 kg/m²     CONSTITUTIONAL:   well nourished, appears age appropriate  EYES: sclera anicteric, PERRL, EOMI  ENMT:nares clear, moist mucous membranes, pharynx clear  NECK: supple. Thyroid normal, No JVD or bruits  RESPIRATORY: Chest: clear to ascultation and percussion, normal inspiratory effort  CARDIOVASCULAR: Heart: regular rate and rhythm no murmurs, rubs or gallops, PMI not displaced, No thrills, no peripheral edema  GASTROINTESTINAL: Abdomen: non distended, soft, non tender, bowel sounds normal  HEMATOLOGIC: no purpura, petechiae or bruising  LYMPHATIC: No lymph node enlargemant  MUSCULOSKELETAL: Extremities: no active synovitis, pulse 1+   INTEGUMENT: No unusual rashes or suspicious skin lesions noted. Nails appear normal.  Superficial laceration to dorsum of right hand that does not require any sutures. This is cleansed and sterilely dressed here in office. PERIPHERAL VASCULAR: normal pulses femoral, PT and DP  NEUROLOGIC: non-focal exam, A & O X 3  PSYCHIATRIC:, appropriate affect     ASSESSMENT:   1. Diastolic CHF, chronic (HCC)    2. Paroxysmal atrial fibrillation (HCC)    3. Stage 3 chronic kidney disease, unspecified whether stage 3a or 3b CKD ([address]    4. Polymyalgia rheumatica ([facility]    5. Laceration of skin of right hand, initial encounter      Impression  1. Diastolic CHF is compensated  2. Paroxysmal atrial fibrillation INR is pending but he did miss his Coumadin for a couple of days because of the laceration to his right hand and have asked him to ask to hold it for the next 5 days and give this time to heal since he still gets significantly oozing of blood  3. CKD stage III repeat status pending  4. Polymyalgia rheumatica sed rate pending   5. Laceration dorsum right hand this area is cleansed and sterilely dressed. As noted vascular hold his Coumadin for 5 days. Follow-up scheduled again for 1 month with a clear understanding I will see him sooner should the laceration become more of a problem or become infected. At this point I do not think we need an antibiotic.     PLAN:  .  Orders Placed This Encounter    METABOLIC PANEL, BASIC    PROTHROMBIN TIME + INR    SED RATE (ESR)         ATTENTION:   This medical record was transcribed using an electronic medical records system. Although proofread, it may and can contain electronic and spelling errors. Other human spelling and other errors may be present. Corrections may be executed at a later time. Please feel free to contact us for any clarifications as needed. Follow-up and Dispositions    · Return in about 4 weeks (around 4/13/2022). No results found for any visits on 03/16/22. lEif Garcia MD    The patient verbalized understanding of the problems and plans as explained.

## 2022-03-17 LAB
ANION GAP SERPL CALC-SCNC: 3 MMOL/L (ref 5–15)
BUN SERPL-MCNC: 34 MG/DL (ref 6–20)
BUN/CREAT SERPL: 23 (ref 12–20)
CALCIUM SERPL-MCNC: 9.6 MG/DL (ref 8.5–10.1)
CHLORIDE SERPL-SCNC: 107 MMOL/L (ref 97–108)
CO2 SERPL-SCNC: 27 MMOL/L (ref 21–32)
CREAT SERPL-MCNC: 1.45 MG/DL (ref 0.7–1.3)
ERYTHROCYTE [SEDIMENTATION RATE] IN BLOOD: 37 MM/HR (ref 0–20)
GLUCOSE SERPL-MCNC: 287 MG/DL (ref 65–100)
INR PPP: 2.8 (ref 0.9–1.1)
POTASSIUM SERPL-SCNC: 5.1 MMOL/L (ref 3.5–5.1)
PROTHROMBIN TIME: 27.6 SEC (ref 9–11.1)
SODIUM SERPL-SCNC: 137 MMOL/L (ref 136–145)

## 2022-03-18 ENCOUNTER — TELEPHONE ANTICOAG (OUTPATIENT)
Dept: INTERNAL MEDICINE CLINIC | Age: 87
End: 2022-03-18

## 2022-03-18 PROBLEM — M35.3 POLYMYALGIA RHEUMATICA (HCC): Status: ACTIVE | Noted: 2018-04-24

## 2022-03-18 PROBLEM — E78.2 MIXED HYPERLIPIDEMIA: Status: ACTIVE | Noted: 2017-09-20

## 2022-03-18 PROBLEM — N18.30 CONTROLLED TYPE 2 DIABETES MELLITUS WITH STAGE 3 CHRONIC KIDNEY DISEASE, WITHOUT LONG-TERM CURRENT USE OF INSULIN (HCC): Status: ACTIVE | Noted: 2017-09-20

## 2022-03-18 PROBLEM — L97.511 ULCER OF BOTH FEET, LIMITED TO BREAKDOWN OF SKIN (HCC): Status: ACTIVE | Noted: 2020-03-12

## 2022-03-18 PROBLEM — R60.0 BILATERAL LEG EDEMA: Status: ACTIVE | Noted: 2020-03-12

## 2022-03-18 PROBLEM — U07.1 COVID-19: Status: ACTIVE | Noted: 2021-12-21

## 2022-03-18 PROBLEM — I12.9 HYPERTENSION WITH RENAL DISEASE: Status: ACTIVE | Noted: 2017-09-20

## 2022-03-18 PROBLEM — I50.32 DIASTOLIC CHF, CHRONIC (HCC): Status: ACTIVE | Noted: 2020-03-05

## 2022-03-18 PROBLEM — L97.521 ULCER OF BOTH FEET, LIMITED TO BREAKDOWN OF SKIN (HCC): Status: ACTIVE | Noted: 2020-03-12

## 2022-03-18 PROBLEM — Z01.810 PRE-OPERATIVE CARDIOVASCULAR EXAMINATION: Status: RESOLVED | Noted: 2022-02-16 | Resolved: 2022-03-18

## 2022-03-18 PROBLEM — M17.11 PRIMARY OSTEOARTHRITIS OF RIGHT KNEE: Status: ACTIVE | Noted: 2020-07-08

## 2022-03-18 PROBLEM — E11.22 CONTROLLED TYPE 2 DIABETES MELLITUS WITH STAGE 3 CHRONIC KIDNEY DISEASE, WITHOUT LONG-TERM CURRENT USE OF INSULIN (HCC): Status: ACTIVE | Noted: 2017-09-20

## 2022-03-18 NOTE — PROGRESS NOTES
Labs stable so continue current treatment. Advised patient to continue the same does of blood thinner which is 7.5 mg on Monday; 10 mg all other days and f/up as scheduled.

## 2022-03-18 NOTE — PROGRESS NOTES
Anticoagulation Summary  As of 3/18/2022    INR goal:  2.0-3.0   TTR:  36.6 % (3.9 y)   INR used for dosin.8 (3/16/2022)   Warfarin maintenance plan:  7.5 mg (5 mg x 1.5) every Mon; 10 mg (5 mg x 2) all other days   Weekly warfarin total:  67.5 mg   Plan last modified:  Abner Borja RN (2021)   Next INR check:  2022   Target end date:      Indications    Paroxysmal atrial fibrillation ([facility] [address] (Primary) [I48.0]             Anticoagulation Episode Summary     INR check location:  Clinic Lab    Preferred lab:      Send INR reminders to:      Comments:        Anticoagulation Care Providers     Provider Role Specialty Phone number    Sanjiv Humphrey MD Responsible Internal Medicine [phone]        Informed patient that PT/INR okay; continue the same dose of blood thinner which is 7.5 mg on Monday; 10 mg all other days and f/up as scheduled.

## 2022-03-19 PROBLEM — R55 NEAR SYNCOPE: Status: ACTIVE | Noted: 2019-11-25

## 2022-03-19 PROBLEM — R97.20 ELEVATED PSA: Status: ACTIVE | Noted: 2017-09-20

## 2022-03-19 PROBLEM — J30.89 CHRONIC NON-SEASONAL ALLERGIC RHINITIS: Status: ACTIVE | Noted: 2017-09-20

## 2022-03-19 PROBLEM — H25.9 AGE-RELATED CATARACT OF BOTH EYES: Status: ACTIVE | Noted: 2022-02-16

## 2022-03-19 PROBLEM — R42 DIZZINESS: Status: ACTIVE | Noted: 2018-01-05

## 2022-03-19 PROBLEM — M15.0 PRIMARY OSTEOARTHRITIS INVOLVING MULTIPLE JOINTS: Status: ACTIVE | Noted: 2017-09-20

## 2022-03-19 PROBLEM — M15.9 PRIMARY OSTEOARTHRITIS INVOLVING MULTIPLE JOINTS: Status: ACTIVE | Noted: 2017-09-20

## 2022-03-19 PROBLEM — M54.9 BACK PAIN: Status: ACTIVE | Noted: 2017-09-20

## 2022-03-19 PROBLEM — M16.12 PRIMARY OSTEOARTHRITIS OF LEFT HIP: Status: ACTIVE | Noted: 2019-10-23

## 2022-03-19 PROBLEM — I48.0 PAROXYSMAL ATRIAL FIBRILLATION (HCC): Status: ACTIVE | Noted: 2018-01-05

## 2022-03-19 PROBLEM — N52.9 ED (ERECTILE DYSFUNCTION): Status: ACTIVE | Noted: 2017-09-20

## 2022-03-19 PROBLEM — M19.012 PRIMARY OSTEOARTHRITIS OF LEFT SHOULDER: Status: ACTIVE | Noted: 2020-06-08

## 2022-03-19 PROBLEM — N40.0 BPH (BENIGN PROSTATIC HYPERPLASIA): Status: ACTIVE | Noted: 2017-09-20

## 2022-03-19 PROBLEM — I25.10 ASCVD (ARTERIOSCLEROTIC CARDIOVASCULAR DISEASE): Status: ACTIVE | Noted: 2017-09-20

## 2022-03-20 PROBLEM — Z01.810 PRE-OPERATIVE CARDIOVASCULAR EXAMINATION: Status: RESOLVED | Noted: 2022-02-16 | Resolved: 2022-03-18

## 2022-03-20 PROBLEM — R91.1 NODULE OF RIGHT LUNG: Status: ACTIVE | Noted: 2017-09-20

## 2022-03-20 PROBLEM — M17.12 PRIMARY OSTEOARTHRITIS OF LEFT KNEE: Status: ACTIVE | Noted: 2020-06-08

## 2022-03-20 PROBLEM — Z13.39 ALCOHOL SCREENING: Status: ACTIVE | Noted: 2019-11-21

## 2022-03-20 PROBLEM — N18.30 STAGE 3 CHRONIC KIDNEY DISEASE (HCC): Status: ACTIVE | Noted: 2017-09-20

## 2022-04-06 RX ORDER — PREDNISONE 10 MG/1
TABLET ORAL
Qty: 60 TABLET | Refills: 5 | Status: SHIPPED | OUTPATIENT
Start: 2022-04-06 | End: 2022-09-22

## 2022-04-06 NOTE — TELEPHONE ENCOUNTER
RX refill request from the patient/pharmacy. Patient last seen 03- with labs, and next appt. scheduled for 04-  Requested Prescriptions     Pending Prescriptions Disp Refills    predniSONE (DELTASONE) 10 mg tablet [Pharmacy Med Name: PREDNISONE 10 MG TABLET] 60 Tablet 5     Sig: TAKE 1TAB BY MOUTH TWO (2) TIMES A DAY. Jerry Ott

## 2022-04-18 ENCOUNTER — OFFICE VISIT (OUTPATIENT)
Dept: INTERNAL MEDICINE CLINIC | Age: 87
End: 2022-04-18
Payer: MEDICARE

## 2022-04-18 VITALS
SYSTOLIC BLOOD PRESSURE: 120 MMHG | BODY MASS INDEX: 24.87 KG/M2 | RESPIRATION RATE: 17 BRPM | HEART RATE: 80 BPM | TEMPERATURE: 97.6 F | HEIGHT: 75 IN | WEIGHT: 200 LBS | DIASTOLIC BLOOD PRESSURE: 80 MMHG | OXYGEN SATURATION: 98 %

## 2022-04-18 DIAGNOSIS — I48.0 PAROXYSMAL ATRIAL FIBRILLATION (HCC): ICD-10-CM

## 2022-04-18 DIAGNOSIS — N18.30 STAGE 3 CHRONIC KIDNEY DISEASE, UNSPECIFIED WHETHER STAGE 3A OR 3B CKD (HCC): ICD-10-CM

## 2022-04-18 DIAGNOSIS — G89.29 CHRONIC PAIN OF BOTH KNEES: ICD-10-CM

## 2022-04-18 DIAGNOSIS — M35.3 POLYMYALGIA RHEUMATICA (HCC): ICD-10-CM

## 2022-04-18 DIAGNOSIS — M25.561 CHRONIC PAIN OF BOTH KNEES: ICD-10-CM

## 2022-04-18 DIAGNOSIS — I50.32 DIASTOLIC CHF, CHRONIC (HCC): Primary | ICD-10-CM

## 2022-04-18 DIAGNOSIS — M25.562 CHRONIC PAIN OF BOTH KNEES: ICD-10-CM

## 2022-04-18 PROCEDURE — G8427 DOCREV CUR MEDS BY ELIG CLIN: HCPCS | Performed by: INTERNAL MEDICINE

## 2022-04-18 PROCEDURE — G8419 CALC BMI OUT NRM PARAM NOF/U: HCPCS | Performed by: INTERNAL MEDICINE

## 2022-04-18 PROCEDURE — G8536 NO DOC ELDER MAL SCRN: HCPCS | Performed by: INTERNAL MEDICINE

## 2022-04-18 PROCEDURE — 1101F PT FALLS ASSESS-DOCD LE1/YR: CPT | Performed by: INTERNAL MEDICINE

## 2022-04-18 PROCEDURE — G8510 SCR DEP NEG, NO PLAN REQD: HCPCS | Performed by: INTERNAL MEDICINE

## 2022-04-18 PROCEDURE — 99213 OFFICE O/P EST LOW 20 MIN: CPT | Performed by: INTERNAL MEDICINE

## 2022-04-18 NOTE — PROGRESS NOTES
Chief Complaint   Patient presents with    CHF    Irregular Heart Beat     atrial fibriiation       SUBJECTIVE:    María Elena Martin. is a 80 y.o. male returns in follow-up for his hypertension, atrial fibrillation, diastolic CHF, CKD stage III, polymyalgia rheumatica and he also notes he has bilateral knee pain since has been doing a lot of walking. They did not give out on him. He notes no history of falls or trauma. He denies any chest pain, shortness of breath, palpitations, PND, orthopnea or cardiorespiratory complaints. He notes no GI or  complaints. He has no headaches, dizziness or neurologic complaints. His arthritic complaints seem to be currently confined to his knees. Current Outpatient Medications   Medication Sig Dispense Refill    predniSONE (DELTASONE) 10 mg tablet TAKE 1TAB BY MOUTH TWO (2) TIMES A DAY. 60 Tablet 5    glimepiride (AMARYL) 1 mg tablet TAKE 1 TABLET BY MOUTH EVERY DAY BEFORE BREAKFAST 90 Tablet 3    warfarin (COUMADIN) 5 mg tablet TAKE 2 TABLETS BY MOUTH ON MONDAY &FRIDAYS AND 1&1/2 TABLET DAILY ON ALL OTHER DAYS 145 Tablet 1    bumetanide (BUMEX) 2 mg tablet TAKE 1 TABLET BY MOUTH EVERY DAY 90 Tablet 1    glucose blood VI test strips (Accu-Chek Merle Plus test strp) strip USE TO TEST BLOOD SUGAR ONCE DAILY 100 Strip PRN    digoxin (LANOXIN) 0.125 mg tablet TAKE 1 TABLET BY MOUTH EVERY DAY 90 Tablet 3    metFORMIN (GLUCOPHAGE) 500 mg tablet TAKE 1 TABLET BY MOUTH EVERY DAY IN THE MORNING WITH BREAKFAST AND ONE TABLET BEFORE DINNER 180 Tablet 3    simvastatin (ZOCOR) 20 mg tablet TAKE 1 TABLET BY MOUTH EVERY DAY 90 Tablet 3    terazosin (HYTRIN) 2 mg capsule TAKE 1 CAPSULE BY MOUTH EVERY DAY 90 Capsule 3    lisinopriL (PRINIVIL, ZESTRIL) 5 mg tablet Take 1 Tablet by mouth daily.  30 Tablet prn    diclofenac EC (VOLTAREN) 75 mg EC tablet TAKE 1 TABLET BY MOUTH TWICE A  Tab 3    Blood-Glucose Meter monitoring kit Use to test blood sugar twice daily  DX:E11.22 1 Kit 0    glucose blood VI test strips (blood glucose test) strip Use to test blood sugar twice daily  DX:E11.22 100 Strip 1    cyanocobalamin (VITAMIN B12) 500 mcg tablet Take 500 mcg by mouth daily.  acetaminophen (TYLENOL ARTHRITIS PAIN) 650 mg TbER Take 650 mg by mouth every eight (8) hours.  multivitamins-minerals-lutein (MULTIVITAMIN 50 PLUS) tab tablet Take 1 Tab by mouth daily.  glucosamine 1,000 mg tab Take 2,000 mg by mouth daily.  cholecalciferol (VITAMIN D3) 1,000 unit cap Take 1,000 Units by mouth daily.  DOCOSAHEXANOIC ACID/EPA (FISH OIL PO) Take 1,200 mg by mouth two (2) times a day. 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    Back pain 9/20/2017    BPH (benign prostatic hyperplasia) 9/20/2017    CHF (congestive heart failure) ([address] 9/20/2017    CKD (chronic kidney disease) 9/20/2017    COVID-19 11/2021    Diabetic acetonemia ([address]     DM (diabetes mellitus) ([address] 9/20/2017    Story: Diet Controlled    ED (erectile dysfunction) 9/20/2017    Edema 9/20/2017    Elevated CPK 9/20/2017    Comments: History of    Elevated LFTs 9/20/2017    Comments: History of    Elevated PSA 9/20/2017    Hypercholesteremia     Hyperlipidemia 9/20/2017    Hypertension     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    Nodule of right lung 9/20/2017    Story: Right    Polymyalgia ([address] 9/20/2017    Prostate enlargement      Past Surgical History:   Procedure Laterality Date    HX HEENT  06/12/2018    Dr. Pennie Villa, surgery to remove cancerous tissue from Left cheek    HX MALIGNANT SKIN LESION EXCISION  09/2018    2 lesions on head    ND ABDOMEN SURGERY PROC UNLISTED      hernia repair     No Known Allergies    REVIEW OF SYSTEMS:  General: negative for - chills or fever, or weight loss or gain  ENT: negative for - headaches, nasal congestion or tinnitus  Eyes: no blurred or visual changes  Neck: No stiffness or swollen nodes  Respiratory: negative for - cough, hemoptysis, shortness of breath or wheezing  Cardiovascular : negative for - chest pain, edema, palpitations or shortness of breath  Gastrointestinal: negative for - abdominal pain, blood in stools, heartburn or nausea/vomiting  Genito-Urinary: no dysuria, trouble voiding, or hematuria  Musculoskeletal: negative for - gait disturbance, joint stiffness or joint swelling. Positive for bilateral knee pain  Neurological: no TIA or stroke symptoms  Hematologic: no bruises, no bleeding  Lymphatic: no swollen glands  Integument: no lumps, mole changes, nail changes or rash  Endocrine:no malaise/lethargy poly uria or polydipsia or unexpected weight changes        Social History     Socioeconomic History    Marital status:    Tobacco Use    Smoking status: Never Smoker    Smokeless tobacco: Never Used   Vaping Use    Vaping Use: Never used   Substance and Sexual Activity    Alcohol use: No    Drug use: No    Sexual activity: Never     History reviewed. No pertinent family history. OBJECTIVE:     Visit Vitals  /80 (BP 1 Location: Left upper arm, BP Patient Position: Sitting, BP Cuff Size: Adult)   Pulse 80   Temp 97.6 °F (36.4 °C) (Oral)   Resp 17   Ht 6' 3\" (1.905 m)   Wt 200 lb (90.7 kg)   SpO2 98%   BMI 25.00 kg/m²     CONSTITUTIONAL:   well nourished, appears age appropriate  EYES: sclera anicteric, PERRL, EOMI  ENMT:nares clear, moist mucous membranes, pharynx clear  NECK: supple.  Thyroid normal, No JVD or bruits  RESPIRATORY: Chest: clear to ascultation and percussion, normal inspiratory effort  CARDIOVASCULAR: Heart: regular rate and rhythm no murmurs, rubs or gallops, PMI not displaced, No thrills, no peripheral edema  GASTROINTESTINAL: Abdomen: non distended, soft, non tender, bowel sounds normal  HEMATOLOGIC: no purpura, petechiae or bruising  LYMPHATIC: No lymph node enlargemant  MUSCULOSKELETAL: Extremities: no active synovitis, pulse 1+. Both knees are tender to palpation with increased discomfort on range of motion. No joint effusion, erythema increased temperature of either knee. No joint instability on exam.  INTEGUMENT: No unusual rashes or suspicious skin lesions noted. Nails appear normal.  PERIPHERAL VASCULAR: normal pulses femoral, PT and DP  NEUROLOGIC: non-focal exam, A & O X 3  PSYCHIATRIC:, appropriate affect     ASSESSMENT:   1. Diastolic CHF, chronic (HCC)    2. Stage 3 chronic kidney disease, unspecified whether stage 3a or 3b CKD (HCC)    3. Paroxysmal atrial fibrillation ([address]    4. Polymyalgia rheumatica ([address]    5. Chronic pain of both knees      Impression  1. Diastolic CHF compensated  2. CKD stage III repeat status pending  3. Paroxysmal atrial fibrillation INR pending  4. Polymyalgia rheumatica sed rate pending  5. DJD with bilateral knee pain x-rays ordered today but cannot be done until tomorrow as the x-ray tech is out of the office today  Follow-up with me again in 1 month or sooner if there is a problem. I will call the lab results. PLAN:  .  Orders Placed This Encounter    XR KNEE RT 3 V    XR KNEE LT 3 V    METABOLIC PANEL, BASIC    PROTHROMBIN TIME + INR    SED RATE (ESR)         ATTENTION:   This medical record was transcribed using an electronic medical records system. Although proofread, it may and can contain electronic and spelling errors. Other human spelling and other errors may be present. Corrections may be executed at a later time. Please feel free to contact us for any clarifications as needed. Follow-up and Dispositions    · Return in about 4 weeks (around 5/16/2022). No results found for any visits on 04/18/22. Cathy Cid MD    The patient verbalized understanding of the problems and plans as explained.

## 2022-04-18 NOTE — PATIENT INSTRUCTIONS
Arthritis: Care Instructions  Overview     Arthritis, also called osteoarthritis, is a breakdown of the cartilage that cushions your joints. When the cartilage wears down, your bones rub against each other. This causes pain and stiffness. Many people have some arthritis as they age. Arthritis most often affects the joints of the spine, hands, hips, knees, or feet. Arthritis never goes away completely. But medicine and home treatment can help reduce pain and help you stay active. Follow-up care is a key part of your treatment and safety. Be sure to make and go to all appointments, and call your doctor if you are having problems. It's also a good idea to know your test results and keep a list of the medicines you take. How can you care for yourself at home? · Stay at a healthy weight. Being overweight puts extra strain on your joints. · Talk to your doctor or physical therapist about exercises that will help ease joint pain. ? Stretch. You may enjoy gentle forms of yoga to help keep your joints and muscles flexible. ? Walk instead of jog. Other types of exercise that are less stressful on the joints include riding a bike, swimming, ashley chi, or water exercise. ? Lift weights. Strong muscles help reduce stress on your joints. Stronger thigh muscles, for example, take some of the stress off of the knees and hips. Learn the right way to lift weights so you don't make joint pain worse. · Take your medicines exactly as prescribed. Call your doctor if you think you are having a problem with your medicine. · Take pain medicines exactly as directed. ? If the doctor gave you a prescription medicine for pain, take it as prescribed. ? If you are not taking a prescription pain medicine, ask your doctor if you can take an over-the-counter medicine. · Use a cane, crutch, walker, or another device if you need help to get around. These can help rest your joints.  You also can use other things to make life easier, such as a higher toilet seat and padded handles on kitchen utensils. · Do not sit in low chairs. They can make it hard to get up. · Put heat or cold on your sore joints as needed. Use whichever helps you most. You can also switch between hot and cold packs. ? Apply heat 2 or 3 times a day for 20 to 30 minutesusing a heating pad, hot shower, or hot packto relieve pain and stiffness. But don't use heat on a swollen joint. ? Put ice or a cold pack on your sore joint for 10 to 20 minutes at a time. Put a thin cloth between the ice and your skin. When should you call for help? Call your doctor now or seek immediate medical care if:    · You have sudden swelling, warmth, or pain in any joint.     · You have joint pain and a fever or rash.     · You have such bad pain that you cannot use a joint. Watch closely for changes in your health, and be sure to contact your doctor if:    · You have mild joint symptoms that continue even with more than 6 weeks of care at home.     · You have stomach pain or other problems with your medicine. Where can you learn more? Go to http://www.gray.com/  Enter G613 in the search box to learn more about \"Arthritis: Care Instructions. \"  Current as of: December 20, 2021               Content Version: 13.2  © 6593-6515 [employer]. Care instructions adapted under license by [employer] (which disclaims liability or warranty for this information). If you have questions about a medical condition or this instruction, always ask your healthcare professional. [place] [address] any warranty or liability for your use of this information.

## 2022-04-19 LAB
ANION GAP SERPL CALC-SCNC: 3 MMOL/L (ref 5–15)
BUN SERPL-MCNC: 34 MG/DL (ref 6–20)
BUN/CREAT SERPL: 28 (ref 12–20)
CALCIUM SERPL-MCNC: 9.2 MG/DL (ref 8.5–10.1)
CHLORIDE SERPL-SCNC: 108 MMOL/L (ref 97–108)
CO2 SERPL-SCNC: 28 MMOL/L (ref 21–32)
CREAT SERPL-MCNC: 1.21 MG/DL (ref 0.7–1.3)
ERYTHROCYTE [SEDIMENTATION RATE] IN BLOOD: 34 MM/HR (ref 0–20)
GLUCOSE SERPL-MCNC: 216 MG/DL (ref 65–100)
INR PPP: 2.4 (ref 0.9–1.1)
POTASSIUM SERPL-SCNC: 4.7 MMOL/L (ref 3.5–5.1)
PROTHROMBIN TIME: 23.5 SEC (ref 9–11.1)
SODIUM SERPL-SCNC: 139 MMOL/L (ref 136–145)

## 2022-04-20 ENCOUNTER — TELEPHONE ANTICOAG (OUTPATIENT)
Dept: INTERNAL MEDICINE CLINIC | Age: 87
End: 2022-04-20

## 2022-04-20 NOTE — PROGRESS NOTES
Labs stable with no change in treatment needed. Advised patient that PT/INR okay; continue the same dose of blood thinner which is 7.5 mg on Monday; 10 mg all other days and f/up as scheduled.

## 2022-04-20 NOTE — PROGRESS NOTES
Anticoagulation Summary  As of 2022    INR goal:  2.0-3.0   TTR:  38.1 % (4 y)   INR used for dosin.4 (2022)   Warfarin maintenance plan:  7.5 mg (5 mg x 1.5) every Mon; 10 mg (5 mg x 2) all other days   Weekly warfarin total:  67.5 mg   Plan last modified:  Pilo Santa RN (2021)   Next INR check:     Target end date:      Indications    Paroxysmal atrial fibrillation ([facility] (Primary) [I48.0]             Anticoagulation Episode Summary     INR check location:  Clinic Lab    Preferred lab:      Send INR reminders to:      Comments:        Anticoagulation Care Providers     Provider Role Specialty Phone number    Samuel Rich MD [facility] [phone]        Informed patient that his PT/INR was okay; continue the same dose of blood thinner which is 7.5 mg on Monday; 10 mg all other days and f/up as scheduled.

## 2022-04-25 ENCOUNTER — TELEPHONE (OUTPATIENT)
Dept: INTERNAL MEDICINE CLINIC | Age: 87
End: 2022-04-25

## 2022-04-25 NOTE — TELEPHONE ENCOUNTER
Mr. Encarnacion Abler son and daughter in law were concerned as well as the patient that his blood sugar's were too high. The am has been running around 156 and the pm about 103. Currently taking Metformin 500 in the am and 500 in the pm as well as Glimperide 1 mg daily. Discussed and reviewed the results with Dr. Dyan Nuñez and will have patient continue the same. Does not feel the patient needs any adjustments at this time.

## 2022-05-02 RX ORDER — BUMETANIDE 2 MG/1
TABLET ORAL
Qty: 90 TABLET | Refills: 3 | Status: SHIPPED | OUTPATIENT
Start: 2022-05-02

## 2022-05-02 NOTE — TELEPHONE ENCOUNTER
RX refill request from the patient/pharmacy. Patient last seen 04- with labs, and next appt. scheduled for 05-  Requested Prescriptions     Pending Prescriptions Disp Refills    bumetanide (BUMEX) 2 mg tablet [Pharmacy Med Name: BUMETANIDE 2 MG TABLET] 90 Tablet 3     Sig: TAKE 1 TABLET BY MOUTH EVERY DAY   .

## 2022-05-14 DIAGNOSIS — I48.91 ATRIAL FIBRILLATION, UNSPECIFIED TYPE (HCC): ICD-10-CM

## 2022-05-16 RX ORDER — WARFARIN SODIUM 5 MG/1
TABLET ORAL
Qty: 145 TABLET | Refills: 3 | Status: SHIPPED | OUTPATIENT
Start: 2022-05-16

## 2022-05-16 NOTE — TELEPHONE ENCOUNTER
RX refill request from the patient/pharmacy. Patient last seen 04- with labs, and next appt. scheduled for 05-  Requested Prescriptions     Pending Prescriptions Disp Refills    warfarin (COUMADIN) 5 mg tablet [Pharmacy Med Name: WARFARIN SODIUM 5 MG TABLET] 145 Tablet 3     Sig: TAKE 2 TABLETS BY MOUTH ON MONDAY &FRIDAYS AND 1&1/2 TABLET DAILY ON ALL OTHER DAYS   .

## 2022-05-17 ENCOUNTER — OFFICE VISIT (OUTPATIENT)
Dept: INTERNAL MEDICINE CLINIC | Age: 87
End: 2022-05-17
Payer: MEDICARE

## 2022-05-17 VITALS
HEIGHT: 75 IN | BODY MASS INDEX: 25.24 KG/M2 | TEMPERATURE: 98.4 F | WEIGHT: 203 LBS | OXYGEN SATURATION: 97 % | SYSTOLIC BLOOD PRESSURE: 138 MMHG | RESPIRATION RATE: 18 BRPM | HEART RATE: 62 BPM | DIASTOLIC BLOOD PRESSURE: 60 MMHG

## 2022-05-17 DIAGNOSIS — I12.9 HYPERTENSION WITH RENAL DISEASE: Primary | ICD-10-CM

## 2022-05-17 DIAGNOSIS — M15.9 PRIMARY OSTEOARTHRITIS INVOLVING MULTIPLE JOINTS: ICD-10-CM

## 2022-05-17 DIAGNOSIS — I48.0 PAROXYSMAL ATRIAL FIBRILLATION (HCC): ICD-10-CM

## 2022-05-17 DIAGNOSIS — M35.3 POLYMYALGIA RHEUMATICA (HCC): ICD-10-CM

## 2022-05-17 DIAGNOSIS — E78.2 MIXED HYPERLIPIDEMIA: ICD-10-CM

## 2022-05-17 DIAGNOSIS — N18.30 CONTROLLED TYPE 2 DIABETES MELLITUS WITH STAGE 3 CHRONIC KIDNEY DISEASE, WITHOUT LONG-TERM CURRENT USE OF INSULIN (HCC): ICD-10-CM

## 2022-05-17 DIAGNOSIS — M17.12 PRIMARY OSTEOARTHRITIS OF LEFT KNEE: ICD-10-CM

## 2022-05-17 DIAGNOSIS — I25.10 ASCVD (ARTERIOSCLEROTIC CARDIOVASCULAR DISEASE): ICD-10-CM

## 2022-05-17 DIAGNOSIS — N18.30 STAGE 3 CHRONIC KIDNEY DISEASE, UNSPECIFIED WHETHER STAGE 3A OR 3B CKD (HCC): ICD-10-CM

## 2022-05-17 DIAGNOSIS — E11.22 CONTROLLED TYPE 2 DIABETES MELLITUS WITH STAGE 3 CHRONIC KIDNEY DISEASE, WITHOUT LONG-TERM CURRENT USE OF INSULIN (HCC): ICD-10-CM

## 2022-05-17 DIAGNOSIS — I50.32 DIASTOLIC CHF, CHRONIC (HCC): ICD-10-CM

## 2022-05-17 DIAGNOSIS — M17.11 PRIMARY OSTEOARTHRITIS OF RIGHT KNEE: ICD-10-CM

## 2022-05-17 PROCEDURE — 3051F HG A1C>EQUAL 7.0%<8.0%: CPT | Performed by: INTERNAL MEDICINE

## 2022-05-17 PROCEDURE — G8419 CALC BMI OUT NRM PARAM NOF/U: HCPCS | Performed by: INTERNAL MEDICINE

## 2022-05-17 PROCEDURE — G8510 SCR DEP NEG, NO PLAN REQD: HCPCS | Performed by: INTERNAL MEDICINE

## 2022-05-17 PROCEDURE — 99214 OFFICE O/P EST MOD 30 MIN: CPT | Performed by: INTERNAL MEDICINE

## 2022-05-17 PROCEDURE — 1101F PT FALLS ASSESS-DOCD LE1/YR: CPT | Performed by: INTERNAL MEDICINE

## 2022-05-17 PROCEDURE — 20610 DRAIN/INJ JOINT/BURSA W/O US: CPT | Performed by: INTERNAL MEDICINE

## 2022-05-17 PROCEDURE — G8536 NO DOC ELDER MAL SCRN: HCPCS | Performed by: INTERNAL MEDICINE

## 2022-05-17 PROCEDURE — G8427 DOCREV CUR MEDS BY ELIG CLIN: HCPCS | Performed by: INTERNAL MEDICINE

## 2022-05-17 RX ORDER — METHYLPREDNISOLONE ACETATE 40 MG/ML
40 INJECTION, SUSPENSION INTRA-ARTICULAR; INTRALESIONAL; INTRAMUSCULAR; SOFT TISSUE ONCE
Qty: 1 ML | Refills: 0
Start: 2022-05-17 | End: 2022-05-17

## 2022-05-17 NOTE — PROGRESS NOTES
Chief Complaint   Patient presents with    Follow-up       SUBJECTIVE:    Cephus Holter. is a 80 y.o. male returns in follow-up for his medical problems include hypertension, diabetes, hyperlipidemia, ASCVD, paroxysmal atrial fibrillation, CHF compensated, CKD stage III, DJD, polymyalgia rheumatica and other multiple medical problems. He notes he has not been walking as much lately because his knees have been bothering him too much. The [place] point he would like to have a cortisone shot in the joint as he has had none before that because that cortisone does help for a while. He denies any chest pain, shortness of breath, palpitations, PND, orthopnea or other cardiac or respiratory complaints. He notes no GI or  complaints. He has no headaches, dizziness or neurologic complaints. On the knees he has no significant arthritic complaints and there are no other complaints on complete review of systems. Current Outpatient Medications   Medication Sig Dispense Refill    methylPREDNISolone acetate (DEPO-MEDROL) 40 mg/mL injection 1 mL by IntraMUSCular route once for 1 dose. 1 mL 0    methylPREDNISolone acetate (DEPO-MEDROL) 40 mg/mL injection 1 mL by IntraMUSCular route once for 1 dose. 1 mL 0    warfarin (COUMADIN) 5 mg tablet TAKE 2 TABLETS BY MOUTH ON MONDAY &FRIDAYS AND 1&1/2 TABLET DAILY ON ALL OTHER DAYS 145 Tablet 3    bumetanide (BUMEX) 2 mg tablet TAKE 1 TABLET BY MOUTH EVERY DAY 90 Tablet 3    predniSONE (DELTASONE) 10 mg tablet TAKE 1TAB BY MOUTH TWO (2) TIMES A DAY.  60 Tablet 5    glimepiride (AMARYL) 1 mg tablet TAKE 1 TABLET BY MOUTH EVERY DAY BEFORE BREAKFAST 90 Tablet 3    glucose blood VI test strips (Accu-Chek Merle Plus test strp) strip USE TO TEST BLOOD SUGAR ONCE DAILY 100 Strip PRN    digoxin (LANOXIN) 0.125 mg tablet TAKE 1 TABLET BY MOUTH EVERY DAY 90 Tablet 3    metFORMIN (GLUCOPHAGE) 500 mg tablet TAKE 1 TABLET BY MOUTH EVERY DAY IN THE MORNING WITH BREAKFAST AND ONE TABLET BEFORE DINNER 180 Tablet 3    simvastatin (ZOCOR) 20 mg tablet TAKE 1 TABLET BY MOUTH EVERY DAY 90 Tablet 3    terazosin (HYTRIN) 2 mg capsule TAKE 1 CAPSULE BY MOUTH EVERY DAY 90 Capsule 3    lisinopriL (PRINIVIL, ZESTRIL) 5 mg tablet Take 1 Tablet by mouth daily. 30 Tablet prn    diclofenac EC (VOLTAREN) 75 mg EC tablet TAKE 1 TABLET BY MOUTH TWICE A  Tab 3    Blood-Glucose Meter monitoring kit Use to test blood sugar twice daily  DX:E11.22 1 Kit 0    glucose blood VI test strips (blood glucose test) strip Use to test blood sugar twice daily  DX:E11.22 100 Strip 1    cyanocobalamin (VITAMIN B12) 500 mcg tablet Take 500 mcg by mouth daily.  acetaminophen (TYLENOL ARTHRITIS PAIN) 650 mg TbER Take 650 mg by mouth every eight (8) hours.  multivitamins-minerals-lutein (MULTIVITAMIN 50 PLUS) tab tablet Take 1 Tab by mouth daily.  glucosamine 1,000 mg tab Take 2,000 mg by mouth daily.  cholecalciferol (VITAMIN D3) 1,000 unit cap Take 1,000 Units by mouth daily.  DOCOSAHEXANOIC ACID/EPA (FISH OIL PO) Take 1,200 mg by mouth two (2) times a day. 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    Back pain 9/20/2017    BPH (benign prostatic hyperplasia) 9/20/2017    CHF (congestive heart failure) ([facility] 9/20/2017    CKD (chronic kidney disease) 9/20/2017    COVID-19 11/2021    Diabetic acetonemia ([facility]     DM (diabetes mellitus) ([facility] 9/20/2017    Story: Diet Controlled    ED (erectile dysfunction) 9/20/2017    Edema 9/20/2017    Elevated CPK 9/20/2017    Comments: History of    Elevated LFTs 9/20/2017    Comments: History of    Elevated PSA 9/20/2017    Hypercholesteremia     Hyperlipidemia 9/20/2017    Hypertension     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    Nodule of right lung 9/20/2017    Story: Right    Polymyalgia ([address] 9/20/2017    Prostate enlargement      Past Surgical History:   Procedure Laterality Date    HX HEENT  06/12/2018    Dr. Elpidio Gagnon, surgery to remove cancerous tissue from Left cheek    HX MALIGNANT SKIN LESION EXCISION  09/2018    2 lesions on head    NV ABDOMEN SURGERY PROC UNLISTED      hernia repair     No Known Allergies    REVIEW OF SYSTEMS:  General: negative for - chills or fever, or weight loss or gain  ENT: negative for - headaches, nasal congestion or tinnitus  Eyes: no blurred or visual changes  Neck: No stiffness or swollen nodes  Respiratory: negative for - cough, hemoptysis, shortness of breath or wheezing  Cardiovascular : negative for - chest pain, edema, palpitations or shortness of breath  Gastrointestinal: negative for - abdominal pain, blood in stools, heartburn or nausea/vomiting  Genito-Urinary: no dysuria, trouble voiding, or hematuria  Musculoskeletal: negative for - gait disturbance, joint pain, joint stiffness or joint swelling except as noted bilateral knee pain  Neurological: no TIA or stroke symptoms  Hematologic: no bruises, no bleeding  Lymphatic: no swollen glands  Integument: no lumps, mole changes, nail changes or rash  Endocrine:no malaise/lethargy poly uria or polydipsia or unexpected weight changes        Social History     Socioeconomic History    Marital status:    Tobacco Use    Smoking status: Never Smoker    Smokeless tobacco: Never Used   Vaping Use    Vaping Use: Never used   Substance and Sexual Activity    Alcohol use: No    Drug use: No    Sexual activity: Never     History reviewed. No pertinent family history.     OBJECTIVE:     Visit Vitals  /60 (BP 1 Location: Right arm, BP Patient Position: Sitting, BP Cuff Size: Adult)   Pulse 62   Temp 98.4 °F (36.9 °C) (Oral)   Resp 18   Ht 6' 3\" (1.905 m)   Wt 203 lb (92.1 kg)   SpO2 97%   BMI 25.37 kg/m²     CONSTITUTIONAL:   well nourished, appears age appropriate  EYES: sclera anicteric, PERRL, EOMI  ENMT:nares clear, moist mucous membranes, pharynx clear  NECK: supple. Thyroid normal, No JVD or bruits  RESPIRATORY: Chest: clear to ascultation and percussion, normal inspiratory effort  CARDIOVASCULAR: Heart: regular rate and rhythm no murmurs, rubs or gallops, PMI not displaced, No thrills, no peripheral edema  GASTROINTESTINAL: Abdomen: non distended, soft, non tender, bowel sounds normal  HEMATOLOGIC: no purpura, petechiae or bruising  LYMPHATIC: No lymph node enlargemant  MUSCULOSKELETAL: Extremities: no active synovitis, pulse 1+. Knees tender to palpation bilateral with increased discomfort on range of motion. No joint effusion, erythema increased temperature in either knee. INTEGUMENT: No unusual rashes or suspicious skin lesions noted. Nails appear normal.  PERIPHERAL VASCULAR: normal pulses femoral, PT and DP  NEUROLOGIC: non-focal exam, A & O X 3  PSYCHIATRIC:, appropriate affect     ASSESSMENT:   1. Hypertension with renal disease    2. 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, without long-term current use of insulin ([address]    3. Mixed hyperlipidemia    4. Primary osteoarthritis involving multiple joints    5. ASCVD (arteriosclerotic cardiovascular disease)    6. Diastolic CHF, chronic (HCC)    7. Paroxysmal atrial fibrillation ([address]    8. Stage 3 chronic kidney disease, unspecified whether stage 3a or 3b CKD ([address]    9. Polymyalgia rheumatica ([address]    10. Primary osteoarthritis of right knee    11. Primary osteoarthritis of left knee      Impression  1. Hypertension that is controlled so we will continue current therapy reviewed with him. 2.  Diabetes mellitus repeat status pending and prior lab reviewed and I will adjust if needed. 3.  Hyperlipidemia prior lab reviewed repeat status pending  4. DJD stable except for his knees  5. ASCVD clinically stable continue aspirin daily  6. Diastolic CHF compensated  7.   Paroxysmal atrial fibrillation on chronic Coumadin INR pending  8. CKD stage III repeat status pending  9. Polymyalgia rheumatica sed rate pending on low-dose prednisone  10. DJD of the right knee. We did discuss treatment options and per his request elected to go with injection. After informed consent and Betadine scrub the right knee is intermediately injected with Depo-Medrol 40 mg a cc lidocaine which he tolerated quite well. 11 DJD of the left knee. We discussed treatment options and per his request we did elect to go with injection that as well. After informed consent and Betadine scrub we intermediately injected with Depo-Medrol 40 mg and a cc lidocaine which he tolerated quite well. I will call with lab results and make further recommendations or adjustments if necessary. Follow-up in 3 months for his general medical problems. Follow-up in 1 month for his heart failure, polymyalgia rheumatica and acute problems. I will see him sooner if there are problems. PLAN:  .  Orders Placed This Encounter    DRAIN/INJECT LARGE JOINT/BURSA    DRAIN/INJECT LARGE JOINT/BURSA    METABOLIC PANEL, COMPREHENSIVE    LIPID PANEL    CK    HEMOGLOBIN A1C WITH EAG    SED RATE (ESR)    PROTHROMBIN TIME + INR    methylPREDNISolone acetate (DEPO-MEDROL) 40 mg/mL injection    methylPREDNISolone acetate (DEPO-MEDROL) 40 mg/mL injection         ATTENTION:   This medical record was transcribed using an electronic medical records system. Although proofread, it may and can contain electronic and spelling errors. Other human spelling and other errors may be present. Corrections may be executed at a later time. Please feel free to contact us for any clarifications as needed. Follow-up and Dispositions    · Return in about 3 months (around 8/17/2022). No results found for any visits on 05/17/22. Rose Marie Silva MD    The patient verbalized understanding of the problems and plans as explained.

## 2022-05-17 NOTE — PATIENT INSTRUCTIONS
Arthritis: Care Instructions  Overview     Arthritis, also called osteoarthritis, is a breakdown of the cartilage that cushions your joints. When the cartilage wears down, your bones rub against each other. This causes pain and stiffness. Many people have some arthritis as they age. Arthritis most often affects the joints of the spine, hands, hips, knees, or feet. Arthritis never goes away completely. But medicine and home treatment can help reduce pain and help you stay active. Follow-up care is a key part of your treatment and safety. Be sure to make and go to all appointments, and call your doctor if you are having problems. It's also a good idea to know your test results and keep a list of the medicines you take. How can you care for yourself at home? · Stay at a healthy weight. Being overweight puts extra strain on your joints. · Talk to your doctor or physical therapist about exercises that will help ease joint pain. ? Stretch. You may enjoy gentle forms of yoga to help keep your joints and muscles flexible. ? Walk instead of jog. Other types of exercise that are less stressful on the joints include riding a bike, swimming, ashley chi, or water exercise. ? Lift weights. Strong muscles help reduce stress on your joints. Stronger thigh muscles, for example, take some of the stress off of the knees and hips. Learn the right way to lift weights so you don't make joint pain worse. · Take your medicines exactly as prescribed. Call your doctor if you think you are having a problem with your medicine. · Take pain medicines exactly as directed. ? If the doctor gave you a prescription medicine for pain, take it as prescribed. ? If you are not taking a prescription pain medicine, ask your doctor if you can take an over-the-counter medicine. · Use a cane, crutch, walker, or another device if you need help to get around. These can help rest your joints.  You also can use other things to make life easier, such as a higher toilet seat and padded handles on kitchen utensils. · Do not sit in low chairs. They can make it hard to get up. · Put heat or cold on your sore joints as needed. Use whichever helps you most. You can also switch between hot and cold packs. ? Apply heat 2 or 3 times a day for 20 to 30 minutesusing a heating pad, hot shower, or hot packto relieve pain and stiffness. But don't use heat on a swollen joint. ? Put ice or a cold pack on your sore joint for 10 to 20 minutes at a time. Put a thin cloth between the ice and your skin. When should you call for help? Call your doctor now or seek immediate medical care if:    · You have sudden swelling, warmth, or pain in any joint.     · You have joint pain and a fever or rash.     · You have such bad pain that you cannot use a joint. Watch closely for changes in your health, and be sure to contact your doctor if:    · You have mild joint symptoms that continue even with more than 6 weeks of care at home.     · You have stomach pain or other problems with your medicine. Where can you learn more? Go to http://www.gray.com/  Enter Q710 in the search box to learn more about \"Arthritis: Care Instructions. \"  Current as of: December 20, 2021               Content Version: 13.2  © 3250-3946 [employer]. Care instructions adapted under license by [employer] (which disclaims liability or warranty for this information). If you have questions about a medical condition or this instruction, always ask your healthcare professional. [place] [address] any warranty or liability for your use of this information.

## 2022-05-18 LAB
ALBUMIN SERPL-MCNC: 3.4 G/DL (ref 3.5–5)
ALBUMIN/GLOB SERPL: 1.1 {RATIO} (ref 1.1–2.2)
ALP SERPL-CCNC: 46 U/L (ref 45–117)
ALT SERPL-CCNC: 29 U/L (ref 12–78)
ANION GAP SERPL CALC-SCNC: 4 MMOL/L (ref 5–15)
AST SERPL-CCNC: 20 U/L (ref 15–37)
BILIRUB SERPL-MCNC: 0.4 MG/DL (ref 0.2–1)
BUN SERPL-MCNC: 29 MG/DL (ref 6–20)
BUN/CREAT SERPL: 21 (ref 12–20)
CALCIUM SERPL-MCNC: 9.2 MG/DL (ref 8.5–10.1)
CHLORIDE SERPL-SCNC: 107 MMOL/L (ref 97–108)
CHOLEST SERPL-MCNC: 229 MG/DL
CK SERPL-CCNC: 69 U/L (ref 39–308)
CO2 SERPL-SCNC: 27 MMOL/L (ref 21–32)
CREAT SERPL-MCNC: 1.4 MG/DL (ref 0.7–1.3)
ERYTHROCYTE [SEDIMENTATION RATE] IN BLOOD: 33 MM/HR (ref 0–20)
EST. AVERAGE GLUCOSE BLD GHB EST-MCNC: 160 MG/DL
GLOBULIN SER CALC-MCNC: 3 G/DL (ref 2–4)
GLUCOSE SERPL-MCNC: 130 MG/DL (ref 65–100)
HBA1C MFR BLD: 7.2 % (ref 4–5.6)
HDLC SERPL-MCNC: 37 MG/DL
HDLC SERPL: 6.2 {RATIO} (ref 0–5)
INR PPP: 2.8 (ref 0.9–1.1)
LDLC SERPL CALC-MCNC: ABNORMAL MG/DL (ref 0–100)
LDLC SERPL DIRECT ASSAY-MCNC: 101 MG/DL (ref 0–100)
POTASSIUM SERPL-SCNC: 4.4 MMOL/L (ref 3.5–5.1)
PROT SERPL-MCNC: 6.4 G/DL (ref 6.4–8.2)
PROTHROMBIN TIME: 27.2 SEC (ref 9–11.1)
SODIUM SERPL-SCNC: 138 MMOL/L (ref 136–145)
TRIGL SERPL-MCNC: 439 MG/DL (ref ?–150)
VLDLC SERPL CALC-MCNC: ABNORMAL MG/DL

## 2022-05-20 ENCOUNTER — TELEPHONE ANTICOAG (OUTPATIENT)
Dept: INTERNAL MEDICINE CLINIC | Age: 87
End: 2022-05-20

## 2022-05-20 DIAGNOSIS — I48.0 PAROXYSMAL ATRIAL FIBRILLATION (HCC): Primary | ICD-10-CM

## 2022-05-20 NOTE — PROGRESS NOTES
Labs stable no changes needed. Discussed lab with patient. Advised to continue the same dose of blood thinner which is 7.5 mg on Monday; 10 mg all other days and f/up as scheduled.

## 2022-05-20 NOTE — PROGRESS NOTES
Anticoagulation Summary  As of 2022    INR goal:  2.0-3.0   TTR:  39.2 % (4.1 y)   INR used for dosin.8 (2022)   Warfarin maintenance plan:  7.5 mg (5 mg x 1.5) every Mon; 10 mg (5 mg x 2) all other days   Weekly warfarin total:  67.5 mg   Plan last modified:  Latoya Warren RN (2021)   Next INR check:     Target end date:      Indications    Paroxysmal atrial fibrillation ([facility] [address] (Primary) [I48.0]             Anticoagulation Episode Summary     INR check location:  Clinic Lab    Preferred lab:      Send INR reminders to:      Comments:        Anticoagulation Care Providers     Provider Role Specialty Phone number    Neetu Kaur MD Responsible Internal Medicine Physician [phone]        Informed patient that PT/INR okay; continue the same dose of blood thinner which is 7.5 mg on Monday; 10 mg all other days and f/up as scheduled.

## 2022-05-26 RX ORDER — DICLOFENAC SODIUM 75 MG/1
TABLET, DELAYED RELEASE ORAL
Qty: 180 TABLET | Refills: 3 | Status: SHIPPED | OUTPATIENT
Start: 2022-05-26

## 2022-06-04 DIAGNOSIS — I48.91 ATRIAL FIBRILLATION, UNSPECIFIED TYPE (HCC): ICD-10-CM

## 2022-06-06 RX ORDER — DIGOXIN 125 MCG
TABLET ORAL
Qty: 90 TABLET | Refills: 3 | Status: SHIPPED | OUTPATIENT
Start: 2022-06-06

## 2022-06-06 NOTE — TELEPHONE ENCOUNTER
RX refill request from the patient/pharmacy. Patient last seen 05- with labs, and next appt. scheduled for 06-  Requested Prescriptions     Pending Prescriptions Disp Refills    digoxin (LANOXIN) 0.125 mg tablet [Pharmacy Med Name: DIGOXIN 125 MCG TABLET] 90 Tablet 3     Sig: TAKE 1 [place]   .

## 2022-06-21 ENCOUNTER — OFFICE VISIT (OUTPATIENT)
Dept: INTERNAL MEDICINE CLINIC | Age: 87
End: 2022-06-21
Payer: MEDICARE

## 2022-06-21 VITALS
HEART RATE: 63 BPM | DIASTOLIC BLOOD PRESSURE: 72 MMHG | OXYGEN SATURATION: 96 % | TEMPERATURE: 98.4 F | SYSTOLIC BLOOD PRESSURE: 134 MMHG | WEIGHT: 205.9 LBS | BODY MASS INDEX: 25.6 KG/M2 | RESPIRATION RATE: 17 BRPM | HEIGHT: 75 IN

## 2022-06-21 DIAGNOSIS — I50.32 DIASTOLIC CHF, CHRONIC (HCC): Primary | ICD-10-CM

## 2022-06-21 DIAGNOSIS — M35.3 POLYMYALGIA RHEUMATICA (HCC): ICD-10-CM

## 2022-06-21 DIAGNOSIS — I48.0 PAROXYSMAL ATRIAL FIBRILLATION (HCC): ICD-10-CM

## 2022-06-21 DIAGNOSIS — N18.30 STAGE 3 CHRONIC KIDNEY DISEASE, UNSPECIFIED WHETHER STAGE 3A OR 3B CKD (HCC): ICD-10-CM

## 2022-06-21 PROCEDURE — G8417 CALC BMI ABV UP PARAM F/U: HCPCS | Performed by: INTERNAL MEDICINE

## 2022-06-21 PROCEDURE — G8427 DOCREV CUR MEDS BY ELIG CLIN: HCPCS | Performed by: INTERNAL MEDICINE

## 2022-06-21 PROCEDURE — 1123F ACP DISCUSS/DSCN MKR DOCD: CPT | Performed by: INTERNAL MEDICINE

## 2022-06-21 PROCEDURE — G8510 SCR DEP NEG, NO PLAN REQD: HCPCS | Performed by: INTERNAL MEDICINE

## 2022-06-21 PROCEDURE — 1101F PT FALLS ASSESS-DOCD LE1/YR: CPT | Performed by: INTERNAL MEDICINE

## 2022-06-21 PROCEDURE — G8536 NO DOC ELDER MAL SCRN: HCPCS | Performed by: INTERNAL MEDICINE

## 2022-06-21 PROCEDURE — 99213 OFFICE O/P EST LOW 20 MIN: CPT | Performed by: INTERNAL MEDICINE

## 2022-06-21 NOTE — PROGRESS NOTES
Chief Complaint   Patient presents with    Hypertension     1 month follow up    CHF    Diabetes       SUBJECTIVE:    Herman Bridges is a 80 y.o. male returns in follow-up for his medical problems include hypertension, chronic kidney disease stage III, atrial fibrillation, diabetes, polymyalgia rheumatica and other medical problems. He is taking his medications trying to follow his diet and remains physically active. He currently denies any chest pain, shortness of breath, palpitations, PND, orthopnea or other cardiac or respiratory complaints. He notes no current GI or  complaints. He has no headaches, dizziness or neurologic complaints. He has no change of his chronic arthritic complaints and there are no other complaints on complete review of systems. He is actively working in the garden and doing well now      Current Outpatient Medications   Medication Sig Dispense Refill    digoxin (LANOXIN) 0.125 mg tablet TAKE 1 TABLET BY MOUTH EVERY DAY 90 Tablet 3    diclofenac EC (VOLTAREN) 75 mg EC tablet TAKE 1 TABLET BY MOUTH TWICE A  Tablet 3    warfarin (COUMADIN) 5 mg tablet TAKE 2 TABLETS BY MOUTH ON MONDAY &FRIDAYS AND 1&1/2 TABLET DAILY ON ALL OTHER DAYS 145 Tablet 3    bumetanide (BUMEX) 2 mg tablet TAKE 1 TABLET BY MOUTH EVERY DAY 90 Tablet 3    predniSONE (DELTASONE) 10 mg tablet TAKE 1TAB BY MOUTH TWO (2) TIMES A DAY.  60 Tablet 5    glimepiride (AMARYL) 1 mg tablet TAKE 1 TABLET BY MOUTH EVERY DAY BEFORE BREAKFAST 90 Tablet 3    glucose blood VI test strips (Accu-Chek Merle Plus test strp) strip USE TO TEST BLOOD SUGAR ONCE DAILY 100 Strip PRN    metFORMIN (GLUCOPHAGE) 500 mg tablet TAKE 1 TABLET BY MOUTH EVERY DAY IN THE MORNING WITH BREAKFAST AND ONE TABLET BEFORE DINNER 180 Tablet 3    simvastatin (ZOCOR) 20 mg tablet TAKE 1 TABLET BY MOUTH EVERY DAY 90 Tablet 3    terazosin (HYTRIN) 2 mg capsule TAKE 1 CAPSULE BY MOUTH EVERY DAY 90 Capsule 3    lisinopriL (PRINIVIL, ZESTRIL) 5 mg tablet Take 1 Tablet by mouth daily. 30 Tablet prn    Blood-Glucose Meter monitoring kit Use to test blood sugar twice daily  DX:E11.22 1 Kit 0    glucose blood VI test strips (blood glucose test) strip Use to test blood sugar twice daily  DX:E11.22 100 Strip 1    cyanocobalamin (VITAMIN B12) 500 mcg tablet Take 500 mcg by mouth daily.  acetaminophen (TYLENOL ARTHRITIS PAIN) 650 mg TbER Take 650 mg by mouth every eight (8) hours.  multivitamins-minerals-lutein (MULTIVITAMIN 50 PLUS) tab tablet Take 1 Tab by mouth daily.  glucosamine 1,000 mg tab Take 2,000 mg by mouth daily.  cholecalciferol (VITAMIN D3) 1,000 unit cap Take 1,000 Units by mouth daily.  DOCOSAHEXANOIC ACID/EPA (FISH OIL PO) Take 1,200 mg by mouth two (2) times a day. 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    Back pain 9/20/2017    BPH (benign prostatic hyperplasia) 9/20/2017    CHF (congestive heart failure) ([address] 9/20/2017    CKD (chronic kidney disease) 9/20/2017    COVID-19 11/2021    Diabetic acetonemia ([address]     DM (diabetes mellitus) ([address] 9/20/2017    Story: Diet Controlled    ED (erectile dysfunction) 9/20/2017    Edema 9/20/2017    Elevated CPK 9/20/2017    Comments: History of    Elevated LFTs 9/20/2017    Comments: History of    Elevated PSA 9/20/2017    Hypercholesteremia     Hyperlipidemia 9/20/2017    Hypertension     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    Nodule of right lung 9/20/2017    Story: Right    Polymyalgia ([address] 9/20/2017    Prostate enlargement      Past Surgical History:   Procedure Laterality Date    HX HEENT  06/12/2018    Dr. Orestes Falk, surgery to remove cancerous tissue from Left cheek    HX MALIGNANT SKIN LESION EXCISION  09/2018    2 lesions on head    OK ABDOMEN SURGERY PROC UNLISTED      hernia repair     No Known Allergies    REVIEW OF SYSTEMS:  General: negative for - chills or fever, or weight loss or gain  ENT: negative for - headaches, nasal congestion or tinnitus  Eyes: no blurred or visual changes  Neck: No stiffness or swollen nodes  Respiratory: negative for - cough, hemoptysis, shortness of breath or wheezing  Cardiovascular : negative for - chest pain, edema, palpitations or shortness of breath  Gastrointestinal: negative for - abdominal pain, blood in stools, heartburn or nausea/vomiting  Genito-Urinary: no dysuria, trouble voiding, or hematuria  Musculoskeletal: negative for - gait disturbance, joint pain, joint stiffness or joint swelling  Neurological: no TIA or stroke symptoms  Hematologic: no bruises, no bleeding  Lymphatic: no swollen glands  Integument: no lumps, mole changes, nail changes or rash  Endocrine:no malaise/lethargy poly uria or polydipsia or unexpected weight changes        Social History     Socioeconomic History    Marital status:    Tobacco Use    Smoking status: Never Smoker    Smokeless tobacco: Never Used   Vaping Use    Vaping Use: Never used   Substance and Sexual Activity    Alcohol use: No    Drug use: No    Sexual activity: Never     History reviewed. No pertinent family history. OBJECTIVE:     Visit Vitals  /72   Pulse 63   Temp 98.4 °F (36.9 °C) (Oral)   Resp 17   Ht 6' 3\" (1.905 m)   Wt 205 lb 14.4 oz (93.4 kg)   SpO2 96%   BMI 25.74 kg/m²     CONSTITUTIONAL:   well nourished, appears age appropriate  EYES: sclera anicteric, PERRL, EOMI  ENMT:nares clear, moist mucous membranes, pharynx clear  NECK: supple.  Thyroid normal, No JVD or bruits  RESPIRATORY: Chest: clear to ascultation and percussion, normal inspiratory effort  CARDIOVASCULAR: Heart: regular rate and rhythm no murmurs, rubs or gallops, PMI not displaced, No thrills, no peripheral edema  GASTROINTESTINAL: Abdomen: non distended, soft, non tender, bowel sounds normal  HEMATOLOGIC: no purpura, petechiae or bruising  LYMPHATIC: No lymph node enlargemant  MUSCULOSKELETAL: Extremities: no active synovitis, pulse 1+   INTEGUMENT: No unusual rashes or suspicious skin lesions noted. Nails appear normal.  PERIPHERAL VASCULAR: normal pulses femoral, PT and DP  NEUROLOGIC: non-focal exam, A & O X 3  PSYCHIATRIC:, appropriate affect     ASSESSMENT:   1. Diastolic CHF, chronic (HCC)    2. Stage 3 chronic kidney disease, unspecified whether stage 3a or 3b CKD (HCC)    3. Paroxysmal atrial fibrillation ([facility] [address]    4. Polymyalgia rheumatica (HCC)      Impression  1. Diastolic CHF compensated  2. CKD stage III repeat status is pending  3. Paroxysmal atrial fibrillation is in sinus rhythm INR pending  For polymyalgia rheumatica stable with sed rate pending  I will call with lab results and make further recommendations or adjustments if necessary. Follow-up in 1 month or sooner if there is a problem    PLAN:  .  Orders Placed This Encounter    METABOLIC PANEL, BASIC    PROTHROMBIN TIME + INR    SED RATE (ESR)         ATTENTION:   This medical record was transcribed using an electronic medical records system. Although proofread, it may and can contain electronic and spelling errors. Other human spelling and other errors may be present. Corrections may be executed at a later time. Please feel free to contact us for any clarifications as needed. Follow-up and Dispositions    · Return in about 4 weeks (around 7/19/2022). No results found for any visits on 06/21/22. Devon Galvan MD    The patient verbalized understanding of the problems and plans as explained.

## 2022-06-21 NOTE — PATIENT INSTRUCTIONS

## 2022-06-21 NOTE — PROGRESS NOTES
Chief Complaint   Patient presents with    Hypertension     1 month follow up    CHF    Diabetes     Visit Vitals  BP (!) 144/72 (BP 1 Location: Left upper arm, BP Patient Position: Sitting, BP Cuff Size: Adult)   Pulse 63   Temp 98.4 °F (36.9 °C) (Oral)   Resp 17   Ht 6' 3\" (1.905 m)   Wt 205 lb 14.4 oz (93.4 kg)   SpO2 96%   BMI 25.74 kg/m²     1. Have you been to the ER, urgent care clinic since your last visit? Hospitalized since your last visit? No    2. Have you seen or consulted any other health care providers outside of the [address] [place] [address] since your last visit? Include any pap smears or colon screening.  No

## 2022-06-22 LAB
ANION GAP SERPL CALC-SCNC: 8 MMOL/L (ref 5–15)
BUN SERPL-MCNC: 40 MG/DL (ref 6–20)
BUN/CREAT SERPL: 24 (ref 12–20)
CALCIUM SERPL-MCNC: 8.9 MG/DL (ref 8.5–10.1)
CHLORIDE SERPL-SCNC: 109 MMOL/L (ref 97–108)
CO2 SERPL-SCNC: 24 MMOL/L (ref 21–32)
CREAT SERPL-MCNC: 1.67 MG/DL (ref 0.7–1.3)
ERYTHROCYTE [SEDIMENTATION RATE] IN BLOOD: 35 MM/HR (ref 0–20)
GLUCOSE SERPL-MCNC: 246 MG/DL (ref 65–100)
INR PPP: 3.3 (ref 0.9–1.1)
POTASSIUM SERPL-SCNC: 5.3 MMOL/L (ref 3.5–5.1)
PROTHROMBIN TIME: 32.3 SEC (ref 9–11.1)
SODIUM SERPL-SCNC: 141 MMOL/L (ref 136–145)

## 2022-06-24 NOTE — PROGRESS NOTES
INR is a little high so hold for 2 days and then resume usual dose. Kidneys without significant change.   Sed rate also without significant change

## 2022-06-28 ENCOUNTER — TELEPHONE ANTICOAG (OUTPATIENT)
Dept: INTERNAL MEDICINE CLINIC | Age: 87
End: 2022-06-28

## 2022-06-28 NOTE — PROGRESS NOTES
Anticoagulation Summary  As of 6/28/2022    INR goal:  2.0-3.0   TTR:  39.3 % (4.2 y)   INR used for dosing:  3.3 (6/21/2022)   Warfarin maintenance plan:  7.5 mg (5 mg x 1.5) every Mon; 10 mg (5 mg x 2) all other days   Weekly warfarin total:  67.5 mg   Plan last modified:  Jeff Gonzalez RN (6/14/2021)   Next INR check:  7/19/2022   Target end date:      Indications    Paroxysmal atrial fibrillation ([facility] [address] (Primary) [I48.0]             Anticoagulation Episode Summary     INR check location:  Clinic Lab    Preferred lab:      Send INR reminders to:      Comments:        Anticoagulation Care Providers     Provider Role Specialty Phone number    Annette Holliday MD Responsible Internal Medicine Physician [phone]        Informed patient that his INR was 3.3. Dr. Leta Smith recommends holding his blood thinner dose x 2 days then resume at 7.5 mg on Monday; 10 mg all other days. F/up as scheduled.

## 2022-06-28 NOTE — PROGRESS NOTES
Advised patient that PT/INR was 3.3. Need to hold his blood thinner dose x 2 days then resume his previous dose at 7.5 mg on Monday; 10 mg all other days. F/up as scheduled.

## 2022-07-18 NOTE — PROGRESS NOTES
Chief Complaint   Patient presents with    CHF     1 month follow up    Irregular Heart Beat    Diabetes       SUBJECTIVE:    Devyn Hoyos is a 80 y.o. male who returns in follow-up for his medical problems include congestive heart failure, paroxysmal atrial fibrillation, CKD, polymyalgia rheumatica and other medical problems. He said he woke up this morning had so much pain in his left knee he could hardly walk on it which the pain seemed to be predominately behind the knee although it is much better now with the pain essentially resolved. He denies any current significant arthritic complaints. He denies any chest pain, shortness of breath or cardiorespiratory complaints. There are no GI or  complaints. He notes no other complaints on complete review of systems. Current Outpatient Medications   Medication Sig Dispense Refill    digoxin (LANOXIN) 0.125 mg tablet TAKE 1 TABLET BY MOUTH EVERY DAY 90 Tablet 3    diclofenac EC (VOLTAREN) 75 mg EC tablet TAKE 1 TABLET BY MOUTH TWICE A  Tablet 3    warfarin (COUMADIN) 5 mg tablet TAKE 2 TABLETS BY MOUTH ON MONDAY &FRIDAYS AND 1&1/2 TABLET DAILY ON ALL OTHER DAYS 145 Tablet 3    bumetanide (BUMEX) 2 mg tablet TAKE 1 TABLET BY MOUTH EVERY DAY 90 Tablet 3    predniSONE (DELTASONE) 10 mg tablet TAKE 1TAB BY MOUTH TWO (2) TIMES A DAY.  60 Tablet 5    glimepiride (AMARYL) 1 mg tablet TAKE 1 TABLET BY MOUTH EVERY DAY BEFORE BREAKFAST 90 Tablet 3    glucose blood VI test strips (Accu-Chek Merle Plus test strp) strip USE TO TEST BLOOD SUGAR ONCE DAILY 100 Strip PRN    metFORMIN (GLUCOPHAGE) 500 mg tablet TAKE 1 TABLET BY MOUTH EVERY DAY IN THE MORNING WITH BREAKFAST AND ONE TABLET BEFORE DINNER 180 Tablet 3    simvastatin (ZOCOR) 20 mg tablet TAKE 1 TABLET BY MOUTH EVERY DAY 90 Tablet 3    terazosin (HYTRIN) 2 mg capsule TAKE 1 CAPSULE BY MOUTH EVERY DAY 90 Capsule 3    lisinopriL (PRINIVIL, ZESTRIL) 5 mg tablet Take 1 Tablet by mouth daily. 30 Tablet prn    Blood-Glucose Meter monitoring kit Use to test blood sugar twice daily  DX:E11.22 1 Kit 0    glucose blood VI test strips (blood glucose test) strip Use to test blood sugar twice daily  DX:E11.22 100 Strip 1    cyanocobalamin (VITAMIN B12) 500 mcg tablet Take 500 mcg by mouth daily.  acetaminophen (TYLENOL ARTHRITIS PAIN) 650 mg TbER Take 650 mg by mouth every eight (8) hours.  multivitamins-minerals-lutein (MULTIVITAMIN 50 PLUS) tab tablet Take 1 Tab by mouth daily.  glucosamine 1,000 mg tab Take 2,000 mg by mouth daily.  cholecalciferol (VITAMIN D3) 1,000 unit cap Take 1,000 Units by mouth daily.  DOCOSAHEXANOIC ACID/EPA (FISH OIL PO) Take 1,200 mg by mouth two (2) times a day. 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    Back pain 9/20/2017    BPH (benign prostatic hyperplasia) 9/20/2017    CHF (congestive heart failure) ([address] 9/20/2017    CKD (chronic kidney disease) 9/20/2017    COVID-19 11/2021    Diabetic acetonemia ([address]     DM (diabetes mellitus) ([address] 9/20/2017    Story: Diet Controlled    ED (erectile dysfunction) 9/20/2017    Edema 9/20/2017    Elevated CPK 9/20/2017    Comments: History of    Elevated LFTs 9/20/2017    Comments: History of    Elevated PSA 9/20/2017    Hypercholesteremia     Hyperlipidemia 9/20/2017    Hypertension     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    Nodule of right lung 9/20/2017    Story: Right    Polymyalgia ([address] 9/20/2017    Prostate enlargement      Past Surgical History:   Procedure Laterality Date    HX HEENT  06/12/2018    Dr. Savannah Cervantes, surgery to remove cancerous tissue from Left cheek    HX MALIGNANT SKIN LESION EXCISION  09/2018    2 lesions on head    MA ABDOMEN SURGERY PROC UNLISTED      hernia repair     No Known Allergies    REVIEW OF SYSTEMS:  General: negative for - chills or fever, or weight loss or gain  ENT: negative for - headaches, nasal congestion or tinnitus  Eyes: no blurred or visual changes  Neck: No stiffness or swollen nodes  Respiratory: negative for - cough, hemoptysis, shortness of breath or wheezing  Cardiovascular : negative for - chest pain, edema, palpitations or shortness of breath  Gastrointestinal: negative for - abdominal pain, blood in stools, heartburn or nausea/vomiting  Genito-Urinary: no dysuria, trouble voiding, or hematuria  Musculoskeletal: negative for - gait disturbance, joint pain, joint stiffness or joint swelling except left knee pain bad this morning but much better now  Neurological: no TIA or stroke symptoms  Hematologic: no bruises, no bleeding  Lymphatic: no swollen glands  Integument: no lumps, mole changes, nail changes or rash  Endocrine:no malaise/lethargy poly uria or polydipsia or unexpected weight changes        Social History     Socioeconomic History    Marital status:    Tobacco Use    Smoking status: Never Smoker    Smokeless tobacco: Never Used   Vaping Use    Vaping Use: Never used   Substance and Sexual Activity    Alcohol use: No    Drug use: No    Sexual activity: Never     History reviewed. No pertinent family history. OBJECTIVE:     Visit Vitals  /68 (BP 1 Location: Left upper arm, BP Patient Position: Sitting, BP Cuff Size: Adult)   Pulse 79   Temp 98.3 °F (36.8 °C) (Oral)   Resp 17   Ht 6' 3\" (1.905 m)   Wt 202 lb 14.4 oz (92 kg)   SpO2 96%   BMI 25.36 kg/m²     CONSTITUTIONAL:   well nourished, appears age appropriate  EYES: sclera anicteric, PERRL, EOMI  ENMT:nares clear, moist mucous membranes, pharynx clear  NECK: supple.  Thyroid normal, No JVD or bruits  RESPIRATORY: Chest: clear to ascultation and percussion, normal inspiratory effort  CARDIOVASCULAR: Heart: regular rate and rhythm no murmurs, rubs or gallops, PMI not displaced, No thrills, no peripheral edema  GASTROINTESTINAL: Abdomen: non distended, soft, non tender, bowel sounds normal  HEMATOLOGIC: no purpura, petechiae or bruising  LYMPHATIC: No lymph node enlargemant  MUSCULOSKELETAL: Extremities: no active synovitis, pulse 1+. Particular reference to the left knee no joint effusion, erythema increased temperature and no soft tissue swelling or tenderness exam with normal range of motion. INTEGUMENT: No unusual rashes or suspicious skin lesions noted. Nails appear normal.  PERIPHERAL VASCULAR: normal pulses femoral, PT and DP  NEUROLOGIC: non-focal exam, A & O X 3  PSYCHIATRIC:, appropriate affect     ASSESSMENT:   1. Diastolic CHF, chronic (HCC)    2. Stage 3 chronic kidney disease, unspecified whether stage 3a or 3b CKD ([facility] [address]    3. Polymyalgia rheumatica (HCC)    4. Paroxysmal atrial fibrillation (HCC)      Impression  1. Diastolic CHF that is compensated  2. CKD stage III repeat status pending  3. Polymyalgia rheumatica sed rate pending  4. Atrial fibrillation seems to be in sinus rhythm today INR pending  5 left knee pain recent x-ray done in April reviewed today showing some arthritis and I think that is probably what was going on this morning. He feels better now so we will not pursue it further at the present time. Follow-up with me again in 1 month or sooner should the be a problem. I will call the lab results in interim. PLAN:  .  Orders Placed This Encounter    METABOLIC PANEL, BASIC    PROTHROMBIN TIME + INR    SED RATE (ESR)         ATTENTION:   This medical record was transcribed using an electronic medical records system. Although proofread, it may and can contain electronic and spelling errors. Other human spelling and other errors may be present. Corrections may be executed at a later time. Please feel free to contact us for any clarifications as needed. Follow-up and Dispositions    · Return in about 4 weeks (around 8/16/2022).          No results found for any visits on 07/19/22. Marley Ng MD    The patient verbalized understanding of the problems and plans as explained.

## 2022-07-19 ENCOUNTER — OFFICE VISIT (OUTPATIENT)
Dept: INTERNAL MEDICINE CLINIC | Age: 87
End: 2022-07-19
Payer: MEDICARE

## 2022-07-19 VITALS
HEART RATE: 79 BPM | RESPIRATION RATE: 17 BRPM | SYSTOLIC BLOOD PRESSURE: 112 MMHG | OXYGEN SATURATION: 96 % | TEMPERATURE: 98.3 F | DIASTOLIC BLOOD PRESSURE: 68 MMHG | BODY MASS INDEX: 25.23 KG/M2 | HEIGHT: 75 IN | WEIGHT: 202.9 LBS

## 2022-07-19 DIAGNOSIS — I50.32 DIASTOLIC CHF, CHRONIC (HCC): Primary | ICD-10-CM

## 2022-07-19 DIAGNOSIS — N18.30 STAGE 3 CHRONIC KIDNEY DISEASE, UNSPECIFIED WHETHER STAGE 3A OR 3B CKD (HCC): ICD-10-CM

## 2022-07-19 DIAGNOSIS — M35.3 POLYMYALGIA RHEUMATICA (HCC): ICD-10-CM

## 2022-07-19 DIAGNOSIS — I48.0 PAROXYSMAL ATRIAL FIBRILLATION (HCC): ICD-10-CM

## 2022-07-19 PROCEDURE — 99213 OFFICE O/P EST LOW 20 MIN: CPT | Performed by: INTERNAL MEDICINE

## 2022-07-19 PROCEDURE — G8417 CALC BMI ABV UP PARAM F/U: HCPCS | Performed by: INTERNAL MEDICINE

## 2022-07-19 PROCEDURE — G8536 NO DOC ELDER MAL SCRN: HCPCS | Performed by: INTERNAL MEDICINE

## 2022-07-19 PROCEDURE — G8432 DEP SCR NOT DOC, RNG: HCPCS | Performed by: INTERNAL MEDICINE

## 2022-07-19 PROCEDURE — 1101F PT FALLS ASSESS-DOCD LE1/YR: CPT | Performed by: INTERNAL MEDICINE

## 2022-07-19 PROCEDURE — 1123F ACP DISCUSS/DSCN MKR DOCD: CPT | Performed by: INTERNAL MEDICINE

## 2022-07-19 PROCEDURE — G8427 DOCREV CUR MEDS BY ELIG CLIN: HCPCS | Performed by: INTERNAL MEDICINE

## 2022-07-19 NOTE — PROGRESS NOTES
Chief Complaint   Patient presents with    CHF     1 month follow up    Irregular Heart Beat    Diabetes     Visit Vitals  /68 (BP 1 Location: Left upper arm, BP Patient Position: Sitting, BP Cuff Size: Adult)   Pulse 79   Temp 98.3 °F (36.8 °C) (Oral)   Resp 17   Ht 6' 3\" (1.905 m)   Wt 202 lb 14.4 oz (92 kg)   SpO2 96%   BMI 25.36 kg/m²     1. Have you been to the ER, urgent care clinic since your last visit? Hospitalized since your last visit? No    2. Have you seen or consulted any other health care providers outside of the [address] [place] [address] since your last visit? Include any pap smears or colon screening.  Saw surgeon to have skin cancer removed from face

## 2022-07-19 NOTE — PATIENT INSTRUCTIONS
Learning About ARBs  Introduction     ARBs (angiotensin II receptor blockers) block a hormone that makes blood vessels narrow. As a result, the blood vessels relax and widen. This lowers blood pressure. ARBs also put more water and salt into the urine. This also lowers blood pressure. ARBs can treat:  · High blood pressure. · Coronary artery disease. · Heart failure. They also may be used to help your kidneys when you have diabetes. Examples  · candesartan (Atacand)  · irbesartan (Avapro)  · losartan (Cozaar)  · olmesartan (Benicar)  · valsartan (Diovan)  This is not a complete list of all ARBs. Possible side effects  Side effects may include:  · Low blood pressure. You may feel dizzy and weak. · High potassium levels. You may have other side effects or reactions not listed here. Check the information that comes with your medicine. What to know about taking this medicine  · ARBs may be used if you had a cough when you tried to take an ACE inhibitor. ARBs are less likely to cause a cough. · You may need regular blood tests. · Take your medicines exactly as prescribed. Call your doctor if you think you are having a problem with your medicine. · Tell your doctor or pharmacist all the medicines you take. This includes over-the-counter medicines, vitamins, herbal products, and supplements. Taking some medicines together can cause problems. · You should not take ARBs if you are pregnant or planning to become pregnant. Where can you learn more? Go to http://www.gray.com/  Enter K212 in the search box to learn more about \"Learning About ARBs. \"  Current as of: January 10, 2022               Content Version: 13.2  © 0784-3182 [employer]. Care instructions adapted under license by [employer] (which disclaims liability or warranty for this information).  If you have questions about a medical condition or this instruction, always ask your healthcare professional. [address] any warranty or liability for your use of this information.

## 2022-07-20 LAB
ANION GAP SERPL CALC-SCNC: 7 MMOL/L (ref 5–15)
BUN SERPL-MCNC: 35 MG/DL (ref 6–20)
BUN/CREAT SERPL: 22 (ref 12–20)
CALCIUM SERPL-MCNC: 9.3 MG/DL (ref 8.5–10.1)
CHLORIDE SERPL-SCNC: 104 MMOL/L (ref 97–108)
CO2 SERPL-SCNC: 27 MMOL/L (ref 21–32)
CREAT SERPL-MCNC: 1.59 MG/DL (ref 0.7–1.3)
ERYTHROCYTE [SEDIMENTATION RATE] IN BLOOD: 37 MM/HR (ref 0–20)
GLUCOSE SERPL-MCNC: 233 MG/DL (ref 65–100)
INR PPP: 3.1 (ref 0.9–1.1)
POTASSIUM SERPL-SCNC: 4.9 MMOL/L (ref 3.5–5.1)
PROTHROMBIN TIME: 29.8 SEC (ref 9–11.1)
SODIUM SERPL-SCNC: 138 MMOL/L (ref 136–145)

## 2022-08-08 ENCOUNTER — APPOINTMENT (OUTPATIENT)
Dept: GENERAL RADIOLOGY | Age: 87
End: 2022-08-08
Attending: STUDENT IN AN ORGANIZED HEALTH CARE EDUCATION/TRAINING PROGRAM
Payer: MEDICARE

## 2022-08-08 ENCOUNTER — HOSPITAL ENCOUNTER (EMERGENCY)
Age: 87
Discharge: HOME | End: 2022-08-08
Attending: STUDENT IN AN ORGANIZED HEALTH CARE EDUCATION/TRAINING PROGRAM
Payer: MEDICARE

## 2022-08-08 VITALS
BODY MASS INDEX: 25.12 KG/M2 | SYSTOLIC BLOOD PRESSURE: 119 MMHG | TEMPERATURE: 98.1 F | WEIGHT: 202 LBS | DIASTOLIC BLOOD PRESSURE: 62 MMHG | OXYGEN SATURATION: 98 % | HEART RATE: 83 BPM | HEIGHT: 75 IN | RESPIRATION RATE: 16 BRPM

## 2022-08-08 DIAGNOSIS — U07.1 COVID-19: Primary | ICD-10-CM

## 2022-08-08 DIAGNOSIS — R79.1 ELEVATED INR: ICD-10-CM

## 2022-08-08 LAB
ALBUMIN SERPL-MCNC: 3.1 G/DL (ref 3.5–5)
ALBUMIN/GLOB SERPL: 1 {RATIO} (ref 1.1–2.2)
ALP SERPL-CCNC: 50 U/L (ref 45–117)
ALT SERPL-CCNC: 38 U/L (ref 12–78)
ANION GAP SERPL CALC-SCNC: 7 MMOL/L (ref 5–15)
APPEARANCE UR: CLEAR
AST SERPL-CCNC: 25 U/L (ref 15–37)
BACTERIA URNS QL MICRO: NEGATIVE /HPF
BASOPHILS # BLD: 0 K/UL (ref 0–0.1)
BASOPHILS NFR BLD: 0 % (ref 0–1)
BILIRUB SERPL-MCNC: 1 MG/DL (ref 0.2–1)
BILIRUB UR QL: NEGATIVE
BUN SERPL-MCNC: 33 MG/DL (ref 6–20)
BUN/CREAT SERPL: 21 (ref 12–20)
CALCIUM SERPL-MCNC: 8.7 MG/DL (ref 8.5–10.1)
CHLORIDE SERPL-SCNC: 106 MMOL/L (ref 97–108)
CO2 SERPL-SCNC: 26 MMOL/L (ref 21–32)
COLOR UR: NORMAL
COVID-19 RAPID TEST, COVR: DETECTED
CREAT SERPL-MCNC: 1.57 MG/DL (ref 0.7–1.3)
DIFFERENTIAL METHOD BLD: ABNORMAL
EOSINOPHIL # BLD: 0 K/UL (ref 0–0.4)
EOSINOPHIL NFR BLD: 0 % (ref 0–7)
EPITH CASTS URNS QL MICRO: NORMAL /LPF
ERYTHROCYTE [DISTWIDTH] IN BLOOD BY AUTOMATED COUNT: 13.6 % (ref 11.5–14.5)
GLOBULIN SER CALC-MCNC: 3 G/DL (ref 2–4)
GLUCOSE SERPL-MCNC: 241 MG/DL (ref 65–100)
GLUCOSE UR STRIP.AUTO-MCNC: NEGATIVE MG/DL
HCT VFR BLD AUTO: 34.7 % (ref 36.6–50.3)
HGB BLD-MCNC: 10.9 G/DL (ref 12.1–17)
HGB UR QL STRIP: NEGATIVE
HYALINE CASTS URNS QL MICRO: NORMAL /LPF (ref 0–2)
IMM GRANULOCYTES # BLD AUTO: 0.2 K/UL (ref 0–0.04)
IMM GRANULOCYTES NFR BLD AUTO: 1 % (ref 0–0.5)
INR PPP: 4.7 (ref 0.9–1.1)
KETONES UR QL STRIP.AUTO: NEGATIVE MG/DL
LACTATE BLD-SCNC: 2.3 MMOL/L (ref 0.4–2)
LEUKOCYTE ESTERASE UR QL STRIP.AUTO: NEGATIVE
LYMPHOCYTES # BLD: 0.6 K/UL (ref 0.8–3.5)
LYMPHOCYTES NFR BLD: 3 % (ref 12–49)
MCH RBC QN AUTO: 30.4 PG (ref 26–34)
MCHC RBC AUTO-ENTMCNC: 31.4 G/DL (ref 30–36.5)
MCV RBC AUTO: 96.7 FL (ref 80–99)
MONOCYTES # BLD: 1.2 K/UL (ref 0–1)
MONOCYTES NFR BLD: 6 % (ref 5–13)
NEUTS SEG # BLD: 17.7 K/UL (ref 1.8–8)
NEUTS SEG NFR BLD: 90 % (ref 32–75)
NITRITE UR QL STRIP.AUTO: NEGATIVE
NRBC # BLD: 0 K/UL (ref 0–0.01)
NRBC BLD-RTO: 0 PER 100 WBC
PH UR STRIP: 5.5 [PH] (ref 5–8)
PLATELET # BLD AUTO: 176 K/UL (ref 150–400)
PMV BLD AUTO: 10.1 FL (ref 8.9–12.9)
POTASSIUM SERPL-SCNC: 4.8 MMOL/L (ref 3.5–5.1)
PROT SERPL-MCNC: 6.1 G/DL (ref 6.4–8.2)
PROT UR STRIP-MCNC: NEGATIVE MG/DL
PROTHROMBIN TIME: 44.7 SEC (ref 9–11.1)
RBC # BLD AUTO: 3.59 M/UL (ref 4.1–5.7)
RBC #/AREA URNS HPF: NORMAL /HPF (ref 0–5)
RBC MORPH BLD: ABNORMAL
SODIUM SERPL-SCNC: 139 MMOL/L (ref 136–145)
SOURCE, COVRS: ABNORMAL
SP GR UR REFRACTOMETRY: 1.01
UA: UC IF INDICATED,UAUC: NORMAL
UROBILINOGEN UR QL STRIP.AUTO: 0.2 EU/DL (ref 0.2–1)
WBC # BLD AUTO: 19.7 K/UL (ref 4.1–11.1)
WBC URNS QL MICRO: NORMAL /HPF (ref 0–4)

## 2022-08-08 PROCEDURE — 87635 SARS-COV-2 COVID-19 AMP PRB: CPT

## 2022-08-08 PROCEDURE — 85610 PROTHROMBIN TIME: CPT

## 2022-08-08 PROCEDURE — 87040 BLOOD CULTURE FOR BACTERIA: CPT

## 2022-08-08 PROCEDURE — 85025 COMPLETE CBC W/AUTO DIFF WBC: CPT

## 2022-08-08 PROCEDURE — 81001 URINALYSIS AUTO W/SCOPE: CPT

## 2022-08-08 PROCEDURE — 36415 COLL VENOUS BLD VENIPUNCTURE: CPT

## 2022-08-08 PROCEDURE — 99285 EMERGENCY DEPT VISIT HI MDM: CPT

## 2022-08-08 PROCEDURE — 80053 COMPREHEN METABOLIC PANEL: CPT

## 2022-08-08 PROCEDURE — 71045 X-RAY EXAM CHEST 1 VIEW: CPT

## 2022-08-08 PROCEDURE — 83605 ASSAY OF LACTIC ACID: CPT

## 2022-08-08 PROCEDURE — 93005 ELECTROCARDIOGRAM TRACING: CPT

## 2022-08-08 RX ORDER — SODIUM CHLORIDE 0.9 % (FLUSH) 0.9 %
5-10 SYRINGE (ML) INJECTION AS NEEDED
Status: DISCONTINUED | OUTPATIENT
Start: 2022-08-08 | End: 2022-08-08 | Stop reason: HOSPADM

## 2022-08-08 NOTE — ED PROVIDER NOTES
EMERGENCY DEPARTMENT HISTORY AND PHYSICAL EXAM      Date: 8/8/2022  Patient Name: Judith Conn.    History of Presenting Illness     Chief Complaint   Patient presents with    Chills     Last night after sneezing 2 to 3 times; fever last night 101.7; no pain no vomiting no diarrhea. History Provided By: Patient and Patient's Wife    HPI: Kenzie Le Sr., 80 y.o. male presents to the ED with cc of  fever, chills since this morning. Patient reports he sneezed twice last night, had chills. This morning he noted fever to 101.9, improved after a dose of tylenol. He denies any pain complaints, any chest pain or dyspnea. He denies any abdominal pain, nausea or vomiting. He denies any known sick contacts. He states he feels significantly improved since arriving to the ED and righgt now has no complaints. There are no other complaints, changes, or physical findings at this time. PCP: Ruperto Lemus MD    No current facility-administered medications on file prior to encounter. Current Outpatient Medications on File Prior to Encounter   Medication Sig Dispense Refill    digoxin (LANOXIN) 0.125 mg tablet TAKE 1 TABLET BY MOUTH EVERY DAY 90 Tablet 3    diclofenac EC (VOLTAREN) 75 mg EC tablet TAKE 1 TABLET BY MOUTH TWICE A  Tablet 3    warfarin (COUMADIN) 5 mg tablet TAKE 2 TABLETS BY MOUTH ON MONDAY &FRIDAYS AND 1&1/2 TABLET DAILY ON ALL OTHER DAYS 145 Tablet 3    bumetanide (BUMEX) 2 mg tablet TAKE 1 TABLET BY MOUTH EVERY DAY 90 Tablet 3    predniSONE (DELTASONE) 10 mg tablet TAKE 1TAB BY MOUTH TWO (2) TIMES A DAY.  60 Tablet 5    glimepiride (AMARYL) 1 mg tablet TAKE 1 TABLET BY MOUTH EVERY DAY BEFORE BREAKFAST 90 Tablet 3    glucose blood VI test strips (Accu-Chek Merle Plus test strp) strip USE TO TEST BLOOD SUGAR ONCE DAILY 100 Strip PRN    metFORMIN (GLUCOPHAGE) 500 mg tablet TAKE 1 TABLET BY MOUTH EVERY DAY IN THE MORNING WITH BREAKFAST AND ONE TABLET BEFORE DINNER 180 Tablet 3 simvastatin (ZOCOR) 20 mg tablet TAKE 1 TABLET BY MOUTH EVERY DAY 90 Tablet 3    terazosin (HYTRIN) 2 mg capsule TAKE 1 CAPSULE BY MOUTH EVERY DAY 90 Capsule 3    lisinopriL (PRINIVIL, ZESTRIL) 5 mg tablet Take 1 Tablet by mouth daily. 30 Tablet prn    Blood-Glucose Meter monitoring kit Use to test blood sugar twice daily  DX:E11.22 1 Kit 0    glucose blood VI test strips (blood glucose test) strip Use to test blood sugar twice daily  DX:E11.22 100 Strip 1    cyanocobalamin (VITAMIN B12) 500 mcg tablet Take 500 mcg by mouth daily. acetaminophen (TYLENOL ARTHRITIS PAIN) 650 mg TbER Take 650 mg by mouth every eight (8) hours. multivitamins-minerals-lutein (MULTIVITAMIN 50 PLUS) tab tablet Take 1 Tab by mouth daily. glucosamine 1,000 mg tab Take 2,000 mg by mouth daily. cholecalciferol (VITAMIN D3) 1,000 unit cap Take 1,000 Units by mouth daily. DOCOSAHEXANOIC ACID/EPA (FISH OIL PO) Take 1,200 mg by mouth two (2) times a day. Past History     Past Medical History:  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    Back pain 9/20/2017    BPH (benign prostatic hyperplasia) 9/20/2017    CHF (congestive heart failure) ([facility] [address] 9/20/2017    CKD (chronic kidney disease) 9/20/2017    COVID-19 11/2021    Diabetic acetonemia ([facility] [address]     DM (diabetes mellitus) ([facility] [address] 9/20/2017    Story: Diet Controlled    ED (erectile dysfunction) 9/20/2017    Edema 9/20/2017    Elevated CPK 9/20/2017    Comments: History of    Elevated LFTs 9/20/2017    Comments: History of    Elevated PSA 9/20/2017    Hypercholesteremia     Hyperlipidemia 9/20/2017    Hypertension     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    Nodule of right lung 9/20/2017    Story: Right    Polymyalgia ([facility] [address] 9/20/2017    Prostate enlargement        Past Surgical History:  Past Surgical History:   Procedure Laterality Date HX HEENT  06/12/2018    Dr. 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 Social History:  Social History     Tobacco Use    Smoking status: Never    Smokeless tobacco: Never   Vaping Use    Vaping Use: Never used   Substance Use Topics    Alcohol use: No    Drug use: No       Allergies:  No Known Allergies      Review of Systems   Review of Systems   Constitutional:  Positive for chills and fever. HENT:  Positive for sneezing. Negative for congestion, postnasal drip, rhinorrhea and sore throat. Respiratory:  Negative for shortness of breath and wheezing. Cardiovascular:  Negative for chest pain and leg swelling. Gastrointestinal:  Negative for abdominal pain, diarrhea, nausea and vomiting. Genitourinary:  Negative for difficulty urinating and hematuria. Musculoskeletal:  Negative for back pain. Neurological:  Negative for dizziness, syncope and light-headedness. Physical Exam   Physical Exam  Vitals and nursing note reviewed. Constitutional:       Appearance: Normal appearance. HENT:      Head: Normocephalic and atraumatic. Eyes:      Conjunctiva/sclera: Conjunctivae normal.   Cardiovascular:      Rate and Rhythm: Normal rate and regular rhythm. Pulses: Normal pulses. Pulmonary:      Effort: Pulmonary effort is normal.      Breath sounds: Normal breath sounds. Abdominal:      General: Abdomen is flat. Palpations: Abdomen is soft. Musculoskeletal:      Cervical back: Neck supple. Skin:     General: Skin is warm. Neurological:      General: No focal deficit present. Mental Status: He is alert.    Psychiatric:         Mood and Affect: Mood normal.         Behavior: Behavior normal.       Diagnostic Study Results     Labs -     Recent Results (from the past 12 hour(s))   CBC WITH AUTOMATED DIFF    Collection Time: 08/08/22  1:37 PM   Result Value Ref Range    WBC 19.7 (H) 4.1 - 11.1 K/uL    RBC 3.59 (L) 4.10 - 5.70 M/uL    HGB 10.9 (L) 12.1 - 17.0 g/dL    HCT 34.7 (L) 36.6 - 50.3 %    MCV 96.7 80.0 - 99.0 FL    MCH 30.4 26.0 - 34.0 PG    MCHC 31.4 30.0 - 36.5 g/dL    RDW 13.6 11.5 - 14.5 %    PLATELET 117 089 - 709 K/uL    MPV 10.1 8.9 - 12.9 FL    NRBC 0.0 0  WBC    ABSOLUTE NRBC 0.00 0.00 - 0.01 K/uL    NEUTROPHILS 90 (H) 32 - 75 %    LYMPHOCYTES 3 (L) 12 - 49 %    MONOCYTES 6 5 - 13 %    EOSINOPHILS 0 0 - 7 %    BASOPHILS 0 0 - 1 %    IMMATURE GRANULOCYTES 1 (H) 0.0 - 0.5 %    ABS. NEUTROPHILS 17.7 (H) 1.8 - 8.0 K/UL    ABS. LYMPHOCYTES 0.6 (L) 0.8 - 3.5 K/UL    ABS. MONOCYTES 1.2 (H) 0.0 - 1.0 K/UL    ABS. EOSINOPHILS 0.0 0.0 - 0.4 K/UL    ABS. BASOPHILS 0.0 0.0 - 0.1 K/UL    ABS. IMM. GRANS. 0.2 (H) 0.00 - 0.04 K/UL    DF SMEAR SCANNED      RBC COMMENTS NORMOCYTIC, NORMOCHROMIC     METABOLIC PANEL, COMPREHENSIVE    Collection Time: 08/08/22  1:37 PM   Result Value Ref Range    Sodium 139 136 - 145 mmol/L    Potassium 4.8 3.5 - 5.1 mmol/L    Chloride 106 97 - 108 mmol/L    CO2 26 21 - 32 mmol/L    Anion gap 7 5 - 15 mmol/L    Glucose 241 (H) 65 - 100 mg/dL    BUN 33 (H) 6 - 20 MG/DL    Creatinine 1.57 (H) 0.70 - 1.30 MG/DL    BUN/Creatinine ratio 21 (H) 12 - 20      GFR est AA 51 (L) >60 ml/min/1.73m2    GFR est non-AA 42 (L) >60 ml/min/1.73m2    Calcium 8.7 8.5 - 10.1 MG/DL    Bilirubin, total 1.0 0.2 - 1.0 MG/DL    ALT (SGPT) 38 12 - 78 U/L    AST (SGOT) 25 15 - 37 U/L    Alk.  phosphatase 50 45 - 117 U/L    Protein, total 6.1 (L) 6.4 - 8.2 g/dL    Albumin 3.1 (L) 3.5 - 5.0 g/dL    Globulin 3.0 2.0 - 4.0 g/dL    A-G Ratio 1.0 (L) 1.1 - 2.2     URINALYSIS W/ REFLEX CULTURE    Collection Time: 08/08/22  1:37 PM    Specimen: Urine   Result Value Ref Range    Color YELLOW/STRAW      Appearance CLEAR CLEAR      Specific gravity 1.011      pH (UA) 5.5 5.0 - 8.0      Protein Negative NEG mg/dL    Glucose Negative NEG mg/dL    Ketone Negative NEG mg/dL    Bilirubin Negative NEG      Blood Negative NEG      Urobilinogen 0.2 0.2 - 1.0 EU/dL    Nitrites Negative NEG      Leukocyte Esterase Negative NEG      UA:UC IF INDICATED CULTURE NOT INDICATED BY UA RESULT CNI      WBC 0-4 0 - 4 /hpf    RBC 0-5 0 - 5 /hpf    Epithelial cells FEW FEW /lpf    Bacteria Negative NEG /hpf    Hyaline cast 2-5 0 - 2 /lpf   POC LACTIC ACID    Collection Time: 08/08/22  1:48 PM   Result Value Ref Range    Lactic Acid (POC) 2.30 (HH) 0.40 - 2.00 mmol/L   COVID-19 RAPID TEST    Collection Time: 08/08/22  3:34 PM   Result Value Ref Range    Specimen source Nasopharyngeal      COVID-19 rapid test Detected (A) NOTD     EKG, 12 LEAD, INITIAL    Collection Time: 08/08/22  4:02 PM   Result Value Ref Range    Ventricular Rate 55 BPM    Atrial Rate 73 BPM    QRS Duration 120 ms    Q-T Interval 422 ms    QTC Calculation (Bezet) 403 ms    Calculated R Axis -39 degrees    Calculated T Axis 71 degrees    Diagnosis       Sinus rhythm with 2nd degree AV block (Mobitz I)  Left axis deviation  Septal infarct (cited on or before 08-AUG-2022)  When compared with ECG of 03-MAR-2020 18:42,  Sinus rhythm is now with 2nd degree AV block (Mobitz I)     PROTHROMBIN TIME + INR    Collection Time: 08/08/22  5:15 PM   Result Value Ref Range    INR 4.7 (HH) 0.9 - 1.1      Prothrombin time 44.7 (H) 9.0 - 11.1 sec       Radiologic Studies -   XR CHEST PORT   Final Result   No acute cardiopulmonary disease. CT Results  (Last 48 hours)      None          CXR Results  (Last 48 hours)                 08/08/22 1452  XR CHEST PORT Final result    Impression:  No acute cardiopulmonary disease. Narrative:  INDICATION: Chills,  evaluate for infiltrate. Portable AP view of the chest.       Direct comparison made to prior chest x-ray dated 11/2021. Cardiomediastinal silhouette is stable. Lungs are clear bilaterally.  Pleural spaces are normal and there is no pneumothorax. Osseous structures are intact. Medical Decision Making   I am the first provider for this patient. I reviewed the vital signs, available nursing notes, past medical history, past surgical history, family history and social history. Vital Signs-Reviewed the patient's vital signs. Patient Vitals for the past 12 hrs:   Temp Pulse Resp BP SpO2   08/08/22 1600 -- -- -- 119/62 98 %   08/08/22 1410 98.1 °F (36.7 °C) -- -- 128/71 97 %   08/08/22 1312 97.9 °F (36.6 °C) 83 16 (!) 122/48 98 %       EKG interpretation: (Preliminary)  Rhythm: normal sinus rhythm and 2nd degree type 1; and regular . Rate (approx.): 55; Axis: left axis deviation; NC interval: 2nd deg type 1; QRS interval: normal ; ST/T wave: normal; Other findings: new 2nd degree, type 1. Records Reviewed: Nursing Notes and Old Medical Records    Provider Notes (Medical Decision Making):   79 yo male with chief complaint of fever, chills. On exam, he is hemodynamically stable, vital signs stable, febrile to 101.9 this morning with elevated lactate and elevated white count on labs ordered from triage. Blood cultures ordered as well as remainder of sepsis order set. Differentials include pneumonia versus urinary tract infection versus COVID/viral syndrome versus bacteremia. Will obtain basic labs as above, COVID swab, chest x-ray. Disposition pending work-up. ED Course:   Initial assessment performed. The patients presenting problems have been discussed, and they are in agreement with the care plan formulated and outlined with them. I have encouraged them to ask questions as they arise throughout their visit. ED Course as of 08/08/22 2123   Mon Aug 08, 2022   1657 Discused with pharmacy for [facility] outpatient therapy as patient's labs stable, minimally symptomatic, no O2 requirement. Recommended Lagevrio based on patient's GFR as well as use of digoxin.  Reached out to  Rufus Pool for follow up. Pending INR [NM]   1806 INR(!!): 4.7  Instructed patient to hold tomorrow's dose of coumadin, call PCP for further dosage instructions and follow up on INR. [NM]      ED Course User Index  [NM] Isidro Jesus          Disposition:    DC- Adult Discharges: All of the diagnostic tests were reviewed and questions answered. Diagnosis, care plan and treatment options were discussed. The patient understands the instructions and will follow up as directed. The patients results have been reviewed with them. They have been counseled regarding their diagnosis. The patient verbally convey understanding and agreement of the signs, symptoms, diagnosis, treatment and prognosis and additionally agrees to follow up as recommended with their PCP in 24 - 48 hours. They also agree with the care-plan and convey that all of their questions have been answered. I have also put together some discharge instructions for them that include: 1) educational information regarding their diagnosis, 2) how to care for their diagnosis at home, as well a 3) list of reasons why they would want to return to the ED prior to their follow-up appointment, should their condition change. DC-The patient and daughter was given verbal chest pain warning signs and, dehydration, fever treatment , and follow-up instructions  DC- Pain Control DC Home plan: Referral Family Medicine/PCP, call Dr. Rufus Pool this week. DISCHARGE PLAN:  1. Discharge Medication List as of 8/8/2022  6:08 PM        START taking these medications    Details   molnupiravir (Lagevrio, EUA,) 200 mg capsule Take 4 Capsules by mouth every twelve (12) hours for 5 days. , Normal, Disp-40 Capsule, R-0           CONTINUE these medications which have NOT CHANGED    Details   digoxin (LANOXIN) 0.125 mg tablet TAKE 1 TABLET BY MOUTH EVERY DAY, Normal, Disp-90 Tablet, R-3      diclofenac EC (VOLTAREN) 75 mg EC tablet TAKE 1 TABLET BY MOUTH TWICE A DAY, Normal, Disp-180 Tablet, R-3      warfarin (COUMADIN) 5 mg tablet TAKE 2 TABLETS BY MOUTH ON MONDAY &FRIDAYS AND 1&1/2 TABLET DAILY ON ALL OTHER DAYS, Normal, Disp-145 Tablet, R-3DX Code Needed  . bumetanide (BUMEX) 2 mg tablet TAKE 1 TABLET BY MOUTH EVERY DAY, Normal, Disp-90 Tablet, R-3      predniSONE (DELTASONE) 10 mg tablet TAKE 1TAB BY MOUTH TWO (2) TIMES A DAY., Normal, Disp-60 Tablet, R-5      glimepiride (AMARYL) 1 mg tablet TAKE 1 TABLET BY MOUTH EVERY DAY BEFORE BREAKFAST, Normal, Disp-90 Tablet, R-3      !! glucose blood VI test strips (Accu-Chek Merle Plus test strp) strip USE TO TEST BLOOD SUGAR ONCE DAILY, Normal, Disp-100 Strip, R-PRN      metFORMIN (GLUCOPHAGE) 500 mg tablet TAKE 1 TABLET BY MOUTH EVERY DAY IN THE MORNING WITH BREAKFAST AND ONE TABLET BEFORE DINNER, Normal, Disp-180 Tablet, R-3      simvastatin (ZOCOR) 20 mg tablet TAKE 1 TABLET BY MOUTH EVERY DAY, Normal, Disp-90 Tablet, R-3      terazosin (HYTRIN) 2 mg capsule TAKE 1 CAPSULE BY MOUTH EVERY DAY, Normal, Disp-90 Capsule, R-3      lisinopriL (PRINIVIL, ZESTRIL) 5 mg tablet Take 1 Tablet by mouth daily. , Normal, Disp-30 Tablet, R-prn      Blood-Glucose Meter monitoring kit Use to test blood sugar twice daily  DX:E11.22, Print, Disp-1 Kit, R-0      !! glucose blood VI test strips (blood glucose test) strip Use to test blood sugar twice daily  DX:E11.22, Print, Disp-100 Strip, R-1      cyanocobalamin (VITAMIN B12) 500 mcg tablet Take 500 mcg by mouth daily. , Historical Med      acetaminophen (TYLENOL ARTHRITIS PAIN) 650 mg TbER Take 650 mg by mouth every eight (8) hours. , Historical Med      multivitamins-minerals-lutein (MULTIVITAMIN 50 PLUS) tab tablet Take 1 Tab by mouth daily. , Historical Med      glucosamine 1,000 mg tab Take 2,000 mg by mouth daily. , Historical Med      cholecalciferol (VITAMIN D3) 1,000 unit cap Take 1,000 Units by mouth daily. , Historical Med      DOCOSAHEXANOIC ACID/EPA (FISH OIL PO) Take 1,200 mg by mouth two (2) times a day., Historical Med       !! - Potential duplicate medications found. Please discuss with provider. 2.   Follow-up Information       Follow up With Specialties Details Why Contact Info    Lidia Vergara MD Internal Medicine Physician Call in 1 day  [address] [place] [address]  [phone]            3. Return to ED if worse     Diagnosis     Clinical Impression:   1. COVID-19    2. Elevated INR        Attestations:    Tyson Bull, DO        Please note that this dictation was completed with [employer], the computer voice recognition software. Quite often unanticipated grammatical, syntax, homophones, and other interpretive errors are inadvertently transcribed by the computer software. Please disregard these errors. Please excuse any errors that have escaped final proofreading. Thank you.

## 2022-08-08 NOTE — ED NOTES
Assumed care of pt from triage. Pt reporting sudden onset dizziness and shaking last night, that initially improved and then returned this morning. Pt was additionally febrile this am but took his scheduled tylenol, pt denies any SOB, CP, dizziness, n/v/d at this time. 31 38 44 Pt discharged by Marquise Arteaga RN . Pt provided with discharge instructions Rx and instructions on follow up care. Pt out of ED via wheelchair accompanied by MADINA Treviño

## 2022-08-08 NOTE — DISCHARGE INSTRUCTIONS
The test was positive today, a prescription for emergency use authorization antiviral treatment was called into the [facility] pharmacy. Your INR today was 4.8. Please hold your next dose of Coumadin and call your primary care doctor tomorrow to set up follow-up for continued dosage adjustments and lab follow-up. Return to the emergency department if your worsen or change.

## 2022-08-09 ENCOUNTER — TELEPHONE ANTICOAG (OUTPATIENT)
Dept: INTERNAL MEDICINE CLINIC | Age: 87
End: 2022-08-09

## 2022-08-09 ENCOUNTER — PATIENT OUTREACH (OUTPATIENT)
Dept: CASE MANAGEMENT | Age: 87
End: 2022-08-09

## 2022-08-09 DIAGNOSIS — I48.0 PAROXYSMAL ATRIAL FIBRILLATION (HCC): Primary | ICD-10-CM

## 2022-08-09 LAB
ATRIAL RATE: 73 BPM
CALCULATED R AXIS, ECG10: -39 DEGREES
CALCULATED T AXIS, ECG11: 71 DEGREES
DIAGNOSIS, 93000: NORMAL
Q-T INTERVAL, ECG07: 422 MS
QRS DURATION, ECG06: 120 MS
QTC CALCULATION (BEZET), ECG08: 403 MS
VENTRICULAR RATE, ECG03: 55 BPM

## 2022-08-09 NOTE — PROGRESS NOTES
Patient contacted regarding COVID-19 diagnosis. Discussed COVID-19 related testing which was available at this time. Test results were positive. Patient informed of results, if available? yes. LPN Care Coordinator contacted the patient by telephone to perform post discharge assessment. Call within 2 business days of discharge: Yes Verified name and  with patient as identifiers. Provided introduction to self, and explanation of the CTN/ACM role, and reason for call due to risk factors for infection and/or exposure to COVID-19. Symptoms reviewed with patient who verbalized the following symptoms: no new symptoms and no worsening symptoms      Due to no new or worsening symptoms encounter was not routed to provider for escalation. Discussed follow-up appointments. If no appointment was previously scheduled, appointment scheduling offered:  no. [address] follow up appointment(s):   Future Appointments   Date Time Provider Helen Butcher   2022  9:00 AM Chata Blake MD [facility]     Non-BS follow up appointment(s): na    Interventions to address risk factors: Obtained and reviewed discharge summary and/or continuity of care documents  Reviewed discharge instructions, medical action plan and red flags with patient who verbalized understanding. Advance Care Planning:   Does patient have an Advance Directive: decision makers updated. Educated patient about risk for severe COVID-19 due to risk factors according to CDC guidelines. LPN CC reviewed discharge instructions, medical action plan and red flag symptoms with the patient who verbalized understanding. Discussed COVID vaccination status: no. Education provided on COVID-19 vaccination as appropriate. Discussed exposure protocols and quarantine with CDC Guidelines.  Patient was given an opportunity to verbalize any questions and concerns and agrees to contact LPN CC or health care provider for questions related to their healthcare. Reviewed and educated patient on any new and changed medications related to discharge diagnosis     Was patient discharged with a pulse oximeter? no    LPN CC provided contact information. Plan for follow-up call in 3-5 days based on severity of symptoms and risk factors.

## 2022-08-09 NOTE — PROGRESS NOTES
Anticoagulation Summary  As of 8/9/2022      INR goal:  2.0-3.0   TTR:  37.7 % (4.3 y)   INR used for dosing:     Warfarin maintenance plan:  7.5 mg (5 mg x 1.5) every day   Weekly warfarin total:  52.5 mg   Plan last modified:  Ethel Galvin LPN (4/5/4384)   Next INR check:  8/17/2022   Target end date:      Indications    Paroxysmal atrial fibrillation ([address] (Primary) [I48.0]                 Anticoagulation Episode Summary       INR check location:  Clinic Lab    Preferred lab:      Send INR reminders to:      Comments:            Anticoagulation Care Providers       Provider Role Specialty Phone number    Hayden Brown MD Responsible Internal Medicine Physician [phone]          Per Dr. Hubert Etienne patients INR is elevated. Patient is to hold coumadin for 3 days and resume at 7.5 mg daily.  Pt will have INR rechecked on 8/17/22

## 2022-08-12 ENCOUNTER — PATIENT OUTREACH (OUTPATIENT)
Dept: CASE MANAGEMENT | Age: 87
End: 2022-08-12

## 2022-08-12 NOTE — PROGRESS NOTES
Patient resolved from [address] Transitions episode on 8/12/22. Discussed COVID-19 related testing which was available at this time. Test results were positive. Patient informed of results, if available? yes     Patient/family has been provided the following resources and education related to COVID-19:                         Signs, symptoms and red flags related to COVID-19            [facility] exposure and quarantine guidelines            [employer] exposure contact - [phone]            Contact for their local Department of Health                 Patient currently reports that the following symptoms have improved:  no new symptoms and no worsening symptoms. No further outreach scheduled with this CTN/ACM/LPN/HC/ MA. Episode of Care resolved. Patient has this CTN/ACM/LPN/HC/MA contact information if future needs arise.

## 2022-08-13 ENCOUNTER — HOSPITAL ENCOUNTER (EMERGENCY)
Age: 87
Discharge: HOME | End: 2022-08-13
Attending: EMERGENCY MEDICINE
Payer: MEDICARE

## 2022-08-13 ENCOUNTER — APPOINTMENT (OUTPATIENT)
Dept: ULTRASOUND IMAGING | Age: 87
End: 2022-08-13
Attending: STUDENT IN AN ORGANIZED HEALTH CARE EDUCATION/TRAINING PROGRAM
Payer: MEDICARE

## 2022-08-13 VITALS
RESPIRATION RATE: 14 BRPM | HEIGHT: 75 IN | HEART RATE: 67 BPM | WEIGHT: 202 LBS | TEMPERATURE: 97.6 F | OXYGEN SATURATION: 100 % | BODY MASS INDEX: 25.12 KG/M2 | SYSTOLIC BLOOD PRESSURE: 154 MMHG | DIASTOLIC BLOOD PRESSURE: 57 MMHG

## 2022-08-13 DIAGNOSIS — M79.89 LEG SWELLING: Primary | ICD-10-CM

## 2022-08-13 LAB
ANION GAP SERPL CALC-SCNC: 7 MMOL/L (ref 5–15)
BASOPHILS # BLD: 0 K/UL (ref 0–0.1)
BASOPHILS NFR BLD: 0 % (ref 0–1)
BUN SERPL-MCNC: 38 MG/DL (ref 6–20)
BUN/CREAT SERPL: 26 (ref 12–20)
CALCIUM SERPL-MCNC: 9.1 MG/DL (ref 8.5–10.1)
CHLORIDE SERPL-SCNC: 107 MMOL/L (ref 97–108)
CO2 SERPL-SCNC: 28 MMOL/L (ref 21–32)
CREAT SERPL-MCNC: 1.45 MG/DL (ref 0.7–1.3)
DIFFERENTIAL METHOD BLD: ABNORMAL
EOSINOPHIL # BLD: 0.1 K/UL (ref 0–0.4)
EOSINOPHIL NFR BLD: 1 % (ref 0–7)
ERYTHROCYTE [DISTWIDTH] IN BLOOD BY AUTOMATED COUNT: 13.2 % (ref 11.5–14.5)
GLUCOSE SERPL-MCNC: 218 MG/DL (ref 65–100)
HCT VFR BLD AUTO: 31.5 % (ref 36.6–50.3)
HGB BLD-MCNC: 9.8 G/DL (ref 12.1–17)
IMM GRANULOCYTES # BLD AUTO: 0.2 K/UL (ref 0–0.04)
IMM GRANULOCYTES NFR BLD AUTO: 2 % (ref 0–0.5)
LYMPHOCYTES # BLD: 1.4 K/UL (ref 0.8–3.5)
LYMPHOCYTES NFR BLD: 14 % (ref 12–49)
MCH RBC QN AUTO: 30.5 PG (ref 26–34)
MCHC RBC AUTO-ENTMCNC: 31.1 G/DL (ref 30–36.5)
MCV RBC AUTO: 98.1 FL (ref 80–99)
MONOCYTES # BLD: 0.9 K/UL (ref 0–1)
MONOCYTES NFR BLD: 9 % (ref 5–13)
NEUTS SEG # BLD: 7.5 K/UL (ref 1.8–8)
NEUTS SEG NFR BLD: 74 % (ref 32–75)
NRBC # BLD: 0 K/UL (ref 0–0.01)
NRBC BLD-RTO: 0 PER 100 WBC
PLATELET # BLD AUTO: 216 K/UL (ref 150–400)
PMV BLD AUTO: 9.6 FL (ref 8.9–12.9)
POTASSIUM SERPL-SCNC: 4.6 MMOL/L (ref 3.5–5.1)
RBC # BLD AUTO: 3.21 M/UL (ref 4.1–5.7)
SODIUM SERPL-SCNC: 142 MMOL/L (ref 136–145)
WBC # BLD AUTO: 10 K/UL (ref 4.1–11.1)

## 2022-08-13 PROCEDURE — 93971 EXTREMITY STUDY: CPT

## 2022-08-13 PROCEDURE — 99284 EMERGENCY DEPT VISIT MOD MDM: CPT

## 2022-08-13 PROCEDURE — 80048 BASIC METABOLIC PNL TOTAL CA: CPT

## 2022-08-13 PROCEDURE — 36415 COLL VENOUS BLD VENIPUNCTURE: CPT

## 2022-08-13 PROCEDURE — 85025 COMPLETE CBC W/AUTO DIFF WBC: CPT

## 2022-08-13 NOTE — ED PROVIDER NOTES
EMERGENCY DEPARTMENT HISTORY AND PHYSICAL EXAM      Date: 8/13/2022  Patient Name: June Wolf.    History of Presenting Illness     Chief Complaint   Patient presents with    Leg Pain     Left lower leg swollen and red; sent to ED to check for blood clot. Swelling started yesterday         HPI: Apryl Monge Sr., 80 y.o. male with known coronavirus disease, history of cellulitis to the left lower extremity, presenting to ED with left lower extremity swelling. 1 days of symptoms. It was apparently much worse than it is now, has improved significantly. He was working out in the yard a lot yesterday though he has been somewhat immobilized due to feeling poorly from coronavirus. He otherwise has no symptoms. There are no other complaints, changes, or physical findings at this time. PCP: Hayden Brown MD    No current facility-administered medications on file prior to encounter. Current Outpatient Medications on File Prior to Encounter   Medication Sig Dispense Refill    molnupiravir (Lagevrio, EUA,) 200 mg capsule Take 4 Capsules by mouth every twelve (12) hours for 5 days. 40 Capsule 0    digoxin (LANOXIN) 0.125 mg tablet TAKE 1 TABLET BY MOUTH EVERY DAY 90 Tablet 3    diclofenac EC (VOLTAREN) 75 mg EC tablet TAKE 1 TABLET BY MOUTH TWICE A  Tablet 3    warfarin (COUMADIN) 5 mg tablet TAKE 2 TABLETS BY MOUTH ON MONDAY &FRIDAYS AND 1&1/2 TABLET DAILY ON ALL OTHER DAYS 145 Tablet 3    bumetanide (BUMEX) 2 mg tablet TAKE 1 TABLET BY MOUTH EVERY DAY 90 Tablet 3    predniSONE (DELTASONE) 10 mg tablet TAKE 1TAB BY MOUTH TWO (2) TIMES A DAY.  60 Tablet 5    glimepiride (AMARYL) 1 mg tablet TAKE 1 TABLET BY MOUTH EVERY DAY BEFORE BREAKFAST 90 Tablet 3    glucose blood VI test strips (Accu-Chek Merle Plus test strp) strip USE TO TEST BLOOD SUGAR ONCE DAILY 100 Strip PRN    metFORMIN (GLUCOPHAGE) 500 mg tablet TAKE 1 TABLET BY MOUTH EVERY DAY IN THE MORNING WITH BREAKFAST AND ONE TABLET BEFORE DINNER 180 Tablet 3    simvastatin (ZOCOR) 20 mg tablet TAKE 1 TABLET BY MOUTH EVERY DAY 90 Tablet 3    terazosin (HYTRIN) 2 mg capsule TAKE 1 CAPSULE BY MOUTH EVERY DAY 90 Capsule 3    lisinopriL (PRINIVIL, ZESTRIL) 5 mg tablet Take 1 Tablet by mouth daily. 30 Tablet prn    Blood-Glucose Meter monitoring kit Use to test blood sugar twice daily  DX:E11.22 1 Kit 0    glucose blood VI test strips (blood glucose test) strip Use to test blood sugar twice daily  DX:E11.22 100 Strip 1    cyanocobalamin (VITAMIN B12) 500 mcg tablet Take 500 mcg by mouth daily. acetaminophen (TYLENOL ARTHRITIS PAIN) 650 mg TbER Take 650 mg by mouth every eight (8) hours. multivitamins-minerals-lutein (MULTIVITAMIN 50 PLUS) tab tablet Take 1 Tab by mouth daily. glucosamine 1,000 mg tab Take 2,000 mg by mouth daily. cholecalciferol (VITAMIN D3) 1,000 unit cap Take 1,000 Units by mouth daily. DOCOSAHEXANOIC ACID/EPA (FISH OIL PO) Take 1,200 mg by mouth two (2) times a day. Past History     Past Medical History:  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    Back pain 9/20/2017    BPH (benign prostatic hyperplasia) 9/20/2017    CHF (congestive heart failure) ([facility] [address] 9/20/2017    CKD (chronic kidney disease) 9/20/2017    COVID-19 11/2021    Diabetic acetonemia ([facility] [address]     DM (diabetes mellitus) ([facility] [address] 9/20/2017    Story: Diet Controlled    ED (erectile dysfunction) 9/20/2017    Edema 9/20/2017    Elevated CPK 9/20/2017    Comments: History of    Elevated LFTs 9/20/2017    Comments: History of    Elevated PSA 9/20/2017    Hypercholesteremia     Hyperlipidemia 9/20/2017    Hypertension     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    Nodule of right lung 9/20/2017    Story: Right    Polymyalgia ([address] 9/20/2017    Prostate enlargement        Past Surgical History:  Past Surgical History: Procedure Laterality Date    HX HEENT  06/12/2018    Dr. 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 Social History:  Social History     Tobacco Use    Smoking status: Never    Smokeless tobacco: Never   Vaping Use    Vaping Use: Never used   Substance Use Topics    Alcohol use: No    Drug use: No       Allergies:  No Known Allergies      Review of Systems   Review of Systems   Constitutional:  Negative for chills and fever. HENT:  Negative for sore throat. Eyes:  Negative for redness. Respiratory:  Negative for shortness of breath. Cardiovascular:  Positive for leg swelling. Negative for chest pain. Gastrointestinal:  Negative for abdominal pain. Genitourinary:  Negative for dysuria. Musculoskeletal:  Negative for back pain. Neurological:  Negative for syncope. Psychiatric/Behavioral:  The patient is not nervous/anxious. All other systems reviewed and are negative. Physical Exam   Physical Exam  Vitals and nursing note reviewed. Constitutional:       Appearance: Normal appearance. HENT:      Head: Normocephalic and atraumatic. Mouth/Throat:      Mouth: Mucous membranes are moist.   Cardiovascular:      Rate and Rhythm: Normal rate and regular rhythm. Pulmonary:      Effort: Pulmonary effort is normal.      Breath sounds: Normal breath sounds. Abdominal:      Palpations: Abdomen is soft. Tenderness: There is no abdominal tenderness. Musculoskeletal:         General: No deformity. Cervical back: Neck supple. Comments: LLE -> mild swelling when compared to right, scar from previous infection noted to ankle, no tenderness to palpation, no warmth, normal ROM to ankle, 2+ DP/PT pulse   Skin:     General: Skin is warm and dry.    Neurological:      General: No focal deficit present. Mental Status: He is alert. Psychiatric:         Mood and Affect: Mood normal.         Behavior: Behavior normal.       Diagnostic Study Results     Labs -     Recent Results (from the past 24 hour(s))   CBC WITH AUTOMATED DIFF    Collection Time: 08/13/22 10:46 AM   Result Value Ref Range    WBC 10.0 4.1 - 11.1 K/uL    RBC 3.21 (L) 4.10 - 5.70 M/uL    HGB 9.8 (L) 12.1 - 17.0 g/dL    HCT 31.5 (L) 36.6 - 50.3 %    MCV 98.1 80.0 - 99.0 FL    MCH 30.5 26.0 - 34.0 PG    MCHC 31.1 30.0 - 36.5 g/dL    RDW 13.2 11.5 - 14.5 %    PLATELET 286 386 - 451 K/uL    MPV 9.6 8.9 - 12.9 FL    NRBC 0.0 0  WBC    ABSOLUTE NRBC 0.00 0.00 - 0.01 K/uL    NEUTROPHILS 74 32 - 75 %    LYMPHOCYTES 14 12 - 49 %    MONOCYTES 9 5 - 13 %    EOSINOPHILS 1 0 - 7 %    BASOPHILS 0 0 - 1 %    IMMATURE GRANULOCYTES 2 (H) 0.0 - 0.5 %    ABS. NEUTROPHILS 7.5 1.8 - 8.0 K/UL    ABS. LYMPHOCYTES 1.4 0.8 - 3.5 K/UL    ABS. MONOCYTES 0.9 0.0 - 1.0 K/UL    ABS. EOSINOPHILS 0.1 0.0 - 0.4 K/UL    ABS. BASOPHILS 0.0 0.0 - 0.1 K/UL    ABS. IMM. GRANS. 0.2 (H) 0.00 - 0.04 K/UL    DF AUTOMATED     METABOLIC PANEL, BASIC    Collection Time: 08/13/22 10:46 AM   Result Value Ref Range    Sodium 142 136 - 145 mmol/L    Potassium 4.6 3.5 - 5.1 mmol/L    Chloride 107 97 - 108 mmol/L    CO2 28 21 - 32 mmol/L    Anion gap 7 5 - 15 mmol/L    Glucose 218 (H) 65 - 100 mg/dL    BUN 38 (H) 6 - 20 MG/DL    Creatinine 1.45 (H) 0.70 - 1.30 MG/DL    BUN/Creatinine ratio 26 (H) 12 - 20      GFR est AA 56 (L) >60 ml/min/1.73m2    GFR est non-AA 46 (L) >60 ml/min/1.73m2    Calcium 9.1 8.5 - 10.1 MG/DL       Radiologic Studies -   DUPLEX LOWER EXT VENOUS LEFT   Final Result        CT Results  (Last 48 hours)      None          CXR Results  (Last 48 hours)      None              Medical Decision Making   I am the first provider for this patient.     I reviewed the vital signs, available nursing notes, past medical history, past surgical history, family history and social history. Vital Signs-Reviewed the patient's vital signs. Patient Vitals for the past 24 hrs:   Temp Pulse Resp BP SpO2   08/13/22 1036 97.6 °F (36.4 °C) 67 14 (!) 154/57 100 %         Provider Notes (Medical Decision Making):   Very pleasant, well-appearing 80year-old presenting to ED with leg swelling. He states it is improved significantly from yesterday. Currently very mild. He is neurovascularly intact. There were no signs of infection including no warmth, asymmetric erythema, abscesses or otherwise. There is no tenderness to palpation. DVT ultrasound is negative. Labs are unchanged from previous. Ultimately, may have some swelling associated with activity and being on his feet after mobilization. He also states that he wears compression stockings every day so it sounds like he normally has some venous insufficiency. Ultimately, he has good outpatient follow-up and I think he safe for discharge. Plan for further evaluation as an outpatient. Return precautions given. ED Course:     Initial assessment performed. The patients presenting problems have been discussed, and they are in agreement with the care plan formulated and outlined with them. I have encouraged them to ask questions as they arise throughout their visit. Disposition:  dc    PLAN:  1. Current Discharge Medication List        2.    Follow-up Information    None       Return to ED if worse     Diagnosis     Clinical Impression: pedal edema

## 2022-08-13 NOTE — ED TRIAGE NOTES
Reports he had been having pain in left knee three weeks prior to that when ever he goes up and down the steps.

## 2022-08-14 LAB
BACTERIA SPEC CULT: NORMAL
BACTERIA SPEC CULT: NORMAL
SERVICE CMNT-IMP: NORMAL
SERVICE CMNT-IMP: NORMAL

## 2022-08-15 ENCOUNTER — PATIENT OUTREACH (OUTPATIENT)
Dept: CASE MANAGEMENT | Age: 87
End: 2022-08-15

## 2022-08-15 NOTE — PROGRESS NOTES
08/15/22     Patient contacted regarding COVID-19 diagnosis. Discussed COVID-19 related testing which was available at this time. Test results were positive. Patient informed of results, if available? yes. Care Transition Nurse contacted the patient by telephone to perform post discharge assessment. Call within 2 business days of discharge: Yes Verified name and  with patient as identifiers. Provided introduction to self, and explanation of the CTN/ACM role, and reason for call due to risk factors for infection and/or exposure to COVID-19. Symptoms reviewed with patient who verbalized the following symptoms: pain or aching joints and mild swelling of left leg. Due to no new or worsening symptoms encounter was not routed to provider for escalation. Discussed follow-up appointments. If no appointment was previously scheduled, appointment scheduling offered:  no, pt has appt as below. [facility] follow up appointment(s):   Future Appointments   Date Time Provider Helen Butcher   2022  9:00 AM Nial Alanis MD PCA BS [facility]     Non-BS follow up appointment(s): none    Interventions to address risk factors: Scheduled appointment with PCP-has appt on      Advance Care Planning:   Does patient have an Advance Directive: decision makers updated. Pt has ACP on file that lists his HCDMs just as they are in EMR but it is missing a date. If I reach pt again in a week and there's time, I'll inform him of this. CTN reviewed discharge instructions, medical action plan and red flag symptoms with the patient who verbalized understanding. Discussed COVID vaccination status: no, I did not ask today, but will do so if I get to speak with him next week. Discussed exposure protocols and quarantine with CDC Guidelines. Patient was given an opportunity to verbalize any questions and concerns and agrees to contact CTN or health care provider for questions related to their healthcare.     Reviewed and educated patient on any new and changed medications related to discharge diagnosis     Was patient discharged with a pulse oximeter? no    CTN provided contact information. Plan for follow-up call in 5-7 days based on severity of symptoms and risk factors.      Jenniffer Rubinstein, DNP, FNP-C, Care Transitions Team, (ph) [phone]

## 2022-08-17 ENCOUNTER — OFFICE VISIT (OUTPATIENT)
Dept: INTERNAL MEDICINE CLINIC | Age: 87
End: 2022-08-17
Payer: MEDICARE

## 2022-08-17 VITALS
DIASTOLIC BLOOD PRESSURE: 68 MMHG | TEMPERATURE: 98.3 F | RESPIRATION RATE: 18 BRPM | SYSTOLIC BLOOD PRESSURE: 118 MMHG | HEIGHT: 75 IN | WEIGHT: 200.7 LBS | OXYGEN SATURATION: 94 % | HEART RATE: 64 BPM | BODY MASS INDEX: 24.96 KG/M2

## 2022-08-17 DIAGNOSIS — E11.22 CONTROLLED TYPE 2 DIABETES MELLITUS WITH STAGE 3 CHRONIC KIDNEY DISEASE, WITHOUT LONG-TERM CURRENT USE OF INSULIN (HCC): ICD-10-CM

## 2022-08-17 DIAGNOSIS — I50.32 DIASTOLIC CHF, CHRONIC (HCC): ICD-10-CM

## 2022-08-17 DIAGNOSIS — I25.10 ASCVD (ARTERIOSCLEROTIC CARDIOVASCULAR DISEASE): ICD-10-CM

## 2022-08-17 DIAGNOSIS — N18.30 STAGE 3 CHRONIC KIDNEY DISEASE, UNSPECIFIED WHETHER STAGE 3A OR 3B CKD (HCC): ICD-10-CM

## 2022-08-17 DIAGNOSIS — I12.9 HYPERTENSION WITH RENAL DISEASE: Primary | ICD-10-CM

## 2022-08-17 DIAGNOSIS — I48.0 PAROXYSMAL ATRIAL FIBRILLATION (HCC): ICD-10-CM

## 2022-08-17 DIAGNOSIS — M35.3 POLYMYALGIA RHEUMATICA (HCC): ICD-10-CM

## 2022-08-17 DIAGNOSIS — L03.116 CELLULITIS OF LEFT LEG: ICD-10-CM

## 2022-08-17 DIAGNOSIS — N18.30 CONTROLLED TYPE 2 DIABETES MELLITUS WITH STAGE 3 CHRONIC KIDNEY DISEASE, WITHOUT LONG-TERM CURRENT USE OF INSULIN (HCC): ICD-10-CM

## 2022-08-17 DIAGNOSIS — U07.1 COVID-19: ICD-10-CM

## 2022-08-17 DIAGNOSIS — M15.9 PRIMARY OSTEOARTHRITIS INVOLVING MULTIPLE JOINTS: ICD-10-CM

## 2022-08-17 DIAGNOSIS — E78.2 MIXED HYPERLIPIDEMIA: ICD-10-CM

## 2022-08-17 PROCEDURE — 1101F PT FALLS ASSESS-DOCD LE1/YR: CPT | Performed by: INTERNAL MEDICINE

## 2022-08-17 PROCEDURE — 99214 OFFICE O/P EST MOD 30 MIN: CPT | Performed by: INTERNAL MEDICINE

## 2022-08-17 PROCEDURE — G8536 NO DOC ELDER MAL SCRN: HCPCS | Performed by: INTERNAL MEDICINE

## 2022-08-17 PROCEDURE — 3051F HG A1C>EQUAL 7.0%<8.0%: CPT | Performed by: INTERNAL MEDICINE

## 2022-08-17 PROCEDURE — 1123F ACP DISCUSS/DSCN MKR DOCD: CPT | Performed by: INTERNAL MEDICINE

## 2022-08-17 PROCEDURE — G8428 CUR MEDS NOT DOCUMENT: HCPCS | Performed by: INTERNAL MEDICINE

## 2022-08-17 PROCEDURE — G8510 SCR DEP NEG, NO PLAN REQD: HCPCS | Performed by: INTERNAL MEDICINE

## 2022-08-17 PROCEDURE — G8417 CALC BMI ABV UP PARAM F/U: HCPCS | Performed by: INTERNAL MEDICINE

## 2022-08-17 RX ORDER — CEFUROXIME AXETIL 250 MG/1
500 TABLET ORAL 2 TIMES DAILY
Qty: 20 TABLET | Refills: 0 | Status: SHIPPED | OUTPATIENT
Start: 2022-08-17 | End: 2022-09-19 | Stop reason: ALTCHOICE

## 2022-08-17 NOTE — PROGRESS NOTES
Chief Complaint   Patient presents with    Hypertension     3 month    Diabetes    Osteoarthritis    Ankle swelling    ED Follow-up     8/8/2022   [facility]    for  fever and chills  and left leg swelling   ankle  hurting and swelling     check toes   tested positive for covid        SUBJECTIVE:    June Cooney is a 80 y.o. male who returns in follow-up for his medical problems include hypertension, diabetes, hyperlipidemia, DJD, polymyalgia rheumatica, atrial fibrillation and follow-up from recent ER visit on August 8 when he presented there with COVID infection which was second bout of COVID. He was treated with Paxlovid and did well. He did have fevers and chills initially. He denies any chest pain, shortness of breath or cardiorespiratory complaints. There are no GI or  complaints. He notes no headaches, dizziness or neurologic complaints. He has no change of his chronic arthritic complaints. He does note some swelling and redness of the left lower extremity. There is no history of trauma there are no other complaints on complete review of systems. Current Outpatient Medications   Medication Sig Dispense Refill    cefUROXime (CEFTIN) 250 mg tablet Take 2 Tablets by mouth two (2) times a day. 20 Tablet 0    digoxin (LANOXIN) 0.125 mg tablet TAKE 1 TABLET BY MOUTH EVERY DAY 90 Tablet 3    diclofenac EC (VOLTAREN) 75 mg EC tablet TAKE 1 TABLET BY MOUTH TWICE A  Tablet 3    warfarin (COUMADIN) 5 mg tablet TAKE 2 TABLETS BY MOUTH ON MONDAY &FRIDAYS AND 1&1/2 TABLET DAILY ON ALL OTHER DAYS 145 Tablet 3    bumetanide (BUMEX) 2 mg tablet TAKE 1 TABLET BY MOUTH EVERY DAY 90 Tablet 3    predniSONE (DELTASONE) 10 mg tablet TAKE 1TAB BY MOUTH TWO (2) TIMES A DAY.  60 Tablet 5    glimepiride (AMARYL) 1 mg tablet TAKE 1 TABLET BY MOUTH EVERY DAY BEFORE BREAKFAST 90 Tablet 3    glucose blood VI test strips (Accu-Chek Merle Plus test strp) strip USE TO TEST BLOOD SUGAR ONCE DAILY 100 Strip PRN metFORMIN (GLUCOPHAGE) 500 mg tablet TAKE 1 TABLET BY MOUTH EVERY DAY IN THE MORNING WITH BREAKFAST AND ONE TABLET BEFORE DINNER 180 Tablet 3    simvastatin (ZOCOR) 20 mg tablet TAKE 1 TABLET BY MOUTH EVERY DAY 90 Tablet 3    lisinopriL (PRINIVIL, ZESTRIL) 5 mg tablet Take 1 Tablet by mouth daily. 30 Tablet prn    Blood-Glucose Meter monitoring kit Use to test blood sugar twice daily  DX:E11.22 1 Kit 0    glucose blood VI test strips (blood glucose test) strip Use to test blood sugar twice daily  DX:E11.22 100 Strip 1    cyanocobalamin (VITAMIN B12) 500 mcg tablet Take 500 mcg by mouth daily. acetaminophen (TYLENOL) 650 mg TbER Take 650 mg by mouth every eight (8) hours. multivitamins-minerals-lutein (MEN'S CENTRUM SILVER WITH LUTEIN) tab tablet Take 1 Tab by mouth daily. glucosamine 1,000 mg tab Take 2,000 mg by mouth daily. cholecalciferol (VITAMIN D3) 25 mcg (1,000 unit) cap Take 1,000 Units by mouth daily. DOCOSAHEXANOIC ACID/EPA (FISH OIL PO) Take 1,200 mg by mouth two (2) times a day. terazosin (HYTRIN) 2 mg capsule TAKE 1 CAPSULE BY MOUTH EVERY DAY 90 Capsule 3     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    Back pain 9/20/2017    BPH (benign prostatic hyperplasia) 9/20/2017    CHF (congestive heart failure) ([facility] [address] 9/20/2017    CKD (chronic kidney disease) 9/20/2017    COVID-19 11/2021    Diabetic acetonemia ([address]     DM (diabetes mellitus) ([facility] [address] 9/20/2017    Story: Diet Controlled    ED (erectile dysfunction) 9/20/2017    Edema 9/20/2017    Elevated CPK 9/20/2017    Comments: History of    Elevated LFTs 9/20/2017    Comments: History of    Elevated PSA 9/20/2017    Hypercholesteremia     Hyperlipidemia 9/20/2017    Hypertension     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    Nodule of right lung 9/20/2017    Story: Right    Polymyalgia ([address] 9/20/2017 Prostate enlargement      Past Surgical History:   Procedure Laterality Date    HX HEENT  06/12/2018    Dr. 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    HX MALIGNANT SKIN LESION EXCISION  09/2018    2 lesions on head    [place] ABDOMEN SURGERY PROC UNLISTED      hernia repair     No Known Allergies    REVIEW OF SYSTEMS:  General: negative for - chills or fever, or weight loss or gain  ENT: negative for - headaches, nasal congestion or tinnitus  Eyes: no blurred or visual changes  Neck: No stiffness or swollen nodes  Respiratory: negative for - cough, hemoptysis, shortness of breath or wheezing  Cardiovascular : negative for - chest pain, edema, palpitations or shortness of breath  Gastrointestinal: negative for - abdominal pain, blood in stools, heartburn or nausea/vomiting  Genito-Urinary: no dysuria, trouble voiding, or hematuria  Musculoskeletal: negative for - gait disturbance, joint pain, joint stiffness or joint swelling  Neurological: no TIA or stroke symptoms  Hematologic: no bruises, no bleeding  Lymphatic: no swollen glands  Integument: no lumps, mole changes, nail changes or rash. Redness and swelling of the left lower extremity  Endocrine:no malaise/lethargy poly uria or polydipsia or unexpected weight changes        Social History     Socioeconomic History    Marital status:    Tobacco Use    Smoking status: Never    Smokeless tobacco: Never   Vaping Use    Vaping Use: Never used   Substance and Sexual Activity    Alcohol use: No    Drug use: No    Sexual activity: Never     History reviewed. No pertinent family history.     OBJECTIVE:     Visit Vitals  /68 (BP 1 Location: Left upper arm, BP Patient Position: Sitting, BP Cuff Size: Small adult)   Pulse 64   Temp 98.3 °F (36.8 °C) (Oral)   Resp 18   Ht 6' 3\" (1.905 m)   Wt 200 lb 11.2 oz (91 kg)   SpO2 94%   BMI 25.09 kg/m²     CONSTITUTIONAL:   well nourished, appears age appropriate  EYES: sclera anicteric, PERRL, EOMI  ENMT:nares clear, moist mucous membranes, pharynx clear  NECK: supple. Thyroid normal, No JVD or bruits  RESPIRATORY: Chest: clear to ascultation and percussion, normal inspiratory effort  CARDIOVASCULAR: Heart: regular rate and rhythm no murmurs, rubs or gallops, PMI not displaced, No thrills, no peripheral edema  GASTROINTESTINAL: Abdomen: non distended, soft, non tender, bowel sounds normal  HEMATOLOGIC: no purpura, petechiae or bruising  LYMPHATIC: No lymph node enlargemant  MUSCULOSKELETAL: Extremities: no active synovitis, pulse 1+   INTEGUMENT: No unusual rashes or suspicious skin lesions noted. Nails appear normal.  Erythroderma slight increase in temperature and slight soft tissue swelling left lower extremity consistent with mild cellulitis  PERIPHERAL VASCULAR: normal pulses femoral, PT and DP  NEUROLOGIC: non-focal exam, A & O X 3  PSYCHIATRIC:, appropriate affect     ASSESSMENT:   1. Hypertension with renal disease    2. 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, without long-term current use of insulin ([address]    3. Mixed hyperlipidemia    4. Diastolic CHF, chronic ([address]    5. ASCVD (arteriosclerotic cardiovascular disease)    6. Paroxysmal atrial fibrillation (HCC)    7. Primary osteoarthritis involving multiple joints    8. Stage 3 chronic kidney disease, unspecified whether stage 3a or 3b CKD ([address]    9. Polymyalgia rheumatica ([address]    10. COVID-19    11. Cellulitis of left leg      Impression  1. Hypertension that is controlled so continue current therapy reviewed with him. 2.  Diabetes repeat status pending prior lab reviewed I will adjust if needed. 3.  Hyperlipidemia prior lab reviewed repeat status pending  4. Diastolic CHF compensated  5. ASCVD clinically stable  6. Paroxysmal atrial fibrillation currently in A. fib  7. DJD stable  8. CKD stage III repeat status pending  Benign polymyalgia rheumatica sed rate pending  10.   Recent report current COVID infection seems have recovered well  11 cellulitis left lower extremity we will treat that with Ceftin  Follow-up scheduled again for 1 month or sooner if there is a problem. I will call with lab results in interim. PLAN:  .  Orders Placed This Encounter    PROTHROMBIN TIME + INR    METABOLIC PANEL, COMPREHENSIVE    LIPID PANEL    CK    HEMOGLOBIN A1C WITH EAG    SED RATE (ESR)    cefUROXime (CEFTIN) 250 mg tablet         ATTENTION:   This medical record was transcribed using an electronic medical records system. Although proofread, it may and can contain electronic and spelling errors. Other human spelling and other errors may be present. Corrections may be executed at a later time. Please feel free to contact us for any clarifications as needed. Follow-up and Dispositions    Return in about 4 weeks (around 9/14/2022). No results found for any visits on 08/17/22. Aditya Bingham MD    The patient verbalized understanding of the problems and plans as explained.

## 2022-08-18 DIAGNOSIS — N40.0 BENIGN PROSTATIC HYPERPLASIA, UNSPECIFIED WHETHER LOWER URINARY TRACT SYMPTOMS PRESENT: ICD-10-CM

## 2022-08-18 LAB
ALBUMIN SERPL-MCNC: 3.2 G/DL (ref 3.5–5)
ALBUMIN/GLOB SERPL: 0.9 {RATIO} (ref 1.1–2.2)
ALP SERPL-CCNC: 63 U/L (ref 45–117)
ALT SERPL-CCNC: 38 U/L (ref 12–78)
ANION GAP SERPL CALC-SCNC: 8 MMOL/L (ref 5–15)
AST SERPL-CCNC: 18 U/L (ref 15–37)
BILIRUB SERPL-MCNC: 0.2 MG/DL (ref 0.2–1)
BUN SERPL-MCNC: 34 MG/DL (ref 6–20)
BUN/CREAT SERPL: 23 (ref 12–20)
CALCIUM SERPL-MCNC: 9.8 MG/DL (ref 8.5–10.1)
CHLORIDE SERPL-SCNC: 106 MMOL/L (ref 97–108)
CHOLEST SERPL-MCNC: 168 MG/DL
CK SERPL-CCNC: 54 U/L (ref 39–308)
CO2 SERPL-SCNC: 27 MMOL/L (ref 21–32)
CREAT SERPL-MCNC: 1.51 MG/DL (ref 0.7–1.3)
ERYTHROCYTE [SEDIMENTATION RATE] IN BLOOD: 83 MM/HR (ref 0–20)
EST. AVERAGE GLUCOSE BLD GHB EST-MCNC: 171 MG/DL
GLOBULIN SER CALC-MCNC: 3.5 G/DL (ref 2–4)
GLUCOSE SERPL-MCNC: 183 MG/DL (ref 65–100)
HBA1C MFR BLD: 7.6 % (ref 4–5.6)
HDLC SERPL-MCNC: 33 MG/DL
HDLC SERPL: 5.1 {RATIO} (ref 0–5)
INR PPP: 1.8 (ref 0.9–1.1)
LDLC SERPL CALC-MCNC: 68.6 MG/DL (ref 0–100)
POTASSIUM SERPL-SCNC: 4.1 MMOL/L (ref 3.5–5.1)
PROT SERPL-MCNC: 6.7 G/DL (ref 6.4–8.2)
PROTHROMBIN TIME: 17.7 SEC (ref 9–11.1)
SODIUM SERPL-SCNC: 141 MMOL/L (ref 136–145)
TRIGL SERPL-MCNC: 332 MG/DL (ref ?–150)
VLDLC SERPL CALC-MCNC: 66.4 MG/DL

## 2022-08-18 RX ORDER — TERAZOSIN 2 MG/1
CAPSULE ORAL
Qty: 90 CAPSULE | Refills: 3 | Status: SHIPPED | OUTPATIENT
Start: 2022-08-18

## 2022-08-18 RX ORDER — SIMVASTATIN 20 MG/1
TABLET, FILM COATED ORAL
Qty: 90 TABLET | Refills: 3 | Status: SHIPPED | OUTPATIENT
Start: 2022-08-18

## 2022-08-18 NOTE — TELEPHONE ENCOUNTER
RX refill request from the patient/pharmacy. Patient last seen 08- with labs, and next appt. scheduled for 09-  Requested Prescriptions     Pending Prescriptions Disp Refills    simvastatin (ZOCOR) 20 mg tablet [Pharmacy Med Name: SIMVASTATIN 20 MG TABLET] 90 Tablet 3     Sig: TAKE 1 TABLET BY MOUTH EVERY DAY    terazosin (HYTRIN) 2 mg capsule [Pharmacy Med Name: TERAZOSIN 2 MG CAPSULE] 90 Capsule 3     Sig: TAKE 1 CAPSULE BY MOUTH EVERY DAY    .

## 2022-08-19 ENCOUNTER — TELEPHONE ANTICOAG (OUTPATIENT)
Dept: INTERNAL MEDICINE CLINIC | Age: 87
End: 2022-08-19

## 2022-08-19 RX ORDER — GLIMEPIRIDE 2 MG/1
2 TABLET ORAL
Qty: 90 TABLET | Refills: 3 | Status: SHIPPED | OUTPATIENT
Start: 2022-08-19

## 2022-08-19 NOTE — TELEPHONE ENCOUNTER
RX refill request from the patient/pharmacy. Patient last seen 08- with labs, and next appt. scheduled for 09-  Requested Prescriptions     Pending Prescriptions Disp Refills    glimepiride (AMARYL) 2 mg tablet 90 Tablet 3     Sig: Take 1 Tablet by mouth Daily (before breakfast).     Jere Still

## 2022-08-19 NOTE — PROGRESS NOTES
Anticoagulation Summary  As of 2022      INR goal:  2.0-3.0   TTR:  37.7 % (4.4 y)   INR used for dosin.8 (2022)   Warfarin maintenance plan:  7.5 mg (5 mg x 1.5) every day   Weekly warfarin total:  52.5 mg   [facility]   Next INR check:  2022   Target end date:      Indications    Paroxysmal atrial fibrillation ([facility] [address] (Primary) [I48.0]                 Anticoagulation Episode Summary       INR check location:  Clinic Lab    Preferred lab:      Send INR reminders to:      Comments:            Anticoagulation Care Providers       Provider Role Specialty Phone number    Aamir Wilkins MD Responsible Internal Medicine Physician [phone]           Informed patient that PT/INR okay; continue the same dose of blood thinner which is 7.5 mg daily and f/up as scheduled.

## 2022-08-22 ENCOUNTER — PATIENT OUTREACH (OUTPATIENT)
Dept: CASE MANAGEMENT | Age: 87
End: 2022-08-22

## 2022-08-22 NOTE — PROGRESS NOTES
08/22/22     Patient resolved from [address] episode on 8/22/22. Discussed COVID-19 related testing which was available at this time. Test results were positive. Patient informed of results, if available? yes     Patient/family has been provided the following resources and education related to COVID-19:                         Signs, symptoms and red flags related to COVID-19            CDC exposure and quarantine guidelines            [employer] exposure contact - [phone]            Contact for their local Department of Health                 Patient currently reports that the following symptoms have improved: Patient reports he is doing better. He reports the swelling in his leg has resolved and that his PCP put him on a course of abx last week at his F/U appt for infections of that leg (?cellulitis). He confirms he has his next PCP F/U appt on 9/19. He states he has not had any doses of the COVID vaccine. On this call, I did not discuss his ACP and advise it does not have a date. I will forward this note to his PCP to inform. Pt denies having any questions or needing any further assistance at this time. I thanked him for the update, advised this is my final call and we disconnected. No further outreach scheduled with this CTN/ACM/LPN/HC/ MA. Episode of Care resolved. Patient has this CTN/ACM/LPN/HC/MA contact information if future needs arise.

## 2022-08-26 RX ORDER — METFORMIN HYDROCHLORIDE 500 MG/1
TABLET ORAL
Qty: 180 TABLET | Refills: 3 | Status: SHIPPED | OUTPATIENT
Start: 2022-08-26

## 2022-09-19 ENCOUNTER — OFFICE VISIT (OUTPATIENT)
Dept: INTERNAL MEDICINE CLINIC | Age: 87
End: 2022-09-19
Payer: MEDICARE

## 2022-09-19 VITALS
OXYGEN SATURATION: 96 % | RESPIRATION RATE: 17 BRPM | DIASTOLIC BLOOD PRESSURE: 74 MMHG | SYSTOLIC BLOOD PRESSURE: 138 MMHG | WEIGHT: 206.3 LBS | TEMPERATURE: 98.5 F | BODY MASS INDEX: 25.65 KG/M2 | HEIGHT: 75 IN | HEART RATE: 67 BPM

## 2022-09-19 DIAGNOSIS — G89.29 CHRONIC PAIN OF LEFT KNEE: ICD-10-CM

## 2022-09-19 DIAGNOSIS — N18.30 STAGE 3 CHRONIC KIDNEY DISEASE, UNSPECIFIED WHETHER STAGE 3A OR 3B CKD (HCC): ICD-10-CM

## 2022-09-19 DIAGNOSIS — M25.562 CHRONIC PAIN OF LEFT KNEE: ICD-10-CM

## 2022-09-19 DIAGNOSIS — I50.32 DIASTOLIC CHF, CHRONIC (HCC): Primary | ICD-10-CM

## 2022-09-19 DIAGNOSIS — M35.3 POLYMYALGIA RHEUMATICA (HCC): ICD-10-CM

## 2022-09-19 DIAGNOSIS — G89.29 CHRONIC PAIN OF LEFT ANKLE: ICD-10-CM

## 2022-09-19 DIAGNOSIS — I48.0 PAROXYSMAL ATRIAL FIBRILLATION (HCC): ICD-10-CM

## 2022-09-19 DIAGNOSIS — M25.572 CHRONIC PAIN OF LEFT ANKLE: ICD-10-CM

## 2022-09-19 PROCEDURE — 1123F ACP DISCUSS/DSCN MKR DOCD: CPT | Performed by: INTERNAL MEDICINE

## 2022-09-19 PROCEDURE — G8432 DEP SCR NOT DOC, RNG: HCPCS | Performed by: INTERNAL MEDICINE

## 2022-09-19 PROCEDURE — 99214 OFFICE O/P EST MOD 30 MIN: CPT | Performed by: INTERNAL MEDICINE

## 2022-09-19 PROCEDURE — G8536 NO DOC ELDER MAL SCRN: HCPCS | Performed by: INTERNAL MEDICINE

## 2022-09-19 PROCEDURE — 1101F PT FALLS ASSESS-DOCD LE1/YR: CPT | Performed by: INTERNAL MEDICINE

## 2022-09-19 PROCEDURE — G8427 DOCREV CUR MEDS BY ELIG CLIN: HCPCS | Performed by: INTERNAL MEDICINE

## 2022-09-19 PROCEDURE — G8417 CALC BMI ABV UP PARAM F/U: HCPCS | Performed by: INTERNAL MEDICINE

## 2022-09-19 RX ORDER — PREDNISONE 10 MG/1
10 TABLET ORAL SEE ADMIN INSTRUCTIONS
Qty: 48 TABLET | Refills: 0 | Status: SHIPPED | OUTPATIENT
Start: 2022-09-19 | End: 2022-10-19 | Stop reason: ALTCHOICE

## 2022-09-19 NOTE — PROGRESS NOTES
Chief Complaint   Patient presents with    CHF     1 month follow up    Irregular Heart Beat    Diabetes       SUBJECTIVE:    Pérez Dia. is a 80 y.o. male who returns in follow-up for his congestive heart failure, atrial fibrillation, CKD, hypertension in addition to his chronic problems as a more SMI acute problem of left knee and left ankle pain has been bothering him for a few weeks. He notes it is tender to walk on the left ankle. He has not had any falls or trauma. He also notes his knee is bothering him. He is noted no significant swelling in his knee but feels like there is some swelling in the ankle. He notes no fevers or chills. He denies any chest pain, shortness of breath, palpitations, PND, orthopnea or other cardiac or respiratory complaints. He notes no current GI or  complaints. He notes no headaches, dizziness or neurologic complaints. He has no other complaints on complete review of systems. Current Outpatient Medications   Medication Sig Dispense Refill    metFORMIN (GLUCOPHAGE) 500 mg tablet TAKE 1 TABLET BY MOUTH EVERY DAY IN THE MORNING WITH BREAKFAST AND ONE TABLET BEFORE DINNER 180 Tablet 3    glimepiride (AMARYL) 2 mg tablet Take 1 Tablet by mouth Daily (before breakfast). 90 Tablet 3    simvastatin (ZOCOR) 20 mg tablet TAKE 1 TABLET BY MOUTH EVERY DAY 90 Tablet 3    terazosin (HYTRIN) 2 mg capsule TAKE 1 CAPSULE BY MOUTH EVERY DAY 90 Capsule 3    digoxin (LANOXIN) 0.125 mg tablet TAKE 1 TABLET BY MOUTH EVERY DAY 90 Tablet 3    diclofenac EC (VOLTAREN) 75 mg EC tablet TAKE 1 TABLET BY MOUTH TWICE A  Tablet 3    warfarin (COUMADIN) 5 mg tablet TAKE 2 TABLETS BY MOUTH ON MONDAY &FRIDAYS AND 1&1/2 TABLET DAILY ON ALL OTHER DAYS 145 Tablet 3    bumetanide (BUMEX) 2 mg tablet TAKE 1 TABLET BY MOUTH EVERY DAY 90 Tablet 3    predniSONE (DELTASONE) 10 mg tablet TAKE 1TAB BY MOUTH TWO (2) TIMES A DAY.  60 Tablet 5    glucose blood VI test strips (Accu-Chek Merle Plus test strp) strip USE TO TEST BLOOD SUGAR ONCE DAILY 100 Strip PRN    lisinopriL (PRINIVIL, ZESTRIL) 5 mg tablet Take 1 Tablet by mouth daily. 30 Tablet prn    Blood-Glucose Meter monitoring kit Use to test blood sugar twice daily  DX:E11.22 1 Kit 0    glucose blood VI test strips (blood glucose test) strip Use to test blood sugar twice daily  DX:E11.22 100 Strip 1    cyanocobalamin (VITAMIN B12) 500 mcg tablet Take 500 mcg by mouth daily. acetaminophen (TYLENOL) 650 mg TbER Take 650 mg by mouth every eight (8) hours. multivitamins-minerals-lutein (MEN'S CENTRUM SILVER WITH LUTEIN) tab tablet Take 1 Tab by mouth daily. glucosamine 1,000 mg tab Take 2,000 mg by mouth daily. cholecalciferol (VITAMIN D3) 25 mcg (1,000 unit) cap Take 1,000 Units by mouth daily. DOCOSAHEXANOIC ACID/EPA (FISH OIL PO) Take 1,200 mg by mouth two (2) times a day. 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    Back pain 9/20/2017    BPH (benign prostatic hyperplasia) 9/20/2017    CHF (congestive heart failure) ([address] 9/20/2017    CKD (chronic kidney disease) 9/20/2017    COVID-19 11/2021    Diabetic acetonemia ([address]     DM (diabetes mellitus) ([facility] 9/20/2017    Story: Diet Controlled    ED (erectile dysfunction) 9/20/2017    Edema 9/20/2017    Elevated CPK 9/20/2017    Comments: History of    Elevated LFTs 9/20/2017    Comments: History of    Elevated PSA 9/20/2017    Hypercholesteremia     Hyperlipidemia 9/20/2017    Hypertension     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    HX MALIGNANT SKIN LESION EXCISION  09/2018    2 lesions on head [place] ABDOMEN SURGERY PROC UNLISTED      hernia repair     No Known Allergies    REVIEW OF SYSTEMS:  General: negative for - chills or fever, or weight loss or gain  ENT: negative for - headaches, nasal congestion or tinnitus  Eyes: no blurred or visual changes  Neck: No stiffness or swollen nodes  Respiratory: negative for - cough, hemoptysis, shortness of breath or wheezing  Cardiovascular : negative for - chest pain, edema, palpitations or shortness of breath  Gastrointestinal: negative for - abdominal pain, blood in stools, heartburn or nausea/vomiting  Genito-Urinary: no dysuria, trouble voiding, or hematuria  Musculoskeletal: negative for - gait disturbance, joint pain, joint stiffness or joint swelling except left knee and left ankle as noted  Neurological: no TIA or stroke symptoms  Hematologic: no bruises, no bleeding  Lymphatic: no swollen glands  Integument: no lumps, mole changes, nail changes or rash  Endocrine:no malaise/lethargy poly uria or polydipsia or unexpected weight changes        Social History     Socioeconomic History    Marital status:    Tobacco Use    Smoking status: Never    Smokeless tobacco: Never   Vaping Use    Vaping Use: Never used   Substance and Sexual Activity    Alcohol use: No    Drug use: No    Sexual activity: Never     History reviewed. No pertinent family history. OBJECTIVE:     Visit Vitals  /74   Pulse 67   Temp 98.5 °F (36.9 °C) (Oral)   Resp 17   Ht 6' 3\" (1.905 m)   Wt 206 lb 4.8 oz (93.6 kg)   SpO2 96%   BMI 25.79 kg/m²     CONSTITUTIONAL:   well nourished, appears age appropriate  EYES: sclera anicteric, PERRL, EOMI  ENMT:nares clear, moist mucous membranes, pharynx clear  NECK: supple.  Thyroid normal, No JVD or bruits  RESPIRATORY: Chest: clear to ascultation and percussion, normal inspiratory effort  CARDIOVASCULAR: Heart: regular rate and rhythm no murmurs, rubs or gallops, PMI not displaced, No thrills, no peripheral edema  GASTROINTESTINAL: Abdomen: non distended, soft, non tender, bowel sounds normal  HEMATOLOGIC: no purpura, petechiae or bruising  LYMPHATIC: No lymph node enlargemant  MUSCULOSKELETAL: Extremities: no active synovitis, pulse 1+. Left knee and left ankle tender to range of motion and palpation but no erythema increased temperature or joint effusion noted. INTEGUMENT: No unusual rashes or suspicious skin lesions noted. Nails appear normal.  PERIPHERAL VASCULAR: normal pulses femoral, PT and DP  NEUROLOGIC: non-focal exam, A & O X 3  PSYCHIATRIC:, appropriate affect     ASSESSMENT:   1. Diastolic CHF, chronic (HCC)    2. Paroxysmal atrial fibrillation ([facility] [address]    3. Polymyalgia rheumatica (HCC)    4. Stage 3 chronic kidney disease, unspecified whether stage 3a or 3b CKD ([facility] [address]    5. Chronic pain of left ankle    6. Chronic pain of left knee      Impression  1. Diastolic CHF chronic but seems to be compensated  2. Paroxysmal atrial fibrillation INR is pending  3. Polymyalgia rheumatica I am going to check a sed rate today   4. CKD stage III repeat status is pending   5. Chronic pain left ankle x-ray obtained today reveals moderately severe arthritic changes. 6.  Chronic pain left knee x-ray obtained today reveals some mild arthritic changes more severe on the lateral.  At this point I will put him on a 10 mg 12-day tapering prednisone Dosepak and then he will go back to his regular prednisone 10 mg daily after that. He is already taking diclofenac twice a day. If he does not improve with the above may need to send him for orthopedic referral for possible cortisone shot into the ankle. I will call with today's lab results. Follow-up with me again in 1 month or sooner should to be a problem. PLAN:  .  Orders Placed This Encounter    XR ANKLE LT MIN 3 V    XR KNEE LT 3 V    PROTHROMBIN TIME + INR    SED RATE (ESR)         ATTENTION:   This medical record was transcribed using an electronic medical records system.   Although proofread, it may and can contain electronic and spelling errors. Other human spelling and other errors may be present. Corrections may be executed at a later time. Please feel free to contact us for any clarifications as needed. Follow-up and Dispositions    Return in about 4 weeks (around 10/17/2022). No results found for any visits on 09/19/22. Lisa Ferrera MD    The patient verbalized understanding of the problems and plans as explained.

## 2022-09-19 NOTE — PROGRESS NOTES
Chief Complaint   Patient presents with    CHF     1 month follow up    Irregular Heart Beat    Diabetes     Visit Vitals  BP (!) 142/70 (BP 1 Location: Left upper arm, BP Patient Position: Sitting, BP Cuff Size: Adult)   Pulse 67   Temp 98.5 °F (36.9 °C) (Oral)   Resp 17   Ht 6' 3\" (1.905 m)   Wt 206 lb 4.8 oz (93.6 kg)   SpO2 96%   BMI 25.79 kg/m²     1. Have you been to the ER, urgent care clinic since your last visit? Hospitalized since your last visit? No    2. Have you seen or consulted any other health care providers outside of the [address] [place] [address] since your last visit? Include any pap smears or colon screening.  No

## 2022-09-20 ENCOUNTER — TELEPHONE (OUTPATIENT)
Dept: INTERNAL MEDICINE CLINIC | Age: 87
End: 2022-09-20

## 2022-09-20 LAB
COMMENT, HOLDF: NORMAL
ERYTHROCYTE [SEDIMENTATION RATE] IN BLOOD: 43 MM/HR (ref 0–20)
INR PPP: 4.5 (ref 0.9–1.1)
PROTHROMBIN TIME: 42.9 SEC (ref 9–11.1)
SAMPLES BEING HELD,HOLD: NORMAL

## 2022-09-20 NOTE — TELEPHONE ENCOUNTER
[facility] called. Spoke to Meche who reports a critical lab PT/INR. Reported this at 4.5 done yesterday. Discussed with Dr. Calista Mckeon and wants patient to hold his blood thinner dose  x 3 days and recheck a STAT PT/INR 9-. Patient informed and scheduled for f/up lab.

## 2022-09-22 RX ORDER — PREDNISONE 10 MG/1
TABLET ORAL
Qty: 60 TABLET | Refills: 5 | Status: SHIPPED | OUTPATIENT
Start: 2022-09-22

## 2022-09-22 NOTE — TELEPHONE ENCOUNTER
RX refill request from the patient/pharmacy. Patient last seen 09- with labs, and next appt. scheduled for 10-  Requested Prescriptions     Pending Prescriptions Disp Refills    predniSONE (DELTASONE) 10 mg tablet [Pharmacy Med Name: PREDNISONE 10 MG TABLET] 60 Tablet 5     Sig: TAKE 1TAB BY MOUTH TWO (2) TIMES A DAY. Celine Carreon

## 2022-09-26 ENCOUNTER — TELEPHONE ANTICOAG (OUTPATIENT)
Dept: INTERNAL MEDICINE CLINIC | Age: 87
End: 2022-09-26

## 2022-09-26 NOTE — PROGRESS NOTES
Anticoagulation Summary  As of 2022      INR goal:  2.0-3.0   TTR:  37.7 % (4.5 y)   INR used for dosin.2 (2022)   Warfarin maintenance plan:  7.5 mg (5 mg x 1.5) every day   Weekly warfarin total:  52.5 mg   Plan last modified:  Liz Day LPN ([id number])   Next INR check:  10/19/2022   Target end date:      Indications    Paroxysmal atrial fibrillation ([facility] [address] (Primary) [I48.0]                 Anticoagulation Episode Summary       INR check location:  Clinic Lab    Preferred lab:      Send INR reminders to:      Comments:            Anticoagulation Care Providers       Provider Role Specialty Phone number    Aamir Wilkins MD Responsible Internal Medicine Physician [phone]           Advised patient per Dr. Claudene Providence that INR is now 1.2. Dr. Claudene Providence recommends restarting his blood thinner dose at 7.5 mg everyday and f/up as scheduled.

## 2022-10-13 RX ORDER — LISINOPRIL 5 MG/1
TABLET ORAL
Qty: 90 TABLET | Refills: 3 | Status: SHIPPED | OUTPATIENT
Start: 2022-10-13

## 2022-10-13 NOTE — TELEPHONE ENCOUNTER
RX refill request from the patient/pharmacy. Patient last seen 09- with labs, and next appt. scheduled for 10-  Requested Prescriptions     Pending Prescriptions Disp Refills    lisinopriL (PRINIVIL, ZESTRIL) 5 mg tablet [Pharmacy Med Name: LISINOPRIL 5 MG TABLET] 90 Tablet 3     Sig: TAKE 1 TABLET BY MOUTH EVERY DAY    .

## 2022-10-18 NOTE — PROGRESS NOTES
Chief Complaint   Patient presents with    CHF     1 month follow up    Irregular Heart Beat       SUBJECTIVE:    Lars Manzo. is a 80 y.o. male who returns in follow-up for his medical problems include hypertension, atrial fibrillation, diastolic CHF compensated, CKD, polymyalgia rheumatica, DJD and other medical problems. He is taking his medication trying to follow his diet and remains physically active. With the higher dose of prednisone he feels much better now and is now back to 10 mg a day. He denies any chest pain, shortness of breath, palpitations, PND, orthopnea or other cardiac or respiratory complaints. He notes no current GI or  complaints. He has no headaches, dizziness or neurologic complaints. He has no change of his chronic arthritic complaints and there are no other complaints noted on complete review of systems. Current Outpatient Medications   Medication Sig Dispense Refill    lisinopriL (PRINIVIL, ZESTRIL) 5 mg tablet TAKE 1 TABLET BY MOUTH EVERY DAY 90 Tablet 3    predniSONE (DELTASONE) 10 mg tablet TAKE 1TAB BY MOUTH TWO (2) TIMES A DAY. (Patient taking differently: Indications: pt is taking 10 mg once daily) 60 Tablet 5    metFORMIN (GLUCOPHAGE) 500 mg tablet TAKE 1 TABLET BY MOUTH EVERY DAY IN THE MORNING WITH BREAKFAST AND ONE TABLET BEFORE DINNER 180 Tablet 3    glimepiride (AMARYL) 2 mg tablet Take 1 Tablet by mouth Daily (before breakfast).  90 Tablet 3    simvastatin (ZOCOR) 20 mg tablet TAKE 1 TABLET BY MOUTH EVERY DAY 90 Tablet 3    terazosin (HYTRIN) 2 mg capsule TAKE 1 CAPSULE BY MOUTH EVERY DAY 90 Capsule 3    digoxin (LANOXIN) 0.125 mg tablet TAKE 1 TABLET BY MOUTH EVERY DAY 90 Tablet 3    diclofenac EC (VOLTAREN) 75 mg EC tablet TAKE 1 TABLET BY MOUTH TWICE A  Tablet 3    warfarin (COUMADIN) 5 mg tablet TAKE 2 TABLETS BY MOUTH ON MONDAY &FRIDAYS AND 1&1/2 TABLET DAILY ON ALL OTHER DAYS 145 Tablet 3    bumetanide (BUMEX) 2 mg tablet TAKE 1 TABLET BY MOUTH EVERY DAY 90 Tablet 3    glucose blood VI test strips (Accu-Chek Merle Plus test strp) strip USE TO TEST BLOOD SUGAR ONCE DAILY 100 Strip PRN    Blood-Glucose Meter monitoring kit Use to test blood sugar twice daily  DX:E11.22 1 Kit 0    glucose blood VI test strips (blood glucose test) strip Use to test blood sugar twice daily  DX:E11.22 100 Strip 1    cyanocobalamin (VITAMIN B12) 500 mcg tablet Take 500 mcg by mouth daily. acetaminophen (TYLENOL) 650 mg TbER Take 650 mg by mouth every eight (8) hours. multivitamins-minerals-lutein (MEN'S CENTRUM SILVER WITH LUTEIN) tab tablet Take 1 Tab by mouth daily. glucosamine 1,000 mg tab Take 2,000 mg by mouth daily. cholecalciferol (VITAMIN D3) 25 mcg (1,000 unit) cap Take 1,000 Units by mouth daily. DOCOSAHEXANOIC ACID/EPA (FISH OIL PO) Take 1,200 mg by mouth two (2) times a day. 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    Back pain 9/20/2017    BPH (benign prostatic hyperplasia) 9/20/2017    CHF (congestive heart failure) ([address] 9/20/2017    CKD (chronic kidney disease) 9/20/2017    COVID-19 11/2021    Diabetic acetonemia ([address]     DM (diabetes mellitus) ([facility] [address] 9/20/2017    Story: Diet Controlled    ED (erectile dysfunction) 9/20/2017    Edema 9/20/2017    Elevated CPK 9/20/2017    Comments: History of    Elevated LFTs 9/20/2017    Comments: History of    Elevated PSA 9/20/2017    Hypercholesteremia     Hyperlipidemia 9/20/2017    Hypertension     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    HX MALIGNANT SKIN LESION EXCISION  09/2018    2 lesions on head    [place] ABDOMEN SURGERY PROC UNLISTED      hernia repair     No Known Allergies    REVIEW OF SYSTEMS:  General: negative for - chills or fever, or weight loss or gain  ENT: negative for - headaches, nasal congestion or tinnitus  Eyes: no blurred or visual changes  Neck: No stiffness or swollen nodes  Respiratory: negative for - cough, hemoptysis, shortness of breath or wheezing  Cardiovascular : negative for - chest pain, edema, palpitations or shortness of breath  Gastrointestinal: negative for - abdominal pain, blood in stools, heartburn or nausea/vomiting  Genito-Urinary: no dysuria, trouble voiding, or hematuria  Musculoskeletal: negative for - gait disturbance, joint pain, joint stiffness or joint swelling  Neurological: no TIA or stroke symptoms  Hematologic: no bruises, no bleeding  Lymphatic: no swollen glands  Integument: no lumps, mole changes, nail changes or rash  Endocrine:no malaise/lethargy poly uria or polydipsia or unexpected weight changes        Social History     Socioeconomic History    Marital status:    Tobacco Use    Smoking status: Never    Smokeless tobacco: Never   Vaping Use    Vaping Use: Never used   Substance and Sexual Activity    Alcohol use: No    Drug use: No    Sexual activity: Never     History reviewed. No pertinent family history. OBJECTIVE:     Visit Vitals  /62 (BP 1 Location: Left upper arm, BP Patient Position: Sitting, BP Cuff Size: Adult)   Pulse (!) 58   Temp 97.7 °F (36.5 °C) (Oral)   Resp 17   Ht 6' 3\" (1.905 m)   Wt 201 lb 3.2 oz (91.3 kg)   SpO2 96%   BMI 25.15 kg/m²     CONSTITUTIONAL:   well nourished, appears age appropriate  EYES: sclera anicteric, PERRL, EOMI  ENMT:nares clear, moist mucous membranes, pharynx clear  NECK: supple.  Thyroid normal, No JVD or bruits  RESPIRATORY: Chest: clear to ascultation and percussion, normal inspiratory effort  CARDIOVASCULAR: Heart: regular rate and rhythm no murmurs, rubs or gallops, PMI not displaced, No thrills, no peripheral edema  GASTROINTESTINAL: Abdomen: non distended, soft, non tender, bowel sounds normal  HEMATOLOGIC: no purpura, petechiae or bruising  LYMPHATIC: No lymph node enlargemant  MUSCULOSKELETAL: Extremities: no active synovitis, pulse 1+   INTEGUMENT: No unusual rashes or suspicious skin lesions noted. Nails appear normal.  PERIPHERAL VASCULAR: normal pulses femoral, PT and DP  NEUROLOGIC: non-focal exam, A & O X 3  PSYCHIATRIC:, appropriate affect     ASSESSMENT:   1. Diastolic CHF, chronic (HCC)    2. Paroxysmal atrial fibrillation (HCC)    3. Stage 3 chronic kidney disease, unspecified whether stage 3a or 3b CKD ([facility] [address]    4. Polymyalgia rheumatica ([address]    5. Needs flu shot      Impression  1. Diastolic CHF seems to be compensated   2. Paroxysmal atrial fibrillation we will check his INR  3. CKD stage III repeat status pending   4. Polymyalgia rheumatica sed rate pending on prednisone 10  Flu shot given today. Follow-up with me again 1 month or sooner should to be a problem. Next appointment is fasting annual checkup. PLAN:  .  Orders Placed This Encounter    Influenza, FLUAD, (age 72 y+), IM, PF, 0.5 mL    METABOLIC PANEL, BASIC    PROTHROMBIN TIME + INR    SED RATE (ESR)         ATTENTION:   This medical record was transcribed using an electronic medical records system. Although proofread, it may and can contain electronic and spelling errors. Other human spelling and other errors may be present. Corrections may be executed at a later time. Please feel free to contact us for any clarifications as needed. Follow-up and Dispositions    Return in about 4 weeks (around 11/16/2022). No results found for any visits on 10/19/22. Kyrie Lynne MD    The patient verbalized understanding of the problems and plans as explained.

## 2022-10-19 ENCOUNTER — OFFICE VISIT (OUTPATIENT)
Dept: INTERNAL MEDICINE CLINIC | Age: 87
End: 2022-10-19
Payer: MEDICARE

## 2022-10-19 VITALS
WEIGHT: 201.2 LBS | HEIGHT: 75 IN | HEART RATE: 58 BPM | DIASTOLIC BLOOD PRESSURE: 62 MMHG | TEMPERATURE: 97.7 F | OXYGEN SATURATION: 96 % | BODY MASS INDEX: 25.02 KG/M2 | RESPIRATION RATE: 17 BRPM | SYSTOLIC BLOOD PRESSURE: 128 MMHG

## 2022-10-19 DIAGNOSIS — N18.30 STAGE 3 CHRONIC KIDNEY DISEASE, UNSPECIFIED WHETHER STAGE 3A OR 3B CKD (HCC): ICD-10-CM

## 2022-10-19 DIAGNOSIS — M35.3 POLYMYALGIA RHEUMATICA (HCC): ICD-10-CM

## 2022-10-19 DIAGNOSIS — I50.32 DIASTOLIC CHF, CHRONIC (HCC): Primary | ICD-10-CM

## 2022-10-19 DIAGNOSIS — I48.0 PAROXYSMAL ATRIAL FIBRILLATION (HCC): ICD-10-CM

## 2022-10-19 DIAGNOSIS — Z23 NEEDS FLU SHOT: ICD-10-CM

## 2022-10-19 PROCEDURE — G8417 CALC BMI ABV UP PARAM F/U: HCPCS | Performed by: INTERNAL MEDICINE

## 2022-10-19 PROCEDURE — 1101F PT FALLS ASSESS-DOCD LE1/YR: CPT | Performed by: INTERNAL MEDICINE

## 2022-10-19 PROCEDURE — 1123F ACP DISCUSS/DSCN MKR DOCD: CPT | Performed by: INTERNAL MEDICINE

## 2022-10-19 PROCEDURE — G8427 DOCREV CUR MEDS BY ELIG CLIN: HCPCS | Performed by: INTERNAL MEDICINE

## 2022-10-19 PROCEDURE — G8510 SCR DEP NEG, NO PLAN REQD: HCPCS | Performed by: INTERNAL MEDICINE

## 2022-10-19 PROCEDURE — G8536 NO DOC ELDER MAL SCRN: HCPCS | Performed by: INTERNAL MEDICINE

## 2022-10-19 PROCEDURE — 99213 OFFICE O/P EST LOW 20 MIN: CPT | Performed by: INTERNAL MEDICINE

## 2022-10-19 PROCEDURE — 90694 VACC AIIV4 NO PRSRV 0.5ML IM: CPT | Performed by: INTERNAL MEDICINE

## 2022-10-19 PROCEDURE — G0008 ADMIN INFLUENZA VIRUS VAC: HCPCS | Performed by: INTERNAL MEDICINE

## 2022-10-19 NOTE — PROGRESS NOTES
Chief Complaint   Patient presents with    CHF     1 month follow up    Irregular Heart Beat     Visit Vitals  /62 (BP 1 Location: Left upper arm, BP Patient Position: Sitting, BP Cuff Size: Adult)   Pulse (!) 58   Temp 97.7 °F (36.5 °C) (Oral)   Resp 17   Ht 6' 3\" (1.905 m)   Wt 201 lb 3.2 oz (91.3 kg)   SpO2 96%   BMI 25.15 kg/m²     1. Have you been to the ER, urgent care clinic since your last visit? Hospitalized since your last visit? No    2. Have you seen or consulted any other health care providers outside of the [address] [place] [address] since your last visit? Include any pap smears or colon screening.  No

## 2022-10-20 LAB
ANION GAP SERPL CALC-SCNC: 4 MMOL/L (ref 5–15)
BUN SERPL-MCNC: 38 MG/DL (ref 6–20)
BUN/CREAT SERPL: 22 (ref 12–20)
CALCIUM SERPL-MCNC: 9.3 MG/DL (ref 8.5–10.1)
CHLORIDE SERPL-SCNC: 107 MMOL/L (ref 97–108)
CO2 SERPL-SCNC: 29 MMOL/L (ref 21–32)
CREAT SERPL-MCNC: 1.74 MG/DL (ref 0.7–1.3)
ERYTHROCYTE [SEDIMENTATION RATE] IN BLOOD: 39 MM/HR (ref 0–20)
GLUCOSE SERPL-MCNC: 240 MG/DL (ref 65–100)
INR PPP: 2.5 (ref 0.9–1.1)
POTASSIUM SERPL-SCNC: 5.2 MMOL/L (ref 3.5–5.1)
PROTHROMBIN TIME: 24.8 SEC (ref 9–11.1)
SODIUM SERPL-SCNC: 140 MMOL/L (ref 136–145)

## 2022-10-25 ENCOUNTER — TELEPHONE ANTICOAG (OUTPATIENT)
Dept: INTERNAL MEDICINE CLINIC | Age: 87
End: 2022-10-25

## 2022-10-25 NOTE — PROGRESS NOTES
Anticoagulation Summary  As of 10/25/2022      INR goal:  2.0-3.0   TTR:  37.7 % (4.5 y)   INR used for dosin.5 (10/19/2022)   Warfarin maintenance plan:  7.5 mg (5 mg x 1.5) every day   Weekly warfarin total:  52.5 mg   Plan last modified:  Linnette Veras ([id number])   Next INR check:  2022   Target end date:      Indications    Paroxysmal atrial fibrillation ([facility] [address] (Primary) [I48.0]                 Anticoagulation Episode Summary       INR check location:  Clinic Lab    Preferred lab:      Send INR reminders to:      Comments:            Anticoagulation Care Providers       Provider Role Specialty Phone number    Chantel Mock MD Responsible Internal Medicine Physician [phone]           Informed patient that lab work okay; continue the same dose of blood thinner which is 7.5 mg daily and f/up as scheduled.

## 2022-10-25 NOTE — PROGRESS NOTES
Results have been reviewed and abnormal results noted. Please see documentation in new EMR. Labs stable no changes needed. Advised to continue the same dose of blood thinner which is 7.5 mg daily. F/up as scheduled.

## 2022-11-17 PROBLEM — E55.9 VITAMIN D DEFICIENCY: Status: ACTIVE | Noted: 2022-11-17

## 2022-11-17 PROBLEM — Z00.00 MEDICARE ANNUAL WELLNESS VISIT, SUBSEQUENT: Status: ACTIVE | Noted: 2017-10-30

## 2022-11-18 ENCOUNTER — OFFICE VISIT (OUTPATIENT)
Dept: INTERNAL MEDICINE CLINIC | Age: 87
End: 2022-11-18
Payer: MEDICARE

## 2022-11-18 VITALS
OXYGEN SATURATION: 96 % | BODY MASS INDEX: 25.28 KG/M2 | TEMPERATURE: 98.4 F | HEIGHT: 75 IN | RESPIRATION RATE: 17 BRPM | DIASTOLIC BLOOD PRESSURE: 78 MMHG | SYSTOLIC BLOOD PRESSURE: 118 MMHG | HEART RATE: 66 BPM | WEIGHT: 203.3 LBS

## 2022-11-18 DIAGNOSIS — I48.0 PAROXYSMAL ATRIAL FIBRILLATION (HCC): ICD-10-CM

## 2022-11-18 DIAGNOSIS — Z13.39 ALCOHOL SCREENING: ICD-10-CM

## 2022-11-18 DIAGNOSIS — J30.89 CHRONIC NON-SEASONAL ALLERGIC RHINITIS: ICD-10-CM

## 2022-11-18 DIAGNOSIS — N18.30 CONTROLLED TYPE 2 DIABETES MELLITUS WITH STAGE 3 CHRONIC KIDNEY DISEASE, WITHOUT LONG-TERM CURRENT USE OF INSULIN (HCC): ICD-10-CM

## 2022-11-18 DIAGNOSIS — E11.22 CONTROLLED TYPE 2 DIABETES MELLITUS WITH STAGE 3 CHRONIC KIDNEY DISEASE, WITHOUT LONG-TERM CURRENT USE OF INSULIN (HCC): ICD-10-CM

## 2022-11-18 DIAGNOSIS — N18.30 STAGE 3 CHRONIC KIDNEY DISEASE, UNSPECIFIED WHETHER STAGE 3A OR 3B CKD (HCC): ICD-10-CM

## 2022-11-18 DIAGNOSIS — L03.116 CELLULITIS OF LEFT LOWER EXTREMITY: ICD-10-CM

## 2022-11-18 DIAGNOSIS — E55.9 VITAMIN D DEFICIENCY: ICD-10-CM

## 2022-11-18 DIAGNOSIS — I25.10 ASCVD (ARTERIOSCLEROTIC CARDIOVASCULAR DISEASE): ICD-10-CM

## 2022-11-18 DIAGNOSIS — Z00.00 MEDICARE ANNUAL WELLNESS VISIT, SUBSEQUENT: ICD-10-CM

## 2022-11-18 DIAGNOSIS — R97.20 ELEVATED PSA: ICD-10-CM

## 2022-11-18 DIAGNOSIS — I50.32 DIASTOLIC CHF, CHRONIC (HCC): ICD-10-CM

## 2022-11-18 DIAGNOSIS — E78.2 MIXED HYPERLIPIDEMIA: ICD-10-CM

## 2022-11-18 DIAGNOSIS — I12.9 HYPERTENSION WITH RENAL DISEASE: Primary | ICD-10-CM

## 2022-11-18 DIAGNOSIS — L97.521 ULCER OF LEFT FOOT, LIMITED TO BREAKDOWN OF SKIN (HCC): ICD-10-CM

## 2022-11-18 DIAGNOSIS — N40.0 BENIGN PROSTATIC HYPERPLASIA WITHOUT LOWER URINARY TRACT SYMPTOMS: ICD-10-CM

## 2022-11-18 DIAGNOSIS — M15.9 PRIMARY OSTEOARTHRITIS INVOLVING MULTIPLE JOINTS: ICD-10-CM

## 2022-11-18 DIAGNOSIS — M35.3 POLYMYALGIA RHEUMATICA (HCC): ICD-10-CM

## 2022-11-18 LAB
25(OH)D3 SERPL-MCNC: 38.4 NG/ML (ref 30–100)
ALBUMIN SERPL-MCNC: 3.3 G/DL (ref 3.5–5)
ALBUMIN/GLOB SERPL: 1.1 {RATIO} (ref 1.1–2.2)
ALP SERPL-CCNC: 57 U/L (ref 45–117)
ALT SERPL-CCNC: 26 U/L (ref 12–78)
ANION GAP SERPL CALC-SCNC: 4 MMOL/L (ref 5–15)
APPEARANCE UR: CLEAR
AST SERPL-CCNC: 16 U/L (ref 15–37)
BACTERIA URNS QL MICRO: NEGATIVE /HPF
BASOPHILS # BLD: 0 K/UL (ref 0–0.1)
BASOPHILS NFR BLD: 0 % (ref 0–1)
BILIRUB SERPL-MCNC: 0.3 MG/DL (ref 0.2–1)
BILIRUB UR QL: NEGATIVE
BUN SERPL-MCNC: 25 MG/DL (ref 6–20)
BUN/CREAT SERPL: 20 (ref 12–20)
CALCIUM SERPL-MCNC: 9.3 MG/DL (ref 8.5–10.1)
CHLORIDE SERPL-SCNC: 107 MMOL/L (ref 97–108)
CHOLEST SERPL-MCNC: 194 MG/DL
CK SERPL-CCNC: 76 U/L (ref 39–308)
CO2 SERPL-SCNC: 31 MMOL/L (ref 21–32)
COLOR UR: NORMAL
COMMENT, HOLDF: NORMAL
CREAT SERPL-MCNC: 1.26 MG/DL (ref 0.7–1.3)
CREAT UR-MCNC: 124 MG/DL
DIFFERENTIAL METHOD BLD: ABNORMAL
EOSINOPHIL # BLD: 0.1 K/UL (ref 0–0.4)
EOSINOPHIL NFR BLD: 1 % (ref 0–7)
EPITH CASTS URNS QL MICRO: NORMAL /LPF
ERYTHROCYTE [DISTWIDTH] IN BLOOD BY AUTOMATED COUNT: 14.1 % (ref 11.5–14.5)
ERYTHROCYTE [SEDIMENTATION RATE] IN BLOOD: 49 MM/HR (ref 0–20)
EST. AVERAGE GLUCOSE BLD GHB EST-MCNC: 169 MG/DL
GLOBULIN SER CALC-MCNC: 3.1 G/DL (ref 2–4)
GLUCOSE SERPL-MCNC: 158 MG/DL (ref 65–100)
GLUCOSE UR STRIP.AUTO-MCNC: NEGATIVE MG/DL
HBA1C MFR BLD: 7.5 % (ref 4–5.6)
HCT VFR BLD AUTO: 35.7 % (ref 36.6–50.3)
HDLC SERPL-MCNC: 33 MG/DL
HDLC SERPL: 5.9 {RATIO} (ref 0–5)
HGB BLD-MCNC: 10.9 G/DL (ref 12.1–17)
HGB UR QL STRIP: NEGATIVE
HYALINE CASTS URNS QL MICRO: NORMAL /LPF (ref 0–5)
IMM GRANULOCYTES # BLD AUTO: 0.1 K/UL (ref 0–0.04)
IMM GRANULOCYTES NFR BLD AUTO: 1 % (ref 0–0.5)
KETONES UR QL STRIP.AUTO: NEGATIVE MG/DL
LDLC SERPL CALC-MCNC: 88 MG/DL (ref 0–100)
LEUKOCYTE ESTERASE UR QL STRIP.AUTO: NEGATIVE
LYMPHOCYTES # BLD: 1.8 K/UL (ref 0.8–3.5)
LYMPHOCYTES NFR BLD: 26 % (ref 12–49)
MCH RBC QN AUTO: 30.2 PG (ref 26–34)
MCHC RBC AUTO-ENTMCNC: 30.5 G/DL (ref 30–36.5)
MCV RBC AUTO: 98.9 FL (ref 80–99)
MICROALBUMIN UR-MCNC: 1.03 MG/DL
MICROALBUMIN/CREAT UR-RTO: 8 MG/G (ref 0–30)
MONOCYTES # BLD: 0.6 K/UL (ref 0–1)
MONOCYTES NFR BLD: 8 % (ref 5–13)
NEUTS SEG # BLD: 4.4 K/UL (ref 1.8–8)
NEUTS SEG NFR BLD: 64 % (ref 32–75)
NITRITE UR QL STRIP.AUTO: NEGATIVE
NRBC # BLD: 0 K/UL (ref 0–0.01)
NRBC BLD-RTO: 0 PER 100 WBC
PH UR STRIP: 6 [PH] (ref 5–8)
PLATELET # BLD AUTO: 202 K/UL (ref 150–400)
PMV BLD AUTO: 10.7 FL (ref 8.9–12.9)
POTASSIUM SERPL-SCNC: 4.7 MMOL/L (ref 3.5–5.1)
PROT SERPL-MCNC: 6.4 G/DL (ref 6.4–8.2)
PROT UR STRIP-MCNC: NEGATIVE MG/DL
PSA SERPL-MCNC: 5.4 NG/ML (ref 0.01–4)
RBC # BLD AUTO: 3.61 M/UL (ref 4.1–5.7)
RBC #/AREA URNS HPF: NORMAL /HPF (ref 0–5)
SAMPLES BEING HELD,HOLD: NORMAL
SODIUM SERPL-SCNC: 142 MMOL/L (ref 136–145)
SP GR UR REFRACTOMETRY: 1.02 (ref 1–1.03)
T4 SERPL-MCNC: 8.5 UG/DL (ref 4.5–12.1)
TRIGL SERPL-MCNC: 365 MG/DL (ref ?–150)
TSH SERPL DL<=0.05 MIU/L-ACNC: 1.17 UIU/ML (ref 0.36–3.74)
UA: UC IF INDICATED,UAUC: NORMAL
UROBILINOGEN UR QL STRIP.AUTO: 0.2 EU/DL (ref 0.2–1)
VLDLC SERPL CALC-MCNC: 73 MG/DL
WBC # BLD AUTO: 6.9 K/UL (ref 4.1–11.1)
WBC URNS QL MICRO: NORMAL /HPF (ref 0–4)

## 2022-11-18 PROCEDURE — 1123F ACP DISCUSS/DSCN MKR DOCD: CPT | Performed by: INTERNAL MEDICINE

## 2022-11-18 PROCEDURE — G8536 NO DOC ELDER MAL SCRN: HCPCS | Performed by: INTERNAL MEDICINE

## 2022-11-18 PROCEDURE — 93000 ELECTROCARDIOGRAM COMPLETE: CPT | Performed by: INTERNAL MEDICINE

## 2022-11-18 PROCEDURE — G8417 CALC BMI ABV UP PARAM F/U: HCPCS | Performed by: INTERNAL MEDICINE

## 2022-11-18 PROCEDURE — 99215 OFFICE O/P EST HI 40 MIN: CPT | Performed by: INTERNAL MEDICINE

## 2022-11-18 PROCEDURE — G8427 DOCREV CUR MEDS BY ELIG CLIN: HCPCS | Performed by: INTERNAL MEDICINE

## 2022-11-18 PROCEDURE — G0439 PPPS, SUBSEQ VISIT: HCPCS | Performed by: INTERNAL MEDICINE

## 2022-11-18 PROCEDURE — 3051F HG A1C>EQUAL 7.0%<8.0%: CPT | Performed by: INTERNAL MEDICINE

## 2022-11-18 PROCEDURE — 1101F PT FALLS ASSESS-DOCD LE1/YR: CPT | Performed by: INTERNAL MEDICINE

## 2022-11-18 PROCEDURE — G8510 SCR DEP NEG, NO PLAN REQD: HCPCS | Performed by: INTERNAL MEDICINE

## 2022-11-18 PROCEDURE — G0442 ANNUAL ALCOHOL SCREEN 15 MIN: HCPCS | Performed by: INTERNAL MEDICINE

## 2022-11-18 RX ORDER — DOXYCYCLINE 100 MG/1
100 TABLET ORAL 2 TIMES DAILY
Qty: 20 TABLET | Refills: 0 | Status: SHIPPED | OUTPATIENT
Start: 2022-11-18

## 2022-11-18 NOTE — PROGRESS NOTES
HIPAA verified by two patient identifiers. Neyda Conde is a 80 y.o. male    Chief Complaint   Patient presents with    Annual Wellness Visit       Visit Vitals  /78 (BP 1 Location: Left upper arm, BP Patient Position: Sitting, BP Cuff Size: Adult long)   Pulse 66   Temp 98.4 °F (36.9 °C) (Oral)   Resp 17   Ht 6' 3\" (1.905 m)   Wt 203 lb 4.8 oz (92.2 kg)   SpO2 96%   BMI 25.41 kg/m²       Pain Scale: 0 - No pain/10  Pain Location:       Health Maintenance Due   Topic Date Due    COVID-19 Vaccine (1) Never done    Eye Exam Retinal or Dilated  Never done    Shingrix Vaccine Age 50> (1 of 2) Never done    MICROALBUMIN Q1  11/24/2022    Medicare Yearly Exam  11/25/2022         Coordination of Care Questionnaire:  :   1) Have you been to an emergency room, urgent care, or hospitalized since your last visit? If yes, where when, and reason for visit? no       2. Have seen or consulted any other health care provider since your last visit? If yes, where when, and reason for visit? NO      Patient is accompanied by self I have received verbal consent from Neyda Conde. to discuss any/all medical information while they are present in the room.

## 2022-11-18 NOTE — PATIENT INSTRUCTIONS
Medicare Wellness Visit, Male    The best way to live healthy is to have a lifestyle where you eat a well-balanced diet, exercise regularly, limit alcohol use, and quit all forms of tobacco/nicotine, if applicable. Regular preventive services are another way to keep healthy. Preventive services (vaccines, screening tests, monitoring & exams) can help personalize your care plan, which helps you manage your own care. Screening tests can find health problems at the earliest stages, when they are easiest to treat. [facility] follows the current, evidence-based guidelines published by the [facility] ([facility]) when recommending preventive services for our patients. Because we follow these guidelines, sometimes recommendations change over time as research supports it. (For example, a prostate screening blood test is no longer routinely recommended for men with no symptoms). Of course, you and your doctor may decide to screen more often for some diseases, based on your risk and co-morbidities (chronic disease you are already diagnosed with). Preventive services for you include:  - Medicare offers their members a free annual wellness visit, which is time for you and your primary care provider to discuss and plan for your preventive service needs. Take advantage of this benefit every year!  -All adults over age 72 should receive the recommended pneumonia vaccines. Current USPSTF guidelines recommend a series of two vaccines for the best pneumonia protection.   -All adults should have a flu vaccine yearly and tetanus vaccine every 10 years.  -All adults age 48 and older should receive the shingles vaccines (series of two vaccines).        -All adults age 38-68 who are overweight should have a diabetes screening test once every three years.   -Other screening tests & preventive services for persons with diabetes include: an eye exam to screen for diabetic retinopathy, a kidney function test, a foot exam, and stricter control over your cholesterol.   -Cardiovascular screening for adults with routine risk involves an electrocardiogram (ECG) at intervals determined by the provider.   -Colorectal cancer screening should be done for adults age 54-65 with no increased risk factors for colorectal cancer. There are a number of acceptable methods of screening for this type of cancer. Each test has its own benefits and drawbacks. Discuss with your provider what is most appropriate for you during your annual wellness visit. The different tests include: colonoscopy (considered the best screening method), a fecal occult blood test, a fecal DNA test, and sigmoidoscopy.  -All adults born between [facility] should be screened once for Hepatitis C.  -An Abdominal Aortic Aneurysm (AAA) Screening is recommended for men age 73-68 who has ever smoked in their lifetime.      Here is a list of your current Health Maintenance items (your personalized list of preventive services) with a due date:  Health Maintenance Due   Topic Date Due    COVID-19 Vaccine (1) Never done    Eye Exam  Never done    Shingles Vaccine (1 of 2) Never done    Albumin Urine Test  11/24/2022    Annual Well Visit  11/25/2022

## 2022-11-18 NOTE — PROGRESS NOTES
This is a Subsequent Medicare Annual Wellness Visit providing Personalized Prevention Plan Services (PPPS) (Performed 12 months after initial AWV and PPPS )    I have reviewed the patient's medical history in detail and updated the computerized patient record. He returns today for his Medicare subsequent annual wellness examination and screening questionnaire. He is also in follow-up of his multiple medical problems include hypertension, diabetes, hyperlipidemia, ASCVD, paroxysmal atrial fibrillation, chronic diastolic CHF compensated, previous ulcers of both feet initially resolved never recurrence ulcer dorsum of left first toe, BPH, allergic rhinitis, DJD, CKD stage III, prior elevated PSA, polymyalgia rheumatica on chronic low-dose prednisone, and other multiple medical problems. As noted he has not had a recurrence of the ulcer over the dorsum of his left first toe and is also noting some redness of the toe. He notes no fevers or chills. He does have some chronic arthritic complaints of the left knee that have not increased. He notes no chest pain, shortness of breath, palpitations, PND, orthopnea or other cardiac or respiratory complaints. He notes no current GI or  complaints. He notes no headaches, dizziness or neurologic complaints. He has no change of his chronic arthritic complaints. His weakness in his legs from polymyalgia rheumatica seems to be resolved. He has no other complaints on complete review of systems. History     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    Back pain 9/20/2017    BPH (benign prostatic hyperplasia) 9/20/2017    CHF (congestive heart failure) ([facility] [address] 9/20/2017    CKD (chronic kidney disease) 9/20/2017    COVID-19 11/2021    Diabetic acetonemia ([facility] [address]     DM (diabetes mellitus) ([facility] [address] 9/20/2017    Story: Diet Controlled    ED (erectile dysfunction) 9/20/2017    Edema 9/20/2017    Elevated CPK 9/20/2017    Comments: History of    Elevated LFTs 9/20/2017    Comments: History of    Elevated PSA 9/20/2017    Hypercholesteremia     Hyperlipidemia 9/20/2017    Hypertension     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    Nodule of right lung 9/20/2017    Story: Right    Polymyalgia ([address] 9/20/2017    Prostate enlargement       Past Surgical History:   Procedure Laterality Date    HX HEENT  06/12/2018    Dr. 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 20 Tablet 0    lisinopriL (PRINIVIL, ZESTRIL) 5 mg tablet TAKE 1 TABLET BY MOUTH EVERY DAY 90 Tablet 3    predniSONE (DELTASONE) 10 mg tablet TAKE 1TAB BY MOUTH TWO (2) TIMES A DAY. (Patient taking differently: Indications: pt is taking 10 mg once daily) 60 Tablet 5    metFORMIN (GLUCOPHAGE) 500 mg tablet TAKE 1 TABLET BY MOUTH EVERY DAY IN THE MORNING WITH BREAKFAST AND ONE TABLET BEFORE DINNER 180 Tablet 3    glimepiride (AMARYL) 2 mg tablet Take 1 Tablet by mouth Daily (before breakfast).  90 Tablet 3    simvastatin (ZOCOR) 20 mg tablet TAKE 1 TABLET BY MOUTH EVERY DAY 90 Tablet 3    terazosin (HYTRIN) 2 mg capsule TAKE 1 CAPSULE BY MOUTH EVERY DAY 90 Capsule 3    digoxin (LANOXIN) 0.125 mg tablet TAKE 1 TABLET BY MOUTH EVERY DAY 90 Tablet 3    diclofenac EC (VOLTAREN) 75 mg EC tablet TAKE 1 TABLET BY MOUTH TWICE A  Tablet 3    warfarin (COUMADIN) 5 mg tablet TAKE 2 TABLETS BY MOUTH ON MONDAY &FRIDAYS AND 1&1/2 TABLET DAILY ON ALL OTHER DAYS 145 Tablet 3    bumetanide (BUMEX) 2 mg tablet TAKE 1 TABLET BY MOUTH EVERY DAY 90 Tablet 3    glucose blood VI test strips (Accu-Chek Merle Plus test strp) strip USE TO TEST BLOOD SUGAR ONCE DAILY 100 Strip PRN    Blood-Glucose Meter monitoring kit Use to test blood sugar twice daily  DX:E11.22 1 Kit 0    glucose blood VI test strips (blood glucose test) strip Use to test blood sugar twice daily  DX:E11.22 100 Strip 1    cyanocobalamin (VITAMIN B12) 500 mcg tablet Take 500 mcg by mouth daily. acetaminophen (TYLENOL) 650 mg TbER Take 650 mg by mouth every eight (8) hours. multivitamins-minerals-lutein (MEN'S CENTRUM SILVER WITH LUTEIN) tab tablet Take 1 Tab by mouth daily. glucosamine 1,000 mg tab Take 2,000 mg by mouth daily. cholecalciferol (VITAMIN D3) 25 mcg (1,000 unit) cap Take 1,000 Units by mouth daily. DOCOSAHEXANOIC ACID/EPA (FISH OIL PO) Take 1,200 mg by mouth two (2) times a day. No Known Allergies  History reviewed. No pertinent family history. Patient Active Problem List    Diagnosis    Diastolic CHF, chronic ([address]     Echo 7//4/66  - mild diastolic dysfunction with EFx 45%      Paroxysmal atrial fibrillation (HCC)    ASCVD (arteriosclerotic cardiovascular disease)     Story:  Old ASMI by EKG      Mixed hyperlipidemia    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, without long-term current use of insulin (HCC)     Story: Diet Controlled      Hypertension with renal disease    Stage 3 chronic kidney disease (HCC)    Primary osteoarthritis involving multiple joints    Polymyalgia rheumatica (HCC)    Chronic non-seasonal allergic rhinitis    BPH (benign prostatic hyperplasia)    Vitamin D deficiency    Chronic pain of both knees    Age-related cataract of both eyes    COVID-19     Dx 11/30/2021 Tx Monoclonal Ab  Second COVID infection 8/8/2022 treated with Paxlovid        Primary osteoarthritis of right knee    Primary osteoarthritis of left shoulder    Primary osteoarthritis of left knee    Bilateral leg edema    Ulcer of left foot, limited to breakdown of skin (HCC)     Right second toe      Near syncope    Alcohol screening    Primary osteoarthritis of left hip    Dizziness    Medicare annual wellness visit, subsequent    Elevated PSA    Nodule of right lung     Story: Right      ED (erectile dysfunction)    Back pain       Patient Care Team:  Ila Smith MD as PCP - General (Internal Medicine Physician)  Ila Smith MD as PCP [facility]    Depression Risk Factor Screening:     3 most recent PHQ Screens 11/18/2022   Little interest or pleasure in doing things Not at all   Feeling down, depressed, irritable, or hopeless Not at all   Total Score PHQ 2 0     Alcohol Risk Factor Screening: You do not drink alcohol or very rarely. Functional Ability and Level of Safety:     Fall Risk     Fall Risk Assessment, last 12 mths 11/18/2022   Able to walk? Yes   Fall in past 12 months? 0   Do you feel unsteady? 0   Are you worried about falling 0   Is TUG test greater than 12 seconds? -   Is the gait abnormal? -   Number of falls in past 12 months -   Fall with injury? -       Hearing Loss   mild    Activities of Daily Living   Self-care.    ADL Assessment 11/18/2022   Feeding yourself No Help Needed   Getting from bed to chair No Help Needed   Getting dressed No Help Needed   Bathing or showering No Help Needed   Walk across the room (includes cane/walker) No Help Needed   Using the telphone No Help Needed   Taking your medications No Help Needed   Preparing meals No Help Needed   Managing money (expenses/bills) No Help Needed   Moderately strenuous housework (laundry) No Help Needed   Shopping for personal items (toiletries/medicines) No Help Needed   Shopping for groceries No Help Needed   Driving No Help Needed   Climbing a flight of stairs No Help Needed   Getting to places beyond walking distances No Help Needed Abuse Screen   Patient is not abused    Social History     Social History Narrative    Not on file       Review of Systems      ROS:    Constitutional: He denies fevers, weight loss, sweats. Eyes: No blurred or double vision. ENT: No difficulty with swallowing, taste, speech or smell. Neck: no stiffness or swelling  Respiratory: No cough wheezing or shortness of breath. Cardiovascular: Denies chest pain, palpitations, unexplained indigestion or syncope. Gastrointestinal:  No changes in bowel movements, no abdominal pain, no bloating. Genitourinary:  He denies frequency, nocturia or stranguria. Extremities: No change of his chronic joint pain, stiffness or swelling. Neurological:  No numbness, tingling, burring paresthesias or loss of motor strength. No syncope, dizziness or frequent headache  Lymphatic: no adenopathy noted  Hematologic: no easy bruising or bleeding gums  Skin:  No recent rashes or mole changes. Recurrent ulcer dorsum left first toe with redness of the toe  Psychiatric/Behavioral:  Negative for depression. Physical Examination     Evaluation of Cognitive Function:  Mood/affect:  happy  Appearance: age appropriate  Family member/caregiver input: None    Vitals:    11/18/22 0933   BP: 118/78   Pulse: 66   Resp: 17   Temp: 98.4 °F (36.9 °C)   TempSrc: Oral   SpO2: 96%   Weight: 203 lb 4.8 oz (92.2 kg)   Height: 6' 3\" (1.905 m)   PainSc:   0 - No pain        PHYSICAL EXAM:    General appearance - alert, well appearing, and in no distress  Mental status - alert, oriented to person, place, and time  HEENT:  Ears - bilateral TM's and external ear canals clear  Eyes - pupillary responses were normal.  Extraocular muscle function intact. Lids and conjunctiva not injected. Fundoscopic exam revealed sharp disc margins. eye movements intact  Pharynx- clear with teeth in good repair. No masses were noted  Neck - supple without thyromegaly or burit.   No JVD noted  Lungs - clear to auscultation and percussion  Cardiac- normal rate, regular rhythm without murmurs. PMI not displaced. No gallop, rub or click  Abdomen - flat, soft, non-tender without palpable organomegaly or mass. No pulsatile mass was felt, and not bruit was heard. Bowel sounds were active  : Circumcised, Testes descended w/o masses  Rectal: normal sphincter tone, prostate 1+ enlarged, smooth and nontender without nodules, no masses, stool brown and hemacult negative  Extremities -  no clubbing cyanosis or edema  Lymphatics - no palpable lymphadenopathy, no hepatosplenomegaly  Hematologic: no petechiae or purpura  Peripheral vascular -Femoral, Dorsalis pedis and posterior tibial pulses felt without difficulty  Skin - no rash or unusual mole change noted. Superficial ulcer of the left first toe dorsum of the foot. No evidence of abscess formation but there is surrounding cellulitis involving the entire toe. No streaking is noted. Neurological - Cranial nerves II-XII grossly intact. Motor strength 5/5. DTR's 2+ and symmetric.   Station and gait normal  Back exam - full range of motion, no tenderness, palpable spasm or pain on motion  Musculoskeletal - no joint tenderness, deformity or swelling       Results for orders placed or performed in visit on 10/19/22   SED RATE (ESR)   Result Value Ref Range    Sed rate, automated 39 (H) 0 - 20 mm/hr   PROTHROMBIN TIME + INR   Result Value Ref Range    INR 2.5 (H) 0.9 - 1.1      Prothrombin time 24.8 (H) 9.0 - 24.9 sec   METABOLIC PANEL, BASIC   Result Value Ref Range    Sodium 140 136 - 145 mmol/L    Potassium 5.2 (H) 3.5 - 5.1 mmol/L    Chloride 107 97 - 108 mmol/L    CO2 29 21 - 32 mmol/L    Anion gap 4 (L) 5 - 15 mmol/L    Glucose 240 (H) 65 - 100 mg/dL    BUN 38 (H) 6 - 20 MG/DL    Creatinine 1.74 (H) 0.70 - 1.30 MG/DL    BUN/Creatinine ratio 22 (H) 12 - 20      eGFR 37 (L) >60 ml/min/1.73m2    Calcium 9.3 8.5 - 10.1 MG/DL       Advice/Referrals/Counseling   Education and counseling provided:  Are appropriate based on today's review and evaluation  End-of-Life planning (with patient's consent)  Pneumococcal Vaccine  Influenza Vaccine  Colorectal cancer screening tests      Assessment/Plan     ASSESSMENT:   1. Hypertension with renal disease    2. 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, without long-term current use of insulin ([facility] [address]    3. Mixed hyperlipidemia    4. Primary osteoarthritis involving multiple joints    5. Stage 3 chronic kidney disease, unspecified whether stage 3a or 3b CKD ([facility] [address]    6. ASCVD (arteriosclerotic cardiovascular disease)    7. Diastolic CHF, chronic (HCC)    8. Paroxysmal atrial fibrillation (HCC)    9. Benign prostatic hyperplasia without lower urinary tract symptoms    10. Chronic non-seasonal allergic rhinitis    11. Polymyalgia rheumatica ([facility] [address]    12. Elevated PSA    13. Vitamin D deficiency    14. Alcohol screening    15. Medicare annual wellness visit, subsequent    16. Ulcer of left foot, limited to breakdown of skin ([facility] [address]    17. Cellulitis of left lower extremity      Impression  1. Hypertension that is controlled so continue current therapy reviewed with him. 2.  Diabetes repeat status pending prior lab reviewed  3. Hyperlipidemia prior lab reviewed repeat status pending  4. DJD that is chronic but stable  5. CKD stage III repeat status pending  6. ASCVD clinically stable. No angina symptoms. EKG today no acute process, sinus bradycardia with old anterior MI  7. Diastolic CHF compensated  8. Paroxysmal atrial fibrillation. On chronic Coumadin therapy INR pending  9. BPH currently asymptomatic  10. Chronic allergic rhinitis stable  11. Polymyalgia rheumatica sed rate pending on low-dose prednisone  12. Elevated PSA repeat status pending   13. Vitamin D deficiency that status is pending  14. Annual alcohol screening is done he claims to drink no alcohol at all.   We did spend 8 minutes discussing excessive alcohol intake in males that they average more than 2 drinks per day with increased cardiovascular risk and increased risk of liver disease and other GI problems. 15.  Ulcer left first toe. This is cleansed. A culture was obtained before cleansing. Is sterilely dressed. He is instructed on local care. I will place him on doxycycline 100 twice daily for 10 days and reevaluate at that time. 16.  Cellulitis left lower extremity should be adequately covered by the doxycycline but we will see what the culture shows  Medicare subsequent annual wellness examination screening questionnaire was completed today. The results were reviewed with him and his questions were answered. Lifestyle recommendations modifications discussed and made. I will call with lab results and make further recommendations or adjustments if necessary. Follow-up 3 months for general medical problems but 1 month regarding his congestive heart failure, chronic kidney disease and atrial fibrillation. I will see him sooner regarding the ulcer if it does not resolve with the doxycycline and local care  40 minutes spent in direct care of this patient today not including time spent on Medicare wellness examination or procedures. Greater than 50% in counseling and coordination of care. PLAN:  .  Orders Placed This Encounter    CULTURE, BODY FLUID W GRAM STAIN    CBC WITH AUTOMATED DIFF    METABOLIC PANEL, COMPREHENSIVE    LIPID PANEL    CK    HEMOGLOBIN A1C WITH EAG    T4 (THYROXINE)    TSH 3RD GENERATION    URINALYSIS W/ REFLEX CULTURE    VITAMIN D, 25 HYDROXY    PSA SCREENING (SCREENING)    PROTHROMBIN TIME + INR    SED RATE (ESR)    MICROALBUMIN, UR, RAND W/ MICROALB/CREAT RATIO    AMB POC EKG ROUTINE W/ 12 LEADS, INTER & REP    doxycycline (ADOXA) 100 mg tablet         ATTENTION:   This medical record was transcribed using an electronic medical records system.   Although proofread, it may and can contain electronic and spelling errors. Other human spelling and other errors may be present. Corrections may be executed at a later time. Please feel free to contact us for any clarifications as needed. Michele Simmonds, MD       Recommended healthy diet low in carbohydrates, fats, sodium and cholesterol. Recommended regular cardiovascular exercise 3-6 times per week for 30-60 minutes daily. Current Outpatient Medications   Medication Sig Dispense Refill    doxycycline (ADOXA) 100 mg tablet Take 1 Tablet by mouth two (2) times a day. 20 Tablet 0    lisinopriL (PRINIVIL, ZESTRIL) 5 mg tablet TAKE 1 TABLET BY MOUTH EVERY DAY 90 Tablet 3    predniSONE (DELTASONE) 10 mg tablet TAKE 1TAB BY MOUTH TWO (2) TIMES A DAY. (Patient taking differently: Indications: pt is taking 10 mg once daily) 60 Tablet 5    metFORMIN (GLUCOPHAGE) 500 mg tablet TAKE 1 TABLET BY MOUTH EVERY DAY IN THE MORNING WITH BREAKFAST AND ONE TABLET BEFORE DINNER 180 Tablet 3    glimepiride (AMARYL) 2 mg tablet Take 1 Tablet by mouth Daily (before breakfast).  90 Tablet 3    simvastatin (ZOCOR) 20 mg tablet TAKE 1 TABLET BY MOUTH EVERY DAY 90 Tablet 3    terazosin (HYTRIN) 2 mg capsule TAKE 1 CAPSULE BY MOUTH EVERY DAY 90 Capsule 3    digoxin (LANOXIN) 0.125 mg tablet TAKE 1 TABLET BY MOUTH EVERY DAY 90 Tablet 3    diclofenac EC (VOLTAREN) 75 mg EC tablet TAKE 1 TABLET BY MOUTH TWICE A  Tablet 3    warfarin (COUMADIN) 5 mg tablet TAKE 2 TABLETS BY MOUTH ON MONDAY &FRIDAYS AND 1&1/2 TABLET DAILY ON ALL OTHER DAYS 145 Tablet 3    bumetanide (BUMEX) 2 mg tablet TAKE 1 TABLET BY MOUTH EVERY DAY 90 Tablet 3    glucose blood VI test strips (Accu-Chek Merle Plus test strp) strip USE TO TEST BLOOD SUGAR ONCE DAILY 100 Strip PRN    Blood-Glucose Meter monitoring kit Use to test blood sugar twice daily  DX:E11.22 1 Kit 0    glucose blood VI test strips (blood glucose test) strip Use to test blood sugar twice daily  DX:E11.22 100 Strip 1    cyanocobalamin (VITAMIN B12) 500 mcg tablet Take 500 mcg by mouth daily. acetaminophen (TYLENOL) 650 mg TbER Take 650 mg by mouth every eight (8) hours. multivitamins-minerals-lutein (MEN'S CENTRUM SILVER WITH LUTEIN) tab tablet Take 1 Tab by mouth daily. glucosamine 1,000 mg tab Take 2,000 mg by mouth daily. cholecalciferol (VITAMIN D3) 25 mcg (1,000 unit) cap Take 1,000 Units by mouth daily. DOCOSAHEXANOIC ACID/EPA (FISH OIL PO) Take 1,200 mg by mouth two (2) times a day. No results found for any visits on 11/18/22. Verbal and written instructions (see AVS) provided. Patient expresses understanding of diagnosis and treatment plan.     Sonjia Cooks, MD

## 2022-11-19 LAB
INR PPP: 2 (ref 0.9–1.1)
PROTHROMBIN TIME: 20.3 SEC (ref 9–11.1)

## 2022-11-23 LAB
BACTERIA SPEC CULT: ABNORMAL
GRAM STN SPEC: ABNORMAL
SERVICE CMNT-IMP: ABNORMAL

## 2022-11-23 RX ORDER — LEVOFLOXACIN 500 MG/1
500 TABLET, FILM COATED ORAL DAILY
Qty: 14 TABLET | Refills: 0 | Status: SHIPPED | OUTPATIENT
Start: 2022-11-23 | End: 2022-12-07

## 2022-11-23 NOTE — TELEPHONE ENCOUNTER
PCP: Josr Allred MD    Last appt: 11/18/2022  Future Appointments   Date Time Provider Helen Butcher   12/19/2022  1:20 PM Josr Allred MD [facility] BS AMB   1/18/2023  1:20 PM Josr Allred MD [facility] BS AMB   2/28/2023  9:00 AM Josr Allred MD [facility] BS AMB       Last refilled: New prescription    Requested Prescriptions     Pending Prescriptions Disp Refills    levoFLOXacin (LEVAQUIN) 500 mg tablet 14 Tablet 0     Sig: Take 1 Tablet by mouth daily for 14 days.

## 2022-11-23 NOTE — PROGRESS NOTES
Per provider instructions, contacted patient with results: Called and spoke to patient. Two pt identifiers confirmed. Please call the patient regarding his abnormal result. Due to sensitivity change antibiotic to Levaquin 500 every day for 14 days. Other labs stable. Patient verbalized understanding of information discussed  with no further questions at this time. Prescription sent to preferred pharmacy.

## 2022-11-27 RX ORDER — BLOOD SUGAR DIAGNOSTIC
STRIP MISCELLANEOUS
Qty: 100 STRIP | Status: SHIPPED | OUTPATIENT
Start: 2022-11-27

## 2022-11-28 DIAGNOSIS — N18.30 CONTROLLED TYPE 2 DIABETES MELLITUS WITH STAGE 3 CHRONIC KIDNEY DISEASE, WITHOUT LONG-TERM CURRENT USE OF INSULIN (HCC): Primary | ICD-10-CM

## 2022-11-28 DIAGNOSIS — E11.22 CONTROLLED TYPE 2 DIABETES MELLITUS WITH STAGE 3 CHRONIC KIDNEY DISEASE, WITHOUT LONG-TERM CURRENT USE OF INSULIN (HCC): Primary | ICD-10-CM

## 2022-11-28 RX ORDER — IBUPROFEN 200 MG
CAPSULE ORAL
Qty: 100 STRIP | Refills: 1 | Status: SHIPPED | OUTPATIENT
Start: 2022-11-28 | End: 2022-12-02 | Stop reason: SDUPTHER

## 2022-11-28 NOTE — TELEPHONE ENCOUNTER
PCP: Mackenzie Doe MD    Last appt: 11/18/2022  Future Appointments   Date Time Provider Helen Butcher   12/19/2022  1:20 PM Mackenzie Doe MD [facility] BS AMB   1/18/2023  1:20 PM MD [facility] BS AMB   2/28/2023  9:00 AM Mackenzie Doe MD [facility] BS AMB       Requested Prescriptions     Pending Prescriptions Disp Refills    glucose blood VI test strips (blood glucose test) strip 100 Strip 1     Sig: Use to test blood sugar twice daily  DX:E11.22         Other Comments:

## 2022-12-02 DIAGNOSIS — N18.30 CONTROLLED TYPE 2 DIABETES MELLITUS WITH STAGE 3 CHRONIC KIDNEY DISEASE, WITHOUT LONG-TERM CURRENT USE OF INSULIN (HCC): ICD-10-CM

## 2022-12-02 DIAGNOSIS — E11.22 CONTROLLED TYPE 2 DIABETES MELLITUS WITH STAGE 3 CHRONIC KIDNEY DISEASE, WITHOUT LONG-TERM CURRENT USE OF INSULIN (HCC): ICD-10-CM

## 2022-12-02 RX ORDER — IBUPROFEN 200 MG
CAPSULE ORAL
Qty: 100 STRIP | Refills: 3 | Status: SHIPPED | OUTPATIENT
Start: 2022-12-02

## 2022-12-17 PROBLEM — Z00.00 MEDICARE ANNUAL WELLNESS VISIT, SUBSEQUENT: Status: RESOLVED | Noted: 2017-10-30 | Resolved: 2022-12-17

## 2022-12-19 ENCOUNTER — OFFICE VISIT (OUTPATIENT)
Dept: INTERNAL MEDICINE CLINIC | Age: 87
End: 2022-12-19
Payer: MEDICARE

## 2022-12-19 VITALS
TEMPERATURE: 98.7 F | HEART RATE: 54 BPM | SYSTOLIC BLOOD PRESSURE: 138 MMHG | HEIGHT: 75 IN | BODY MASS INDEX: 25.61 KG/M2 | DIASTOLIC BLOOD PRESSURE: 74 MMHG | RESPIRATION RATE: 17 BRPM | OXYGEN SATURATION: 96 % | WEIGHT: 206 LBS

## 2022-12-19 DIAGNOSIS — I50.32 DIASTOLIC CHF, CHRONIC (HCC): ICD-10-CM

## 2022-12-19 DIAGNOSIS — N18.30 STAGE 3 CHRONIC KIDNEY DISEASE, UNSPECIFIED WHETHER STAGE 3A OR 3B CKD (HCC): ICD-10-CM

## 2022-12-19 DIAGNOSIS — I12.9 HYPERTENSION WITH RENAL DISEASE: Primary | ICD-10-CM

## 2022-12-19 DIAGNOSIS — N39.0 URINARY TRACT INFECTION WITHOUT HEMATURIA, SITE UNSPECIFIED: ICD-10-CM

## 2022-12-19 DIAGNOSIS — M16.12 PRIMARY OSTEOARTHRITIS OF LEFT HIP: ICD-10-CM

## 2022-12-19 DIAGNOSIS — I48.0 PAROXYSMAL ATRIAL FIBRILLATION (HCC): ICD-10-CM

## 2022-12-19 DIAGNOSIS — M35.3 POLYMYALGIA RHEUMATICA (HCC): ICD-10-CM

## 2022-12-19 PROCEDURE — G8417 CALC BMI ABV UP PARAM F/U: HCPCS | Performed by: INTERNAL MEDICINE

## 2022-12-19 PROCEDURE — 20610 DRAIN/INJ JOINT/BURSA W/O US: CPT | Performed by: INTERNAL MEDICINE

## 2022-12-19 PROCEDURE — G8427 DOCREV CUR MEDS BY ELIG CLIN: HCPCS | Performed by: INTERNAL MEDICINE

## 2022-12-19 PROCEDURE — G8536 NO DOC ELDER MAL SCRN: HCPCS | Performed by: INTERNAL MEDICINE

## 2022-12-19 PROCEDURE — 1123F ACP DISCUSS/DSCN MKR DOCD: CPT | Performed by: INTERNAL MEDICINE

## 2022-12-19 PROCEDURE — 99213 OFFICE O/P EST LOW 20 MIN: CPT | Performed by: INTERNAL MEDICINE

## 2022-12-19 PROCEDURE — 1101F PT FALLS ASSESS-DOCD LE1/YR: CPT | Performed by: INTERNAL MEDICINE

## 2022-12-19 PROCEDURE — G8432 DEP SCR NOT DOC, RNG: HCPCS | Performed by: INTERNAL MEDICINE

## 2022-12-19 RX ORDER — METHYLPREDNISOLONE ACETATE 40 MG/ML
40 INJECTION, SUSPENSION INTRA-ARTICULAR; INTRALESIONAL; INTRAMUSCULAR; SOFT TISSUE ONCE
Qty: 1 ML | Refills: 0
Start: 2022-12-19 | End: 2022-12-19

## 2022-12-19 NOTE — PROGRESS NOTES
Chief Complaint   Patient presents with    Hypertension     1 month    Cholesterol Problem    Diabetes    Osteoarthritis    Chronic Kidney Disease       SUBJECTIVE:    Soren Hedrick is a 80 y.o. male who returns today for follow-up of his hypertension, diabetes, CKD stage III, polymyalgia rheumatica, and other medical problems. He does note he is got a lot of left hip pain now that is hurting quite a bit when he walks. He previously had a cortisone shot and there sometime back and seemed to do well he like to have that again. He is no history of falls or trauma. He denies any chest pain, shortness of breath or cardiorespiratory complaints. There are no GI or  complaints. He notes no other complaints. Current Outpatient Medications   Medication Sig Dispense Refill    methylPREDNISolone acetate (DEPO-MEDROL) 40 mg/mL injection 1 mL by IntraMUSCular route once for 1 dose. 1 mL 0    glucose blood VI test strips (blood glucose test) strip Use to test blood sugar twice daily  DX:E11.22 100 Strip 3    Accu-Chek Merle Plus test strp strip USE TO TEST BLOOD SUGAR ONCE DAILY 100 Strip PRN    doxycycline (ADOXA) 100 mg tablet Take 1 Tablet by mouth two (2) times a day. 20 Tablet 0    lisinopriL (PRINIVIL, ZESTRIL) 5 mg tablet TAKE 1 TABLET BY MOUTH EVERY DAY 90 Tablet 3    predniSONE (DELTASONE) 10 mg tablet TAKE 1TAB BY MOUTH TWO (2) TIMES A DAY. (Patient taking differently: Indications: pt is taking 10 mg once daily) 60 Tablet 5    metFORMIN (GLUCOPHAGE) 500 mg tablet TAKE 1 TABLET BY MOUTH EVERY DAY IN THE MORNING WITH BREAKFAST AND ONE TABLET BEFORE DINNER 180 Tablet 3    glimepiride (AMARYL) 2 mg tablet Take 1 Tablet by mouth Daily (before breakfast).  90 Tablet 3    simvastatin (ZOCOR) 20 mg tablet TAKE 1 TABLET BY MOUTH EVERY DAY 90 Tablet 3    terazosin (HYTRIN) 2 mg capsule TAKE 1 CAPSULE BY MOUTH EVERY DAY 90 Capsule 3    digoxin (LANOXIN) 0.125 mg tablet TAKE 1 TABLET BY MOUTH EVERY DAY 90 Tablet 3    diclofenac EC (VOLTAREN) 75 mg EC tablet TAKE 1 TABLET BY MOUTH TWICE A  Tablet 3    warfarin (COUMADIN) 5 mg tablet TAKE 2 TABLETS BY MOUTH ON MONDAY &FRIDAYS AND 1&1/2 TABLET DAILY ON ALL OTHER DAYS 145 Tablet 3    bumetanide (BUMEX) 2 mg tablet TAKE 1 TABLET BY MOUTH EVERY DAY 90 Tablet 3    Blood-Glucose Meter monitoring kit Use to test blood sugar twice daily  DX:E11.22 1 Kit 0    cyanocobalamin (VITAMIN B12) 500 mcg tablet Take 500 mcg by mouth daily. acetaminophen (TYLENOL) 650 mg TbER Take 650 mg by mouth every eight (8) hours. multivitamins-minerals-lutein (MEN'S CENTRUM SILVER WITH LUTEIN) tab tablet Take 1 Tab by mouth daily. glucosamine 1,000 mg tab Take 2,000 mg by mouth daily. cholecalciferol (VITAMIN D3) 25 mcg (1,000 unit) cap Take 1,000 Units by mouth daily. DOCOSAHEXANOIC ACID/EPA (FISH OIL PO) Take 1,200 mg by mouth two (2) times a day. 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    Back pain 9/20/2017    BPH (benign prostatic hyperplasia) 9/20/2017    CHF (congestive heart failure) ([address] 9/20/2017    CKD (chronic kidney disease) 9/20/2017    COVID-19 11/2021    Diabetic acetonemia ([address]     DM (diabetes mellitus) ([address] 9/20/2017    Story: Diet Controlled    ED (erectile dysfunction) 9/20/2017    Edema 9/20/2017    Elevated CPK 9/20/2017    Comments: History of    Elevated LFTs 9/20/2017    Comments: History of    Elevated PSA 9/20/2017    Hypercholesteremia     Hyperlipidemia 9/20/2017    Hypertension     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    HX MALIGNANT SKIN LESION EXCISION 09/2018    2 lesions on head    [place] ABDOMEN SURGERY PROC UNLISTED      hernia repair     No Known Allergies    REVIEW OF SYSTEMS:  General: negative for - chills or fever, or weight loss or gain  ENT: negative for - headaches, nasal congestion or tinnitus  Eyes: no blurred or visual changes  Neck: No stiffness or swollen nodes  Respiratory: negative for - cough, hemoptysis, shortness of breath or wheezing  Cardiovascular : negative for - chest pain, edema, palpitations or shortness of breath  Gastrointestinal: negative for - abdominal pain, blood in stools, heartburn or nausea/vomiting  Genito-Urinary: no dysuria, trouble voiding, or hematuria  Musculoskeletal: negative for - gait disturbance, joint pain, joint stiffness or joint swelling except marked amount of left hip pain  Neurological: no TIA or stroke symptoms  Hematologic: no bruises, no bleeding  Lymphatic: no swollen glands  Integument: no lumps, mole changes, nail changes or rash  Endocrine:no malaise/lethargy poly uria or polydipsia or unexpected weight changes        Social History     Socioeconomic History    Marital status:    Tobacco Use    Smoking status: Never    Smokeless tobacco: Never   Vaping Use    Vaping Use: Never used   Substance and Sexual Activity    Alcohol use: No    Drug use: No    Sexual activity: Never     History reviewed. No pertinent family history. OBJECTIVE:     Visit Vitals  /74   Pulse (!) 54   Temp 98.7 °F (37.1 °C) (Oral)   Resp 17   Ht 6' 3\" (1.905 m)   Wt 206 lb (93.4 kg)   SpO2 96%   BMI 25.75 kg/m²     CONSTITUTIONAL:   well nourished, appears age appropriate  EYES: sclera anicteric, PERRL, EOMI  ENMT:nares clear, moist mucous membranes, pharynx clear  NECK: supple.  Thyroid normal, No JVD or bruits  RESPIRATORY: Chest: clear to ascultation and percussion, normal inspiratory effort  CARDIOVASCULAR: Heart: regular rate and rhythm no murmurs, rubs or gallops, PMI not displaced, No thrills, no peripheral edema  GASTROINTESTINAL: Abdomen: non distended, soft, non tender, bowel sounds normal  HEMATOLOGIC: no purpura, petechiae or bruising  LYMPHATIC: No lymph node enlargemant  MUSCULOSKELETAL: Extremities: no active synovitis, pulse 1+. Left hip tender to palpation with increased discomfort on range of motion. No joint effusion, erythema increased temperature. INTEGUMENT: No unusual rashes or suspicious skin lesions noted. Nails appear normal.  PERIPHERAL VASCULAR: normal pulses femoral, PT and DP  NEUROLOGIC: non-focal exam, A & O X 3  PSYCHIATRIC:, appropriate affect     ASSESSMENT:   1. Hypertension with renal disease    2. Paroxysmal atrial fibrillation (HCC)    3. Diastolic CHF, chronic (HCC)    4. Stage 3 chronic kidney disease, unspecified whether stage 3a or 3b CKD ([address]    5. Polymyalgia rheumatica ([facility]    6. Primary osteoarthritis of left hip    7. Urinary tract infection without hematuria, site unspecified      Impression  1. Hypertension that is controlled so continue current therapy reviewed with him. 2.  Paroxysmal atrial fibrillation INR pending  3. Diastolic CHF seems to be stable   4. CKD stage III repeat status pending   5. Polymyalgia rheumatica currently on low-dose prednisone and sed rate pending  6. DJD left hip we did discuss treatment options per his request elected to go with injection. After informed consent and Betadine scrub the left hip was entered laterally injected with Depo-Medrol 40 mg and cc lidocaine which he tolerated quite well. 7.  Recent urinary tract infection follow-up urine and culture are pending  Follow-up me in a month or sooner should the be a problem. I will call with lab results in interim.     PLAN:  .  Orders Placed This Encounter    DRAIN/INJECT LARGE JOINT/BURSA    CULTURE, URINE    METABOLIC PANEL, BASIC    PROTHROMBIN TIME + INR    SED RATE (ESR)    URINALYSIS W/MICROSCOPIC    methylPREDNISolone acetate (DEPO-MEDROL) 40 mg/mL injection ATTENTION:   This medical record was transcribed using an electronic medical records system. Although proofread, it may and can contain electronic and spelling errors. Other human spelling and other errors may be present. Corrections may be executed at a later time. Please feel free to contact us for any clarifications as needed. No results found for any visits on 12/19/22. Farooq Mccloud MD    The patient verbalized understanding of the problems and plans as explained.

## 2022-12-19 NOTE — PROGRESS NOTES
HIPAA verified by two patient identifiers. Tabitha Gr is a 80 y.o. male    Chief Complaint   Patient presents with    Hypertension     1 month    Cholesterol Problem    Diabetes    Osteoarthritis    Chronic Kidney Disease       Visit Vitals  BP (!) 142/70 (BP 1 Location: Left upper arm, BP Patient Position: Sitting, BP Cuff Size: Small adult)   Pulse (!) 54   Temp 98.7 °F (37.1 °C) (Oral)   Resp 17   Ht 6' 3\" (1.905 m)   Wt 206 lb (93.4 kg)   SpO2 96%   BMI 25.75 kg/m²       Pain Scale: 0 - No pain/10  Pain Location:       Health Maintenance Due   Topic Date Due    COVID-19 Vaccine (1) Never done    Eye Exam Retinal or Dilated  Never done    Shingrix Vaccine Age 50> (1 of 2) Never done    Foot Exam Q1  01/19/2023         Coordination of Care Questionnaire:  :   1) Have you been to an emergency room, urgent care, or hospitalized since your last visit? If yes, where when, and reason for visit? no       2. Have seen or consulted any other health care provider since your last visit? If yes, where when, and reason for visit? NO      Patient is accompanied by self I have received verbal consent from Tabitha Gr. to discuss any/all medical information while they are present in the room.

## 2022-12-20 LAB
ANION GAP SERPL CALC-SCNC: 8 MMOL/L (ref 5–15)
APPEARANCE UR: CLEAR
BACTERIA URNS QL MICRO: NEGATIVE /HPF
BILIRUB UR QL: NEGATIVE
BUN SERPL-MCNC: 34 MG/DL (ref 6–20)
BUN/CREAT SERPL: 22 (ref 12–20)
CALCIUM SERPL-MCNC: 9.7 MG/DL (ref 8.5–10.1)
CHLORIDE SERPL-SCNC: 108 MMOL/L (ref 97–108)
CO2 SERPL-SCNC: 26 MMOL/L (ref 21–32)
COLOR UR: ABNORMAL
CREAT SERPL-MCNC: 1.52 MG/DL (ref 0.7–1.3)
EPITH CASTS URNS QL MICRO: ABNORMAL /LPF
ERYTHROCYTE [SEDIMENTATION RATE] IN BLOOD: 35 MM/HR (ref 0–20)
GLUCOSE SERPL-MCNC: 198 MG/DL (ref 65–100)
GLUCOSE UR STRIP.AUTO-MCNC: NEGATIVE MG/DL
HGB UR QL STRIP: NEGATIVE
HYALINE CASTS URNS QL MICRO: ABNORMAL /LPF (ref 0–5)
INR PPP: 1.8 (ref 0.9–1.1)
KETONES UR QL STRIP.AUTO: ABNORMAL MG/DL
LEUKOCYTE ESTERASE UR QL STRIP.AUTO: NEGATIVE
NITRITE UR QL STRIP.AUTO: NEGATIVE
PH UR STRIP: 5 [PH] (ref 5–8)
POTASSIUM SERPL-SCNC: 4.7 MMOL/L (ref 3.5–5.1)
PROT UR STRIP-MCNC: NEGATIVE MG/DL
PROTHROMBIN TIME: 18.5 SEC (ref 9–11.1)
RBC #/AREA URNS HPF: ABNORMAL /HPF (ref 0–5)
SODIUM SERPL-SCNC: 142 MMOL/L (ref 136–145)
SP GR UR REFRACTOMETRY: 1.03 (ref 1–1.03)
UROBILINOGEN UR QL STRIP.AUTO: 0.2 EU/DL (ref 0.2–1)
WBC URNS QL MICRO: ABNORMAL /HPF (ref 0–4)

## 2022-12-21 ENCOUNTER — TELEPHONE ANTICOAG (OUTPATIENT)
Dept: INTERNAL MEDICINE CLINIC | Age: 87
End: 2022-12-21

## 2022-12-21 LAB
BACTERIA SPEC CULT: NORMAL
SERVICE CMNT-IMP: NORMAL

## 2022-12-21 NOTE — PROGRESS NOTES
Anticoagulation Summary  As of 2022      INR goal:  2.0-3.0   TTR:  38.1 % (4.7 y)   INR used for dosin.8 (2022)   Warfarin maintenance plan:  7.5 mg (5 mg x 1.5) every day; Starting 2022   Weekly warfarin total:  52.5 mg   Plan last modified:  Elizabeth Vieyra LPN ()   Next INR check:  2023   Target end date:      Indications    Paroxysmal atrial fibrillation ([facility] (Primary) [I48.0]                 Anticoagulation Episode Summary       INR check location:  Clinic Lab    Preferred lab:      Send INR reminders to:      Comments:            Anticoagulation Care Providers       Provider Role Specialty Phone number    Sanjiv Humphrey MD Responsible Internal Medicine Physician [phone]           Informed patient that PT/INR okay; continue the same dose of blood thinner which is 7.5 mg daily. F/up as scheduled.

## 2022-12-21 NOTE — PROGRESS NOTES
Results have been reviewed and abnormal results noted. Please see documentation in new EMR. Labs stable no changes needed. Informed patient that PT/INR okay; continue the same dose of blood thinner which is 7.5 mg daily. F/up as scheduled.

## 2023-01-17 PROBLEM — L97.521 ULCER OF LEFT FOOT, LIMITED TO BREAKDOWN OF SKIN (HCC): Status: RESOLVED | Noted: 2020-03-12 | Resolved: 2023-01-17

## 2023-01-18 ENCOUNTER — OFFICE VISIT (OUTPATIENT)
Dept: INTERNAL MEDICINE CLINIC | Age: 88
End: 2023-01-18
Payer: MEDICARE

## 2023-01-18 VITALS
BODY MASS INDEX: 24.62 KG/M2 | HEIGHT: 75 IN | SYSTOLIC BLOOD PRESSURE: 136 MMHG | RESPIRATION RATE: 16 BRPM | HEART RATE: 71 BPM | TEMPERATURE: 98 F | WEIGHT: 198 LBS | OXYGEN SATURATION: 96 % | DIASTOLIC BLOOD PRESSURE: 64 MMHG

## 2023-01-18 DIAGNOSIS — I48.0 PAROXYSMAL ATRIAL FIBRILLATION (HCC): ICD-10-CM

## 2023-01-18 DIAGNOSIS — M35.3 POLYMYALGIA RHEUMATICA (HCC): ICD-10-CM

## 2023-01-18 DIAGNOSIS — N18.30 CONTROLLED TYPE 2 DIABETES MELLITUS WITH STAGE 3 CHRONIC KIDNEY DISEASE, WITHOUT LONG-TERM CURRENT USE OF INSULIN (HCC): ICD-10-CM

## 2023-01-18 DIAGNOSIS — I50.32 DIASTOLIC CHF, CHRONIC (HCC): Primary | ICD-10-CM

## 2023-01-18 DIAGNOSIS — E11.22 CONTROLLED TYPE 2 DIABETES MELLITUS WITH STAGE 3 CHRONIC KIDNEY DISEASE, WITHOUT LONG-TERM CURRENT USE OF INSULIN (HCC): ICD-10-CM

## 2023-01-18 DIAGNOSIS — N18.30 STAGE 3 CHRONIC KIDNEY DISEASE, UNSPECIFIED WHETHER STAGE 3A OR 3B CKD (HCC): ICD-10-CM

## 2023-01-18 PROCEDURE — 1123F ACP DISCUSS/DSCN MKR DOCD: CPT | Performed by: INTERNAL MEDICINE

## 2023-01-18 PROCEDURE — 1101F PT FALLS ASSESS-DOCD LE1/YR: CPT | Performed by: INTERNAL MEDICINE

## 2023-01-18 PROCEDURE — G8427 DOCREV CUR MEDS BY ELIG CLIN: HCPCS | Performed by: INTERNAL MEDICINE

## 2023-01-18 PROCEDURE — G8536 NO DOC ELDER MAL SCRN: HCPCS | Performed by: INTERNAL MEDICINE

## 2023-01-18 PROCEDURE — 99213 OFFICE O/P EST LOW 20 MIN: CPT | Performed by: INTERNAL MEDICINE

## 2023-01-18 PROCEDURE — G8510 SCR DEP NEG, NO PLAN REQD: HCPCS | Performed by: INTERNAL MEDICINE

## 2023-01-18 PROCEDURE — G8420 CALC BMI NORM PARAMETERS: HCPCS | Performed by: INTERNAL MEDICINE

## 2023-01-18 NOTE — PROGRESS NOTES
Chief Complaint   Patient presents with    CHF     1 month follow up    Hypertension    Diabetes       SUBJECTIVE:    Magdalena Awan is a 80 y.o. male who returns in follow-up his medical problems include diastolic CHF, paroxysmal atrial fibrillation, polymyalgia rheumatica, CKD and other medical problems. He is taking his medication trying to follow his diet and try and exercise symptoms doing this much as the thousand symptoms while laying on the couch each day. He currently denies any chest pain, shortness of breath, palpitations, PND, orthopnea or other cardiac ristra complaints. There are no GI or  complaints. He has no headaches, dizziness or neurologic complaints. He has no current active arthritic complaints and there are no other complaints on complete review of systems. Current Outpatient Medications   Medication Sig Dispense Refill    glucose blood VI test strips (blood glucose test) strip Use to test blood sugar twice daily  DX:E11.22 100 Strip 3    Accu-Chek Merle Plus test strp strip USE TO TEST BLOOD SUGAR ONCE DAILY 100 Strip PRN    lisinopriL (PRINIVIL, ZESTRIL) 5 mg tablet TAKE 1 TABLET BY MOUTH EVERY DAY 90 Tablet 3    predniSONE (DELTASONE) 10 mg tablet TAKE 1TAB BY MOUTH TWO (2) TIMES A DAY. (Patient taking differently: Indications: pt is taking 10 mg once daily) 60 Tablet 5    metFORMIN (GLUCOPHAGE) 500 mg tablet TAKE 1 TABLET BY MOUTH EVERY DAY IN THE MORNING WITH BREAKFAST AND ONE TABLET BEFORE DINNER 180 Tablet 3    glimepiride (AMARYL) 2 mg tablet Take 1 Tablet by mouth Daily (before breakfast).  90 Tablet 3    simvastatin (ZOCOR) 20 mg tablet TAKE 1 TABLET BY MOUTH EVERY DAY 90 Tablet 3    terazosin (HYTRIN) 2 mg capsule TAKE 1 CAPSULE BY MOUTH EVERY DAY 90 Capsule 3    digoxin (LANOXIN) 0.125 mg tablet TAKE 1 TABLET BY MOUTH EVERY DAY 90 Tablet 3    diclofenac EC (VOLTAREN) 75 mg EC tablet TAKE 1 TABLET BY MOUTH TWICE A  Tablet 3    warfarin (COUMADIN) 5 mg tablet TAKE 2 TABLETS BY MOUTH ON MONDAY &FRIDAYS AND 1&1/2 TABLET DAILY ON ALL OTHER DAYS 145 Tablet 3    bumetanide (BUMEX) 2 mg tablet TAKE 1 TABLET BY MOUTH EVERY DAY 90 Tablet 3    Blood-Glucose Meter monitoring kit Use to test blood sugar twice daily  DX:E11.22 1 Kit 0    cyanocobalamin (VITAMIN B12) 500 mcg tablet Take 500 mcg by mouth daily. acetaminophen (TYLENOL) 650 mg TbER Take 650 mg by mouth every eight (8) hours. multivitamins-minerals-lutein (MEN'S CENTRUM SILVER WITH LUTEIN) tab tablet Take 1 Tab by mouth daily. glucosamine 1,000 mg tab Take 2,000 mg by mouth daily. cholecalciferol (VITAMIN D3) 25 mcg (1,000 unit) cap Take 1,000 Units by mouth daily. DOCOSAHEXANOIC ACID/EPA (FISH OIL PO) Take 1,200 mg by mouth two (2) times a day. 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    Back pain 9/20/2017    BPH (benign prostatic hyperplasia) 9/20/2017    CHF (congestive heart failure) ([address] 9/20/2017    CKD (chronic kidney disease) 9/20/2017    COVID-19 11/2021    Diabetic acetonemia ([address]     DM (diabetes mellitus) ([address] 9/20/2017    Story: Diet Controlled    ED (erectile dysfunction) 9/20/2017    Edema 9/20/2017    Elevated CPK 9/20/2017    Comments: History of    Elevated LFTs 9/20/2017    Comments: History of    Elevated PSA 9/20/2017    Hypercholesteremia     Hyperlipidemia 9/20/2017    Hypertension     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    HX MALIGNANT SKIN LESION EXCISION  09/2018    2 lesions on head    [place] UNLISTED PROCEDURE ABDOMEN PERITONEUM & OMENTUM      hernia repair     No Known Allergies    REVIEW OF SYSTEMS:  General: negative for - chills or fever, or weight loss or gain  ENT: negative for - headaches, nasal congestion or tinnitus  Eyes: no blurred or visual changes  Neck: No stiffness or swollen nodes  Respiratory: negative for - cough, hemoptysis, shortness of breath or wheezing  Cardiovascular : negative for - chest pain, edema, palpitations or shortness of breath  Gastrointestinal: negative for - abdominal pain, blood in stools, heartburn or nausea/vomiting  Genito-Urinary: no dysuria, trouble voiding, or hematuria  Musculoskeletal: negative for - gait disturbance, joint pain, joint stiffness or joint swelling  Neurological: no TIA or stroke symptoms  Hematologic: no bruises, no bleeding  Lymphatic: no swollen glands  Integument: no lumps, mole changes, nail changes or rash  Endocrine:no malaise/lethargy poly uria or polydipsia or unexpected weight changes        Social History     Socioeconomic History    Marital status:    Tobacco Use    Smoking status: Never    Smokeless tobacco: Never   Vaping Use    Vaping Use: Never used   Substance and Sexual Activity    Alcohol use: No    Drug use: No    Sexual activity: Never     History reviewed. No pertinent family history. OBJECTIVE:     Visit Vitals  /64   Pulse 71   Temp 98 °F (36.7 °C) (Oral)   Resp 16   Ht 6' 3\" (1.905 m)   Wt 198 lb (89.8 kg)   SpO2 96%   BMI 24.75 kg/m²     CONSTITUTIONAL:   well nourished, appears age appropriate  EYES: sclera anicteric, PERRL, EOMI  ENMT:nares clear, moist mucous membranes, pharynx clear  NECK: supple.  Thyroid normal, No JVD or bruits  RESPIRATORY: Chest: clear to ascultation and percussion, normal inspiratory effort  CARDIOVASCULAR: Heart: regular rate and rhythm no murmurs, rubs or gallops, PMI not displaced, No thrills, no peripheral edema  GASTROINTESTINAL: Abdomen: non distended, soft, non tender, bowel sounds normal  HEMATOLOGIC: no purpura, petechiae or bruising  LYMPHATIC: No lymph node enlargemant  MUSCULOSKELETAL: Extremities: no active synovitis, pulse 1+   INTEGUMENT: No unusual rashes or suspicious skin lesions noted. Nails appear normal.  PERIPHERAL VASCULAR: normal pulses femoral, PT and DP  NEUROLOGIC: non-focal exam, A & O X 3  PSYCHIATRIC:, appropriate affect     ASSESSMENT:   1. Diastolic CHF, chronic (HCC)    2. Polymyalgia rheumatica ([address]    3. 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, without long-term current use of insulin (HCC)    4. Paroxysmal atrial fibrillation (HCC)    5. Stage 3 chronic kidney disease, unspecified whether stage 3a or 3b CKD ([facility])      Impression  1 diastolic CHF seems to be stable  2. Polymyalgia rheumatica we will see what the sed rate looks like  3. Diabetes repeat status pending   4. Paroxysmal atrial fibrillation INR pending  5. CKD stage III repeat status pending  I will call the lab and follow stable continue same follow-up in 3 months or sooner if there is a problem. PLAN:  .  Orders Placed This Encounter    METABOLIC PANEL, BASIC    PROTHROMBIN TIME + INR    SED RATE (ESR)         ATTENTION:   This medical record was transcribed using an electronic medical records system. Although proofread, it may and can contain electronic and spelling errors. Other human spelling and other errors may be present. Corrections may be executed at a later time. Please feel free to contact us for any clarifications as needed. No results found for any visits on 01/18/23. Mark Sinclair MD    The patient verbalized understanding of the problems and plans as explained.

## 2023-01-18 NOTE — PROGRESS NOTES
Laura Hudson is a 80 y.o. male     Chief Complaint   Patient presents with    CHF     1 month follow up    Hypertension    Diabetes       Visit Vitals  BP (!) 150/50 (BP 1 Location: Left upper arm, BP Patient Position: Sitting, BP Cuff Size: Adult)   Pulse 71   Temp 98 °F (36.7 °C) (Oral)   Resp 16   Ht 6' 3\" (1.905 m)   Wt 198 lb (89.8 kg)   SpO2 96%   BMI 24.75 kg/m²       Health Maintenance Due   Topic Date Due    COVID-19 Vaccine (1) Never done    Shingles Vaccine (1 of 2) Never done         1. \"Have you been to the ER, urgent care clinic since your last visit? Hospitalized since your last visit? \" No    2. \"Have you seen or consulted any other health care providers outside of the [address] [place] [address] since your last visit? \" No     3. For patients aged 39-70: Has the patient had a colonoscopy / FIT/ Cologuard? NA - based on age      If the patient is female:    4. For patients aged 41-77: Has the patient had a mammogram within the past 2 years? NA - based on age or sex      11. For patients aged 21-65: Has the patient had a pap smear?  NA - based on age or sex

## 2023-01-19 LAB
ANION GAP SERPL CALC-SCNC: 4 MMOL/L (ref 5–15)
BUN SERPL-MCNC: 39 MG/DL (ref 6–20)
BUN/CREAT SERPL: 24 (ref 12–20)
CALCIUM SERPL-MCNC: 10.2 MG/DL (ref 8.5–10.1)
CHLORIDE SERPL-SCNC: 106 MMOL/L (ref 97–108)
CO2 SERPL-SCNC: 31 MMOL/L (ref 21–32)
CREAT SERPL-MCNC: 1.61 MG/DL (ref 0.7–1.3)
ERYTHROCYTE [SEDIMENTATION RATE] IN BLOOD: 49 MM/HR (ref 0–20)
GLUCOSE SERPL-MCNC: 243 MG/DL (ref 65–100)
POTASSIUM SERPL-SCNC: 5.4 MMOL/L (ref 3.5–5.1)
SODIUM SERPL-SCNC: 141 MMOL/L (ref 136–145)

## 2023-01-20 LAB
INR PPP: 2.2 (ref 0.9–1.1)
PROTHROMBIN TIME: 21.7 SEC (ref 9–11.1)

## 2023-01-21 NOTE — PROGRESS NOTES
A/Ox4. VSS on Room Air. Denied SOB, CP, Cough, N/V, Dizziness, Headache. Baseline numbness in all extremities. Pt denied pain this shift. NPO. Continent of B/B, LBM 1/20/23. SBA, gait belt refused. Skin is intact except R foot wounds. Dressing changed, CDI. R PIV SL, patent with flush. Call light within reach, able to make needs known. Appears to be sleeping during rounds. Continue POC.    I&D scheduled for 1/21/23 as add-on, time not specific.    BG of 162 at 0200     Sed rate is gone up a little but all the lab studies essentially stable so at this point I would not make a change in prednisone dose and we will see what additional follow-up.

## 2023-01-24 ENCOUNTER — TELEPHONE ANTICOAG (OUTPATIENT)
Dept: INTERNAL MEDICINE CLINIC | Age: 88
End: 2023-01-24

## 2023-01-24 NOTE — PROGRESS NOTES
Anticoagulation Summary  As of 2023      INR goal:  2.0-3.0   TTR:  38.3 % (4.8 y)   INR used for dosin.2 (2023)   Warfarin maintenance plan:  7.5 mg (5 mg x 1.5) every day; Starting 2023   Weekly warfarin total:  52.5 mg   Plan last modified:  Terrence Vera LPN ([id number])   Next INR check:  2023   Target end date:      Indications    Paroxysmal atrial fibrillation ([facility] [address] (Primary) [I48.0]                 Anticoagulation Episode Summary       INR check location:  Clinic Lab    Preferred lab:      Send INR reminders to:      Comments:            Anticoagulation Care Providers       Provider Role Specialty Phone number    José Antonio Aburto MD Responsible Internal Medicine Physician [phone]           Informed patient that PT/INR okay; continue the same dose of blood thinner which is 7.5 mg daily and f/up as scheduled.

## 2023-02-06 ENCOUNTER — OFFICE VISIT (OUTPATIENT)
Dept: INTERNAL MEDICINE CLINIC | Age: 88
End: 2023-02-06
Payer: MEDICARE

## 2023-02-06 VITALS
TEMPERATURE: 98 F | HEART RATE: 50 BPM | BODY MASS INDEX: 25.24 KG/M2 | SYSTOLIC BLOOD PRESSURE: 142 MMHG | OXYGEN SATURATION: 97 % | HEIGHT: 75 IN | DIASTOLIC BLOOD PRESSURE: 78 MMHG | WEIGHT: 203 LBS

## 2023-02-06 DIAGNOSIS — I48.0 PAROXYSMAL ATRIAL FIBRILLATION (HCC): ICD-10-CM

## 2023-02-06 DIAGNOSIS — L03.116 CELLULITIS OF LEFT LOWER EXTREMITY: Primary | ICD-10-CM

## 2023-02-06 PROCEDURE — G8536 NO DOC ELDER MAL SCRN: HCPCS | Performed by: INTERNAL MEDICINE

## 2023-02-06 PROCEDURE — G8432 DEP SCR NOT DOC, RNG: HCPCS | Performed by: INTERNAL MEDICINE

## 2023-02-06 PROCEDURE — 1123F ACP DISCUSS/DSCN MKR DOCD: CPT | Performed by: INTERNAL MEDICINE

## 2023-02-06 PROCEDURE — 1101F PT FALLS ASSESS-DOCD LE1/YR: CPT | Performed by: INTERNAL MEDICINE

## 2023-02-06 PROCEDURE — G8427 DOCREV CUR MEDS BY ELIG CLIN: HCPCS | Performed by: INTERNAL MEDICINE

## 2023-02-06 PROCEDURE — G8417 CALC BMI ABV UP PARAM F/U: HCPCS | Performed by: INTERNAL MEDICINE

## 2023-02-06 PROCEDURE — 99213 OFFICE O/P EST LOW 20 MIN: CPT | Performed by: INTERNAL MEDICINE

## 2023-02-06 RX ORDER — SULFAMETHOXAZOLE AND TRIMETHOPRIM 800; 160 MG/1; MG/1
1 TABLET ORAL 2 TIMES DAILY
Qty: 20 TABLET | Refills: 0 | Status: SHIPPED | OUTPATIENT
Start: 2023-02-06

## 2023-02-06 NOTE — PROGRESS NOTES
Subjective:   Katherine Garcia is a 80 y.o. male      Chief Complaint   Patient presents with    Wound Infection     Both big toes oozing and swollen. History of present illness: He presents complaining of infection in both big toes with only some oozing and swelling. He did have some doxycycline leftover from a infection back in November and he took that for 5 days and the right one is actually improved. He still notes a lot of drainage from the left toe although it is a little better than it was before he took the antibiotic. He notes no fevers or chills and no other complaints noted. 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    Mixed hyperlipidemia E78.2    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, without long-term current use of insulin (HCC) E11.22, N18.30    Hypertension with renal disease I12.9    Stage 3 chronic kidney disease ([facility] [address] N18.30    Primary osteoarthritis involving multiple joints M15.9    Nodule of right lung R91.1    Chronic non-seasonal allergic rhinitis J30.89    ED (erectile dysfunction) N52.9    Back pain M54.9    BPH (benign prostatic hyperplasia) N40.0    Dizziness R42    Paroxysmal atrial fibrillation (HCC) I48.0    Polymyalgia rheumatica (HCC) M35.3    Primary osteoarthritis of left hip M16.12    Alcohol screening Z13.39    Near syncope R55    Cellulitis of left lower extremity L03. 406    Diastolic CHF, chronic (HCC) I50.32    Bilateral leg edema R60.0    Primary osteoarthritis of left shoulder M19.012    Primary osteoarthritis of left knee M17.12    Primary osteoarthritis of right knee M17.11    COVID-19 U07.1    Age-related cataract of both eyes H25.9    Chronic pain of both knees M25.561, M25.562, G89.29    Vitamin D deficiency E55.9      Past Medical History:   Diagnosis Date    Allergic rhinitis 9/20/2017    Arthritis     ASCVD (arteriosclerotic cardiovascular disease) 9/20/2017    Story: Old ASMI by EKG    Back pain 9/20/2017    BPH (benign prostatic hyperplasia) 9/20/2017    CHF (congestive heart failure) ([facility] [address] 9/20/2017    CKD (chronic kidney disease) 9/20/2017    COVID-19 11/2021    Diabetic acetonemia ([facility] [address]     DM (diabetes mellitus) ([email or web address] 9/20/2017    Story: Diet Controlled    ED (erectile dysfunction) 9/20/2017    Edema 9/20/2017    Elevated CPK 9/20/2017    Comments: History of    Elevated LFTs 9/20/2017    Comments: History of    Elevated PSA 9/20/2017    Hypercholesteremia     Hyperlipidemia 9/20/2017    Hypertension     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    Nodule of right lung 9/20/2017    Story: Right    Polymyalgia ([address] 9/20/2017    Prostate enlargement       No Known Allergies   No family history on file. Social History     Socioeconomic History    Marital status:      Spouse name: Not on file    Number of children: Not on file    Years of education: Not on file    Highest education level: Not on file   Occupational History    Not on file   Tobacco Use    Smoking status: Never    Smokeless tobacco: Never   Vaping Use    Vaping Use: Never used   Substance and Sexual Activity    Alcohol use: No    Drug use: No    Sexual activity: Never   Other Topics Concern    Not on file   Social History Narrative    Not on file     Social Determinants of Health     Financial Resource Strain: Not on file   Food Insecurity: Not on file   Transportation Needs: Not on file   Physical Activity: Not on file   Stress: Not on file   Social Connections: Not on file   Intimate Partner Violence: Not on file   Housing Stability: Not on file     Prior to Admission medications    Medication Sig Start Date End Date Taking? Authorizing Provider   trimethoprim-sulfamethoxazole (BACTRIM DS, SEPTRA DS) 160-800 mg per tablet Take 1 Tablet by mouth two (2) times a day.  2/6/23  Yes Juan C Mak MD   glucose blood VI test strips (blood glucose test) strip Use to test blood sugar twice daily  DX:E11.22 12/2/22  Yes Yovany Chavarria MD   Accu-Chek Merle Plus test strp strip USE TO TEST BLOOD SUGAR ONCE DAILY 11/27/22  Yes Yovany Chavarria MD   lisinopriL (PRINIVIL, ZESTRIL) 5 mg tablet TAKE 1 TABLET BY MOUTH EVERY DAY 10/13/22  Yes Yovany Chavarria MD   predniSONE (DELTASONE) 10 mg tablet TAKE 1TAB BY MOUTH TWO (2) TIMES A DAY. Patient taking differently: Indications: pt is taking 10 mg once daily 9/22/22  Yes Yovany Chavarria MD   metFORMIN (GLUCOPHAGE) 500 mg tablet TAKE 1 TABLET BY MOUTH EVERY DAY IN THE MORNING WITH BREAKFAST AND ONE TABLET BEFORE DINNER 8/26/22  Yes Yovany Chavarria MD   glimepiride (AMARYL) 2 mg tablet Take 1 Tablet by mouth Daily (before breakfast). 8/19/22  Yes Yovany Chavarria MD   simvastatin (ZOCOR) 20 mg tablet TAKE 1 TABLET BY MOUTH EVERY DAY 8/18/22  Yes Yovany Chavarria MD   terazosin (HYTRIN) 2 mg capsule TAKE 1 CAPSULE BY MOUTH EVERY DAY 8/18/22  Yes Yovany Chavarria MD   digoxin (LANOXIN) 0.125 mg tablet TAKE 1 TABLET BY MOUTH EVERY DAY 6/6/22  Yes Yovany Chavarria MD   diclofenac EC (VOLTAREN) 75 mg EC tablet TAKE 1 TABLET BY MOUTH TWICE A DAY 5/26/22  Yes Yovany Chavarria MD   warfarin (COUMADIN) 5 mg tablet TAKE 2 TABLETS BY MOUTH ON MONDAY &FRIDAYS AND 1&1/2 TABLET DAILY ON ALL OTHER DAYS 5/16/22  Yes Yovany Chavarria MD   bumetanide (BUMEX) 2 mg tablet TAKE 1 TABLET BY MOUTH EVERY DAY 5/2/22  Yes Yovany Chavarria MD   Blood-Glucose Meter monitoring kit Use to test blood sugar twice daily  DX:E11.22 5/4/20  Yes Yovany Chavarria MD   cyanocobalamin (VITAMIN B12) 500 mcg tablet Take 500 mcg by mouth daily. Yes Provider, Historical   acetaminophen (TYLENOL) 650 mg TbER Take 650 mg by mouth every eight (8) hours. Yes Provider, Historical   multivitamins-minerals-lutein (MEN'S CENTRUM SILVER WITH LUTEIN) tab tablet Take 1 Tab by mouth daily.    Yes Provider, Historical   glucosamine 1,000 mg tab Take 2,000 mg by mouth daily. Yes Provider, Historical   cholecalciferol (VITAMIN D3) 25 mcg (1,000 unit) cap Take 1,000 Units by mouth daily. Yes Provider, Historical   DOCOSAHEXANOIC ACID/EPA (FISH OIL PO) Take 1,200 mg by mouth two (2) times a day. Yes Provider, Historical        Review of Systems              Constitutional:  He denies fever, weight loss, sweats or fatigue. EYES: No blurred or double vision,               ENT: no nasal congestion, no headache or dizziness. No difficulty with               swallowing, taste, speech or smell. Respiratory:  No cough, wheezing or shortness of breath. No sputum production. Cardiac:  Denies chest pain, palpitations, unexplained indigestion, syncope, edema, PND or orthopnea. GI:  No changes in bowel movements, no abdominal pain, no bloating, anorexia, nausea, vomiting or heartburn. :  No frequency or dysuria. Denies incontinence or sexual dysfunction. Extremities:  No joint pain, stiffness or swelling  Back:.no pain or soreness  Skin:  No recent rashes or mole changes. Infection both great toes  Neurological:  No numbness, tingling, burning paresthesias or loss of motor strength. No syncope, dizziness, frequent headaches or memory loss. Hematologic:  No easy bruising  Lymphatic: No lymph node enlargement    Objective:     Vitals:    02/06/23 1454   BP: (!) 142/78   Pulse: (!) 50   Temp: 98 °F (36.7 °C)   TempSrc: Oral   SpO2: 97%   Weight: 203 lb (92.1 kg)   Height: 6' 3\" (1.905 m)   PainSc:   6   PainLoc: Toe       Body mass index is 25.37 kg/m². Physical Examination:              General Appearance:  Well-developed, well-nourished, no acute distress. HEENT:      Ears:  The TMs and ear canals were clear. Eyes:  The pupillary responses were normal.  Extraocular muscle function intact. Lids and conjunctiva not injected. Funduscopic exam revealed sharp disc margins.   Nares: Clear w/o edema or erythema  Pharynx: Clear with teeth in good repair. No masses were noted. Neck:  Supple without thyromegaly or adenopathy. No JVD noted. No carotid                bruits. Lungs:  Clear to auscultation and percussion. Cardiac:  Regular rate and rhythm without murmur. PMI not displaced. No gallop, rub or click. Abdominal: Soft, non-tender, no hepata-spleenomegally or masses  Extremities:  No clubbing, cyanosis or edema. Skin:  No rash or unusual mole changes noted. Ulcerated area over the dorsum of both great toes. Left does have some drainage but the right appears to be crusting. There are some mild surrounding erythema but no evidence of streaking up the foot. Toes are warm with normal distal sensation and coloration  Lymph Nodes:  None felt in the cervical, supraclavicular, axillary or inguinal region. Neurological: . DTRs 2+ and symmetric. Station and gait normal.   Hematologic:   No purpura or petechiae        Assessment/Plan:         1. Cellulitis of left lower extremity    2. Paroxysmal atrial fibrillation (HCC)        Impressions/Plan:  Impression  1. Bilateral cellulitis we will treat this with Bactrim twice a day for 10 days. Culture obtained  2. Atrial fibrillation since he is on Coumadin, check his INR today  Recheck in 10 days or sooner if there is a problem. Orders Placed This Encounter    CULTURE, WOUND W GRAM STAIN ([facility] Only)    PROTHROMBIN TIME + INR    trimethoprim-sulfamethoxazole (BACTRIM DS, SEPTRA DS) 160-800 mg per tablet       Follow-up and Dispositions    Return in about 10 days (around 2/16/2023). No results found for any visits on 02/06/23. Aubrey Guerra MD    The patient was given after the visit summary the patient verbalized an understanding of the plans and problems as explained.

## 2023-02-06 NOTE — PROGRESS NOTES
Kathy Stover is a 80 y.o. male     Chief Complaint   Patient presents with    Wound Infection     Both big toes oozing and swollen. Visit Vitals  BP (!) 142/78 (BP 1 Location: Left upper arm, BP Patient Position: Sitting, BP Cuff Size: Adult)   Pulse (!) 50   Temp 98 °F (36.7 °C) (Oral)   Ht 6' 3\" (1.905 m)   Wt 203 lb (92.1 kg)   SpO2 97%   BMI 25.37 kg/m²       Health Maintenance Due   Topic Date Due    COVID-19 Vaccine (1) Never done    Shingles Vaccine (1 of 2) Never done         1. \"Have you been to the ER, urgent care clinic since your last visit? Hospitalized since your last visit? \" No    2. \"Have you seen or consulted any other health care providers outside of the [address] [place] [address] since your last visit? \"  Yes. Dr. Nicholas Chase      3. For patients aged 39-70: Has the patient had a colonoscopy / FIT/ Cologuard? NA - based on age      If the patient is female:    4. For patients aged 41-77: Has the patient had a mammogram within the past 2 years? NA - based on age or sex      11. For patients aged 21-65: Has the patient had a pap smear?  NA - based on age or sex

## 2023-02-07 LAB
INR PPP: 2
PROTHROMBIN TIME: 20 SEC

## 2023-02-07 RX ORDER — GLIMEPIRIDE 2 MG/1
TABLET ORAL
Qty: 90 TABLET | Refills: 3 | Status: SHIPPED | OUTPATIENT
Start: 2023-02-07

## 2023-02-07 NOTE — TELEPHONE ENCOUNTER
RX refill request from the patient/pharmacy. Patient last seen 02- with labs, and next appt. scheduled for 02-  Requested Prescriptions     Pending Prescriptions Disp Refills    glimepiride (AMARYL) 2 mg tablet 90 Tablet 3     Sig: TAKE 1 TABLET BY MOUTH EVERY DAY BEFORE BREAKFAST    .

## 2023-02-10 RX ORDER — CIPROFLOXACIN 500 MG/1
500 TABLET ORAL 2 TIMES DAILY
Qty: 20 TABLET | Refills: 0 | Status: SHIPPED | OUTPATIENT
Start: 2023-02-10 | End: 2023-02-20

## 2023-02-10 NOTE — PROGRESS NOTES
Results have been reviewed and abnormal results noted. Please see documentation in new EMR. Please call the patient regarding his abnormal result. Change antibiotic to Cipro 500 twice daily for 10 days which will cover both staph and Pseudomonas. Rx sent to patient's pharmacy.

## 2023-02-10 NOTE — TELEPHONE ENCOUNTER
RX refill request from the patient/pharmacy. Patient last seen 02- with labs, and next appt. scheduled for 02-  Requested Prescriptions     Pending Prescriptions Disp Refills    ciprofloxacin HCl (CIPRO) 500 mg tablet 20 Tablet 0     Sig: Take 1 Tablet by mouth two (2) times a day for 10 days. Joey Brumfield

## 2023-02-10 NOTE — PROGRESS NOTES
Results have been reviewed and abnormal results noted. Please see documentation in new EMR. Please call the patient regarding his abnormal result. Staph in the culture should be covered by the Bactrim which he is on. Discussed results with patient.

## 2023-02-11 LAB
BACTERIA SPEC CULT: ABNORMAL
GRAM STN SPEC: ABNORMAL
GRAM STN SPEC: ABNORMAL
SERVICE CMNT-IMP: ABNORMAL

## 2023-02-17 ENCOUNTER — OFFICE VISIT (OUTPATIENT)
Dept: INTERNAL MEDICINE CLINIC | Age: 88
End: 2023-02-17
Payer: MEDICARE

## 2023-02-17 VITALS
DIASTOLIC BLOOD PRESSURE: 62 MMHG | WEIGHT: 203 LBS | HEART RATE: 71 BPM | SYSTOLIC BLOOD PRESSURE: 140 MMHG | TEMPERATURE: 98.7 F | BODY MASS INDEX: 25.24 KG/M2 | OXYGEN SATURATION: 94 % | HEIGHT: 75 IN

## 2023-02-17 DIAGNOSIS — L03.116 CELLULITIS OF LEFT LOWER EXTREMITY: Primary | ICD-10-CM

## 2023-02-17 DIAGNOSIS — I48.0 PAROXYSMAL ATRIAL FIBRILLATION (HCC): ICD-10-CM

## 2023-02-17 DIAGNOSIS — I50.32 DIASTOLIC CHF, CHRONIC (HCC): ICD-10-CM

## 2023-02-17 PROCEDURE — 1101F PT FALLS ASSESS-DOCD LE1/YR: CPT | Performed by: INTERNAL MEDICINE

## 2023-02-17 PROCEDURE — 99213 OFFICE O/P EST LOW 20 MIN: CPT | Performed by: INTERNAL MEDICINE

## 2023-02-17 PROCEDURE — G8510 SCR DEP NEG, NO PLAN REQD: HCPCS | Performed by: INTERNAL MEDICINE

## 2023-02-17 PROCEDURE — G8536 NO DOC ELDER MAL SCRN: HCPCS | Performed by: INTERNAL MEDICINE

## 2023-02-17 PROCEDURE — G8417 CALC BMI ABV UP PARAM F/U: HCPCS | Performed by: INTERNAL MEDICINE

## 2023-02-17 PROCEDURE — 1123F ACP DISCUSS/DSCN MKR DOCD: CPT | Performed by: INTERNAL MEDICINE

## 2023-02-17 PROCEDURE — G8427 DOCREV CUR MEDS BY ELIG CLIN: HCPCS | Performed by: INTERNAL MEDICINE

## 2023-02-17 RX ORDER — CIPROFLOXACIN 500 MG/1
500 TABLET ORAL 2 TIMES DAILY
Qty: 20 TABLET | Refills: 0 | Status: SHIPPED | OUTPATIENT
Start: 2023-02-17

## 2023-02-17 NOTE — PROGRESS NOTES
Geovanna Trinidad is a 80 y.o. male     Chief Complaint   Patient presents with    CHF     10 day follow up    Diabetes    Cholesterol Problem    Hypertension       Visit Vitals  BP (!) 140/62 (BP 1 Location: Left upper arm, BP Patient Position: Sitting, BP Cuff Size: Adult)   Pulse 71   Temp 98.7 °F (37.1 °C) (Oral)   Ht 6' 3\" (1.905 m)   Wt 203 lb (92.1 kg)   SpO2 94%   BMI 25.37 kg/m²       Health Maintenance Due   Topic Date Due    COVID-19 Vaccine (1) Never done    Shingles Vaccine (1 of 2) Never done         1. \"Have you been to the ER, urgent care clinic since your last visit? Hospitalized since your last visit? \" No    2. \"Have you seen or consulted any other health care providers outside of the [address] [place] [address] since your last visit? \"  Dr. Michelle Horan      3. For patients aged 39-70: Has the patient had a colonoscopy / FIT/ Cologuard? NA - based on age      If the patient is female:    4. For patients aged 41-77: Has the patient had a mammogram within the past 2 years? NA - based on age or sex      11. For patients aged 21-65: Has the patient had a pap smear?  NA - based on age or sex

## 2023-02-17 NOTE — PROGRESS NOTES
Chief Complaint   Patient presents with    CHF     10 day follow up    Diabetes    Cholesterol Problem    Hypertension       SUBJECTIVE:    Wang Bose is a 80 y.o. male who returns in follow-up regarding his cellulitis on both feet, congestive heart failure, atrial fibrillation and other medical problems. He does have ulcers over the dorsum of both great toes secondary to arthritic changes of the toes and pressure from the shoes. He was placed initially on Bactrim and once cultures returned we will switch to Cipro and he is noting things are starting to heal up. He denies any chest pain, shortness of breath, palpitations, PND, orthopnea cardiorespiratory complaints. There are no GI  complaints. He notes no other current complaints. Current Outpatient Medications   Medication Sig Dispense Refill    ciprofloxacin HCl (CIPRO) 500 mg tablet Take 1 Tablet by mouth two (2) times a day. 20 Tablet 0    ciprofloxacin HCl (CIPRO) 500 mg tablet Take 1 Tablet by mouth two (2) times a day for 10 days. 20 Tablet 0    glimepiride (AMARYL) 2 mg tablet TAKE 1 TABLET BY MOUTH EVERY DAY BEFORE BREAKFAST 90 Tablet 3    trimethoprim-sulfamethoxazole (BACTRIM DS, SEPTRA DS) 160-800 mg per tablet Take 1 Tablet by mouth two (2) times a day. 20 Tablet 0    glucose blood VI test strips (blood glucose test) strip Use to test blood sugar twice daily  DX:E11.22 100 Strip 3    Accu-Chek Merle Plus test strp strip USE TO TEST BLOOD SUGAR ONCE DAILY 100 Strip PRN    lisinopriL (PRINIVIL, ZESTRIL) 5 mg tablet TAKE 1 TABLET BY MOUTH EVERY DAY 90 Tablet 3    predniSONE (DELTASONE) 10 mg tablet TAKE 1TAB BY MOUTH TWO (2) TIMES A DAY.  (Patient taking differently: Indications: pt is taking 10 mg once daily) 60 Tablet 5    metFORMIN (GLUCOPHAGE) 500 mg tablet TAKE 1 TABLET BY MOUTH EVERY DAY IN THE MORNING WITH BREAKFAST AND ONE TABLET BEFORE DINNER 180 Tablet 3    glimepiride (AMARYL) 2 mg tablet Take 1 Tablet by mouth Daily (before breakfast). 90 Tablet 3    simvastatin (ZOCOR) 20 mg tablet TAKE 1 TABLET BY MOUTH EVERY DAY 90 Tablet 3    terazosin (HYTRIN) 2 mg capsule TAKE 1 CAPSULE BY MOUTH EVERY DAY 90 Capsule 3    digoxin (LANOXIN) 0.125 mg tablet TAKE 1 TABLET BY MOUTH EVERY DAY 90 Tablet 3    diclofenac EC (VOLTAREN) 75 mg EC tablet TAKE 1 TABLET BY MOUTH TWICE A  Tablet 3    warfarin (COUMADIN) 5 mg tablet TAKE 2 TABLETS BY MOUTH ON MONDAY &FRIDAYS AND 1&1/2 TABLET DAILY ON ALL OTHER DAYS 145 Tablet 3    bumetanide (BUMEX) 2 mg tablet TAKE 1 TABLET BY MOUTH EVERY DAY 90 Tablet 3    Blood-Glucose Meter monitoring kit Use to test blood sugar twice daily  DX:E11.22 1 Kit 0    cyanocobalamin (VITAMIN B12) 500 mcg tablet Take 500 mcg by mouth daily. acetaminophen (TYLENOL) 650 mg TbER Take 650 mg by mouth every eight (8) hours. multivitamins-minerals-lutein (MEN'S CENTRUM SILVER WITH LUTEIN) tab tablet Take 1 Tab by mouth daily. glucosamine 1,000 mg tab Take 2,000 mg by mouth daily. cholecalciferol (VITAMIN D3) 25 mcg (1,000 unit) cap Take 1,000 Units by mouth daily. DOCOSAHEXANOIC ACID/EPA (FISH OIL PO) Take 1,200 mg by mouth two (2) times a day. 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    Back pain 9/20/2017    BPH (benign prostatic hyperplasia) 9/20/2017    CHF (congestive heart failure) ([facility] [address] 9/20/2017    CKD (chronic kidney disease) 9/20/2017    COVID-19 11/2021    Diabetic acetonemia ([facility] [address]     DM (diabetes mellitus) ([facility] [address] 9/20/2017    Story: Diet Controlled    ED (erectile dysfunction) 9/20/2017    Edema 9/20/2017    Elevated CPK 9/20/2017    Comments: History of    Elevated LFTs 9/20/2017    Comments: History of    Elevated PSA 9/20/2017    Hypercholesteremia     Hyperlipidemia 9/20/2017    Hypertension     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    HX MALIGNANT SKIN LESION EXCISION  09/2018    2 lesions on head    [place] UNLISTED PROCEDURE ABDOMEN PERITONEUM & OMENTUM      hernia repair     No Known Allergies    REVIEW OF SYSTEMS:  General: negative for - chills or fever, or weight loss or gain  ENT: negative for - headaches, nasal congestion or tinnitus  Eyes: no blurred or visual changes  Neck: No stiffness or swollen nodes  Respiratory: negative for - cough, hemoptysis, shortness of breath or wheezing  Cardiovascular : negative for - chest pain, edema, palpitations or shortness of breath  Gastrointestinal: negative for - abdominal pain, blood in stools, heartburn or nausea/vomiting  Genito-Urinary: no dysuria, trouble voiding, or hematuria  Musculoskeletal: negative for - gait disturbance, joint pain, joint stiffness or joint swelling  Neurological: no TIA or stroke symptoms  Hematologic: no bruises, no bleeding  Lymphatic: no swollen glands  Integument: no lumps, mole changes, nail changes or rash  Endocrine:no malaise/lethargy poly uria or polydipsia or unexpected weight changes        Social History     Socioeconomic History    Marital status:    Tobacco Use    Smoking status: Never    Smokeless tobacco: Never   Vaping Use    Vaping Use: Never used   Substance and Sexual Activity    Alcohol use: No    Drug use: No    Sexual activity: Never     No family history on file.     OBJECTIVE:     Visit Vitals  BP (!) 140/62 (BP 1 Location: Left upper arm, BP Patient Position: Sitting, BP Cuff Size: Adult)   Pulse 71   Temp 98.7 °F (37.1 °C) (Oral)   Ht 6' 3\" (1.905 m)   Wt 203 lb (92.1 kg)   SpO2 94%   BMI 25.37 kg/m²     CONSTITUTIONAL:   well nourished, appears age appropriate  EYES: sclera anicteric, PERRL, EOMI  ENMT:nares clear, moist mucous membranes, pharynx clear  NECK: supple. Thyroid normal, No JVD or bruits  RESPIRATORY: Chest: clear to ascultation and percussion, normal inspiratory effort  CARDIOVASCULAR: Heart: regular rate and rhythm no murmurs, rubs or gallops, PMI not displaced, No thrills, no peripheral edema  GASTROINTESTINAL: Abdomen: non distended, soft, non tender, bowel sounds normal  HEMATOLOGIC: no purpura, petechiae or bruising  LYMPHATIC: No lymph node enlargemant  MUSCULOSKELETAL: Extremities: no active synovitis, pulse 1+   INTEGUMENT: No unusual rashes or suspicious skin lesions noted. Nails appear normal.  Ulceration dorsum of both great toes now dry with some callus formation and no significant drainage. PERIPHERAL VASCULAR: normal pulses femoral, PT and DP  NEUROLOGIC: non-focal exam, A & O X 3  PSYCHIATRIC:, appropriate affect     ASSESSMENT:   1. Cellulitis of left lower extremity    2. Paroxysmal atrial fibrillation (HCC)    3. Diastolic CHF, chronic (HCC)      Impression  1. Cellulitis lower extremities we will continue to treat with Cipro prescription given for an additional 10 days  2. Paroxysmal atrial fibrillation I will check his INR  3. Diastolic CHF compensated  Follow-up with me again at his prior scheduled appointment on February 28. I will call with today's INR result    PLAN:  .  Orders Placed This Encounter    PROTHROMBIN TIME + INR    ciprofloxacin HCl (CIPRO) 500 mg tablet         ATTENTION:   This medical record was transcribed using an electronic medical records system. Although proofread, it may and can contain electronic and spelling errors. Other human spelling and other errors may be present. Corrections may be executed at a later time. Please feel free to contact us for any clarifications as needed. Follow-up and Dispositions    Return in about 11 days (around 2/28/2023). No results found for any visits on 02/17/23.     Chacorta Fermin MD    The patient verbalized understanding of the problems and plans as explained.

## 2023-02-18 LAB
INR PPP: 1.8 (ref 0.9–1.1)
PROTHROMBIN TIME: 18.5 SEC (ref 9–11.1)

## 2023-02-27 NOTE — PROGRESS NOTES
Chief Complaint   Patient presents with    Hypertension    Chronic Kidney Disease    Irregular Heart Beat    Skin Infection     Both feet       SUBJECTIVE:    Lisa De Leon is a 80 y.o. male who returns in follow-up his medical problems include hypertension, diabetes, hyperlipidemia, ASCVD, paroxysmal atrial fibrillation, diastolic CHF compensated, polymyalgia rheumatica, CKD stage III, recent treatment for bilateral great toe infection and other multiple medical problems. He is taking his medications trying to follow his diet remains physically active. He currently denies any chest pain, shortness of breath or cardiac respiratory complaints. There are no current GI or  complaints. There are no headaches, dizziness or neurologic complaints. He has no change of his chronic arthritic complaints and there are no other complaints on complete review of systems. Current Outpatient Medications   Medication Sig Dispense Refill    glucose blood VI test strips (blood glucose test) strip Use to test blood sugar twice daily  DX:E11.22 100 Strip 3    Accu-Chek Merle Plus test strp strip USE TO TEST BLOOD SUGAR ONCE DAILY 100 Strip PRN    lisinopriL (PRINIVIL, ZESTRIL) 5 mg tablet TAKE 1 TABLET BY MOUTH EVERY DAY 90 Tablet 3    predniSONE (DELTASONE) 10 mg tablet TAKE 1TAB BY MOUTH TWO (2) TIMES A DAY. (Patient taking differently: Indications: pt is taking 10 mg once daily) 60 Tablet 5    metFORMIN (GLUCOPHAGE) 500 mg tablet TAKE 1 TABLET BY MOUTH EVERY DAY IN THE MORNING WITH BREAKFAST AND ONE TABLET BEFORE DINNER 180 Tablet 3    glimepiride (AMARYL) 2 mg tablet Take 1 Tablet by mouth Daily (before breakfast).  90 Tablet 3    simvastatin (ZOCOR) 20 mg tablet TAKE 1 TABLET BY MOUTH EVERY DAY 90 Tablet 3    terazosin (HYTRIN) 2 mg capsule TAKE 1 CAPSULE BY MOUTH EVERY DAY 90 Capsule 3    digoxin (LANOXIN) 0.125 mg tablet TAKE 1 TABLET BY MOUTH EVERY DAY 90 Tablet 3    diclofenac EC (VOLTAREN) 75 mg EC tablet TAKE 1 TABLET BY MOUTH TWICE A  Tablet 3    warfarin (COUMADIN) 5 mg tablet TAKE 2 TABLETS BY MOUTH ON MONDAY &FRIDAYS AND 1&1/2 TABLET DAILY ON ALL OTHER DAYS 145 Tablet 3    bumetanide (BUMEX) 2 mg tablet TAKE 1 TABLET BY MOUTH EVERY DAY 90 Tablet 3    Blood-Glucose Meter monitoring kit Use to test blood sugar twice daily  DX:E11.22 1 Kit 0    cyanocobalamin (VITAMIN B12) 500 mcg tablet Take 500 mcg by mouth daily. acetaminophen (TYLENOL) 650 mg TbER Take 650 mg by mouth every eight (8) hours. multivitamins-minerals-lutein (MEN'S CENTRUM SILVER WITH LUTEIN) tab tablet Take 1 Tab by mouth daily. glucosamine 1,000 mg tab Take 2,000 mg by mouth daily. cholecalciferol (VITAMIN D3) 25 mcg (1,000 unit) cap Take 1,000 Units by mouth daily. DOCOSAHEXANOIC ACID/EPA (FISH OIL PO) Take 1,200 mg by mouth two (2) times a day. 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    Back pain 9/20/2017    BPH (benign prostatic hyperplasia) 9/20/2017    CHF (congestive heart failure) ([facility] [address] 9/20/2017    CKD (chronic kidney disease) 9/20/2017    COVID-19 11/2021    Diabetic acetonemia ([facility] [address]     DM (diabetes mellitus) ([facility] [address] 9/20/2017    Story: Diet Controlled    ED (erectile dysfunction) 9/20/2017    Edema 9/20/2017    Elevated CPK 9/20/2017    Comments: History of    Elevated LFTs 9/20/2017    Comments: History of    Elevated PSA 9/20/2017    Hypercholesteremia     Hyperlipidemia 9/20/2017    Hypertension     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    HX MALIGNANT SKIN LESION EXCISION  09/2018    2 lesions on head    DE UNLISTED PROCEDURE ABDOMEN PERITONEUM & OMENTUM      hernia repair     No Known Allergies    REVIEW OF SYSTEMS:  General: negative for - chills or fever, or weight loss or gain  ENT: negative for - headaches, nasal congestion or tinnitus  Eyes: no blurred or visual changes  Neck: No stiffness or swollen nodes  Respiratory: negative for - cough, hemoptysis, shortness of breath or wheezing  Cardiovascular : negative for - chest pain, edema, palpitations or shortness of breath  Gastrointestinal: negative for - abdominal pain, blood in stools, heartburn or nausea/vomiting  Genito-Urinary: no dysuria, trouble voiding, or hematuria  Musculoskeletal: negative for - gait disturbance, joint pain, joint stiffness or joint swelling  Neurological: no TIA or stroke symptoms  Hematologic: no bruises, no bleeding  Lymphatic: no swollen glands  Integument: no lumps, mole changes, nail changes or rash  Endocrine:no malaise/lethargy poly uria or polydipsia or unexpected weight changes        Social History     Socioeconomic History    Marital status:    Tobacco Use    Smoking status: Never    Smokeless tobacco: Never   Vaping Use    Vaping Use: Never used   Substance and Sexual Activity    Alcohol use: No    Drug use: No    Sexual activity: Not Currently     History reviewed. No pertinent family history. OBJECTIVE:     Visit Vitals  BP (!) 150/72   Pulse (!) 52   Temp 97.6 °F (36.4 °C)   Ht 6' 3\" (1.905 m)   Wt 202 lb 6.4 oz (91.8 kg)   SpO2 98%   BMI 25.30 kg/m²     CONSTITUTIONAL:   well nourished, appears age appropriate  EYES: sclera anicteric, PERRL, EOMI  ENMT:nares clear, moist mucous membranes, pharynx clear  NECK: supple.  Thyroid normal, No JVD or bruits  RESPIRATORY: Chest: clear to ascultation and percussion, normal inspiratory effort  CARDIOVASCULAR: Heart: regular rate and rhythm no murmurs, rubs or gallops, PMI not displaced, No thrills, no peripheral edema  GASTROINTESTINAL: Abdomen: non distended, soft, non tender, bowel sounds normal  HEMATOLOGIC: no purpura, petechiae or bruising  LYMPHATIC: No lymph node enlargemant  MUSCULOSKELETAL: Extremities: no active synovitis, pulse 1+   INTEGUMENT: No unusual rashes or suspicious skin lesions noted. Nails appear normal.  PERIPHERAL VASCULAR: normal pulses femoral, PT and DP  NEUROLOGIC: non-focal exam, A & O X 3  PSYCHIATRIC:, appropriate affect     ASSESSMENT:   1. Hypertension with renal disease    2. 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, without long-term current use of insulin ([address]    3. Diastolic CHF, chronic (HCC)    4. Paroxysmal atrial fibrillation ([address]    5. Mixed hyperlipidemia    6. Primary osteoarthritis involving multiple joints    7. Stage 3 chronic kidney disease, unspecified whether stage 3a or 3b CKD ([address]    8. Polymyalgia rheumatica (HCC)      Impression  1. Hypertension blood pressure is adequate at 80years old I am not can change his medicine. 2.  Diabetes repeat status pending prior lab reviewed  3. Diastolic CHF compensated  4. Paroxysmal atrial fibrillation INR pending  5. Hyperlipidemia prior lab reviewed repeat status pending  6. DJD that is stable  7. CKD stage III repeat status pending  8. Polymyalgia rheumatica still on prednisone we will see what the results are of his sed rate  Follow-up again 3 months fasting but monthly in between for follow-up of his congestive heart failure, CKD, A-fib and PMR    PLAN:  .  Orders Placed This Encounter    METABOLIC PANEL, COMPREHENSIVE    LIPID PANEL    CK    HEMOGLOBIN A1C WITH EAG    SED RATE (ESR)    PROTHROMBIN TIME + INR         ATTENTION:   This medical record was transcribed using an electronic medical records system. Although proofread, it may and can contain electronic and spelling errors. Other human spelling and other errors may be present. Corrections may be executed at a later time.   Please feel free to contact us for any clarifications as needed. Follow-up and Dispositions    Return in about 3 months (around 5/28/2023). No results found for any visits on 02/28/23. Tracy Marie MD    The patient verbalized understanding of the problems and plans as explained.

## 2023-02-28 ENCOUNTER — OFFICE VISIT (OUTPATIENT)
Dept: INTERNAL MEDICINE CLINIC | Age: 88
End: 2023-02-28
Payer: MEDICARE

## 2023-02-28 VITALS
HEART RATE: 52 BPM | HEIGHT: 75 IN | OXYGEN SATURATION: 98 % | SYSTOLIC BLOOD PRESSURE: 150 MMHG | BODY MASS INDEX: 25.17 KG/M2 | DIASTOLIC BLOOD PRESSURE: 72 MMHG | WEIGHT: 202.4 LBS | TEMPERATURE: 97.6 F

## 2023-02-28 DIAGNOSIS — E78.2 MIXED HYPERLIPIDEMIA: ICD-10-CM

## 2023-02-28 DIAGNOSIS — M15.9 PRIMARY OSTEOARTHRITIS INVOLVING MULTIPLE JOINTS: ICD-10-CM

## 2023-02-28 DIAGNOSIS — I12.9 HYPERTENSION WITH RENAL DISEASE: Primary | ICD-10-CM

## 2023-02-28 DIAGNOSIS — N18.30 CONTROLLED TYPE 2 DIABETES MELLITUS WITH STAGE 3 CHRONIC KIDNEY DISEASE, WITHOUT LONG-TERM CURRENT USE OF INSULIN (HCC): ICD-10-CM

## 2023-02-28 DIAGNOSIS — E11.22 CONTROLLED TYPE 2 DIABETES MELLITUS WITH STAGE 3 CHRONIC KIDNEY DISEASE, WITHOUT LONG-TERM CURRENT USE OF INSULIN (HCC): ICD-10-CM

## 2023-02-28 DIAGNOSIS — I50.32 DIASTOLIC CHF, CHRONIC (HCC): ICD-10-CM

## 2023-02-28 DIAGNOSIS — I48.0 PAROXYSMAL ATRIAL FIBRILLATION (HCC): ICD-10-CM

## 2023-02-28 DIAGNOSIS — N18.30 STAGE 3 CHRONIC KIDNEY DISEASE, UNSPECIFIED WHETHER STAGE 3A OR 3B CKD (HCC): ICD-10-CM

## 2023-02-28 DIAGNOSIS — M35.3 POLYMYALGIA RHEUMATICA (HCC): ICD-10-CM

## 2023-02-28 LAB
INR PPP: 2.1 (ref 0.9–1.1)
PROTHROMBIN TIME: 21.2 SEC (ref 9–11.1)

## 2023-02-28 PROCEDURE — G8536 NO DOC ELDER MAL SCRN: HCPCS | Performed by: INTERNAL MEDICINE

## 2023-02-28 PROCEDURE — 1101F PT FALLS ASSESS-DOCD LE1/YR: CPT | Performed by: INTERNAL MEDICINE

## 2023-02-28 PROCEDURE — G8417 CALC BMI ABV UP PARAM F/U: HCPCS | Performed by: INTERNAL MEDICINE

## 2023-02-28 PROCEDURE — 1123F ACP DISCUSS/DSCN MKR DOCD: CPT | Performed by: INTERNAL MEDICINE

## 2023-02-28 PROCEDURE — 99214 OFFICE O/P EST MOD 30 MIN: CPT | Performed by: INTERNAL MEDICINE

## 2023-02-28 PROCEDURE — G8427 DOCREV CUR MEDS BY ELIG CLIN: HCPCS | Performed by: INTERNAL MEDICINE

## 2023-02-28 PROCEDURE — G8510 SCR DEP NEG, NO PLAN REQD: HCPCS | Performed by: INTERNAL MEDICINE

## 2023-02-28 NOTE — PROGRESS NOTES
Chief Complaint   Patient presents with    Hypertension    Chronic Kidney Disease    Irregular Heart Beat    Skin Infection     Both feet      1. Have you been to the ER, urgent care clinic since your last visit? Hospitalized since your last visit? No    2. Have you seen or consulted any other health care providers outside of the [address] [place] [address] since your last visit? Include any pap smears or colon screening.  No

## 2023-03-01 LAB
ALBUMIN SERPL-MCNC: 3.3 G/DL (ref 3.5–5)
ALBUMIN/GLOB SERPL: 1.1 (ref 1.1–2.2)
ALP SERPL-CCNC: 54 U/L (ref 45–117)
ALT SERPL-CCNC: 39 U/L (ref 12–78)
ANION GAP SERPL CALC-SCNC: 4 MMOL/L (ref 5–15)
AST SERPL-CCNC: 22 U/L (ref 15–37)
BILIRUB SERPL-MCNC: 0.3 MG/DL (ref 0.2–1)
BUN SERPL-MCNC: 33 MG/DL (ref 6–20)
BUN/CREAT SERPL: 23 (ref 12–20)
CALCIUM SERPL-MCNC: 9.1 MG/DL (ref 8.5–10.1)
CHLORIDE SERPL-SCNC: 108 MMOL/L (ref 97–108)
CHOLEST SERPL-MCNC: 179 MG/DL
CK SERPL-CCNC: 77 U/L (ref 39–308)
CO2 SERPL-SCNC: 30 MMOL/L (ref 21–32)
CREAT SERPL-MCNC: 1.42 MG/DL (ref 0.7–1.3)
ERYTHROCYTE [SEDIMENTATION RATE] IN BLOOD: 39 MM/HR (ref 0–20)
EST. AVERAGE GLUCOSE BLD GHB EST-MCNC: 177 MG/DL
GLOBULIN SER CALC-MCNC: 3.1 G/DL (ref 2–4)
GLUCOSE SERPL-MCNC: 157 MG/DL (ref 65–100)
HBA1C MFR BLD: 7.8 % (ref 4–5.6)
HDLC SERPL-MCNC: 42 MG/DL
HDLC SERPL: 4.3 (ref 0–5)
LDLC SERPL CALC-MCNC: 75.8 MG/DL (ref 0–100)
POTASSIUM SERPL-SCNC: 4.4 MMOL/L (ref 3.5–5.1)
PROT SERPL-MCNC: 6.4 G/DL (ref 6.4–8.2)
SODIUM SERPL-SCNC: 142 MMOL/L (ref 136–145)
TRIGL SERPL-MCNC: 306 MG/DL (ref ?–150)
VLDLC SERPL CALC-MCNC: 61.2 MG/DL

## 2023-03-06 ENCOUNTER — TELEPHONE ANTICOAG (OUTPATIENT)
Dept: INTERNAL MEDICINE CLINIC | Age: 88
End: 2023-03-06

## 2023-03-06 RX ORDER — GLIMEPIRIDE 4 MG/1
4 TABLET ORAL
Qty: 30 TABLET | Refills: 5 | Status: SHIPPED | OUTPATIENT
Start: 2023-03-06

## 2023-03-06 NOTE — TELEPHONE ENCOUNTER
RX refill request from the patient/pharmacy. Patient last seen 02- with labs, and next appt. scheduled for 03-  Requested Prescriptions     Pending Prescriptions Disp Refills    glimepiride (AMARYL) 4 mg tablet 30 Tablet 5     Sig: Take 1 Tablet by mouth Daily (before breakfast).     Sandra Johnson

## 2023-03-15 NOTE — DISCHARGE INSTRUCTIONS
Discharge Instructions/Wound Care Orders  [facility]  [address], [address] [facility]  Telephone: [phone]    NAME:  Zainab Arndt Sr. YOB: 1933  MEDICAL RECORD NUMBER:  [id number]  DATE:  3/19/2020      WOUND CARE ORDERS:  Left lower leg wound and right 2nd toe wound - cleanse with saline, apply Xeroform, cover with ABD, apply 3 layer compression wrap to bilateral lower legs. Right toe, apply mupirocin ointment cover with bandaid, change daily. Pt to be seen at [facility] on 03/26/20 at 1:45 and then will come here for nurse visit/dressing change, Follow-up with Dr. Sherwin Calderon in 2 weeks. TREATMENT ORDERS:    Elevate leg(s) above the level of the heart when sitting. Avoid prolonged standing in one place. Do no get dressing/wrap wet. Follow Diet as prescribed:   [x] Diet as tolerated: [x] Calorie Diabetic Diet: No sugar, low carb [x] No Added Salt:  [x] Increase Protein: [] Other:                 Return Appointment:  [x] Return Appointment: With Dr. Sherwin Calderon  in  [facility]),  Nurse visit 03/26/20    [] Ordered tests:      Electronically signed Zita Tipton RN on 3/19/2020 at 12:03 PM     [address]: Should you experience any significant changes in your wound(s) or have questions about your wound care, please contact the [address] [place] [address] at [address] [place] [address] 8:00 am - 4:30. If you need help with your wound outside these hours and cannot wait until we are again available, contact your PCP or go to the hospital emergency room. PLEASE NOTE: IF YOU ARE UNABLE TO OBTAIN WOUND SUPPLIES, CONTINUE TO USE THE SUPPLIES YOU HAVE AVAILABLE UNTIL YOU ARE ABLE TO REACH US. IT IS MOST IMPORTANT TO KEEP THE WOUND COVERED AT ALL TIMES.      Physician Signature:_______________________    Date: ___________ Time:  ____________
Sudden numbness or weakness of the face, arm, or leg, especially on one side of the body. Confusion, trouble speaking or understanding. Trouble seeing in one or both eyes. Trouble walking, dizziness, loss of balance or coordination. Severe headache.

## 2023-03-20 ENCOUNTER — TELEPHONE (OUTPATIENT)
Dept: INTERNAL MEDICINE CLINIC | Age: 88
End: 2023-03-20

## 2023-03-20 NOTE — TELEPHONE ENCOUNTER
Patients son is calling to inform that dads toes do not look much better yet and they think patient needs a refill on his antibiotic. Please call patients son with questions.

## 2023-03-23 ENCOUNTER — OFFICE VISIT (OUTPATIENT)
Dept: INTERNAL MEDICINE CLINIC | Age: 88
End: 2023-03-23
Payer: MEDICARE

## 2023-03-23 VITALS
WEIGHT: 204 LBS | OXYGEN SATURATION: 97 % | RESPIRATION RATE: 18 BRPM | TEMPERATURE: 97.3 F | HEIGHT: 75 IN | DIASTOLIC BLOOD PRESSURE: 62 MMHG | SYSTOLIC BLOOD PRESSURE: 122 MMHG | HEART RATE: 68 BPM | BODY MASS INDEX: 25.36 KG/M2

## 2023-03-23 DIAGNOSIS — E11.22 CONTROLLED TYPE 2 DIABETES MELLITUS WITH STAGE 3 CHRONIC KIDNEY DISEASE, WITHOUT LONG-TERM CURRENT USE OF INSULIN (HCC): ICD-10-CM

## 2023-03-23 DIAGNOSIS — N18.30 CONTROLLED TYPE 2 DIABETES MELLITUS WITH STAGE 3 CHRONIC KIDNEY DISEASE, WITHOUT LONG-TERM CURRENT USE OF INSULIN (HCC): ICD-10-CM

## 2023-03-23 DIAGNOSIS — L03.031 CELLULITIS OF GREAT TOE OF RIGHT FOOT: Primary | ICD-10-CM

## 2023-03-23 DIAGNOSIS — S90.414A ABRASION OF TOE OF RIGHT FOOT, INITIAL ENCOUNTER: ICD-10-CM

## 2023-03-23 DIAGNOSIS — I48.0 PAROXYSMAL ATRIAL FIBRILLATION (HCC): ICD-10-CM

## 2023-03-23 PROCEDURE — G8536 NO DOC ELDER MAL SCRN: HCPCS | Performed by: PHYSICIAN ASSISTANT

## 2023-03-23 PROCEDURE — 3051F HG A1C>EQUAL 7.0%<8.0%: CPT | Performed by: PHYSICIAN ASSISTANT

## 2023-03-23 PROCEDURE — 1101F PT FALLS ASSESS-DOCD LE1/YR: CPT | Performed by: PHYSICIAN ASSISTANT

## 2023-03-23 PROCEDURE — 1123F ACP DISCUSS/DSCN MKR DOCD: CPT | Performed by: PHYSICIAN ASSISTANT

## 2023-03-23 PROCEDURE — G8432 DEP SCR NOT DOC, RNG: HCPCS | Performed by: PHYSICIAN ASSISTANT

## 2023-03-23 PROCEDURE — G8427 DOCREV CUR MEDS BY ELIG CLIN: HCPCS | Performed by: PHYSICIAN ASSISTANT

## 2023-03-23 PROCEDURE — 99214 OFFICE O/P EST MOD 30 MIN: CPT | Performed by: PHYSICIAN ASSISTANT

## 2023-03-23 PROCEDURE — G8417 CALC BMI ABV UP PARAM F/U: HCPCS | Performed by: PHYSICIAN ASSISTANT

## 2023-03-23 RX ORDER — DOXYCYCLINE 100 MG/1
100 CAPSULE ORAL 2 TIMES DAILY
COMMUNITY

## 2023-03-23 RX ORDER — CIPROFLOXACIN 500 MG/1
500 TABLET ORAL 2 TIMES DAILY
Qty: 20 TABLET | Refills: 0 | Status: SHIPPED | OUTPATIENT
Start: 2023-03-23 | End: 2023-04-02

## 2023-03-23 NOTE — PROGRESS NOTES
Room: F  Identified pt with two pt identifiers(name and ). Reviewed record in preparation for visit and have obtained necessary documentation. Chief Complaint   Patient presents with    Skin Problem     Toe skin problem. Vitals:    23 1027   BP: 122/62   Pulse: 68   Resp: 18   Temp: 97.3 °F (36.3 °C)   TempSrc: Oral   SpO2: 97%   Weight: 204 lb (92.5 kg)   Height: 6' 3\" (1.905 m)   PainSc:   0 - No pain       Health Maintenance Due   Topic    COVID-19 Vaccine (1)    Shingles Vaccine (1 of 2)       1. \"Have you been to the ER, urgent care clinic since your last visit? Hospitalized since your last visit? \" No    2. \"Have you seen or consulted any other health care providers outside of the [address] [place] [address] since your last visit? \" No     3. For patients over 45: Has the patient had a colonoscopy? NA - based on age     If the patient is female:    4. For patients over 36: Has the patient had a mammogram? NA - based on age    11. For patients over 21: Has the patient had a pap smear?  NA - based on age    Current Outpatient Medications   Medication Instructions    Accu-Chek Merle Plus test strp strip USE TO TEST BLOOD SUGAR ONCE DAILY    acetaminophen (TYLENOL) 650 mg, Oral, EVERY 8 HOURS    Blood-Glucose Meter monitoring kit Use to test blood sugar twice daily<BR>DX:E11.22    bumetanide (BUMEX) 2 mg tablet TAKE 1 TABLET BY MOUTH EVERY DAY    cholecalciferol (VITAMIN D3) 1,000 Units, Oral, DAILY    cyanocobalamin (VITAMIN B12) 500 mcg, Oral, DAILY    diclofenac EC (VOLTAREN) 75 mg EC tablet TAKE 1 TABLET BY MOUTH TWICE A DAY    digoxin (LANOXIN) 0.125 mg tablet TAKE 1 TABLET BY MOUTH EVERY DAY    DOCOSAHEXANOIC ACID/EPA (FISH OIL PO) 1,200 mg, Oral, 2 TIMES DAILY    doxycycline (MONODOX) 100 mg, 2 TIMES DAILY    glimepiride (AMARYL) 4 mg, Oral, DAILY BEFORE BREAKFAST    glucosamine 2,000 mg, Oral, DAILY    glucose blood VI test strips (blood glucose test) strip Use to test blood sugar twice daily<BR>DX:E11.22    lisinopriL (PRINIVIL, ZESTRIL) 5 mg tablet TAKE 1 TABLET BY MOUTH EVERY DAY    metFORMIN (GLUCOPHAGE) 500 mg tablet TAKE 1 TABLET BY MOUTH EVERY DAY IN THE MORNING WITH BREAKFAST AND ONE TABLET BEFORE DINNER    multivitamins-minerals-lutein (MEN'S CENTRUM SILVER WITH LUTEIN) tab tablet 1 Tablet, Oral, DAILY    predniSONE (DELTASONE) 10 mg tablet TAKE 1TAB BY MOUTH TWO (2) TIMES A DAY. simvastatin (ZOCOR) 20 mg tablet TAKE 1 TABLET BY MOUTH EVERY DAY    terazosin (HYTRIN) 2 mg capsule TAKE 1 CAPSULE BY MOUTH EVERY DAY    warfarin (COUMADIN) 5 mg tablet TAKE 2 TABLETS BY MOUTH ON MONDAY &FRIDAYS AND 1&1/2 TABLET DAILY ON ALL OTHER DAYS       No Known Allergies    Immunization History   Administered Date(s) Administered    Influenza High Dose Vaccine PF 10/30/2017, 09/23/2019    Influenza Vaccine 10/07/2016    Influenza Vaccine (Tri) Adjuvanted (>65 Yrs FLUAD TRI 50902) 10/19/2018    Influenza, FLUAD, (age 72 y+), Adjuvanted 10/13/2020, 10/08/2021, 10/19/2022    Pneumococcal Conjugate (PCV-13) 01/07/2005    Pneumococcal Vaccine (Unspecified Type) 01/07/2005    Rabies Vaccine 03/11/2009    Tdap 07/27/2020       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    Back pain 9/20/2017    BPH (benign prostatic hyperplasia) 9/20/2017    CHF (congestive heart failure) ([address] 9/20/2017    CKD (chronic kidney disease) 9/20/2017    COVID-19 11/2021    Diabetic acetonemia ([address]     DM (diabetes mellitus) ([facility] 9/20/2017    Story: Diet Controlled    ED (erectile dysfunction) 9/20/2017    Edema 9/20/2017    Elevated CPK 9/20/2017    Comments: History of    Elevated LFTs 9/20/2017    Comments: History of    Elevated PSA 9/20/2017    Hypercholesteremia     Hyperlipidemia 9/20/2017    Hypertension     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    Nodule of right lung 9/20/2017    Story: Right [facility] 9/20/2017    Prostate enlargement

## 2023-03-23 NOTE — PROGRESS NOTES
HPI:  80 y.o.  presents for acute visit due to Right toe infection recurrence x 2 wks    PCP is Dr Andreas Manzo, previously unknown to me    He was seen for same on 2/06/2023 and culture showed (+)pseudomonas and staph; treated with Cipro  He was improving on follow up, it responded to the Cipro and seemed to \"callus\" up according to the patient  Pt states he had identical single ulcers on both great toes at the time  The wound on the right toe started to worsen again about 2 wks ago, but this time there is a second ulcer on the right toe as well    DM - on metformin and glimepiride  A1C 7.8% 2/28/23  Noted his FBS are going up slightly, had been in the 120s - 130s the week of 3/10, but now in the 150s with a high of 182    He is on coumadin for PAF    Patient Active Problem List    Diagnosis    Vitamin D deficiency    Chronic pain of both knees    Age-related cataract of both eyes    COVID-19     Dx 11/30/2021 Tx Monoclonal Ab  Second COVID infection 8/8/2022 treated with Paxlovid        Primary osteoarthritis of right knee    Primary osteoarthritis of left shoulder    Primary osteoarthritis of left knee    Bilateral leg edema    Diastolic CHF, chronic ([address]     Echo 7//8/94  - mild diastolic dysfunction with EFx 45%      Cellulitis of left lower extremity    Near syncope    Alcohol screening    Primary osteoarthritis of left hip    Polymyalgia rheumatica (HCC)    Dizziness    Paroxysmal atrial fibrillation (HCC)    Elevated PSA    ASCVD (arteriosclerotic cardiovascular disease)     Story:  Old ASMI by EKG      Mixed hyperlipidemia    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, without long-term current use of insulin (HCC)     Story: Diet Controlled      Hypertension with renal disease    Stage 3 chronic kidney disease (HCC)    Primary osteoarthritis involving multiple joints    Nodule of right lung     Story: Right      Chronic non-seasonal allergic rhinitis    ED (erectile dysfunction) Back pain    BPH (benign prostatic hyperplasia)         Past Medical History:   Diagnosis Date    Allergic rhinitis 9/20/2017    Arthritis     ASCVD (arteriosclerotic cardiovascular disease) 9/20/2017    Story: Old ASMI by EKG    Back pain 9/20/2017    BPH (benign prostatic hyperplasia) 9/20/2017    CHF (congestive heart failure) ([facility] 9/20/2017    CKD (chronic kidney disease) 9/20/2017    COVID-19 11/2021    Diabetic acetonemia ([facility]     DM (diabetes mellitus) ([facility] 9/20/2017    Story: Diet Controlled    ED (erectile dysfunction) 9/20/2017    Edema 9/20/2017    Elevated CPK 9/20/2017    Comments: History of    Elevated LFTs 9/20/2017    Comments: History of    Elevated PSA 9/20/2017    Hypercholesteremia     Hyperlipidemia 9/20/2017    Hypertension     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    Nodule of right lung 9/20/2017    Story: Right    Polymyalgia ([facility] 9/20/2017    Prostate enlargement        Social History     Tobacco Use    Smoking status: Never    Smokeless tobacco: Never   Vaping Use    Vaping Use: Never used   Substance Use Topics    Alcohol use: No    Drug use: No       Outpatient Medications Marked as Taking for the 3/23/23 encounter (Office Visit) with Gertrudis Centeno PA-C   Medication Sig Dispense Refill    glimepiride (AMARYL) 4 mg tablet Take 1 Tablet by mouth Daily (before breakfast). 30 Tablet 5    glucose blood VI test strips (blood glucose test) strip Use to test blood sugar twice daily  DX:E11.22 100 Strip 3    Accu-Chek Merle Plus test strp strip USE TO TEST BLOOD SUGAR ONCE DAILY 100 Strip PRN    lisinopriL (PRINIVIL, ZESTRIL) 5 mg tablet TAKE 1 TABLET BY MOUTH EVERY DAY 90 Tablet 3    predniSONE (DELTASONE) 10 mg tablet TAKE 1TAB BY MOUTH TWO (2) TIMES A DAY.  (Patient taking differently: Indications: pt is taking 10 mg once daily) 60 Tablet 5    metFORMIN (GLUCOPHAGE) 500 mg tablet TAKE 1 TABLET BY MOUTH EVERY DAY IN THE MORNING WITH BREAKFAST AND ONE TABLET BEFORE DINNER 180 Tablet 3 simvastatin (ZOCOR) 20 mg tablet TAKE 1 TABLET BY MOUTH EVERY DAY 90 Tablet 3    terazosin (HYTRIN) 2 mg capsule TAKE 1 CAPSULE BY MOUTH EVERY DAY 90 Capsule 3    digoxin (LANOXIN) 0.125 mg tablet TAKE 1 TABLET BY MOUTH EVERY DAY 90 Tablet 3    diclofenac EC (VOLTAREN) 75 mg EC tablet TAKE 1 TABLET BY MOUTH TWICE A  Tablet 3    warfarin (COUMADIN) 5 mg tablet TAKE 2 TABLETS BY MOUTH ON MONDAY &FRIDAYS AND 1&1/2 TABLET DAILY ON ALL OTHER DAYS 145 Tablet 3    bumetanide (BUMEX) 2 mg tablet TAKE 1 TABLET BY MOUTH EVERY DAY 90 Tablet 3    Blood-Glucose Meter monitoring kit Use to test blood sugar twice daily  DX:E11.22 1 Kit 0    cyanocobalamin (VITAMIN B12) 500 mcg tablet Take 500 mcg by mouth daily. multivitamins-minerals-lutein (MEN'S CENTRUM SILVER WITH LUTEIN) tab tablet Take 1 Tab by mouth daily. glucosamine 1,000 mg tab Take 2,000 mg by mouth daily. cholecalciferol (VITAMIN D3) 25 mcg (1,000 unit) cap Take 1,000 Units by mouth daily. DOCOSAHEXANOIC ACID/EPA (FISH OIL PO) Take 1,200 mg by mouth two (2) times a day. No Known Allergies    ROS:  ROS negative except as per HPI. PE:  Visit Vitals  /62 (BP 1 Location: Left arm, BP Patient Position: Sitting, BP Cuff Size: Adult)   Pulse 68   Temp 97.3 °F (36.3 °C) (Oral)   Resp 18   Ht 6' 3\" (1.905 m)   Wt 204 lb (92.5 kg)   SpO2 97%   BMI 25.50 kg/m²     Gen: alert, oriented, no acute distress  Head: normocephalic, atraumatic  Eyes: pupils equal round reactive to light, sclera clear, conjunctiva clear  Neck: supple  Resp: no increase work of breathing      Neuro: normal movement in all extremities, sensation intact and symmetric.   Skin:  (+)2 shallow ulcers with drainage on dorsum of Right great toe over IP joint which has a hammer toe deformity; on right great toe pad is small acute abrasion which was acutely bleeding and hemostasis achieved with direct pressure; Left great toe hammer toe with previously healed dorsal ulcer and callus formation  (+)mild erythema around right toe ulcers, but no lymphangitis  Extremities: (+) edema of right great toe with hammer toe    No results found for this visit on 03/23/23. Assessment/Plan:      ICD-10-CM ICD-9-CM    1. Cellulitis of great toe of right foot  L03.031 681.10 ciprofloxacin HCl (CIPRO) 500 mg tablet      2. 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 Abrasion of toe of right foot, initial encounter  S90.414A 917.0       4. Paroxysmal atrial fibrillation ([facility])  I48.0 427.31         1. Cellulitis right great toe  Recurrent, now with a second ulcer located over deformity of hammertoe  The deformity of his toe is predisposing him to develop these mechanical ulcers  He reports he has been evaluated for diabetic shoes in the past but they were not helpful according to him  He has seen podiatry for similar issue in recent past, he reports in the wintertime he was told he just needed to wear sandals and he did not like this advice and does not wish to go back to that podiatrist  We will treat with Cipro according to prior culture obtained in February by Dr. Diamond Fong  He will continue Epsom salt soaks but I advised him to use lukewarm water and not very hot like he has been doing  Advised him to change bandage twice a day and change his socks twice a day  I asked him to use an anti-bacterial shoe spray in his tennis shoes and let them air out for 24 hours and alternate between 2 pairs of tennis shoes every other day  He will follow-up to review other recommendations and progress of his wound with Dr. Diamond Fong at his scheduled appointment next week    2. Diabetes  Fasting blood sugars have been rising this week likely due to acute infection and should improve as his infection improves  He will continue to monitor    3.   Small abrasion on right great toe pad uncovered today when we undressed his toe, he is on Coumadin, hemostasis achieved with pressure, he is aware of this and will continue to apply bandage    4. PAF on Coumadin therapy  He has follow-up next week with PCP for INR testing      Health Maintenance reviewed - deferred. Orders Placed This Encounter    ciprofloxacin HCl (CIPRO) 500 mg tablet     Sig: Take 1 Tablet by mouth two (2) times a day for 10 days. Dispense:  20 Tablet     Refill:  0       There are no discontinued medications. Current Outpatient Medications   Medication Sig Dispense Refill    ciprofloxacin HCl (CIPRO) 500 mg tablet Take 1 Tablet by mouth two (2) times a day for 10 days. 20 Tablet 0    glimepiride (AMARYL) 4 mg tablet Take 1 Tablet by mouth Daily (before breakfast). 30 Tablet 5    glucose blood VI test strips (blood glucose test) strip Use to test blood sugar twice daily  DX:E11.22 100 Strip 3    Accu-Chek Merle Plus test strp strip USE TO TEST BLOOD SUGAR ONCE DAILY 100 Strip PRN    lisinopriL (PRINIVIL, ZESTRIL) 5 mg tablet TAKE 1 TABLET BY MOUTH EVERY DAY 90 Tablet 3    predniSONE (DELTASONE) 10 mg tablet TAKE 1TAB BY MOUTH TWO (2) TIMES A DAY.  (Patient taking differently: Indications: pt is taking 10 mg once daily) 60 Tablet 5    metFORMIN (GLUCOPHAGE) 500 mg tablet TAKE 1 TABLET BY MOUTH EVERY DAY IN THE MORNING WITH BREAKFAST AND ONE TABLET BEFORE DINNER 180 Tablet 3    simvastatin (ZOCOR) 20 mg tablet TAKE 1 TABLET BY MOUTH EVERY DAY 90 Tablet 3    terazosin (HYTRIN) 2 mg capsule TAKE 1 CAPSULE BY MOUTH EVERY DAY 90 Capsule 3    digoxin (LANOXIN) 0.125 mg tablet TAKE 1 TABLET BY MOUTH EVERY DAY 90 Tablet 3    diclofenac EC (VOLTAREN) 75 mg EC tablet TAKE 1 TABLET BY MOUTH TWICE A  Tablet 3    warfarin (COUMADIN) 5 mg tablet TAKE 2 TABLETS BY MOUTH ON MONDAY &FRIDAYS AND 1&1/2 TABLET DAILY ON ALL OTHER DAYS 145 Tablet 3    bumetanide (BUMEX) 2 mg tablet TAKE 1 TABLET BY MOUTH EVERY DAY 90 Tablet 3    Blood-Glucose Meter monitoring kit Use to test blood sugar twice daily  DX:E11.22 1 Kit 0    cyanocobalamin (VITAMIN B12) 500 mcg tablet Take 500 mcg by mouth daily. multivitamins-minerals-lutein (MEN'S CENTRUM SILVER WITH LUTEIN) tab tablet Take 1 Tab by mouth daily. glucosamine 1,000 mg tab Take 2,000 mg by mouth daily. cholecalciferol (VITAMIN D3) 25 mcg (1,000 unit) cap Take 1,000 Units by mouth daily. DOCOSAHEXANOIC ACID/EPA (FISH OIL PO) Take 1,200 mg by mouth two (2) times a day. doxycycline (MONODOX) 100 mg capsule Take 100 mg by mouth two (2) times a day. (Patient not taking: Reported on 3/23/2023)      acetaminophen (TYLENOL) 650 mg TbER Take 650 mg by mouth every eight (8) hours. Recommended healthy diet low in carbohydrates, fats, sodium and cholesterol. Recommended regular cardiovascular exercise 3-6 times per week for 30-60 minutes daily. Verbal and written instructions (see AVS) provided. Patient expresses understanding of diagnosis and treatment plan.       Future Appointments   Date Time Provider Helen Butcher   3/28/2023  1:20 PM MD ANA Granda BS AMB   4/28/2023  1:00 PM MD ANA Granda BS AMB   6/2/2023  9:00 AM MD ANA Granda BS AMB

## 2023-03-27 NOTE — PROGRESS NOTES
Chief Complaint   Patient presents with    Diabetes    Hypertension       SUBJECTIVE:    Marcelle Bailey is a 80 y.o. male who returns in follow-up for his congestive heart failure, polymyalgia rheumatica, atrial fibrillation, diabetic foot ulcers and other multiple medical problems. He was seen by the physician assistant last week for his foot ulcers and placed on Cipro which she has taken 5 days and does note significant improvement. He has been soaking in Epsom salt and keeping a wrap. He notes no fevers or chills. He notes no chest pain, shortness of breath, palpitations, PND, orthopnea or other cardiac or respiratory complaints. He notes no GI or  complaints. He has no headaches, dizziness or neurologic complaints. He has no current change of his chronic arthritic complaints and there are no other complaints on complete review of systems. Current Outpatient Medications   Medication Sig Dispense Refill    ciprofloxacin HCl (CIPRO) 500 mg tablet Take 1 Tablet by mouth two (2) times a day for 10 days. 20 Tablet 0    doxycycline (MONODOX) 100 mg capsule Take 100 mg by mouth two (2) times a day. glimepiride (AMARYL) 4 mg tablet Take 1 Tablet by mouth Daily (before breakfast). 30 Tablet 5    glucose blood VI test strips (blood glucose test) strip Use to test blood sugar twice daily  DX:E11.22 100 Strip 3    Accu-Chek Merle Plus test strp strip USE TO TEST BLOOD SUGAR ONCE DAILY 100 Strip PRN    lisinopriL (PRINIVIL, ZESTRIL) 5 mg tablet TAKE 1 TABLET BY MOUTH EVERY DAY 90 Tablet 3    predniSONE (DELTASONE) 10 mg tablet TAKE 1TAB BY MOUTH TWO (2) TIMES A DAY.  (Patient taking differently: Indications: pt is taking 10 mg once daily) 60 Tablet 5    metFORMIN (GLUCOPHAGE) 500 mg tablet TAKE 1 TABLET BY MOUTH EVERY DAY IN THE MORNING WITH BREAKFAST AND ONE TABLET BEFORE DINNER 180 Tablet 3    simvastatin (ZOCOR) 20 mg tablet TAKE 1 TABLET BY MOUTH EVERY DAY 90 Tablet 3    terazosin (HYTRIN) 2 mg capsule TAKE 1 CAPSULE BY MOUTH EVERY DAY 90 Capsule 3    digoxin (LANOXIN) 0.125 mg tablet TAKE 1 TABLET BY MOUTH EVERY DAY 90 Tablet 3    diclofenac EC (VOLTAREN) 75 mg EC tablet TAKE 1 TABLET BY MOUTH TWICE A  Tablet 3    warfarin (COUMADIN) 5 mg tablet TAKE 2 TABLETS BY MOUTH ON MONDAY &FRIDAYS AND 1&1/2 TABLET DAILY ON ALL OTHER DAYS 145 Tablet 3    bumetanide (BUMEX) 2 mg tablet TAKE 1 TABLET BY MOUTH EVERY DAY 90 Tablet 3    Blood-Glucose Meter monitoring kit Use to test blood sugar twice daily  DX:E11.22 1 Kit 0    cyanocobalamin (VITAMIN B12) 500 mcg tablet Take 500 mcg by mouth daily. acetaminophen (TYLENOL) 650 mg TbER Take 650 mg by mouth every eight (8) hours. multivitamins-minerals-lutein (MEN'S CENTRUM SILVER WITH LUTEIN) tab tablet Take 1 Tab by mouth daily. glucosamine 1,000 mg tab Take 2,000 mg by mouth daily. cholecalciferol (VITAMIN D3) 25 mcg (1,000 unit) cap Take 1,000 Units by mouth daily. DOCOSAHEXANOIC ACID/EPA (FISH OIL PO) Take 1,200 mg by mouth two (2) times a day. 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    Back pain 9/20/2017    BPH (benign prostatic hyperplasia) 9/20/2017    CHF (congestive heart failure) ([facility] [address] 9/20/2017    CKD (chronic kidney disease) 9/20/2017    COVID-19 11/2021    Diabetic acetonemia ([facility] [address]     DM (diabetes mellitus) ([facility] [address] 9/20/2017    Story: Diet Controlled    ED (erectile dysfunction) 9/20/2017    Edema 9/20/2017    Elevated CPK 9/20/2017    Comments: History of    Elevated LFTs 9/20/2017    Comments: History of    Elevated PSA 9/20/2017    Hypercholesteremia     Hyperlipidemia 9/20/2017    Hypertension     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    Nodule of right lung 9/20/2017    Story: Right    Polymyalgia ([facility] [address] 9/20/2017    Prostate enlargement      Past Surgical History:   Procedure Laterality Date    HX HEENT  06/12/2018    Dr. 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    HX MALIGNANT SKIN LESION EXCISION  09/2018    2 lesions on head    [place] UNLISTED PROCEDURE ABDOMEN PERITONEUM & OMENTUM      hernia repair     No Known Allergies    REVIEW OF SYSTEMS:  General: negative for - chills or fever, or weight loss or gain  ENT: negative for - headaches, nasal congestion or tinnitus  Eyes: no blurred or visual changes  Neck: No stiffness or swollen nodes  Respiratory: negative for - cough, hemoptysis, shortness of breath or wheezing  Cardiovascular : negative for - chest pain, edema, palpitations or shortness of breath  Gastrointestinal: negative for - abdominal pain, blood in stools, heartburn or nausea/vomiting  Genito-Urinary: no dysuria, trouble voiding, or hematuria  Musculoskeletal: negative for - gait disturbance, joint pain, joint stiffness or joint swelling  Neurological: no TIA or stroke symptoms  Hematologic: no bruises, no bleeding  Lymphatic: no swollen glands  Integument: no lumps, mole changes, nail changes or rash. Healing ulcer over the top of both great toes  Endocrine:no malaise/lethargy poly uria or polydipsia or unexpected weight changes        Social History     Socioeconomic History    Marital status:    Tobacco Use    Smoking status: Never    Smokeless tobacco: Never   Vaping Use    Vaping Use: Never used   Substance and Sexual Activity    Alcohol use: No    Drug use: No    Sexual activity: Not Currently     History reviewed. No pertinent family history. OBJECTIVE:     Visit Vitals  /70   Pulse (!) 50   Temp 97.9 °F (36.6 °C) (Oral)   Resp 18   Wt 201 lb 4.8 oz (91.3 kg)   SpO2 96%   BMI 25.16 kg/m²     CONSTITUTIONAL:   well nourished, appears age appropriate  EYES: sclera anicteric, PERRL, EOMI  ENMT:nares clear, moist mucous membranes, pharynx clear  NECK: supple.  Thyroid normal, No JVD or bruits  RESPIRATORY: Chest: clear to ascultation and percussion, normal inspiratory effort  CARDIOVASCULAR: Heart: regular rate and rhythm no murmurs, rubs or gallops, PMI not displaced, No thrills, no peripheral edema  GASTROINTESTINAL: Abdomen: non distended, soft, non tender, bowel sounds normal  HEMATOLOGIC: no purpura, petechiae or bruising  LYMPHATIC: No lymph node enlargemant  MUSCULOSKELETAL: Extremities: no active synovitis, pulse 1+   INTEGUMENT: No unusual rashes or suspicious skin lesions noted. Nails appear normal..  Superficial ulcer dorsum of both great toes healing without evidence of secondary surrounding erythema currently and no drainage  PERIPHERAL VASCULAR: normal pulses femoral, PT and DP  NEUROLOGIC: non-focal exam, A & O X 3  PSYCHIATRIC:, appropriate affect     ASSESSMENT:   1. Diastolic CHF, chronic (HCC)    2. Paroxysmal atrial fibrillation (HCC)    3. Stage 3 chronic kidney disease, unspecified whether stage 3a or 3b CKD ([facility] [address]    4. Polymyalgia rheumatica ([facility] [address]    5. Cellulitis of great toe of right foot    6. Ulcer of both feet, limited to breakdown of skin ([facility])      Impression  1. Diastolic CHF seems to be compensated  2. Paroxysmal atrial fibrillation INR is pending  3. CKD stage III repeat status was stable on last check and repeat pending today  4. Polymyalgia rheumatica we will see what the sed rate looks like  5. Cellulitis of right great toe that has markedly improved with the Cipro over 5 days. 06 ulcers dorsum of both great toes. The bandages were removed these are evaluated and both are sterilely dressed. Both ulcers are healing without significant cellulitis and no evidence of deep ulceration. 35 minutes spent in direct care of this patient today with greater than 50% in counseling coordination of care. Follow-up with me again in 1 month but sooner should the be a problem.   I renewed his Cipro for an additional 10 days which would carry him from now for additional 2 weeks    PLAN:  .  Orders Placed This Encounter METABOLIC PANEL, BASIC    PROTHROMBIN TIME + INR    SED RATE (ESR)    ciprofloxacin HCl (CIPRO) 500 mg tablet         ATTENTION:   This medical record was transcribed using an electronic medical records system. Although proofread, it may and can contain electronic and spelling errors. Other human spelling and other errors may be present. Corrections may be executed at a later time. Please feel free to contact us for any clarifications as needed. Follow-up and Dispositions    Return in about 4 weeks (around 4/25/2023). No results found for any visits on 03/28/23. Ezio Mena MD    The patient verbalized understanding of the problems and plans as explained.

## 2023-03-28 ENCOUNTER — OFFICE VISIT (OUTPATIENT)
Dept: INTERNAL MEDICINE CLINIC | Age: 88
End: 2023-03-28
Payer: MEDICARE

## 2023-03-28 VITALS
WEIGHT: 201.3 LBS | DIASTOLIC BLOOD PRESSURE: 70 MMHG | RESPIRATION RATE: 18 BRPM | OXYGEN SATURATION: 96 % | SYSTOLIC BLOOD PRESSURE: 132 MMHG | BODY MASS INDEX: 25.16 KG/M2 | HEART RATE: 50 BPM | TEMPERATURE: 97.9 F

## 2023-03-28 DIAGNOSIS — I48.0 PAROXYSMAL ATRIAL FIBRILLATION (HCC): ICD-10-CM

## 2023-03-28 DIAGNOSIS — L03.031 CELLULITIS OF GREAT TOE OF RIGHT FOOT: ICD-10-CM

## 2023-03-28 DIAGNOSIS — I50.32 DIASTOLIC CHF, CHRONIC (HCC): Primary | ICD-10-CM

## 2023-03-28 DIAGNOSIS — N18.30 STAGE 3 CHRONIC KIDNEY DISEASE, UNSPECIFIED WHETHER STAGE 3A OR 3B CKD (HCC): ICD-10-CM

## 2023-03-28 DIAGNOSIS — M35.3 POLYMYALGIA RHEUMATICA (HCC): ICD-10-CM

## 2023-03-28 DIAGNOSIS — L97.511 ULCER OF BOTH FEET, LIMITED TO BREAKDOWN OF SKIN (HCC): ICD-10-CM

## 2023-03-28 DIAGNOSIS — L97.521 ULCER OF BOTH FEET, LIMITED TO BREAKDOWN OF SKIN (HCC): ICD-10-CM

## 2023-03-28 PROCEDURE — G8417 CALC BMI ABV UP PARAM F/U: HCPCS | Performed by: INTERNAL MEDICINE

## 2023-03-28 PROCEDURE — 1123F ACP DISCUSS/DSCN MKR DOCD: CPT | Performed by: INTERNAL MEDICINE

## 2023-03-28 PROCEDURE — G8536 NO DOC ELDER MAL SCRN: HCPCS | Performed by: INTERNAL MEDICINE

## 2023-03-28 PROCEDURE — 1101F PT FALLS ASSESS-DOCD LE1/YR: CPT | Performed by: INTERNAL MEDICINE

## 2023-03-28 PROCEDURE — G8510 SCR DEP NEG, NO PLAN REQD: HCPCS | Performed by: INTERNAL MEDICINE

## 2023-03-28 PROCEDURE — 99214 OFFICE O/P EST MOD 30 MIN: CPT | Performed by: INTERNAL MEDICINE

## 2023-03-28 PROCEDURE — G8427 DOCREV CUR MEDS BY ELIG CLIN: HCPCS | Performed by: INTERNAL MEDICINE

## 2023-03-28 RX ORDER — CIPROFLOXACIN 500 MG/1
500 TABLET ORAL 2 TIMES DAILY
Qty: 20 TABLET | Refills: 0 | Status: SHIPPED | OUTPATIENT
Start: 2023-03-28 | End: 2023-04-07

## 2023-03-28 NOTE — PROGRESS NOTES
Chief Complaint   Patient presents with    Diabetes    Hypertension   1. Have you been to the ER, urgent care clinic since your last visit? Hospitalized since your last visit? No    2. Have you seen or consulted any other health care providers outside of the [address] [place] [address] since your last visit? Include any pap smears or colon screening.  No

## 2023-03-29 LAB
ANION GAP SERPL CALC-SCNC: 4 MMOL/L (ref 5–15)
BUN SERPL-MCNC: 39 MG/DL (ref 6–20)
BUN/CREAT SERPL: 23 (ref 12–20)
CALCIUM SERPL-MCNC: 9.6 MG/DL (ref 8.5–10.1)
CHLORIDE SERPL-SCNC: 105 MMOL/L (ref 97–108)
CO2 SERPL-SCNC: 28 MMOL/L (ref 21–32)
CREAT SERPL-MCNC: 1.72 MG/DL (ref 0.7–1.3)
ERYTHROCYTE [SEDIMENTATION RATE] IN BLOOD: 61 MM/HR (ref 0–20)
GLUCOSE SERPL-MCNC: 270 MG/DL (ref 65–100)
INR PPP: 2.1 (ref 0.9–1.1)
POTASSIUM SERPL-SCNC: 5.1 MMOL/L (ref 3.5–5.1)
PROTHROMBIN TIME: 21.4 SEC (ref 9–11.1)
SODIUM SERPL-SCNC: 137 MMOL/L (ref 136–145)

## 2023-04-07 ENCOUNTER — TELEPHONE ANTICOAG (OUTPATIENT)
Dept: INTERNAL MEDICINE CLINIC | Age: 88
End: 2023-04-07

## 2023-04-28 ENCOUNTER — OFFICE VISIT (OUTPATIENT)
Dept: INTERNAL MEDICINE CLINIC | Age: 88
End: 2023-04-28

## 2023-04-28 VITALS
HEIGHT: 75 IN | WEIGHT: 203.7 LBS | OXYGEN SATURATION: 98 % | SYSTOLIC BLOOD PRESSURE: 144 MMHG | TEMPERATURE: 98.1 F | BODY MASS INDEX: 25.33 KG/M2 | DIASTOLIC BLOOD PRESSURE: 72 MMHG | HEART RATE: 56 BPM

## 2023-04-28 DIAGNOSIS — I50.32 DIASTOLIC CHF, CHRONIC (HCC): Primary | ICD-10-CM

## 2023-04-28 DIAGNOSIS — M17.12 PRIMARY OSTEOARTHRITIS OF LEFT KNEE: ICD-10-CM

## 2023-04-28 DIAGNOSIS — M17.11 PRIMARY OSTEOARTHRITIS OF RIGHT KNEE: ICD-10-CM

## 2023-04-28 DIAGNOSIS — L97.511 ULCER OF BOTH FEET, LIMITED TO BREAKDOWN OF SKIN (HCC): ICD-10-CM

## 2023-04-28 DIAGNOSIS — I48.0 PAROXYSMAL ATRIAL FIBRILLATION (HCC): ICD-10-CM

## 2023-04-28 DIAGNOSIS — N18.30 STAGE 3 CHRONIC KIDNEY DISEASE, UNSPECIFIED WHETHER STAGE 3A OR 3B CKD (HCC): ICD-10-CM

## 2023-04-28 DIAGNOSIS — M35.3 POLYMYALGIA RHEUMATICA (HCC): ICD-10-CM

## 2023-04-28 DIAGNOSIS — L97.521 ULCER OF BOTH FEET, LIMITED TO BREAKDOWN OF SKIN (HCC): ICD-10-CM

## 2023-04-28 RX ORDER — METHYLPREDNISOLONE ACETATE 40 MG/ML
40 INJECTION, SUSPENSION INTRA-ARTICULAR; INTRALESIONAL; INTRAMUSCULAR; SOFT TISSUE ONCE
Qty: 1 ML | Refills: 0
Start: 2023-04-28 | End: 2023-04-28

## 2023-04-28 NOTE — PROGRESS NOTES
Chief Complaint   Patient presents with    Irregular Heart Beat     1 month f/up    Hypertension    Chronic Kidney Disease    Diabetes    Wound Infection     Toes on both feet       SUBJECTIVE:    Ashleigh Cabrera Sr. is a 80 y.o. male who returns in follow-up for evaluation of several medical issues including polymyalgia rheumatica, diastolic CHF which is compensated, atrial fibrillation, bilateral foot ulcers over the dorsum of the great toe bilateral, CKD, hypertension and other medical problems. He is continue to soak his feet on a daily basis in Epsom salt and apply a cotton ball to cushion his feet before bandaging him and trying to avoid any tight fitting shoes. He has not made a lot of progress with his feet but they are not any worse. He otherwise continues to follow his diet. He notes that the prednisone does seem to be controlling his polymyalgia rheumatica. Currently at 10 mg daily. He denies any chest pain, shortness of breath, palpitations, PND, orthopnea or other cardiac respiratory complaints. He notes no GI or  complaints. He notes no other complaints except his arthritis seems to have become worse and he has bilateral knee pain and would thus like to get a shot in both knees which has helped before. .      Current Outpatient Medications   Medication Sig Dispense Refill    methylPREDNISolone acetate (DEPO-MEDROL) 40 mg/mL injection 1 mL by IntraMUSCular route once for 1 dose. 1 mL 0    methylPREDNISolone acetate (DEPO-MEDROL) 40 mg/mL injection 1 mL by IntraMUSCular route once for 1 dose. 1 mL 0    bumetanide (BUMEX) 2 mg tablet TAKE 1 TABLET BY MOUTH EVERY DAY 90 Tablet 3    warfarin (COUMADIN) 5 mg tablet TAKE 2 TABLETS BY MOUTH ON MONDAY &FRIDAYS AND 1&1/2 TABLET DAILY ON ALL OTHER DAYS 145 Tablet 3    glimepiride (AMARYL) 4 mg tablet Take 1 Tablet by mouth Daily (before breakfast).  30 Tablet 5    glucose blood VI test strips (blood glucose test) strip Use to test blood sugar twice daily  DX:E11.22 100 Strip 3    Accu-Chek Merle Plus test strp strip USE TO TEST BLOOD SUGAR ONCE DAILY 100 Strip PRN    lisinopriL (PRINIVIL, ZESTRIL) 5 mg tablet TAKE 1 TABLET BY MOUTH EVERY DAY 90 Tablet 3    predniSONE (DELTASONE) 10 mg tablet TAKE 1TAB BY MOUTH TWO (2) TIMES A DAY. (Patient taking differently: Indications: pt is taking 10 mg once daily) 60 Tablet 5    metFORMIN (GLUCOPHAGE) 500 mg tablet TAKE 1 TABLET BY MOUTH EVERY DAY IN THE MORNING WITH BREAKFAST AND ONE TABLET BEFORE DINNER 180 Tablet 3    simvastatin (ZOCOR) 20 mg tablet TAKE 1 TABLET BY MOUTH EVERY DAY 90 Tablet 3    terazosin (HYTRIN) 2 mg capsule TAKE 1 CAPSULE BY MOUTH EVERY DAY 90 Capsule 3    digoxin (LANOXIN) 0.125 mg tablet TAKE 1 TABLET BY MOUTH EVERY DAY 90 Tablet 3    diclofenac EC (VOLTAREN) 75 mg EC tablet TAKE 1 TABLET BY MOUTH TWICE A  Tablet 3    Blood-Glucose Meter monitoring kit Use to test blood sugar twice daily  DX:E11.22 1 Kit 0    cyanocobalamin (VITAMIN B12) 500 mcg tablet Take 1 Tablet by mouth daily. acetaminophen (TYLENOL) 650 mg TbER Take 1 Tablet by mouth every eight (8) hours. multivitamins-minerals-lutein (MEN'S CENTRUM SILVER WITH LUTEIN) tab tablet Take 1 Tablet by mouth daily. glucosamine 1,000 mg tab Take 2,000 mg by mouth daily. cholecalciferol (VITAMIN D3) 25 mcg (1,000 unit) cap Take 1 Capsule by mouth daily. DOCOSAHEXANOIC ACID/EPA (FISH OIL PO) Take 1,200 mg by mouth two (2) times a day. 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    Back pain 9/20/2017    BPH (benign prostatic hyperplasia) 9/20/2017    CHF (congestive heart failure) ([facility] 9/20/2017    CKD (chronic kidney disease) 9/20/2017    COVID-19 11/2021    Diabetic acetonemia ([facility]     DM (diabetes mellitus) ([facility] 9/20/2017    Story: Diet Controlled    ED (erectile dysfunction) 9/20/2017    Edema 9/20/2017    Elevated CPK 9/20/2017    Comments: History of    Elevated LFTs 9/20/2017    Comments: History of    Elevated PSA 9/20/2017    Hypercholesteremia     Hyperlipidemia 9/20/2017    Hypertension     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    HX MALIGNANT SKIN LESION EXCISION  09/2018    2 lesions on head    [place] UNLISTED PROCEDURE ABDOMEN PERITONEUM & OMENTUM      hernia repair     No Known Allergies    REVIEW OF SYSTEMS:  General: negative for - chills or fever, or weight loss or gain  ENT: negative for - headaches, nasal congestion or tinnitus  Eyes: no blurred or visual changes  Neck: No stiffness or swollen nodes  Respiratory: negative for - cough, hemoptysis, shortness of breath or wheezing  Cardiovascular : negative for - chest pain, edema, palpitations or shortness of breath  Gastrointestinal: negative for - abdominal pain, blood in stools, heartburn or nausea/vomiting  Genito-Urinary: no dysuria, trouble voiding, or hematuria  Musculoskeletal: negative for - gait disturbance, joint pain, joint stiffness or joint swelling except as noted bilateral knee pain  Neurological: no TIA or stroke symptoms  Hematologic: no bruises, no bleeding  Lymphatic: no swollen glands  Integument: no lumps, mole changes, nail changes or rash  Endocrine:no malaise/lethargy poly uria or polydipsia or unexpected weight changes        Social History     Socioeconomic History    Marital status:    Tobacco Use    Smoking status: Never    Smokeless tobacco: Never   Vaping Use    Vaping Use: Never used   Substance and Sexual Activity    Alcohol use: No    Drug use: No    Sexual activity: Not Currently     History reviewed.  No pertinent family history. OBJECTIVE:     Visit Vitals  BP (!) 144/72 (BP 1 Location: Left upper arm, BP Patient Position: Sitting, BP Cuff Size: Adult)   Pulse (!) 56   Temp 98.1 °F (36.7 °C)   Ht 6' 3\" (1.905 m)   Wt 203 lb 11.2 oz (92.4 kg)   SpO2 98%   BMI 25.46 kg/m²     CONSTITUTIONAL:   well nourished, appears age appropriate  EYES: sclera anicteric, PERRL, EOMI  ENMT:nares clear, moist mucous membranes, pharynx clear  NECK: supple. Thyroid normal, No JVD or bruits  RESPIRATORY: Chest: clear to ascultation and percussion, normal inspiratory effort  CARDIOVASCULAR: Heart: regular rate and rhythm no murmurs, rubs or gallops, PMI not displaced, No thrills, no peripheral edema  GASTROINTESTINAL: Abdomen: non distended, soft, non tender, bowel sounds normal  HEMATOLOGIC: no purpura, petechiae or bruising  LYMPHATIC: No lymph node enlargemant  MUSCULOSKELETAL: Extremities: no active synovitis, pulse 1+. Both knees have discomfort range of motion. There is no erythema, increased temperature or joint effusion of either knee but marked arthritic changes are noted. INTEGUMENT: No unusual rashes or suspicious skin lesions noted. Nails appear normal.  PERIPHERAL VASCULAR: normal pulses femoral, PT and DP  NEUROLOGIC: non-focal exam, A & O X 3  PSYCHIATRIC:, appropriate affect     ASSESSMENT:   1. Diastolic CHF, chronic (HCC)    2. Polymyalgia rheumatica (HCC)    3. Stage 3 chronic kidney disease, unspecified whether stage 3a or 3b CKD (HCC)    4. Paroxysmal atrial fibrillation ([address]    5. Ulcer of both feet, limited to breakdown of skin ([address]    6. Primary osteoarthritis of right knee    7. Primary osteoarthritis of left knee      Impression  1 diastolic CHF seems to be compensated  2. Polymyalgia rheumatica we will see what the sed rate looks like today prednisone dose currently at 10 mg  3. CKD stage III repeat status pending   4. Paroxysmal atrial fibrillation with INR pending  5.   Bilateral foot ulcers we discussed sending for podiatry evaluation but he wants to hold off and see if they can improve over the next month before making that move. He will continue to use local care as discussed. 6. DJD of right knee. We did discuss treatment options and he would like to proceed with a cortisone injection. After informed consent and Betadine scrub the right knee is entered medially injected with Depo-Medrol 40 mg and a cc lidocaine which he tolerated quite well. 7. DJD of the left knee. Again treatment options were discussed and he would like to proceed with a cortisone injection. After informed consent and Betadine scrub the left knee is entered medially injected with Depo-Medrol 40 mg and a cc of lidocaine which he tolerated quite well. I will recheck him myself again in 1 month or sooner if there is a problem. I will call with today's lab results. PLAN:  .  Orders Placed This Encounter    DRAIN/INJECT LARGE JOINT/BURSA    DRAIN/INJECT LARGE JOINT/BURSA    SED RATE (ESR)    PROTHROMBIN TIME + INR    METABOLIC PANEL, BASIC    methylPREDNISolone acetate (DEPO-MEDROL) 40 mg/mL injection    methylPREDNISolone acetate (DEPO-MEDROL) 40 mg/mL injection         ATTENTION:   This medical record was transcribed using an electronic medical records system. Although proofread, it may and can contain electronic and spelling errors. Other human spelling and other errors may be present. Corrections may be executed at a later time. Please feel free to contact us for any clarifications as needed. Follow-up and Dispositions    Return in about 4 weeks (around 5/26/2023). No results found for any visits on 04/28/23. Nabeel Fregoso MD    The patient verbalized understanding of the problems and plans as explained.

## 2023-04-28 NOTE — PROGRESS NOTES
Chief Complaint   Patient presents with    Irregular Heart Beat     1 month f/up    Hypertension    Chronic Kidney Disease    Diabetes    Wound Infection     Toes on both feet      1. Have you been to the ER, urgent care clinic since your last visit? Hospitalized since your last visit? No    2. Have you seen or consulted any other health care providers outside of the [address] [place] [address] since your last visit? Include any pap smears or colon screening. Went to ENT yesterday to get hearing aids checked.

## 2023-04-29 LAB
ANION GAP SERPL CALC-SCNC: 5 MMOL/L (ref 5–15)
BUN SERPL-MCNC: 37 MG/DL (ref 6–20)
BUN/CREAT SERPL: 24 (ref 12–20)
CALCIUM SERPL-MCNC: 9.1 MG/DL (ref 8.5–10.1)
CHLORIDE SERPL-SCNC: 108 MMOL/L (ref 97–108)
CO2 SERPL-SCNC: 27 MMOL/L (ref 21–32)
CREAT SERPL-MCNC: 1.57 MG/DL (ref 0.7–1.3)
ERYTHROCYTE [SEDIMENTATION RATE] IN BLOOD: 58 MM/HR (ref 0–20)
GLUCOSE SERPL-MCNC: 229 MG/DL (ref 65–100)
INR PPP: 2.1 (ref 0.9–1.1)
POTASSIUM SERPL-SCNC: 5.1 MMOL/L (ref 3.5–5.1)
PROTHROMBIN TIME: 21.4 SEC (ref 9–11.1)
SODIUM SERPL-SCNC: 140 MMOL/L (ref 136–145)

## 2023-05-16 PROBLEM — M15.9 PRIMARY OSTEOARTHRITIS INVOLVING MULTIPLE JOINTS: Status: ACTIVE | Noted: 2017-09-20

## 2023-05-16 PROBLEM — I50.32 DIASTOLIC CHF, CHRONIC (HCC): Status: ACTIVE | Noted: 2020-03-05

## 2023-05-16 PROBLEM — J30.89 CHRONIC NON-SEASONAL ALLERGIC RHINITIS: Status: ACTIVE | Noted: 2017-09-20

## 2023-05-16 PROBLEM — E11.22 CONTROLLED TYPE 2 DIABETES MELLITUS WITH STAGE 3 CHRONIC KIDNEY DISEASE, WITHOUT LONG-TERM CURRENT USE OF INSULIN (HCC): Status: ACTIVE | Noted: 2017-09-20

## 2023-05-16 PROBLEM — N18.30 STAGE 3 CHRONIC KIDNEY DISEASE (HCC): Status: ACTIVE | Noted: 2017-09-20

## 2023-05-16 PROBLEM — M15.0 PRIMARY OSTEOARTHRITIS INVOLVING MULTIPLE JOINTS: Status: ACTIVE | Noted: 2017-09-20

## 2023-05-16 PROBLEM — E78.2 MIXED HYPERLIPIDEMIA: Status: ACTIVE | Noted: 2017-09-20

## 2023-05-16 PROBLEM — M35.3 POLYMYALGIA RHEUMATICA (HCC): Status: ACTIVE | Noted: 2018-04-24

## 2023-05-16 PROBLEM — N40.0 BPH (BENIGN PROSTATIC HYPERPLASIA): Status: ACTIVE | Noted: 2017-09-20

## 2023-05-16 PROBLEM — I48.0 PAROXYSMAL ATRIAL FIBRILLATION (HCC): Status: ACTIVE | Noted: 2018-01-05

## 2023-05-16 PROBLEM — I12.9 HYPERTENSION WITH RENAL DISEASE: Status: ACTIVE | Noted: 2017-09-20

## 2023-05-16 PROBLEM — N18.30 CONTROLLED TYPE 2 DIABETES MELLITUS WITH STAGE 3 CHRONIC KIDNEY DISEASE, WITHOUT LONG-TERM CURRENT USE OF INSULIN (HCC): Status: ACTIVE | Noted: 2017-09-20

## 2023-05-16 PROBLEM — I25.10 ASCVD (ARTERIOSCLEROTIC CARDIOVASCULAR DISEASE): Status: ACTIVE | Noted: 2017-09-20

## 2023-05-17 ENCOUNTER — OFFICE VISIT (OUTPATIENT)
Facility: CLINIC | Age: 88
End: 2023-05-17
Payer: COMMERCIAL

## 2023-05-17 VITALS
OXYGEN SATURATION: 95 % | BODY MASS INDEX: 25.31 KG/M2 | HEIGHT: 75 IN | WEIGHT: 203.6 LBS | DIASTOLIC BLOOD PRESSURE: 84 MMHG | TEMPERATURE: 98.2 F | RESPIRATION RATE: 16 BRPM | HEART RATE: 61 BPM | SYSTOLIC BLOOD PRESSURE: 146 MMHG

## 2023-05-17 DIAGNOSIS — L97.511 ULCER OF BOTH FEET, LIMITED TO BREAKDOWN OF SKIN (HCC): Primary | ICD-10-CM

## 2023-05-17 DIAGNOSIS — L97.521 ULCER OF BOTH FEET, LIMITED TO BREAKDOWN OF SKIN (HCC): Primary | ICD-10-CM

## 2023-05-17 DIAGNOSIS — N18.30 STAGE 3 CHRONIC KIDNEY DISEASE, UNSPECIFIED WHETHER STAGE 3A OR 3B CKD (HCC): ICD-10-CM

## 2023-05-17 DIAGNOSIS — N18.30 CONTROLLED TYPE 2 DIABETES MELLITUS WITH STAGE 3 CHRONIC KIDNEY DISEASE, WITHOUT LONG-TERM CURRENT USE OF INSULIN (HCC): ICD-10-CM

## 2023-05-17 DIAGNOSIS — E11.22 CONTROLLED TYPE 2 DIABETES MELLITUS WITH STAGE 3 CHRONIC KIDNEY DISEASE, WITHOUT LONG-TERM CURRENT USE OF INSULIN (HCC): ICD-10-CM

## 2023-05-17 DIAGNOSIS — L03.115 CELLULITIS OF RIGHT FOOT: ICD-10-CM

## 2023-05-17 LAB
ANION GAP SERPL CALC-SCNC: 1 MMOL/L (ref 5–15)
BASOPHILS # BLD: 0 K/UL (ref 0–0.1)
BASOPHILS NFR BLD: 0 % (ref 0–1)
BUN SERPL-MCNC: 36 MG/DL (ref 6–20)
BUN/CREAT SERPL: 24 (ref 12–20)
CALCIUM SERPL-MCNC: 9.3 MG/DL (ref 8.5–10.1)
CHLORIDE SERPL-SCNC: 106 MMOL/L (ref 97–108)
CO2 SERPL-SCNC: 29 MMOL/L (ref 21–32)
CREAT SERPL-MCNC: 1.53 MG/DL (ref 0.7–1.3)
DIFFERENTIAL METHOD BLD: ABNORMAL
EOSINOPHIL # BLD: 0 K/UL (ref 0–0.4)
EOSINOPHIL NFR BLD: 0 % (ref 0–7)
ERYTHROCYTE [DISTWIDTH] IN BLOOD BY AUTOMATED COUNT: 14.7 % (ref 11.5–14.5)
ERYTHROCYTE [SEDIMENTATION RATE] IN BLOOD: 75 MM/HR (ref 0–20)
GLUCOSE SERPL-MCNC: 268 MG/DL (ref 65–100)
HCT VFR BLD AUTO: 33.6 % (ref 36.6–50.3)
HGB BLD-MCNC: 10 G/DL (ref 12.1–17)
IMM GRANULOCYTES # BLD AUTO: 0.1 K/UL (ref 0–0.04)
IMM GRANULOCYTES NFR BLD AUTO: 1 % (ref 0–0.5)
LYMPHOCYTES # BLD: 1.9 K/UL (ref 0.8–3.5)
LYMPHOCYTES NFR BLD: 18 % (ref 12–49)
MCH RBC QN AUTO: 27.9 PG (ref 26–34)
MCHC RBC AUTO-ENTMCNC: 29.8 G/DL (ref 30–36.5)
MCV RBC AUTO: 93.9 FL (ref 80–99)
MONOCYTES # BLD: 0.6 K/UL (ref 0–1)
MONOCYTES NFR BLD: 6 % (ref 5–13)
NEUTS SEG # BLD: 8.4 K/UL (ref 1.8–8)
NEUTS SEG NFR BLD: 75 % (ref 32–75)
NRBC # BLD: 0 K/UL (ref 0–0.01)
NRBC BLD-RTO: 0 PER 100 WBC
PLATELET # BLD AUTO: 222 K/UL (ref 150–400)
PMV BLD AUTO: 10.2 FL (ref 8.9–12.9)
POTASSIUM SERPL-SCNC: 5.2 MMOL/L (ref 3.5–5.1)
RBC # BLD AUTO: 3.58 M/UL (ref 4.1–5.7)
SODIUM SERPL-SCNC: 136 MMOL/L (ref 136–145)
WBC # BLD AUTO: 11.1 K/UL (ref 4.1–11.1)

## 2023-05-17 PROCEDURE — 1123F ACP DISCUSS/DSCN MKR DOCD: CPT | Performed by: INTERNAL MEDICINE

## 2023-05-17 PROCEDURE — 99214 OFFICE O/P EST MOD 30 MIN: CPT | Performed by: INTERNAL MEDICINE

## 2023-05-17 PROCEDURE — 3051F HG A1C>EQUAL 7.0%<8.0%: CPT | Performed by: INTERNAL MEDICINE

## 2023-05-17 RX ORDER — AMOXICILLIN AND CLAVULANATE POTASSIUM 875; 125 MG/1; MG/1
1 TABLET, FILM COATED ORAL 2 TIMES DAILY
Qty: 28 TABLET | Refills: 0 | Status: SHIPPED | OUTPATIENT
Start: 2023-05-17 | End: 2023-05-31

## 2023-05-17 RX ORDER — BLOOD SUGAR DIAGNOSTIC
STRIP MISCELLANEOUS
COMMUNITY
Start: 2022-11-27

## 2023-05-17 RX ORDER — GLIMEPIRIDE 2 MG/1
TABLET ORAL
COMMUNITY
Start: 2023-05-06

## 2023-05-17 ASSESSMENT — PATIENT HEALTH QUESTIONNAIRE - PHQ9
SUM OF ALL RESPONSES TO PHQ9 QUESTIONS 1 & 2: 0
SUM OF ALL RESPONSES TO PHQ QUESTIONS 1-9: 0
1. LITTLE INTEREST OR PLEASURE IN DOING THINGS: 0
2. FEELING DOWN, DEPRESSED OR HOPELESS: 0
SUM OF ALL RESPONSES TO PHQ QUESTIONS 1-9: 0

## 2023-05-17 NOTE — PROGRESS NOTES
Chief Complaint   Patient presents with    Wound Infection    Hypertension    Chronic Kidney Disease    Diabetes     Infected toes    1. Have you been to the ER, urgent care clinic since your last visit? Hospitalized since your last visit?no    2. Have you seen or consulted any other health care providers outside of the [address] [place] [address] since your last visit? Include any pap smears or colon screening.  no
Standing Expiration Date:   0/01/2454    Basic Metabolic Panel     Standing Status:   Future     Standing Expiration Date:   5/17/2024    Sedimentation Rate     Standing Status:   Future     Standing Expiration Date:   5/17/2024          No follow-ups on file. No results found for any visits on 05/17/23. Deedee De Santiago MD    The patient was given after the visit summary the patient verbalized an understanding of the plans and problems as explained.

## 2023-05-22 RX ORDER — SULFAMETHOXAZOLE AND TRIMETHOPRIM 800; 160 MG/1; MG/1
1 TABLET ORAL 2 TIMES DAILY
Qty: 28 TABLET | Refills: 0 | Status: SHIPPED | OUTPATIENT
Start: 2023-05-22 | End: 2023-06-05

## 2023-05-22 NOTE — TELEPHONE ENCOUNTER
RX refill request from the patient/pharmacy. Patient last seen 05- with labs, and next appt. scheduled for 05-  Requested Prescriptions     Pending Prescriptions Disp Refills    sulfamethoxazole-trimethoprim (BACTRIM DS;SEPTRA DS) 800-160 MG per tablet 28 tablet 0     Sig: Take 1 tablet by mouth 2 times daily for 14 days    .

## 2023-05-23 RX ORDER — DICLOFENAC SODIUM 75 MG/1
TABLET, DELAYED RELEASE ORAL
Qty: 180 TABLET | Refills: 3 | Status: SHIPPED | OUTPATIENT
Start: 2023-05-23

## 2023-05-23 NOTE — TELEPHONE ENCOUNTER
RX refill request from the patient/pharmacy. Patient last seen 05- with labs, and next appt. scheduled for 05-  Requested Prescriptions     Pending Prescriptions Disp Refills    diclofenac (VOLTAREN) 75 MG EC tablet [Pharmacy Med Name: DICLOFENAC SOD EC 75 MG TAB] 180 tablet 3     Sig: TAKE 1 TABLET BY MOUTH TWICE A DAY    .

## 2023-05-27 ENCOUNTER — TELEPHONE (OUTPATIENT)
Facility: CLINIC | Age: 88
End: 2023-05-27

## 2023-05-27 NOTE — TELEPHONE ENCOUNTER
A 59-year-old male patient with a past medical history significant for type 2 diabetes mellitus, congestive heart failure with preserved ejection fraction (CHF-PEF), hypertension, atrial fibrillation (AFIB) on coumadin, and stage 3B chronic kidney disease (CKD G3B) presents with concerns of worsening diabetic neuropathic pain. He is currently receiving treatment with trimethoprim/sulfamethoxazole (TMP-SMX) for a diabetic foot infection, which according to the family continues to drain fluid, but no recent inspection of the wound has been made. They also report that the wound hasn't worsened in terms of erythema or overall appearance. The patient is experiencing burning pain in both feet, similar to his past neuropathic symptoms, but it is unclear whether this pain is related to the ongoing foot infection. He has no worsening systemic symptoms. His most recent serum creatinine was approximately 1.6 mg/dL, with an estimated glomerular filtration rate (EGFR) in the 30s. The patient is currently taking acetaminophen 1000 mg BID for pain management. Non-pharmacologic interventions including heat and ice have been attempted, and an offer of topical capsaicin was declined. I discussed potential therapeutic options with the family. These could include TCAs, pregabalin, gabapentin, or duloxetine, but I noted to the family that all of these would carry the risk of side effects given the patient's age and comorbidities. I advised against frequent use of NSAIDs in light of his use of coumadin and his renal function. I advised them to discuss any potential oral pharmacologic therapies with the patient's PCP. The patient's worsening neuropathic pain, in the context of an ongoing foot infection, should prompt a re-evaluation of the infection management strategy. It may also be beneficial to inspect the wound again to ensure that it is healing appropriately and not contributing to the pain.  I advised the family to make sure

## 2023-05-31 ENCOUNTER — OFFICE VISIT (OUTPATIENT)
Facility: CLINIC | Age: 88
End: 2023-05-31
Payer: COMMERCIAL

## 2023-05-31 VITALS
BODY MASS INDEX: 24.73 KG/M2 | HEART RATE: 59 BPM | SYSTOLIC BLOOD PRESSURE: 150 MMHG | OXYGEN SATURATION: 97 % | DIASTOLIC BLOOD PRESSURE: 70 MMHG | WEIGHT: 198.9 LBS | TEMPERATURE: 98.4 F | RESPIRATION RATE: 16 BRPM | HEIGHT: 75 IN

## 2023-05-31 DIAGNOSIS — E11.22 CONTROLLED TYPE 2 DIABETES MELLITUS WITH STAGE 3 CHRONIC KIDNEY DISEASE, WITHOUT LONG-TERM CURRENT USE OF INSULIN (HCC): ICD-10-CM

## 2023-05-31 DIAGNOSIS — N18.30 STAGE 3 CHRONIC KIDNEY DISEASE, UNSPECIFIED WHETHER STAGE 3A OR 3B CKD (HCC): ICD-10-CM

## 2023-05-31 DIAGNOSIS — L97.521 ULCER OF BOTH FEET, LIMITED TO BREAKDOWN OF SKIN (HCC): Primary | ICD-10-CM

## 2023-05-31 DIAGNOSIS — N18.30 CONTROLLED TYPE 2 DIABETES MELLITUS WITH STAGE 3 CHRONIC KIDNEY DISEASE, WITHOUT LONG-TERM CURRENT USE OF INSULIN (HCC): ICD-10-CM

## 2023-05-31 DIAGNOSIS — I48.0 PAROXYSMAL ATRIAL FIBRILLATION (HCC): ICD-10-CM

## 2023-05-31 DIAGNOSIS — L97.511 ULCER OF BOTH FEET, LIMITED TO BREAKDOWN OF SKIN (HCC): Primary | ICD-10-CM

## 2023-05-31 DIAGNOSIS — L03.115 CELLULITIS OF RIGHT FOOT: ICD-10-CM

## 2023-05-31 PROCEDURE — 3051F HG A1C>EQUAL 7.0%<8.0%: CPT | Performed by: INTERNAL MEDICINE

## 2023-05-31 PROCEDURE — 99213 OFFICE O/P EST LOW 20 MIN: CPT | Performed by: INTERNAL MEDICINE

## 2023-05-31 PROCEDURE — 1123F ACP DISCUSS/DSCN MKR DOCD: CPT | Performed by: INTERNAL MEDICINE

## 2023-05-31 RX ORDER — SULFAMETHOXAZOLE AND TRIMETHOPRIM 800; 160 MG/1; MG/1
1 TABLET ORAL 2 TIMES DAILY
Qty: 20 TABLET | Refills: 0 | Status: SHIPPED | OUTPATIENT
Start: 2023-05-31 | End: 2023-06-10

## 2023-05-31 ASSESSMENT — PATIENT HEALTH QUESTIONNAIRE - PHQ9
SUM OF ALL RESPONSES TO PHQ QUESTIONS 1-9: 0
1. LITTLE INTEREST OR PLEASURE IN DOING THINGS: 0
2. FEELING DOWN, DEPRESSED OR HOPELESS: 0
SUM OF ALL RESPONSES TO PHQ9 QUESTIONS 1 & 2: 0
SUM OF ALL RESPONSES TO PHQ QUESTIONS 1-9: 0

## 2023-05-31 NOTE — PROGRESS NOTES
Chief Complaint   Patient presents with    Hypertension     2 week f/up, infection in the right toe    Cholesterol Problem    Erectile Dysfunction     1. Have you been to the ER, urgent care clinic since your last visit? Hospitalized since your last visit? No    2. Have you seen or consulted any other health care providers outside of the [address] [place] [address] since your last visit? Include any pap smears or colon screening.  No

## 2023-05-31 NOTE — PROGRESS NOTES
Chief Complaint   Patient presents with    Hypertension     2 week f/up, infection in the right toe    Cholesterol Problem    Erectile Dysfunction       SUBJECTIVE:    Chalo Ferro. is a 80 y.o. male who returns in follow-up regarding his bilateral foot/great toe ulcers with associated cellulitis on the right foot. The left foot is almost healed. The right foot culture did show staph sensitive to Bactrim and unfortunate also had Pseudomonas only sensitive to IV antibiotics which I hope was colonized and will improve as the infection is clearing with the Bactrim. He does note it with the local wound care and topical Bactroban ointment his left foot is almost healed and his right is markedly improved. He notes no fevers or chills. Current Outpatient Medications   Medication Sig Dispense Refill    sulfamethoxazole-trimethoprim (BACTRIM DS;SEPTRA DS) 800-160 MG per tablet Take 1 tablet by mouth 2 times daily for 10 days 20 tablet 0    diclofenac (VOLTAREN) 75 MG EC tablet TAKE 1 TABLET BY MOUTH TWICE A  tablet 3    sulfamethoxazole-trimethoprim (BACTRIM DS;SEPTRA DS) 800-160 MG per tablet Take 1 tablet by mouth 2 times daily for 14 days 28 tablet 0    blood glucose test strips (EXACTECH TEST) strip USE TO TEST BLOOD SUGAR ONCE DAILY      glimepiride (AMARYL) 2 MG tablet       acetaminophen (TYLENOL) 650 MG extended release tablet Take 1 tablet by mouth in the morning and 1 tablet at noon and 1 tablet in the evening.       bumetanide (BUMEX) 2 MG tablet TAKE 1 TABLET BY MOUTH EVERY DAY      vitamin D 25 MCG (1000 UT) CAPS Take 1 capsule by mouth daily      digoxin (LANOXIN) 125 MCG tablet TAKE 1 TABLET BY MOUTH EVERY DAY      Glucosamine Sulfate 1000 MG TABS Take 2,000 mg by mouth daily      lisinopril (PRINIVIL;ZESTRIL) 5 MG tablet TAKE 1 TABLET BY MOUTH EVERY DAY      metFORMIN (GLUCOPHAGE) 500 MG tablet TAKE 1 TABLET BY MOUTH EVERY DAY IN THE MORNING WITH BREAKFAST AND ONE TABLET BEFORE DINNER

## 2023-06-01 LAB
ANION GAP SERPL CALC-SCNC: 6 MMOL/L (ref 5–15)
BASOPHILS # BLD: 0 K/UL (ref 0–0.1)
BASOPHILS NFR BLD: 1 % (ref 0–1)
BUN SERPL-MCNC: 46 MG/DL (ref 6–20)
BUN/CREAT SERPL: 21 (ref 12–20)
CALCIUM SERPL-MCNC: 9.1 MG/DL (ref 8.5–10.1)
CHLORIDE SERPL-SCNC: 102 MMOL/L (ref 97–108)
CO2 SERPL-SCNC: 24 MMOL/L (ref 21–32)
CREAT SERPL-MCNC: 2.14 MG/DL (ref 0.7–1.3)
DIFFERENTIAL METHOD BLD: ABNORMAL
EOSINOPHIL # BLD: 0 K/UL (ref 0–0.4)
EOSINOPHIL NFR BLD: 0 % (ref 0–7)
ERYTHROCYTE [DISTWIDTH] IN BLOOD BY AUTOMATED COUNT: 15.6 % (ref 11.5–14.5)
ERYTHROCYTE [SEDIMENTATION RATE] IN BLOOD: 60 MM/HR (ref 0–20)
GLUCOSE SERPL-MCNC: 274 MG/DL (ref 65–100)
HCT VFR BLD AUTO: 35.4 % (ref 36.6–50.3)
HGB BLD-MCNC: 10.3 G/DL (ref 12.1–17)
IMM GRANULOCYTES # BLD AUTO: 0.1 K/UL (ref 0–0.04)
IMM GRANULOCYTES NFR BLD AUTO: 2 % (ref 0–0.5)
INR PPP: 3 (ref 0.9–1.1)
LYMPHOCYTES # BLD: 1 K/UL (ref 0.8–3.5)
LYMPHOCYTES NFR BLD: 12 % (ref 12–49)
MCH RBC QN AUTO: 27.9 PG (ref 26–34)
MCHC RBC AUTO-ENTMCNC: 29.1 G/DL (ref 30–36.5)
MCV RBC AUTO: 95.9 FL (ref 80–99)
MONOCYTES # BLD: 0.6 K/UL (ref 0–1)
MONOCYTES NFR BLD: 7 % (ref 5–13)
NEUTS SEG # BLD: 6.4 K/UL (ref 1.8–8)
NEUTS SEG NFR BLD: 78 % (ref 32–75)
NRBC # BLD: 0 K/UL (ref 0–0.01)
NRBC BLD-RTO: 0 PER 100 WBC
PLATELET # BLD AUTO: 245 K/UL (ref 150–400)
PMV BLD AUTO: 10.2 FL (ref 8.9–12.9)
POTASSIUM SERPL-SCNC: 5.4 MMOL/L (ref 3.5–5.1)
PROTHROMBIN TIME: 29.2 SEC (ref 9–11.1)
RBC # BLD AUTO: 3.69 M/UL (ref 4.1–5.7)
SODIUM SERPL-SCNC: 132 MMOL/L (ref 136–145)
WBC # BLD AUTO: 8 K/UL (ref 4.1–11.1)

## 2023-06-03 DIAGNOSIS — I48.91 UNSPECIFIED ATRIAL FIBRILLATION (HCC): ICD-10-CM

## 2023-06-05 ENCOUNTER — TELEPHONE (OUTPATIENT)
Facility: CLINIC | Age: 88
End: 2023-06-05

## 2023-06-05 RX ORDER — DIGOXIN 125 MCG
TABLET ORAL
Qty: 90 TABLET | Refills: 3 | Status: SHIPPED | OUTPATIENT
Start: 2023-06-05

## 2023-06-05 NOTE — TELEPHONE ENCOUNTER
Daughter in law Michelle Knapp called regarding patient. Concerned because he is not feeling well. States he feels weak and a little light headed. Has been eating well. States he blood sugar also is not in good control. Dr. Oralia Meza out of the office but advised the daughter in law as well as the patient to go to the ER for further evaluation. Concerned regarding the wounds he has on his feet and risk of infection. Patient states he will think about it.

## 2023-06-05 NOTE — TELEPHONE ENCOUNTER
RX refill request from the patient/pharmacy. Patient last seen 05- with labs, and next appt. scheduled for 06-  Requested Prescriptions     Pending Prescriptions Disp Refills    digoxin (LANOXIN) 125 MCG tablet [Pharmacy Med Name: DIGOXIN 125 MCG TABLET] 90 tablet 3     Sig: TAKE 1 TABLET BY MOUTH EVERY DAY    .

## 2023-06-06 ENCOUNTER — TELEPHONE (OUTPATIENT)
Facility: CLINIC | Age: 88
End: 2023-06-06

## 2023-06-06 NOTE — TELEPHONE ENCOUNTER
Called patient and instructed to decrease his fluid pill to 1/2 tablet daily. Will have to call him back regarding his PT/INR because he is on the 7.5 mg daily. Message sent to Dr. Shawnee Cole regarding.

## 2023-06-07 ENCOUNTER — OFFICE VISIT (OUTPATIENT)
Facility: CLINIC | Age: 88
End: 2023-06-07
Payer: COMMERCIAL

## 2023-06-07 VITALS
SYSTOLIC BLOOD PRESSURE: 124 MMHG | BODY MASS INDEX: 24.43 KG/M2 | DIASTOLIC BLOOD PRESSURE: 58 MMHG | WEIGHT: 196.5 LBS | OXYGEN SATURATION: 95 % | RESPIRATION RATE: 16 BRPM | HEART RATE: 63 BPM | TEMPERATURE: 98.1 F | HEIGHT: 75 IN

## 2023-06-07 DIAGNOSIS — I48.0 PAROXYSMAL ATRIAL FIBRILLATION (HCC): ICD-10-CM

## 2023-06-07 DIAGNOSIS — N18.30 STAGE 3 CHRONIC KIDNEY DISEASE, UNSPECIFIED WHETHER STAGE 3A OR 3B CKD (HCC): ICD-10-CM

## 2023-06-07 DIAGNOSIS — R11.2 NAUSEA AND VOMITING, UNSPECIFIED VOMITING TYPE: ICD-10-CM

## 2023-06-07 DIAGNOSIS — L97.511 ULCER OF BOTH FEET, LIMITED TO BREAKDOWN OF SKIN (HCC): Primary | ICD-10-CM

## 2023-06-07 DIAGNOSIS — L97.521 ULCER OF BOTH FEET, LIMITED TO BREAKDOWN OF SKIN (HCC): Primary | ICD-10-CM

## 2023-06-07 DIAGNOSIS — D68.69 SECONDARY HYPERCOAGULABLE STATE (HCC): ICD-10-CM

## 2023-06-07 DIAGNOSIS — E11.22 CONTROLLED TYPE 2 DIABETES MELLITUS WITH STAGE 3 CHRONIC KIDNEY DISEASE, WITHOUT LONG-TERM CURRENT USE OF INSULIN (HCC): ICD-10-CM

## 2023-06-07 DIAGNOSIS — I50.32 DIASTOLIC CHF, CHRONIC (HCC): ICD-10-CM

## 2023-06-07 DIAGNOSIS — N18.30 CONTROLLED TYPE 2 DIABETES MELLITUS WITH STAGE 3 CHRONIC KIDNEY DISEASE, WITHOUT LONG-TERM CURRENT USE OF INSULIN (HCC): ICD-10-CM

## 2023-06-07 PROCEDURE — 3051F HG A1C>EQUAL 7.0%<8.0%: CPT | Performed by: INTERNAL MEDICINE

## 2023-06-07 PROCEDURE — 99214 OFFICE O/P EST MOD 30 MIN: CPT | Performed by: INTERNAL MEDICINE

## 2023-06-07 PROCEDURE — 1123F ACP DISCUSS/DSCN MKR DOCD: CPT | Performed by: INTERNAL MEDICINE

## 2023-06-07 RX ORDER — DOXYCYCLINE HYCLATE 100 MG
100 TABLET ORAL 2 TIMES DAILY
Qty: 28 TABLET | Refills: 0 | Status: SHIPPED | OUTPATIENT
Start: 2023-06-07 | End: 2023-06-21

## 2023-06-07 NOTE — PROGRESS NOTES
Chief Complaint   Patient presents with    Diabetes     Pt reports elevated blood sugar readings of 198 this morning and 167 ln 6/6     BP 96/60 (Site: Left Upper Arm, Position: Sitting, Cuff Size: Large Adult)   Pulse 63   Temp 98.1 °F (36.7 °C) (Oral)   Resp 16   Ht 6' 3\" (1.905 m)   Wt 196 lb 8 oz (89.1 kg)   SpO2 95%   BMI 24.56 kg/m²   1. Have you been to the ER, urgent care clinic since your last visit? Hospitalized since your last visit? No    2. Have you seen or consulted any other health care providers outside of the [address] [place] [address] since your last visit? Include any pap smears or colon screening.  no
therapy. In light of the nausea vomiting and Bactrim I will check and make sure that his dig level looks okay. We will make sure his INR is not prolonged. We will also see what he is electrolytes look like along with his blood sugar. At this point I am stopping his lisinopril which she is on 5 mg daily. And as noted above discontinuation of Bactrim and replaced with doxycycline  Follow-up in 48 hours or sooner should the be a problem. All of this is discussed with his son present with him today. Note that the ulcer on the right great toe was sterilely dressed today. Orders Placed This Encounter   Procedures    Basic Metabolic Panel     Standing Status:   Future     Number of Occurrences:   1     Standing Expiration Date:   6/7/2024    CBC with Auto Differential     Standing Status:   Future     Number of Occurrences:   1     Standing Expiration Date:   6/7/2024    Protime-INR     Standing Status:   Future     Number of Occurrences:   1     Standing Expiration Date:   6/7/2024     Order Specific Question:   Daily Coumadin Dose? Answer:   f    Sedimentation Rate     Standing Status:   Future     Number of Occurrences:   1     Standing Expiration Date:   6/7/2024    Digoxin Level     Standing Status:   Future     Standing Expiration Date:   6/7/2024     Order Specific Question:   Dose Schedule & Time of Last Dose? Answer:   f          Return in about 2 days (around 6/9/2023). No results found for any visits on 06/07/23. Alfonso Agrawal MD    The patient was given after the visit summary the patient verbalized an understanding of the plans and problems as explained.

## 2023-06-08 LAB
ANION GAP SERPL CALC-SCNC: 6 MMOL/L (ref 5–15)
BASOPHILS # BLD: 0 K/UL (ref 0–0.1)
BASOPHILS NFR BLD: 0 % (ref 0–1)
BUN SERPL-MCNC: 65 MG/DL (ref 6–20)
BUN/CREAT SERPL: 26 (ref 12–20)
CALCIUM SERPL-MCNC: 9.1 MG/DL (ref 8.5–10.1)
CHLORIDE SERPL-SCNC: 103 MMOL/L (ref 97–108)
CO2 SERPL-SCNC: 24 MMOL/L (ref 21–32)
CREAT SERPL-MCNC: 2.48 MG/DL (ref 0.7–1.3)
DIFFERENTIAL METHOD BLD: ABNORMAL
EOSINOPHIL # BLD: 0 K/UL (ref 0–0.4)
EOSINOPHIL NFR BLD: 0 % (ref 0–7)
ERYTHROCYTE [DISTWIDTH] IN BLOOD BY AUTOMATED COUNT: 16.8 % (ref 11.5–14.5)
ERYTHROCYTE [SEDIMENTATION RATE] IN BLOOD: 65 MM/HR (ref 0–20)
GLUCOSE SERPL-MCNC: 356 MG/DL (ref 65–100)
HCT VFR BLD AUTO: 31.8 % (ref 36.6–50.3)
HGB BLD-MCNC: 9.6 G/DL (ref 12.1–17)
IMM GRANULOCYTES # BLD AUTO: 0 K/UL
IMM GRANULOCYTES NFR BLD AUTO: 0 %
INR PPP: 3.6 (ref 0.9–1.1)
LYMPHOCYTES # BLD: 1 K/UL (ref 0.8–3.5)
LYMPHOCYTES NFR BLD: 17 % (ref 12–49)
MCH RBC QN AUTO: 27.6 PG (ref 26–34)
MCHC RBC AUTO-ENTMCNC: 30.2 G/DL (ref 30–36.5)
MCV RBC AUTO: 91.4 FL (ref 80–99)
MONOCYTES # BLD: 0.8 K/UL (ref 0–1)
MONOCYTES NFR BLD: 13 % (ref 5–13)
NEUTS BAND NFR BLD MANUAL: 2 % (ref 0–6)
NEUTS SEG # BLD: 4 K/UL (ref 1.8–8)
NEUTS SEG NFR BLD: 68 % (ref 32–75)
NRBC # BLD: 0 K/UL (ref 0–0.01)
NRBC BLD-RTO: 0 PER 100 WBC
PLATELET # BLD AUTO: 151 K/UL (ref 150–400)
PMV BLD AUTO: 11.6 FL (ref 8.9–12.9)
POTASSIUM SERPL-SCNC: 5.7 MMOL/L (ref 3.5–5.1)
PROTHROMBIN TIME: 35.1 SEC (ref 9–11.1)
RBC # BLD AUTO: 3.48 M/UL (ref 4.1–5.7)
RBC MORPH BLD: ABNORMAL
SODIUM SERPL-SCNC: 133 MMOL/L (ref 136–145)
WBC # BLD AUTO: 5.8 K/UL (ref 4.1–11.1)

## 2023-06-09 ENCOUNTER — OFFICE VISIT (OUTPATIENT)
Facility: CLINIC | Age: 88
End: 2023-06-09
Payer: COMMERCIAL

## 2023-06-09 VITALS
SYSTOLIC BLOOD PRESSURE: 120 MMHG | DIASTOLIC BLOOD PRESSURE: 68 MMHG | OXYGEN SATURATION: 99 % | HEIGHT: 75 IN | TEMPERATURE: 97.9 F | HEART RATE: 79 BPM | WEIGHT: 194.7 LBS | BODY MASS INDEX: 24.21 KG/M2 | RESPIRATION RATE: 16 BRPM

## 2023-06-09 DIAGNOSIS — M15.9 PRIMARY OSTEOARTHRITIS INVOLVING MULTIPLE JOINTS: ICD-10-CM

## 2023-06-09 DIAGNOSIS — E11.22 CONTROLLED TYPE 2 DIABETES MELLITUS WITH STAGE 3 CHRONIC KIDNEY DISEASE, WITHOUT LONG-TERM CURRENT USE OF INSULIN (HCC): ICD-10-CM

## 2023-06-09 DIAGNOSIS — L97.511 ULCER OF BOTH FEET, LIMITED TO BREAKDOWN OF SKIN (HCC): ICD-10-CM

## 2023-06-09 DIAGNOSIS — I48.0 PAROXYSMAL ATRIAL FIBRILLATION (HCC): ICD-10-CM

## 2023-06-09 DIAGNOSIS — R11.2 NAUSEA AND VOMITING, UNSPECIFIED VOMITING TYPE: ICD-10-CM

## 2023-06-09 DIAGNOSIS — M35.3 POLYMYALGIA RHEUMATICA (HCC): ICD-10-CM

## 2023-06-09 DIAGNOSIS — I12.9 HYPERTENSION WITH RENAL DISEASE: Primary | ICD-10-CM

## 2023-06-09 DIAGNOSIS — E78.2 MIXED HYPERLIPIDEMIA: ICD-10-CM

## 2023-06-09 DIAGNOSIS — L97.521 ULCER OF BOTH FEET, LIMITED TO BREAKDOWN OF SKIN (HCC): ICD-10-CM

## 2023-06-09 DIAGNOSIS — N18.30 CONTROLLED TYPE 2 DIABETES MELLITUS WITH STAGE 3 CHRONIC KIDNEY DISEASE, WITHOUT LONG-TERM CURRENT USE OF INSULIN (HCC): ICD-10-CM

## 2023-06-09 DIAGNOSIS — I25.10 ASCVD (ARTERIOSCLEROTIC CARDIOVASCULAR DISEASE): ICD-10-CM

## 2023-06-09 DIAGNOSIS — I50.32 DIASTOLIC CHF, CHRONIC (HCC): ICD-10-CM

## 2023-06-09 DIAGNOSIS — N18.30 STAGE 3 CHRONIC KIDNEY DISEASE, UNSPECIFIED WHETHER STAGE 3A OR 3B CKD (HCC): ICD-10-CM

## 2023-06-09 LAB
ALBUMIN SERPL-MCNC: 3.1 G/DL (ref 3.5–5)
ALBUMIN/GLOB SERPL: 0.8 (ref 1.1–2.2)
ALP SERPL-CCNC: 86 U/L (ref 45–117)
ALT SERPL-CCNC: 169 U/L (ref 12–78)
ANION GAP SERPL CALC-SCNC: 5 MMOL/L (ref 5–15)
APPEARANCE UR: CLEAR
AST SERPL-CCNC: 77 U/L (ref 15–37)
BACTERIA URNS QL MICRO: NEGATIVE /HPF
BILIRUB SERPL-MCNC: 0.3 MG/DL (ref 0.2–1)
BILIRUB UR QL: NEGATIVE
BUN SERPL-MCNC: 52 MG/DL (ref 6–20)
BUN/CREAT SERPL: 31 (ref 12–20)
CALCIUM SERPL-MCNC: 10 MG/DL (ref 8.5–10.1)
CHLORIDE SERPL-SCNC: 105 MMOL/L (ref 97–108)
CHOLEST SERPL-MCNC: 159 MG/DL
CK SERPL-CCNC: 18 U/L (ref 39–308)
CO2 SERPL-SCNC: 28 MMOL/L (ref 21–32)
COLOR UR: ABNORMAL
CREAT SERPL-MCNC: 1.66 MG/DL (ref 0.7–1.3)
EPITH CASTS URNS QL MICRO: ABNORMAL /LPF
EST. AVERAGE GLUCOSE BLD GHB EST-MCNC: 194 MG/DL
GLOBULIN SER CALC-MCNC: 3.7 G/DL (ref 2–4)
GLUCOSE SERPL-MCNC: 208 MG/DL (ref 65–100)
GLUCOSE UR STRIP.AUTO-MCNC: 250 MG/DL
HBA1C MFR BLD: 8.4 % (ref 4–5.6)
HDLC SERPL-MCNC: 20 MG/DL
HDLC SERPL: 8 (ref 0–5)
HGB UR QL STRIP: NEGATIVE
HYALINE CASTS URNS QL MICRO: ABNORMAL /LPF (ref 0–5)
INR PPP: 2.9 (ref 0.9–1.1)
KETONES UR QL STRIP.AUTO: NEGATIVE MG/DL
LDLC SERPL CALC-MCNC: ABNORMAL MG/DL (ref 0–100)
LDLC SERPL DIRECT ASSAY-MCNC: 62 MG/DL (ref 0–100)
LEUKOCYTE ESTERASE UR QL STRIP.AUTO: NEGATIVE
NITRITE UR QL STRIP.AUTO: NEGATIVE
PH UR STRIP: 5.5 (ref 5–8)
POTASSIUM SERPL-SCNC: 5.3 MMOL/L (ref 3.5–5.1)
PROT SERPL-MCNC: 6.8 G/DL (ref 6.4–8.2)
PROT UR STRIP-MCNC: ABNORMAL MG/DL
PROTHROMBIN TIME: 28.7 SEC (ref 9–11.1)
RBC #/AREA URNS HPF: ABNORMAL /HPF (ref 0–5)
SODIUM SERPL-SCNC: 138 MMOL/L (ref 136–145)
SP GR UR REFRACTOMETRY: 1.02 (ref 1–1.03)
TRIGL SERPL-MCNC: 460 MG/DL
URINE CULTURE IF INDICATED: ABNORMAL
UROBILINOGEN UR QL STRIP.AUTO: 0.2 EU/DL (ref 0.2–1)
VLDLC SERPL CALC-MCNC: ABNORMAL MG/DL
WBC URNS QL MICRO: ABNORMAL /HPF (ref 0–4)

## 2023-06-09 PROCEDURE — 99214 OFFICE O/P EST MOD 30 MIN: CPT | Performed by: INTERNAL MEDICINE

## 2023-06-09 PROCEDURE — 3051F HG A1C>EQUAL 7.0%<8.0%: CPT | Performed by: INTERNAL MEDICINE

## 2023-06-09 PROCEDURE — 1123F ACP DISCUSS/DSCN MKR DOCD: CPT | Performed by: INTERNAL MEDICINE

## 2023-06-09 RX ORDER — CHLORAL HYDRATE 500 MG
CAPSULE ORAL 2 TIMES DAILY
COMMUNITY

## 2023-06-09 ASSESSMENT — PATIENT HEALTH QUESTIONNAIRE - PHQ9
1. LITTLE INTEREST OR PLEASURE IN DOING THINGS: 0
SUM OF ALL RESPONSES TO PHQ QUESTIONS 1-9: 0
SUM OF ALL RESPONSES TO PHQ9 QUESTIONS 1 & 2: 0
2. FEELING DOWN, DEPRESSED OR HOPELESS: 0
SUM OF ALL RESPONSES TO PHQ QUESTIONS 1-9: 0

## 2023-06-09 NOTE — PROGRESS NOTES
Chief Complaint   Patient presents with    Follow-up     /68 (Site: Left Upper Arm, Position: Sitting, Cuff Size: Small Adult)   Pulse 79   Temp 97.9 °F (36.6 °C) (Oral)   Resp 16   Ht 6' 3\" (1.905 m)   Wt 194 lb 11.2 oz (88.3 kg)   SpO2 99%   BMI 24.34 kg/m²       1. \"Have you been to the ER, urgent care clinic since your last visit? Hospitalized since your last visit? \" NO    2. \"Have you seen or consulted any other health care providers outside of the [address] [place] [address] since your last visit? \" NO     3. For patients aged 39-70: Has the patient had a colonoscopy / FIT/ Cologuard? No      If the patient is female:    4. For patients aged 41-77: Has the patient had a mammogram within the past 2 years? NO      5. For patients aged 21-65: Has the patient had a pap smear?  NO
UT) CAPS Take 1 capsule by mouth daily      Glucosamine Sulfate 1000 MG TABS Take 2,000 mg by mouth daily      metFORMIN (GLUCOPHAGE) 500 MG tablet TAKE 1 TABLET BY MOUTH EVERY DAY IN THE MORNING WITH BREAKFAST AND ONE TABLET BEFORE DINNER      predniSONE (DELTASONE) 10 MG tablet TAKE 1TAB BY MOUTH TWO (2) TIMES A DAY. simvastatin (ZOCOR) 20 MG tablet TAKE 1 TABLET BY MOUTH EVERY DAY      terazosin (HYTRIN) 2 MG capsule TAKE 1 CAPSULE BY MOUTH EVERY DAY      warfarin (COUMADIN) 5 MG tablet TAKE 2 TABLETS BY MOUTH ON MONDAY &FRIDAYS AND 1&1/2 TABLET DAILY ON ALL OTHER DAYS       No current facility-administered medications for this visit. 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    Back pain 9/20/2017    BPH (benign prostatic hyperplasia) 9/20/2017    CHF (congestive heart failure) ([facility] 9/20/2017    CKD (chronic kidney disease) 9/20/2017    COVID-19 11/2021    Diabetic [facility]emia ([facility]     DM (diabetes mellitus) ([facility] 9/20/2017    Story: Diet Controlled    ED (erectile dysfunction) 9/20/2017    Edema 9/20/2017    Elevated CPK 9/20/2017    Comments: History of    Elevated LFTs 9/20/2017    Comments: History of    Elevated PSA 9/20/2017    Hypercholesteremia     Hyperlipidemia 9/20/2017    Hypertension     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    Nodule of right lung 9/20/2017    Story: Right    Polymyalgia ([facility] 9/20/2017    Prostate enlargement      Past Surgical History:   Procedure Laterality Date    HEENT  06/12/2018    Dr. 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

## 2023-06-10 ENCOUNTER — TELEPHONE (OUTPATIENT)
Facility: CLINIC | Age: 88
End: 2023-06-10

## 2023-06-10 LAB — DIGOXIN SERPL-MCNC: 1.1 NG/ML (ref 0.9–2)

## 2023-06-21 ENCOUNTER — OFFICE VISIT (OUTPATIENT)
Facility: CLINIC | Age: 88
End: 2023-06-21
Payer: COMMERCIAL

## 2023-06-21 VITALS
HEIGHT: 75 IN | WEIGHT: 194.6 LBS | HEART RATE: 55 BPM | TEMPERATURE: 97.9 F | BODY MASS INDEX: 24.2 KG/M2 | OXYGEN SATURATION: 99 % | DIASTOLIC BLOOD PRESSURE: 72 MMHG | SYSTOLIC BLOOD PRESSURE: 132 MMHG | RESPIRATION RATE: 18 BRPM

## 2023-06-21 DIAGNOSIS — L97.511 ULCER OF BOTH FEET, LIMITED TO BREAKDOWN OF SKIN (HCC): Primary | ICD-10-CM

## 2023-06-21 DIAGNOSIS — R11.2 NAUSEA AND VOMITING, UNSPECIFIED VOMITING TYPE: ICD-10-CM

## 2023-06-21 DIAGNOSIS — N18.30 STAGE 3 CHRONIC KIDNEY DISEASE, UNSPECIFIED WHETHER STAGE 3A OR 3B CKD (HCC): ICD-10-CM

## 2023-06-21 DIAGNOSIS — I48.0 PAROXYSMAL ATRIAL FIBRILLATION (HCC): ICD-10-CM

## 2023-06-21 DIAGNOSIS — M35.3 POLYMYALGIA RHEUMATICA (HCC): ICD-10-CM

## 2023-06-21 DIAGNOSIS — L97.521 ULCER OF BOTH FEET, LIMITED TO BREAKDOWN OF SKIN (HCC): Primary | ICD-10-CM

## 2023-06-21 PROCEDURE — 99213 OFFICE O/P EST LOW 20 MIN: CPT | Performed by: INTERNAL MEDICINE

## 2023-06-21 PROCEDURE — 1123F ACP DISCUSS/DSCN MKR DOCD: CPT | Performed by: INTERNAL MEDICINE

## 2023-06-21 RX ORDER — DOXYCYCLINE HYCLATE 100 MG
100 TABLET ORAL 2 TIMES DAILY
Qty: 28 TABLET | Refills: 0 | Status: SHIPPED | OUTPATIENT
Start: 2023-06-21 | End: 2023-07-05

## 2023-06-21 RX ORDER — LISINOPRIL 5 MG/1
5 TABLET ORAL DAILY
COMMUNITY

## 2023-06-21 SDOH — ECONOMIC STABILITY: INCOME INSECURITY: HOW HARD IS IT FOR YOU TO PAY FOR THE VERY BASICS LIKE FOOD, HOUSING, MEDICAL CARE, AND HEATING?: NOT HARD AT ALL

## 2023-06-21 SDOH — ECONOMIC STABILITY: FOOD INSECURITY: WITHIN THE PAST 12 MONTHS, THE FOOD YOU BOUGHT JUST DIDN'T LAST AND YOU DIDN'T HAVE MONEY TO GET MORE.: NEVER TRUE

## 2023-06-21 SDOH — ECONOMIC STABILITY: HOUSING INSECURITY
IN THE LAST 12 MONTHS, WAS THERE A TIME WHEN YOU DID NOT HAVE A STEADY PLACE TO SLEEP OR SLEPT IN A SHELTER (INCLUDING NOW)?: NO

## 2023-06-21 SDOH — ECONOMIC STABILITY: FOOD INSECURITY: WITHIN THE PAST 12 MONTHS, YOU WORRIED THAT YOUR FOOD WOULD RUN OUT BEFORE YOU GOT MONEY TO BUY MORE.: NEVER TRUE

## 2023-06-21 NOTE — PROGRESS NOTES
Chief Complaint   Patient presents with    Hypertension     Two week follow up       SUBJECTIVE:    Bertha Barry. is a 80 y.o. male is in follow-up regarding his diabetic foot ulcers on his great toe bilateral, polymyalgia rheumatica, CKD, diastolic CHF, paroxysmal atrial fibrillation and other medical problems. He does note that Delson the left toe is completely healed and the right has markedly improved with one of the ulcers healed and yellow markedly improved. He does note edema seems to be mobilized with a half fluid pill. He denies any chest pain, shortness of breath or cardiorespiratory complaints. There are no GI or  complaints. He has no other complaints on complete review of systems. Current Outpatient Medications   Medication Sig Dispense Refill    lisinopril (PRINIVIL;ZESTRIL) 5 MG tablet Take 1 tablet by mouth daily Takes twice a day. SITagliptin (JANUVIA) 100 MG tablet Take 1 tablet by mouth daily 30 tablet 5    doxycycline hyclate (VIBRA-TABS) 100 MG tablet Take 1 tablet by mouth 2 times daily for 14 days 28 tablet 0    pregabalin (LYRICA) 75 MG capsule Take one tablet twice a day. 60 capsule 2    Omega-3 Fatty Acids (FISH OIL) 1000 MG capsule Take by mouth 2 times daily      Multiple Vitamins-Minerals (CENTRUM SILVER ULTRA MENS PO) Take 1 tablet by mouth daily      digoxin (LANOXIN) 125 MCG tablet TAKE 1 TABLET BY MOUTH EVERY DAY 90 tablet 3    diclofenac (VOLTAREN) 75 MG EC tablet TAKE 1 TABLET BY MOUTH TWICE A  tablet 3    blood glucose test strips (EXACTECH TEST) strip USE TO TEST BLOOD SUGAR ONCE DAILY      glimepiride (AMARYL) 2 MG tablet       acetaminophen (TYLENOL) 650 MG extended release tablet Take 1 tablet by mouth in the morning and 1 tablet at noon and 1 tablet in the evening.       bumetanide (BUMEX) 2 MG tablet TAKE 1 TABLET BY MOUTH EVERY DAY      vitamin D 25 MCG (1000 UT) CAPS Take 1 capsule by mouth daily      Glucosamine Sulfate 1000 MG TABS Take

## 2023-06-21 NOTE — PROGRESS NOTES
Enedina Vaughan is a 80 y.o. male     Chief Complaint   Patient presents with    Hypertension     Two week follow up       /72 (Site: Left Upper Arm, Position: Sitting, Cuff Size: Large Adult)   Pulse 55   Temp 97.9 °F (36.6 °C) (Oral)   Resp 18   Ht 6' 3\" (1.905 m)   Wt 194 lb 9.6 oz (88.3 kg)   SpO2 99%   BMI 24.32 kg/m²     Health Maintenance Due   Topic Date Due    COVID-19 Vaccine (1) Never done    Shingles vaccine (1 of 2) Never done    Pneumococcal 65+ years Vaccine (2 - PPSV23 if available, else PCV20) 03/04/2005         1. \"Have you been to the ER, urgent care clinic since your last visit? Hospitalized since your last visit? \" No    2. \"Have you seen or consulted any other health care providers outside of the [address] [place] [address] since your last visit? \" No     3. For patients aged 39-70: Has the patient had a colonoscopy / FIT/ Cologuard? N/A      If the patient is female:    4. For patients aged 41-77: Has the patient had a mammogram within the past 2 years? N/A      5. For patients aged 21-65: Has the patient had a pap smear?  N/A

## 2023-06-22 LAB
ANION GAP SERPL CALC-SCNC: 3 MMOL/L (ref 5–15)
BASOPHILS # BLD: 0.1 K/UL (ref 0–0.1)
BASOPHILS NFR BLD: 1 % (ref 0–1)
BUN SERPL-MCNC: 46 MG/DL (ref 6–20)
BUN/CREAT SERPL: 27 (ref 12–20)
CALCIUM SERPL-MCNC: 9.6 MG/DL (ref 8.5–10.1)
CHLORIDE SERPL-SCNC: 106 MMOL/L (ref 97–108)
CO2 SERPL-SCNC: 29 MMOL/L (ref 21–32)
CREAT SERPL-MCNC: 1.69 MG/DL (ref 0.7–1.3)
DIFFERENTIAL METHOD BLD: ABNORMAL
EOSINOPHIL # BLD: 0 K/UL (ref 0–0.4)
EOSINOPHIL NFR BLD: 0 % (ref 0–7)
ERYTHROCYTE [DISTWIDTH] IN BLOOD BY AUTOMATED COUNT: 18.2 % (ref 11.5–14.5)
ERYTHROCYTE [SEDIMENTATION RATE] IN BLOOD: 61 MM/HR (ref 0–20)
GLUCOSE SERPL-MCNC: 313 MG/DL (ref 65–100)
HCT VFR BLD AUTO: 35.5 % (ref 36.6–50.3)
HGB BLD-MCNC: 10.3 G/DL (ref 12.1–17)
IMM GRANULOCYTES # BLD AUTO: 0 K/UL (ref 0–0.04)
IMM GRANULOCYTES NFR BLD AUTO: 0 % (ref 0–0.5)
INR PPP: 1.9 (ref 0.9–1.1)
LYMPHOCYTES # BLD: 1.9 K/UL (ref 0.8–3.5)
LYMPHOCYTES NFR BLD: 25 % (ref 12–49)
MCH RBC QN AUTO: 27.8 PG (ref 26–34)
MCHC RBC AUTO-ENTMCNC: 29 G/DL (ref 30–36.5)
MCV RBC AUTO: 95.9 FL (ref 80–99)
MONOCYTES # BLD: 0.4 K/UL (ref 0–1)
MONOCYTES NFR BLD: 5 % (ref 5–13)
NEUTS SEG # BLD: 5.2 K/UL (ref 1.8–8)
NEUTS SEG NFR BLD: 69 % (ref 32–75)
NRBC # BLD: 0 K/UL (ref 0–0.01)
NRBC BLD-RTO: 0 PER 100 WBC
PLATELET # BLD AUTO: 255 K/UL (ref 150–400)
PMV BLD AUTO: 11 FL (ref 8.9–12.9)
POTASSIUM SERPL-SCNC: 5.3 MMOL/L (ref 3.5–5.1)
PROTHROMBIN TIME: 18.7 SEC (ref 9–11.1)
RBC # BLD AUTO: 3.7 M/UL (ref 4.1–5.7)
SODIUM SERPL-SCNC: 138 MMOL/L (ref 136–145)
WBC # BLD AUTO: 7.5 K/UL (ref 4.1–11.1)

## 2023-06-26 ENCOUNTER — ANTI-COAG VISIT (OUTPATIENT)
Facility: CLINIC | Age: 88
End: 2023-06-26

## 2023-06-26 ENCOUNTER — TELEPHONE (OUTPATIENT)
Facility: CLINIC | Age: 88
End: 2023-06-26

## 2023-06-26 RX ORDER — BLOOD SUGAR DIAGNOSTIC
STRIP MISCELLANEOUS
Qty: 100 STRIP | Refills: 3 | Status: SHIPPED | OUTPATIENT
Start: 2023-06-26

## 2023-07-03 ENCOUNTER — APPOINTMENT (OUTPATIENT)
Facility: HOSPITAL | Age: 88
DRG: 853 | End: 2023-07-03
Payer: MEDICARE

## 2023-07-03 ENCOUNTER — HOSPITAL ENCOUNTER (INPATIENT)
Facility: HOSPITAL | Age: 88
LOS: 13 days | Discharge: HOME HEALTH | DRG: 853 | End: 2023-07-17
Attending: EMERGENCY MEDICINE | Admitting: INTERNAL MEDICINE
Payer: MEDICARE

## 2023-07-03 DIAGNOSIS — A41.9 SEPTIC SHOCK (HCC): ICD-10-CM

## 2023-07-03 DIAGNOSIS — A41.9 SEPTICEMIA (HCC): Primary | ICD-10-CM

## 2023-07-03 DIAGNOSIS — L97.506 DIABETIC ULCER OF TOE ASSOCIATED WITH TYPE 2 DIABETES MELLITUS, WITH BONE INVOLVEMENT WITHOUT EVIDENCE OF NECROSIS, UNSPECIFIED LATERALITY (HCC): ICD-10-CM

## 2023-07-03 DIAGNOSIS — L97.521 ULCER OF BOTH FEET, LIMITED TO BREAKDOWN OF SKIN (HCC): ICD-10-CM

## 2023-07-03 DIAGNOSIS — R00.1 BRADYCARDIA: ICD-10-CM

## 2023-07-03 DIAGNOSIS — R65.21 SEPTIC SHOCK (HCC): ICD-10-CM

## 2023-07-03 DIAGNOSIS — I45.9 HEART BLOCK: ICD-10-CM

## 2023-07-03 DIAGNOSIS — L03.116 CELLULITIS OF LEFT LOWER EXTREMITY: ICD-10-CM

## 2023-07-03 DIAGNOSIS — E11.621 DIABETIC ULCER OF TOE ASSOCIATED WITH TYPE 2 DIABETES MELLITUS, WITH BONE INVOLVEMENT WITHOUT EVIDENCE OF NECROSIS, UNSPECIFIED LATERALITY (HCC): ICD-10-CM

## 2023-07-03 DIAGNOSIS — L97.511 ULCER OF BOTH FEET, LIMITED TO BREAKDOWN OF SKIN (HCC): ICD-10-CM

## 2023-07-03 DIAGNOSIS — L03.115 CELLULITIS OF RIGHT FOOT: ICD-10-CM

## 2023-07-03 LAB
ALBUMIN SERPL-MCNC: 3.5 G/DL (ref 3.5–5)
ALBUMIN/GLOB SERPL: 0.8 (ref 1.1–2.2)
ALP SERPL-CCNC: 71 U/L (ref 45–117)
ALT SERPL-CCNC: 40 U/L (ref 12–78)
ANION GAP BLD CALC-SCNC: 9 (ref 10–20)
ANION GAP SERPL CALC-SCNC: 7 MMOL/L (ref 5–15)
AST SERPL-CCNC: 31 U/L (ref 15–37)
BASE EXCESS BLD CALC-SCNC: 0.7 MMOL/L
BASOPHILS # BLD: 0 K/UL (ref 0–0.1)
BASOPHILS NFR BLD: 0 % (ref 0–1)
BILIRUB SERPL-MCNC: 0.3 MG/DL (ref 0.2–1)
BUN SERPL-MCNC: 33 MG/DL (ref 6–20)
BUN/CREAT SERPL: 23 (ref 12–20)
CA-I BLD-MCNC: 1.25 MMOL/L (ref 1.12–1.32)
CALCIUM SERPL-MCNC: 9.1 MG/DL (ref 8.5–10.1)
CHLORIDE BLD-SCNC: 105 MMOL/L (ref 100–108)
CHLORIDE SERPL-SCNC: 105 MMOL/L (ref 97–108)
CO2 BLD-SCNC: 27 MMOL/L (ref 19–24)
CO2 SERPL-SCNC: 26 MMOL/L (ref 21–32)
CREAT SERPL-MCNC: 1.44 MG/DL (ref 0.7–1.3)
CREAT UR-MCNC: 1.2 MG/DL (ref 0.6–1.3)
DIFFERENTIAL METHOD BLD: ABNORMAL
EOSINOPHIL # BLD: 0 K/UL (ref 0–0.4)
EOSINOPHIL NFR BLD: 0 % (ref 0–7)
ERYTHROCYTE [DISTWIDTH] IN BLOOD BY AUTOMATED COUNT: 17.9 % (ref 11.5–14.5)
GLOBULIN SER CALC-MCNC: 4.2 G/DL (ref 2–4)
GLUCOSE BLD STRIP.AUTO-MCNC: 191 MG/DL (ref 74–106)
GLUCOSE SERPL-MCNC: 178 MG/DL (ref 65–100)
HCO3 BLDA-SCNC: 27 MMOL/L
HCT VFR BLD AUTO: 37.5 % (ref 36.6–50.3)
HGB BLD-MCNC: 11.1 G/DL (ref 12.1–17)
IMM GRANULOCYTES # BLD AUTO: 0.2 K/UL (ref 0–0.04)
IMM GRANULOCYTES NFR BLD AUTO: 1 % (ref 0–0.5)
LACTATE BLD-SCNC: 2.33 MMOL/L (ref 0.4–2)
LYMPHOCYTES # BLD: 1.6 K/UL (ref 0.8–3.5)
LYMPHOCYTES NFR BLD: 8 % (ref 12–49)
MCH RBC QN AUTO: 27.5 PG (ref 26–34)
MCHC RBC AUTO-ENTMCNC: 29.6 G/DL (ref 30–36.5)
MCV RBC AUTO: 93.1 FL (ref 80–99)
MONOCYTES # BLD: 0.8 K/UL (ref 0–1)
MONOCYTES NFR BLD: 4 % (ref 5–13)
NEUTS SEG # BLD: 17.6 K/UL (ref 1.8–8)
NEUTS SEG NFR BLD: 87 % (ref 32–75)
NRBC # BLD: 0 K/UL (ref 0–0.01)
NRBC BLD-RTO: 0 PER 100 WBC
PCO2 BLDV: 47 MMHG (ref 41–51)
PH BLDV: 7.36 (ref 7.32–7.42)
PLATELET # BLD AUTO: 159 K/UL (ref 150–400)
PMV BLD AUTO: 10.8 FL (ref 8.9–12.9)
PO2 BLDV: <13 MMHG (ref 25–40)
POTASSIUM BLD-SCNC: 5.7 MMOL/L (ref 3.5–5.5)
POTASSIUM SERPL-SCNC: 5.5 MMOL/L (ref 3.5–5.1)
PROT SERPL-MCNC: 7.7 G/DL (ref 6.4–8.2)
RBC # BLD AUTO: 4.03 M/UL (ref 4.1–5.7)
RBC MORPH BLD: ABNORMAL
SERVICE CMNT-IMP: ABNORMAL
SODIUM BLD-SCNC: 141 MMOL/L (ref 136–145)
SODIUM SERPL-SCNC: 138 MMOL/L (ref 136–145)
SPECIMEN SITE: ABNORMAL
WBC # BLD AUTO: 20.2 K/UL (ref 4.1–11.1)

## 2023-07-03 PROCEDURE — 82330 ASSAY OF CALCIUM: CPT

## 2023-07-03 PROCEDURE — 96365 THER/PROPH/DIAG IV INF INIT: CPT

## 2023-07-03 PROCEDURE — 96361 HYDRATE IV INFUSION ADD-ON: CPT

## 2023-07-03 PROCEDURE — 6370000000 HC RX 637 (ALT 250 FOR IP): Performed by: EMERGENCY MEDICINE

## 2023-07-03 PROCEDURE — 99285 EMERGENCY DEPT VISIT HI MDM: CPT

## 2023-07-03 PROCEDURE — 2580000003 HC RX 258: Performed by: EMERGENCY MEDICINE

## 2023-07-03 PROCEDURE — 87077 CULTURE AEROBIC IDENTIFY: CPT

## 2023-07-03 PROCEDURE — 87150 DNA/RNA AMPLIFIED PROBE: CPT

## 2023-07-03 PROCEDURE — 87635 SARS-COV-2 COVID-19 AMP PRB: CPT

## 2023-07-03 PROCEDURE — 82803 BLOOD GASES ANY COMBINATION: CPT

## 2023-07-03 PROCEDURE — 87186 SC STD MICRODIL/AGAR DIL: CPT

## 2023-07-03 PROCEDURE — 82947 ASSAY GLUCOSE BLOOD QUANT: CPT

## 2023-07-03 PROCEDURE — 84295 ASSAY OF SERUM SODIUM: CPT

## 2023-07-03 PROCEDURE — 84132 ASSAY OF SERUM POTASSIUM: CPT

## 2023-07-03 PROCEDURE — 6360000002 HC RX W HCPCS: Performed by: EMERGENCY MEDICINE

## 2023-07-03 PROCEDURE — 80053 COMPREHEN METABOLIC PANEL: CPT

## 2023-07-03 PROCEDURE — 36415 COLL VENOUS BLD VENIPUNCTURE: CPT

## 2023-07-03 PROCEDURE — 85025 COMPLETE CBC W/AUTO DIFF WBC: CPT

## 2023-07-03 PROCEDURE — 96366 THER/PROPH/DIAG IV INF ADDON: CPT

## 2023-07-03 PROCEDURE — 87040 BLOOD CULTURE FOR BACTERIA: CPT

## 2023-07-03 PROCEDURE — 71045 X-RAY EXAM CHEST 1 VIEW: CPT

## 2023-07-03 RX ORDER — ACETAMINOPHEN 500 MG
1000 TABLET ORAL
Status: COMPLETED | OUTPATIENT
Start: 2023-07-03 | End: 2023-07-03

## 2023-07-03 RX ORDER — 0.9 % SODIUM CHLORIDE 0.9 %
30 INTRAVENOUS SOLUTION INTRAVENOUS ONCE
Status: COMPLETED | OUTPATIENT
Start: 2023-07-03 | End: 2023-07-04

## 2023-07-03 RX ADMIN — SODIUM CHLORIDE 2535 ML: 9 INJECTION, SOLUTION INTRAVENOUS at 23:27

## 2023-07-03 RX ADMIN — ACETAMINOPHEN 1000 MG: 500 TABLET ORAL at 22:18

## 2023-07-03 RX ADMIN — SODIUM CHLORIDE 1000 MG: 900 INJECTION INTRAVENOUS at 23:32

## 2023-07-03 ASSESSMENT — ENCOUNTER SYMPTOMS
DIARRHEA: 0
SHORTNESS OF BREATH: 0
NAUSEA: 0
ABDOMINAL PAIN: 0
VOMITING: 0

## 2023-07-03 ASSESSMENT — PAIN - FUNCTIONAL ASSESSMENT: PAIN_FUNCTIONAL_ASSESSMENT: NONE - DENIES PAIN

## 2023-07-04 ENCOUNTER — APPOINTMENT (OUTPATIENT)
Facility: HOSPITAL | Age: 88
DRG: 853 | End: 2023-07-04
Payer: MEDICARE

## 2023-07-04 PROBLEM — L97.521 ULCER OF BOTH FEET, LIMITED TO BREAKDOWN OF SKIN (HCC): Status: ACTIVE | Noted: 2020-03-12

## 2023-07-04 PROBLEM — L97.511 ULCER OF BOTH FEET, LIMITED TO BREAKDOWN OF SKIN (HCC): Status: ACTIVE | Noted: 2020-03-12

## 2023-07-04 PROBLEM — A41.9 SEPTIC SHOCK (HCC): Status: ACTIVE | Noted: 2023-07-04

## 2023-07-04 PROBLEM — R65.21 SEPTIC SHOCK (HCC): Status: ACTIVE | Noted: 2023-07-04

## 2023-07-04 LAB
ANION GAP SERPL CALC-SCNC: 8 MMOL/L (ref 5–15)
APPEARANCE UR: CLEAR
BACTERIA URNS QL MICRO: NEGATIVE /HPF
BASOPHILS # BLD: 0 K/UL (ref 0–0.1)
BASOPHILS # BLD: 0 K/UL (ref 0–0.1)
BASOPHILS NFR BLD: 0 % (ref 0–1)
BASOPHILS NFR BLD: 0 % (ref 0–1)
BILIRUB UR QL: NEGATIVE
BUN SERPL-MCNC: 33 MG/DL (ref 6–20)
BUN/CREAT SERPL: 22 (ref 12–20)
CALCIUM SERPL-MCNC: 7.6 MG/DL (ref 8.5–10.1)
CHLORIDE SERPL-SCNC: 111 MMOL/L (ref 97–108)
CO2 SERPL-SCNC: 21 MMOL/L (ref 21–32)
COLOR UR: ABNORMAL
COMMENT:: NORMAL
CORTIS SERPL-MCNC: 22.7 UG/DL
CREAT SERPL-MCNC: 1.49 MG/DL (ref 0.7–1.3)
DIFFERENTIAL METHOD BLD: ABNORMAL
DIFFERENTIAL METHOD BLD: ABNORMAL
DIGOXIN SERPL-MCNC: 1.1 NG/ML (ref 0.9–2)
EOSINOPHIL # BLD: 0 K/UL (ref 0–0.4)
EOSINOPHIL # BLD: 0 K/UL (ref 0–0.4)
EOSINOPHIL NFR BLD: 0 % (ref 0–7)
EOSINOPHIL NFR BLD: 0 % (ref 0–7)
EPITH CASTS URNS QL MICRO: ABNORMAL /LPF
ERYTHROCYTE [DISTWIDTH] IN BLOOD BY AUTOMATED COUNT: 17.9 % (ref 11.5–14.5)
ERYTHROCYTE [DISTWIDTH] IN BLOOD BY AUTOMATED COUNT: 18.1 % (ref 11.5–14.5)
GLUCOSE BLD STRIP.AUTO-MCNC: 132 MG/DL (ref 65–117)
GLUCOSE BLD STRIP.AUTO-MCNC: 199 MG/DL (ref 65–117)
GLUCOSE BLD STRIP.AUTO-MCNC: 202 MG/DL (ref 65–117)
GLUCOSE BLD STRIP.AUTO-MCNC: 208 MG/DL (ref 65–117)
GLUCOSE BLD STRIP.AUTO-MCNC: 281 MG/DL (ref 65–117)
GLUCOSE SERPL-MCNC: 152 MG/DL (ref 65–100)
GLUCOSE UR STRIP.AUTO-MCNC: 100 MG/DL
HCT VFR BLD AUTO: 29.1 % (ref 36.6–50.3)
HCT VFR BLD AUTO: 31.1 % (ref 36.6–50.3)
HGB BLD-MCNC: 8.8 G/DL (ref 12.1–17)
HGB BLD-MCNC: 9.3 G/DL (ref 12.1–17)
HGB UR QL STRIP: ABNORMAL
HYALINE CASTS URNS QL MICRO: ABNORMAL /LPF (ref 0–2)
IMM GRANULOCYTES # BLD AUTO: 0 K/UL (ref 0–0.04)
IMM GRANULOCYTES # BLD AUTO: 0.2 K/UL (ref 0–0.04)
IMM GRANULOCYTES NFR BLD AUTO: 0 % (ref 0–0.5)
IMM GRANULOCYTES NFR BLD AUTO: 1 % (ref 0–0.5)
INR PPP: 2.1 (ref 0.9–1.1)
KETONES UR QL STRIP.AUTO: NEGATIVE MG/DL
LACTATE BLD-SCNC: 1.77 MMOL/L (ref 0.4–2)
LEUKOCYTE ESTERASE UR QL STRIP.AUTO: NEGATIVE
LYMPHOCYTES # BLD: 0.4 K/UL (ref 0.8–3.5)
LYMPHOCYTES # BLD: 0.8 K/UL (ref 0.8–3.5)
LYMPHOCYTES NFR BLD: 2 % (ref 12–49)
LYMPHOCYTES NFR BLD: 5 % (ref 12–49)
MAGNESIUM SERPL-MCNC: 1.6 MG/DL (ref 1.6–2.4)
MCH RBC QN AUTO: 27.4 PG (ref 26–34)
MCH RBC QN AUTO: 27.8 PG (ref 26–34)
MCHC RBC AUTO-ENTMCNC: 29.9 G/DL (ref 30–36.5)
MCHC RBC AUTO-ENTMCNC: 30.2 G/DL (ref 30–36.5)
MCV RBC AUTO: 91.7 FL (ref 80–99)
MCV RBC AUTO: 92.1 FL (ref 80–99)
MONOCYTES # BLD: 0.8 K/UL (ref 0–1)
MONOCYTES # BLD: 1.1 K/UL (ref 0–1)
MONOCYTES NFR BLD: 5 % (ref 5–13)
MONOCYTES NFR BLD: 5 % (ref 5–13)
NEUTS BAND NFR BLD MANUAL: 7 %
NEUTS SEG # BLD: 13.8 K/UL (ref 1.8–8)
NEUTS SEG # BLD: 20 K/UL (ref 1.8–8)
NEUTS SEG NFR BLD: 86 % (ref 32–75)
NEUTS SEG NFR BLD: 89 % (ref 32–75)
NITRITE UR QL STRIP.AUTO: NEGATIVE
NRBC # BLD: 0 K/UL (ref 0–0.01)
NRBC # BLD: 0 K/UL (ref 0–0.01)
NRBC BLD-RTO: 0 PER 100 WBC
NRBC BLD-RTO: 0 PER 100 WBC
PH UR STRIP: 6.5 (ref 5–8)
PHOSPHATE SERPL-MCNC: 1.9 MG/DL (ref 2.6–4.7)
PLATELET # BLD AUTO: 100 K/UL (ref 150–400)
PLATELET # BLD AUTO: 117 K/UL (ref 150–400)
PMV BLD AUTO: 10.5 FL (ref 8.9–12.9)
PMV BLD AUTO: 11 FL (ref 8.9–12.9)
POTASSIUM SERPL-SCNC: 4.3 MMOL/L (ref 3.5–5.1)
PROCALCITONIN SERPL-MCNC: 1.96 NG/ML
PROT UR STRIP-MCNC: ABNORMAL MG/DL
PROTHROMBIN TIME: 20.9 SEC (ref 9–11.1)
RBC # BLD AUTO: 3.16 M/UL (ref 4.1–5.7)
RBC # BLD AUTO: 3.39 M/UL (ref 4.1–5.7)
RBC #/AREA URNS HPF: ABNORMAL /HPF (ref 0–5)
RBC MORPH BLD: ABNORMAL
SARS-COV-2 RDRP RESP QL NAA+PROBE: NOT DETECTED
SERVICE CMNT-IMP: ABNORMAL
SODIUM SERPL-SCNC: 140 MMOL/L (ref 136–145)
SOURCE: NORMAL
SP GR UR REFRACTOMETRY: 1.02
SPECIMEN HOLD: NORMAL
URINE CULTURE IF INDICATED: ABNORMAL
UROBILINOGEN UR QL STRIP.AUTO: 0.2 EU/DL (ref 0.2–1)
WBC # BLD AUTO: 15.7 K/UL (ref 4.1–11.1)
WBC # BLD AUTO: 21.5 K/UL (ref 4.1–11.1)
WBC URNS QL MICRO: ABNORMAL /HPF (ref 0–4)

## 2023-07-04 PROCEDURE — 2500000003 HC RX 250 WO HCPCS: Performed by: NURSE PRACTITIONER

## 2023-07-04 PROCEDURE — 2700000000 HC OXYGEN THERAPY PER DAY

## 2023-07-04 PROCEDURE — 2000000000 HC ICU R&B

## 2023-07-04 PROCEDURE — 71045 X-RAY EXAM CHEST 1 VIEW: CPT

## 2023-07-04 PROCEDURE — 6360000002 HC RX W HCPCS: Performed by: INTERNAL MEDICINE

## 2023-07-04 PROCEDURE — 87086 URINE CULTURE/COLONY COUNT: CPT

## 2023-07-04 PROCEDURE — 83735 ASSAY OF MAGNESIUM: CPT

## 2023-07-04 PROCEDURE — 6370000000 HC RX 637 (ALT 250 FOR IP): Performed by: EMERGENCY MEDICINE

## 2023-07-04 PROCEDURE — 80048 BASIC METABOLIC PNL TOTAL CA: CPT

## 2023-07-04 PROCEDURE — 6370000000 HC RX 637 (ALT 250 FOR IP): Performed by: INTERNAL MEDICINE

## 2023-07-04 PROCEDURE — 85610 PROTHROMBIN TIME: CPT

## 2023-07-04 PROCEDURE — 2580000003 HC RX 258: Performed by: NURSE PRACTITIONER

## 2023-07-04 PROCEDURE — 82533 TOTAL CORTISOL: CPT

## 2023-07-04 PROCEDURE — 2580000003 HC RX 258: Performed by: EMERGENCY MEDICINE

## 2023-07-04 PROCEDURE — 99291 CRITICAL CARE FIRST HOUR: CPT | Performed by: INTERNAL MEDICINE

## 2023-07-04 PROCEDURE — 36415 COLL VENOUS BLD VENIPUNCTURE: CPT

## 2023-07-04 PROCEDURE — 71260 CT THORAX DX C+: CPT

## 2023-07-04 PROCEDURE — 6360000002 HC RX W HCPCS: Performed by: NURSE PRACTITIONER

## 2023-07-04 PROCEDURE — 02HV33Z INSERTION OF INFUSION DEVICE INTO SUPERIOR VENA CAVA, PERCUTANEOUS APPROACH: ICD-10-PCS | Performed by: INTERNAL MEDICINE

## 2023-07-04 PROCEDURE — 82962 GLUCOSE BLOOD TEST: CPT

## 2023-07-04 PROCEDURE — 84100 ASSAY OF PHOSPHORUS: CPT

## 2023-07-04 PROCEDURE — 84145 PROCALCITONIN (PCT): CPT

## 2023-07-04 PROCEDURE — 2500000003 HC RX 250 WO HCPCS: Performed by: EMERGENCY MEDICINE

## 2023-07-04 PROCEDURE — 74177 CT ABD & PELVIS W/CONTRAST: CPT

## 2023-07-04 PROCEDURE — 6370000000 HC RX 637 (ALT 250 FOR IP): Performed by: NURSE PRACTITIONER

## 2023-07-04 PROCEDURE — 2580000003 HC RX 258: Performed by: INTERNAL MEDICINE

## 2023-07-04 PROCEDURE — 80162 ASSAY OF DIGOXIN TOTAL: CPT

## 2023-07-04 PROCEDURE — 85025 COMPLETE CBC W/AUTO DIFF WBC: CPT

## 2023-07-04 PROCEDURE — 83605 ASSAY OF LACTIC ACID: CPT

## 2023-07-04 PROCEDURE — 6360000004 HC RX CONTRAST MEDICATION: Performed by: EMERGENCY MEDICINE

## 2023-07-04 PROCEDURE — 81001 URINALYSIS AUTO W/SCOPE: CPT

## 2023-07-04 RX ORDER — INSULIN LISPRO 100 [IU]/ML
0-8 INJECTION, SOLUTION INTRAVENOUS; SUBCUTANEOUS
Status: DISCONTINUED | OUTPATIENT
Start: 2023-07-04 | End: 2023-07-04

## 2023-07-04 RX ORDER — DOPAMINE HYDROCHLORIDE 320 MG/100ML
1-20 INJECTION, SOLUTION INTRAVENOUS CONTINUOUS
Status: DISCONTINUED | OUTPATIENT
Start: 2023-07-04 | End: 2023-07-04

## 2023-07-04 RX ORDER — ENOXAPARIN SODIUM 100 MG/ML
40 INJECTION SUBCUTANEOUS DAILY
Status: DISCONTINUED | OUTPATIENT
Start: 2023-07-04 | End: 2023-07-04

## 2023-07-04 RX ORDER — SODIUM CHLORIDE 9 MG/ML
INJECTION, SOLUTION INTRAVENOUS PRN
Status: DISCONTINUED | OUTPATIENT
Start: 2023-07-04 | End: 2023-07-17 | Stop reason: HOSPADM

## 2023-07-04 RX ORDER — SODIUM CHLORIDE 0.9 % (FLUSH) 0.9 %
5-40 SYRINGE (ML) INJECTION PRN
Status: DISCONTINUED | OUTPATIENT
Start: 2023-07-04 | End: 2023-07-17 | Stop reason: HOSPADM

## 2023-07-04 RX ORDER — ACETAMINOPHEN 325 MG/1
650 TABLET ORAL EVERY 6 HOURS PRN
Status: DISCONTINUED | OUTPATIENT
Start: 2023-07-04 | End: 2023-07-17 | Stop reason: HOSPADM

## 2023-07-04 RX ORDER — DOPAMINE HYDROCHLORIDE 160 MG/100ML
1-20 INJECTION, SOLUTION INTRAVENOUS CONTINUOUS
Status: DISCONTINUED | OUTPATIENT
Start: 2023-07-04 | End: 2023-07-04 | Stop reason: SDUPTHER

## 2023-07-04 RX ORDER — DOPAMINE HYDROCHLORIDE 320 MG/100ML
1-20 INJECTION, SOLUTION INTRAVENOUS CONTINUOUS
Status: DISCONTINUED | OUTPATIENT
Start: 2023-07-04 | End: 2023-07-05

## 2023-07-04 RX ORDER — WARFARIN SODIUM 7.5 MG/1
7.5 TABLET ORAL EVERY EVENING
Status: COMPLETED | OUTPATIENT
Start: 2023-07-04 | End: 2023-07-04

## 2023-07-04 RX ORDER — NOREPINEPHRINE BITARTRATE 0.06 MG/ML
1-100 INJECTION, SOLUTION INTRAVENOUS CONTINUOUS
Status: DISCONTINUED | OUTPATIENT
Start: 2023-07-04 | End: 2023-07-04

## 2023-07-04 RX ORDER — ACETAMINOPHEN 650 MG/1
650 SUPPOSITORY RECTAL EVERY 6 HOURS PRN
Status: DISCONTINUED | OUTPATIENT
Start: 2023-07-04 | End: 2023-07-17 | Stop reason: HOSPADM

## 2023-07-04 RX ORDER — ONDANSETRON 2 MG/ML
4 INJECTION INTRAMUSCULAR; INTRAVENOUS EVERY 4 HOURS PRN
Status: DISCONTINUED | OUTPATIENT
Start: 2023-07-04 | End: 2023-07-17 | Stop reason: HOSPADM

## 2023-07-04 RX ORDER — IBUPROFEN 400 MG/1
800 TABLET ORAL
Status: COMPLETED | OUTPATIENT
Start: 2023-07-04 | End: 2023-07-04

## 2023-07-04 RX ORDER — CASTOR OIL AND BALSAM, PERU 788; 87 MG/G; MG/G
OINTMENT TOPICAL 2 TIMES DAILY
Status: DISCONTINUED | OUTPATIENT
Start: 2023-07-04 | End: 2023-07-17 | Stop reason: HOSPADM

## 2023-07-04 RX ORDER — INSULIN LISPRO 100 [IU]/ML
0-4 INJECTION, SOLUTION INTRAVENOUS; SUBCUTANEOUS NIGHTLY
Status: DISCONTINUED | OUTPATIENT
Start: 2023-07-04 | End: 2023-07-04

## 2023-07-04 RX ORDER — SODIUM CHLORIDE 0.9 % (FLUSH) 0.9 %
5-40 SYRINGE (ML) INJECTION EVERY 12 HOURS SCHEDULED
Status: DISCONTINUED | OUTPATIENT
Start: 2023-07-04 | End: 2023-07-17 | Stop reason: HOSPADM

## 2023-07-04 RX ORDER — INSULIN LISPRO 100 [IU]/ML
0-4 INJECTION, SOLUTION INTRAVENOUS; SUBCUTANEOUS EVERY 6 HOURS
Status: DISCONTINUED | OUTPATIENT
Start: 2023-07-04 | End: 2023-07-05

## 2023-07-04 RX ORDER — DEXTROSE MONOHYDRATE 100 MG/ML
INJECTION, SOLUTION INTRAVENOUS CONTINUOUS PRN
Status: DISCONTINUED | OUTPATIENT
Start: 2023-07-04 | End: 2023-07-04 | Stop reason: SDUPTHER

## 2023-07-04 RX ORDER — ACETAMINOPHEN 325 MG/1
650 TABLET ORAL EVERY 6 HOURS PRN
Status: DISCONTINUED | OUTPATIENT
Start: 2023-07-04 | End: 2023-07-11 | Stop reason: SDUPTHER

## 2023-07-04 RX ORDER — DEXTROSE MONOHYDRATE 100 MG/ML
INJECTION, SOLUTION INTRAVENOUS CONTINUOUS PRN
Status: DISCONTINUED | OUTPATIENT
Start: 2023-07-04 | End: 2023-07-17 | Stop reason: HOSPADM

## 2023-07-04 RX ORDER — SODIUM CHLORIDE, SODIUM LACTATE, POTASSIUM CHLORIDE, AND CALCIUM CHLORIDE .6; .31; .03; .02 G/100ML; G/100ML; G/100ML; G/100ML
1000 INJECTION, SOLUTION INTRAVENOUS ONCE
Status: COMPLETED | OUTPATIENT
Start: 2023-07-04 | End: 2023-07-04

## 2023-07-04 RX ORDER — MAGNESIUM SULFATE IN WATER 40 MG/ML
2000 INJECTION, SOLUTION INTRAVENOUS ONCE
Status: COMPLETED | OUTPATIENT
Start: 2023-07-04 | End: 2023-07-04

## 2023-07-04 RX ORDER — NOREPINEPHRINE BITARTRATE 0.06 MG/ML
.5-2 INJECTION, SOLUTION INTRAVENOUS CONTINUOUS
Status: DISCONTINUED | OUTPATIENT
Start: 2023-07-04 | End: 2023-07-05

## 2023-07-04 RX ADMIN — WARFARIN SODIUM 7.5 MG: 7.5 TABLET ORAL at 17:56

## 2023-07-04 RX ADMIN — SODIUM CHLORIDE, PRESERVATIVE FREE 10 ML: 5 INJECTION INTRAVENOUS at 21:22

## 2023-07-04 RX ADMIN — PIPERACILLIN AND TAZOBACTAM 3375 MG: 3; .375 INJECTION, POWDER, LYOPHILIZED, FOR SOLUTION INTRAVENOUS at 08:15

## 2023-07-04 RX ADMIN — NOREPINEPHRINE BITARTRATE 6 MCG/MIN: 1 INJECTION, SOLUTION, CONCENTRATE INTRAVENOUS at 14:26

## 2023-07-04 RX ADMIN — NOREPINEPHRINE BITARTRATE 15 MCG/MIN: 1 INJECTION, SOLUTION, CONCENTRATE INTRAVENOUS at 04:29

## 2023-07-04 RX ADMIN — SODIUM PHOSPHATE, MONOBASIC, MONOHYDRATE AND SODIUM PHOSPHATE, DIBASIC, ANHYDROUS 15 MMOL: 276; 142 INJECTION, SOLUTION INTRAVENOUS at 06:15

## 2023-07-04 RX ADMIN — NOREPINEPHRINE BITARTRATE 5 MCG/MIN: 1 INJECTION, SOLUTION, CONCENTRATE INTRAVENOUS at 01:43

## 2023-07-04 RX ADMIN — IOPAMIDOL 100 ML: 755 INJECTION, SOLUTION INTRAVENOUS at 02:01

## 2023-07-04 RX ADMIN — NOREPINEPHRINE BITARTRATE 13 MCG/MIN: 1 INJECTION, SOLUTION, CONCENTRATE INTRAVENOUS at 02:31

## 2023-07-04 RX ADMIN — NOREPINEPHRINE BITARTRATE 12 MCG/MIN: 1 INJECTION, SOLUTION, CONCENTRATE INTRAVENOUS at 02:24

## 2023-07-04 RX ADMIN — HYDROCORTISONE SODIUM SUCCINATE 100 MG: 100 INJECTION, POWDER, FOR SOLUTION INTRAMUSCULAR; INTRAVENOUS at 08:46

## 2023-07-04 RX ADMIN — Medication 1 AMPULE: at 21:22

## 2023-07-04 RX ADMIN — VANCOMYCIN HYDROCHLORIDE 2000 MG: 10 INJECTION, POWDER, LYOPHILIZED, FOR SOLUTION INTRAVENOUS at 05:07

## 2023-07-04 RX ADMIN — Medication 1 AMPULE: at 10:00

## 2023-07-04 RX ADMIN — IBUPROFEN 800 MG: 400 TABLET, FILM COATED ORAL at 01:02

## 2023-07-04 RX ADMIN — NOREPINEPHRINE BITARTRATE 11 MCG/MIN: 1 INJECTION, SOLUTION, CONCENTRATE INTRAVENOUS at 02:16

## 2023-07-04 RX ADMIN — Medication 2 UNITS: at 08:13

## 2023-07-04 RX ADMIN — SODIUM CHLORIDE, PRESERVATIVE FREE 10 ML: 5 INJECTION INTRAVENOUS at 08:53

## 2023-07-04 RX ADMIN — PIPERACILLIN AND TAZOBACTAM 4500 MG: 4; .5 INJECTION, POWDER, LYOPHILIZED, FOR SOLUTION INTRAVENOUS at 03:08

## 2023-07-04 RX ADMIN — PIPERACILLIN AND TAZOBACTAM 3375 MG: 3; .375 INJECTION, POWDER, LYOPHILIZED, FOR SOLUTION INTRAVENOUS at 16:38

## 2023-07-04 RX ADMIN — Medication: at 21:34

## 2023-07-04 RX ADMIN — NOREPINEPHRINE BITARTRATE 14 MCG/MIN: 1 INJECTION, SOLUTION, CONCENTRATE INTRAVENOUS at 02:42

## 2023-07-04 RX ADMIN — NOREPINEPHRINE BITARTRATE 15 MCG/MIN: 1 INJECTION, SOLUTION, CONCENTRATE INTRAVENOUS at 02:47

## 2023-07-04 RX ADMIN — NOREPINEPHRINE BITARTRATE 2 MCG/MIN: 1 INJECTION, SOLUTION, CONCENTRATE INTRAVENOUS at 17:08

## 2023-07-04 RX ADMIN — SODIUM CHLORIDE, POTASSIUM CHLORIDE, SODIUM LACTATE AND CALCIUM CHLORIDE 1000 ML: 600; 310; 30; 20 INJECTION, SOLUTION INTRAVENOUS at 08:50

## 2023-07-04 RX ADMIN — Medication 2 UNITS: at 17:55

## 2023-07-04 RX ADMIN — NOREPINEPHRINE BITARTRATE 10 MCG/MIN: 1 INJECTION, SOLUTION, CONCENTRATE INTRAVENOUS at 09:01

## 2023-07-04 RX ADMIN — DOPAMINE HYDROCHLORIDE IN DEXTROSE 5 MCG/KG/MIN: 3.2 INJECTION, SOLUTION INTRAVENOUS at 15:43

## 2023-07-04 RX ADMIN — Medication: at 10:00

## 2023-07-04 RX ADMIN — MAGNESIUM SULFATE HEPTAHYDRATE 2000 MG: 40 INJECTION, SOLUTION INTRAVENOUS at 04:42

## 2023-07-04 RX ADMIN — HYDROCORTISONE SODIUM SUCCINATE 100 MG: 100 INJECTION, POWDER, FOR SOLUTION INTRAMUSCULAR; INTRAVENOUS at 16:37

## 2023-07-04 ASSESSMENT — ENCOUNTER SYMPTOMS
COUGH: 0
DIARRHEA: 0
ABDOMINAL PAIN: 0
NAUSEA: 0
VOMITING: 0
WHEEZING: 0
SHORTNESS OF BREATH: 0

## 2023-07-05 ENCOUNTER — APPOINTMENT (OUTPATIENT)
Facility: HOSPITAL | Age: 88
DRG: 853 | End: 2023-07-05
Payer: MEDICARE

## 2023-07-05 ENCOUNTER — APPOINTMENT (OUTPATIENT)
Facility: HOSPITAL | Age: 88
DRG: 853 | End: 2023-07-05
Attending: INTERNAL MEDICINE
Payer: MEDICARE

## 2023-07-05 PROBLEM — Z51.5 PALLIATIVE CARE BY SPECIALIST: Status: ACTIVE | Noted: 2023-07-05

## 2023-07-05 PROBLEM — Z71.89 GOALS OF CARE, COUNSELING/DISCUSSION: Status: ACTIVE | Noted: 2023-07-05

## 2023-07-05 PROBLEM — E11.65 TYPE 2 DIABETES MELLITUS WITH HYPERGLYCEMIA, WITHOUT LONG-TERM CURRENT USE OF INSULIN (HCC): Status: ACTIVE | Noted: 2017-09-20

## 2023-07-05 PROBLEM — R00.1 BRADYCARDIA: Status: ACTIVE | Noted: 2023-07-05

## 2023-07-05 PROBLEM — Z71.89 DNR (DO NOT RESUSCITATE) DISCUSSION: Status: ACTIVE | Noted: 2023-07-05

## 2023-07-05 PROBLEM — R53.1 WEAKNESS: Status: ACTIVE | Noted: 2023-07-05

## 2023-07-05 LAB
ACCESSION NUMBER, LLC1M: ABNORMAL
ACINETOBACTER CALCOAC BAUMANNII COMPLEX BY PCR: NOT DETECTED
ANION GAP SERPL CALC-SCNC: 5 MMOL/L (ref 5–15)
B FRAGILIS DNA BLD POS QL NAA+NON-PROBE: NOT DETECTED
BACTERIA SPEC CULT: NORMAL
BASOPHILS # BLD: 0 K/UL (ref 0–0.1)
BASOPHILS NFR BLD: 0 % (ref 0–1)
BIOFIRE TEST COMMENT: ABNORMAL
BLACTX-M ISLT/SPM QL: NOT DETECTED
BLAIMP ISLT/SPM QL: NOT DETECTED
BLAKPC BLD POS QL NAA+NON-PROBE: NOT DETECTED
BLAVIM ISLT/SPM QL: NOT DETECTED
BUN SERPL-MCNC: 24 MG/DL (ref 6–20)
BUN/CREAT SERPL: 19 (ref 12–20)
C ALBICANS DNA BLD POS QL NAA+NON-PROBE: NOT DETECTED
C AURIS DNA BLD POS QL NAA+NON-PROBE: NOT DETECTED
C GATTII+NEOFOR DNA BLD POS QL NAA+N-PRB: NOT DETECTED
C GLABRATA DNA BLD POS QL NAA+NON-PROBE: NOT DETECTED
C KRUSEI DNA BLD POS QL NAA+NON-PROBE: NOT DETECTED
C PARAP DNA BLD POS QL NAA+NON-PROBE: NOT DETECTED
C TROPICLS DNA BLD POS QL NAA+NON-PROBE: NOT DETECTED
CALCIUM SERPL-MCNC: 8.1 MG/DL (ref 8.5–10.1)
CHLORIDE SERPL-SCNC: 112 MMOL/L (ref 97–108)
CO2 SERPL-SCNC: 23 MMOL/L (ref 21–32)
CREAT SERPL-MCNC: 1.26 MG/DL (ref 0.7–1.3)
DIFFERENTIAL METHOD BLD: ABNORMAL
E CLOAC COMP DNA BLD POS NAA+NON-PROBE: NOT DETECTED
E COLI DNA BLD POS QL NAA+NON-PROBE: NOT DETECTED
E FAECALIS DNA BLD POS QL NAA+NON-PROBE: NOT DETECTED
E FAECIUM DNA BLD POS QL NAA+NON-PROBE: NOT DETECTED
ECHO AO ROOT DIAM: 3.3 CM
ECHO AO ROOT INDEX: 1.47 CM/M2
ECHO AV AREA PEAK VELOCITY: 1 CM2
ECHO AV AREA VTI: 1 CM2
ECHO AV AREA/BSA PEAK VELOCITY: 0.4 CM2/M2
ECHO AV AREA/BSA VTI: 0.4 CM2/M2
ECHO AV MEAN GRADIENT: 20 MMHG
ECHO AV MEAN VELOCITY: 2.2 M/S
ECHO AV PEAK GRADIENT: 36 MMHG
ECHO AV PEAK VELOCITY: 3 M/S
ECHO AV VELOCITY RATIO: 0.3
ECHO AV VTI: 61.8 CM
ECHO BSA: 2.26 M2
ECHO LA DIAMETER INDEX: 1.82 CM/M2
ECHO LA DIAMETER: 4.1 CM
ECHO LA TO AORTIC ROOT RATIO: 1.24
ECHO LA VOL 2C: 68 ML (ref 18–58)
ECHO LA VOL 2C: 71 ML (ref 18–58)
ECHO LA VOL 4C: 73 ML (ref 18–58)
ECHO LA VOL 4C: 78 ML (ref 18–58)
ECHO LA VOLUME AREA LENGTH: 77 ML
ECHO LA VOLUME INDEX AREA LENGTH: 34 ML/M2 (ref 16–34)
ECHO LV E' LATERAL VELOCITY: 5 CM/S
ECHO LV E' SEPTAL VELOCITY: 5 CM/S
ECHO LV EDV A2C: 102 ML
ECHO LV EDV A4C: 129 ML
ECHO LV EDV BP: 117 ML (ref 67–155)
ECHO LV EDV INDEX A4C: 57 ML/M2
ECHO LV EDV INDEX BP: 52 ML/M2
ECHO LV EDV NDEX A2C: 45 ML/M2
ECHO LV EJECTION FRACTION A2C: 31 %
ECHO LV EJECTION FRACTION A4C: 32 %
ECHO LV EJECTION FRACTION BIPLANE: 32 % (ref 55–100)
ECHO LV ESV A2C: 70 ML
ECHO LV ESV A4C: 88 ML
ECHO LV ESV BP: 80 ML (ref 22–58)
ECHO LV ESV INDEX A2C: 31 ML/M2
ECHO LV ESV INDEX A4C: 39 ML/M2
ECHO LV ESV INDEX BP: 36 ML/M2
ECHO LV FRACTIONAL SHORTENING: 14 % (ref 28–44)
ECHO LV INTERNAL DIMENSION DIASTOLE INDEX: 2.22 CM/M2
ECHO LV INTERNAL DIMENSION DIASTOLIC: 5 CM (ref 4.2–5.9)
ECHO LV INTERNAL DIMENSION SYSTOLIC INDEX: 1.91 CM/M2
ECHO LV INTERNAL DIMENSION SYSTOLIC: 4.3 CM
ECHO LV IVSD: 1.2 CM (ref 0.6–1)
ECHO LV MASS 2D: 233.7 G (ref 88–224)
ECHO LV MASS INDEX 2D: 103.9 G/M2 (ref 49–115)
ECHO LV POSTERIOR WALL DIASTOLIC: 1.2 CM (ref 0.6–1)
ECHO LV RELATIVE WALL THICKNESS RATIO: 0.48
ECHO LVOT AREA: 3.5 CM2
ECHO LVOT AV VTI INDEX: 0.3
ECHO LVOT DIAM: 2.1 CM
ECHO LVOT MEAN GRADIENT: 2 MMHG
ECHO LVOT PEAK GRADIENT: 3 MMHG
ECHO LVOT PEAK VELOCITY: 0.9 M/S
ECHO LVOT STROKE VOLUME INDEX: 28.9 ML/M2
ECHO LVOT SV: 64 ML
ECHO LVOT VTI: 18.8 CM
ECHO MV A VELOCITY: 0.88 M/S
ECHO MV E DECELERATION TIME (DT): 174.8 MS
ECHO MV E VELOCITY: 0.75 M/S
ECHO MV E/A RATIO: 0.85
ECHO MV E/E' LATERAL: 15
ECHO MV E/E' RATIO (AVERAGED): 15
ECHO MV E/E' SEPTAL: 15
ECHO RA VOLUME: 74 ML
ECHO RA VOLUME: 75 ML
ECHO RV TAPSE: 2.1 CM (ref 1.7–?)
ECHO TV REGURGITANT MAX VELOCITY: 2.91 M/S
ECHO TV REGURGITANT PEAK GRADIENT: 34 MMHG
ENTEROBACTERALES DNA BLD POS NAA+N-PRB: NOT DETECTED
EOSINOPHIL # BLD: 0 K/UL (ref 0–0.4)
EOSINOPHIL NFR BLD: 0 % (ref 0–7)
ERYTHROCYTE [DISTWIDTH] IN BLOOD BY AUTOMATED COUNT: 17.8 % (ref 11.5–14.5)
GLUCOSE BLD STRIP.AUTO-MCNC: 167 MG/DL (ref 65–117)
GLUCOSE BLD STRIP.AUTO-MCNC: 174 MG/DL (ref 65–117)
GLUCOSE BLD STRIP.AUTO-MCNC: 179 MG/DL (ref 65–117)
GLUCOSE BLD STRIP.AUTO-MCNC: 193 MG/DL (ref 65–117)
GLUCOSE BLD STRIP.AUTO-MCNC: 205 MG/DL (ref 65–117)
GLUCOSE SERPL-MCNC: 182 MG/DL (ref 65–100)
GP B STREP DNA BLD POS QL NAA+NON-PROBE: NOT DETECTED
HAEM INFLU DNA BLD POS QL NAA+NON-PROBE: NOT DETECTED
HCT VFR BLD AUTO: 30.5 % (ref 36.6–50.3)
HGB BLD-MCNC: 9.2 G/DL (ref 12.1–17)
IMM GRANULOCYTES # BLD AUTO: 0.1 K/UL (ref 0–0.04)
IMM GRANULOCYTES NFR BLD AUTO: 1 % (ref 0–0.5)
INR PPP: 1.5 (ref 0.9–1.1)
K OXYTOCA DNA BLD POS QL NAA+NON-PROBE: NOT DETECTED
KLEBSIELLA SP DNA BLD POS QL NAA+NON-PRB: NOT DETECTED
KLEBSIELLA SP DNA BLD POS QL NAA+NON-PRB: NOT DETECTED
L MONOCYTOG DNA BLD POS QL NAA+NON-PROBE: NOT DETECTED
LYMPHOCYTES # BLD: 1.7 K/UL (ref 0.8–3.5)
LYMPHOCYTES NFR BLD: 11 % (ref 12–49)
MAGNESIUM SERPL-MCNC: 2.5 MG/DL (ref 1.6–2.4)
MCH RBC QN AUTO: 27.1 PG (ref 26–34)
MCHC RBC AUTO-ENTMCNC: 30.2 G/DL (ref 30–36.5)
MCV RBC AUTO: 89.7 FL (ref 80–99)
MONOCYTES # BLD: 0.8 K/UL (ref 0–1)
MONOCYTES NFR BLD: 5 % (ref 5–13)
N MEN DNA BLD POS QL NAA+NON-PROBE: NOT DETECTED
NEUTS SEG # BLD: 13.1 K/UL (ref 1.8–8)
NEUTS SEG NFR BLD: 83 % (ref 32–75)
NRBC # BLD: 0 K/UL (ref 0–0.01)
NRBC BLD-RTO: 0 PER 100 WBC
P AERUGINOSA DNA BLD POS NAA+NON-PROBE: DETECTED
PHOSPHATE SERPL-MCNC: 2.7 MG/DL (ref 2.6–4.7)
PLATELET # BLD AUTO: 103 K/UL (ref 150–400)
PMV BLD AUTO: 10.3 FL (ref 8.9–12.9)
POTASSIUM SERPL-SCNC: 4.2 MMOL/L (ref 3.5–5.1)
PROTEUS SP DNA BLD POS QL NAA+NON-PROBE: NOT DETECTED
PROTHROMBIN TIME: 14.9 SEC (ref 9–11.1)
RBC # BLD AUTO: 3.4 M/UL (ref 4.1–5.7)
RESISTANT GENE NDM BY PCR: NOT DETECTED
RESISTANT GENE TARGETS: ABNORMAL
S AUREUS DNA BLD POS QL NAA+NON-PROBE: NOT DETECTED
S AUREUS+CONS DNA BLD POS NAA+NON-PROBE: NOT DETECTED
S EPIDERMIDIS DNA BLD POS QL NAA+NON-PRB: NOT DETECTED
S LUGDUNENSIS DNA BLD POS QL NAA+NON-PRB: NOT DETECTED
S MALTOPHILIA DNA BLD POS QL NAA+NON-PRB: NOT DETECTED
S MARCESCENS DNA BLD POS NAA+NON-PROBE: NOT DETECTED
S PNEUM DNA BLD POS QL NAA+NON-PROBE: NOT DETECTED
S PYO DNA BLD POS QL NAA+NON-PROBE: NOT DETECTED
SALMONELLA DNA BLD POS QL NAA+NON-PROBE: NOT DETECTED
SERVICE CMNT-IMP: ABNORMAL
SERVICE CMNT-IMP: NORMAL
SODIUM SERPL-SCNC: 140 MMOL/L (ref 136–145)
STREPTOCOCCUS DNA BLD POS NAA+NON-PROBE: NOT DETECTED
WBC # BLD AUTO: 15.8 K/UL (ref 4.1–11.1)

## 2023-07-05 PROCEDURE — 73620 X-RAY EXAM OF FOOT: CPT

## 2023-07-05 PROCEDURE — 97530 THERAPEUTIC ACTIVITIES: CPT

## 2023-07-05 PROCEDURE — 93005 ELECTROCARDIOGRAM TRACING: CPT | Performed by: INTERNAL MEDICINE

## 2023-07-05 PROCEDURE — 99233 SBSQ HOSP IP/OBS HIGH 50: CPT | Performed by: INTERNAL MEDICINE

## 2023-07-05 PROCEDURE — 6370000000 HC RX 637 (ALT 250 FOR IP): Performed by: NURSE PRACTITIONER

## 2023-07-05 PROCEDURE — 83735 ASSAY OF MAGNESIUM: CPT

## 2023-07-05 PROCEDURE — 82962 GLUCOSE BLOOD TEST: CPT

## 2023-07-05 PROCEDURE — 85610 PROTHROMBIN TIME: CPT

## 2023-07-05 PROCEDURE — 2060000000 HC ICU INTERMEDIATE R&B

## 2023-07-05 PROCEDURE — 99221 1ST HOSP IP/OBS SF/LOW 40: CPT | Performed by: CLINICAL NURSE SPECIALIST

## 2023-07-05 PROCEDURE — 36415 COLL VENOUS BLD VENIPUNCTURE: CPT

## 2023-07-05 PROCEDURE — 93306 TTE W/DOPPLER COMPLETE: CPT

## 2023-07-05 PROCEDURE — 99222 1ST HOSP IP/OBS MODERATE 55: CPT | Performed by: NURSE PRACTITIONER

## 2023-07-05 PROCEDURE — 85025 COMPLETE CBC W/AUTO DIFF WBC: CPT

## 2023-07-05 PROCEDURE — 6360000002 HC RX W HCPCS: Performed by: INTERNAL MEDICINE

## 2023-07-05 PROCEDURE — 2580000003 HC RX 258: Performed by: INTERNAL MEDICINE

## 2023-07-05 PROCEDURE — 2580000003 HC RX 258: Performed by: NURSE PRACTITIONER

## 2023-07-05 PROCEDURE — 2700000000 HC OXYGEN THERAPY PER DAY

## 2023-07-05 PROCEDURE — 84100 ASSAY OF PHOSPHORUS: CPT

## 2023-07-05 PROCEDURE — 6370000000 HC RX 637 (ALT 250 FOR IP): Performed by: CLINICAL NURSE SPECIALIST

## 2023-07-05 PROCEDURE — 6360000002 HC RX W HCPCS: Performed by: NURSE PRACTITIONER

## 2023-07-05 PROCEDURE — 6370000000 HC RX 637 (ALT 250 FOR IP): Performed by: INTERNAL MEDICINE

## 2023-07-05 PROCEDURE — 80048 BASIC METABOLIC PNL TOTAL CA: CPT

## 2023-07-05 PROCEDURE — 97165 OT EVAL LOW COMPLEX 30 MIN: CPT

## 2023-07-05 RX ORDER — LANOLIN ALCOHOL/MO/W.PET/CERES
1000 CREAM (GRAM) TOPICAL DAILY
COMMUNITY

## 2023-07-05 RX ORDER — SODIUM PHOSPHATE,MONO-DIBASIC 19G-7G/118
1 ENEMA (ML) RECTAL DAILY
COMMUNITY

## 2023-07-05 RX ORDER — GLIPIZIDE 5 MG/1
5 TABLET ORAL 2 TIMES DAILY WITH MEALS
Status: DISCONTINUED | OUTPATIENT
Start: 2023-07-05 | End: 2023-07-11

## 2023-07-05 RX ORDER — INSULIN LISPRO 100 [IU]/ML
0-4 INJECTION, SOLUTION INTRAVENOUS; SUBCUTANEOUS NIGHTLY
Status: DISCONTINUED | OUTPATIENT
Start: 2023-07-05 | End: 2023-07-17 | Stop reason: HOSPADM

## 2023-07-05 RX ORDER — WARFARIN SODIUM 7.5 MG/1
7.5 TABLET ORAL EVERY EVENING
Status: COMPLETED | OUTPATIENT
Start: 2023-07-05 | End: 2023-07-05

## 2023-07-05 RX ORDER — INSULIN LISPRO 100 [IU]/ML
0-4 INJECTION, SOLUTION INTRAVENOUS; SUBCUTANEOUS
Status: DISCONTINUED | OUTPATIENT
Start: 2023-07-05 | End: 2023-07-17 | Stop reason: HOSPADM

## 2023-07-05 RX ORDER — DOPAMINE HYDROCHLORIDE 320 MG/100ML
1-20 INJECTION, SOLUTION INTRAVENOUS CONTINUOUS
Status: DISCONTINUED | OUTPATIENT
Start: 2023-07-05 | End: 2023-07-06

## 2023-07-05 RX ORDER — AMOXICILLIN 500 MG
2400 CAPSULE ORAL 2 TIMES DAILY
COMMUNITY

## 2023-07-05 RX ORDER — DEXTROSE MONOHYDRATE 100 MG/ML
INJECTION, SOLUTION INTRAVENOUS CONTINUOUS PRN
Status: DISCONTINUED | OUTPATIENT
Start: 2023-07-05 | End: 2023-07-07

## 2023-07-05 RX ORDER — ALOGLIPTIN 12.5 MG/1
12.5 TABLET, FILM COATED ORAL DAILY
Status: DISCONTINUED | OUTPATIENT
Start: 2023-07-05 | End: 2023-07-17 | Stop reason: HOSPADM

## 2023-07-05 RX ORDER — VITAMIN E 268 MG
400 CAPSULE ORAL DAILY
COMMUNITY

## 2023-07-05 RX ORDER — DOCUSATE SODIUM 100 MG/1
200 CAPSULE, LIQUID FILLED ORAL 2 TIMES DAILY
COMMUNITY

## 2023-07-05 RX ORDER — INSULIN LISPRO 100 [IU]/ML
0-4 INJECTION, SOLUTION INTRAVENOUS; SUBCUTANEOUS
Status: DISCONTINUED | OUTPATIENT
Start: 2023-07-05 | End: 2023-07-05

## 2023-07-05 RX ORDER — ACETAMINOPHEN 500 MG
1000 TABLET ORAL 2 TIMES DAILY
COMMUNITY

## 2023-07-05 RX ADMIN — Medication: at 10:08

## 2023-07-05 RX ADMIN — VANCOMYCIN HYDROCHLORIDE 1000 MG: 1 INJECTION, POWDER, LYOPHILIZED, FOR SOLUTION INTRAVENOUS at 02:46

## 2023-07-05 RX ADMIN — PIPERACILLIN AND TAZOBACTAM 3375 MG: 3; .375 INJECTION, POWDER, LYOPHILIZED, FOR SOLUTION INTRAVENOUS at 00:39

## 2023-07-05 RX ADMIN — PIPERACILLIN AND TAZOBACTAM 3375 MG: 3; .375 INJECTION, POWDER, LYOPHILIZED, FOR SOLUTION INTRAVENOUS at 17:31

## 2023-07-05 RX ADMIN — Medication 1 AMPULE: at 10:05

## 2023-07-05 RX ADMIN — Medication 1 AMPULE: at 21:43

## 2023-07-05 RX ADMIN — HYDROCORTISONE SODIUM SUCCINATE 50 MG: 100 INJECTION, POWDER, FOR SOLUTION INTRAMUSCULAR; INTRAVENOUS at 12:32

## 2023-07-05 RX ADMIN — Medication: at 21:44

## 2023-07-05 RX ADMIN — SODIUM CHLORIDE, PRESERVATIVE FREE 10 ML: 5 INJECTION INTRAVENOUS at 10:07

## 2023-07-05 RX ADMIN — ALOGLIPTIN 12.5 MG: 12.5 TABLET, FILM COATED ORAL at 13:09

## 2023-07-05 RX ADMIN — HYDROCORTISONE SODIUM SUCCINATE 100 MG: 100 INJECTION, POWDER, FOR SOLUTION INTRAMUSCULAR; INTRAVENOUS at 00:36

## 2023-07-05 RX ADMIN — HYDROCORTISONE SODIUM SUCCINATE 50 MG: 100 INJECTION, POWDER, FOR SOLUTION INTRAMUSCULAR; INTRAVENOUS at 21:16

## 2023-07-05 RX ADMIN — WARFARIN SODIUM 7.5 MG: 7.5 TABLET ORAL at 17:31

## 2023-07-05 RX ADMIN — Medication 1 UNITS: at 00:35

## 2023-07-05 RX ADMIN — GLIPIZIDE 5 MG: 5 TABLET ORAL at 17:31

## 2023-07-05 RX ADMIN — METFORMIN HYDROCHLORIDE 500 MG: 500 TABLET ORAL at 17:31

## 2023-07-05 RX ADMIN — PIPERACILLIN AND TAZOBACTAM 3375 MG: 3; .375 INJECTION, POWDER, LYOPHILIZED, FOR SOLUTION INTRAVENOUS at 10:04

## 2023-07-05 RX ADMIN — ACETAMINOPHEN 650 MG: 325 TABLET ORAL at 10:02

## 2023-07-05 RX ADMIN — SODIUM CHLORIDE, PRESERVATIVE FREE 10 ML: 5 INJECTION INTRAVENOUS at 21:46

## 2023-07-05 ASSESSMENT — PAIN DESCRIPTION - PAIN TYPE: TYPE: ACUTE PAIN

## 2023-07-05 ASSESSMENT — PAIN SCALES - GENERAL: PAINLEVEL_OUTOF10: 8

## 2023-07-05 ASSESSMENT — PAIN DESCRIPTION - LOCATION: LOCATION: HEAD

## 2023-07-06 LAB
ANION GAP SERPL CALC-SCNC: 7 MMOL/L (ref 5–15)
BASOPHILS # BLD: 0 K/UL (ref 0–0.1)
BASOPHILS NFR BLD: 0 % (ref 0–1)
BUN SERPL-MCNC: 22 MG/DL (ref 6–20)
BUN/CREAT SERPL: 18 (ref 12–20)
CALCIUM SERPL-MCNC: 8.3 MG/DL (ref 8.5–10.1)
CHLORIDE SERPL-SCNC: 111 MMOL/L (ref 97–108)
CO2 SERPL-SCNC: 24 MMOL/L (ref 21–32)
CREAT SERPL-MCNC: 1.23 MG/DL (ref 0.7–1.3)
DIFFERENTIAL METHOD BLD: ABNORMAL
EKG ATRIAL RATE: 58 BPM
EKG ATRIAL RATE: 74 BPM
EKG DIAGNOSIS: NORMAL
EKG DIAGNOSIS: NORMAL
EKG Q-T INTERVAL: 448 MS
EKG Q-T INTERVAL: 570 MS
EKG QRS DURATION: 114 MS
EKG QRS DURATION: 126 MS
EKG QTC CALCULATION (BAZETT): 408 MS
EKG QTC CALCULATION (BAZETT): 464 MS
EKG R AXIS: -31 DEGREES
EKG R AXIS: -36 DEGREES
EKG T AXIS: -45 DEGREES
EKG T AXIS: 42 DEGREES
EKG VENTRICULAR RATE: 40 BPM
EKG VENTRICULAR RATE: 50 BPM
EOSINOPHIL # BLD: 0 K/UL (ref 0–0.4)
EOSINOPHIL NFR BLD: 0 % (ref 0–7)
ERYTHROCYTE [DISTWIDTH] IN BLOOD BY AUTOMATED COUNT: 18.1 % (ref 11.5–14.5)
GLUCOSE BLD STRIP.AUTO-MCNC: 187 MG/DL (ref 65–117)
GLUCOSE BLD STRIP.AUTO-MCNC: 211 MG/DL (ref 65–117)
GLUCOSE BLD STRIP.AUTO-MCNC: 229 MG/DL (ref 65–117)
GLUCOSE BLD STRIP.AUTO-MCNC: 337 MG/DL (ref 65–117)
GLUCOSE SERPL-MCNC: 222 MG/DL (ref 65–100)
HCT VFR BLD AUTO: 30.3 % (ref 36.6–50.3)
HGB BLD-MCNC: 9.3 G/DL (ref 12.1–17)
IMM GRANULOCYTES # BLD AUTO: 0.1 K/UL (ref 0–0.04)
IMM GRANULOCYTES NFR BLD AUTO: 1 % (ref 0–0.5)
INR PPP: 1.6 (ref 0.9–1.1)
LYMPHOCYTES # BLD: 2.1 K/UL (ref 0.8–3.5)
LYMPHOCYTES NFR BLD: 14 % (ref 12–49)
MAGNESIUM SERPL-MCNC: 2.4 MG/DL (ref 1.6–2.4)
MCH RBC QN AUTO: 27.4 PG (ref 26–34)
MCHC RBC AUTO-ENTMCNC: 30.7 G/DL (ref 30–36.5)
MCV RBC AUTO: 89.4 FL (ref 80–99)
MONOCYTES # BLD: 1.1 K/UL (ref 0–1)
MONOCYTES NFR BLD: 7 % (ref 5–13)
NEUTS SEG # BLD: 12.3 K/UL (ref 1.8–8)
NEUTS SEG NFR BLD: 78 % (ref 32–75)
NRBC # BLD: 0 K/UL (ref 0–0.01)
NRBC BLD-RTO: 0 PER 100 WBC
PHOSPHATE SERPL-MCNC: 2.3 MG/DL (ref 2.6–4.7)
PLATELET # BLD AUTO: 108 K/UL (ref 150–400)
PMV BLD AUTO: 10 FL (ref 8.9–12.9)
POTASSIUM SERPL-SCNC: 3.9 MMOL/L (ref 3.5–5.1)
PROTHROMBIN TIME: 16.1 SEC (ref 9–11.1)
RBC # BLD AUTO: 3.39 M/UL (ref 4.1–5.7)
SERVICE CMNT-IMP: ABNORMAL
SODIUM SERPL-SCNC: 142 MMOL/L (ref 136–145)
TSH SERPL DL<=0.05 MIU/L-ACNC: 0.12 UIU/ML (ref 0.36–3.74)
WBC # BLD AUTO: 15.7 K/UL (ref 4.1–11.1)

## 2023-07-06 PROCEDURE — 97116 GAIT TRAINING THERAPY: CPT

## 2023-07-06 PROCEDURE — 6370000000 HC RX 637 (ALT 250 FOR IP): Performed by: CLINICAL NURSE SPECIALIST

## 2023-07-06 PROCEDURE — 6360000002 HC RX W HCPCS: Performed by: INTERNAL MEDICINE

## 2023-07-06 PROCEDURE — 84100 ASSAY OF PHOSPHORUS: CPT

## 2023-07-06 PROCEDURE — 6370000000 HC RX 637 (ALT 250 FOR IP): Performed by: INTERNAL MEDICINE

## 2023-07-06 PROCEDURE — 36415 COLL VENOUS BLD VENIPUNCTURE: CPT

## 2023-07-06 PROCEDURE — 85025 COMPLETE CBC W/AUTO DIFF WBC: CPT

## 2023-07-06 PROCEDURE — 6360000002 HC RX W HCPCS: Performed by: NURSE PRACTITIONER

## 2023-07-06 PROCEDURE — 80048 BASIC METABOLIC PNL TOTAL CA: CPT

## 2023-07-06 PROCEDURE — 85610 PROTHROMBIN TIME: CPT

## 2023-07-06 PROCEDURE — 97162 PT EVAL MOD COMPLEX 30 MIN: CPT

## 2023-07-06 PROCEDURE — 2580000003 HC RX 258: Performed by: NURSE PRACTITIONER

## 2023-07-06 PROCEDURE — 99233 SBSQ HOSP IP/OBS HIGH 50: CPT | Performed by: INTERNAL MEDICINE

## 2023-07-06 PROCEDURE — 84443 ASSAY THYROID STIM HORMONE: CPT

## 2023-07-06 PROCEDURE — 83735 ASSAY OF MAGNESIUM: CPT

## 2023-07-06 PROCEDURE — 6370000000 HC RX 637 (ALT 250 FOR IP): Performed by: STUDENT IN AN ORGANIZED HEALTH CARE EDUCATION/TRAINING PROGRAM

## 2023-07-06 PROCEDURE — 97535 SELF CARE MNGMENT TRAINING: CPT

## 2023-07-06 PROCEDURE — 2060000000 HC ICU INTERMEDIATE R&B

## 2023-07-06 PROCEDURE — 82962 GLUCOSE BLOOD TEST: CPT

## 2023-07-06 RX ORDER — INSULIN GLARGINE 100 [IU]/ML
10 INJECTION, SOLUTION SUBCUTANEOUS NIGHTLY
Status: DISCONTINUED | OUTPATIENT
Start: 2023-07-06 | End: 2023-07-10

## 2023-07-06 RX ORDER — WARFARIN SODIUM 5 MG/1
7.5 TABLET ORAL EVERY EVENING
Status: DISCONTINUED | OUTPATIENT
Start: 2023-07-06 | End: 2023-07-07

## 2023-07-06 RX ORDER — DOPAMINE HYDROCHLORIDE 320 MG/100ML
5 INJECTION, SOLUTION INTRAVENOUS CONTINUOUS
Status: DISCONTINUED | OUTPATIENT
Start: 2023-07-06 | End: 2023-07-07

## 2023-07-06 RX ADMIN — PIPERACILLIN AND TAZOBACTAM 3375 MG: 3; .375 INJECTION, POWDER, LYOPHILIZED, FOR SOLUTION INTRAVENOUS at 17:09

## 2023-07-06 RX ADMIN — SODIUM CHLORIDE, PRESERVATIVE FREE 10 ML: 5 INJECTION INTRAVENOUS at 08:33

## 2023-07-06 RX ADMIN — Medication 1 UNITS: at 11:51

## 2023-07-06 RX ADMIN — SODIUM CHLORIDE, PRESERVATIVE FREE 10 ML: 5 INJECTION INTRAVENOUS at 21:51

## 2023-07-06 RX ADMIN — Medication 1 AMPULE: at 21:50

## 2023-07-06 RX ADMIN — DOPAMINE HYDROCHLORIDE IN DEXTROSE 5 MCG/KG/MIN: 3.2 INJECTION, SOLUTION INTRAVENOUS at 18:16

## 2023-07-06 RX ADMIN — PIPERACILLIN AND TAZOBACTAM 3375 MG: 3; .375 INJECTION, POWDER, LYOPHILIZED, FOR SOLUTION INTRAVENOUS at 00:31

## 2023-07-06 RX ADMIN — Medication 4 UNITS: at 21:49

## 2023-07-06 RX ADMIN — INSULIN GLARGINE 10 UNITS: 100 INJECTION, SOLUTION SUBCUTANEOUS at 21:50

## 2023-07-06 RX ADMIN — Medication 1 AMPULE: at 08:34

## 2023-07-06 RX ADMIN — HYDROCORTISONE SODIUM SUCCINATE 50 MG: 100 INJECTION, POWDER, FOR SOLUTION INTRAMUSCULAR; INTRAVENOUS at 17:03

## 2023-07-06 RX ADMIN — ALOGLIPTIN 12.5 MG: 12.5 TABLET, FILM COATED ORAL at 09:37

## 2023-07-06 RX ADMIN — Medication 1 UNITS: at 08:31

## 2023-07-06 RX ADMIN — PIPERACILLIN AND TAZOBACTAM 3375 MG: 3; .375 INJECTION, POWDER, LYOPHILIZED, FOR SOLUTION INTRAVENOUS at 08:32

## 2023-07-06 RX ADMIN — METFORMIN HYDROCHLORIDE 500 MG: 500 TABLET ORAL at 17:12

## 2023-07-06 RX ADMIN — DOPAMINE HYDROCHLORIDE IN DEXTROSE 5 MCG/KG/MIN: 3.2 INJECTION, SOLUTION INTRAVENOUS at 00:35

## 2023-07-06 RX ADMIN — Medication: at 08:34

## 2023-07-06 RX ADMIN — HYDROCORTISONE SODIUM SUCCINATE 50 MG: 100 INJECTION, POWDER, FOR SOLUTION INTRAMUSCULAR; INTRAVENOUS at 04:08

## 2023-07-06 RX ADMIN — METFORMIN HYDROCHLORIDE 500 MG: 500 TABLET ORAL at 08:32

## 2023-07-07 LAB
ANION GAP SERPL CALC-SCNC: 7 MMOL/L (ref 5–15)
BASOPHILS # BLD: 0 K/UL (ref 0–0.1)
BASOPHILS NFR BLD: 0 % (ref 0–1)
BUN SERPL-MCNC: 19 MG/DL (ref 6–20)
BUN/CREAT SERPL: 20 (ref 12–20)
CALCIUM SERPL-MCNC: 8.5 MG/DL (ref 8.5–10.1)
CHLORIDE SERPL-SCNC: 112 MMOL/L (ref 97–108)
CO2 SERPL-SCNC: 24 MMOL/L (ref 21–32)
CREAT SERPL-MCNC: 0.97 MG/DL (ref 0.7–1.3)
DIFFERENTIAL METHOD BLD: ABNORMAL
ECHO BSA: 2.26 M2
EOSINOPHIL # BLD: 0 K/UL (ref 0–0.4)
EOSINOPHIL NFR BLD: 0 % (ref 0–7)
ERYTHROCYTE [DISTWIDTH] IN BLOOD BY AUTOMATED COUNT: 17.8 % (ref 11.5–14.5)
GLUCOSE BLD STRIP.AUTO-MCNC: 179 MG/DL (ref 65–117)
GLUCOSE BLD STRIP.AUTO-MCNC: 218 MG/DL (ref 65–117)
GLUCOSE BLD STRIP.AUTO-MCNC: 219 MG/DL (ref 65–117)
GLUCOSE BLD STRIP.AUTO-MCNC: 305 MG/DL (ref 65–117)
GLUCOSE SERPL-MCNC: 197 MG/DL (ref 65–100)
HCT VFR BLD AUTO: 30.4 % (ref 36.6–50.3)
HGB BLD-MCNC: 9.2 G/DL (ref 12.1–17)
IMM GRANULOCYTES # BLD AUTO: 0.1 K/UL (ref 0–0.04)
IMM GRANULOCYTES NFR BLD AUTO: 1 % (ref 0–0.5)
INR PPP: 1.6 (ref 0.9–1.1)
LYMPHOCYTES # BLD: 2.6 K/UL (ref 0.8–3.5)
LYMPHOCYTES NFR BLD: 25 % (ref 12–49)
MCH RBC QN AUTO: 27.5 PG (ref 26–34)
MCHC RBC AUTO-ENTMCNC: 30.3 G/DL (ref 30–36.5)
MCV RBC AUTO: 90.7 FL (ref 80–99)
MONOCYTES # BLD: 0.8 K/UL (ref 0–1)
MONOCYTES NFR BLD: 7 % (ref 5–13)
NEUTS SEG # BLD: 7.1 K/UL (ref 1.8–8)
NEUTS SEG NFR BLD: 67 % (ref 32–75)
NRBC # BLD: 0 K/UL (ref 0–0.01)
NRBC BLD-RTO: 0 PER 100 WBC
PLATELET # BLD AUTO: 118 K/UL (ref 150–400)
PMV BLD AUTO: 9.9 FL (ref 8.9–12.9)
POTASSIUM SERPL-SCNC: 3.6 MMOL/L (ref 3.5–5.1)
PROTHROMBIN TIME: 15.9 SEC (ref 9–11.1)
RBC # BLD AUTO: 3.35 M/UL (ref 4.1–5.7)
SERVICE CMNT-IMP: ABNORMAL
SODIUM SERPL-SCNC: 143 MMOL/L (ref 136–145)
WBC # BLD AUTO: 10.6 K/UL (ref 4.1–11.1)

## 2023-07-07 PROCEDURE — 02H63JZ INSERTION OF PACEMAKER LEAD INTO RIGHT ATRIUM, PERCUTANEOUS APPROACH: ICD-10-PCS | Performed by: STUDENT IN AN ORGANIZED HEALTH CARE EDUCATION/TRAINING PROGRAM

## 2023-07-07 PROCEDURE — 2580000003 HC RX 258: Performed by: NURSE PRACTITIONER

## 2023-07-07 PROCEDURE — 2709999900 HC NON-CHARGEABLE SUPPLY: Performed by: STUDENT IN AN ORGANIZED HEALTH CARE EDUCATION/TRAINING PROGRAM

## 2023-07-07 PROCEDURE — C1894 INTRO/SHEATH, NON-LASER: HCPCS | Performed by: STUDENT IN AN ORGANIZED HEALTH CARE EDUCATION/TRAINING PROGRAM

## 2023-07-07 PROCEDURE — 6360000002 HC RX W HCPCS: Performed by: INTERNAL MEDICINE

## 2023-07-07 PROCEDURE — 85025 COMPLETE CBC W/AUTO DIFF WBC: CPT

## 2023-07-07 PROCEDURE — 2000000000 HC ICU R&B

## 2023-07-07 PROCEDURE — 36415 COLL VENOUS BLD VENIPUNCTURE: CPT

## 2023-07-07 PROCEDURE — 85610 PROTHROMBIN TIME: CPT

## 2023-07-07 PROCEDURE — 82962 GLUCOSE BLOOD TEST: CPT

## 2023-07-07 PROCEDURE — 33210 INSERT ELECTRD/PM CATH SNGL: CPT | Performed by: STUDENT IN AN ORGANIZED HEALTH CARE EDUCATION/TRAINING PROGRAM

## 2023-07-07 PROCEDURE — 5A1223Z PERFORMANCE OF CARDIAC PACING, CONTINUOUS: ICD-10-PCS | Performed by: STUDENT IN AN ORGANIZED HEALTH CARE EDUCATION/TRAINING PROGRAM

## 2023-07-07 PROCEDURE — 6360000002 HC RX W HCPCS: Performed by: NURSE PRACTITIONER

## 2023-07-07 PROCEDURE — 2580000003 HC RX 258: Performed by: INTERNAL MEDICINE

## 2023-07-07 PROCEDURE — 87040 BLOOD CULTURE FOR BACTERIA: CPT

## 2023-07-07 PROCEDURE — 99152 MOD SED SAME PHYS/QHP 5/>YRS: CPT | Performed by: STUDENT IN AN ORGANIZED HEALTH CARE EDUCATION/TRAINING PROGRAM

## 2023-07-07 PROCEDURE — 99233 SBSQ HOSP IP/OBS HIGH 50: CPT | Performed by: INTERNAL MEDICINE

## 2023-07-07 PROCEDURE — 6370000000 HC RX 637 (ALT 250 FOR IP): Performed by: CLINICAL NURSE SPECIALIST

## 2023-07-07 PROCEDURE — 6370000000 HC RX 637 (ALT 250 FOR IP): Performed by: INTERNAL MEDICINE

## 2023-07-07 PROCEDURE — 6370000000 HC RX 637 (ALT 250 FOR IP): Performed by: STUDENT IN AN ORGANIZED HEALTH CARE EDUCATION/TRAINING PROGRAM

## 2023-07-07 PROCEDURE — 80048 BASIC METABOLIC PNL TOTAL CA: CPT

## 2023-07-07 DEVICE — BIPOLAR PACING CATHETER
Type: IMPLANTABLE DEVICE | Status: FUNCTIONAL
Brand: PACEL™

## 2023-07-07 RX ORDER — PREDNISONE 10 MG/1
10 TABLET ORAL DAILY
Status: DISCONTINUED | OUTPATIENT
Start: 2023-07-08 | End: 2023-07-17 | Stop reason: HOSPADM

## 2023-07-07 RX ADMIN — PIPERACILLIN AND TAZOBACTAM 3375 MG: 3; .375 INJECTION, POWDER, LYOPHILIZED, FOR SOLUTION INTRAVENOUS at 00:23

## 2023-07-07 RX ADMIN — Medication 1 AMPULE: at 09:31

## 2023-07-07 RX ADMIN — SODIUM CHLORIDE, PRESERVATIVE FREE 10 ML: 5 INJECTION INTRAVENOUS at 21:03

## 2023-07-07 RX ADMIN — INSULIN GLARGINE 10 UNITS: 100 INJECTION, SOLUTION SUBCUTANEOUS at 21:03

## 2023-07-07 RX ADMIN — SODIUM CHLORIDE, PRESERVATIVE FREE 10 ML: 5 INJECTION INTRAVENOUS at 09:08

## 2023-07-07 RX ADMIN — Medication 1 AMPULE: at 21:02

## 2023-07-07 RX ADMIN — DOPAMINE HYDROCHLORIDE IN DEXTROSE 5 MCG/KG/MIN: 3.2 INJECTION, SOLUTION INTRAVENOUS at 05:12

## 2023-07-07 RX ADMIN — PIPERACILLIN AND TAZOBACTAM 3375 MG: 3; .375 INJECTION, POWDER, LYOPHILIZED, FOR SOLUTION INTRAVENOUS at 09:07

## 2023-07-07 RX ADMIN — HYDROCORTISONE SODIUM SUCCINATE 50 MG: 100 INJECTION, POWDER, FOR SOLUTION INTRAMUSCULAR; INTRAVENOUS at 04:56

## 2023-07-07 RX ADMIN — Medication 4 UNITS: at 21:02

## 2023-07-07 RX ADMIN — HYDROCORTISONE SODIUM SUCCINATE 25 MG: 100 INJECTION, POWDER, FOR SOLUTION INTRAMUSCULAR; INTRAVENOUS at 16:17

## 2023-07-07 RX ADMIN — PIPERACILLIN AND TAZOBACTAM 3375 MG: 3; .375 INJECTION, POWDER, LYOPHILIZED, FOR SOLUTION INTRAVENOUS at 16:19

## 2023-07-07 RX ADMIN — Medication 1 UNITS: at 11:51

## 2023-07-08 LAB
ANION GAP SERPL CALC-SCNC: 5 MMOL/L (ref 5–15)
BASOPHILS # BLD: 0 K/UL (ref 0–0.1)
BASOPHILS NFR BLD: 0 % (ref 0–1)
BUN SERPL-MCNC: 17 MG/DL (ref 6–20)
BUN/CREAT SERPL: 16 (ref 12–20)
CALCIUM SERPL-MCNC: 8.4 MG/DL (ref 8.5–10.1)
CHLORIDE SERPL-SCNC: 113 MMOL/L (ref 97–108)
CO2 SERPL-SCNC: 25 MMOL/L (ref 21–32)
COMMENT:: NORMAL
CREAT SERPL-MCNC: 1.06 MG/DL (ref 0.7–1.3)
DIFFERENTIAL METHOD BLD: ABNORMAL
EOSINOPHIL # BLD: 0 K/UL (ref 0–0.4)
EOSINOPHIL NFR BLD: 1 % (ref 0–7)
ERYTHROCYTE [DISTWIDTH] IN BLOOD BY AUTOMATED COUNT: 17.6 % (ref 11.5–14.5)
GLUCOSE BLD STRIP.AUTO-MCNC: 164 MG/DL (ref 65–117)
GLUCOSE BLD STRIP.AUTO-MCNC: 214 MG/DL (ref 65–117)
GLUCOSE BLD STRIP.AUTO-MCNC: 220 MG/DL (ref 65–117)
GLUCOSE BLD STRIP.AUTO-MCNC: 249 MG/DL (ref 65–117)
GLUCOSE SERPL-MCNC: 161 MG/DL (ref 65–100)
HCT VFR BLD AUTO: 26.4 % (ref 36.6–50.3)
HGB BLD-MCNC: 8.4 G/DL (ref 12.1–17)
IMM GRANULOCYTES # BLD AUTO: 0.1 K/UL (ref 0–0.04)
IMM GRANULOCYTES NFR BLD AUTO: 2 % (ref 0–0.5)
LYMPHOCYTES # BLD: 2.8 K/UL (ref 0.8–3.5)
LYMPHOCYTES NFR BLD: 34 % (ref 12–49)
MAGNESIUM SERPL-MCNC: 2.3 MG/DL (ref 1.6–2.4)
MCH RBC QN AUTO: 28.3 PG (ref 26–34)
MCHC RBC AUTO-ENTMCNC: 31.8 G/DL (ref 30–36.5)
MCV RBC AUTO: 88.9 FL (ref 80–99)
MONOCYTES # BLD: 0.8 K/UL (ref 0–1)
MONOCYTES NFR BLD: 9 % (ref 5–13)
NEUTS SEG # BLD: 4.7 K/UL (ref 1.8–8)
NEUTS SEG NFR BLD: 54 % (ref 32–75)
NRBC # BLD: 0 K/UL (ref 0–0.01)
NRBC BLD-RTO: 0 PER 100 WBC
PHOSPHATE SERPL-MCNC: 1.8 MG/DL (ref 2.6–4.7)
PLATELET # BLD AUTO: 111 K/UL (ref 150–400)
PMV BLD AUTO: 10.1 FL (ref 8.9–12.9)
POTASSIUM SERPL-SCNC: 3.5 MMOL/L (ref 3.5–5.1)
RBC # BLD AUTO: 2.97 M/UL (ref 4.1–5.7)
SERVICE CMNT-IMP: ABNORMAL
SODIUM SERPL-SCNC: 143 MMOL/L (ref 136–145)
SPECIMEN HOLD: NORMAL
WBC # BLD AUTO: 8.5 K/UL (ref 4.1–11.1)

## 2023-07-08 PROCEDURE — 6370000000 HC RX 637 (ALT 250 FOR IP): Performed by: INTERNAL MEDICINE

## 2023-07-08 PROCEDURE — 84100 ASSAY OF PHOSPHORUS: CPT

## 2023-07-08 PROCEDURE — 2000000000 HC ICU R&B

## 2023-07-08 PROCEDURE — 6370000000 HC RX 637 (ALT 250 FOR IP): Performed by: CLINICAL NURSE SPECIALIST

## 2023-07-08 PROCEDURE — 83735 ASSAY OF MAGNESIUM: CPT

## 2023-07-08 PROCEDURE — 2500000003 HC RX 250 WO HCPCS: Performed by: NURSE PRACTITIONER

## 2023-07-08 PROCEDURE — 2580000003 HC RX 258: Performed by: NURSE PRACTITIONER

## 2023-07-08 PROCEDURE — 82962 GLUCOSE BLOOD TEST: CPT

## 2023-07-08 PROCEDURE — 85025 COMPLETE CBC W/AUTO DIFF WBC: CPT

## 2023-07-08 PROCEDURE — 6370000000 HC RX 637 (ALT 250 FOR IP): Performed by: STUDENT IN AN ORGANIZED HEALTH CARE EDUCATION/TRAINING PROGRAM

## 2023-07-08 PROCEDURE — 80048 BASIC METABOLIC PNL TOTAL CA: CPT

## 2023-07-08 PROCEDURE — 6360000002 HC RX W HCPCS: Performed by: INTERNAL MEDICINE

## 2023-07-08 PROCEDURE — 2580000003 HC RX 258: Performed by: INTERNAL MEDICINE

## 2023-07-08 PROCEDURE — 36415 COLL VENOUS BLD VENIPUNCTURE: CPT

## 2023-07-08 RX ADMIN — SODIUM CHLORIDE, PRESERVATIVE FREE 10 ML: 5 INJECTION INTRAVENOUS at 08:15

## 2023-07-08 RX ADMIN — Medication: at 08:15

## 2023-07-08 RX ADMIN — PIPERACILLIN AND TAZOBACTAM 3375 MG: 3; .375 INJECTION, POWDER, LYOPHILIZED, FOR SOLUTION INTRAVENOUS at 08:11

## 2023-07-08 RX ADMIN — ALOGLIPTIN 12.5 MG: 12.5 TABLET, FILM COATED ORAL at 09:06

## 2023-07-08 RX ADMIN — POTASSIUM PHOSPHATE, MONOBASIC AND POTASSIUM PHOSPHATE, DIBASIC 20 MMOL: 224; 236 INJECTION, SOLUTION, CONCENTRATE INTRAVENOUS at 09:04

## 2023-07-08 RX ADMIN — SODIUM CHLORIDE, PRESERVATIVE FREE 10 ML: 5 INJECTION INTRAVENOUS at 21:36

## 2023-07-08 RX ADMIN — Medication 1 AMPULE: at 08:15

## 2023-07-08 RX ADMIN — Medication 1 UNITS: at 12:01

## 2023-07-08 RX ADMIN — PIPERACILLIN AND TAZOBACTAM 4500 MG: 4; .5 INJECTION, POWDER, LYOPHILIZED, FOR SOLUTION INTRAVENOUS at 16:52

## 2023-07-08 RX ADMIN — Medication: at 21:35

## 2023-07-08 RX ADMIN — Medication 1 AMPULE: at 21:37

## 2023-07-08 RX ADMIN — PREDNISONE 10 MG: 5 TABLET ORAL at 08:13

## 2023-07-08 RX ADMIN — Medication 1 UNITS: at 16:51

## 2023-07-08 RX ADMIN — PIPERACILLIN AND TAZOBACTAM 3375 MG: 3; .375 INJECTION, POWDER, LYOPHILIZED, FOR SOLUTION INTRAVENOUS at 00:14

## 2023-07-08 RX ADMIN — INSULIN GLARGINE 10 UNITS: 100 INJECTION, SOLUTION SUBCUTANEOUS at 21:00

## 2023-07-08 ASSESSMENT — PAIN SCALES - GENERAL
PAINLEVEL_OUTOF10: 0

## 2023-07-09 LAB
ANION GAP SERPL CALC-SCNC: 5 MMOL/L (ref 5–15)
BACTERIA SPEC CULT: ABNORMAL
BUN SERPL-MCNC: 17 MG/DL (ref 6–20)
BUN/CREAT SERPL: 15 (ref 12–20)
CALCIUM SERPL-MCNC: 8.2 MG/DL (ref 8.5–10.1)
CHLORIDE SERPL-SCNC: 113 MMOL/L (ref 97–108)
CO2 SERPL-SCNC: 25 MMOL/L (ref 21–32)
CREAT SERPL-MCNC: 1.11 MG/DL (ref 0.7–1.3)
ERYTHROCYTE [DISTWIDTH] IN BLOOD BY AUTOMATED COUNT: 17.7 % (ref 11.5–14.5)
GLUCOSE BLD STRIP.AUTO-MCNC: 153 MG/DL (ref 65–117)
GLUCOSE BLD STRIP.AUTO-MCNC: 249 MG/DL (ref 65–117)
GLUCOSE BLD STRIP.AUTO-MCNC: 250 MG/DL (ref 65–117)
GLUCOSE BLD STRIP.AUTO-MCNC: 265 MG/DL (ref 65–117)
GLUCOSE SERPL-MCNC: 174 MG/DL (ref 65–100)
HCT VFR BLD AUTO: 25.6 % (ref 36.6–50.3)
HGB BLD-MCNC: 7.8 G/DL (ref 12.1–17)
MAGNESIUM SERPL-MCNC: 2.1 MG/DL (ref 1.6–2.4)
MCH RBC QN AUTO: 27.8 PG (ref 26–34)
MCHC RBC AUTO-ENTMCNC: 30.5 G/DL (ref 30–36.5)
MCV RBC AUTO: 91.1 FL (ref 80–99)
NRBC # BLD: 0 K/UL (ref 0–0.01)
NRBC BLD-RTO: 0 PER 100 WBC
PHOSPHATE SERPL-MCNC: 2.4 MG/DL (ref 2.6–4.7)
PLATELET # BLD AUTO: 117 K/UL (ref 150–400)
PMV BLD AUTO: 10.5 FL (ref 8.9–12.9)
POTASSIUM SERPL-SCNC: 3.4 MMOL/L (ref 3.5–5.1)
RBC # BLD AUTO: 2.81 M/UL (ref 4.1–5.7)
SERVICE CMNT-IMP: ABNORMAL
SODIUM SERPL-SCNC: 143 MMOL/L (ref 136–145)
WBC # BLD AUTO: 9.3 K/UL (ref 4.1–11.1)

## 2023-07-09 PROCEDURE — 2580000003 HC RX 258: Performed by: INTERNAL MEDICINE

## 2023-07-09 PROCEDURE — 2000000000 HC ICU R&B

## 2023-07-09 PROCEDURE — 83735 ASSAY OF MAGNESIUM: CPT

## 2023-07-09 PROCEDURE — 80048 BASIC METABOLIC PNL TOTAL CA: CPT

## 2023-07-09 PROCEDURE — 2580000003 HC RX 258: Performed by: NURSE PRACTITIONER

## 2023-07-09 PROCEDURE — 6370000000 HC RX 637 (ALT 250 FOR IP): Performed by: INTERNAL MEDICINE

## 2023-07-09 PROCEDURE — 6360000002 HC RX W HCPCS: Performed by: INTERNAL MEDICINE

## 2023-07-09 PROCEDURE — 36415 COLL VENOUS BLD VENIPUNCTURE: CPT

## 2023-07-09 PROCEDURE — 6370000000 HC RX 637 (ALT 250 FOR IP): Performed by: STUDENT IN AN ORGANIZED HEALTH CARE EDUCATION/TRAINING PROGRAM

## 2023-07-09 PROCEDURE — 84100 ASSAY OF PHOSPHORUS: CPT

## 2023-07-09 PROCEDURE — 82962 GLUCOSE BLOOD TEST: CPT

## 2023-07-09 PROCEDURE — 85027 COMPLETE CBC AUTOMATED: CPT

## 2023-07-09 PROCEDURE — 6370000000 HC RX 637 (ALT 250 FOR IP): Performed by: CLINICAL NURSE SPECIALIST

## 2023-07-09 RX ADMIN — SODIUM CHLORIDE, PRESERVATIVE FREE 10 ML: 5 INJECTION INTRAVENOUS at 21:14

## 2023-07-09 RX ADMIN — Medication 1 AMPULE: at 09:28

## 2023-07-09 RX ADMIN — Medication: at 21:17

## 2023-07-09 RX ADMIN — PIPERACILLIN AND TAZOBACTAM 4500 MG: 4; .5 INJECTION, POWDER, LYOPHILIZED, FOR SOLUTION INTRAVENOUS at 23:40

## 2023-07-09 RX ADMIN — Medication 2 UNITS: at 17:00

## 2023-07-09 RX ADMIN — Medication 1 AMPULE: at 21:13

## 2023-07-09 RX ADMIN — PIPERACILLIN AND TAZOBACTAM 4500 MG: 4; .5 INJECTION, POWDER, LYOPHILIZED, FOR SOLUTION INTRAVENOUS at 17:01

## 2023-07-09 RX ADMIN — PIPERACILLIN AND TAZOBACTAM 4500 MG: 4; .5 INJECTION, POWDER, LYOPHILIZED, FOR SOLUTION INTRAVENOUS at 00:00

## 2023-07-09 RX ADMIN — SODIUM CHLORIDE, PRESERVATIVE FREE 10 ML: 5 INJECTION INTRAVENOUS at 09:27

## 2023-07-09 RX ADMIN — INSULIN GLARGINE 10 UNITS: 100 INJECTION, SOLUTION SUBCUTANEOUS at 21:15

## 2023-07-09 RX ADMIN — PIPERACILLIN AND TAZOBACTAM 4500 MG: 4; .5 INJECTION, POWDER, LYOPHILIZED, FOR SOLUTION INTRAVENOUS at 09:25

## 2023-07-09 RX ADMIN — Medication: at 09:27

## 2023-07-09 RX ADMIN — Medication 2 UNITS: at 12:55

## 2023-07-09 RX ADMIN — ALOGLIPTIN 12.5 MG: 12.5 TABLET, FILM COATED ORAL at 09:26

## 2023-07-09 RX ADMIN — PREDNISONE 10 MG: 5 TABLET ORAL at 09:26

## 2023-07-09 ASSESSMENT — PAIN SCALES - GENERAL
PAINLEVEL_OUTOF10: 0
PAINLEVEL_OUTOF10: 0

## 2023-07-10 LAB
ANION GAP SERPL CALC-SCNC: 3 MMOL/L (ref 5–15)
BUN SERPL-MCNC: 13 MG/DL (ref 6–20)
BUN/CREAT SERPL: 13 (ref 12–20)
CALCIUM SERPL-MCNC: 8.2 MG/DL (ref 8.5–10.1)
CHLORIDE SERPL-SCNC: 114 MMOL/L (ref 97–108)
CO2 SERPL-SCNC: 25 MMOL/L (ref 21–32)
CREAT SERPL-MCNC: 1.01 MG/DL (ref 0.7–1.3)
ERYTHROCYTE [DISTWIDTH] IN BLOOD BY AUTOMATED COUNT: 18.1 % (ref 11.5–14.5)
GLUCOSE BLD STRIP.AUTO-MCNC: 187 MG/DL (ref 65–117)
GLUCOSE BLD STRIP.AUTO-MCNC: 208 MG/DL (ref 65–117)
GLUCOSE BLD STRIP.AUTO-MCNC: 210 MG/DL (ref 65–117)
GLUCOSE BLD STRIP.AUTO-MCNC: 214 MG/DL (ref 65–117)
GLUCOSE SERPL-MCNC: 167 MG/DL (ref 65–100)
HCT VFR BLD AUTO: 27 % (ref 36.6–50.3)
HGB BLD-MCNC: 8.4 G/DL (ref 12.1–17)
MAGNESIUM SERPL-MCNC: 2 MG/DL (ref 1.6–2.4)
MCH RBC QN AUTO: 28.4 PG (ref 26–34)
MCHC RBC AUTO-ENTMCNC: 31.1 G/DL (ref 30–36.5)
MCV RBC AUTO: 91.2 FL (ref 80–99)
NRBC # BLD: 0 K/UL (ref 0–0.01)
NRBC BLD-RTO: 0 PER 100 WBC
PHOSPHATE SERPL-MCNC: 2.2 MG/DL (ref 2.6–4.7)
PLATELET # BLD AUTO: 154 K/UL (ref 150–400)
PMV BLD AUTO: 10.2 FL (ref 8.9–12.9)
POTASSIUM SERPL-SCNC: 3.1 MMOL/L (ref 3.5–5.1)
RBC # BLD AUTO: 2.96 M/UL (ref 4.1–5.7)
SERVICE CMNT-IMP: ABNORMAL
SODIUM SERPL-SCNC: 142 MMOL/L (ref 136–145)
WBC # BLD AUTO: 10.4 K/UL (ref 4.1–11.1)

## 2023-07-10 PROCEDURE — 82962 GLUCOSE BLOOD TEST: CPT

## 2023-07-10 PROCEDURE — 84100 ASSAY OF PHOSPHORUS: CPT

## 2023-07-10 PROCEDURE — 6370000000 HC RX 637 (ALT 250 FOR IP): Performed by: CLINICAL NURSE SPECIALIST

## 2023-07-10 PROCEDURE — 85027 COMPLETE CBC AUTOMATED: CPT

## 2023-07-10 PROCEDURE — 6370000000 HC RX 637 (ALT 250 FOR IP): Performed by: INTERNAL MEDICINE

## 2023-07-10 PROCEDURE — 6360000002 HC RX W HCPCS: Performed by: HOSPITALIST

## 2023-07-10 PROCEDURE — 83735 ASSAY OF MAGNESIUM: CPT

## 2023-07-10 PROCEDURE — 2580000003 HC RX 258: Performed by: INTERNAL MEDICINE

## 2023-07-10 PROCEDURE — 80048 BASIC METABOLIC PNL TOTAL CA: CPT

## 2023-07-10 PROCEDURE — 2580000003 HC RX 258: Performed by: HOSPITALIST

## 2023-07-10 PROCEDURE — 97116 GAIT TRAINING THERAPY: CPT

## 2023-07-10 PROCEDURE — 97535 SELF CARE MNGMENT TRAINING: CPT

## 2023-07-10 PROCEDURE — 6370000000 HC RX 637 (ALT 250 FOR IP): Performed by: NURSE PRACTITIONER

## 2023-07-10 PROCEDURE — 6360000002 HC RX W HCPCS: Performed by: INTERNAL MEDICINE

## 2023-07-10 PROCEDURE — 99233 SBSQ HOSP IP/OBS HIGH 50: CPT | Performed by: INTERNAL MEDICINE

## 2023-07-10 PROCEDURE — 2000000000 HC ICU R&B

## 2023-07-10 PROCEDURE — 36415 COLL VENOUS BLD VENIPUNCTURE: CPT

## 2023-07-10 PROCEDURE — 97530 THERAPEUTIC ACTIVITIES: CPT

## 2023-07-10 PROCEDURE — 2580000003 HC RX 258: Performed by: NURSE PRACTITIONER

## 2023-07-10 RX ORDER — INSULIN GLARGINE 100 [IU]/ML
7.5 INJECTION, SOLUTION SUBCUTANEOUS ONCE
Status: COMPLETED | OUTPATIENT
Start: 2023-07-10 | End: 2023-07-10

## 2023-07-10 RX ORDER — INSULIN GLARGINE 100 [IU]/ML
15 INJECTION, SOLUTION SUBCUTANEOUS NIGHTLY
Status: DISCONTINUED | OUTPATIENT
Start: 2023-07-10 | End: 2023-07-17 | Stop reason: HOSPADM

## 2023-07-10 RX ORDER — ALUMINUM ZIRCONIUM OCTACHLOROHYDREX GLY 16 G/100G
1 GEL TOPICAL DAILY
Status: DISCONTINUED | OUTPATIENT
Start: 2023-07-10 | End: 2023-07-16

## 2023-07-10 RX ORDER — LOPERAMIDE HYDROCHLORIDE 2 MG/1
2 CAPSULE ORAL 4 TIMES DAILY PRN
Status: DISCONTINUED | OUTPATIENT
Start: 2023-07-10 | End: 2023-07-12

## 2023-07-10 RX ADMIN — Medication: at 21:44

## 2023-07-10 RX ADMIN — WATER 2000 MG: 1 INJECTION INTRAMUSCULAR; INTRAVENOUS; SUBCUTANEOUS at 21:27

## 2023-07-10 RX ADMIN — Medication 1 CAPSULE: at 09:19

## 2023-07-10 RX ADMIN — Medication 1 UNITS: at 17:32

## 2023-07-10 RX ADMIN — INSULIN GLARGINE 8 UNITS: 100 INJECTION, SOLUTION SUBCUTANEOUS at 22:02

## 2023-07-10 RX ADMIN — PREDNISONE 10 MG: 5 TABLET ORAL at 09:05

## 2023-07-10 RX ADMIN — Medication 1 UNITS: at 09:20

## 2023-07-10 RX ADMIN — Medication 1 UNITS: at 12:47

## 2023-07-10 RX ADMIN — LOPERAMIDE HYDROCHLORIDE 2 MG: 2 CAPSULE ORAL at 12:48

## 2023-07-10 RX ADMIN — LOPERAMIDE HYDROCHLORIDE 2 MG: 2 CAPSULE ORAL at 17:56

## 2023-07-10 RX ADMIN — Medication 1 AMPULE: at 09:10

## 2023-07-10 RX ADMIN — SODIUM CHLORIDE, PRESERVATIVE FREE 10 ML: 5 INJECTION INTRAVENOUS at 09:09

## 2023-07-10 RX ADMIN — ALOGLIPTIN 12.5 MG: 12.5 TABLET, FILM COATED ORAL at 09:20

## 2023-07-10 RX ADMIN — PIPERACILLIN AND TAZOBACTAM 4500 MG: 4; .5 INJECTION, POWDER, LYOPHILIZED, FOR SOLUTION INTRAVENOUS at 09:20

## 2023-07-10 RX ADMIN — Medication 1 AMPULE: at 21:35

## 2023-07-10 RX ADMIN — Medication: at 09:10

## 2023-07-10 RX ADMIN — POTASSIUM & SODIUM PHOSPHATES POWDER PACK 280-160-250 MG 500 MG: 280-160-250 PACK at 06:18

## 2023-07-10 RX ADMIN — SODIUM CHLORIDE, PRESERVATIVE FREE 10 ML: 5 INJECTION INTRAVENOUS at 21:33

## 2023-07-11 ENCOUNTER — APPOINTMENT (OUTPATIENT)
Facility: HOSPITAL | Age: 88
DRG: 853 | End: 2023-07-11
Payer: MEDICARE

## 2023-07-11 LAB
ANION GAP SERPL CALC-SCNC: 4 MMOL/L (ref 5–15)
BUN SERPL-MCNC: 12 MG/DL (ref 6–20)
BUN/CREAT SERPL: 11 (ref 12–20)
CALCIUM SERPL-MCNC: 8.5 MG/DL (ref 8.5–10.1)
CHLORIDE SERPL-SCNC: 112 MMOL/L (ref 97–108)
CO2 SERPL-SCNC: 26 MMOL/L (ref 21–32)
CREAT SERPL-MCNC: 1.08 MG/DL (ref 0.7–1.3)
CRP SERPL-MCNC: 1.29 MG/DL (ref 0–0.6)
ERYTHROCYTE [DISTWIDTH] IN BLOOD BY AUTOMATED COUNT: 18.1 % (ref 11.5–14.5)
ERYTHROCYTE [SEDIMENTATION RATE] IN BLOOD: 65 MM/HR (ref 0–20)
GLUCOSE BLD STRIP.AUTO-MCNC: 138 MG/DL (ref 65–117)
GLUCOSE BLD STRIP.AUTO-MCNC: 138 MG/DL (ref 65–117)
GLUCOSE BLD STRIP.AUTO-MCNC: 219 MG/DL (ref 65–117)
GLUCOSE BLD STRIP.AUTO-MCNC: 270 MG/DL (ref 65–117)
GLUCOSE SERPL-MCNC: 143 MG/DL (ref 65–100)
HCT VFR BLD AUTO: 29.5 % (ref 36.6–50.3)
HGB BLD-MCNC: 8.8 G/DL (ref 12.1–17)
INR PPP: 1.1 (ref 0.9–1.1)
MCH RBC QN AUTO: 27.1 PG (ref 26–34)
MCHC RBC AUTO-ENTMCNC: 29.8 G/DL (ref 30–36.5)
MCV RBC AUTO: 90.8 FL (ref 80–99)
NRBC # BLD: 0 K/UL (ref 0–0.01)
NRBC BLD-RTO: 0 PER 100 WBC
PLATELET # BLD AUTO: 203 K/UL (ref 150–400)
PMV BLD AUTO: 10.2 FL (ref 8.9–12.9)
POTASSIUM SERPL-SCNC: 3.4 MMOL/L (ref 3.5–5.1)
PROTHROMBIN TIME: 11 SEC (ref 9–11.1)
RBC # BLD AUTO: 3.25 M/UL (ref 4.1–5.7)
SERVICE CMNT-IMP: ABNORMAL
SODIUM SERPL-SCNC: 142 MMOL/L (ref 136–145)
WBC # BLD AUTO: 10.7 K/UL (ref 4.1–11.1)

## 2023-07-11 PROCEDURE — 99233 SBSQ HOSP IP/OBS HIGH 50: CPT | Performed by: INTERNAL MEDICINE

## 2023-07-11 PROCEDURE — 2580000003 HC RX 258: Performed by: NURSE PRACTITIONER

## 2023-07-11 PROCEDURE — 85027 COMPLETE CBC AUTOMATED: CPT

## 2023-07-11 PROCEDURE — 36415 COLL VENOUS BLD VENIPUNCTURE: CPT

## 2023-07-11 PROCEDURE — 6370000000 HC RX 637 (ALT 250 FOR IP): Performed by: HOSPITALIST

## 2023-07-11 PROCEDURE — 6370000000 HC RX 637 (ALT 250 FOR IP): Performed by: INTERNAL MEDICINE

## 2023-07-11 PROCEDURE — 6360000002 HC RX W HCPCS: Performed by: HOSPITALIST

## 2023-07-11 PROCEDURE — 86140 C-REACTIVE PROTEIN: CPT

## 2023-07-11 PROCEDURE — 6370000000 HC RX 637 (ALT 250 FOR IP): Performed by: NURSE PRACTITIONER

## 2023-07-11 PROCEDURE — 2000000000 HC ICU R&B

## 2023-07-11 PROCEDURE — 2580000003 HC RX 258: Performed by: HOSPITALIST

## 2023-07-11 PROCEDURE — 85652 RBC SED RATE AUTOMATED: CPT

## 2023-07-11 PROCEDURE — 6370000000 HC RX 637 (ALT 250 FOR IP): Performed by: CLINICAL NURSE SPECIALIST

## 2023-07-11 PROCEDURE — 73701 CT LOWER EXTREMITY W/DYE: CPT

## 2023-07-11 PROCEDURE — 85610 PROTHROMBIN TIME: CPT

## 2023-07-11 PROCEDURE — 82962 GLUCOSE BLOOD TEST: CPT

## 2023-07-11 PROCEDURE — 80048 BASIC METABOLIC PNL TOTAL CA: CPT

## 2023-07-11 RX ORDER — INSULIN GLARGINE 100 [IU]/ML
8 INJECTION, SOLUTION SUBCUTANEOUS ONCE
Status: COMPLETED | OUTPATIENT
Start: 2023-07-11 | End: 2023-07-11

## 2023-07-11 RX ORDER — POTASSIUM CHLORIDE 20 MEQ/1
40 TABLET, EXTENDED RELEASE ORAL ONCE
Status: COMPLETED | OUTPATIENT
Start: 2023-07-11 | End: 2023-07-11

## 2023-07-11 RX ADMIN — Medication 1 UNITS: at 17:46

## 2023-07-11 RX ADMIN — Medication 1 CAPSULE: at 12:44

## 2023-07-11 RX ADMIN — Medication: at 09:01

## 2023-07-11 RX ADMIN — SODIUM CHLORIDE, PRESERVATIVE FREE 10 ML: 5 INJECTION INTRAVENOUS at 09:11

## 2023-07-11 RX ADMIN — PREDNISONE 10 MG: 5 TABLET ORAL at 12:44

## 2023-07-11 RX ADMIN — CEFEPIME 2000 MG: 2 INJECTION, POWDER, FOR SOLUTION INTRAVENOUS at 09:11

## 2023-07-11 RX ADMIN — INSULIN GLARGINE 8 UNITS: 100 INJECTION, SOLUTION SUBCUTANEOUS at 21:39

## 2023-07-11 RX ADMIN — Medication 1 AMPULE: at 21:09

## 2023-07-11 RX ADMIN — SODIUM CHLORIDE, PRESERVATIVE FREE 10 ML: 5 INJECTION INTRAVENOUS at 21:07

## 2023-07-11 RX ADMIN — Medication 1 AMPULE: at 09:01

## 2023-07-11 RX ADMIN — POTASSIUM CHLORIDE 40 MEQ: 20 TABLET, EXTENDED RELEASE ORAL at 19:54

## 2023-07-11 RX ADMIN — PIPERACILLIN AND TAZOBACTAM 3375 MG: 3; .375 INJECTION, POWDER, LYOPHILIZED, FOR SOLUTION INTRAVENOUS at 19:53

## 2023-07-11 RX ADMIN — ALOGLIPTIN 12.5 MG: 12.5 TABLET, FILM COATED ORAL at 12:45

## 2023-07-11 RX ADMIN — Medication: at 21:08

## 2023-07-11 RX ADMIN — PIPERACILLIN AND TAZOBACTAM 3375 MG: 3; .375 INJECTION, POWDER, LYOPHILIZED, FOR SOLUTION INTRAVENOUS at 12:46

## 2023-07-12 LAB
ANION GAP SERPL CALC-SCNC: 4 MMOL/L (ref 5–15)
BASOPHILS # BLD: 0.1 K/UL (ref 0–0.1)
BASOPHILS NFR BLD: 1 % (ref 0–1)
BUN SERPL-MCNC: 11 MG/DL (ref 6–20)
BUN/CREAT SERPL: 11 (ref 12–20)
CALCIUM SERPL-MCNC: 8.5 MG/DL (ref 8.5–10.1)
CHLORIDE SERPL-SCNC: 110 MMOL/L (ref 97–108)
CO2 SERPL-SCNC: 25 MMOL/L (ref 21–32)
CREAT SERPL-MCNC: 1 MG/DL (ref 0.7–1.3)
DIFFERENTIAL METHOD BLD: ABNORMAL
EOSINOPHIL # BLD: 0.1 K/UL (ref 0–0.4)
EOSINOPHIL NFR BLD: 1 % (ref 0–7)
ERYTHROCYTE [DISTWIDTH] IN BLOOD BY AUTOMATED COUNT: 18.1 % (ref 11.5–14.5)
GLUCOSE BLD STRIP.AUTO-MCNC: 153 MG/DL (ref 65–117)
GLUCOSE BLD STRIP.AUTO-MCNC: 179 MG/DL (ref 65–117)
GLUCOSE BLD STRIP.AUTO-MCNC: 199 MG/DL (ref 65–117)
GLUCOSE BLD STRIP.AUTO-MCNC: 316 MG/DL (ref 65–117)
GLUCOSE SERPL-MCNC: 176 MG/DL (ref 65–100)
HCT VFR BLD AUTO: 29.5 % (ref 36.6–50.3)
HGB BLD-MCNC: 8.7 G/DL (ref 12.1–17)
IMM GRANULOCYTES # BLD AUTO: 0.2 K/UL (ref 0–0.04)
IMM GRANULOCYTES NFR BLD AUTO: 2 % (ref 0–0.5)
INR PPP: 1.1 (ref 0.9–1.1)
LYMPHOCYTES # BLD: 2.4 K/UL (ref 0.8–3.5)
LYMPHOCYTES NFR BLD: 24 % (ref 12–49)
MCH RBC QN AUTO: 26.6 PG (ref 26–34)
MCHC RBC AUTO-ENTMCNC: 29.5 G/DL (ref 30–36.5)
MCV RBC AUTO: 90.2 FL (ref 80–99)
MONOCYTES # BLD: 0.8 K/UL (ref 0–1)
MONOCYTES NFR BLD: 8 % (ref 5–13)
NEUTS SEG # BLD: 6.2 K/UL (ref 1.8–8)
NEUTS SEG NFR BLD: 64 % (ref 32–75)
NRBC # BLD: 0 K/UL (ref 0–0.01)
NRBC BLD-RTO: 0 PER 100 WBC
PLATELET # BLD AUTO: 241 K/UL (ref 150–400)
PMV BLD AUTO: 9.7 FL (ref 8.9–12.9)
POTASSIUM SERPL-SCNC: 3.7 MMOL/L (ref 3.5–5.1)
PROTHROMBIN TIME: 11.4 SEC (ref 9–11.1)
RBC # BLD AUTO: 3.27 M/UL (ref 4.1–5.7)
SERVICE CMNT-IMP: ABNORMAL
SODIUM SERPL-SCNC: 139 MMOL/L (ref 136–145)
WBC # BLD AUTO: 9.8 K/UL (ref 4.1–11.1)

## 2023-07-12 PROCEDURE — 80048 BASIC METABOLIC PNL TOTAL CA: CPT

## 2023-07-12 PROCEDURE — 85025 COMPLETE CBC W/AUTO DIFF WBC: CPT

## 2023-07-12 PROCEDURE — 6370000000 HC RX 637 (ALT 250 FOR IP): Performed by: INTERNAL MEDICINE

## 2023-07-12 PROCEDURE — 2580000003 HC RX 258: Performed by: NURSE PRACTITIONER

## 2023-07-12 PROCEDURE — 6370000000 HC RX 637 (ALT 250 FOR IP): Performed by: CLINICAL NURSE SPECIALIST

## 2023-07-12 PROCEDURE — 99233 SBSQ HOSP IP/OBS HIGH 50: CPT | Performed by: INTERNAL MEDICINE

## 2023-07-12 PROCEDURE — 6370000000 HC RX 637 (ALT 250 FOR IP): Performed by: NURSE PRACTITIONER

## 2023-07-12 PROCEDURE — 6360000002 HC RX W HCPCS: Performed by: NURSE PRACTITIONER

## 2023-07-12 PROCEDURE — 6360000002 HC RX W HCPCS: Performed by: HOSPITALIST

## 2023-07-12 PROCEDURE — 82962 GLUCOSE BLOOD TEST: CPT

## 2023-07-12 PROCEDURE — 36415 COLL VENOUS BLD VENIPUNCTURE: CPT

## 2023-07-12 PROCEDURE — 2000000000 HC ICU R&B

## 2023-07-12 PROCEDURE — 2580000003 HC RX 258: Performed by: HOSPITALIST

## 2023-07-12 PROCEDURE — 85610 PROTHROMBIN TIME: CPT

## 2023-07-12 PROCEDURE — 6370000000 HC RX 637 (ALT 250 FOR IP): Performed by: HOSPITALIST

## 2023-07-12 RX ORDER — LANOLIN ALCOHOL/MO/W.PET/CERES
6 CREAM (GRAM) TOPICAL NIGHTLY PRN
Status: DISCONTINUED | OUTPATIENT
Start: 2023-07-12 | End: 2023-07-17 | Stop reason: HOSPADM

## 2023-07-12 RX ORDER — HEPARIN SODIUM 5000 [USP'U]/ML
5000 INJECTION, SOLUTION INTRAVENOUS; SUBCUTANEOUS EVERY 8 HOURS SCHEDULED
Status: DISCONTINUED | OUTPATIENT
Start: 2023-07-12 | End: 2023-07-17 | Stop reason: HOSPADM

## 2023-07-12 RX ORDER — POTASSIUM CHLORIDE 29.8 MG/ML
20 INJECTION INTRAVENOUS ONCE
Status: COMPLETED | OUTPATIENT
Start: 2023-07-12 | End: 2023-07-12

## 2023-07-12 RX ORDER — LOPERAMIDE HYDROCHLORIDE 2 MG/1
4 CAPSULE ORAL 4 TIMES DAILY PRN
Status: DISCONTINUED | OUTPATIENT
Start: 2023-07-12 | End: 2023-07-17 | Stop reason: HOSPADM

## 2023-07-12 RX ORDER — WARFARIN SODIUM 5 MG/1
7.5 TABLET ORAL
Status: ACTIVE | OUTPATIENT
Start: 2023-07-12 | End: 2023-07-13

## 2023-07-12 RX ORDER — POTASSIUM CHLORIDE 7.45 MG/ML
10 INJECTION INTRAVENOUS
Status: DISCONTINUED | OUTPATIENT
Start: 2023-07-12 | End: 2023-07-12

## 2023-07-12 RX ADMIN — Medication 4 UNITS: at 21:56

## 2023-07-12 RX ADMIN — PIPERACILLIN AND TAZOBACTAM 3375 MG: 3; .375 INJECTION, POWDER, LYOPHILIZED, FOR SOLUTION INTRAVENOUS at 19:49

## 2023-07-12 RX ADMIN — Medication 1 AMPULE: at 21:44

## 2023-07-12 RX ADMIN — Medication: at 19:48

## 2023-07-12 RX ADMIN — HEPARIN SODIUM 5000 UNITS: 5000 INJECTION INTRAVENOUS; SUBCUTANEOUS at 21:57

## 2023-07-12 RX ADMIN — INSULIN GLARGINE 15 UNITS: 100 INJECTION, SOLUTION SUBCUTANEOUS at 21:55

## 2023-07-12 RX ADMIN — POTASSIUM CHLORIDE 20 MEQ: 29.8 INJECTION, SOLUTION INTRAVENOUS at 07:52

## 2023-07-12 RX ADMIN — SODIUM CHLORIDE, PRESERVATIVE FREE 10 ML: 5 INJECTION INTRAVENOUS at 19:49

## 2023-07-12 RX ADMIN — PIPERACILLIN AND TAZOBACTAM 3375 MG: 3; .375 INJECTION, POWDER, LYOPHILIZED, FOR SOLUTION INTRAVENOUS at 04:08

## 2023-07-12 RX ADMIN — SODIUM CHLORIDE, PRESERVATIVE FREE 10 ML: 5 INJECTION INTRAVENOUS at 09:10

## 2023-07-12 RX ADMIN — MELATONIN 6 MG: at 22:18

## 2023-07-12 RX ADMIN — ALOGLIPTIN 12.5 MG: 12.5 TABLET, FILM COATED ORAL at 09:09

## 2023-07-12 RX ADMIN — Medication: at 09:11

## 2023-07-12 RX ADMIN — PREDNISONE 10 MG: 5 TABLET ORAL at 09:09

## 2023-07-12 RX ADMIN — PIPERACILLIN AND TAZOBACTAM 3375 MG: 3; .375 INJECTION, POWDER, LYOPHILIZED, FOR SOLUTION INTRAVENOUS at 13:00

## 2023-07-12 RX ADMIN — Medication 1 AMPULE: at 09:10

## 2023-07-12 RX ADMIN — Medication 1 CAPSULE: at 09:09

## 2023-07-12 ASSESSMENT — PAIN SCALES - GENERAL: PAINLEVEL_OUTOF10: 0

## 2023-07-13 ENCOUNTER — APPOINTMENT (OUTPATIENT)
Facility: HOSPITAL | Age: 88
DRG: 853 | End: 2023-07-13
Payer: MEDICARE

## 2023-07-13 ENCOUNTER — APPOINTMENT (OUTPATIENT)
Facility: HOSPITAL | Age: 88
DRG: 853 | End: 2023-07-13
Attending: INTERNAL MEDICINE
Payer: MEDICARE

## 2023-07-13 LAB
ANION GAP SERPL CALC-SCNC: 4 MMOL/L (ref 5–15)
BACTERIA SPEC CULT: NORMAL
BACTERIA SPEC CULT: NORMAL
BASOPHILS # BLD: 0.1 K/UL (ref 0–0.1)
BASOPHILS NFR BLD: 1 % (ref 0–1)
BUN SERPL-MCNC: 13 MG/DL (ref 6–20)
BUN/CREAT SERPL: 12 (ref 12–20)
CALCIUM SERPL-MCNC: 8.4 MG/DL (ref 8.5–10.1)
CHLORIDE SERPL-SCNC: 110 MMOL/L (ref 97–108)
CO2 SERPL-SCNC: 26 MMOL/L (ref 21–32)
COMMENT:: NORMAL
CREAT SERPL-MCNC: 1.06 MG/DL (ref 0.7–1.3)
DIFFERENTIAL METHOD BLD: ABNORMAL
ECHO BSA: 2.26 M2
ECHO BSA: 2.26 M2
EOSINOPHIL # BLD: 0.1 K/UL (ref 0–0.4)
EOSINOPHIL NFR BLD: 2 % (ref 0–7)
ERYTHROCYTE [DISTWIDTH] IN BLOOD BY AUTOMATED COUNT: 17.9 % (ref 11.5–14.5)
GLUCOSE BLD STRIP.AUTO-MCNC: 164 MG/DL (ref 65–117)
GLUCOSE BLD STRIP.AUTO-MCNC: 192 MG/DL (ref 65–117)
GLUCOSE BLD STRIP.AUTO-MCNC: 255 MG/DL (ref 65–117)
GLUCOSE BLD STRIP.AUTO-MCNC: 258 MG/DL (ref 65–117)
GLUCOSE SERPL-MCNC: 143 MG/DL (ref 65–100)
HCT VFR BLD AUTO: 28.1 % (ref 36.6–50.3)
HGB BLD-MCNC: 8.4 G/DL (ref 12.1–17)
IMM GRANULOCYTES # BLD AUTO: 0.2 K/UL (ref 0–0.04)
IMM GRANULOCYTES NFR BLD AUTO: 2 % (ref 0–0.5)
INR PPP: 1.1 (ref 0.9–1.1)
LYMPHOCYTES # BLD: 2.5 K/UL (ref 0.8–3.5)
LYMPHOCYTES NFR BLD: 28 % (ref 12–49)
MCH RBC QN AUTO: 26.9 PG (ref 26–34)
MCHC RBC AUTO-ENTMCNC: 29.9 G/DL (ref 30–36.5)
MCV RBC AUTO: 90.1 FL (ref 80–99)
MONOCYTES # BLD: 0.8 K/UL (ref 0–1)
MONOCYTES NFR BLD: 9 % (ref 5–13)
NEUTS SEG # BLD: 5 K/UL (ref 1.8–8)
NEUTS SEG NFR BLD: 58 % (ref 32–75)
NRBC # BLD: 0 K/UL (ref 0–0.01)
NRBC BLD-RTO: 0 PER 100 WBC
PLATELET # BLD AUTO: 244 K/UL (ref 150–400)
PMV BLD AUTO: 9.8 FL (ref 8.9–12.9)
POTASSIUM SERPL-SCNC: 3.6 MMOL/L (ref 3.5–5.1)
PROTHROMBIN TIME: 11.7 SEC (ref 9–11.1)
RBC # BLD AUTO: 3.12 M/UL (ref 4.1–5.7)
SERVICE CMNT-IMP: ABNORMAL
SERVICE CMNT-IMP: NORMAL
SERVICE CMNT-IMP: NORMAL
SODIUM SERPL-SCNC: 140 MMOL/L (ref 136–145)
SPECIMEN HOLD: NORMAL
VAS LEFT ABI: 1.33
VAS LEFT DORSALIS PEDIS BP: 175 MMHG
VAS LEFT PTA BP: 189 MMHG
VAS LEFT TBI: 1.13
VAS LEFT TOE PRESSURE: 160 MMHG
VAS RIGHT ABI: 1.25
VAS RIGHT ARM BP: 142 MMHG
VAS RIGHT DORSALIS PEDIS BP: 178 MMHG
VAS RIGHT PTA BP: 161 MMHG
VAS RIGHT TBI: 0.85
VAS RIGHT TOE PRESSURE: 120 MMHG
WBC # BLD AUTO: 8.6 K/UL (ref 4.1–11.1)

## 2023-07-13 PROCEDURE — 6370000000 HC RX 637 (ALT 250 FOR IP): Performed by: INTERNAL MEDICINE

## 2023-07-13 PROCEDURE — 82962 GLUCOSE BLOOD TEST: CPT

## 2023-07-13 PROCEDURE — 02PA3MZ REMOVAL OF CARDIAC LEAD FROM HEART, PERCUTANEOUS APPROACH: ICD-10-PCS | Performed by: INTERNAL MEDICINE

## 2023-07-13 PROCEDURE — 02H63NZ INSERTION OF INTRACARDIAC PACEMAKER INTO RIGHT ATRIUM, PERCUTANEOUS APPROACH: ICD-10-PCS | Performed by: INTERNAL MEDICINE

## 2023-07-13 PROCEDURE — 2580000003 HC RX 258: Performed by: INTERNAL MEDICINE

## 2023-07-13 PROCEDURE — 6370000000 HC RX 637 (ALT 250 FOR IP): Performed by: HOSPITALIST

## 2023-07-13 PROCEDURE — 6360000002 HC RX W HCPCS: Performed by: INTERNAL MEDICINE

## 2023-07-13 PROCEDURE — C1786 PMKR, SINGLE, RATE-RESP: HCPCS | Performed by: INTERNAL MEDICINE

## 2023-07-13 PROCEDURE — C1894 INTRO/SHEATH, NON-LASER: HCPCS | Performed by: INTERNAL MEDICINE

## 2023-07-13 PROCEDURE — 99153 MOD SED SAME PHYS/QHP EA: CPT | Performed by: INTERNAL MEDICINE

## 2023-07-13 PROCEDURE — 87205 SMEAR GRAM STAIN: CPT

## 2023-07-13 PROCEDURE — 2580000003 HC RX 258: Performed by: HOSPITALIST

## 2023-07-13 PROCEDURE — 0S9P3ZZ DRAINAGE OF RIGHT TOE PHALANGEAL JOINT, PERCUTANEOUS APPROACH: ICD-10-PCS | Performed by: PODIATRIST

## 2023-07-13 PROCEDURE — C1713 ANCHOR/SCREW BN/BN,TIS/BN: HCPCS | Performed by: INTERNAL MEDICINE

## 2023-07-13 PROCEDURE — 36415 COLL VENOUS BLD VENIPUNCTURE: CPT

## 2023-07-13 PROCEDURE — 80048 BASIC METABOLIC PNL TOTAL CA: CPT

## 2023-07-13 PROCEDURE — C1769 GUIDE WIRE: HCPCS | Performed by: INTERNAL MEDICINE

## 2023-07-13 PROCEDURE — 85610 PROTHROMBIN TIME: CPT

## 2023-07-13 PROCEDURE — 6360000002 HC RX W HCPCS: Performed by: NURSE PRACTITIONER

## 2023-07-13 PROCEDURE — 6360000002 HC RX W HCPCS: Performed by: HOSPITALIST

## 2023-07-13 PROCEDURE — 2500000003 HC RX 250 WO HCPCS: Performed by: INTERNAL MEDICINE

## 2023-07-13 PROCEDURE — 85025 COMPLETE CBC W/AUTO DIFF WBC: CPT

## 2023-07-13 PROCEDURE — 76937 US GUIDE VASCULAR ACCESS: CPT

## 2023-07-13 PROCEDURE — 6370000000 HC RX 637 (ALT 250 FOR IP): Performed by: NURSE PRACTITIONER

## 2023-07-13 PROCEDURE — 93922 UPR/L XTREMITY ART 2 LEVELS: CPT

## 2023-07-13 PROCEDURE — 2580000003 HC RX 258: Performed by: NURSE PRACTITIONER

## 2023-07-13 PROCEDURE — 99233 SBSQ HOSP IP/OBS HIGH 50: CPT | Performed by: INTERNAL MEDICINE

## 2023-07-13 PROCEDURE — 99152 MOD SED SAME PHYS/QHP 5/>YRS: CPT | Performed by: INTERNAL MEDICINE

## 2023-07-13 PROCEDURE — 87070 CULTURE OTHR SPECIMN AEROBIC: CPT

## 2023-07-13 PROCEDURE — 71045 X-RAY EXAM CHEST 1 VIEW: CPT

## 2023-07-13 PROCEDURE — 2060000000 HC ICU INTERMEDIATE R&B

## 2023-07-13 PROCEDURE — 2709999900 HC NON-CHARGEABLE SUPPLY: Performed by: INTERNAL MEDICINE

## 2023-07-13 PROCEDURE — 36569 INSJ PICC 5 YR+ W/O IMAGING: CPT

## 2023-07-13 PROCEDURE — 6370000000 HC RX 637 (ALT 250 FOR IP): Performed by: CLINICAL NURSE SPECIALIST

## 2023-07-13 PROCEDURE — 2720000010 HC SURG SUPPLY STERILE: Performed by: INTERNAL MEDICINE

## 2023-07-13 DEVICE — TPS MC1AVR1 MICRA AV US H3
Type: IMPLANTABLE DEVICE | Status: FUNCTIONAL
Brand: MICRA™ AV

## 2023-07-13 RX ORDER — POTASSIUM CHLORIDE 750 MG/1
20 TABLET, FILM COATED, EXTENDED RELEASE ORAL DAILY
Status: DISCONTINUED | OUTPATIENT
Start: 2023-07-13 | End: 2023-07-17 | Stop reason: HOSPADM

## 2023-07-13 RX ORDER — POTASSIUM CHLORIDE 29.8 MG/ML
20 INJECTION INTRAVENOUS ONCE
Status: COMPLETED | OUTPATIENT
Start: 2023-07-13 | End: 2023-07-13

## 2023-07-13 RX ORDER — HEPARIN SODIUM 10000 [USP'U]/ML
INJECTION, SOLUTION INTRAVENOUS; SUBCUTANEOUS PRN
Status: DISCONTINUED | OUTPATIENT
Start: 2023-07-13 | End: 2023-07-13 | Stop reason: HOSPADM

## 2023-07-13 RX ORDER — FENTANYL CITRATE 50 UG/ML
INJECTION, SOLUTION INTRAMUSCULAR; INTRAVENOUS PRN
Status: DISCONTINUED | OUTPATIENT
Start: 2023-07-13 | End: 2023-07-13 | Stop reason: HOSPADM

## 2023-07-13 RX ORDER — SODIUM CHLORIDE 9 MG/ML
INJECTION, SOLUTION INTRAVENOUS PRN
Status: DISCONTINUED | OUTPATIENT
Start: 2023-07-13 | End: 2023-07-17 | Stop reason: HOSPADM

## 2023-07-13 RX ORDER — TRAMADOL HYDROCHLORIDE 50 MG/1
50 TABLET ORAL EVERY 6 HOURS PRN
Status: DISCONTINUED | OUTPATIENT
Start: 2023-07-13 | End: 2023-07-17 | Stop reason: HOSPADM

## 2023-07-13 RX ORDER — MIDAZOLAM HYDROCHLORIDE 1 MG/ML
INJECTION INTRAMUSCULAR; INTRAVENOUS PRN
Status: DISCONTINUED | OUTPATIENT
Start: 2023-07-13 | End: 2023-07-13 | Stop reason: HOSPADM

## 2023-07-13 RX ORDER — HEPARIN SODIUM 1000 [USP'U]/ML
INJECTION, SOLUTION INTRAVENOUS; SUBCUTANEOUS PRN
Status: DISCONTINUED | OUTPATIENT
Start: 2023-07-13 | End: 2023-07-13 | Stop reason: HOSPADM

## 2023-07-13 RX ORDER — SODIUM CHLORIDE 9 MG/ML
INJECTION, SOLUTION INTRAVENOUS CONTINUOUS
Status: DISCONTINUED | OUTPATIENT
Start: 2023-07-13 | End: 2023-07-15

## 2023-07-13 RX ORDER — LIDOCAINE HYDROCHLORIDE 10 MG/ML
INJECTION, SOLUTION EPIDURAL; INFILTRATION; INTRACAUDAL; PERINEURAL PRN
Status: DISCONTINUED | OUTPATIENT
Start: 2023-07-13 | End: 2023-07-13 | Stop reason: HOSPADM

## 2023-07-13 RX ORDER — ACETAMINOPHEN 325 MG/1
650 TABLET ORAL EVERY 4 HOURS PRN
Status: DISCONTINUED | OUTPATIENT
Start: 2023-07-13 | End: 2023-07-13

## 2023-07-13 RX ORDER — BUPIVACAINE HYDROCHLORIDE 5 MG/ML
INJECTION, SOLUTION EPIDURAL; INTRACAUDAL PRN
Status: DISCONTINUED | OUTPATIENT
Start: 2023-07-13 | End: 2023-07-13 | Stop reason: HOSPADM

## 2023-07-13 RX ORDER — SODIUM CHLORIDE 0.9 % (FLUSH) 0.9 %
5-40 SYRINGE (ML) INJECTION EVERY 12 HOURS SCHEDULED
Status: DISCONTINUED | OUTPATIENT
Start: 2023-07-13 | End: 2023-07-17 | Stop reason: HOSPADM

## 2023-07-13 RX ORDER — SODIUM CHLORIDE 0.9 % (FLUSH) 0.9 %
5-40 SYRINGE (ML) INJECTION PRN
Status: DISCONTINUED | OUTPATIENT
Start: 2023-07-13 | End: 2023-07-17 | Stop reason: HOSPADM

## 2023-07-13 RX ORDER — TRAMADOL HYDROCHLORIDE 50 MG/1
100 TABLET ORAL EVERY 6 HOURS PRN
Status: DISCONTINUED | OUTPATIENT
Start: 2023-07-13 | End: 2023-07-17 | Stop reason: HOSPADM

## 2023-07-13 RX ADMIN — POTASSIUM CHLORIDE 20 MEQ: 750 TABLET, FILM COATED, EXTENDED RELEASE ORAL at 10:37

## 2023-07-13 RX ADMIN — MELATONIN 6 MG: at 22:54

## 2023-07-13 RX ADMIN — Medication: at 10:41

## 2023-07-13 RX ADMIN — INSULIN GLARGINE 15 UNITS: 100 INJECTION, SOLUTION SUBCUTANEOUS at 20:38

## 2023-07-13 RX ADMIN — SODIUM CHLORIDE, PRESERVATIVE FREE 10 ML: 5 INJECTION INTRAVENOUS at 20:39

## 2023-07-13 RX ADMIN — Medication 1 AMPULE: at 22:57

## 2023-07-13 RX ADMIN — SODIUM CHLORIDE: 9 INJECTION, SOLUTION INTRAVENOUS at 12:47

## 2023-07-13 RX ADMIN — WARFARIN SODIUM 7.5 MG: 5 TABLET ORAL at 18:10

## 2023-07-13 RX ADMIN — HEPARIN SODIUM 5000 UNITS: 5000 INJECTION INTRAVENOUS; SUBCUTANEOUS at 22:50

## 2023-07-13 RX ADMIN — PIPERACILLIN AND TAZOBACTAM 3375 MG: 3; .375 INJECTION, POWDER, LYOPHILIZED, FOR SOLUTION INTRAVENOUS at 20:38

## 2023-07-13 RX ADMIN — PIPERACILLIN AND TAZOBACTAM 3375 MG: 3; .375 INJECTION, POWDER, LYOPHILIZED, FOR SOLUTION INTRAVENOUS at 12:41

## 2023-07-13 RX ADMIN — Medication 1 CAPSULE: at 10:37

## 2023-07-13 RX ADMIN — SODIUM CHLORIDE, PRESERVATIVE FREE 10 ML: 5 INJECTION INTRAVENOUS at 10:38

## 2023-07-13 RX ADMIN — POTASSIUM CHLORIDE 20 MEQ: 29.8 INJECTION, SOLUTION INTRAVENOUS at 05:33

## 2023-07-13 RX ADMIN — PREDNISONE 10 MG: 5 TABLET ORAL at 10:37

## 2023-07-13 RX ADMIN — Medication: at 21:00

## 2023-07-13 RX ADMIN — SODIUM CHLORIDE, PRESERVATIVE FREE 10 ML: 5 INJECTION INTRAVENOUS at 12:05

## 2023-07-13 RX ADMIN — PIPERACILLIN AND TAZOBACTAM 3375 MG: 3; .375 INJECTION, POWDER, LYOPHILIZED, FOR SOLUTION INTRAVENOUS at 04:25

## 2023-07-13 RX ADMIN — ALOGLIPTIN 12.5 MG: 12.5 TABLET, FILM COATED ORAL at 10:38

## 2023-07-13 RX ADMIN — Medication 1 AMPULE: at 10:41

## 2023-07-13 ASSESSMENT — PAIN SCALES - GENERAL
PAINLEVEL_OUTOF10: 0
PAINLEVEL_OUTOF10: 0

## 2023-07-14 LAB
ANION GAP SERPL CALC-SCNC: 8 MMOL/L (ref 5–15)
BASOPHILS # BLD: 0 K/UL (ref 0–0.1)
BASOPHILS NFR BLD: 0 % (ref 0–1)
BUN SERPL-MCNC: 12 MG/DL (ref 6–20)
BUN/CREAT SERPL: 12 (ref 12–20)
CALCIUM SERPL-MCNC: 8.3 MG/DL (ref 8.5–10.1)
CHLORIDE SERPL-SCNC: 108 MMOL/L (ref 97–108)
CO2 SERPL-SCNC: 25 MMOL/L (ref 21–32)
CREAT SERPL-MCNC: 0.98 MG/DL (ref 0.7–1.3)
DIFFERENTIAL METHOD BLD: ABNORMAL
EOSINOPHIL # BLD: 0.1 K/UL (ref 0–0.4)
EOSINOPHIL NFR BLD: 1 % (ref 0–7)
ERYTHROCYTE [DISTWIDTH] IN BLOOD BY AUTOMATED COUNT: 17.6 % (ref 11.5–14.5)
GLUCOSE BLD STRIP.AUTO-MCNC: 129 MG/DL (ref 65–117)
GLUCOSE BLD STRIP.AUTO-MCNC: 191 MG/DL (ref 65–117)
GLUCOSE BLD STRIP.AUTO-MCNC: 221 MG/DL (ref 65–117)
GLUCOSE BLD STRIP.AUTO-MCNC: 223 MG/DL (ref 65–117)
GLUCOSE SERPL-MCNC: 129 MG/DL (ref 65–100)
HCT VFR BLD AUTO: 26.7 % (ref 36.6–50.3)
HGB BLD-MCNC: 8.2 G/DL (ref 12.1–17)
IMM GRANULOCYTES # BLD AUTO: 0.2 K/UL (ref 0–0.04)
IMM GRANULOCYTES NFR BLD AUTO: 1 % (ref 0–0.5)
INR PPP: 1.1 (ref 0.9–1.1)
LYMPHOCYTES # BLD: 2.5 K/UL (ref 0.8–3.5)
LYMPHOCYTES NFR BLD: 24 % (ref 12–49)
MCH RBC QN AUTO: 27.9 PG (ref 26–34)
MCHC RBC AUTO-ENTMCNC: 30.7 G/DL (ref 30–36.5)
MCV RBC AUTO: 90.8 FL (ref 80–99)
MONOCYTES # BLD: 1 K/UL (ref 0–1)
MONOCYTES NFR BLD: 10 % (ref 5–13)
NEUTS SEG # BLD: 6.6 K/UL (ref 1.8–8)
NEUTS SEG NFR BLD: 64 % (ref 32–75)
NRBC # BLD: 0 K/UL (ref 0–0.01)
NRBC BLD-RTO: 0 PER 100 WBC
PLATELET # BLD AUTO: 250 K/UL (ref 150–400)
PMV BLD AUTO: 9.8 FL (ref 8.9–12.9)
POTASSIUM SERPL-SCNC: 3.7 MMOL/L (ref 3.5–5.1)
PROTHROMBIN TIME: 11.5 SEC (ref 9–11.1)
RBC # BLD AUTO: 2.94 M/UL (ref 4.1–5.7)
SERVICE CMNT-IMP: ABNORMAL
SODIUM SERPL-SCNC: 141 MMOL/L (ref 136–145)
WBC # BLD AUTO: 10.4 K/UL (ref 4.1–11.1)

## 2023-07-14 PROCEDURE — 80048 BASIC METABOLIC PNL TOTAL CA: CPT

## 2023-07-14 PROCEDURE — 2060000000 HC ICU INTERMEDIATE R&B

## 2023-07-14 PROCEDURE — 6370000000 HC RX 637 (ALT 250 FOR IP): Performed by: INTERNAL MEDICINE

## 2023-07-14 PROCEDURE — 97116 GAIT TRAINING THERAPY: CPT

## 2023-07-14 PROCEDURE — 2580000003 HC RX 258: Performed by: HOSPITALIST

## 2023-07-14 PROCEDURE — 2580000003 HC RX 258: Performed by: NURSE PRACTITIONER

## 2023-07-14 PROCEDURE — 85025 COMPLETE CBC W/AUTO DIFF WBC: CPT

## 2023-07-14 PROCEDURE — 6360000002 HC RX W HCPCS: Performed by: HOSPITALIST

## 2023-07-14 PROCEDURE — 36415 COLL VENOUS BLD VENIPUNCTURE: CPT

## 2023-07-14 PROCEDURE — 82962 GLUCOSE BLOOD TEST: CPT

## 2023-07-14 PROCEDURE — 6370000000 HC RX 637 (ALT 250 FOR IP): Performed by: NURSE PRACTITIONER

## 2023-07-14 PROCEDURE — 85610 PROTHROMBIN TIME: CPT

## 2023-07-14 PROCEDURE — 6370000000 HC RX 637 (ALT 250 FOR IP): Performed by: HOSPITALIST

## 2023-07-14 PROCEDURE — 97530 THERAPEUTIC ACTIVITIES: CPT

## 2023-07-14 PROCEDURE — 2580000003 HC RX 258: Performed by: INTERNAL MEDICINE

## 2023-07-14 PROCEDURE — 6370000000 HC RX 637 (ALT 250 FOR IP): Performed by: CLINICAL NURSE SPECIALIST

## 2023-07-14 PROCEDURE — 99233 SBSQ HOSP IP/OBS HIGH 50: CPT | Performed by: INTERNAL MEDICINE

## 2023-07-14 RX ORDER — L. ACIDOPHILUS/PECTIN, CITRUS 25MM-100MG
1 TABLET ORAL DAILY
Status: DISCONTINUED | OUTPATIENT
Start: 2023-07-14 | End: 2023-07-14 | Stop reason: SDUPTHER

## 2023-07-14 RX ADMIN — MELATONIN 6 MG: at 22:58

## 2023-07-14 RX ADMIN — POTASSIUM CHLORIDE 20 MEQ: 750 TABLET, FILM COATED, EXTENDED RELEASE ORAL at 08:40

## 2023-07-14 RX ADMIN — SODIUM CHLORIDE, PRESERVATIVE FREE 5 ML: 5 INJECTION INTRAVENOUS at 21:00

## 2023-07-14 RX ADMIN — Medication 1 AMPULE: at 21:00

## 2023-07-14 RX ADMIN — HEPARIN SODIUM 5000 UNITS: 5000 INJECTION INTRAVENOUS; SUBCUTANEOUS at 16:48

## 2023-07-14 RX ADMIN — SODIUM CHLORIDE, PRESERVATIVE FREE 10 ML: 5 INJECTION INTRAVENOUS at 08:41

## 2023-07-14 RX ADMIN — Medication: at 21:00

## 2023-07-14 RX ADMIN — INSULIN GLARGINE 15 UNITS: 100 INJECTION, SOLUTION SUBCUTANEOUS at 22:58

## 2023-07-14 RX ADMIN — HEPARIN SODIUM 5000 UNITS: 5000 INJECTION INTRAVENOUS; SUBCUTANEOUS at 22:58

## 2023-07-14 RX ADMIN — HEPARIN SODIUM 5000 UNITS: 5000 INJECTION INTRAVENOUS; SUBCUTANEOUS at 06:07

## 2023-07-14 RX ADMIN — ALOGLIPTIN 12.5 MG: 12.5 TABLET, FILM COATED ORAL at 08:40

## 2023-07-14 RX ADMIN — Medication: at 08:40

## 2023-07-14 RX ADMIN — Medication 1 CAPSULE: at 09:19

## 2023-07-14 RX ADMIN — SODIUM CHLORIDE: 9 INJECTION, SOLUTION INTRAVENOUS at 12:33

## 2023-07-14 RX ADMIN — Medication 1 UNITS: at 17:18

## 2023-07-14 RX ADMIN — PIPERACILLIN AND TAZOBACTAM 3375 MG: 3; .375 INJECTION, POWDER, LYOPHILIZED, FOR SOLUTION INTRAVENOUS at 12:28

## 2023-07-14 RX ADMIN — PREDNISONE 10 MG: 5 TABLET ORAL at 08:40

## 2023-07-14 RX ADMIN — PIPERACILLIN AND TAZOBACTAM 3375 MG: 3; .375 INJECTION, POWDER, LYOPHILIZED, FOR SOLUTION INTRAVENOUS at 04:10

## 2023-07-14 RX ADMIN — PIPERACILLIN AND TAZOBACTAM 3375 MG: 3; .375 INJECTION, POWDER, LYOPHILIZED, FOR SOLUTION INTRAVENOUS at 22:58

## 2023-07-14 RX ADMIN — WARFARIN SODIUM 7.5 MG: 5 TABLET ORAL at 17:18

## 2023-07-14 ASSESSMENT — PAIN SCALES - GENERAL
PAINLEVEL_OUTOF10: 0

## 2023-07-15 LAB
BACTERIA SPEC CULT: ABNORMAL
GLUCOSE BLD STRIP.AUTO-MCNC: 117 MG/DL (ref 65–117)
GLUCOSE BLD STRIP.AUTO-MCNC: 131 MG/DL (ref 65–117)
GLUCOSE BLD STRIP.AUTO-MCNC: 190 MG/DL (ref 65–117)
GLUCOSE BLD STRIP.AUTO-MCNC: 209 MG/DL (ref 65–117)
GRAM STN SPEC: ABNORMAL
GRAM STN SPEC: ABNORMAL
INR PPP: 1.1 (ref 0.9–1.1)
PROTHROMBIN TIME: 11.8 SEC (ref 9–11.1)
SERVICE CMNT-IMP: ABNORMAL
SERVICE CMNT-IMP: NORMAL

## 2023-07-15 PROCEDURE — 6370000000 HC RX 637 (ALT 250 FOR IP): Performed by: HOSPITALIST

## 2023-07-15 PROCEDURE — 6370000000 HC RX 637 (ALT 250 FOR IP): Performed by: INTERNAL MEDICINE

## 2023-07-15 PROCEDURE — 2580000003 HC RX 258: Performed by: INTERNAL MEDICINE

## 2023-07-15 PROCEDURE — 6370000000 HC RX 637 (ALT 250 FOR IP): Performed by: CLINICAL NURSE SPECIALIST

## 2023-07-15 PROCEDURE — 82962 GLUCOSE BLOOD TEST: CPT

## 2023-07-15 PROCEDURE — 99233 SBSQ HOSP IP/OBS HIGH 50: CPT | Performed by: INTERNAL MEDICINE

## 2023-07-15 PROCEDURE — 2060000000 HC ICU INTERMEDIATE R&B

## 2023-07-15 PROCEDURE — 2580000003 HC RX 258: Performed by: NURSE PRACTITIONER

## 2023-07-15 PROCEDURE — 6360000002 HC RX W HCPCS: Performed by: HOSPITALIST

## 2023-07-15 PROCEDURE — 2580000003 HC RX 258: Performed by: HOSPITALIST

## 2023-07-15 PROCEDURE — 36415 COLL VENOUS BLD VENIPUNCTURE: CPT

## 2023-07-15 PROCEDURE — 85610 PROTHROMBIN TIME: CPT

## 2023-07-15 PROCEDURE — 6370000000 HC RX 637 (ALT 250 FOR IP): Performed by: NURSE PRACTITIONER

## 2023-07-15 RX ADMIN — Medication 1 AMPULE: at 09:07

## 2023-07-15 RX ADMIN — HEPARIN SODIUM 5000 UNITS: 5000 INJECTION INTRAVENOUS; SUBCUTANEOUS at 21:14

## 2023-07-15 RX ADMIN — SODIUM CHLORIDE, PRESERVATIVE FREE 10 ML: 5 INJECTION INTRAVENOUS at 21:17

## 2023-07-15 RX ADMIN — POTASSIUM CHLORIDE 20 MEQ: 750 TABLET, FILM COATED, EXTENDED RELEASE ORAL at 09:06

## 2023-07-15 RX ADMIN — PIPERACILLIN AND TAZOBACTAM 3375 MG: 3; .375 INJECTION, POWDER, LYOPHILIZED, FOR SOLUTION INTRAVENOUS at 12:15

## 2023-07-15 RX ADMIN — PIPERACILLIN AND TAZOBACTAM 3375 MG: 3; .375 INJECTION, POWDER, LYOPHILIZED, FOR SOLUTION INTRAVENOUS at 20:33

## 2023-07-15 RX ADMIN — SODIUM CHLORIDE, PRESERVATIVE FREE 10 ML: 5 INJECTION INTRAVENOUS at 09:09

## 2023-07-15 RX ADMIN — Medication 1 AMPULE: at 21:14

## 2023-07-15 RX ADMIN — INSULIN GLARGINE 15 UNITS: 100 INJECTION, SOLUTION SUBCUTANEOUS at 21:14

## 2023-07-15 RX ADMIN — MELATONIN 6 MG: at 23:30

## 2023-07-15 RX ADMIN — HEPARIN SODIUM 5000 UNITS: 5000 INJECTION INTRAVENOUS; SUBCUTANEOUS at 07:01

## 2023-07-15 RX ADMIN — Medication 1 CAPSULE: at 09:07

## 2023-07-15 RX ADMIN — HEPARIN SODIUM 5000 UNITS: 5000 INJECTION INTRAVENOUS; SUBCUTANEOUS at 14:12

## 2023-07-15 RX ADMIN — Medication 1 UNITS: at 17:12

## 2023-07-15 RX ADMIN — ALOGLIPTIN 12.5 MG: 12.5 TABLET, FILM COATED ORAL at 09:06

## 2023-07-15 RX ADMIN — Medication: at 09:07

## 2023-07-15 RX ADMIN — PREDNISONE 10 MG: 5 TABLET ORAL at 09:06

## 2023-07-15 RX ADMIN — PIPERACILLIN AND TAZOBACTAM 3375 MG: 3; .375 INJECTION, POWDER, LYOPHILIZED, FOR SOLUTION INTRAVENOUS at 04:24

## 2023-07-15 RX ADMIN — SODIUM CHLORIDE, PRESERVATIVE FREE 10 ML: 5 INJECTION INTRAVENOUS at 21:14

## 2023-07-16 LAB
GLUCOSE BLD STRIP.AUTO-MCNC: 129 MG/DL (ref 65–117)
GLUCOSE BLD STRIP.AUTO-MCNC: 151 MG/DL (ref 65–117)
GLUCOSE BLD STRIP.AUTO-MCNC: 216 MG/DL (ref 65–117)
GLUCOSE BLD STRIP.AUTO-MCNC: 260 MG/DL (ref 65–117)
INR PPP: 1.1 (ref 0.9–1.1)
PROTHROMBIN TIME: 11.6 SEC (ref 9–11.1)
SERVICE CMNT-IMP: ABNORMAL

## 2023-07-16 PROCEDURE — 85610 PROTHROMBIN TIME: CPT

## 2023-07-16 PROCEDURE — 99233 SBSQ HOSP IP/OBS HIGH 50: CPT | Performed by: INTERNAL MEDICINE

## 2023-07-16 PROCEDURE — 2580000003 HC RX 258: Performed by: NURSE PRACTITIONER

## 2023-07-16 PROCEDURE — 6370000000 HC RX 637 (ALT 250 FOR IP): Performed by: INTERNAL MEDICINE

## 2023-07-16 PROCEDURE — 36415 COLL VENOUS BLD VENIPUNCTURE: CPT

## 2023-07-16 PROCEDURE — 6360000002 HC RX W HCPCS: Performed by: HOSPITALIST

## 2023-07-16 PROCEDURE — 2580000003 HC RX 258: Performed by: HOSPITALIST

## 2023-07-16 PROCEDURE — 2580000003 HC RX 258: Performed by: INTERNAL MEDICINE

## 2023-07-16 PROCEDURE — 6370000000 HC RX 637 (ALT 250 FOR IP): Performed by: HOSPITALIST

## 2023-07-16 PROCEDURE — 2060000000 HC ICU INTERMEDIATE R&B

## 2023-07-16 PROCEDURE — 6370000000 HC RX 637 (ALT 250 FOR IP): Performed by: NURSE PRACTITIONER

## 2023-07-16 PROCEDURE — 82962 GLUCOSE BLOOD TEST: CPT

## 2023-07-16 PROCEDURE — 6370000000 HC RX 637 (ALT 250 FOR IP): Performed by: CLINICAL NURSE SPECIALIST

## 2023-07-16 RX ORDER — L. ACIDOPHILUS/PECTIN, CITRUS 25MM-100MG
1 TABLET ORAL DAILY
Status: DISCONTINUED | OUTPATIENT
Start: 2023-07-17 | End: 2023-07-17 | Stop reason: HOSPADM

## 2023-07-16 RX ORDER — WARFARIN SODIUM 10 MG/1
10 TABLET ORAL
Status: COMPLETED | OUTPATIENT
Start: 2023-07-16 | End: 2023-07-16

## 2023-07-16 RX ADMIN — PIPERACILLIN AND TAZOBACTAM 3375 MG: 3; .375 INJECTION, POWDER, LYOPHILIZED, FOR SOLUTION INTRAVENOUS at 12:43

## 2023-07-16 RX ADMIN — Medication 1 UNITS: at 17:41

## 2023-07-16 RX ADMIN — PIPERACILLIN AND TAZOBACTAM 3375 MG: 3; .375 INJECTION, POWDER, LYOPHILIZED, FOR SOLUTION INTRAVENOUS at 03:56

## 2023-07-16 RX ADMIN — MELATONIN 6 MG: at 22:54

## 2023-07-16 RX ADMIN — Medication 1 AMPULE: at 21:16

## 2023-07-16 RX ADMIN — INSULIN GLARGINE 15 UNITS: 100 INJECTION, SOLUTION SUBCUTANEOUS at 21:16

## 2023-07-16 RX ADMIN — SODIUM CHLORIDE, PRESERVATIVE FREE 10 ML: 5 INJECTION INTRAVENOUS at 09:13

## 2023-07-16 RX ADMIN — Medication: at 09:13

## 2023-07-16 RX ADMIN — HEPARIN SODIUM 5000 UNITS: 5000 INJECTION INTRAVENOUS; SUBCUTANEOUS at 12:54

## 2023-07-16 RX ADMIN — HEPARIN SODIUM 5000 UNITS: 5000 INJECTION INTRAVENOUS; SUBCUTANEOUS at 21:15

## 2023-07-16 RX ADMIN — POTASSIUM CHLORIDE 20 MEQ: 750 TABLET, FILM COATED, EXTENDED RELEASE ORAL at 09:12

## 2023-07-16 RX ADMIN — HEPARIN SODIUM 5000 UNITS: 5000 INJECTION INTRAVENOUS; SUBCUTANEOUS at 06:56

## 2023-07-16 RX ADMIN — SODIUM CHLORIDE, PRESERVATIVE FREE 20 ML: 5 INJECTION INTRAVENOUS at 09:14

## 2023-07-16 RX ADMIN — Medication 1 AMPULE: at 09:12

## 2023-07-16 RX ADMIN — PREDNISONE 10 MG: 5 TABLET ORAL at 09:12

## 2023-07-16 RX ADMIN — PIPERACILLIN AND TAZOBACTAM 3375 MG: 3; .375 INJECTION, POWDER, LYOPHILIZED, FOR SOLUTION INTRAVENOUS at 21:17

## 2023-07-16 RX ADMIN — WARFARIN SODIUM 10 MG: 10 TABLET ORAL at 17:41

## 2023-07-16 RX ADMIN — SODIUM CHLORIDE, PRESERVATIVE FREE 10 ML: 5 INJECTION INTRAVENOUS at 21:21

## 2023-07-16 RX ADMIN — ALOGLIPTIN 12.5 MG: 12.5 TABLET, FILM COATED ORAL at 09:12

## 2023-07-17 VITALS
BODY MASS INDEX: 24.26 KG/M2 | HEIGHT: 75 IN | DIASTOLIC BLOOD PRESSURE: 79 MMHG | HEART RATE: 79 BPM | OXYGEN SATURATION: 95 % | SYSTOLIC BLOOD PRESSURE: 136 MMHG | RESPIRATION RATE: 16 BRPM | WEIGHT: 195.1 LBS | TEMPERATURE: 98.3 F

## 2023-07-17 PROBLEM — B96.5 BACTEREMIA DUE TO PSEUDOMONAS: Status: ACTIVE | Noted: 2023-07-17

## 2023-07-17 PROBLEM — G93.41 METABOLIC ENCEPHALOPATHY: Status: ACTIVE | Noted: 2023-07-17

## 2023-07-17 PROBLEM — M00.9 SEPTIC ARTHRITIS OF INTERPHALANGEAL JOINT OF TOE OF RIGHT FOOT (HCC): Status: ACTIVE | Noted: 2023-07-17

## 2023-07-17 PROBLEM — M86.171 ACUTE OSTEOMYELITIS OF TOE, RIGHT (HCC): Status: ACTIVE | Noted: 2023-07-17

## 2023-07-17 PROBLEM — A49.8 PSEUDOMONAS INFECTION: Status: ACTIVE | Noted: 2023-07-17

## 2023-07-17 PROBLEM — A49.01 METHICILLIN SUSCEPTIBLE STAPHYLOCOCCUS AUREUS INFECTION: Status: ACTIVE | Noted: 2023-07-17

## 2023-07-17 PROBLEM — I45.9 HEART BLOCK: Status: ACTIVE | Noted: 2023-07-17

## 2023-07-17 PROBLEM — N17.9 AKI (ACUTE KIDNEY INJURY) (HCC): Status: ACTIVE | Noted: 2023-07-17

## 2023-07-17 PROBLEM — R78.81 BACTEREMIA DUE TO PSEUDOMONAS: Status: ACTIVE | Noted: 2023-07-17

## 2023-07-17 LAB
ANION GAP SERPL CALC-SCNC: 4 MMOL/L (ref 5–15)
BACTERIA SPEC CULT: ABNORMAL
BACTERIA SPEC CULT: ABNORMAL
BACTERIA SPEC CULT: NORMAL
BASOPHILS # BLD: 0.1 K/UL (ref 0–0.1)
BASOPHILS NFR BLD: 1 % (ref 0–1)
BUN SERPL-MCNC: 17 MG/DL (ref 6–20)
BUN/CREAT SERPL: 15 (ref 12–20)
CALCIUM SERPL-MCNC: 9.2 MG/DL (ref 8.5–10.1)
CHLORIDE SERPL-SCNC: 108 MMOL/L (ref 97–108)
CO2 SERPL-SCNC: 26 MMOL/L (ref 21–32)
CREAT SERPL-MCNC: 1.14 MG/DL (ref 0.7–1.3)
DIFFERENTIAL METHOD BLD: ABNORMAL
EOSINOPHIL # BLD: 0.1 K/UL (ref 0–0.4)
EOSINOPHIL NFR BLD: 1 % (ref 0–7)
ERYTHROCYTE [DISTWIDTH] IN BLOOD BY AUTOMATED COUNT: 17.4 % (ref 11.5–14.5)
GLUCOSE BLD STRIP.AUTO-MCNC: 120 MG/DL (ref 65–117)
GLUCOSE BLD STRIP.AUTO-MCNC: 144 MG/DL (ref 65–117)
GLUCOSE BLD STRIP.AUTO-MCNC: 189 MG/DL (ref 65–117)
GLUCOSE SERPL-MCNC: 96 MG/DL (ref 65–100)
GRAM STN SPEC: ABNORMAL
GRAM STN SPEC: ABNORMAL
GRAM STN SPEC: NORMAL
GRAM STN SPEC: NORMAL
HCT VFR BLD AUTO: 28.9 % (ref 36.6–50.3)
HGB BLD-MCNC: 8.7 G/DL (ref 12.1–17)
IMM GRANULOCYTES # BLD AUTO: 0.1 K/UL (ref 0–0.04)
IMM GRANULOCYTES NFR BLD AUTO: 1 % (ref 0–0.5)
INR PPP: 1.1 (ref 0.9–1.1)
LYMPHOCYTES # BLD: 2.7 K/UL (ref 0.8–3.5)
LYMPHOCYTES NFR BLD: 29 % (ref 12–49)
MCH RBC QN AUTO: 26.9 PG (ref 26–34)
MCHC RBC AUTO-ENTMCNC: 30.1 G/DL (ref 30–36.5)
MCV RBC AUTO: 89.5 FL (ref 80–99)
MONOCYTES # BLD: 0.9 K/UL (ref 0–1)
MONOCYTES NFR BLD: 10 % (ref 5–13)
NEUTS SEG # BLD: 5.7 K/UL (ref 1.8–8)
NEUTS SEG NFR BLD: 58 % (ref 32–75)
NRBC # BLD: 0 K/UL (ref 0–0.01)
NRBC BLD-RTO: 0 PER 100 WBC
PLATELET # BLD AUTO: 298 K/UL (ref 150–400)
PMV BLD AUTO: 9.2 FL (ref 8.9–12.9)
POTASSIUM SERPL-SCNC: 3.8 MMOL/L (ref 3.5–5.1)
PROTHROMBIN TIME: 11.3 SEC (ref 9–11.1)
RBC # BLD AUTO: 3.23 M/UL (ref 4.1–5.7)
SERVICE CMNT-IMP: ABNORMAL
SERVICE CMNT-IMP: NORMAL
SODIUM SERPL-SCNC: 138 MMOL/L (ref 136–145)
WBC # BLD AUTO: 9.6 K/UL (ref 4.1–11.1)

## 2023-07-17 PROCEDURE — 6370000000 HC RX 637 (ALT 250 FOR IP): Performed by: INTERNAL MEDICINE

## 2023-07-17 PROCEDURE — 2580000003 HC RX 258: Performed by: INTERNAL MEDICINE

## 2023-07-17 PROCEDURE — 6370000000 HC RX 637 (ALT 250 FOR IP): Performed by: CLINICAL NURSE SPECIALIST

## 2023-07-17 PROCEDURE — 2580000003 HC RX 258: Performed by: HOSPITALIST

## 2023-07-17 PROCEDURE — 85610 PROTHROMBIN TIME: CPT

## 2023-07-17 PROCEDURE — 2500000003 HC RX 250 WO HCPCS: Performed by: INTERNAL MEDICINE

## 2023-07-17 PROCEDURE — 6360000002 HC RX W HCPCS: Performed by: HOSPITALIST

## 2023-07-17 PROCEDURE — 80048 BASIC METABOLIC PNL TOTAL CA: CPT

## 2023-07-17 PROCEDURE — 99232 SBSQ HOSP IP/OBS MODERATE 35: CPT | Performed by: INTERNAL MEDICINE

## 2023-07-17 PROCEDURE — 82962 GLUCOSE BLOOD TEST: CPT

## 2023-07-17 PROCEDURE — 36415 COLL VENOUS BLD VENIPUNCTURE: CPT

## 2023-07-17 PROCEDURE — 85025 COMPLETE CBC W/AUTO DIFF WBC: CPT

## 2023-07-17 RX ORDER — WARFARIN SODIUM 10 MG/1
10 TABLET ORAL
Status: COMPLETED | OUTPATIENT
Start: 2023-07-17 | End: 2023-07-17

## 2023-07-17 RX ORDER — L. ACIDOPHILUS/PECTIN, CITRUS 25MM-100MG
1 TABLET ORAL DAILY
Qty: 60 TABLET | Refills: 0 | Status: SHIPPED | OUTPATIENT
Start: 2023-07-18

## 2023-07-17 RX ADMIN — Medication 1 AMPULE: at 08:18

## 2023-07-17 RX ADMIN — SODIUM CHLORIDE, PRESERVATIVE FREE 10 ML: 5 INJECTION INTRAVENOUS at 08:15

## 2023-07-17 RX ADMIN — PIPERACILLIN AND TAZOBACTAM 3375 MG: 3; .375 INJECTION, POWDER, LYOPHILIZED, FOR SOLUTION INTRAVENOUS at 12:00

## 2023-07-17 RX ADMIN — Medication: at 08:16

## 2023-07-17 RX ADMIN — Medication 1 TABLET: at 08:14

## 2023-07-17 RX ADMIN — ALOGLIPTIN 12.5 MG: 12.5 TABLET, FILM COATED ORAL at 08:14

## 2023-07-17 RX ADMIN — POTASSIUM CHLORIDE 20 MEQ: 750 TABLET, FILM COATED, EXTENDED RELEASE ORAL at 08:14

## 2023-07-17 RX ADMIN — PREDNISONE 10 MG: 5 TABLET ORAL at 08:14

## 2023-07-17 RX ADMIN — HEPARIN SODIUM 5000 UNITS: 5000 INJECTION INTRAVENOUS; SUBCUTANEOUS at 06:17

## 2023-07-17 RX ADMIN — PIPERACILLIN AND TAZOBACTAM 3375 MG: 3; .375 INJECTION, POWDER, LYOPHILIZED, FOR SOLUTION INTRAVENOUS at 04:05

## 2023-07-17 RX ADMIN — WARFARIN SODIUM 10 MG: 10 TABLET ORAL at 17:33

## 2023-07-17 NOTE — PROGRESS NOTES
INTERNAL MEDICINE PROGRESS NOTE    NAME:  Diann Gutierrez Sr.   :   1933   MRN:   [id number]     Date/Time:  2023 6:18 AM  Subjective:   History:  Chart reviewed and patient seen and examined and D/W his nurse, Dr. Marylou Krishna and his son at bedside this AM and all events noted. He is well known to me and has been followed for years with DM, HTN, HLD, ASCVD, PAF, chronic diastolic CHF, CKD st 3, PMR, DJD and a recent problem with superficial ulcers of the dorsum of both great toes for which he was 2 days short of completing a course of Doxycycline with the ulcerations now healed. He was in his usual state of health 7/3 PM and had eaten dinner w/o a problem. He subsequently developed shaking chills and confusion and was brought to the ER where he was found to be septic with hypotension and has now been admitted. He was on Levophed at 16 initially and now weaned off. On low dose Dopamine at 5 prior to pacemaker placement primarily for bradycardia as BP has been good. He denied any cough or cardiac or respiratory c/o or GI/ c/o prior to this and has had none since arrival except he developed 2 diarrheal stools/day for 2 days until Probiotic and Imodium added and now improved. He has no pain in either of his great toes since the ulcers have healed. He has no other c/o on complete ROS except weakness. He is sleeping better and appetite has improved.   Pacemaker was placed yesterday without difficulty      Medications reviewed:  Current Facility-Administered Medications   Medication Dose Route Frequency    acidophilus/citrus pectin 1 tablet  1 tablet Oral Daily    potassium chloride (KLOR-CON) extended release tablet 20 mEq  20 mEq Oral Daily    sodium chloride flush 0.9 % injection 5-40 mL  5-40 mL IntraVENous 2 times per day    sodium chloride flush 0.9 % injection 5-40 mL  5-40 mL IntraVENous PRN    0.9 % sodium chloride infusion   IntraVENous PRN    traMADol (ULTRAM) tablet 50 mg  50 mg Oral Q6H PRN    Or

## 2023-07-17 NOTE — PROGRESS NOTES
Pharmacist Daily Dosing of Warfarin    Indication & Goal INR: AFib, INR Goal 2-3    PTA Warfarin Dose: warfarin 7.5 mg qd     Notable concurrent conditions and medications: Hypoalbuminemia and zosyn    Labs:  Recent Labs     Units 07/17/23  0410 07/16/23  0321 07/15/23  0332   INR   1.1 1.1 1.1   HGB g/dL 8.7*  --   --    PLT K/uL 298  --   --        Impression/Plan:   Will order warfarin 10 mg tonight  SQH will need to be d/c when INR within goal range  Daily INR has been ordered  CBC w/o differential every other day has been ordered     Pharmacy will follow daily and adjust the dose as appropriate.     Thank you,  Tawny Renteria [facility]      Warfarin Protocol    Located on pharmacy Teams site: Clinical Practice -> Anticoagulation & Cardiology -> Anticoagulation Policies, Protocols, Guidance

## 2023-07-17 NOTE — PROGRESS NOTES
I have reviewed discharge instructions with the PATIENT PARENT GUARDIAN: patient and son . The  patient and son  verbalized understanding. Discharge medications reviewed with  patient and son  and appropriate educational materials and side effects teaching were provided. Follow-up appointments reviewed. Opportunity for questions and clarification was provided. Venous access removed without difficulty. Patient's belongings gathered and sent with patient. Patient is ready for discharge. Pt and son educated to follow up with dr Tapan Armas this Friday. Pt understands to follow up with cardiology post PPM insertion. Pt understands weight bearing instructions and to avoid areas of extreme heat. Pt understands to find home health RN tomorrow if does not hear from them. Pt provided infusion agency phone number for any questions. Pt discharging with PICC in R upper arm. PICC is in place, clean dry, intact, and capped.

## 2023-07-17 NOTE — PROGRESS NOTES
Infectious Disease Progress      Impression    Gram-negative sepsis, septic shock  Resolved    Pseudomonas bacteremia  Blood cultures 7/3+ for Pseudomonas aeruginosa 2/4-LAC  Negative cultures 7/7    H/o bilateral big toe ulcerations  Recent ulcer of left big toe, now healed  R/big toe-swollen and discolored  CT of feet + for OM of R/hallux & septic arthritis of right 1st IP joint  Wound cultures 5/17+ for moderate MSSA, scant Pseudomonas  Patient has been on doxycycline PTA  Wound culture 11/2022+ for Pseudomonas, P mirabilis, MSSA  S/p arthrocentesis of1st IP joint by podiatry 7/13  Cultures-rare CoNS, gram variable rods. ? contaminant    Metabolic encephalopathy  Resolved    Diabetes poorly controlled  A1c 8.4    Symptomatic bradycardia  S/p temporary pacemaker 7/7  S/p permanent pacemaker placement 7/13    ALBERTO  Resolved  Cr 1.00    ESR 65  CRP 1.29    Plan    Continue Zosyn IV. This would cover Pseudomonas bacteremia   & Pseudomonas, MSSA seen on toe cultures. Recommend 6 weeks of Zosyn IV. Await cultures  Daily probiotic. May DC from ID standpoint if no c surgery planned   By Podiatry. Please contact ID on call with questions, concerns over the weekend. Antimicrobial orders for discharge  - Zosyn 3.375G   IV Q8h  end date 8/18  -Pull PICC at end of therapy on / after 8/19  -Weekly CBC, CMP- & ESR/ CRP every other week  -Fax reports to [phone], call with critical labs  at [phone]  -Encourage adequate fluids, daily probiotic/yogurt  -If PICC malfunction occurs and home health cannot reposition  please send patient to ED immediately  -ID follow-up -8/17 at 3:30 PM in person or virtual.    May DC from ID standpoint. Will sign off, please reconsult as needed. Patient is currently not bacteremic. WBC is within normal limits at 10.7. Patient requires pacemaker as a life saving measure & his recent bacteremia should not hold up the procedure.   We will plan to continue antimicrobial

## 2023-07-17 NOTE — PROGRESS NOTES
1350: bioscript RN at bedside giving pt and son IV administration education. Informed MD.  1640: pt and family eager to d/c. RN called MD.

## 2023-07-17 NOTE — DISCHARGE INSTRUCTIONS
[address] [facility], [address] [place] [address]  [phone]      Patient Discharge Instructions    Judith Miranda / [id number] : 1933    Admitted 7/3/2023 Discharged: 2023     Active Hospital Problems    Diastolic CHF, chronic ([address])            Echo 81  - mild diastolic dysfunction with            EFx 45%                  Paroxysmal atrial fibrillation (HCC)      Type 2 diabetes mellitus with hyperglycemia, without long-term current use of insulin (HCC)            Story: Diet Controlled                  Hypertension with renal disease      ASCVD (arteriosclerotic cardiovascular disease)            Story: Old ASMI by EKG                  Stage 3 chronic kidney disease (HCC)      Polymyalgia rheumatica (HCC)      Weakness      Bradycardia      Palliative care by specialist      DNR (do not resuscitate) discussion      Goals of care, counseling/discussion      Septicemia ([address])      Ulcer of both feet, limited to breakdown of skin ([address])            Right second toe                       It is important that you take the medication exactly as they are prescribed. Do not take other medications without consulting your doctor. What to do at Next Level of Care    Disposition:  Home    Recommended diet: Diabetic    Recommended activity: Up in House. Minimize exposure to heat    Take Zosyn 3.375 mg every 8 hours from discharge thru 2023    F/U with me on 2023      Information obtained by :  I understand that if any problems occur once I am at home I am to contact my physician. I understand and acknowledge receipt of the instructions indicated above.                                                                                                                                            Physician's or R.N.'s Signature                                                                  Date/Time

## 2023-07-17 NOTE — PLAN OF CARE
Problem: Discharge Planning  Goal: Discharge to home or other facility with appropriate resources  Outcome: [facility] 114 Resolved Met     Problem: Safety - Adult  Goal: Free from fall injury  Outcome: [facility] 114 Resolved Met     Problem: Skin/Tissue Integrity  Goal: Absence of new skin breakdown  Description: 1. Monitor for areas of redness and/or skin breakdown  2. Assess vascular access sites hourly  3. Every 4-6 hours minimum:  Change oxygen saturation probe site  4. Every 4-6 hours:  If on nasal continuous positive airway pressure, respiratory therapy assess nares and determine need for appliance change or resting period.   Outcome: HH/HSPC Resolved Met     Problem: Chronic Conditions and Co-morbidities  Goal: Patient's chronic conditions and co-morbidity symptoms are monitored and maintained or improved  Outcome: [address] [address] Resolved Met     Problem: Respiratory - Adult  Goal: Achieves optimal ventilation and oxygenation  Outcome: HH/HSPC Resolved Met     Problem: ABCDS Injury Assessment  Goal: Absence of physical injury  Outcome: HH/HSPC Resolved Met

## 2023-07-17 NOTE — CARE COORDINATION
Transition of Care Plan:     RUR: 14%  Prior Level of Functioning: Independent  Disposition: Home with family and home health services  And IV infusion services ([facility])  If SNF or IPR: Date FOC offered: N/A  Date FOC received: N/A  Accepting facility: N/A  Date authorization started with reference number: N/A  Date authorization received and expires: N/A  Follow up appointments: To be done prior to discharge. DME needed: None  Transportation at discharge: Family  IM/IMM Medicare/ letter given: To be given prior to discharge. Is patient a [place] and connected with VA? No              If yes, was [employer] transfer form completed and VA notified? N/A  Caregiver Contact: Son  Discharge Caregiver contacted prior to discharge? Caregiver to be contacted prior to discharge. Care Conference needed? No  Barriers to discharge:       UPDATE: Pierre Fofana to meet with Pt and son at 1:30pm for IV ABX teaching. 9:30am: CM received message from Pierre Fofana through [address] KYLER was informed that they are available for IV infusion teaching today. CM was not provided a time. Infusion company is to coordinate a meeting time with Pt and son. [place] to follow for Therapy and Nursing.        Sade Guevara CM [employer]

## 2023-07-18 PROBLEM — R11.2 NAUSEA & VOMITING: Status: RESOLVED | Noted: 2023-06-07 | Resolved: 2023-07-18

## 2023-07-18 PROBLEM — R53.1 WEAKNESS: Status: RESOLVED | Noted: 2023-07-05 | Resolved: 2023-07-18

## 2023-07-18 PROBLEM — Z71.89 GOALS OF CARE, COUNSELING/DISCUSSION: Status: RESOLVED | Noted: 2023-07-05 | Resolved: 2023-07-18

## 2023-07-18 PROBLEM — Z51.5 PALLIATIVE CARE BY SPECIALIST: Status: RESOLVED | Noted: 2023-07-05 | Resolved: 2023-07-18

## 2023-07-18 NOTE — DISCHARGE SUMMARY
seen by Podiatry. An aspiration was obtained from the joint but those studies pending at the present time. At this point, he hemodynamically was stable. He has pacemaker was placed and he has pacing in the low 70s without any complication. It was felt at this point that maximal hospital benefit has been achieved. DISCHARGE MEDICATIONS:  He will be discharged home on IV Zosyn 3.375 mg three times daily for an additional 4 weeks for a total of 6 weeks therapy. His other discharge medications will consist of:  1. Addition of a probiotic once daily. 2.  He would discontinue diclofenac that he was on prior to admission as well as doxycycline. 3.  He will continue his other medications prior to admission which consist of Tylenol 500 mg 2 tablets in the morning and two at bedtime. 4.  Bumex 1 mg daily of which he takes half of a 2 mg tablet. 5.  Multivitamin in the form of Centrum Silver one daily. 6.  Calcium 400 mg tablets daily. 7.  Lanoxin 0.125 daily. 8.  Colace 100 mg 2 capsules b.i.d.  9.  Omega-3 fish oil 2 tablets daily. 10.  Glyburide 2 mg of which he takes 2 tablets every morning  11. Glucosamine chondroitin 500/400 one capsule daily. 12.  Lisinopril 5 mg daily. 13.  Metformin 500 mg b.i.d.  14.  Prednisone 10 mg daily. 15.  Lyrica 75 mg b.i.d. 16.  Zocor 20 mg daily. 17.  Hytrin 2 mg daily. 18.  Vitamin B12 1000 mcg tablet daily. 19.  Vitamin E 400 units daily. 20.  Coumadin 5 mg tablets which she takes two on Mondays and Fridays and one and half on the other days. FOLLOWUP:  He will have followup by me in the office in 4 days. He will have followup by Dr. Doni Cho who placed his pacemaker and I will followup in the office I will monitor his blood counts blood chemistries as well as a sed rate. 50 minutes spent in direct care of this patient today at the time of discharge. Followup with me again as noted.       MD ERIC Osman/[email or web address]  D:  07/17/2023

## 2023-07-19 ENCOUNTER — OFFICE VISIT (OUTPATIENT)
Facility: CLINIC | Age: 88
End: 2023-07-19

## 2023-07-19 VITALS
HEIGHT: 75 IN | RESPIRATION RATE: 18 BRPM | SYSTOLIC BLOOD PRESSURE: 136 MMHG | OXYGEN SATURATION: 98 % | WEIGHT: 201.8 LBS | HEART RATE: 79 BPM | DIASTOLIC BLOOD PRESSURE: 82 MMHG | BODY MASS INDEX: 25.09 KG/M2 | TEMPERATURE: 98.1 F

## 2023-07-19 DIAGNOSIS — I12.9 HYPERTENSION WITH RENAL DISEASE: ICD-10-CM

## 2023-07-19 DIAGNOSIS — N17.9 AKI (ACUTE KIDNEY INJURY) (HCC): ICD-10-CM

## 2023-07-19 DIAGNOSIS — E11.65 POORLY CONTROLLED DIABETES MELLITUS (HCC): ICD-10-CM

## 2023-07-19 DIAGNOSIS — M00.9 SEPTIC ARTHRITIS OF INTERPHALANGEAL JOINT OF TOE OF RIGHT FOOT (HCC): ICD-10-CM

## 2023-07-19 DIAGNOSIS — N18.31 STAGE 3A CHRONIC KIDNEY DISEASE (HCC): ICD-10-CM

## 2023-07-19 DIAGNOSIS — I50.32 DIASTOLIC CHF, CHRONIC (HCC): ICD-10-CM

## 2023-07-19 DIAGNOSIS — M86.171 ACUTE OSTEOMYELITIS OF TOE, RIGHT (HCC): ICD-10-CM

## 2023-07-19 DIAGNOSIS — A49.8 PSEUDOMONAS INFECTION: ICD-10-CM

## 2023-07-19 DIAGNOSIS — A41.9 SEPTICEMIA (HCC): Primary | ICD-10-CM

## 2023-07-19 DIAGNOSIS — Z09 HOSPITAL DISCHARGE FOLLOW-UP: ICD-10-CM

## 2023-07-19 DIAGNOSIS — I45.9 HEART BLOCK: ICD-10-CM

## 2023-07-20 ENCOUNTER — TELEPHONE (OUTPATIENT)
Facility: CLINIC | Age: 88
End: 2023-07-20

## 2023-07-20 LAB
ANION GAP SERPL CALC-SCNC: 5 MMOL/L (ref 5–15)
BASOPHILS # BLD: 0.1 K/UL (ref 0–0.1)
BASOPHILS NFR BLD: 1 % (ref 0–1)
BUN SERPL-MCNC: 26 MG/DL (ref 6–20)
BUN/CREAT SERPL: 17 (ref 12–20)
CALCIUM SERPL-MCNC: 10.1 MG/DL (ref 8.5–10.1)
CHLORIDE SERPL-SCNC: 105 MMOL/L (ref 97–108)
CO2 SERPL-SCNC: 26 MMOL/L (ref 21–32)
CREAT SERPL-MCNC: 1.54 MG/DL (ref 0.7–1.3)
DIFFERENTIAL METHOD BLD: ABNORMAL
EOSINOPHIL # BLD: 0 K/UL (ref 0–0.4)
EOSINOPHIL NFR BLD: 0 % (ref 0–7)
ERYTHROCYTE [DISTWIDTH] IN BLOOD BY AUTOMATED COUNT: 17.5 % (ref 11.5–14.5)
ERYTHROCYTE [SEDIMENTATION RATE] IN BLOOD: 66 MM/HR (ref 0–20)
GLUCOSE SERPL-MCNC: 146 MG/DL (ref 65–100)
HCT VFR BLD AUTO: 32 % (ref 36.6–50.3)
HGB BLD-MCNC: 9 G/DL (ref 12.1–17)
IMM GRANULOCYTES # BLD AUTO: 0.1 K/UL (ref 0–0.04)
IMM GRANULOCYTES NFR BLD AUTO: 1 % (ref 0–0.5)
LYMPHOCYTES # BLD: 1.7 K/UL (ref 0.8–3.5)
LYMPHOCYTES NFR BLD: 18 % (ref 12–49)
MCH RBC QN AUTO: 26.2 PG (ref 26–34)
MCHC RBC AUTO-ENTMCNC: 28.1 G/DL (ref 30–36.5)
MCV RBC AUTO: 93.3 FL (ref 80–99)
MONOCYTES # BLD: 0.6 K/UL (ref 0–1)
MONOCYTES NFR BLD: 6 % (ref 5–13)
NEUTS SEG # BLD: 6.7 K/UL (ref 1.8–8)
NEUTS SEG NFR BLD: 74 % (ref 32–75)
NRBC # BLD: 0 K/UL (ref 0–0.01)
NRBC BLD-RTO: 0 PER 100 WBC
PLATELET # BLD AUTO: 369 K/UL (ref 150–400)
PMV BLD AUTO: 10.1 FL (ref 8.9–12.9)
POTASSIUM SERPL-SCNC: 5.2 MMOL/L (ref 3.5–5.1)
RBC # BLD AUTO: 3.43 M/UL (ref 4.1–5.7)
RBC MORPH BLD: ABNORMAL
SODIUM SERPL-SCNC: 136 MMOL/L (ref 136–145)
WBC # BLD AUTO: 9.2 K/UL (ref 4.1–11.1)

## 2023-07-20 NOTE — TELEPHONE ENCOUNTER
Patient has foot and ankle pain. The pain is prominent in his r foot & ankle. He states his knees hurt and he can barely walk. He has put cream on it and massaged it. He also still has diarrhea. Rebekah Pat states [facility] is out of the probiotics prescribed but they will call them when it is in. She has been giving him Natures Bounty probiotic pills for a month. Please advise.

## 2023-07-21 NOTE — TELEPHONE ENCOUNTER
Discussed note with Dr. Serjio Gonzalez who states that as long as the patient is on the antibiotic the diarrhea is going to continue to occur. He suggested imodium and a probiotic. Also patient states he wore his support hose yesterday and his legs were better.

## 2023-07-24 ENCOUNTER — TELEPHONE (OUTPATIENT)
Age: 88
End: 2023-07-24

## 2023-07-24 NOTE — TELEPHONE ENCOUNTER
Pls call Stacey ortiz/Sylvie        Pt EZ pump malfunctioned for 24 hours - so patient did not get medication on Saturday (per his son)     Pls advise         Best number to reach her is [phone]

## 2023-07-25 NOTE — TELEPHONE ENCOUNTER
MD Chinedu Dwyer LPN  Caller: Unspecified Nola Hlul,  8:53 AM)  Send to ED for evaluation of pump per Instructions

## 2023-07-25 NOTE — TELEPHONE ENCOUNTER
Spoke with [facility] nurse. She said that PICC is fine. That the antibiotic ball didn't work Saturday. Brett Nuñez said that [address] [place] is sending new ball. Patient's son is worried because he missed a dose of antibiotics. They want to know if you want to make it up.

## 2023-07-26 ENCOUNTER — TELEPHONE (OUTPATIENT)
Age: 88
End: 2023-07-26

## 2023-07-26 NOTE — TELEPHONE ENCOUNTER
Pls call Silvia with [facility]     Wants to see if labs have been received   Potassium was high on 7/19/23       Best number to reach her is [phone]

## 2023-07-27 ENCOUNTER — TELEPHONE (OUTPATIENT)
Age: 88
End: 2023-07-27

## 2023-07-27 NOTE — TELEPHONE ENCOUNTER
Spoke with Roel from [facility]. Labs drawn [facility] 7/24/23 and sent to [facility] for results. . [employer] is Infusion company

## 2023-07-27 NOTE — TELEPHONE ENCOUNTER
Please request most recent labs on patient.   If potassium is elevated beyond upper level of normal patient will need to go to ED for evaluation

## 2023-07-28 ENCOUNTER — OFFICE VISIT (OUTPATIENT)
Facility: CLINIC | Age: 88
End: 2023-07-28
Payer: COMMERCIAL

## 2023-07-28 VITALS
OXYGEN SATURATION: 98 % | TEMPERATURE: 97.6 F | BODY MASS INDEX: 25.12 KG/M2 | HEART RATE: 73 BPM | DIASTOLIC BLOOD PRESSURE: 80 MMHG | HEIGHT: 75 IN | WEIGHT: 202 LBS | RESPIRATION RATE: 18 BRPM | SYSTOLIC BLOOD PRESSURE: 131 MMHG

## 2023-07-28 DIAGNOSIS — R42 DIZZINESS: Primary | ICD-10-CM

## 2023-07-28 DIAGNOSIS — I48.0 PAROXYSMAL ATRIAL FIBRILLATION (HCC): ICD-10-CM

## 2023-07-28 DIAGNOSIS — N18.31 STAGE 3A CHRONIC KIDNEY DISEASE (HCC): ICD-10-CM

## 2023-07-28 PROCEDURE — 1123F ACP DISCUSS/DSCN MKR DOCD: CPT | Performed by: INTERNAL MEDICINE

## 2023-07-28 PROCEDURE — 99213 OFFICE O/P EST LOW 20 MIN: CPT | Performed by: INTERNAL MEDICINE

## 2023-07-28 PROCEDURE — G8427 DOCREV CUR MEDS BY ELIG CLIN: HCPCS | Performed by: INTERNAL MEDICINE

## 2023-07-28 PROCEDURE — 1111F DSCHRG MED/CURRENT MED MERGE: CPT | Performed by: INTERNAL MEDICINE

## 2023-07-28 PROCEDURE — 1036F TOBACCO NON-USER: CPT | Performed by: INTERNAL MEDICINE

## 2023-07-28 PROCEDURE — G8419 CALC BMI OUT NRM PARAM NOF/U: HCPCS | Performed by: INTERNAL MEDICINE

## 2023-07-28 RX ORDER — DICLOFENAC SODIUM 75 MG/1
75 TABLET, DELAYED RELEASE ORAL 2 TIMES DAILY
COMMUNITY

## 2023-07-28 RX ORDER — MECLIZINE HCL 12.5 MG/1
12.5 TABLET ORAL 3 TIMES DAILY
Qty: 30 TABLET | Refills: 0 | Status: SHIPPED | OUTPATIENT
Start: 2023-07-28 | End: 2023-08-07

## 2023-07-28 RX ORDER — PREGABALIN 75 MG/1
75 CAPSULE ORAL 2 TIMES DAILY
COMMUNITY

## 2023-07-28 NOTE — PROGRESS NOTES
metFORMIN (GLUCOPHAGE) 500 MG tablet TAKE 1 TABLET BY MOUTH EVERY DAY IN THE MORNING WITH BREAKFAST AND ONE TABLET BEFORE DINNER      predniSONE (DELTASONE) 10 MG tablet Take 1 tablet by mouth daily      simvastatin (ZOCOR) 20 MG tablet Take 1 tablet by mouth nightly      terazosin (HYTRIN) 2 MG capsule Take 1 capsule by mouth nightly      warfarin (COUMADIN) 5 MG tablet TAKE 2 TABLETS BY MOUTH ON MONDAY &FRIDAYS AND 1&1/2 TABLET DAILY ON ALL OTHER DAYS      pregabalin (LYRICA) 75 MG capsule Take one tablet twice a day. 60 capsule 2     No current facility-administered medications for this visit. 1. \"Have you been to the ER, urgent care clinic since your last visit? Hospitalized since your last visit? \" Yes When: few weeks ago Where: [facility] Reason for visit: SEPTIC and pacemaker was put in    2. \"Have you seen or consulted any other health care providers outside of the [address] [place] [address] since your last visit? \" No     3. For patients aged 43-73: Has the patient had a colonoscopy / FIT/ Cologuard? NA - based on age      If the patient is female:    4. For patients aged 43-66: Has the patient had a mammogram within the past 2 years? NA - based on age or sex      11. For patients aged 21-65: Has the patient had a pap smear?  NA - based on age or sex
in the Last Year: Never true   Transportation Needs: Unknown    Lack of Transportation (Medical): Not on file    Lack of Transportation (Non-Medical): No   Physical Activity: Not on file   Stress: Not on file   Social Connections: Not on file   Intimate Partner Violence: Not on file   Housing Stability: Unknown    Unable to Pay for Housing in the Last Year: Not on file    Number of Places Lived in the Last Year: Not on file    Unstable Housing in the Last Year: No     Prior to Admission medications    Medication Sig Start Date End Date Taking? Authorizing Provider   pregabalin (LYRICA) 75 MG capsule Take 1 capsule by mouth 2 times daily. Max Daily Amount: 150 mg   Yes Historical Provider, MD   diclofenac (VOLTAREN) 75 MG EC tablet Take 1 tablet by mouth 2 times daily   Yes Historical Provider, MD   meclizine (ANTIVERT) 12.5 MG tablet Take 1 tablet by mouth in the morning, at noon, and at bedtime for 10 days 7/28/23 8/7/23 Yes Yolie Aquino MD   Lactobacillus Acid-Pectin (ACIDOPHILUS/CITRUS PECTIN) TABS Take 1 tablet by mouth daily 7/18/23  Yes Yolie Aquino MD   acetaminophen (TYLENOL) 500 MG tablet Take 2 tablets by mouth in the morning and at bedtime   Yes Historical Provider, MD   Omega-3 Fatty Acids (FISH OIL) 1200 MG CAPS Take 2,400 mg by mouth in the morning and at bedtime   Yes Historical Provider, MD   glucosamine-chondroitin 500-400 MG CAPS Take 1 capsule by mouth daily   Yes Historical Provider, MD   vitamin E 400 UNIT capsule Take 1 capsule by mouth daily   Yes Historical Provider, MD   vitamin B-12 (CYANOCOBALAMIN) 1000 MCG tablet Take 1 tablet by mouth daily   Yes Historical Provider, MD   docusate sodium (COLACE) 100 MG capsule Take 2 capsules by mouth 2 times daily   Yes Historical Provider, MD   ACCU-CHEK LEE ANN PLUS strip USE TO TEST BLOOD SUGAR TWICE DAILY 6/26/23  Yes Yolie Aquino MD   lisinopril (PRINIVIL;ZESTRIL) 5 MG tablet Take 1 tablet by mouth daily Takes twice a day.    Yes

## 2023-07-29 LAB
ANION GAP SERPL CALC-SCNC: 6 MMOL/L (ref 5–15)
BASOPHILS # BLD: 0.1 K/UL (ref 0–0.1)
BASOPHILS NFR BLD: 1 % (ref 0–1)
BUN SERPL-MCNC: 29 MG/DL (ref 6–20)
BUN/CREAT SERPL: 17 (ref 12–20)
CALCIUM SERPL-MCNC: 10 MG/DL (ref 8.5–10.1)
CHLORIDE SERPL-SCNC: 102 MMOL/L (ref 97–108)
CO2 SERPL-SCNC: 27 MMOL/L (ref 21–32)
CREAT SERPL-MCNC: 1.7 MG/DL (ref 0.7–1.3)
DIFFERENTIAL METHOD BLD: ABNORMAL
EOSINOPHIL # BLD: 0 K/UL (ref 0–0.4)
EOSINOPHIL NFR BLD: 0 % (ref 0–7)
ERYTHROCYTE [DISTWIDTH] IN BLOOD BY AUTOMATED COUNT: 17.1 % (ref 11.5–14.5)
GLUCOSE SERPL-MCNC: 232 MG/DL (ref 65–100)
HCT VFR BLD AUTO: 34.1 % (ref 36.6–50.3)
HGB BLD-MCNC: 9.5 G/DL (ref 12.1–17)
IMM GRANULOCYTES # BLD AUTO: 0.1 K/UL (ref 0–0.04)
IMM GRANULOCYTES NFR BLD AUTO: 1 % (ref 0–0.5)
INR PPP: 2 (ref 0.9–1.1)
LYMPHOCYTES # BLD: 1.6 K/UL (ref 0.8–3.5)
LYMPHOCYTES NFR BLD: 18 % (ref 12–49)
MCH RBC QN AUTO: 25.5 PG (ref 26–34)
MCHC RBC AUTO-ENTMCNC: 27.9 G/DL (ref 30–36.5)
MCV RBC AUTO: 91.7 FL (ref 80–99)
MONOCYTES # BLD: 0.4 K/UL (ref 0–1)
MONOCYTES NFR BLD: 4 % (ref 5–13)
NEUTS SEG # BLD: 6.8 K/UL (ref 1.8–8)
NEUTS SEG NFR BLD: 76 % (ref 32–75)
NRBC # BLD: 0 K/UL (ref 0–0.01)
NRBC BLD-RTO: 0 PER 100 WBC
PLATELET # BLD AUTO: 229 K/UL (ref 150–400)
PMV BLD AUTO: 10.8 FL (ref 8.9–12.9)
POTASSIUM SERPL-SCNC: 4.9 MMOL/L (ref 3.5–5.1)
PROTHROMBIN TIME: 19.8 SEC (ref 9–11.1)
RBC # BLD AUTO: 3.72 M/UL (ref 4.1–5.7)
RBC MORPH BLD: ABNORMAL
SODIUM SERPL-SCNC: 135 MMOL/L (ref 136–145)
WBC # BLD AUTO: 9 K/UL (ref 4.1–11.1)

## 2023-08-01 PROBLEM — D68.69 SECONDARY HYPERCOAGULABLE STATE (HCC): Status: RESOLVED | Noted: 2023-06-07 | Resolved: 2023-08-01

## 2023-08-01 PROBLEM — L03.116 CELLULITIS OF LEFT LOWER EXTREMITY: Status: RESOLVED | Noted: 2020-01-14 | Resolved: 2023-08-01

## 2023-08-01 PROBLEM — I45.9 HEART BLOCK: Status: RESOLVED | Noted: 2023-07-17 | Resolved: 2023-08-01

## 2023-08-01 PROBLEM — L03.115 CELLULITIS OF RIGHT FOOT: Status: RESOLVED | Noted: 2023-05-17 | Resolved: 2023-08-01

## 2023-08-01 PROBLEM — R00.1 BRADYCARDIA: Status: RESOLVED | Noted: 2023-07-05 | Resolved: 2023-08-01

## 2023-08-01 PROBLEM — U07.1 COVID-19: Status: RESOLVED | Noted: 2021-12-21 | Resolved: 2023-08-01

## 2023-08-01 PROBLEM — M25.562 CHRONIC PAIN OF BOTH KNEES: Status: RESOLVED | Noted: 2022-04-18 | Resolved: 2023-08-01

## 2023-08-01 PROBLEM — G89.29 CHRONIC PAIN OF BOTH KNEES: Status: RESOLVED | Noted: 2022-04-18 | Resolved: 2023-08-01

## 2023-08-01 PROBLEM — I44.2 COMPLETE HEART BLOCK (HCC): Status: ACTIVE | Noted: 2023-08-01

## 2023-08-01 PROBLEM — E55.9 VITAMIN D DEFICIENCY: Status: ACTIVE | Noted: 2022-11-17

## 2023-08-01 PROBLEM — A49.8 PSEUDOMONAS INFECTION: Status: RESOLVED | Noted: 2023-07-17 | Resolved: 2023-08-01

## 2023-08-01 PROBLEM — M25.561 CHRONIC PAIN OF BOTH KNEES: Status: RESOLVED | Noted: 2022-04-18 | Resolved: 2023-08-01

## 2023-08-01 PROBLEM — R55 NEAR SYNCOPE: Status: RESOLVED | Noted: 2019-11-25 | Resolved: 2023-08-01

## 2023-08-01 NOTE — PROGRESS NOTES
Chief Complaint   Patient presents with    Follow-up       SUBJECTIVE:    Zack Lunsford. is a 80 y.o. male who had a recent prolonged hospitalization when he presented with sepsis from Pseudomonas secondary to a osteomyelitis and septic joint of his right foot first toe. He is now on his fifth week of IV antibiotics with Zosyn. He is also in follow-up today of his atrial fibrillation, congestive heart failure, CKD, diabetes and atherosclerotic coronary vascular disease. He is tolerating the medicine with some diarrhea even though he is taking a probiotic. He currently denies any chest pain, shortness of breath, palpitations, PND, orthopnea or other cardiac respiratory complaints. He notes no GI or  complaints except diarrhea. There is no complaint of headaches, dizziness or neurologic complaints. He is now getting around with a walker and also walking some with a cane here in office. He notes no other complaints on complete review of systems. Current Outpatient Medications   Medication Sig Dispense Refill    pregabalin (LYRICA) 75 MG capsule Take 1 capsule by mouth 2 times daily.       diclofenac (VOLTAREN) 75 MG EC tablet Take 1 tablet by mouth 2 times daily      meclizine (ANTIVERT) 12.5 MG tablet Take 1 tablet by mouth in the morning, at noon, and at bedtime for 10 days 30 tablet 0    Lactobacillus Acid-Pectin (ACIDOPHILUS/CITRUS PECTIN) TABS Take 1 tablet by mouth daily 60 tablet 0    acetaminophen (TYLENOL) 500 MG tablet Take 2 tablets by mouth in the morning and at bedtime      Omega-3 Fatty Acids (FISH OIL) 1200 MG CAPS Take 2,400 mg by mouth in the morning and at bedtime      glucosamine-chondroitin 500-400 MG CAPS Take 1 capsule by mouth daily      vitamin E 400 UNIT capsule Take 1 capsule by mouth daily      vitamin B-12 (CYANOCOBALAMIN) 1000 MCG tablet Take 1 tablet by mouth daily      docusate sodium (COLACE) 100 MG capsule Take 2 capsules by mouth 2 times daily      ACCU-CHESARAH NANCE

## 2023-08-02 ENCOUNTER — OFFICE VISIT (OUTPATIENT)
Facility: CLINIC | Age: 88
End: 2023-08-02
Payer: COMMERCIAL

## 2023-08-02 VITALS
HEIGHT: 75 IN | TEMPERATURE: 98 F | RESPIRATION RATE: 16 BRPM | WEIGHT: 203 LBS | BODY MASS INDEX: 25.24 KG/M2 | DIASTOLIC BLOOD PRESSURE: 82 MMHG | HEART RATE: 76 BPM | SYSTOLIC BLOOD PRESSURE: 144 MMHG

## 2023-08-02 DIAGNOSIS — E11.65 POORLY CONTROLLED DIABETES MELLITUS (HCC): ICD-10-CM

## 2023-08-02 DIAGNOSIS — I44.2 COMPLETE HEART BLOCK (HCC): ICD-10-CM

## 2023-08-02 DIAGNOSIS — I25.10 ASCVD (ARTERIOSCLEROTIC CARDIOVASCULAR DISEASE): ICD-10-CM

## 2023-08-02 DIAGNOSIS — I48.0 PAROXYSMAL ATRIAL FIBRILLATION (HCC): ICD-10-CM

## 2023-08-02 DIAGNOSIS — I50.32 DIASTOLIC CHF, CHRONIC (HCC): ICD-10-CM

## 2023-08-02 DIAGNOSIS — M86.171 ACUTE OSTEOMYELITIS OF TOE, RIGHT (HCC): ICD-10-CM

## 2023-08-02 DIAGNOSIS — A41.9 SEPTICEMIA (HCC): Primary | ICD-10-CM

## 2023-08-02 PROCEDURE — 1111F DSCHRG MED/CURRENT MED MERGE: CPT | Performed by: INTERNAL MEDICINE

## 2023-08-02 PROCEDURE — 3052F HG A1C>EQUAL 8.0%<EQUAL 9.0%: CPT | Performed by: INTERNAL MEDICINE

## 2023-08-02 PROCEDURE — 1123F ACP DISCUSS/DSCN MKR DOCD: CPT | Performed by: INTERNAL MEDICINE

## 2023-08-02 PROCEDURE — G8419 CALC BMI OUT NRM PARAM NOF/U: HCPCS | Performed by: INTERNAL MEDICINE

## 2023-08-02 PROCEDURE — G8427 DOCREV CUR MEDS BY ELIG CLIN: HCPCS | Performed by: INTERNAL MEDICINE

## 2023-08-02 PROCEDURE — 99213 OFFICE O/P EST LOW 20 MIN: CPT | Performed by: INTERNAL MEDICINE

## 2023-08-02 PROCEDURE — 1036F TOBACCO NON-USER: CPT | Performed by: INTERNAL MEDICINE

## 2023-08-03 LAB
ALBUMIN SERPL-MCNC: 3.2 G/DL (ref 3.5–5)
ALBUMIN/GLOB SERPL: 0.9 (ref 1.1–2.2)
ALP SERPL-CCNC: 60 U/L (ref 45–117)
ALT SERPL-CCNC: 22 U/L (ref 12–78)
ANION GAP SERPL CALC-SCNC: 6 MMOL/L (ref 5–15)
AST SERPL-CCNC: 17 U/L (ref 15–37)
BASOPHILS # BLD: 0 K/UL (ref 0–0.1)
BASOPHILS NFR BLD: 0 % (ref 0–1)
BILIRUB SERPL-MCNC: 0.2 MG/DL (ref 0.2–1)
BUN SERPL-MCNC: 23 MG/DL (ref 6–20)
BUN/CREAT SERPL: 14 (ref 12–20)
CALCIUM SERPL-MCNC: 9.1 MG/DL (ref 8.5–10.1)
CHLORIDE SERPL-SCNC: 104 MMOL/L (ref 97–108)
CO2 SERPL-SCNC: 29 MMOL/L (ref 21–32)
CREAT SERPL-MCNC: 1.61 MG/DL (ref 0.7–1.3)
DIFFERENTIAL METHOD BLD: ABNORMAL
EOSINOPHIL # BLD: 0.1 K/UL (ref 0–0.4)
EOSINOPHIL NFR BLD: 1 % (ref 0–7)
ERYTHROCYTE [DISTWIDTH] IN BLOOD BY AUTOMATED COUNT: 17.1 % (ref 11.5–14.5)
ERYTHROCYTE [SEDIMENTATION RATE] IN BLOOD: 37 MM/HR (ref 0–20)
GLOBULIN SER CALC-MCNC: 3.5 G/DL (ref 2–4)
GLUCOSE SERPL-MCNC: 251 MG/DL (ref 65–100)
HCT VFR BLD AUTO: 30.9 % (ref 36.6–50.3)
HGB BLD-MCNC: 8.8 G/DL (ref 12.1–17)
IMM GRANULOCYTES # BLD AUTO: 0.1 K/UL (ref 0–0.04)
IMM GRANULOCYTES NFR BLD AUTO: 1 % (ref 0–0.5)
INR PPP: 2.4 (ref 0.9–1.1)
LYMPHOCYTES # BLD: 1.8 K/UL (ref 0.8–3.5)
LYMPHOCYTES NFR BLD: 17 % (ref 12–49)
MCH RBC QN AUTO: 25.5 PG (ref 26–34)
MCHC RBC AUTO-ENTMCNC: 28.5 G/DL (ref 30–36.5)
MCV RBC AUTO: 89.6 FL (ref 80–99)
MONOCYTES # BLD: 0.5 K/UL (ref 0–1)
MONOCYTES NFR BLD: 5 % (ref 5–13)
NEUTS SEG # BLD: 8.1 K/UL (ref 1.8–8)
NEUTS SEG NFR BLD: 76 % (ref 32–75)
NRBC # BLD: 0 K/UL (ref 0–0.01)
NRBC BLD-RTO: 0 PER 100 WBC
PLATELET # BLD AUTO: 167 K/UL (ref 150–400)
PMV BLD AUTO: 11.6 FL (ref 8.9–12.9)
POTASSIUM SERPL-SCNC: 4.8 MMOL/L (ref 3.5–5.1)
PROT SERPL-MCNC: 6.7 G/DL (ref 6.4–8.2)
PROTHROMBIN TIME: 23.5 SEC (ref 9–11.1)
RBC # BLD AUTO: 3.45 M/UL (ref 4.1–5.7)
RBC MORPH BLD: ABNORMAL
SODIUM SERPL-SCNC: 139 MMOL/L (ref 136–145)
WBC # BLD AUTO: 10.6 K/UL (ref 4.1–11.1)

## 2023-08-04 ENCOUNTER — ANTI-COAG VISIT (OUTPATIENT)
Facility: CLINIC | Age: 88
End: 2023-08-04

## 2023-08-04 DIAGNOSIS — N40.0 BENIGN PROSTATIC HYPERPLASIA WITHOUT LOWER URINARY TRACT SYMPTOMS: ICD-10-CM

## 2023-08-04 RX ORDER — LISINOPRIL 5 MG/1
TABLET ORAL
Qty: 90 TABLET | Refills: 3 | Status: SHIPPED | OUTPATIENT
Start: 2023-08-04

## 2023-08-04 RX ORDER — TERAZOSIN 2 MG/1
CAPSULE ORAL
Qty: 90 CAPSULE | Refills: 3 | Status: SHIPPED | OUTPATIENT
Start: 2023-08-04

## 2023-08-04 RX ORDER — SIMVASTATIN 20 MG
TABLET ORAL
Qty: 90 TABLET | Refills: 3 | Status: SHIPPED | OUTPATIENT
Start: 2023-08-04

## 2023-08-04 NOTE — TELEPHONE ENCOUNTER
RX refill request from the patient/pharmacy. Patient last seen 08- with labs, and next appt. scheduled for 08-  Requested Prescriptions     Pending Prescriptions Disp Refills    terazosin (HYTRIN) 2 MG capsule [Pharmacy Med Name: TERAZOSIN 2 MG CAPSULE] 90 capsule 3     Sig: TAKE 1 CAPSULE BY MOUTH EVERY DAY    simvastatin (ZOCOR) 20 MG tablet [Pharmacy Med Name: SIMVASTATIN 20 MG TABLET] 90 tablet 3     Sig: TAKE 1 TABLET BY MOUTH EVERY DAY    lisinopril (PRINIVIL;ZESTRIL) 5 MG tablet [Pharmacy Med Name: LISINOPRIL 5 MG TABLET] 90 tablet 3     Sig: TAKE 1 TABLET BY MOUTH EVERY DAY    metFORMIN (GLUCOPHAGE) 500 MG tablet [Pharmacy Med Name: METFORMIN  MG TABLET] 180 tablet 3     Sig: TAKE 1 TABLET BY MOUTH EVERY DAY IN THE MORNING WITH BREAKFAST AND 1 TABLET BEFORE DINNER    .

## 2023-08-04 NOTE — PROGRESS NOTES
Anticoagulation Summary  As of 2023      INR goal:     TTR:  --   INR used for dosin.4 (2023)   Warfarin maintenance plan:  7.5 mg (5 mg x 1.5) every day   Weekly warfarin total:  52.5 mg   Plan last modified:  Mannie Chavis RN (2023)   Next INR check:  2023   Target end date:               Anticoagulation Episode Summary       INR check location:      Preferred lab:      Send INR reminders to:      Comments:             Informed patient that his PT/INR okay; continue the same dose of blood thinner which is 7.5 mg daily.

## 2023-08-16 ENCOUNTER — OFFICE VISIT (OUTPATIENT)
Facility: CLINIC | Age: 88
End: 2023-08-16
Payer: COMMERCIAL

## 2023-08-16 VITALS
BODY MASS INDEX: 26.28 KG/M2 | WEIGHT: 211.4 LBS | OXYGEN SATURATION: 99 % | DIASTOLIC BLOOD PRESSURE: 72 MMHG | TEMPERATURE: 98.1 F | HEART RATE: 72 BPM | SYSTOLIC BLOOD PRESSURE: 124 MMHG | RESPIRATION RATE: 16 BRPM | HEIGHT: 75 IN

## 2023-08-16 DIAGNOSIS — M35.3 POLYMYALGIA RHEUMATICA (HCC): ICD-10-CM

## 2023-08-16 DIAGNOSIS — M86.171 ACUTE OSTEOMYELITIS OF TOE, RIGHT (HCC): ICD-10-CM

## 2023-08-16 DIAGNOSIS — E11.65 POORLY CONTROLLED DIABETES MELLITUS (HCC): ICD-10-CM

## 2023-08-16 DIAGNOSIS — I48.0 PAROXYSMAL ATRIAL FIBRILLATION (HCC): ICD-10-CM

## 2023-08-16 DIAGNOSIS — N18.31 STAGE 3A CHRONIC KIDNEY DISEASE (HCC): ICD-10-CM

## 2023-08-16 DIAGNOSIS — I50.32 DIASTOLIC CHF, CHRONIC (HCC): Primary | ICD-10-CM

## 2023-08-16 PROCEDURE — 1036F TOBACCO NON-USER: CPT | Performed by: INTERNAL MEDICINE

## 2023-08-16 PROCEDURE — G8427 DOCREV CUR MEDS BY ELIG CLIN: HCPCS | Performed by: INTERNAL MEDICINE

## 2023-08-16 PROCEDURE — 1111F DSCHRG MED/CURRENT MED MERGE: CPT | Performed by: INTERNAL MEDICINE

## 2023-08-16 PROCEDURE — 1123F ACP DISCUSS/DSCN MKR DOCD: CPT | Performed by: INTERNAL MEDICINE

## 2023-08-16 PROCEDURE — 99214 OFFICE O/P EST MOD 30 MIN: CPT | Performed by: INTERNAL MEDICINE

## 2023-08-16 PROCEDURE — 3052F HG A1C>EQUAL 8.0%<EQUAL 9.0%: CPT | Performed by: INTERNAL MEDICINE

## 2023-08-16 PROCEDURE — G8419 CALC BMI OUT NRM PARAM NOF/U: HCPCS | Performed by: INTERNAL MEDICINE

## 2023-08-16 ASSESSMENT — PATIENT HEALTH QUESTIONNAIRE - PHQ9
1. LITTLE INTEREST OR PLEASURE IN DOING THINGS: 0
SUM OF ALL RESPONSES TO PHQ QUESTIONS 1-9: 0
SUM OF ALL RESPONSES TO PHQ9 QUESTIONS 1 & 2: 0
SUM OF ALL RESPONSES TO PHQ QUESTIONS 1-9: 0
SUM OF ALL RESPONSES TO PHQ QUESTIONS 1-9: 0
2. FEELING DOWN, DEPRESSED OR HOPELESS: 0
SUM OF ALL RESPONSES TO PHQ QUESTIONS 1-9: 0

## 2023-08-17 LAB
ANION GAP SERPL CALC-SCNC: 8 MMOL/L (ref 5–15)
BASOPHILS # BLD: 0.1 K/UL (ref 0–0.1)
BASOPHILS NFR BLD: 1 % (ref 0–1)
BUN SERPL-MCNC: 29 MG/DL (ref 6–20)
BUN/CREAT SERPL: 18 (ref 12–20)
CALCIUM SERPL-MCNC: 9.2 MG/DL (ref 8.5–10.1)
CHLORIDE SERPL-SCNC: 108 MMOL/L (ref 97–108)
CO2 SERPL-SCNC: 26 MMOL/L (ref 21–32)
CREAT SERPL-MCNC: 1.65 MG/DL (ref 0.7–1.3)
DIFFERENTIAL METHOD BLD: ABNORMAL
EOSINOPHIL # BLD: 0 K/UL (ref 0–0.4)
EOSINOPHIL NFR BLD: 0 % (ref 0–7)
ERYTHROCYTE [DISTWIDTH] IN BLOOD BY AUTOMATED COUNT: 19.1 % (ref 11.5–14.5)
ERYTHROCYTE [SEDIMENTATION RATE] IN BLOOD: 47 MM/HR (ref 0–20)
GLUCOSE SERPL-MCNC: 259 MG/DL (ref 65–100)
HCT VFR BLD AUTO: 31.1 % (ref 36.6–50.3)
HGB BLD-MCNC: 8.7 G/DL (ref 12.1–17)
IMM GRANULOCYTES # BLD AUTO: 0.1 K/UL (ref 0–0.04)
IMM GRANULOCYTES NFR BLD AUTO: 1 % (ref 0–0.5)
INR PPP: 1.8 (ref 0.9–1.1)
LYMPHOCYTES # BLD: 1.2 K/UL (ref 0.8–3.5)
LYMPHOCYTES NFR BLD: 15 % (ref 12–49)
MCH RBC QN AUTO: 25.1 PG (ref 26–34)
MCHC RBC AUTO-ENTMCNC: 28 G/DL (ref 30–36.5)
MCV RBC AUTO: 89.6 FL (ref 80–99)
MONOCYTES # BLD: 0.3 K/UL (ref 0–1)
MONOCYTES NFR BLD: 4 % (ref 5–13)
NEUTS SEG # BLD: 6.6 K/UL (ref 1.8–8)
NEUTS SEG NFR BLD: 79 % (ref 32–75)
NRBC # BLD: 0 K/UL (ref 0–0.01)
NRBC BLD-RTO: 0 PER 100 WBC
PLATELET # BLD AUTO: 204 K/UL (ref 150–400)
PMV BLD AUTO: 10.9 FL (ref 8.9–12.9)
POTASSIUM SERPL-SCNC: 5 MMOL/L (ref 3.5–5.1)
PROTHROMBIN TIME: 18.3 SEC (ref 9–11.1)
RBC # BLD AUTO: 3.47 M/UL (ref 4.1–5.7)
RBC MORPH BLD: ABNORMAL
SODIUM SERPL-SCNC: 142 MMOL/L (ref 136–145)
WBC # BLD AUTO: 8.3 K/UL (ref 4.1–11.1)

## 2023-09-14 RX ORDER — GLIMEPIRIDE 4 MG/1
4 TABLET ORAL
Qty: 90 TABLET | Refills: 1 | Status: SHIPPED | OUTPATIENT
Start: 2023-09-14

## 2023-09-14 NOTE — TELEPHONE ENCOUNTER
RX refill request from the patient/pharmacy. Patient last seen 08-16-23 with labs, and next appt. scheduled for 09-15-23.     Requested Prescriptions     Pending Prescriptions Disp Refills    glimepiride (AMARYL) 4 MG tablet [Pharmacy Med Name: GLIMEPIRIDE 4 MG TABLET] 90 tablet 1     Sig: TAKE 1 TABLET BY MOUTH EVERY DAY BEFORE BREAKFAST

## 2023-09-15 ENCOUNTER — OFFICE VISIT (OUTPATIENT)
Facility: CLINIC | Age: 88
End: 2023-09-15
Payer: COMMERCIAL

## 2023-09-15 VITALS
TEMPERATURE: 97.5 F | SYSTOLIC BLOOD PRESSURE: 132 MMHG | BODY MASS INDEX: 24.45 KG/M2 | DIASTOLIC BLOOD PRESSURE: 74 MMHG | RESPIRATION RATE: 16 BRPM | WEIGHT: 196.6 LBS | HEIGHT: 75 IN | OXYGEN SATURATION: 100 % | HEART RATE: 60 BPM

## 2023-09-15 DIAGNOSIS — I50.32 DIASTOLIC CHF, CHRONIC (HCC): Primary | ICD-10-CM

## 2023-09-15 DIAGNOSIS — I44.2 COMPLETE HEART BLOCK (HCC): ICD-10-CM

## 2023-09-15 DIAGNOSIS — M35.3 POLYMYALGIA RHEUMATICA (HCC): ICD-10-CM

## 2023-09-15 DIAGNOSIS — M86.171 ACUTE OSTEOMYELITIS OF TOE, RIGHT (HCC): ICD-10-CM

## 2023-09-15 DIAGNOSIS — E11.65 POORLY CONTROLLED DIABETES MELLITUS (HCC): ICD-10-CM

## 2023-09-15 DIAGNOSIS — I48.0 PAROXYSMAL ATRIAL FIBRILLATION (HCC): ICD-10-CM

## 2023-09-15 DIAGNOSIS — L97.521 ULCER OF LEFT FOOT, LIMITED TO BREAKDOWN OF SKIN (HCC): ICD-10-CM

## 2023-09-15 DIAGNOSIS — N18.31 STAGE 3A CHRONIC KIDNEY DISEASE (HCC): ICD-10-CM

## 2023-09-15 LAB
ANION GAP SERPL CALC-SCNC: 7 MMOL/L (ref 5–15)
BUN SERPL-MCNC: 50 MG/DL (ref 6–20)
BUN/CREAT SERPL: 29 (ref 12–20)
CALCIUM SERPL-MCNC: 9.3 MG/DL (ref 8.5–10.1)
CHLORIDE SERPL-SCNC: 101 MMOL/L (ref 97–108)
CO2 SERPL-SCNC: 26 MMOL/L (ref 21–32)
CREAT SERPL-MCNC: 1.75 MG/DL (ref 0.7–1.3)
GLUCOSE SERPL-MCNC: 378 MG/DL (ref 65–100)
POTASSIUM SERPL-SCNC: 5.2 MMOL/L (ref 3.5–5.1)
SODIUM SERPL-SCNC: 134 MMOL/L (ref 136–145)

## 2023-09-15 PROCEDURE — G8427 DOCREV CUR MEDS BY ELIG CLIN: HCPCS | Performed by: INTERNAL MEDICINE

## 2023-09-15 PROCEDURE — 99214 OFFICE O/P EST MOD 30 MIN: CPT | Performed by: INTERNAL MEDICINE

## 2023-09-15 PROCEDURE — 3052F HG A1C>EQUAL 8.0%<EQUAL 9.0%: CPT | Performed by: INTERNAL MEDICINE

## 2023-09-15 PROCEDURE — 1123F ACP DISCUSS/DSCN MKR DOCD: CPT | Performed by: INTERNAL MEDICINE

## 2023-09-15 PROCEDURE — G8420 CALC BMI NORM PARAMETERS: HCPCS | Performed by: INTERNAL MEDICINE

## 2023-09-15 PROCEDURE — 1036F TOBACCO NON-USER: CPT | Performed by: INTERNAL MEDICINE

## 2023-09-15 RX ORDER — AMOXICILLIN AND CLAVULANATE POTASSIUM 875; 125 MG/1; MG/1
1 TABLET, FILM COATED ORAL 2 TIMES DAILY
Qty: 20 TABLET | Refills: 0 | Status: SHIPPED | OUTPATIENT
Start: 2023-09-15 | End: 2023-09-25

## 2023-09-15 ASSESSMENT — PATIENT HEALTH QUESTIONNAIRE - PHQ9
1. LITTLE INTEREST OR PLEASURE IN DOING THINGS: 0
SUM OF ALL RESPONSES TO PHQ9 QUESTIONS 1 & 2: 0
2. FEELING DOWN, DEPRESSED OR HOPELESS: 0
SUM OF ALL RESPONSES TO PHQ QUESTIONS 1-9: 0

## 2023-09-15 NOTE — PROGRESS NOTES
Chief Complaint   Patient presents with    Follow-up     1 month f/u       SUBJECTIVE:    Teddy Deleon. is a 80 y.o. male who returns today in follow-up regarding his congestive heart failure, A-fib, complete heart block with recent hospitalization, recent hospitalization with sepsis, ulcer right great toe which resolved after prolonged IV antibiotics and other multiple medical problems. In addition to his chronic issues he now has an acute issue where he has developed recurrent ulcer over the dorsum of his left first toe. That is not the one that was infected when he was in the hospital or caused his sepsis. He notes this is very superficial but he is getting some drainage from and has been using some topical Neosporin ointment. He notes no fevers or chills. There is no pain in the toe. He does note he still having some dizziness and he is concerned it could be related to the Lyrica as Lyrica does state it can be associated with dizziness so has been taking the Lyrica intermittently. He thinks the Lyrica does help his neuropathy but he is not 100% sure that. He notes no chest pain, palpitations, PND, orthopnea or other cardiac or respiratory complaints. He notes no current GI or  complaints. He notes no headaches, dizziness or neurologic complaints. He has no change of his chronic arthritic complaints and there are no other complaints on complete review of systems. Current Outpatient Medications   Medication Sig Dispense Refill    mupirocin (BACTROBAN) 2 % ointment Apply topically 3 times daily.  30 g 0    amoxicillin-clavulanate (AUGMENTIN) 875-125 MG per tablet Take 1 tablet by mouth 2 times daily for 10 days 20 tablet 0    glimepiride (AMARYL) 4 MG tablet TAKE 1 TABLET BY MOUTH EVERY DAY BEFORE BREAKFAST 90 tablet 1    terazosin (HYTRIN) 2 MG capsule TAKE 1 CAPSULE BY MOUTH EVERY DAY 90 capsule 3    simvastatin (ZOCOR) 20 MG tablet TAKE 1 TABLET BY MOUTH EVERY DAY 90 tablet 3

## 2023-09-16 LAB
BASOPHILS # BLD: 0 K/UL (ref 0–0.1)
BASOPHILS NFR BLD: 0 % (ref 0–1)
DIFFERENTIAL METHOD BLD: ABNORMAL
EOSINOPHIL # BLD: 0 K/UL (ref 0–0.4)
EOSINOPHIL NFR BLD: 0 % (ref 0–7)
ERYTHROCYTE [DISTWIDTH] IN BLOOD BY AUTOMATED COUNT: 19.2 % (ref 11.5–14.5)
ERYTHROCYTE [SEDIMENTATION RATE] IN BLOOD: 63 MM/HR (ref 0–20)
HCT VFR BLD AUTO: 31.8 % (ref 36.6–50.3)
HGB BLD-MCNC: 8.9 G/DL (ref 12.1–17)
IMM GRANULOCYTES # BLD AUTO: 0.1 K/UL (ref 0–0.04)
IMM GRANULOCYTES NFR BLD AUTO: 1 % (ref 0–0.5)
INR PPP: 2.2 (ref 0.9–1.1)
LYMPHOCYTES # BLD: 1.8 K/UL (ref 0.8–3.5)
LYMPHOCYTES NFR BLD: 14 % (ref 12–49)
MCH RBC QN AUTO: 23.9 PG (ref 26–34)
MCHC RBC AUTO-ENTMCNC: 28 G/DL (ref 30–36.5)
MCV RBC AUTO: 85.5 FL (ref 80–99)
MONOCYTES # BLD: 0.8 K/UL (ref 0–1)
MONOCYTES NFR BLD: 6 % (ref 5–13)
NEUTS SEG # BLD: 9.9 K/UL (ref 1.8–8)
NEUTS SEG NFR BLD: 79 % (ref 32–75)
NRBC # BLD: 0 K/UL (ref 0–0.01)
NRBC BLD-RTO: 0 PER 100 WBC
PLATELET # BLD AUTO: 256 K/UL (ref 150–400)
PMV BLD AUTO: 10.5 FL (ref 8.9–12.9)
PROTHROMBIN TIME: 22.1 SEC (ref 9–11.1)
RBC # BLD AUTO: 3.72 M/UL (ref 4.1–5.7)
RBC MORPH BLD: ABNORMAL
WBC # BLD AUTO: 12.6 K/UL (ref 4.1–11.1)

## 2023-09-17 LAB
BACTERIA SPEC CULT: ABNORMAL
BACTERIA SPEC CULT: ABNORMAL
GRAM STN SPEC: ABNORMAL
GRAM STN SPEC: ABNORMAL
SERVICE CMNT-IMP: ABNORMAL

## 2023-09-18 ENCOUNTER — OFFICE VISIT (OUTPATIENT)
Facility: CLINIC | Age: 88
End: 2023-09-18
Payer: COMMERCIAL

## 2023-09-18 VITALS
WEIGHT: 192 LBS | RESPIRATION RATE: 18 BRPM | HEART RATE: 74 BPM | HEIGHT: 75 IN | BODY MASS INDEX: 23.87 KG/M2 | DIASTOLIC BLOOD PRESSURE: 73 MMHG | SYSTOLIC BLOOD PRESSURE: 121 MMHG | TEMPERATURE: 97.8 F | OXYGEN SATURATION: 98 %

## 2023-09-18 DIAGNOSIS — R19.7 DIARRHEA, UNSPECIFIED TYPE: ICD-10-CM

## 2023-09-18 DIAGNOSIS — L97.521 ULCER OF BOTH FEET, LIMITED TO BREAKDOWN OF SKIN (HCC): ICD-10-CM

## 2023-09-18 DIAGNOSIS — L97.511 ULCER OF BOTH FEET, LIMITED TO BREAKDOWN OF SKIN (HCC): ICD-10-CM

## 2023-09-18 DIAGNOSIS — L97.511 ULCER OF RIGHT FOOT, LIMITED TO BREAKDOWN OF SKIN (HCC): Primary | ICD-10-CM

## 2023-09-18 PROCEDURE — 1123F ACP DISCUSS/DSCN MKR DOCD: CPT | Performed by: INTERNAL MEDICINE

## 2023-09-18 PROCEDURE — 1036F TOBACCO NON-USER: CPT | Performed by: INTERNAL MEDICINE

## 2023-09-18 PROCEDURE — 99213 OFFICE O/P EST LOW 20 MIN: CPT | Performed by: INTERNAL MEDICINE

## 2023-09-18 PROCEDURE — G8420 CALC BMI NORM PARAMETERS: HCPCS | Performed by: INTERNAL MEDICINE

## 2023-09-18 PROCEDURE — G8428 CUR MEDS NOT DOCUMENT: HCPCS | Performed by: INTERNAL MEDICINE

## 2023-09-18 RX ORDER — SULFAMETHOXAZOLE AND TRIMETHOPRIM 800; 160 MG/1; MG/1
1 TABLET ORAL 2 TIMES DAILY
Qty: 20 TABLET | Refills: 0 | Status: SHIPPED | OUTPATIENT
Start: 2023-09-18 | End: 2023-09-22 | Stop reason: SDUPTHER

## 2023-09-21 PROBLEM — D64.9 ANEMIA: Status: ACTIVE | Noted: 2023-09-21

## 2023-09-21 PROBLEM — R19.7 DIARRHEA: Status: ACTIVE | Noted: 2023-09-21

## 2023-09-22 ENCOUNTER — OFFICE VISIT (OUTPATIENT)
Facility: CLINIC | Age: 88
End: 2023-09-22
Payer: COMMERCIAL

## 2023-09-22 VITALS
RESPIRATION RATE: 16 BRPM | HEART RATE: 80 BPM | SYSTOLIC BLOOD PRESSURE: 104 MMHG | WEIGHT: 189.5 LBS | TEMPERATURE: 98.4 F | OXYGEN SATURATION: 98 % | BODY MASS INDEX: 23.56 KG/M2 | HEIGHT: 75 IN | DIASTOLIC BLOOD PRESSURE: 56 MMHG

## 2023-09-22 DIAGNOSIS — N18.31 STAGE 3A CHRONIC KIDNEY DISEASE (HCC): ICD-10-CM

## 2023-09-22 DIAGNOSIS — L97.511 ULCER OF BOTH FEET, LIMITED TO BREAKDOWN OF SKIN (HCC): Primary | ICD-10-CM

## 2023-09-22 DIAGNOSIS — I50.32 DIASTOLIC CHF, CHRONIC (HCC): ICD-10-CM

## 2023-09-22 DIAGNOSIS — L97.521 ULCER OF BOTH FEET, LIMITED TO BREAKDOWN OF SKIN (HCC): Primary | ICD-10-CM

## 2023-09-22 DIAGNOSIS — I48.0 PAROXYSMAL ATRIAL FIBRILLATION (HCC): ICD-10-CM

## 2023-09-22 DIAGNOSIS — D64.9 ANEMIA, UNSPECIFIED TYPE: ICD-10-CM

## 2023-09-22 DIAGNOSIS — R19.7 DIARRHEA, UNSPECIFIED TYPE: ICD-10-CM

## 2023-09-22 PROCEDURE — 1036F TOBACCO NON-USER: CPT | Performed by: INTERNAL MEDICINE

## 2023-09-22 PROCEDURE — 99214 OFFICE O/P EST MOD 30 MIN: CPT | Performed by: INTERNAL MEDICINE

## 2023-09-22 PROCEDURE — G8420 CALC BMI NORM PARAMETERS: HCPCS | Performed by: INTERNAL MEDICINE

## 2023-09-22 PROCEDURE — G8427 DOCREV CUR MEDS BY ELIG CLIN: HCPCS | Performed by: INTERNAL MEDICINE

## 2023-09-22 PROCEDURE — 1123F ACP DISCUSS/DSCN MKR DOCD: CPT | Performed by: INTERNAL MEDICINE

## 2023-09-22 RX ORDER — SULFAMETHOXAZOLE AND TRIMETHOPRIM 800; 160 MG/1; MG/1
1 TABLET ORAL 2 TIMES DAILY
Qty: 20 TABLET | Refills: 0 | Status: ON HOLD | OUTPATIENT
Start: 2023-09-22 | End: 2023-10-02

## 2023-09-23 ENCOUNTER — HOSPITAL ENCOUNTER (INPATIENT)
Facility: HOSPITAL | Age: 88
LOS: 13 days | Discharge: HOME | End: 2023-10-06
Attending: EMERGENCY MEDICINE | Admitting: INTERNAL MEDICINE
Payer: MEDICARE

## 2023-09-23 ENCOUNTER — APPOINTMENT (OUTPATIENT)
Facility: HOSPITAL | Age: 88
End: 2023-09-23
Payer: MEDICARE

## 2023-09-23 DIAGNOSIS — L97.521 SKIN ULCER OF TOE OF LEFT FOOT, LIMITED TO BREAKDOWN OF SKIN (HCC): ICD-10-CM

## 2023-09-23 DIAGNOSIS — R53.1 GENERALIZED WEAKNESS: Primary | ICD-10-CM

## 2023-09-23 DIAGNOSIS — E86.0 DEHYDRATION: ICD-10-CM

## 2023-09-23 PROBLEM — L03.90 CELLULITIS: Status: ACTIVE | Noted: 2023-09-23

## 2023-09-23 LAB
ALBUMIN SERPL-MCNC: 2.2 G/DL (ref 3.5–5)
ALBUMIN/GLOB SERPL: 0.6 (ref 1.1–2.2)
ALP SERPL-CCNC: 69 U/L (ref 45–117)
ALT SERPL-CCNC: 25 U/L (ref 12–78)
ANION GAP SERPL CALC-SCNC: 5 MMOL/L (ref 5–15)
ANION GAP SERPL CALC-SCNC: 9 MMOL/L (ref 5–15)
APPEARANCE UR: CLEAR
AST SERPL-CCNC: 14 U/L (ref 15–37)
BACTERIA URNS QL MICRO: NEGATIVE /HPF
BASOPHILS # BLD: 0 K/UL (ref 0–0.1)
BASOPHILS # BLD: 0.1 K/UL (ref 0–0.1)
BASOPHILS NFR BLD: 0 % (ref 0–1)
BASOPHILS NFR BLD: 1 % (ref 0–1)
BILIRUB SERPL-MCNC: 0.3 MG/DL (ref 0.2–1)
BILIRUB UR QL: NEGATIVE
BUN SERPL-MCNC: 34 MG/DL (ref 6–20)
BUN SERPL-MCNC: 42 MG/DL (ref 6–20)
BUN/CREAT SERPL: 20 (ref 12–20)
BUN/CREAT SERPL: 21 (ref 12–20)
CALCIUM SERPL-MCNC: 8.4 MG/DL (ref 8.5–10.1)
CALCIUM SERPL-MCNC: 8.8 MG/DL (ref 8.5–10.1)
CHLORIDE SERPL-SCNC: 104 MMOL/L (ref 97–108)
CHLORIDE SERPL-SCNC: 105 MMOL/L (ref 97–108)
CO2 SERPL-SCNC: 23 MMOL/L (ref 21–32)
CO2 SERPL-SCNC: 25 MMOL/L (ref 21–32)
COLOR UR: NORMAL
CREAT SERPL-MCNC: 1.74 MG/DL (ref 0.7–1.3)
CREAT SERPL-MCNC: 1.97 MG/DL (ref 0.7–1.3)
DIFFERENTIAL METHOD BLD: ABNORMAL
DIFFERENTIAL METHOD BLD: ABNORMAL
DIGOXIN SERPL-MCNC: 1.2 NG/ML (ref 0.9–2)
EKG ATRIAL RATE: 101 BPM
EKG DIAGNOSIS: NORMAL
EKG Q-T INTERVAL: 392 MS
EKG QRS DURATION: 156 MS
EKG QTC CALCULATION (BAZETT): 452 MS
EKG R AXIS: 97 DEGREES
EKG T AXIS: -63 DEGREES
EKG VENTRICULAR RATE: 80 BPM
EOSINOPHIL # BLD: 0 K/UL (ref 0–0.4)
EOSINOPHIL # BLD: 0 K/UL (ref 0–0.4)
EOSINOPHIL NFR BLD: 0 % (ref 0–7)
EOSINOPHIL NFR BLD: 0 % (ref 0–7)
EPITH CASTS URNS QL MICRO: NORMAL /LPF
ERYTHROCYTE [DISTWIDTH] IN BLOOD BY AUTOMATED COUNT: 19.5 % (ref 11.5–14.5)
ERYTHROCYTE [DISTWIDTH] IN BLOOD BY AUTOMATED COUNT: 19.8 % (ref 11.5–14.5)
ERYTHROCYTE [SEDIMENTATION RATE] IN BLOOD: 83 MM/HR (ref 0–20)
EST. AVERAGE GLUCOSE BLD GHB EST-MCNC: 189 MG/DL
GLOBULIN SER CALC-MCNC: 4 G/DL (ref 2–4)
GLUCOSE BLD STRIP.AUTO-MCNC: 192 MG/DL (ref 65–117)
GLUCOSE BLD STRIP.AUTO-MCNC: 270 MG/DL (ref 65–117)
GLUCOSE SERPL-MCNC: 217 MG/DL (ref 65–100)
GLUCOSE SERPL-MCNC: 240 MG/DL (ref 65–100)
GLUCOSE UR STRIP.AUTO-MCNC: NEGATIVE MG/DL
HBA1C MFR BLD: 8.2 % (ref 4–5.6)
HCT VFR BLD AUTO: 26.8 % (ref 36.6–50.3)
HCT VFR BLD AUTO: 32.6 % (ref 36.6–50.3)
HGB BLD-MCNC: 8 G/DL (ref 12.1–17)
HGB BLD-MCNC: 9.1 G/DL (ref 12.1–17)
HGB UR QL STRIP: NEGATIVE
HYALINE CASTS URNS QL MICRO: NORMAL /LPF (ref 0–2)
IMM GRANULOCYTES # BLD AUTO: 0 K/UL (ref 0–0.04)
IMM GRANULOCYTES # BLD AUTO: 0.3 K/UL (ref 0–0.04)
IMM GRANULOCYTES NFR BLD AUTO: 0 % (ref 0–0.5)
IMM GRANULOCYTES NFR BLD AUTO: 2 % (ref 0–0.5)
INR PPP: 10.4 (ref 0.9–1.1)
INR PPP: 13.1 (ref 0.9–1.1)
KETONES UR QL STRIP.AUTO: NEGATIVE MG/DL
LACTATE BLD-SCNC: 1.32 MMOL/L (ref 0.4–2)
LEUKOCYTE ESTERASE UR QL STRIP.AUTO: NEGATIVE
LYMPHOCYTES # BLD: 1.4 K/UL (ref 0.8–3.5)
LYMPHOCYTES # BLD: 1.5 K/UL (ref 0.8–3.5)
LYMPHOCYTES NFR BLD: 10 % (ref 12–49)
LYMPHOCYTES NFR BLD: 10 % (ref 12–49)
MCH RBC QN AUTO: 23.8 PG (ref 26–34)
MCH RBC QN AUTO: 23.9 PG (ref 26–34)
MCHC RBC AUTO-ENTMCNC: 27.9 G/DL (ref 30–36.5)
MCHC RBC AUTO-ENTMCNC: 29.9 G/DL (ref 30–36.5)
MCV RBC AUTO: 80 FL (ref 80–99)
MCV RBC AUTO: 85.1 FL (ref 80–99)
MONOCYTES # BLD: 1.2 K/UL (ref 0–1)
MONOCYTES # BLD: 1.2 K/UL (ref 0–1)
MONOCYTES NFR BLD: 8 % (ref 5–13)
MONOCYTES NFR BLD: 9 % (ref 5–13)
NEUTS BAND NFR BLD MANUAL: 9 %
NEUTS SEG # BLD: 10.6 K/UL (ref 1.8–8)
NEUTS SEG # BLD: 12.4 K/UL (ref 1.8–8)
NEUTS SEG NFR BLD: 73 % (ref 32–75)
NEUTS SEG NFR BLD: 78 % (ref 32–75)
NITRITE UR QL STRIP.AUTO: NEGATIVE
NRBC # BLD: 0 K/UL (ref 0–0.01)
NRBC # BLD: 0 K/UL (ref 0–0.01)
NRBC BLD-RTO: 0 PER 100 WBC
NRBC BLD-RTO: 0 PER 100 WBC
PH UR STRIP: 5.5 (ref 5–8)
PLATELET # BLD AUTO: 288 K/UL (ref 150–400)
PLATELET # BLD AUTO: 366 K/UL (ref 150–400)
PMV BLD AUTO: 10 FL (ref 8.9–12.9)
PMV BLD AUTO: 9.4 FL (ref 8.9–12.9)
POTASSIUM SERPL-SCNC: 4.7 MMOL/L (ref 3.5–5.1)
POTASSIUM SERPL-SCNC: 5.6 MMOL/L (ref 3.5–5.1)
PROCALCITONIN SERPL-MCNC: 0.18 NG/ML
PROT SERPL-MCNC: 6.2 G/DL (ref 6.4–8.2)
PROT UR STRIP-MCNC: NEGATIVE MG/DL
PROTHROMBIN TIME: 116.2 SEC (ref 9–11.1)
PROTHROMBIN TIME: 93 SEC (ref 9–11.1)
RBC # BLD AUTO: 3.35 M/UL (ref 4.1–5.7)
RBC # BLD AUTO: 3.83 M/UL (ref 4.1–5.7)
RBC #/AREA URNS HPF: NORMAL /HPF (ref 0–5)
RBC MORPH BLD: ABNORMAL
SARS-COV-2 RDRP RESP QL NAA+PROBE: NOT DETECTED
SERVICE CMNT-IMP: ABNORMAL
SERVICE CMNT-IMP: ABNORMAL
SODIUM SERPL-SCNC: 135 MMOL/L (ref 136–145)
SODIUM SERPL-SCNC: 136 MMOL/L (ref 136–145)
SOURCE: NORMAL
SP GR UR REFRACTOMETRY: 1.02
TROPONIN I SERPL HS-MCNC: 31 NG/L (ref 0–76)
URINE CULTURE IF INDICATED: NORMAL
UROBILINOGEN UR QL STRIP.AUTO: 0.2 EU/DL (ref 0.2–1)
WBC # BLD AUTO: 13.6 K/UL (ref 4.1–11.1)
WBC # BLD AUTO: 15.1 K/UL (ref 4.1–11.1)
WBC URNS QL MICRO: NORMAL /HPF (ref 0–4)

## 2023-09-23 PROCEDURE — 84484 ASSAY OF TROPONIN QUANT: CPT

## 2023-09-23 PROCEDURE — 96365 THER/PROPH/DIAG IV INF INIT: CPT

## 2023-09-23 PROCEDURE — 6360000002 HC RX W HCPCS: Performed by: INTERNAL MEDICINE

## 2023-09-23 PROCEDURE — 73630 X-RAY EXAM OF FOOT: CPT

## 2023-09-23 PROCEDURE — 2580000003 HC RX 258: Performed by: EMERGENCY MEDICINE

## 2023-09-23 PROCEDURE — 80053 COMPREHEN METABOLIC PANEL: CPT

## 2023-09-23 PROCEDURE — 6370000000 HC RX 637 (ALT 250 FOR IP): Performed by: INTERNAL MEDICINE

## 2023-09-23 PROCEDURE — 85610 PROTHROMBIN TIME: CPT

## 2023-09-23 PROCEDURE — 87040 BLOOD CULTURE FOR BACTERIA: CPT

## 2023-09-23 PROCEDURE — 83036 HEMOGLOBIN GLYCOSYLATED A1C: CPT

## 2023-09-23 PROCEDURE — 36415 COLL VENOUS BLD VENIPUNCTURE: CPT

## 2023-09-23 PROCEDURE — 6360000002 HC RX W HCPCS: Performed by: EMERGENCY MEDICINE

## 2023-09-23 PROCEDURE — 96366 THER/PROPH/DIAG IV INF ADDON: CPT

## 2023-09-23 PROCEDURE — 85025 COMPLETE CBC W/AUTO DIFF WBC: CPT

## 2023-09-23 PROCEDURE — 2580000003 HC RX 258: Performed by: INTERNAL MEDICINE

## 2023-09-23 PROCEDURE — 2500000003 HC RX 250 WO HCPCS: Performed by: INTERNAL MEDICINE

## 2023-09-23 PROCEDURE — 87635 SARS-COV-2 COVID-19 AMP PRB: CPT

## 2023-09-23 PROCEDURE — 84145 PROCALCITONIN (PCT): CPT

## 2023-09-23 PROCEDURE — 82962 GLUCOSE BLOOD TEST: CPT

## 2023-09-23 PROCEDURE — 71045 X-RAY EXAM CHEST 1 VIEW: CPT

## 2023-09-23 PROCEDURE — 81001 URINALYSIS AUTO W/SCOPE: CPT

## 2023-09-23 PROCEDURE — 99285 EMERGENCY DEPT VISIT HI MDM: CPT

## 2023-09-23 PROCEDURE — 96375 TX/PRO/DX INJ NEW DRUG ADDON: CPT

## 2023-09-23 PROCEDURE — 83605 ASSAY OF LACTIC ACID: CPT

## 2023-09-23 PROCEDURE — 1100000003 HC PRIVATE W/ TELEMETRY

## 2023-09-23 RX ORDER — SODIUM CHLORIDE 9 MG/ML
INJECTION, SOLUTION INTRAVENOUS PRN
Status: DISCONTINUED | OUTPATIENT
Start: 2023-09-23 | End: 2023-10-06 | Stop reason: HOSPADM

## 2023-09-23 RX ORDER — INSULIN GLARGINE 100 [IU]/ML
5 INJECTION, SOLUTION SUBCUTANEOUS NIGHTLY
Status: DISCONTINUED | OUTPATIENT
Start: 2023-09-23 | End: 2023-09-29

## 2023-09-23 RX ORDER — SODIUM CHLORIDE 0.9 % (FLUSH) 0.9 %
5-40 SYRINGE (ML) INJECTION EVERY 12 HOURS SCHEDULED
Status: DISCONTINUED | OUTPATIENT
Start: 2023-09-23 | End: 2023-09-25

## 2023-09-23 RX ORDER — ACETAMINOPHEN 650 MG/1
650 SUPPOSITORY RECTAL EVERY 6 HOURS PRN
Status: DISCONTINUED | OUTPATIENT
Start: 2023-09-23 | End: 2023-10-06 | Stop reason: HOSPADM

## 2023-09-23 RX ORDER — SODIUM CHLORIDE, SODIUM LACTATE, POTASSIUM CHLORIDE, AND CALCIUM CHLORIDE .6; .31; .03; .02 G/100ML; G/100ML; G/100ML; G/100ML
30 INJECTION, SOLUTION INTRAVENOUS ONCE
Status: COMPLETED | OUTPATIENT
Start: 2023-09-23 | End: 2023-09-23

## 2023-09-23 RX ORDER — SODIUM PHOSPHATE,MONO-DIBASIC 19G-7G/118
1 ENEMA (ML) RECTAL DAILY
Status: DISCONTINUED | OUTPATIENT
Start: 2023-09-23 | End: 2023-09-23

## 2023-09-23 RX ORDER — ONDANSETRON 2 MG/ML
4 INJECTION INTRAMUSCULAR; INTRAVENOUS EVERY 6 HOURS PRN
Status: DISCONTINUED | OUTPATIENT
Start: 2023-09-23 | End: 2023-10-06 | Stop reason: HOSPADM

## 2023-09-23 RX ORDER — ATORVASTATIN CALCIUM 40 MG/1
40 TABLET, FILM COATED ORAL DAILY
Status: DISCONTINUED | OUTPATIENT
Start: 2023-09-23 | End: 2023-10-06 | Stop reason: HOSPADM

## 2023-09-23 RX ORDER — CHOLECALCIFEROL (VITAMIN D3) 125 MCG
1000 CAPSULE ORAL DAILY
Status: DISCONTINUED | OUTPATIENT
Start: 2023-09-23 | End: 2023-10-06 | Stop reason: HOSPADM

## 2023-09-23 RX ORDER — INSULIN GLARGINE 100 [IU]/ML
7 INJECTION, SOLUTION SUBCUTANEOUS NIGHTLY
Status: DISCONTINUED | OUTPATIENT
Start: 2023-09-23 | End: 2023-09-23

## 2023-09-23 RX ORDER — SODIUM CHLORIDE 0.9 % (FLUSH) 0.9 %
5-40 SYRINGE (ML) INJECTION PRN
Status: DISCONTINUED | OUTPATIENT
Start: 2023-09-23 | End: 2023-10-06 | Stop reason: HOSPADM

## 2023-09-23 RX ORDER — PREDNISONE 10 MG/1
10 TABLET ORAL DAILY
Status: DISCONTINUED | OUTPATIENT
Start: 2023-09-23 | End: 2023-09-23

## 2023-09-23 RX ORDER — POLYETHYLENE GLYCOL 3350 17 G/17G
17 POWDER, FOR SOLUTION ORAL DAILY PRN
Status: DISCONTINUED | OUTPATIENT
Start: 2023-09-23 | End: 2023-10-06 | Stop reason: HOSPADM

## 2023-09-23 RX ORDER — DEXTROSE MONOHYDRATE 100 MG/ML
INJECTION, SOLUTION INTRAVENOUS CONTINUOUS PRN
Status: DISCONTINUED | OUTPATIENT
Start: 2023-09-23 | End: 2023-10-06 | Stop reason: HOSPADM

## 2023-09-23 RX ORDER — VITAMIN E 268 MG
400 CAPSULE ORAL DAILY
Status: DISCONTINUED | OUTPATIENT
Start: 2023-09-23 | End: 2023-10-06 | Stop reason: HOSPADM

## 2023-09-23 RX ORDER — INSULIN LISPRO 100 [IU]/ML
0-4 INJECTION, SOLUTION INTRAVENOUS; SUBCUTANEOUS
Status: DISCONTINUED | OUTPATIENT
Start: 2023-09-23 | End: 2023-09-26

## 2023-09-23 RX ORDER — ACETAMINOPHEN 325 MG/1
650 TABLET ORAL EVERY 6 HOURS PRN
Status: DISCONTINUED | OUTPATIENT
Start: 2023-09-23 | End: 2023-10-06 | Stop reason: HOSPADM

## 2023-09-23 RX ORDER — INSULIN LISPRO 100 [IU]/ML
0-4 INJECTION, SOLUTION INTRAVENOUS; SUBCUTANEOUS NIGHTLY
Status: DISCONTINUED | OUTPATIENT
Start: 2023-09-23 | End: 2023-09-26

## 2023-09-23 RX ORDER — DIGOXIN 125 MCG
125 TABLET ORAL DAILY
Status: DISCONTINUED | OUTPATIENT
Start: 2023-09-23 | End: 2023-10-06 | Stop reason: HOSPADM

## 2023-09-23 RX ORDER — PREGABALIN 75 MG/1
75 CAPSULE ORAL 2 TIMES DAILY
Status: DISCONTINUED | OUTPATIENT
Start: 2023-09-23 | End: 2023-10-06 | Stop reason: HOSPADM

## 2023-09-23 RX ORDER — DOXAZOSIN 2 MG/1
2 TABLET ORAL DAILY
Status: DISCONTINUED | OUTPATIENT
Start: 2023-09-23 | End: 2023-10-06 | Stop reason: HOSPADM

## 2023-09-23 RX ORDER — L. ACIDOPHILUS/PECTIN, CITRUS 25MM-100MG
1 TABLET ORAL DAILY
Status: DISCONTINUED | OUTPATIENT
Start: 2023-09-23 | End: 2023-10-06 | Stop reason: HOSPADM

## 2023-09-23 RX ORDER — ONDANSETRON 4 MG/1
4 TABLET, ORALLY DISINTEGRATING ORAL EVERY 8 HOURS PRN
Status: DISCONTINUED | OUTPATIENT
Start: 2023-09-23 | End: 2023-10-06 | Stop reason: HOSPADM

## 2023-09-23 RX ORDER — OMEGA-3S/DHA/EPA/FISH OIL/D3 300MG-1000
400 CAPSULE ORAL DAILY
Status: DISCONTINUED | OUTPATIENT
Start: 2023-09-23 | End: 2023-10-06 | Stop reason: HOSPADM

## 2023-09-23 RX ADMIN — SODIUM CHLORIDE, POTASSIUM CHLORIDE, SODIUM LACTATE AND CALCIUM CHLORIDE 2478 ML: 600; 310; 30; 20 INJECTION, SOLUTION INTRAVENOUS at 12:44

## 2023-09-23 RX ADMIN — CHOLECALCIFEROL TAB 10 MCG (400 UNIT) 400 UNITS: 10 TAB at 21:18

## 2023-09-23 RX ADMIN — SODIUM CHLORIDE, PRESERVATIVE FREE 10 ML: 5 INJECTION INTRAVENOUS at 21:23

## 2023-09-23 RX ADMIN — DIGOXIN 125 MCG: 125 TABLET ORAL at 18:44

## 2023-09-23 RX ADMIN — PREGABALIN 75 MG: 75 CAPSULE ORAL at 21:12

## 2023-09-23 RX ADMIN — DOXAZOSIN 2 MG: 2 TABLET ORAL at 21:17

## 2023-09-23 RX ADMIN — ATORVASTATIN CALCIUM 40 MG: 40 TABLET, FILM COATED ORAL at 18:44

## 2023-09-23 RX ADMIN — PIPERACILLIN AND TAZOBACTAM 4500 MG: 4; .5 INJECTION, POWDER, LYOPHILIZED, FOR SOLUTION INTRAVENOUS at 16:08

## 2023-09-23 RX ADMIN — Medication 1 TABLET: at 21:18

## 2023-09-23 RX ADMIN — WATER 2000 MG: 1 INJECTION INTRAMUSCULAR; INTRAVENOUS; SUBCUTANEOUS at 12:43

## 2023-09-23 RX ADMIN — ACETAMINOPHEN 650 MG: 325 TABLET ORAL at 21:12

## 2023-09-23 RX ADMIN — Medication 1000 MCG: at 21:17

## 2023-09-23 RX ADMIN — PIPERACILLIN AND TAZOBACTAM 3375 MG: 3; .375 INJECTION, POWDER, LYOPHILIZED, FOR SOLUTION INTRAVENOUS at 22:02

## 2023-09-23 RX ADMIN — VANCOMYCIN HYDROCHLORIDE 2000 MG: 10 INJECTION, POWDER, LYOPHILIZED, FOR SOLUTION INTRAVENOUS at 12:44

## 2023-09-23 RX ADMIN — Medication 400 UNITS: at 21:18

## 2023-09-23 ASSESSMENT — PAIN DESCRIPTION - LOCATION: LOCATION: HEAD

## 2023-09-23 ASSESSMENT — PAIN SCALES - GENERAL
PAINLEVEL_OUTOF10: 5
PAINLEVEL_OUTOF10: 0

## 2023-09-23 ASSESSMENT — PAIN DESCRIPTION - ORIENTATION: ORIENTATION: MID

## 2023-09-23 ASSESSMENT — PAIN - FUNCTIONAL ASSESSMENT: PAIN_FUNCTIONAL_ASSESSMENT: ACTIVITIES ARE NOT PREVENTED

## 2023-09-23 ASSESSMENT — PAIN DESCRIPTION - DESCRIPTORS: DESCRIPTORS: ACHING

## 2023-09-23 NOTE — H&P
Hospitalist Admission Note    NAME:   Yasmine Taylor Sr.   : 1933   MRN: [id number]     Date/Time: 2023 4:12 PM    Patient PCP: Mehnaz Ding MD    ______________________________________________________________________  Given the patient's current clinical presentation, I have a high level of concern for decompensation if discharged from the emergency department. Complex decision making was performed, which includes reviewing the patient's available past medical records, laboratory results, and x-ray films. My assessment of this patient's clinical condition and my plan of care is as follows. Assessment / Plan:  Cellulitis  Ulcers on both feet  Start vancomycin and zosyn  Will get Xray of B/L foot. Diastolic heart failure  Clinically appears dry today    Stage 3 a CKD  Avoid nephrotoxic medications      Paroxysmal afib:  Pharmacy consult for warfarin dosing    Anemia:  Monitor    Supra therapeutic INR:  INR from yesterday was 13.1  Hold warfarin today  Will recheck INR today      Type II DM:  Hold oral meds  Start lispro sliding scale  Start lantus 5 units sc nightly. Medical Decision Making:   I personally reviewed labs: cbc, cmp, INR  I personally reviewed imaging:CXR  I personally reviewed EKG:  Toxic drug monitoring: warfarin, daily INR   Discussed case with: ED provider. After discussion I am in agreement that acuity of patient's medical condition necessitates hospital stay. Code Status:   DVT Prophylaxis:   GI Prophylaxis:  Baseline:     Subjective:   CHIEF COMPLAINT: generalized weakness, poor oral intake    HISTORY OF PRESENT ILLNESS:     Yasmine Taylor is a 80 y.o.  male with PMHx significant for BPH, CKD, arthritis, and hx of pseudomonal bacteremia presented to ED with c/o generalized weakness. Son by the bedside. Son states that patient is usually big eater, however, for last 2 days, he has not been able to eat much.  He is more lethargic, and c/o generalized

## 2023-09-23 NOTE — PROGRESS NOTES
Pharmacist Daily Dosing of Warfarin    Indication & Goal INR: AFib, INR Goal 2-3    PTA Warfarin Dose: 7.5 mg daily     Notable concurrent conditions and medications: Bactrim (recently prescribed PTA)    Labs:  Recent Labs     Units 09/23/23  1607 09/23/23  1219 09/22/23  1527   INR   10.4*  --  13.1*   HGB g/dL  --  8.0* 9.1*   PLT K/uL  --  288 366         Impression/Plan:   Hold warfarin x 1 dose for INR = 10.4. Supratherapeutic INR likely due to DDI with Bactrim. Daily INR has been ordered  CBC w/o differential every other day has been ordered     Pharmacy will follow daily and adjust the dose as appropriate.     Thank you,  John Nagel, [facility]

## 2023-09-23 NOTE — ED PROVIDER NOTES
[facility] EMERGENCY DEPT  EMERGENCY DEPARTMENT ENCOUNTER       Pt Name: Tawana Ortiz. MRN: [id number]  [facility] 2/20/1933  Date of evaluation: 9/23/2023  Provider: Tomi Apodaca MD   PCP: Reinier Santamaria MD  Note Started: 11:54 AM EDT 9/23/23     CHIEF COMPLAINT       Chief Complaint   Patient presents with    Fatigue     Pt real weak. Dr. Phan Burgess saw pt and advise to come to ED, but did not want to come. Pt is so weak can't eat and no energy. Has infection in his toes. HISTORY OF PRESENT ILLNESS: 1 or more elements      History From: patient, History limited by: none     Tawana Hernandez is a 80 y.o. male with a history of BPH, CKD, arthritis and history of pseudomonal bacteremia presents emerged department with a chief complaint of weakness. Patient reports that he has had 2 lesions over his bilateral feet for which he has been treating with antibiotics by his primary care. States that these cultures have come back as positive for \"staph. \"  Over the past 2 days has noted increasing weakness, decreased appetite. Denies cough, chest pain, nausea, vomiting or diarrhea. Denies any fevers or chills but does report weakness. Advised to come to ED for fluids yesterday but patient refused. Increasingly weak today. Please See MDM for Additional Details of the HPI/PMH  Nursing Notes were all reviewed and agreed with or any disagreements were addressed in the HPI. REVIEW OF SYSTEMS        Positives and Pertinent negatives as per HPI. 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[address])     DM (diabetes mellitus) ([address]) 9/20/2017 Story: Diet Controlled    ED (erectile dysfunction) 9/20/2017    Edema 9/20/2017    Elevated CPK 9/20/2017    Comments: History of    Elevated LFTs 9/20/2017    Comments: History of    Elevated PSA 9/20/2017    Hypercholesteremia     Hyperlipidemia 9/20/2017    Hypertension     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    Nodule of right lung 9/20/2017    Story: Right    Polymyalgia ([address]) 9/20/2017    Prostate enlargement        Past Surgical History:  Past Surgical History:   Procedure Laterality Date    ARTHROCENTESIS  7/13/2023    CARDIAC PROCEDURE N/A 7/7/2023    Insert temporary pacemaker performed by Malcom Mccullough MD at [facility]  7/4/2023    EP DEVICE PROCEDURE N/A 7/13/2023    Insert or replace transcath PPM leadless performed by Oralia Sandoval MD at [address] [place] [address] CATH LAB    HEENT  06/12/2018    Dr. 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     Social History:  Social History     Tobacco Use    Smoking status: Never    Smokeless tobacco: Never   Vaping Use    Vaping Use: Never used   Substance Use Topics    Alcohol use: No    Drug use: No       Allergies:  No Known Allergies    CURRENT MEDICATIONS      Previous Medications    ACCU-CHEK LEE ANN PLUS STRIP    USE TO TEST BLOOD SUGAR TWICE DAILY    ACETAMINOPHEN (TYLENOL) 500 MG TABLET    Take 2 tablets by mouth in the morning and at bedtime    CHOLECALCIFEROL 400 UNIT TABS TABLET    Take 1 tablet by mouth at bedtime    DIGOXIN (LANOXIN) 125 MCG TABLET    TAKE 1 TABLET BY MOUTH EVERY DAY    GLIMEPIRIDE (AMARYL) 2 MG TABLET    Take 2 tablets by mouth every morning (before breakfast)    GLIMEPIRIDE (AMARYL) 4 MG TABLET    TAKE 1 TABLET BY MOUTH EVERY DAY BEFORE BREAKFAST    GLUCOSAMINE-CHONDROITIN 500-400 MG CAPS    Take 1 capsule Decision-making details documented in ED Course. Radiology: ordered and independent interpretation performed. Decision-making details documented in ED Course. ECG/medicine tests: ordered and independent interpretation performed. Decision-making details documented in ED Course. Risk  Decision regarding hospitalization. ED Course as of 09/23/23 1609   Sat Sep 23, 2023   1222 Preliminary EKG interpreted by me. Shows normal sinus with a HR of 80. There are no ST elevations. Left bundle branch block pathology. [MB]   6160 Plain film of the chest as interpreted by me shows no acute infiltrate or pneumothorax [MB]   1248 Plain film of the bilateral feet show no evidence of fracture or soft tissue gas as interpreted by me. [MB]   7214 Labs showed leukocytosis to 15 with 9% bands, hemoglobin with chronic anemia noted. [MB]   56 Discussed with patient's son, will observe in emergency department for IV antibiotics given patient's generalized weakness. [MB]   [phone] with hospitalist, Dr. Alistair Castro for admission. [MB]      ED Course User Index  [MB] Lola Chavarria MD         FINAL IMPRESSION     1. Generalized weakness    2. Skin ulcer of toe of left foot, limited to breakdown of skin ([address])    3. Dehydration          DISPOSITION/PLAN   Colette Cesar 's  results have been reviewed with him. He has been counseled regarding his diagnosis, treatment, and plan. He verbally conveys understanding and agreement of the signs, symptoms, diagnosis, treatment and prognosis and additionally agrees to follow up as discussed. He also agrees with the care-plan and conveys that all of his questions have been answered. I have also provided discharge instructions for him that include: educational information regarding their diagnosis and treatment, and list of reasons why they would want to return to the ED prior to their follow-up appointment, should his condition change.      CLINICAL IMPRESSION    DISPOSITION

## 2023-09-24 LAB
ANION GAP SERPL CALC-SCNC: 5 MMOL/L (ref 5–15)
BASOPHILS # BLD: 0 K/UL (ref 0–0.1)
BASOPHILS NFR BLD: 0 % (ref 0–1)
BUN SERPL-MCNC: 24 MG/DL (ref 6–20)
BUN/CREAT SERPL: 17 (ref 12–20)
CALCIUM SERPL-MCNC: 7.9 MG/DL (ref 8.5–10.1)
CHLORIDE SERPL-SCNC: 109 MMOL/L (ref 97–108)
CO2 SERPL-SCNC: 19 MMOL/L (ref 21–32)
CREAT SERPL-MCNC: 1.42 MG/DL (ref 0.7–1.3)
DIFFERENTIAL METHOD BLD: ABNORMAL
EOSINOPHIL # BLD: 0.2 K/UL (ref 0–0.4)
EOSINOPHIL NFR BLD: 1 % (ref 0–7)
ERYTHROCYTE [DISTWIDTH] IN BLOOD BY AUTOMATED COUNT: 19.5 % (ref 11.5–14.5)
GLUCOSE BLD STRIP.AUTO-MCNC: 185 MG/DL (ref 65–117)
GLUCOSE BLD STRIP.AUTO-MCNC: 315 MG/DL (ref 65–117)
GLUCOSE BLD STRIP.AUTO-MCNC: 348 MG/DL (ref 65–117)
GLUCOSE BLD STRIP.AUTO-MCNC: 360 MG/DL (ref 65–117)
GLUCOSE SERPL-MCNC: 155 MG/DL (ref 65–100)
HCT VFR BLD AUTO: 24.3 % (ref 36.6–50.3)
HGB BLD-MCNC: 7.2 G/DL (ref 12.1–17)
IMM GRANULOCYTES # BLD AUTO: 0 K/UL (ref 0–0.04)
IMM GRANULOCYTES NFR BLD AUTO: 0 % (ref 0–0.5)
INR PPP: 4.3 (ref 0.9–1.1)
LYMPHOCYTES # BLD: 1.2 K/UL (ref 0.8–3.5)
LYMPHOCYTES NFR BLD: 8 % (ref 12–49)
MCH RBC QN AUTO: 23.8 PG (ref 26–34)
MCHC RBC AUTO-ENTMCNC: 29.6 G/DL (ref 30–36.5)
MCV RBC AUTO: 80.2 FL (ref 80–99)
MONOCYTES # BLD: 0.8 K/UL (ref 0–1)
MONOCYTES NFR BLD: 5 % (ref 5–13)
NEUTS BAND NFR BLD MANUAL: 11 %
NEUTS SEG # BLD: 13.4 K/UL (ref 1.8–8)
NEUTS SEG NFR BLD: 75 % (ref 32–75)
NRBC # BLD: 0.02 K/UL (ref 0–0.01)
NRBC BLD-RTO: 0.1 PER 100 WBC
PLATELET # BLD AUTO: 275 K/UL (ref 150–400)
PMV BLD AUTO: 8.9 FL (ref 8.9–12.9)
POTASSIUM SERPL-SCNC: 5.2 MMOL/L (ref 3.5–5.1)
PROTHROMBIN TIME: 40.6 SEC (ref 9–11.1)
RBC # BLD AUTO: 3.03 M/UL (ref 4.1–5.7)
RBC MORPH BLD: ABNORMAL
SERVICE CMNT-IMP: ABNORMAL
SODIUM SERPL-SCNC: 133 MMOL/L (ref 136–145)
WBC # BLD AUTO: 15.6 K/UL (ref 4.1–11.1)

## 2023-09-24 PROCEDURE — 6360000002 HC RX W HCPCS: Performed by: INTERNAL MEDICINE

## 2023-09-24 PROCEDURE — 99233 SBSQ HOSP IP/OBS HIGH 50: CPT | Performed by: INTERNAL MEDICINE

## 2023-09-24 PROCEDURE — 2500000003 HC RX 250 WO HCPCS: Performed by: INTERNAL MEDICINE

## 2023-09-24 PROCEDURE — 85610 PROTHROMBIN TIME: CPT

## 2023-09-24 PROCEDURE — 36415 COLL VENOUS BLD VENIPUNCTURE: CPT

## 2023-09-24 PROCEDURE — 6370000000 HC RX 637 (ALT 250 FOR IP): Performed by: INTERNAL MEDICINE

## 2023-09-24 PROCEDURE — 85025 COMPLETE CBC W/AUTO DIFF WBC: CPT

## 2023-09-24 PROCEDURE — 1100000003 HC PRIVATE W/ TELEMETRY

## 2023-09-24 PROCEDURE — 80048 BASIC METABOLIC PNL TOTAL CA: CPT

## 2023-09-24 PROCEDURE — 82962 GLUCOSE BLOOD TEST: CPT

## 2023-09-24 PROCEDURE — 2580000003 HC RX 258: Performed by: INTERNAL MEDICINE

## 2023-09-24 RX ADMIN — PREGABALIN 75 MG: 75 CAPSULE ORAL at 21:31

## 2023-09-24 RX ADMIN — ATORVASTATIN CALCIUM 40 MG: 40 TABLET, FILM COATED ORAL at 09:07

## 2023-09-24 RX ADMIN — PREGABALIN 75 MG: 75 CAPSULE ORAL at 09:07

## 2023-09-24 RX ADMIN — PIPERACILLIN AND TAZOBACTAM 3375 MG: 3; .375 INJECTION, POWDER, LYOPHILIZED, FOR SOLUTION INTRAVENOUS at 21:31

## 2023-09-24 RX ADMIN — VANCOMYCIN HYDROCHLORIDE 750 MG: 750 INJECTION, POWDER, LYOPHILIZED, FOR SOLUTION INTRAVENOUS at 10:30

## 2023-09-24 RX ADMIN — Medication 1000 MCG: at 09:07

## 2023-09-24 RX ADMIN — INSULIN LISPRO 4 UNITS: 100 INJECTION, SOLUTION INTRAVENOUS; SUBCUTANEOUS at 21:30

## 2023-09-24 RX ADMIN — INSULIN GLARGINE 5 UNITS: 100 INJECTION, SOLUTION SUBCUTANEOUS at 21:31

## 2023-09-24 RX ADMIN — ACETAMINOPHEN 650 MG: 325 TABLET ORAL at 15:51

## 2023-09-24 RX ADMIN — Medication 1 TABLET: at 09:07

## 2023-09-24 RX ADMIN — CHOLECALCIFEROL TAB 10 MCG (400 UNIT) 400 UNITS: 10 TAB at 09:07

## 2023-09-24 RX ADMIN — Medication 400 UNITS: at 09:07

## 2023-09-24 RX ADMIN — PIPERACILLIN AND TAZOBACTAM 3375 MG: 3; .375 INJECTION, POWDER, LYOPHILIZED, FOR SOLUTION INTRAVENOUS at 13:18

## 2023-09-24 RX ADMIN — PIPERACILLIN AND TAZOBACTAM 3375 MG: 3; .375 INJECTION, POWDER, LYOPHILIZED, FOR SOLUTION INTRAVENOUS at 06:02

## 2023-09-24 RX ADMIN — SODIUM CHLORIDE, PRESERVATIVE FREE 10 ML: 5 INJECTION INTRAVENOUS at 09:14

## 2023-09-24 RX ADMIN — SODIUM CHLORIDE, PRESERVATIVE FREE 10 ML: 5 INJECTION INTRAVENOUS at 21:31

## 2023-09-24 RX ADMIN — DIGOXIN 125 MCG: 125 TABLET ORAL at 13:21

## 2023-09-24 RX ADMIN — Medication 3 UNITS: at 13:18

## 2023-09-24 RX ADMIN — DOXAZOSIN 2 MG: 2 TABLET ORAL at 09:07

## 2023-09-24 RX ADMIN — Medication 3 UNITS: at 18:22

## 2023-09-24 NOTE — PROGRESS NOTES
Pharmacist Daily Dosing of Warfarin    Indication & Goal INR: AFib, INR Goal 2-3    PTA Warfarin Dose: 7.5 mg daily     Notable concurrent conditions and medications: Bactrim (recently prescribed PTA)    Labs:  Recent Labs     Units 09/24/23  1329 09/24/23  0358 09/23/23  1607 09/23/23  1219 09/22/23  1527   INR   4.3*  --  10.4*  --  13.1*   HGB g/dL  --  7.2*  --  8.0* 9.1*   PLT K/uL  --  275  --  288 366         Impression/Plan:   Hold warfarin tonight 9/24 for INR of 4.3  Daily INR has been ordered  CBC w/o differential every other day has been ordered     Pharmacy will follow daily and adjust the dose as appropriate.     Thank you,  Faith Aviles, [address] [place] [address]      Warfarin Protocol    Located on pharmacy Teams site: Clinical Practice -> Anticoagulation & Cardiology -> Anticoagulation Policies, Protocols, Guidance

## 2023-09-24 NOTE — PROGRESS NOTES
Bedside shift change report given to [address] [place] [address] LPN (oncoming nurse) by Qi Chapin RN (offgoing nurse). Report included the following information Nurse Handoff Report, Index, ED Encounter Summary, ED SBAR, Adult Overview, Surgery Report, Intake/Output, MAR, Recent Results, Med Rec Status, and Cardiac Rhythm (A Paced) . Pt had a calm night no complaints made and he was made comfortable. Pt at bedside shift report was awake and alert in bed.

## 2023-09-25 LAB
ANION GAP SERPL CALC-SCNC: 6 MMOL/L (ref 5–15)
BASOPHILS # BLD: 0 K/UL (ref 0–0.1)
BASOPHILS NFR BLD: 0 % (ref 0–1)
BUN SERPL-MCNC: 23 MG/DL (ref 6–20)
BUN/CREAT SERPL: 16 (ref 12–20)
CALCIUM SERPL-MCNC: 7.8 MG/DL (ref 8.5–10.1)
CHLORIDE SERPL-SCNC: 109 MMOL/L (ref 97–108)
CO2 SERPL-SCNC: 23 MMOL/L (ref 21–32)
CREAT SERPL-MCNC: 1.48 MG/DL (ref 0.7–1.3)
DIFFERENTIAL METHOD BLD: ABNORMAL
EOSINOPHIL # BLD: 0.2 K/UL (ref 0–0.4)
EOSINOPHIL NFR BLD: 1 % (ref 0–7)
ERYTHROCYTE [DISTWIDTH] IN BLOOD BY AUTOMATED COUNT: 19.4 % (ref 11.5–14.5)
GLUCOSE BLD STRIP.AUTO-MCNC: 184 MG/DL (ref 65–117)
GLUCOSE BLD STRIP.AUTO-MCNC: 200 MG/DL (ref 65–117)
GLUCOSE BLD STRIP.AUTO-MCNC: 285 MG/DL (ref 65–117)
GLUCOSE BLD STRIP.AUTO-MCNC: 423 MG/DL (ref 65–117)
GLUCOSE SERPL-MCNC: 202 MG/DL (ref 65–100)
HCT VFR BLD AUTO: 20 % (ref 36.6–50.3)
HCT VFR BLD AUTO: 24.6 % (ref 36.6–50.3)
HGB BLD-MCNC: 5.8 G/DL (ref 12.1–17)
HGB BLD-MCNC: 7.2 G/DL (ref 12.1–17)
HISTORY CHECK: NORMAL
IMM GRANULOCYTES # BLD AUTO: 0 K/UL (ref 0–0.04)
IMM GRANULOCYTES NFR BLD AUTO: 0 % (ref 0–0.5)
INR PPP: 2.3 (ref 0.9–1.1)
LYMPHOCYTES # BLD: 2 K/UL (ref 0.8–3.5)
LYMPHOCYTES NFR BLD: 11 % (ref 12–49)
MCH RBC QN AUTO: 24.1 PG (ref 26–34)
MCHC RBC AUTO-ENTMCNC: 29.3 G/DL (ref 30–36.5)
MCV RBC AUTO: 82.3 FL (ref 80–99)
METAMYELOCYTES NFR BLD MANUAL: 1 %
MONOCYTES # BLD: 2 K/UL (ref 0–1)
MONOCYTES NFR BLD: 11 % (ref 5–13)
MYELOCYTES NFR BLD MANUAL: 1 %
NEUTS SEG # BLD: 13.5 K/UL (ref 1.8–8)
NEUTS SEG NFR BLD: 75 % (ref 32–75)
NRBC # BLD: 0 K/UL (ref 0–0.01)
NRBC BLD-RTO: 0 PER 100 WBC
PLATELET # BLD AUTO: 276 K/UL (ref 150–400)
PMV BLD AUTO: 8.9 FL (ref 8.9–12.9)
POTASSIUM SERPL-SCNC: 4.3 MMOL/L (ref 3.5–5.1)
PROTHROMBIN TIME: 23 SEC (ref 9–11.1)
RBC # BLD AUTO: 2.99 M/UL (ref 4.1–5.7)
RBC MORPH BLD: ABNORMAL
RBC MORPH BLD: ABNORMAL
SERVICE CMNT-IMP: ABNORMAL
SODIUM SERPL-SCNC: 138 MMOL/L (ref 136–145)
VANCOMYCIN SERPL-MCNC: 8.3 UG/ML
WBC # BLD AUTO: 18 K/UL (ref 4.1–11.1)

## 2023-09-25 PROCEDURE — C9113 INJ PANTOPRAZOLE SODIUM, VIA: HCPCS | Performed by: INTERNAL MEDICINE

## 2023-09-25 PROCEDURE — 6360000002 HC RX W HCPCS: Performed by: INTERNAL MEDICINE

## 2023-09-25 PROCEDURE — 2580000003 HC RX 258: Performed by: INTERNAL MEDICINE

## 2023-09-25 PROCEDURE — 82962 GLUCOSE BLOOD TEST: CPT

## 2023-09-25 PROCEDURE — 86850 RBC ANTIBODY SCREEN: CPT

## 2023-09-25 PROCEDURE — 85018 HEMOGLOBIN: CPT

## 2023-09-25 PROCEDURE — 85610 PROTHROMBIN TIME: CPT

## 2023-09-25 PROCEDURE — 6370000000 HC RX 637 (ALT 250 FOR IP): Performed by: INTERNAL MEDICINE

## 2023-09-25 PROCEDURE — 36415 COLL VENOUS BLD VENIPUNCTURE: CPT

## 2023-09-25 PROCEDURE — 86901 BLOOD TYPING SEROLOGIC RH(D): CPT

## 2023-09-25 PROCEDURE — 80048 BASIC METABOLIC PNL TOTAL CA: CPT

## 2023-09-25 PROCEDURE — 85025 COMPLETE CBC W/AUTO DIFF WBC: CPT

## 2023-09-25 PROCEDURE — 85014 HEMATOCRIT: CPT

## 2023-09-25 PROCEDURE — 99233 SBSQ HOSP IP/OBS HIGH 50: CPT | Performed by: INTERNAL MEDICINE

## 2023-09-25 PROCEDURE — 1100000003 HC PRIVATE W/ TELEMETRY

## 2023-09-25 PROCEDURE — 86923 COMPATIBILITY TEST ELECTRIC: CPT

## 2023-09-25 PROCEDURE — 86900 BLOOD TYPING SEROLOGIC ABO: CPT

## 2023-09-25 PROCEDURE — 80202 ASSAY OF VANCOMYCIN: CPT

## 2023-09-25 PROCEDURE — 2500000003 HC RX 250 WO HCPCS: Performed by: INTERNAL MEDICINE

## 2023-09-25 PROCEDURE — A4216 STERILE WATER/SALINE, 10 ML: HCPCS | Performed by: INTERNAL MEDICINE

## 2023-09-25 RX ORDER — SODIUM CHLORIDE 450 MG/100ML
INJECTION, SOLUTION INTRAVENOUS CONTINUOUS
Status: DISCONTINUED | OUTPATIENT
Start: 2023-09-25 | End: 2023-10-01

## 2023-09-25 RX ORDER — SODIUM CHLORIDE 9 MG/ML
INJECTION, SOLUTION INTRAVENOUS PRN
Status: DISCONTINUED | OUTPATIENT
Start: 2023-09-25 | End: 2023-10-05

## 2023-09-25 RX ADMIN — Medication 400 UNITS: at 08:51

## 2023-09-25 RX ADMIN — Medication 1000 MCG: at 08:51

## 2023-09-25 RX ADMIN — VANCOMYCIN HYDROCHLORIDE 750 MG: 750 INJECTION, POWDER, LYOPHILIZED, FOR SOLUTION INTRAVENOUS at 08:52

## 2023-09-25 RX ADMIN — Medication 1 TABLET: at 08:51

## 2023-09-25 RX ADMIN — SODIUM CHLORIDE 40 MG: 9 INJECTION INTRAMUSCULAR; INTRAVENOUS; SUBCUTANEOUS at 20:22

## 2023-09-25 RX ADMIN — INSULIN LISPRO 4 UNITS: 100 INJECTION, SOLUTION INTRAVENOUS; SUBCUTANEOUS at 20:22

## 2023-09-25 RX ADMIN — INSULIN GLARGINE 5 UNITS: 100 INJECTION, SOLUTION SUBCUTANEOUS at 20:23

## 2023-09-25 RX ADMIN — PREGABALIN 75 MG: 75 CAPSULE ORAL at 20:23

## 2023-09-25 RX ADMIN — CEFEPIME 2000 MG: 2 INJECTION, POWDER, FOR SOLUTION INTRAVENOUS at 10:36

## 2023-09-25 RX ADMIN — ACETAMINOPHEN 650 MG: 325 TABLET ORAL at 20:22

## 2023-09-25 RX ADMIN — Medication 2 UNITS: at 17:05

## 2023-09-25 RX ADMIN — SODIUM CHLORIDE: 4.5 INJECTION, SOLUTION INTRAVENOUS at 08:56

## 2023-09-25 RX ADMIN — Medication 1 UNITS: at 08:51

## 2023-09-25 RX ADMIN — CHOLECALCIFEROL TAB 10 MCG (400 UNIT) 400 UNITS: 10 TAB at 08:51

## 2023-09-25 RX ADMIN — ATORVASTATIN CALCIUM 40 MG: 40 TABLET, FILM COATED ORAL at 08:51

## 2023-09-25 RX ADMIN — PREGABALIN 75 MG: 75 CAPSULE ORAL at 08:51

## 2023-09-25 RX ADMIN — SODIUM CHLORIDE 40 MG: 9 INJECTION INTRAMUSCULAR; INTRAVENOUS; SUBCUTANEOUS at 10:36

## 2023-09-25 RX ADMIN — PIPERACILLIN AND TAZOBACTAM 3375 MG: 3; .375 INJECTION, POWDER, LYOPHILIZED, FOR SOLUTION INTRAVENOUS at 05:58

## 2023-09-25 NOTE — CARE COORDINATION
Care Management Initial Assessment       RUR: 21%  Readmission? No  1st IM letter given? Yes, given by Patient Access Team on 9/24/23  1st  letter given: N/A    Initial note - 2055 PM: Chart reviewed. CM met with pt at the bedside to introduce self and role. Verified contact information and demographics. Pt resides with his son and daughter-in-law in a multi level home with 4 VIMAL the garage, 2 VIMAL to get inside. Pt PCP is Dr. Nicholas Liao with last visit being last Friday (9/22/23). Preferred pharmacy is [facility] on [address] Pt has a hx of [address] services with an agency out of [place], [place]. Pt unable to recall the name of the agency used. Pt has no hx of a SNF stay. Pt is independent with ADL's and is an active . Pt has no DME needs as he has a cane and walker at home. Pt family able to transport him home upon d/c. Full assessment below:     09/25/23 1422   Service Assessment   Patient Orientation Alert and Oriented;Person;Place;Situation;Self   Cognition Alert   History Provided By Patient   Primary Caregiver Self   Support Systems Children;Family Members   Patient's Healthcare Decision Maker is: Named in [facility]   PCP Verified by [place] Yes  Deshawn Ramires)   Last Visit to PCP   (last Friday (9/22/23))   Prior Functional Level Independent in ADLs/IADLs   Current Functional Level Independent in ADLs/IADLs   Can patient return to prior living arrangement Yes   Ability to make needs known: Good   Family able to assist with home care needs: Yes   Would you like for me to discuss the discharge plan with any other family members/significant others, and if so, who?  Yes   Financial Resources Medicare  ([place] [employer]   [employer] None   Social/Functional History   Lives With [employer]   Type of [address] [place] [address] Avenue to enter without rails   Entrance Stairs - Number of Steps [place], rolling;Cane   Receives Help From [address] [facility]   Ambulation Assistance Independent   Transfer Assistance Independent   Active  Yes   Occupation Retired   Discharge Planning   Type of [address] Children;Family Members   Current Services Prior To Admission None   Potential Assistance Needed N/A   DME Ordered?  No   Potential Assistance Purchasing Medications No   Type of Home Care Services None   Patient expects to be discharged to: [facility]    [phone]

## 2023-09-25 NOTE — CONSULTS
GI CONSULTATION NOTE  Manasa Brantley NP  [phone] NP in-hospital cell phone M-F until 4:30  After 5pm or on weekends, please call  for physician on call    NAME: Hal Tracey Sr.   :  1933   MRN:  [id number]   Attending: Dr. Mounika Lewis  Primary GI: Dr. Nadir Mancilla  Date/Time:  2023 10:53 AM  Assessment:   Melena  Anemia  Black stools during this admission  INR supra therapeutic on admission at 13.1 - improved  Hgb dropped to 7.2 (9.1 on admission 23 - seems to be baseline)  Denies NSAID use    Bilateral Cellulitis  Paroxysmal afib -Coumadin on held  Plan:   No plan for endoscopic evaluation at this time - melena likely due to supra therapeutic INR on admission  PPI BID  Diet as tolerated  Supportive measures per primary team. Monitor for further S&S of GI bleed  Serial H&H as clinically indicated. Goal hemoglobin >7  Avoid NSAIDs    Plan discussed with Dr. Nadir Mancilla  Subjective:     HISTORY OF PRESENT ILLNESS:     Lorena Mak. is an 80 y.o.  male who we are asked to see for complaint of melena/anemia. Patient presented to the ED on 23 with complaints of generalized weakness and decline in appetite over the last few days. He had been treated for cellulitis bilaterally with PO abx with little improvement. On admission, his INR was noted to be 13.1. Over the weekend he noted multiple melanotic stools and a hgb drop to 7.2, was 9.1 on admission which is likely his baseline. Does not recall ever having an EGD. Last CLN was several years ago with no hx of polyps. No abd pain. No nausea or vomiting. 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

## 2023-09-25 NOTE — PROGRESS NOTES
Pharmacist Daily Dosing of Warfarin    Indication & Goal INR: AFib, INR Goal 2-3    PTA Warfarin Dose: 7.5 mg daily     Notable concurrent conditions and medications: Bactrim (recently prescribed PTA)    Labs:  Recent Labs     Units 09/25/23  0240 09/24/23  1329 09/24/23  0358 09/23/23  1607 09/23/23  1219   INR   2.3* 4.3*  --  10.4*  --    HGB g/dL 7.2*  --  7.2*  --  8.0*   PLT K/uL 276  --  275  --  288     Impression/Plan:   Will order warfarin 7.5 mg PO x 1 dose   Daily INR has been ordered  CBC w/o differential every other day has been ordered     Pharmacy will follow daily and adjust the dose as appropriate.     Thank you,  Vinicio Montoya, [facility]

## 2023-09-25 NOTE — PROGRESS NOTES
Comprehensive Nutrition Assessment    Type and Reason for Visit:  Initial, Positive Nutrition Screen    Nutrition Recommendations/Plan:   Add carb restrictions to GI bland diet      Malnutrition Assessment:  Malnutrition Status:  Insufficient data (09/25/23 1423)      Nutrition Assessment:    Patient medically noted for cellulitis, generalized weakness, dehydration, and toe ulcers. PMH CHF, HTN, AFIB, CKD, and DM. Patient sleeping at time of attempted visit and did not respond to voice. Advanced to a GI bland diet per GI this morning. Will add carb controlled restriction given elevated BG. Difficult to assess weight loss given differing/fluctuating weights; currently 182# as of 9/23 and was 189# on 9/22. Wound care consulted. Encourage intake of meals. Will monitor PO and need for ONS. Nutrition Related Findings:    [phone]   BM 9/24   Atorvastatin, Lantus, Humalog, Protonix, Vitamin B12, Vitamin D, Vitamin E, Coumadin   Wound Type: Wound Consult Pending       Current Nutrition Intake & Therapies:          ADULT DIET; Regular; GI Garvin (GERD/Peptic Ulcer)    Anthropometric Measures:  Height: 190.5 cm (6' 3\")  Ideal Body Weight (IBW): 196 lbs (89 kg)       Current Body Weight: 182 lb 1.6 oz (82.6 kg),   IBW. Current BMI (kg/m2): 22.8                          BMI Categories: Normal Weight (BMI 18.5-24. 9)    Estimated Daily Nutrient Needs:  Energy Requirements Based On: Formula  Weight Used for Energy Requirements: Current  Energy (kcal/day): 2044 kcals (BMR x 1. 3AF)  Weight Used for Protein Requirements: Current  Protein (g/day): 83-99g (1.0-1.2 g/kg bw)  Method Used for Fluid Requirements: 1 ml/kcal  Fluid (ml/day): 2000 mL    Nutrition Diagnosis:   Altered nutrition-related lab values related to endocrine dysfuntion as evidenced by  [phone]    Nutrition Interventions:   Food and/or Nutrient Delivery: Modify Current Diet  Nutrition Education/Counseling: No recommendation at this time  Coordination of Nutrition Care: Continue to monitor while inpatient       Goals:     Goals: PO intake 75% or greater, by next RD assessment       Nutrition Monitoring and Evaluation:   Behavioral-Environmental Outcomes: None Identified  Food/Nutrient Intake Outcomes: Food and Nutrient Intake  Physical Signs/Symptoms Outcomes: Biochemical Data, Weight    Discharge Planning:    Continue current diet     Snow Viera RD  Contact: ext [phone]

## 2023-09-25 NOTE — PROGRESS NOTES
Bedside shift change report given to [address] [place] [address] (oncoming nurse) by Suhas Ross RN (offgoing nurse). Report included the following information Nurse Handoff Report, Index, Adult Overview, Surgery Report, Intake/Output, MAR, Recent Results, Med Rec Status, and Cardiac Rhythm (NSR) . Pt had a calm night and no complaints were made. Pt at bedside shift report was alert and awake in bed. Pt is NPO and on GI consult.

## 2023-09-26 LAB
ANION GAP SERPL CALC-SCNC: 6 MMOL/L (ref 5–15)
BASOPHILS # BLD: 0 K/UL (ref 0–0.1)
BASOPHILS NFR BLD: 0 % (ref 0–1)
BUN SERPL-MCNC: 18 MG/DL (ref 6–20)
BUN/CREAT SERPL: 14 (ref 12–20)
CALCIUM SERPL-MCNC: 7.6 MG/DL (ref 8.5–10.1)
CHLORIDE SERPL-SCNC: 108 MMOL/L (ref 97–108)
CO2 SERPL-SCNC: 22 MMOL/L (ref 21–32)
COMMENT:: NORMAL
CREAT SERPL-MCNC: 1.26 MG/DL (ref 0.7–1.3)
DIFFERENTIAL METHOD BLD: ABNORMAL
DIGOXIN SERPL-MCNC: 0.6 NG/ML (ref 0.9–2)
EOSINOPHIL # BLD: 0 K/UL (ref 0–0.4)
EOSINOPHIL NFR BLD: 0 % (ref 0–7)
ERYTHROCYTE [DISTWIDTH] IN BLOOD BY AUTOMATED COUNT: 19.4 % (ref 11.5–14.5)
GLUCOSE BLD STRIP.AUTO-MCNC: 196 MG/DL (ref 65–117)
GLUCOSE BLD STRIP.AUTO-MCNC: 212 MG/DL (ref 65–117)
GLUCOSE BLD STRIP.AUTO-MCNC: 281 MG/DL (ref 65–117)
GLUCOSE BLD STRIP.AUTO-MCNC: 305 MG/DL (ref 65–117)
GLUCOSE BLD STRIP.AUTO-MCNC: 398 MG/DL (ref 65–117)
GLUCOSE SERPL-MCNC: 208 MG/DL (ref 65–100)
HCT VFR BLD AUTO: 29.1 % (ref 36.6–50.3)
HCT VFR BLD AUTO: 31.1 % (ref 36.6–50.3)
HCT VFR BLD AUTO: 31.4 % (ref 36.6–50.3)
HCT VFR BLD AUTO: 31.5 % (ref 36.6–50.3)
HGB BLD-MCNC: 8.9 G/DL (ref 12.1–17)
HGB BLD-MCNC: 9.6 G/DL (ref 12.1–17)
HGB BLD-MCNC: 9.7 G/DL (ref 12.1–17)
HGB BLD-MCNC: 9.7 G/DL (ref 12.1–17)
IMM GRANULOCYTES # BLD AUTO: 0 K/UL (ref 0–0.04)
IMM GRANULOCYTES NFR BLD AUTO: 0 % (ref 0–0.5)
INR PPP: 1.4 (ref 0.9–1.1)
LACTATE SERPL-SCNC: 1.3 MMOL/L (ref 0.4–2)
LYMPHOCYTES # BLD: 1.5 K/UL (ref 0.8–3.5)
LYMPHOCYTES NFR BLD: 7 % (ref 12–49)
MCH RBC QN AUTO: 25.2 PG (ref 26–34)
MCHC RBC AUTO-ENTMCNC: 30.6 G/DL (ref 30–36.5)
MCV RBC AUTO: 82.4 FL (ref 80–99)
METAMYELOCYTES NFR BLD MANUAL: 1 %
MONOCYTES # BLD: 1.1 K/UL (ref 0–1)
MONOCYTES NFR BLD: 5 % (ref 5–13)
NEUTS BAND NFR BLD MANUAL: 1 %
NEUTS SEG # BLD: 19.1 K/UL (ref 1.8–8)
NEUTS SEG NFR BLD: 86 % (ref 32–75)
NRBC # BLD: 0.02 K/UL (ref 0–0.01)
NRBC BLD-RTO: 0.1 PER 100 WBC
PLATELET # BLD AUTO: 273 K/UL (ref 150–400)
PMV BLD AUTO: 8.7 FL (ref 8.9–12.9)
POTASSIUM SERPL-SCNC: 4.1 MMOL/L (ref 3.5–5.1)
PROTHROMBIN TIME: 14.3 SEC (ref 9–11.1)
RBC # BLD AUTO: 3.53 M/UL (ref 4.1–5.7)
RBC MORPH BLD: ABNORMAL
SERVICE CMNT-IMP: ABNORMAL
SODIUM SERPL-SCNC: 136 MMOL/L (ref 136–145)
SPECIMEN HOLD: NORMAL
WBC # BLD AUTO: 21.9 K/UL (ref 4.1–11.1)

## 2023-09-26 PROCEDURE — 80162 ASSAY OF DIGOXIN TOTAL: CPT

## 2023-09-26 PROCEDURE — 36415 COLL VENOUS BLD VENIPUNCTURE: CPT

## 2023-09-26 PROCEDURE — 6370000000 HC RX 637 (ALT 250 FOR IP): Performed by: INTERNAL MEDICINE

## 2023-09-26 PROCEDURE — C9113 INJ PANTOPRAZOLE SODIUM, VIA: HCPCS | Performed by: INTERNAL MEDICINE

## 2023-09-26 PROCEDURE — 2580000003 HC RX 258: Performed by: INTERNAL MEDICINE

## 2023-09-26 PROCEDURE — 85025 COMPLETE CBC W/AUTO DIFF WBC: CPT

## 2023-09-26 PROCEDURE — A4216 STERILE WATER/SALINE, 10 ML: HCPCS | Performed by: INTERNAL MEDICINE

## 2023-09-26 PROCEDURE — 85018 HEMOGLOBIN: CPT

## 2023-09-26 PROCEDURE — 99233 SBSQ HOSP IP/OBS HIGH 50: CPT | Performed by: INTERNAL MEDICINE

## 2023-09-26 PROCEDURE — 87040 BLOOD CULTURE FOR BACTERIA: CPT

## 2023-09-26 PROCEDURE — 83605 ASSAY OF LACTIC ACID: CPT

## 2023-09-26 PROCEDURE — 2500000003 HC RX 250 WO HCPCS: Performed by: INTERNAL MEDICINE

## 2023-09-26 PROCEDURE — 36430 TRANSFUSION BLD/BLD COMPNT: CPT

## 2023-09-26 PROCEDURE — P9016 RBC LEUKOCYTES REDUCED: HCPCS

## 2023-09-26 PROCEDURE — 85014 HEMATOCRIT: CPT

## 2023-09-26 PROCEDURE — 1100000003 HC PRIVATE W/ TELEMETRY

## 2023-09-26 PROCEDURE — 85610 PROTHROMBIN TIME: CPT

## 2023-09-26 PROCEDURE — 82962 GLUCOSE BLOOD TEST: CPT

## 2023-09-26 PROCEDURE — 80048 BASIC METABOLIC PNL TOTAL CA: CPT

## 2023-09-26 PROCEDURE — 6360000002 HC RX W HCPCS: Performed by: INTERNAL MEDICINE

## 2023-09-26 RX ORDER — INSULIN LISPRO 100 [IU]/ML
0-6 INJECTION, SOLUTION INTRAVENOUS; SUBCUTANEOUS ONCE
Status: COMPLETED | OUTPATIENT
Start: 2023-09-26 | End: 2023-09-26

## 2023-09-26 RX ORDER — INSULIN LISPRO 100 [IU]/ML
0-14 INJECTION, SOLUTION INTRAVENOUS; SUBCUTANEOUS NIGHTLY
Status: DISCONTINUED | OUTPATIENT
Start: 2023-09-26 | End: 2023-10-06 | Stop reason: HOSPADM

## 2023-09-26 RX ORDER — INSULIN LISPRO 100 [IU]/ML
0-14 INJECTION, SOLUTION INTRAVENOUS; SUBCUTANEOUS
Status: DISCONTINUED | OUTPATIENT
Start: 2023-09-26 | End: 2023-10-06 | Stop reason: HOSPADM

## 2023-09-26 RX ORDER — INSULIN LISPRO 100 [IU]/ML
0-6 INJECTION, SOLUTION INTRAVENOUS; SUBCUTANEOUS
Status: DISCONTINUED | OUTPATIENT
Start: 2023-09-26 | End: 2023-09-26

## 2023-09-26 RX ORDER — CASTOR OIL AND BALSAM, PERU 788; 87 MG/G; MG/G
OINTMENT TOPICAL 2 TIMES DAILY
Status: DISCONTINUED | OUTPATIENT
Start: 2023-09-26 | End: 2023-10-06 | Stop reason: HOSPADM

## 2023-09-26 RX ORDER — INSULIN LISPRO 100 [IU]/ML
0-14 INJECTION, SOLUTION INTRAVENOUS; SUBCUTANEOUS
Status: DISCONTINUED | OUTPATIENT
Start: 2023-09-27 | End: 2023-09-26

## 2023-09-26 RX ORDER — IBUPROFEN 400 MG/1
800 TABLET ORAL EVERY 6 HOURS PRN
Status: DISCONTINUED | OUTPATIENT
Start: 2023-09-26 | End: 2023-09-26

## 2023-09-26 RX ADMIN — Medication: at 09:18

## 2023-09-26 RX ADMIN — ACETAMINOPHEN 650 MG: 325 TABLET ORAL at 21:22

## 2023-09-26 RX ADMIN — CEFEPIME 2000 MG: 2 INJECTION, POWDER, FOR SOLUTION INTRAVENOUS at 09:06

## 2023-09-26 RX ADMIN — SODIUM CHLORIDE 40 MG: 9 INJECTION INTRAMUSCULAR; INTRAVENOUS; SUBCUTANEOUS at 21:26

## 2023-09-26 RX ADMIN — DIGOXIN 125 MCG: 125 TABLET ORAL at 12:50

## 2023-09-26 RX ADMIN — INSULIN LISPRO 6 UNITS: 100 INJECTION, SOLUTION INTRAVENOUS; SUBCUTANEOUS at 14:27

## 2023-09-26 RX ADMIN — PREGABALIN 75 MG: 75 CAPSULE ORAL at 21:22

## 2023-09-26 RX ADMIN — Medication 1 TABLET: at 12:50

## 2023-09-26 RX ADMIN — PREGABALIN 75 MG: 75 CAPSULE ORAL at 09:10

## 2023-09-26 RX ADMIN — INSULIN LISPRO 10 UNITS: 100 INJECTION, SOLUTION INTRAVENOUS; SUBCUTANEOUS at 18:01

## 2023-09-26 RX ADMIN — ACETAMINOPHEN 650 MG: 325 TABLET ORAL at 03:46

## 2023-09-26 RX ADMIN — Medication 1000 MCG: at 09:10

## 2023-09-26 RX ADMIN — VANCOMYCIN HYDROCHLORIDE 750 MG: 750 INJECTION, POWDER, LYOPHILIZED, FOR SOLUTION INTRAVENOUS at 12:53

## 2023-09-26 RX ADMIN — ACETAMINOPHEN 650 MG: 325 TABLET ORAL at 10:03

## 2023-09-26 RX ADMIN — CHOLECALCIFEROL TAB 10 MCG (400 UNIT) 400 UNITS: 10 TAB at 09:10

## 2023-09-26 RX ADMIN — CEFEPIME 2000 MG: 2 INJECTION, POWDER, FOR SOLUTION INTRAVENOUS at 21:43

## 2023-09-26 RX ADMIN — DOXAZOSIN 2 MG: 2 TABLET ORAL at 14:27

## 2023-09-26 RX ADMIN — Medication 400 UNITS: at 14:27

## 2023-09-26 RX ADMIN — SODIUM CHLORIDE 40 MG: 9 INJECTION INTRAMUSCULAR; INTRAVENOUS; SUBCUTANEOUS at 09:09

## 2023-09-26 RX ADMIN — Medication: at 21:44

## 2023-09-26 RX ADMIN — ATORVASTATIN CALCIUM 40 MG: 40 TABLET, FILM COATED ORAL at 09:10

## 2023-09-26 RX ADMIN — INSULIN GLARGINE 5 UNITS: 100 INJECTION, SOLUTION SUBCUTANEOUS at 21:25

## 2023-09-26 NOTE — PLAN OF CARE
Problem: ABCDS Injury Assessment  Goal: Absence of physical injury  Outcome: Progressing     Problem: Skin/Tissue Integrity  Goal: Absence of new skin breakdown  Description: 1. Monitor for areas of redness and/or skin breakdown  2. Assess vascular access sites hourly  3. Every 4-6 hours minimum:  Change oxygen saturation probe site  4. Every 4-6 hours:  If on nasal continuous positive airway pressure, respiratory therapy assess nares and determine need for appliance change or resting period.   Outcome: Progressing     Problem: Safety - Adult  Goal: Free from fall injury  Outcome: Progressing     Problem: Discharge Planning  Goal: Discharge to home or other facility with appropriate resources  Outcome: Progressing     Problem: Pain  Goal: Verbalizes/displays adequate comfort level or baseline comfort level  Outcome: Progressing     Problem: Chronic Conditions and Co-morbidities  Goal: Patient's chronic conditions and co-morbidity symptoms are monitored and maintained or improved  Outcome: Progressing

## 2023-09-26 NOTE — PROGRESS NOTES
Pt BS is 398 , keep tried to  message and called to doctor from last two BS, not able to communicate with him, keep calling to  to the pharmacist for changing insuline units.

## 2023-09-26 NOTE — PLAN OF CARE
Problem: ABCDS Injury Assessment  Goal: Absence of physical injury  9/26/2023 1453 by Marshall Harding RN  Outcome: Progressing  9/26/2023 0140 by Radha Arias RN  Outcome: Progressing     Problem: Skin/Tissue Integrity  Goal: Absence of new skin breakdown  Description: 1. Monitor for areas of redness and/or skin breakdown  2. Assess vascular access sites hourly  3. Every 4-6 hours minimum:  Change oxygen saturation probe site  4. Every 4-6 hours:  If on nasal continuous positive airway pressure, respiratory therapy assess nares and determine need for appliance change or resting period.   9/26/2023 1453 by Marshall Harding RN  Outcome: Progressing  9/26/2023 0140 by Radha Arias RN  Outcome: Progressing     Problem: Safety - Adult  Goal: Free from fall injury  9/26/2023 1453 by Marshall Harding RN  Outcome: Progressing  9/26/2023 0140 by Radha Arias RN  Outcome: Progressing     Problem: Discharge Planning  Goal: Discharge to home or other facility with appropriate resources  9/26/2023 1453 by Marshall Harding RN  Outcome: Progressing  9/26/2023 0140 by Radha Arias RN  Outcome: Progressing     Problem: Pain  Goal: Verbalizes/displays adequate comfort level or baseline comfort level  9/26/2023 1453 by Marshall Harding RN  Outcome: Progressing  9/26/2023 0140 by Radha Arias RN  Outcome: Progressing     Problem: Chronic Conditions and Co-morbidities  Goal: Patient's chronic conditions and co-morbidity symptoms are monitored and maintained or improved  9/26/2023 1453 by Marshall Harding RN  Outcome: Progressing  9/26/2023 0140 by Radha Arias RN  Outcome: Progressing

## 2023-09-26 NOTE — PROGRESS NOTES
Pharmacist Daily Dosing of Warfarin    Indication & Goal INR: AFib, INR Goal 2-3    PTA Warfarin Dose: 7.5 mg daily     Notable concurrent conditions and medications:    Labs:  Recent Labs     Units 09/25/23  0240 09/24/23  1329 09/24/23  0358 09/23/23  1607 09/23/23  1219   INR   2.3* 4.3*  --  10.4*  --    HGB g/dL 7.2*  --  7.2*  --  8.0*   PLT K/uL 276  --  275  --  288     Impression/Plan:   Holding warfarin today because of Hgb drop   Daily INR has been ordered  CBC w/o differential every other day has been ordered     Pharmacy will follow daily and adjust the dose as appropriate.     Thank you,  Rosalba Yadav, [facility]      Warfarin Protocol    Located on pharmacy Teams site: Clinical Practice -> Anticoagulation & Cardiology -> Anticoagulation Policies, Protocols, Guidance

## 2023-09-27 ENCOUNTER — APPOINTMENT (OUTPATIENT)
Facility: HOSPITAL | Age: 88
End: 2023-09-27
Payer: MEDICARE

## 2023-09-27 LAB
ABO + RH BLD: NORMAL
ANION GAP SERPL CALC-SCNC: 5 MMOL/L (ref 5–15)
BASOPHILS # BLD: 0 K/UL (ref 0–0.1)
BASOPHILS NFR BLD: 0 % (ref 0–1)
BLD PROD TYP BPU: NORMAL
BLD PROD TYP BPU: NORMAL
BLOOD BANK DISPENSE STATUS: NORMAL
BLOOD BANK DISPENSE STATUS: NORMAL
BLOOD GROUP ANTIBODIES SERPL: NORMAL
BPU ID: NORMAL
BPU ID: NORMAL
BUN SERPL-MCNC: 17 MG/DL (ref 6–20)
BUN/CREAT SERPL: 16 (ref 12–20)
CALCIUM SERPL-MCNC: 7.6 MG/DL (ref 8.5–10.1)
CHLORIDE SERPL-SCNC: 109 MMOL/L (ref 97–108)
CO2 SERPL-SCNC: 23 MMOL/L (ref 21–32)
CREAT SERPL-MCNC: 1.07 MG/DL (ref 0.7–1.3)
CROSSMATCH RESULT: NORMAL
CROSSMATCH RESULT: NORMAL
DIFFERENTIAL METHOD BLD: ABNORMAL
EOSINOPHIL # BLD: 0.2 K/UL (ref 0–0.4)
EOSINOPHIL NFR BLD: 1 % (ref 0–7)
ERYTHROCYTE [DISTWIDTH] IN BLOOD BY AUTOMATED COUNT: 19.4 % (ref 11.5–14.5)
GLUCOSE BLD STRIP.AUTO-MCNC: 203 MG/DL (ref 65–117)
GLUCOSE BLD STRIP.AUTO-MCNC: 242 MG/DL (ref 65–117)
GLUCOSE BLD STRIP.AUTO-MCNC: 269 MG/DL (ref 65–117)
GLUCOSE BLD STRIP.AUTO-MCNC: 320 MG/DL (ref 65–117)
GLUCOSE SERPL-MCNC: 183 MG/DL (ref 65–100)
HCT VFR BLD AUTO: 28.4 % (ref 36.6–50.3)
HCT VFR BLD AUTO: 29.3 % (ref 36.6–50.3)
HGB BLD-MCNC: 8.8 G/DL (ref 12.1–17)
HGB BLD-MCNC: 9 G/DL (ref 12.1–17)
IMM GRANULOCYTES # BLD AUTO: 0 K/UL (ref 0–0.04)
IMM GRANULOCYTES NFR BLD AUTO: 0 % (ref 0–0.5)
INR PPP: 1.3 (ref 0.9–1.1)
LYMPHOCYTES # BLD: 2.6 K/UL (ref 0.8–3.5)
LYMPHOCYTES NFR BLD: 14 % (ref 12–49)
MCH RBC QN AUTO: 25.1 PG (ref 26–34)
MCHC RBC AUTO-ENTMCNC: 31 G/DL (ref 30–36.5)
MCV RBC AUTO: 80.9 FL (ref 80–99)
METAMYELOCYTES NFR BLD MANUAL: 2 %
MONOCYTES # BLD: 1.5 K/UL (ref 0–1)
MONOCYTES NFR BLD: 8 % (ref 5–13)
MYELOCYTES NFR BLD MANUAL: 3 %
NEUTS BAND NFR BLD MANUAL: 1 %
NEUTS SEG # BLD: 13.5 K/UL (ref 1.8–8)
NEUTS SEG NFR BLD: 71 % (ref 32–75)
NRBC # BLD: 0 K/UL (ref 0–0.01)
NRBC BLD-RTO: 0 PER 100 WBC
PLATELET # BLD AUTO: 231 K/UL (ref 150–400)
PMV BLD AUTO: 8.6 FL (ref 8.9–12.9)
POTASSIUM SERPL-SCNC: 4.2 MMOL/L (ref 3.5–5.1)
PROTHROMBIN TIME: 13 SEC (ref 9–11.1)
RBC # BLD AUTO: 3.51 M/UL (ref 4.1–5.7)
RBC MORPH BLD: ABNORMAL
SERVICE CMNT-IMP: ABNORMAL
SODIUM SERPL-SCNC: 137 MMOL/L (ref 136–145)
SPECIMEN EXP DATE BLD: NORMAL
UNIT DIVISION: 0
UNIT DIVISION: 0
WBC # BLD AUTO: 18.8 K/UL (ref 4.1–11.1)

## 2023-09-27 PROCEDURE — 82962 GLUCOSE BLOOD TEST: CPT

## 2023-09-27 PROCEDURE — A4216 STERILE WATER/SALINE, 10 ML: HCPCS | Performed by: INTERNAL MEDICINE

## 2023-09-27 PROCEDURE — 6370000000 HC RX 637 (ALT 250 FOR IP): Performed by: INTERNAL MEDICINE

## 2023-09-27 PROCEDURE — 1100000003 HC PRIVATE W/ TELEMETRY

## 2023-09-27 PROCEDURE — 6360000002 HC RX W HCPCS: Performed by: INTERNAL MEDICINE

## 2023-09-27 PROCEDURE — 85014 HEMATOCRIT: CPT

## 2023-09-27 PROCEDURE — 80048 BASIC METABOLIC PNL TOTAL CA: CPT

## 2023-09-27 PROCEDURE — 85025 COMPLETE CBC W/AUTO DIFF WBC: CPT

## 2023-09-27 PROCEDURE — 73700 CT LOWER EXTREMITY W/O DYE: CPT

## 2023-09-27 PROCEDURE — 99233 SBSQ HOSP IP/OBS HIGH 50: CPT | Performed by: INTERNAL MEDICINE

## 2023-09-27 PROCEDURE — 97161 PT EVAL LOW COMPLEX 20 MIN: CPT

## 2023-09-27 PROCEDURE — 85610 PROTHROMBIN TIME: CPT

## 2023-09-27 PROCEDURE — 97116 GAIT TRAINING THERAPY: CPT

## 2023-09-27 PROCEDURE — 2580000003 HC RX 258: Performed by: INTERNAL MEDICINE

## 2023-09-27 PROCEDURE — 99223 1ST HOSP IP/OBS HIGH 75: CPT | Performed by: INTERNAL MEDICINE

## 2023-09-27 PROCEDURE — 2500000003 HC RX 250 WO HCPCS: Performed by: INTERNAL MEDICINE

## 2023-09-27 PROCEDURE — C9113 INJ PANTOPRAZOLE SODIUM, VIA: HCPCS | Performed by: INTERNAL MEDICINE

## 2023-09-27 PROCEDURE — 36415 COLL VENOUS BLD VENIPUNCTURE: CPT

## 2023-09-27 PROCEDURE — 85018 HEMOGLOBIN: CPT

## 2023-09-27 RX ADMIN — Medication 1000 MCG: at 11:29

## 2023-09-27 RX ADMIN — VANCOMYCIN HYDROCHLORIDE 750 MG: 750 INJECTION, POWDER, LYOPHILIZED, FOR SOLUTION INTRAVENOUS at 21:27

## 2023-09-27 RX ADMIN — CEFEPIME 2000 MG: 2 INJECTION, POWDER, FOR SOLUTION INTRAVENOUS at 21:16

## 2023-09-27 RX ADMIN — DOXAZOSIN 2 MG: 2 TABLET ORAL at 11:29

## 2023-09-27 RX ADMIN — Medication: at 21:19

## 2023-09-27 RX ADMIN — CHOLECALCIFEROL TAB 10 MCG (400 UNIT) 400 UNITS: 10 TAB at 11:29

## 2023-09-27 RX ADMIN — PREGABALIN 75 MG: 75 CAPSULE ORAL at 21:17

## 2023-09-27 RX ADMIN — DIGOXIN 125 MCG: 125 TABLET ORAL at 13:35

## 2023-09-27 RX ADMIN — INSULIN LISPRO 8 UNITS: 100 INJECTION, SOLUTION INTRAVENOUS; SUBCUTANEOUS at 12:45

## 2023-09-27 RX ADMIN — Medication 1 TABLET: at 11:29

## 2023-09-27 RX ADMIN — PREGABALIN 75 MG: 75 CAPSULE ORAL at 11:29

## 2023-09-27 RX ADMIN — SODIUM CHLORIDE 40 MG: 9 INJECTION INTRAMUSCULAR; INTRAVENOUS; SUBCUTANEOUS at 21:16

## 2023-09-27 RX ADMIN — ATORVASTATIN CALCIUM 40 MG: 40 TABLET, FILM COATED ORAL at 11:29

## 2023-09-27 RX ADMIN — ACETAMINOPHEN 650 MG: 325 TABLET ORAL at 14:43

## 2023-09-27 RX ADMIN — VANCOMYCIN HYDROCHLORIDE 750 MG: 750 INJECTION, POWDER, LYOPHILIZED, FOR SOLUTION INTRAVENOUS at 02:30

## 2023-09-27 RX ADMIN — Medication: at 12:00

## 2023-09-27 RX ADMIN — INSULIN LISPRO 6 UNITS: 100 INJECTION, SOLUTION INTRAVENOUS; SUBCUTANEOUS at 17:13

## 2023-09-27 RX ADMIN — INSULIN LISPRO 4 UNITS: 100 INJECTION, SOLUTION INTRAVENOUS; SUBCUTANEOUS at 09:33

## 2023-09-27 RX ADMIN — CEFEPIME 2000 MG: 2 INJECTION, POWDER, FOR SOLUTION INTRAVENOUS at 11:28

## 2023-09-27 RX ADMIN — Medication 400 UNITS: at 11:29

## 2023-09-27 RX ADMIN — SODIUM CHLORIDE 40 MG: 9 INJECTION INTRAMUSCULAR; INTRAVENOUS; SUBCUTANEOUS at 11:28

## 2023-09-27 RX ADMIN — INSULIN GLARGINE 5 UNITS: 100 INJECTION, SOLUTION SUBCUTANEOUS at 21:17

## 2023-09-27 ASSESSMENT — PAIN SCALES - GENERAL: PAINLEVEL_OUTOF10: 3

## 2023-09-27 NOTE — PLAN OF CARE
Problem: Physical Therapy - Adult  Goal: By Discharge: Performs mobility at highest level of function for planned discharge setting. See evaluation for individualized goals. Description: FUNCTIONAL STATUS PRIOR TO ADMISSION: Patient reports he has started using RW the past few weeks before coming to the hospital due to feeling weak. Prior he did not use any AD inside the house and SPC walking to his mailbox. HOME SUPPORT PRIOR TO ADMISSION: The patient lived with his son and daughter-in-law but did not require assistance. 1-story home with full basement. Patient stays on 1st floor. Physical Therapy Goals  Initiated 9/27/2023  1. Patient will move from supine to sit and sit to supine in bed with modified independence within 7 day(s). 2.  Patient will perform sit to stand with modified independence within 7 day(s). 3.  Patient will transfer from bed to chair and chair to bed with modified independence using the least restrictive device within 7 day(s). 4.  Patient will ambulate with modified independence for 300 feet with the least restrictive device within 7 day(s). 5.  Patient will ascend/descend 4 stairs with no handrail(s) with supervision/set-up within 7 day(s). Outcome: Not Progressing    PHYSICAL THERAPY EVALUATION    Patient: Natty Chavez Sr. (80 y.o. male)  Date: 9/27/2023  Primary Diagnosis: Dehydration [E86.0]  Cellulitis [L03.90]  Generalized weakness [R53.1]  Skin ulcer of toe of left foot, limited to breakdown of skin ([address]) [L97.521]       Precautions: Fall Risk                    ASSESSMENT :   DEFICITS/IMPAIRMENTS:   The patient is limited by decreased strength, activity tolerance, balance s/p admission for cellulitis. Based on the impairments listed above the patient is slightly below his prior level of function. Received patient sitting up in the chair, nurse reports he has been getting up to the chair daily with nursing staff.  Overall mobility is contact guard assist, verbal

## 2023-09-27 NOTE — CONSULTS
Infectious Disease Consult    Date of Consultation:  September 27, 2023  Reason for Consultation:  Referring Physician: Dr Sim Cross  Date of Admission:9/23/2023      Impression    Cellulitis  & infected wounds of both feet  Left hallux, R/second toe  Recent outpatient wound cultures left + for light MSSA  R/foot + for heavy Klebsiella aerogenes  S/p outpatient Bactrim p.o., Augmentin   Currently on cefepime IV    Leukocytosis  WBC 18.8, 21.9 on admission    Diabetes poorly controlled  A1c 8.2    S/p pacemaker placement  8/84    Diastolic CHF, chronic    CKD 3  Cr 1.07    Acute anemia Hb 5.8  S/p PRBC transfusion    S/p admission in 7/2023  Treated for Pseudomonas bacteremia,  Metabolic encephalopathy, B/l big toe ulcerations  OM of right hallux, septic arthritis of 1st PIP jt  WC  5/17+ MSSA, Pseudomonas. S/p Zosyn iv x 6 weeks end date 8/18. Plan  -Continue Cefepime IV  -Pharmacy to adjust to creatinine clearance  -Duration would be dependent on imaging of both feet  -Presence of osteomyelitis are not  - CT of R& L/ foot  -Adequate fluids, daily probiotic    ID service to follow. Extensive review of chart notes, labs, imaging, cultures done  Additionally review of done: Recent reports-Labs, cultures, imaging  D/w -hospitalist, PETROS Miguel. is a 80 y.o. male with a history of BPH, CKD, arthritis and history of pseudomonal bacteremia presents emerged department with a chief complaint of weakness. Patient is well-known to ID service. Patient was treated for Pseudomonas bacteremia, gram-negative sepsis and septic shock, OM of right hallux and septic arthritis of 1st PIP joint in 7/23  Patient  had active lesions on both feet for which he has been treating with antibiotics by his primary care, Dr Sim Cross. Patient had chronic wound on dorsum of left hallux, And plantar aspect of R/second toe. On review of electronic chart it is noted that cultures had been done.  R/foot -9/18 -heavy  Klebsiella cefTAZidime <=1 ug/mL Sensitive      cefTRIAXone <=1 ug/mL Sensitive      ciprofloxacin <=0.25 ug/mL Sensitive      gentamicin <=1 ug/mL Sensitive      levofloxacin <=0.12 ug/mL Sensitive      tobramycin <=1 ug/mL Sensitive      trimethoprim-sulfamethoxazole <=20 ug/mL Sensitive                           Culture, Body Fluid [[id number]]  (Abnormal)  (Susceptibility) Collected: 09/15/23 1511    Order Status: Completed Specimen: Body Fluid from Swab Updated: 09/18/23 0910     Special Requests NO SPECIAL REQUESTS        Gram stain NO WBC'S SEEN         NO ORGANISMS SEEN        Culture       LIGHT Staphylococcus aureus                  RARE MIXED SKIN MARY ISOLATED          Susceptibility        Staphylococcus aureus      BACTERIAL SUSCEPTIBILITY PANEL PHYLLIS      ciprofloxacin <=0.5 ug/mL Sensitive      clindamycin 0.25 ug/mL Sensitive      DAPTOmycin 0.25 ug/mL Sensitive      doxycycline <=0.5 ug/mL Sensitive      erythromycin <=0.25 ug/mL Sensitive      gentamicin <=0.5 ug/mL Sensitive      levofloxacin 0.25 ug/mL Sensitive      linezolid 2 ug/mL Sensitive      moxifloxacin <=0.25 ug/mL Sensitive      oxacillin 0.5 ug/mL Sensitive      rifampin <=0.5 ug/mL Sensitive  [1]       tetracycline <=1 ug/mL Sensitive      trimethoprim-sulfamethoxazole <=10 ug/mL Sensitive      vancomycin <=0.5 ug/mL Sensitive                   [1]  Rifampin is not to be used for mono-therapy. Xray Result (most recent):  XR CHEST PORTABLE 09/23/2023    Narrative  EXAM:  XR CHEST PORTABLE    INDICATION: Sepsis    COMPARISON: Chest radiograph 7/13/2023    TECHNIQUE: Upright portable chest AP view    FINDINGS:    Cardiac mediastinal silhouette is upper limits of normal in size. Cardiac loop  recorder. Left hemidiaphragm elevation. Lungs and pleural spaces are grossly  clear. Impression  No acute findings.       XR CHEST PORTABLE    Result Date: 9/23/2023  EXAM:  XR CHEST PORTABLE INDICATION: Sepsis COMPARISON: Chest

## 2023-09-27 NOTE — PROGRESS NOTES
Spiritual Care Assessment/Progress Note  Juan    Name: Manfred Garcia Sr. MRN: [id number]    Age: 80 y.o. Sex: male   Language: English     Date: 9/27/2023            Total Time Calculated: 21 min              Spiritual Assessment begun in [facility] 3 MED TELE  Service Provided For[de-identified] Patient  Referral/Consult From[de-identified] Rounding  Encounter Overview/Reason : Initial Encounter    Spiritual beliefs:      [x] Involved in a lynn tradition/spiritual practice:      [] Supported by a lynn community:      [] Claims no spiritual orientation:      [] Seeking spiritual identity:           [] Adheres to an individual form of spirituality:      [] Not able to assess:                Identified resources for coping and support system:   Support System: Children, Family members       [x] Prayer                  [] Devotional reading               [] Music                  [] Guided Imagery     [] Pet visits                                        [] Other: (COMMENT)     Specific area/focus of visit   Encounter:    Crisis:    Spiritual/Emotional needs: Type: Spiritual Support  Ritual, Rites and Sacraments:    Grief, Loss, and Adjustments:    Ethics/Mediation:    Behavioral Health:    Palliative Care: Advance Care Planning:      Plan/Referrals: Continue to visit, (comment), Continue Support (comment)    Narrative:  reviewed the patient's chart prior to the visit.  called his nurse to assist him with his IV machine. He shared his loved for God and his family.  listened as he shared his thoughts and affirmed his feelings.  found a favorite television channel for him Tonio Seen) he beamed with melissa.  provided a presence, encouragement, prayer and empathetic listening.  services are available 24 hours a day as requested. Rev. ROSIO Saleem  [address]   Paging Service [phone])

## 2023-09-27 NOTE — PROGRESS NOTES
PT TEMP 100 , GAVE TYLENOL , RECHECKED IS  TEMP 97.4 . SKIN ASSESSED , WOUND DRESSING DONE , APPLIED VENELEX ON PRESSURE AREA.

## 2023-09-27 NOTE — PROGRESS NOTES
Pharmacist Daily Dosing of Warfarin    Indication & Goal INR: AFib, INR Goal 2-3    PTA Warfarin Dose: 7.5 mg daily     Notable concurrent conditions and medications:    Labs:  Recent Labs     Units 09/27/23  1116 09/27/23  0232 09/26/23  0957 09/26/23  0657 09/25/23  1716 09/25/23  0240   INR    --  1.3*  --  1.4*  --  2.3*   HGB g/dL 9.0* 8.8*   < > 8.9*   < > 7.2*   PLT K/uL  --  231  --  273  --  276    < > = values in this interval not displayed. Impression/Plan:   Holding warfarin today  Hgb stable  Daily INR has been ordered  CBC w/o differential every other day has been ordered     Pharmacy will follow daily and adjust the dose as appropriate.     Thank you,  Kalie Dowling, [facility]      Warfarin Protocol    Located on pharmacy Teams site: Clinical Practice -> Anticoagulation & Cardiology -> Anticoagulation Policies, Protocols, Guidance

## 2023-09-27 NOTE — ADT AUTH CERT
Can Adams MD  Physician  Internal Medicine  Progress Notes      Signed  Date of Service:  2023  5:55 AM     Signed        Expand All Collapse All                                                                                                                                                                                INTERNAL MEDICINE PROGRESS NOTE     NAME:            Arsh Palma Sr.   :               1933   MRN:               [id number]      Date/Time:      2023 5:55 AM  Subjective:   History:  Chart reviewed and patient seen and examined and D/W his nurse this AM and all events noted. He is followed by me for HTN, HLD, CKD st 3, PAF, CHD S/P pacemaker, diastolic CHF, recent hospitalization with Sepsis and Osteomyelitis R great toe and other medical problems. He has now been admitted with bilateral foot/toe ulcers and dehydration. This AM he claims to feel a little better. He denies CP or SOB but has been at bedrest. He has no N/V but still little appetite. He has 3 BMs on admission  which were black loose and foul smelling and one black BM yesterday. He has no other GI c/o. He has no neuro c/o except generalized weakness. He has no change of his chronic arthritic c/o. He has no other c/o on complete ROS.         Medications reviewed:         Current Facility-Administered Medications   Medication Dose Route Frequency    Vancomycin level  with AM labs  1 each Other Once    Warfarin- Hold dose tonight    Other Once    digoxin (LANOXIN) tablet 125 mcg  125 mcg Oral Daily    acidophilus/citrus pectin 1 tablet  1 tablet Oral Daily    pregabalin (LYRICA) capsule 75 mg  75 mg Oral BID    atorvastatin (LIPITOR) tablet 40 mg  40 mg Oral Daily    doxazosin (CARDURA) tablet 2 mg  2 mg Oral Daily    vitamin B-12 (CYANOCOBALAMIN) tablet 1,000 mcg  1,000 mcg Oral Daily    vitamin E capsule 400 Units  400 Units Oral Daily    glucose chewable tablet 16 g  4 tablet Oral PRN (arteriosclerotic cardiovascular disease)    Stage 3 chronic kidney disease (HCC)    Complete heart block (HCC)    Ulcer of both feet, limited to breakdown of skin ([address])    Anemia  Resolved Problems:    * No resolved hospital problems. *              ___________________________________________________  PLAN:     1. Continue Vancomycin and change Zosyn to Cefipime, Cx of L toe ulcer MSSA  R foot ulcer Klebsiella S Cefepime. So far blood cx neg  2. Follow WBC which is increasing now to 18.0  3. Follow Hgb (recent baseline 8.7 - 9.1 and on adm 8.0 down to 7.2 today  4. Protonix IV Q 12  5. GI Consult for GI bleed likely based upon symptoms and Hgb drop  6. Transfuse if Hgb < 7.0  7. Coagulopathy on adm with INR 10.4 today down to 2.3. Continue to HOLD Coumadin  8. Follow renal function (Baseline 29/1.650 and on adm 34/1.74 with today 23/1.48  9. Follow BS and SSI as needed, Holding oral meds  10. ASCVD stable  11. PAF - now paced rhythm  12. Diastolic CHF - Compensated     60 Minutes spent today in direct care of this high complexity patient with greater than 50% in counseling and coordination of care.      If need to contact me use hospital  [phone], DO NOT USE PERFECT SERVE     ___________________________________________________     Attending Physician:   Onesimo Krabbe, MD [place], MD  Physician  Internal Medicine  Progress Notes      Signed  Date of Service:  2023  6:00 AM     Signed        Expand All Collapse All                                                                                                                                                                                INTERNAL MEDICINE PROGRESS NOTE     NAME:            Tom Ramsaynicolas Fuentes   :               1933   MRN:               [id number]      Date/Time:      2023 6:00 AM  Subjective:   History:  Chart reviewed and patient seen and examined and D/W his nurse this AM and all ___________________________________________________     Attending Physician:   Marjorie Greenfield MD

## 2023-09-28 PROBLEM — L97.523 CHRONIC ULCER OF LEFT FOOT WITH NECROSIS OF MUSCLE (HCC): Status: ACTIVE | Noted: 2023-09-28

## 2023-09-28 PROBLEM — L03.116 CELLULITIS OF BOTH FEET: Status: ACTIVE | Noted: 2023-05-17

## 2023-09-28 PROBLEM — L97.513 ULCER OF RIGHT FOOT WITH NECROSIS OF MUSCLE (HCC): Status: ACTIVE | Noted: 2020-03-12

## 2023-09-28 PROBLEM — D62 ACUTE BLOOD LOSS ANEMIA: Status: ACTIVE | Noted: 2023-09-28

## 2023-09-28 PROBLEM — D72.825 BANDEMIA: Status: ACTIVE | Noted: 2023-09-28

## 2023-09-28 LAB
ANION GAP SERPL CALC-SCNC: 5 MMOL/L (ref 5–15)
BASOPHILS # BLD: 0 K/UL (ref 0–0.1)
BASOPHILS NFR BLD: 0 % (ref 0–1)
BUN SERPL-MCNC: 19 MG/DL (ref 6–20)
BUN/CREAT SERPL: 18 (ref 12–20)
CALCIUM SERPL-MCNC: 7.8 MG/DL (ref 8.5–10.1)
CHLORIDE SERPL-SCNC: 109 MMOL/L (ref 97–108)
CO2 SERPL-SCNC: 22 MMOL/L (ref 21–32)
CREAT SERPL-MCNC: 1.07 MG/DL (ref 0.7–1.3)
CRP SERPL-MCNC: 11 MG/DL (ref 0–0.6)
DIFFERENTIAL METHOD BLD: ABNORMAL
EOSINOPHIL # BLD: 0.3 K/UL (ref 0–0.4)
EOSINOPHIL NFR BLD: 2 % (ref 0–7)
ERYTHROCYTE [DISTWIDTH] IN BLOOD BY AUTOMATED COUNT: 19.9 % (ref 11.5–14.5)
GLUCOSE BLD STRIP.AUTO-MCNC: 239 MG/DL (ref 65–117)
GLUCOSE BLD STRIP.AUTO-MCNC: 252 MG/DL (ref 65–117)
GLUCOSE BLD STRIP.AUTO-MCNC: 271 MG/DL (ref 65–117)
GLUCOSE SERPL-MCNC: 199 MG/DL (ref 65–100)
HCT VFR BLD AUTO: 26.8 % (ref 36.6–50.3)
HGB BLD-MCNC: 8.3 G/DL (ref 12.1–17)
IMM GRANULOCYTES # BLD AUTO: 0 K/UL (ref 0–0.04)
IMM GRANULOCYTES NFR BLD AUTO: 0 % (ref 0–0.5)
INR PPP: 1.2 (ref 0.9–1.1)
LYMPHOCYTES # BLD: 1.2 K/UL (ref 0.8–3.5)
LYMPHOCYTES NFR BLD: 9 % (ref 12–49)
MCH RBC QN AUTO: 25.4 PG (ref 26–34)
MCHC RBC AUTO-ENTMCNC: 31 G/DL (ref 30–36.5)
MCV RBC AUTO: 82 FL (ref 80–99)
MONOCYTES # BLD: 0.3 K/UL (ref 0–1)
MONOCYTES NFR BLD: 2 % (ref 5–13)
NEUTS BAND NFR BLD MANUAL: 1 %
NEUTS SEG # BLD: 11.8 K/UL (ref 1.8–8)
NEUTS SEG NFR BLD: 86 % (ref 32–75)
NRBC # BLD: 0 K/UL (ref 0–0.01)
NRBC BLD-RTO: 0 PER 100 WBC
PLATELET # BLD AUTO: 246 K/UL (ref 150–400)
PLATELET COMMENT: ABNORMAL
PMV BLD AUTO: 9.1 FL (ref 8.9–12.9)
POTASSIUM SERPL-SCNC: 4.4 MMOL/L (ref 3.5–5.1)
PROTHROMBIN TIME: 12.2 SEC (ref 9–11.1)
RBC # BLD AUTO: 3.27 M/UL (ref 4.1–5.7)
RBC MORPH BLD: ABNORMAL
SERVICE CMNT-IMP: ABNORMAL
SODIUM SERPL-SCNC: 136 MMOL/L (ref 136–145)
VANCOMYCIN SERPL-MCNC: 10.7 UG/ML
WBC # BLD AUTO: 13.6 K/UL (ref 4.1–11.1)

## 2023-09-28 PROCEDURE — 97116 GAIT TRAINING THERAPY: CPT

## 2023-09-28 PROCEDURE — 6360000002 HC RX W HCPCS: Performed by: INTERNAL MEDICINE

## 2023-09-28 PROCEDURE — 86140 C-REACTIVE PROTEIN: CPT

## 2023-09-28 PROCEDURE — 80048 BASIC METABOLIC PNL TOTAL CA: CPT

## 2023-09-28 PROCEDURE — 99233 SBSQ HOSP IP/OBS HIGH 50: CPT | Performed by: INTERNAL MEDICINE

## 2023-09-28 PROCEDURE — 6370000000 HC RX 637 (ALT 250 FOR IP): Performed by: INTERNAL MEDICINE

## 2023-09-28 PROCEDURE — A4216 STERILE WATER/SALINE, 10 ML: HCPCS | Performed by: INTERNAL MEDICINE

## 2023-09-28 PROCEDURE — 80202 ASSAY OF VANCOMYCIN: CPT

## 2023-09-28 PROCEDURE — 2580000003 HC RX 258: Performed by: INTERNAL MEDICINE

## 2023-09-28 PROCEDURE — 85025 COMPLETE CBC W/AUTO DIFF WBC: CPT

## 2023-09-28 PROCEDURE — C9113 INJ PANTOPRAZOLE SODIUM, VIA: HCPCS | Performed by: INTERNAL MEDICINE

## 2023-09-28 PROCEDURE — 85610 PROTHROMBIN TIME: CPT

## 2023-09-28 PROCEDURE — 2500000003 HC RX 250 WO HCPCS: Performed by: INTERNAL MEDICINE

## 2023-09-28 PROCEDURE — 82962 GLUCOSE BLOOD TEST: CPT

## 2023-09-28 PROCEDURE — 36415 COLL VENOUS BLD VENIPUNCTURE: CPT

## 2023-09-28 PROCEDURE — 1100000003 HC PRIVATE W/ TELEMETRY

## 2023-09-28 RX ADMIN — INSULIN GLARGINE 5 UNITS: 100 INJECTION, SOLUTION SUBCUTANEOUS at 21:59

## 2023-09-28 RX ADMIN — DIGOXIN 125 MCG: 125 TABLET ORAL at 08:57

## 2023-09-28 RX ADMIN — CEFEPIME 2000 MG: 2 INJECTION, POWDER, FOR SOLUTION INTRAVENOUS at 21:58

## 2023-09-28 RX ADMIN — CHOLECALCIFEROL TAB 10 MCG (400 UNIT) 400 UNITS: 10 TAB at 08:56

## 2023-09-28 RX ADMIN — DOXAZOSIN 2 MG: 2 TABLET ORAL at 08:57

## 2023-09-28 RX ADMIN — Medication 400 UNITS: at 08:57

## 2023-09-28 RX ADMIN — SODIUM CHLORIDE 40 MG: 9 INJECTION INTRAMUSCULAR; INTRAVENOUS; SUBCUTANEOUS at 08:56

## 2023-09-28 RX ADMIN — VANCOMYCIN HYDROCHLORIDE 750 MG: 750 INJECTION, POWDER, LYOPHILIZED, FOR SOLUTION INTRAVENOUS at 09:22

## 2023-09-28 RX ADMIN — Medication 1 TABLET: at 08:57

## 2023-09-28 RX ADMIN — Medication 1000 MCG: at 08:56

## 2023-09-28 RX ADMIN — SODIUM CHLORIDE 40 MG: 9 INJECTION INTRAMUSCULAR; INTRAVENOUS; SUBCUTANEOUS at 21:59

## 2023-09-28 RX ADMIN — INSULIN LISPRO 6 UNITS: 100 INJECTION, SOLUTION INTRAVENOUS; SUBCUTANEOUS at 17:44

## 2023-09-28 RX ADMIN — INSULIN LISPRO 6 UNITS: 100 INJECTION, SOLUTION INTRAVENOUS; SUBCUTANEOUS at 12:20

## 2023-09-28 RX ADMIN — Medication: at 22:00

## 2023-09-28 RX ADMIN — INSULIN LISPRO 4 UNITS: 100 INJECTION, SOLUTION INTRAVENOUS; SUBCUTANEOUS at 08:55

## 2023-09-28 RX ADMIN — PREGABALIN 75 MG: 75 CAPSULE ORAL at 08:57

## 2023-09-28 RX ADMIN — CEFEPIME 2000 MG: 2 INJECTION, POWDER, FOR SOLUTION INTRAVENOUS at 08:56

## 2023-09-28 RX ADMIN — ATORVASTATIN CALCIUM 40 MG: 40 TABLET, FILM COATED ORAL at 08:56

## 2023-09-28 RX ADMIN — PREGABALIN 75 MG: 75 CAPSULE ORAL at 21:58

## 2023-09-28 RX ADMIN — Medication: at 09:30

## 2023-09-28 NOTE — PLAN OF CARE
Problem: ABCDS Injury Assessment  Goal: Absence of physical injury  9/28/2023 0941 by Aileen Mari RN  Outcome: Progressing  9/27/2023 2042 by Aileen Mari RN  Outcome: Progressing     Problem: Skin/Tissue Integrity  Goal: Absence of new skin breakdown  Description: 1. Monitor for areas of redness and/or skin breakdown  2. Assess vascular access sites hourly  3. Every 4-6 hours minimum:  Change oxygen saturation probe site  4. Every 4-6 hours:  If on nasal continuous positive airway pressure, respiratory therapy assess nares and determine need for appliance change or resting period.   9/28/2023 0941 by Aileen Mari RN  Outcome: Progressing  9/27/2023 2042 by Aileen Mari RN  Outcome: Progressing     Problem: Safety - Adult  Goal: Free from fall injury  9/28/2023 0941 by Aileen Mari RN  Outcome: Progressing  9/27/2023 2042 by Aileen Mari RN  Outcome: Progressing     Problem: Discharge Planning  Goal: Discharge to home or other facility with appropriate resources  9/28/2023 0941 by Aileen Mari RN  Outcome: Progressing  9/27/2023 2042 by Aileen Mari RN  Outcome: Progressing     Problem: Pain  Goal: Verbalizes/displays adequate comfort level or baseline comfort level  9/28/2023 0941 by Aileen Mari RN  Outcome: Progressing  9/27/2023 2042 by Aileen Mari RN  Outcome: Progressing     Problem: Chronic Conditions and Co-morbidities  Goal: Patient's chronic conditions and co-morbidity symptoms are monitored and maintained or improved  9/28/2023 0941 by Aileen Mari RN  Outcome: Progressing  9/27/2023 2042 by Aileen Mari RN  Outcome: Progressing

## 2023-09-28 NOTE — PROGRESS NOTES
Infectious Disease Progress          Impression    Cellulitis  & infected wounds of both feet  Left hallux, R/second toe  Recent outpatient wound cultures left + for light MSSA  R/foot + for heavy Klebsiella aerogenes  S/p outpatient Bactrim p.o., Augmentin   Currently on cefepime IV, s/p DC of Vancomycin. CT R/foot  9/27 + for  acute on chronic osteomyelitis/septic arthritis at  the first interphalangeal joint. Findings concerning for acute osteomyelitis at the tip of the second distal  phalanx. CT L/foot - no OM. Leukocytosis  WBC 18.8, 21.9 on admission    Diabetes poorly controlled  A1c 8.2    S/p pacemaker placement  3/61    Diastolic CHF, chronic    CKD 3  Cr 1.07    Acute anemia Hb 5.8  S/p PRBC transfusion    S/p admission in 7/2023  Treated for Pseudomonas bacteremia,  Metabolic encephalopathy, B/l big toe ulcerations  OM of right hallux, septic arthritis of 1st PIP jt  WC  5/17+ MSSA, Pseudomonas. S/p Zosyn iv x 6 weeks end date 8/18. Plan  -Continue Cefepime IV  -Pharmacy to adjust to creatinine clearance  -Pt would require 6 weeks given presence OM, septic arthritis  -Adequate fluids, daily probiotic  - Podiatry input    ID service to follow. Extensive review of chart notes, labs, imaging, cultures done  Additionally review of done: Recent reports-Labs, cultures, imaging  D/w -hospitalist, PETROS Brown. is a 80 y.o. male with a history of BPH, CKD, arthritis and history of pseudomonal bacteremia presents emerged department with a chief complaint of weakness. Patient is well-known to ID service. Patient was treated for Pseudomonas bacteremia, gram-negative sepsis and septic shock, OM of right hallux and septic arthritis of 1st PIP joint in 7/23  Patient  had active lesions on both feet for which he has been treating with antibiotics by his primary care, Dr Bryan Lainez. Patient had chronic wound on dorsum of left hallux, And plantar aspect of R/second toe.   On review of foot demonstrate diffuse bilateral toe up deformities, arthroplasty of the right hallux IP joint and varus of the right fifth MTP joint. There is generalized soft tissue swelling of the hallux and second toes. Cutaneous ulceration at the acral aspect of the right second toe and probably the left second toe are shown. Dorsal nailbed irregularity of the right hallux is demonstrated. No destructive osseous changes are evident. Moderate right and mild left hallux MTP osteoarthrosis is shown. Moderate right plantar and dorsal calcaneal spurs are noted. Small left dorsal calcaneal spur is shown. Soft tissue changes in the right hallux and bilateral second toes as above. XR FOOT RIGHT (MIN 3 VIEWS)    Result Date: 9/23/2023  EXAM: XR FOOT RIGHT (MIN 3 VIEWS), XR FOOT LEFT (MIN 3 VIEWS) INDICATION: PAIN. COMPARISON: None. FINDINGS: Three views of the right foot and 3 views of the left foot demonstrate diffuse bilateral toe up deformities, arthroplasty of the right hallux IP joint and varus of the right fifth MTP joint. There is generalized soft tissue swelling of the hallux and second toes. Cutaneous ulceration at the acral aspect of the right second toe and probably the left second toe are shown. Dorsal nailbed irregularity of the right hallux is demonstrated. No destructive osseous changes are evident. Moderate right and mild left hallux MTP osteoarthrosis is shown. Moderate right plantar and dorsal calcaneal spurs are noted. Small left dorsal calcaneal spur is shown. Soft tissue changes in the right hallux and bilateral second toes as above.       Lindsay Paul MD [facility]

## 2023-09-28 NOTE — PLAN OF CARE
Problem: Physical Therapy - Adult  Goal: By Discharge: Performs mobility at highest level of function for planned discharge setting. See evaluation for individualized goals. Description: FUNCTIONAL STATUS PRIOR TO ADMISSION: Patient reports he has started using RW the past few weeks before coming to the hospital due to feeling weak. Prior he did not use any AD inside the house and SPC walking to his mailbox. HOME SUPPORT PRIOR TO ADMISSION: The patient lived with his son and daughter-in-law but did not require assistance. 1-story home with full basement. Patient stays on 1st floor. Physical Therapy Goals  Initiated 9/27/2023  1. Patient will move from supine to sit and sit to supine in bed with modified independence within 7 day(s). 2.  Patient will perform sit to stand with modified independence within 7 day(s). 3.  Patient will transfer from bed to chair and chair to bed with modified independence using the least restrictive device within 7 day(s). 4.  Patient will ambulate with modified independence for 300 feet with the least restrictive device within 7 day(s). 5.  Patient will ascend/descend 4 stairs with no handrail(s) with supervision/set-up within 7 day(s).    7/77/1048 1153 by Josi Gallardo PT  Outcome: Not Progressing

## 2023-09-28 NOTE — PROGRESS NOTES
Bedside and Verbal shift change report given to [address] [place] [address] (oncoming nurse) by Polly Vizcarra RN (offgoing nurse). Report included the following information MAR, Recent Results, and Med Rec Status.

## 2023-09-28 NOTE — PROGRESS NOTES
Pharmacist Daily Dosing of Warfarin    Indication & Goal INR: AFib, INR Goal 2-3    PTA Warfarin Dose: 7.5 mg daily     Notable concurrent conditions and medications:    Labs:  Recent Labs     Units 09/28/23  0445 09/27/23  1116 09/27/23  0232 09/26/23  0957 09/26/23  0657   INR   1.2*  --  1.3*  --  1.4*   HGB g/dL 8.3*   < > 8.8*   < > 8.9*   PLT K/uL 246  --  231  --  273    < > = values in this interval not displayed. Impression/Plan:   Warfarin continued ON HOLD for coagulopathy (INR 10.4 on admission) per Dr Shavonne Rand. PAF: paced rhythm. Transfuse if HgB<7.0  Daily INR has been ordered  CBC w/o differential every other day has been ordered     Pharmacy will follow daily and adjust the dose as appropriate.     Thank you,  Chet Munoz, [facility]      Warfarin Protocol    Located on pharmacy Teams site: Clinical Practice -> Anticoagulation & Cardiology -> Anticoagulation Policies, Protocols, Guidance

## 2023-09-28 NOTE — PLAN OF CARE
Problem: Physical Therapy - Adult  Goal: By Discharge: Performs mobility at highest level of function for planned discharge setting. See evaluation for individualized goals. Description: FUNCTIONAL STATUS PRIOR TO ADMISSION: Patient reports he has started using RW the past few weeks before coming to the hospital due to feeling weak. Prior he did not use any AD inside the house and SPC walking to his mailbox. HOME SUPPORT PRIOR TO ADMISSION: The patient lived with his son and daughter-in-law but did not require assistance. 1-story home with full basement. Patient stays on 1st floor. Physical Therapy Goals  Initiated 9/27/2023  1. Patient will move from supine to sit and sit to supine in bed with modified independence within 7 day(s). 2.  Patient will perform sit to stand with modified independence within 7 day(s). 3.  Patient will transfer from bed to chair and chair to bed with modified independence using the least restrictive device within 7 day(s). 4.  Patient will ambulate with modified independence for 300 feet with the least restrictive device within 7 day(s). 5.  Patient will ascend/descend 4 stairs with no handrail(s) with supervision/set-up within 7 day(s). Outcome: Progressing   PHYSICAL THERAPY TREATMENT    Patient: Colette Cesar . (80 y.o. male)  Date: 9/28/2023  Diagnosis: Dehydration [E86.0]  Cellulitis [L03.90]  Generalized weakness [R53.1]  Skin ulcer of toe of left foot, limited to breakdown of skin (HCC) [L97.521] Cellulitis      Precautions: Fall Risk                    ASSESSMENT:  Patient continues to benefit from skilled PT services and is progressing towards goals. Lying in bed when PT arrived. Reports being in bed all day. Good improvement in mobility and activity tolerance today. Gait progressed to 175 feet with sba using RW. Bed mobility improved to modified independent.  Bed to chair transfers supervision with cues to stay with walker all the way till legs are backed on to chair. He tries to sit too far away. Left patient up in chair with all needs met when the session ended. PLAN:  Patient continues to benefit from skilled intervention to address the above impairments. Continue treatment per established plan of care. Recommendation for discharge: (in order for the patient to meet his/her long term goals): Therapy 2 days/week in the home    Other factors to consider for discharge: concern for safely navigating or managing the home environment    IF patient discharges home will need the following DME: patient owns DME required for discharge       SUBJECTIVE:   Patient stated, \" they are still trying to get my sugar under control. \"    OBJECTIVE DATA SUMMARY:   Critical Behavior:  Orientation  Overall Orientation Status: Within Normal Limits  Orientation Level: Oriented X4  Cognition  Overall Cognitive Status: WNL    Functional Mobility Training:  Bed Mobility:  Bed Mobility Training  Bed Mobility Training: Yes  Supine to Sit: Modified independent  Scooting: Modified independent  Transfers:  Transfer Training  Transfer Training: Yes  Sit to Stand: Modified independent  Stand to Sit: Modified independent  Bed to Chair: Adaptive equipment;Supervision (RW;cues to back all the way to the chair before sitting)  Balance:  Balance  Sitting: With support  Standing: Impaired  Standing - Static: Good;Constant support  Standing - Dynamic: Good;Constant support   Ambulation/Gait Training:     Gait  Overall Level of Assistance: Stand-by assistance  Interventions:  Other (comment) (self pacing)  Speed/Soheila: Pace decreased (< 100 feet/min)  Step Length: Right shortened;Left shortened  Gait Abnormalities: Other (comment) (slight forward trunk lean)  Distance (ft): 175 Feet  Assistive Device: Gait belt;Walker, rolling    Pain Ratin/10   Pain Intervention(s):   pain is at a level acceptable to the patient    Activity Tolerance:   Good and SpO2 stable on room air    After treatment:

## 2023-09-28 NOTE — PROGRESS NOTES
INTERNAL MEDICINE PROGRESS NOTE    NAME:  Arsh Palma Sr.   :   1933   MRN:   [id number]     Date/Time:  2023 6:09 AM  Subjective:   History:  Chart reviewed and patient seen and examined and D/W his nurse, his son and daughter-in-law at bedside this AM and all events noted. He is followed by me for HTN, HLD, CKD st 3, PAF, CHD S/P pacemaker, diastolic CHF, recent hospitalization with Sepsis and Osteomyelitis R great toe and other medical problems. He has now been admitted with bilateral foot/toe ulcers and dehydration. This AM he claims to feel about the same although he remains quite weak. He denies CP or SOB but has been at bedrest mostly. He has no N/V but still little appetite. He had 3 BMs on admission date   which were black loose and foul smelling and one black BM  but has had 1 normal-appearing BM each of the last 3 days. Hgb fell to 5.8  PM and transfused 2 U PRBC. He has no other GI c/o. He has no neuro c/o except generalized weakness. He has no change of his chronic arthritic c/o. He has no other c/o on complete ROS. He did have a low-grade fever  evening and repeat blood cultures were sent although initial blood cultures remain negative. No fever noted last 24 hours      Medications reviewed:  Current Facility-Administered Medications   Medication Dose Route Frequency    Vancomycin level- please obtain w/ AM labs  thanks!    Other RX Placeholder    Warfarin- hold dose tonight    Other RX Placeholder    balsum peru-castor oil (VENELEX) ointment   Topical BID    insulin lispro (HUMALOG) injection vial 0-14 Units  0-14 Units SubCUTAneous Nightly    insulin lispro (HUMALOG) injection vial 0-14 Units  0-14 Units SubCUTAneous TID WC    ceFEPIme (MAXIPIME) 2,000 mg in sodium chloride 0.9 % 100 mL IVPB (mini-bag)  2,000 mg IntraVENous Q12H    pantoprazole (PROTONIX) 40 mg in sodium chloride (PF) 0.9 % 10 mL injection  40 mg IntraVENous Q12H    0.45 % sodium chloride hour   Intake --   Output 1600 ml   Net -1600 ml          PHYSICAL EXAM:  General:     Alert, cooperative, no distress, appears stated age. Head:    Normocephalic, without obvious abnormality, atraumatic. Eyes:    Conjunctivae/corneas clear. PERRLA  Nose:   Nares normal. No drainage or sinus tenderness. Throat:     Lips, mucosa, and tongue normal.  No Thrush  Neck:   Supple, symmetrical,  no adenopathy, thyroid: non tender     no carotid bruit and no JVD. Back:     Symmetric,  No CVA tenderness. Lungs:    Clear to auscultation bilaterally. No Wheezing or Rhonchi. No rales. Heart:    Regular rate and rhythm,  no murmur, rub or gallop. Abdomen:    Soft, non-tender. Not distended. Bowel sounds normal. No masses  Extremities:  Extremities normal, atraumatic, No cyanosis. No edema. No clubbing  Lymph nodes:  Cervical, supraclavicular normal.  Neurologic:  Normal strength, Alert and oriented X 3. Skin:                No rash. Small superficial ulcers dorsum L 1st toe and tip R 2nd toe      Lab Data Reviewed:    Recent Results (from the past 24 hour(s))   POCT Glucose    Collection Time: 09/27/23  8:11 AM   Result Value Ref Range    POC Glucose 203 (H) 65 - 117 mg/dL    Performed by: Glenn Echeverria PCT    POCT Glucose    Collection Time: 09/27/23 11:12 AM   Result Value Ref Range    POC Glucose 320 (H) 65 - 117 mg/dL    Performed by: Glenn Echeverria PCT    Hemoglobin and Hematocrit    Collection Time: 09/27/23 11:16 AM   Result Value Ref Range    Hemoglobin 9.0 (L) 12.1 - 17.0 g/dL    Hematocrit 29.3 (L) 36.6 - 50.3 %   POCT Glucose    Collection Time: 09/27/23  4:23 PM   Result Value Ref Range    POC Glucose 269 (H) 65 - 117 mg/dL    Performed by:  Qi Gutierrez PCT    POCT Glucose    Collection Time: 09/27/23  8:54 PM   Result Value Ref Range    POC Glucose 242 (H) 65 - 117 mg/dL    Performed by: Mee Beck PCT    Protime-INR    Collection Time: 09/28/23  4:45 AM   Result Value Ref Range neg at 5 days and 2 days respectively (Hospitalized July with Pseudomonas sepsis from R foot ulcer/osteomyelitis)  2. Follow WBC which is improved a little now to 13.6 from 21.9 and clinically no evidence of sepsis  3. Follow Hgb (recent baseline 8.7 - 9.1 and on adm 8.0 down to 5.8 on 9/25 PM so ordered 2 U PRBC and still Hgb 8.3 today  4. Continue Protonix IV Q 12  5. GI Consult for GI bleed likely based upon symptoms and Hgb drop. Note reviewed and not sure I agree totally with not doing EGD although I see the reasoning and he is improving with improved BMs  6. Transfuse further if Hgb < 7.0 (2 U given 9/25)  7. Coagulopathy on adm with INR 10.4, today down to 1.2. Continue to HOLD Coumadin  8. Follow renal function (Baseline 29/1.65 and on adm 34/1.74 with today 19/1.07  9. Follow BS and SSI as needed (Increased Insulin amounts 9/26 AM as BS running quite high), Holding oral meds  10. ASCVD stable  11. PAF - now paced rhythm  12. Diastolic CHF - Compensated  13. Infectious disease consult for guidance regarding what antibiotic to use on discharge that will be most effective with the least side effects. He recently completed a prolonged course of Augmentin slightly greater than 6 weeks and had severe diarrhea during the entire treatment time. 14   CT right foot done 9/27         1. Findings concerning for acute on chronic osteomyelitis/septic arthritis at  the first interphalangeal joint. 2.  Findings concerning for acute osteomyelitis at the tip of the second distal  phalanx       CT left foot no evidence of osteomyelitis  15. Podiatry consult    50 Minutes spent today in direct care of this high complexity patient with greater than 50% in counseling and coordination of care.     If need to contact me use hospital  [phone], DO NOT USE PERFECT SERVE    ___________________________________________________    Attending Physician: Kim Saez MD

## 2023-09-29 PROBLEM — M86.071 ACUTE HEMATOGENOUS OSTEOMYELITIS OF RIGHT FOOT (HCC): Status: ACTIVE | Noted: 2023-09-29

## 2023-09-29 LAB
BACTERIA SPEC CULT: NORMAL
BACTERIA SPEC CULT: NORMAL
GLUCOSE BLD STRIP.AUTO-MCNC: 292 MG/DL (ref 65–117)
GLUCOSE BLD STRIP.AUTO-MCNC: 298 MG/DL (ref 65–117)
GLUCOSE BLD STRIP.AUTO-MCNC: 349 MG/DL (ref 65–117)
INR PPP: 1.2 (ref 0.9–1.1)
PROTHROMBIN TIME: 11.9 SEC (ref 9–11.1)
SERVICE CMNT-IMP: ABNORMAL
SERVICE CMNT-IMP: NORMAL
SERVICE CMNT-IMP: NORMAL

## 2023-09-29 PROCEDURE — C9113 INJ PANTOPRAZOLE SODIUM, VIA: HCPCS | Performed by: INTERNAL MEDICINE

## 2023-09-29 PROCEDURE — 99233 SBSQ HOSP IP/OBS HIGH 50: CPT | Performed by: INTERNAL MEDICINE

## 2023-09-29 PROCEDURE — 85610 PROTHROMBIN TIME: CPT

## 2023-09-29 PROCEDURE — 6370000000 HC RX 637 (ALT 250 FOR IP): Performed by: INTERNAL MEDICINE

## 2023-09-29 PROCEDURE — 6360000002 HC RX W HCPCS: Performed by: INTERNAL MEDICINE

## 2023-09-29 PROCEDURE — 1100000003 HC PRIVATE W/ TELEMETRY

## 2023-09-29 PROCEDURE — 2580000003 HC RX 258: Performed by: INTERNAL MEDICINE

## 2023-09-29 PROCEDURE — 82962 GLUCOSE BLOOD TEST: CPT

## 2023-09-29 PROCEDURE — A4216 STERILE WATER/SALINE, 10 ML: HCPCS | Performed by: INTERNAL MEDICINE

## 2023-09-29 PROCEDURE — 36415 COLL VENOUS BLD VENIPUNCTURE: CPT

## 2023-09-29 PROCEDURE — 2500000003 HC RX 250 WO HCPCS: Performed by: INTERNAL MEDICINE

## 2023-09-29 PROCEDURE — 99232 SBSQ HOSP IP/OBS MODERATE 35: CPT | Performed by: INTERNAL MEDICINE

## 2023-09-29 RX ORDER — INSULIN GLARGINE 100 [IU]/ML
10 INJECTION, SOLUTION SUBCUTANEOUS NIGHTLY
Status: DISCONTINUED | OUTPATIENT
Start: 2023-09-29 | End: 2023-10-06 | Stop reason: HOSPADM

## 2023-09-29 RX ADMIN — SODIUM CHLORIDE 40 MG: 9 INJECTION INTRAMUSCULAR; INTRAVENOUS; SUBCUTANEOUS at 21:04

## 2023-09-29 RX ADMIN — PREGABALIN 75 MG: 75 CAPSULE ORAL at 21:04

## 2023-09-29 RX ADMIN — Medication 1 TABLET: at 10:55

## 2023-09-29 RX ADMIN — CEFEPIME 2000 MG: 2 INJECTION, POWDER, FOR SOLUTION INTRAVENOUS at 10:55

## 2023-09-29 RX ADMIN — PREGABALIN 75 MG: 75 CAPSULE ORAL at 10:54

## 2023-09-29 RX ADMIN — Medication: at 11:14

## 2023-09-29 RX ADMIN — DOXAZOSIN 2 MG: 2 TABLET ORAL at 10:55

## 2023-09-29 RX ADMIN — Medication: at 21:04

## 2023-09-29 RX ADMIN — CEFEPIME 2000 MG: 2 INJECTION, POWDER, FOR SOLUTION INTRAVENOUS at 21:03

## 2023-09-29 RX ADMIN — INSULIN LISPRO 8 UNITS: 100 INJECTION, SOLUTION INTRAVENOUS; SUBCUTANEOUS at 13:18

## 2023-09-29 RX ADMIN — DIGOXIN 125 MCG: 125 TABLET ORAL at 10:54

## 2023-09-29 RX ADMIN — SODIUM CHLORIDE: 4.5 INJECTION, SOLUTION INTRAVENOUS at 18:59

## 2023-09-29 RX ADMIN — Medication 400 UNITS: at 10:55

## 2023-09-29 RX ADMIN — SODIUM CHLORIDE 40 MG: 9 INJECTION INTRAMUSCULAR; INTRAVENOUS; SUBCUTANEOUS at 10:55

## 2023-09-29 RX ADMIN — INSULIN GLARGINE 10 UNITS: 100 INJECTION, SOLUTION SUBCUTANEOUS at 21:09

## 2023-09-29 RX ADMIN — ATORVASTATIN CALCIUM 40 MG: 40 TABLET, FILM COATED ORAL at 10:55

## 2023-09-29 RX ADMIN — CHOLECALCIFEROL TAB 10 MCG (400 UNIT) 400 UNITS: 10 TAB at 10:55

## 2023-09-29 RX ADMIN — Medication 1000 MCG: at 10:54

## 2023-09-29 ASSESSMENT — PAIN SCALES - GENERAL: PAINLEVEL_OUTOF10: 0

## 2023-09-29 NOTE — CARE COORDINATION
Transition of Care Plan:    RUR: 21%  Prior Level of Functioning: home, lives with family  Disposition: home w/ [address]- may need home IV abx? Follow up appointments: PCP, specialists as indicated   DME needed: N/A  Transportation at discharge: family  IM/IMM Medicare/ letter given: to be given   Is patient a  and connected with [facility]? No   If yes, was [employer] transfer form completed and [facility] notified? Caregiver Contact: Robert Callaway (son) [phone]  Discharge Caregiver contacted prior to discharge? To be contacted prior to d/c   Care Conference needed? no  Barriers to discharge:  medical clearance    Chart reviewed. CM continuing to follow for discharge planning. Met with pt at bedside to discuss disposition. Pt aware that he may require IV ABX post discharge for 6 weeks. Pending OR tomorrow w/ podiatry- need for IV ABX is TBD at this time. Pt reports if needed, he will feel comfortable doing them at home with support of family and home health. Pt w/ recent home IV abx in July 2023 ([address]) and had [facility] services in place. Pt prefers to return home rather than be placed at facility post discharge. Will continue to follow.     Primitivo Jeffries, MSW   Care Manager, [facility]

## 2023-09-29 NOTE — PROGRESS NOTES
Bedside and Verbal shift change report given to [address] [place] [address] (oncoming nurse) by Antonette Carcamo RN (offgoing nurse). Report included the following information MAR, Recent Results, and Med Rec Status.

## 2023-09-29 NOTE — PROGRESS NOTES
INTERNAL MEDICINE PROGRESS NOTE    NAME:  Paz Norwood Sr.   :   1933   MRN:   [id number]     Date/Time:  2023 6:11 AM  Subjective:   History:  Chart reviewed and patient seen and examined and D/W his nurse, his son and daughter-in-law at bedside this AM and all events noted. He is followed by me for HTN, HLD, CKD st 3, PAF, CHD S/P pacemaker, diastolic CHF, recent hospitalization with Sepsis and Osteomyelitis R great toe and other medical problems. He has now been admitted with bilateral foot/toe ulcers and dehydration. This AM continues to feel quite weak without any other change. He denies CP or SOB and has been up in the room some. He has no N/V but still little appetite. He had 3 BMs on admission date   which were black loose and foul smelling and one black BM  but has had 1 normal appearing BM each of the next 3 days until some loose bowel movements over the last 24 hours without evidence of blood. Hgb fell to 5.8 on  PM and transfused 2 U PRBC. He has no other GI c/o. He has no neuro c/o except generalized weakness. He has no change of his chronic arthritic c/o. He has no other c/o on complete ROS. He did have a low-grade fever  evening and repeat blood cultures were sent although initial blood cultures remain negative.   No fever noted last 24 hours      Medications reviewed:  Current Facility-Administered Medications   Medication Dose Route Frequency    Warfarin ON HOLD / Dr. Hossein Fernandez   Other RX Placeholder    balsum peru-castor oil (VENELEX) ointment   Topical BID    insulin lispro (HUMALOG) injection vial 0-14 Units  0-14 Units SubCUTAneous Nightly    insulin lispro (HUMALOG) injection vial 0-14 Units  0-14 Units SubCUTAneous TID WC    ceFEPIme (MAXIPIME) 2,000 mg in sodium chloride 0.9 % 100 mL IVPB (mini-bag)  2,000 mg IntraVENous Q12H    pantoprazole (PROTONIX) 40 mg in sodium chloride (PF) 0.9 % 10 mL injection  40 mg IntraVENous Q12H    0.45 % sodium chloride infusion distress, appears stated age. Head:    Normocephalic, without obvious abnormality, atraumatic. Eyes:    Conjunctivae/corneas clear. PERRLA  Nose:   Nares normal. No drainage or sinus tenderness. Throat:     Lips, mucosa, and tongue normal.  No Thrush  Neck:   Supple, symmetrical,  no adenopathy, thyroid: non tender     no carotid bruit and no JVD. Back:     Symmetric,  No CVA tenderness. Lungs:    Clear to auscultation bilaterally. No Wheezing or Rhonchi. No rales. Heart:    Regular rate and rhythm,  no murmur, rub or gallop. Abdomen:    Soft, non-tender. Not distended. Bowel sounds normal. No masses  Extremities:  Extremities normal, atraumatic, No cyanosis. No edema. No clubbing  Lymph nodes:  Cervical, supraclavicular normal.  Neurologic:  Normal strength, Alert and oriented X 3. Skin:                No rash.   Small superficial ulcers dorsum L 1st toe and tip R 2nd toe      Lab Data Reviewed:    Recent Results (from the past 24 hour(s))   POCT Glucose    Collection Time: 09/28/23  8:07 AM   Result Value Ref Range    POC Glucose 239 (H) 65 - 117 mg/dL    Performed by: [place] PCT    POCT Glucose    Collection Time: 09/28/23 11:32 AM   Result Value Ref Range    POC Glucose 252 (H) 65 - 117 mg/dL    Performed by: [facility] PCT    POCT Glucose    Collection Time: 09/28/23  4:56 PM   Result Value Ref Range    POC Glucose 271 (H) 65 - 117 mg/dL    Performed by: [facility] PCT    Protime-INR    Collection Time: 09/29/23  5:05 AM   Result Value Ref Range    INR 1.2 (H) 0.9 - 1.1      Protime 11.9 (H) 9.0 - 11.1 sec         Assessment/Plan:       Principal Problem:    Cellulitis  Active Problems:    Chronic diastolic congestive heart failure (HCC)    Hypertension with renal disease    Paroxysmal atrial fibrillation (HCC)    ASCVD (arteriosclerotic cardiovascular disease)    Stage 3 chronic kidney disease ([address])    Complete heart block (HCC)    Ulcer of right foot with necrosis of muscle

## 2023-09-29 NOTE — CONSULTS
Seen patient in the room   Discussed the findings of the imaging studies     EXAM: XR FOOT RIGHT (MIN 3 VIEWS), XR FOOT LEFT (MIN 3 VIEWS)     INDICATION: PAIN. COMPARISON: None. FINDINGS: Three views of the right foot and 3 views of the left foot demonstrate  diffuse bilateral toe up deformities, arthroplasty of the right hallux IP joint  and varus of the right fifth MTP joint. There is generalized soft tissue  swelling of the hallux and second toes. Cutaneous ulceration at the acral aspect  of the right second toe and probably the left second toe are shown. Dorsal  nailbed irregularity of the right hallux is demonstrated. No destructive osseous  changes are evident. Moderate right and mild left hallux MTP osteoarthrosis is  shown. Moderate right plantar and dorsal calcaneal spurs are noted. Small left  dorsal calcaneal spur is shown. IMPRESSION:  Soft tissue changes in the right hallux and bilateral second toes as  above. EXAM: XR FOOT RIGHT (MIN 3 VIEWS), XR FOOT LEFT (MIN 3 VIEWS)     INDICATION: PAIN. COMPARISON: None. FINDINGS: Three views of the right foot and 3 views of the left foot demonstrate  diffuse bilateral toe up deformities, arthroplasty of the right hallux IP joint  and varus of the right fifth MTP joint. There is generalized soft tissue  swelling of the hallux and second toes. Cutaneous ulceration at the acral aspect  of the right second toe and probably the left second toe are shown. Dorsal  nailbed irregularity of the right hallux is demonstrated. No destructive osseous  changes are evident. Moderate right and mild left hallux MTP osteoarthrosis is  shown. Moderate right plantar and dorsal calcaneal spurs are noted. Small left  dorsal calcaneal spur is shown. IMPRESSION:  Soft tissue changes in the right hallux and bilateral second toes as  above.     EXAM: CT FOOT LEFT WO CONTRAST     INDICATION: Chronic foot ulcer evaluate for OM       COMPARISON: Left foot enthesophytes     Tendons: No definite full-thickness tendon tear. Muscles: No intramuscular hematoma. No focal atrophy. Soft tissues: No appreciable mass. IMPRESSION:  1. Findings concerning for acute on chronic osteomyelitis/septic arthritis at  the first interphalangeal joint. 2.  Findings concerning for acute osteomyelitis at the tip of the second distal  phalanx.        Right distal second toe with ulceration and evidence of OM  Right hallux completely healed dorsal hallux and no clinical evidence of infection     Left hallux dorsal superficial grade 2 ulceration no acute signs of infection    Recommended partial right second toe amputation     Scheduled for tomorrow and placed orders for npo and consent     Full note to follow

## 2023-09-29 NOTE — PROGRESS NOTES
Physician Progress Note      PATIENT:               Glenn Beatty  CSN #:                  [id number]  :                       1933  ADMIT DATE:       2023 11:49 AM  [address] [facility] DATE:  Caridad Barnett  PROVIDER #:        Ronel Maxwell MD          QUERY TEXT:    Pt admitted  with Cellulitis & infected wounds of both feet. Pt noted to   have DM \" poorly controlled\" per ID pn. Please document in progress notes and   discharge summary the relationship, if any, between cellulitis and DM. The medical record reflects the following:  Risk Factors: Diabetes  Clinical Indicators: glucose range 184-423. Hgb a1c 8.2 (23)  Treatment: Cefepime, Lantus, Humalog, IV Abx    Thank you,  Robert Vazquez RN, CDI, CRCR  [email or web address]  Options provided:  -- bilateral feet cellulitis associated with Diabetes  -- bilateral feet cellulitis unrelated to Diabetes  -- Other - I will add my own diagnosis  -- Disagree - Not applicable / Not valid  -- Disagree - Clinically unable to determine / Unknown  -- Refer to Clinical Documentation Reviewer    PROVIDER RESPONSE TEXT:    bilateral feet cellulitis unrelated to Diabetes.     Query created by: Robert Vazquez on 2023 6:51 PM      Electronically signed by:  Ronel Maxwell MD 2023 6:57 PM

## 2023-09-29 NOTE — PROGRESS NOTES
Comprehensive Nutrition Assessment    Type and Reason for Visit:  Reassess    Nutrition Recommendations/Plan:   Continue current diet  Consider DM CNS consult to assist in BG control     Malnutrition Assessment:  Malnutrition Status:  Insufficient data (09/25/23 1423)      Nutrition Assessment:    Chart reviewed; patient having breakfast at time of attempted visit this morning and received a phone call after RD entered room so deferred visit at the time. Returned this afternoon and patient was sleeping soundly. Continues on a carb controled diet but BG remains elevated. Consider DM CNS consult to assist in BG management. Encourage intake of  meals. Nutrition Related Findings:     [phone]   BM 9/25   Atorvastatin, Lantus, Humalog, Protonix, Vitamin B12, Vitamin D, Vitamin E   Wound Type: Diabetic Ulcer       Current Nutrition Intake & Therapies:          ADULT DIET; Regular; 5 carb choices (75 gm/meal); GI [place] (GERD/Peptic Ulcer)    Anthropometric Measures:  Height: 190.5 cm (6' 3\")  Ideal Body Weight (IBW): 196 lbs (89 kg)       Current Body Weight: 182 lb 1.6 oz (82.6 kg),   IBW. Current BMI (kg/m2): 22.8                          BMI Categories: Normal Weight (BMI 18.5-24. 9)    Estimated Daily Nutrient Needs:  Energy Requirements Based On: Formula  Weight Used for Energy Requirements: Current  Energy (kcal/day): 2044 kcals (BMR x 1. 3AF)  Weight Used for Protein Requirements: Current  Protein (g/day): 83-99g (1.0-1.2 g/kg bw)  Method Used for Fluid Requirements: 1 ml/kcal  Fluid (ml/day): 2000 mL    Nutrition Diagnosis:   Altered nutrition-related lab values related to endocrine dysfuntion as evidenced by  [phone])    Nutrition Interventions:   Food and/or Nutrient Delivery: Continue Current Diet  Nutrition Education/Counseling: No recommendation at this time  Coordination of Nutrition Care: Continue to monitor while inpatient       Goals:  Previous Goal Met: Goal(s) Achieved  Goals: PO

## 2023-09-29 NOTE — PROGRESS NOTES
Pharmacist Daily Dosing of Warfarin    Indication & Goal INR: AFib, INR Goal 2-3    PTA Warfarin Dose: 7.5 mg daily     Notable concurrent conditions and medications:    Labs:  Recent Labs     Units 09/29/23  0505 09/28/23  0445 09/27/23  1116 09/27/23  0232   INR   1.2* 1.2*  --  1.3*   HGB g/dL  --  8.3*   < > 8.8*   PLT K/uL  --  246  --  231    < > = values in this interval not displayed. Impression/Plan:   Warfarin continued ON HOLD for coagulopathy (INR 10.4 on admission) per Dr Nicholas Liao. PAF: paced rhythm. Transfuse if HgB<7.0  Daily INR has been ordered  CBC w/o differential every other day has been ordered     Pharmacy will follow daily and adjust the dose as appropriate.     Thank you,  Jesusita uKnz, [facility]

## 2023-09-29 NOTE — PROGRESS NOTES
Infectious Disease Progress          Impression    Cellulitis  & infected wounds of both feet  Left hallux, R/second toe  Recent outpatient wound cultures left + for light MSSA  R/foot + for heavy Klebsiella aerogenes  S/p outpatient Bactrim p.o., Augmentin   Currently on cefepime IV, s/p DC of Vancomycin. CT R/foot  9/27 + for  acute on chronic osteomyelitis/septic arthritis at  the first interphalangeal joint. Findings concerning for acute osteomyelitis at the tip of the second distal  phalanx. CT L/foot - no OM. Leukocytosis improving  WBC 13.6   21.9 on admission    Diabetes poorly controlled  A1c 8.2    S/p pacemaker placement  8/75    Diastolic CHF, chronic    CKD 3  Cr 1.07    Acute anemia Hb 5.8  S/p PRBC transfusion    S/p admission in 7/2023  Treated for Pseudomonas bacteremia,  Metabolic encephalopathy, B/l big toe ulcerations  OM of right hallux, septic arthritis of 1st PIP jt  WC  5/17+ MSSA, Pseudomonas. S/p Zosyn iv x 6 weeks end date 8/18. Plan  -Continue Cefepime IV  -Pharmacy to adjust to creatinine clearance  -Pt would require 6 weeks given presence OM, septic arthritis  -Adequate fluids, daily probiotic  -Will await podiatry input and make final antibiotic recommendations   D/w Podiatry - plan is for Surgery      Please contact ID on call with questions, concerns over the weekend. Extensive review of chart notes, labs, imaging, cultures done  Additionally review of done: Recent reports-Labs, cultures, imaging  D/w -hospitalist, PETROS Kruse. is a 80 y.o. male with a history of BPH, CKD, arthritis and history of pseudomonal bacteremia presents emerged department with a chief complaint of weakness. Patient is well-known to ID service.   Patient was treated for Pseudomonas bacteremia, gram-negative sepsis and septic shock, OM of right hallux and septic arthritis of 1st PIP joint in 7/23  Patient  had active lesions on both feet for which he has been treating with foot and 3 views of the left foot demonstrate diffuse bilateral toe up deformities, arthroplasty of the right hallux IP joint and varus of the right fifth MTP joint. There is generalized soft tissue swelling of the hallux and second toes. Cutaneous ulceration at the acral aspect of the right second toe and probably the left second toe are shown. Dorsal nailbed irregularity of the right hallux is demonstrated. No destructive osseous changes are evident. Moderate right and mild left hallux MTP osteoarthrosis is shown. Moderate right plantar and dorsal calcaneal spurs are noted. Small left dorsal calcaneal spur is shown. Soft tissue changes in the right hallux and bilateral second toes as above. XR FOOT RIGHT (MIN 3 VIEWS)    Result Date: 9/23/2023  EXAM: XR FOOT RIGHT (MIN 3 VIEWS), XR FOOT LEFT (MIN 3 VIEWS) INDICATION: PAIN. COMPARISON: None. FINDINGS: Three views of the right foot and 3 views of the left foot demonstrate diffuse bilateral toe up deformities, arthroplasty of the right hallux IP joint and varus of the right fifth MTP joint. There is generalized soft tissue swelling of the hallux and second toes. Cutaneous ulceration at the acral aspect of the right second toe and probably the left second toe are shown. Dorsal nailbed irregularity of the right hallux is demonstrated. No destructive osseous changes are evident. Moderate right and mild left hallux MTP osteoarthrosis is shown. Moderate right plantar and dorsal calcaneal spurs are noted. Small left dorsal calcaneal spur is shown. Soft tissue changes in the right hallux and bilateral second toes as above.       MD Lei Miramontes

## 2023-09-29 NOTE — PROGRESS NOTES
Physician Progress Note      PATIENT:               Kate Moseley  CSN #:                  [id number]  :                       1933  ADMIT DATE:       2023 11:49 AM  [address] [facility] DATE:  [facility]  PROVIDER #:        Gabriela Martell MD          QUERY TEXT:    Pt admitted  with Cellulitis & infected wounds of both feet. Patient noted   to have increased WBC and CRP with temp during this admission. Please document in the progress notes and discharge summary if you are   evaluating and /or treating any of the following: The medical record reflects the following:    Risk Factors: 30-year-old male with Cellulitis & infected wounds of both feet. Dr Rosie Estes  pn \"Septic arthritis of right foot\"    Clinical Indicators:    Day of admission () WBC 15.1, band neutrophil 1, Temp 101.1, HR in 70s   with one increase of 91 in triage. RR 16-18 with one elevation of 21     WBC 13.6, CRP 11    Cx of L toe ulcer MSSA  R foot ulcer Klebsiella S Cefepime ( Dr Rosie Estes   pn)     Blood Cx No growth in 6 days   Blood Cx prelim no growth to date    ID pn: Cellulitis  & infected wounds of both feet. Treatment: CBC and BMP monitoring, Infectious disease consult, Podiatry   consult. Initially on Vancomycin and Zosyn which was changed to Cefipime ,   Vancomycin discontinued  ( Dr Rosie Estes pn). Thank you,  Aldo Howard RN, CDI, CRCR  [email or web address]  Options provided:  -- Sepsis, present on admission  -- Sepsis, present on admission, now resolved  -- Sepsis, developed following admission  -- Cellulitis without Sepsis  -- Other - I will add my own diagnosis  -- Disagree - Not applicable / Not valid  -- Disagree - Clinically unable to determine / Unknown  -- Refer to Clinical Documentation Reviewer    PROVIDER RESPONSE TEXT:    This patient has Cellulitis without Sepsis.     Query created by: Aldo Howard on 2023 6:44 PM      Electronically signed by:  Rodolfo Hill

## 2023-09-30 ENCOUNTER — ANESTHESIA EVENT (OUTPATIENT)
Facility: HOSPITAL | Age: 88
End: 2023-09-30
Payer: MEDICARE

## 2023-09-30 ENCOUNTER — ANESTHESIA (OUTPATIENT)
Facility: HOSPITAL | Age: 88
End: 2023-09-30
Payer: MEDICARE

## 2023-09-30 LAB
GLUCOSE BLD STRIP.AUTO-MCNC: 190 MG/DL (ref 65–117)
GLUCOSE BLD STRIP.AUTO-MCNC: 196 MG/DL (ref 65–117)
GLUCOSE BLD STRIP.AUTO-MCNC: 204 MG/DL (ref 65–117)
GLUCOSE BLD STRIP.AUTO-MCNC: 259 MG/DL (ref 65–117)
SERVICE CMNT-IMP: ABNORMAL

## 2023-09-30 PROCEDURE — 99233 SBSQ HOSP IP/OBS HIGH 50: CPT | Performed by: INTERNAL MEDICINE

## 2023-09-30 PROCEDURE — 6370000000 HC RX 637 (ALT 250 FOR IP): Performed by: INTERNAL MEDICINE

## 2023-09-30 PROCEDURE — 2709999900 HC NON-CHARGEABLE SUPPLY: Performed by: PODIATRIST

## 2023-09-30 PROCEDURE — 87186 SC STD MICRODIL/AGAR DIL: CPT

## 2023-09-30 PROCEDURE — 6360000002 HC RX W HCPCS

## 2023-09-30 PROCEDURE — C9113 INJ PANTOPRAZOLE SODIUM, VIA: HCPCS | Performed by: INTERNAL MEDICINE

## 2023-09-30 PROCEDURE — 7100000000 HC PACU RECOVERY - FIRST 15 MIN: Performed by: PODIATRIST

## 2023-09-30 PROCEDURE — 0Y6R0Z3 DETACHMENT AT RIGHT 2ND TOE, LOW, OPEN APPROACH: ICD-10-PCS | Performed by: PODIATRIST

## 2023-09-30 PROCEDURE — 6360000002 HC RX W HCPCS: Performed by: INTERNAL MEDICINE

## 2023-09-30 PROCEDURE — 3600000012 HC SURGERY LEVEL 2 ADDTL 15MIN: Performed by: PODIATRIST

## 2023-09-30 PROCEDURE — 2580000003 HC RX 258: Performed by: INTERNAL MEDICINE

## 2023-09-30 PROCEDURE — 7100000001 HC PACU RECOVERY - ADDTL 15 MIN: Performed by: PODIATRIST

## 2023-09-30 PROCEDURE — 87147 CULTURE TYPE IMMUNOLOGIC: CPT

## 2023-09-30 PROCEDURE — A4216 STERILE WATER/SALINE, 10 ML: HCPCS | Performed by: INTERNAL MEDICINE

## 2023-09-30 PROCEDURE — 88311 DECALCIFY TISSUE: CPT

## 2023-09-30 PROCEDURE — 87070 CULTURE OTHR SPECIMN AEROBIC: CPT

## 2023-09-30 PROCEDURE — 87205 SMEAR GRAM STAIN: CPT

## 2023-09-30 PROCEDURE — 82962 GLUCOSE BLOOD TEST: CPT

## 2023-09-30 PROCEDURE — 87077 CULTURE AEROBIC IDENTIFY: CPT

## 2023-09-30 PROCEDURE — 3700000000 HC ANESTHESIA ATTENDED CARE: Performed by: PODIATRIST

## 2023-09-30 PROCEDURE — 87075 CULTR BACTERIA EXCEPT BLOOD: CPT

## 2023-09-30 PROCEDURE — 3600000002 HC SURGERY LEVEL 2 BASE: Performed by: PODIATRIST

## 2023-09-30 PROCEDURE — 88305 TISSUE EXAM BY PATHOLOGIST: CPT

## 2023-09-30 PROCEDURE — 3700000001 HC ADD 15 MINUTES (ANESTHESIA): Performed by: PODIATRIST

## 2023-09-30 PROCEDURE — 1100000003 HC PRIVATE W/ TELEMETRY

## 2023-09-30 RX ORDER — ONDANSETRON 2 MG/ML
4 INJECTION INTRAMUSCULAR; INTRAVENOUS
Status: DISCONTINUED | OUTPATIENT
Start: 2023-09-30 | End: 2023-09-30 | Stop reason: HOSPADM

## 2023-09-30 RX ORDER — HYDROMORPHONE HYDROCHLORIDE 1 MG/ML
0.5 INJECTION, SOLUTION INTRAMUSCULAR; INTRAVENOUS; SUBCUTANEOUS EVERY 5 MIN PRN
Status: DISCONTINUED | OUTPATIENT
Start: 2023-09-30 | End: 2023-09-30 | Stop reason: HOSPADM

## 2023-09-30 RX ORDER — FENTANYL CITRATE 50 UG/ML
25 INJECTION, SOLUTION INTRAMUSCULAR; INTRAVENOUS EVERY 5 MIN PRN
Status: DISCONTINUED | OUTPATIENT
Start: 2023-09-30 | End: 2023-09-30 | Stop reason: HOSPADM

## 2023-09-30 RX ORDER — PROCHLORPERAZINE EDISYLATE 5 MG/ML
5 INJECTION INTRAMUSCULAR; INTRAVENOUS
Status: DISCONTINUED | OUTPATIENT
Start: 2023-09-30 | End: 2023-09-30 | Stop reason: HOSPADM

## 2023-09-30 RX ORDER — SODIUM CHLORIDE 0.9 % (FLUSH) 0.9 %
5-40 SYRINGE (ML) INJECTION EVERY 12 HOURS SCHEDULED
Status: DISCONTINUED | OUTPATIENT
Start: 2023-09-30 | End: 2023-09-30 | Stop reason: HOSPADM

## 2023-09-30 RX ORDER — HYDROCODONE BITARTRATE AND ACETAMINOPHEN 5; 325 MG/1; MG/1
1 TABLET ORAL EVERY 6 HOURS PRN
Status: DISCONTINUED | OUTPATIENT
Start: 2023-09-30 | End: 2023-10-06 | Stop reason: HOSPADM

## 2023-09-30 RX ORDER — SODIUM CHLORIDE 0.9 % (FLUSH) 0.9 %
5-40 SYRINGE (ML) INJECTION PRN
Status: DISCONTINUED | OUTPATIENT
Start: 2023-09-30 | End: 2023-09-30 | Stop reason: HOSPADM

## 2023-09-30 RX ORDER — SODIUM CHLORIDE 9 MG/ML
INJECTION, SOLUTION INTRAVENOUS PRN
Status: DISCONTINUED | OUTPATIENT
Start: 2023-09-30 | End: 2023-09-30 | Stop reason: HOSPADM

## 2023-09-30 RX ORDER — DEXTROSE MONOHYDRATE 100 MG/ML
INJECTION, SOLUTION INTRAVENOUS CONTINUOUS PRN
Status: DISCONTINUED | OUTPATIENT
Start: 2023-09-30 | End: 2023-09-30 | Stop reason: HOSPADM

## 2023-09-30 RX ADMIN — PROPOFOL 50 MCG/KG/MIN: 10 INJECTION, EMULSION INTRAVENOUS at 14:17

## 2023-09-30 RX ADMIN — SODIUM CHLORIDE 40 MG: 9 INJECTION INTRAMUSCULAR; INTRAVENOUS; SUBCUTANEOUS at 12:16

## 2023-09-30 RX ADMIN — CEFEPIME 2000 MG: 2 INJECTION, POWDER, FOR SOLUTION INTRAVENOUS at 14:20

## 2023-09-30 RX ADMIN — Medication: at 12:17

## 2023-09-30 RX ADMIN — ACETAMINOPHEN 650 MG: 325 TABLET ORAL at 21:15

## 2023-09-30 RX ADMIN — SODIUM CHLORIDE 40 MG: 9 INJECTION INTRAMUSCULAR; INTRAVENOUS; SUBCUTANEOUS at 21:15

## 2023-09-30 RX ADMIN — PREGABALIN 75 MG: 75 CAPSULE ORAL at 21:15

## 2023-09-30 RX ADMIN — Medication: at 21:16

## 2023-09-30 RX ADMIN — INSULIN GLARGINE 10 UNITS: 100 INJECTION, SOLUTION SUBCUTANEOUS at 21:10

## 2023-09-30 RX ADMIN — CEFEPIME 2000 MG: 2 INJECTION, POWDER, FOR SOLUTION INTRAVENOUS at 21:15

## 2023-09-30 ASSESSMENT — PAIN SCALES - GENERAL: PAINLEVEL_OUTOF10: 0

## 2023-09-30 NOTE — PROGRESS NOTES
Post op right second toe distal amputation   Can be discharged as per Podiatry when medically appropriate   With following instructions and recommendations    Can be discharged with oral antibiotics like Doxycycline 100 mg for 10 days bid  As tolerated weight bearing on the right foot  Follow up in 10 days   Can be sent home with skilled home health wound care for the right and left foot wound care treatment twice a day   Wound care with aquacell Ag and padded gauze application    If need further assistance please reach out to me on perfect serve or call me on my cell [phone]

## 2023-09-30 NOTE — PROGRESS NOTES
Bedside and Verbal shift change report given to Shimon Hammond RN (oncoming nurse) by Jamaal Eaton (offgoing nurse). Report included the following information Nurse Handoff Report, Intake/Output, MAR, and Cardiac Rhythm A paced .

## 2023-09-30 NOTE — PLAN OF CARE
Problem: ABCDS Injury Assessment  Goal: Absence of physical injury  9/29/2023 2241 by Archie Portillo RN  Outcome: Progressing  9/29/2023 1721 by Mahin Mcnair RN  Outcome: Progressing     Problem: Skin/Tissue Integrity  Goal: Absence of new skin breakdown  Description: 1. Monitor for areas of redness and/or skin breakdown  2. Assess vascular access sites hourly  3. Every 4-6 hours minimum:  Change oxygen saturation probe site  4. Every 4-6 hours:  If on nasal continuous positive airway pressure, respiratory therapy assess nares and determine need for appliance change or resting period.   9/29/2023 2241 by Archie Portillo RN  Outcome: Progressing  9/29/2023 1721 by Mahin Mcnair RN  Outcome: Progressing     Problem: Safety - Adult  Goal: Free from fall injury  9/29/2023 2241 by Archie Portillo RN  Outcome: Progressing  9/29/2023 1721 by Mahin Mcnair RN  Outcome: Progressing     Problem: Discharge Planning  Goal: Discharge to home or other facility with appropriate resources  9/29/2023 2241 by Archie Portillo RN  Outcome: Progressing  9/29/2023 1721 by Mahin Mcnair RN  Outcome: Progressing     Problem: Pain  Goal: Verbalizes/displays adequate comfort level or baseline comfort level  9/29/2023 2241 by Archie Portillo RN  Outcome: Progressing  9/29/2023 1721 by Mahin Mcnair RN  Outcome: Progressing     Problem: Chronic Conditions and Co-morbidities  Goal: Patient's chronic conditions and co-morbidity symptoms are monitored and maintained or improved  9/29/2023 2241 by Archie Portillo RN  Outcome: Progressing  9/29/2023 1721 by Mahin Mcnair RN  Outcome: Progressing

## 2023-09-30 NOTE — BRIEF OP NOTE
Brief Postoperative Note      Patient: Ed Pel Sr.   YOB: 1933  MRN: [id number]    Date of Procedure: 9/30/2023    Pre-Op Diagnosis Codes:     * Osteomyelitis of right foot, unspecified type ([address]) [M86.9]    Post-Op Diagnosis: Same       Procedure(s):  RIGHT 2ND TOE AMPUTATION partial to the DIPJ    Surgeon(s):  Enzo Lynne DPM    Assistant:  * No surgical staff found *    Anesthesia: Monitor Anesthesia Care    Estimated Blood Loss (mL): Minimal    Complications: None    Specimens:   ID Type Source Tests Collected by Time Destination   1 : RIGHT SECOND TOE WOUND Swab Foot CULTURE, ANAEROBIC, CULTURE, WOUND Enzo Lynne DPM 9/30/2023 1433    2 : RIGHT SECOND TOE TIP Tissue Foot SURGICAL PATHOLOGY Enzo Lynne DPM 9/30/2023 1433        Implants:  * No implants in log * none      Drains: * No LDAs found * none    Findings: viable margins      Electronically signed by Enzo Lynne DPM on 9/30/2023 at 2:49 PM

## 2023-09-30 NOTE — ANESTHESIA PRE PROCEDURE
Department of Anesthesiology  Preprocedure Note       Name:  Lorena Mak.   Age:  80 y.o.  :  1933                                          MRN:  [id number]         Date:  2023      Surgeon: Mikala Ulrich):  Jorge Arias DPM    Procedure: Procedure(s):  RIGHT 2ND TOE AMPUTATION    Medications prior to admission:   Prior to Admission medications    Medication Sig Start Date End Date Taking? Authorizing Provider   sulfamethoxazole-trimethoprim (BACTRIM DS;SEPTRA DS) 800-160 MG per tablet Take 1 tablet by mouth 2 times daily for 10 days  Patient not taking: Reported on 2023 9/22/23 10/2/23  William Troncoso MD   glimepiride (AMARYL) 4 MG tablet TAKE 1 TABLET BY MOUTH EVERY DAY BEFORE BREAKFAST 23   William Troncoso MD   terazosin (HYTRIN) 2 MG capsule TAKE 1 CAPSULE BY MOUTH EVERY DAY 23   William Troncoso MD   simvastatin (ZOCOR) 20 MG tablet TAKE 1 TABLET BY MOUTH EVERY DAY 23   William Troncoso MD   metFORMIN (GLUCOPHAGE) 500 MG tablet TAKE 1 TABLET BY MOUTH EVERY DAY IN THE MORNING WITH BREAKFAST AND 1 TABLET BEFORE DINNER 23   William Troncoso MD   pregabalin (LYRICA) 75 MG capsule Take 1 capsule by mouth 2 times daily.   Patient not taking: Reported on 2023    Historical Provider, MD   Lactobacillus Acid-Pectin (ACIDOPHILUS/CITRUS PECTIN) TABS Take 1 tablet by mouth daily 23   William Troncoso MD   acetaminophen (TYLENOL) 500 MG tablet Take 2 tablets by mouth in the morning and at bedtime    Historical Provider, MD   Omega-3 Fatty Acids (FISH OIL) 1200 MG CAPS Take 2,400 mg by mouth in the morning and at bedtime    Historical Provider, MD   glucosamine-chondroitin 500-400 MG CAPS Take 1 capsule by mouth daily    Historical Provider, MD   vitamin E 400 UNIT capsule Take 1 capsule by mouth daily    Historical Provider, MD   vitamin B-12 (CYANOCOBALAMIN) 1000 MCG tablet Take 1 tablet by mouth daily    Historical Provider, MD   ACCU-CHEK LEE ANN PLUS strip

## 2023-09-30 NOTE — ANESTHESIA POSTPROCEDURE EVALUATION
Department of Anesthesiology  Postprocedure Note    Patient: Lio Thompson Sr.   MRN: [id number]  YOB: 1933  Date of evaluation: 9/30/2023      Procedure Summary     Date: 09/30/23 Room / Location: [facility] MAIN OR  / [facility] MAIN OR    Anesthesia Start: 1416 Anesthesia Stop:     Procedure: RIGHT 2ND TOE AMPUTATION (Right: Foot) Diagnosis:       Osteomyelitis of right foot, unspecified type ([address])      (Osteomyelitis of right foot, unspecified type ([address]) [M86.9])    Providers: Neal Ibrahim DPM Responsible Provider: Hillary Conklin MD    Anesthesia Type: MAC ASA Status: 3          Anesthesia Type: MAC    Claudia Phase I: Claudia Score: 9    Claudia Phase II:        Anesthesia Post Evaluation    Patient location during evaluation: PACU  Patient participation: complete - patient participated  Level of consciousness: awake and alert  Airway patency: patent  Nausea & Vomiting: no nausea  Complications: no  Cardiovascular status: hemodynamically stable  Respiratory status: acceptable  Hydration status: euvolemic  Multimodal analgesia pain management approach  Pain management: adequate

## 2023-10-01 LAB
ANION GAP SERPL CALC-SCNC: 6 MMOL/L (ref 5–15)
BASOPHILS # BLD: 0.2 K/UL (ref 0–0.1)
BASOPHILS NFR BLD: 1 % (ref 0–1)
BUN SERPL-MCNC: 19 MG/DL (ref 6–20)
BUN/CREAT SERPL: 17 (ref 12–20)
CALCIUM SERPL-MCNC: 8.7 MG/DL (ref 8.5–10.1)
CHLORIDE SERPL-SCNC: 108 MMOL/L (ref 97–108)
CO2 SERPL-SCNC: 23 MMOL/L (ref 21–32)
CREAT SERPL-MCNC: 1.15 MG/DL (ref 0.7–1.3)
DIFFERENTIAL METHOD BLD: ABNORMAL
EOSINOPHIL # BLD: 0.2 K/UL (ref 0–0.4)
EOSINOPHIL NFR BLD: 1 % (ref 0–7)
ERYTHROCYTE [DISTWIDTH] IN BLOOD BY AUTOMATED COUNT: 20.9 % (ref 11.5–14.5)
GLUCOSE BLD STRIP.AUTO-MCNC: 171 MG/DL (ref 65–117)
GLUCOSE BLD STRIP.AUTO-MCNC: 237 MG/DL (ref 65–117)
GLUCOSE BLD STRIP.AUTO-MCNC: 296 MG/DL (ref 65–117)
GLUCOSE BLD STRIP.AUTO-MCNC: 300 MG/DL (ref 65–117)
GLUCOSE SERPL-MCNC: 153 MG/DL (ref 65–100)
HCT VFR BLD AUTO: 32.5 % (ref 36.6–50.3)
HGB BLD-MCNC: 9.8 G/DL (ref 12.1–17)
IMM GRANULOCYTES # BLD AUTO: 0.4 K/UL (ref 0–0.04)
IMM GRANULOCYTES NFR BLD AUTO: 2 % (ref 0–0.5)
INR PPP: 1.1 (ref 0.9–1.1)
LYMPHOCYTES # BLD: 2.2 K/UL (ref 0.8–3.5)
LYMPHOCYTES NFR BLD: 11 % (ref 12–49)
MCH RBC QN AUTO: 25.5 PG (ref 26–34)
MCHC RBC AUTO-ENTMCNC: 30.2 G/DL (ref 30–36.5)
MCV RBC AUTO: 84.4 FL (ref 80–99)
MONOCYTES # BLD: 1.4 K/UL (ref 0–1)
MONOCYTES NFR BLD: 7 % (ref 5–13)
NEUTS SEG # BLD: 15.7 K/UL (ref 1.8–8)
NEUTS SEG NFR BLD: 78 % (ref 32–75)
NRBC # BLD: 0 K/UL (ref 0–0.01)
NRBC BLD-RTO: 0 PER 100 WBC
PLATELET # BLD AUTO: 284 K/UL (ref 150–400)
PMV BLD AUTO: 8.9 FL (ref 8.9–12.9)
POTASSIUM SERPL-SCNC: 4.3 MMOL/L (ref 3.5–5.1)
PROTHROMBIN TIME: 11.9 SEC (ref 9–11.1)
RBC # BLD AUTO: 3.85 M/UL (ref 4.1–5.7)
RBC MORPH BLD: ABNORMAL
SERVICE CMNT-IMP: ABNORMAL
SODIUM SERPL-SCNC: 137 MMOL/L (ref 136–145)
WBC # BLD AUTO: 20.1 K/UL (ref 4.1–11.1)

## 2023-10-01 PROCEDURE — 85610 PROTHROMBIN TIME: CPT

## 2023-10-01 PROCEDURE — 82962 GLUCOSE BLOOD TEST: CPT

## 2023-10-01 PROCEDURE — 2580000003 HC RX 258: Performed by: INTERNAL MEDICINE

## 2023-10-01 PROCEDURE — 1100000003 HC PRIVATE W/ TELEMETRY

## 2023-10-01 PROCEDURE — C9113 INJ PANTOPRAZOLE SODIUM, VIA: HCPCS | Performed by: INTERNAL MEDICINE

## 2023-10-01 PROCEDURE — A4216 STERILE WATER/SALINE, 10 ML: HCPCS | Performed by: INTERNAL MEDICINE

## 2023-10-01 PROCEDURE — 80048 BASIC METABOLIC PNL TOTAL CA: CPT

## 2023-10-01 PROCEDURE — 6360000002 HC RX W HCPCS: Performed by: INTERNAL MEDICINE

## 2023-10-01 PROCEDURE — 85025 COMPLETE CBC W/AUTO DIFF WBC: CPT

## 2023-10-01 PROCEDURE — 6370000000 HC RX 637 (ALT 250 FOR IP): Performed by: INTERNAL MEDICINE

## 2023-10-01 PROCEDURE — 99233 SBSQ HOSP IP/OBS HIGH 50: CPT | Performed by: INTERNAL MEDICINE

## 2023-10-01 PROCEDURE — 36415 COLL VENOUS BLD VENIPUNCTURE: CPT

## 2023-10-01 PROCEDURE — 2500000003 HC RX 250 WO HCPCS: Performed by: INTERNAL MEDICINE

## 2023-10-01 RX ORDER — ALUMINUM ZIRCONIUM OCTACHLOROHYDREX GLY 16 G/100G
1 GEL TOPICAL DAILY
Status: DISCONTINUED | OUTPATIENT
Start: 2023-10-01 | End: 2023-10-06 | Stop reason: HOSPADM

## 2023-10-01 RX ADMIN — Medication 400 UNITS: at 11:01

## 2023-10-01 RX ADMIN — DIGOXIN 125 MCG: 125 TABLET ORAL at 11:01

## 2023-10-01 RX ADMIN — INSULIN LISPRO 4 UNITS: 100 INJECTION, SOLUTION INTRAVENOUS; SUBCUTANEOUS at 18:06

## 2023-10-01 RX ADMIN — PREGABALIN 75 MG: 75 CAPSULE ORAL at 20:45

## 2023-10-01 RX ADMIN — DOXAZOSIN 2 MG: 2 TABLET ORAL at 11:01

## 2023-10-01 RX ADMIN — ATORVASTATIN CALCIUM 40 MG: 40 TABLET, FILM COATED ORAL at 11:01

## 2023-10-01 RX ADMIN — ACETAMINOPHEN 650 MG: 325 TABLET ORAL at 18:07

## 2023-10-01 RX ADMIN — CEFEPIME 2000 MG: 2 INJECTION, POWDER, FOR SOLUTION INTRAVENOUS at 20:45

## 2023-10-01 RX ADMIN — Medication: at 11:11

## 2023-10-01 RX ADMIN — INSULIN LISPRO 4 UNITS: 100 INJECTION, SOLUTION INTRAVENOUS; SUBCUTANEOUS at 20:36

## 2023-10-01 RX ADMIN — SODIUM CHLORIDE 40 MG: 9 INJECTION INTRAMUSCULAR; INTRAVENOUS; SUBCUTANEOUS at 11:01

## 2023-10-01 RX ADMIN — ACETAMINOPHEN 650 MG: 325 TABLET ORAL at 04:15

## 2023-10-01 RX ADMIN — Medication 1000 MCG: at 11:01

## 2023-10-01 RX ADMIN — PREGABALIN 75 MG: 75 CAPSULE ORAL at 11:01

## 2023-10-01 RX ADMIN — CEFEPIME 2000 MG: 2 INJECTION, POWDER, FOR SOLUTION INTRAVENOUS at 11:01

## 2023-10-01 RX ADMIN — Medication 1 TABLET: at 11:01

## 2023-10-01 RX ADMIN — Medication: at 20:19

## 2023-10-01 RX ADMIN — INSULIN GLARGINE 10 UNITS: 100 INJECTION, SOLUTION SUBCUTANEOUS at 20:37

## 2023-10-01 RX ADMIN — INSULIN LISPRO 6 UNITS: 100 INJECTION, SOLUTION INTRAVENOUS; SUBCUTANEOUS at 12:29

## 2023-10-01 RX ADMIN — SODIUM CHLORIDE 40 MG: 9 INJECTION INTRAMUSCULAR; INTRAVENOUS; SUBCUTANEOUS at 20:19

## 2023-10-01 RX ADMIN — CHOLECALCIFEROL TAB 10 MCG (400 UNIT) 400 UNITS: 10 TAB at 11:01

## 2023-10-01 NOTE — PROGRESS NOTES
2606R RN informed MD Mehnaz Ding, that pt had febrile episodes at 2109H PRN tylenol given TSB done. Pt temp went down. at 0400H pt temp trend up to 101.8, the latest now is 99.3F. VS stable at this time. RN reviewed pt labs. WBC is 20.1, predominant neutrophils at 78.    0641H RN informed RAMÍREZ Schneider regarding patients condition. 0649H No further orders from [facility].

## 2023-10-01 NOTE — PROGRESS NOTES
Bedside shift change report given to [address] [place] [address] (oncoming nurse) by Ruth Olmos (offgoing nurse). Report included the following information Nurse Handoff Report, Intake/Output, MAR, Recent Results, and Cardiac Rhythm V paced . Pt slept most of the night. Applied foam dressing on his mid sacrum. TSB done  PRN tylenol given. At handoff report, pt is sleeping in bed with visible rise and fall of chest.      0723H No orders from MD Moe العراقي yet. Oncoming RN aware to follow up if there's any order.

## 2023-10-01 NOTE — PROGRESS NOTES
INTERNAL MEDICINE PROGRESS NOTE    NAME:  Burak Pelletier Sr.   :   1933   MRN:   [id number]     Date/Time:  10/1/2023 7:07 AM  Subjective:   History:  Chart reviewed and patient seen and examined and D/W his nurse and his son at bedside this AM and all events noted. He is followed by me for HTN, HLD, CKD st 3, PAF, CHD S/P pacemaker, diastolic CHF, recent hospitalization with Sepsis and Osteomyelitis R great toe and other medical problems. He has now been admitted with bilateral foot/toe ulcers and dehydration. This AM continues to feel quite weak without any other change. He denies CP or SOB or other cardiac or respiratory c/o. He has no N/V but still little appetite. He had 3 BMs on admission date   which were black loose and foul smelling and one black BM the following day and had 1 normal appearing BM each of the next 3 days until some loose bowel movements - however with no BM during the day yesterday or overnight. Hgb fell to 5.8 on  PM and transfused 2 U PRBC. He has no other GI c/o. He has no neuro c/o except generalized weakness. He has no change of his chronic arthritic c/o. He has no other c/o on complete ROS. He did have a low-grade fever  evening and repeat blood cultures were sent although initial blood cultures remained negative. Yesterday  he had distal R 2nd toe amputation and had fever to 102.2 last PM; however afebrile now.       Medications reviewed:  Current Facility-Administered Medications   Medication Dose Route Frequency    HYDROcodone-acetaminophen (NORCO) 5-325 MG per tablet 1 tablet  1 tablet Oral Q6H PRN    insulin glargine (LANTUS) injection vial 10 Units  10 Units SubCUTAneous Nightly    Warfarin ON HOLD / Dr. 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 *          ___________________________________________________  PLAN:    1. Initially on Vancomycin and Zosyn which was changed to to Cefipime 9/25, Vancomycin discontinued 9/28. Cx of L toe ulcer MSSA  R foot ulcer Klebsiella S Cefepime. So far blood cx neg at 6 days and 3 days respectively (Hospitalized July with Pseudomonas sepsis from R foot ulcer/osteomyelitis)  2. Follow WBC which is improved a little to 13.6 from 21.9 and clinically no evidence of sepsis. Now post op up to 20.1  3. Follow Hgb (recent baseline 8.7 - 9.1 and on adm 8.0 down to 5.8 on 9/25 PM so ordered 2 U PRBC and still Hgb 8.3 yesterday, today 9.8  4. Continue Protonix IV Q 12  5. GI Consult for GI bleed likely based upon symptoms and Hgb drop. Note reviewed and not sure I agreed totally with not doing EGD although I see the reasoning and he is improving with improved BMs  6. Transfuse further if Hgb < 7.0 (2 U given 9/25)  7. Coagulopathy on adm with INR 10.4, today down to 1.2. Continue to HOLD Coumadin  8. Follow renal function (Baseline 29/1.65 and on adm 34/1.74 with today 19/1.07  9. Follow BS and SSI as needed (Increased Insulin amounts 9/26 AM as BS running quite high), increase Lantus to 16 units starting 10/1, holding oral meds  10. ASCVD stable  11. PAF - now paced rhythm  12. Diastolic CHF - Compensated  13. Infectious disease consult for guidance regarding what antibiotic to use on discharge that will be most effective with the least side effects.   He recently completed a prolonged course of Augmentin slightly greater than 6 weeks and had severe diarrhea during the entire treatment time. 14   CT right foot done 9/27         1. Findings concerning for acute on chronic osteomyelitis/septic arthritis at  the first interphalangeal joint. 2.  Findings concerning for acute osteomyelitis at the tip of the second distal  phalanx       CT left foot no evidence of osteomyelitis  15. Podiatry consult note reviewed and had amputation of tip of right second toe to be done 9/30  16. CXR if febrile today    50 Minutes spent today in direct care of this high complexity patient with greater than 50% in counseling and coordination of care.     If need to contact me use hospital  [phone], DO NOT USE PERFECT SERVE    ___________________________________________________    Attending Physician: Yadira Velasquez MD

## 2023-10-02 ENCOUNTER — APPOINTMENT (OUTPATIENT)
Facility: HOSPITAL | Age: 88
End: 2023-10-02
Payer: MEDICARE

## 2023-10-02 LAB
ANION GAP SERPL CALC-SCNC: 7 MMOL/L (ref 5–15)
BACTERIA SPEC CULT: NORMAL
BACTERIA SPEC CULT: NORMAL
BASOPHILS # BLD: 0.1 K/UL (ref 0–0.1)
BASOPHILS NFR BLD: 1 % (ref 0–1)
BUN SERPL-MCNC: 21 MG/DL (ref 6–20)
BUN/CREAT SERPL: 18 (ref 12–20)
CALCIUM SERPL-MCNC: 8.5 MG/DL (ref 8.5–10.1)
CHLORIDE SERPL-SCNC: 106 MMOL/L (ref 97–108)
CO2 SERPL-SCNC: 21 MMOL/L (ref 21–32)
CREAT SERPL-MCNC: 1.16 MG/DL (ref 0.7–1.3)
DIFFERENTIAL METHOD BLD: ABNORMAL
EOSINOPHIL # BLD: 0.1 K/UL (ref 0–0.4)
EOSINOPHIL NFR BLD: 1 % (ref 0–7)
ERYTHROCYTE [DISTWIDTH] IN BLOOD BY AUTOMATED COUNT: 20.6 % (ref 11.5–14.5)
GLUCOSE BLD STRIP.AUTO-MCNC: 168 MG/DL (ref 65–117)
GLUCOSE BLD STRIP.AUTO-MCNC: 230 MG/DL (ref 65–117)
GLUCOSE BLD STRIP.AUTO-MCNC: 240 MG/DL (ref 65–117)
GLUCOSE BLD STRIP.AUTO-MCNC: 247 MG/DL (ref 65–117)
GLUCOSE SERPL-MCNC: 159 MG/DL (ref 65–100)
HCT VFR BLD AUTO: 33.6 % (ref 36.6–50.3)
HGB BLD-MCNC: 9.9 G/DL (ref 12.1–17)
IMM GRANULOCYTES # BLD AUTO: 0.3 K/UL (ref 0–0.04)
IMM GRANULOCYTES NFR BLD AUTO: 1 % (ref 0–0.5)
INR PPP: 1.1 (ref 0.9–1.1)
LYMPHOCYTES # BLD: 2.9 K/UL (ref 0.8–3.5)
LYMPHOCYTES NFR BLD: 15 % (ref 12–49)
MCH RBC QN AUTO: 25.2 PG (ref 26–34)
MCHC RBC AUTO-ENTMCNC: 29.5 G/DL (ref 30–36.5)
MCV RBC AUTO: 85.5 FL (ref 80–99)
MONOCYTES # BLD: 1.5 K/UL (ref 0–1)
MONOCYTES NFR BLD: 8 % (ref 5–13)
NEUTS SEG # BLD: 14.1 K/UL (ref 1.8–8)
NEUTS SEG NFR BLD: 75 % (ref 32–75)
NRBC # BLD: 0 K/UL (ref 0–0.01)
NRBC BLD-RTO: 0 PER 100 WBC
PLATELET # BLD AUTO: 307 K/UL (ref 150–400)
PMV BLD AUTO: 9.1 FL (ref 8.9–12.9)
POTASSIUM SERPL-SCNC: 4.5 MMOL/L (ref 3.5–5.1)
PROTHROMBIN TIME: 11.9 SEC (ref 9–11.1)
RBC # BLD AUTO: 3.93 M/UL (ref 4.1–5.7)
SERVICE CMNT-IMP: ABNORMAL
SERVICE CMNT-IMP: NORMAL
SERVICE CMNT-IMP: NORMAL
SODIUM SERPL-SCNC: 134 MMOL/L (ref 136–145)
WBC # BLD AUTO: 18.9 K/UL (ref 4.1–11.1)

## 2023-10-02 PROCEDURE — 99233 SBSQ HOSP IP/OBS HIGH 50: CPT | Performed by: INTERNAL MEDICINE

## 2023-10-02 PROCEDURE — 2500000003 HC RX 250 WO HCPCS: Performed by: INTERNAL MEDICINE

## 2023-10-02 PROCEDURE — 6360000002 HC RX W HCPCS: Performed by: INTERNAL MEDICINE

## 2023-10-02 PROCEDURE — 71045 X-RAY EXAM CHEST 1 VIEW: CPT

## 2023-10-02 PROCEDURE — A4216 STERILE WATER/SALINE, 10 ML: HCPCS | Performed by: INTERNAL MEDICINE

## 2023-10-02 PROCEDURE — 36415 COLL VENOUS BLD VENIPUNCTURE: CPT

## 2023-10-02 PROCEDURE — 87040 BLOOD CULTURE FOR BACTERIA: CPT

## 2023-10-02 PROCEDURE — 6370000000 HC RX 637 (ALT 250 FOR IP): Performed by: INTERNAL MEDICINE

## 2023-10-02 PROCEDURE — 85025 COMPLETE CBC W/AUTO DIFF WBC: CPT

## 2023-10-02 PROCEDURE — 1100000003 HC PRIVATE W/ TELEMETRY

## 2023-10-02 PROCEDURE — 80048 BASIC METABOLIC PNL TOTAL CA: CPT

## 2023-10-02 PROCEDURE — 85610 PROTHROMBIN TIME: CPT

## 2023-10-02 PROCEDURE — C9113 INJ PANTOPRAZOLE SODIUM, VIA: HCPCS | Performed by: INTERNAL MEDICINE

## 2023-10-02 PROCEDURE — 2580000003 HC RX 258: Performed by: INTERNAL MEDICINE

## 2023-10-02 PROCEDURE — 97116 GAIT TRAINING THERAPY: CPT

## 2023-10-02 PROCEDURE — 82962 GLUCOSE BLOOD TEST: CPT

## 2023-10-02 RX ORDER — GLIMEPIRIDE 1 MG/1
2 TABLET ORAL
Status: DISCONTINUED | OUTPATIENT
Start: 2023-10-03 | End: 2023-10-06 | Stop reason: HOSPADM

## 2023-10-02 RX ORDER — PANTOPRAZOLE SODIUM 40 MG/1
40 TABLET, DELAYED RELEASE ORAL
Status: DISCONTINUED | OUTPATIENT
Start: 2023-10-02 | End: 2023-10-06 | Stop reason: HOSPADM

## 2023-10-02 RX ADMIN — METFORMIN HYDROCHLORIDE 500 MG: 500 TABLET ORAL at 17:00

## 2023-10-02 RX ADMIN — Medication: at 08:53

## 2023-10-02 RX ADMIN — CEFEPIME 2000 MG: 2 INJECTION, POWDER, FOR SOLUTION INTRAVENOUS at 09:13

## 2023-10-02 RX ADMIN — SODIUM CHLORIDE 40 MG: 9 INJECTION INTRAMUSCULAR; INTRAVENOUS; SUBCUTANEOUS at 08:51

## 2023-10-02 RX ADMIN — PREGABALIN 75 MG: 75 CAPSULE ORAL at 08:51

## 2023-10-02 RX ADMIN — PREGABALIN 75 MG: 75 CAPSULE ORAL at 20:30

## 2023-10-02 RX ADMIN — Medication 1 TABLET: at 08:53

## 2023-10-02 RX ADMIN — Medication: at 20:11

## 2023-10-02 RX ADMIN — CEFEPIME 2000 MG: 2 INJECTION, POWDER, FOR SOLUTION INTRAVENOUS at 20:30

## 2023-10-02 RX ADMIN — INSULIN LISPRO 4 UNITS: 100 INJECTION, SOLUTION INTRAVENOUS; SUBCUTANEOUS at 13:17

## 2023-10-02 RX ADMIN — Medication 1 CAPSULE: at 08:51

## 2023-10-02 RX ADMIN — DIGOXIN 125 MCG: 125 TABLET ORAL at 09:16

## 2023-10-02 RX ADMIN — CHOLECALCIFEROL TAB 10 MCG (400 UNIT) 400 UNITS: 10 TAB at 08:51

## 2023-10-02 RX ADMIN — Medication 1000 MCG: at 08:51

## 2023-10-02 RX ADMIN — INSULIN GLARGINE 10 UNITS: 100 INJECTION, SOLUTION SUBCUTANEOUS at 20:30

## 2023-10-02 RX ADMIN — METFORMIN HYDROCHLORIDE 500 MG: 500 TABLET ORAL at 10:48

## 2023-10-02 RX ADMIN — DOXAZOSIN 2 MG: 2 TABLET ORAL at 08:51

## 2023-10-02 RX ADMIN — ATORVASTATIN CALCIUM 40 MG: 40 TABLET, FILM COATED ORAL at 08:51

## 2023-10-02 RX ADMIN — PANTOPRAZOLE SODIUM 40 MG: 40 TABLET, DELAYED RELEASE ORAL at 17:00

## 2023-10-02 RX ADMIN — Medication 400 UNITS: at 08:51

## 2023-10-02 RX ADMIN — INSULIN LISPRO 4 UNITS: 100 INJECTION, SOLUTION INTRAVENOUS; SUBCUTANEOUS at 18:53

## 2023-10-02 NOTE — PLAN OF CARE
Problem: Physical Therapy - Adult  Goal: By Discharge: Performs mobility at highest level of function for planned discharge setting. See evaluation for individualized goals. Description: FUNCTIONAL STATUS PRIOR TO ADMISSION: Patient reports he has started using RW the past few weeks before coming to the hospital due to feeling weak. Prior he did not use any AD inside the house and SPC walking to his mailbox. HOME SUPPORT PRIOR TO ADMISSION: The patient lived with his son and daughter-in-law but did not require assistance. 1-story home with full basement. Patient stays on 1st floor. Physical Therapy Goals  Initiated 9/27/2023  1. Patient will move from supine to sit and sit to supine in bed with modified independence within 7 day(s). 2.  Patient will perform sit to stand with modified independence within 7 day(s). 3.  Patient will transfer from bed to chair and chair to bed with modified independence using the least restrictive device within 7 day(s). 4.  Patient will ambulate with modified independence for 300 feet with the least restrictive device within 7 day(s). 5.  Patient will ascend/descend 4 stairs with no handrail(s) with supervision/set-up within 7 day(s).    10/2/2023 1520 by Pilar Quezada PT  Outcome: Progressing

## 2023-10-02 NOTE — PROGRESS NOTES
Infectious Disease Progress          Impression    Cellulitis  & infected wounds of both feet  Left hallux, R/second toe  Recent outpatient wound cultures left + for light MSSA  R/foot + for heavy Klebsiella aerogenes  S/p outpatient Bactrim p.o., Augmentin   Currently on cefepime IV, s/p DC of Vancomycin. CT R/foot  9/27 + for  acute on chronic osteomyelitis/septic arthritis at  the first interphalangeal joint. Findings concerning for acute osteomyelitis at the tip of the second distal  phalanx. CT L/foot - no OM. S/p amputation of distal R/2nd toe on 9/30  Intra-Op cultures-NGTD      Leukocytosis persistent  WBC 18.9   21.9 on admission      S/p fever Tmax 101.8 on 10/1  For repeat blood cultures    Diabetes poorly controlled  A1c 8.2    S/p pacemaker placement  6/56    Diastolic CHF, chronic    CKD 3  Cr 1.16    Acute anemia Hb 5.8  S/p PRBC transfusion    S/p admission in 7/2023  Treated for Pseudomonas bacteremia,  Metabolic encephalopathy, B/l big toe ulcerations  OM of right hallux, septic arthritis of 1st PIP jt  WC  5/17+ MSSA, Pseudomonas. S/p Zosyn iv x 6 weeks end date 8/18. Plan  -Continue Cefepime IV  -Pharmacy to adjust to creatinine clearance please  -Repeat blood cultures x2  -Await Intra-Op cultures, pathology  -Adequate fluids, daily probiotic  -ID service to follow    Extensive review of chart notes, labs, imaging, cultures done  Additionally review of done: Recent reports-Labs, cultures, imaging  D/w -hospitalist, PETROS Peoples. is a 80 y.o. male with a history of BPH, CKD, arthritis and history of pseudomonal bacteremia presents emerged department with a chief complaint of weakness. Patient is well-known to ID service.   Patient was treated for Pseudomonas bacteremia, gram-negative sepsis and septic shock, OM of right hallux and septic arthritis of 1st PIP joint in 7/23  Patient  had active lesions on both feet for which he has been treating with antibiotics by his primary Lymphocytes Absolute 2.9 0.8 - 3.5 K/UL    Monocytes Absolute 1.5 (H) 0.0 - 1.0 K/UL    Eosinophils Absolute 0.1 0.0 - 0.4 K/UL    Basophils Absolute 0.1 0.0 - 0.1 K/UL    Absolute Immature Granulocyte 0.3 (H) 0.00 - 0.04 K/UL    Differential Type AUTOMATED     Basic Metabolic Panel    Collection Time: 10/02/23  3:01 AM   Result Value Ref Range    Sodium 134 (L) 136 - 145 mmol/L    Potassium 4.5 3.5 - 5.1 mmol/L    Chloride 106 97 - 108 mmol/L    CO2 21 21 - 32 mmol/L    Anion Gap 7 5 - 15 mmol/L    Glucose 159 (H) 65 - 100 mg/dL    BUN 21 (H) 6 - 20 MG/DL    Creatinine 1.16 0.70 - 1.30 MG/DL    Bun/Cre Ratio 18 12 - 20      Est, Glom Filt Rate 60 (L) >60 ml/min/1.73m2    Calcium 8.5 8.5 - 10.1 MG/DL   Protime-INR    Collection Time: 10/02/23  3:01 AM   Result Value Ref Range    INR 1.1 0.9 - 1.1      Protime 11.9 (H) 9.0 - 11.1 sec   POCT Glucose    Collection Time: 10/02/23  8:07 AM   Result Value Ref Range    POC Glucose 168 (H) 65 - 117 mg/dL    Performed by: Puja Moyer PCT        Results       Procedure Component Value Units Date/Time    Culture, Anaerobic [[id number]] Collected: 09/30/23 1432    Order Status: Sent Specimen: Foot, Right Updated: 09/30/23 1941    Culture, Wound Aerobic Only [[id number]] Collected: 09/30/23 1432    Order Status: Completed Specimen: Foot, Right Updated: 10/01/23 1340     Special Requests NO SPECIAL REQUESTS        Gram stain RARE WBCS SEEN         NO DEFINITE ORGANISM SEEN        Culture NO GROWTH THUS FAR       Culture, Blood 2 [[id number]] Collected: 09/26/23 2129    Order Status: Completed Specimen: Blood Updated: 10/02/23 0756     Special Requests NO SPECIAL REQUESTS        Culture NO GROWTH 6 DAYS       Blood Culture 1 [[id number]] Collected: 09/23/23 1219    Order Status: Completed Specimen: Blood Updated: 09/29/23 0717     Special Requests NO SPECIAL REQUESTS        Culture NO GROWTH 6 DAYS       Blood Culture 2 [[id number]] Collected: 09/23/23 1219    Order Status: Completed Specimen: Blood Updated: 09/29/23 0717     Special Requests NO SPECIAL REQUESTS        Culture NO GROWTH 6 DAYS       COVID-19, Rapid [[id number]] Collected: 09/23/23 1219    Order Status: Completed Specimen: Nasopharyngeal Updated: 09/23/23 1322     Source Nasopharyngeal        SARS-CoV-2, Rapid Not detected        Comment: Rapid [employer] ID Now     Rapid NAAT:  The specimen is NEGATIVE for SARS-CoV-2, the novel coronavirus associated with COVID-19. Negative results should be treated as presumptive and, if inconsistent with clinical signs and symptoms or necessary for patient management, should be tested with an alternative molecular assay. Negative results do not preclude SARS-CoV-2 infection and should not be used as the sole basis for patient management decisions. This test has been authorized by the FDA under an Emergency Use Authorization (EUA) for use by authorized laboratories. Fact sheet for Healthcare Providers:  http://www.mee.leo/  Fact sheet for Patients: http://www.mee.leo/     Methodology: Isothermal Nucleic Acid Amplification         Culture, Body Fluid [[id number]]  (Abnormal)  (Susceptibility) Collected: 09/18/23 1552    Order Status: Completed Specimen:  Body Fluid Updated: 09/21/23 0942     Special Requests NO SPECIAL REQUESTS        Gram stain NO WBC'S SEEN               RARE Gram positive cocci IN CLUSTERS           Culture       HEAVY Klebsiella (Enterobacter) aerogenes                  MODERATE MIXED SKIN MARY ISOLATED          Susceptibility        Klebsiella aerogenes      BACTERIAL SUSCEPTIBILITY PANEL PHYLLIS      amikacin <=2 ug/mL Sensitive      ceFAZolin >=64 ug/mL Resistant      cefepime <=1 ug/mL Sensitive      cefOXitin >=64 ug/mL Resistant      cefTAZidime <=1 ug/mL Sensitive      cefTRIAXone <=1 ug/mL Sensitive      ciprofloxacin <=0.25 ug/mL Sensitive      gentamicin <=1 ug/mL Sensitive      levofloxacin <=0.12

## 2023-10-02 NOTE — PROGRESS NOTES
Bedside shift change report given to PETROS Owusu (oncoming nurse) by Jignesh Yanes  (offgoing nurse). Report included the following information Nurse Handoff Report, Intake/Output, MAR, and Recent Results. Pt slept most of the night. TSB done. Labs drawn. Q2H turns done. At handoff report, pt is awake in bed. Son at bedside.

## 2023-10-02 NOTE — PROGRESS NOTES
INTERNAL MEDICINE PROGRESS NOTE    NAME:  Leti Soriano Sr.   :   1933   MRN:   [id number]     Date/Time:  10/2/2023 5:27 AM  Subjective:   History:  Chart reviewed and patient seen and examined and D/W his nurse and his son at bedside this AM and all events noted. He is followed by me for HTN, HLD, CKD st 3, PAF, CHD S/P pacemaker, diastolic CHF, recent hospitalization with Sepsis and Osteomyelitis R great toe and other medical problems. He has now been admitted with bilateral foot/toe ulcers and dehydration. This AM continues to feel quite weak without any other change. He denies CP or SOB or other cardiac or respiratory c/o. He has no N/V but still little appetite. He had 3 BMs on admission date   which were black loose and foul smelling and one black BM the following day and had 1 normal appearing BM each of the next 3 days until some loose bowel movements - however BMs now normalized with normal BM yesterday. Hgb fell to 5.8 on  PM and transfused 2 U PRBC. He has no other GI c/o. He has no neuro c/o except generalized weakness. He has no change of his chronic arthritic c/o. He has no other c/o on complete ROS. He did have a low-grade fever  evening and repeat blood cultures were sent although initial blood cultures remained negative. On he had distal R 2nd toe amputation and had fever to 102.2 that PM and again temperature up to 101.8 last p.m.; however afebrile now.       Medications reviewed:  Current Facility-Administered Medications   Medication Dose Route Frequency    metFORMIN (GLUCOPHAGE) tablet 500 mg  500 mg Oral BID     acidophilus probiotic capsule 1 capsule  1 capsule Oral Daily    HYDROcodone-acetaminophen (NORCO) 5-325 MG per tablet 1 tablet  1 tablet Oral Q6H PRN    insulin glargine (LANTUS) injection vial 10 Units  10 Units SubCUTAneous Nightly    Warfarin ON HOLD / Dr. Phan Burgess   Other RX Placeholder    balsum peru-castor oil (VENELEX) ointment   Topical BID

## 2023-10-02 NOTE — PLAN OF CARE
Problem: ABCDS Injury Assessment  Goal: Absence of physical injury  Outcome: Progressing     Problem: Skin/Tissue Integrity  Goal: Absence of new skin breakdown  Description: 1. Monitor for areas of redness and/or skin breakdown  2. Assess vascular access sites hourly  3. Every 4-6 hours minimum:  Change oxygen saturation probe site  4. Every 4-6 hours:  If on nasal continuous positive airway pressure, respiratory therapy assess nares and determine need for appliance change or resting period. Outcome: Progressing     Problem: Safety - Adult  Goal: Free from fall injury  Outcome: Progressing     Problem: Discharge Planning  Goal: Discharge to home or other facility with appropriate resources  Outcome: Progressing     Problem: Pain  Goal: Verbalizes/displays adequate comfort level or baseline comfort level  Outcome: Progressing     Problem: Chronic Conditions and Co-morbidities  Goal: Patient's chronic conditions and co-morbidity symptoms are monitored and maintained or improved  Outcome: Progressing  Flowsheets (Taken 10/2/2023 0731)  Care Plan - Patient's Chronic Conditions and Co-Morbidity Symptoms are Monitored and Maintained or Improved: Monitor and assess patient's chronic conditions and comorbid symptoms for stability, deterioration, or improvement     Problem: Physical Therapy - Adult  Goal: By Discharge: Performs mobility at highest level of function for planned discharge setting. See evaluation for individualized goals. Description: FUNCTIONAL STATUS PRIOR TO ADMISSION: Patient reports he has started using RW the past few weeks before coming to the hospital due to feeling weak. Prior he did not use any AD inside the house and SPC walking to his mailbox. HOME SUPPORT PRIOR TO ADMISSION: The patient lived with his son and daughter-in-law but did not require assistance. 1-story home with full basement. Patient stays on 1st floor. Physical Therapy Goals  Initiated 9/27/2023  1.   Patient will move from supine to sit and sit to supine in bed with modified independence within 7 day(s). 2.  Patient will perform sit to stand with modified independence within 7 day(s). 3.  Patient will transfer from bed to chair and chair to bed with modified independence using the least restrictive device within 7 day(s). 4.  Patient will ambulate with modified independence for 300 feet with the least restrictive device within 7 day(s). 5.  Patient will ascend/descend 4 stairs with no handrail(s) with supervision/set-up within 7 day(s).    10/2/2023 1520 by Sanjuana Rivera PT  Outcome: Progressing

## 2023-10-02 NOTE — PROGRESS NOTES
IV Cefepime 2g was discarded because the 100ml saline bag could not flow into the bottle. Pharmacy was notified: medication was discarded and a new one tubed from pharmacy was administered. Bedside and Verbal shift change report given to Jesus Rm RN (oncoming nurse) by Jurgen Bledsoe RN (offgoing nurse). Report included the following information Nurse Handoff Report, Index, MAR, Recent Results, Cardiac Rhythm  , and Quality Measures.

## 2023-10-02 NOTE — PLAN OF CARE
Problem: Physical Therapy - Adult  Goal: By Discharge: Performs mobility at highest level of function for planned discharge setting. See evaluation for individualized goals. Description: FUNCTIONAL STATUS PRIOR TO ADMISSION: Patient reports he has started using RW the past few weeks before coming to the hospital due to feeling weak. Prior he did not use any AD inside the house and SPC walking to his mailbox. HOME SUPPORT PRIOR TO ADMISSION: The patient lived with his son and daughter-in-law but did not require assistance. 1-story home with full basement. Patient stays on 1st floor. Physical Therapy Goals  Initiated 9/27/2023  1. Patient will move from supine to sit and sit to supine in bed with modified independence within 7 day(s). 2.  Patient will perform sit to stand with modified independence within 7 day(s). 3.  Patient will transfer from bed to chair and chair to bed with modified independence using the least restrictive device within 7 day(s). 4.  Patient will ambulate with modified independence for 300 feet with the least restrictive device within 7 day(s). 5.  Patient will ascend/descend 4 stairs with no handrail(s) with supervision/set-up within 7 day(s). Outcome: Progressing   PHYSICAL THERAPY TREATMENT    Patient: El Alberto Sr. (80 y.o. male)  Date: 10/2/2023  Diagnosis: Dehydration [E86.0]  Cellulitis [L03.90]  Generalized weakness [R53.1]  Skin ulcer of toe of left foot, limited to breakdown of skin (HCC) [L97.521] Cellulitis  Procedure(s) (LRB):  RIGHT SECOND TOE AMPUTATION (Right) 2 Days Post-Op  Precautions: Fall Risk                    ASSESSMENT:  Patient continues to benefit from skilled PT services and is progressing towards goals. He demonstrates the ability to ambulate increased distance with use of rollator brought to the room. Patient reports increased L knee stiffness that eases up with increased distance.  He requires CGA for balance and coordination with use of rollator. PLAN:  Patient continues to benefit from skilled intervention to address the above impairments. Continue treatment per established plan of care. Recommendation for discharge: (in order for the patient to meet his/her long term goals): Therapy 2 days/week in the home and also see \"other factors to consider\" below for additional discharge concerns/needs    Other factors to consider for discharge: concern for safely navigating or managing the home environment    IF patient discharges home will need the following DME: patient owns DME required for discharge       SUBJECTIVE:   Patient stated, \"That walker is a piece of junk, my daughter in law is bringing in my rollator. \"    OBJECTIVE DATA SUMMARY:   Critical Behavior:          Functional Mobility Training:  Bed Mobility:  Bed Mobility Training  Supine to Sit: Stand-by assistance; Additional time  Scooting: Modified independent  Transfers:  Transfer Training  Sit to Stand: Modified independent  Stand to Sit: Modified independent  Balance:  Balance  Sitting: With support  Standing: Impaired  Standing - Static: Good;Constant support  Standing - Dynamic: Fair;Constant support   Ambulation/Gait Training:        Neuro Re-Education:                      Pain Intervention(s):        Activity Tolerance:   Fair     After treatment:   Patient left in no apparent distress sitting up in chair, Call bell within reach, Bed/ chair alarm activated, and Heels elevated for pressure relief      COMMUNICATION/EDUCATION:   The patient's plan of care was discussed with: registered nurse           Sheela Johansen, PT  Minutes: 53

## 2023-10-03 PROBLEM — S98.131A AMPUTATION OF TOE OF RIGHT FOOT (HCC): Status: ACTIVE | Noted: 2023-10-03

## 2023-10-03 LAB
ANION GAP SERPL CALC-SCNC: 5 MMOL/L (ref 5–15)
BASOPHILS # BLD: 0.2 K/UL (ref 0–0.1)
BASOPHILS NFR BLD: 1 % (ref 0–1)
BUN SERPL-MCNC: 23 MG/DL (ref 6–20)
BUN/CREAT SERPL: 19 (ref 12–20)
CALCIUM SERPL-MCNC: 8.3 MG/DL (ref 8.5–10.1)
CHLORIDE SERPL-SCNC: 107 MMOL/L (ref 97–108)
CO2 SERPL-SCNC: 23 MMOL/L (ref 21–32)
CREAT SERPL-MCNC: 1.22 MG/DL (ref 0.7–1.3)
DIFFERENTIAL METHOD BLD: ABNORMAL
EOSINOPHIL # BLD: 0.2 K/UL (ref 0–0.4)
EOSINOPHIL NFR BLD: 1 % (ref 0–7)
ERYTHROCYTE [DISTWIDTH] IN BLOOD BY AUTOMATED COUNT: 20.2 % (ref 11.5–14.5)
GLUCOSE BLD STRIP.AUTO-MCNC: 173 MG/DL (ref 65–117)
GLUCOSE BLD STRIP.AUTO-MCNC: 194 MG/DL (ref 65–117)
GLUCOSE BLD STRIP.AUTO-MCNC: 213 MG/DL (ref 65–117)
GLUCOSE BLD STRIP.AUTO-MCNC: 220 MG/DL (ref 65–117)
GLUCOSE BLD STRIP.AUTO-MCNC: 264 MG/DL (ref 65–117)
GLUCOSE SERPL-MCNC: 175 MG/DL (ref 65–100)
HCT VFR BLD AUTO: 29.3 % (ref 36.6–50.3)
HGB BLD-MCNC: 9.1 G/DL (ref 12.1–17)
IMM GRANULOCYTES # BLD AUTO: 0.2 K/UL (ref 0–0.04)
IMM GRANULOCYTES NFR BLD AUTO: 1 % (ref 0–0.5)
INR PPP: 1.2 (ref 0.9–1.1)
LYMPHOCYTES # BLD: 2.5 K/UL (ref 0.8–3.5)
LYMPHOCYTES NFR BLD: 15 % (ref 12–49)
MCH RBC QN AUTO: 25.3 PG (ref 26–34)
MCHC RBC AUTO-ENTMCNC: 31.1 G/DL (ref 30–36.5)
MCV RBC AUTO: 81.4 FL (ref 80–99)
MONOCYTES # BLD: 1.2 K/UL (ref 0–1)
MONOCYTES NFR BLD: 7 % (ref 5–13)
NEUTS SEG # BLD: 12.4 K/UL (ref 1.8–8)
NEUTS SEG NFR BLD: 75 % (ref 32–75)
NRBC # BLD: 0 K/UL (ref 0–0.01)
NRBC BLD-RTO: 0 PER 100 WBC
PLATELET # BLD AUTO: 331 K/UL (ref 150–400)
PMV BLD AUTO: 8.9 FL (ref 8.9–12.9)
POTASSIUM SERPL-SCNC: 4.2 MMOL/L (ref 3.5–5.1)
PROTHROMBIN TIME: 11.9 SEC (ref 9–11.1)
RBC # BLD AUTO: 3.6 M/UL (ref 4.1–5.7)
RBC MORPH BLD: ABNORMAL
SERVICE CMNT-IMP: ABNORMAL
SODIUM SERPL-SCNC: 135 MMOL/L (ref 136–145)
WBC # BLD AUTO: 16.7 K/UL (ref 4.1–11.1)

## 2023-10-03 PROCEDURE — 97116 GAIT TRAINING THERAPY: CPT

## 2023-10-03 PROCEDURE — 99233 SBSQ HOSP IP/OBS HIGH 50: CPT | Performed by: INTERNAL MEDICINE

## 2023-10-03 PROCEDURE — 85610 PROTHROMBIN TIME: CPT

## 2023-10-03 PROCEDURE — 6370000000 HC RX 637 (ALT 250 FOR IP): Performed by: INTERNAL MEDICINE

## 2023-10-03 PROCEDURE — 36415 COLL VENOUS BLD VENIPUNCTURE: CPT

## 2023-10-03 PROCEDURE — 2580000003 HC RX 258: Performed by: INTERNAL MEDICINE

## 2023-10-03 PROCEDURE — 80048 BASIC METABOLIC PNL TOTAL CA: CPT

## 2023-10-03 PROCEDURE — 6360000002 HC RX W HCPCS: Performed by: INTERNAL MEDICINE

## 2023-10-03 PROCEDURE — 1100000003 HC PRIVATE W/ TELEMETRY

## 2023-10-03 PROCEDURE — 82962 GLUCOSE BLOOD TEST: CPT

## 2023-10-03 PROCEDURE — 85025 COMPLETE CBC W/AUTO DIFF WBC: CPT

## 2023-10-03 PROCEDURE — 97530 THERAPEUTIC ACTIVITIES: CPT

## 2023-10-03 PROCEDURE — 2500000003 HC RX 250 WO HCPCS: Performed by: INTERNAL MEDICINE

## 2023-10-03 RX ADMIN — Medication: at 09:31

## 2023-10-03 RX ADMIN — Medication 400 UNITS: at 09:24

## 2023-10-03 RX ADMIN — INSULIN LISPRO 4 UNITS: 100 INJECTION, SOLUTION INTRAVENOUS; SUBCUTANEOUS at 12:02

## 2023-10-03 RX ADMIN — CEFEPIME 2000 MG: 2 INJECTION, POWDER, FOR SOLUTION INTRAVENOUS at 09:28

## 2023-10-03 RX ADMIN — Medication: at 20:19

## 2023-10-03 RX ADMIN — PREGABALIN 75 MG: 75 CAPSULE ORAL at 20:07

## 2023-10-03 RX ADMIN — SODIUM CHLORIDE, PRESERVATIVE FREE 10 ML: 5 INJECTION INTRAVENOUS at 09:34

## 2023-10-03 RX ADMIN — CEFEPIME 2000 MG: 2 INJECTION, POWDER, FOR SOLUTION INTRAVENOUS at 20:09

## 2023-10-03 RX ADMIN — INSULIN GLARGINE 10 UNITS: 100 INJECTION, SOLUTION SUBCUTANEOUS at 20:08

## 2023-10-03 RX ADMIN — INSULIN LISPRO 2 UNITS: 100 INJECTION, SOLUTION INTRAVENOUS; SUBCUTANEOUS at 08:13

## 2023-10-03 RX ADMIN — DIGOXIN 125 MCG: 125 TABLET ORAL at 09:26

## 2023-10-03 RX ADMIN — Medication 1 CAPSULE: at 09:25

## 2023-10-03 RX ADMIN — INSULIN LISPRO 4 UNITS: 100 INJECTION, SOLUTION INTRAVENOUS; SUBCUTANEOUS at 17:22

## 2023-10-03 RX ADMIN — CHOLECALCIFEROL TAB 10 MCG (400 UNIT) 400 UNITS: 10 TAB at 09:24

## 2023-10-03 RX ADMIN — METFORMIN HYDROCHLORIDE 500 MG: 500 TABLET ORAL at 09:25

## 2023-10-03 RX ADMIN — ATORVASTATIN CALCIUM 40 MG: 40 TABLET, FILM COATED ORAL at 09:25

## 2023-10-03 RX ADMIN — PANTOPRAZOLE SODIUM 40 MG: 40 TABLET, DELAYED RELEASE ORAL at 17:17

## 2023-10-03 RX ADMIN — PREGABALIN 75 MG: 75 CAPSULE ORAL at 09:26

## 2023-10-03 RX ADMIN — Medication 1000 MCG: at 09:26

## 2023-10-03 RX ADMIN — DOXAZOSIN 2 MG: 2 TABLET ORAL at 09:22

## 2023-10-03 RX ADMIN — GLIMEPIRIDE 2 MG: 1 TABLET ORAL at 06:00

## 2023-10-03 RX ADMIN — SODIUM CHLORIDE: 9 INJECTION, SOLUTION INTRAVENOUS at 09:30

## 2023-10-03 RX ADMIN — Medication 1 TABLET: at 09:27

## 2023-10-03 RX ADMIN — PANTOPRAZOLE SODIUM 40 MG: 40 TABLET, DELAYED RELEASE ORAL at 06:00

## 2023-10-03 RX ADMIN — ACETAMINOPHEN 650 MG: 325 TABLET ORAL at 20:07

## 2023-10-03 RX ADMIN — METFORMIN HYDROCHLORIDE 500 MG: 500 TABLET ORAL at 17:17

## 2023-10-03 NOTE — PROGRESS NOTES
Bedside shift change report given to PETROS Owusu (oncoming nurse) by Oralia Hdez (offgoing nurse). Report included the following information Nurse Handoff Report, Intake/Output, MAR, and Recent Results. Pt slept most of the night. Wound care and change of dressing done on his sacrum. RN placed [facility] consult. Q2H turns done. Afebrile all night. RN spoke to pt son thru phone and gave an update reg pt status and lab result.        At handoff report, pt is sleeping in bed with visible rise and fall of chest.

## 2023-10-03 NOTE — PLAN OF CARE
Problem: ABCDS Injury Assessment  Goal: Absence of physical injury  10/3/2023 0810 by Linda Moe RN  Outcome: Progressing  10/2/2023 2246 by Mila Bateman RN  Outcome: Progressing     Problem: Skin/Tissue Integrity  Goal: Absence of new skin breakdown  Description: 1. Monitor for areas of redness and/or skin breakdown  2. Assess vascular access sites hourly  3. Every 4-6 hours minimum:  Change oxygen saturation probe site  4. Every 4-6 hours:  If on nasal continuous positive airway pressure, respiratory therapy assess nares and determine need for appliance change or resting period.   10/3/2023 0810 by Linda Moe RN  Outcome: Progressing  10/2/2023 2246 by Mila Bateman RN  Outcome: Progressing     Problem: Safety - Adult  Goal: Free from fall injury  10/3/2023 0810 by Linda Moe RN  Outcome: Progressing  10/2/2023 2246 by Mila Bateman RN  Outcome: Progressing     Problem: Discharge Planning  Goal: Discharge to home or other facility with appropriate resources  10/3/2023 0810 by Linda Moe RN  Outcome: Progressing  10/2/2023 2246 by Mila Bateman RN  Outcome: Progressing     Problem: Pain  Goal: Verbalizes/displays adequate comfort level or baseline comfort level  10/3/2023 0810 by Linda Moe RN  Outcome: Progressing  10/2/2023 2246 by Mila Bateman RN  Outcome: Progressing     Problem: Chronic Conditions and Co-morbidities  Goal: Patient's chronic conditions and co-morbidity symptoms are monitored and maintained or improved  10/3/2023 0810 by Linda Moe RN  Outcome: Progressing  10/2/2023 2246 by Mila Bateman RN  Outcome: Progressing

## 2023-10-03 NOTE — CARE COORDINATION
Transition of Care Plan:     RUR: 20%  Prior Level of Functioning: home, lives with family  Disposition: home w/ [address]- may need home IV abx? Follow up appointments: PCP, specialists as indicated   DME needed: N/A  Transportation at discharge: family  IM/IMM Medicare/ letter given: to be given   Is patient a  and connected with [facility]? No              If yes, was [employer] transfer form completed and [facility] notified? Caregiver Contact: Liat Tidwell (son) [phone]  Discharge Caregiver contacted prior to discharge? To be contacted prior to d/c   Care Conference needed? no  Barriers to discharge:  medical clearance    Chart reviewed. CM continuing to follow for discharge planning. Pt not yet medically stable for d/c at this time. Will continue to follow.     Rabia Man MSW   Care Manager, [address] [place] [address]

## 2023-10-03 NOTE — PLAN OF CARE
Problem: ABCDS Injury Assessment  Goal: Absence of physical injury  10/2/2023 2246 by Justina Flores RN  Outcome: Progressing  10/2/2023 1750 by Flavio Sebastian RN  Outcome: Adequate for Discharge  10/2/2023 1554 by Flavio Sebastian RN  Outcome: Progressing     Problem: Skin/Tissue Integrity  Goal: Absence of new skin breakdown  Description: 1. Monitor for areas of redness and/or skin breakdown  2. Assess vascular access sites hourly  3. Every 4-6 hours minimum:  Change oxygen saturation probe site  4. Every 4-6 hours:  If on nasal continuous positive airway pressure, respiratory therapy assess nares and determine need for appliance change or resting period.   10/2/2023 2246 by Justina Flores RN  Outcome: Progressing  10/2/2023 1750 by Flavio Sebastian RN  Outcome: Adequate for Discharge  10/2/2023 1554 by Flavio Sebastian RN  Outcome: Progressing     Problem: Safety - Adult  Goal: Free from fall injury  10/2/2023 2246 by Justina Flores RN  Outcome: Progressing  10/2/2023 1750 by Flavio Sebastian RN  Outcome: Adequate for Discharge  10/2/2023 1554 by Flavio Sebastian RN  Outcome: Progressing     Problem: Discharge Planning  Goal: Discharge to home or other facility with appropriate resources  10/2/2023 2246 by Justina Flores RN  Outcome: Progressing  10/2/2023 1750 by Flavio Sebastian RN  Outcome: Adequate for Discharge  10/2/2023 1554 by Flavio Sebastian RN  Outcome: Progressing     Problem: Pain  Goal: Verbalizes/displays adequate comfort level or baseline comfort level  10/2/2023 2246 by Justina Flores RN  Outcome: Progressing  10/2/2023 1750 by Flavio Sebastian RN  Outcome: Adequate for Discharge  10/2/2023 1554 by Flavio Sebastian RN  Outcome: Progressing     Problem: Chronic Conditions and Co-morbidities  Goal: Patient's chronic conditions and co-morbidity symptoms are monitored and maintained or improved  10/2/2023 2246 by Justina Flores RN  Outcome: Progressing  10/2/2023 1750 by Flavio Sebastian RN  Outcome: Adequate for Discharge  10/2/2023 1554 by Rose Mary Weiss RN  Outcome: Progressing  Flowsheets (Taken 10/2/2023 0789)  Care Plan - Patient's Chronic Conditions and Co-Morbidity Symptoms are Monitored and Maintained or Improved: Monitor and assess patient's chronic conditions and comorbid symptoms for stability, deterioration, or improvement     Problem: Physical Therapy - Adult  Goal: By Discharge: Performs mobility at highest level of function for planned discharge setting. See evaluation for individualized goals. Description: FUNCTIONAL STATUS PRIOR TO ADMISSION: Patient reports he has started using RW the past few weeks before coming to the hospital due to feeling weak. Prior he did not use any AD inside the house and SPC walking to his mailbox. HOME SUPPORT PRIOR TO ADMISSION: The patient lived with his son and daughter-in-law but did not require assistance. 1-story home with full basement. Patient stays on 1st floor. Physical Therapy Goals  Initiated 9/27/2023  1. Patient will move from supine to sit and sit to supine in bed with modified independence within 7 day(s). 2.  Patient will perform sit to stand with modified independence within 7 day(s). 3.  Patient will transfer from bed to chair and chair to bed with modified independence using the least restrictive device within 7 day(s). 4.  Patient will ambulate with modified independence for 300 feet with the least restrictive device within 7 day(s). 5.  Patient will ascend/descend 4 stairs with no handrail(s) with supervision/set-up within 7 day(s).    10/2/2023 1520 by Fanny Hong PT  Outcome: Progressing

## 2023-10-03 NOTE — PROGRESS NOTES
INTERNAL MEDICINE PROGRESS NOTE    NAME:  Emmanuel Bird Sr.   :   1933   MRN:   [id number]     Date/Time:  10/3/2023 6:19 AM  Subjective:   History:  Chart reviewed and patient seen and examined and D/W his nurse and his son at bedside this AM and all events noted. He is followed by me for HTN, HLD, CKD st 3, PAF, CHD S/P pacemaker, diastolic CHF, recent hospitalization with Sepsis and Osteomyelitis R great toe and other medical problems. He has now been admitted with bilateral foot/toe ulcers and dehydration. This AM says that he feels a little better. He denies CP or SOB or other cardiac or respiratory c/o. He has no N/V and appetite may have improved a little. He had 3 BMs on admission date   which were black loose and foul smelling and one black BM the following day but BMs seem to have normalized now. Hgb fell to 5.8 on  PM and transfused 2 U PRBC. He has no other GI c/o. He has no neuro c/o except generalized weakness. He has no change of his chronic arthritic c/o. He has no other c/o on complete ROS. He did have a low-grade fever  evening and repeat blood cultures were sent although initial blood cultures remained negative.  On he had distal R 2nd toe amputation and had fever to 102.2  PM and again temperature up to 101.8 on 10/1 PM.; however afebrile since      Medications reviewed:  Current Facility-Administered Medications   Medication Dose Route Frequency    metFORMIN (GLUCOPHAGE) tablet 500 mg  500 mg Oral BID WC    pantoprazole (PROTONIX) tablet 40 mg  40 mg Oral BID AC    glimepiride (AMARYL) tablet 2 mg  2 mg Oral QAM AC    acidophilus probiotic capsule 1 capsule  1 capsule Oral Daily    HYDROcodone-acetaminophen (NORCO) 5-325 MG per tablet 1 tablet  1 tablet Oral Q6H PRN    insulin glargine (LANTUS) injection vial 10 Units  10 Units SubCUTAneous Nightly    Warfarin ON HOLD / Dr. Travis Novak   Other RX Placeholder    balsum peru-castor oil (VENELEX) ointment   Topical BID effects. He recently completed a prolonged course of Augmentin slightly greater than 6 weeks and had severe diarrhea during the entire treatment time. 14   CT right foot done 9/27         1. Findings concerning for acute on chronic osteomyelitis/septic arthritis at  the first interphalangeal joint. 2.  Findings concerning for acute osteomyelitis at the tip of the second distal  phalanx       CT left foot no evidence of osteomyelitis  15. Podiatry consult note reviewed and had amputation of tip of right second toe done 9/30  16. CXR done 10/2 is clear-reviewed by me today    50 Minutes spent today in direct care of this high complexity patient with greater than 50% in counseling and coordination of care.     If need to contact me use hospital  [phone], DO NOT USE PERFECT SERVE    ___________________________________________________    Attending Physician: Tim Cai MD

## 2023-10-03 NOTE — PROGRESS NOTES
Bedside and Verbal shift change report given to Ming Campbell RN (oncoming nurse) by William Haile RN (offgoing nurse). Report included the following information Nurse Handoff Report, Index, Intake/Output, MAR, Recent Results, and Cardiac Rhythm   .

## 2023-10-03 NOTE — PROGRESS NOTES
Infectious Disease Progress          Impression    Cellulitis  & infected wounds of both feet  Left hallux, R/second toe  Recent outpatient wound cultures left + for light MSSA  R/foot + for heavy Klebsiella aerogenes  S/p outpatient Bactrim p.o., Augmentin   Currently on cefepime IV, s/p DC of Vancomycin. CT R/foot  9/27 + for  acute on chronic osteomyelitis/septic arthritis at  the first interphalangeal joint. Findings concerning for acute osteomyelitis at the tip of the second distal  phalanx. CT L/foot - no OM. S/p amputation of distal R/2nd toe on 9/30  Intra-Op cultures+ for rare probable MSSA , negative for Ag detection  Few MSF      Leukocytosis persistent  WBC 16.7   21.9 on admission      S/p fever Tmax 101.8 on 10/1  For repeat blood cultures    Diabetes poorly controlled  A1c 8.2    S/p pacemaker placement  4/80    Diastolic CHF, chronic    CKD 3  Cr 1.16    Acute anemia Hb 5.8  S/p PRBC transfusion    S/p admission in 7/2023  Treated for Pseudomonas bacteremia,  Metabolic encephalopathy, B/l big toe ulcerations  OM of right hallux, septic arthritis of 1st PIP jt  WC  5/17+ MSSA, Pseudomonas. S/p Zosyn iv x 6 weeks end date 8/18. Plan  -Continue Cefepime IV  -Pharmacy to adjust to creatinine clearance please  - Await final  Intra-Op cultures, pathology  -Adequate fluids, daily probiotic  -ID service to follow    Extensive review of chart notes, labs, imaging, cultures done  Additionally review of done: Recent reports-Labs, cultures, imaging  D/w -hospitalist, PETROS Jordan Wilderquinn. is a 80 y.o. male with a history of BPH, CKD, arthritis and history of pseudomonal bacteremia presents emerged department with a chief complaint of weakness. Patient is well-known to ID service.   Patient was treated for Pseudomonas bacteremia, gram-negative sepsis and septic shock, OM of right hallux and septic arthritis of 1st PIP joint in 7/23  Patient  had active lesions on both feet for which he has been No   Sig: Take 1 capsule by mouth daily   metFORMIN (GLUCOPHAGE) 500 MG tablet 9/22/2023  No No   Sig: TAKE 1 TABLET BY MOUTH EVERY DAY IN THE MORNING WITH BREAKFAST AND 1 TABLET BEFORE DINNER   predniSONE (DELTASONE) 10 MG tablet 9/22/2023  Yes No   Sig: Take 1 tablet by mouth daily   pregabalin (LYRICA) 75 MG capsule Not Taking  Yes No   Sig: Take 1 capsule by mouth 2 times daily.    Patient not taking: Reported on 9/23/2023   simvastatin (ZOCOR) 20 MG tablet 9/22/2023  No No   Sig: TAKE 1 TABLET BY MOUTH EVERY DAY   sulfamethoxazole-trimethoprim (BACTRIM DS;SEPTRA DS) 800-160 MG per tablet Not Taking  No No   Sig: Take 1 tablet by mouth 2 times daily for 10 days   Patient not taking: Reported on 9/23/2023   terazosin (HYTRIN) 2 MG capsule 9/22/2023  No No   Sig: TAKE 1 CAPSULE BY MOUTH EVERY DAY   vitamin B-12 (CYANOCOBALAMIN) 1000 MCG tablet 9/22/2023  Yes No   Sig: Take 1 tablet by mouth daily   vitamin E 400 UNIT capsule 9/22/2023  Yes No   Sig: Take 1 capsule by mouth daily   warfarin (COUMADIN) 5 MG tablet Not Taking  Yes No   Sig: TAKE 2 TABLETS BY MOUTH ON MONDAY &FRIDAYS AND 1&1/2 TABLET DAILY ON ALL OTHER DAYS   Patient not taking: Reported on 9/23/2023      Facility-Administered Medications: None             Review of Systems:     Gen: Negative for chills, fevers, weight loss, weight gain   HEENT: Negative for headache, vision changes, ear ache or discharge, tingling,  nasal discharge, swelling, lumps in neck, sores on tongue   CV:  Negative for chest pain, dyspnea on exertion, leg edema   Lungs: Negative for shortness of breath, cough, wheezing   Abdomen: Negative for abdominal pain, nausea, vomiting, diarrhea, constipation   Genitourinary: Negative for genital pain or genital discharge     Neuro: Negative for headache, numbness, tingling, extremity weakness,  syncope, seizures    Skin: Negative for rash, sores/open wounds   Musculoskeletal: Negative for joint pain, joint swelling, joint erythema

## 2023-10-03 NOTE — PLAN OF CARE
Problem: Physical Therapy - Adult  Goal: By Discharge: Performs mobility at highest level of function for planned discharge setting. See evaluation for individualized goals. Description: FUNCTIONAL STATUS PRIOR TO ADMISSION: Patient reports he has started using RW the past few weeks before coming to the hospital due to feeling weak. Prior he did not use any AD inside the house and SPC walking to his mailbox. HOME SUPPORT PRIOR TO ADMISSION: The patient lived with his son and daughter-in-law but did not require assistance. 1-story home with full basement. Patient stays on 1st floor. Physical Therapy Goals  Initiated 9/27/2023  1. Patient will move from supine to sit and sit to supine in bed with modified independence within 7 day(s). 2.  Patient will perform sit to stand with modified independence within 7 day(s). 3.  Patient will transfer from bed to chair and chair to bed with modified independence using the least restrictive device within 7 day(s). 4.  Patient will ambulate with modified independence for 300 feet with the least restrictive device within 7 day(s). 5.  Patient will ascend/descend 4 stairs with no handrail(s) with supervision/set-up within 7 day(s). Outcome: Progressing   PHYSICAL THERAPY TREATMENT    Patient: Manjit Kruse Sr. (80 y.o. male)  Date: 10/3/2023  Diagnosis: Dehydration [E86.0]  Cellulitis [L03.90]  Generalized weakness [R53.1]  Skin ulcer of toe of left foot, limited to breakdown of skin (HCC) [L97.521] Cellulitis  Procedure(s) (LRB):  RIGHT SECOND TOE AMPUTATION (Right) 3 Days Post-Op  Precautions: Fall Risk                    ASSESSMENT:  Patient continues to benefit from skilled PT services and is progressing towards goals. He was lying in bed when PT arrived. Near independent with supine to sit transfers. Stood with modified independent skill.  Gait training progressed to 350 feet with supervision with only deviation being postural with increased forward trunk

## 2023-10-04 LAB
ANION GAP SERPL CALC-SCNC: 5 MMOL/L (ref 5–15)
BACTERIA SPEC CULT: ABNORMAL
BACTERIA SPEC CULT: ABNORMAL
BASOPHILS # BLD: 0.1 K/UL (ref 0–0.1)
BASOPHILS NFR BLD: 1 % (ref 0–1)
BUN SERPL-MCNC: 19 MG/DL (ref 6–20)
BUN/CREAT SERPL: 19 (ref 12–20)
CALCIUM SERPL-MCNC: 8.1 MG/DL (ref 8.5–10.1)
CHLORIDE SERPL-SCNC: 107 MMOL/L (ref 97–108)
CO2 SERPL-SCNC: 23 MMOL/L (ref 21–32)
CREAT SERPL-MCNC: 1 MG/DL (ref 0.7–1.3)
DIFFERENTIAL METHOD BLD: ABNORMAL
EOSINOPHIL # BLD: 0.1 K/UL (ref 0–0.4)
EOSINOPHIL NFR BLD: 1 % (ref 0–7)
ERYTHROCYTE [DISTWIDTH] IN BLOOD BY AUTOMATED COUNT: 20.6 % (ref 11.5–14.5)
GLUCOSE BLD STRIP.AUTO-MCNC: 153 MG/DL (ref 65–117)
GLUCOSE BLD STRIP.AUTO-MCNC: 162 MG/DL (ref 65–117)
GLUCOSE BLD STRIP.AUTO-MCNC: 167 MG/DL (ref 65–117)
GLUCOSE BLD STRIP.AUTO-MCNC: 190 MG/DL (ref 65–117)
GLUCOSE SERPL-MCNC: 141 MG/DL (ref 65–100)
GRAM STN SPEC: ABNORMAL
GRAM STN SPEC: ABNORMAL
HCT VFR BLD AUTO: 27.6 % (ref 36.6–50.3)
HGB BLD-MCNC: 8.4 G/DL (ref 12.1–17)
IMM GRANULOCYTES # BLD AUTO: 0.2 K/UL (ref 0–0.04)
IMM GRANULOCYTES NFR BLD AUTO: 1 % (ref 0–0.5)
LYMPHOCYTES # BLD: 2.3 K/UL (ref 0.8–3.5)
LYMPHOCYTES NFR BLD: 14 % (ref 12–49)
MCH RBC QN AUTO: 24.7 PG (ref 26–34)
MCHC RBC AUTO-ENTMCNC: 30.4 G/DL (ref 30–36.5)
MCV RBC AUTO: 81.2 FL (ref 80–99)
MONOCYTES # BLD: 1.2 K/UL (ref 0–1)
MONOCYTES NFR BLD: 7 % (ref 5–13)
NEUTS SEG # BLD: 13.1 K/UL (ref 1.8–8)
NEUTS SEG NFR BLD: 77 % (ref 32–75)
NRBC # BLD: 0 K/UL (ref 0–0.01)
NRBC BLD-RTO: 0 PER 100 WBC
PLATELET # BLD AUTO: 347 K/UL (ref 150–400)
PMV BLD AUTO: 9.1 FL (ref 8.9–12.9)
POTASSIUM SERPL-SCNC: 4 MMOL/L (ref 3.5–5.1)
RBC # BLD AUTO: 3.4 M/UL (ref 4.1–5.7)
SERVICE CMNT-IMP: ABNORMAL
SODIUM SERPL-SCNC: 135 MMOL/L (ref 136–145)
WBC # BLD AUTO: 17 K/UL (ref 4.1–11.1)

## 2023-10-04 PROCEDURE — 82962 GLUCOSE BLOOD TEST: CPT

## 2023-10-04 PROCEDURE — 99233 SBSQ HOSP IP/OBS HIGH 50: CPT | Performed by: INTERNAL MEDICINE

## 2023-10-04 PROCEDURE — 85025 COMPLETE CBC W/AUTO DIFF WBC: CPT

## 2023-10-04 PROCEDURE — 6370000000 HC RX 637 (ALT 250 FOR IP): Performed by: INTERNAL MEDICINE

## 2023-10-04 PROCEDURE — 80048 BASIC METABOLIC PNL TOTAL CA: CPT

## 2023-10-04 PROCEDURE — 2580000003 HC RX 258: Performed by: INTERNAL MEDICINE

## 2023-10-04 PROCEDURE — 2500000003 HC RX 250 WO HCPCS: Performed by: INTERNAL MEDICINE

## 2023-10-04 PROCEDURE — 36415 COLL VENOUS BLD VENIPUNCTURE: CPT

## 2023-10-04 PROCEDURE — 1100000003 HC PRIVATE W/ TELEMETRY

## 2023-10-04 PROCEDURE — 6360000002 HC RX W HCPCS: Performed by: INTERNAL MEDICINE

## 2023-10-04 RX ADMIN — INSULIN LISPRO 2 UNITS: 100 INJECTION, SOLUTION INTRAVENOUS; SUBCUTANEOUS at 18:38

## 2023-10-04 RX ADMIN — DOXAZOSIN 2 MG: 2 TABLET ORAL at 10:35

## 2023-10-04 RX ADMIN — INSULIN LISPRO 2 UNITS: 100 INJECTION, SOLUTION INTRAVENOUS; SUBCUTANEOUS at 13:37

## 2023-10-04 RX ADMIN — INSULIN LISPRO 2 UNITS: 100 INJECTION, SOLUTION INTRAVENOUS; SUBCUTANEOUS at 10:34

## 2023-10-04 RX ADMIN — CEFEPIME 2000 MG: 2 INJECTION, POWDER, FOR SOLUTION INTRAVENOUS at 10:34

## 2023-10-04 RX ADMIN — PREGABALIN 75 MG: 75 CAPSULE ORAL at 21:32

## 2023-10-04 RX ADMIN — Medication 1 CAPSULE: at 10:35

## 2023-10-04 RX ADMIN — Medication 400 UNITS: at 10:35

## 2023-10-04 RX ADMIN — METFORMIN HYDROCHLORIDE 500 MG: 500 TABLET ORAL at 10:35

## 2023-10-04 RX ADMIN — CHOLECALCIFEROL TAB 10 MCG (400 UNIT) 400 UNITS: 10 TAB at 10:35

## 2023-10-04 RX ADMIN — GLIMEPIRIDE 2 MG: 1 TABLET ORAL at 06:05

## 2023-10-04 RX ADMIN — PANTOPRAZOLE SODIUM 40 MG: 40 TABLET, DELAYED RELEASE ORAL at 06:05

## 2023-10-04 RX ADMIN — SODIUM CHLORIDE, PRESERVATIVE FREE 10 ML: 5 INJECTION INTRAVENOUS at 10:35

## 2023-10-04 RX ADMIN — METFORMIN HYDROCHLORIDE 500 MG: 500 TABLET ORAL at 18:38

## 2023-10-04 RX ADMIN — Medication 1 TABLET: at 11:28

## 2023-10-04 RX ADMIN — Medication 1000 MCG: at 10:35

## 2023-10-04 RX ADMIN — DIGOXIN 125 MCG: 125 TABLET ORAL at 10:35

## 2023-10-04 RX ADMIN — Medication: at 10:37

## 2023-10-04 RX ADMIN — PREGABALIN 75 MG: 75 CAPSULE ORAL at 10:35

## 2023-10-04 RX ADMIN — ATORVASTATIN CALCIUM 40 MG: 40 TABLET, FILM COATED ORAL at 10:35

## 2023-10-04 RX ADMIN — PANTOPRAZOLE SODIUM 40 MG: 40 TABLET, DELAYED RELEASE ORAL at 18:38

## 2023-10-04 NOTE — PROGRESS NOTES
See below my last note after post op for discharge instructions       See below    Post op right second toe distal amputation   Can be discharged as per Podiatry when medically appropriate   With following instructions and recommendations     Can be discharged with oral antibiotics like Doxycycline 100 mg for 10 days bid  As tolerated weight bearing on the right foot  Follow up in 10 days   Can be sent home with skilled home health wound care for the right and left foot wound care treatment twice a day   Wound care with aquacell Ag and padded gauze application     If need further assistance please reach out to me on perfect serve or call me on my cell [phone]

## 2023-10-04 NOTE — PROGRESS NOTES
Input/Output:    Intake/Output Summary (Last 24 hours) at 10/4/2023 0611  Last data filed at 10/4/2023 0449  Gross per 24 hour   Intake 200 ml   Output 850 ml   Net -650 ml          PHYSICAL EXAM:  General:     Alert, cooperative, no distress, appears stated age. Head:    Normocephalic, without obvious abnormality, atraumatic. Eyes:    Conjunctivae/corneas clear. PERRLA  Nose:   Nares normal. No drainage or sinus tenderness. Throat:     Lips, mucosa, and tongue normal.  No Thrush  Neck:   Supple, symmetrical,  no adenopathy, thyroid: non tender     no carotid bruit and no JVD. Back:     Symmetric,  No CVA tenderness. Lungs:    Clear to auscultation bilaterally. No Wheezing or Rhonchi. No rales. Heart:    Regular rate and rhythm,  no murmur, rub or gallop. Abdomen:    Soft, non-tender. Not distended. Bowel sounds normal. No masses  Extremities:  Extremities with right foot dressing surgery, No cyanosis. No edema. No clubbing  Lymph nodes:  Cervical, supraclavicular normal.  Neurologic: Mild generalized decreased strength, Alert and oriented X 3. Skin:                No rash.  R foot wrapped post surgery      Lab Data Reviewed:    Recent Results (from the past 24 hour(s))   POCT Glucose    Collection Time: 10/03/23  7:53 AM   Result Value Ref Range    POC Glucose 173 (H) 65 - 117 mg/dL    Performed by: Murriel Habermann CON    POCT Glucose    Collection Time: 10/03/23 11:28 AM   Result Value Ref Range    POC Glucose 220 (H) 65 - 117 mg/dL    Performed by: Xavi Mata (CON)    POCT Glucose    Collection Time: 10/03/23  4:50 PM   Result Value Ref Range    POC Glucose 194 (H) 65 - 117 mg/dL    Performed by: Valeria Correia (TRV RN)    POCT Glucose    Collection Time: 10/03/23  4:57 PM   Result Value Ref Range    POC Glucose 213 (H) 65 - 117 mg/dL    Performed by: Marlyn Randall PCT    POCT Glucose    Collection Time: 10/03/23  8:06 PM   Result Value Ref Range    POC Glucose 264 (H) 65 - 117 mg/dL Staphylococcus aureus) infection    Anemia    Chronic 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 *          ___________________________________________________  PLAN:    1. Initially on Vancomycin and Zosyn which was changed to to Cefipime 9/25, Vancomycin discontinued 9/28. Cx of L toe ulcer MSSA , R foot ulcer Klebsiella S Cefepime. So far blood cx admission and subsequent 3 days later neg at 6 days (Hospitalized July with Pseudomonas sepsis from R foot ulcer/osteomyelitis). Repeat culture sent 10/2 remain negative at this point. Wound culture negative for infection and pathology shows margins for osteomyelitis is good. 2.  Follow WBC which had improved a little to 13.6 from 21.9 and clinically no evidence of sepsis. Now post op up to 20.1. With slight improvement today at 17.0 (yesterday 16.7)  3. Follow Hgb (recent baseline 8.7 - 9.1 and on adm 8.0 down to 5.8 on 9/25 PM so ordered 2 U PRBC and still Hgb 8.3 on 9/30, today 8.4  4. Continue Protonix IV Q 12  5. GI Consult for GI bleed likely based upon symptoms and Hgb drop. Note reviewed and not sure I agreed totally with not doing EGD although I see the reasoning and he is improving with improved BMs  6. Transfuse further if Hgb < 7.0 (2 U given 9/25)  7. Coagulopathy on adm with INR 10.4, today down to 1.2. Continue to HOLD Coumadin  8. Follow renal function (Baseline 29/1.65 and on adm 34/1.74 with today 19/1.00  9. Follow BS and SSI as needed (Increased Insulin amounts 9/26 AM as BS running quite high), increased Lantus to 16 units starting 10/1, holding oral meds initially but resumed his glimepiride at one half of his home dose starting on 10/3 and on metformin 500 twice daily since 10/2. 10.  ASCVD stable  11. PAF - now paced rhythm  12. Diastolic CHF - Compensated  13. Infectious disease consult for guidance regarding what antibiotic to use on discharge that will be most effective with the least side effects. He recently completed a prolonged course of Augmentin slightly greater than 6 weeks and had severe diarrhea during the entire treatment time. Per Dr. Matilde Taylor note today recommendation is cefepime for 2 weeks  14   CT right foot done 9/27         1. Findings concerning for acute on chronic osteomyelitis/septic arthritis at  the first interphalangeal joint. 2.  Findings concerning for acute osteomyelitis at the tip of the second distal  phalanx       CT left foot no evidence of osteomyelitis  15. Podiatry consult note reviewed and had amputation of tip of right second toe done 9/30  16. CXR done 10/2 is clear-reviewed by me     50 Minutes spent today in direct care of this high complexity patient with greater than 50% in counseling and coordination of care.     If need to contact me use hospital  [phone], DO NOT USE PERFECT SERVE    ___________________________________________________    Attending Physician: Guero Saul MD

## 2023-10-04 NOTE — CARE COORDINATION
Transition of Care Plan:     RUR: 20%  Prior Level of Functioning: home, lives with family  Disposition: [facility]   Follow up appointments: PCP, specialists as indicated   DME needed: N/A  Transportation at discharge: family  IM/IMM Medicare/ letter given: to be given   Is patient a  and connected with [facility]? No              If yes, was [employer] transfer form completed and [facility] notified? Caregiver Contact: Derrick Lopez (son) [phone]  Discharge Caregiver contacted prior to discharge? To be contacted prior to d/c   Care Conference needed? no  Barriers to discharge:  medical clearance     CM sent home IV abx order to [address]- pending review for d/c planning. Will continue to follow.     Jesus Desai MSW   Care Manager, [address] [place] [address]

## 2023-10-04 NOTE — WOUND CARE
Wound care nurse consult for POA wounds to left 1st toe and right 2nd toe. Chart reviewed and patient assessed. 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[address])     DM (diabetes mellitus) ([address]) 9/20/2017    Story: Diet Controlled    ED (erectile dysfunction) 9/20/2017    Edema 9/20/2017    Elevated CPK 9/20/2017    Comments: History of    Elevated LFTs 9/20/2017    Comments: History of    Elevated PSA 9/20/2017    Hypercholesteremia     Hyperlipidemia 9/20/2017    Hypertension     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    Nodule of right lung 9/20/2017    Story: Right    Polymyalgia ([address]) 9/20/2017    Prostate enlargement      Past Surgical History:   Procedure Laterality Date    ARTHROCENTESIS  7/13/2023    CARDIAC PROCEDURE N/A 7/7/2023    Insert temporary pacemaker performed by Abe Connolly MD at [facility]  7/4/2023    EP DEVICE PROCEDURE N/A 7/13/2023    Insert or replace transcath PPM leadless performed by Jim Lange MD at [address] [place] [address] CATH LAB    HEENT  06/12/2018    Dr. Roxy Noel, surgery to remove cancerous tissue from Left cheek    MALIGNANT SKIN LESION EXCISION  09/2018    2 lesions on head    SD UNLISTED PROCEDURE ABDOMEN PERITONEUM & OMENTUM      hernia repair   Patient states he does not see a foot doctor, just his medical doctor, Dr Mounika Lewis for his toe wound. Recent history of long term antibiotics for left 1st toe wound with osteomyelitis    Wounds:    Left 1st toe:    Right 2nd toe tip.     WOUND POA CONDITIONS    Puncture 07/13/23 Internal jugular (Active)   Number of days: 74       Puncture
Wound care nurse consult for sacral wound    Chart reviewed and patient assessed    81 y/o CM admitted for cellulitis of right foot. 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[address])     DM (diabetes mellitus) ([address]) 9/20/2017    Story: Diet Controlled    ED (erectile dysfunction) 9/20/2017    Edema 9/20/2017    Elevated CPK 9/20/2017    Comments: History of    Elevated LFTs 9/20/2017    Comments: History of    Elevated PSA 9/20/2017    Hypercholesteremia     Hyperlipidemia 9/20/2017    Hypertension     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    Nodule of right lung 9/20/2017    Story: Right    Polymyalgia ([address]) 9/20/2017    Prostate enlargement      Past Surgical History:   Procedure Laterality Date    ARTHROCENTESIS  7/13/2023    CARDIAC PROCEDURE N/A 7/7/2023    Insert temporary pacemaker performed by Edith Hung MD at [facility]  7/4/2023    EP DEVICE PROCEDURE N/A 7/13/2023    Insert or replace transcath PPM leadless performed by Junito Montejo MD at [address] [place] [address] CATH LAB    [facility]  06/12/2018    Dr. Iglesia Agrawal, surgery to remove cancerous tissue from Left cheek    MALIGNANT SKIN LESION EXCISION  09/2018    2 lesions on head    MN UNLISTED PROCEDURE ABDOMEN PERITONEUM & OMENTUM      hernia repair    TOE AMPUTATION Right 9/30/2023    RIGHT SECOND TOE AMPUTATION performed by Liana Gipson DPM at [facility]     Dr Luci Sepulveda, podiatry did a right 2nd toe amputation partial to he DIPJ on 9/30/23 due to osteomyelitis.  nurse defers to Dr Jamaal Rosenberg orders for right foot.   Heels need to  be floated    Sacrum has blanchable partial thickness skin loss from
28

## 2023-10-05 LAB
ANION GAP SERPL CALC-SCNC: 5 MMOL/L (ref 5–15)
BASOPHILS # BLD: 0.1 K/UL (ref 0–0.1)
BASOPHILS NFR BLD: 1 % (ref 0–1)
BUN SERPL-MCNC: 18 MG/DL (ref 6–20)
BUN/CREAT SERPL: 17 (ref 12–20)
CALCIUM SERPL-MCNC: 8.7 MG/DL (ref 8.5–10.1)
CHLORIDE SERPL-SCNC: 107 MMOL/L (ref 97–108)
CO2 SERPL-SCNC: 23 MMOL/L (ref 21–32)
CREAT SERPL-MCNC: 1.03 MG/DL (ref 0.7–1.3)
DIFFERENTIAL METHOD BLD: ABNORMAL
EOSINOPHIL # BLD: 0.1 K/UL (ref 0–0.4)
EOSINOPHIL NFR BLD: 1 % (ref 0–7)
ERYTHROCYTE [DISTWIDTH] IN BLOOD BY AUTOMATED COUNT: 20.4 % (ref 11.5–14.5)
GLUCOSE BLD STRIP.AUTO-MCNC: 149 MG/DL (ref 65–117)
GLUCOSE BLD STRIP.AUTO-MCNC: 159 MG/DL (ref 65–117)
GLUCOSE BLD STRIP.AUTO-MCNC: 169 MG/DL (ref 65–117)
GLUCOSE BLD STRIP.AUTO-MCNC: 212 MG/DL (ref 65–117)
GLUCOSE SERPL-MCNC: 126 MG/DL (ref 65–100)
HCT VFR BLD AUTO: 28 % (ref 36.6–50.3)
HGB BLD-MCNC: 8.4 G/DL (ref 12.1–17)
IMM GRANULOCYTES # BLD AUTO: 0.1 K/UL (ref 0–0.04)
IMM GRANULOCYTES NFR BLD AUTO: 1 % (ref 0–0.5)
LYMPHOCYTES # BLD: 2.2 K/UL (ref 0.8–3.5)
LYMPHOCYTES NFR BLD: 17 % (ref 12–49)
MCH RBC QN AUTO: 24.7 PG (ref 26–34)
MCHC RBC AUTO-ENTMCNC: 30 G/DL (ref 30–36.5)
MCV RBC AUTO: 82.4 FL (ref 80–99)
MONOCYTES # BLD: 1 K/UL (ref 0–1)
MONOCYTES NFR BLD: 8 % (ref 5–13)
NEUTS SEG # BLD: 9.4 K/UL (ref 1.8–8)
NEUTS SEG NFR BLD: 72 % (ref 32–75)
NRBC # BLD: 0 K/UL (ref 0–0.01)
NRBC BLD-RTO: 0 PER 100 WBC
PLATELET # BLD AUTO: 359 K/UL (ref 150–400)
PMV BLD AUTO: 8.9 FL (ref 8.9–12.9)
POTASSIUM SERPL-SCNC: 4.1 MMOL/L (ref 3.5–5.1)
RBC # BLD AUTO: 3.4 M/UL (ref 4.1–5.7)
RBC MORPH BLD: ABNORMAL
SERVICE CMNT-IMP: ABNORMAL
SODIUM SERPL-SCNC: 135 MMOL/L (ref 136–145)
WBC # BLD AUTO: 12.9 K/UL (ref 4.1–11.1)

## 2023-10-05 PROCEDURE — 99233 SBSQ HOSP IP/OBS HIGH 50: CPT | Performed by: INTERNAL MEDICINE

## 2023-10-05 PROCEDURE — 97110 THERAPEUTIC EXERCISES: CPT

## 2023-10-05 PROCEDURE — 82962 GLUCOSE BLOOD TEST: CPT

## 2023-10-05 PROCEDURE — 6370000000 HC RX 637 (ALT 250 FOR IP): Performed by: INTERNAL MEDICINE

## 2023-10-05 PROCEDURE — 85025 COMPLETE CBC W/AUTO DIFF WBC: CPT

## 2023-10-05 PROCEDURE — 36415 COLL VENOUS BLD VENIPUNCTURE: CPT

## 2023-10-05 PROCEDURE — 97116 GAIT TRAINING THERAPY: CPT

## 2023-10-05 PROCEDURE — 2500000003 HC RX 250 WO HCPCS: Performed by: INTERNAL MEDICINE

## 2023-10-05 PROCEDURE — 80048 BASIC METABOLIC PNL TOTAL CA: CPT

## 2023-10-05 PROCEDURE — 6360000002 HC RX W HCPCS: Performed by: INTERNAL MEDICINE

## 2023-10-05 PROCEDURE — 1100000003 HC PRIVATE W/ TELEMETRY

## 2023-10-05 PROCEDURE — 2580000003 HC RX 258: Performed by: INTERNAL MEDICINE

## 2023-10-05 RX ORDER — WARFARIN SODIUM 5 MG/1
5 TABLET ORAL
Status: COMPLETED | OUTPATIENT
Start: 2023-10-05 | End: 2023-10-05

## 2023-10-05 RX ADMIN — Medication 1 TABLET: at 09:20

## 2023-10-05 RX ADMIN — INSULIN LISPRO 2 UNITS: 100 INJECTION, SOLUTION INTRAVENOUS; SUBCUTANEOUS at 09:19

## 2023-10-05 RX ADMIN — Medication 1 CAPSULE: at 09:20

## 2023-10-05 RX ADMIN — ATORVASTATIN CALCIUM 40 MG: 40 TABLET, FILM COATED ORAL at 09:20

## 2023-10-05 RX ADMIN — Medication: at 20:45

## 2023-10-05 RX ADMIN — PREGABALIN 75 MG: 75 CAPSULE ORAL at 20:44

## 2023-10-05 RX ADMIN — DOXAZOSIN 2 MG: 2 TABLET ORAL at 09:20

## 2023-10-05 RX ADMIN — Medication 1000 MCG: at 09:20

## 2023-10-05 RX ADMIN — PREGABALIN 75 MG: 75 CAPSULE ORAL at 09:20

## 2023-10-05 RX ADMIN — GLIMEPIRIDE 2 MG: 1 TABLET ORAL at 06:29

## 2023-10-05 RX ADMIN — CHOLECALCIFEROL TAB 10 MCG (400 UNIT) 400 UNITS: 10 TAB at 09:20

## 2023-10-05 RX ADMIN — INSULIN LISPRO 2 UNITS: 100 INJECTION, SOLUTION INTRAVENOUS; SUBCUTANEOUS at 18:14

## 2023-10-05 RX ADMIN — METFORMIN HYDROCHLORIDE 500 MG: 500 TABLET ORAL at 09:20

## 2023-10-05 RX ADMIN — Medication: at 09:21

## 2023-10-05 RX ADMIN — PANTOPRAZOLE SODIUM 40 MG: 40 TABLET, DELAYED RELEASE ORAL at 18:14

## 2023-10-05 RX ADMIN — PANTOPRAZOLE SODIUM 40 MG: 40 TABLET, DELAYED RELEASE ORAL at 06:29

## 2023-10-05 RX ADMIN — CEFEPIME 2000 MG: 2 INJECTION, POWDER, FOR SOLUTION INTRAVENOUS at 11:07

## 2023-10-05 RX ADMIN — METFORMIN HYDROCHLORIDE 500 MG: 500 TABLET ORAL at 18:14

## 2023-10-05 RX ADMIN — WARFARIN SODIUM 5 MG: 5 TABLET ORAL at 19:10

## 2023-10-05 RX ADMIN — Medication 400 UNITS: at 09:20

## 2023-10-05 RX ADMIN — DIGOXIN 125 MCG: 125 TABLET ORAL at 09:20

## 2023-10-05 NOTE — PROGRESS NOTES
INTERNAL MEDICINE PROGRESS NOTE    NAME:  Ute Méndez Sr.   :   1933   MRN:   [id number]     Date/Time:  10/5/2023 5:14 AM  Subjective:   History:  Chart reviewed and patient seen and examined and D/W his nurse and his son at bedside this AM and all events noted. He is followed by me for HTN, HLD, CKD st 3, PAF, CHD S/P pacemaker, diastolic CHF, recent hospitalization with Sepsis and Osteomyelitis R great toe and other medical problems. He has now been admitted with bilateral foot/toe ulcers and dehydration. This AM he continues to feel better. He denies CP, SOB or other cardiac or respiratory c/o. He has no N/V and appetite has improved a little. He had 3 BMs on admission date  which were black, loose and foul smelling and one black BM the following day but BMs seem to have normalized now. Hgb fell to 5.8 on  PM and transfused 2 U PRBC. He has no other GI c/o. He has no neuro c/o except generalized weakness. He has no change of his chronic arthritic c/o. He has no other c/o on complete ROS. Blood cultures obtained on admission and he did have a low-grade fever  evening and repeat blood cultures were sent and all blood cultures remained negative. On  he had distal R 2nd toe amputation and had fever to 102.2  PM and again temperature up to 101.8 on 10/1 PM.; however afebrile since except 1 isolated temp of 100.6 at 7 PM on 10/3.       Medications reviewed:  Current Facility-Administered Medications   Medication Dose Route Frequency    ceFEPIme (MAXIPIME) 2,000 mg in sodium chloride 0.9 % 100 mL IVPB (mini-bag)  2,000 mg IntraVENous Q24H    metFORMIN (GLUCOPHAGE) tablet 500 mg  500 mg Oral BID WC    pantoprazole (PROTONIX) tablet 40 mg  40 mg Oral BID AC    glimepiride (AMARYL) tablet 2 mg  2 mg Oral QAM AC    acidophilus probiotic capsule 1 capsule  1 capsule Oral Daily    HYDROcodone-acetaminophen (NORCO) 5-325 MG per tablet 1 tablet  1 tablet Oral Q6H PRN    insulin glargine

## 2023-10-05 NOTE — PROGRESS NOTES
Bedside shift change report given to [address] (oncoming nurse) by Shahid Vogt RN (offgoing nurse). Report included the following information Nurse Handoff Report, Index, ED Encounter Summary, ED SBAR, Adult Overview, Surgery Report, Intake/Output, MAR, Recent Results, Med Rec Status, and Cardiac Rhythm (NSR) . Pt had a calm night and had no complaints over the night. Pt at  bedside shift report was sound asleep in bed.

## 2023-10-05 NOTE — PROGRESS NOTES
Infectious Disease Progress          Impression    Cellulitis  & infected wounds of both feet  Left hallux, R/second toe  Recent outpatient wound cultures left + for light MSSA  R/foot + for heavy Klebsiella aerogenes  S/p outpatient Bactrim p.o., Augmentin   Currently on cefepime IV, s/p DC of Vancomycin. CT R/foot  9/27 + for  acute on chronic osteomyelitis/septic arthritis at  the first interphalangeal joint. Findings concerning for acute osteomyelitis at the tip of the second distal  phalanx. CT L/foot - no OM. S/p amputation of distal R/2nd toe on 9/30  Intra-Op cultures+ for rare probable MSSA , negative for Ag detection  Few MSF  Pathology+ for acute on chronic OM, margins clear      Leukocytosis improving  WBC 12.9   21.9 on admission   Wound clean per Podiatry      S/p fever Tmax 101.8 on 10/1  For repeat blood cultures    Diabetes poorly controlled  A1c 8.2    S/p pacemaker placement  3/84    Diastolic CHF, chronic    CKD 3  Cr 1.03, improved    Acute anemia Hb 5.8  S/p PRBC transfusion    S/p admission in 7/2023  Treated for Pseudomonas bacteremia,  Metabolic encephalopathy, B/l big toe ulcerations  OM of right hallux, septic arthritis of 1st PIP jt  WC  5/17+ MSSA, Pseudomonas. S/p Zosyn iv x 6 weeks end date 8/18.     Plan  -Continue Cefepime IV  -Pharmacy to adjust to creatinine clearance please  - D/w Podiatry - foot is dong well, OK for 2 weeks of antibiotic      Antimicrobial orders for discharge  -Cefepime 2 g  IV every 12 hours end date 10/14  -Pull line at end of therapy.    -Weekly CBC, CMP-  -Fax reports to [phone], call with critical labs  at [phone]  -Encourage adequate fluids, daily probiotic/yogurt  -If line malfunction occurs and home health cannot reposition  please send patient to ED immediately  -ID follow-up -  no follow up required  If persistent side effects occur stop antibiotic and call-ID/PCP    May DC from ID standpoint      Possible adverse effects of long term (congestive heart failure) ([address]) 2017    CKD (chronic kidney disease) 2017    COVID-19 2021    Diabetic acetonemia (HCC)     DM (diabetes mellitus) ([address]) 2017    Story: Diet Controlled    ED (erectile dysfunction) 2017    Edema 2017    Elevated CPK 2017    Comments: History of    Elevated LFTs 2017    Comments: History of    Elevated PSA 2017    Hypercholesteremia     Hyperlipidemia 2017    Hypertension     Hypertension with renal disease 2017    Nodule of right lung 2017    Story: Right    Polymyalgia ([address]) 2017    Prostate enlargement        Past Surgical History:   Procedure Laterality Date    ARTHROCENTESIS  2023    CARDIAC PROCEDURE N/A 2023    Insert temporary pacemaker performed by Lionel Rosales MD at [facility]  2023    EP DEVICE PROCEDURE N/A 2023    Insert or replace transcath PPM leadless performed by Meredith Sumner MD at [address] [place] [address] CATH LAB    [facility]  2018    Dr. 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    MALIGNANT SKIN LESION EXCISION  2018    2 lesions on head    RI UNLISTED PROCEDURE ABDOMEN PERITONEUM & OMENTUM      hernia repair    TOE AMPUTATION Right 2023    RIGHT SECOND TOE AMPUTATION performed by Samantha Grigsby DPM at [facility] MAIN OR       No Known Allergies    Tobacco Use: Low Risk  (2023)    Patient History     Smoking Tobacco Use: Never     Smokeless Tobacco Use: Never     Passive Exposure: Not on file       Alcohol Use: Not At Risk (2023)    AUDIT-C     Frequency of Alcohol Consumption: Never     Average Number of Drinks: Patient does not drink     Frequency of Binge Drinking: Never         Family Status   Relation Name Status    Father      Mother         Prior to Admission Medications   Prescriptions Last Dose Informant Patient Reported? Taking?    ACCU-CHEK LEE ANN PLUS strip 2023  No No   Sig: USE TO TEST BLOOD SUGAR

## 2023-10-05 NOTE — PROGRESS NOTES
Seen patient for a bandage change and post op eval    Pictures taken in the chart please see b/l foot concerned areas all stable no change in status superficial non infected ulceration left hallux and no open lesion on the right hallux dorsal surface    Right post op amp area second toe well coapt closure no evidence of further infection     No change post op care and discharge instructions  Similar to my last note please see

## 2023-10-05 NOTE — PROGRESS NOTES
Comprehensive Nutrition Assessment    Type and Reason for Visit:  Reassess    Nutrition Recommendations/Plan:   Continue 4 carb choice diet     Nutrition Assessment:    Chart reviewed; patient receiving a 4 carb choice diet. He reports a good appetite and eating well. Consuming the majority of his meals. States he eats well at home and hasn't needed supplements before. Hopeful for discharge tomorrow. No nutrition questions reported. Encouraged intake of meals. Patient Vitals for the past 120 hrs:   PO Meals Eaten (%)   10/05/23 0949 76 - 100%   10/03/23 1104 76 - 100%     Nutrition Related Findings:    [phone]   BM 10/5   Atorvastatin, Amaryl, Lantus, Humalog, Metformin, Protonix, Vitamin B12, Vitamin D, Vitamin E   Wound Type: Moisture Associate Skin Damage, Diabetic Ulcer       Current Nutrition Intake & Therapies:    Average Meal Intake: %     ADULT DIET; Regular; 4 carb choices (60 gm/meal)    Anthropometric Measures:  Height: 190.5 cm (6' 3\")  Ideal Body Weight (IBW): 196 lbs (89 kg)       Current Body Weight: 182 lb 1.6 oz (82.6 kg),   IBW. Current BMI (kg/m2): 22.8                          BMI Categories: Normal Weight (BMI 18.5-24. 9)    Estimated Daily Nutrient Needs:  Energy Requirements Based On: Formula  Weight Used for Energy Requirements: Current  Energy (kcal/day): 2044 kcals (BMR x 1. 3AF)  Weight Used for Protein Requirements: Current  Protein (g/day): 83-99g (1.0-1.2 g/kg bw)  Method Used for Fluid Requirements: 1 ml/kcal  Fluid (ml/day): 2000 mL    Nutrition Diagnosis:   Altered nutrition-related lab values related to endocrine dysfuntion as evidenced by  [phone])    Nutrition Interventions:   Food and/or Nutrient Delivery: Continue Current Diet  Nutrition Education/Counseling: No recommendation at this time  Coordination of Nutrition Care: Continue to monitor while inpatient       Goals:  Previous Goal Met: Goal(s) Achieved  Goals: PO intake 75% or greater, by next RD assessment       Nutrition Monitoring and Evaluation:   Behavioral-Environmental Outcomes: None Identified  Food/Nutrient Intake Outcomes: Food and Nutrient Intake  Physical Signs/Symptoms Outcomes: Biochemical Data, Weight    Discharge Planning:    Continue current diet     Kelli Keen RD  Contact: ext [phone]

## 2023-10-06 ENCOUNTER — APPOINTMENT (OUTPATIENT)
Facility: HOSPITAL | Age: 88
End: 2023-10-06
Payer: MEDICARE

## 2023-10-06 VITALS
RESPIRATION RATE: 20 BRPM | HEART RATE: 72 BPM | OXYGEN SATURATION: 98 % | WEIGHT: 182 LBS | DIASTOLIC BLOOD PRESSURE: 71 MMHG | HEIGHT: 75 IN | SYSTOLIC BLOOD PRESSURE: 116 MMHG | TEMPERATURE: 98.1 F | BODY MASS INDEX: 22.63 KG/M2

## 2023-10-06 LAB
ANION GAP SERPL CALC-SCNC: 5 MMOL/L (ref 5–15)
BASOPHILS # BLD: 0.1 K/UL (ref 0–0.1)
BASOPHILS NFR BLD: 1 % (ref 0–1)
BUN SERPL-MCNC: 18 MG/DL (ref 6–20)
BUN/CREAT SERPL: 17 (ref 12–20)
CALCIUM SERPL-MCNC: 8.1 MG/DL (ref 8.5–10.1)
CHLORIDE SERPL-SCNC: 106 MMOL/L (ref 97–108)
CO2 SERPL-SCNC: 24 MMOL/L (ref 21–32)
CREAT SERPL-MCNC: 1.04 MG/DL (ref 0.7–1.3)
DIFFERENTIAL METHOD BLD: ABNORMAL
EOSINOPHIL # BLD: 0.1 K/UL (ref 0–0.4)
EOSINOPHIL NFR BLD: 1 % (ref 0–7)
ERYTHROCYTE [DISTWIDTH] IN BLOOD BY AUTOMATED COUNT: 20.4 % (ref 11.5–14.5)
GLUCOSE BLD STRIP.AUTO-MCNC: 161 MG/DL (ref 65–117)
GLUCOSE BLD STRIP.AUTO-MCNC: 174 MG/DL (ref 65–117)
GLUCOSE SERPL-MCNC: 136 MG/DL (ref 65–100)
HCT VFR BLD AUTO: 27.6 % (ref 36.6–50.3)
HGB BLD-MCNC: 8.3 G/DL (ref 12.1–17)
IMM GRANULOCYTES # BLD AUTO: 0.2 K/UL (ref 0–0.04)
IMM GRANULOCYTES NFR BLD AUTO: 1 % (ref 0–0.5)
INR PPP: 1.1 (ref 0.9–1.1)
LYMPHOCYTES # BLD: 2.2 K/UL (ref 0.8–3.5)
LYMPHOCYTES NFR BLD: 19 % (ref 12–49)
MCH RBC QN AUTO: 24.5 PG (ref 26–34)
MCHC RBC AUTO-ENTMCNC: 30.1 G/DL (ref 30–36.5)
MCV RBC AUTO: 81.4 FL (ref 80–99)
MONOCYTES # BLD: 0.9 K/UL (ref 0–1)
MONOCYTES NFR BLD: 8 % (ref 5–13)
NEUTS SEG # BLD: 8 K/UL (ref 1.8–8)
NEUTS SEG NFR BLD: 70 % (ref 32–75)
NRBC # BLD: 0 K/UL (ref 0–0.01)
NRBC BLD-RTO: 0 PER 100 WBC
PLATELET # BLD AUTO: 340 K/UL (ref 150–400)
PMV BLD AUTO: 8.5 FL (ref 8.9–12.9)
POTASSIUM SERPL-SCNC: 3.9 MMOL/L (ref 3.5–5.1)
PROTHROMBIN TIME: 11.9 SEC (ref 9–11.1)
RBC # BLD AUTO: 3.39 M/UL (ref 4.1–5.7)
SERVICE CMNT-IMP: ABNORMAL
SERVICE CMNT-IMP: ABNORMAL
SODIUM SERPL-SCNC: 135 MMOL/L (ref 136–145)
WBC # BLD AUTO: 11.4 K/UL (ref 4.1–11.1)

## 2023-10-06 PROCEDURE — 85610 PROTHROMBIN TIME: CPT

## 2023-10-06 PROCEDURE — 36415 COLL VENOUS BLD VENIPUNCTURE: CPT

## 2023-10-06 PROCEDURE — 80048 BASIC METABOLIC PNL TOTAL CA: CPT

## 2023-10-06 PROCEDURE — C1751 CATH, INF, PER/CENT/MIDLINE: HCPCS

## 2023-10-06 PROCEDURE — 97530 THERAPEUTIC ACTIVITIES: CPT

## 2023-10-06 PROCEDURE — 6360000002 HC RX W HCPCS: Performed by: INTERNAL MEDICINE

## 2023-10-06 PROCEDURE — 99239 HOSP IP/OBS DSCHRG MGMT >30: CPT | Performed by: INTERNAL MEDICINE

## 2023-10-06 PROCEDURE — 82962 GLUCOSE BLOOD TEST: CPT

## 2023-10-06 PROCEDURE — 71045 X-RAY EXAM CHEST 1 VIEW: CPT

## 2023-10-06 PROCEDURE — 6370000000 HC RX 637 (ALT 250 FOR IP): Performed by: INTERNAL MEDICINE

## 2023-10-06 PROCEDURE — 2580000003 HC RX 258: Performed by: INTERNAL MEDICINE

## 2023-10-06 PROCEDURE — 76937 US GUIDE VASCULAR ACCESS: CPT

## 2023-10-06 PROCEDURE — 85025 COMPLETE CBC W/AUTO DIFF WBC: CPT

## 2023-10-06 PROCEDURE — 36569 INSJ PICC 5 YR+ W/O IMAGING: CPT

## 2023-10-06 PROCEDURE — 02HV33Z INSERTION OF INFUSION DEVICE INTO SUPERIOR VENA CAVA, PERCUTANEOUS APPROACH: ICD-10-PCS | Performed by: INTERNAL MEDICINE

## 2023-10-06 RX ORDER — PANTOPRAZOLE SODIUM 40 MG/1
40 TABLET, DELAYED RELEASE ORAL
Qty: 30 TABLET | Refills: 3 | Status: SHIPPED | OUTPATIENT
Start: 2023-10-06 | End: 2023-10-18 | Stop reason: SDUPTHER

## 2023-10-06 RX ORDER — CASTOR OIL AND BALSAM, PERU 788; 87 MG/G; MG/G
OINTMENT TOPICAL 2 TIMES DAILY
Qty: 1 EACH | Refills: 2 | Status: SHIPPED | OUTPATIENT
Start: 2023-10-06

## 2023-10-06 RX ADMIN — Medication 400 UNITS: at 09:07

## 2023-10-06 RX ADMIN — Medication 1000 MCG: at 09:06

## 2023-10-06 RX ADMIN — DOXAZOSIN 2 MG: 2 TABLET ORAL at 09:06

## 2023-10-06 RX ADMIN — Medication 1 CAPSULE: at 09:06

## 2023-10-06 RX ADMIN — CHOLECALCIFEROL TAB 10 MCG (400 UNIT) 400 UNITS: 10 TAB at 09:06

## 2023-10-06 RX ADMIN — ATORVASTATIN CALCIUM 40 MG: 40 TABLET, FILM COATED ORAL at 09:06

## 2023-10-06 RX ADMIN — PREGABALIN 75 MG: 75 CAPSULE ORAL at 09:07

## 2023-10-06 RX ADMIN — METFORMIN HYDROCHLORIDE 500 MG: 500 TABLET ORAL at 09:07

## 2023-10-06 RX ADMIN — DIGOXIN 125 MCG: 125 TABLET ORAL at 09:06

## 2023-10-06 RX ADMIN — PANTOPRAZOLE SODIUM 40 MG: 40 TABLET, DELAYED RELEASE ORAL at 06:31

## 2023-10-06 RX ADMIN — GLIMEPIRIDE 2 MG: 1 TABLET ORAL at 06:31

## 2023-10-06 RX ADMIN — CEFEPIME 2000 MG: 2 INJECTION, POWDER, FOR SOLUTION INTRAVENOUS at 11:13

## 2023-10-06 NOTE — PROGRESS NOTES
He is currently doing well and did a great job walking with physical therapy yesterday  Vital signs stable afebrile  Heart regular rate and rhythm  Lungs clear  Extremities without edema  White blood count 11,600 hemoglobin 8.4  Medically stable for discharge  Full note dictated  Follow-up with me in 3 days at the office.

## 2023-10-06 NOTE — DISCHARGE INSTRUCTIONS
[address] [facility], [address] [place] [address]  [phone]      Patient Discharge Instructions    Gayle Morrow / [id number] : 1933    Admitted 2023 Discharged: 10/6/2023     Active Hospital Problems    Complete heart block [facility])            Pacemaker placed 5473      Diastolic CHF, chronic ([address])            Echo 65  - mild diastolic dysfunction with            EFx 45%                  Paroxysmal atrial fibrillation ([address])      Hypertension with renal disease      ASCVD (arteriosclerotic cardiovascular disease)            Story: Old ASMI by EKG                  Stage 3 chronic kidney disease (HCC)      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 Do not take other medications without consulting your doctor. What to do at Next Level of Care    Disposition: Home    Recommended diet: Diabetic diet    Recommended activity: Up as tolerated    Wound Care: Apply Venelex ointment to area on buttocks to prevent breakdown          Information obtained by :  I understand that if any problems occur once I am at home I am to contact my physician. I understand and acknowledge receipt of the instructions indicated above.                                                                                                                                            Physician's or R.N.'s Signature Date/Time                                                                                                                                              Patient or Representative Signature                                                          Date/Time

## 2023-10-06 NOTE — PROGRESS NOTES
7194 - PCP hospital follow-up transitional care appointment has been scheduled with Dr. Kim Saez on 10/11/23 4520. Pending patient discharge. Emily Remy Care Management Assistant     Attempted to schedule PCP hospital follow up appointment. Unable to reach anyone, left voicemail. Pending patient discharge.  Clifton Loyd Management Assistant

## 2023-10-06 NOTE — PROGRESS NOTES
Bedside shift change report given to [facility] LPN (oncoming nurse) by Mortimer Spar RN (offgoing nurse). Report included the following information Nurse Handoff Report, Adult Overview, Surgery Report, Intake/Output, MAR, Recent Results, Med Rec Status, and Cardiac Rhythm (NSR)        Pt had a calm night, no complaints over the night and he was made comfortable ib bed. Pt at bedside shift report was alert and awake in bed.

## 2023-10-06 NOTE — DISCHARGE SUMMARY
[facility]    Name:  El Alberto  MR#:  [id number]  :  1933  ACCOUNT #:  [de-identified]  ADMIT DATE:  2023  DISCHARGE DATE:  10/06/2023    FINAL DIAGNOSES:  1. Foot ulcers, left great toe and right second toe.  2.  Osteomyelitis, right second toe. 3.  Diabetes mellitus. 4.  Recent hospitalization for Pseudomonas bacteremia. 5.  Diastolic congestive heart failure, chronic. 6.  Paroxysmal atrial fibrillation. 7.  Complete heart block, status post pacemaker placement, 2023.  8.  Hypertension. 9.  Atherosclerotic cardiovascular disease. 10.  Chronic kidney disease, stage III. 11.  Acute blood loss anemia. 12.  Culture positive for Klebsiella, right second toe, and methicillin-sensitive staph, left first toe. CONSULTATIONS:  Infectious Disease, Dr. Polly Ahn, and Podiatry, Dr. Pamella Gruber. PROCEDURES:  Amputation, distal phalanx, right second toe. COMPLICATIONS:  None. HOSPITAL COURSE:  For details of admission history, please see admit note. Briefly, the patient is a 80-year-old white male who had had recent hospitalization in July of this year, at which time he was admitted with a Pseudomonas bacteremia and had osteomyelitis of his right first toe. That was successfully treated and resolved and the ulceration had resolved, and unfortunately, he subsequently developed an ulceration of his left first toe which was very superficial in nature and culture was obtained from this, which grew methicillin-sensitive Staph aureus and he was given a course of Augmentin which did not seem to resolve the issue. This was followed by a course of Bactrim because at that time he also had developed an ulcer of the tip of the right second toe, which grew Klebsiella.   Once he had failed treatment for both of them, he was admitted to the hospital, at which time he was noted to be dehydrated and also had developed a coagulopathy with elevated INR,

## 2023-10-06 NOTE — PROCEDURES
PICC Education: Explained reason and rationale for PICC placement along with providing education in order to make an informed consent including nature, risks, benefits, potential complications, care and maintenance of PICC line. The opportunity for questions or concerns was given. Patient  gave written consent for PICC procedure to be done at the bedside. Patient  verbalizes understanding at this time. Patient is very alert and oriented. Procedure:  Time out completed. Pre procedure assessment done. Maximum sterile barrier precautions observed throughout procedure. Lidocaine 1% 3.0 ml sc given prior to cannulation  Cannulated Basilic vein using ultrasound guidance and modified seldinger technique. Inserted SINGLE lumen 4 fr PICC in  RIGHT arm using, [place] [facility] Tip Location System and 3CG   Patient has sinus rhythm. Patient has pacemaker therefore unable to confirm tip location with 3CG. Xray ordered for PICC tip confirmation. Blood return verified and flushed with 20ml NS in each port. Sterile dressing applied with Biopatch, Stat loc, occlusive dressing per protocol. Curios caps applied to each port. Reason for access (8 more days of antibiotics). Complications related to insertion (NONE). Patient tolerated procedure well with minimal blood loss. PICC procedure performed by: Donna Wiggins, RN, BSN, [facility]-BC/ Vascular Access Nurse  Assisted by: Jaylon Navarro  RN,BSN, CRNI/ Vascular Access Nurse    RIGHT  arm circumference: 26 cm. Catheter internal length:  44 cm  Catheter external length: 0 cm  TOTAL Length:44 cm  PICC catheter occupies 25% of vein    Brand of catheter:  Francisco Javier Frazier  Lot #[id number]   REF# [id number]    EXP 04/30/2024. Primary nurse Nicole MORRELL, notified PICC line may not be used until confirmed with Xray and to hang new infusion tubing prior to use.     @ 1964 Xray read by Dr. Clare Almazan Radiologist: Right PICC terminates in appropriate position in CAJ.      @4164  Called Primary nurse Giuseppe Mejia

## 2023-10-06 NOTE — DISCHARGE SUMMARY
Nurse reviewed discharged paperwork with patient and son, his caregiver. Nurse used teach back method to instruct on daily wound care as ordered by podiatry and wound care team. Patient son, caregiver expressed understanding.  Nurse gave patient son, written instructions as per podiatry and wound care team.

## 2023-10-06 NOTE — CARE COORDINATION
Transition of Care Plan:     RUR: 20%  Prior Level of Functioning: home, lives with family  Disposition: home w/ [address] [place] IV ABX ([address], [facility])  Follow up appointments: PCP, specialists as indicated   DME needed: N/A  Transportation at discharge: family  IM/IMM Medicare/ letter given: given on 10/6  Is patient a [place] and connected with [place]? No              If yes, was [employer] transfer form completed and VA notified? Caregiver Contact: Deborah Shaw (son) [phone]  Discharge Caregiver contacted prior to discharge? yes, spoke with DIL ([address] [place] [address], [phone])  Care Conference needed? no  Barriers to discharge:  d/c order    Update 3:52 PM  Unable to secure other [address] [place] [address] agency that can see pt sooner. Will continue with [address] [place] [address]. In the mean time, [address] has coordinated with Dr. Darren Mendoza and [facility]- pt can come to [facility] next week for lab draws due to [address] delay. Pt with daily wound care needs- son at bedside is aware that family will need to assist (ordered daily for sacrum, twice daily for toes) Nursing to provide wound care supplies at d/c and will need education/teachback prior to d/c. Son is transporting home. Cleared for d/c from CM standpoint. 10/06/23 [address] Discharge   Transition of Care Consult (CM Consult) Discharge [facility];IV [address] Provided? No   Mode of Transport at Discharge Other (see comment)  (daughter in law)   Hospital Transport Time of Discharge 1500   Confirm Follow Up Transport Family   Condition of Participation: Discharge Planning   The Plan for Transition of Care is related to the following treatment goals: HHC, IV infusion abx   The Patient and/or Patient Representative was provided with a Choice of Provider?  Patient;Patient Representative   Name of the Patient Representative who was provided with the Choice of Provider and agrees with the Discharge Plan?  Mansi Sinclair   The Patient and/Or Patient Representative agree with the Discharge Plan? Yes   Freedom of Choice list was provided with basic dialogue that supports the patient's individualized plan of care/goals, treatment preferences, and shares the quality data associated with the providers? Yes       Chart reviewed. Anticipate d/c home today pending d/c order. PICC line placed this AM. [facility] infusion services to provide home IV ABX- pt will have co-payment of $2.00 per day. KYLER submitted C order to [facility]- delayed start of care date noted, unable to see patient until 10/12 due to staffing shortage. KYLER has submitted additional [facility] referrals which are pending review to identify if pt can be seen sooner than 10/12. Spoke with Odilon RICHARDS, this AM to review d/c plan. She plans to arrive for d/c today at 2:30PM and she will be transporting pt home. She will assist with iv abx at home. Gorman Claude w/ [employer] to contact Odilon by phone to review teaching/education and will come in person for education prior to d/c if needed. 2nd IM given to pt at bedside. He is agreeable to d/c plan. Cleared for d/c from CM standpoint pending d/c order from MD and confirmation of [facility] services.     Sherie Hirsch, MSW   Care Manager, [address] [place] [address]

## 2023-10-06 NOTE — PROGRESS NOTES
Called to the pharmacy for the clarification about WARFARIN 5 MG , communication is telephonic , talked with Theantony Prarach about dose and medication time .

## 2023-10-10 DIAGNOSIS — E11.65 POORLY CONTROLLED DIABETES MELLITUS (HCC): Primary | ICD-10-CM

## 2023-10-10 PROBLEM — E11.9 TYPE 2 DIABETES MELLITUS WITHOUT COMPLICATION, WITHOUT LONG-TERM CURRENT USE OF INSULIN (HCC): Status: ACTIVE | Noted: 2023-10-10

## 2023-10-10 PROBLEM — D72.825 BANDEMIA: Status: RESOLVED | Noted: 2023-09-28 | Resolved: 2023-10-10

## 2023-10-10 PROBLEM — L03.115 CELLULITIS OF BOTH FEET: Status: RESOLVED | Noted: 2023-05-17 | Resolved: 2023-10-10

## 2023-10-10 PROBLEM — M86.9 OSTEOMYELITIS OF SECOND TOE OF RIGHT FOOT (HCC): Status: ACTIVE | Noted: 2023-10-10

## 2023-10-10 PROBLEM — N52.9 ED (ERECTILE DYSFUNCTION): Status: RESOLVED | Noted: 2017-09-20 | Resolved: 2023-10-10

## 2023-10-10 PROBLEM — L03.90 CELLULITIS: Status: RESOLVED | Noted: 2023-09-23 | Resolved: 2023-10-10

## 2023-10-10 PROBLEM — L03.116 CELLULITIS OF BOTH FEET: Status: RESOLVED | Noted: 2023-05-17 | Resolved: 2023-10-10

## 2023-10-11 ENCOUNTER — OFFICE VISIT (OUTPATIENT)
Facility: CLINIC | Age: 88
End: 2023-10-11

## 2023-10-11 VITALS
DIASTOLIC BLOOD PRESSURE: 75 MMHG | SYSTOLIC BLOOD PRESSURE: 138 MMHG | RESPIRATION RATE: 18 BRPM | HEART RATE: 72 BPM | HEIGHT: 75 IN | WEIGHT: 196.3 LBS | OXYGEN SATURATION: 97 % | TEMPERATURE: 98.3 F | BODY MASS INDEX: 24.41 KG/M2

## 2023-10-11 DIAGNOSIS — L97.511 ULCER OF BOTH FEET, LIMITED TO BREAKDOWN OF SKIN (HCC): Primary | ICD-10-CM

## 2023-10-11 DIAGNOSIS — I50.32 DIASTOLIC CHF, CHRONIC (HCC): ICD-10-CM

## 2023-10-11 DIAGNOSIS — Z09 HOSPITAL DISCHARGE FOLLOW-UP: ICD-10-CM

## 2023-10-11 DIAGNOSIS — E11.9 TYPE 2 DIABETES MELLITUS WITHOUT COMPLICATION, WITHOUT LONG-TERM CURRENT USE OF INSULIN (HCC): ICD-10-CM

## 2023-10-11 DIAGNOSIS — A49.8 KLEBSIELLA INFECTION: ICD-10-CM

## 2023-10-11 DIAGNOSIS — A49.01 MSSA (METHICILLIN SUSCEPTIBLE STAPHYLOCOCCUS AUREUS) INFECTION: ICD-10-CM

## 2023-10-11 DIAGNOSIS — N18.31 STAGE 3A CHRONIC KIDNEY DISEASE (HCC): ICD-10-CM

## 2023-10-11 DIAGNOSIS — I25.10 ASCVD (ARTERIOSCLEROTIC CARDIOVASCULAR DISEASE): ICD-10-CM

## 2023-10-11 DIAGNOSIS — I12.9 HYPERTENSION WITH RENAL DISEASE: ICD-10-CM

## 2023-10-11 DIAGNOSIS — I44.2 COMPLETE HEART BLOCK (HCC): ICD-10-CM

## 2023-10-11 DIAGNOSIS — D62 ACUTE BLOOD LOSS ANEMIA: ICD-10-CM

## 2023-10-11 DIAGNOSIS — M86.9 OSTEOMYELITIS OF SECOND TOE OF RIGHT FOOT (HCC): ICD-10-CM

## 2023-10-11 DIAGNOSIS — S98.131A AMPUTATION OF TOE OF RIGHT FOOT (HCC): ICD-10-CM

## 2023-10-11 DIAGNOSIS — I48.0 PAROXYSMAL ATRIAL FIBRILLATION (HCC): ICD-10-CM

## 2023-10-11 DIAGNOSIS — L97.521 ULCER OF BOTH FEET, LIMITED TO BREAKDOWN OF SKIN (HCC): Primary | ICD-10-CM

## 2023-10-11 LAB
INR PPP: 3.1 (ref 0.9–1.1)
PROTHROMBIN TIME: 29.8 SEC (ref 9–11.1)

## 2023-10-11 RX ORDER — DICLOFENAC SODIUM 75 MG/1
75 TABLET, DELAYED RELEASE ORAL 2 TIMES DAILY PRN
Qty: 60 TABLET | Refills: 3 | Status: SHIPPED | OUTPATIENT
Start: 2023-10-11

## 2023-10-11 RX ORDER — BUMETANIDE 2 MG/1
2 TABLET ORAL DAILY
Qty: 30 TABLET | Refills: 3 | Status: SHIPPED | OUTPATIENT
Start: 2023-10-11

## 2023-10-11 NOTE — PROGRESS NOTES
Follow-Up from Hospital (Got dischared from [facility] 10-6-23.)       HPI:  Teddy Deleon. is a 80y.o. year old male who is here for a follow up visit for hospitalization transition of care. He was last seen by me on 9/22/2023 office and on 10/6/2023 time of hospital discharge. Discharged on: 10/6/2023    Diagnosis in hospital:   1. Foot ulcers, left great toe and right second toe.  2.  Osteomyelitis, right second toe. 3.  Diabetes mellitus. 4.  Recent hospitalization for Pseudomonas bacteremia. 5.  Diastolic congestive heart failure, chronic. 6.  Paroxysmal atrial fibrillation. 7.  Complete heart block, status post pacemaker placement, 07/2023.  8.  Hypertension. 9.  Atherosclerotic cardiovascular disease. 10.  Chronic kidney disease, stage III. 11.  Acute blood loss anemia. 12.  Culture positive for Klebsiella, right second toe, and methicillin-sensitive staph, left first toe    Complications in hospital: No complications    Medication changes: New medications cefepime 2 g twice daily for 10 days, Protonix 40 mg twice daily, Coumadin 10 mg Monday and Friday and 7.5 mg other days, resume Lyrica at 75 twice daily, discontinue prednisone, DC Bactrim, hold Bumex 2 mg daily, hold diclofenac 75 twice daily, and other medicines continue the same as prior to admission    Discharge Summary reviewed. Today 10/11/2023      He reports the following: He is accompanied by his daughter-in-law at the visit today. He is noting a little swelling in his legs the left more than the right. The wound care/home health nurse has been by and change his dressing most recently this morning. According to the nurse his wounds look like they are healing well with the left toe almost completely healed and the right second toe that had partial amputation as well almost completely healed. That is now 11 days postop.   He notes no chest pain, shortness of breath, palpitations, PND, orthopnea or cardiorespiratory complaints

## 2023-10-11 NOTE — TELEPHONE ENCOUNTER
RX refill request from the patient/pharmacy. Patient last seen 09- with labs, and next appt. scheduled for 10-  Requested Prescriptions     Pending Prescriptions Disp Refills    pregabalin (LYRICA) 75 MG capsule [Pharmacy Med Name: PREGABALIN 75 MG CAPSULE] 60 capsule 5     Sig: Take 1 capsule by mouth 2 times daily for 30 days. Max Daily Amount: 150 mg    Lactobacillus Acid-Pectin (ACIDOPHILUS/CITRUS PECTIN) TABS [Pharmacy Med Name: ACIDOPHILUS-PECTIN CAPTAB] 60 tablet 5     Sig: TAKE 1 TABLET BY MOUTH EVERY DAY    .

## 2023-10-12 LAB
ALBUMIN SERPL-MCNC: 2.1 G/DL (ref 3.5–5)
ALBUMIN/GLOB SERPL: 0.5 (ref 1.1–2.2)
ALP SERPL-CCNC: 77 U/L (ref 45–117)
ALT SERPL-CCNC: 25 U/L (ref 12–78)
ANION GAP SERPL CALC-SCNC: 6 MMOL/L (ref 5–15)
AST SERPL-CCNC: 29 U/L (ref 15–37)
BASOPHILS # BLD: 0.1 K/UL (ref 0–0.1)
BASOPHILS NFR BLD: 1 % (ref 0–1)
BILIRUB SERPL-MCNC: 0.3 MG/DL (ref 0.2–1)
BUN SERPL-MCNC: 28 MG/DL (ref 6–20)
BUN/CREAT SERPL: 20 (ref 12–20)
CALCIUM SERPL-MCNC: 8.7 MG/DL (ref 8.5–10.1)
CHLORIDE SERPL-SCNC: 107 MMOL/L (ref 97–108)
CO2 SERPL-SCNC: 23 MMOL/L (ref 21–32)
CREAT SERPL-MCNC: 1.43 MG/DL (ref 0.7–1.3)
DIFFERENTIAL METHOD BLD: ABNORMAL
EOSINOPHIL # BLD: 0.1 K/UL (ref 0–0.4)
EOSINOPHIL NFR BLD: 1 % (ref 0–7)
ERYTHROCYTE [DISTWIDTH] IN BLOOD BY AUTOMATED COUNT: 21.1 % (ref 11.5–14.5)
ERYTHROCYTE [SEDIMENTATION RATE] IN BLOOD: 49 MM/HR (ref 0–20)
GLOBULIN SER CALC-MCNC: 4 G/DL (ref 2–4)
GLUCOSE SERPL-MCNC: 221 MG/DL (ref 65–100)
HCT VFR BLD AUTO: 33.1 % (ref 36.6–50.3)
HGB BLD-MCNC: 9.4 G/DL (ref 12.1–17)
IMM GRANULOCYTES # BLD AUTO: 0.3 K/UL (ref 0–0.04)
IMM GRANULOCYTES NFR BLD AUTO: 2 % (ref 0–0.5)
LYMPHOCYTES # BLD: 3.6 K/UL (ref 0.8–3.5)
LYMPHOCYTES NFR BLD: 28 % (ref 12–49)
MCH RBC QN AUTO: 24.4 PG (ref 26–34)
MCHC RBC AUTO-ENTMCNC: 28.4 G/DL (ref 30–36.5)
MCV RBC AUTO: 86 FL (ref 80–99)
MONOCYTES # BLD: 1 K/UL (ref 0–1)
MONOCYTES NFR BLD: 8 % (ref 5–13)
NEUTS SEG # BLD: 7.9 K/UL (ref 1.8–8)
NEUTS SEG NFR BLD: 60 % (ref 32–75)
NRBC # BLD: 0 K/UL (ref 0–0.01)
NRBC BLD-RTO: 0 PER 100 WBC
PLATELET # BLD AUTO: 279 K/UL (ref 150–400)
PMV BLD AUTO: 9.2 FL (ref 8.9–12.9)
POTASSIUM SERPL-SCNC: 4.7 MMOL/L (ref 3.5–5.1)
PROT SERPL-MCNC: 6.1 G/DL (ref 6.4–8.2)
RBC # BLD AUTO: 3.85 M/UL (ref 4.1–5.7)
RBC MORPH BLD: ABNORMAL
SODIUM SERPL-SCNC: 136 MMOL/L (ref 136–145)
WBC # BLD AUTO: 13 K/UL (ref 4.1–11.1)

## 2023-10-12 RX ORDER — PREGABALIN 75 MG/1
75 CAPSULE ORAL 2 TIMES DAILY
Qty: 60 CAPSULE | Refills: 5 | Status: SHIPPED | OUTPATIENT
Start: 2023-10-12 | End: 2023-11-11

## 2023-10-12 RX ORDER — L. ACIDOPHILUS/PECTIN, CITRUS 25MM-100MG
1 TABLET ORAL DAILY
Qty: 60 TABLET | Refills: 5 | Status: SHIPPED | OUTPATIENT
Start: 2023-10-12

## 2023-10-16 ENCOUNTER — OFFICE VISIT (OUTPATIENT)
Facility: CLINIC | Age: 88
End: 2023-10-16
Payer: COMMERCIAL

## 2023-10-16 ENCOUNTER — ANTI-COAG VISIT (OUTPATIENT)
Facility: CLINIC | Age: 88
End: 2023-10-16

## 2023-10-16 VITALS
WEIGHT: 194.5 LBS | RESPIRATION RATE: 18 BRPM | SYSTOLIC BLOOD PRESSURE: 133 MMHG | OXYGEN SATURATION: 95 % | DIASTOLIC BLOOD PRESSURE: 83 MMHG | HEART RATE: 72 BPM | TEMPERATURE: 97.6 F | HEIGHT: 75 IN | BODY MASS INDEX: 24.18 KG/M2

## 2023-10-16 DIAGNOSIS — E11.9 TYPE 2 DIABETES MELLITUS WITHOUT COMPLICATION, WITHOUT LONG-TERM CURRENT USE OF INSULIN (HCC): ICD-10-CM

## 2023-10-16 DIAGNOSIS — M86.9 OSTEOMYELITIS OF SECOND TOE OF RIGHT FOOT (HCC): Primary | ICD-10-CM

## 2023-10-16 DIAGNOSIS — N18.31 STAGE 3A CHRONIC KIDNEY DISEASE (HCC): ICD-10-CM

## 2023-10-16 DIAGNOSIS — I48.0 PAROXYSMAL ATRIAL FIBRILLATION (HCC): ICD-10-CM

## 2023-10-16 DIAGNOSIS — L97.522 DIABETIC ULCER OF TOE OF LEFT FOOT ASSOCIATED WITH TYPE 2 DIABETES MELLITUS, WITH FAT LAYER EXPOSED (HCC): ICD-10-CM

## 2023-10-16 DIAGNOSIS — I50.32 DIASTOLIC CHF, CHRONIC (HCC): ICD-10-CM

## 2023-10-16 DIAGNOSIS — D64.9 ANEMIA, UNSPECIFIED TYPE: ICD-10-CM

## 2023-10-16 DIAGNOSIS — S98.131A AMPUTATION OF TOE OF RIGHT FOOT (HCC): ICD-10-CM

## 2023-10-16 DIAGNOSIS — E11.621 DIABETIC ULCER OF TOE OF LEFT FOOT ASSOCIATED WITH TYPE 2 DIABETES MELLITUS, WITH FAT LAYER EXPOSED (HCC): ICD-10-CM

## 2023-10-16 PROCEDURE — 1036F TOBACCO NON-USER: CPT | Performed by: INTERNAL MEDICINE

## 2023-10-16 PROCEDURE — 3052F HG A1C>EQUAL 8.0%<EQUAL 9.0%: CPT | Performed by: INTERNAL MEDICINE

## 2023-10-16 PROCEDURE — 99214 OFFICE O/P EST MOD 30 MIN: CPT | Performed by: INTERNAL MEDICINE

## 2023-10-16 PROCEDURE — 1111F DSCHRG MED/CURRENT MED MERGE: CPT | Performed by: INTERNAL MEDICINE

## 2023-10-16 PROCEDURE — G8484 FLU IMMUNIZE NO ADMIN: HCPCS | Performed by: INTERNAL MEDICINE

## 2023-10-16 PROCEDURE — 1124F ACP DISCUSS-NO DSCNMKR DOCD: CPT | Performed by: INTERNAL MEDICINE

## 2023-10-16 PROCEDURE — G8420 CALC BMI NORM PARAMETERS: HCPCS | Performed by: INTERNAL MEDICINE

## 2023-10-16 PROCEDURE — G8427 DOCREV CUR MEDS BY ELIG CLIN: HCPCS | Performed by: INTERNAL MEDICINE

## 2023-10-16 NOTE — PROGRESS NOTES
Anticoagulation Summary  As of 10/16/2023      INR goal:     TTR:  --   INR used for dosing:  3.1 (10/11/2023)   Warfarin maintenance plan:  7.5 mg (5 mg x 1.5) every day   Weekly warfarin total:  52.5 mg   Plan last modified:  Vaughn Perez RN (6/26/2023)   Next INR check:  10/16/2023   Target end date:               Anticoagulation Episode Summary       INR check location:      Preferred lab:      Send INR reminders to:      Comments:             Informed patient that his PT/INR was 3.1. States his current blood thinner dose is 7.5 mg daily. F/up as scheduled 10-.

## 2023-10-16 NOTE — PROGRESS NOTES
Chief Complaint   Patient presents with    Follow-up     5 day follow up. SUBJECTIVE:    Omer Russell is a 80 y.o. male who has had problems with recurrent diabetic foot ulcers on the great toe of both feet and previous osteomyelitis of the right great toe and then the right second toe resulting in amputation of the distal phalanx of the right second toe. He is in follow-up of the osteomyelitis having just recently completed a 2-week course of cefepime as well as follow-up of his other medical problems include atrial fibrillation, CKD, diabetes, hypertension, diastolic CHF and other multiple medical problems. The home health nurse this morning did draw a CBC and a CMP and that is pending. I did discuss this with her by phone today while he was here in office. He is accompanied by his son at the office visit today. He does note he gets a little dizzy if he sits too long without a good back support but as long as he has a back support he is okay. The area of superficial breakdown on his buttocks is cleared. The ulcer on the left great toe has resolved and the wound where he had the distal phalanx of his right second toe removed has healed with sutures removed this morning. He currently denies any chest pain, shortness of breath or cardiorespiratory complaints. There are no GI or  complaints. There are no headaches, dizziness or neurologic complaints. He has no change of his chronic arthritic complaints and there are no other complaints on complete review of systems. Current Outpatient Medications   Medication Sig Dispense Refill    pregabalin (LYRICA) 75 MG capsule Take 1 capsule by mouth 2 times daily for 30 days.  Max Daily Amount: 150 mg 60 capsule 5    Lactobacillus Acid-Pectin (ACIDOPHILUS/CITRUS PECTIN) TABS TAKE 1 TABLET BY MOUTH EVERY DAY 60 tablet 5    bumetanide (BUMEX) 2 MG tablet Take 1 tablet by mouth daily 30 tablet 3    diclofenac (VOLTAREN) 75 MG EC tablet Take 1 tablet

## 2023-10-17 ENCOUNTER — ANTI-COAG VISIT (OUTPATIENT)
Facility: CLINIC | Age: 88
End: 2023-10-17

## 2023-10-17 LAB
ERYTHROCYTE [SEDIMENTATION RATE] IN BLOOD: 77 MM/HR (ref 0–20)
INR PPP: 5.4 (ref 0.9–1.1)
PROTHROMBIN TIME: 50.9 SEC (ref 9–11.1)

## 2023-10-17 NOTE — PROGRESS NOTES
Anticoagulation Summary  As of 10/17/2023      INR goal:     TTR:  --   INR used for dosin.4 (10/16/2023)   Warfarin maintenance plan:  7.5 mg (5 mg x 1.5) every day   Weekly warfarin total:  52.5 mg   Plan last modified:  Aby Alexander RN (10/17/2023)   Next INR check:  10/20/2023   Target end date:               Anticoagulation Episode Summary       INR check location:      Preferred lab:      Send INR reminders to:      Comments:           Informed patient that his PT/INR was critical at 5.4 per [facility]. Dr. Shawnee Cole recommends hold the dose and getting a STAT PT/INR 10-.

## 2023-10-18 RX ORDER — PANTOPRAZOLE SODIUM 40 MG/1
40 TABLET, DELAYED RELEASE ORAL
Qty: 180 TABLET | Refills: 1 | Status: SHIPPED | OUTPATIENT
Start: 2023-10-18

## 2023-10-18 NOTE — TELEPHONE ENCOUNTER
Requested Prescriptions     Pending Prescriptions Disp Refills    pantoprazole (PROTONIX) 40 MG tablet 180 tablet 1     Sig: Take 1 tablet by mouth 2 times daily (before meals)       RX refill request from the patient/pharmacy. Patient last seen 10/16/23 with labs, and next appt. scheduled for 10/24/23.

## 2023-10-20 ENCOUNTER — NURSE ONLY (OUTPATIENT)
Facility: CLINIC | Age: 88
End: 2023-10-20

## 2023-10-20 DIAGNOSIS — D62 ACUTE BLOOD LOSS ANEMIA: ICD-10-CM

## 2023-10-20 DIAGNOSIS — I48.0 PAROXYSMAL ATRIAL FIBRILLATION (HCC): Primary | ICD-10-CM

## 2023-10-20 LAB
BASOPHILS # BLD: 0.1 K/UL (ref 0–0.1)
BASOPHILS NFR BLD: 1 % (ref 0–1)
DIFFERENTIAL METHOD BLD: ABNORMAL
EOSINOPHIL # BLD: 0.1 K/UL (ref 0–0.4)
EOSINOPHIL NFR BLD: 1 % (ref 0–7)
ERYTHROCYTE [DISTWIDTH] IN BLOOD BY AUTOMATED COUNT: 20.7 % (ref 11.5–14.5)
HCT VFR BLD AUTO: 28.3 % (ref 36.6–50.3)
HGB BLD-MCNC: 8.3 G/DL (ref 12.1–17)
IMM GRANULOCYTES # BLD AUTO: 0.1 K/UL (ref 0–0.04)
IMM GRANULOCYTES NFR BLD AUTO: 1 % (ref 0–0.5)
INR PPP: 1.6 (ref 0.9–1.1)
LYMPHOCYTES # BLD: 3.4 K/UL (ref 0.8–3.5)
LYMPHOCYTES NFR BLD: 32 % (ref 12–49)
MCH RBC QN AUTO: 24.5 PG (ref 26–34)
MCHC RBC AUTO-ENTMCNC: 29.3 G/DL (ref 30–36.5)
MCV RBC AUTO: 83.5 FL (ref 80–99)
MONOCYTES # BLD: 0.8 K/UL (ref 0–1)
MONOCYTES NFR BLD: 8 % (ref 5–13)
NEUTS SEG # BLD: 6.1 K/UL (ref 1.8–8)
NEUTS SEG NFR BLD: 57 % (ref 32–75)
NRBC # BLD: 0 K/UL (ref 0–0.01)
NRBC BLD-RTO: 0 PER 100 WBC
PLATELET # BLD AUTO: 223 K/UL (ref 150–400)
PMV BLD AUTO: 9.6 FL (ref 8.9–12.9)
PROTHROMBIN TIME: 16.7 SEC (ref 9–11.1)
RBC # BLD AUTO: 3.39 M/UL (ref 4.1–5.7)
RBC MORPH BLD: ABNORMAL
WBC # BLD AUTO: 10.6 K/UL (ref 4.1–11.1)

## 2023-10-23 RX ORDER — LACTOBACILLUS RHAMNOSUS GG 10B CELL
1 CAPSULE ORAL DAILY
Qty: 30 CAPSULE | Refills: 0 | Status: SHIPPED | OUTPATIENT
Start: 2023-10-23

## 2023-10-23 NOTE — TELEPHONE ENCOUNTER
Requested Prescriptions     Pending Prescriptions Disp Refills    lactobacillus (CULTURELLE) capsule 30 capsule 0     Sig: Take 1 capsule by mouth daily       RX refill request from the patient/pharmacy. Patient last seen 10/16/23 with labs, and next appt. scheduled for 10/24/23.

## 2023-10-24 ENCOUNTER — OFFICE VISIT (OUTPATIENT)
Facility: CLINIC | Age: 88
End: 2023-10-24
Payer: MEDICARE

## 2023-10-24 VITALS
RESPIRATION RATE: 16 BRPM | BODY MASS INDEX: 24.1 KG/M2 | DIASTOLIC BLOOD PRESSURE: 63 MMHG | SYSTOLIC BLOOD PRESSURE: 129 MMHG | OXYGEN SATURATION: 96 % | TEMPERATURE: 97.6 F | HEART RATE: 65 BPM | HEIGHT: 75 IN | WEIGHT: 193.8 LBS

## 2023-10-24 DIAGNOSIS — I48.0 PAROXYSMAL ATRIAL FIBRILLATION (HCC): ICD-10-CM

## 2023-10-24 DIAGNOSIS — I12.9 HYPERTENSION WITH RENAL DISEASE: ICD-10-CM

## 2023-10-24 DIAGNOSIS — S98.131A AMPUTATION OF TOE OF RIGHT FOOT (HCC): ICD-10-CM

## 2023-10-24 DIAGNOSIS — N18.31 STAGE 3A CHRONIC KIDNEY DISEASE (HCC): ICD-10-CM

## 2023-10-24 DIAGNOSIS — M86.171 ACUTE OSTEOMYELITIS OF TOE, RIGHT (HCC): Primary | ICD-10-CM

## 2023-10-24 DIAGNOSIS — L97.511 ULCER OF BOTH FEET, LIMITED TO BREAKDOWN OF SKIN (HCC): ICD-10-CM

## 2023-10-24 DIAGNOSIS — I50.32 DIASTOLIC CHF, CHRONIC (HCC): ICD-10-CM

## 2023-10-24 DIAGNOSIS — L97.521 ULCER OF BOTH FEET, LIMITED TO BREAKDOWN OF SKIN (HCC): ICD-10-CM

## 2023-10-24 DIAGNOSIS — E11.9 TYPE 2 DIABETES MELLITUS WITHOUT COMPLICATION, WITHOUT LONG-TERM CURRENT USE OF INSULIN (HCC): ICD-10-CM

## 2023-10-24 LAB
ANION GAP SERPL CALC-SCNC: 7 MMOL/L (ref 5–15)
BUN SERPL-MCNC: 29 MG/DL (ref 6–20)
BUN/CREAT SERPL: 19 (ref 12–20)
CALCIUM SERPL-MCNC: 8.5 MG/DL (ref 8.5–10.1)
CHLORIDE SERPL-SCNC: 108 MMOL/L (ref 97–108)
CO2 SERPL-SCNC: 25 MMOL/L (ref 21–32)
CREAT SERPL-MCNC: 1.49 MG/DL (ref 0.7–1.3)
GLUCOSE SERPL-MCNC: 101 MG/DL (ref 65–100)
INR PPP: 1.2 (ref 0.9–1.1)
POTASSIUM SERPL-SCNC: 4.6 MMOL/L (ref 3.5–5.1)
PROTHROMBIN TIME: 12.1 SEC (ref 9–11.1)
SODIUM SERPL-SCNC: 140 MMOL/L (ref 136–145)

## 2023-10-24 PROCEDURE — 99213 OFFICE O/P EST LOW 20 MIN: CPT | Performed by: INTERNAL MEDICINE

## 2023-10-24 PROCEDURE — 1124F ACP DISCUSS-NO DSCNMKR DOCD: CPT | Performed by: INTERNAL MEDICINE

## 2023-10-24 PROCEDURE — 1111F DSCHRG MED/CURRENT MED MERGE: CPT | Performed by: INTERNAL MEDICINE

## 2023-10-24 PROCEDURE — 1036F TOBACCO NON-USER: CPT | Performed by: INTERNAL MEDICINE

## 2023-10-24 PROCEDURE — 3052F HG A1C>EQUAL 8.0%<EQUAL 9.0%: CPT | Performed by: INTERNAL MEDICINE

## 2023-10-24 PROCEDURE — G8484 FLU IMMUNIZE NO ADMIN: HCPCS | Performed by: INTERNAL MEDICINE

## 2023-10-24 PROCEDURE — G8427 DOCREV CUR MEDS BY ELIG CLIN: HCPCS | Performed by: INTERNAL MEDICINE

## 2023-10-24 PROCEDURE — G8420 CALC BMI NORM PARAMETERS: HCPCS | Performed by: INTERNAL MEDICINE

## 2023-10-24 NOTE — OP NOTE
[facility]  OPERATIVE REPORT    Name:  Iza Prather  MR#:  [id number]  :  1933  ACCOUNT #:  [de-identified]  DATE OF SERVICE:  2023    PREOPERATIVE DIAGNOSIS:  Osteomyelitis, right second toe. POSTOPERATIVE DIAGNOSIS:  Osteomyelitis, right second toe. PROCEDURE PERFORMED:  Right second partial toe amputation to the distal interphalangeal joint. SURGEON:  Zoila Nava DPM    ASSISTANT:  None. ANESTHESIA:  MAC.    COMPLICATIONS:  nonr. SPECIMENS REMOVED:  Partial toe was sent for pathology. Deep cultures were taken. IMPLANTS:  None. ESTIMATED BLOOD LOSS:  Minimal.    HEMOSTASIS:  Toe tourniquet. DRAINS:  None. PROCEDURE:  This patient was admitted through the ER for possible osteomyelitis of the right second toe. The patient has had a history of distal lesion, which was not healing and the patient also had infection of the second toe previously, which he was admitted and was readmitted with non-nonhealing wound with possible osteomyelitis and the imaging studies did show a distal irregularity of the bone and advised the patient for a partial amputation of the toe. The patient was brought into the OR and left on the patient's bed in supine position. The IV sedation performed as per CRNA. The local infiltration of 0.5% Marcaine plain was injected for a local block and a toe tourniquet was used for a distal block and after the distal block, the attention was directed to the distal toe. Using sharp and blunt dissection after anesthesia check, a circular incision was made surrounding the distal part of the toe, slightly base of the digital nail fold, deepened into the joint level to the distal interphalangeal joint, where this was dislocated.   The phalanx was dislocated at the interphalangeal joint level and the distal part was removed, which was devitalized and no non-viability was noted at the middle phalangeal head and the articular surface was

## 2023-10-25 LAB
BASOPHILS # BLD: 0.2 K/UL (ref 0–0.1)
BASOPHILS NFR BLD: 1 % (ref 0–1)
DIFFERENTIAL METHOD BLD: ABNORMAL
EOSINOPHIL # BLD: 0.2 K/UL (ref 0–0.4)
EOSINOPHIL NFR BLD: 1 % (ref 0–7)
ERYTHROCYTE [DISTWIDTH] IN BLOOD BY AUTOMATED COUNT: 21.1 % (ref 11.5–14.5)
HCT VFR BLD AUTO: 32 % (ref 36.6–50.3)
HGB BLD-MCNC: 9 G/DL (ref 12.1–17)
IMM GRANULOCYTES # BLD AUTO: 0 K/UL
IMM GRANULOCYTES NFR BLD AUTO: 0 %
LYMPHOCYTES # BLD: 5.6 K/UL (ref 0.8–3.5)
LYMPHOCYTES NFR BLD: 34 % (ref 12–49)
MCH RBC QN AUTO: 23.9 PG (ref 26–34)
MCHC RBC AUTO-ENTMCNC: 28.1 G/DL (ref 30–36.5)
MCV RBC AUTO: 84.9 FL (ref 80–99)
MONOCYTES # BLD: 1.1 K/UL (ref 0–1)
MONOCYTES NFR BLD: 7 % (ref 5–13)
NEUTS BAND NFR BLD MANUAL: 3 % (ref 0–6)
NEUTS SEG # BLD: 9.3 K/UL (ref 1.8–8)
NEUTS SEG NFR BLD: 54 % (ref 32–75)
NRBC # BLD: 0 K/UL (ref 0–0.01)
NRBC BLD-RTO: 0 PER 100 WBC
PLATELET # BLD AUTO: 327 K/UL (ref 150–400)
PMV BLD AUTO: 9.5 FL (ref 8.9–12.9)
RBC # BLD AUTO: 3.77 M/UL (ref 4.1–5.7)
RBC MORPH BLD: ABNORMAL
WBC # BLD AUTO: 16.4 K/UL (ref 4.1–11.1)
WBC MORPH BLD: ABNORMAL

## 2023-11-01 ENCOUNTER — HOSPITAL ENCOUNTER (OUTPATIENT)
Facility: HOSPITAL | Age: 87
Setting detail: INFUSION SERIES
End: 2023-11-01

## 2023-11-02 ENCOUNTER — TELEPHONE (OUTPATIENT)
Facility: CLINIC | Age: 88
End: 2023-11-02

## 2023-11-02 NOTE — TELEPHONE ENCOUNTER
Call patient with abnormal results. WBC a little higher so will need to follow closely with repeat on F/U. INR low, what Coumadin dose is he currently taking      Patient is currently taking 1 tablet once a day of the coumadin 5 mg

## 2023-11-06 PROBLEM — R19.7 DIARRHEA: Status: RESOLVED | Noted: 2023-09-21 | Resolved: 2023-11-06

## 2023-11-07 ENCOUNTER — OFFICE VISIT (OUTPATIENT)
Facility: CLINIC | Age: 88
End: 2023-11-07
Payer: MEDICARE

## 2023-11-07 VITALS
BODY MASS INDEX: 24.02 KG/M2 | OXYGEN SATURATION: 98 % | HEIGHT: 75 IN | WEIGHT: 193.2 LBS | HEART RATE: 73 BPM | RESPIRATION RATE: 17 BRPM | SYSTOLIC BLOOD PRESSURE: 102 MMHG | TEMPERATURE: 97.6 F | DIASTOLIC BLOOD PRESSURE: 59 MMHG

## 2023-11-07 DIAGNOSIS — L97.521 ULCER OF BOTH FEET, LIMITED TO BREAKDOWN OF SKIN (HCC): ICD-10-CM

## 2023-11-07 DIAGNOSIS — I48.0 PAROXYSMAL ATRIAL FIBRILLATION (HCC): ICD-10-CM

## 2023-11-07 DIAGNOSIS — I50.32 DIASTOLIC CHF, CHRONIC (HCC): ICD-10-CM

## 2023-11-07 DIAGNOSIS — I12.9 HYPERTENSION WITH RENAL DISEASE: ICD-10-CM

## 2023-11-07 DIAGNOSIS — E11.65 POORLY CONTROLLED DIABETES MELLITUS (HCC): ICD-10-CM

## 2023-11-07 DIAGNOSIS — D62 ACUTE BLOOD LOSS ANEMIA: ICD-10-CM

## 2023-11-07 DIAGNOSIS — N18.31 STAGE 3A CHRONIC KIDNEY DISEASE (HCC): ICD-10-CM

## 2023-11-07 DIAGNOSIS — M86.9 OSTEOMYELITIS OF SECOND TOE OF RIGHT FOOT (HCC): Primary | ICD-10-CM

## 2023-11-07 DIAGNOSIS — L97.511 ULCER OF BOTH FEET, LIMITED TO BREAKDOWN OF SKIN (HCC): ICD-10-CM

## 2023-11-07 DIAGNOSIS — E11.9 TYPE 2 DIABETES MELLITUS WITHOUT COMPLICATION, WITHOUT LONG-TERM CURRENT USE OF INSULIN (HCC): ICD-10-CM

## 2023-11-07 LAB
INR PPP: 1.3 (ref 0.9–1.1)
PROTHROMBIN TIME: 13.6 SEC (ref 9–11.1)

## 2023-11-07 PROCEDURE — G8427 DOCREV CUR MEDS BY ELIG CLIN: HCPCS | Performed by: INTERNAL MEDICINE

## 2023-11-07 PROCEDURE — 1124F ACP DISCUSS-NO DSCNMKR DOCD: CPT | Performed by: INTERNAL MEDICINE

## 2023-11-07 PROCEDURE — G8420 CALC BMI NORM PARAMETERS: HCPCS | Performed by: INTERNAL MEDICINE

## 2023-11-07 PROCEDURE — 1036F TOBACCO NON-USER: CPT | Performed by: INTERNAL MEDICINE

## 2023-11-07 PROCEDURE — 99214 OFFICE O/P EST MOD 30 MIN: CPT | Performed by: INTERNAL MEDICINE

## 2023-11-07 PROCEDURE — G8484 FLU IMMUNIZE NO ADMIN: HCPCS | Performed by: INTERNAL MEDICINE

## 2023-11-07 PROCEDURE — 3052F HG A1C>EQUAL 8.0%<EQUAL 9.0%: CPT | Performed by: INTERNAL MEDICINE

## 2023-11-07 RX ORDER — PANTOPRAZOLE SODIUM 40 MG/1
40 TABLET, DELAYED RELEASE ORAL DAILY
Qty: 90 TABLET | Refills: 1 | OUTPATIENT
Start: 2023-11-07

## 2023-11-07 RX ORDER — LACTOBACILLUS RHAMNOSUS GG 10B CELL
1 CAPSULE ORAL DAILY
Qty: 30 CAPSULE | Refills: 5 | Status: SHIPPED | OUTPATIENT
Start: 2023-11-07

## 2023-11-07 RX ORDER — PREGABALIN 75 MG/1
75 CAPSULE ORAL DAILY
Qty: 30 CAPSULE | Refills: 5 | Status: SHIPPED | OUTPATIENT
Start: 2023-11-07 | End: 2024-05-05

## 2023-11-07 NOTE — PROGRESS NOTES
Chief Complaint   Patient presents with    Follow-up     2 wk f/u;     Discuss Medications     Pt states they never received the probiotic        SUBJECTIVE:    Susan Mena is a 80 y.o. male who returns in follow-up accompanied by his daughter-in-law for follow-up of his medical problems to include recent hospitalization with osteomyelitis right second toe requiring amputation of the distal phalanx, bilateral foot ulcers resolved, anemia, hypertension, coagulopathy with recent adjustment of Coumadin dosing, CKD stage III, paroxysmal atrial fibrillation, congestive heart failure, diabetes mellitus and other medical problems. He claims to be doing pretty well at the present time. He does not feel like he is is drowsy now that his Lyrica has been decreased from twice daily to once daily. He did note that when we take him off the diclofenac the arthritis became worse and he is now taking it once again once daily at dinnertime with food. He does need a prescription now for the Culturelle which his insurance will pay for. He went from diarrhea now to constipation and he has been taking 2 stool softeners a day but does not seem to work which I suggested that he takes MiraLAX once or twice a day in addition. He notes no other GI complaints. He notes no chest pain, shortness of breath or cardiorespiratory complaints. He notes no headaches, dizziness or neurologic complaints. He has no change of his arthritic complaints and that he is back on the diclofenac once daily. The remainder complete review of systems is negative. Current Outpatient Medications   Medication Sig Dispense Refill    Docusate Sodium (STOOL SOFTENER LAXATIVE PO) Take by mouth daily      lactobacillus (CULTURELLE) capsule Take 1 capsule by mouth daily 30 capsule 5    pantoprazole (PROTONIX) 40 MG tablet Take 1 tablet by mouth daily 90 tablet 1    pregabalin (LYRICA) 75 MG capsule Take 1 capsule by mouth daily for 180 days.  Max Daily

## 2023-11-08 LAB
ANION GAP SERPL CALC-SCNC: 7 MMOL/L (ref 5–15)
BASOPHILS # BLD: 0 K/UL (ref 0–0.1)
BASOPHILS NFR BLD: 0 % (ref 0–1)
BUN SERPL-MCNC: 28 MG/DL (ref 6–20)
BUN/CREAT SERPL: 19 (ref 12–20)
CALCIUM SERPL-MCNC: 9 MG/DL (ref 8.5–10.1)
CHLORIDE SERPL-SCNC: 106 MMOL/L (ref 97–108)
CO2 SERPL-SCNC: 29 MMOL/L (ref 21–32)
CREAT SERPL-MCNC: 1.48 MG/DL (ref 0.7–1.3)
DIFFERENTIAL METHOD BLD: ABNORMAL
EOSINOPHIL # BLD: 0.1 K/UL (ref 0–0.4)
EOSINOPHIL NFR BLD: 1 % (ref 0–7)
ERYTHROCYTE [DISTWIDTH] IN BLOOD BY AUTOMATED COUNT: 20.6 % (ref 11.5–14.5)
GLUCOSE SERPL-MCNC: 87 MG/DL (ref 65–100)
HCT VFR BLD AUTO: 32.1 % (ref 36.6–50.3)
HGB BLD-MCNC: 9.2 G/DL (ref 12.1–17)
IMM GRANULOCYTES # BLD AUTO: 0 K/UL
IMM GRANULOCYTES NFR BLD AUTO: 0 %
LYMPHOCYTES # BLD: 3.9 K/UL (ref 0.8–3.5)
LYMPHOCYTES NFR BLD: 28 % (ref 12–49)
MCH RBC QN AUTO: 24.3 PG (ref 26–34)
MCHC RBC AUTO-ENTMCNC: 28.7 G/DL (ref 30–36.5)
MCV RBC AUTO: 84.7 FL (ref 80–99)
MONOCYTES # BLD: 1 K/UL (ref 0–1)
MONOCYTES NFR BLD: 7 % (ref 5–13)
NEUTS BAND NFR BLD MANUAL: 10 % (ref 0–6)
NEUTS SEG # BLD: 8.8 K/UL (ref 1.8–8)
NEUTS SEG NFR BLD: 54 % (ref 32–75)
NRBC # BLD: 0 K/UL (ref 0–0.01)
NRBC BLD-RTO: 0 PER 100 WBC
PLATELET # BLD AUTO: 328 K/UL (ref 150–400)
PMV BLD AUTO: 9.8 FL (ref 8.9–12.9)
POTASSIUM SERPL-SCNC: 4.4 MMOL/L (ref 3.5–5.1)
RBC # BLD AUTO: 3.79 M/UL (ref 4.1–5.7)
RBC MORPH BLD: ABNORMAL
RBC MORPH BLD: ABNORMAL
SODIUM SERPL-SCNC: 142 MMOL/L (ref 136–145)
WBC # BLD AUTO: 13.8 K/UL (ref 4.1–11.1)
WBC MORPH BLD: ABNORMAL

## 2023-11-15 RX ORDER — CHOLECALCIFEROL (VITAMIN D3) 125 MCG
CAPSULE ORAL
Qty: 30 CAPSULE | Refills: 5 | Status: SHIPPED | OUTPATIENT
Start: 2023-11-15

## 2023-11-15 NOTE — TELEPHONE ENCOUNTER
PCP: Mickey Fang MD    Last appt: 11/7/2023    Future Appointments   Date Time Provider [facility] [address]   12/1/2023  9:30 AM Mickey Fang MD [facility] AMB       Requested Prescriptions     Pending Prescriptions Disp Refills    Lactobacillus (PROBIOTIC ACIDOPHILUS) CAPS 30 capsule 5     Sig: Take 1 daily

## 2023-11-21 ENCOUNTER — TELEPHONE (OUTPATIENT)
Facility: CLINIC | Age: 88
End: 2023-11-21

## 2023-11-21 NOTE — TELEPHONE ENCOUNTER
Patient's daughter in law is requesting that Mr. Wagner restart his prednisone. It was d/c at his hospital stay 9-. Since then states his arthritic complaints have gotten worse and now taking the diclofenac twice a day. Wants to have him restart the prednisone. Discussed with Dr. Dorothy Hardy and he okayed patient to restart the Prednisone at 10 mg daily. Currently do not need a rx. They have pleanty on hand.

## 2023-11-30 PROBLEM — A49.8 KLEBSIELLA INFECTION: Status: RESOLVED | Noted: 2023-07-17 | Resolved: 2023-11-30

## 2023-11-30 PROBLEM — N17.9 AKI (ACUTE KIDNEY INJURY) (HCC): Status: RESOLVED | Noted: 2023-07-17 | Resolved: 2023-11-30

## 2023-11-30 PROBLEM — D62 ACUTE BLOOD LOSS ANEMIA: Status: RESOLVED | Noted: 2023-09-28 | Resolved: 2023-11-30

## 2023-11-30 PROBLEM — M54.9 BACK PAIN: Status: RESOLVED | Noted: 2017-09-20 | Resolved: 2023-11-30

## 2023-11-30 PROBLEM — M00.9 SEPTIC ARTHRITIS OF RIGHT FOOT (HCC): Status: RESOLVED | Noted: 2023-07-17 | Resolved: 2023-11-30

## 2023-11-30 PROBLEM — M86.171 ACUTE OSTEOMYELITIS OF TOE, RIGHT (HCC): Status: RESOLVED | Noted: 2023-07-17 | Resolved: 2023-11-30

## 2023-11-30 PROBLEM — Z00.00 MEDICARE ANNUAL WELLNESS VISIT, SUBSEQUENT: Status: ACTIVE | Noted: 2017-10-30

## 2023-11-30 PROBLEM — G93.41 METABOLIC ENCEPHALOPATHY: Status: RESOLVED | Noted: 2023-07-17 | Resolved: 2023-11-30

## 2023-11-30 PROBLEM — R60.0 BILATERAL LEG EDEMA: Status: RESOLVED | Noted: 2020-03-12 | Resolved: 2023-11-30

## 2023-12-01 ENCOUNTER — OFFICE VISIT (OUTPATIENT)
Facility: CLINIC | Age: 88
End: 2023-12-01
Payer: MEDICARE

## 2023-12-01 VITALS
TEMPERATURE: 98.1 F | HEART RATE: 72 BPM | DIASTOLIC BLOOD PRESSURE: 70 MMHG | BODY MASS INDEX: 25.82 KG/M2 | WEIGHT: 206.6 LBS | OXYGEN SATURATION: 98 % | SYSTOLIC BLOOD PRESSURE: 130 MMHG

## 2023-12-01 DIAGNOSIS — M35.3 POLYMYALGIA RHEUMATICA (HCC): ICD-10-CM

## 2023-12-01 DIAGNOSIS — I50.32 DIASTOLIC CHF, CHRONIC (HCC): ICD-10-CM

## 2023-12-01 DIAGNOSIS — I48.0 PAROXYSMAL ATRIAL FIBRILLATION (HCC): ICD-10-CM

## 2023-12-01 DIAGNOSIS — I44.2 COMPLETE HEART BLOCK (HCC): ICD-10-CM

## 2023-12-01 DIAGNOSIS — E78.2 MIXED HYPERLIPIDEMIA: ICD-10-CM

## 2023-12-01 DIAGNOSIS — N40.0 BENIGN PROSTATIC HYPERPLASIA WITHOUT LOWER URINARY TRACT SYMPTOMS: ICD-10-CM

## 2023-12-01 DIAGNOSIS — Z00.00 MEDICARE ANNUAL WELLNESS VISIT, SUBSEQUENT: ICD-10-CM

## 2023-12-01 DIAGNOSIS — E11.9 TYPE 2 DIABETES MELLITUS WITHOUT COMPLICATION, WITHOUT LONG-TERM CURRENT USE OF INSULIN (HCC): ICD-10-CM

## 2023-12-01 DIAGNOSIS — N18.31 STAGE 3A CHRONIC KIDNEY DISEASE (HCC): ICD-10-CM

## 2023-12-01 DIAGNOSIS — I25.10 ASCVD (ARTERIOSCLEROTIC CARDIOVASCULAR DISEASE): ICD-10-CM

## 2023-12-01 DIAGNOSIS — R97.20 ELEVATED PSA: ICD-10-CM

## 2023-12-01 DIAGNOSIS — J30.89 CHRONIC NON-SEASONAL ALLERGIC RHINITIS: ICD-10-CM

## 2023-12-01 DIAGNOSIS — I12.9 HYPERTENSION WITH RENAL DISEASE: Primary | ICD-10-CM

## 2023-12-01 DIAGNOSIS — M15.9 PRIMARY OSTEOARTHRITIS INVOLVING MULTIPLE JOINTS: ICD-10-CM

## 2023-12-01 DIAGNOSIS — Z13.39 ALCOHOL SCREENING: ICD-10-CM

## 2023-12-01 DIAGNOSIS — E55.9 VITAMIN D DEFICIENCY: ICD-10-CM

## 2023-12-01 LAB
ALBUMIN SERPL-MCNC: 3.2 G/DL (ref 3.5–5)
ALBUMIN/GLOB SERPL: 0.8 (ref 1.1–2.2)
ALP SERPL-CCNC: 72 U/L (ref 45–117)
ALT SERPL-CCNC: 30 U/L (ref 12–78)
ANION GAP SERPL CALC-SCNC: 9 MMOL/L (ref 5–15)
APPEARANCE UR: CLEAR
AST SERPL-CCNC: 26 U/L (ref 15–37)
BACTERIA URNS QL MICRO: NEGATIVE /HPF
BASOPHILS # BLD: 0.1 K/UL (ref 0–0.1)
BASOPHILS NFR BLD: 1 % (ref 0–1)
BILIRUB SERPL-MCNC: 0.4 MG/DL (ref 0.2–1)
BILIRUB UR QL: NEGATIVE
BUN SERPL-MCNC: 38 MG/DL (ref 6–20)
BUN/CREAT SERPL: 26 (ref 12–20)
CALCIUM SERPL-MCNC: 8.8 MG/DL (ref 8.5–10.1)
CHLORIDE SERPL-SCNC: 105 MMOL/L (ref 97–108)
CHOLEST SERPL-MCNC: 131 MG/DL
CK SERPL-CCNC: 88 U/L (ref 39–308)
CO2 SERPL-SCNC: 25 MMOL/L (ref 21–32)
COLOR UR: NORMAL
CREAT SERPL-MCNC: 1.49 MG/DL (ref 0.7–1.3)
CREAT UR-MCNC: 117 MG/DL
DIFFERENTIAL METHOD BLD: ABNORMAL
DIGOXIN SERPL-MCNC: 0.9 NG/ML (ref 0.9–2)
EOSINOPHIL # BLD: 0.5 K/UL (ref 0–0.4)
EOSINOPHIL NFR BLD: 4 % (ref 0–7)
EPITH CASTS URNS QL MICRO: NORMAL /LPF
ERYTHROCYTE [DISTWIDTH] IN BLOOD BY AUTOMATED COUNT: 17.9 % (ref 11.5–14.5)
EST. AVERAGE GLUCOSE BLD GHB EST-MCNC: 143 MG/DL
GLOBULIN SER CALC-MCNC: 3.8 G/DL (ref 2–4)
GLUCOSE SERPL-MCNC: 152 MG/DL (ref 65–100)
GLUCOSE UR STRIP.AUTO-MCNC: NEGATIVE MG/DL
HBA1C MFR BLD: 6.6 % (ref 4–5.6)
HCT VFR BLD AUTO: 28.4 % (ref 36.6–50.3)
HDLC SERPL-MCNC: 31 MG/DL
HDLC SERPL: 4.2 (ref 0–5)
HGB BLD-MCNC: 8 G/DL (ref 12.1–17)
HGB UR QL STRIP: NEGATIVE
HYALINE CASTS URNS QL MICRO: NORMAL /LPF (ref 0–5)
IMM GRANULOCYTES # BLD AUTO: 0.1 K/UL (ref 0–0.04)
IMM GRANULOCYTES NFR BLD AUTO: 1 % (ref 0–0.5)
INR PPP: 1.5 (ref 0.9–1.1)
KETONES UR QL STRIP.AUTO: NEGATIVE MG/DL
LDLC SERPL CALC-MCNC: 51 MG/DL (ref 0–100)
LEUKOCYTE ESTERASE UR QL STRIP.AUTO: NEGATIVE
LYMPHOCYTES # BLD: 3.5 K/UL (ref 0.8–3.5)
LYMPHOCYTES NFR BLD: 29 % (ref 12–49)
MCH RBC QN AUTO: 23.4 PG (ref 26–34)
MCHC RBC AUTO-ENTMCNC: 28.2 G/DL (ref 30–36.5)
MCV RBC AUTO: 83 FL (ref 80–99)
MICROALBUMIN UR-MCNC: 2.13 MG/DL
MICROALBUMIN/CREAT UR-RTO: 18 MG/G (ref 0–30)
MONOCYTES # BLD: 1 K/UL (ref 0–1)
MONOCYTES NFR BLD: 8 % (ref 5–13)
NEUTS SEG # BLD: 7 K/UL (ref 1.8–8)
NEUTS SEG NFR BLD: 57 % (ref 32–75)
NITRITE UR QL STRIP.AUTO: NEGATIVE
NRBC # BLD: 0 K/UL (ref 0–0.01)
NRBC BLD-RTO: 0 PER 100 WBC
PH UR STRIP: 5.5 (ref 5–8)
PLATELET # BLD AUTO: 252 K/UL (ref 150–400)
PMV BLD AUTO: 10.7 FL (ref 8.9–12.9)
POTASSIUM SERPL-SCNC: 4.1 MMOL/L (ref 3.5–5.1)
PROT SERPL-MCNC: 7 G/DL (ref 6.4–8.2)
PROT UR STRIP-MCNC: NEGATIVE MG/DL
PROTHROMBIN TIME: 15.4 SEC (ref 9–11.1)
PSA SERPL-MCNC: 4.7 NG/ML (ref 0.01–4)
RBC # BLD AUTO: 3.42 M/UL (ref 4.1–5.7)
RBC #/AREA URNS HPF: NORMAL /HPF (ref 0–5)
RBC MORPH BLD: ABNORMAL
RBC MORPH BLD: ABNORMAL
SODIUM SERPL-SCNC: 139 MMOL/L (ref 136–145)
SP GR UR REFRACTOMETRY: 1.02 (ref 1–1.03)
T4 FREE SERPL-MCNC: 1.1 NG/DL (ref 0.8–1.5)
TRIGL SERPL-MCNC: 245 MG/DL
TSH SERPL DL<=0.05 MIU/L-ACNC: 2.34 UIU/ML (ref 0.36–3.74)
UROBILINOGEN UR QL STRIP.AUTO: 0.2 EU/DL (ref 0.2–1)
VLDLC SERPL CALC-MCNC: 49 MG/DL
WBC # BLD AUTO: 12.2 K/UL (ref 4.1–11.1)
WBC MORPH BLD: ABNORMAL
WBC URNS QL MICRO: NORMAL /HPF (ref 0–4)

## 2023-12-01 PROCEDURE — 99215 OFFICE O/P EST HI 40 MIN: CPT | Performed by: INTERNAL MEDICINE

## 2023-12-01 PROCEDURE — 3052F HG A1C>EQUAL 8.0%<EQUAL 9.0%: CPT | Performed by: INTERNAL MEDICINE

## 2023-12-01 PROCEDURE — G0442 ANNUAL ALCOHOL SCREEN 15 MIN: HCPCS | Performed by: INTERNAL MEDICINE

## 2023-12-01 PROCEDURE — 90694 VACC AIIV4 NO PRSRV 0.5ML IM: CPT | Performed by: INTERNAL MEDICINE

## 2023-12-01 PROCEDURE — 1124F ACP DISCUSS-NO DSCNMKR DOCD: CPT | Performed by: INTERNAL MEDICINE

## 2023-12-01 PROCEDURE — G8484 FLU IMMUNIZE NO ADMIN: HCPCS | Performed by: INTERNAL MEDICINE

## 2023-12-01 PROCEDURE — G0439 PPPS, SUBSEQ VISIT: HCPCS | Performed by: INTERNAL MEDICINE

## 2023-12-01 PROCEDURE — 1036F TOBACCO NON-USER: CPT | Performed by: INTERNAL MEDICINE

## 2023-12-01 PROCEDURE — G8419 CALC BMI OUT NRM PARAM NOF/U: HCPCS | Performed by: INTERNAL MEDICINE

## 2023-12-01 PROCEDURE — G8427 DOCREV CUR MEDS BY ELIG CLIN: HCPCS | Performed by: INTERNAL MEDICINE

## 2023-12-01 PROCEDURE — G0008 ADMIN INFLUENZA VIRUS VAC: HCPCS | Performed by: INTERNAL MEDICINE

## 2023-12-01 PROCEDURE — 93000 ELECTROCARDIOGRAM COMPLETE: CPT | Performed by: INTERNAL MEDICINE

## 2023-12-01 RX ORDER — BUMETANIDE 2 MG/1
2 TABLET ORAL 2 TIMES DAILY
Qty: 60 TABLET | Refills: 3 | Status: SHIPPED | OUTPATIENT
Start: 2023-12-01

## 2023-12-01 RX ORDER — LACTOBACILLUS RHAMNOSUS GG 10B CELL
1 CAPSULE ORAL DAILY
Qty: 30 CAPSULE | Status: SHIPPED | OUTPATIENT
Start: 2023-12-01

## 2023-12-01 ASSESSMENT — PATIENT HEALTH QUESTIONNAIRE - PHQ9
1. LITTLE INTEREST OR PLEASURE IN DOING THINGS: 0
SUM OF ALL RESPONSES TO PHQ QUESTIONS 1-9: 0
SUM OF ALL RESPONSES TO PHQ QUESTIONS 1-9: 0
SUM OF ALL RESPONSES TO PHQ9 QUESTIONS 1 & 2: 0
SUM OF ALL RESPONSES TO PHQ QUESTIONS 1-9: 0
SUM OF ALL RESPONSES TO PHQ QUESTIONS 1-9: 0
2. FEELING DOWN, DEPRESSED OR HOPELESS: 0

## 2023-12-01 ASSESSMENT — LIFESTYLE VARIABLES
HOW MANY STANDARD DRINKS CONTAINING ALCOHOL DO YOU HAVE ON A TYPICAL DAY: PATIENT DOES NOT DRINK
HOW OFTEN DO YOU HAVE A DRINK CONTAINING ALCOHOL: NEVER

## 2023-12-01 NOTE — PROGRESS NOTES
After obtaining written consent and per orders of Dr. Marjorie Greenfield, injection of Fluad given by Tania Nuno MA, CMA. Patient tolerated procedure well. VIS was given to them. No reactions noted.
Chief Complaint   Patient presents with    Medicare AWV    Hypertension    Chronic Kidney Disease    Cholesterol Problem     mwv    1. Have you been to the ER, urgent care clinic since your last visit? Hospitalized since your last visit?no    2. Have you seen or consulted any other health care providers outside of the [address] [place] [address] Avenue since your last visit? Include any pap smears or colon screening.  no
by mouth in the morning and at bedtime Yes Vaishnavi Garcia MD   Omega-3 Fatty Acids (FISH OIL) 1200 MG CAPS Take 2,400 mg by mouth in the morning and at bedtime Yes Vaishnavi Garcia MD   glucosamine-chondroitin 500-400 MG CAPS Take 1 capsule by mouth daily Yes Vaishnavi Garcia MD   vitamin E 400 UNIT capsule Take 1 capsule by mouth daily Yes Vaishnavi Garcia MD   vitamin B-12 (CYANOCOBALAMIN) 1000 MCG tablet Take 1 tablet by mouth daily Yes Vaishnavi Garcia MD   ACCU-CHEK LEE ANN PLUS strip USE TO TEST BLOOD SUGAR TWICE DAILY Yes Sia Remy MD   Multiple Vitamins-Minerals (CENTRUM SILVER ULTRA MENS PO) Take 1 tablet by mouth daily Yes Vaishnavi Garcia MD   digoxin (LANOXIN) 125 MCG tablet TAKE 1 TABLET BY MOUTH EVERY DAY Yes Sia Remy MD   Cholecalciferol 400 UNIT TABS tablet Take 1 tablet by mouth at bedtime Yes Automatic Reconciliation, Ar   warfarin (COUMADIN) 5 MG tablet Take 1 tablet by mouth Take 1 1/2 tablet on Monday and Fridays and 1 tablet on the other days Yes Automatic Reconciliation, Ar   lactobacillus (CULTURELLE) capsule Take 1 capsule by mouth daily  Sia Remy MD       [facility] (Including outside providers/suppliers regularly involved in providing care):   Patient Care Team:  Sia Remy MD as PCP - General  Sia Remy MD as PCP - Empaneled Provider  Sia Remy MD as Consulting Physician (Internal Medicine)  Ruthy Felty, MD as Consulting Physician (Cardiology)  Radha Villa MD (Infectious Diseases)     Reviewed and updated this visit:  Tobacco  Allergies  Meds  Problems  Med Hx  Surg Hx  Soc Hx  Fam Hx

## 2023-12-02 LAB
25(OH)D3 SERPL-MCNC: 38.7 NG/ML (ref 30–100)
ERYTHROCYTE [SEDIMENTATION RATE] IN BLOOD: 67 MM/HR (ref 0–20)

## 2023-12-06 ENCOUNTER — ANTI-COAG VISIT (OUTPATIENT)
Facility: CLINIC | Age: 88
End: 2023-12-06

## 2023-12-06 RX ORDER — CIPROFLOXACIN 500 MG/1
500 TABLET, FILM COATED ORAL 2 TIMES DAILY
Qty: 28 TABLET | Refills: 0 | Status: SHIPPED | OUTPATIENT
Start: 2023-12-06 | End: 2023-12-20

## 2023-12-06 NOTE — PROGRESS NOTES
Anticoagulation Summary  As of 2023      INR goal:     TTR:  --   INR used for dosin.5 (2023)   Warfarin maintenance plan:  7.5 mg (5 mg x 1.5) every Mon, Wed, Fri; 5 mg (5 mg x 1) all other days   Weekly warfarin total:  42.5 mg   Plan last modified:  Kwaku Haddad RN (2023)   Next INR check:  2023   Target end date:               Anticoagulation Episode Summary       INR check location:      Preferred lab:      Send INR reminders to:      Comments:             Informed patient's daughter that her INR was 1.5. Dr. Min Fraser recommends increasing his blood thinner dose to 7.5 mg on Monday, Wednesday, and Friday; 5 mg all other days. F/up 2023.

## 2023-12-06 NOTE — TELEPHONE ENCOUNTER
RX refill request from the patient/pharmacy.  Patient last seen 12- with labs, and next appt. scheduled for 12-  Requested Prescriptions     Pending Prescriptions Disp Refills    ciprofloxacin (CIPRO) 500 MG tablet 28 tablet 0     Sig: Take 1 tablet by mouth 2 times daily for 14 days

## 2023-12-08 ENCOUNTER — OFFICE VISIT (OUTPATIENT)
Facility: CLINIC | Age: 88
End: 2023-12-08
Payer: MEDICARE

## 2023-12-08 VITALS
HEIGHT: 75 IN | TEMPERATURE: 97.7 F | WEIGHT: 198.1 LBS | HEART RATE: 77 BPM | BODY MASS INDEX: 24.63 KG/M2 | DIASTOLIC BLOOD PRESSURE: 80 MMHG | RESPIRATION RATE: 17 BRPM | OXYGEN SATURATION: 100 % | SYSTOLIC BLOOD PRESSURE: 130 MMHG

## 2023-12-08 DIAGNOSIS — N18.31 STAGE 3A CHRONIC KIDNEY DISEASE (HCC): ICD-10-CM

## 2023-12-08 DIAGNOSIS — I48.0 PAROXYSMAL ATRIAL FIBRILLATION (HCC): ICD-10-CM

## 2023-12-08 DIAGNOSIS — I12.9 HYPERTENSION WITH RENAL DISEASE: ICD-10-CM

## 2023-12-08 DIAGNOSIS — I50.32 DIASTOLIC CHF, CHRONIC (HCC): Primary | ICD-10-CM

## 2023-12-08 DIAGNOSIS — D64.9 ANEMIA, UNSPECIFIED TYPE: ICD-10-CM

## 2023-12-08 LAB
INR PPP: 1.3 (ref 0.9–1.1)
PROTHROMBIN TIME: 13.1 SEC (ref 9–11.1)

## 2023-12-08 PROCEDURE — 1124F ACP DISCUSS-NO DSCNMKR DOCD: CPT | Performed by: INTERNAL MEDICINE

## 2023-12-08 PROCEDURE — 1036F TOBACCO NON-USER: CPT | Performed by: INTERNAL MEDICINE

## 2023-12-08 PROCEDURE — G8420 CALC BMI NORM PARAMETERS: HCPCS | Performed by: INTERNAL MEDICINE

## 2023-12-08 PROCEDURE — G8484 FLU IMMUNIZE NO ADMIN: HCPCS | Performed by: INTERNAL MEDICINE

## 2023-12-08 PROCEDURE — 99214 OFFICE O/P EST MOD 30 MIN: CPT | Performed by: INTERNAL MEDICINE

## 2023-12-08 PROCEDURE — G8427 DOCREV CUR MEDS BY ELIG CLIN: HCPCS | Performed by: INTERNAL MEDICINE

## 2023-12-09 LAB
ANION GAP SERPL CALC-SCNC: 8 MMOL/L (ref 5–15)
BASOPHILS # BLD: 0 K/UL (ref 0–0.1)
BASOPHILS NFR BLD: 0 % (ref 0–1)
BUN SERPL-MCNC: 39 MG/DL (ref 6–20)
BUN/CREAT SERPL: 23 (ref 12–20)
CALCIUM SERPL-MCNC: 8.9 MG/DL (ref 8.5–10.1)
CHLORIDE SERPL-SCNC: 103 MMOL/L (ref 97–108)
CO2 SERPL-SCNC: 26 MMOL/L (ref 21–32)
CREAT SERPL-MCNC: 1.68 MG/DL (ref 0.7–1.3)
DIFFERENTIAL METHOD BLD: ABNORMAL
EOSINOPHIL # BLD: 0.1 K/UL (ref 0–0.4)
EOSINOPHIL NFR BLD: 1 % (ref 0–7)
ERYTHROCYTE [DISTWIDTH] IN BLOOD BY AUTOMATED COUNT: 17.1 % (ref 11.5–14.5)
GLUCOSE SERPL-MCNC: 296 MG/DL (ref 65–100)
HCT VFR BLD AUTO: 30.3 % (ref 36.6–50.3)
HGB BLD-MCNC: 8.7 G/DL (ref 12.1–17)
IMM GRANULOCYTES # BLD AUTO: 0 K/UL (ref 0–0.04)
IMM GRANULOCYTES NFR BLD AUTO: 0 % (ref 0–0.5)
IRON SATN MFR SERPL: 4 % (ref 20–50)
IRON SERPL-MCNC: 17 UG/DL (ref 35–150)
LYMPHOCYTES # BLD: 2 K/UL (ref 0.8–3.5)
LYMPHOCYTES NFR BLD: 20 % (ref 12–49)
MCH RBC QN AUTO: 23.1 PG (ref 26–34)
MCHC RBC AUTO-ENTMCNC: 28.7 G/DL (ref 30–36.5)
MCV RBC AUTO: 80.4 FL (ref 80–99)
MONOCYTES # BLD: 0.4 K/UL (ref 0–1)
MONOCYTES NFR BLD: 4 % (ref 5–13)
NEUTS SEG # BLD: 7.3 K/UL (ref 1.8–8)
NEUTS SEG NFR BLD: 75 % (ref 32–75)
NRBC # BLD: 0 K/UL (ref 0–0.01)
NRBC BLD-RTO: 0 PER 100 WBC
PLATELET # BLD AUTO: 310 K/UL (ref 150–400)
PLATELET COMMENT: ABNORMAL
PMV BLD AUTO: 10.7 FL (ref 8.9–12.9)
POTASSIUM SERPL-SCNC: 4.5 MMOL/L (ref 3.5–5.1)
RBC # BLD AUTO: 3.77 M/UL (ref 4.1–5.7)
RBC MORPH BLD: ABNORMAL
SODIUM SERPL-SCNC: 137 MMOL/L (ref 136–145)
TIBC SERPL-MCNC: 426 UG/DL (ref 250–450)
WBC # BLD AUTO: 9.8 K/UL (ref 4.1–11.1)

## 2023-12-11 ENCOUNTER — ANTI-COAG VISIT (OUTPATIENT)
Facility: CLINIC | Age: 88
End: 2023-12-11

## 2023-12-11 NOTE — PROGRESS NOTES
Anticoagulation Summary  As of 2023      INR goal:     TTR:  --   INR used for dosin.3 (2023)   Warfarin maintenance plan:  7.5 mg (5 mg x 1.5) every Mon, Wed, Fri; 5 mg (5 mg x 1) all other days   Weekly warfarin total:  42.5 mg   Plan last modified:  Evangelina Ramos RN (2023)   Next INR check:  2023   Target end date:               Anticoagulation Episode Summary       INR check location:      Preferred lab:      Send INR reminders to:      Comments:             Spoke to patient's daughter in law. Dose of blood thinner was just adjusted by Dr. Denisse Gill two days ago. Advised to continue the same dose of blood thinner which is 7.5 mg on Monday, Wednesday and Friday; 5 mg all other days. F/up as scheduled.

## 2023-12-26 PROBLEM — D50.9 IRON DEFICIENCY ANEMIA, UNSPECIFIED: Status: ACTIVE | Noted: 2023-12-26

## 2023-12-26 RX ORDER — SODIUM CHLORIDE 9 MG/ML
5-250 INJECTION, SOLUTION INTRAVENOUS PRN
OUTPATIENT
Start: 2024-01-03

## 2023-12-30 PROBLEM — Z00.00 MEDICARE ANNUAL WELLNESS VISIT, SUBSEQUENT: Status: RESOLVED | Noted: 2017-10-30 | Resolved: 2023-12-30

## 2023-12-31 NOTE — PROGRESS NOTES
Chief Complaint   Patient presents with    Follow-up       SUBJECTIVE:    Baron Wagner Sr. is a 89 y.o. male who returns in follow-up for his congestive heart failure, CKD, diabetes mellitus, recurrent diabetic foot ulcers, anemia, atrial fibrillation and other medical problems.  He is keeping a very close eye on his feet and has no evidence of any skin breakdown.  He currently denies any chest pain, shortness of breath, palpitations, PND, orthopnea or other cardiac respiratory complaints.  He notes no GI or  complaints.  He is no longer taking the Protonix and has not noted any problems with his stomach not taking it would like to stay off of that.  He notes no other complaints on complete review of systems other than his chronic arthritic complaints which seem to be stable.      Current Outpatient Medications   Medication Sig Dispense Refill    diclofenac (VOLTAREN) 75 MG EC tablet Take 1 tablet by mouth at bedtime Take with food 60 tablet 2    bumetanide (BUMEX) 2 MG tablet Take 1 tablet by mouth daily 60 tablet 3    Lactobacillus (PROBIOTIC ACIDOPHILUS) CAPS Take 1 daily 30 capsule 5    Docusate Sodium (STOOL SOFTENER LAXATIVE PO) Take by mouth daily      pregabalin (LYRICA) 75 MG capsule Take 1 capsule by mouth daily for 180 days. Max Daily Amount: 75 mg 30 capsule 5    glimepiride (AMARYL) 4 MG tablet TAKE 1 TABLET BY MOUTH EVERY DAY BEFORE BREAKFAST 90 tablet 1    terazosin (HYTRIN) 2 MG capsule TAKE 1 CAPSULE BY MOUTH EVERY DAY 90 capsule 3    simvastatin (ZOCOR) 20 MG tablet TAKE 1 TABLET BY MOUTH EVERY DAY 90 tablet 3    metFORMIN (GLUCOPHAGE) 500 MG tablet TAKE 1 TABLET BY MOUTH EVERY DAY IN THE MORNING WITH BREAKFAST AND 1 TABLET BEFORE DINNER 180 tablet 3    acetaminophen (TYLENOL) 500 MG tablet Take 2 tablets by mouth in the morning and at bedtime      Omega-3 Fatty Acids (FISH OIL) 1200 MG CAPS Take 2,400 mg by mouth in the morning and at bedtime      glucosamine-chondroitin 500-400 MG CAPS

## 2024-01-02 ENCOUNTER — OFFICE VISIT (OUTPATIENT)
Facility: CLINIC | Age: 89
End: 2024-01-02
Payer: MEDICARE

## 2024-01-02 VITALS
DIASTOLIC BLOOD PRESSURE: 63 MMHG | TEMPERATURE: 97.6 F | HEART RATE: 74 BPM | BODY MASS INDEX: 24.5 KG/M2 | SYSTOLIC BLOOD PRESSURE: 120 MMHG | OXYGEN SATURATION: 98 % | WEIGHT: 196 LBS

## 2024-01-02 DIAGNOSIS — I48.0 PAROXYSMAL ATRIAL FIBRILLATION (HCC): ICD-10-CM

## 2024-01-02 DIAGNOSIS — I12.9 HYPERTENSION WITH RENAL DISEASE: ICD-10-CM

## 2024-01-02 DIAGNOSIS — I50.32 DIASTOLIC CHF, CHRONIC (HCC): Primary | ICD-10-CM

## 2024-01-02 DIAGNOSIS — M35.3 POLYMYALGIA RHEUMATICA (HCC): ICD-10-CM

## 2024-01-02 DIAGNOSIS — L97.511 ULCER OF BOTH FEET, LIMITED TO BREAKDOWN OF SKIN (HCC): ICD-10-CM

## 2024-01-02 DIAGNOSIS — N18.31 STAGE 3A CHRONIC KIDNEY DISEASE (HCC): ICD-10-CM

## 2024-01-02 DIAGNOSIS — D64.9 ANEMIA, UNSPECIFIED TYPE: ICD-10-CM

## 2024-01-02 DIAGNOSIS — L97.521 ULCER OF BOTH FEET, LIMITED TO BREAKDOWN OF SKIN (HCC): ICD-10-CM

## 2024-01-02 DIAGNOSIS — R97.20 ELEVATED PSA: ICD-10-CM

## 2024-01-02 LAB
INR PPP: 1.3 (ref 0.9–1.1)
PROTHROMBIN TIME: 12.9 SEC (ref 9–11.1)

## 2024-01-02 PROCEDURE — 1124F ACP DISCUSS-NO DSCNMKR DOCD: CPT | Performed by: INTERNAL MEDICINE

## 2024-01-02 PROCEDURE — G8427 DOCREV CUR MEDS BY ELIG CLIN: HCPCS | Performed by: INTERNAL MEDICINE

## 2024-01-02 PROCEDURE — 99213 OFFICE O/P EST LOW 20 MIN: CPT | Performed by: INTERNAL MEDICINE

## 2024-01-02 PROCEDURE — 1036F TOBACCO NON-USER: CPT | Performed by: INTERNAL MEDICINE

## 2024-01-02 PROCEDURE — G8420 CALC BMI NORM PARAMETERS: HCPCS | Performed by: INTERNAL MEDICINE

## 2024-01-02 PROCEDURE — G8484 FLU IMMUNIZE NO ADMIN: HCPCS | Performed by: INTERNAL MEDICINE

## 2024-01-02 NOTE — PROGRESS NOTES
Chief Complaint   Patient presents with    Follow-up        /63 (Site: Right Upper Arm, Position: Sitting, Cuff Size: Medium Adult)   Pulse 74   Temp 97.6 °F (36.4 °C) (Oral)   Wt 88.9 kg (196 lb)   SpO2 98%   BMI 24.50 kg/m²     Pain Scale: 0 - No pain/10  Pain Location:      Current Outpatient Medications on File Prior to Visit   Medication Sig Dispense Refill    diclofenac (VOLTAREN) 75 MG EC tablet Take 1 tablet by mouth at bedtime Take with food 60 tablet 2    bumetanide (BUMEX) 2 MG tablet Take 1 tablet by mouth daily 60 tablet 3    Lactobacillus (PROBIOTIC ACIDOPHILUS) CAPS Take 1 daily 30 capsule 5    Docusate Sodium (STOOL SOFTENER LAXATIVE PO) Take by mouth daily      pregabalin (LYRICA) 75 MG capsule Take 1 capsule by mouth daily for 180 days. Max Daily Amount: 75 mg 30 capsule 5    glimepiride (AMARYL) 4 MG tablet TAKE 1 TABLET BY MOUTH EVERY DAY BEFORE BREAKFAST 90 tablet 1    terazosin (HYTRIN) 2 MG capsule TAKE 1 CAPSULE BY MOUTH EVERY DAY 90 capsule 3    simvastatin (ZOCOR) 20 MG tablet TAKE 1 TABLET BY MOUTH EVERY DAY 90 tablet 3    metFORMIN (GLUCOPHAGE) 500 MG tablet TAKE 1 TABLET BY MOUTH EVERY DAY IN THE MORNING WITH BREAKFAST AND 1 TABLET BEFORE DINNER 180 tablet 3    acetaminophen (TYLENOL) 500 MG tablet Take 2 tablets by mouth in the morning and at bedtime      Omega-3 Fatty Acids (FISH OIL) 1200 MG CAPS Take 2,400 mg by mouth in the morning and at bedtime      glucosamine-chondroitin 500-400 MG CAPS Take 1 capsule by mouth daily      vitamin E 400 UNIT capsule Take 1 capsule by mouth daily      vitamin B-12 (CYANOCOBALAMIN) 1000 MCG tablet Take 1 tablet by mouth daily      ACCU-CHEK LEE ANN PLUS strip USE TO TEST BLOOD SUGAR TWICE DAILY 100 strip 3    Multiple Vitamins-Minerals (CENTRUM SILVER ULTRA MENS PO) Take 1 tablet by mouth daily      digoxin (LANOXIN) 125 MCG tablet TAKE 1 TABLET BY MOUTH EVERY DAY 90 tablet 3    Cholecalciferol 400 UNIT TABS tablet Take 1 tablet by mouth at

## 2024-01-03 ENCOUNTER — HOSPITAL ENCOUNTER (OUTPATIENT)
Facility: HOSPITAL | Age: 89
Setting detail: INFUSION SERIES
Discharge: HOME | End: 2024-01-03
Payer: MEDICARE

## 2024-01-03 ENCOUNTER — ANTI-COAG VISIT (OUTPATIENT)
Facility: CLINIC | Age: 89
End: 2024-01-03

## 2024-01-03 VITALS
BODY MASS INDEX: 24.5 KG/M2 | TEMPERATURE: 97.7 F | HEART RATE: 80 BPM | WEIGHT: 196 LBS | OXYGEN SATURATION: 98 % | SYSTOLIC BLOOD PRESSURE: 128 MMHG | DIASTOLIC BLOOD PRESSURE: 71 MMHG | RESPIRATION RATE: 16 BRPM

## 2024-01-03 DIAGNOSIS — D50.9 IRON DEFICIENCY ANEMIA, UNSPECIFIED IRON DEFICIENCY ANEMIA TYPE: Primary | ICD-10-CM

## 2024-01-03 LAB
ANION GAP SERPL CALC-SCNC: 6 MMOL/L (ref 5–15)
BASOPHILS # BLD: 0 K/UL (ref 0–0.1)
BASOPHILS NFR BLD: 0 % (ref 0–1)
BUN SERPL-MCNC: 39 MG/DL (ref 6–20)
BUN/CREAT SERPL: 22 (ref 12–20)
CALCIUM SERPL-MCNC: 9.2 MG/DL (ref 8.5–10.1)
CHLORIDE SERPL-SCNC: 105 MMOL/L (ref 97–108)
CO2 SERPL-SCNC: 27 MMOL/L (ref 21–32)
CREAT SERPL-MCNC: 1.78 MG/DL (ref 0.7–1.3)
DIFFERENTIAL METHOD BLD: ABNORMAL
EOSINOPHIL # BLD: 0 K/UL (ref 0–0.4)
EOSINOPHIL NFR BLD: 0 % (ref 0–7)
ERYTHROCYTE [DISTWIDTH] IN BLOOD BY AUTOMATED COUNT: 16.7 % (ref 11.5–14.5)
ERYTHROCYTE [SEDIMENTATION RATE] IN BLOOD: 54 MM/HR (ref 0–20)
GLUCOSE SERPL-MCNC: 361 MG/DL (ref 65–100)
HCT VFR BLD AUTO: 31.8 % (ref 36.6–50.3)
HGB BLD-MCNC: 8.7 G/DL (ref 12.1–17)
IMM GRANULOCYTES # BLD AUTO: 0 K/UL (ref 0–0.04)
IMM GRANULOCYTES NFR BLD AUTO: 0 % (ref 0–0.5)
LYMPHOCYTES # BLD: 1.3 K/UL (ref 0.8–3.5)
LYMPHOCYTES NFR BLD: 17 % (ref 12–49)
MCH RBC QN AUTO: 21.9 PG (ref 26–34)
MCHC RBC AUTO-ENTMCNC: 27.4 G/DL (ref 30–36.5)
MCV RBC AUTO: 80.1 FL (ref 80–99)
MONOCYTES # BLD: 0.3 K/UL (ref 0–1)
MONOCYTES NFR BLD: 4 % (ref 5–13)
NEUTS SEG # BLD: 6.1 K/UL (ref 1.8–8)
NEUTS SEG NFR BLD: 79 % (ref 32–75)
NRBC # BLD: 0 K/UL (ref 0–0.01)
NRBC BLD-RTO: 0 PER 100 WBC
PLATELET # BLD AUTO: 208 K/UL (ref 150–400)
PMV BLD AUTO: 11.2 FL (ref 8.9–12.9)
POTASSIUM SERPL-SCNC: 5 MMOL/L (ref 3.5–5.1)
PSA SERPL-MCNC: 3.8 NG/ML (ref 0.01–4)
RBC # BLD AUTO: 3.97 M/UL (ref 4.1–5.7)
RBC MORPH BLD: ABNORMAL
RBC MORPH BLD: ABNORMAL
SODIUM SERPL-SCNC: 138 MMOL/L (ref 136–145)
WBC # BLD AUTO: 7.7 K/UL (ref 4.1–11.1)

## 2024-01-03 PROCEDURE — 2580000003 HC RX 258: Performed by: INTERNAL MEDICINE

## 2024-01-03 PROCEDURE — 6360000002 HC RX W HCPCS: Performed by: INTERNAL MEDICINE

## 2024-01-03 PROCEDURE — 96365 THER/PROPH/DIAG IV INF INIT: CPT

## 2024-01-03 RX ORDER — SODIUM CHLORIDE 9 MG/ML
5-250 INJECTION, SOLUTION INTRAVENOUS PRN
OUTPATIENT
Start: 2024-01-17

## 2024-01-03 RX ORDER — SODIUM CHLORIDE 0.9 % (FLUSH) 0.9 %
5-40 SYRINGE (ML) INJECTION PRN
OUTPATIENT
Start: 2024-01-17

## 2024-01-03 RX ORDER — SODIUM CHLORIDE 0.9 % (FLUSH) 0.9 %
5-40 SYRINGE (ML) INJECTION PRN
Status: DISCONTINUED | OUTPATIENT
Start: 2024-01-03 | End: 2024-01-04 | Stop reason: HOSPADM

## 2024-01-03 RX ADMIN — SODIUM CHLORIDE 300 MG: 9 INJECTION, SOLUTION INTRAVENOUS at 12:15

## 2024-01-03 RX ADMIN — SODIUM CHLORIDE, PRESERVATIVE FREE 10 ML: 5 INJECTION INTRAVENOUS at 11:35

## 2024-01-03 NOTE — PROGRESS NOTES
Anticoagulation Summary  As of 1/3/2024      INR goal:     TTR:  --   INR used for dosin.3 (2024)   Warfarin maintenance plan:  5 mg (5 mg x 1) every Sun, Tue, Thu; 7.5 mg (5 mg x 1.5) all other days   Weekly warfarin total:  45 mg   Plan last modified:  Arlene Smith RN (1/3/2024)   Next INR check:  2024   Target end date:               Anticoagulation Episode Summary       INR check location:      Preferred lab:      Send INR reminders to:      Comments:             Informed patient's daughter in law that his INR was 1.3.  Dr. Powell recommends changing the dose to 7.5 mg on Monday, Wednesday, Friday and Saturday; continue 5 mg all other days.  F/up as scheduled.

## 2024-01-03 NOTE — DISCHARGE INSTR - COC
Continuity of Care Form    Patient Name: Baron Wagner Sr.   :  1933  MRN:  [id number]    Admit date:  1/3/2024  Discharge date:  ***    Code Status Order: Prior   Advance Directives:     Admitting Physician:  No admitting provider for patient encounter.  PCP: Willem Powell MD    Discharging Nurse: ***  Discharging Hospital Unit/Room#: No information available for this encounter.  Discharging Unit Phone Number: ***    Emergency Contact:   Extended Emergency Contact Information  Primary Emergency Contact: Jr Baron Wagner  Address: [address] [place] [address] [place]  Mobile Phone: [phone]  Relation: Child  Secondary Emergency Contact: Hortencia Wagner  Home Phone: [phone]  Mobile Phone: [phone]  Relation: Daughter-in-Law    Past Surgical History:  Past Surgical History:   Procedure Laterality Date    ARTHROCENTESIS  2023    CARDIAC PROCEDURE N/A 2023    Insert temporary pacemaker performed by Dominic Peace MD at [facility] CARDIAC CATH LAB    CENTRAL LINE  2023    EP DEVICE PROCEDURE N/A 2023    Insert or replace transcath PPM leadless performed by Jewel Call MD at [facility] CARDIAC CATH LAB    HEENT  2018    Dr. 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 MAIN OR       Immunization History:   Immunization History   Administered Date(s) Administered    Influenza Virus Vaccine 10/07/2016    Influenza, FLUAD, (age 65 y+), Adjuvanted, 0.5mL 10/13/2020, 10/08/2021, 10/19/2022, 2023    Influenza, High Dose (Fluzone 65 yrs and older) 10/30/2017, 2019    Influenza, Triv, inactivated, subunit, adjuvanted, IM (Fluad 65 yrs and older) 10/19/2018    Pneumococcal  Expiration Resolved Resolved By    None active    Resolved    COVID-19 08/08/22 08/08/22 08/08/22 Conversion, Epic   08/22/22 Conversion, Epic                       Nurse Assessment:  Last Vital Signs: /74   Pulse 70   Temp 97.7 °F (36.5 °C)   Resp 16   Wt 88.9 kg (196 lb)   SpO2 98%   BMI 24.50 kg/m²     Last documented pain score (0-10 scale):    Last Weight:   Wt Readings from Last 1 Encounters:   01/03/24 88.9 kg (196 lb)     Mental Status:  {IP PT MENTAL STATUS:20030}    IV Access:  { MARIANO IV ACCESS:438284423}    Nursing Mobility/ADLs:  Walking   {CHP DME ADLs:165423386}  Transfer  {CHP DME ADLs:232608636}  Bathing  {CHP DME ADLs:965771252}  Dressing  {CHP DME ADLs:890889626}  Toileting  {CHP DME ADLs:375029604}  Feeding  {CHP DME ADLs:793024281}  Med Admin  {CHP DME ADLs:511537072}  Med Delivery   { MARIANO MED Delivery:278789646}    Wound Care Documentation and Therapy:  Puncture 07/13/23 Internal jugular (Active)   Number of days: 174       Puncture 07/13/23 Femoral (Active)   Number of days: 174       Wound 07/04/23 Toe (Comment  which one) Left Wound on Big Toe (Active)   Number of days: 183       Wound 07/04/23 Toe (Comment  which one) Right Wound on Toe (Active)   Number of days: 183       Wound 09/30/23 Buttocks Left MASD (Active)   Number of days: 94       Wound 09/30/23 Sacrum Mid Skin tear vs Excoriation on sacrum (Active)   Number of days: 94       Incision 09/30/23 Foot Right (Active)   Number of days: 94        Elimination:  Continence:   Bowel: {YES / NO:19727}  Bladder: {YES / NO:19727}  Urinary Catheter: {Urinary Catheter:228046156}   Colostomy/Ileostomy/Ileal Conduit: {YES / NO:19727}       Date of Last BM: ***  No intake or output data in the 24 hours ending 01/03/24 1336  No intake/output data recorded.    Safety Concerns:     { MARIANO Safety Concerns:095386926}    Impairments/Disabilities:      {[facility] Impairments/Disabilities:736410615}    Nutrition Therapy:  Current Nutrition

## 2024-01-03 NOTE — PROGRESS NOTES
[facility] Progress Note    Pt arrived in stable condition for Venofer Dose 1    Assessment completed.     24G PIV established in right wrist without issue and positive blood return noted.     Patient Vitals for the past 12 hrs:   Temp Pulse Resp BP SpO2   01/03/24 1406 -- 80 16 128/71 --   01/03/24 1130 97.7 °F (36.5 °C) 70 16 127/74 98 %      The following medications administered:  Medications Administered         iron sucrose (VENOFER) 300 mg in sodium chloride 0.9 % 250 mL IVPB Admin Date  01/03/2024 Action  New Bag Dose  300 mg Rate  193.3 mL/hr Route  IntraVENous Administered By  Ashley Schofield, RN        sodium chloride flush 0.9 % injection 5-40 mL Admin Date  01/03/2024 Action  Given Dose  10 mL Rate   Route  IntraVENous Administered By  Ashley Schofield, RN          Pt monitored x 30 mins. Pt tolerated treatment well. IV flushed per policy and removed, 2x2 and coban placed.     Pt provided with education on possible side effects of medication along with discharge instructions. Pt verbalized understanding.      Pt discharged in no acute distress. Next appointment:    Future Appointments   Date Time Provider Department Center   1/17/2024 11:30 AM [facility]   1/31/2024 11:30 AM [facility]   2/2/2024  1:30 PM Willem Powell MD [facility] AMB   3/4/2024  9:30 AM Willem Powell MD [facility] AMB

## 2024-01-17 ENCOUNTER — HOSPITAL ENCOUNTER (OUTPATIENT)
Facility: HOSPITAL | Age: 89
Setting detail: INFUSION SERIES
Discharge: HOME | End: 2024-01-17
Payer: MEDICARE

## 2024-01-17 VITALS
DIASTOLIC BLOOD PRESSURE: 71 MMHG | HEART RATE: 73 BPM | TEMPERATURE: 98 F | OXYGEN SATURATION: 99 % | SYSTOLIC BLOOD PRESSURE: 115 MMHG | RESPIRATION RATE: 16 BRPM

## 2024-01-17 DIAGNOSIS — D50.9 IRON DEFICIENCY ANEMIA, UNSPECIFIED IRON DEFICIENCY ANEMIA TYPE: Primary | ICD-10-CM

## 2024-01-17 PROCEDURE — 6360000002 HC RX W HCPCS: Performed by: INTERNAL MEDICINE

## 2024-01-17 PROCEDURE — 96365 THER/PROPH/DIAG IV INF INIT: CPT

## 2024-01-17 PROCEDURE — 2580000003 HC RX 258: Performed by: INTERNAL MEDICINE

## 2024-01-17 PROCEDURE — 96361 HYDRATE IV INFUSION ADD-ON: CPT

## 2024-01-17 RX ORDER — SODIUM CHLORIDE 9 MG/ML
5-250 INJECTION, SOLUTION INTRAVENOUS PRN
Status: DISCONTINUED | OUTPATIENT
Start: 2024-01-17 | End: 2024-01-18 | Stop reason: HOSPADM

## 2024-01-17 RX ORDER — SODIUM CHLORIDE 9 MG/ML
5-250 INJECTION, SOLUTION INTRAVENOUS PRN
OUTPATIENT
Start: 2024-01-31

## 2024-01-17 RX ORDER — SODIUM CHLORIDE 0.9 % (FLUSH) 0.9 %
5-40 SYRINGE (ML) INJECTION PRN
OUTPATIENT
Start: 2024-01-31

## 2024-01-17 RX ADMIN — SODIUM CHLORIDE 25 ML/HR: 9 INJECTION, SOLUTION INTRAVENOUS at 11:18

## 2024-01-17 RX ADMIN — SODIUM CHLORIDE 300 MG: 9 INJECTION, SOLUTION INTRAVENOUS at 12:14

## 2024-01-17 NOTE — PROGRESS NOTES
[facility] Visit Note    Pt arrived to [facility] ambulatory in no acute distress for Venofer 2/3.  Assessment unremarkable.  IV established in Left forearm site WNL.     The following medications administered:  Medications Administered         0.9 % sodium chloride infusion Admin Date  01/17/2024 Action  New Bag Dose  25 mL/hr Rate  25 mL/hr Route  IntraVENous Administered By  Sanjuana Lemon, RN        iron sucrose (VENOFER) 300 mg in sodium chloride 0.9 % 250 mL IVPB Admin Date  01/17/2024 Action  New Bag Dose  300 mg Rate  193.3 mL/hr Route  IntraVENous Administered By  Sanjuana Lemon, RN           Patient Vitals for the past 24 hrs:   BP Temp Temp src Pulse Resp SpO2   01/17/24 1400 115/71 -- -- 73 16 --   01/17/24 1111 118/63 98 °F (36.7 °C) Temporal 83 15 99 %        Pt tolerated treatment well.  IV flushed per policy and removed, 2x2 and tape placed.  Patient declined full 30 minute observation period (observed approx. 10 minutes). Pt discharged ambulatory in no acute distress, accompanied by self.  Next appointment 1/31/2024.

## 2024-01-31 ENCOUNTER — HOSPITAL ENCOUNTER (OUTPATIENT)
Facility: HOSPITAL | Age: 89
Setting detail: INFUSION SERIES
Discharge: HOME | End: 2024-01-31
Payer: MEDICARE

## 2024-01-31 VITALS
RESPIRATION RATE: 16 BRPM | DIASTOLIC BLOOD PRESSURE: 77 MMHG | OXYGEN SATURATION: 97 % | HEART RATE: 72 BPM | SYSTOLIC BLOOD PRESSURE: 134 MMHG | TEMPERATURE: 98.3 F

## 2024-01-31 DIAGNOSIS — D50.9 IRON DEFICIENCY ANEMIA, UNSPECIFIED IRON DEFICIENCY ANEMIA TYPE: Primary | ICD-10-CM

## 2024-01-31 PROCEDURE — 2580000003 HC RX 258: Performed by: INTERNAL MEDICINE

## 2024-01-31 PROCEDURE — 96365 THER/PROPH/DIAG IV INF INIT: CPT

## 2024-01-31 PROCEDURE — 6360000002 HC RX W HCPCS: Performed by: INTERNAL MEDICINE

## 2024-01-31 RX ORDER — SODIUM CHLORIDE 0.9 % (FLUSH) 0.9 %
5-40 SYRINGE (ML) INJECTION PRN
OUTPATIENT
Start: 2024-01-31

## 2024-01-31 RX ORDER — SODIUM CHLORIDE 9 MG/ML
5-250 INJECTION, SOLUTION INTRAVENOUS PRN
OUTPATIENT
Start: 2024-01-31

## 2024-01-31 RX ORDER — SODIUM CHLORIDE 9 MG/ML
5-250 INJECTION, SOLUTION INTRAVENOUS PRN
Status: DISCONTINUED | OUTPATIENT
Start: 2024-01-31 | End: 2024-02-01 | Stop reason: HOSPADM

## 2024-01-31 RX ORDER — SODIUM CHLORIDE 0.9 % (FLUSH) 0.9 %
5-40 SYRINGE (ML) INJECTION PRN
Status: DISCONTINUED | OUTPATIENT
Start: 2024-01-31 | End: 2024-02-01 | Stop reason: HOSPADM

## 2024-01-31 RX ADMIN — SODIUM CHLORIDE 25 ML/HR: 9 INJECTION, SOLUTION INTRAVENOUS at 12:15

## 2024-01-31 RX ADMIN — SODIUM CHLORIDE 300 MG: 9 INJECTION, SOLUTION INTRAVENOUS at 12:17

## 2024-01-31 NOTE — PROGRESS NOTES
[facility] Visit Note    Pt arrived to [facility] ambulatory in no acute distress for Venofer 3/3.  Assessment unremarkable.  IV established in LAC without issue and positive blood return noted.      The following medications administered:  Medications Administered         0.9 % sodium chloride infusion Admin Date  01/31/2024 Action  New Bag Dose  25 mL/hr Rate  25 mL/hr Route  IntraVENous Administered By  Adriana Santana RN        iron sucrose (VENOFER) 300 mg in sodium chloride 0.9 % 250 mL IVPB Admin Date  01/31/2024 Action  New Bag Dose  300 mg Rate  193.3 mL/hr Route  IntraVENous Administered By  Adriana Santana RN            Patient Vitals for the past 24 hrs:   BP Temp Temp src Pulse Resp SpO2   01/31/24 1405 134/77 -- -- 72 16 --   01/31/24 1130 137/76 98.3 °F (36.8 °C) Temporal 98 16 97 %       Pt tolerated treatment well; stayed for a 15 minute observation (declined full 30 minutes).  IV flushed per policy and removed, 2x2 and coban placed.  Pt discharged ambulatory in no acute distress.  Next appointments:  Future Appointments   Date Time Provider Department Center   2/2/2024  1:30 PM Willem Powell MD [facility] AM[facility]   3/4/2024  9:30 AM Willem Powell MD [facility] AMB

## 2024-02-01 NOTE — PROGRESS NOTES
Chief Complaint   Patient presents with    1 Month Follow-Up       SUBJECTIVE:    Baron Wagner Sr. is a 89 y.o. male who returns in follow-up of his medical problems include congestive heart failure, CKD stage III, diabetes mellitus, atrial fibrillation, hypertension, recurrent diabetic foot ulcers and previous history of osteomyelitis on each foot.  He currently has developed a new ulcer over the tip of his left first toe.  He notes no pain associate with this.  He notes no fevers or chills.  He has been treating it with topical Bactroban ointment which he had at home.  He notes no chest pain, shortness of breath or cardiac respiratory complaints.  There are no GI or  complaints.  He notes no headaches, dizziness or neurologic complaints.  He has no change of his chronic arthritic complaints and there are no other complaints on complete review of systems.      Current Outpatient Medications   Medication Sig Dispense Refill    ciprofloxacin (CIPRO) 500 MG tablet Take 1 tablet by mouth 2 times daily for 10 days 28 tablet 0    diclofenac (VOLTAREN) 75 MG EC tablet Take 1 tablet by mouth at bedtime Take with food 60 tablet 2    bumetanide (BUMEX) 2 MG tablet Take 1 tablet by mouth daily 60 tablet 3    Lactobacillus (PROBIOTIC ACIDOPHILUS) CAPS Take 1 daily 30 capsule 5    Docusate Sodium (STOOL SOFTENER LAXATIVE PO) Take by mouth daily      pregabalin (LYRICA) 75 MG capsule Take 1 capsule by mouth daily for 180 days. Max Daily Amount: 75 mg 30 capsule 5    glimepiride (AMARYL) 4 MG tablet TAKE 1 TABLET BY MOUTH EVERY DAY BEFORE BREAKFAST 90 tablet 1    terazosin (HYTRIN) 2 MG capsule TAKE 1 CAPSULE BY MOUTH EVERY DAY 90 capsule 3    simvastatin (ZOCOR) 20 MG tablet TAKE 1 TABLET BY MOUTH EVERY DAY 90 tablet 3    metFORMIN (GLUCOPHAGE) 500 MG tablet TAKE 1 TABLET BY MOUTH EVERY DAY IN THE MORNING WITH BREAKFAST AND 1 TABLET BEFORE DINNER 180 tablet 3    acetaminophen (TYLENOL) 500 MG tablet Take 2 tablets by

## 2024-02-02 ENCOUNTER — OFFICE VISIT (OUTPATIENT)
Facility: CLINIC | Age: 89
End: 2024-02-02
Payer: MEDICARE

## 2024-02-02 VITALS
HEIGHT: 75 IN | RESPIRATION RATE: 16 BRPM | WEIGHT: 197.1 LBS | SYSTOLIC BLOOD PRESSURE: 136 MMHG | OXYGEN SATURATION: 96 % | DIASTOLIC BLOOD PRESSURE: 74 MMHG | BODY MASS INDEX: 24.51 KG/M2 | HEART RATE: 77 BPM | TEMPERATURE: 97.7 F

## 2024-02-02 DIAGNOSIS — I48.0 PAROXYSMAL A-FIB (HCC): ICD-10-CM

## 2024-02-02 DIAGNOSIS — E11.9 TYPE 2 DIABETES MELLITUS WITHOUT COMPLICATION, WITHOUT LONG-TERM CURRENT USE OF INSULIN (HCC): ICD-10-CM

## 2024-02-02 DIAGNOSIS — L97.521 DIABETIC ULCER OF TOE OF LEFT FOOT ASSOCIATED WITH TYPE 2 DIABETES MELLITUS, LIMITED TO BREAKDOWN OF SKIN (HCC): ICD-10-CM

## 2024-02-02 DIAGNOSIS — N18.31 TYPE 2 DIABETES MELLITUS WITH STAGE 3A CHRONIC KIDNEY DISEASE, WITHOUT LONG-TERM CURRENT USE OF INSULIN (HCC): ICD-10-CM

## 2024-02-02 DIAGNOSIS — I50.32 DIASTOLIC CHF, CHRONIC (HCC): Primary | ICD-10-CM

## 2024-02-02 DIAGNOSIS — N18.31 STAGE 3A CHRONIC KIDNEY DISEASE (HCC): ICD-10-CM

## 2024-02-02 DIAGNOSIS — E11.22 TYPE 2 DIABETES MELLITUS WITH STAGE 3A CHRONIC KIDNEY DISEASE, WITHOUT LONG-TERM CURRENT USE OF INSULIN (HCC): ICD-10-CM

## 2024-02-02 DIAGNOSIS — E11.621 DIABETIC ULCER OF TOE OF LEFT FOOT ASSOCIATED WITH TYPE 2 DIABETES MELLITUS, LIMITED TO BREAKDOWN OF SKIN (HCC): ICD-10-CM

## 2024-02-02 LAB
INR PPP: 1.3 (ref 0.9–1.1)
PROTHROMBIN TIME: 13.4 SEC (ref 9–11.1)

## 2024-02-02 PROCEDURE — 99214 OFFICE O/P EST MOD 30 MIN: CPT | Performed by: INTERNAL MEDICINE

## 2024-02-02 PROCEDURE — G8420 CALC BMI NORM PARAMETERS: HCPCS | Performed by: INTERNAL MEDICINE

## 2024-02-02 PROCEDURE — G8484 FLU IMMUNIZE NO ADMIN: HCPCS | Performed by: INTERNAL MEDICINE

## 2024-02-02 PROCEDURE — 1123F ACP DISCUSS/DSCN MKR DOCD: CPT | Performed by: INTERNAL MEDICINE

## 2024-02-02 PROCEDURE — G8428 CUR MEDS NOT DOCUMENT: HCPCS | Performed by: INTERNAL MEDICINE

## 2024-02-02 PROCEDURE — 1036F TOBACCO NON-USER: CPT | Performed by: INTERNAL MEDICINE

## 2024-02-02 RX ORDER — CIPROFLOXACIN 500 MG/1
500 TABLET, FILM COATED ORAL 2 TIMES DAILY
Qty: 28 TABLET | Refills: 0 | Status: SHIPPED | OUTPATIENT
Start: 2024-02-02 | End: 2024-02-12

## 2024-02-02 ASSESSMENT — PATIENT HEALTH QUESTIONNAIRE - PHQ9
2. FEELING DOWN, DEPRESSED OR HOPELESS: 0
SUM OF ALL RESPONSES TO PHQ QUESTIONS 1-9: 0
1. LITTLE INTEREST OR PLEASURE IN DOING THINGS: 0
SUM OF ALL RESPONSES TO PHQ QUESTIONS 1-9: 0
SUM OF ALL RESPONSES TO PHQ9 QUESTIONS 1 & 2: 0

## 2024-02-02 NOTE — PROGRESS NOTES
Baron Wagner  is a 89 y.o. male     Chief Complaint   Patient presents with    1 Month Follow-Up       /74 (Site: Left Upper Arm, Position: Sitting, Cuff Size: Medium Adult)   Pulse 77   Temp 97.7 °F (36.5 °C) (Oral)   Resp 16   Ht 1.905 m (6' 3\")   Wt 89.4 kg (197 lb 1.6 oz)   SpO2 96%   BMI 24.64 kg/m²     Health Maintenance Due   Topic Date Due    COVID-19 Vaccine (1) Never done    Shingles vaccine (1 of 2) Never done    Respiratory Syncytial Virus (RSV) Pregnant or age 60 yrs+ (1 - 1-dose 60+ series) Never done    Pneumococcal 65+ years Vaccine (2 - PPSV23 or PCV20) 03/04/2005    Annual Wellness Visit (Medicare Advantage)  01/01/2024         \"Have you been to the ER, urgent care clinic since your last visit?  Hospitalized since your last visit?\"    NO    “Have you seen or consulted any other health care providers outside of [facility] since your last visit?”    NO

## 2024-02-03 LAB
ANION GAP SERPL CALC-SCNC: 5 MMOL/L (ref 5–15)
BASOPHILS # BLD: 0.1 K/UL (ref 0–0.1)
BASOPHILS NFR BLD: 1 % (ref 0–1)
BUN SERPL-MCNC: 32 MG/DL (ref 6–20)
BUN/CREAT SERPL: 19 (ref 12–20)
CALCIUM SERPL-MCNC: 9.7 MG/DL (ref 8.5–10.1)
CHLORIDE SERPL-SCNC: 106 MMOL/L (ref 97–108)
CO2 SERPL-SCNC: 29 MMOL/L (ref 21–32)
CREAT SERPL-MCNC: 1.69 MG/DL (ref 0.7–1.3)
DIFFERENTIAL METHOD BLD: ABNORMAL
EOSINOPHIL # BLD: 0.1 K/UL (ref 0–0.4)
EOSINOPHIL NFR BLD: 1 % (ref 0–7)
ERYTHROCYTE [DISTWIDTH] IN BLOOD BY AUTOMATED COUNT: 23.6 % (ref 11.5–14.5)
GLUCOSE SERPL-MCNC: 295 MG/DL (ref 65–100)
HCT VFR BLD AUTO: 34.3 % (ref 36.6–50.3)
HGB BLD-MCNC: 9.6 G/DL (ref 12.1–17)
IMM GRANULOCYTES # BLD AUTO: 0.1 K/UL (ref 0–0.04)
IMM GRANULOCYTES NFR BLD AUTO: 1 % (ref 0–0.5)
LYMPHOCYTES # BLD: 1.4 K/UL (ref 0.8–3.5)
LYMPHOCYTES NFR BLD: 16 % (ref 12–49)
MCH RBC QN AUTO: 23.2 PG (ref 26–34)
MCHC RBC AUTO-ENTMCNC: 28 G/DL (ref 30–36.5)
MCV RBC AUTO: 83.1 FL (ref 80–99)
MONOCYTES # BLD: 0.3 K/UL (ref 0–1)
MONOCYTES NFR BLD: 4 % (ref 5–13)
NEUTS SEG # BLD: 6.7 K/UL (ref 1.8–8)
NEUTS SEG NFR BLD: 77 % (ref 32–75)
NRBC # BLD: 0 K/UL (ref 0–0.01)
NRBC BLD-RTO: 0 PER 100 WBC
PLATELET # BLD AUTO: 233 K/UL (ref 150–400)
PMV BLD AUTO: 10.8 FL (ref 8.9–12.9)
POTASSIUM SERPL-SCNC: 5 MMOL/L (ref 3.5–5.1)
RBC # BLD AUTO: 4.13 M/UL (ref 4.1–5.7)
RBC MORPH BLD: ABNORMAL
SODIUM SERPL-SCNC: 140 MMOL/L (ref 136–145)
WBC # BLD AUTO: 8.7 K/UL (ref 4.1–11.1)

## 2024-02-04 LAB
BACTERIA SPEC CULT: ABNORMAL
GRAM STN SPEC: ABNORMAL
SERVICE CMNT-IMP: ABNORMAL

## 2024-02-06 LAB
BACTERIA SPEC CULT: ABNORMAL
GRAM STN SPEC: ABNORMAL
SERVICE CMNT-IMP: ABNORMAL

## 2024-02-08 PROBLEM — L97.523 CHRONIC ULCER OF LEFT FOOT WITH NECROSIS OF MUSCLE (HCC): Status: RESOLVED | Noted: 2023-09-28 | Resolved: 2024-02-08

## 2024-02-09 ENCOUNTER — OFFICE VISIT (OUTPATIENT)
Facility: CLINIC | Age: 89
End: 2024-02-09
Payer: MEDICARE

## 2024-02-09 VITALS
SYSTOLIC BLOOD PRESSURE: 117 MMHG | WEIGHT: 200.2 LBS | DIASTOLIC BLOOD PRESSURE: 73 MMHG | TEMPERATURE: 98.3 F | OXYGEN SATURATION: 95 % | HEART RATE: 69 BPM | BODY MASS INDEX: 25.02 KG/M2

## 2024-02-09 DIAGNOSIS — E11.621 DIABETIC ULCER OF TOE OF LEFT FOOT ASSOCIATED WITH TYPE 2 DIABETES MELLITUS, LIMITED TO BREAKDOWN OF SKIN (HCC): Primary | ICD-10-CM

## 2024-02-09 DIAGNOSIS — E11.9 TYPE 2 DIABETES MELLITUS WITHOUT COMPLICATION, WITHOUT LONG-TERM CURRENT USE OF INSULIN (HCC): ICD-10-CM

## 2024-02-09 DIAGNOSIS — I48.0 PAROXYSMAL ATRIAL FIBRILLATION (HCC): ICD-10-CM

## 2024-02-09 DIAGNOSIS — I50.32 DIASTOLIC CHF, CHRONIC (HCC): ICD-10-CM

## 2024-02-09 DIAGNOSIS — L97.521 DIABETIC ULCER OF TOE OF LEFT FOOT ASSOCIATED WITH TYPE 2 DIABETES MELLITUS, LIMITED TO BREAKDOWN OF SKIN (HCC): Primary | ICD-10-CM

## 2024-02-09 DIAGNOSIS — N18.31 STAGE 3A CHRONIC KIDNEY DISEASE (HCC): ICD-10-CM

## 2024-02-09 LAB
INR PPP: 1.3 (ref 0.9–1.1)
PROTHROMBIN TIME: 13.8 SEC (ref 9–11.1)

## 2024-02-09 PROCEDURE — G8484 FLU IMMUNIZE NO ADMIN: HCPCS | Performed by: INTERNAL MEDICINE

## 2024-02-09 PROCEDURE — G8427 DOCREV CUR MEDS BY ELIG CLIN: HCPCS | Performed by: INTERNAL MEDICINE

## 2024-02-09 PROCEDURE — 1123F ACP DISCUSS/DSCN MKR DOCD: CPT | Performed by: INTERNAL MEDICINE

## 2024-02-09 PROCEDURE — 1036F TOBACCO NON-USER: CPT | Performed by: INTERNAL MEDICINE

## 2024-02-09 PROCEDURE — G8419 CALC BMI OUT NRM PARAM NOF/U: HCPCS | Performed by: INTERNAL MEDICINE

## 2024-02-09 PROCEDURE — 99213 OFFICE O/P EST LOW 20 MIN: CPT | Performed by: INTERNAL MEDICINE

## 2024-02-09 NOTE — PROGRESS NOTES
Chief Complaint   Patient presents with    Hypertension    Chronic Kidney Disease    Diabetes    Cholesterol Problem     1 month followup    1. Have you been to the ER, urgent care clinic since your last visit?  Hospitalized since your last visit?no    2. Have you seen or consulted any other health care providers outside of the [facility] since your last visit?  Include any pap smears or colon screening. no

## 2024-02-09 NOTE — PROGRESS NOTES
Chief Complaint   Patient presents with    Hypertension    Chronic Kidney Disease    Diabetes    Cholesterol Problem     1 month followup       SUBJECTIVE:    Baron Wagner Sr. is a 89 y.o. male who returns in follow-up for his medical problems include hypertension, diabetes, diabetic foot ulcer over the tip of his left first toe CKD and other medical problems.  He has been continuing antibiotic of which I gave him Cipro last week for treatment of the ulcer.  Culture was obtained which was appropriately sensitive to the Cipro.  He currently notes that has not increased any.  He continues to change the dressing daily.  He denies any chest pain, shortness of breath or cardiorespiratory complaints.  There are no GI or  complaints.      Current Outpatient Medications   Medication Sig Dispense Refill    ciprofloxacin (CIPRO) 500 MG tablet Take 1 tablet by mouth 2 times daily for 10 days 28 tablet 0    diclofenac (VOLTAREN) 75 MG EC tablet Take 1 tablet by mouth at bedtime Take with food 60 tablet 2    bumetanide (BUMEX) 2 MG tablet Take 1 tablet by mouth daily 60 tablet 3    Lactobacillus (PROBIOTIC ACIDOPHILUS) CAPS Take 1 daily 30 capsule 5    Docusate Sodium (STOOL SOFTENER LAXATIVE PO) Take by mouth daily      pregabalin (LYRICA) 75 MG capsule Take 1 capsule by mouth daily for 180 days. Max Daily Amount: 75 mg 30 capsule 5    glimepiride (AMARYL) 4 MG tablet TAKE 1 TABLET BY MOUTH EVERY DAY BEFORE BREAKFAST 90 tablet 1    terazosin (HYTRIN) 2 MG capsule TAKE 1 CAPSULE BY MOUTH EVERY DAY 90 capsule 3    simvastatin (ZOCOR) 20 MG tablet TAKE 1 TABLET BY MOUTH EVERY DAY 90 tablet 3    metFORMIN (GLUCOPHAGE) 500 MG tablet TAKE 1 TABLET BY MOUTH EVERY DAY IN THE MORNING WITH BREAKFAST AND 1 TABLET BEFORE DINNER 180 tablet 3    acetaminophen (TYLENOL) 500 MG tablet Take 2 tablets by mouth in the morning and at bedtime      Omega-3 Fatty Acids (FISH OIL) 1200 MG CAPS Take 2,400 mg by mouth in the morning and at

## 2024-02-10 LAB
ANION GAP SERPL CALC-SCNC: 5 MMOL/L (ref 5–15)
BUN SERPL-MCNC: 36 MG/DL (ref 6–20)
BUN/CREAT SERPL: 21 (ref 12–20)
CALCIUM SERPL-MCNC: 9.3 MG/DL (ref 8.5–10.1)
CHLORIDE SERPL-SCNC: 107 MMOL/L (ref 97–108)
CO2 SERPL-SCNC: 26 MMOL/L (ref 21–32)
CREAT SERPL-MCNC: 1.7 MG/DL (ref 0.7–1.3)
GLUCOSE SERPL-MCNC: 311 MG/DL (ref 65–100)
POTASSIUM SERPL-SCNC: 4.7 MMOL/L (ref 3.5–5.1)
SODIUM SERPL-SCNC: 138 MMOL/L (ref 136–145)

## 2024-02-12 RX ORDER — DICLOFENAC SODIUM 75 MG/1
75 TABLET, DELAYED RELEASE ORAL 2 TIMES DAILY
Qty: 60 TABLET | Refills: 3 | Status: SHIPPED | OUTPATIENT
Start: 2024-02-12

## 2024-02-12 NOTE — TELEPHONE ENCOUNTER
Requested Prescriptions     Pending Prescriptions Disp Refills    diclofenac (VOLTAREN) 75 MG EC tablet [Pharmacy Med Name: DICLOFENAC SOD EC 75 MG TAB] 60 tablet 3     Sig: TAKE 1 TABLET BY MOUTH TWICE A DAY AS NEEDED FOR PAIN     Last fill 12/19/2023 for #60 w/ 3 addtnl  Last OV 2/9/2024  Next OV 2/16/2024    Shabana Bedoya LPN

## 2024-02-16 ENCOUNTER — OFFICE VISIT (OUTPATIENT)
Facility: CLINIC | Age: 89
End: 2024-02-16
Payer: MEDICARE

## 2024-02-16 VITALS
RESPIRATION RATE: 17 BRPM | SYSTOLIC BLOOD PRESSURE: 100 MMHG | TEMPERATURE: 98.4 F | WEIGHT: 202.3 LBS | HEART RATE: 78 BPM | BODY MASS INDEX: 25.15 KG/M2 | DIASTOLIC BLOOD PRESSURE: 60 MMHG | OXYGEN SATURATION: 97 % | HEIGHT: 75 IN

## 2024-02-16 DIAGNOSIS — E11.9 TYPE 2 DIABETES MELLITUS WITHOUT COMPLICATION, WITHOUT LONG-TERM CURRENT USE OF INSULIN (HCC): ICD-10-CM

## 2024-02-16 DIAGNOSIS — N18.31 STAGE 3A CHRONIC KIDNEY DISEASE (HCC): ICD-10-CM

## 2024-02-16 DIAGNOSIS — I48.0 PAROXYSMAL ATRIAL FIBRILLATION (HCC): ICD-10-CM

## 2024-02-16 DIAGNOSIS — D64.9 ANEMIA, UNSPECIFIED TYPE: ICD-10-CM

## 2024-02-16 DIAGNOSIS — E11.621 DIABETIC ULCER OF TOE OF LEFT FOOT ASSOCIATED WITH TYPE 2 DIABETES MELLITUS, LIMITED TO BREAKDOWN OF SKIN (HCC): Primary | ICD-10-CM

## 2024-02-16 DIAGNOSIS — I50.32 DIASTOLIC CHF, CHRONIC (HCC): ICD-10-CM

## 2024-02-16 DIAGNOSIS — L97.521 DIABETIC ULCER OF TOE OF LEFT FOOT ASSOCIATED WITH TYPE 2 DIABETES MELLITUS, LIMITED TO BREAKDOWN OF SKIN (HCC): Primary | ICD-10-CM

## 2024-02-16 LAB
ANION GAP SERPL CALC-SCNC: 4 MMOL/L (ref 5–15)
BUN SERPL-MCNC: 33 MG/DL (ref 6–20)
BUN/CREAT SERPL: 18 (ref 12–20)
CALCIUM SERPL-MCNC: 9.5 MG/DL (ref 8.5–10.1)
CHLORIDE SERPL-SCNC: 108 MMOL/L (ref 97–108)
CO2 SERPL-SCNC: 26 MMOL/L (ref 21–32)
CREAT SERPL-MCNC: 1.86 MG/DL (ref 0.7–1.3)
GLUCOSE SERPL-MCNC: 321 MG/DL (ref 65–100)
INR PPP: 1.3 (ref 0.9–1.1)
POTASSIUM SERPL-SCNC: 5 MMOL/L (ref 3.5–5.1)
PROTHROMBIN TIME: 13.5 SEC (ref 9–11.1)
SODIUM SERPL-SCNC: 138 MMOL/L (ref 136–145)

## 2024-02-16 PROCEDURE — 1123F ACP DISCUSS/DSCN MKR DOCD: CPT | Performed by: INTERNAL MEDICINE

## 2024-02-16 PROCEDURE — G8484 FLU IMMUNIZE NO ADMIN: HCPCS | Performed by: INTERNAL MEDICINE

## 2024-02-16 PROCEDURE — 1036F TOBACCO NON-USER: CPT | Performed by: INTERNAL MEDICINE

## 2024-02-16 PROCEDURE — G8419 CALC BMI OUT NRM PARAM NOF/U: HCPCS | Performed by: INTERNAL MEDICINE

## 2024-02-16 PROCEDURE — 99213 OFFICE O/P EST LOW 20 MIN: CPT | Performed by: INTERNAL MEDICINE

## 2024-02-16 PROCEDURE — G8427 DOCREV CUR MEDS BY ELIG CLIN: HCPCS | Performed by: INTERNAL MEDICINE

## 2024-02-16 NOTE — PROGRESS NOTES
Chief Complaint   Patient presents with    Diabetes     1 week f/up       SUBJECTIVE:    Baron Wagner Sr. is a 89 y.o. male who returns in follow-up for his diabetes which has been recently out-of-control, CKD, congestive heart failure and a diabetic foot ulcer on his left first toe as well as A-fib.  He is taking his medication transformers diet remains physically active he is still dressing his foot on a daily basis he notes the drainage has essentially stopped.  He currently denies any chest pain, shortness of breath or cardiorespiratory complaints.  There are no GI or  complaints.  He notes no headaches, dizziness or neurologic complaints.  There are no other problems on complete review of systems.      Current Outpatient Medications   Medication Sig Dispense Refill    diclofenac (VOLTAREN) 75 MG EC tablet TAKE 1 TABLET BY MOUTH TWICE A DAY AS NEEDED FOR PAIN 60 tablet 3    bumetanide (BUMEX) 2 MG tablet Take 1 tablet by mouth daily 60 tablet 3    Lactobacillus (PROBIOTIC ACIDOPHILUS) CAPS Take 1 daily 30 capsule 5    Docusate Sodium (STOOL SOFTENER LAXATIVE PO) Take by mouth daily      pregabalin (LYRICA) 75 MG capsule Take 1 capsule by mouth daily for 180 days. Max Daily Amount: 75 mg 30 capsule 5    glimepiride (AMARYL) 4 MG tablet TAKE 1 TABLET BY MOUTH EVERY DAY BEFORE BREAKFAST 90 tablet 1    terazosin (HYTRIN) 2 MG capsule TAKE 1 CAPSULE BY MOUTH EVERY DAY 90 capsule 3    simvastatin (ZOCOR) 20 MG tablet TAKE 1 TABLET BY MOUTH EVERY DAY 90 tablet 3    metFORMIN (GLUCOPHAGE) 500 MG tablet TAKE 1 TABLET BY MOUTH EVERY DAY IN THE MORNING WITH BREAKFAST AND 1 TABLET BEFORE DINNER 180 tablet 3    acetaminophen (TYLENOL) 500 MG tablet Take 2 tablets by mouth in the morning and at bedtime      Omega-3 Fatty Acids (FISH OIL) 1200 MG CAPS Take 2,400 mg by mouth in the morning and at bedtime      glucosamine-chondroitin 500-400 MG CAPS Take 1 capsule by mouth daily      vitamin E 400 UNIT capsule Take 1

## 2024-02-16 NOTE — PROGRESS NOTES
Baron Wagner  is a 89 y.o. male     Chief Complaint   Patient presents with    Diabetes     1 week f/up       /60 (Site: Left Upper Arm, Position: Sitting, Cuff Size: Large Adult)   Pulse 78   Temp 98.4 °F (36.9 °C) (Oral)   Resp 17   Ht 1.905 m (6' 3\")   Wt 91.8 kg (202 lb 4.8 oz)   SpO2 97%   BMI 25.29 kg/m²     Health Maintenance Due   Topic Date Due    COVID-19 Vaccine (1) Never done    Shingles vaccine (1 of 2) Never done    Respiratory Syncytial Virus (RSV) Pregnant or age 60 yrs+ (1 - 1-dose 60+ series) Never done    Pneumococcal 65+ years Vaccine (2 - PPSV23 or PCV20) 03/04/2005    Annual Wellness Visit (Medicare Advantage)  01/01/2024         \"Have you been to the ER, urgent care clinic since your last visit?  Hospitalized since your last visit?\"    NO    “Have you seen or consulted any other health care providers outside of [facility] since your last visit?”    NO

## 2024-02-17 LAB
BASOPHILS # BLD: 0 K/UL (ref 0–0.1)
BASOPHILS NFR BLD: 0 % (ref 0–1)
DIFFERENTIAL METHOD BLD: ABNORMAL
EOSINOPHIL # BLD: 0 K/UL (ref 0–0.4)
EOSINOPHIL NFR BLD: 0 % (ref 0–7)
ERYTHROCYTE [DISTWIDTH] IN BLOOD BY AUTOMATED COUNT: 25.8 % (ref 11.5–14.5)
HCT VFR BLD AUTO: 35.3 % (ref 36.6–50.3)
HGB BLD-MCNC: 10.6 G/DL (ref 12.1–17)
IMM GRANULOCYTES # BLD AUTO: 0.1 K/UL (ref 0–0.04)
IMM GRANULOCYTES NFR BLD AUTO: 1 % (ref 0–0.5)
LYMPHOCYTES # BLD: 1.2 K/UL (ref 0.8–3.5)
LYMPHOCYTES NFR BLD: 15 % (ref 12–49)
MCH RBC QN AUTO: 25 PG (ref 26–34)
MCHC RBC AUTO-ENTMCNC: 30 G/DL (ref 30–36.5)
MCV RBC AUTO: 83.3 FL (ref 80–99)
MONOCYTES # BLD: 0.4 K/UL (ref 0–1)
MONOCYTES NFR BLD: 5 % (ref 5–13)
NEUTS SEG # BLD: 6.6 K/UL (ref 1.8–8)
NEUTS SEG NFR BLD: 79 % (ref 32–75)
NRBC # BLD: 0 K/UL (ref 0–0.01)
NRBC BLD-RTO: 0 PER 100 WBC
PLATELET # BLD AUTO: 161 K/UL (ref 150–400)
PMV BLD AUTO: 11.6 FL (ref 8.9–12.9)
RBC # BLD AUTO: 4.24 M/UL (ref 4.1–5.7)
RBC MORPH BLD: ABNORMAL
WBC # BLD AUTO: 8.3 K/UL (ref 4.1–11.1)

## 2024-03-03 NOTE — PROGRESS NOTES
Chief Complaint   Patient presents with    3 Month Follow-Up       SUBJECTIVE:    Baron Wagner Sr. is a 89 y.o. male who returns in follow-up for his medical problems to include hypertension, diabetes, hyperlipidemia, DJD, diabetic neuropathy, ASCVD, history of complete heart block status post pacemaker, history of paroxysmal atrial fibrillation, compensated diastolic heart failure, recurrent foot ulcers and other medical problems.  He generally feels well at the present time he has no active foot ulcers at the current time.  He has been monitoring his blood sugar closely his average over the past 2 weeks has been 147.  He notes no chest pain, shortness of breath or cardiac respiratory complaints.  There are no GI or  complaints.  He has no headaches, dizziness or neurologic complaints other than the numbness in his feet but that has not become any worse.  He has no change of his chronic arthritic complaints and there are no other complaints on complete review of systems.      Current Outpatient Medications   Medication Sig Dispense Refill    diclofenac (VOLTAREN) 75 MG EC tablet TAKE 1 TABLET BY MOUTH TWICE A DAY AS NEEDED FOR PAIN 60 tablet 3    bumetanide (BUMEX) 2 MG tablet Take 1 tablet by mouth daily 60 tablet 3    Lactobacillus (PROBIOTIC ACIDOPHILUS) CAPS Take 1 daily 30 capsule 5    Docusate Sodium (STOOL SOFTENER LAXATIVE PO) Take by mouth daily      pregabalin (LYRICA) 75 MG capsule Take 1 capsule by mouth daily for 180 days. Max Daily Amount: 75 mg 30 capsule 5    glimepiride (AMARYL) 4 MG tablet TAKE 1 TABLET BY MOUTH EVERY DAY BEFORE BREAKFAST 90 tablet 1    terazosin (HYTRIN) 2 MG capsule TAKE 1 CAPSULE BY MOUTH EVERY DAY 90 capsule 3    simvastatin (ZOCOR) 20 MG tablet TAKE 1 TABLET BY MOUTH EVERY DAY 90 tablet 3    metFORMIN (GLUCOPHAGE) 500 MG tablet TAKE 1 TABLET BY MOUTH EVERY DAY IN THE MORNING WITH BREAKFAST AND 1 TABLET BEFORE DINNER 180 tablet 3    acetaminophen (TYLENOL) 500 MG tablet

## 2024-03-04 ENCOUNTER — OFFICE VISIT (OUTPATIENT)
Facility: CLINIC | Age: 89
End: 2024-03-04
Payer: MEDICARE

## 2024-03-04 VITALS
WEIGHT: 198 LBS | TEMPERATURE: 97.5 F | HEIGHT: 75 IN | SYSTOLIC BLOOD PRESSURE: 134 MMHG | DIASTOLIC BLOOD PRESSURE: 83 MMHG | HEART RATE: 81 BPM | RESPIRATION RATE: 19 BRPM | BODY MASS INDEX: 24.62 KG/M2 | OXYGEN SATURATION: 98 %

## 2024-03-04 DIAGNOSIS — I12.9 HYPERTENSION WITH RENAL DISEASE: Primary | ICD-10-CM

## 2024-03-04 DIAGNOSIS — E11.9 TYPE 2 DIABETES MELLITUS WITHOUT COMPLICATION, WITHOUT LONG-TERM CURRENT USE OF INSULIN (HCC): ICD-10-CM

## 2024-03-04 DIAGNOSIS — I48.0 PAROXYSMAL ATRIAL FIBRILLATION (HCC): ICD-10-CM

## 2024-03-04 DIAGNOSIS — M35.3 POLYMYALGIA RHEUMATICA (HCC): ICD-10-CM

## 2024-03-04 DIAGNOSIS — I44.2 COMPLETE HEART BLOCK (HCC): ICD-10-CM

## 2024-03-04 DIAGNOSIS — E78.2 MIXED HYPERLIPIDEMIA: ICD-10-CM

## 2024-03-04 DIAGNOSIS — M15.9 PRIMARY OSTEOARTHRITIS INVOLVING MULTIPLE JOINTS: ICD-10-CM

## 2024-03-04 DIAGNOSIS — I25.10 ASCVD (ARTERIOSCLEROTIC CARDIOVASCULAR DISEASE): ICD-10-CM

## 2024-03-04 DIAGNOSIS — N18.31 STAGE 3A CHRONIC KIDNEY DISEASE (HCC): ICD-10-CM

## 2024-03-04 DIAGNOSIS — I50.32 DIASTOLIC CHF, CHRONIC (HCC): ICD-10-CM

## 2024-03-04 PROCEDURE — 99214 OFFICE O/P EST MOD 30 MIN: CPT | Performed by: INTERNAL MEDICINE

## 2024-03-04 PROCEDURE — G8484 FLU IMMUNIZE NO ADMIN: HCPCS | Performed by: INTERNAL MEDICINE

## 2024-03-04 PROCEDURE — 1123F ACP DISCUSS/DSCN MKR DOCD: CPT | Performed by: INTERNAL MEDICINE

## 2024-03-04 PROCEDURE — G8428 CUR MEDS NOT DOCUMENT: HCPCS | Performed by: INTERNAL MEDICINE

## 2024-03-04 PROCEDURE — 1036F TOBACCO NON-USER: CPT | Performed by: INTERNAL MEDICINE

## 2024-03-04 PROCEDURE — G8420 CALC BMI NORM PARAMETERS: HCPCS | Performed by: INTERNAL MEDICINE

## 2024-03-04 RX ORDER — BUMETANIDE 2 MG/1
2 TABLET ORAL 2 TIMES DAILY
Qty: 180 TABLET | Refills: 1 | Status: SHIPPED | OUTPATIENT
Start: 2024-03-04

## 2024-03-04 NOTE — TELEPHONE ENCOUNTER
PCP: Willem Powell MD    Last appt: 2/16/2024    Future Appointments   Date Time Provider Department Center   4/8/2024  1:50 PM Willem Powell MD [facility] AMB   5/8/2024  1:00 PM Willem Powell MD [facility] BS AMB   6/6/2024  9:10 AM Willem Powell MD [facility] AMB       Requested Prescriptions     Pending Prescriptions Disp Refills    bumetanide (BUMEX) 2 MG tablet [Pharmacy Med Name: BUMETANIDE 2 MG TABLET] 180 tablet 1     Sig: TAKE 1 TABLET BY MOUTH TWICE A DAY

## 2024-03-04 NOTE — PROGRESS NOTES
Baron Wagner  is a 89 y.o. male     Chief Complaint   Patient presents with    3 Month Follow-Up       /83 (Site: Left Upper Arm, Position: Sitting, Cuff Size: Medium Adult)   Pulse 81   Temp 97.5 °F (36.4 °C) (Oral)   Resp 19   Ht 1.905 m (6' 3\")   Wt 89.8 kg (198 lb)   SpO2 98%   BMI 24.75 kg/m²     Health Maintenance Due   Topic Date Due    COVID-19 Vaccine (1) Never done    Respiratory Syncytial Virus (RSV) Pregnant or age 60 yrs+ (1 - 1-dose 60+ series) Never done    Pneumococcal 65+ years Vaccine (2 - PPSV23 or PCV20) 03/04/2005    Annual Wellness Visit (Medicare Advantage)  01/01/2024         \"Have you been to the ER, urgent care clinic since your last visit?  Hospitalized since your last visit?\"    NO    “Have you seen or consulted any other health care providers outside of [facility] since your last visit?”    NO

## 2024-03-05 LAB
ALBUMIN SERPL-MCNC: 3.6 G/DL (ref 3.5–5)
ALBUMIN/GLOB SERPL: 1.1 (ref 1.1–2.2)
ALP SERPL-CCNC: 62 U/L (ref 45–117)
ALT SERPL-CCNC: 39 U/L (ref 12–78)
ANION GAP SERPL CALC-SCNC: 4 MMOL/L (ref 5–15)
AST SERPL-CCNC: 19 U/L (ref 15–37)
BILIRUB SERPL-MCNC: 0.5 MG/DL (ref 0.2–1)
BUN SERPL-MCNC: 35 MG/DL (ref 6–20)
BUN/CREAT SERPL: 23 (ref 12–20)
CALCIUM SERPL-MCNC: 10 MG/DL (ref 8.5–10.1)
CHLORIDE SERPL-SCNC: 106 MMOL/L (ref 97–108)
CHOLEST SERPL-MCNC: 170 MG/DL
CK SERPL-CCNC: 61 U/L (ref 39–308)
CO2 SERPL-SCNC: 29 MMOL/L (ref 21–32)
CREAT SERPL-MCNC: 1.52 MG/DL (ref 0.7–1.3)
ERYTHROCYTE [SEDIMENTATION RATE] IN BLOOD: 19 MM/HR (ref 0–20)
EST. AVERAGE GLUCOSE BLD GHB EST-MCNC: 180 MG/DL
GLOBULIN SER CALC-MCNC: 3.4 G/DL (ref 2–4)
GLUCOSE SERPL-MCNC: 173 MG/DL (ref 65–100)
HBA1C MFR BLD: 7.9 % (ref 4–5.6)
HDLC SERPL-MCNC: 40 MG/DL
HDLC SERPL: 4.3 (ref 0–5)
INR PPP: 1.3 (ref 0.9–1.1)
LDLC SERPL CALC-MCNC: 69.8 MG/DL (ref 0–100)
POTASSIUM SERPL-SCNC: 4.2 MMOL/L (ref 3.5–5.1)
PROT SERPL-MCNC: 7 G/DL (ref 6.4–8.2)
PROTHROMBIN TIME: 13.3 SEC (ref 9–11.1)
SODIUM SERPL-SCNC: 139 MMOL/L (ref 136–145)
TRIGL SERPL-MCNC: 301 MG/DL
VLDLC SERPL CALC-MCNC: 60.2 MG/DL

## 2024-03-06 ENCOUNTER — TELEPHONE (OUTPATIENT)
Facility: CLINIC | Age: 89
End: 2024-03-06

## 2024-03-08 ENCOUNTER — TELEPHONE (OUTPATIENT)
Facility: CLINIC | Age: 89
End: 2024-03-08

## 2024-03-08 NOTE — TELEPHONE ENCOUNTER
Pt called regarding new medication that he need to start. Medication queued up.    PCP: Willem Powell MD    Last appt: 3/4/2024    Future Appointments   Date Time Provider Department Center   4/8/2024  1:50 PM Willem Powell MD [facility] AMB   5/8/2024  1:00 PM Willem Powell MD [facility] AM[facility]   6/6/2024  9:10 AM [facility]       Requested Prescriptions     Pending Prescriptions Disp Refills    SITagliptin (JANUVIA) 100 MG tablet 30 tablet 0     Sig: Take 1 tablet by mouth daily

## 2024-03-14 RX ORDER — GLIMEPIRIDE 4 MG/1
4 TABLET ORAL
Qty: 90 TABLET | Refills: 1 | Status: SHIPPED | OUTPATIENT
Start: 2024-03-14

## 2024-03-14 NOTE — TELEPHONE ENCOUNTER
PCP: Willem Powell MD    Last appt: 3/4/2024    Future Appointments   Date Time Provider Department Center   4/8/2024  1:50 PM Willem Powell MD [facility] AMB   5/8/2024  1:00 PM Willem Powell MD [facility] AMB   6/6/2024  9:10 AM Willem Powell MD [facility] BS AMB       Requested Prescriptions     Pending Prescriptions Disp Refills    glimepiride (AMARYL) 4 MG tablet [Pharmacy Med Name: GLIMEPIRIDE 4 MG TABLET] 90 tablet 1     Sig: TAKE 1 TABLET BY MOUTH EVERY DAY BEFORE BREAKFAST

## 2024-04-07 NOTE — PROGRESS NOTES
Chief Complaint   Patient presents with    1 Month Follow-Up       SUBJECTIVE:    Baron Wagner Sr. is a 89 y.o. male who returns in follow-up for his medical problems including congestive heart failure, chronic kidney disease, diabetes, paroxysmal atrial fibrillation, DJD and other medical problems.  He notes his knees have become so bad now that he thinks he needs to get a shot in both knees which she has had done before and it seems to work for a while before.  He notes no chest pain, shortness of breath or cardiac respiratory complaints.  There are no GI or  complaints.  He has no headaches, dizziness or neurologic complaints.  He does have the arthritic complaints in his knees and wants a shot as noted.  He does note that his blood sugars have been doing much better since we started the Januvia but unfortunately his co-pay for that is $60 and makes him concerned.  He notes no other complaints on complete review of systems.      Current Outpatient Medications   Medication Sig Dispense Refill    glimepiride (AMARYL) 4 MG tablet TAKE 1 TABLET BY MOUTH EVERY DAY BEFORE BREAKFAST 90 tablet 1    bumetanide (BUMEX) 2 MG tablet TAKE 1 TABLET BY MOUTH TWICE A  tablet 1    diclofenac (VOLTAREN) 75 MG EC tablet TAKE 1 TABLET BY MOUTH TWICE A DAY AS NEEDED FOR PAIN 60 tablet 3    Lactobacillus (PROBIOTIC ACIDOPHILUS) CAPS Take 1 daily 30 capsule 5    Docusate Sodium (STOOL SOFTENER LAXATIVE PO) Take by mouth daily      pregabalin (LYRICA) 75 MG capsule Take 1 capsule by mouth daily for 180 days. Max Daily Amount: 75 mg 30 capsule 5    terazosin (HYTRIN) 2 MG capsule TAKE 1 CAPSULE BY MOUTH EVERY DAY 90 capsule 3    simvastatin (ZOCOR) 20 MG tablet TAKE 1 TABLET BY MOUTH EVERY DAY 90 tablet 3    metFORMIN (GLUCOPHAGE) 500 MG tablet TAKE 1 TABLET BY MOUTH EVERY DAY IN THE MORNING WITH BREAKFAST AND 1 TABLET BEFORE DINNER 180 tablet 3    acetaminophen (TYLENOL) 500 MG tablet Take 2 tablets by mouth in the morning

## 2024-04-08 ENCOUNTER — OFFICE VISIT (OUTPATIENT)
Facility: CLINIC | Age: 89
End: 2024-04-08
Payer: MEDICARE

## 2024-04-08 VITALS
HEART RATE: 77 BPM | BODY MASS INDEX: 24.93 KG/M2 | HEIGHT: 75 IN | OXYGEN SATURATION: 95 % | RESPIRATION RATE: 18 BRPM | SYSTOLIC BLOOD PRESSURE: 132 MMHG | WEIGHT: 200.5 LBS | DIASTOLIC BLOOD PRESSURE: 81 MMHG | TEMPERATURE: 98.1 F

## 2024-04-08 DIAGNOSIS — L97.521 ULCER OF BOTH FEET, LIMITED TO BREAKDOWN OF SKIN (HCC): ICD-10-CM

## 2024-04-08 DIAGNOSIS — M17.11 PRIMARY OSTEOARTHRITIS OF RIGHT KNEE: ICD-10-CM

## 2024-04-08 DIAGNOSIS — L97.511 ULCER OF BOTH FEET, LIMITED TO BREAKDOWN OF SKIN (HCC): ICD-10-CM

## 2024-04-08 DIAGNOSIS — M17.12 PRIMARY OSTEOARTHRITIS OF LEFT KNEE: ICD-10-CM

## 2024-04-08 DIAGNOSIS — I48.0 PAROXYSMAL ATRIAL FIBRILLATION (HCC): ICD-10-CM

## 2024-04-08 DIAGNOSIS — N18.31 STAGE 3A CHRONIC KIDNEY DISEASE (HCC): ICD-10-CM

## 2024-04-08 DIAGNOSIS — I50.32 DIASTOLIC CHF, CHRONIC (HCC): Primary | ICD-10-CM

## 2024-04-08 DIAGNOSIS — E11.9 TYPE 2 DIABETES MELLITUS WITHOUT COMPLICATION, WITHOUT LONG-TERM CURRENT USE OF INSULIN (HCC): ICD-10-CM

## 2024-04-08 PROCEDURE — 3051F HG A1C>EQUAL 7.0%<8.0%: CPT | Performed by: INTERNAL MEDICINE

## 2024-04-08 PROCEDURE — 99214 OFFICE O/P EST MOD 30 MIN: CPT | Performed by: INTERNAL MEDICINE

## 2024-04-08 PROCEDURE — 20610 DRAIN/INJ JOINT/BURSA W/O US: CPT | Performed by: INTERNAL MEDICINE

## 2024-04-08 PROCEDURE — 1036F TOBACCO NON-USER: CPT | Performed by: INTERNAL MEDICINE

## 2024-04-08 PROCEDURE — G8428 CUR MEDS NOT DOCUMENT: HCPCS | Performed by: INTERNAL MEDICINE

## 2024-04-08 PROCEDURE — 1123F ACP DISCUSS/DSCN MKR DOCD: CPT | Performed by: INTERNAL MEDICINE

## 2024-04-08 PROCEDURE — G8419 CALC BMI OUT NRM PARAM NOF/U: HCPCS | Performed by: INTERNAL MEDICINE

## 2024-04-08 RX ORDER — METHYLPREDNISOLONE ACETATE 40 MG/ML
40 INJECTION, SUSPENSION INTRA-ARTICULAR; INTRALESIONAL; INTRAMUSCULAR; SOFT TISSUE ONCE
Status: COMPLETED | OUTPATIENT
Start: 2024-04-08 | End: 2024-04-08

## 2024-04-08 RX ADMIN — METHYLPREDNISOLONE ACETATE 40 MG: 40 INJECTION, SUSPENSION INTRA-ARTICULAR; INTRALESIONAL; INTRAMUSCULAR; SOFT TISSUE at 15:28

## 2024-04-08 RX ADMIN — METHYLPREDNISOLONE ACETATE 40 MG: 40 INJECTION, SUSPENSION INTRA-ARTICULAR; INTRALESIONAL; INTRAMUSCULAR; SOFT TISSUE at 15:26

## 2024-04-08 NOTE — PROGRESS NOTES
Baron Wagner  is a 89 y.o. male     Chief Complaint   Patient presents with    1 Month Follow-Up       /81 (Site: Right Upper Arm, Position: Sitting, Cuff Size: Large Adult)   Pulse 77   Temp 98.1 °F (36.7 °C) (Temporal)   Resp 18   Ht 1.905 m (6' 3\")   Wt 90.9 kg (200 lb 8 oz)   SpO2 95%   BMI 25.06 kg/m²     Health Maintenance Due   Topic Date Due    COVID-19 Vaccine (1) Never done    Respiratory Syncytial Virus (RSV) Pregnant or age 60 yrs+ (1 - 1-dose 60+ series) Never done    Pneumococcal 65+ years Vaccine (2 of 2 - PPSV23 or PCV20) 03/04/2005    Annual Wellness Visit (Medicare Advantage)  01/01/2024         \"Have you been to the ER, urgent care clinic since your last visit?  Hospitalized since your last visit?\"    NO    “Have you seen or consulted any other health care providers outside of [facility] since your last visit?”    NO

## 2024-04-09 LAB
ANION GAP SERPL CALC-SCNC: 5 MMOL/L (ref 5–15)
BUN SERPL-MCNC: 40 MG/DL (ref 6–20)
BUN/CREAT SERPL: 23 (ref 12–20)
CALCIUM SERPL-MCNC: 10.1 MG/DL (ref 8.5–10.1)
CHLORIDE SERPL-SCNC: 106 MMOL/L (ref 97–108)
CO2 SERPL-SCNC: 30 MMOL/L (ref 21–32)
CREAT SERPL-MCNC: 1.71 MG/DL (ref 0.7–1.3)
GLUCOSE SERPL-MCNC: 234 MG/DL (ref 65–100)
INR PPP: 1.2 (ref 0.9–1.1)
POTASSIUM SERPL-SCNC: 4.9 MMOL/L (ref 3.5–5.1)
PROTHROMBIN TIME: 12.1 SEC (ref 9–11.1)
SODIUM SERPL-SCNC: 141 MMOL/L (ref 136–145)

## 2024-04-11 RX ORDER — WARFARIN SODIUM 5 MG/1
TABLET ORAL
Qty: 90 TABLET | Refills: 3 | Status: SHIPPED | OUTPATIENT
Start: 2024-04-11 | End: 2024-04-12 | Stop reason: SDUPTHER

## 2024-04-11 NOTE — TELEPHONE ENCOUNTER
Rx refill:        warfarin (COUMADIN) 5 MG tablet [[id number]]    Order Details  Dose, Route, Frequency: As Directed   Dispense Quantity: -- Refills: --          Sig: Take 1 tablet by mouth Take 1 1/2 tablet on Monday and Fridays and 1 tablet on the other days       Pharmacy    [facility]/pharmacy #[id number] - [place], [place] - [address] - P [phone] - F [phone]  [id number] [id number]  Phone: [phone]  Fax: [phone]

## 2024-04-12 RX ORDER — WARFARIN SODIUM 5 MG/1
TABLET ORAL
Qty: 145 TABLET | Refills: 0 | Status: SHIPPED | OUTPATIENT
Start: 2024-04-12

## 2024-04-12 NOTE — TELEPHONE ENCOUNTER
PCP: Willem Powell MD    Last appt: 4/8/2024    Future Appointments   Date Time Provider Department Center   5/8/2024  1:00 PM Willem Powell MD [facility] AMB   6/6/2024  9:10 AM Willem Powell MD [facility] BS AMB       Requested Prescriptions     Pending Prescriptions Disp Refills    warfarin (COUMADIN) 5 MG tablet 145 tablet 0     Sig: Take 1 tablet by mouth Take 1 1/2 tablet on Monday and Fridays and 1 tablet on the other days

## 2024-04-16 ENCOUNTER — ANTI-COAG VISIT (OUTPATIENT)
Facility: CLINIC | Age: 89
End: 2024-04-16

## 2024-04-16 NOTE — PROGRESS NOTES
Spoke to patient's daughter in law regarding patient's INR of 1.2.  Dr. Powell recommends changing the dose to 5 mg on Sunday, Tuesday and Thursday; and 7.5 mg all other days.  F/up as scheduled.

## 2024-05-08 ENCOUNTER — OFFICE VISIT (OUTPATIENT)
Facility: CLINIC | Age: 89
End: 2024-05-08
Payer: MEDICARE

## 2024-05-08 VITALS
HEIGHT: 75 IN | SYSTOLIC BLOOD PRESSURE: 134 MMHG | WEIGHT: 203 LBS | DIASTOLIC BLOOD PRESSURE: 74 MMHG | HEART RATE: 80 BPM | OXYGEN SATURATION: 94 % | BODY MASS INDEX: 25.24 KG/M2 | RESPIRATION RATE: 18 BRPM | TEMPERATURE: 98.2 F

## 2024-05-08 DIAGNOSIS — E11.621 DIABETIC ULCER OF TOE OF LEFT FOOT ASSOCIATED WITH TYPE 2 DIABETES MELLITUS, LIMITED TO BREAKDOWN OF SKIN (HCC): ICD-10-CM

## 2024-05-08 DIAGNOSIS — I50.32 DIASTOLIC CHF, CHRONIC (HCC): Primary | ICD-10-CM

## 2024-05-08 DIAGNOSIS — N18.31 STAGE 3A CHRONIC KIDNEY DISEASE (HCC): ICD-10-CM

## 2024-05-08 DIAGNOSIS — E11.9 TYPE 2 DIABETES MELLITUS WITHOUT COMPLICATION, WITHOUT LONG-TERM CURRENT USE OF INSULIN (HCC): ICD-10-CM

## 2024-05-08 DIAGNOSIS — L97.521 DIABETIC ULCER OF TOE OF LEFT FOOT ASSOCIATED WITH TYPE 2 DIABETES MELLITUS, LIMITED TO BREAKDOWN OF SKIN (HCC): ICD-10-CM

## 2024-05-08 DIAGNOSIS — I48.0 PAROXYSMAL ATRIAL FIBRILLATION (HCC): ICD-10-CM

## 2024-05-08 PROCEDURE — 1036F TOBACCO NON-USER: CPT | Performed by: INTERNAL MEDICINE

## 2024-05-08 PROCEDURE — 3051F HG A1C>EQUAL 7.0%<8.0%: CPT | Performed by: INTERNAL MEDICINE

## 2024-05-08 PROCEDURE — G8427 DOCREV CUR MEDS BY ELIG CLIN: HCPCS | Performed by: INTERNAL MEDICINE

## 2024-05-08 PROCEDURE — G8419 CALC BMI OUT NRM PARAM NOF/U: HCPCS | Performed by: INTERNAL MEDICINE

## 2024-05-08 PROCEDURE — 1123F ACP DISCUSS/DSCN MKR DOCD: CPT | Performed by: INTERNAL MEDICINE

## 2024-05-08 PROCEDURE — 99214 OFFICE O/P EST MOD 30 MIN: CPT | Performed by: INTERNAL MEDICINE

## 2024-05-08 NOTE — PROGRESS NOTES
Baron Wagner  is a 89 y.o. male     Chief Complaint   Patient presents with    1 Month Follow-Up       /68 (Site: Left Upper Arm, Position: Sitting, Cuff Size: Large Adult)   Pulse 80   Temp 98.2 °F (36.8 °C) (Temporal)   Resp 18   Ht 1.905 m (6' 3\")   Wt 92.1 kg (203 lb)   SpO2 94%   BMI 25.37 kg/m²     Health Maintenance Due   Topic Date Due    COVID-19 Vaccine (1) Never done    Respiratory Syncytial Virus (RSV) Pregnant or age 60 yrs+ (1 - 1-dose 60+ series) Never done    Pneumococcal 65+ years Vaccine (2 of 2 - PPSV23 or PCV20) 03/04/2005    Annual Wellness Visit (Medicare Advantage)  01/01/2024    Shingles vaccine (2 of 2) 04/12/2024         \"Have you been to the ER, urgent care clinic since your last visit?  Hospitalized since your last visit?\"    NO    “Have you seen or consulted any other health care providers outside of [facility] since your last visit?”    NO                     
 Although proofread, it may and can contain electronic and spelling errors.  Other human spelling and other errors may be present.  Corrections may be executed at a later time.  Please feel free to contact us for any clarifications as needed.      No follow-up provider specified.    No results found for any visits on 05/08/24.    Willem Powell MD    The patient verbalized understanding of the problems and plans as explained.

## 2024-05-09 LAB
ANION GAP SERPL CALC-SCNC: 4 MMOL/L (ref 5–15)
BUN SERPL-MCNC: 42 MG/DL (ref 6–20)
BUN/CREAT SERPL: 22 (ref 12–20)
CALCIUM SERPL-MCNC: 9.7 MG/DL (ref 8.5–10.1)
CHLORIDE SERPL-SCNC: 107 MMOL/L (ref 97–108)
CO2 SERPL-SCNC: 29 MMOL/L (ref 21–32)
CREAT SERPL-MCNC: 1.94 MG/DL (ref 0.7–1.3)
GLUCOSE SERPL-MCNC: 175 MG/DL (ref 65–100)
INR PPP: 1.3 (ref 0.9–1.1)
POTASSIUM SERPL-SCNC: 4.8 MMOL/L (ref 3.5–5.1)
PROTHROMBIN TIME: 12.9 SEC (ref 9–11.1)
SODIUM SERPL-SCNC: 140 MMOL/L (ref 136–145)

## 2024-05-15 DIAGNOSIS — E11.65 POORLY CONTROLLED DIABETES MELLITUS (HCC): ICD-10-CM

## 2024-05-15 RX ORDER — PREGABALIN 75 MG/1
75 CAPSULE ORAL DAILY
Qty: 90 CAPSULE | Refills: 1 | Status: SHIPPED | OUTPATIENT
Start: 2024-05-15 | End: 2024-11-11

## 2024-05-15 NOTE — TELEPHONE ENCOUNTER
RX refill request from the patient/pharmacy. Patient last seen 05- with labs, and next appt. scheduled for 06-  Requested Prescriptions     Pending Prescriptions Disp Refills    pregabalin (LYRICA) 75 MG capsule [Pharmacy Med Name: PREGABALIN 75 MG CAPSULE] 90 capsule 1     Sig: Take 1 capsule by mouth daily for 90 days. Max Daily Amount: 75 mg    .

## 2024-05-20 ENCOUNTER — ANTI-COAG VISIT (OUTPATIENT)
Facility: CLINIC | Age: 89
End: 2024-05-20

## 2024-05-20 NOTE — PROGRESS NOTES
Spoke to patient's daughter in law regarding patient's INR.  Advised to continue the same dose of blood thinner which is 5 mg every Sunday, Tuesday and Thursday; 7.5 mg all other days and f/up as scheduled.

## 2024-05-29 RX ORDER — BUMETANIDE 2 MG/1
2 TABLET ORAL DAILY
Qty: 90 TABLET | Refills: 1 | Status: SHIPPED | OUTPATIENT
Start: 2024-05-29

## 2024-05-29 NOTE — TELEPHONE ENCOUNTER
RX refill request from the patient/pharmacy. Patient last seen 05- with labs, and next appt. scheduled for 06-  Requested Prescriptions     Pending Prescriptions Disp Refills    bumetanide (BUMEX) 2 MG tablet [Pharmacy Med Name: BUMETANIDE 2 MG TABLET] 90 tablet 1     Sig: TAKE 1 TABLET BY MOUTH EVERY DAY    .

## 2024-06-06 ENCOUNTER — OFFICE VISIT (OUTPATIENT)
Facility: CLINIC | Age: 89
End: 2024-06-06
Payer: MEDICARE

## 2024-06-06 VITALS
HEIGHT: 75 IN | BODY MASS INDEX: 25.12 KG/M2 | DIASTOLIC BLOOD PRESSURE: 76 MMHG | RESPIRATION RATE: 18 BRPM | WEIGHT: 202 LBS | SYSTOLIC BLOOD PRESSURE: 116 MMHG | HEART RATE: 70 BPM | TEMPERATURE: 98.4 F | OXYGEN SATURATION: 97 %

## 2024-06-06 DIAGNOSIS — N40.0 BENIGN PROSTATIC HYPERPLASIA WITHOUT LOWER URINARY TRACT SYMPTOMS: ICD-10-CM

## 2024-06-06 DIAGNOSIS — I44.2 COMPLETE HEART BLOCK (HCC): ICD-10-CM

## 2024-06-06 DIAGNOSIS — I50.32 DIASTOLIC CHF, CHRONIC (HCC): ICD-10-CM

## 2024-06-06 DIAGNOSIS — I25.10 ASCVD (ARTERIOSCLEROTIC CARDIOVASCULAR DISEASE): ICD-10-CM

## 2024-06-06 DIAGNOSIS — Z00.00 MEDICARE ANNUAL WELLNESS VISIT, SUBSEQUENT: ICD-10-CM

## 2024-06-06 DIAGNOSIS — E11.9 TYPE 2 DIABETES MELLITUS WITHOUT COMPLICATION, WITHOUT LONG-TERM CURRENT USE OF INSULIN (HCC): ICD-10-CM

## 2024-06-06 DIAGNOSIS — E78.2 MIXED HYPERLIPIDEMIA: ICD-10-CM

## 2024-06-06 DIAGNOSIS — E55.9 VITAMIN D DEFICIENCY: ICD-10-CM

## 2024-06-06 DIAGNOSIS — I12.9 HYPERTENSION WITH RENAL DISEASE: Primary | ICD-10-CM

## 2024-06-06 DIAGNOSIS — M35.3 POLYMYALGIA RHEUMATICA (HCC): ICD-10-CM

## 2024-06-06 DIAGNOSIS — N18.31 STAGE 3A CHRONIC KIDNEY DISEASE (HCC): ICD-10-CM

## 2024-06-06 DIAGNOSIS — J30.89 CHRONIC NON-SEASONAL ALLERGIC RHINITIS: ICD-10-CM

## 2024-06-06 DIAGNOSIS — Z13.39 ALCOHOL SCREENING: ICD-10-CM

## 2024-06-06 DIAGNOSIS — I48.0 PAROXYSMAL ATRIAL FIBRILLATION (HCC): ICD-10-CM

## 2024-06-06 DIAGNOSIS — M15.9 PRIMARY OSTEOARTHRITIS INVOLVING MULTIPLE JOINTS: ICD-10-CM

## 2024-06-06 LAB
25(OH)D3 SERPL-MCNC: 38.1 NG/ML (ref 30–100)
ALBUMIN SERPL-MCNC: 3.6 G/DL (ref 3.5–5)
ALBUMIN/GLOB SERPL: 1.1 (ref 1.1–2.2)
ALP SERPL-CCNC: 54 U/L (ref 45–117)
ALT SERPL-CCNC: 36 U/L (ref 12–78)
ANION GAP SERPL CALC-SCNC: 5 MMOL/L (ref 5–15)
APPEARANCE UR: CLEAR
AST SERPL-CCNC: 19 U/L (ref 15–37)
BACTERIA URNS QL MICRO: NEGATIVE /HPF
BASOPHILS # BLD: 0 K/UL (ref 0–0.1)
BASOPHILS NFR BLD: 1 % (ref 0–1)
BILIRUB SERPL-MCNC: 0.5 MG/DL (ref 0.2–1)
BILIRUB UR QL: NEGATIVE
BUN SERPL-MCNC: 37 MG/DL (ref 6–20)
BUN/CREAT SERPL: 24 (ref 12–20)
CALCIUM SERPL-MCNC: 9.4 MG/DL (ref 8.5–10.1)
CHLORIDE SERPL-SCNC: 107 MMOL/L (ref 97–108)
CHOLEST SERPL-MCNC: 154 MG/DL
CK SERPL-CCNC: 78 U/L (ref 39–308)
CO2 SERPL-SCNC: 29 MMOL/L (ref 21–32)
COLOR UR: ABNORMAL
CREAT SERPL-MCNC: 1.57 MG/DL (ref 0.7–1.3)
CREAT UR-MCNC: 121 MG/DL
DIFFERENTIAL METHOD BLD: ABNORMAL
EOSINOPHIL # BLD: 0.1 K/UL (ref 0–0.4)
EOSINOPHIL NFR BLD: 1 % (ref 0–7)
EPITH CASTS URNS QL MICRO: ABNORMAL /LPF
ERYTHROCYTE [DISTWIDTH] IN BLOOD BY AUTOMATED COUNT: 16 % (ref 11.5–14.5)
ERYTHROCYTE [SEDIMENTATION RATE] IN BLOOD: 21 MM/HR (ref 0–20)
EST. AVERAGE GLUCOSE BLD GHB EST-MCNC: 163 MG/DL
GLOBULIN SER CALC-MCNC: 3.3 G/DL (ref 2–4)
GLUCOSE SERPL-MCNC: 140 MG/DL (ref 65–100)
GLUCOSE UR STRIP.AUTO-MCNC: NEGATIVE MG/DL
HBA1C MFR BLD: 7.3 % (ref 4–5.6)
HCT VFR BLD AUTO: 39 % (ref 36.6–50.3)
HDLC SERPL-MCNC: 44 MG/DL
HDLC SERPL: 3.5 (ref 0–5)
HGB BLD-MCNC: 11.6 G/DL (ref 12.1–17)
HGB UR QL STRIP: NEGATIVE
HYALINE CASTS URNS QL MICRO: ABNORMAL /LPF (ref 0–5)
IMM GRANULOCYTES # BLD AUTO: 0.1 K/UL (ref 0–0.04)
IMM GRANULOCYTES NFR BLD AUTO: 1 % (ref 0–0.5)
INR PPP: 1.3 (ref 0.9–1.1)
KETONES UR QL STRIP.AUTO: NEGATIVE MG/DL
LDLC SERPL CALC-MCNC: 69.6 MG/DL (ref 0–100)
LEUKOCYTE ESTERASE UR QL STRIP.AUTO: NEGATIVE
LYMPHOCYTES # BLD: 2 K/UL (ref 0.8–3.5)
LYMPHOCYTES NFR BLD: 27 % (ref 12–49)
MCH RBC QN AUTO: 28.3 PG (ref 26–34)
MCHC RBC AUTO-ENTMCNC: 29.7 G/DL (ref 30–36.5)
MCV RBC AUTO: 95.1 FL (ref 80–99)
MICROALBUMIN UR-MCNC: 2.59 MG/DL
MICROALBUMIN/CREAT UR-RTO: 21 MG/G (ref 0–30)
MONOCYTES # BLD: 0.7 K/UL (ref 0–1)
MONOCYTES NFR BLD: 9 % (ref 5–13)
NEUTS SEG # BLD: 4.7 K/UL (ref 1.8–8)
NEUTS SEG NFR BLD: 61 % (ref 32–75)
NITRITE UR QL STRIP.AUTO: NEGATIVE
NRBC # BLD: 0 K/UL (ref 0–0.01)
NRBC BLD-RTO: 0 PER 100 WBC
PH UR STRIP: 5.5 (ref 5–8)
PLATELET # BLD AUTO: 150 K/UL (ref 150–400)
PMV BLD AUTO: 11.4 FL (ref 8.9–12.9)
POTASSIUM SERPL-SCNC: 4.2 MMOL/L (ref 3.5–5.1)
PROT SERPL-MCNC: 6.9 G/DL (ref 6.4–8.2)
PROT UR STRIP-MCNC: ABNORMAL MG/DL
PROTHROMBIN TIME: 13.2 SEC (ref 9–11.1)
RBC # BLD AUTO: 4.1 M/UL (ref 4.1–5.7)
RBC #/AREA URNS HPF: ABNORMAL /HPF (ref 0–5)
SODIUM SERPL-SCNC: 141 MMOL/L (ref 136–145)
SP GR UR REFRACTOMETRY: 1.02 (ref 1–1.03)
T4 FREE SERPL-MCNC: 0.9 NG/DL (ref 0.8–1.5)
TRIGL SERPL-MCNC: 202 MG/DL
TSH SERPL DL<=0.05 MIU/L-ACNC: 1.19 UIU/ML (ref 0.36–3.74)
UROBILINOGEN UR QL STRIP.AUTO: 0.2 EU/DL (ref 0.2–1)
VLDLC SERPL CALC-MCNC: 40.4 MG/DL
WBC # BLD AUTO: 7.5 K/UL (ref 4.1–11.1)
WBC URNS QL MICRO: ABNORMAL /HPF (ref 0–4)

## 2024-06-06 PROCEDURE — G8419 CALC BMI OUT NRM PARAM NOF/U: HCPCS | Performed by: INTERNAL MEDICINE

## 2024-06-06 PROCEDURE — G8427 DOCREV CUR MEDS BY ELIG CLIN: HCPCS | Performed by: INTERNAL MEDICINE

## 2024-06-06 PROCEDURE — 93000 ELECTROCARDIOGRAM COMPLETE: CPT | Performed by: INTERNAL MEDICINE

## 2024-06-06 PROCEDURE — 1123F ACP DISCUSS/DSCN MKR DOCD: CPT | Performed by: INTERNAL MEDICINE

## 2024-06-06 PROCEDURE — 3051F HG A1C>EQUAL 7.0%<8.0%: CPT | Performed by: INTERNAL MEDICINE

## 2024-06-06 PROCEDURE — 1036F TOBACCO NON-USER: CPT | Performed by: INTERNAL MEDICINE

## 2024-06-06 PROCEDURE — 99214 OFFICE O/P EST MOD 30 MIN: CPT | Performed by: INTERNAL MEDICINE

## 2024-06-06 PROCEDURE — G0439 PPPS, SUBSEQ VISIT: HCPCS | Performed by: INTERNAL MEDICINE

## 2024-06-06 PROCEDURE — G0442 ANNUAL ALCOHOL SCREEN 15 MIN: HCPCS | Performed by: INTERNAL MEDICINE

## 2024-06-06 ASSESSMENT — PATIENT HEALTH QUESTIONNAIRE - PHQ9
SUM OF ALL RESPONSES TO PHQ QUESTIONS 1-9: 0
2. FEELING DOWN, DEPRESSED OR HOPELESS: NOT AT ALL
SUM OF ALL RESPONSES TO PHQ9 QUESTIONS 1 & 2: 0
1. LITTLE INTEREST OR PLEASURE IN DOING THINGS: NOT AT ALL
SUM OF ALL RESPONSES TO PHQ QUESTIONS 1-9: 0

## 2024-06-06 ASSESSMENT — LIFESTYLE VARIABLES
HOW OFTEN DO YOU HAVE A DRINK CONTAINING ALCOHOL: NEVER
HOW MANY STANDARD DRINKS CONTAINING ALCOHOL DO YOU HAVE ON A TYPICAL DAY: PATIENT DOES NOT DRINK

## 2024-06-06 NOTE — PROGRESS NOTES
Baron Wagner  is a 89 y.o. male     Chief Complaint   Patient presents with    Medicare AWV       /76 (Site: Right Upper Arm, Position: Sitting, Cuff Size: Large Adult)   Pulse 70   Temp 98.4 °F (36.9 °C) (Temporal)   Resp 18   Ht 1.905 m (6' 3\")   Wt 91.6 kg (202 lb)   SpO2 97%   BMI 25.25 kg/m²     Health Maintenance Due   Topic Date Due    COVID-19 Vaccine (1) Never done    Respiratory Syncytial Virus (RSV) Pregnant or age 60 yrs+ (1 - 1-dose 60+ series) Never done    Pneumococcal 65+ years Vaccine (2 of 2 - PPSV23 or PCV20) 03/04/2005    Annual Wellness Visit (Medicare Advantage)  01/01/2024         \"Have you been to the ER, urgent care clinic since your last visit?  Hospitalized since your last visit?\"    NO    “Have you seen or consulted any other health care providers outside of [facility] since your last visit?”    NO                   
encouraged to make appointment with their eye specialist            ROS:    Constitutional: He denies fevers, weight loss, sweats.  Eyes: No blurred or double vision.  ENT: No difficulty with swallowing, taste, speech or smell.  Neck: no stiffness or swelling  Respiratory: No cough wheezing or shortness of breath.  Cardiovascular: Denies chest pain, palpitations, unexplained indigestion or syncope.  Gastrointestinal:  No changes in bowel movements, no abdominal pain, no bloating.  Genitourinary:  He denies frequency, nocturia or stranguria.  Extremities: No joint pain, stiffness or swelling.  Neurological:  No numbness, tingling, burring paresthesias or loss of motor strength.  No syncope, dizziness or frequent headache  Lymphatic: no adenopathy noted  Hematologic: no easy bruising or bleeding gums  Skin:  No recent rashes or mole changes.  Psychiatric/Behavioral:  Negative for depression.          Objective   Vitals:    06/06/24 0911   BP: 116/76   Site: Right Upper Arm   Position: Sitting   Cuff Size: Large Adult   Pulse: 70   Resp: 18   Temp: 98.4 °F (36.9 °C)   TempSrc: Temporal   SpO2: 97%   Weight: 91.6 kg (202 lb)   Height: 1.905 m (6' 3\")      Body mass index is 25.25 kg/m².        [unfilled]     PHYSICAL EXAM:    General appearance - alert, well appearing, and in no distress  Mental status - alert, oriented to person, place, and time  HEENT:  Ears - bilateral TM's and external ear canals clear  Eyes - pupillary responses were normal.  Extraocular muscle function intact.  Lids and conjunctiva not injected.  Fundoscopic exam revealed sharp disc margins.eye movements intact  Pharynx- clear with teeth in good repair.  No masses were noted  Neck - supple without thyromegaly or burit.  No JVD noted  Lungs - clear to auscultation and percussion  Cardiac- normal rate, regular rhythm without murmurs.  PMI not displaced.  No gallop, rub or click  Abdomen - flat, soft, non-tender without palpable organomegaly or mass.

## 2024-06-11 ENCOUNTER — ANTI-COAG VISIT (OUTPATIENT)
Facility: CLINIC | Age: 89
End: 2024-06-11

## 2024-06-11 NOTE — PROGRESS NOTES
Called patient's daughter in law and informed that the PT/INR was okay; continue the same dose of blood thinner which is 5 mg every Sunday, Tuesday and Thursday; 7.5 mg all other days.  F/up as scheduled.

## 2024-06-12 RX ORDER — SIMVASTATIN 20 MG
TABLET ORAL
Qty: 90 TABLET | Refills: 3 | Status: SHIPPED | OUTPATIENT
Start: 2024-06-12

## 2024-06-17 RX ORDER — DICLOFENAC SODIUM 75 MG/1
75 TABLET, DELAYED RELEASE ORAL 2 TIMES DAILY
Qty: 60 TABLET | Refills: 5 | Status: SHIPPED | OUTPATIENT
Start: 2024-06-17

## 2024-06-17 NOTE — TELEPHONE ENCOUNTER
RX refill request from the patient/pharmacy. Patient last seen 06- with labs, and next appt. scheduled for 07-  Requested Prescriptions     Pending Prescriptions Disp Refills    diclofenac (VOLTAREN) 75 MG EC tablet [Pharmacy Med Name: DICLOFENAC SOD EC 75 MG TAB] 60 tablet 5     Sig: TAKE 1 TABLET BY MOUTH TWICE A DAY AS NEEDED FOR PAIN    .

## 2024-06-22 DIAGNOSIS — I48.91 UNSPECIFIED ATRIAL FIBRILLATION (HCC): ICD-10-CM

## 2024-06-24 RX ORDER — DIGOXIN 125 MCG
TABLET ORAL
Qty: 90 TABLET | Refills: 3 | Status: SHIPPED | OUTPATIENT
Start: 2024-06-24

## 2024-06-24 NOTE — TELEPHONE ENCOUNTER
RX refill request from the patient/pharmacy. Patient last seen 06- with labs, and next appt. scheduled for 07-  Requested Prescriptions     Pending Prescriptions Disp Refills    digoxin (LANOXIN) 125 MCG tablet [Pharmacy Med Name: DIGOXIN 125 MCG TABLET] 90 tablet 3     Sig: TAKE 1 TABLET BY MOUTH EVERY DAY    .

## 2024-07-05 PROBLEM — L97.509 DIABETIC FOOT ULCERS (HCC): Status: ACTIVE | Noted: 2020-03-12

## 2024-07-05 PROBLEM — E11.621 DIABETIC FOOT ULCERS (HCC): Status: ACTIVE | Noted: 2020-03-12

## 2024-07-05 PROBLEM — Z00.00 MEDICARE ANNUAL WELLNESS VISIT, SUBSEQUENT: Status: RESOLVED | Noted: 2017-10-30 | Resolved: 2024-07-05

## 2024-07-05 NOTE — PROGRESS NOTES
clear, moist mucous membranes, pharynx clear  NECK: supple. Thyroid normal, No JVD or bruits  RESPIRATORY: Chest: clear to ascultation and percussion, normal inspiratory effort  CARDIOVASCULAR: Heart: regular rate and rhythm no murmurs, rubs or gallops, PMI not displaced, No thrills, no peripheral edema  GASTROINTESTINAL: Abdomen: non distended, soft, non tender, bowel sounds normal  HEMATOLOGIC: no purpura, petechiae or bruising  LYMPHATIC: No lymph node enlargemant  MUSCULOSKELETAL: Extremities: no active synovitis, pulse 1+   INTEGUMENT: No unusual rashes or suspicious skin lesions noted. Nails appear normal.  PERIPHERAL VASCULAR: normal pulses femoral, PT and DP  NEUROLOGIC: non-focal exam, A & O X 3  PSYCHIATRIC:, appropriate affect     ASSESSMENT:   1. Diastolic CHF, chronic (HCC)    2. Paroxysmal atrial fibrillation (HCC)    3. Stage 3a chronic kidney disease (HCC)    4. Type 2 diabetes mellitus without complication, without long-term current use of insulin (HCC)    5. Polymyalgia rheumatica (HCC)    6. Diabetic foot ulcer associated with type 2 diabetes mellitus, unspecified laterality, unspecified part of foot, unspecified ulcer stage (HCC)      Impression  1 diastolic CHF compensated  2 paroxysmal atrial fibrillation INR pending currently in A-fib  3.  CKD stage III repeat status pending  4 diabetes seems to be stable repeat blood sugar pending  5 polymyalgia rheumatica sed rate pending  6 diabetic foot ulcer those are currently resolved at this point we will continue current medicines pending results of lab and I will recheck him again in 1 month or sooner if there is a problem.  I will call with today's lab results.    PLAN:  1. Diastolic CHF, chronic (HCC)  2. Paroxysmal atrial fibrillation (HCC)  -     Protime-INR; Future  3. Stage 3a chronic kidney disease (HCC)  -     Basic Metabolic Panel; Future  4. Type 2 diabetes mellitus without complication, without long-term current use of insulin (HCC)  5.

## 2024-07-08 ENCOUNTER — OFFICE VISIT (OUTPATIENT)
Facility: CLINIC | Age: 89
End: 2024-07-08
Payer: MEDICARE

## 2024-07-08 VITALS
RESPIRATION RATE: 17 BRPM | HEART RATE: 74 BPM | SYSTOLIC BLOOD PRESSURE: 120 MMHG | OXYGEN SATURATION: 95 % | WEIGHT: 203.8 LBS | BODY MASS INDEX: 25.34 KG/M2 | HEIGHT: 75 IN | TEMPERATURE: 98.1 F | DIASTOLIC BLOOD PRESSURE: 70 MMHG

## 2024-07-08 DIAGNOSIS — E11.621 DIABETIC FOOT ULCER ASSOCIATED WITH TYPE 2 DIABETES MELLITUS, UNSPECIFIED LATERALITY, UNSPECIFIED PART OF FOOT, UNSPECIFIED ULCER STAGE (HCC): ICD-10-CM

## 2024-07-08 DIAGNOSIS — N18.31 STAGE 3A CHRONIC KIDNEY DISEASE (HCC): ICD-10-CM

## 2024-07-08 DIAGNOSIS — I50.32 DIASTOLIC CHF, CHRONIC (HCC): Primary | ICD-10-CM

## 2024-07-08 DIAGNOSIS — L97.509 DIABETIC FOOT ULCER ASSOCIATED WITH TYPE 2 DIABETES MELLITUS, UNSPECIFIED LATERALITY, UNSPECIFIED PART OF FOOT, UNSPECIFIED ULCER STAGE (HCC): ICD-10-CM

## 2024-07-08 DIAGNOSIS — E11.9 TYPE 2 DIABETES MELLITUS WITHOUT COMPLICATION, WITHOUT LONG-TERM CURRENT USE OF INSULIN (HCC): ICD-10-CM

## 2024-07-08 DIAGNOSIS — M35.3 POLYMYALGIA RHEUMATICA (HCC): ICD-10-CM

## 2024-07-08 DIAGNOSIS — I48.0 PAROXYSMAL ATRIAL FIBRILLATION (HCC): ICD-10-CM

## 2024-07-08 PROCEDURE — 1123F ACP DISCUSS/DSCN MKR DOCD: CPT | Performed by: INTERNAL MEDICINE

## 2024-07-08 PROCEDURE — 1036F TOBACCO NON-USER: CPT | Performed by: INTERNAL MEDICINE

## 2024-07-08 PROCEDURE — G8427 DOCREV CUR MEDS BY ELIG CLIN: HCPCS | Performed by: INTERNAL MEDICINE

## 2024-07-08 PROCEDURE — 99213 OFFICE O/P EST LOW 20 MIN: CPT | Performed by: INTERNAL MEDICINE

## 2024-07-08 PROCEDURE — 3051F HG A1C>EQUAL 7.0%<8.0%: CPT | Performed by: INTERNAL MEDICINE

## 2024-07-08 PROCEDURE — G8419 CALC BMI OUT NRM PARAM NOF/U: HCPCS | Performed by: INTERNAL MEDICINE

## 2024-07-08 NOTE — PROGRESS NOTES
Baron Wagner  is a 89 y.o. male     Chief Complaint   Patient presents with    1 Month Follow-Up       /70 (Site: Left Upper Arm, Position: Sitting, Cuff Size: Large Adult)   Pulse 74   Temp 98.1 °F (36.7 °C) (Oral)   Resp 17   Ht 1.905 m (6' 3\")   Wt 92.4 kg (203 lb 12.8 oz)   SpO2 95%   BMI 25.47 kg/m²     Health Maintenance Due   Topic Date Due    COVID-19 Vaccine (1) Never done    Respiratory Syncytial Virus (RSV) Pregnant or age 60 yrs+ (1 - 1-dose 60+ series) Never done    Pneumococcal 65+ years Vaccine (2 of 2 - PPSV23 or PCV20) 03/04/2005         \"Have you been to the ER, urgent care clinic since your last visit?  Hospitalized since your last visit?\"    NO    “Have you seen or consulted any other health care providers outside of [facility] since your last visit?”    NO

## 2024-07-09 LAB
ANION GAP SERPL CALC-SCNC: 6 MMOL/L (ref 5–15)
BUN SERPL-MCNC: 44 MG/DL (ref 6–20)
BUN/CREAT SERPL: 26 (ref 12–20)
CALCIUM SERPL-MCNC: 9.5 MG/DL (ref 8.5–10.1)
CHLORIDE SERPL-SCNC: 108 MMOL/L (ref 97–108)
CO2 SERPL-SCNC: 27 MMOL/L (ref 21–32)
CREAT SERPL-MCNC: 1.71 MG/DL (ref 0.7–1.3)
ERYTHROCYTE [SEDIMENTATION RATE] IN BLOOD: 26 MM/HR (ref 0–20)
GLUCOSE SERPL-MCNC: 211 MG/DL (ref 65–100)
INR PPP: 1.2 (ref 0.9–1.1)
POTASSIUM SERPL-SCNC: 5.1 MMOL/L (ref 3.5–5.1)
PROTHROMBIN TIME: 12.7 SEC (ref 9–11.1)
SODIUM SERPL-SCNC: 141 MMOL/L (ref 136–145)

## 2024-07-12 ENCOUNTER — ANTI-COAG VISIT (OUTPATIENT)
Facility: CLINIC | Age: 89
End: 2024-07-12

## 2024-07-12 NOTE — PROGRESS NOTES
Spoke to patient's daughter in law and informed his PT/INR was okay; continue the same dose of blood thinner which is 5 mg on Sunday, Tuesday and Thursday; 7.5 mg all other days.  F/up as scheduled.

## 2024-08-01 ENCOUNTER — TELEPHONE (OUTPATIENT)
Facility: CLINIC | Age: 89
End: 2024-08-01

## 2024-08-01 NOTE — TELEPHONE ENCOUNTER
Daughter and patient called regarding a questionable wound problem to his 3rd toe on his right foot.  No pain but is having some drainage.  Soaking foot in epson salt.  Requesting an antibiotic or visit. History of ulcers on his feet and Diabetes.

## 2024-08-02 ENCOUNTER — OFFICE VISIT (OUTPATIENT)
Facility: CLINIC | Age: 89
End: 2024-08-02
Payer: MEDICARE

## 2024-08-02 VITALS
HEIGHT: 75 IN | SYSTOLIC BLOOD PRESSURE: 128 MMHG | RESPIRATION RATE: 17 BRPM | DIASTOLIC BLOOD PRESSURE: 74 MMHG | BODY MASS INDEX: 24.99 KG/M2 | HEART RATE: 79 BPM | OXYGEN SATURATION: 97 % | TEMPERATURE: 97.7 F | WEIGHT: 201 LBS

## 2024-08-02 DIAGNOSIS — L97.509 DIABETIC FOOT ULCER ASSOCIATED WITH TYPE 2 DIABETES MELLITUS, UNSPECIFIED LATERALITY, UNSPECIFIED PART OF FOOT, UNSPECIFIED ULCER STAGE (HCC): ICD-10-CM

## 2024-08-02 DIAGNOSIS — I48.0 PAROXYSMAL ATRIAL FIBRILLATION (HCC): Primary | ICD-10-CM

## 2024-08-02 DIAGNOSIS — I25.10 ASCVD (ARTERIOSCLEROTIC CARDIOVASCULAR DISEASE): ICD-10-CM

## 2024-08-02 DIAGNOSIS — E11.621 DIABETIC FOOT ULCER ASSOCIATED WITH TYPE 2 DIABETES MELLITUS, UNSPECIFIED LATERALITY, UNSPECIFIED PART OF FOOT, UNSPECIFIED ULCER STAGE (HCC): ICD-10-CM

## 2024-08-02 DIAGNOSIS — E11.9 TYPE 2 DIABETES MELLITUS WITHOUT COMPLICATION, WITHOUT LONG-TERM CURRENT USE OF INSULIN (HCC): ICD-10-CM

## 2024-08-02 DIAGNOSIS — N18.31 STAGE 3A CHRONIC KIDNEY DISEASE (HCC): ICD-10-CM

## 2024-08-02 DIAGNOSIS — I12.9 HYPERTENSION WITH RENAL DISEASE: ICD-10-CM

## 2024-08-02 DIAGNOSIS — E78.2 MIXED HYPERLIPIDEMIA: ICD-10-CM

## 2024-08-02 DIAGNOSIS — I50.32 DIASTOLIC CHF, CHRONIC (HCC): ICD-10-CM

## 2024-08-02 DIAGNOSIS — I50.22 CHRONIC SYSTOLIC (CONGESTIVE) HEART FAILURE (HCC): ICD-10-CM

## 2024-08-02 PROCEDURE — G8419 CALC BMI OUT NRM PARAM NOF/U: HCPCS | Performed by: INTERNAL MEDICINE

## 2024-08-02 PROCEDURE — 3051F HG A1C>EQUAL 7.0%<8.0%: CPT | Performed by: INTERNAL MEDICINE

## 2024-08-02 PROCEDURE — 99214 OFFICE O/P EST MOD 30 MIN: CPT | Performed by: INTERNAL MEDICINE

## 2024-08-02 PROCEDURE — 1036F TOBACCO NON-USER: CPT | Performed by: INTERNAL MEDICINE

## 2024-08-02 PROCEDURE — 1123F ACP DISCUSS/DSCN MKR DOCD: CPT | Performed by: INTERNAL MEDICINE

## 2024-08-02 PROCEDURE — G8427 DOCREV CUR MEDS BY ELIG CLIN: HCPCS | Performed by: INTERNAL MEDICINE

## 2024-08-02 NOTE — PROGRESS NOTES
Baron Wagner Sr. is a 89 y.o. male     Chief Complaint   Patient presents with    Other     Right middle toe nfected       /74 (Site: Left Upper Arm, Position: Sitting, Cuff Size: Medium Adult)   Pulse 79   Temp 97.7 °F (36.5 °C) (Oral)   Resp 17   Ht 1.905 m (6' 3\")   Wt 91.2 kg (201 lb)   SpO2 97%   BMI 25.12 kg/m²     Health Maintenance Due   Topic Date Due    COVID-19 Vaccine (1) Never done    Respiratory Syncytial Virus (RSV) Pregnant or age 60 yrs+ (1 - 1-dose 60+ series) Never done    Pneumococcal 65+ years Vaccine (2 of 2 - PPSV23 or PCV20) 03/04/2005    Flu vaccine (1) 08/01/2024         \"Have you been to the ER, urgent care clinic since your last visit?  Hospitalized since your last visit?\"    NO    “Have you seen or consulted any other health care providers outside of [facility] since your last visit?”    NO                     
mouth daily      vitamin E 400 UNIT capsule Take 1 capsule by mouth daily      vitamin B-12 (CYANOCOBALAMIN) 1000 MCG tablet Take 1 tablet by mouth daily      ACCU-CHEK LEE ANN PLUS strip USE TO TEST BLOOD SUGAR TWICE DAILY 100 strip 3    Multiple Vitamins-Minerals (CENTRUM SILVER ULTRA MENS PO) Take 1 tablet by mouth daily      Cholecalciferol 400 UNIT TABS tablet Take 1 tablet by mouth at bedtime       No current facility-administered medications for this visit.     Social History     Socioeconomic History    Marital status:      Spouse name: None    Number of children: None    Years of education: None    Highest education level: None   Tobacco Use    Smoking status: Never    Smokeless tobacco: Never   Vaping Use    Vaping Use: Never used   Substance and Sexual Activity    Alcohol use: No    Drug use: No     Social Determinants of Health     Financial Resource Strain: Low Risk  (5/9/2024)    Overall Financial Resource Strain (CARDIA)     Difficulty of Paying Living Expenses: Not hard at all   Food Insecurity: No Food Insecurity (5/9/2024)    Hunger Vital Sign     Worried About Running Out of Food in the Last Year: Never true     Ran Out of Food in the Last Year: Never true   Transportation Needs: No Transportation Needs (5/9/2024)    PRAPARE - Transportation     Lack of Transportation (Medical): No     Lack of Transportation (Non-Medical): No   Physical Activity: Inactive (6/6/2024)    Exercise Vital Sign     Days of Exercise per Week: 0 days     Minutes of Exercise per Session: 0 min   Stress: No Stress Concern Present (5/9/2024)    [employer] - Occupational Stress Questionnaire     Feeling of Stress : Not at all   Social Connections: Moderately Integrated (5/9/2024)    Social Connection and Isolation Panel [NHANES]     Frequency of Communication with Friends and Family: Three times a week     Frequency of Social Gatherings with Friends and Family: Three times a week     Attends

## 2024-08-03 LAB
ALBUMIN SERPL-MCNC: 3.6 G/DL (ref 3.5–5)
ALBUMIN/GLOB SERPL: 1 (ref 1.1–2.2)
ALP SERPL-CCNC: 75 U/L (ref 45–117)
ALT SERPL-CCNC: 44 U/L (ref 12–78)
ANION GAP SERPL CALC-SCNC: 6 MMOL/L (ref 5–15)
AST SERPL-CCNC: 16 U/L (ref 15–37)
BASOPHILS # BLD: 0 K/UL (ref 0–0.1)
BASOPHILS NFR BLD: 0 % (ref 0–1)
BILIRUB SERPL-MCNC: 0.5 MG/DL (ref 0.2–1)
BUN SERPL-MCNC: 47 MG/DL (ref 6–20)
BUN/CREAT SERPL: 28 (ref 12–20)
CALCIUM SERPL-MCNC: 9.7 MG/DL (ref 8.5–10.1)
CHLORIDE SERPL-SCNC: 105 MMOL/L (ref 97–108)
CO2 SERPL-SCNC: 29 MMOL/L (ref 21–32)
CREAT SERPL-MCNC: 1.69 MG/DL (ref 0.7–1.3)
CRP SERPL-MCNC: 1.49 MG/DL (ref 0–0.3)
DIFFERENTIAL METHOD BLD: ABNORMAL
EOSINOPHIL # BLD: 0 K/UL (ref 0–0.4)
EOSINOPHIL NFR BLD: 0 % (ref 0–7)
ERYTHROCYTE [DISTWIDTH] IN BLOOD BY AUTOMATED COUNT: 15.5 % (ref 11.5–14.5)
EST. AVERAGE GLUCOSE BLD GHB EST-MCNC: 157 MG/DL
GLOBULIN SER CALC-MCNC: 3.7 G/DL (ref 2–4)
GLUCOSE SERPL-MCNC: 219 MG/DL (ref 65–100)
HBA1C MFR BLD: 7.1 % (ref 4–5.6)
HCT VFR BLD AUTO: 40.8 % (ref 36.6–50.3)
HGB BLD-MCNC: 12.4 G/DL (ref 12.1–17)
IMM GRANULOCYTES # BLD AUTO: 0.1 K/UL (ref 0–0.04)
IMM GRANULOCYTES NFR BLD AUTO: 1 % (ref 0–0.5)
INR PPP: 1.1 (ref 0.9–1.1)
LYMPHOCYTES # BLD: 1.2 K/UL (ref 0.8–3.5)
LYMPHOCYTES NFR BLD: 12 % (ref 12–49)
MCH RBC QN AUTO: 29.5 PG (ref 26–34)
MCHC RBC AUTO-ENTMCNC: 30.4 G/DL (ref 30–36.5)
MCV RBC AUTO: 96.9 FL (ref 80–99)
MONOCYTES # BLD: 0.5 K/UL (ref 0–1)
MONOCYTES NFR BLD: 5 % (ref 5–13)
NEUTS SEG # BLD: 8.1 K/UL (ref 1.8–8)
NEUTS SEG NFR BLD: 82 % (ref 32–75)
NRBC # BLD: 0 K/UL (ref 0–0.01)
NRBC BLD-RTO: 0 PER 100 WBC
PLATELET # BLD AUTO: 168 K/UL (ref 150–400)
PMV BLD AUTO: 11.6 FL (ref 8.9–12.9)
POTASSIUM SERPL-SCNC: 4.4 MMOL/L (ref 3.5–5.1)
PROT SERPL-MCNC: 7.3 G/DL (ref 6.4–8.2)
PROTHROMBIN TIME: 11.7 SEC (ref 9–11.1)
RBC # BLD AUTO: 4.21 M/UL (ref 4.1–5.7)
SODIUM SERPL-SCNC: 140 MMOL/L (ref 136–145)
WBC # BLD AUTO: 9.9 K/UL (ref 4.1–11.1)

## 2024-08-05 RX ORDER — SITAGLIPTIN 100 MG/1
100 TABLET, FILM COATED ORAL DAILY
Qty: 30 TABLET | Refills: 0 | Status: SHIPPED | OUTPATIENT
Start: 2024-08-05

## 2024-08-05 NOTE — TELEPHONE ENCOUNTER
PCP: Willem Powell MD    Last appt: 7/8/2024    Future Appointments   Date Time Provider Department Center   9/12/2024  9:30 AM Willem Powell MD [facility] DEP       Requested Prescriptions     Pending Prescriptions Disp Refills    JANUVIA 100 MG tablet [Pharmacy Med Name: JANUVIA 100 MG TABLET] 30 tablet 0     Sig: TAKE 1 TABLET BY MOUTH EVERY DAY

## 2024-08-11 DIAGNOSIS — E11.9 TYPE 2 DIABETES MELLITUS WITHOUT COMPLICATION, WITHOUT LONG-TERM CURRENT USE OF INSULIN (HCC): Primary | ICD-10-CM

## 2024-08-12 RX ORDER — BLOOD SUGAR DIAGNOSTIC
STRIP MISCELLANEOUS
Qty: 100 STRIP | Refills: 0 | Status: SHIPPED | OUTPATIENT
Start: 2024-08-12

## 2024-08-12 NOTE — TELEPHONE ENCOUNTER
RX refill request from the patient/pharmacy. Patient last seen 08- with labs, and next appt. scheduled for 08-  Requested Prescriptions     Pending Prescriptions Disp Refills    ACCU-CHEK LEE ANN PLUS strip [Pharmacy Med Name: ACCU-CHEK LEE ANN PLUS TEST STRP] 100 strip 3     Sig: USE TO TEST BLOOD SUGAR TWICE DAILY    .

## 2024-08-19 NOTE — TELEPHONE ENCOUNTER
RX refill request from the patient/pharmacy. Patient last seen 08- with labs, and next appt. scheduled for 08-  Requested Prescriptions     Pending Prescriptions Disp Refills    warfarin (COUMADIN) 5 MG tablet [Pharmacy Med Name: WARFARIN SODIUM 5 MG TABLET] 145 tablet 1     Sig: Take 5 mg on Sunday, Tuesday and Thursday; one and a half tablets all other days.    .

## 2024-08-20 ENCOUNTER — LAB (OUTPATIENT)
Facility: CLINIC | Age: 89
End: 2024-08-20

## 2024-08-20 DIAGNOSIS — I48.0 PAROXYSMAL ATRIAL FIBRILLATION (HCC): Primary | ICD-10-CM

## 2024-08-20 LAB
INR PPP: 1.3 (ref 0.9–1.1)
PROTHROMBIN TIME: 13.3 SEC (ref 9–11.1)

## 2024-08-20 RX ORDER — WARFARIN SODIUM 5 MG/1
TABLET ORAL
Qty: 145 TABLET | Refills: 1 | Status: SHIPPED | OUTPATIENT
Start: 2024-08-20

## 2024-08-20 NOTE — PROGRESS NOTES
Reviewed with Dr. Carson.  Dose of his blood thinner was change 2 weeks ago because his INR was low.  Due for F/up PTINR.  Dr. Carson gave verbal order to sign.

## 2024-08-29 DIAGNOSIS — I48.0 PAROXYSMAL ATRIAL FIBRILLATION (HCC): Primary | ICD-10-CM

## 2024-09-05 ENCOUNTER — ANTI-COAG VISIT (OUTPATIENT)
Facility: CLINIC | Age: 89
End: 2024-09-05

## 2024-09-06 RX ORDER — SITAGLIPTIN 100 MG/1
100 TABLET, FILM COATED ORAL DAILY
Qty: 30 TABLET | Refills: 5 | Status: SHIPPED | OUTPATIENT
Start: 2024-09-06

## 2024-09-06 NOTE — TELEPHONE ENCOUNTER
RX refill request from the patient/pharmacy. Patient last seen 08- with labs, and next appt. scheduled for 09-  Requested Prescriptions     Pending Prescriptions Disp Refills    JANUVIA 100 MG tablet [Pharmacy Med Name: JANUVIA 100 MG TABLET] 30 tablet 5     Sig: TAKE 1 TABLET BY MOUTH EVERY DAY    .

## 2024-09-10 RX ORDER — GLIMEPIRIDE 4 MG/1
4 TABLET ORAL
Qty: 90 TABLET | Refills: 3 | Status: SHIPPED | OUTPATIENT
Start: 2024-09-10

## 2024-09-12 ENCOUNTER — OFFICE VISIT (OUTPATIENT)
Facility: CLINIC | Age: 89
End: 2024-09-12
Payer: MEDICARE

## 2024-09-12 VITALS
HEART RATE: 73 BPM | WEIGHT: 198.6 LBS | TEMPERATURE: 97.9 F | HEIGHT: 75 IN | SYSTOLIC BLOOD PRESSURE: 115 MMHG | RESPIRATION RATE: 18 BRPM | BODY MASS INDEX: 24.69 KG/M2 | OXYGEN SATURATION: 97 % | DIASTOLIC BLOOD PRESSURE: 76 MMHG

## 2024-09-12 DIAGNOSIS — I50.32 DIASTOLIC CHF, CHRONIC (HCC): ICD-10-CM

## 2024-09-12 DIAGNOSIS — M15.9 PRIMARY OSTEOARTHRITIS INVOLVING MULTIPLE JOINTS: ICD-10-CM

## 2024-09-12 DIAGNOSIS — N18.31 STAGE 3A CHRONIC KIDNEY DISEASE (HCC): ICD-10-CM

## 2024-09-12 DIAGNOSIS — I44.2 COMPLETE HEART BLOCK (HCC): ICD-10-CM

## 2024-09-12 DIAGNOSIS — M35.3 POLYMYALGIA RHEUMATICA (HCC): ICD-10-CM

## 2024-09-12 DIAGNOSIS — E78.2 MIXED HYPERLIPIDEMIA: ICD-10-CM

## 2024-09-12 DIAGNOSIS — I12.9 HYPERTENSION WITH RENAL DISEASE: Primary | ICD-10-CM

## 2024-09-12 DIAGNOSIS — I25.10 ASCVD (ARTERIOSCLEROTIC CARDIOVASCULAR DISEASE): ICD-10-CM

## 2024-09-12 DIAGNOSIS — E11.9 TYPE 2 DIABETES MELLITUS WITHOUT COMPLICATION, WITHOUT LONG-TERM CURRENT USE OF INSULIN (HCC): ICD-10-CM

## 2024-09-12 DIAGNOSIS — I48.0 PAROXYSMAL ATRIAL FIBRILLATION (HCC): ICD-10-CM

## 2024-09-12 LAB
ALBUMIN SERPL-MCNC: 3.7 G/DL (ref 3.5–5)
ALBUMIN/GLOB SERPL: 1.1 (ref 1.1–2.2)
ALP SERPL-CCNC: 72 U/L (ref 45–117)
ALT SERPL-CCNC: 43 U/L (ref 12–78)
ANION GAP SERPL CALC-SCNC: 5 MMOL/L (ref 2–12)
AST SERPL-CCNC: 26 U/L (ref 15–37)
BILIRUB SERPL-MCNC: 0.5 MG/DL (ref 0.2–1)
BUN SERPL-MCNC: 35 MG/DL (ref 6–20)
BUN/CREAT SERPL: 20 (ref 12–20)
CALCIUM SERPL-MCNC: 9.6 MG/DL (ref 8.5–10.1)
CHLORIDE SERPL-SCNC: 106 MMOL/L (ref 97–108)
CHOLEST SERPL-MCNC: 148 MG/DL
CK SERPL-CCNC: 68 U/L (ref 39–308)
CO2 SERPL-SCNC: 28 MMOL/L (ref 21–32)
CREAT SERPL-MCNC: 1.76 MG/DL (ref 0.7–1.3)
ERYTHROCYTE [SEDIMENTATION RATE] IN BLOOD: 23 MM/HR (ref 0–20)
EST. AVERAGE GLUCOSE BLD GHB EST-MCNC: 163 MG/DL
GLOBULIN SER CALC-MCNC: 3.4 G/DL (ref 2–4)
GLUCOSE SERPL-MCNC: 145 MG/DL (ref 65–100)
HBA1C MFR BLD: 7.3 % (ref 4–5.6)
HDLC SERPL-MCNC: 37 MG/DL
HDLC SERPL: 4 (ref 0–5)
INR PPP: 1.3 (ref 0.9–1.1)
LDLC SERPL CALC-MCNC: 64.2 MG/DL (ref 0–100)
POTASSIUM SERPL-SCNC: 4.1 MMOL/L (ref 3.5–5.1)
PROT SERPL-MCNC: 7.1 G/DL (ref 6.4–8.2)
PROTHROMBIN TIME: 13 SEC (ref 9–11.1)
SODIUM SERPL-SCNC: 139 MMOL/L (ref 136–145)
TRIGL SERPL-MCNC: 234 MG/DL
VLDLC SERPL CALC-MCNC: 46.8 MG/DL

## 2024-09-12 PROCEDURE — 1123F ACP DISCUSS/DSCN MKR DOCD: CPT | Performed by: INTERNAL MEDICINE

## 2024-09-12 PROCEDURE — 1036F TOBACCO NON-USER: CPT | Performed by: INTERNAL MEDICINE

## 2024-09-12 PROCEDURE — 3051F HG A1C>EQUAL 7.0%<8.0%: CPT | Performed by: INTERNAL MEDICINE

## 2024-09-12 PROCEDURE — G8420 CALC BMI NORM PARAMETERS: HCPCS | Performed by: INTERNAL MEDICINE

## 2024-09-12 PROCEDURE — G8427 DOCREV CUR MEDS BY ELIG CLIN: HCPCS | Performed by: INTERNAL MEDICINE

## 2024-09-12 PROCEDURE — 99214 OFFICE O/P EST MOD 30 MIN: CPT | Performed by: INTERNAL MEDICINE

## 2024-09-12 RX ORDER — PETROLATUM,WHITE/LANOLIN
1 OINTMENT (GRAM) TOPICAL DAILY
COMMUNITY

## 2024-09-12 RX ORDER — GLUCOSAMINE SULFATE 500 MG
CAPSULE ORAL
COMMUNITY

## 2024-09-17 ENCOUNTER — ANTI-COAG VISIT (OUTPATIENT)
Facility: CLINIC | Age: 89
End: 2024-09-17

## 2024-09-20 DIAGNOSIS — E11.9 TYPE 2 DIABETES MELLITUS WITHOUT COMPLICATION, WITHOUT LONG-TERM CURRENT USE OF INSULIN (HCC): Primary | ICD-10-CM

## 2024-09-20 DIAGNOSIS — N40.0 BENIGN PROSTATIC HYPERPLASIA WITHOUT LOWER URINARY TRACT SYMPTOMS: ICD-10-CM

## 2024-09-20 RX ORDER — TERAZOSIN 2 MG/1
CAPSULE ORAL
Qty: 90 CAPSULE | Refills: 0 | Status: SHIPPED | OUTPATIENT
Start: 2024-09-20

## 2024-09-23 ENCOUNTER — TELEPHONE (OUTPATIENT)
Facility: CLINIC | Age: 89
End: 2024-09-23

## 2024-09-27 DIAGNOSIS — E11.9 TYPE 2 DIABETES MELLITUS WITHOUT COMPLICATION, WITHOUT LONG-TERM CURRENT USE OF INSULIN (HCC): ICD-10-CM

## 2024-09-27 RX ORDER — BLOOD SUGAR DIAGNOSTIC
STRIP MISCELLANEOUS
Qty: 100 STRIP | Refills: 3 | Status: SHIPPED | OUTPATIENT
Start: 2024-09-27

## 2024-10-14 ENCOUNTER — OFFICE VISIT (OUTPATIENT)
Facility: CLINIC | Age: 89
End: 2024-10-14
Payer: MEDICARE

## 2024-10-14 VITALS
HEIGHT: 75 IN | SYSTOLIC BLOOD PRESSURE: 138 MMHG | WEIGHT: 203.4 LBS | HEART RATE: 72 BPM | DIASTOLIC BLOOD PRESSURE: 72 MMHG | BODY MASS INDEX: 25.29 KG/M2 | RESPIRATION RATE: 17 BRPM | OXYGEN SATURATION: 94 % | TEMPERATURE: 98.1 F

## 2024-10-14 DIAGNOSIS — I50.32 DIASTOLIC CHF, CHRONIC (HCC): Primary | ICD-10-CM

## 2024-10-14 DIAGNOSIS — Z23 NEEDS FLU SHOT: ICD-10-CM

## 2024-10-14 DIAGNOSIS — E11.9 TYPE 2 DIABETES MELLITUS WITHOUT COMPLICATION, WITHOUT LONG-TERM CURRENT USE OF INSULIN (HCC): ICD-10-CM

## 2024-10-14 DIAGNOSIS — N18.31 STAGE 3A CHRONIC KIDNEY DISEASE (HCC): ICD-10-CM

## 2024-10-14 DIAGNOSIS — I48.0 PAROXYSMAL ATRIAL FIBRILLATION (HCC): ICD-10-CM

## 2024-10-14 PROCEDURE — G8419 CALC BMI OUT NRM PARAM NOF/U: HCPCS | Performed by: INTERNAL MEDICINE

## 2024-10-14 PROCEDURE — 99213 OFFICE O/P EST LOW 20 MIN: CPT | Performed by: INTERNAL MEDICINE

## 2024-10-14 PROCEDURE — G8427 DOCREV CUR MEDS BY ELIG CLIN: HCPCS | Performed by: INTERNAL MEDICINE

## 2024-10-14 PROCEDURE — 3051F HG A1C>EQUAL 7.0%<8.0%: CPT | Performed by: INTERNAL MEDICINE

## 2024-10-14 PROCEDURE — G8482 FLU IMMUNIZE ORDER/ADMIN: HCPCS | Performed by: INTERNAL MEDICINE

## 2024-10-14 PROCEDURE — 1036F TOBACCO NON-USER: CPT | Performed by: INTERNAL MEDICINE

## 2024-10-14 PROCEDURE — 1123F ACP DISCUSS/DSCN MKR DOCD: CPT | Performed by: INTERNAL MEDICINE

## 2024-10-14 NOTE — PROGRESS NOTES
Baron Wagner  is a 89 y.o. male     Chief Complaint   Patient presents with    1 Month Follow-Up       BP (!) 140/70 (Site: Left Upper Arm, Position: Sitting, Cuff Size: Large Adult)   Pulse 72   Temp 98.1 °F (36.7 °C) (Oral)   Resp 17   Ht 1.905 m (6' 3\")   Wt 92.3 kg (203 lb 6.4 oz)   SpO2 94%   BMI 25.42 kg/m²     Health Maintenance Due   Topic Date Due    Respiratory Syncytial Virus (RSV) Pregnant or age 60 yrs+ (1 - 1-dose 60+ series) Never done    Pneumococcal 65+ years Vaccine (2 of 2 - PPSV23 or PCV20) 03/04/2005    Flu vaccine (1) 08/01/2024    COVID-19 Vaccine (1 - 2023-24 season) Never done         \"Have you been to the ER, urgent care clinic since your last visit?  Hospitalized since your last visit?\"    NO    “Have you seen or consulted any other health care providers outside of [facility] since your last visit?”    NO                    
  CONSTITUTIONAL:   well nourished, appears age appropriate  EYES: sclera anicteric, PERRL, EOMI  ENMT:nares clear, moist mucous membranes, pharynx clear  NECK: supple. Thyroid normal, No JVD or bruits  RESPIRATORY: Chest: clear to ascultation and percussion, normal inspiratory effort  CARDIOVASCULAR: Heart: regular rate and rhythm no murmurs, rubs or gallops, PMI not displaced, No thrills, no peripheral edema  GASTROINTESTINAL: Abdomen: non distended, soft, non tender, bowel sounds normal  HEMATOLOGIC: no purpura, petechiae or bruising  LYMPHATIC: No lymph node enlargemant  MUSCULOSKELETAL: Extremities: no active synovitis, pulse 1+   INTEGUMENT: No unusual rashes or suspicious skin lesions noted. Nails appear normal.  PERIPHERAL VASCULAR: normal pulses femoral, PT and DP  NEUROLOGIC: non-focal exam, A & O X 3  PSYCHIATRIC:, appropriate affect     ASSESSMENT:   1. Diastolic CHF, chronic (HCC)    2. Paroxysmal atrial fibrillation (HCC)    3. Stage 3a chronic kidney disease (HCC)    4. Type 2 diabetes mellitus without complication, without long-term current use of insulin (HCC)    5. Needs flu shot      Impression  1 diastolic CHF compensated  2 paroxysmal atrial fibrillation with INR pending  3.  CKD stage III repeat status pending  For diabetic foot ulcers intermittent currently not present  Flu shot given today.  I will call with lab and follow stable continue same and recheck in a month or sooner if there is a problem.    PLAN:  1. Diastolic CHF, chronic (HCC)  -     Basic Metabolic Panel; Future  2. Paroxysmal atrial fibrillation (HCC)  -     Protime-INR; Future  3. Stage 3a chronic kidney disease (HCC)  -     Basic Metabolic Panel; Future  4. Type 2 diabetes mellitus without complication, without long-term current use of insulin (HCC)  5. Needs flu shot  -     Influenza, FLUAD Trivalent, (age 65 y+), IM, Preservative Free, 0.5mL          ATTENTION:   This medical record was transcribed using an electronic

## 2024-10-15 LAB
ANION GAP SERPL CALC-SCNC: 4 MMOL/L (ref 2–12)
BUN SERPL-MCNC: 42 MG/DL (ref 6–20)
BUN/CREAT SERPL: 23 (ref 12–20)
CALCIUM SERPL-MCNC: 9.5 MG/DL (ref 8.5–10.1)
CHLORIDE SERPL-SCNC: 109 MMOL/L (ref 97–108)
CO2 SERPL-SCNC: 27 MMOL/L (ref 21–32)
CREAT SERPL-MCNC: 1.86 MG/DL (ref 0.7–1.3)
GLUCOSE SERPL-MCNC: 234 MG/DL (ref 65–100)
INR PPP: 1.4 (ref 0.9–1.1)
POTASSIUM SERPL-SCNC: 5.4 MMOL/L (ref 3.5–5.1)
PROTHROMBIN TIME: 14.1 SEC (ref 9–11.1)
SODIUM SERPL-SCNC: 140 MMOL/L (ref 136–145)

## 2024-10-15 PROCEDURE — G0008 ADMIN INFLUENZA VIRUS VAC: HCPCS | Performed by: INTERNAL MEDICINE

## 2024-10-15 PROCEDURE — 90653 IIV ADJUVANT VACCINE IM: CPT | Performed by: INTERNAL MEDICINE

## 2024-10-20 DIAGNOSIS — N40.0 BENIGN PROSTATIC HYPERPLASIA WITHOUT LOWER URINARY TRACT SYMPTOMS: ICD-10-CM

## 2024-10-20 DIAGNOSIS — E11.9 TYPE 2 DIABETES MELLITUS WITHOUT COMPLICATION, WITHOUT LONG-TERM CURRENT USE OF INSULIN (HCC): ICD-10-CM

## 2024-10-21 RX ORDER — L. ACIDOPHILUS/PECTIN, CITRUS 25MM-100MG
1 TABLET ORAL DAILY
Qty: 90 TABLET | Refills: 3 | Status: SHIPPED | OUTPATIENT
Start: 2024-10-21

## 2024-10-21 RX ORDER — TERAZOSIN 2 MG/1
CAPSULE ORAL
Qty: 90 CAPSULE | Refills: 3 | Status: SHIPPED | OUTPATIENT
Start: 2024-10-21

## 2024-10-21 NOTE — TELEPHONE ENCOUNTER
RX refill request from the patient/pharmacy. Patient last seen 10- with labs, and next appt. scheduled for 11-  Requested Prescriptions     Pending Prescriptions Disp Refills    metFORMIN (GLUCOPHAGE) 500 MG tablet [Pharmacy Med Name: METFORMIN  MG TABLET] 180 tablet 3     Sig: TAKE 1 TABLET BY MOUTH TWICE A DAY IN THE MORNING WITH BREAKFAST AND BEFORE DINNER    terazosin (HYTRIN) 2 MG capsule [Pharmacy Med Name: TERAZOSIN 2 MG CAPSULE] 90 capsule 3     Sig: TAKE 1 CAPSULE BY MOUTH EVERY DAY    .

## 2024-10-21 NOTE — TELEPHONE ENCOUNTER
RX refill request from the patient/pharmacy. Patient last seen 10- with labs, and next appt. scheduled for 11-  Requested Prescriptions     Pending Prescriptions Disp Refills    Lactobacillus Acid-Pectin (ACIDOPHILUS/CITRUS PECTIN) TABS [Pharmacy Med Name: ACIDOPHILUS-PECTIN CAPTAB] 90 tablet 3     Sig: TAKE 1 TABLET BY MOUTH EVERY DAY    .

## 2024-10-29 ENCOUNTER — LAB (OUTPATIENT)
Facility: CLINIC | Age: 89
End: 2024-10-29

## 2024-10-29 DIAGNOSIS — I50.32 DIASTOLIC CHF, CHRONIC (HCC): Primary | ICD-10-CM

## 2024-10-29 DIAGNOSIS — I50.32 DIASTOLIC CHF, CHRONIC (HCC): ICD-10-CM

## 2024-10-29 LAB
ANION GAP SERPL CALC-SCNC: 6 MMOL/L (ref 2–12)
BASOPHILS # BLD: 0.1 K/UL (ref 0–0.1)
BASOPHILS NFR BLD: 1 % (ref 0–1)
BUN SERPL-MCNC: 41 MG/DL (ref 6–20)
BUN/CREAT SERPL: 23 (ref 12–20)
CALCIUM SERPL-MCNC: 9.9 MG/DL (ref 8.5–10.1)
CHLORIDE SERPL-SCNC: 109 MMOL/L (ref 97–108)
CO2 SERPL-SCNC: 27 MMOL/L (ref 21–32)
CREAT SERPL-MCNC: 1.8 MG/DL (ref 0.7–1.3)
DIFFERENTIAL METHOD BLD: ABNORMAL
EOSINOPHIL # BLD: 0 K/UL (ref 0–0.4)
EOSINOPHIL NFR BLD: 0 % (ref 0–7)
ERYTHROCYTE [DISTWIDTH] IN BLOOD BY AUTOMATED COUNT: 15.9 % (ref 11.5–14.5)
GLUCOSE SERPL-MCNC: 175 MG/DL (ref 65–100)
HCT VFR BLD AUTO: 38.6 % (ref 36.6–50.3)
HGB BLD-MCNC: 12 G/DL (ref 12.1–17)
IMM GRANULOCYTES # BLD AUTO: 0.1 K/UL (ref 0–0.04)
IMM GRANULOCYTES NFR BLD AUTO: 1 % (ref 0–0.5)
LYMPHOCYTES # BLD: 0.8 K/UL (ref 0.8–3.5)
LYMPHOCYTES NFR BLD: 9 % (ref 12–49)
MAGNESIUM SERPL-MCNC: 2.3 MG/DL (ref 1.6–2.4)
MCH RBC QN AUTO: 30.3 PG (ref 26–34)
MCHC RBC AUTO-ENTMCNC: 31.1 G/DL (ref 30–36.5)
MCV RBC AUTO: 97.5 FL (ref 80–99)
MONOCYTES # BLD: 0.6 K/UL (ref 0–1)
MONOCYTES NFR BLD: 6 % (ref 5–13)
NEUTS SEG # BLD: 7.8 K/UL (ref 1.8–8)
NEUTS SEG NFR BLD: 83 % (ref 32–75)
NRBC # BLD: 0 K/UL (ref 0–0.01)
NRBC BLD-RTO: 0 PER 100 WBC
PLATELET # BLD AUTO: 152 K/UL (ref 150–400)
PMV BLD AUTO: 11.5 FL (ref 8.9–12.9)
POTASSIUM SERPL-SCNC: 5.5 MMOL/L (ref 3.5–5.1)
RBC # BLD AUTO: 3.96 M/UL (ref 4.1–5.7)
SODIUM SERPL-SCNC: 142 MMOL/L (ref 136–145)
WBC # BLD AUTO: 9.4 K/UL (ref 4.1–11.1)

## 2024-10-29 NOTE — PROGRESS NOTES
Patient request to have labs drawn at this office, closer to him-Dr. Martinez with cardiology.  Pacemaker scheduled for next week.  Patient of MEG Carcamo - NP

## 2024-11-06 RX ORDER — BUMETANIDE 2 MG/1
2 TABLET ORAL 2 TIMES DAILY
Qty: 180 TABLET | Refills: 1 | Status: SHIPPED | OUTPATIENT
Start: 2024-11-06

## 2024-11-06 NOTE — TELEPHONE ENCOUNTER
RX refill request from the patient/pharmacy. Patient last seen 10- with labs, and next appt. scheduled for 11-  Requested Prescriptions     Pending Prescriptions Disp Refills    bumetanide (BUMEX) 2 MG tablet [Pharmacy Med Name: BUMETANIDE 2 MG TABLET] 180 tablet 1     Sig: TAKE 1 TABLET BY MOUTH TWICE A DAY    .

## 2024-11-12 ENCOUNTER — TELEPHONE (OUTPATIENT)
Facility: CLINIC | Age: 89
End: 2024-11-12

## 2024-11-12 NOTE — TELEPHONE ENCOUNTER
Mrs. Wagner (patient's daughter in law) called stating that the patient has been experiencing sinus issues and is feeling very congested and under the weather. Advised patient would need to covid test prior to making an appointment and was offered availability for today. Patient's family member advised understanding and states that he has an appointment on Thursday already with Dr. Powell and will keep that appointment. She also states that the patient was recently put on Jardiance and began experiencing diarrhea and decided to discontinue this medication as he has had fecal incontinence twice since. He was scheduled for a pacemaker installation surgery for tomorrow however this has been cancelled due to his sickness and they will call back to reschedule this.    Imaging Studies/Medications

## 2024-11-14 ENCOUNTER — OFFICE VISIT (OUTPATIENT)
Facility: CLINIC | Age: 89
End: 2024-11-14

## 2024-11-14 VITALS
WEIGHT: 189.3 LBS | HEART RATE: 75 BPM | DIASTOLIC BLOOD PRESSURE: 58 MMHG | BODY MASS INDEX: 23.54 KG/M2 | OXYGEN SATURATION: 96 % | TEMPERATURE: 97.9 F | SYSTOLIC BLOOD PRESSURE: 100 MMHG | HEIGHT: 75 IN | RESPIRATION RATE: 17 BRPM

## 2024-11-14 DIAGNOSIS — I48.0 PAROXYSMAL ATRIAL FIBRILLATION (HCC): ICD-10-CM

## 2024-11-14 DIAGNOSIS — M35.3 POLYMYALGIA RHEUMATICA (HCC): ICD-10-CM

## 2024-11-14 DIAGNOSIS — E11.9 TYPE 2 DIABETES MELLITUS WITHOUT COMPLICATION, WITHOUT LONG-TERM CURRENT USE OF INSULIN (HCC): ICD-10-CM

## 2024-11-14 DIAGNOSIS — J20.9 ACUTE BRONCHITIS, UNSPECIFIED ORGANISM: ICD-10-CM

## 2024-11-14 DIAGNOSIS — I25.10 ASCVD (ARTERIOSCLEROTIC CARDIOVASCULAR DISEASE): ICD-10-CM

## 2024-11-14 DIAGNOSIS — I50.32 DIASTOLIC CHF, CHRONIC (HCC): ICD-10-CM

## 2024-11-14 DIAGNOSIS — I12.9 HYPERTENSION WITH RENAL DISEASE: Primary | ICD-10-CM

## 2024-11-14 DIAGNOSIS — E86.0 DEHYDRATION: ICD-10-CM

## 2024-11-14 RX ORDER — LEVOFLOXACIN 500 MG/1
500 TABLET, FILM COATED ORAL DAILY
Qty: 10 TABLET | Refills: 0 | Status: SHIPPED | OUTPATIENT
Start: 2024-11-14 | End: 2024-11-24

## 2024-11-14 NOTE — PROGRESS NOTES
Baron Wagner  is a 89 y.o. male     Chief Complaint   Patient presents with    1 Month Follow-Up       BP 91/61 (Site: Left Upper Arm, Position: Sitting, Cuff Size: Large Adult)   Pulse 75   Temp 97.9 °F (36.6 °C) (Oral)   Resp 17   Ht 1.905 m (6' 3\")   Wt 85.9 kg (189 lb 4.8 oz)   SpO2 96%   BMI 23.66 kg/m²     Health Maintenance Due   Topic Date Due    Respiratory Syncytial Virus (RSV) Pregnant or age 60 yrs+ (1 - 1-dose 60+ series) Never done    Pneumococcal 65+ years Vaccine (2 of 2 - PPSV23 or PCV20) 03/04/2005    COVID-19 Vaccine (1 - 2023-24 season) Never done         \"Have you been to the ER, urgent care clinic since your last visit?  Hospitalized since your last visit?\"    NO    “Have you seen or consulted any other health care providers outside of [facility] since your last visit?”    NO                    
[facility] [employer] of Occupational Health - Occupational Stress Questionnaire     Feeling of Stress : Not at all   Social Connections: Moderately Integrated (5/9/2024)    Social Connection and Isolation Panel [NHANES]     Frequency of Communication with Friends and Family: Three times a week     Frequency of Social Gatherings with Friends and Family: Three times a week     Attends Baptism Services: More than 4 times per year     Active Member of Clubs or Organizations: Yes     Attends Club or Organization Meetings: 1 to 4 times per year     Marital Status:    Intimate Partner Violence: Unknown (5/9/2024)    Humiliation, Afraid, Rape, and Kick questionnaire     Fear of Current or Ex-Partner: No     Emotionally Abused: No     Sexually Abused: No   Housing Stability: Low Risk  (5/9/2024)    Housing Stability Vital Sign     Unable to Pay for Housing in the Last Year: No     Number of Places Lived in the Last Year: 1     Unstable Housing in the Last Year: No     History reviewed. No pertinent family history.    OBJECTIVE:     BP (!) 100/58   Pulse 75   Temp 97.9 °F (36.6 °C) (Oral)   Resp 17   Ht 1.905 m (6' 3\")   Wt 85.9 kg (189 lb 4.8 oz)   SpO2 96%   BMI 23.66 kg/m²   CONSTITUTIONAL:   well nourished, appears age appropriate  EYES: sclera anicteric, PERRL, EOMI  ENMT:nares clear, moist mucous membranes, pharynx clear  NECK: supple. Thyroid normal, No JVD or bruits  RESPIRATORY: Chest: Scattered coarse rhonchi increased at the right base with some scattered rales in the right base as well.  Good air movement and no accessory muscle use.  CARDIOVASCULAR: Heart: regular rate and rhythm no murmurs, rubs or gallops, PMI not displaced, No thrills, no peripheral edema  GASTROINTESTINAL: Abdomen: non distended, soft, non tender, bowel sounds normal  HEMATOLOGIC: no purpura, petechiae or bruising  LYMPHATIC: No lymph node enlargemant  MUSCULOSKELETAL: Extremities: no active synovitis, pulse 1+   INTEGUMENT: No

## 2024-11-15 LAB
ANION GAP SERPL CALC-SCNC: 3 MMOL/L (ref 2–12)
BASOPHILS # BLD: 0 K/UL (ref 0–0.1)
BASOPHILS NFR BLD: 0 % (ref 0–1)
BUN SERPL-MCNC: 49 MG/DL (ref 6–20)
BUN/CREAT SERPL: 25 (ref 12–20)
CALCIUM SERPL-MCNC: 9.4 MG/DL (ref 8.5–10.1)
CHLORIDE SERPL-SCNC: 104 MMOL/L (ref 97–108)
CO2 SERPL-SCNC: 27 MMOL/L (ref 21–32)
CREAT SERPL-MCNC: 2 MG/DL (ref 0.7–1.3)
DIFFERENTIAL METHOD BLD: ABNORMAL
EOSINOPHIL # BLD: 0 K/UL (ref 0–0.4)
EOSINOPHIL NFR BLD: 0 % (ref 0–7)
ERYTHROCYTE [DISTWIDTH] IN BLOOD BY AUTOMATED COUNT: 16.2 % (ref 11.5–14.5)
ERYTHROCYTE [SEDIMENTATION RATE] IN BLOOD: 70 MM/HR (ref 0–20)
GLUCOSE SERPL-MCNC: 225 MG/DL (ref 65–100)
HCT VFR BLD AUTO: 39 % (ref 36.6–50.3)
HGB BLD-MCNC: 11.9 G/DL (ref 12.1–17)
IMM GRANULOCYTES # BLD AUTO: 0.1 K/UL (ref 0–0.04)
IMM GRANULOCYTES NFR BLD AUTO: 1 % (ref 0–0.5)
INR PPP: 1.1 (ref 0.9–1.1)
LYMPHOCYTES # BLD: 0.8 K/UL (ref 0.8–3.5)
LYMPHOCYTES NFR BLD: 5 % (ref 12–49)
MCH RBC QN AUTO: 29.7 PG (ref 26–34)
MCHC RBC AUTO-ENTMCNC: 30.5 G/DL (ref 30–36.5)
MCV RBC AUTO: 97.3 FL (ref 80–99)
MONOCYTES # BLD: 1.3 K/UL (ref 0–1)
MONOCYTES NFR BLD: 8 % (ref 5–13)
NEUTS SEG # BLD: 14.3 K/UL (ref 1.8–8)
NEUTS SEG NFR BLD: 86 % (ref 32–75)
NRBC # BLD: 0 K/UL (ref 0–0.01)
NRBC BLD-RTO: 0 PER 100 WBC
PLATELET # BLD AUTO: 216 K/UL (ref 150–400)
PMV BLD AUTO: 11 FL (ref 8.9–12.9)
POTASSIUM SERPL-SCNC: 4.7 MMOL/L (ref 3.5–5.1)
PROTHROMBIN TIME: 11.6 SEC (ref 9–11.1)
RBC # BLD AUTO: 4.01 M/UL (ref 4.1–5.7)
SODIUM SERPL-SCNC: 134 MMOL/L (ref 136–145)
WBC # BLD AUTO: 16.6 K/UL (ref 4.1–11.1)

## 2024-11-19 ENCOUNTER — ANTI-COAG VISIT (OUTPATIENT)
Facility: CLINIC | Age: 89
End: 2024-11-19

## 2024-11-19 NOTE — PROGRESS NOTES
Discussed INR of 1.1.  Daughter in law states he is taking his medication but is not feeling well.   Dr. Powell informed of the the currently situation and advised to have patient continue the same for now.

## 2024-11-23 DIAGNOSIS — E11.65 POORLY CONTROLLED DIABETES MELLITUS (HCC): ICD-10-CM

## 2024-11-23 PROBLEM — E86.0 DEHYDRATION: Status: ACTIVE | Noted: 2024-11-23

## 2024-11-23 PROBLEM — J20.9 ACUTE BRONCHITIS: Status: ACTIVE | Noted: 2024-11-23

## 2024-11-25 ENCOUNTER — OFFICE VISIT (OUTPATIENT)
Facility: CLINIC | Age: 88
End: 2024-11-25
Payer: MEDICARE

## 2024-11-25 VITALS
RESPIRATION RATE: 16 BRPM | DIASTOLIC BLOOD PRESSURE: 70 MMHG | BODY MASS INDEX: 24.38 KG/M2 | WEIGHT: 196.1 LBS | SYSTOLIC BLOOD PRESSURE: 110 MMHG | HEART RATE: 69 BPM | HEIGHT: 75 IN | TEMPERATURE: 97.9 F | OXYGEN SATURATION: 97 %

## 2024-11-25 DIAGNOSIS — J20.9 ACUTE BRONCHITIS, UNSPECIFIED ORGANISM: Primary | ICD-10-CM

## 2024-11-25 DIAGNOSIS — E86.0 DEHYDRATION: ICD-10-CM

## 2024-11-25 DIAGNOSIS — E11.9 TYPE 2 DIABETES MELLITUS WITHOUT COMPLICATION, WITHOUT LONG-TERM CURRENT USE OF INSULIN (HCC): ICD-10-CM

## 2024-11-25 DIAGNOSIS — N18.31 STAGE 3A CHRONIC KIDNEY DISEASE (HCC): ICD-10-CM

## 2024-11-25 DIAGNOSIS — E11.65 POORLY CONTROLLED DIABETES MELLITUS (HCC): ICD-10-CM

## 2024-11-25 LAB
ANION GAP SERPL CALC-SCNC: 6 MMOL/L (ref 2–12)
BUN SERPL-MCNC: 36 MG/DL (ref 6–20)
BUN/CREAT SERPL: 23 (ref 12–20)
CALCIUM SERPL-MCNC: 9.3 MG/DL (ref 8.5–10.1)
CHLORIDE SERPL-SCNC: 109 MMOL/L (ref 97–108)
CO2 SERPL-SCNC: 26 MMOL/L (ref 21–32)
CREAT SERPL-MCNC: 1.56 MG/DL (ref 0.7–1.3)
GLUCOSE SERPL-MCNC: 218 MG/DL (ref 65–100)
POTASSIUM SERPL-SCNC: 4.9 MMOL/L (ref 3.5–5.1)
SODIUM SERPL-SCNC: 141 MMOL/L (ref 136–145)

## 2024-11-25 PROCEDURE — G8482 FLU IMMUNIZE ORDER/ADMIN: HCPCS | Performed by: INTERNAL MEDICINE

## 2024-11-25 PROCEDURE — G8427 DOCREV CUR MEDS BY ELIG CLIN: HCPCS | Performed by: INTERNAL MEDICINE

## 2024-11-25 PROCEDURE — 1123F ACP DISCUSS/DSCN MKR DOCD: CPT | Performed by: INTERNAL MEDICINE

## 2024-11-25 PROCEDURE — G8420 CALC BMI NORM PARAMETERS: HCPCS | Performed by: INTERNAL MEDICINE

## 2024-11-25 PROCEDURE — 3051F HG A1C>EQUAL 7.0%<8.0%: CPT | Performed by: INTERNAL MEDICINE

## 2024-11-25 PROCEDURE — 1036F TOBACCO NON-USER: CPT | Performed by: INTERNAL MEDICINE

## 2024-11-25 PROCEDURE — 99214 OFFICE O/P EST MOD 30 MIN: CPT | Performed by: INTERNAL MEDICINE

## 2024-11-25 PROCEDURE — 1159F MED LIST DOCD IN RCRD: CPT | Performed by: INTERNAL MEDICINE

## 2024-11-25 PROCEDURE — 1126F AMNT PAIN NOTED NONE PRSNT: CPT | Performed by: INTERNAL MEDICINE

## 2024-11-25 RX ORDER — CHOLECALCIFEROL (VITAMIN D3) 125 MCG
CAPSULE ORAL
Qty: 30 CAPSULE | Refills: 5 | Status: SHIPPED | OUTPATIENT
Start: 2024-11-25

## 2024-11-25 RX ORDER — PREGABALIN 75 MG/1
75 CAPSULE ORAL DAILY
Qty: 90 CAPSULE | Refills: 1 | Status: SHIPPED | OUTPATIENT
Start: 2024-11-25 | End: 2025-05-24

## 2024-11-25 NOTE — TELEPHONE ENCOUNTER
RX refill request from the patient/pharmacy. Patient last seen 11- with labs, and next appt. scheduled for 12-  Requested Prescriptions     Pending Prescriptions Disp Refills    pregabalin (LYRICA) 75 MG capsule [Pharmacy Med Name: PREGABALIN 75 MG CAPSULE] 90 capsule 1     Sig: Take 1 capsule by mouth daily for 90 days. Max Daily Amount: 75 mg    .

## 2024-11-25 NOTE — PROGRESS NOTES
Baron Wagner . is a 89 y.o. male     Chief Complaint   Patient presents with    10 day f/u       /70 (Site: Left Upper Arm, Position: Sitting, Cuff Size: Large Adult)   Pulse 69   Temp 97.9 °F (36.6 °C) (Oral)   Resp 16   Ht 1.905 m (6' 3\")   Wt 89 kg (196 lb 1.6 oz)   SpO2 97%   BMI 24.51 kg/m²     Health Maintenance Due   Topic Date Due    Pneumococcal 65+ years Vaccine (2 of 2 - PPSV23 or PCV20) 03/04/2005    Respiratory Syncytial Virus (RSV) Pregnant or age 60 yrs+ (1 - 1-dose 75+ series) Never done    COVID-19 Vaccine (1 - 2023-24 season) Never done         \"Have you been to the ER, urgent care clinic since your last visit?  Hospitalized since your last visit?\"    NO    “Have you seen or consulted any other health care providers outside of [facility] since your last visit?”    NO                    
Cuff Size: Large Adult)   Pulse 69   Temp 97.9 °F (36.6 °C) (Oral)   Resp 16   Ht 1.905 m (6' 3\")   Wt 89 kg (196 lb 1.6 oz)   SpO2 97%   BMI 24.51 kg/m²   CONSTITUTIONAL:   well nourished, appears age appropriate  EYES: sclera anicteric, PERRL, EOMI  ENMT:nares clear, moist mucous membranes, pharynx clear  NECK: supple. Thyroid normal, No JVD or bruits  RESPIRATORY: Chest: clear to ascultation and percussion, normal inspiratory effort  CARDIOVASCULAR: Heart: regular rate and rhythm no murmurs, rubs or gallops, PMI not displaced, No thrills, no peripheral edema  GASTROINTESTINAL: Abdomen: non distended, soft, non tender, bowel sounds normal  HEMATOLOGIC: no purpura, petechiae or bruising  LYMPHATIC: No lymph node enlargemant  MUSCULOSKELETAL: Extremities: no active synovitis, pulse 1+   INTEGUMENT: No unusual rashes or suspicious skin lesions noted. Nails appear normal.  PERIPHERAL VASCULAR: normal pulses femoral, PT and DP  NEUROLOGIC: non-focal exam, A & O X 3  PSYCHIATRIC:, appropriate affect     ASSESSMENT:   1. Acute bronchitis, unspecified organism    2. Dehydration    3. Stage 3a chronic kidney disease (HCC)    4. Type 2 diabetes mellitus without complication, without long-term current use of insulin ([facility])    5. Poorly controlled diabetes mellitus (HCC)      Impression  1 acute bronchitis is resolved  2.  Dehydration will see what that status is I think that is probably better as his weight is up 7 pounds from his visit when he had acute bronchitis and dehydration.  3.  CKD stage III repeat status is pending I will call results of that  4 diabetes mellitus he has been monitoring his blood sugars and they are doing okay and is back on his diabetic medicines now  5.  Neuropathy I renewed his Lyrica  I renewed his probiotic and the fact that he is taking antibiotic for the bronchitis and now dermatologist placed him on Keflex to treat his wound where they did a skin lesion excision of his forehead.  I

## 2024-12-11 NOTE — PROGRESS NOTES
Chief Complaint   Patient presents with    Diabetes     3 month f/u    Hypertension    Cholesterol Problem       SUBJECTIVE:    Baron Wagner Sr. is a 89 y.o. male who returns in follow-up for his medical problems include hypertension, hyperlipidemia, diabetes, history recurrent diabetic ulcers, DJD, polymyalgia rheumatica, atrial fibrillation, chronic diastolic CHF compensated and other multimedical problems.  He is taking his medication transformers diet remains physically active.  He actually was off of his Coumadin for couple days last week as they were going to change his pacemaker but there was loss of power at the hospital that had to be reset scheduled so he is back on his Coumadin now.  He notes no chest pain, shortness of breath or cardiac respiratory complaints.  There are no GI or  complaints.  He notes no headaches, dizziness or neurologic complaints.  He has no current change of his chronic arthritic complaints and there are no other complaints on complete review of systems.      Current Outpatient Medications   Medication Sig Dispense Refill    pregabalin (LYRICA) 75 MG capsule Take 1 capsule by mouth daily for 180 days. Max Daily Amount: 75 mg 90 capsule 1    Lactobacillus (PROBIOTIC ACIDOPHILUS) CAPS Take 1 daily 30 capsule 5    bumetanide (BUMEX) 2 MG tablet TAKE 1 TABLET BY MOUTH TWICE A  tablet 1    metFORMIN (GLUCOPHAGE) 500 MG tablet TAKE 1 TABLET BY MOUTH TWICE A DAY IN THE MORNING WITH BREAKFAST AND BEFORE DINNER 180 tablet 3    terazosin (HYTRIN) 2 MG capsule TAKE 1 CAPSULE BY MOUTH EVERY DAY 90 capsule 3    Lactobacillus Acid-Pectin (ACIDOPHILUS/CITRUS PECTIN) TABS TAKE 1 TABLET BY MOUTH EVERY DAY 90 tablet 3    ACCU-CHEK LEE ANN PLUS strip USE TO TEST BLOOD SUGAR TWICE DAILY 100 strip 3    Glucosamine 500 MG CAPS 1 capsule with meals Orally Three times a day      Glucosamine-Chondroitin--400-167 MG TABS Take 1 tablet by mouth daily      glimepiride (AMARYL) 4 MG tablet

## 2024-12-12 ENCOUNTER — OFFICE VISIT (OUTPATIENT)
Facility: CLINIC | Age: 88
End: 2024-12-12
Payer: MEDICARE

## 2024-12-12 VITALS
HEART RATE: 81 BPM | RESPIRATION RATE: 18 BRPM | WEIGHT: 199.6 LBS | TEMPERATURE: 97.9 F | HEIGHT: 75 IN | SYSTOLIC BLOOD PRESSURE: 100 MMHG | BODY MASS INDEX: 24.82 KG/M2 | DIASTOLIC BLOOD PRESSURE: 70 MMHG | OXYGEN SATURATION: 95 %

## 2024-12-12 DIAGNOSIS — E11.9 TYPE 2 DIABETES MELLITUS WITHOUT COMPLICATION, WITHOUT LONG-TERM CURRENT USE OF INSULIN (HCC): ICD-10-CM

## 2024-12-12 DIAGNOSIS — M15.0 PRIMARY OSTEOARTHRITIS INVOLVING MULTIPLE JOINTS: ICD-10-CM

## 2024-12-12 DIAGNOSIS — N18.31 STAGE 3A CHRONIC KIDNEY DISEASE (HCC): ICD-10-CM

## 2024-12-12 DIAGNOSIS — I12.9 HYPERTENSION WITH RENAL DISEASE: Primary | ICD-10-CM

## 2024-12-12 DIAGNOSIS — I44.2 COMPLETE HEART BLOCK (HCC): ICD-10-CM

## 2024-12-12 DIAGNOSIS — I48.0 PAROXYSMAL ATRIAL FIBRILLATION (HCC): ICD-10-CM

## 2024-12-12 DIAGNOSIS — I50.32 DIASTOLIC CHF, CHRONIC (HCC): ICD-10-CM

## 2024-12-12 DIAGNOSIS — I25.10 ASCVD (ARTERIOSCLEROTIC CARDIOVASCULAR DISEASE): ICD-10-CM

## 2024-12-12 DIAGNOSIS — M35.3 POLYMYALGIA RHEUMATICA (HCC): ICD-10-CM

## 2024-12-12 DIAGNOSIS — E78.2 MIXED HYPERLIPIDEMIA: ICD-10-CM

## 2024-12-12 PROCEDURE — 1123F ACP DISCUSS/DSCN MKR DOCD: CPT | Performed by: INTERNAL MEDICINE

## 2024-12-12 PROCEDURE — G8427 DOCREV CUR MEDS BY ELIG CLIN: HCPCS | Performed by: INTERNAL MEDICINE

## 2024-12-12 PROCEDURE — G8482 FLU IMMUNIZE ORDER/ADMIN: HCPCS | Performed by: INTERNAL MEDICINE

## 2024-12-12 PROCEDURE — G8420 CALC BMI NORM PARAMETERS: HCPCS | Performed by: INTERNAL MEDICINE

## 2024-12-12 PROCEDURE — 3051F HG A1C>EQUAL 7.0%<8.0%: CPT | Performed by: INTERNAL MEDICINE

## 2024-12-12 PROCEDURE — 1159F MED LIST DOCD IN RCRD: CPT | Performed by: INTERNAL MEDICINE

## 2024-12-12 PROCEDURE — 1126F AMNT PAIN NOTED NONE PRSNT: CPT | Performed by: INTERNAL MEDICINE

## 2024-12-12 PROCEDURE — 99214 OFFICE O/P EST MOD 30 MIN: CPT | Performed by: INTERNAL MEDICINE

## 2024-12-12 PROCEDURE — 1036F TOBACCO NON-USER: CPT | Performed by: INTERNAL MEDICINE

## 2024-12-12 PROCEDURE — 1160F RVW MEDS BY RX/DR IN RCRD: CPT | Performed by: INTERNAL MEDICINE

## 2024-12-12 NOTE — PROGRESS NOTES
Baron Wagner . is a 89 y.o. male     Chief Complaint   Patient presents with    Diabetes     3 month f/u    Hypertension    Cholesterol Problem       /70 (Site: Left Upper Arm, Position: Sitting, Cuff Size: Large Adult)   Pulse 81   Temp 97.9 °F (36.6 °C) (Oral)   Resp 18   Ht 1.905 m (6' 3\")   Wt 90.5 kg (199 lb 9.6 oz)   SpO2 95%   BMI 24.95 kg/m²     Health Maintenance Due   Topic Date Due    Pneumococcal 65+ years Vaccine (2 of 2 - PPSV23 or PCV20) 03/04/2005    Respiratory Syncytial Virus (RSV) Pregnant or age 60 yrs+ (1 - 1-dose 75+ series) Never done    COVID-19 Vaccine (1 - 2023-24 season) Never done         \"Have you been to the ER, urgent care clinic since your last visit?  Hospitalized since your last visit?\"    NO    “Have you seen or consulted any other health care providers outside of [facility] since your last visit?”    NO

## 2024-12-13 LAB
ALBUMIN SERPL-MCNC: 3.5 G/DL (ref 3.5–5)
ALBUMIN/GLOB SERPL: 0.9 (ref 1.1–2.2)
ALP SERPL-CCNC: 81 U/L (ref 45–117)
ALT SERPL-CCNC: 60 U/L (ref 12–78)
ANION GAP SERPL CALC-SCNC: 6 MMOL/L (ref 2–12)
AST SERPL-CCNC: 32 U/L (ref 15–37)
BILIRUB SERPL-MCNC: 0.5 MG/DL (ref 0.2–1)
BUN SERPL-MCNC: 42 MG/DL (ref 6–20)
BUN/CREAT SERPL: 29 (ref 12–20)
CALCIUM SERPL-MCNC: 9.7 MG/DL (ref 8.5–10.1)
CHLORIDE SERPL-SCNC: 105 MMOL/L (ref 97–108)
CHOLEST SERPL-MCNC: 166 MG/DL
CK SERPL-CCNC: 98 U/L (ref 39–308)
CO2 SERPL-SCNC: 27 MMOL/L (ref 21–32)
CREAT SERPL-MCNC: 1.47 MG/DL (ref 0.7–1.3)
ERYTHROCYTE [SEDIMENTATION RATE] IN BLOOD: 37 MM/HR (ref 0–20)
EST. AVERAGE GLUCOSE BLD GHB EST-MCNC: 169 MG/DL
GLOBULIN SER CALC-MCNC: 3.8 G/DL (ref 2–4)
GLUCOSE SERPL-MCNC: 169 MG/DL (ref 65–100)
HBA1C MFR BLD: 7.5 % (ref 4–5.6)
HDLC SERPL-MCNC: 43 MG/DL
HDLC SERPL: 3.9 (ref 0–5)
INR PPP: 1.4 (ref 0.9–1.1)
LDLC SERPL CALC-MCNC: 76.6 MG/DL (ref 0–100)
POTASSIUM SERPL-SCNC: 5 MMOL/L (ref 3.5–5.1)
PROT SERPL-MCNC: 7.3 G/DL (ref 6.4–8.2)
PROTHROMBIN TIME: 14.4 SEC (ref 9–11.1)
SODIUM SERPL-SCNC: 138 MMOL/L (ref 136–145)
TRIGL SERPL-MCNC: 232 MG/DL
VLDLC SERPL CALC-MCNC: 46.4 MG/DL

## 2024-12-23 PROBLEM — E86.0 DEHYDRATION: Status: RESOLVED | Noted: 2024-11-23 | Resolved: 2024-12-23

## 2024-12-24 RX ORDER — DICLOFENAC SODIUM 75 MG/1
75 TABLET, DELAYED RELEASE ORAL 2 TIMES DAILY
Qty: 60 TABLET | Refills: 5 | Status: SHIPPED | OUTPATIENT
Start: 2024-12-24

## 2024-12-24 NOTE — TELEPHONE ENCOUNTER
PCP: Willem Powell MD    Last appt: 12/12/2024  Future Appointments   Date Time Provider Department Center   1/14/2025  2:20 PM Willem Powell MD [facility] DEP   3/13/2025 10:00 AM Willem Powell MD [facility] DEP       Requested Prescriptions     Pending Prescriptions Disp Refills    diclofenac (VOLTAREN) 75 MG EC tablet [Pharmacy Med Name: DICLOFENAC SOD EC 75 MG TAB] 60 tablet 5     Sig: TAKE 1 TABLET BY MOUTH TWICE A DAY AS NEEDED FOR PAIN       Prior labs and Blood pressures:  BP Readings from Last 3 Encounters:   12/12/24 100/70   11/25/24 110/70   11/14/24 (!) 100/58     Lab Results   Component Value Date/Time     12/12/2024 12:21 PM    K 5.0 12/12/2024 12:21 PM     12/12/2024 12:21 PM    CO2 27 12/12/2024 12:21 PM    BUN 42 12/12/2024 12:21 PM    GFRAA 53 08/17/2022 10:24 AM     No results found for: \"HBA1C\", \"[id number]\"  Lab Results   Component Value Date/Time    CHOL 166 12/12/2024 12:21 PM    HDL 43 12/12/2024 12:21 PM    LDL 76.6 12/12/2024 12:21 PM    LDL 69.8 03/04/2024 10:10 AM    VLDL 46.4 12/12/2024 12:21 PM    VLDL 81 04/02/2021 03:16 PM     No results found for: \"VITD3\"        Lab Results   Component Value Date/Time    TSH 1.19 06/06/2024 10:23 AM

## 2025-01-01 ENCOUNTER — RESULTS FOLLOW-UP (OUTPATIENT)
Facility: CLINIC | Age: 89
End: 2025-01-01

## 2025-01-01 ENCOUNTER — TELEPHONE (OUTPATIENT)
Age: 89
End: 2025-01-01

## 2025-01-03 ENCOUNTER — TELEPHONE (OUTPATIENT)
Facility: CLINIC | Age: 89
End: 2025-01-03

## 2025-01-03 RX ORDER — DOXYCYCLINE HYCLATE 100 MG
100 TABLET ORAL 2 TIMES DAILY
Qty: 28 TABLET | Refills: 0 | Status: SHIPPED | OUTPATIENT
Start: 2025-01-03 | End: 2025-01-17

## 2025-01-03 NOTE — TELEPHONE ENCOUNTER
Phone call from SUSIE stating patient was seen today at [facility] by Dr. Abdullahi. Pt has had a growth on the side of his foot. Xray was done which showed arthritis. During exam, Dr. Abdullahi told them there was an infection in his foot but did not give an antibiotic. Asking if you want to prescribe an antibiotis since pt received a pacemaker in December. Pt has a f/u appt with you on 1/14/25. JUN

## 2025-01-04 RX ORDER — DOXYCYCLINE HYCLATE 100 MG
100 TABLET ORAL 2 TIMES DAILY
Qty: 28 TABLET | Refills: 0 | Status: SHIPPED | OUTPATIENT
Start: 2025-01-04 | End: 2025-01-18

## 2025-01-14 ENCOUNTER — OFFICE VISIT (OUTPATIENT)
Facility: CLINIC | Age: 89
End: 2025-01-14
Payer: MEDICARE

## 2025-01-14 VITALS
TEMPERATURE: 98 F | DIASTOLIC BLOOD PRESSURE: 80 MMHG | RESPIRATION RATE: 17 BRPM | SYSTOLIC BLOOD PRESSURE: 120 MMHG | WEIGHT: 196.5 LBS | BODY MASS INDEX: 24.43 KG/M2 | HEART RATE: 77 BPM | HEIGHT: 75 IN | OXYGEN SATURATION: 95 %

## 2025-01-14 DIAGNOSIS — E11.621 DIABETIC ULCER OF LEFT MIDFOOT ASSOCIATED WITH TYPE 2 DIABETES MELLITUS, LIMITED TO BREAKDOWN OF SKIN (HCC): ICD-10-CM

## 2025-01-14 DIAGNOSIS — L97.421 DIABETIC ULCER OF LEFT MIDFOOT ASSOCIATED WITH TYPE 2 DIABETES MELLITUS, LIMITED TO BREAKDOWN OF SKIN (HCC): ICD-10-CM

## 2025-01-14 DIAGNOSIS — M35.3 POLYMYALGIA RHEUMATICA (HCC): ICD-10-CM

## 2025-01-14 DIAGNOSIS — I48.0 PAROXYSMAL ATRIAL FIBRILLATION (HCC): ICD-10-CM

## 2025-01-14 DIAGNOSIS — E11.9 TYPE 2 DIABETES MELLITUS WITHOUT COMPLICATION, WITHOUT LONG-TERM CURRENT USE OF INSULIN (HCC): ICD-10-CM

## 2025-01-14 DIAGNOSIS — N18.31 STAGE 3A CHRONIC KIDNEY DISEASE (HCC): ICD-10-CM

## 2025-01-14 DIAGNOSIS — I50.32 DIASTOLIC CHF, CHRONIC (HCC): Primary | ICD-10-CM

## 2025-01-14 PROCEDURE — 99214 OFFICE O/P EST MOD 30 MIN: CPT | Performed by: INTERNAL MEDICINE

## 2025-01-14 PROCEDURE — 1159F MED LIST DOCD IN RCRD: CPT | Performed by: INTERNAL MEDICINE

## 2025-01-14 PROCEDURE — G8420 CALC BMI NORM PARAMETERS: HCPCS | Performed by: INTERNAL MEDICINE

## 2025-01-14 PROCEDURE — G8427 DOCREV CUR MEDS BY ELIG CLIN: HCPCS | Performed by: INTERNAL MEDICINE

## 2025-01-14 PROCEDURE — 1123F ACP DISCUSS/DSCN MKR DOCD: CPT | Performed by: INTERNAL MEDICINE

## 2025-01-14 PROCEDURE — 1036F TOBACCO NON-USER: CPT | Performed by: INTERNAL MEDICINE

## 2025-01-14 PROCEDURE — 1125F AMNT PAIN NOTED PAIN PRSNT: CPT | Performed by: INTERNAL MEDICINE

## 2025-01-14 PROCEDURE — 1160F RVW MEDS BY RX/DR IN RCRD: CPT | Performed by: INTERNAL MEDICINE

## 2025-01-14 RX ORDER — CASTOR OIL AND BALSAM, PERU 788; 87 MG/G; MG/G
OINTMENT TOPICAL 2 TIMES DAILY
Qty: 30 G | Refills: 1 | Status: SHIPPED | OUTPATIENT
Start: 2025-01-14

## 2025-01-14 ASSESSMENT — PATIENT HEALTH QUESTIONNAIRE - PHQ9
SUM OF ALL RESPONSES TO PHQ QUESTIONS 1-9: 0
SUM OF ALL RESPONSES TO PHQ QUESTIONS 1-9: 0
SUM OF ALL RESPONSES TO PHQ9 QUESTIONS 1 & 2: 0
1. LITTLE INTEREST OR PLEASURE IN DOING THINGS: NOT AT ALL
SUM OF ALL RESPONSES TO PHQ QUESTIONS 1-9: 0
2. FEELING DOWN, DEPRESSED OR HOPELESS: NOT AT ALL
SUM OF ALL RESPONSES TO PHQ QUESTIONS 1-9: 0

## 2025-01-14 NOTE — PROGRESS NOTES
Chief Complaint   Patient presents with    balance issues     1 month f/u       SUBJECTIVE:    Baron Wagner Sr. is a 89 y.o. male who returns in follow-up regarding his issues to include congestive heart failure, diabetes with diabetic foot ulcer now on the lateral aspect of the left foot, paroxysmal atrial fibrillation, CKD stage III and other medical problems.  He does note his balance is a little bit off because of the foot also but has not had any falls and is being very careful.  He currently denies any chest pain, shortness of breath or cardiac respiratory complaints.  He notes no current GI or  complaints.  He did see an orthopedist regarding his foot and had an x-ray but does not know what it showed and they told him that he needed to have an MRI but he is trying to get that worked out with his heart doctor because of his pacemaker.  He denies any chest pain, shortness of breath or cardiac respiratory complaints.  He notes no current GI or  complaints.  He notes no headaches, dizziness or neurologic complaints.  He has no current active arthritic complaints and there are no other complaints on complete review of systems.      Current Outpatient Medications   Medication Sig Dispense Refill    balsum peru-castor oil (VENELEX) OINT ointment Apply topically 2 times daily 30 g 1    doxycycline hyclate (VIBRA-TABS) 100 MG tablet Take 1 tablet by mouth 2 times daily for 14 days 28 tablet 0    diclofenac (VOLTAREN) 75 MG EC tablet TAKE 1 TABLET BY MOUTH TWICE A DAY AS NEEDED FOR PAIN 60 tablet 5    pregabalin (LYRICA) 75 MG capsule Take 1 capsule by mouth daily for 180 days. Max Daily Amount: 75 mg 90 capsule 1    Lactobacillus (PROBIOTIC ACIDOPHILUS) CAPS Take 1 daily 30 capsule 5    bumetanide (BUMEX) 2 MG tablet TAKE 1 TABLET BY MOUTH TWICE A  tablet 1    metFORMIN (GLUCOPHAGE) 500 MG tablet TAKE 1 TABLET BY MOUTH TWICE A DAY IN THE MORNING WITH BREAKFAST AND BEFORE DINNER 180 tablet 3

## 2025-01-14 NOTE — PROGRESS NOTES
Baron Wagner  is a 89 y.o. male     Chief Complaint   Patient presents with    balance issues     1 month f/u       /80 (Site: Left Upper Arm, Position: Sitting, Cuff Size: Large Adult)   Pulse 77   Temp 98 °F (36.7 °C) (Oral)   Resp 17   Ht 1.905 m (6' 3\")   Wt 89.1 kg (196 lb 8 oz)   SpO2 95%   BMI 24.56 kg/m²     Health Maintenance Due   Topic Date Due    Pneumococcal 65+ years Vaccine (2 of 2 - PPSV23 or PCV20) 03/04/2005    Respiratory Syncytial Virus (RSV) Pregnant or age 60 yrs+ (1 - 1-dose 75+ series) Never done    COVID-19 Vaccine (1 - 2023-24 season) Never done    Annual Wellness Visit (Medicare Advantage)  01/01/2025         \"Have you been to the ER, urgent care clinic since your last visit?  Hospitalized since your last visit?\"    NO    “Have you seen or consulted any other health care providers outside of [facility] since your last visit?”    NO

## 2025-01-15 LAB
ANION GAP SERPL CALC-SCNC: 8 MMOL/L (ref 2–12)
BUN SERPL-MCNC: 44 MG/DL (ref 6–20)
BUN/CREAT SERPL: 26 (ref 12–20)
CALCIUM SERPL-MCNC: 9.8 MG/DL (ref 8.5–10.1)
CHLORIDE SERPL-SCNC: 105 MMOL/L (ref 97–108)
CO2 SERPL-SCNC: 26 MMOL/L (ref 21–32)
CREAT SERPL-MCNC: 1.67 MG/DL (ref 0.7–1.3)
ERYTHROCYTE [SEDIMENTATION RATE] IN BLOOD: 29 MM/HR (ref 0–20)
GLUCOSE SERPL-MCNC: 305 MG/DL (ref 65–100)
INR PPP: 1.7 (ref 0.9–1.1)
POTASSIUM SERPL-SCNC: 4.8 MMOL/L (ref 3.5–5.1)
PROTHROMBIN TIME: 17.3 SEC (ref 9–11.1)
SODIUM SERPL-SCNC: 139 MMOL/L (ref 136–145)

## 2025-01-22 ENCOUNTER — HOSPITAL ENCOUNTER (OUTPATIENT)
Facility: HOSPITAL | Age: 89
Discharge: HOME | End: 2025-01-25

## 2025-01-22 ENCOUNTER — TELEPHONE (OUTPATIENT)
Facility: CLINIC | Age: 89
End: 2025-01-22

## 2025-01-22 DIAGNOSIS — L97.521 ULCER OF TOE OF LEFT FOOT, LIMITED TO BREAKDOWN OF SKIN (HCC): ICD-10-CM

## 2025-01-22 DIAGNOSIS — M19.072 ARTHRITIS OF LEFT ANKLE: ICD-10-CM

## 2025-01-22 DIAGNOSIS — M79.672 LEFT FOOT PAIN: ICD-10-CM

## 2025-01-22 NOTE — TELEPHONE ENCOUNTER
Odilon daughter-in-law return nurse Irsi's phone call.  Daughter-in-law is on patients HIPAA.   Phone #[phone]

## 2025-01-24 NOTE — TELEPHONE ENCOUNTER
Odilon calling back stating she has not heard from the nurse in reference to her father-in-law's lab results.  She would like to get a call back today.

## 2025-01-31 ENCOUNTER — HOSPITAL ENCOUNTER (OUTPATIENT)
Facility: HOSPITAL | Age: 89
Discharge: HOME | End: 2025-01-31
Attending: PODIATRIST
Payer: MEDICARE

## 2025-01-31 VITALS
RESPIRATION RATE: 18 BRPM | DIASTOLIC BLOOD PRESSURE: 80 MMHG | SYSTOLIC BLOOD PRESSURE: 119 MMHG | HEART RATE: 96 BPM | TEMPERATURE: 98 F

## 2025-01-31 DIAGNOSIS — L97.425 DIABETIC ULCER OF LEFT MIDFOOT ASSOCIATED WITH TYPE 2 DIABETES MELLITUS, WITH MUSCLE INVOLVEMENT WITHOUT EVIDENCE OF NECROSIS (HCC): Primary | ICD-10-CM

## 2025-01-31 DIAGNOSIS — L97.523 ULCER OF LEFT FOOT, WITH NECROSIS OF MUSCLE (HCC): ICD-10-CM

## 2025-01-31 DIAGNOSIS — E11.621 DIABETIC ULCER OF LEFT MIDFOOT ASSOCIATED WITH TYPE 2 DIABETES MELLITUS, WITH MUSCLE INVOLVEMENT WITHOUT EVIDENCE OF NECROSIS (HCC): Primary | ICD-10-CM

## 2025-01-31 PROCEDURE — 87205 SMEAR GRAM STAIN: CPT

## 2025-01-31 PROCEDURE — 99203 OFFICE O/P NEW LOW 30 MIN: CPT

## 2025-01-31 PROCEDURE — 11043 DBRDMT MUSC&/FSCA 1ST 20/<: CPT

## 2025-01-31 PROCEDURE — 87070 CULTURE OTHR SPECIMN AEROBIC: CPT

## 2025-01-31 RX ORDER — LIDOCAINE HYDROCHLORIDE 40 MG/ML
SOLUTION TOPICAL PRN
OUTPATIENT
Start: 2025-01-31

## 2025-01-31 RX ORDER — BETAMETHASONE DIPROPIONATE 0.5 MG/G
CREAM TOPICAL PRN
OUTPATIENT
Start: 2025-01-31

## 2025-01-31 RX ORDER — LIDOCAINE HYDROCHLORIDE 20 MG/ML
JELLY TOPICAL PRN
OUTPATIENT
Start: 2025-01-31

## 2025-01-31 RX ORDER — CLOBETASOL PROPIONATE 0.5 MG/G
OINTMENT TOPICAL PRN
OUTPATIENT
Start: 2025-01-31

## 2025-01-31 RX ORDER — BACITRACIN ZINC AND POLYMYXIN B SULFATE 500; 1000 [USP'U]/G; [USP'U]/G
OINTMENT TOPICAL PRN
OUTPATIENT
Start: 2025-01-31

## 2025-01-31 RX ORDER — GENTAMICIN SULFATE 1 MG/G
OINTMENT TOPICAL PRN
OUTPATIENT
Start: 2025-01-31

## 2025-01-31 RX ORDER — LIDOCAINE 50 MG/G
OINTMENT TOPICAL PRN
OUTPATIENT
Start: 2025-01-31

## 2025-01-31 RX ORDER — TRIAMCINOLONE ACETONIDE 1 MG/G
OINTMENT TOPICAL PRN
OUTPATIENT
Start: 2025-01-31

## 2025-01-31 RX ORDER — SILVER SULFADIAZINE 10 MG/G
CREAM TOPICAL PRN
OUTPATIENT
Start: 2025-01-31

## 2025-01-31 RX ORDER — LIDOCAINE 40 MG/G
CREAM TOPICAL PRN
OUTPATIENT
Start: 2025-01-31

## 2025-01-31 RX ORDER — DOXYCYCLINE HYCLATE 100 MG
100 TABLET ORAL 2 TIMES DAILY
COMMUNITY

## 2025-01-31 RX ORDER — NEOMYCIN/BACITRACIN/POLYMYXINB 3.5-400-5K
OINTMENT (GRAM) TOPICAL PRN
OUTPATIENT
Start: 2025-01-31

## 2025-01-31 RX ORDER — MUPIROCIN 20 MG/G
OINTMENT TOPICAL PRN
OUTPATIENT
Start: 2025-01-31

## 2025-01-31 RX ORDER — GINSENG 100 MG
CAPSULE ORAL PRN
OUTPATIENT
Start: 2025-01-31

## 2025-01-31 RX ORDER — SODIUM CHLOR/HYPOCHLOROUS ACID 0.033 %
SOLUTION, IRRIGATION IRRIGATION PRN
OUTPATIENT
Start: 2025-01-31

## 2025-01-31 NOTE — FLOWSHEET NOTE
01/31/25 1424   LLE Neurovascular Assessment   Capillary Refill Less than/Equal to 3 seconds   Color Appropriate for Ethnicity   Temperature Cool   L Pedal Pulse +2   Wound 01/31/25 Foot Left;Lateral   Date First Assessed/Time First Assessed: 01/31/25 1423   Present on Original Admission: Yes  Wound Approximate Age at First Assessment (Weeks): 2 weeks  Location: Foot  Wound Location Orientation: Left;Lateral   Wound Image    Dressing Status Intact;Old drainage noted   Wound Cleansed Cleansed with saline   Offloading for Diabetic Foot Ulcers Offloading not ordered   Wound Length (cm) 0.9 cm   Wound Width (cm) 0.9 cm   Wound Depth (cm) 0.7 cm   Wound Surface Area (cm^2) 0.81 cm^2   Wound Volume (cm^3) 0.567 cm^3   Undermining Starts ___ O'Clock 6   Undermining Ends___ O'Clock 10   Undermining Maxium Distance (cm) 0.5   Wound Assessment Denuded;Slough   Drainage Amount Moderate (25-50%)   Drainage Description Serosanguinous   Odor None   Lizbeth-wound Assessment Hyperkeratosis (callous);Hypopigmented   Margins Defined edges   Wound Thickness Description not for Pressure Injury Full thickness   Pain Assessment   Pain Assessment None - Denies Pain     /80   Pulse 96   Temp 98 °F (36.7 °C) (Temporal)   Resp 18

## 2025-01-31 NOTE — FLOWSHEET NOTE
01/31/25 1506   Wound 01/31/25 Foot Left;Lateral   Date First Assessed/Time First Assessed: 01/31/25 1423   Present on Original Admission: Yes  Wound Approximate Age at First Assessment (Weeks): 2 weeks  Location: Foot  Wound Location Orientation: Left;Lateral   Dressing Status New dressing applied   Wound Cleansed Betadine/povidone iodine   Dressing/Treatment Gauze dressing/dressing sponge;Roll gauze   Offloading for Diabetic Foot Ulcers Offloading ordered

## 2025-01-31 NOTE — PATIENT INSTRUCTIONS
Discharge Instructions/Wound Care Orders  [facility]   [address]  [id number]  [place] [address]  Phone: [phone] Fax: [phone]    NAME:  Baron Wagner Sr.  YOB: 1933  MEDICAL RECORD NUMBER:  [id number]  DATE:  January 31, 2025    Wound Care Orders:  Left foot wound - Cleanse with normal saline and gauze. Packed betadine soaked gauze into wound. Cover with dry gauze. Secure with kerlix. Change dressing every other day. Home health to do dressing changes.    Referral to Home health    Activity:  [x] Elevate leg(s) above the level of the heart when sitting and avoid prolonged standing in one place.    [x] Wear off-loading shoe/boot on the affected foot when walking.  [x] Do not get dressing wet.    Dietary:  [] Diet as tolerated      [x] Diabetic Diet            [] Increase Protein: examples (Meat, cheese, eggs, greek yogurt, fish, nuts)          [] [employer] Therapeutic Nutrition Powder  [] Other:      Follow-up:  [x] Return Appointment: With Dr. Radha Carrasco in 2 Weeks.  [x] Ordered tests: Culture done today. We are ordering CT scan and vascular office to complete KASSIDY.      Electronically signed Chrissy Smith RN on 1/31/2025 at 2:29 PM     Wound Care Center Information: Should you experience any significant changes in your wound(s) or have questions about your wound care, please contact the [facility] at MONDAY - FRIDAY 8:00 am - 4:30.  If you need help with your wound outside these hours and cannot wait until we are again available, contact your PCP or go to the hospital emergency room.     PLEASE NOTE: IF YOU ARE UNABLE TO OBTAIN WOUND SUPPLIES, CONTINUE TO USE THE SUPPLIES YOU HAVE AVAILABLE UNTIL YOU ARE ABLE TO REACH US. IT IS MOST IMPORTANT TO KEEP THE WOUND COVERED AT ALL TIMES.     Physician Signature:_______________________    Date: ___________ Time:  ____________

## 2025-01-31 NOTE — WOUND CARE
Debridement Wound Care        Problem List Items Addressed This Visit          Endocrine    Diabetic ulcer of left midfoot associated with type 2 diabetes mellitus, with muscle involvement without evidence of necrosis (HCC) - Primary       Other    Ulcer of left foot, with necrosis of muscle (HCC)       Procedure Note  Indications:  Based on my examination of this patient's wound(s)/ulcer(s) today, debridement is  required to promote healing and evaluate the wound base.    Performed by: Radha Carrasco DPM    Consent obtained: Yes    Time out taken: Yes    Debridement: excisional    Using #15 blade scalpel, scissors, and forceps the wound(s)/ulcer(s) was/were sharply debrided down through and including the removal of    subcutaneous tissue and muscle/fascia    Devitalized Tissue Debrided: necrotic/eschar    Pre Debridement Measurements:  Are located in the Wound/Ulcer Documentation Flow Sheet    Diabetic ulcer, muscle necrosis    Wound/Ulcer #: 1    Post Debridement Measurements:  Wound/Ulcer Descriptions are Pre Debridement except measurements:    Wound Care Documentation:  Puncture 07/13/23 Internal jugular (Active)   Number of days: 568       Puncture 07/13/23 Femoral (Active)   Number of days: 568       Wound 07/04/23 Toe (Comment  which one) Left Wound on Big Toe (Active)   Number of days: 577       Wound 07/04/23 Toe (Comment  which one) Right Wound on Toe (Active)   Number of days: 577       Wound 09/30/23 Buttocks Left MASD (Active)   Number of days: 488       Wound 09/30/23 Sacrum Mid Skin tear vs Excoriation on sacrum (Active)   Number of days: 488       Wound 01/31/25 Foot Left;Lateral (Active)   Wound Image   01/31/25 1424   Dressing Status New dressing applied 01/31/25 1506   Wound Cleansed Betadine/povidone iodine 01/31/25 1506   Dressing/Treatment Gauze dressing/dressing sponge;Roll gauze 01/31/25 1506   Offloading for Diabetic Foot Ulcers Offloading ordered 01/31/25 1506   Wound Length (cm) 0.9 cm 
  Lymph nodes: Cervical, supraclavicular, and axillary nodes normal.   Neurologic: CNII-XII intact. Normal strength, and reflexes throughout.     LOWER EXTREMITY  Barley Palpable pulses  Epicritic sensation decreased  Good muscle strength  Lateral base 5th left metatarsal is a granular grade 4 necrotic wound with mild odor. Inconclusive if bone is palpated      Assessment:      Problem List Items Addressed This Visit          Endocrine    Diabetic ulcer of left midfoot associated with type 2 diabetes mellitus, with muscle involvement without evidence of necrosis (HCC) - Primary       Other    Ulcer of left foot, with necrosis of muscle (HCC)       Procedure Note  Indications:  Based on my examination of this patient's wound(s)/ulcer(s) today, debridement is  required to promote healing and evaluate the wound base.          Plan:   Discuss diabetic foot care and current diagnosis.   Discuss treatment plan   Xray left foot did not show any evidence of osteomyelitis   Rx ct scan with and without contrast left foot to rule out osteomyelitis.  [facility]  Wound cultures   Excisional debridement .  Wound packed with betadine and gauze.  Covered with dry gauze and kerlix.  Advised surgical shoe   Requires skilled care   Return to clinic in 3 weeks   Face to face 30 minutes    Treatment Note please see attached Discharge Instructions    Written patient dismissal instructions given to patient or POA.         Electronically signed by Radha Carrasco DPM on 1/31/2025 at 3:55 PM

## 2025-02-02 LAB
BACTERIA SPEC CULT: ABNORMAL
BACTERIA SPEC CULT: ABNORMAL
GRAM STN SPEC: ABNORMAL
SERVICE CMNT-IMP: ABNORMAL

## 2025-02-10 ENCOUNTER — APPOINTMENT (OUTPATIENT)
Facility: HOSPITAL | Age: 89
DRG: 622 | End: 2025-02-10
Payer: MEDICARE

## 2025-02-10 ENCOUNTER — HOSPITAL ENCOUNTER (INPATIENT)
Facility: HOSPITAL | Age: 89
LOS: 8 days | Discharge: HOME HEALTH | DRG: 622 | End: 2025-02-18
Attending: STUDENT IN AN ORGANIZED HEALTH CARE EDUCATION/TRAINING PROGRAM | Admitting: INTERNAL MEDICINE
Payer: MEDICARE

## 2025-02-10 DIAGNOSIS — L08.9 FOOT INFECTION: Primary | ICD-10-CM

## 2025-02-10 PROBLEM — E11.628 DIABETIC INFECTION OF LEFT FOOT (HCC): Status: ACTIVE | Noted: 2025-02-10

## 2025-02-10 LAB
ALBUMIN SERPL-MCNC: 2.5 G/DL (ref 3.5–5)
ALBUMIN/GLOB SERPL: 0.6 (ref 1.1–2.2)
ALP SERPL-CCNC: 109 U/L (ref 45–117)
ALT SERPL-CCNC: 116 U/L (ref 12–78)
ANION GAP SERPL CALC-SCNC: 7 MMOL/L (ref 2–12)
APPEARANCE UR: CLEAR
APPEARANCE UR: CLEAR
AST SERPL-CCNC: 92 U/L (ref 15–37)
BACTERIA URNS QL MICRO: NEGATIVE /HPF
BACTERIA URNS QL MICRO: NEGATIVE /HPF
BASOPHILS # BLD: 0 K/UL (ref 0–0.1)
BASOPHILS NFR BLD: 0 % (ref 0–1)
BILIRUB SERPL-MCNC: 0.4 MG/DL (ref 0.2–1)
BILIRUB UR QL: NEGATIVE
BILIRUB UR QL: NEGATIVE
BUN SERPL-MCNC: 52 MG/DL (ref 6–20)
BUN/CREAT SERPL: 24 (ref 12–20)
CALCIUM SERPL-MCNC: 9.3 MG/DL (ref 8.5–10.1)
CHLORIDE SERPL-SCNC: 101 MMOL/L (ref 97–108)
CO2 SERPL-SCNC: 27 MMOL/L (ref 21–32)
COLOR UR: NORMAL
COLOR UR: NORMAL
CREAT SERPL-MCNC: 2.2 MG/DL (ref 0.7–1.3)
DIFFERENTIAL METHOD BLD: ABNORMAL
EKG ATRIAL RATE: 99 BPM
EKG DIAGNOSIS: NORMAL
EKG P AXIS: 66 DEGREES
EKG P-R INTERVAL: 116 MS
EKG Q-T INTERVAL: 380 MS
EKG QRS DURATION: 144 MS
EKG QTC CALCULATION (BAZETT): 487 MS
EKG R AXIS: 74 DEGREES
EKG T AXIS: 260 DEGREES
EKG VENTRICULAR RATE: 99 BPM
EOSINOPHIL # BLD: 0.13 K/UL (ref 0–0.4)
EOSINOPHIL NFR BLD: 1 % (ref 0–7)
EPITH CASTS URNS QL MICRO: NORMAL /LPF
EPITH CASTS URNS QL MICRO: NORMAL /LPF
ERYTHROCYTE [DISTWIDTH] IN BLOOD BY AUTOMATED COUNT: 15.3 % (ref 11.5–14.5)
FLUAV RNA SPEC QL NAA+PROBE: NOT DETECTED
FLUBV RNA SPEC QL NAA+PROBE: NOT DETECTED
GLOBULIN SER CALC-MCNC: 4.4 G/DL (ref 2–4)
GLUCOSE BLD STRIP.AUTO-MCNC: 295 MG/DL (ref 65–117)
GLUCOSE BLD STRIP.AUTO-MCNC: 422 MG/DL (ref 65–117)
GLUCOSE SERPL-MCNC: 179 MG/DL (ref 65–100)
GLUCOSE UR STRIP.AUTO-MCNC: NEGATIVE MG/DL
GLUCOSE UR STRIP.AUTO-MCNC: NEGATIVE MG/DL
HCT VFR BLD AUTO: 32.9 % (ref 36.6–50.3)
HGB BLD-MCNC: 10.2 G/DL (ref 12.1–17)
HGB UR QL STRIP: NEGATIVE
HGB UR QL STRIP: NEGATIVE
HYALINE CASTS URNS QL MICRO: NORMAL /LPF (ref 0–2)
IMM GRANULOCYTES # BLD AUTO: 0 K/UL (ref 0–0.04)
IMM GRANULOCYTES NFR BLD AUTO: 0 % (ref 0–0.5)
INR PPP: 3 (ref 0.9–1.1)
KETONES UR QL STRIP.AUTO: NEGATIVE MG/DL
KETONES UR QL STRIP.AUTO: NEGATIVE MG/DL
LACTATE BLD-SCNC: 2.13 MMOL/L (ref 0.4–2)
LACTATE BLD-SCNC: 2.32 MMOL/L (ref 0.4–2)
LACTATE SERPL-SCNC: 1.9 MMOL/L (ref 0.4–2)
LEUKOCYTE ESTERASE UR QL STRIP.AUTO: NEGATIVE
LEUKOCYTE ESTERASE UR QL STRIP.AUTO: NEGATIVE
LYMPHOCYTES # BLD: 0.76 K/UL (ref 0.8–3.5)
LYMPHOCYTES NFR BLD: 6 % (ref 12–49)
MCH RBC QN AUTO: 29.4 PG (ref 26–34)
MCHC RBC AUTO-ENTMCNC: 31 G/DL (ref 30–36.5)
MCV RBC AUTO: 94.8 FL (ref 80–99)
MONOCYTES # BLD: 0.38 K/UL (ref 0–1)
MONOCYTES NFR BLD: 3 % (ref 5–13)
MYELOCYTES NFR BLD MANUAL: 1 %
NEUTS BAND NFR BLD MANUAL: 1 %
NEUTS SEG # BLD: 11.21 K/UL (ref 1.8–8)
NEUTS SEG NFR BLD: 88 % (ref 32–75)
NITRITE UR QL STRIP.AUTO: NEGATIVE
NITRITE UR QL STRIP.AUTO: NEGATIVE
NRBC # BLD: 0 K/UL (ref 0–0.01)
NRBC BLD-RTO: 0 PER 100 WBC
PH UR STRIP: 5 (ref 5–8)
PH UR STRIP: 5 (ref 5–8)
PLATELET # BLD AUTO: 154 K/UL (ref 150–400)
PMV BLD AUTO: 11 FL (ref 8.9–12.9)
POTASSIUM SERPL-SCNC: 4.1 MMOL/L (ref 3.5–5.1)
PROT SERPL-MCNC: 6.9 G/DL (ref 6.4–8.2)
PROT UR STRIP-MCNC: NEGATIVE MG/DL
PROT UR STRIP-MCNC: NEGATIVE MG/DL
PROTHROMBIN TIME: 29.9 SEC (ref 9.2–11.2)
RBC # BLD AUTO: 3.47 M/UL (ref 4.1–5.7)
RBC #/AREA URNS HPF: NORMAL /HPF (ref 0–5)
RBC #/AREA URNS HPF: NORMAL /HPF (ref 0–5)
RBC MORPH BLD: ABNORMAL
SARS-COV-2 RNA RESP QL NAA+PROBE: NOT DETECTED
SERVICE CMNT-IMP: ABNORMAL
SERVICE CMNT-IMP: ABNORMAL
SODIUM SERPL-SCNC: 135 MMOL/L (ref 136–145)
SOURCE: NORMAL
SP GR UR REFRACTOMETRY: 1.01
SP GR UR REFRACTOMETRY: 1.01
URINE CULTURE IF INDICATED: NORMAL
URINE CULTURE IF INDICATED: NORMAL
UROBILINOGEN UR QL STRIP.AUTO: 0.2 EU/DL (ref 0.2–1)
UROBILINOGEN UR QL STRIP.AUTO: 0.2 EU/DL (ref 0.2–1)
WBC # BLD AUTO: 12.6 K/UL (ref 4.1–11.1)
WBC URNS QL MICRO: NORMAL /HPF (ref 0–4)
WBC URNS QL MICRO: NORMAL /HPF (ref 0–4)

## 2025-02-10 PROCEDURE — 36415 COLL VENOUS BLD VENIPUNCTURE: CPT

## 2025-02-10 PROCEDURE — 99285 EMERGENCY DEPT VISIT HI MDM: CPT

## 2025-02-10 PROCEDURE — 6360000002 HC RX W HCPCS

## 2025-02-10 PROCEDURE — 73700 CT LOWER EXTREMITY W/O DYE: CPT

## 2025-02-10 PROCEDURE — 93005 ELECTROCARDIOGRAM TRACING: CPT | Performed by: STUDENT IN AN ORGANIZED HEALTH CARE EDUCATION/TRAINING PROGRAM

## 2025-02-10 PROCEDURE — 2580000003 HC RX 258

## 2025-02-10 PROCEDURE — 81001 URINALYSIS AUTO W/SCOPE: CPT

## 2025-02-10 PROCEDURE — 6370000000 HC RX 637 (ALT 250 FOR IP): Performed by: STUDENT IN AN ORGANIZED HEALTH CARE EDUCATION/TRAINING PROGRAM

## 2025-02-10 PROCEDURE — 85025 COMPLETE CBC W/AUTO DIFF WBC: CPT

## 2025-02-10 PROCEDURE — 1100000003 HC PRIVATE W/ TELEMETRY

## 2025-02-10 PROCEDURE — 87040 BLOOD CULTURE FOR BACTERIA: CPT

## 2025-02-10 PROCEDURE — 6360000002 HC RX W HCPCS: Performed by: STUDENT IN AN ORGANIZED HEALTH CARE EDUCATION/TRAINING PROGRAM

## 2025-02-10 PROCEDURE — 2580000003 HC RX 258: Performed by: STUDENT IN AN ORGANIZED HEALTH CARE EDUCATION/TRAINING PROGRAM

## 2025-02-10 PROCEDURE — 73630 X-RAY EXAM OF FOOT: CPT

## 2025-02-10 PROCEDURE — 80053 COMPREHEN METABOLIC PANEL: CPT

## 2025-02-10 PROCEDURE — 6370000000 HC RX 637 (ALT 250 FOR IP)

## 2025-02-10 PROCEDURE — 71045 X-RAY EXAM CHEST 1 VIEW: CPT

## 2025-02-10 PROCEDURE — 87636 SARSCOV2 & INF A&B AMP PRB: CPT

## 2025-02-10 PROCEDURE — 85610 PROTHROMBIN TIME: CPT

## 2025-02-10 PROCEDURE — 82962 GLUCOSE BLOOD TEST: CPT

## 2025-02-10 PROCEDURE — 83605 ASSAY OF LACTIC ACID: CPT

## 2025-02-10 RX ORDER — BUMETANIDE 1 MG/1
2 TABLET ORAL 2 TIMES DAILY
Status: DISCONTINUED | OUTPATIENT
Start: 2025-02-10 | End: 2025-02-18 | Stop reason: HOSPADM

## 2025-02-10 RX ORDER — ACETAMINOPHEN 325 MG/1
650 TABLET ORAL EVERY 6 HOURS PRN
Status: DISCONTINUED | OUTPATIENT
Start: 2025-02-10 | End: 2025-02-18 | Stop reason: HOSPADM

## 2025-02-10 RX ORDER — ASCORBIC ACID 500 MG
1000 TABLET ORAL DAILY
Status: DISCONTINUED | OUTPATIENT
Start: 2025-02-10 | End: 2025-02-18 | Stop reason: HOSPADM

## 2025-02-10 RX ORDER — DOXAZOSIN 2 MG/1
2 TABLET ORAL DAILY
Status: DISCONTINUED | OUTPATIENT
Start: 2025-02-10 | End: 2025-02-18 | Stop reason: HOSPADM

## 2025-02-10 RX ORDER — DEXTROSE MONOHYDRATE 100 MG/ML
INJECTION, SOLUTION INTRAVENOUS CONTINUOUS PRN
Status: DISCONTINUED | OUTPATIENT
Start: 2025-02-10 | End: 2025-02-18 | Stop reason: HOSPADM

## 2025-02-10 RX ORDER — 0.9 % SODIUM CHLORIDE 0.9 %
1000 INTRAVENOUS SOLUTION INTRAVENOUS ONCE
Status: COMPLETED | OUTPATIENT
Start: 2025-02-10 | End: 2025-02-10

## 2025-02-10 RX ORDER — MULTIVITAMIN WITH IRON
1000 TABLET ORAL DAILY
Status: DISCONTINUED | OUTPATIENT
Start: 2025-02-10 | End: 2025-02-18 | Stop reason: HOSPADM

## 2025-02-10 RX ORDER — VITAMIN E 268 MG
400 CAPSULE ORAL DAILY
Status: DISCONTINUED | OUTPATIENT
Start: 2025-02-10 | End: 2025-02-18 | Stop reason: HOSPADM

## 2025-02-10 RX ORDER — ATORVASTATIN CALCIUM 20 MG/1
20 TABLET, FILM COATED ORAL DAILY
Status: DISCONTINUED | OUTPATIENT
Start: 2025-02-10 | End: 2025-02-18 | Stop reason: HOSPADM

## 2025-02-10 RX ORDER — DOCUSATE SODIUM 100 MG/1
100 CAPSULE, LIQUID FILLED ORAL DAILY
Status: DISCONTINUED | OUTPATIENT
Start: 2025-02-10 | End: 2025-02-18 | Stop reason: HOSPADM

## 2025-02-10 RX ORDER — WARFARIN SODIUM 5 MG/1
5 TABLET ORAL DAILY
Status: DISCONTINUED | OUTPATIENT
Start: 2025-02-10 | End: 2025-02-10

## 2025-02-10 RX ORDER — GLUCAGON 1 MG/ML
1 KIT INJECTION PRN
Status: DISCONTINUED | OUTPATIENT
Start: 2025-02-10 | End: 2025-02-18 | Stop reason: HOSPADM

## 2025-02-10 RX ORDER — DIGOXIN 125 MCG
125 TABLET ORAL DAILY
Status: DISCONTINUED | OUTPATIENT
Start: 2025-02-10 | End: 2025-02-18 | Stop reason: HOSPADM

## 2025-02-10 RX ORDER — PREGABALIN 25 MG/1
75 CAPSULE ORAL DAILY
Status: DISCONTINUED | OUTPATIENT
Start: 2025-02-10 | End: 2025-02-18 | Stop reason: HOSPADM

## 2025-02-10 RX ORDER — INSULIN LISPRO 100 [IU]/ML
0-8 INJECTION, SOLUTION INTRAVENOUS; SUBCUTANEOUS
Status: DISCONTINUED | OUTPATIENT
Start: 2025-02-10 | End: 2025-02-18 | Stop reason: HOSPADM

## 2025-02-10 RX ADMIN — Medication 400 UNITS: at 20:19

## 2025-02-10 RX ADMIN — INSULIN LISPRO 8 UNITS: 100 INJECTION, SOLUTION INTRAVENOUS; SUBCUTANEOUS at 21:01

## 2025-02-10 RX ADMIN — PIPERACILLIN AND TAZOBACTAM 3375 MG: 3; .375 INJECTION, POWDER, LYOPHILIZED, FOR SOLUTION INTRAVENOUS at 20:18

## 2025-02-10 RX ADMIN — CYANOCOBALAMIN TAB 500 MCG 1000 MCG: 500 TAB at 20:20

## 2025-02-10 RX ADMIN — DOXAZOSIN 2 MG: 2 TABLET ORAL at 20:20

## 2025-02-10 RX ADMIN — ATORVASTATIN CALCIUM 20 MG: 20 TABLET, FILM COATED ORAL at 20:20

## 2025-02-10 RX ADMIN — Medication 2500 MG: at 13:28

## 2025-02-10 RX ADMIN — DOCUSATE SODIUM 100 MG: 100 CAPSULE, LIQUID FILLED ORAL at 20:20

## 2025-02-10 RX ADMIN — Medication 400 UNITS: at 20:23

## 2025-02-10 RX ADMIN — ACETAMINOPHEN 650 MG: 325 TABLET ORAL at 13:33

## 2025-02-10 RX ADMIN — OXYCODONE HYDROCHLORIDE AND ACETAMINOPHEN 1000 MG: 500 TABLET ORAL at 20:20

## 2025-02-10 RX ADMIN — PIPERACILLIN AND TAZOBACTAM 4500 MG: 4; .5 INJECTION, POWDER, LYOPHILIZED, FOR SOLUTION INTRAVENOUS at 13:16

## 2025-02-10 RX ADMIN — SODIUM CHLORIDE 1000 ML: 9 INJECTION, SOLUTION INTRAVENOUS at 13:32

## 2025-02-10 RX ADMIN — PREGABALIN 75 MG: 25 CAPSULE ORAL at 20:19

## 2025-02-10 RX ADMIN — DIGOXIN 125 MCG: 0.12 TABLET ORAL at 20:20

## 2025-02-10 ASSESSMENT — PAIN SCALES - GENERAL
PAINLEVEL_OUTOF10: 3
PAINLEVEL_OUTOF10: 0
PAINLEVEL_OUTOF10: 4
PAINLEVEL_OUTOF10: 4
PAINLEVEL_OUTOF10: 0

## 2025-02-10 ASSESSMENT — PAIN - FUNCTIONAL ASSESSMENT
PAIN_FUNCTIONAL_ASSESSMENT: 0-10
PAIN_FUNCTIONAL_ASSESSMENT: 0-10

## 2025-02-10 NOTE — ED PROVIDER NOTES
EMERGENCY DEPARTMENT HISTORY AND PHYSICAL EXAM      Date: 2/10/2025  Patient Name: Baron Wagner Sr.    History of Presenting Illness     Chief Complaint   Patient presents with    Foot Pain     Patient ambulatory to triage with a cane, reports he had a small spot on his left foot that became infected, patient was seen by a wound MD and hasn't heard back from her. Patient reports that the wound office was supposed to send a ABX but hasn't. Patients foot has increased swelling, redness and warmth.          HPI: History From: Daughter-in-law, patient, History limited by: none  Baron Wagner Sr., 89 y.o. male presents to the ED with cc of Foot wound.  He has had a wound on his left foot, it started as a blister about 5 weeks ago and has progressed to a larger wound.  He saw Dr. Carrasco on 1/31.  He also saw Dr. Thanh Van within the last 2 weeks and was told that he had good blood flow to his foot.  Over the past 2 days, there has been increasing redness and swelling around the wound as well as some mild pain.  The wound has been leaking malodorous cloudy discharge.  He was on doxycycline previously for 2 weeks, his daughter-in-law restarted this 2 days ago from some old pills.  He denies any fevers.  He has a history of diabetes.  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          There are no other complaints, changes, or physical findings at this time.    PCP: Willem Powell MD    No current facility-administered medications on file prior to encounter.     Current Outpatient Medications on File Prior to Encounter   Medication Sig Dispense Refill    doxycycline hyclate (VIBRA-TABS) 100 MG tablet Take 1 tablet by mouth 2 times daily      balsum peru-castor oil (VENELEX) OINT ointment Apply topically 2 times daily 30 g 1    diclofenac (VOLTAREN) 75 MG EC tablet TAKE 1 TABLET BY MOUTH TWICE A DAY AS NEEDED FOR PAIN 60 tablet 5    pregabalin (LYRICA) 75 MG capsule Take 1 capsule by mouth daily for 180 days. Max

## 2025-02-10 NOTE — ED NOTES
TRANSFER - OUT REPORT:    Verbal SBAR report given to Joe MORRELL on Baron Wagner Sr.  being [facility].     Report consisted of patient's Situation, Background, Assessment and Recommendations(SBAR). Opportunity for questions and clarification were provided.    Information from the following report(s) Nurse Handoff Report, Index, ED Encounter Summary, ED SBAR, Adult Overview, Intake/Output, MAR, Recent Results, Quality Measures, Neuro Assessment, and Event Log was reviewed with the receiving nurse. Pt reports pt on RA, coming from home & pt ambulates w/  cane - this RN recommends at least x2 assist.     Gus Fall Assessment:    Presents to emergency department  because of falls (Syncope, seizure, or loss of consciousness): No  Age > 70: Yes  Altered Mental Status, Intoxication with alcohol or substance confusion (Disorientation, impaired judgment, poor safety awaremess, or inability to follow instructions): No  Impaired Mobility: Ambulates or transfers with assistive devices or assistance; Unable to ambulate or transer.: Yes  Nursing Judgement: Yes          Lines:   Peripheral IV 02/10/25 Left Antecubital (Active)   Site Assessment Clean, dry & intact 02/10/25 1156   Line Status Blood return noted;Flushed;Specimen collected 02/10/25 1156   Line Care Connections checked and tightened;Line pulled back 02/10/25 1156   Phlebitis Assessment No symptoms 02/10/25 1156   Infiltration Assessment 0 02/10/25 1156   Alcohol Cap Used No 02/10/25 1156   Dressing Status New dressing applied;Clean, dry & intact 02/10/25 1156   Dressing Type Transparent 02/10/25 1156   Dressing Intervention New 02/10/25 1156       Peripheral IV 02/10/25 Proximal;Right;Anterior Forearm (Active)   Site Assessment Clean, dry & intact 02/10/25 1255   Line Status Blood return noted 02/10/25 1255   Line Care Connections checked and tightened 02/10/25 1255   Phlebitis Assessment No symptoms 02/10/25 1255

## 2025-02-10 NOTE — H&P
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 No pertinent family history.    No Known Allergies     Prior to Admission medications    Medication Sig Start Date End Date Taking? Authorizing Provider   doxycycline hyclate (VIBRA-TABS) 100 MG tablet Take 1 tablet by mouth 2 times daily   Yes Provider, Historical, MD   balsum peru-castor oil (VENELEX) OINT ointment Apply topically 2 times daily 1/14/25  Yes Willem Powell MD   diclofenac (VOLTAREN) 75 MG EC tablet TAKE 1 TABLET BY MOUTH TWICE A DAY AS NEEDED FOR PAIN 12/24/24  Yes Willem Powell MD   pregabalin (LYRICA) 75 MG capsule Take 1 capsule by mouth daily for 180 days. Max Daily Amount: 75 mg 11/25/24 5/24/25 Yes Willem Powell MD   Lactobacillus (PROBIOTIC ACIDOPHILUS) CAPS Take 1 daily 11/25/24  Yes Willem Powell MD   metFORMIN (GLUCOPHAGE) 500

## 2025-02-10 NOTE — ED NOTES
Patient was seen by MD Thanh Van, and told that he has \" perfect blood flow all the way down to his toes\".

## 2025-02-11 LAB
ANION GAP SERPL CALC-SCNC: 6 MMOL/L (ref 2–12)
BASOPHILS # BLD: 0.02 K/UL (ref 0–0.1)
BASOPHILS NFR BLD: 0.2 % (ref 0–1)
BUN SERPL-MCNC: 51 MG/DL (ref 6–20)
BUN/CREAT SERPL: 29 (ref 12–20)
CALCIUM SERPL-MCNC: 8.6 MG/DL (ref 8.5–10.1)
CHLORIDE SERPL-SCNC: 107 MMOL/L (ref 97–108)
CO2 SERPL-SCNC: 24 MMOL/L (ref 21–32)
CREAT SERPL-MCNC: 1.76 MG/DL (ref 0.7–1.3)
DIFFERENTIAL METHOD BLD: ABNORMAL
EOSINOPHIL # BLD: 0.06 K/UL (ref 0–0.4)
EOSINOPHIL NFR BLD: 0.7 % (ref 0–7)
ERYTHROCYTE [DISTWIDTH] IN BLOOD BY AUTOMATED COUNT: 15.3 % (ref 11.5–14.5)
GLUCOSE BLD STRIP.AUTO-MCNC: 191 MG/DL (ref 65–117)
GLUCOSE BLD STRIP.AUTO-MCNC: 206 MG/DL (ref 65–117)
GLUCOSE BLD STRIP.AUTO-MCNC: 224 MG/DL (ref 65–117)
GLUCOSE BLD STRIP.AUTO-MCNC: 236 MG/DL (ref 65–117)
GLUCOSE SERPL-MCNC: 234 MG/DL (ref 65–100)
HCT VFR BLD AUTO: 28.3 % (ref 36.6–50.3)
HGB BLD-MCNC: 9.1 G/DL (ref 12.1–17)
IMM GRANULOCYTES # BLD AUTO: 0.17 K/UL (ref 0–0.04)
IMM GRANULOCYTES NFR BLD AUTO: 1.8 % (ref 0–0.5)
INR PPP: 3.1 (ref 0.9–1.1)
LYMPHOCYTES # BLD: 0.5 K/UL (ref 0.8–3.5)
LYMPHOCYTES NFR BLD: 5.4 % (ref 12–49)
MCH RBC QN AUTO: 29.4 PG (ref 26–34)
MCHC RBC AUTO-ENTMCNC: 32.2 G/DL (ref 30–36.5)
MCV RBC AUTO: 91.6 FL (ref 80–99)
MONOCYTES # BLD: 0.55 K/UL (ref 0–1)
MONOCYTES NFR BLD: 6 % (ref 5–13)
NEUTS SEG # BLD: 7.92 K/UL (ref 1.8–8)
NEUTS SEG NFR BLD: 85.9 % (ref 32–75)
NRBC # BLD: 0 K/UL (ref 0–0.01)
NRBC BLD-RTO: 0 PER 100 WBC
PLATELET # BLD AUTO: 137 K/UL (ref 150–400)
PMV BLD AUTO: 10.7 FL (ref 8.9–12.9)
POTASSIUM SERPL-SCNC: 4.4 MMOL/L (ref 3.5–5.1)
PROTHROMBIN TIME: 30.7 SEC (ref 9.2–11.2)
RBC # BLD AUTO: 3.09 M/UL (ref 4.1–5.7)
SERVICE CMNT-IMP: ABNORMAL
SODIUM SERPL-SCNC: 137 MMOL/L (ref 136–145)
VANCOMYCIN SERPL-MCNC: >50 UG/ML
WBC # BLD AUTO: 9.2 K/UL (ref 4.1–11.1)

## 2025-02-11 PROCEDURE — 85025 COMPLETE CBC W/AUTO DIFF WBC: CPT

## 2025-02-11 PROCEDURE — 80048 BASIC METABOLIC PNL TOTAL CA: CPT

## 2025-02-11 PROCEDURE — 87070 CULTURE OTHR SPECIMN AEROBIC: CPT

## 2025-02-11 PROCEDURE — 2580000003 HC RX 258

## 2025-02-11 PROCEDURE — 36415 COLL VENOUS BLD VENIPUNCTURE: CPT

## 2025-02-11 PROCEDURE — 82962 GLUCOSE BLOOD TEST: CPT

## 2025-02-11 PROCEDURE — 1100000003 HC PRIVATE W/ TELEMETRY

## 2025-02-11 PROCEDURE — 87075 CULTR BACTERIA EXCEPT BLOOD: CPT

## 2025-02-11 PROCEDURE — 80202 ASSAY OF VANCOMYCIN: CPT

## 2025-02-11 PROCEDURE — 87205 SMEAR GRAM STAIN: CPT

## 2025-02-11 PROCEDURE — 6370000000 HC RX 637 (ALT 250 FOR IP)

## 2025-02-11 PROCEDURE — 85610 PROTHROMBIN TIME: CPT

## 2025-02-11 PROCEDURE — 6360000002 HC RX W HCPCS

## 2025-02-11 RX ADMIN — INSULIN LISPRO 2 UNITS: 100 INJECTION, SOLUTION INTRAVENOUS; SUBCUTANEOUS at 13:30

## 2025-02-11 RX ADMIN — OXYCODONE HYDROCHLORIDE AND ACETAMINOPHEN 1000 MG: 500 TABLET ORAL at 09:22

## 2025-02-11 RX ADMIN — CYANOCOBALAMIN TAB 500 MCG 1000 MCG: 500 TAB at 09:21

## 2025-02-11 RX ADMIN — ATORVASTATIN CALCIUM 20 MG: 20 TABLET, FILM COATED ORAL at 09:22

## 2025-02-11 RX ADMIN — INSULIN LISPRO 2 UNITS: 100 INJECTION, SOLUTION INTRAVENOUS; SUBCUTANEOUS at 21:56

## 2025-02-11 RX ADMIN — Medication 400 UNITS: at 09:21

## 2025-02-11 RX ADMIN — DOXAZOSIN 2 MG: 2 TABLET ORAL at 09:22

## 2025-02-11 RX ADMIN — INSULIN LISPRO 2 UNITS: 100 INJECTION, SOLUTION INTRAVENOUS; SUBCUTANEOUS at 17:15

## 2025-02-11 RX ADMIN — PREGABALIN 75 MG: 25 CAPSULE ORAL at 09:22

## 2025-02-11 RX ADMIN — Medication 400 UNITS: at 21:57

## 2025-02-11 RX ADMIN — PIPERACILLIN AND TAZOBACTAM 3375 MG: 3; .375 INJECTION, POWDER, LYOPHILIZED, FOR SOLUTION INTRAVENOUS at 13:29

## 2025-02-11 RX ADMIN — PIPERACILLIN AND TAZOBACTAM 3375 MG: 3; .375 INJECTION, POWDER, LYOPHILIZED, FOR SOLUTION INTRAVENOUS at 20:16

## 2025-02-11 RX ADMIN — PIPERACILLIN AND TAZOBACTAM 3375 MG: 3; .375 INJECTION, POWDER, LYOPHILIZED, FOR SOLUTION INTRAVENOUS at 03:20

## 2025-02-11 RX ADMIN — INSULIN LISPRO 2 UNITS: 100 INJECTION, SOLUTION INTRAVENOUS; SUBCUTANEOUS at 09:21

## 2025-02-11 RX ADMIN — DIGOXIN 125 MCG: 0.12 TABLET ORAL at 09:22

## 2025-02-11 RX ADMIN — DOCUSATE SODIUM 100 MG: 100 CAPSULE, LIQUID FILLED ORAL at 09:22

## 2025-02-11 ASSESSMENT — PAIN SCALES - GENERAL: PAINLEVEL_OUTOF10: 0

## 2025-02-11 NOTE — WOUND CARE
Wound care nurse consult for left foot diabetic wound infection.    89 y/o male admitted for diabetic infection of left foot. Patient was previously over at [facility] and seen by Dr Carrasco there. He was discharged to rehab from [facility] and now readmitted here.  Dr Carrasco wrote note earlier today and stated she would see patient later today.  Currently has a dry dressing covering foot wounds  Left foot CT shows : Diffuse soft tissue swelling, concerning for cellulitis. Gas in the medial soft  tissues concerning for necrotizing soft tissue infection. Ulceration in the  lateral proximal forefoot. No definite CT evidence of abscess or acute  osteomyelitis.  Unable to do MRI secondary to pacemaker  Plantar foot    Left lateral foot      Patient will most likely need surgery. Will defer treatment to Dr Carrasco. Please re-consult if wound care nurse can be off assistance.  MARKUS CONNOR RN, CWON

## 2025-02-12 ENCOUNTER — PREP FOR PROCEDURE (OUTPATIENT)
Age: 89
End: 2025-02-12

## 2025-02-12 LAB
ANION GAP SERPL CALC-SCNC: 8 MMOL/L (ref 2–12)
BUN SERPL-MCNC: 33 MG/DL (ref 6–20)
BUN/CREAT SERPL: 22 (ref 12–20)
CALCIUM SERPL-MCNC: 9.3 MG/DL (ref 8.5–10.1)
CHLORIDE SERPL-SCNC: 107 MMOL/L (ref 97–108)
CO2 SERPL-SCNC: 23 MMOL/L (ref 21–32)
CREAT SERPL-MCNC: 1.49 MG/DL (ref 0.7–1.3)
GLUCOSE BLD STRIP.AUTO-MCNC: 145 MG/DL (ref 65–117)
GLUCOSE BLD STRIP.AUTO-MCNC: 202 MG/DL (ref 65–117)
GLUCOSE BLD STRIP.AUTO-MCNC: 322 MG/DL (ref 65–117)
GLUCOSE BLD STRIP.AUTO-MCNC: 363 MG/DL (ref 65–117)
GLUCOSE SERPL-MCNC: 126 MG/DL (ref 65–100)
INR PPP: 2 (ref 0.9–1.1)
POTASSIUM SERPL-SCNC: 4.4 MMOL/L (ref 3.5–5.1)
PROTHROMBIN TIME: 19.8 SEC (ref 9.2–11.2)
SERVICE CMNT-IMP: ABNORMAL
SODIUM SERPL-SCNC: 138 MMOL/L (ref 136–145)
VANCOMYCIN SERPL-MCNC: 6.7 UG/ML
VANCOMYCIN SERPL-MCNC: 7.1 UG/ML

## 2025-02-12 PROCEDURE — 80048 BASIC METABOLIC PNL TOTAL CA: CPT

## 2025-02-12 PROCEDURE — 80202 ASSAY OF VANCOMYCIN: CPT

## 2025-02-12 PROCEDURE — 85610 PROTHROMBIN TIME: CPT

## 2025-02-12 PROCEDURE — 82962 GLUCOSE BLOOD TEST: CPT

## 2025-02-12 PROCEDURE — 6360000002 HC RX W HCPCS: Performed by: INTERNAL MEDICINE

## 2025-02-12 PROCEDURE — 6370000000 HC RX 637 (ALT 250 FOR IP): Performed by: INTERNAL MEDICINE

## 2025-02-12 PROCEDURE — 99223 1ST HOSP IP/OBS HIGH 75: CPT | Performed by: INTERNAL MEDICINE

## 2025-02-12 PROCEDURE — 1100000003 HC PRIVATE W/ TELEMETRY

## 2025-02-12 PROCEDURE — 6370000000 HC RX 637 (ALT 250 FOR IP)

## 2025-02-12 PROCEDURE — 2580000003 HC RX 258

## 2025-02-12 PROCEDURE — 6360000002 HC RX W HCPCS

## 2025-02-12 PROCEDURE — 36415 COLL VENOUS BLD VENIPUNCTURE: CPT

## 2025-02-12 RX ORDER — CLINDAMYCIN PHOSPHATE 600 MG/50ML
600 INJECTION, SOLUTION INTRAVENOUS EVERY 8 HOURS SCHEDULED
Status: DISCONTINUED | OUTPATIENT
Start: 2025-02-12 | End: 2025-02-12

## 2025-02-12 RX ORDER — CLINDAMYCIN PHOSPHATE 600 MG/50ML
600 INJECTION, SOLUTION INTRAVENOUS EVERY 8 HOURS SCHEDULED
Status: DISCONTINUED | OUTPATIENT
Start: 2025-02-12 | End: 2025-02-17

## 2025-02-12 RX ORDER — VANCOMYCIN 1.5 G/300ML
1500 INJECTION, SOLUTION INTRAVENOUS
Status: COMPLETED | OUTPATIENT
Start: 2025-02-12 | End: 2025-02-12

## 2025-02-12 RX ORDER — SODIUM CHLORIDE 0.9 % (FLUSH) 0.9 %
5-40 SYRINGE (ML) INJECTION PRN
Status: CANCELLED | OUTPATIENT
Start: 2025-02-12

## 2025-02-12 RX ORDER — VANCOMYCIN 2 G/400ML
2000 INJECTION, SOLUTION INTRAVENOUS
Status: DISCONTINUED | OUTPATIENT
Start: 2025-02-12 | End: 2025-02-12

## 2025-02-12 RX ORDER — SODIUM CHLORIDE 9 MG/ML
INJECTION, SOLUTION INTRAVENOUS PRN
Status: CANCELLED | OUTPATIENT
Start: 2025-02-12

## 2025-02-12 RX ORDER — WARFARIN SODIUM 5 MG/1
5 TABLET ORAL
Status: ACTIVE | OUTPATIENT
Start: 2025-02-12 | End: 2025-02-13

## 2025-02-12 RX ORDER — SODIUM CHLORIDE 0.9 % (FLUSH) 0.9 %
5-40 SYRINGE (ML) INJECTION EVERY 12 HOURS SCHEDULED
Status: CANCELLED | OUTPATIENT
Start: 2025-02-12

## 2025-02-12 RX ORDER — VANCOMYCIN 1.5 G/300ML
1500 INJECTION, SOLUTION INTRAVENOUS
Status: DISCONTINUED | OUTPATIENT
Start: 2025-02-12 | End: 2025-02-12

## 2025-02-12 RX ADMIN — VANCOMYCIN 1500 MG: 1.5 INJECTION, SOLUTION INTRAVENOUS at 09:23

## 2025-02-12 RX ADMIN — OXYCODONE HYDROCHLORIDE AND ACETAMINOPHEN 1000 MG: 500 TABLET ORAL at 09:23

## 2025-02-12 RX ADMIN — DOXAZOSIN 2 MG: 2 TABLET ORAL at 09:23

## 2025-02-12 RX ADMIN — DOCUSATE SODIUM 100 MG: 100 CAPSULE, LIQUID FILLED ORAL at 09:23

## 2025-02-12 RX ADMIN — INSULIN LISPRO 6 UNITS: 100 INJECTION, SOLUTION INTRAVENOUS; SUBCUTANEOUS at 21:08

## 2025-02-12 RX ADMIN — ATORVASTATIN CALCIUM 20 MG: 20 TABLET, FILM COATED ORAL at 09:24

## 2025-02-12 RX ADMIN — Medication 400 UNITS: at 22:13

## 2025-02-12 RX ADMIN — DIGOXIN 125 MCG: 0.12 TABLET ORAL at 09:23

## 2025-02-12 RX ADMIN — Medication 400 UNITS: at 09:23

## 2025-02-12 RX ADMIN — CLINDAMYCIN PHOSPHATE 600 MG: 600 INJECTION, SOLUTION INTRAVENOUS at 17:56

## 2025-02-12 RX ADMIN — PREGABALIN 75 MG: 25 CAPSULE ORAL at 09:23

## 2025-02-12 RX ADMIN — PIPERACILLIN AND TAZOBACTAM 3375 MG: 3; .375 INJECTION, POWDER, LYOPHILIZED, FOR SOLUTION INTRAVENOUS at 21:32

## 2025-02-12 RX ADMIN — PIPERACILLIN AND TAZOBACTAM 3375 MG: 3; .375 INJECTION, POWDER, LYOPHILIZED, FOR SOLUTION INTRAVENOUS at 03:13

## 2025-02-12 RX ADMIN — PIPERACILLIN AND TAZOBACTAM 3375 MG: 3; .375 INJECTION, POWDER, LYOPHILIZED, FOR SOLUTION INTRAVENOUS at 12:37

## 2025-02-12 RX ADMIN — INSULIN LISPRO 8 UNITS: 100 INJECTION, SOLUTION INTRAVENOUS; SUBCUTANEOUS at 17:47

## 2025-02-12 RX ADMIN — INSULIN LISPRO 2 UNITS: 100 INJECTION, SOLUTION INTRAVENOUS; SUBCUTANEOUS at 12:29

## 2025-02-12 RX ADMIN — CYANOCOBALAMIN TAB 500 MCG 1000 MCG: 500 TAB at 09:23

## 2025-02-12 NOTE — CARE COORDINATION
Care Management Initial Assessment       RUR: 17%  Readmission? No  1st IM letter given? Yes - 2/10/25  1st  letter given: No    CM introduce self, explain role and confirmed demographics with pt and pt's family.    Pt lives with his family in a three story home with five steps to enter. Pt lives on the main level of the home.    Pt is currently open to home health services-[facility].    Referral sent to [facility].    No hx of inpatient rehab or SNF.    Pt uses [facility] on [address]    At the time of d/c pt's family will transport.    CM will follow and assist with d/c planning.       02/12/25 1601   Service Assessment   Patient Orientation Alert and Oriented   Cognition Alert   History Provided By Patient;Child/Family;Medical Record   Primary Caregiver Self   Support Systems Children;Family Members;Home Care Staff   Patient's Healthcare Decision Maker is: Named in Scanned ACP Document   PCP Verified by CM Yes   Last Visit to PCP Within last 3 months   Prior Functional Level Assistance with the following:;Cooking;Housework;Shopping   Current Functional Level Assistance with the following:;Cooking;Housework;Shopping   Can patient return to prior living arrangement Yes   Family able to assist with home care needs: Yes   Would you like for me to discuss the discharge plan with any other family members/significant others, and if so, who? Yes  (Pt's son and dtr in law)   Financial Resources Medicare;Other (Comment)  ([employer])   Community Resources ECF/Home Care   Social/Functional History   Lives With Family   Type of Home House   Home Equipment Grab bars;Walker - Rolling  (Shower chair, Hanidcap height toilet, Pace maker)   Active  Yes   Discharge Planning   Type of Residence House   Current Services Prior To Admission Home Care   Patient expects to be discharged to: House     Advance Care Planning     General Advance Care Planning (ACP) Conversation    Date of Conversation:

## 2025-02-13 ENCOUNTER — ANESTHESIA (OUTPATIENT)
Facility: HOSPITAL | Age: 89
End: 2025-02-13
Payer: MEDICARE

## 2025-02-13 ENCOUNTER — ANESTHESIA EVENT (OUTPATIENT)
Facility: HOSPITAL | Age: 89
End: 2025-02-13
Payer: MEDICARE

## 2025-02-13 LAB
ANION GAP SERPL CALC-SCNC: 7 MMOL/L (ref 2–12)
BACTERIA SPEC CULT: ABNORMAL
BACTERIA SPEC CULT: NORMAL
BASOPHILS # BLD: 0 K/UL (ref 0–0.1)
BASOPHILS NFR BLD: 0 % (ref 0–1)
BUN SERPL-MCNC: 25 MG/DL (ref 6–20)
BUN/CREAT SERPL: 19 (ref 12–20)
CALCIUM SERPL-MCNC: 9.4 MG/DL (ref 8.5–10.1)
CHLORIDE SERPL-SCNC: 107 MMOL/L (ref 97–108)
CO2 SERPL-SCNC: 24 MMOL/L (ref 21–32)
CREAT SERPL-MCNC: 1.35 MG/DL (ref 0.7–1.3)
DIFFERENTIAL METHOD BLD: ABNORMAL
EOSINOPHIL # BLD: 0 K/UL (ref 0–0.4)
EOSINOPHIL NFR BLD: 0 % (ref 0–7)
ERYTHROCYTE [DISTWIDTH] IN BLOOD BY AUTOMATED COUNT: 15.1 % (ref 11.5–14.5)
GLUCOSE BLD STRIP.AUTO-MCNC: 142 MG/DL (ref 65–117)
GLUCOSE BLD STRIP.AUTO-MCNC: 144 MG/DL (ref 65–117)
GLUCOSE BLD STRIP.AUTO-MCNC: 161 MG/DL (ref 65–117)
GLUCOSE BLD STRIP.AUTO-MCNC: 174 MG/DL (ref 65–117)
GLUCOSE BLD STRIP.AUTO-MCNC: 288 MG/DL (ref 65–117)
GLUCOSE SERPL-MCNC: 113 MG/DL (ref 65–100)
GRAM STN SPEC: NORMAL
GRAM STN SPEC: NORMAL
HCT VFR BLD AUTO: 29.7 % (ref 36.6–50.3)
HGB BLD-MCNC: 9.6 G/DL (ref 12.1–17)
IMM GRANULOCYTES # BLD AUTO: 0 K/UL (ref 0–0.04)
IMM GRANULOCYTES NFR BLD AUTO: 0 % (ref 0–0.5)
INR PPP: 1.6 (ref 0.9–1.1)
LYMPHOCYTES # BLD: 1.4 K/UL (ref 0.8–3.5)
LYMPHOCYTES NFR BLD: 14 % (ref 12–49)
MCH RBC QN AUTO: 29.2 PG (ref 26–34)
MCHC RBC AUTO-ENTMCNC: 32.3 G/DL (ref 30–36.5)
MCV RBC AUTO: 90.3 FL (ref 80–99)
METAMYELOCYTES NFR BLD MANUAL: 2 %
MONOCYTES # BLD: 0.8 K/UL (ref 0–1)
MONOCYTES NFR BLD: 8 % (ref 5–13)
NEUTS BAND NFR BLD MANUAL: 1 %
NEUTS SEG # BLD: 7.6 K/UL (ref 1.8–8)
NEUTS SEG NFR BLD: 75 % (ref 32–75)
NRBC # BLD: 0 K/UL (ref 0–0.01)
NRBC BLD-RTO: 0 PER 100 WBC
PLATELET # BLD AUTO: 153 K/UL (ref 150–400)
PMV BLD AUTO: 10.4 FL (ref 8.9–12.9)
POTASSIUM SERPL-SCNC: 4.1 MMOL/L (ref 3.5–5.1)
PROTHROMBIN TIME: 16.5 SEC (ref 9.2–11.2)
RBC # BLD AUTO: 3.29 M/UL (ref 4.1–5.7)
RBC MORPH BLD: ABNORMAL
SERVICE CMNT-IMP: ABNORMAL
SERVICE CMNT-IMP: NORMAL
SODIUM SERPL-SCNC: 138 MMOL/L (ref 136–145)
VANCOMYCIN SERPL-MCNC: 10.9 UG/ML
WBC # BLD AUTO: 10 K/UL (ref 4.1–11.1)

## 2025-02-13 PROCEDURE — 87070 CULTURE OTHR SPECIMN AEROBIC: CPT

## 2025-02-13 PROCEDURE — 6370000000 HC RX 637 (ALT 250 FOR IP): Performed by: PODIATRIST

## 2025-02-13 PROCEDURE — 87076 CULTURE ANAEROBE IDENT EACH: CPT

## 2025-02-13 PROCEDURE — 3700000000 HC ANESTHESIA ATTENDED CARE: Performed by: PODIATRIST

## 2025-02-13 PROCEDURE — 85610 PROTHROMBIN TIME: CPT

## 2025-02-13 PROCEDURE — 2709999900 HC NON-CHARGEABLE SUPPLY: Performed by: PODIATRIST

## 2025-02-13 PROCEDURE — 88307 TISSUE EXAM BY PATHOLOGIST: CPT

## 2025-02-13 PROCEDURE — 6360000002 HC RX W HCPCS

## 2025-02-13 PROCEDURE — 2580000003 HC RX 258

## 2025-02-13 PROCEDURE — 7100000000 HC PACU RECOVERY - FIRST 15 MIN: Performed by: PODIATRIST

## 2025-02-13 PROCEDURE — 6370000000 HC RX 637 (ALT 250 FOR IP)

## 2025-02-13 PROCEDURE — 87205 SMEAR GRAM STAIN: CPT

## 2025-02-13 PROCEDURE — 85025 COMPLETE CBC W/AUTO DIFF WBC: CPT

## 2025-02-13 PROCEDURE — 36415 COLL VENOUS BLD VENIPUNCTURE: CPT

## 2025-02-13 PROCEDURE — 87077 CULTURE AEROBIC IDENTIFY: CPT

## 2025-02-13 PROCEDURE — 6360000002 HC RX W HCPCS: Performed by: INTERNAL MEDICINE

## 2025-02-13 PROCEDURE — 88311 DECALCIFY TISSUE: CPT

## 2025-02-13 PROCEDURE — 7100000001 HC PACU RECOVERY - ADDTL 15 MIN: Performed by: PODIATRIST

## 2025-02-13 PROCEDURE — 6360000002 HC RX W HCPCS: Performed by: PODIATRIST

## 2025-02-13 PROCEDURE — 87185 SC STD ENZYME DETCJ PER NZM: CPT

## 2025-02-13 PROCEDURE — 80202 ASSAY OF VANCOMYCIN: CPT

## 2025-02-13 PROCEDURE — 3600000012 HC SURGERY LEVEL 2 ADDTL 15MIN: Performed by: PODIATRIST

## 2025-02-13 PROCEDURE — 87186 SC STD MICRODIL/AGAR DIL: CPT

## 2025-02-13 PROCEDURE — 80048 BASIC METABOLIC PNL TOTAL CA: CPT

## 2025-02-13 PROCEDURE — 3700000001 HC ADD 15 MINUTES (ANESTHESIA): Performed by: PODIATRIST

## 2025-02-13 PROCEDURE — 82962 GLUCOSE BLOOD TEST: CPT

## 2025-02-13 PROCEDURE — 1100000003 HC PRIVATE W/ TELEMETRY

## 2025-02-13 PROCEDURE — 87075 CULTR BACTERIA EXCEPT BLOOD: CPT

## 2025-02-13 PROCEDURE — 3600000002 HC SURGERY LEVEL 2 BASE: Performed by: PODIATRIST

## 2025-02-13 RX ORDER — FENTANYL CITRATE 50 UG/ML
100 INJECTION, SOLUTION INTRAMUSCULAR; INTRAVENOUS
Status: CANCELLED | OUTPATIENT
Start: 2025-02-13 | End: 2025-02-14

## 2025-02-13 RX ORDER — SODIUM CHLORIDE 9 MG/ML
INJECTION, SOLUTION INTRAVENOUS PRN
Status: CANCELLED | OUTPATIENT
Start: 2025-02-13

## 2025-02-13 RX ORDER — PROCHLORPERAZINE EDISYLATE 5 MG/ML
5 INJECTION INTRAMUSCULAR; INTRAVENOUS
Status: DISCONTINUED | OUTPATIENT
Start: 2025-02-13 | End: 2025-02-13 | Stop reason: HOSPADM

## 2025-02-13 RX ORDER — WARFARIN SODIUM 5 MG/1
5 TABLET ORAL
Status: COMPLETED | OUTPATIENT
Start: 2025-02-13 | End: 2025-02-13

## 2025-02-13 RX ORDER — MIDAZOLAM HYDROCHLORIDE 5 MG/5ML
2 INJECTION, SOLUTION INTRAMUSCULAR; INTRAVENOUS
Status: CANCELLED | OUTPATIENT
Start: 2025-02-13 | End: 2025-02-14

## 2025-02-13 RX ORDER — DEXAMETHASONE SODIUM PHOSPHATE 4 MG/ML
4 INJECTION, SOLUTION INTRA-ARTICULAR; INTRALESIONAL; INTRAMUSCULAR; INTRAVENOUS; SOFT TISSUE
Status: DISCONTINUED | OUTPATIENT
Start: 2025-02-13 | End: 2025-02-13 | Stop reason: HOSPADM

## 2025-02-13 RX ORDER — HYDROMORPHONE HYDROCHLORIDE 1 MG/ML
0.25 INJECTION, SOLUTION INTRAMUSCULAR; INTRAVENOUS; SUBCUTANEOUS EVERY 5 MIN PRN
Status: DISCONTINUED | OUTPATIENT
Start: 2025-02-13 | End: 2025-02-13 | Stop reason: HOSPADM

## 2025-02-13 RX ORDER — SODIUM CHLORIDE 9 MG/ML
INJECTION, SOLUTION INTRAVENOUS PRN
Status: DISCONTINUED | OUTPATIENT
Start: 2025-02-13 | End: 2025-02-13 | Stop reason: HOSPADM

## 2025-02-13 RX ORDER — SODIUM CHLORIDE 0.9 % (FLUSH) 0.9 %
5-40 SYRINGE (ML) INJECTION EVERY 12 HOURS SCHEDULED
Status: CANCELLED | OUTPATIENT
Start: 2025-02-13

## 2025-02-13 RX ORDER — ACETAMINOPHEN 325 MG/1
650 TABLET ORAL
Status: DISCONTINUED | OUTPATIENT
Start: 2025-02-13 | End: 2025-02-13 | Stop reason: HOSPADM

## 2025-02-13 RX ORDER — ACETAMINOPHEN 500 MG
1000 TABLET ORAL ONCE
Status: CANCELLED | OUTPATIENT
Start: 2025-02-13 | End: 2025-02-13

## 2025-02-13 RX ORDER — SODIUM CHLORIDE 0.9 % (FLUSH) 0.9 %
5-40 SYRINGE (ML) INJECTION PRN
Status: CANCELLED | OUTPATIENT
Start: 2025-02-13

## 2025-02-13 RX ORDER — SODIUM CHLORIDE 0.9 % (FLUSH) 0.9 %
5-40 SYRINGE (ML) INJECTION EVERY 12 HOURS SCHEDULED
Status: DISCONTINUED | OUTPATIENT
Start: 2025-02-13 | End: 2025-02-13 | Stop reason: HOSPADM

## 2025-02-13 RX ORDER — FENTANYL CITRATE 50 UG/ML
25 INJECTION, SOLUTION INTRAMUSCULAR; INTRAVENOUS EVERY 5 MIN PRN
Status: DISCONTINUED | OUTPATIENT
Start: 2025-02-13 | End: 2025-02-13 | Stop reason: HOSPADM

## 2025-02-13 RX ORDER — VANCOMYCIN 1.75 GRAM/500 ML IN 0.9 % SODIUM CHLORIDE INTRAVENOUS
1750
Status: DISCONTINUED | OUTPATIENT
Start: 2025-02-13 | End: 2025-02-13 | Stop reason: SDUPTHER

## 2025-02-13 RX ORDER — NALOXONE HYDROCHLORIDE 0.4 MG/ML
INJECTION, SOLUTION INTRAMUSCULAR; INTRAVENOUS; SUBCUTANEOUS PRN
Status: DISCONTINUED | OUTPATIENT
Start: 2025-02-13 | End: 2025-02-13 | Stop reason: HOSPADM

## 2025-02-13 RX ORDER — ONDANSETRON 2 MG/ML
4 INJECTION INTRAMUSCULAR; INTRAVENOUS ONCE
Status: CANCELLED | OUTPATIENT
Start: 2025-02-13 | End: 2025-02-13

## 2025-02-13 RX ORDER — OXYCODONE HYDROCHLORIDE 5 MG/1
5 TABLET ORAL
Status: DISCONTINUED | OUTPATIENT
Start: 2025-02-13 | End: 2025-02-13 | Stop reason: HOSPADM

## 2025-02-13 RX ORDER — ONDANSETRON 2 MG/ML
INJECTION INTRAMUSCULAR; INTRAVENOUS
Status: DISCONTINUED | OUTPATIENT
Start: 2025-02-13 | End: 2025-02-13 | Stop reason: SDUPTHER

## 2025-02-13 RX ORDER — VANCOMYCIN 1.25 G/250ML
1750 INJECTION, SOLUTION INTRAVENOUS
Status: DISCONTINUED | OUTPATIENT
Start: 2025-02-13 | End: 2025-02-17

## 2025-02-13 RX ORDER — HYDRALAZINE HYDROCHLORIDE 20 MG/ML
10 INJECTION INTRAMUSCULAR; INTRAVENOUS
Status: DISCONTINUED | OUTPATIENT
Start: 2025-02-13 | End: 2025-02-13 | Stop reason: HOSPADM

## 2025-02-13 RX ORDER — SODIUM CHLORIDE, SODIUM LACTATE, POTASSIUM CHLORIDE, CALCIUM CHLORIDE 600; 310; 30; 20 MG/100ML; MG/100ML; MG/100ML; MG/100ML
INJECTION, SOLUTION INTRAVENOUS CONTINUOUS
Status: CANCELLED | OUTPATIENT
Start: 2025-02-13

## 2025-02-13 RX ORDER — LIDOCAINE HYDROCHLORIDE 10 MG/ML
INJECTION, SOLUTION EPIDURAL; INFILTRATION; INTRACAUDAL; PERINEURAL PRN
Status: DISCONTINUED | OUTPATIENT
Start: 2025-02-13 | End: 2025-02-13 | Stop reason: ALTCHOICE

## 2025-02-13 RX ORDER — SODIUM CHLORIDE 0.9 % (FLUSH) 0.9 %
5-40 SYRINGE (ML) INJECTION PRN
Status: DISCONTINUED | OUTPATIENT
Start: 2025-02-13 | End: 2025-02-13 | Stop reason: HOSPADM

## 2025-02-13 RX ORDER — SODIUM CHLORIDE, SODIUM LACTATE, POTASSIUM CHLORIDE, CALCIUM CHLORIDE 600; 310; 30; 20 MG/100ML; MG/100ML; MG/100ML; MG/100ML
INJECTION, SOLUTION INTRAVENOUS
Status: DISCONTINUED | OUTPATIENT
Start: 2025-02-13 | End: 2025-02-13 | Stop reason: SDUPTHER

## 2025-02-13 RX ADMIN — DOCUSATE SODIUM 100 MG: 100 CAPSULE, LIQUID FILLED ORAL at 10:41

## 2025-02-13 RX ADMIN — PIPERACILLIN AND TAZOBACTAM 3375 MG: 3; .375 INJECTION, POWDER, LYOPHILIZED, FOR SOLUTION INTRAVENOUS at 03:56

## 2025-02-13 RX ADMIN — DOXAZOSIN 2 MG: 2 TABLET ORAL at 10:41

## 2025-02-13 RX ADMIN — PIPERACILLIN AND TAZOBACTAM 3375 MG: 3; .375 INJECTION, POWDER, LYOPHILIZED, FOR SOLUTION INTRAVENOUS at 11:43

## 2025-02-13 RX ADMIN — CLINDAMYCIN PHOSPHATE 600 MG: 600 INJECTION, SOLUTION INTRAVENOUS at 02:27

## 2025-02-13 RX ADMIN — PREGABALIN 75 MG: 25 CAPSULE ORAL at 10:40

## 2025-02-13 RX ADMIN — PIPERACILLIN AND TAZOBACTAM 3375 MG: 3; .375 INJECTION, POWDER, LYOPHILIZED, FOR SOLUTION INTRAVENOUS at 21:41

## 2025-02-13 RX ADMIN — Medication 400 UNITS: at 21:46

## 2025-02-13 RX ADMIN — VANCOMYCIN 1750 MG: 1.25 INJECTION, SOLUTION INTRAVENOUS at 11:13

## 2025-02-13 RX ADMIN — DIGOXIN 125 MCG: 0.12 TABLET ORAL at 10:41

## 2025-02-13 RX ADMIN — CLINDAMYCIN PHOSPHATE 600 MG: 600 INJECTION, SOLUTION INTRAVENOUS at 18:35

## 2025-02-13 RX ADMIN — ACETAMINOPHEN 650 MG: 325 TABLET ORAL at 21:55

## 2025-02-13 RX ADMIN — WARFARIN SODIUM 5 MG: 5 TABLET ORAL at 21:39

## 2025-02-13 RX ADMIN — SODIUM CHLORIDE, POTASSIUM CHLORIDE, SODIUM LACTATE AND CALCIUM CHLORIDE: 600; 310; 30; 20 INJECTION, SOLUTION INTRAVENOUS at 18:28

## 2025-02-13 RX ADMIN — INSULIN LISPRO 4 UNITS: 100 INJECTION, SOLUTION INTRAVENOUS; SUBCUTANEOUS at 21:50

## 2025-02-13 RX ADMIN — CLINDAMYCIN PHOSPHATE 600 MG: 600 INJECTION, SOLUTION INTRAVENOUS at 21:12

## 2025-02-13 RX ADMIN — ONDANSETRON HYDROCHLORIDE 4 MG: 2 INJECTION, SOLUTION INTRAMUSCULAR; INTRAVENOUS at 19:12

## 2025-02-13 RX ADMIN — PROPOFOL 40 MCG/KG/MIN: 10 INJECTION, EMULSION INTRAVENOUS at 18:34

## 2025-02-13 RX ADMIN — CYANOCOBALAMIN TAB 500 MCG 1000 MCG: 500 TAB at 10:41

## 2025-02-13 RX ADMIN — OXYCODONE HYDROCHLORIDE AND ACETAMINOPHEN 1000 MG: 500 TABLET ORAL at 10:41

## 2025-02-13 RX ADMIN — ATORVASTATIN CALCIUM 20 MG: 20 TABLET, FILM COATED ORAL at 10:41

## 2025-02-13 RX ADMIN — CLINDAMYCIN PHOSPHATE 600 MG: 600 INJECTION, SOLUTION INTRAVENOUS at 11:06

## 2025-02-13 ASSESSMENT — PAIN - FUNCTIONAL ASSESSMENT: PAIN_FUNCTIONAL_ASSESSMENT: 0-10

## 2025-02-13 ASSESSMENT — PAIN DESCRIPTION - DESCRIPTORS: DESCRIPTORS: THROBBING

## 2025-02-13 NOTE — PERIOP NOTE
Left foot bulky dressing. plantar with bloody stain. Redness to left shin candice, cellulitis, pain to left foot 4/10, throbbing.  Right anterior  tibial with dressing,reported as skin tear.   Updated family, patient eta to OR approximately 15 minutes per OR staff    
16

## 2025-02-13 NOTE — ANESTHESIA PRE PROCEDURE
Department of Anesthesiology  Preprocedure Note       Name:  Baron Wagner Sr.   Age:  89 y.o.  :  1933                                          MRN:  [id number]         Date:  2025      Surgeon: Surgeon(s):  Radha Carrasco DPM    Procedure: Procedure(s):  LEFT FOOT ABSCESS  INCISION AND DRAINAGE    Medications prior to admission:   Prior to Admission medications    Medication Sig Start Date End Date Taking? Authorizing Provider   doxycycline hyclate (VIBRA-TABS) 100 MG tablet Take 1 tablet by mouth 2 times daily   Yes Provider, Historical, MD   balsum peru-castor oil (VENELEX) OINT ointment Apply topically 2 times daily 25  Yes Willem Powell MD   diclofenac (VOLTAREN) 75 MG EC tablet TAKE 1 TABLET BY MOUTH TWICE A DAY AS NEEDED FOR PAIN 24  Yes Willem Powell MD   pregabalin (LYRICA) 75 MG capsule Take 1 capsule by mouth daily for 180 days. Max Daily Amount: 75 mg 24 Yes Willem Powell MD   Lactobacillus (PROBIOTIC ACIDOPHILUS) CAPS Take 1 daily 24  Yes Willem Powell MD   metFORMIN (GLUCOPHAGE) 500 MG tablet TAKE 1 TABLET BY MOUTH TWICE A DAY IN THE MORNING WITH BREAKFAST AND BEFORE DINNER 10/21/24  Yes Willem Powell MD   terazosin (HYTRIN) 2 MG capsule TAKE 1 CAPSULE BY MOUTH EVERY DAY 10/21/24  Yes Willem Powell MD   Lactobacillus Acid-Pectin (ACIDOPHILUS/CITRUS PECTIN) TABS TAKE 1 TABLET BY MOUTH EVERY DAY 10/21/24  Yes Willem Powell MD   Glucosamine 500 MG CAPS 1 capsule with meals Orally Three times a day   Yes Vaishnavi Garcia MD   glimepiride (AMARYL) 4 MG tablet TAKE 1 TABLET BY MOUTH EVERY DAY BEFORE BREAKFAST 9/10/24  Yes Willem Powell MD   JANUVIA 100 MG tablet TAKE 1 TABLET BY MOUTH EVERY DAY 24  Yes Willem Powell MD   warfarin (COUMADIN) 5 MG tablet Take 5 mg on , Tuesday and Thursday; one and a half tablets all other days. 24  Yes DENNIS Carson MD   digoxin (LANOXIN) 125 MCG

## 2025-02-14 PROBLEM — E78.5 HYPERLIPIDEMIA: Status: ACTIVE | Noted: 2017-09-20

## 2025-02-14 PROBLEM — L02.619 ABSCESS OF PLANTAR ASPECT OF FOOT: Status: ACTIVE | Noted: 2025-02-14

## 2025-02-14 PROBLEM — E11.65 POORLY CONTROLLED DIABETES MELLITUS (HCC): Status: ACTIVE | Noted: 2025-02-14

## 2025-02-14 PROBLEM — M79.89 NECROTIZING SOFT TISSUE INFECTION: Status: ACTIVE | Noted: 2025-02-14

## 2025-02-14 PROBLEM — A49.9 GRAM-NEGATIVE INFECTION: Status: ACTIVE | Noted: 2025-02-14

## 2025-02-14 PROBLEM — Z95.0 PACEMAKER: Status: ACTIVE | Noted: 2025-02-14

## 2025-02-14 LAB
ANION GAP SERPL CALC-SCNC: 5 MMOL/L (ref 2–12)
BASOPHILS # BLD: 0 K/UL (ref 0–0.1)
BASOPHILS NFR BLD: 0 % (ref 0–1)
BUN SERPL-MCNC: 21 MG/DL (ref 6–20)
BUN/CREAT SERPL: 17 (ref 12–20)
CALCIUM SERPL-MCNC: 9.1 MG/DL (ref 8.5–10.1)
CHLORIDE SERPL-SCNC: 105 MMOL/L (ref 97–108)
CO2 SERPL-SCNC: 27 MMOL/L (ref 21–32)
CREAT SERPL-MCNC: 1.24 MG/DL (ref 0.7–1.3)
CRP SERPL-MCNC: 10.3 MG/DL (ref 0–0.3)
DIFFERENTIAL METHOD BLD: ABNORMAL
DIGOXIN SERPL-MCNC: 1.2 NG/ML (ref 0.9–2)
EOSINOPHIL # BLD: 0 K/UL (ref 0–0.4)
EOSINOPHIL NFR BLD: 0 % (ref 0–7)
ERYTHROCYTE [DISTWIDTH] IN BLOOD BY AUTOMATED COUNT: 14.9 % (ref 11.5–14.5)
GLUCOSE BLD STRIP.AUTO-MCNC: 170 MG/DL (ref 65–117)
GLUCOSE BLD STRIP.AUTO-MCNC: 226 MG/DL (ref 65–117)
GLUCOSE BLD STRIP.AUTO-MCNC: 277 MG/DL (ref 65–117)
GLUCOSE BLD STRIP.AUTO-MCNC: 310 MG/DL (ref 65–117)
GLUCOSE SERPL-MCNC: 169 MG/DL (ref 65–100)
HCT VFR BLD AUTO: 32.2 % (ref 36.6–50.3)
HGB BLD-MCNC: 10.3 G/DL (ref 12.1–17)
IMM GRANULOCYTES # BLD AUTO: 0 K/UL (ref 0–0.04)
IMM GRANULOCYTES NFR BLD AUTO: 0 % (ref 0–0.5)
INR PPP: 1.5 (ref 0.9–1.1)
LYMPHOCYTES # BLD: 1.19 K/UL (ref 0.8–3.5)
LYMPHOCYTES NFR BLD: 11 % (ref 12–49)
MCH RBC QN AUTO: 29.1 PG (ref 26–34)
MCHC RBC AUTO-ENTMCNC: 32 G/DL (ref 30–36.5)
MCV RBC AUTO: 91 FL (ref 80–99)
METAMYELOCYTES NFR BLD MANUAL: 1 %
MONOCYTES # BLD: 0.76 K/UL (ref 0–1)
MONOCYTES NFR BLD: 7 % (ref 5–13)
MYELOCYTES NFR BLD MANUAL: 1 %
NEUTS BAND NFR BLD MANUAL: 1 %
NEUTS SEG # BLD: 8.64 K/UL (ref 1.8–8)
NEUTS SEG NFR BLD: 79 % (ref 32–75)
NRBC # BLD: 0 K/UL (ref 0–0.01)
NRBC BLD-RTO: 0 PER 100 WBC
PLATELET # BLD AUTO: 167 K/UL (ref 150–400)
PMV BLD AUTO: 9.8 FL (ref 8.9–12.9)
POTASSIUM SERPL-SCNC: 4.3 MMOL/L (ref 3.5–5.1)
PROTHROMBIN TIME: 15.3 SEC (ref 9.2–11.2)
RBC # BLD AUTO: 3.54 M/UL (ref 4.1–5.7)
RBC MORPH BLD: ABNORMAL
SERVICE CMNT-IMP: ABNORMAL
SODIUM SERPL-SCNC: 137 MMOL/L (ref 136–145)
WBC # BLD AUTO: 10.8 K/UL (ref 4.1–11.1)

## 2025-02-14 PROCEDURE — 36415 COLL VENOUS BLD VENIPUNCTURE: CPT

## 2025-02-14 PROCEDURE — 1100000003 HC PRIVATE W/ TELEMETRY

## 2025-02-14 PROCEDURE — 2580000003 HC RX 258

## 2025-02-14 PROCEDURE — 97116 GAIT TRAINING THERAPY: CPT

## 2025-02-14 PROCEDURE — 2580000003 HC RX 258: Performed by: INTERNAL MEDICINE

## 2025-02-14 PROCEDURE — 82962 GLUCOSE BLOOD TEST: CPT

## 2025-02-14 PROCEDURE — 97162 PT EVAL MOD COMPLEX 30 MIN: CPT

## 2025-02-14 PROCEDURE — 6370000000 HC RX 637 (ALT 250 FOR IP): Performed by: INTERNAL MEDICINE

## 2025-02-14 PROCEDURE — 6360000002 HC RX W HCPCS: Performed by: INTERNAL MEDICINE

## 2025-02-14 PROCEDURE — 97110 THERAPEUTIC EXERCISES: CPT

## 2025-02-14 PROCEDURE — 80162 ASSAY OF DIGOXIN TOTAL: CPT

## 2025-02-14 PROCEDURE — 94760 N-INVAS EAR/PLS OXIMETRY 1: CPT

## 2025-02-14 PROCEDURE — 97530 THERAPEUTIC ACTIVITIES: CPT

## 2025-02-14 PROCEDURE — 85610 PROTHROMBIN TIME: CPT

## 2025-02-14 PROCEDURE — 6360000002 HC RX W HCPCS

## 2025-02-14 PROCEDURE — 97165 OT EVAL LOW COMPLEX 30 MIN: CPT

## 2025-02-14 PROCEDURE — 86140 C-REACTIVE PROTEIN: CPT

## 2025-02-14 PROCEDURE — 6370000000 HC RX 637 (ALT 250 FOR IP)

## 2025-02-14 PROCEDURE — 0KBW0ZZ EXCISION OF LEFT FOOT MUSCLE, OPEN APPROACH: ICD-10-PCS | Performed by: PODIATRIST

## 2025-02-14 PROCEDURE — 2700000000 HC OXYGEN THERAPY PER DAY

## 2025-02-14 PROCEDURE — 85025 COMPLETE CBC W/AUTO DIFF WBC: CPT

## 2025-02-14 PROCEDURE — 97535 SELF CARE MNGMENT TRAINING: CPT

## 2025-02-14 PROCEDURE — 99233 SBSQ HOSP IP/OBS HIGH 50: CPT | Performed by: INTERNAL MEDICINE

## 2025-02-14 PROCEDURE — 80048 BASIC METABOLIC PNL TOTAL CA: CPT

## 2025-02-14 PROCEDURE — 0QBP0ZX EXCISION OF LEFT METATARSAL, OPEN APPROACH, DIAGNOSTIC: ICD-10-PCS | Performed by: PODIATRIST

## 2025-02-14 RX ORDER — WARFARIN SODIUM 5 MG/1
7.5 TABLET ORAL
Status: COMPLETED | OUTPATIENT
Start: 2025-02-14 | End: 2025-02-14

## 2025-02-14 RX ADMIN — INSULIN LISPRO 2 UNITS: 100 INJECTION, SOLUTION INTRAVENOUS; SUBCUTANEOUS at 11:06

## 2025-02-14 RX ADMIN — DIGOXIN 125 MCG: 0.12 TABLET ORAL at 08:49

## 2025-02-14 RX ADMIN — BUMETANIDE 2 MG: 1 TABLET ORAL at 20:56

## 2025-02-14 RX ADMIN — CLINDAMYCIN PHOSPHATE 600 MG: 600 INJECTION, SOLUTION INTRAVENOUS at 18:17

## 2025-02-14 RX ADMIN — INSULIN LISPRO 6 UNITS: 100 INJECTION, SOLUTION INTRAVENOUS; SUBCUTANEOUS at 18:12

## 2025-02-14 RX ADMIN — CLINDAMYCIN PHOSPHATE 600 MG: 600 INJECTION, SOLUTION INTRAVENOUS at 10:50

## 2025-02-14 RX ADMIN — WARFARIN SODIUM 7.5 MG: 5 TABLET ORAL at 18:06

## 2025-02-14 RX ADMIN — INSULIN LISPRO 4 UNITS: 100 INJECTION, SOLUTION INTRAVENOUS; SUBCUTANEOUS at 20:56

## 2025-02-14 RX ADMIN — CYANOCOBALAMIN TAB 500 MCG 1000 MCG: 500 TAB at 08:48

## 2025-02-14 RX ADMIN — OXYCODONE HYDROCHLORIDE AND ACETAMINOPHEN 1000 MG: 500 TABLET ORAL at 08:46

## 2025-02-14 RX ADMIN — Medication 400 UNITS: at 21:38

## 2025-02-14 RX ADMIN — PIPERACILLIN AND TAZOBACTAM 3375 MG: 3; .375 INJECTION, POWDER, LYOPHILIZED, FOR SOLUTION INTRAVENOUS at 03:54

## 2025-02-14 RX ADMIN — CLINDAMYCIN PHOSPHATE 600 MG: 600 INJECTION, SOLUTION INTRAVENOUS at 02:43

## 2025-02-14 RX ADMIN — PIPERACILLIN AND TAZOBACTAM 3375 MG: 3; .375 INJECTION, POWDER, LYOPHILIZED, FOR SOLUTION INTRAVENOUS at 12:00

## 2025-02-14 RX ADMIN — VANCOMYCIN 1750 MG: 1.25 INJECTION, SOLUTION INTRAVENOUS at 22:31

## 2025-02-14 RX ADMIN — ATORVASTATIN CALCIUM 20 MG: 20 TABLET, FILM COATED ORAL at 08:55

## 2025-02-14 RX ADMIN — DOXAZOSIN 2 MG: 2 TABLET ORAL at 08:55

## 2025-02-14 RX ADMIN — DOCUSATE SODIUM 100 MG: 100 CAPSULE, LIQUID FILLED ORAL at 08:55

## 2025-02-14 RX ADMIN — PREGABALIN 75 MG: 25 CAPSULE ORAL at 08:48

## 2025-02-14 RX ADMIN — PIPERACILLIN AND TAZOBACTAM 3375 MG: 3; .375 INJECTION, POWDER, LYOPHILIZED, FOR SOLUTION INTRAVENOUS at 19:55

## 2025-02-14 ASSESSMENT — PAIN DESCRIPTION - LOCATION: LOCATION: FOOT

## 2025-02-14 ASSESSMENT — PAIN SCALES - GENERAL: PAINLEVEL_OUTOF10: 0

## 2025-02-14 NOTE — OP NOTE
[facility]              [address] [place]  [address]                            OPERATIVE REPORT      PATIENT NAME: GEETA CUMMINGS                : 1933  MED REC NO: [id number]                       ROOM: 1138  ACCOUNT NO: [id number]                       ADMIT DATE: 02/10/2025  PROVIDER: Radha Carrasco DPM    DATE OF SERVICE:  2025    PREOPERATIVE DIAGNOSES:  Abscess, left foot.    POSTOPERATIVE DIAGNOSES:  Abscess x2, left foot.    PROCEDURES PERFORMED:  I and D abscess x2, left foot and bone biopsy, 5th left metatarsal.    SURGEON:  Radha Carrasco DPM    ASSISTANT:  ***    ANESTHESIA:  IV sedation with local.    ESTIMATED BLOOD LOSS:  Minimal.    SPECIMENS REMOVED:  Wound cultures, aerobic as well as anaerobic, 1 set from the plantar aspect of the foot and 1 set from the lateral aspect of the foot.  Bone biopsy from lateral 5th left metatarsal base.    INTRAOPERATIVE FINDINGS:  ***     COMPLICATIONS:  ***    IMPLANTS:  ***    INDICATIONS:  This 89-year-old male had presented through the ER 2 days ago with an infected left foot.  At this time, surgical intervention has been opted as treatment of choice.  Medical history and physical has been performed.  The patient released for surgery.    Physical examination of lower extremities:  Palpable pedal pulses.  Fairly good muscle strength.  Noted lack of hair growth and diminished epicritic sensation.  The left foot is somewhat swollen and erythematous.  Plantar aspect of the left arch, there is a pustular wound that measures 0.2 x 0.2 x 0.3 cm in the lateral aspect of the left foot.  At the base of the 5th left metatarsal, there is a necrotic wound that measures 0.6 x 0.6 x 0.3 cm.  White cell count was 10.0.  X-rays taken of the left foot did not show any evidence of osteomyelitis.  There is diffuse osteopenia.  X-ray also showed findings concerning for a soft tissue infection, which was

## 2025-02-14 NOTE — BRIEF OP NOTE
Brief Postoperative Note      Patient: Baron Wagner Sr.  YOB: 1933  MRN: [id number]    Date of Procedure: 2/13/2025    Pre-Op Diagnosis Codes:      * Foot abscess, left [L02.612]x 2    Post-Op Diagnosis: Same       Procedure(s):  LEFT FOOT ABSCESS  INCISION AND DRAINAGE and BONE BIOPSY    Surgeon(s):  Radha Carrasco DPM    Assistant:  * No surgical staff found *    Anesthesia: Monitor Anesthesia Care    Estimated Blood Loss (mL): Minimal    Complications: None    Specimens:   ID Type Source Tests Collected by Time Destination   1 : BONE BIOPSY: BASE LEFT FIFTH METATARSAL Bone Foot SURGICAL PATHOLOGY Radha Carrasco DPM 2/13/2025 1911    A : LEFT FOOT CULTURES PLANTAR Swab Foot CULTURE, ANAEROBIC, CULTURE, WOUND (WITH GRAM STAIN) Radha Carrasco DPM 2/13/2025 1906    B : LEFT FOOT LATERAL Swab Foot CULTURE, ANAEROBIC, CULTURE, WOUND (WITH GRAM STAIN) Radha Carrasco DPM 2/13/2025 1908        Implants:  * No implants in log *      Drains: * No LDAs found *    Findings:  Infection Present At Time Of Surgery (PATOS) (choose all levels that have infection present):  - Deep Infection (muscle/fascia) present as evidenced by abscess and phlegmon  Other Findings: abscess x 2    Electronically signed by Radha Carrasco DPM on 2/13/2025 at 7:36 PM

## 2025-02-14 NOTE — FLOWSHEET NOTE
02/13/25 1930   Handoff   Communication Given Periop Handoff/Relief   Handoff phase Phase I receiving   Handoff Given To Moraima PACU RN   Handoff Received From Adelita OR RN/ Destiny BOYCE   Handoff Communication Face to Face;At bedside   Time Handoff Given 1930 1955: TRANSFER - OUT REPORT:    Verbal report given to PETROS Ko on Baron Alexanderike Sr. being transferred to [facility] for routine progression of care       Report consisted of patient’s Situation, Background, Assessment and   Recommendations(SBAR).     Opportunity for questions and clarification was provided.

## 2025-02-14 NOTE — CARE COORDINATION
Transition of Care Plan:    RUR: 14%  Prior Level of Functioning: Independent   [facility]  HELENA: 2/17/25  If SNF or IPR: Date FOC offered:   Date FOC received:   Accepting facility:   Date authorization started with reference number:   Date authorization received and expires:   Follow up appointments: PCP   DME needed: No DME needed   Transportation at discharge: Pt's family will transport   IM/IMM Medicare/ letter given: Needs 2nd IMM letter   Is patient a [place] and connected with VA? No   If yes, was [place] transfer form completed and VA notified? No  Caregiver Contact: Pt's son   Discharge Caregiver contacted prior to discharge? Pt will be contacted   Care Conference needed? No  Barriers to discharge: Medical clearance    CM reviewed pt's chart and pt is not medically stable for d/c due to blood and wound cultures pending and ID and podi clearance.    CM will continue to follow and assist with d/c planning.    Glenis López

## 2025-02-14 NOTE — ANESTHESIA POSTPROCEDURE EVALUATION
Department of Anesthesiology  Postprocedure Note    Patient: Baron Wagner Sr.  MRN: [id number]  YOB: 1933  Date of evaluation: 2/13/2025    Procedure Summary       Date: 02/13/25 Room / Location: [facility] MAIN OR M2 / [facility] MAIN OR    Anesthesia Start: 1828 Anesthesia Stop: 1937    Procedure: LEFT FOOT ABSCESS  INCISION AND DRAINAGE and BONE BIOPSY (Left: Foot) Diagnosis:       Foot abscess, left      (Foot abscess, left [L02.612])    Providers: Radha Carrasco DPM Responsible Provider: Irwin Tariq MD    Anesthesia Type: MAC ASA Status: 3            Anesthesia Type: MAC    Claudia Phase I: Claudia Score: 9    Claudia Phase II:      Anesthesia Post Evaluation    Patient location during evaluation: PACU  Patient participation: complete - patient participated  Level of consciousness: sleepy but conscious and responsive to verbal stimuli  Airway patency: patent  Nausea & Vomiting: no vomiting and no nausea  Cardiovascular status: blood pressure returned to baseline and hemodynamically stable  Respiratory status: acceptable  Hydration status: stable    No notable events documented.

## 2025-02-14 NOTE — OP NOTE
[facility]              [address] [place]  [address]                            OPERATIVE REPORT      PATIENT NAME: GEETA CUMMINGS                : 1933  MED REC NO: [id number]                       ROOM: 1138  ACCOUNT NO: [id number]                       ADMIT DATE: 02/10/2025  PROVIDER: Radha Carrasco DPM    DATE OF SERVICE:  2025    PREOPERATIVE DIAGNOSES:  Abscess, left foot.    POSTOPERATIVE DIAGNOSES:  Abscess x2, left foot.    PROCEDURES PERFORMED:  I and D abscess x2, left foot and bone biopsy, 5th left metatarsal.    SURGEON:  Radha Carrasco DPM    ASSISTANT:  none    ANESTHESIA:  IV sedation with local.    ESTIMATED BLOOD LOSS:  Minimal.    SPECIMENS REMOVED:  Wound cultures, aerobic as well as anaerobic, 1 set from the plantar aspect of the foot and 1 set from the lateral aspect of the foot.  Bone biopsy from lateral 5th left metatarsal base.         COMPLICATIONS:  none    IMPLANTS:  none    INDICATIONS:  This 89-year-old male had presented through the ER 2 days ago with an infected left foot.  At this time, surgical intervention has been opted as treatment of choice.  Medical history and physical has been performed.  The patient released for surgery.    Physical examination of lower extremities:  Palpable pedal pulses.  Fairly good muscle strength.  Noted lack of hair growth and diminished epicritic sensation.  The left foot is somewhat swollen and erythematous.  Plantar aspect of the left arch, there is a pustular wound that measures 0.2 x 0.2 x 0.3 cm in the lateral aspect of the left foot.  At the base of the 5th left metatarsal, there is a necrotic wound that measures 0.6 x 0.6 x 0.3 cm.  White cell count was 10.0.  X-rays taken of the left foot did not show any evidence of osteomyelitis.  There is diffuse osteopenia.  X-ray also showed findings concerning for a soft tissue infection, which was indicated as potential gas

## 2025-02-15 ENCOUNTER — APPOINTMENT (OUTPATIENT)
Facility: HOSPITAL | Age: 89
DRG: 622 | End: 2025-02-15
Payer: MEDICARE

## 2025-02-15 LAB
ANION GAP SERPL CALC-SCNC: 7 MMOL/L (ref 2–12)
BACTERIA SPEC CULT: ABNORMAL
BACTERIA SPEC CULT: ABNORMAL
BACTERIA SPEC CULT: NORMAL
BUN SERPL-MCNC: 19 MG/DL (ref 6–20)
BUN/CREAT SERPL: 15 (ref 12–20)
CALCIUM SERPL-MCNC: 9.3 MG/DL (ref 8.5–10.1)
CHLORIDE SERPL-SCNC: 104 MMOL/L (ref 97–108)
CO2 SERPL-SCNC: 27 MMOL/L (ref 21–32)
CREAT SERPL-MCNC: 1.31 MG/DL (ref 0.7–1.3)
CRP SERPL-MCNC: 7.77 MG/DL (ref 0–0.3)
ERYTHROCYTE [SEDIMENTATION RATE] IN BLOOD: 82 MM/HR (ref 0–20)
GLUCOSE BLD STRIP.AUTO-MCNC: 169 MG/DL (ref 65–117)
GLUCOSE BLD STRIP.AUTO-MCNC: 169 MG/DL (ref 65–117)
GLUCOSE BLD STRIP.AUTO-MCNC: 209 MG/DL (ref 65–117)
GLUCOSE BLD STRIP.AUTO-MCNC: 274 MG/DL (ref 65–117)
GLUCOSE SERPL-MCNC: 174 MG/DL (ref 65–100)
GRAM STN SPEC: ABNORMAL
GRAM STN SPEC: ABNORMAL
INR PPP: 1.7 (ref 0.9–1.1)
POTASSIUM SERPL-SCNC: 3.9 MMOL/L (ref 3.5–5.1)
PROTHROMBIN TIME: 17.9 SEC (ref 9.2–11.2)
RHEUMATOID FACT SERPL-ACNC: <10 IU/ML
SERVICE CMNT-IMP: ABNORMAL
SERVICE CMNT-IMP: NORMAL
SODIUM SERPL-SCNC: 138 MMOL/L (ref 136–145)
URATE SERPL-MCNC: 5 MG/DL (ref 3.5–7.2)

## 2025-02-15 PROCEDURE — 6370000000 HC RX 637 (ALT 250 FOR IP)

## 2025-02-15 PROCEDURE — 82962 GLUCOSE BLOOD TEST: CPT

## 2025-02-15 PROCEDURE — 85610 PROTHROMBIN TIME: CPT

## 2025-02-15 PROCEDURE — 2580000003 HC RX 258: Performed by: INTERNAL MEDICINE

## 2025-02-15 PROCEDURE — 6360000002 HC RX W HCPCS: Performed by: INTERNAL MEDICINE

## 2025-02-15 PROCEDURE — 86140 C-REACTIVE PROTEIN: CPT

## 2025-02-15 PROCEDURE — 1100000003 HC PRIVATE W/ TELEMETRY

## 2025-02-15 PROCEDURE — 84550 ASSAY OF BLOOD/URIC ACID: CPT

## 2025-02-15 PROCEDURE — 36415 COLL VENOUS BLD VENIPUNCTURE: CPT

## 2025-02-15 PROCEDURE — 6370000000 HC RX 637 (ALT 250 FOR IP): Performed by: INTERNAL MEDICINE

## 2025-02-15 PROCEDURE — 73140 X-RAY EXAM OF FINGER(S): CPT

## 2025-02-15 PROCEDURE — 86431 RHEUMATOID FACTOR QUANT: CPT

## 2025-02-15 PROCEDURE — 85652 RBC SED RATE AUTOMATED: CPT

## 2025-02-15 PROCEDURE — 80048 BASIC METABOLIC PNL TOTAL CA: CPT

## 2025-02-15 RX ORDER — WARFARIN SODIUM 5 MG/1
7.5 TABLET ORAL
Status: COMPLETED | OUTPATIENT
Start: 2025-02-15 | End: 2025-02-15

## 2025-02-15 RX ORDER — PREDNISONE 5 MG/1
10 TABLET ORAL 2 TIMES DAILY
Status: COMPLETED | OUTPATIENT
Start: 2025-02-15 | End: 2025-02-17

## 2025-02-15 RX ADMIN — ACETAMINOPHEN 650 MG: 325 TABLET ORAL at 09:55

## 2025-02-15 RX ADMIN — PIPERACILLIN AND TAZOBACTAM 3375 MG: 3; .375 INJECTION, POWDER, LYOPHILIZED, FOR SOLUTION INTRAVENOUS at 18:39

## 2025-02-15 RX ADMIN — PIPERACILLIN AND TAZOBACTAM 3375 MG: 3; .375 INJECTION, POWDER, LYOPHILIZED, FOR SOLUTION INTRAVENOUS at 11:08

## 2025-02-15 RX ADMIN — CLINDAMYCIN PHOSPHATE 600 MG: 600 INJECTION, SOLUTION INTRAVENOUS at 17:56

## 2025-02-15 RX ADMIN — DOXAZOSIN 2 MG: 2 TABLET ORAL at 09:38

## 2025-02-15 RX ADMIN — CYANOCOBALAMIN TAB 500 MCG 1000 MCG: 500 TAB at 09:38

## 2025-02-15 RX ADMIN — WARFARIN SODIUM 7.5 MG: 5 TABLET ORAL at 17:53

## 2025-02-15 RX ADMIN — INSULIN LISPRO 4 UNITS: 100 INJECTION, SOLUTION INTRAVENOUS; SUBCUTANEOUS at 21:14

## 2025-02-15 RX ADMIN — BUMETANIDE 2 MG: 1 TABLET ORAL at 09:38

## 2025-02-15 RX ADMIN — INSULIN LISPRO 2 UNITS: 100 INJECTION, SOLUTION INTRAVENOUS; SUBCUTANEOUS at 11:05

## 2025-02-15 RX ADMIN — CLINDAMYCIN PHOSPHATE 600 MG: 600 INJECTION, SOLUTION INTRAVENOUS at 09:47

## 2025-02-15 RX ADMIN — ATORVASTATIN CALCIUM 20 MG: 20 TABLET, FILM COATED ORAL at 09:37

## 2025-02-15 RX ADMIN — BUMETANIDE 2 MG: 1 TABLET ORAL at 21:15

## 2025-02-15 RX ADMIN — DIGOXIN 125 MCG: 0.12 TABLET ORAL at 09:37

## 2025-02-15 RX ADMIN — CLINDAMYCIN PHOSPHATE 600 MG: 600 INJECTION, SOLUTION INTRAVENOUS at 02:08

## 2025-02-15 RX ADMIN — DOCUSATE SODIUM 100 MG: 100 CAPSULE, LIQUID FILLED ORAL at 09:38

## 2025-02-15 RX ADMIN — Medication 400 UNITS: at 21:33

## 2025-02-15 RX ADMIN — PREGABALIN 75 MG: 25 CAPSULE ORAL at 09:37

## 2025-02-15 RX ADMIN — PREDNISONE 10 MG: 5 TABLET ORAL at 17:53

## 2025-02-15 RX ADMIN — OXYCODONE HYDROCHLORIDE AND ACETAMINOPHEN 1000 MG: 500 TABLET ORAL at 09:37

## 2025-02-15 RX ADMIN — PIPERACILLIN AND TAZOBACTAM 3375 MG: 3; .375 INJECTION, POWDER, LYOPHILIZED, FOR SOLUTION INTRAVENOUS at 03:35

## 2025-02-15 ASSESSMENT — PAIN DESCRIPTION - ORIENTATION: ORIENTATION: RIGHT;LEFT

## 2025-02-15 ASSESSMENT — PAIN SCALES - GENERAL
PAINLEVEL_OUTOF10: 0
PAINLEVEL_OUTOF10: 10

## 2025-02-15 ASSESSMENT — PAIN DESCRIPTION - LOCATION: LOCATION: HAND

## 2025-02-16 LAB
ANION GAP SERPL CALC-SCNC: 8 MMOL/L (ref 2–12)
BACTERIA SPEC CULT: ABNORMAL
BACTERIA SPEC CULT: ABNORMAL
BACTERIA SPEC CULT: NORMAL
BACTERIA SPEC CULT: NORMAL
BUN SERPL-MCNC: 18 MG/DL (ref 6–20)
BUN/CREAT SERPL: 13 (ref 12–20)
CALCIUM SERPL-MCNC: 9.4 MG/DL (ref 8.5–10.1)
CHLORIDE SERPL-SCNC: 103 MMOL/L (ref 97–108)
CO2 SERPL-SCNC: 27 MMOL/L (ref 21–32)
CREAT SERPL-MCNC: 1.37 MG/DL (ref 0.7–1.3)
GLUCOSE BLD STRIP.AUTO-MCNC: 200 MG/DL (ref 65–117)
GLUCOSE BLD STRIP.AUTO-MCNC: 204 MG/DL (ref 65–117)
GLUCOSE BLD STRIP.AUTO-MCNC: 308 MG/DL (ref 65–117)
GLUCOSE BLD STRIP.AUTO-MCNC: 420 MG/DL (ref 65–117)
GLUCOSE SERPL-MCNC: 225 MG/DL (ref 65–100)
GRAM STN SPEC: ABNORMAL
GRAM STN SPEC: ABNORMAL
INR PPP: 1.9 (ref 0.9–1.1)
POTASSIUM SERPL-SCNC: 3.7 MMOL/L (ref 3.5–5.1)
PROTHROMBIN TIME: 19.5 SEC (ref 9.2–11.2)
SERVICE CMNT-IMP: ABNORMAL
SERVICE CMNT-IMP: NORMAL
SERVICE CMNT-IMP: NORMAL
SODIUM SERPL-SCNC: 138 MMOL/L (ref 136–145)

## 2025-02-16 PROCEDURE — 6360000002 HC RX W HCPCS: Performed by: INTERNAL MEDICINE

## 2025-02-16 PROCEDURE — 1100000003 HC PRIVATE W/ TELEMETRY

## 2025-02-16 PROCEDURE — 85610 PROTHROMBIN TIME: CPT

## 2025-02-16 PROCEDURE — 36415 COLL VENOUS BLD VENIPUNCTURE: CPT

## 2025-02-16 PROCEDURE — 82962 GLUCOSE BLOOD TEST: CPT

## 2025-02-16 PROCEDURE — 6370000000 HC RX 637 (ALT 250 FOR IP)

## 2025-02-16 PROCEDURE — 80048 BASIC METABOLIC PNL TOTAL CA: CPT

## 2025-02-16 PROCEDURE — 2580000003 HC RX 258: Performed by: INTERNAL MEDICINE

## 2025-02-16 PROCEDURE — 6370000000 HC RX 637 (ALT 250 FOR IP): Performed by: INTERNAL MEDICINE

## 2025-02-16 RX ADMIN — DIGOXIN 125 MCG: 0.12 TABLET ORAL at 11:40

## 2025-02-16 RX ADMIN — WARFARIN SODIUM 6 MG: 5 TABLET ORAL at 17:32

## 2025-02-16 RX ADMIN — PIPERACILLIN AND TAZOBACTAM 3375 MG: 3; .375 INJECTION, POWDER, LYOPHILIZED, FOR SOLUTION INTRAVENOUS at 11:42

## 2025-02-16 RX ADMIN — Medication 400 UNITS: at 21:43

## 2025-02-16 RX ADMIN — CLINDAMYCIN PHOSPHATE 600 MG: 600 INJECTION, SOLUTION INTRAVENOUS at 17:32

## 2025-02-16 RX ADMIN — INSULIN LISPRO 2 UNITS: 100 INJECTION, SOLUTION INTRAVENOUS; SUBCUTANEOUS at 11:42

## 2025-02-16 RX ADMIN — PREDNISONE 10 MG: 5 TABLET ORAL at 09:03

## 2025-02-16 RX ADMIN — INSULIN LISPRO 8 UNITS: 100 INJECTION, SOLUTION INTRAVENOUS; SUBCUTANEOUS at 21:30

## 2025-02-16 RX ADMIN — CLINDAMYCIN PHOSPHATE 600 MG: 600 INJECTION, SOLUTION INTRAVENOUS at 09:12

## 2025-02-16 RX ADMIN — PIPERACILLIN AND TAZOBACTAM 3375 MG: 3; .375 INJECTION, POWDER, LYOPHILIZED, FOR SOLUTION INTRAVENOUS at 03:34

## 2025-02-16 RX ADMIN — INSULIN LISPRO 2 UNITS: 100 INJECTION, SOLUTION INTRAVENOUS; SUBCUTANEOUS at 09:04

## 2025-02-16 RX ADMIN — ATORVASTATIN CALCIUM 20 MG: 20 TABLET, FILM COATED ORAL at 09:04

## 2025-02-16 RX ADMIN — PIPERACILLIN AND TAZOBACTAM 3375 MG: 3; .375 INJECTION, POWDER, LYOPHILIZED, FOR SOLUTION INTRAVENOUS at 18:20

## 2025-02-16 RX ADMIN — BUMETANIDE 2 MG: 1 TABLET ORAL at 09:03

## 2025-02-16 RX ADMIN — INSULIN LISPRO 6 UNITS: 100 INJECTION, SOLUTION INTRAVENOUS; SUBCUTANEOUS at 17:32

## 2025-02-16 RX ADMIN — PREDNISONE 10 MG: 5 TABLET ORAL at 21:29

## 2025-02-16 RX ADMIN — BUMETANIDE 2 MG: 1 TABLET ORAL at 21:29

## 2025-02-16 RX ADMIN — VANCOMYCIN 1750 MG: 1.25 INJECTION, SOLUTION INTRAVENOUS at 09:33

## 2025-02-16 RX ADMIN — DOXAZOSIN 2 MG: 2 TABLET ORAL at 09:04

## 2025-02-16 RX ADMIN — OXYCODONE HYDROCHLORIDE AND ACETAMINOPHEN 1000 MG: 500 TABLET ORAL at 09:02

## 2025-02-16 RX ADMIN — DOCUSATE SODIUM 100 MG: 100 CAPSULE, LIQUID FILLED ORAL at 09:03

## 2025-02-16 RX ADMIN — PREGABALIN 75 MG: 25 CAPSULE ORAL at 09:03

## 2025-02-16 RX ADMIN — CYANOCOBALAMIN TAB 500 MCG 1000 MCG: 500 TAB at 09:03

## 2025-02-16 RX ADMIN — CLINDAMYCIN PHOSPHATE 600 MG: 600 INJECTION, SOLUTION INTRAVENOUS at 02:04

## 2025-02-17 DIAGNOSIS — E11.9 TYPE 2 DIABETES MELLITUS WITHOUT COMPLICATION, WITHOUT LONG-TERM CURRENT USE OF INSULIN (HCC): ICD-10-CM

## 2025-02-17 PROBLEM — A49.1 INFECTION DUE TO ALPHA-HEMOLYTIC STREPTOCOCCUS: Status: ACTIVE | Noted: 2025-02-17

## 2025-02-17 LAB
ANION GAP SERPL CALC-SCNC: 10 MMOL/L (ref 2–12)
BASOPHILS # BLD: 0 K/UL (ref 0–0.1)
BASOPHILS NFR BLD: 0 % (ref 0–1)
BUN SERPL-MCNC: 22 MG/DL (ref 6–20)
BUN/CREAT SERPL: 17 (ref 12–20)
CALCIUM SERPL-MCNC: 9.1 MG/DL (ref 8.5–10.1)
CHLORIDE SERPL-SCNC: 102 MMOL/L (ref 97–108)
CO2 SERPL-SCNC: 25 MMOL/L (ref 21–32)
CREAT SERPL-MCNC: 1.27 MG/DL (ref 0.7–1.3)
DIFFERENTIAL METHOD BLD: ABNORMAL
EOSINOPHIL # BLD: 0 K/UL (ref 0–0.4)
EOSINOPHIL NFR BLD: 0 % (ref 0–7)
ERYTHROCYTE [DISTWIDTH] IN BLOOD BY AUTOMATED COUNT: 14.9 % (ref 11.5–14.5)
GLUCOSE BLD STRIP.AUTO-MCNC: 233 MG/DL (ref 65–117)
GLUCOSE BLD STRIP.AUTO-MCNC: 252 MG/DL (ref 65–117)
GLUCOSE BLD STRIP.AUTO-MCNC: 333 MG/DL (ref 65–117)
GLUCOSE BLD STRIP.AUTO-MCNC: 464 MG/DL (ref 65–117)
GLUCOSE SERPL-MCNC: 245 MG/DL (ref 65–100)
HCT VFR BLD AUTO: 32 % (ref 36.6–50.3)
HGB BLD-MCNC: 10.3 G/DL (ref 12.1–17)
IMM GRANULOCYTES # BLD AUTO: 0 K/UL (ref 0–0.04)
IMM GRANULOCYTES NFR BLD AUTO: 0 % (ref 0–0.5)
INR PPP: 2.6 (ref 0.9–1.1)
LYMPHOCYTES # BLD: 0.53 K/UL (ref 0.8–3.5)
LYMPHOCYTES NFR BLD: 4 % (ref 12–49)
MCH RBC QN AUTO: 28.9 PG (ref 26–34)
MCHC RBC AUTO-ENTMCNC: 32.2 G/DL (ref 30–36.5)
MCV RBC AUTO: 89.9 FL (ref 80–99)
MONOCYTES # BLD: 0.53 K/UL (ref 0–1)
MONOCYTES NFR BLD: 4 % (ref 5–13)
NEUTS BAND NFR BLD MANUAL: 1 %
NEUTS SEG # BLD: 12.24 K/UL (ref 1.8–8)
NEUTS SEG NFR BLD: 91 % (ref 32–75)
NRBC # BLD: 0 K/UL (ref 0–0.01)
NRBC BLD-RTO: 0 PER 100 WBC
PLATELET # BLD AUTO: 252 K/UL (ref 150–400)
PMV BLD AUTO: 10.1 FL (ref 8.9–12.9)
POTASSIUM SERPL-SCNC: 3.7 MMOL/L (ref 3.5–5.1)
PROTHROMBIN TIME: 26 SEC (ref 9.2–11.2)
RBC # BLD AUTO: 3.56 M/UL (ref 4.1–5.7)
RBC MORPH BLD: ABNORMAL
SERVICE CMNT-IMP: ABNORMAL
SODIUM SERPL-SCNC: 137 MMOL/L (ref 136–145)
WBC # BLD AUTO: 13.3 K/UL (ref 4.1–11.1)

## 2025-02-17 PROCEDURE — 6360000002 HC RX W HCPCS: Performed by: INTERNAL MEDICINE

## 2025-02-17 PROCEDURE — 80048 BASIC METABOLIC PNL TOTAL CA: CPT

## 2025-02-17 PROCEDURE — 97535 SELF CARE MNGMENT TRAINING: CPT

## 2025-02-17 PROCEDURE — 82962 GLUCOSE BLOOD TEST: CPT

## 2025-02-17 PROCEDURE — 6370000000 HC RX 637 (ALT 250 FOR IP): Performed by: INTERNAL MEDICINE

## 2025-02-17 PROCEDURE — 1100000003 HC PRIVATE W/ TELEMETRY

## 2025-02-17 PROCEDURE — 99233 SBSQ HOSP IP/OBS HIGH 50: CPT | Performed by: INTERNAL MEDICINE

## 2025-02-17 PROCEDURE — 2580000003 HC RX 258: Performed by: INTERNAL MEDICINE

## 2025-02-17 PROCEDURE — 85025 COMPLETE CBC W/AUTO DIFF WBC: CPT

## 2025-02-17 PROCEDURE — 6370000000 HC RX 637 (ALT 250 FOR IP)

## 2025-02-17 PROCEDURE — 85610 PROTHROMBIN TIME: CPT

## 2025-02-17 PROCEDURE — 36415 COLL VENOUS BLD VENIPUNCTURE: CPT

## 2025-02-17 RX ORDER — BLOOD SUGAR DIAGNOSTIC
STRIP MISCELLANEOUS
Qty: 100 STRIP | Refills: 3 | Status: SHIPPED | OUTPATIENT
Start: 2025-02-17

## 2025-02-17 RX ORDER — SITAGLIPTIN 100 MG/1
100 TABLET, FILM COATED ORAL DAILY
Qty: 30 TABLET | Refills: 5 | Status: SHIPPED | OUTPATIENT
Start: 2025-02-17

## 2025-02-17 RX ADMIN — DOXAZOSIN 2 MG: 2 TABLET ORAL at 09:14

## 2025-02-17 RX ADMIN — INSULIN LISPRO 6 UNITS: 100 INJECTION, SOLUTION INTRAVENOUS; SUBCUTANEOUS at 17:36

## 2025-02-17 RX ADMIN — OXYCODONE HYDROCHLORIDE AND ACETAMINOPHEN 1000 MG: 500 TABLET ORAL at 09:14

## 2025-02-17 RX ADMIN — INSULIN LISPRO 4 UNITS: 100 INJECTION, SOLUTION INTRAVENOUS; SUBCUTANEOUS at 12:25

## 2025-02-17 RX ADMIN — PREDNISONE 10 MG: 5 TABLET ORAL at 09:14

## 2025-02-17 RX ADMIN — PIPERACILLIN AND TAZOBACTAM 3375 MG: 3; .375 INJECTION, POWDER, LYOPHILIZED, FOR SOLUTION INTRAVENOUS at 02:45

## 2025-02-17 RX ADMIN — CYANOCOBALAMIN TAB 500 MCG 1000 MCG: 500 TAB at 09:14

## 2025-02-17 RX ADMIN — CLINDAMYCIN PHOSPHATE 600 MG: 600 INJECTION, SOLUTION INTRAVENOUS at 01:44

## 2025-02-17 RX ADMIN — ATORVASTATIN CALCIUM 20 MG: 20 TABLET, FILM COATED ORAL at 09:14

## 2025-02-17 RX ADMIN — CLINDAMYCIN PHOSPHATE 600 MG: 600 INJECTION, SOLUTION INTRAVENOUS at 09:21

## 2025-02-17 RX ADMIN — INSULIN LISPRO 8 UNITS: 100 INJECTION, SOLUTION INTRAVENOUS; SUBCUTANEOUS at 21:36

## 2025-02-17 RX ADMIN — BUMETANIDE 2 MG: 1 TABLET ORAL at 21:36

## 2025-02-17 RX ADMIN — BUMETANIDE 2 MG: 1 TABLET ORAL at 09:14

## 2025-02-17 RX ADMIN — AMPICILLIN SODIUM AND SULBACTAM SODIUM 3000 MG: 2; 1 INJECTION, POWDER, FOR SOLUTION INTRAMUSCULAR; INTRAVENOUS at 17:39

## 2025-02-17 RX ADMIN — AMPICILLIN SODIUM AND SULBACTAM SODIUM 3000 MG: 2; 1 INJECTION, POWDER, FOR SOLUTION INTRAMUSCULAR; INTRAVENOUS at 23:00

## 2025-02-17 RX ADMIN — PREGABALIN 75 MG: 25 CAPSULE ORAL at 09:14

## 2025-02-17 RX ADMIN — DOCUSATE SODIUM 100 MG: 100 CAPSULE, LIQUID FILLED ORAL at 09:14

## 2025-02-17 RX ADMIN — Medication 400 UNITS: at 22:12

## 2025-02-17 RX ADMIN — DIGOXIN 125 MCG: 0.12 TABLET ORAL at 09:14

## 2025-02-17 RX ADMIN — WARFARIN SODIUM 3 MG: 2 TABLET ORAL at 17:36

## 2025-02-17 RX ADMIN — INSULIN LISPRO 2 UNITS: 100 INJECTION, SOLUTION INTRAVENOUS; SUBCUTANEOUS at 09:13

## 2025-02-17 RX ADMIN — AMPICILLIN SODIUM AND SULBACTAM SODIUM 3000 MG: 2; 1 INJECTION, POWDER, FOR SOLUTION INTRAMUSCULAR; INTRAVENOUS at 11:35

## 2025-02-17 NOTE — TELEPHONE ENCOUNTER
PCP: Willem Powell MD    Last appt: 1/14/2025  Future Appointments   Date Time Provider Department Center   2/21/2025  1:30 PM Willem Powell MD [facility] DEP   3/13/2025 10:00 AM Willem Powell MD [facility] DEP       Requested Prescriptions     Pending Prescriptions Disp Refills    JANUVIA 100 MG tablet [Pharmacy Med Name: JANUVIA 100 MG TABLET] 30 tablet 5     Sig: TAKE 1 TABLET BY MOUTH EVERY DAY       Prior labs and Blood pressures:  BP Readings from Last 3 Encounters:   02/17/25 (!) 114/94   01/31/25 119/80   01/14/25 120/80     Lab Results   Component Value Date/Time     02/17/2025 04:09 AM    K 3.7 02/17/2025 04:09 AM     02/17/2025 04:09 AM    CO2 25 02/17/2025 04:09 AM    BUN 22 02/17/2025 04:09 AM    GFRAA 53 08/17/2022 10:24 AM     No results found for: \"HBA1C\", \"[id number]\"  Lab Results   Component Value Date/Time    CHOL 166 12/12/2024 12:21 PM    HDL 43 12/12/2024 12:21 PM    LDL 76.6 12/12/2024 12:21 PM    LDL 69.8 03/04/2024 10:10 AM    VLDL 46.4 12/12/2024 12:21 PM    VLDL 81 04/02/2021 03:16 PM     No results found for: \"VITD3\"        Lab Results   Component Value Date/Time    TSH 1.19 06/06/2024 10:23 AM

## 2025-02-17 NOTE — CARE COORDINATION
Transition of Care Plan:     RUR: 15%  Prior Level of Functioning: Independent   [facility]  HELENA: 2/18/25  If SNF or IPR: Date FOC offered:   Date FOC received:   Accepting facility:   Date authorization started with reference number:   Date authorization received and expires:   Follow up appointments: PCP   DME needed: No DME needed   Transportation at discharge: Pt's family will transport   IM/IMM Medicare/ letter given: Needs 2nd IMM letter   Is patient a  and connected with VA? No              If yes, was [place] transfer form completed and VA notified? No  Caregiver Contact: Pt's son   Discharge Caregiver contacted prior to discharge? Pt will be contacted   Care Conference needed? No  Barriers to discharge: Medical clearance    CM reviewed pt's chart and pt is not medically stable for d/c due to podi and ID clearance.    CM will continue to follow and assist with d/c planning.    Glenis López    [phone]

## 2025-02-17 NOTE — TELEPHONE ENCOUNTER
PCP: Willem Powell MD    Last appt: 8/2/2024    Future Appointments   Date Time Provider Department Center   2/21/2025  1:30 PM Willem Powell MD [facility] DEP   3/13/2025 10:00 AM Willem Powell MD [facility] DEP       Requested Prescriptions     Pending Prescriptions Disp Refills    ACCU-CHEK LEE ANN PLUS strip [Pharmacy Med Name: ACCU-CHEK LEE ANN PLUS TEST STRP] 100 strip 3     Sig: USE TO TEST BLOOD SUGAR TWICE DAILY

## 2025-02-18 ENCOUNTER — TELEPHONE (OUTPATIENT)
Age: 89
End: 2025-02-18

## 2025-02-18 VITALS
TEMPERATURE: 98.6 F | BODY MASS INDEX: 25 KG/M2 | HEART RATE: 75 BPM | HEIGHT: 75 IN | RESPIRATION RATE: 16 BRPM | DIASTOLIC BLOOD PRESSURE: 61 MMHG | WEIGHT: 201.06 LBS | SYSTOLIC BLOOD PRESSURE: 131 MMHG | OXYGEN SATURATION: 94 %

## 2025-02-18 PROBLEM — M86.072 ACUTE HEMATOGENOUS OSTEOMYELITIS OF LEFT FOOT (HCC): Status: ACTIVE | Noted: 2025-02-18

## 2025-02-18 LAB
ANION GAP SERPL CALC-SCNC: 9 MMOL/L (ref 2–12)
BUN SERPL-MCNC: 22 MG/DL (ref 6–20)
BUN/CREAT SERPL: 15 (ref 12–20)
CALCIUM SERPL-MCNC: 9 MG/DL (ref 8.5–10.1)
CHLORIDE SERPL-SCNC: 101 MMOL/L (ref 97–108)
CO2 SERPL-SCNC: 27 MMOL/L (ref 21–32)
CREAT SERPL-MCNC: 1.51 MG/DL (ref 0.7–1.3)
GLUCOSE BLD STRIP.AUTO-MCNC: 230 MG/DL (ref 65–117)
GLUCOSE BLD STRIP.AUTO-MCNC: 289 MG/DL (ref 65–117)
GLUCOSE BLD STRIP.AUTO-MCNC: 432 MG/DL (ref 65–117)
GLUCOSE SERPL-MCNC: 307 MG/DL (ref 65–100)
INR PPP: 2.5 (ref 0.9–1.1)
POTASSIUM SERPL-SCNC: 3.4 MMOL/L (ref 3.5–5.1)
PROTHROMBIN TIME: 25.3 SEC (ref 9.2–11.2)
SERVICE CMNT-IMP: ABNORMAL
SODIUM SERPL-SCNC: 137 MMOL/L (ref 136–145)

## 2025-02-18 PROCEDURE — 97116 GAIT TRAINING THERAPY: CPT

## 2025-02-18 PROCEDURE — 36415 COLL VENOUS BLD VENIPUNCTURE: CPT

## 2025-02-18 PROCEDURE — 36569 INSJ PICC 5 YR+ W/O IMAGING: CPT

## 2025-02-18 PROCEDURE — 6370000000 HC RX 637 (ALT 250 FOR IP)

## 2025-02-18 PROCEDURE — C1892 INTRO/SHEATH,FIXED,PEEL-AWAY: HCPCS

## 2025-02-18 PROCEDURE — 99233 SBSQ HOSP IP/OBS HIGH 50: CPT | Performed by: INTERNAL MEDICINE

## 2025-02-18 PROCEDURE — 80048 BASIC METABOLIC PNL TOTAL CA: CPT

## 2025-02-18 PROCEDURE — C1751 CATH, INF, PER/CENT/MIDLINE: HCPCS

## 2025-02-18 PROCEDURE — 05HY33Z INSERTION OF INFUSION DEVICE INTO UPPER VEIN, PERCUTANEOUS APPROACH: ICD-10-PCS | Performed by: INTERNAL MEDICINE

## 2025-02-18 PROCEDURE — 76937 US GUIDE VASCULAR ACCESS: CPT

## 2025-02-18 PROCEDURE — 82962 GLUCOSE BLOOD TEST: CPT

## 2025-02-18 PROCEDURE — 6360000002 HC RX W HCPCS: Performed by: INTERNAL MEDICINE

## 2025-02-18 PROCEDURE — 2580000003 HC RX 258: Performed by: INTERNAL MEDICINE

## 2025-02-18 PROCEDURE — 6370000000 HC RX 637 (ALT 250 FOR IP): Performed by: INTERNAL MEDICINE

## 2025-02-18 PROCEDURE — 85610 PROTHROMBIN TIME: CPT

## 2025-02-18 RX ORDER — WARFARIN SODIUM 5 MG/1
5 TABLET ORAL
Status: COMPLETED | OUTPATIENT
Start: 2025-02-18 | End: 2025-02-18

## 2025-02-18 RX ADMIN — AMPICILLIN SODIUM AND SULBACTAM SODIUM 3000 MG: 2; 1 INJECTION, POWDER, FOR SOLUTION INTRAMUSCULAR; INTRAVENOUS at 05:35

## 2025-02-18 RX ADMIN — ATORVASTATIN CALCIUM 20 MG: 20 TABLET, FILM COATED ORAL at 09:09

## 2025-02-18 RX ADMIN — DIGOXIN 125 MCG: 0.12 TABLET ORAL at 09:09

## 2025-02-18 RX ADMIN — INSULIN LISPRO 4 UNITS: 100 INJECTION, SOLUTION INTRAVENOUS; SUBCUTANEOUS at 11:17

## 2025-02-18 RX ADMIN — BUMETANIDE 2 MG: 1 TABLET ORAL at 09:09

## 2025-02-18 RX ADMIN — OXYCODONE HYDROCHLORIDE AND ACETAMINOPHEN 1000 MG: 500 TABLET ORAL at 09:09

## 2025-02-18 RX ADMIN — WARFARIN SODIUM 5 MG: 5 TABLET ORAL at 16:53

## 2025-02-18 RX ADMIN — INSULIN LISPRO 8 UNITS: 100 INJECTION, SOLUTION INTRAVENOUS; SUBCUTANEOUS at 16:53

## 2025-02-18 RX ADMIN — DOCUSATE SODIUM 100 MG: 100 CAPSULE, LIQUID FILLED ORAL at 09:09

## 2025-02-18 RX ADMIN — DOXAZOSIN 2 MG: 2 TABLET ORAL at 09:09

## 2025-02-18 RX ADMIN — PREGABALIN 75 MG: 25 CAPSULE ORAL at 09:09

## 2025-02-18 RX ADMIN — AMPICILLIN SODIUM AND SULBACTAM SODIUM 3000 MG: 2; 1 INJECTION, POWDER, FOR SOLUTION INTRAMUSCULAR; INTRAVENOUS at 10:59

## 2025-02-18 RX ADMIN — ACETAMINOPHEN 650 MG: 325 TABLET ORAL at 11:16

## 2025-02-18 RX ADMIN — CYANOCOBALAMIN TAB 500 MCG 1000 MCG: 500 TAB at 09:09

## 2025-02-18 RX ADMIN — INSULIN LISPRO 2 UNITS: 100 INJECTION, SOLUTION INTRAVENOUS; SUBCUTANEOUS at 09:09

## 2025-02-18 ASSESSMENT — PAIN DESCRIPTION - ORIENTATION: ORIENTATION: RIGHT;LEFT

## 2025-02-18 ASSESSMENT — PAIN DESCRIPTION - DESCRIPTORS: DESCRIPTORS: ACHING

## 2025-02-18 ASSESSMENT — PAIN SCALES - GENERAL: PAINLEVEL_OUTOF10: 4

## 2025-02-18 ASSESSMENT — PAIN DESCRIPTION - LOCATION: LOCATION: EYE;HEAD

## 2025-02-18 NOTE — CARE COORDINATION
Transition of Care Plan:     RUR: 15%  Prior Level of Functioning: Independent   Disposition: Home with family and home health-[place]  HELENA: 2/18/25  If SNF or IPR: Date FOC offered:   Date FOC received:   Accepting facility:   Date authorization started with reference number:   Date authorization received and expires:   Follow up appointments: PCP   DME needed: No DME needed   Transportation at discharge: Pt's family will transport   IM/IMM Medicare/ letter given: Needs 2nd IMM letter   Is patient a  and connected with VA? No              If yes, was [place] transfer form completed and VA notified? No  Caregiver Contact: Pt's son   Discharge Caregiver contacted prior to discharge? Pt will be contacted   Care Conference needed? No  Barriers to discharge: Medical clearance      CM is aware that pt will need to be d/c home with IV Abx.    CM sent referral to [facility].    CM also updated [place] regarding that pt will be d/c home with IV Abx.     CM will continue to follow and assist with d/c planning.    Glenis López    [phone]

## 2025-02-18 NOTE — PROGRESS NOTES
Hospitalist Progress Note        Demographics    Patient Name  Baron Wagner Sr.   Date of Birth 2/20/1933   Medical Record Number  [id number]      Age  89 y.o.   PCP Willem Powell MD   Admit date:  2/10/2025 11:56 AM     Room Number  1138/01  @ [facility]           Admission Diagnoses:  Diabetic infection of left foot (HCC)   Admission Summary:  Baron Wagner is a 89 y.o.  male with PMHx significant for diabetes type 2, paroxysmal A-fib, pacemaker, CKD stage III, polymyalgia rheumatica and diabetic ulcer of left midfoot presents with chief complaint of left foot wound.  Patient's daughter-in-law at bedside and patient reports it started as a blister 5 days ago and has progressed to a larger wound.  Per chart patient saw Dr. Carrasco on 1/31.  Patient also saw Dr. Thanh Van within the last 2 weeks we did an KASSIDY.  Patient was to that he had good blood flow to his foot.  Over the past 2 days, patient experiencing mild pain and patient's daughter-in-law noticed increased redness and swelling around the wound, with malodorous cloudy drainage.  Patient report being prescribed an antibiotic, but pharmacy did not call for him to .  His daughter-in-law stated he was previously on doxycycline for 2 weeks, and his daughter-in-law restarted doxycycline 2 days ago from some old pills.  ED workup revealed lactic acid of 2.32, WBC 12.6, creatinine 2.20.  X-ray of the left foot reveals diffuse osteopenia.  Findings concerning for neck with Tyzine soft tissue infection, without radiographic evidence of acute osteomyelitis.    We were asked to admit for work up and evaluation of the above problems.      Assessment and plan:     Left foot wound  Left foot wound with malodorous cloudy drainage  Elevated lactic acid-probably due to infection  WBC POA 12.6, currently 9.2  lactic acid POA 2.32, currently 1.9  X-ray left foot reveals diffuse osteopenia limits evaluation.  Findings concerning for 
      Hospitalist Progress Note        Demographics    Patient Name  Baron Wagner Sr.   Date of Birth 2/20/1933   Medical Record Number  [id number]      Age  89 y.o.   PCP Willem Powell MD   Admit date:  2/10/2025 11:56 AM     Room Number  1138/01  @ [facility]           Admission Diagnoses:  Diabetic infection of left foot (HCC)   Admission Summary:  Baron Wagner is a 89 y.o.  male with PMHx significant for diabetes type 2, paroxysmal A-fib, pacemaker, CKD stage III, polymyalgia rheumatica and diabetic ulcer of left midfoot presents with chief complaint of left foot wound.  Patient's daughter-in-law at bedside and patient reports it started as a blister 5 days ago and has progressed to a larger wound.  Per chart patient saw Dr. Carrasco on 1/31.  Patient also saw Dr. Thanh Van within the last 2 weeks we did an KASSIDY.  Patient was to that he had good blood flow to his foot.  Over the past 2 days, patient experiencing mild pain and patient's daughter-in-law noticed increased redness and swelling around the wound, with malodorous cloudy drainage.  Patient report being prescribed an antibiotic, but pharmacy did not call for him to .  His daughter-in-law stated he was previously on doxycycline for 2 weeks, and his daughter-in-law restarted doxycycline 2 days ago from some old pills.  ED workup revealed lactic acid of 2.32, WBC 12.6, creatinine 2.20.  X-ray of the left foot reveals diffuse osteopenia.  Findings concerning for neck with Tyzine soft tissue infection, without radiographic evidence of acute osteomyelitis.    We were asked to admit for work up and evaluation of the above problems.      Assessment and plan:     Left foot wound  Left foot wound with malodorous cloudy drainage  POA  Elevated lactic acid-probably due to infection  WBC POA 12.6, currently 10.8  lactic acid POA 2.32, currently 1.9  X-ray left foot reveals diffuse osteopenia limits evaluation.  Findings concerning 
      Hospitalist Progress Note        Demographics    Patient Name  Baron Wagner Sr.   Date of Birth 2/20/1933   Medical Record Number  [id number]      Age  89 y.o.   PCP Willem Powell MD   Admit date:  2/10/2025 11:56 AM     Room Number  1138/01  @ [facility]           Admission Diagnoses:  Diabetic infection of left foot (HCC)   Admission Summary:  Baron Wagner is a 89 y.o.  male with PMHx significant for diabetes type 2, paroxysmal A-fib, pacemaker, CKD stage III, polymyalgia rheumatica and diabetic ulcer of left midfoot presents with chief complaint of left foot wound.  Patient's daughter-in-law at bedside and patient reports it started as a blister 5 days ago and has progressed to a larger wound.  Per chart patient saw Dr. Carrasco on 1/31.  Patient also saw Dr. Thanh Van within the last 2 weeks we did an KASSIDY.  Patient was to that he had good blood flow to his foot.  Over the past 2 days, patient experiencing mild pain and patient's daughter-in-law noticed increased redness and swelling around the wound, with malodorous cloudy drainage.  Patient report being prescribed an antibiotic, but pharmacy did not call for him to .  His daughter-in-law stated he was previously on doxycycline for 2 weeks, and his daughter-in-law restarted doxycycline 2 days ago from some old pills.  ED workup revealed lactic acid of 2.32, WBC 12.6, creatinine 2.20.  X-ray of the left foot reveals diffuse osteopenia.  Findings concerning for neck with Tyzine soft tissue infection, without radiographic evidence of acute osteomyelitis.    We were asked to admit for work up and evaluation of the above problems.      Assessment and plan:     Left foot wound  Left foot wound with malodorous cloudy drainage  POA  Elevated lactic acid-probably due to infection  WBC POA 12.6, currently 10.8  lactic acid POA 2.32, currently 1.9  X-ray left foot reveals diffuse osteopenia limits evaluation.  Findings concerning 
      Hospitalist Progress Note        Demographics    Patient Name  Baron Wagner Sr.   Date of Birth 2/20/1933   Medical Record Number  [id number]      Age  89 y.o.   PCP Willem Powell MD   Admit date:  2/10/2025 11:56 AM     Room Number  1138/01  @ [facility]           Admission Diagnoses:  Diabetic infection of left foot (HCC)   Admission Summary:  Baron Wagner is a 89 y.o.  male with PMHx significant for diabetes type 2, paroxysmal A-fib, pacemaker, CKD stage III, polymyalgia rheumatica and diabetic ulcer of left midfoot presents with chief complaint of left foot wound.  Patient's daughter-in-law at bedside and patient reports it started as a blister 5 days ago and has progressed to a larger wound.  Per chart patient saw Dr. Carrasco on 1/31.  Patient also saw Dr. Thanh Van within the last 2 weeks we did an KASSIDY.  Patient was to that he had good blood flow to his foot.  Over the past 2 days, patient experiencing mild pain and patient's daughter-in-law noticed increased redness and swelling around the wound, with malodorous cloudy drainage.  Patient report being prescribed an antibiotic, but pharmacy did not call for him to .  His daughter-in-law stated he was previously on doxycycline for 2 weeks, and his daughter-in-law restarted doxycycline 2 days ago from some old pills.  ED workup revealed lactic acid of 2.32, WBC 12.6, creatinine 2.20.  X-ray of the left foot reveals diffuse osteopenia.  Findings concerning for neck with Tyzine soft tissue infection, without radiographic evidence of acute osteomyelitis.    We were asked to admit for work up and evaluation of the above problems.      Assessment and plan:     Left foot wound  Left foot wound with malodorous cloudy drainage  POA  Elevated lactic acid-probably due to infection  WBC POA 12.6, currently 10.8  lactic acid POA 2.32, currently 1.9  X-ray left foot reveals diffuse osteopenia limits evaluation.  Findings concerning 
     Hospitalist Progress Note     Demographics    Patient Name  Baron Wagner Sr.    Date of Birth 2/20/1933   Medical Record Number  [id number]      Age  89 y.o.   PCP Willem Powell MD   Admit date:  2/10/2025 11:56 AM     Room Number  1138/01  @ [facility]         Admission Diagnoses:  Diabetic infection of left foot (HCC)      Admission Diagnoses:  Diabetic infection of left foot (HCC)   Admission Summary:  Baron Wagner is a 89 y.o.  male with PMHx significant for diabetes type 2, paroxysmal A-fib, pacemaker, CKD stage III, polymyalgia rheumatica and diabetic ulcer of left midfoot presents with chief complaint of left foot wound.  Patient's daughter-in-law at bedside and patient reports it started as a blister 5 days ago and has progressed to a larger wound.  Per chart patient saw Dr. Carrasco on 1/31.  Patient also saw Dr. Thanh Van within the last 2 weeks we did an KASSIDY.  Patient was to that he had good blood flow to his foot.  Over the past 2 days, patient experiencing mild pain and patient's daughter-in-law noticed increased redness and swelling around the wound, with malodorous cloudy drainage.  Patient report being prescribed an antibiotic, but pharmacy did not call for him to .  His daughter-in-law stated he was previously on doxycycline for 2 weeks, and his daughter-in-law restarted doxycycline 2 days ago from some old pills.  ED workup revealed lactic acid of 2.32, WBC 12.6, creatinine 2.20.  X-ray of the left foot reveals diffuse osteopenia.  Findings concerning for neck with Tyzine soft tissue infection, without radiographic evidence of acute osteomyelitis.    We were asked to admit for work up and evaluation of the above problems.       Assessment and plan:      Left foot wound  Left foot wound with malodorous cloudy drainage  POA  Elevated lactic acid-probably due to infection  WBC POA 12.6, currently 10.8  lactic acid POA 2.32, currently 1.9  X-ray left foot 
  Patient determined to be stable for discharge by attending provider. I have reviewed the discharge instructions and follow-up appointments with the patient. They verbalized understanding and all questions were answered to their satisfaction. No complaints or further questions were expressed.       New medications: Appropriate educational materials and medication side effect teaching were provided.       PIV were removed prior to discharge.     Patient discharged with PICC line in Right upper arm for long term IV antibiotics, placed on 2/18/2025, dressing clean dry and intact prior to discharge. [employer] at the bedside right before discharge to hook up antibiotic.     All personal items collected during admission were returned to the patient prior to discharge.    KIRT MERRITT RN    
  Physician Progress Note      PATIENT:               GEETA CUMMINGS  CSN #:                  [id number]  :                       1933  ADMIT DATE:       2/10/2025 11:56 AM  DISCH DATE:  RESPONDING  PROVIDER #:        John Lee          QUERY TEXT:    Pt admitted with Diabetic infection of left foot and has diastolic CHF   documented. If possible, please document in progress notes and discharge   summary further specificity regarding the type and acuity of CHF:    The medical record reflects the following:  Risk Factors: Advanced age, HX CHF, HTN    Clinical Indicators:  Per H&P- Diastolic heart failure  Continue PTA warfarin  Continue PTA digoxin    Treatment: receiving BUMEX tablets, Digoxin  Options provided:  -- Chronic Diastolic CHF/HFpEF  -- Other - I will add my own diagnosis  -- Disagree - Not applicable / Not valid  -- Disagree - Clinically unable to determine / Unknown  -- Refer to Clinical Documentation Reviewer    PROVIDER RESPONSE TEXT:    This patient has chronic diastolic CHF/HFpEF.    Query created by: Kimber Overton on 2025 1:10 PM      QUERY TEXT:    Pt admitted with Diabetic infection of left foot. Pt noted to have WBC 12.6,   , RR 23, lactic acid 2.32 POA. If possible, please document in the   progress notes and discharge summary if you are evaluating and /or treating   any of the following:    The medical record reflects the following:  Risk Factors:  Diabetic infection of left foot    Clinical Indicators:  > 02/10/25 11:45 WBC: 12.6 (H)  > 02/10/25 16:53 Lactic Acid 232  > WBC POA 12.6, lactic acid 2.32  > HR 96, RR 23  > Blood culture no growth after 21 hours    Treatment: receiving Zosyn and Vancomycin  Options provided:  -- Sepsis, present on admission  -- Sepsis, present on admission, now resolved  -- Diabetic infection of left foot without Sepsis  -- Other - I will add my own diagnosis  -- Disagree - Not applicable / Not valid  -- Disagree - Clinically unable to 
  Physician Progress Note      PATIENT:               GEETA CUMMINGS  CSN #:                  [id number]  :                       1933  ADMIT DATE:       2/10/2025 11:56 AM  DISCH DATE:  RESPONDING  PROVIDER #:        John Lee          QUERY TEXT:    Patient admitted with Left foot wound, noted to have paroxysmal atrial   fibrillation and is maintained on warfarin. If possible, please document in   progress notes and discharge summary if you are evaluating and/or treating any   of the following:?  ?  The medical record reflects the following:  Risk Factors: 89-year-old male, Paf, anticoagulated    Clinical Indicators:  Per H&P- Paroxysmal A-fib  Continue PTA warfarin    Treatment: receiving Warfarin  Options provided:  -- Secondary hypercoagulable state in a patient with atrial fibrillation  -- Other - I will add my own diagnosis  -- Disagree - Not applicable / Not valid  -- Disagree - Clinically unable to determine / Unknown  -- Refer to Clinical Documentation Reviewer    PROVIDER RESPONSE TEXT:    This patient has secondary hypercoagulable state in a patient with atrial   fibrillation.    Query created by: Kimber Overton on 2025 12:52 PM      Electronically signed by:  John Lee 2025 1:11 PM          
 End of Shift Note    Bedside shift change report given to ELIOT (oncoming nurse) by Sera Jarquin RN .        Shift worked:  DAYS   Shift summary and any significant changes:     -SAT IN CHAIR MOST OF DAY   -WOUND CARE COMPLETED   -NO PAIN IN FOOT AND HAND NEUROPATHY HAS IMPROVED   -LAST BM 2/17/25 (LOOSE FROM ANTIBIOTICS) X2     Concerns for physician to address:  NONE   Zone phone for oncoming shift:   [phone]     Patient Information  Baron Wagner Sr.  89 y.o.  2/10/2025 11:56 AM by Laura Blue MD. Baron Wagner Sr. was admitted from [facility]    Problem List  Patient Active Problem List    Diagnosis Date Noted    Diabetic infection of left foot ([facility]) 02/10/2025    Necrotizing soft tissue infection 02/14/2025    Abscess of plantar aspect of foot 02/14/2025    Gram-negative infection 02/14/2025    Poorly controlled diabetes mellitus ([facility]) 02/14/2025    Pacemaker 02/14/2025    Diabetic ulcer of left midfoot associated with type 2 diabetes mellitus, with muscle involvement without evidence of necrosis ([facility]) 01/31/2025    Ulcer of left foot, with necrosis of muscle ([facility]) 01/31/2025    Acute bronchitis 11/23/2024    Chronic systolic (congestive) heart failure 08/02/2024    Type 2 diabetes mellitus with chronic kidney disease 02/02/2024    Diabetic ulcer of toe of left foot associated with type 2 diabetes mellitus, limited to breakdown of skin ([facility]) 02/02/2024    Iron deficiency anemia, unspecified 12/26/2023    Osteomyelitis of second toe of right foot 10/10/2023    Type 2 diabetes mellitus without complication, without long-term current use of insulin ([facility]) 10/10/2023    Amputation of toe of right foot ([facility]) 10/03/2023    Acute hematogenous osteomyelitis of right foot 09/29/2023    Anemia 09/21/2023    Complete heart block ([facility]) 08/01/2023    Bacteremia due to Pseudomonas 07/17/2023    MSSA (methicillin susceptible Staphylococcus aureus) infection 07/17/2023    DNR (do not resuscitate) discussion 07/05/2023    
 End of Shift Note    Bedside shift change report given to PETROS Johnson (oncoming nurse) by KIRT MERRITT, PETROS .        Shift worked:  DAYS   Shift summary and any significant changes:     Pt sat up in the chair for most of the shift. Wound care of left foot completed. All scheduled medications given per MAR. Pt made no complaints of pain or nausea.    Concerns for physician to address:  NONE   Zone phone for oncoming shift:   [phone]       Activity:Level of Assistance: Moderate assist, patient does 50-74%  Number times ambulated in hallways past shift: 0  Number of times OOB to chair past shift: 1    Cardiac:   Cardiac Monitoring: no    Access:   Current line(s): PIV    Respiratory:   O2 Device: None (Room air)    GI:  Last BM (including prior to admit): 02/16/25  Current diet:  ADULT DIET; Regular; 5 carb choices (75 gm/meal)  ADULT ORAL NUTRITION SUPPLEMENT; Breakfast, Dinner; Wound Healing Oral Supplement  ADULT ORAL NUTRITION SUPPLEMENT; Breakfast, Lunch; Standard High Calorie/High Protein Oral Supplement  Tolerating current diet: Yes    Pain Management:   Patient states pain is manageable on current regimen: N/A    Patient Safety:  Fall Score: Cline Total Score: 45  Interventions: stay with me program  Self-release roll belt: No  Dexterity to release roll belt: N/A   (must document dexterity  here by stating Yes or No here, otherwise this is a restraint and must follow restraint documentation policy.)    DVT prophylaxis:  DVT prophylaxis: meds    Active Consults:  IP CONSULT TO PHARMACY  IP CONSULT TO INFECTIOUS DISEASES  IP CONSULT TO PHARMACY  IP CONSULT TO VASCULAR SURGERY    Length of Stay:  Expected LOS: 8  Actual LOS: 7    KIRT MERRITT, RN                             
Comprehensive Nutrition Assessment    Type and Reason for Visit:  Initial, Wound    Nutrition Recommendations/Plan:   Consistent carb, cardiac diet  Lawson BID and Ensure HP BID to support wound healing  Please document % meals and supplements consumed in flowsheet I/O's under intake      Malnutrition Assessment:  Malnutrition Status:  Insufficient data (02/12/25 1005). Not suspected.  Context:  Acute Illness     Findings of the 6 clinical characteristics of malnutrition:  Energy Intake:  No decrease in energy intake  Weight Loss:  No weight loss     Body Fat Loss:  Unable to assess     Muscle Mass Loss:  Unable to assess    Fluid Accumulation:  Mild Extremities   Strength:  Not Performed    Nutrition Assessment:     Chart reviewed for documented wounds. Pt admitted with L foot wound, cellulitis, afib, heart failure, CKD3, T2DM, neuropathy, HLD. Unable to visit bedside for assessment today. The MST was negative for malnutrition risk factors PTA and his weight hx appears stable in the EMR. No PO intake yet documented. Supplements added to support wound healing. Will continue monitoring.    No PO intake yet documented.     Wt Readings from Last 10 Encounters:   02/10/25 91.2 kg (201 lb 1 oz)   01/14/25 89.1 kg (196 lb 8 oz)   12/12/24 90.5 kg (199 lb 9.6 oz)   11/25/24 89 kg (196 lb 1.6 oz)   11/14/24 85.9 kg (189 lb 4.8 oz)   10/14/24 92.3 kg (203 lb 6.4 oz)   09/12/24 90.1 kg (198 lb 9.6 oz)   08/02/24 91.2 kg (201 lb)   07/08/24 92.4 kg (203 lb 12.8 oz)   06/06/24 91.6 kg (202 lb)   ]    Nutrition Related Findings:    Labs: Cr 1.49, BUN 33, -206-236, A1c 7.5 (Dec 2024).   Meds: Vitamin C, Lipitor, Vitamin D3, Colace, Humalog, Zosyn, Vancoycin Vitamin B12, Vitamin E, Coumadin.   Edema: +2 pitting RLE, +1 LLE.   BM 2/8.   Wound Type: Stage II, Unstageable, Diabetic Ulcer       Current Nutrition Intake & Therapies:    Average Meal Intake: Unable to assess  Average Supplements Intake: Unable to assess  ADULT 
Comprehensive Nutrition Assessment    Type and Reason for Visit:  Reassess    Nutrition Recommendations/Plan:   Continue current diet  Encourage PO intakes, honor preferences as able, provide assistance PRN  Ensure 2x/day, lawson 2x/day  Monitor and record PO intakes, supplement acceptance, and Bms in I/Os     Malnutrition Assessment:  Malnutrition Status:  Insufficient data (02/12/25 1005)    Context:  Acute Illness     Findings of the 6 clinical characteristics of malnutrition:  Energy Intake:  No decrease in energy intake  Weight Loss:  No weight loss     Body Fat Loss:  Unable to assess     Muscle Mass Loss:  Unable to assess    Fluid Accumulation:  Mild Extremities   Strength:  Not Performed    Nutrition Assessment:    Follow up. Pt unavailable at interview attempt. No documented intakes. Lawson/ensure fell off with NPO 2/13 - will readd.    Nutrition Related Findings:    Labs: Na 137, K 3.7, BUN 22, Creat 1.27, Gluc 252. Meds: ascorbic acid, atorvastatin, bumetanide, cholecalciferol, docusate sodium, insulin lispro, prednisone, vitamin B-12, vitamin E, warfarin. Trace b/l LE, 2+ L foot edema. BM 2/16. Wound Type: Stage II, Unstageable, Diabetic Ulcer       Current Nutrition Intake & Therapies:    Average Meal Intake: Unable to assess  Average Supplements Intake: None Ordered  ADULT DIET; Regular; 5 carb choices (75 gm/meal)    Anthropometric Measures:  Height: 190.5 cm (6' 3\")  Ideal Body Weight (IBW): 196 lbs (89 kg)    Current Body Weight: 91.2 kg (201 lb 1 oz), 102.6 % IBW. Weight Source: Bed scale  Current BMI (kg/m2): 25.1  Weight Adjustment For: No Adjustment  BMI Categories: Overweight (BMI 25.0-29.9)    Estimated Daily Nutrient Needs:  Energy Requirements Based On: Formula  Weight Used for Energy Requirements: Current  Energy (kcal/day): 2150 kcals (BMR x 1.3AF)  Weight Used for Protein Requirements: Current  Protein (g/day): 109-128g (1.2-1.4g/kg)  Method Used for Fluid Requirements: 1 
End of Shift Note    Bedside shift change report given to Della MORRELL (oncoming nurse) by Leigh Van RN (offgoing nurse).  Report included the following information SBAR, Kardex, Intake/Output, MAR, Recent Results, and Cardiac Rhythm NSR/B    Shift worked: Night   Shift summary and any significant changes:    Kept NPO from [place] for I & D today. Has already signed the consent.VS stable and give a bath in readiness of the surgical procedure. No complaints      Concerns for physician to address: Is already NPO for I & D that will be done in the evening.He is diabetic and not on IV fluids. Has had no BM x three days.   Zone phone for oncoming shift:  [phone]       Activity:  Level of Assistance: Moderate assist, patient does 50-74%  Number times ambulated in hallways past shift: 0  Number of times OOB to chair past shift: 0    Cardiac:   Cardiac Monitoring: Yes      Cardiac Rhythm: AV paced    Access:  Current line(s): PIV     Genitourinary:   Urinary Status: Voiding    Respiratory:   O2 Device: None (Room air)  Chronic home O2 use?: NO  Incentive spirometer at bedside: YES    GI:  Last BM (including prior to admit): 02/08/25  Current diet:  Diet NPO  Passing flatus: YES    Pain Management:   Patient states pain is manageable on current regimen: YES    Skin:  Adams Scale Score: 19  Interventions:      Patient Safety:  Fall Risk: Nursing Judgement-Fall Risk High(Add Comments): Yes  Fall Risk Interventions  Nursing Judgement-Fall Risk High(Add Comments): Yes  Toilet Every 2 Hours-In Advance of Need: Yes  Hourly Visual Checks: Awake, In bed  Fall Visual Posted: Socks, Armband  Room Door Open: Deferred to promote rest  Alarm On: Bed  Patient Moved Closer to Nursing Station: No    Active Consults:   IP CONSULT TO PHARMACY  IP CONSULT TO GENERAL SURGERY  IP CONSULT TO PODIATRY  IP CONSULT TO INFECTIOUS DISEASES    Length of Stay:  Expected LOS: 5  Actual LOS: 3    Leigh Van RN                            
End of Shift Note    Bedside shift change report given to Della MORRELL (oncoming nurse) by Leigh Van RN (offgoing nurse).  Report included the following information SBAR, Kardex, Intake/Output, MAR, and Recent Results    Shift worked: Night   Shift summary and any significant changes:    Received from PACU at shift change after I & D was done under [place]. Wound wrapped heavily and no bleeding noted during the shift.Pt rested comfortably and Labs drawn this morning. VS stable.Passing adequate/normal looking urine. No BM yet.   Concerns for physician to address: None     Zone phone for oncoming shift:  [phone]       Activity:  Level of Assistance: Moderate assist, patient does 50-74%  Number times ambulated in hallways past shift: 0  Number of times OOB to chair past shift: 0    Cardiac:   Cardiac Monitoring: No      Cardiac Rhythm: AV paced    Access:  Current line(s): PIV     Genitourinary:   Urinary Status: Voiding (urine)    Respiratory:   O2 Device: Nasal cannula  Chronic home O2 use?: NO  Incentive spirometer at bedside: YES    GI:  Last BM (including prior to admit): 02/13/25  Current diet:  ADULT DIET; Regular; 5 carb choices (75 gm/meal)  Passing flatus: YES    Pain Management:   Patient states pain is manageable on current regimen: YES    Skin:  Adams Scale Score: 18  Interventions: Wound Offloading (Prevention Methods): Pillows, Repositioning    Patient Safety:  Fall Risk: Nursing Judgement-Fall Risk High(Add Comments): Yes  Fall Risk Interventions  Nursing Judgement-Fall Risk High(Add Comments): Yes  Toilet Every 2 Hours-In Advance of Need: Yes  Hourly Visual Checks: Rooming in  Fall Visual Posted: Fall sign posted  Room Door Open: Yes  Alarm On: Bed  Patient Moved Closer to Nursing Station: No    Active Consults:   IP CONSULT TO PHARMACY  IP CONSULT TO PODIATRY  IP CONSULT TO INFECTIOUS DISEASES  IP CONSULT TO PHARMACY    Length of Stay:  Expected LOS: 7  Actual LOS: 4    Leigh Van 
End of Shift Note    Bedside shift change report given to Desolee (oncoming nurse) by Quinton Cm RN (offgoing nurse).  Report included the following information SBAR, Kardex, and MAR    Shift worked:  7a-7p     Shift summary and any significant changes:     Pt has been calm, alert and oriented throughout the shift. His due medications were administered per MAR, wound care was done on his right foot and made comfortable in bed    Concerns for physician to address:  none     Zone phone for oncoming shift:  [phone]         Quinton Cm, RN                           
End of Shift Note    Bedside shift change report given to Dilma MORRELL (oncoming nurse) by Â Berna Thompson RN (offgoing nurse).  Report included the following information SBAR, Intake/Output, and MAR    Shift worked:  7pm-7am     Shift summary and any significant changes:     No complain of pain in this shift. IV antibiotic given as per MAR.     Concerns for physician to address:  Elevated blood sugars      Zone phone for oncoming shift:            Â Berna Thompson RN                           
End of Shift Note    Bedside shift change report given to Edwige MORRELL (oncoming nurse) by Ailyn Talley RN (offgoing nurse).  Report included the following information SBAR, Kardex, and MAR    Shift worked:  9865-6707     Shift summary and any significant changes:     Blood works sent, elevated vancomycin level informed NP hold vanco doses and random vanco level tomorrow am,Seen by wound care,patient will be seen by podiatry as per notes, dressing done by RN wound culture sent, informed NP that the family needs update if she can call them, all needs attended     Concerns for physician to address:  Waiting to be seen by podiatry           Activity:  Level of Assistance: Independent  Number times ambulated in hallways past shift: 0  Number of times OOB to chair past shift: 2    Cardiac:   Cardiac Monitoring: Yes      Cardiac Rhythm: AV paced    Access:  Current line(s): PIV     Genitourinary:        Respiratory:   O2 Device: None (Room air)  Chronic home O2 use?: NO  Incentive spirometer at bedside: NO    GI:  Last BM (including prior to admit): 02/08/25  Current diet:  ADULT DIET; Regular; 4 carb choices (60 gm/meal); Low Fat/Low Chol/High Fiber/АЛЕКСАНДР  ADULT ORAL NUTRITION SUPPLEMENT; Breakfast, Dinner; Wound Healing Oral Supplement  ADULT ORAL NUTRITION SUPPLEMENT; Lunch; Low Calorie/High Protein Oral Supplement  Passing flatus: YES    Pain Management:   Patient states pain is manageable on current regimen: YES    Skin:  Adams Scale Score: 19  Interventions:      Patient Safety:  Fall Risk: Nursing Judgement-Fall Risk High(Add Comments): Yes  Fall Risk Interventions  Nursing Judgement-Fall Risk High(Add Comments): Yes  Toilet Every 2 Hours-In Advance of Need: Yes  Hourly Visual Checks: In bed, Awake  Fall Visual Posted: Socks  Room Door Open: Yes  Alarm On: Personal  Patient Moved Closer to Nursing Station: No    Active Consults:   IP CONSULT TO PHARMACY  IP CONSULT TO GENERAL SURGERY  IP CONSULT TO 
End of Shift Note    Bedside shift change report given to Kayce MORRELL (oncoming nurse) by Ro Rowe RN (offgoing nurse).  Report included the following information SBAR, Kardex, and MAR    Shift worked:  7am-1900     Shift summary and any significant changes:     Pt complained of BL hand  pain and not able to greep. Provider notified and labs drawn. Did not attemp to get pt out of the bed as pt was having difficulty greeping from both hand. Wound care done.           Ro Rowe, RN                            
End of Shift Note    Bedside shift change report given to Kimberly MORRELL (oncoming nurse) by Ailyn Talley RN (offgoing nurse).  Report included the following information SBAR, Kardex, and MAR    Shift worked:  6073-1235     Shift summary and any significant changes:     None     Concerns for physician to address:  Admission from ER, come in the unit empty bags of antibiotics and IV fluid connected to patient, wounds are opened dressing weeping wound,Ct done,informed NP about the patient needs for meds entry and diet, all needs attended           Activity:     Number times ambulated in hallways past shift: 1  Number of times OOB to chair past shift: 3    Cardiac:   Cardiac Monitoring: Yes      Cardiac Rhythm: AV paced    Access:  Current line(s): PIV     Genitourinary:        Respiratory:   O2 Device: None (Room air)  Chronic home O2 use?: NO  Incentive spirometer at bedside: NO    GI:  Last BM (including prior to admit): 02/08/25  Current diet:  ADULT DIET; Regular; 4 carb choices (60 gm/meal); Low Fat/Low Chol/High Fiber/АЛЕКСАНДР  Passing flatus: YES    Pain Management:   Patient states pain is manageable on current regimen: YES    Skin:  Adams Scale Score: 20  Interventions:      Patient Safety:  Fall Risk:    Fall Risk Interventions  Toilet Every 2 Hours-In Advance of Need: Yes  Hourly Visual Checks: Awake, In bed  Fall Visual Posted: Armband, Socks  Room Door Open: Yes  Alarm On: Personal  Patient Moved Closer to Nursing Station: No    Active Consults:   IP CONSULT TO HOSPITALIST  IP CONSULT TO PHARMACY  IP CONSULT TO GENERAL SURGERY    Length of Stay:  Expected LOS: 5  Actual LOS: 0    Ailyn Talley RN                           
End of Shift Note    Bedside shift change report given to PETROS Starr (oncoming nurse) by SHEREEN BLANDON RN (offgoing nurse).  Report included the following information SBAR, Kardex, and MAR    Shift worked:  7p-7a     Shift summary and any significant changes:     AAOx4, VSS, no complaint of pain, safety rounding completed      Concerns for physician to address:  no     Zone phone for oncoming shift:   none       Activity:  Level of Assistance: Moderate assist, patient does 50-74%    Cardiac:   Cardiac Monitoring:  no    Access:  Current line(s): PIV     Genitourinary:   Urinary Status: Voiding    Respiratory:   O2 Device: None (Room air)    GI:  Current diet: ADULT DIET; Regular; 5 carb choices (75 gm/meal)    Pain Management:   Patient states pain is manageable on current regimen: N/A    Skin:  Adams Scale Score: 18  Interventions: Wound Offloading (Prevention Methods): Pillows  Pressure injury: no    Patient Safety:  Fall Score: Cline Total Score: 45  Fall Risk Interventions  Nursing Judgement-Fall Risk High(Add Comments): Yes  Toilet Every 2 Hours-In Advance of Need: Yes  Hourly Visual Checks: Awake, In bed  Fall Visual Posted: Armband  Room Door Open: Yes  Alarm On: Bed  Patient Moved Closer to Nursing Station: No    Active Consults:  IP CONSULT TO PHARMACY  IP CONSULT TO PODIATRY  IP CONSULT TO INFECTIOUS DISEASES  IP CONSULT TO PHARMACY  IP CONSULT TO VASCULAR SURGERY    Length of Stay:  Expected LOS: 7  Actual LOS: 5      SHEREEN BLANDON RN    
End of Shift Note    Bedside shift change report given to PETROS Yung (oncoming nurse) by Elizabeth Duque RN (offgoing nurse).  Report included the following information SBAR, Kardex, Intake/Output, MAR, and Recent Results    Shift worked:  7p-7a     Shift summary and any significant changes:     Patient tolerated care. Scheduled medication given per MAR. Patient was able to rest over shift. VSS. Pt had no complaint of pain. Caring rounds completed. Lab work drawn.     Concerns for physician to address:  none     Zone phone for oncoming shift:   [phone]       Activity:  Level of Assistance: Moderate assist, patient does 50-74%  Number times ambulated in hallways past shift: 0  Number of times OOB to chair past shift: 1    Cardiac:   Cardiac Monitoring: No      Cardiac Rhythm: AV paced    Access:  Current line(s): PIV     Genitourinary:   Urinary Status: Voiding    Respiratory:   O2 Device: None (Room air)  Chronic home O2 use?: NO  Incentive spirometer at bedside: NO    GI:  Last BM (including prior to admit): 02/17/25  Current diet:  ADULT DIET; Regular; 5 carb choices (75 gm/meal)  ADULT ORAL NUTRITION SUPPLEMENT; Breakfast, Dinner; Wound Healing Oral Supplement  ADULT ORAL NUTRITION SUPPLEMENT; Breakfast, Lunch; Standard High Calorie/High Protein Oral Supplement  Passing flatus: YES    Pain Management:   Patient states pain is manageable on current regimen: YES    Skin:  Adams Scale Score: 18  Interventions: Wound Offloading (Prevention Methods): Pillows, Repositioning, Turning    Patient Safety:  Fall Risk: Nursing Judgement-Fall Risk High(Add Comments): Yes  Fall Risk Interventions  Nursing Judgement-Fall Risk High(Add Comments): Yes  Toilet Every 2 Hours-In Advance of Need: Yes  Hourly Visual Checks: Awake, In chair  Fall Visual Posted: Socks, Fall sign posted  Room Door Open: Deferred to promote rest  Alarm On: Bed  Patient Moved Closer to Nursing Station: No    Active Consults:   IP CONSULT TO PHARMACY  IP 
End of Shift Note    Bedside shift change report given to PETROS Zamora (oncoming nurse) by SHEREEN BLANDON RN (offgoing nurse).  Report included the following information SBAR, Kardex, and MAR    Shift worked:  7p-7a     Shift summary and any significant changes:     AAOx4, VSS, no complaint of pain, safety rounding completed.     Concerns for physician to address:  no     Zone phone for oncoming shift:   none       Activity:  Level of Assistance: Moderate assist, patient does 50-74%    Cardiac:   Cardiac Monitoring:  no    Access:  Current line(s): PIV     Genitourinary:   Urinary Status: Voiding    Respiratory:   O2 Device: None (Room air)    GI:  Current diet: ADULT DIET; Regular; 5 carb choices (75 gm/meal)    Pain Management:   Patient states pain is manageable on current regimen: N/A    Skin:  Adams Scale Score: 19  Interventions: Wound Offloading (Prevention Methods): Pillows, Repositioning  Pressure injury: no    Patient Safety:  Fall Score: Cline Total Score: 45  Fall Risk Interventions  Nursing Judgement-Fall Risk High(Add Comments): Yes  Toilet Every 2 Hours-In Advance of Need: Yes  Hourly Visual Checks: Awake, In bed  Fall Visual Posted: Armband, Fall sign posted  Room Door Open: Yes  Alarm On: Bed  Patient Moved Closer to Nursing Station: No    Active Consults:  IP CONSULT TO PHARMACY  IP CONSULT TO PODIATRY  IP CONSULT TO INFECTIOUS DISEASES  IP CONSULT TO PHARMACY  IP CONSULT TO VASCULAR SURGERY    Length of Stay:  Expected LOS: 7  Actual LOS: 6      SHEREEN BLANDON RN    
Foot and Ankle Progress Note    Admit Date: 2/10/2025  Hospital day 4    Subjective:     Patient post op I&D abscess left foot with bone biopsy 5th left metatarsal.  No complaints of pain     No Known Allergies     History:     History reviewed. No pertinent family history.   Past Medical History:   Diagnosis Date    Allergic rhinitis 9/20/2017    Arthritis     ASCVD (arteriosclerotic cardiovascular disease) 9/20/2017    Story: Old ASMI by EKG    Back pain 9/20/2017    BPH (benign prostatic hyperplasia) 9/20/2017    CHF (congestive heart failure) ([facility]) 9/20/2017    CKD (chronic kidney disease) 9/20/2017    COVID-19 11/2021    Diabetic acetonemia (HCC)     DM (diabetes mellitus) ([facility]) 9/20/2017    Story: Diet Controlled    ED (erectile dysfunction) 9/20/2017    Edema 9/20/2017    Elevated CPK 9/20/2017    Comments: History of    Elevated LFTs 9/20/2017    Comments: History of    Elevated PSA 9/20/2017    Hypercholesteremia     Hyperlipidemia 9/20/2017    Hypertension     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    Nodule of right lung 9/20/2017    Story: Right    Polymyalgia (HCC) 9/20/2017    Prostate enlargement     Substance abuse ([facility])       Social History     Substance and Sexual Activity   Alcohol Use No      Social History     Substance and Sexual Activity   Drug Use No      Past Surgical History:   Procedure Laterality Date    ARTHROCENTESIS  7/13/2023    CARDIAC PROCEDURE N/A 7/7/2023    Insert temporary pacemaker performed by Dominic Peace MD at [facility] CARDIAC CATH LAB    CENTRAL LINE  7/4/2023    EP DEVICE PROCEDURE N/A 7/13/2023    Insert or replace transcath PPM leadless performed by Jewel Call MD at [facility] CARDIAC CATH LAB    FOOT DEBRIDEMENT Left 2/13/2025    LEFT FOOT ABSCESS  INCISION AND DRAINAGE and BONE BIOPSY performed by Radha Carrasco DPM at [facility] MAIN OR    HEENT  06/12/2018    Dr. Menezes, surgery to remove cancerous tissue from Left cheek    MALIGNANT SKIN LESION EXCISION  09/2018    2 
Infectious Disease Progress        Impression    Diabetic left foot infection  Abscess on plantar aspect of foot, small ulcer on lateral foot  Concern for necrotizing soft tissue infection  CT of left foot 2/10+ for diffuse soft tissue swelling concerning for cellulitis.  Gas in the medial soft tissues concerning for necrotizing soft tissue infection.  Ulceration in the lateral proximal forefoot.  No CT evidence of abscess, acute OM  X-ray 2/10+ for findings concerning for necrotizing soft tissue infection, without  Radiographic evidence of acute osteomyelitis.  S/p doxycycline p.o. PTA  Wound culture 2/11+ light mixed anaerobic GNR, MSF  Blood cultures 2/10-no growth       S/p I&D of abscess x 2, left foot and bone biopsy 5th MT  Wound culture 2/13 + for few E. coli, alpha strep, light Bacteroides  Thetaiotaomicron beta-lactamase positive.  Wound exam done, wounds are clean, no drainage  Minimal soft tissue soft tissue erythema and edema +  (See photos below)  Bone biopsy+ osteomyelitis.     Diabetes type 2  Poorly controlled  Diabetic neuropathy  A1c 7.5    CKD 3  Cr 1.27    Diastolic CHF  Stable      Paroxysmal atrial fibrillation  On digoxin, Coumadin  Pacemaker present.    S/p evaluation by vascular service  For circulatory adequacy    HLD  Continue statin    H/o polymyalgia  rheumatica    H/o admission 10/2023  Treated for cellulitis and infected wounds of both feet  Left hallux, right second toe.  Outpatient wound cultures positive for Klebsiella  aerogenes & MSSA.  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  IntraOp culture+ for  MSSA, MSF   Pathology was+ for acute OM with clear margins  Patient was treated with cefepime IV x 2 weeks end date 10/14/2023      Plan    Continue Unasyn IV  Plan is for 6 weeks ( 2/13- 3/27)  Adequate fluids, daily probiotic  Await pathology/bone biopsy  Patient will require aggressive wound care, adequate nutrition, pressure offloading  For  wound healing. D/w pt.    Antimicrobial 
Occupational Therapy   Chart reviewed; cleared for tx; currently with wound care; will retry later as able. Kelli Lorenzo OTR/L  
PCP hospital follow-up transitional care appointment has been scheduled with Dr. Willem Powell on 2/21/25 6110. [facility] information on AVS for patient resource. Pending patient discharge.     
Pharmacist Daily Dosing of Warfarin    Indication & Goal INR: AFib, INR Goal 2-3    PTA Warfarin Dose: 5 MG ON SUN, TUES, THUR; ONE AND A HALF TABLETS ALL OTHER DAYS     Notable concurrent conditions and medications: None    Labs:  Recent Labs     Units 02/10/25  1257 02/10/25  1145   INR   3.0*  --    HGB g/dL  --  10.2*   PLT K/uL  --  154         Impression/Plan:   Hold Warfarin dose tonight 2/10.  Daily INR has been ordered  CBC w/o differential every other day has been ordered     Pharmacy will follow daily and adjust the dose as appropriate.    Thank you,  Antione Rodriguez, [facility]      Warfarin Protocol    Located on pharmacy Teams site: Clinical Practice -> Anticoagulation & Cardiology -> Anticoagulation Policies, Protocols, Guidance     
Pharmacist Daily Dosing of Warfarin    Indication & Goal INR: AFib, INR Goal 2-3    PTA Warfarin Dose: 5 MG ON SUN, TUES, THUR; ONE AND A HALF TABLETS ALL OTHER DAYS     Notable concurrent conditions and medications: None    Labs:  Recent Labs     Units 02/11/25  0324 02/10/25  1257 02/10/25  1145   INR   3.1* 3.0*  --    HGB g/dL 9.1*  --  10.2*   PLT K/uL 137*  --  154         Impression/Plan:   Hold Warfarin dose tonight 2/11   Daily INR has been ordered  CBC w/o differential every other day has been ordered     Pharmacy will follow daily and adjust the dose as appropriate.    Thank you,  Katahrine Nava [facility]      Warfarin Protocol    Located on pharmacy Teams site: Clinical Practice -> Anticoagulation & Cardiology -> Anticoagulation Policies, Protocols, Guidance       
Pharmacist Daily Dosing of Warfarin    Indication & Goal INR: AFib, INR Goal 2-3    PTA Warfarin Dose: 5 MG ON SUN, TUES, THUR; ONE AND A HALF TABLETS ALL OTHER DAYS     Notable concurrent conditions and medications: None    Labs:  Recent Labs     Units 02/12/25  0314 02/11/25  0324 02/10/25  1257 02/10/25  1145   INR   2.0* 3.1* 3.0*  --    HGB g/dL  --  9.1*  --  10.2*   PLT K/uL  --  137*  --  154         Impression/Plan:   INR decreased from 3.1 to 2 after holding two doses  Will give warfarin 5mg x 1 this evening   Daily INR has been ordered  CBC w/o differential every other day has been ordered     Pharmacy will follow daily and adjust the dose as appropriate.    Thank you,  Katharine Nava [facility]      Warfarin Protocol    Located on pharmacy Teams site: Clinical Practice -> Anticoagulation & Cardiology -> Anticoagulation Policies, Protocols, Guidance         
Pharmacist Daily Dosing of Warfarin    Indication & Goal INR: AFib, INR Goal 2-3    PTA Warfarin Dose: 5 MG ON SUN, TUES, THUR; ONE AND A HALF TABLETS ALL OTHER DAYS     Notable concurrent conditions and medications: None    Labs:  Recent Labs     Units 02/13/25  0245 02/12/25  0314 02/11/25  0324   INR   1.6* 2.0* 3.1*   HGB g/dL 9.6*  --  9.1*   PLT K/uL 153  --  137*         Impression/Plan:   INR decreased from 3.1 to 2 after holding two doses  INR down to 1.6 today  Tolerated surgery well. Dr. Carrasco placed an order for Warfarin 5mg this evening  Will give warfarin 5mg x 1 this evening   Daily INR has been ordered  CBC w/o differential every other day has been ordered     Pharmacy will follow daily and adjust the dose as appropriate.    Thank you,  KLAUS MANNING, [facility]      
Pharmacist Daily Dosing of Warfarin    Indication & Goal INR: AFib, INR Goal 2-3    PTA Warfarin Dose: 5 MG ON SUN, TUES, THUR; ONE AND A HALF TABLETS ALL OTHER DAYS     Notable concurrent conditions and medications: None    Labs:  Recent Labs     Units 02/14/25  0410 02/13/25  0245 02/12/25  0314   INR   1.5* 1.6* 2.0*   HGB g/dL  --  9.6*  --    PLT K/uL  --  153  --          Impression/Plan:   INR now down to 1.5 after procedure.     Will give warfarin 7.5 mg today   Daily INR has been ordered  CBC w/o differential every other day has been ordered     Pharmacy will follow daily and adjust the dose as appropriate.    Thank you,  NAHED WASHINGTON RPH        
Pharmacist Daily Dosing of Warfarin    Indication & Goal INR: AFib, INR Goal 2-3    PTA Warfarin Dose: 5 MG ON SUN, TUES, THUR; ONE AND A HALF TABLETS ALL OTHER DAYS     Notable concurrent conditions and medications: None    Labs:  Recent Labs     Units 02/15/25  0341 02/14/25  0750 02/14/25  0410 02/13/25  0245   INR   1.7*  --  1.5* 1.6*   HGB g/dL  --  10.3*  --  9.6*   PLT K/uL  --  167  --  153         Impression/Plan:   INR now down to 1.5 after procedure.     Will give warfarin 7.5 mg today   Daily INR has been ordered  CBC w/o differential every other day has been ordered     Pharmacy will follow daily and adjust the dose as appropriate.    Thank you,  Domingo Mueller RPH          
Pharmacist Daily Dosing of Warfarin    Indication & Goal INR: AFib, INR Goal 2-3    PTA Warfarin Dose: 5 MG ON SUN, TUES, THUR; ONE AND A HALF TABLETS ALL OTHER DAYS     Notable concurrent conditions and medications: None    Labs:  Recent Labs     Units 02/16/25  0341 02/15/25  0341 02/14/25  0750 02/14/25  0410   INR   1.9* 1.7*  --  1.5*   HGB g/dL  --   --  10.3*  --    PLT K/uL  --   --  167  --          Impression/Plan:   INR now down to 1.5 after procedure.     Will give warfarin 6 mg today   Daily INR has been ordered  CBC w/o differential every other day has been ordered     Pharmacy will follow daily and adjust the dose as appropriate.    Thank you,  Domingo Mueller RPH      
Pharmacist Daily Dosing of Warfarin    Indication & Goal INR: AFib, INR Goal 2-3    PTA Warfarin Dose: 5 MG ON SUN, TUES, THUR; ONE AND A HALF TABLETS ALL OTHER DAYS     Notable concurrent conditions and medications: None    Labs:  Recent Labs     Units 02/17/25  0409 02/16/25  0341 02/15/25  0341   INR   2.6* 1.9* 1.7*   HGB g/dL 10.3*  --   --    PLT K/uL 252  --   --          Impression/Plan:   INR up 0.7 today     Will give warfarin 3 mg today   Daily INR has been ordered  CBC w/o differential every other day has been ordered     Pharmacy will follow daily and adjust the dose as appropriate.    Thank you,  KLAUS MANNING, [facility]        
Pharmacist Daily Dosing of Warfarin    Indication & Goal INR: AFib, INR Goal 2-3    PTA Warfarin Dose: 5 MG ON SUN, TUES, THUR; ONE AND A HALF TABLETS ALL OTHER DAYS     Notable concurrent conditions and medications: None    Labs:  Recent Labs     Units 02/18/25  0357 02/17/25  0409 02/16/25  0341   INR   2.5* 2.6* 1.9*   HGB g/dL  --  10.3*  --    PLT K/uL  --  252  --          Impression/Plan:   INR 2.5     Will give warfarin 5 mg today   Daily INR has been ordered  CBC w/o differential every other day has been ordered     Pharmacy will follow daily and adjust the dose as appropriate.    Thank you,  KLAUS MANNING, [facility]          
Pharmacy Antimicrobial Kinetic Dosing    Indication for Antimicrobials: ssti     Current Regimen of Each Antimicrobial:  Zosyn 3.375 gm iv every 8 hr; Start Date 2/10; Day # 2  Vancomycin 2500 mg iv once then dose by level Start Date 2/10; Day # 2    Previous Antimicrobial Therapy:       Goal Level: Vancomycin random level < 20     Date Dose & Interval Measured (mcg/mL) Predicted AUC 24-48 Predicted AUC 24,ss     2500mg x 1  >50                      Significant Cultures:   2/10 blood - ng     Labs:  Recent Labs     Units 25  0324 02/10/25  1145   CREATININE MG/DL 1.76* 2.20*   BUN MG/DL 51* 52*   WBC K/uL 9.2 12.6*   BANDS %  --  1     Temp (24hrs), Av.8 °F (36.6 °C), Min:97.5 °F (36.4 °C), Max:98.2 °F (36.8 °C)      Conditions for Dosing Consideration:     Creatinine Clearance (mL/min): Estimated Creatinine Clearance: 33 mL/min (A) (based on SCr of 1.76 mg/dL (H)).       Impression/Plan:   Vancomycin dose by level  Renal function improving  Random level today = >50. Unsure if lab error or correct, but will hold on further dosing and recheck level tomorrow morning   Antimicrobial stop date 5 days     Pharmacy will follow daily and adjust medications as appropriate for renal function and/or serum levels.    Thank you,  Katharine Nava [facility]      
Pharmacy Antimicrobial Kinetic Dosing    Indication for Antimicrobials: ssti / L foot wound     Current Regimen of Each Antimicrobial:  Zosyn 3.375 gm iv every 8 hr; Start Date /10; Day # 7  Vancomycin 2500 mg iv once then dose by level Start Date 2/10; Day # 7  Clinda 600 mg IV TID  -  day 5  Previous Antimicrobial Therapy:     Goal Level: Vancomycin random level < 20     Date Dose & Interval Measured (mcg/mL) Predicted AUC 24-48 Predicted AUC 24,ss     2500mg x 1  >50         6.7      1500 mg x 1  10.9  449     Significant Cultures:    anerobic - gnr - pending  All other cultures pending    Labs:  Recent Labs     Units 25  0341 02/15/25  0341 25  0750 25  0410   CREATININE MG/DL 1.37* 1.31*  --  1.24   BUN MG/DL 18 19  --  21*   WBC K/uL  --   --  10.8  --    BANDS %  --   --  1  --      Temp (24hrs), Av.7 °F (37.1 °C), Min:98.6 °F (37 °C), Max:98.8 °F (37.1 °C)      Conditions for Dosing Consideration:     Creatinine Clearance (mL/min): Estimated Creatinine Clearance: 42 mL/min (A) (based on SCr of 1.37 mg/dL (H)).       Impression/Plan:   Renal function improved  With improvement in renal function will continue vancomycin 1750 mg IV q36h for expected AUC ~427-454 and monitor closely  Bmp through   I&D 2/15; bone biopsy  Removed stop date on zosyn for now per ID  Antimicrobial stop date pending for clinda/vanc: ID following     Pharmacy will follow daily and adjust medications as appropriate for renal function and/or serum levels.    Thank you,  Domingo Mueller RPH          
Pharmacy Antimicrobial Kinetic Dosing    Indication for Antimicrobials: ssti / L foot wound     Current Regimen of Each Antimicrobial:  Zosyn 3.375 gm iv every 8 hr; Start Date 2/10; Day # 3  Vancomycin 2500 mg iv once then dose by level Start Date 2/10; Day # 3    Previous Antimicrobial Therapy:     Goal Level: Vancomycin random level < 20     Date Dose & Interval Measured (mcg/mL) Predicted AUC 24-48 Predicted AUC 24,ss     2500mg x 1  >50         6.7              Significant Cultures:   2/10 blood - ng    foot wound - pending    Labs:  Recent Labs     Units 25  0314 25  0324 02/10/25  1145   CREATININE MG/DL 1.49* 1.76* 2.20*   BUN MG/DL 33* 51* 52*   WBC K/uL  --  9.2 12.6*   BANDS %  --   --  1     Temp (24hrs), Av.1 °F (37.3 °C), Min:98.6 °F (37 °C), Max:99.7 °F (37.6 °C)      Conditions for Dosing Consideration:     Creatinine Clearance (mL/min): Estimated Creatinine Clearance: 39 mL/min (A) (based on SCr of 1.49 mg/dL (H)).       Impression/Plan:   Vancomycin dose by level  Renal function improving  Random level today = 6.7. >50 result yesterday was likely lab error. Redose with vancomycin 1500mg x 1   Random level with am labs tomorrow   Antimicrobial stop date 5 days     Pharmacy will follow daily and adjust medications as appropriate for renal function and/or serum levels.    Thank you,  Katharine Nava [facility]        
Pharmacy Antimicrobial Kinetic Dosing    Indication for Antimicrobials: ssti / L foot wound     Current Regimen of Each Antimicrobial:  Zosyn 3.375 gm iv every 8 hr; Start Date 2/10; Day # 4  Vancomycin 2500 mg iv once then dose by level Start Date 2/10; Day # 4  Clinda 600 mg IV TID  -   Previous Antimicrobial Therapy:     Goal Level: Vancomycin random level < 20     Date Dose & Interval Measured (mcg/mL) Predicted AUC 24-48 Predicted AUC 24,ss     2500mg x 1  >50         6.7      1500 mg x 1  10.9       Significant Cultures:   All cultures pending    Labs:  Recent Labs     Units 25  0245 25  0314 25  0324 02/10/25  1145   CREATININE MG/DL 1.35* 1.49* 1.76* 2.20*   BUN MG/DL 25* 33* 51* 52*   WBC K/uL 10.0  --  9.2 12.6*   BANDS % 1  --   --  1     Temp (24hrs), Av.8 °F (37.1 °C), Min:98.4 °F (36.9 °C), Max:99.1 °F (37.3 °C)      Conditions for Dosing Consideration:     Creatinine Clearance (mL/min): Estimated Creatinine Clearance: 43 mL/min (A) (based on SCr of 1.35 mg/dL (H)).       Impression/Plan:   Renal function improved; baseline scr ~1.56  Random level 10.9; Will start vancomycin 1750 mg IV q36h for expected AUC ~449 and monitor closely  Bmp through 2/15  Antimicrobial stop date pending for clinda/vanc: ID following     Pharmacy will follow daily and adjust medications as appropriate for renal function and/or serum levels.    Thank you,  NAHED WASHINGTON [facility]          
Pharmacy Antimicrobial Kinetic Dosing    Indication for Antimicrobials: ssti / L foot wound     Current Regimen of Each Antimicrobial:  Zosyn 3.375 gm iv every 8 hr; Start Date 2/10; Day # 5  Vancomycin 2500 mg iv once then dose by level Start Date 2/10; Day # 5  Clinda 600 mg IV TID  - 3  Previous Antimicrobial Therapy:     Goal Level: Vancomycin random level < 20     Date Dose & Interval Measured (mcg/mL) Predicted AUC 24-48 Predicted AUC 24,ss     2500mg x 1  >50         6.7      1500 mg x 1  10.9  449     Significant Cultures:    anerobic - gnr - pending  All other cultures pending    Labs:  Recent Labs     Units 25  0750 25  0410 25  0245 25  0314   CREATININE MG/DL  --  1.24 1.35* 1.49*   BUN MG/DL  --  21* 25* 33*   WBC K/uL 10.8  --  10.0  --    BANDS %  --   --  1  --      Temp (24hrs), Av.4 °F (36.9 °C), Min:98 °F (36.7 °C), Max:99.1 °F (37.3 °C)      Conditions for Dosing Consideration:     Creatinine Clearance (mL/min): Estimated Creatinine Clearance: 46 mL/min (based on SCr of 1.24 mg/dL).       Impression/Plan:   Renal function improved  With improvement in renal function will continue vancomycin 1750 mg IV q36h for expected AUC ~427-454 and monitor closely  Bmp through 2/15  I&D 2/15; bone biopsy  Removed stop date on zosyn for now per ID  Antimicrobial stop date pending for clinda/vanc: ID following     Pharmacy will follow daily and adjust medications as appropriate for renal function and/or serum levels.    Thank you,  NAHED WASHINGTON [facility]              
Pharmacy Antimicrobial Kinetic Dosing    Indication for Antimicrobials: ssti / L foot wound     Current Regimen of Each Antimicrobial:  Zosyn 3.375 gm iv every 8 hr; Start Date 2/10; Day # 6  Vancomycin 2500 mg iv once then dose by level Start Date 2/10; Day # 6  Clinda 600 mg IV TID  -  day 4  Previous Antimicrobial Therapy:     Goal Level: Vancomycin random level < 20     Date Dose & Interval Measured (mcg/mL) Predicted AUC 24-48 Predicted AUC 24,ss     2500mg x 1  >50         6.7      1500 mg x 1  10.9  449     Significant Cultures:    anerobic - gnr - pending  All other cultures pending    Labs:  Recent Labs     Units 02/15/25  0341 25  0750 25  0410 25  0245   CREATININE MG/DL 1.31*  --  1.24 1.35*   BUN MG/DL 19  --  21* 25*   WBC K/uL  --  10.8  --  10.0   BANDS %  --  1  --  1     Temp (24hrs), Av.3 °F (36.8 °C), Min:98.1 °F (36.7 °C), Max:98.4 °F (36.9 °C)      Conditions for Dosing Consideration:     Creatinine Clearance (mL/min): Estimated Creatinine Clearance: 44 mL/min (A) (based on SCr of 1.31 mg/dL (H)).       Impression/Plan:   Renal function improved  With improvement in renal function will continue vancomycin 1750 mg IV q36h for expected AUC ~427-454 and monitor closely  Bmp through   I&D 2/15; bone biopsy  Removed stop date on zosyn for now per ID  Antimicrobial stop date pending for clinda/vanc: ID following     Pharmacy will follow daily and adjust medications as appropriate for renal function and/or serum levels.    Thank you,  Domingo Mueller RPH        
Post excision and drainage abscess left foot.  Tolerated procedure well    PLEASE SEE WOUND CARE ORDERS THAT ARE TO START 2/14/25  
Reviewed all labs and imaging studies   Plan to see this patient later today  
Spoke with patient and family .  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  Discussed risks, benefits and expected outcome.  All parties understand there is no guarantee that IV antibiotic therapy may or maynot cure the osteomyelitis. All parites understand that if the wound fails to thrive after 6 weeks the next best option is to have bone resected from the 5th metatarsal     From a podiatry standpoint this patient can be discharged pending Infectious Diease managing at home IV antibiotics     Case management orders have been placed.    Patient is to follow with me at in  week at the [facility].  
      PHYSICAL EXAM LOWER EXTREMITIES:  Palpable pedal pulses   Diminished epicritic sensation   Left foot mild warm , red and swollen  Necrotic granular wound lateral base 5th left met; pustular wound plantar central left foot.  Min pain    WBC: 9.2    CT SCANleft foot    No discernable evidence of abscess nor of osteomyelitis.    Soft tissue swelling concening for cellulitis        Assessment:     Principal Problem:    Diabetic infection of left foot (HCC)  Resolved Problems:    * No resolved hospital problems. *  Cellulitis left foot   Diabetic infection left foot    Plan:   Discuss dx and pan with patient and son( via phone).  Scheduled for I&D abscess left foot tomorrow at 5pm.  To hold coumadin   Dressing changes performed  Face to face 60 minutes                                                                                        
- weakness    Comments  Pleasant 89 years old male laying in bed in no apparent distress     Patient Vitals for the past 24 hrs:   BP   02/11/25 0759 120/72   02/11/25 0420 114/82   02/10/25 2003 137/77   02/10/25 1721 132/75   02/10/25 1517 124/80   02/10/25 1502 118/81   02/10/25 1457 122/81   02/10/25 1345 109/65   02/10/25 1330 (!) 112/90   02/10/25 1313 120/74   02/10/25 1258 117/72   02/10/25 1243 115/78   02/10/25 1228 117/70   02/10/25 1215 110/69   02/10/25 1127 (!) 94/39      Patient Vitals for the past 24 hrs:   Pulse   02/11/25 0759 99   02/11/25 0420 86   02/10/25 2003 71   02/10/25 1721 (!) 101   02/10/25 1517 84   02/10/25 1502 77   02/10/25 1457 81   02/10/25 1345 83   02/10/25 1330 95   02/10/25 1313 83   02/10/25 1258 87   02/10/25 1228 89   02/10/25 1215 90   02/10/25 1127 (!) 101      Patient Vitals for the past 24 hrs:   Resp   02/11/25 0759 18   02/10/25 2003 18   02/10/25 1721 18   02/10/25 1517 18   02/10/25 1502 17   02/10/25 1457 18   02/10/25 1345 18   02/10/25 1330 19   02/10/25 1313 19   02/10/25 1258 20   02/10/25 1243 17   02/10/25 1228 19   02/10/25 1215 18   02/10/25 1127 15      Patient Vitals for the past 24 hrs:   Temp   02/11/25 0759 98.1 °F (36.7 °C)   02/11/25 0420 97.5 °F (36.4 °C)   02/10/25 2003 97.5 °F (36.4 °C)   02/10/25 1721 98.2 °F (36.8 °C)   02/10/25 1457 97.7 °F (36.5 °C)   02/10/25 1127 97.2 °F (36.2 °C)        SpO2 Readings from Last 6 Encounters:   02/11/25 96%   01/14/25 95%   12/12/24 95%   11/25/24 97%   11/14/24 96%   10/14/24 94%            Body mass index is 25.13 kg/m². -  Wt Readings from Last 10 Encounters:   02/10/25 91.2 kg (201 lb 1 oz)   01/14/25 89.1 kg (196 lb 8 oz)   12/12/24 90.5 kg (199 lb 9.6 oz)   11/25/24 89 kg (196 lb 1.6 oz)   11/14/24 85.9 kg (189 lb 4.8 oz)   10/14/24 92.3 kg (203 lb 6.4 oz)   09/12/24 90.1 kg (198 lb 9.6 oz)   08/02/24 91.2 kg (201 lb)   07/08/24 92.4 kg (203 lb 12.8 oz)   06/06/24 91.6 kg (202 lb)      I/O last 3 
LEE ANN PLUS strip   No No   Sig: USE TO TEST BLOOD SUGAR TWICE DAILY   Cholecalciferol 400 UNIT TABS tablet 2/10/2025  Yes Yes   Sig: Take 1 tablet by mouth at bedtime   Docusate Sodium (STOOL SOFTENER LAXATIVE PO) 2/10/2025  Yes Yes   Sig: Take 100 mg by mouth daily Takes 2 each evening   Glucosamine 500 MG CAPS 2/10/2025  Yes Yes   Si capsule with meals Orally Three times a day   Glucosamine-Chondroitin--400-167 MG TABS   Yes No   Sig: Take 1 tablet by mouth daily   JANUVIA 100 MG tablet 2/10/2025  No Yes   Sig: TAKE 1 TABLET BY MOUTH EVERY DAY   Lactobacillus (PROBIOTIC ACIDOPHILUS) CAPS 2/10/2025  No Yes   Sig: Take 1 daily   Lactobacillus Acid-Pectin (ACIDOPHILUS/CITRUS PECTIN) TABS 2/10/2025  No Yes   Sig: TAKE 1 TABLET BY MOUTH EVERY DAY   Multiple Vitamins-Minerals (CENTRUM SILVER ULTRA MENS PO) 2/10/2025  Yes Yes   Sig: Take 1 tablet by mouth daily   Omega-3 Fatty Acids (FISH OIL) 1200 MG CAPS 2/10/2025  Yes Yes   Sig: Take 2,400 mg by mouth in the morning and at bedtime Taking 1200 mg   acetaminophen (TYLENOL) 500 MG tablet 2/10/2025  Yes Yes   Sig: Take 2 tablets by mouth in the morning and at bedtime   balsum peru-castor oil (VENELEX) OINT ointment 2/10/2025  No Yes   Sig: Apply topically 2 times daily   bumetanide (BUMEX) 2 MG tablet   No No   Sig: TAKE 1 TABLET BY MOUTH TWICE A DAY   diclofenac (VOLTAREN) 75 MG EC tablet 2/10/2025  No Yes   Sig: TAKE 1 TABLET BY MOUTH TWICE A DAY AS NEEDED FOR PAIN   digoxin (LANOXIN) 125 MCG tablet 2/10/2025  No Yes   Sig: TAKE 1 TABLET BY MOUTH EVERY DAY   doxycycline hyclate (VIBRA-TABS) 100 MG tablet 2025  Yes Yes   Sig: Take 1 tablet by mouth 2 times daily   glimepiride (AMARYL) 4 MG tablet 2/10/2025  No Yes   Sig: TAKE 1 TABLET BY MOUTH EVERY DAY BEFORE BREAKFAST   glucosamine-chondroitin 500-400 MG CAPS   Yes No   Sig: Take 1 capsule by mouth daily   metFORMIN (GLUCOPHAGE) 500 MG tablet 2/10/2025  No Yes   Sig: TAKE 1 TABLET BY MOUTH TWICE A DAY IN 
   warfarin placeholder: dosing by pharmacy   Oral RX Placeholder     Continuous Infusions:   dextrose       PRN Meds:.acetaminophen, glucose, dextrose bolus **OR** dextrose bolus, glucagon (rDNA), dextrose       Relevant other information:   Results       Procedure Component Value Units Date/Time    Culture, Anaerobic [[id number]]  (Abnormal) Collected: 02/11/25 1803    Order Status: Completed Specimen: Foot Updated: 02/13/25 1618     Special Requests NO SPECIAL REQUESTS        Culture       LIGHT MIXED ANAEROBIC GRAM NEGATIVE RODS          Culture, Wound (with Gram Stain) [[id number]] Collected: 02/11/25 1803    Order Status: Completed Specimen: Foot Updated: 02/13/25 1443     Special Requests NO SPECIAL REQUESTS        Gram Stain OCCASIONAL WBCS SEEN               FEW Gram positive cocci IN PAIRS AND CHAINS           Culture       HEAVY Mixed skin jeny including gram negative rods          Culture, Anaerobic [[id number]]     Order Status: Canceled Specimen: Foot     Culture, Wound (with Gram Stain) [[id number]]     Order Status: Canceled Specimen: Foot     Culture, Blood 2 [[id number]] Collected: 02/10/25 1257    Order Status: Completed Specimen: Blood Updated: 02/13/25 1040     Special Requests --        NO SPECIAL REQUESTS  LEFT  FOREARM       Culture NO GROWTH 3 DAYS       Culture, Blood 1 [[id number]] Collected: 02/10/25 1154    Order Status: Completed Specimen: Blood Updated: 02/13/25 1040     Special Requests --        LEFT  ARM       Culture NO GROWTH 3 DAYS       COVID-19 & Influenza Combo [[id number]] Collected: 02/10/25 1145    Order Status: Completed Specimen: Nasopharyngeal Updated: 02/10/25 1243     Source Nasopharyngeal        SARS-CoV-2, PCR Not detected        Comment: Not Detected results do not preclude SARS-CoV-2 infection and should not be used as the sole basis for patient management decisions.Results must be combined with clinical observations, patient history, and epidemiological 
detected        Rapid Influenza B By PCR Not detected        Comment:    Testing was performed using hiro Radha SARS-CoV-2 and Influenza A/B nucleic acid assay.  This test is a multiplex Real-Time Reverse Transcriptase Polymerase Chain Reaction (RT-PCR) based in vitro diagnostic test intended for the qualitative detection of nucleic acids from SARS-CoV-2, Influenza A, and Influenza B in nasopharyngeal specimens.         Culture, Wound (with Gram Stain) [[id number]]  (Abnormal) Collected: 01/31/25 1645    Order Status: Completed Specimen: Ulcer Updated: 02/02/25 1620     Special Requests NO SPECIAL REQUESTS        Gram Stain NO WBCS SEEN         2+ Gram negative rods               1+ Gram positive cocci IN CLUSTERS           Culture       HEAVY Mixed skin jeny including gram negative rods                  MODERATE Prevotella species BETA LACTAMASE POSITIVE                  Recent Labs     02/13/25  0245 02/14/25  0750   WBC 10.0 10.8   HGB 9.6* 10.3*   HCT 29.7* 32.2*    167     Recent Labs     02/12/25  0314 02/13/25  0245 02/14/25  0410    138 137   K 4.4 4.1 4.3    107 105   CO2 23 24 27   BUN 33* 25* 21*   INR 2.0* 1.6* 1.5*      Lab Results   Component Value Date/Time    TSH 1.19 06/06/2024 10:23 AM         Other medical conditions are listed in the active hospital problem list section; these and other pertinent data were taken into consideration when the treatment plan was developed and customized to this patient's unique overall circumstances and needs.  We have reviewed relevant medical records within the constraints of this admission process.   High complexity decision making was performed for this patient who is at high risk for decompensation with multiple organ involvement.40   Today total floor/unit time was 40 minutes while caring for this patient and greater than 50% of that time was spent with patient (and/or family/responsible party) coordinating patient's clinical issues; this 
fracture, dislocation, or periosteal reaction. Calcaneal enthesophytes. Joint fluid: No appreciable effusion. Articulations: Degenerative changes. Tendons: No definite full-thickness tendon tear. Muscles: No intramuscular hematoma. No focal atrophy. Soft tissues: Diffuse soft tissue swelling. Gas in the medial foot, again concerning for necrotizing soft tissue infection. Ulceration in the lateral proximal forefoot. No definite mass or fluid collection.     Diffuse soft tissue swelling, concerning for cellulitis. Gas in the medial soft tissues concerning for necrotizing soft tissue infection. Ulceration in the lateral proximal forefoot. No definite CT evidence of abscess or acute osteomyelitis. Electronically signed by Rocketskates    XR FOOT LEFT (MIN 3 VIEWS)    Result Date: 2/10/2025  EXAM: XR FOOT LEFT (MIN 3 VIEWS) INDICATION: foot infection COMPARISON: Left foot radiographs 9/23/2023. TECHNIQUE: Left foot, 3 views FINDINGS: Diffuse osteopenia limits evaluation. No definite aggressive osteolysis to suggest acute osteomyelitis. Degenerative changes. No definite acute fracture or dislocation. Diffuse soft tissue swelling. Gas medially concerning for necrotizing soft tissue infection.     Diffuse osteopenia limits evaluation. Findings concerning for necrotizing soft tissue infection, without radiographic evidence of acute osteomyelitis. Electronically signed by Rocketskates    XR CHEST PORTABLE    Result Date: 2/10/2025  EXAM:  XR CHEST PORTABLE INDICATION: suspected infection COMPARISON: Chest radiograph 12/1/2023 TECHNIQUE: Upright portable chest AP view FINDINGS: Cardiac loop recorder. Left pectoral biventricular pacemaker. Cardiomediastinal silhouette is stably enlarged. Left hemidiaphragm elevation. Subtle streaky atelectasis/airspace disease at the left lung base laterally. No definite pleural effusion or pneumothorax.     Subtle streaky atelectasis/airspace disease at the left lung base laterally.

## 2025-02-18 NOTE — CARE COORDINATION
Pt is clear from CM standpoint for d/c.    Pt's family will transport.      Transition of Care Plan:     RUR: 15%  Prior Level of Functioning: Independent   Disposition: Home with family and home health-[facility]  HELENA: 2/18/25  If SNF or IPR: Date FOC offered:   Date FOC received:   Accepting facility:   Date authorization started with reference number:   Date authorization received and expires:   Follow up appointments: PCP   DME needed: No DME needed   Transportation at discharge: Pt's family will transport   IM/IMM Medicare/ letter given: 2/18/25  Is patient a [place] and connected with [facility]? No              If yes, was  transfer form completed and VA notified? No  Caregiver Contact: Pt's son   Discharge Caregiver contacted prior to discharge? Pt was contacted   Care Conference needed? No  Barriers to discharge: None                02/18/25 1405   Services At/After Discharge   Transition of Care Consult (CM Consult) Home Health;Other  (IV Therapy)   Services At/After Discharge Home Health;IV Therapy   [place] Resource Information Provided? No   Mode of Transport at Discharge Self   Confirm Follow Up Transport Self   Condition of Participation: Discharge Planning   The Plan for Transition of Care is related to the following treatment goals: Goal is to return home with family, follow up appointments and home health   The Patient and/or Patient Representative was provided with a Choice of Provider? Patient;Patient Representative   Name of the Patient Representative who was provided with the Choice of Provider and agrees with the Discharge Plan?  Pt's dtr in law Hortencia Wagner   The Patient and/Or Patient Representative agree with the Discharge Plan? Yes   Freedom of Choice list was provided with basic dialogue that supports the patient's individualized plan of care/goals, treatment preferences, and shares the quality data associated with the providers?  Yes     Glenis López  Case

## 2025-02-18 NOTE — CONSULTS
ID  Patient seen and examined  Full consult to follow  Continue vancomycin and Zosyn  Agree with plan for  addition of clindamycin  D/w Dr Blue.  
Infectious Disease Consult    Date of Consultation:  February 12, 2025  Reason for Consultation: Infected left foot wound, concern for necrotizing infection  Referring Physician: DR Blue  Date of Admission:2/10/2025      Impression    Diabetic left foot infection  Abscess on plantar aspect of foot, small ulcer on lateral foot  Concern for necrotizing soft tissue infection  CT of left foot 2/10+ for diffuse soft tissue swelling concerning for cellulitis.  Gas in the medial soft tissues concerning for necrotizing soft tissue infection.  Ulceration in the lateral proximal forefoot.  No CT evidence of abscess, acute OM  X-ray 2/10+ for findings concerning for necrotizing soft tissue infection, without  Radiographic evidence of acute osteomyelitis.  S/p doxycycline p.o. PTA  Patient scheduled for surgery 2/30.  Blood cultures 2/10-no growth  Wound culture 2/11 + for heavy mixed skin jeny, including GNR, light mixed anaerobic GNR    Diabetes type 2  Poorly controlled  Diabetic neuropathy  A1c 7.5    CKD 3  Cr 1.35    Diastolic CHF  Stable      Paroxysmal atrial fibrillation  On digoxin, Coumadin    HLD  Continue statin    H/o polymyalgia  rheumatica    H/o admission 10/2023  Treated for cellulitis and infected wounds of both feet  Left hallux, right second toe.  Outpatient wound cultures positive for Klebsiella  aerogenes & MSSA.  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  IntraOp culture+ for  MSSA, MSF   Pathology was+ for acute OM with clear margins  Patient was treated with cefepime IV x 2 weeks end date 10/14/2023      Plan    Continue vancomycin and Zosyn IV  Agree with plan to start clindamycin  Adequate fluids, daily probiotic  Await IntraOp cultures    ID service follow-up    Abx  Vancomycin-2/10  Zosyn 2/10-    Extensive review of chart notes, labs, imaging, cultures done  Additionally review of done: Recent reports-Labs, cultures, imaging  D/w -hospitalist, RN  Baron Wagner Sr. Is seen for left foot wound, 
Vascular Surgery Consult Note  2/14/2025    Subjective:     Baron Wagner Sr. is a 89 y.o. male with a pmhx significant for DM, HF, CAD, HTN, HLD, PAF, CHB, PPM, CKD, BPH, PMR, and constipation.  He was taking warfarin prior to presenting.    He is admitted to the hospital with a non-healing diabetic ulceration of the left foot.  He is s/p incision and drainage of a left foot abscess on 2/13/25.  We have been asked to evaluate for PAD.  He was seen in the vascular lab only on 2/5/25.  His ABIs were NC bilaterally.  His digit indices were right 0.93 and left 0.82.  He had pulsatile waveforms throughout.      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 Max Daily Amount: 75 mg   Lactobacillus (PROBIOTIC ACIDOPHILUS) CAPS Take 1 daily   metFORMIN (GLUCOPHAGE) 500 MG tablet TAKE 1 TABLET BY MOUTH TWICE A DAY IN THE MORNING WITH BREAKFAST AND BEFORE DINNER 
used and to hang new infusion tubing prior to use.        Nataliia Marquez RN, BSN, [facility]  Vascular Access Team

## 2025-02-18 NOTE — PLAN OF CARE
Problem: Chronic Conditions and Co-morbidities  Goal: Patient's chronic conditions and co-morbidity symptoms are monitored and maintained or improved  2/11/2025 1347 by Ailyn Talley RN  Outcome: Progressing  2/11/2025 1219 by Ailyn Talley RN  Outcome: Progressing     Problem: Discharge Planning  Goal: Discharge to home or other facility with appropriate resources  2/11/2025 1347 by Ailyn Talley RN  Outcome: Progressing  2/11/2025 1219 by Ailyn Talley RN  Outcome: Progressing     Problem: Pain  Goal: Verbalizes/displays adequate comfort level or baseline comfort level  2/11/2025 1347 by Ailyn Talley RN  Outcome: Progressing  2/11/2025 1219 by Ailyn Talley RN  Outcome: Progressing     Problem: ABCDS Injury Assessment  Goal: Absence of physical injury  2/11/2025 1347 by Ailyn Talley RN  Outcome: Progressing  2/11/2025 1219 by Ailyn Talley RN  Outcome: Progressing     Problem: Safety - Adult  Goal: Free from fall injury  2/11/2025 1347 by Ailyn Talley RN  Outcome: Progressing  2/11/2025 1219 by Ailyn Talley RN  Outcome: Progressing     
  Problem: Chronic Conditions and Co-morbidities  Goal: Patient's chronic conditions and co-morbidity symptoms are monitored and maintained or improved  2/12/2025 0926 by Vonda Fleming RN  Outcome: Progressing  Flowsheets (Taken 2/12/2025 0926)  Care Plan - Patient's Chronic Conditions and Co-Morbidity Symptoms are Monitored and Maintained or Improved:   Collaborate with multidisciplinary team to address chronic and comorbid conditions and prevent exacerbation or deterioration   Monitor and assess patient's chronic conditions and comorbid symptoms for stability, deterioration, or improvement  2/12/2025 0617 by Pamela Albright RN  Outcome: Progressing     Problem: Discharge Planning  Goal: Discharge to home or other facility with appropriate resources  2/12/2025 0926 by Vonda Fleming RN  Outcome: Progressing  Flowsheets (Taken 2/12/2025 0926)  Discharge to home or other facility with appropriate resources:   Identify barriers to discharge with patient and caregiver   Arrange for needed discharge resources and transportation as appropriate   Identify discharge learning needs (meds, wound care, etc)  2/12/2025 0617 by Pamela Albright RN  Outcome: Progressing     Problem: Pain  Goal: Verbalizes/displays adequate comfort level or baseline comfort level  2/12/2025 0926 by Vonda Fleming RN  Outcome: Progressing  Flowsheets (Taken 2/12/2025 0926)  Verbalizes/displays adequate comfort level or baseline comfort level:   Encourage patient to monitor pain and request assistance   Administer analgesics based on type and severity of pain and evaluate response   Assess pain using appropriate pain scale  2/12/2025 0617 by Pamela Albright RN  Outcome: Progressing  Flowsheets (Taken 2/11/2025 2012)  Verbalizes/displays adequate comfort level or baseline comfort level:   Encourage patient to monitor pain and request assistance   Assess pain using appropriate pain scale     Problem: ABCDS Injury Assessment  Goal: Absence of 
  Problem: Chronic Conditions and Co-morbidities  Goal: Patient's chronic conditions and co-morbidity symptoms are monitored and maintained or improved  Outcome: Progressing     Problem: Discharge Planning  Goal: Discharge to home or other facility with appropriate resources  2/17/2025 1114 by Aga Patel RN  Outcome: Progressing  2/16/2025 2301 by AMANDA Thompson RN  Outcome: Progressing     Problem: Pain  Goal: Verbalizes/displays adequate comfort level or baseline comfort level  2/17/2025 1114 by Aga Patel RN  Outcome: Progressing  2/16/2025 2301 by AMANDA Thompson RN  Outcome: Progressing     Problem: ABCDS Injury Assessment  Goal: Absence of physical injury  Outcome: Progressing     Problem: Safety - Adult  Goal: Free from fall injury  2/17/2025 1114 by Aga Patel RN  Outcome: Progressing  2/16/2025 2301 by AMANDA Thompson RN  Outcome: Progressing     Problem: Nutrition Deficit:  Goal: Optimize nutritional status  Outcome: Progressing     Problem: Skin/Tissue Integrity  Goal: Skin integrity remains intact  Description: 1.  Monitor for areas of redness and/or skin breakdown  2.  Assess vascular access sites hourly  3.  Every 4-6 hours minimum:  Change oxygen saturation probe site  4.  Every 4-6 hours:  If on nasal continuous positive airway pressure, respiratory therapy assess nares and determine need for appliance change or resting period  2/17/2025 1114 by Aga Patel RN  Outcome: Progressing  2/16/2025 2301 by AMANDA Thompson RN  Outcome: Progressing     
  Problem: Chronic Conditions and Co-morbidities  Goal: Patient's chronic conditions and co-morbidity symptoms are monitored and maintained or improved  Outcome: Progressing     Problem: Discharge Planning  Goal: Discharge to home or other facility with appropriate resources  Outcome: Progressing     Problem: Pain  Goal: Verbalizes/displays adequate comfort level or baseline comfort level  Outcome: Progressing     Problem: ABCDS Injury Assessment  Goal: Absence of physical injury  Outcome: Progressing     Problem: Safety - Adult  Goal: Free from fall injury  Outcome: Progressing     
  Problem: Chronic Conditions and Co-morbidities  Goal: Patient's chronic conditions and co-morbidity symptoms are monitored and maintained or improved  Outcome: Progressing     Problem: Discharge Planning  Goal: Discharge to home or other facility with appropriate resources  Outcome: Progressing     Problem: Pain  Goal: Verbalizes/displays adequate comfort level or baseline comfort level  Outcome: Progressing     Problem: ABCDS Injury Assessment  Goal: Absence of physical injury  Outcome: Progressing     Problem: Safety - Adult  Goal: Free from fall injury  Outcome: Progressing     
  Problem: Chronic Conditions and Co-morbidities  Goal: Patient's chronic conditions and co-morbidity symptoms are monitored and maintained or improved  Outcome: Progressing     Problem: Discharge Planning  Goal: Discharge to home or other facility with appropriate resources  Outcome: Progressing     Problem: Pain  Goal: Verbalizes/displays adequate comfort level or baseline comfort level  Outcome: Progressing     Problem: ABCDS Injury Assessment  Goal: Absence of physical injury  Outcome: Progressing     Problem: Safety - Adult  Goal: Free from fall injury  Outcome: Progressing     Problem: Nutrition Deficit:  Goal: Optimize nutritional status  Outcome: Progressing     Problem: Skin/Tissue Integrity  Goal: Skin integrity remains intact  Description: 1.  Monitor for areas of redness and/or skin breakdown  2.  Assess vascular access sites hourly  3.  Every 4-6 hours minimum:  Change oxygen saturation probe site  4.  Every 4-6 hours:  If on nasal continuous positive airway pressure, respiratory therapy assess nares and determine need for appliance change or resting period  Outcome: Progressing     
  Problem: Chronic Conditions and Co-morbidities  Goal: Patient's chronic conditions and co-morbidity symptoms are monitored and maintained or improved  Outcome: Progressing     Problem: Discharge Planning  Goal: Discharge to home or other facility with appropriate resources  Outcome: Progressing     Problem: Pain  Goal: Verbalizes/displays adequate comfort level or baseline comfort level  Outcome: Progressing  Flowsheets (Taken 2/11/2025 2012)  Verbalizes/displays adequate comfort level or baseline comfort level:   Encourage patient to monitor pain and request assistance   Assess pain using appropriate pain scale     Problem: ABCDS Injury Assessment  Goal: Absence of physical injury  Outcome: Progressing     Problem: Safety - Adult  Goal: Free from fall injury  Outcome: Progressing     
  Problem: Chronic Conditions and Co-morbidities  Goal: Patient's chronic conditions and co-morbidity symptoms are monitored and maintained or improved  Outcome: Progressing  Flowsheets (Taken 2/12/2025 2014)  Care Plan - Patient's Chronic Conditions and Co-Morbidity Symptoms are Monitored and Maintained or Improved:   Monitor and assess patient's chronic conditions and comorbid symptoms for stability, deterioration, or improvement   Collaborate with multidisciplinary team to address chronic and comorbid conditions and prevent exacerbation or deterioration   Update acute care plan with appropriate goals if chronic or comorbid symptoms are exacerbated and prevent overall improvement and discharge     Problem: Discharge Planning  Goal: Discharge to home or other facility with appropriate resources  Outcome: Progressing  Flowsheets (Taken 2/12/2025 2014)  Discharge to home or other facility with appropriate resources: Identify barriers to discharge with patient and caregiver     Problem: Pain  Goal: Verbalizes/displays adequate comfort level or baseline comfort level  Outcome: Progressing     Problem: ABCDS Injury Assessment  Goal: Absence of physical injury  Outcome: Progressing     Problem: Safety - Adult  Goal: Free from fall injury  Outcome: Progressing     Problem: Nutrition Deficit:  Goal: Optimize nutritional status  Outcome: Progressing     
  Problem: Chronic Conditions and Co-morbidities  Goal: Patient's chronic conditions and co-morbidity symptoms are monitored and maintained or improved  Outcome: Progressing  Flowsheets (Taken 2/14/2025 2223)  Care Plan - Patient's Chronic Conditions and Co-Morbidity Symptoms are Monitored and Maintained or Improved: Monitor and assess patient's chronic conditions and comorbid symptoms for stability, deterioration, or improvement     Problem: Discharge Planning  Goal: Discharge to home or other facility with appropriate resources  Outcome: Progressing  Flowsheets (Taken 2/14/2025 2223)  Discharge to home or other facility with appropriate resources: Identify barriers to discharge with patient and caregiver     Problem: Pain  Goal: Verbalizes/displays adequate comfort level or baseline comfort level  Outcome: Progressing     Problem: ABCDS Injury Assessment  Goal: Absence of physical injury  Outcome: Progressing     Problem: Safety - Adult  Goal: Free from fall injury  Outcome: Progressing     Problem: Nutrition Deficit:  Goal: Optimize nutritional status  Outcome: Progressing     Problem: Skin/Tissue Integrity  Goal: Skin integrity remains intact  Description: 1.  Monitor for areas of redness and/or skin breakdown  2.  Assess vascular access sites hourly  3.  Every 4-6 hours minimum:  Change oxygen saturation probe site  4.  Every 4-6 hours:  If on nasal continuous positive airway pressure, respiratory therapy assess nares and determine need for appliance change or resting period  Outcome: Progressing  Flowsheets (Taken 2/14/2025 2223)  Skin Integrity Remains Intact: Monitor for areas of redness and/or skin breakdown     Problem: Physical Therapy - Adult  Goal: By Discharge: Performs mobility at highest level of function for planned discharge setting.  See evaluation for individualized goals.  Description: FUNCTIONAL STATUS PRIOR TO ADMISSION: Pt is independent and ambulatory with occ SPC, RW use at baseline. Pt is 
  Problem: Chronic Conditions and Co-morbidities  Goal: Patient's chronic conditions and co-morbidity symptoms are monitored and maintained or improved  Outcome: Progressing  Flowsheets (Taken 2/15/2025 2006)  Care Plan - Patient's Chronic Conditions and Co-Morbidity Symptoms are Monitored and Maintained or Improved: Monitor and assess patient's chronic conditions and comorbid symptoms for stability, deterioration, or improvement     Problem: Discharge Planning  Goal: Discharge to home or other facility with appropriate resources  Outcome: Progressing  Flowsheets (Taken 2/15/2025 2006)  Discharge to home or other facility with appropriate resources: Identify barriers to discharge with patient and caregiver     Problem: Pain  Goal: Verbalizes/displays adequate comfort level or baseline comfort level  Outcome: Progressing     Problem: ABCDS Injury Assessment  Goal: Absence of physical injury  Outcome: Progressing     Problem: Safety - Adult  Goal: Free from fall injury  Outcome: Progressing     Problem: Nutrition Deficit:  Goal: Optimize nutritional status  Outcome: Progressing     Problem: Skin/Tissue Integrity  Goal: Skin integrity remains intact  Description: 1.  Monitor for areas of redness and/or skin breakdown  2.  Assess vascular access sites hourly  3.  Every 4-6 hours minimum:  Change oxygen saturation probe site  4.  Every 4-6 hours:  If on nasal continuous positive airway pressure, respiratory therapy assess nares and determine need for appliance change or resting period  Outcome: Progressing  Flowsheets (Taken 2/15/2025 2006)  Skin Integrity Remains Intact: Monitor for areas of redness and/or skin breakdown     
  Problem: Discharge Planning  Goal: Discharge to home or other facility with appropriate resources  Outcome: Progressing     Problem: Pain  Goal: Verbalizes/displays adequate comfort level or baseline comfort level  Outcome: Progressing     Problem: Safety - Adult  Goal: Free from fall injury  2/16/2025 2301 by AAMNDA Thompson, RN  Outcome: Progressing  2/16/2025 1230 by Sera Jarquin RN  Outcome: Progressing     Problem: Skin/Tissue Integrity  Goal: Skin integrity remains intact  Description: 1.  Monitor for areas of redness and/or skin breakdown  2.  Assess vascular access sites hourly  3.  Every 4-6 hours minimum:  Change oxygen saturation probe site  4.  Every 4-6 hours:  If on nasal continuous positive airway pressure, respiratory therapy assess nares and determine need for appliance change or resting period  Outcome: Progressing     
  Problem: Occupational Therapy - Adult  Goal: By Discharge: Performs self-care activities at highest level of function for planned discharge setting.  See evaluation for individualized goals.  Description: FUNCTIONAL STATUS PRIOR TO ADMISSION:    Receives Help From: Family, Prior Level of Assist for ADLs: Independent,  ,  ,  ,  ,  , Prior Level of Assist for Homemaking: Needs assistance,  , Prior Level of Assist for Transfers: Independent, Active : Yes     HOME SUPPORT: Patient lived with son and DIL but didn't require assistance. Assisted with light homemaking; personal homemaking    Occupational Therapy Goals:  Initiated 2/14/2025  1.  Patient will perform grooming in standing VSS with Modified [place] within 7 day(s).  2.  Patient will perform bathing with Modified [place] within 7 day(s).  3.  Patient will perform upper body dressing and lower body dressing with Modified [place] within 7 day(s).  4.  Patient will perform toilet transfers with Modified [place]  within 7 day(s).  5.  Patient will perform all aspects of toileting with Modified [place] within 7 day(s).  6.  Patient will participate in upper extremity therapeutic exercise/activities with Modified [place] for 5 minutes within 7 day(s).    7.  Patient will utilize energy conservation, fall prevention, pain management techniques during functional activities with verbal cues within 7 day(s).   Outcome: Progressing   OCCUPATIONAL THERAPY TREATMENT  Patient: Baron Wagner  (89 y.o. male)  Date: 2/17/2025  Primary Diagnosis: Foot infection [L08.9]  Diabetic infection of left foot (HCC) [E11.628, L08.9]  Procedure(s) (LRB):  LEFT FOOT ABSCESS  INCISION AND DRAINAGE and BONE BIOPSY (Left) 4 Days Post-Op   Precautions: Fall Risk, General Precautions, Bed Alarm (falls, LLE WBAT with post op shoel DNR)                Chart, occupational therapy assessment, plan of care, and goals were reviewed.    ASSESSMENT  Patient 
  Problem: Occupational Therapy - Adult  Goal: By Discharge: Performs self-care activities at highest level of function for planned discharge setting.  See evaluation for individualized goals.  Description: FUNCTIONAL STATUS PRIOR TO ADMISSION:    Receives Help From: Family, Prior Level of Assist for ADLs: Independent,  ,  ,  ,  ,  , Prior Level of Assist for Homemaking: Needs assistance,  , Prior Level of Assist for Transfers: Independent, Active : Yes     HOME SUPPORT: Patient lived with son and DIL but didn't require assistance. Assisted with light homemaking; personal homemaking; patient enjoys dancing as hobby    Occupational Therapy Goals:  Initiated 2/14/2025  1.  Patient will perform grooming in standing VSS with Modified [place] within 7 day(s).  2.  Patient will perform bathing with Modified [place] within 7 day(s).  3.  Patient will perform upper body dressing and lower body dressing with Modified [place] within 7 day(s).  4.  Patient will perform toilet transfers with Modified [place]  within 7 day(s).  5.  Patient will perform all aspects of toileting with Modified [place] within 7 day(s).  6.  Patient will participate in upper extremity therapeutic exercise/activities with Modified [place] for 5 minutes within 7 day(s).    7.  Patient will utilize energy conservation, fall prevention, pain management techniques during functional activities with verbal cues within 7 day(s).   Outcome: Progressing   OCCUPATIONAL THERAPY EVALUATION    Patient: Baron Wagner  (89 y.o. male)  Date: 2/14/2025  Primary Diagnosis: Foot infection [L08.9]  Diabetic infection of left foot (HCC) [E11.628, L08.9]  Procedure(s) (LRB):  LEFT FOOT ABSCESS  INCISION AND DRAINAGE and BONE BIOPSY (Left) 1 Day Post-Op     Precautions: Fall Risk, General Precautions (WBAT w/post op shoe L foot,  post lateral 5th metatarsal debridement 2-13-25)                  ASSESSMENT :  The patient is limited by 
  Problem: Physical Therapy - Adult  Goal: By Discharge: Performs mobility at highest level of function for planned discharge setting.  See evaluation for individualized goals.  Description: FUNCTIONAL STATUS PRIOR TO ADMISSION: Pt is independent and ambulatory with occ SPC, RW use at baseline. Pt is active at baseline (+) drives and participates in community activities including weekly dancing at \"[employer]\". Pt resides with his son/DIL in a multi level home with [facility] master suite. WIS with built in shower chair available on first level. Pt's son/DIL retired and available to provide support, prn. PMHx significant for falls, but states, \"I didn't hurt myself and I was okay\". Pt is referred for PT IE per resulting functional mobility decline.    HOME SUPPORT PRIOR TO ADMISSION:     Physical Therapy Goals  Initiated 2/14/2025  1.  Patient will move from supine to sit and sit to supine in bed with supervision/set-up within 7 day(s).    2.  Patient will perform sit to stand with supervision/set-up within 7 day(s).  3.  Patient will transfer from bed to chair and chair to bed with supervision/set-up using the least restrictive device within 7 day(s).  4.  Patient will ambulate with supervision/set-up for 200 feet with the least restrictive device within 7 day(s).   5.  Patient will ascend/descend 3-5 stairs with R/L handrail(s) with supervision/set-up within 7 day(s).    Outcome: Progressing     PHYSICAL THERAPY TREATMENT    Patient: Baron Wagner  (89 y.o. male)  Date: 2/18/2025  Diagnosis: Foot infection [L08.9]  Diabetic infection of left foot (HCC) [E11.628, L08.9] Diabetic infection of left foot (HCC)  Procedure(s) (LRB):  LEFT FOOT ABSCESS  INCISION AND DRAINAGE and BONE BIOPSY (Left) 5 Days Post-Op  Precautions: Fall Risk, General Precautions, Bed Alarm (falls, LLE WBAT with post op shoel DNR)                      ASSESSMENT:  Patient continues to benefit from skilled PT services and is progressing towards 
bearing restrictions limiting activity or patient is unable to maintain, and medical status.    IF patient discharges home will need the following DME: patient owns DME required for discharge         SUBJECTIVE:   Patient stated “If I weren't here, I would be at [employer]. It's Friday.”    OBJECTIVE DATA SUMMARY:       Past Medical History:   Diagnosis Date    Allergic rhinitis 9/20/2017    Arthritis     ASCVD (arteriosclerotic cardiovascular disease) 9/20/2017    Story: Old ASMI by EKG    Back pain 9/20/2017    BPH (benign prostatic hyperplasia) 9/20/2017    CHF (congestive heart failure) ([facility]) 9/20/2017    CKD (chronic kidney disease) 9/20/2017    COVID-19 11/2021    Diabetic acetonemia ([facility])     DM (diabetes mellitus) ([facility]) 9/20/2017    Story: Diet Controlled    ED (erectile dysfunction) 9/20/2017    Edema 9/20/2017    Elevated CPK 9/20/2017    Comments: History of    Elevated LFTs 9/20/2017    Comments: History of    Elevated PSA 9/20/2017    Hypercholesteremia     Hyperlipidemia 9/20/2017    Hypertension     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    Nodule of right lung 9/20/2017    Story: Right    Polymyalgia (HCC) 9/20/2017    Prostate enlargement     Substance abuse (HCC)      Past Surgical History:   Procedure Laterality Date    ARTHROCENTESIS  7/13/2023    CARDIAC PROCEDURE N/A 7/7/2023    Insert temporary pacemaker performed by Dominic Peace MD at [facility] CARDIAC CATH LAB    CENTRAL LINE  7/4/2023    EP DEVICE PROCEDURE N/A 7/13/2023    Insert or replace transcath PPM leadless performed by Jewel Call MD at [facility] CARDIAC CATH LAB    FOOT DEBRIDEMENT Left 2/13/2025    LEFT FOOT ABSCESS  INCISION AND DRAINAGE and BONE BIOPSY performed by Radha Carrasco DPM at [facility] MAIN OR    HEENT  06/12/2018    Dr. Menezes, surgery to remove cancerous tissue from Left cheek    MALIGNANT SKIN LESION EXCISION  09/2018    2 lesions on head    CT UNLISTED PROCEDURE ABDOMEN PERITONEUM & OMENTUM      hernia repair

## 2025-02-19 ENCOUNTER — TELEPHONE (OUTPATIENT)
Facility: CLINIC | Age: 89
End: 2025-02-19

## 2025-02-19 NOTE — TELEPHONE ENCOUNTER
Patients daughter-in-law Hortencia called stating the hospital advised Mr. Wagner to stop the metformin. Patient was D/C from the hospital last night.  Hortencia is concerned because his blood sugars are running in the 300's.  They have not taken his BS this morning due to the patient is still asleep.  Please advise.

## 2025-02-20 PROBLEM — L03.116 CELLULITIS OF LEFT FOOT: Status: ACTIVE | Noted: 2020-01-14

## 2025-02-20 NOTE — PROGRESS NOTES
Transition of Care ([facility] D/C 2/17/25)       HPI:  Baron Wagner Sr. is a 89 y.o. year old male who is here for a follow up visit for hospitalization transition of care.   He was last seen by me on 1/14/2025.     Discharged on: 2/18/2023 after being admitted 2/10/2023    Diagnosis in hospital:  1.  Diabetic foot ulcer left foot  2 cellulitis left foot with bone biopsy positive osteomyelitis  3 diastolic CHF  4.  Paroxysmal atrial fibrillation  5 CKD stage III  6 diabetes type 2  7.  Diabetic neuropathy    Complications in hospital:  No complications    Medication changes: Unasyn daily for 6 weeks IV    Discharge Summary reviewed.  Today 2/21/2025      He reports the following: He is accompanied by his son at the visit today.  He has been having his blood sugars running a little high until I resumed his metformin on the day after hospital discharge which time his blood sugars been coming down about 10 points per day.  During hospitalization his blood sugar was running high as he was not on medicines but on sliding scale insulin.  He does note he is having wound care 3 times a week by the wound care nurse for his foot and is receiving IV antibiotics but 24-hour infusion on a daily basis.  That is all confirmed by his son who is present with him.  He notes no chest pain, shortness of breath, palpitations, PND, orthopnea or other cardiac respiratory complaints.  He notes no current GI or  complaints.  He notes no headaches, dizziness or neurologic complaints.  He notes no current active arthritic complaints and there are no other complaints on complete review of systems.          /70 (Site: Left Upper Arm, Position: Sitting, Cuff Size: Large Adult)   Pulse 84   Temp 97.4 °F (36.3 °C) (Oral)   Resp 18   Ht 1.905 m (6' 3\")   Wt 90.7 kg (200 lb)   SpO2 95%   BMI 25.00 kg/m²     Historical Data    Past Medical History:   Diagnosis Date    Allergic rhinitis 9/20/2017    Arthritis     ASCVD (arteriosclerotic  [facility]rid[facility] Boucher

## 2025-02-21 ENCOUNTER — OFFICE VISIT (OUTPATIENT)
Facility: CLINIC | Age: 89
End: 2025-02-21

## 2025-02-21 VITALS
SYSTOLIC BLOOD PRESSURE: 120 MMHG | TEMPERATURE: 97.4 F | DIASTOLIC BLOOD PRESSURE: 70 MMHG | HEIGHT: 75 IN | OXYGEN SATURATION: 95 % | BODY MASS INDEX: 24.87 KG/M2 | RESPIRATION RATE: 18 BRPM | WEIGHT: 200 LBS | HEART RATE: 84 BPM

## 2025-02-21 DIAGNOSIS — I48.0 PAROXYSMAL ATRIAL FIBRILLATION (HCC): ICD-10-CM

## 2025-02-21 DIAGNOSIS — I50.32 CHRONIC DIASTOLIC CONGESTIVE HEART FAILURE (HCC): ICD-10-CM

## 2025-02-21 DIAGNOSIS — E11.9 TYPE 2 DIABETES MELLITUS WITHOUT COMPLICATION, WITHOUT LONG-TERM CURRENT USE OF INSULIN (HCC): ICD-10-CM

## 2025-02-21 DIAGNOSIS — N18.31 STAGE 3A CHRONIC KIDNEY DISEASE (HCC): ICD-10-CM

## 2025-02-21 DIAGNOSIS — L03.116 CELLULITIS OF LEFT FOOT: ICD-10-CM

## 2025-02-21 DIAGNOSIS — E11.621 DIABETIC ULCER OF LEFT FOOT ASSOCIATED WITH TYPE 2 DIABETES MELLITUS, UNSPECIFIED PART OF FOOT, UNSPECIFIED ULCER STAGE (HCC): Primary | ICD-10-CM

## 2025-02-21 DIAGNOSIS — Z09 HOSPITAL DISCHARGE FOLLOW-UP: ICD-10-CM

## 2025-02-21 DIAGNOSIS — L97.529 DIABETIC ULCER OF LEFT FOOT ASSOCIATED WITH TYPE 2 DIABETES MELLITUS, UNSPECIFIED PART OF FOOT, UNSPECIFIED ULCER STAGE (HCC): Primary | ICD-10-CM

## 2025-02-21 RX ORDER — ACETAMINOPHEN 500 MG
1 TABLET ORAL DAILY
Qty: 30 CAPSULE | Refills: 2 | Status: SHIPPED | OUTPATIENT
Start: 2025-02-21

## 2025-02-21 NOTE — PROGRESS NOTES
Baron Wagner  is a 89 y.o. male     Chief Complaint   Patient presents with    Transition of Care     [facility] D/C 2/17/25       /70 (Site: Left Upper Arm, Position: Sitting, Cuff Size: Large Adult)   Pulse 84   Temp 97.4 °F (36.3 °C) (Oral)   Resp 18   Ht 1.905 m (6' 3\")   Wt 90.7 kg (200 lb)   SpO2 95%   BMI 25.00 kg/m²     Health Maintenance Due   Topic Date Due    Pneumococcal 50+ years Vaccine (2 of 2 - PPSV23) 03/04/2005    Respiratory Syncytial Virus (RSV) Pregnant or age 60 yrs+ (1 - 1-dose 75+ series) Never done    COVID-19 Vaccine (1 - 2024-25 season) Never done    Annual Wellness Visit (Medicare Advantage)  01/01/2025         \"Have you been to the ER, urgent care clinic since your last visit?  Hospitalized since your last visit?\"    YES - When: approximately 12 days ago.  Where and Why: [facility].    “Have you seen or consulted any other health care providers outside of [facility] since your last visit?”    NO

## 2025-02-21 NOTE — TELEPHONE ENCOUNTER
Spoke with patient's daughter in law the same day we got the message and did inform her of what Dr. Powell said which was to resume the metformin. Patient's daughter in law verbalized understanding.

## 2025-02-22 LAB
ANION GAP SERPL CALC-SCNC: 8 MMOL/L (ref 2–12)
BASOPHILS # BLD: 0.06 K/UL (ref 0–0.1)
BASOPHILS NFR BLD: 0.5 % (ref 0–1)
BUN SERPL-MCNC: 29 MG/DL (ref 6–20)
BUN/CREAT SERPL: 18 (ref 12–20)
CALCIUM SERPL-MCNC: 9.3 MG/DL (ref 8.5–10.1)
CHLORIDE SERPL-SCNC: 101 MMOL/L (ref 97–108)
CO2 SERPL-SCNC: 29 MMOL/L (ref 21–32)
CREAT SERPL-MCNC: 1.6 MG/DL (ref 0.7–1.3)
DIFFERENTIAL METHOD BLD: ABNORMAL
EOSINOPHIL # BLD: 0.04 K/UL (ref 0–0.4)
EOSINOPHIL NFR BLD: 0.3 % (ref 0–7)
ERYTHROCYTE [DISTWIDTH] IN BLOOD BY AUTOMATED COUNT: 15.2 % (ref 11.5–14.5)
GLUCOSE SERPL-MCNC: 291 MG/DL (ref 65–100)
HCT VFR BLD AUTO: 36.3 % (ref 36.6–50.3)
HGB BLD-MCNC: 11 G/DL (ref 12.1–17)
IMM GRANULOCYTES # BLD AUTO: 0.21 K/UL (ref 0–0.04)
IMM GRANULOCYTES NFR BLD AUTO: 1.6 % (ref 0–0.5)
INR PPP: 1.7 (ref 0.9–1.1)
LYMPHOCYTES # BLD: 1.1 K/UL (ref 0.8–3.5)
LYMPHOCYTES NFR BLD: 8.3 % (ref 12–49)
MCH RBC QN AUTO: 29.3 PG (ref 26–34)
MCHC RBC AUTO-ENTMCNC: 30.3 G/DL (ref 30–36.5)
MCV RBC AUTO: 96.8 FL (ref 80–99)
MONOCYTES # BLD: 0.5 K/UL (ref 0–1)
MONOCYTES NFR BLD: 3.8 % (ref 5–13)
NEUTS SEG # BLD: 11.37 K/UL (ref 1.8–8)
NEUTS SEG NFR BLD: 85.5 % (ref 32–75)
NRBC # BLD: 0 K/UL (ref 0–0.01)
NRBC BLD-RTO: 0 PER 100 WBC
PLATELET # BLD AUTO: 324 K/UL (ref 150–400)
PMV BLD AUTO: 10.6 FL (ref 8.9–12.9)
POTASSIUM SERPL-SCNC: 4.6 MMOL/L (ref 3.5–5.1)
PROTHROMBIN TIME: 18 SEC (ref 9.2–11.2)
RBC # BLD AUTO: 3.75 M/UL (ref 4.1–5.7)
SODIUM SERPL-SCNC: 138 MMOL/L (ref 136–145)
WBC # BLD AUTO: 13.3 K/UL (ref 4.1–11.1)

## 2025-02-28 ENCOUNTER — HOSPITAL ENCOUNTER (OUTPATIENT)
Facility: HOSPITAL | Age: 89
Discharge: HOME | End: 2025-02-28
Attending: PODIATRIST
Payer: MEDICARE

## 2025-02-28 VITALS
SYSTOLIC BLOOD PRESSURE: 123 MMHG | TEMPERATURE: 98 F | HEART RATE: 100 BPM | RESPIRATION RATE: 16 BRPM | DIASTOLIC BLOOD PRESSURE: 82 MMHG

## 2025-02-28 DIAGNOSIS — L97.523 ULCER OF LEFT FOOT, WITH NECROSIS OF MUSCLE (HCC): Primary | ICD-10-CM

## 2025-02-28 PROBLEM — L02.611 ABSCESS OF RIGHT FOOT: Status: ACTIVE | Noted: 2025-02-14

## 2025-02-28 PROCEDURE — 87070 CULTURE OTHR SPECIMN AEROBIC: CPT

## 2025-02-28 PROCEDURE — 99213 OFFICE O/P EST LOW 20 MIN: CPT

## 2025-02-28 PROCEDURE — 87205 SMEAR GRAM STAIN: CPT

## 2025-02-28 RX ORDER — LIDOCAINE HYDROCHLORIDE 40 MG/ML
SOLUTION TOPICAL PRN
OUTPATIENT
Start: 2025-02-28

## 2025-02-28 RX ORDER — LIDOCAINE 40 MG/G
CREAM TOPICAL PRN
OUTPATIENT
Start: 2025-02-28

## 2025-02-28 RX ORDER — NEOMYCIN/BACITRACIN/POLYMYXINB 3.5-400-5K
OINTMENT (GRAM) TOPICAL PRN
OUTPATIENT
Start: 2025-02-28

## 2025-02-28 RX ORDER — LIDOCAINE HYDROCHLORIDE 20 MG/ML
JELLY TOPICAL PRN
OUTPATIENT
Start: 2025-02-28

## 2025-02-28 RX ORDER — CLOBETASOL PROPIONATE 0.5 MG/G
OINTMENT TOPICAL PRN
OUTPATIENT
Start: 2025-02-28

## 2025-02-28 RX ORDER — GENTAMICIN SULFATE 1 MG/G
OINTMENT TOPICAL PRN
OUTPATIENT
Start: 2025-02-28

## 2025-02-28 RX ORDER — MUPIROCIN 20 MG/G
OINTMENT TOPICAL PRN
OUTPATIENT
Start: 2025-02-28

## 2025-02-28 RX ORDER — BETAMETHASONE DIPROPIONATE 0.5 MG/G
CREAM TOPICAL PRN
OUTPATIENT
Start: 2025-02-28

## 2025-02-28 RX ORDER — GINSENG 100 MG
CAPSULE ORAL PRN
OUTPATIENT
Start: 2025-02-28

## 2025-02-28 RX ORDER — SODIUM CHLOR/HYPOCHLOROUS ACID 0.033 %
SOLUTION, IRRIGATION IRRIGATION PRN
OUTPATIENT
Start: 2025-02-28

## 2025-02-28 RX ORDER — BACITRACIN ZINC AND POLYMYXIN B SULFATE 500; 1000 [USP'U]/G; [USP'U]/G
OINTMENT TOPICAL PRN
OUTPATIENT
Start: 2025-02-28

## 2025-02-28 RX ORDER — LIDOCAINE 50 MG/G
OINTMENT TOPICAL PRN
OUTPATIENT
Start: 2025-02-28

## 2025-02-28 RX ORDER — SILVER SULFADIAZINE 10 MG/G
CREAM TOPICAL PRN
OUTPATIENT
Start: 2025-02-28

## 2025-02-28 RX ORDER — TRIAMCINOLONE ACETONIDE 1 MG/G
OINTMENT TOPICAL PRN
OUTPATIENT
Start: 2025-02-28

## 2025-02-28 NOTE — FLOWSHEET NOTE
02/28/25 1441   LLE Neurovascular Assessment   Capillary Refill Less than/Equal to 3 seconds   Color Appropriate for Ethnicity   Temperature Warm   LLE Sensation  Decreased   L Pedal Pulse Doppler   Wound 02/10/25 Other (Comment) Left;Plantar   Date First Assessed/Time First Assessed: 02/10/25 1700   Primary Wound Type: Diabetic Ulcer  Location: (c) Other (Comment)  Wound Location Orientation: Left;Plantar   Wound Image    Wound Etiology Diabetic   Dressing Status Old drainage noted   Wound Cleansed Cleansed with saline   Offloading for Diabetic Foot Ulcers Post op shoe   Wound Length (cm) 0.7 cm   Wound Width (cm) 0.7 cm   Wound Depth (cm) 0.9 cm   Wound Surface Area (cm^2) 0.49 cm^2   Wound Volume (cm^3) 0.441 cm^3   Distance Tunneling (cm) 3 cm   Tunneling Position ___ O'Clock 11   Wound Assessment Pink/red;Slough  (tendon exposed)   Drainage Amount Moderate (25-50%)   Drainage Description Serosanguinous   Odor None   Lizbeth-wound Assessment Dry/flaky;Intact;Fragile   Margins Defined edges   Wound Thickness Description not for Pressure Injury Full thickness   Wound 01/31/25 Foot Left;Lateral   Date First Assessed/Time First Assessed: 01/31/25 1423   Present on Original Admission: Yes  Wound Approximate Age at First Assessment (Weeks): 2 weeks  Location: Foot  Wound Location Orientation: Left;Lateral   Wound Image    Wound Etiology Diabetic   Dressing Status Old drainage noted   Wound Cleansed Cleansed with saline   Offloading for Diabetic Foot Ulcers Post op shoe   Wound Length (cm) 1.1 cm   Wound Width (cm) 0.9 cm   Wound Depth (cm) 1 cm   Wound Surface Area (cm^2) 0.99 cm^2   Change in Wound Size % (l*w) -22.22   Wound Volume (cm^3) 0.99 cm^3   Wound Healing % -75   Wound Assessment [place]/red;Slough   Drainage Amount Moderate (25-50%)   Drainage Description Serosanguinous   Odor None   Lizbeth-wound Assessment Fragile;Intact;Dry/flaky   Margins Defined edges   Wound Thickness Description not for Pressure Injury

## 2025-02-28 NOTE — PATIENT INSTRUCTIONS
Discharge Instructions/Wound Care Orders  [facility]   [address]  [address], [id number]  [place] [address]  Phone: [phone] Fax: [phone]    NAME:  Baron Wagner Sr.  YOB: 1933  MEDICAL RECORD NUMBER:  [id number]  DATE:  February 28, 2025    Wound Care Orders:  Left foot wound - Cleanse with normal saline and gauze. Packed saline soaked gauze into wound especially toward arch of foot. Cover with dry gauze. Secure with kerlix. Change dressing every other day. Home health to change dressing every other day.    Home health [facility]    Activity:  [x] Elevate leg(s) above the level of the heart when sitting and avoid prolonged standing in one place.    [x] Wear off-loading shoe/boot on the affected foot when walking.  [x] Do not get dressing wet.    Dietary:  [] Diet as tolerated      [x] Diabetic Diet            [] Increase Protein: examples (Meat, cheese, eggs, greek yogurt, fish, nuts)          [] Lawson Therapeutic Nutrition Powder  [] Other:      Follow-up:  [x] Return Appointment: With Dr. Radha Carrasco in 4 Weeks.  [x] Ordered tests: Culture done today.       Electronically signed Chrissy Smith RN on 2/28/2025 at 8:45 AM     Wound Care Center Information: Should you experience any significant changes in your wound(s) or have questions about your wound care, please contact the [facility]ound Center at MONDAY - FRIDAY 8:00 am - 4:30.  If you need help with your wound outside these hours and cannot wait until we are again available, contact your PCP or go to the hospital emergency room.     PLEASE NOTE: IF YOU ARE UNABLE TO OBTAIN WOUND SUPPLIES, CONTINUE TO USE THE SUPPLIES YOU HAVE AVAILABLE UNTIL YOU ARE ABLE TO REACH US. IT IS MOST IMPORTANT TO KEEP THE WOUND COVERED AT ALL TIMES.     Physician Signature:_______________________    Date: ___________ Time:  ____________

## 2025-02-28 NOTE — PROGRESS NOTES
[facility]   Progress Note and Procedure Note   NO Procedure      Baron Wagner Sr.  MEDICAL RECORD NUMBER:  [id number]  AGE: 89 y.o. RACE White (non-)  GENDER: male  : 1933  EPISODE DATE:  2025    Subjective:     Chief Complaint   Patient presents with    Wound Check     Left foot       Post op I&D abscess left foot with biopsy base 5th left met  Curently on IV antibiotics managed by Infectious Dr. Praveena Padilla  HISTORY of PRESENT ILLNESS HPI    Baron Wagner Sr. is a 89 y.o. male who presents today for wound/ulcer evaluation.   Wound/Ulcer Pain Timing/Severity: none          Ulcer Identification:  Ulcer Type: non-healing surgical    Contributing Factors: diabetes and decreased mobility        PAST MEDICAL HISTORY    Past Medical History:   Diagnosis Date    Allergic rhinitis 2017    Arthritis     ASCVD (arteriosclerotic cardiovascular disease) 2017    Story: Old ASMI by EKG    Back pain 2017    BPH (benign prostatic hyperplasia) 2017    CHF (congestive heart failure) ([facility]) 2017    CKD (chronic kidney disease) 2017    COVID-19 2021    Diabetic acetonemia (HCC)     DM (diabetes mellitus) ([facility]) 2017    Story: Diet Controlled    ED (erectile dysfunction) 2017    Edema 2017    Elevated CPK 2017    Comments: History of    Elevated LFTs 2017    Comments: History of    Elevated PSA 2017    Hypercholesteremia     Hyperlipidemia 2017    Hypertension     Hypertension with renal disease 2017    Nodule of right lung 2017    Story: Right    Polymyalgia 2017    Prostate enlargement     Substance abuse (HCC)         PAST SURGICAL HISTORY    Past Surgical History:   Procedure Laterality Date    ARTHROCENTESIS  2023    CARDIAC PROCEDURE N/A 2023    Insert temporary pacemaker performed by Dominic Peace MD at [facility] CARDIAC CATH LAB    CENTRAL LINE  2023    EP DEVICE PROCEDURE N/A

## 2025-03-02 LAB
BACTERIA SPEC CULT: NORMAL
GRAM STN SPEC: NORMAL
GRAM STN SPEC: NORMAL
SERVICE CMNT-IMP: NORMAL

## 2025-03-12 PROBLEM — A41.9 SEPTICEMIA (HCC): Status: RESOLVED | Noted: 2023-07-04 | Resolved: 2025-03-12

## 2025-03-12 PROBLEM — A49.8 BACTEROIDES INFECTION: Status: RESOLVED | Noted: 2023-07-17 | Resolved: 2025-03-12

## 2025-03-12 PROBLEM — A49.9 GRAM-NEGATIVE INFECTION: Status: RESOLVED | Noted: 2025-02-14 | Resolved: 2025-03-12

## 2025-03-12 PROBLEM — L03.116 CELLULITIS OF LEFT FOOT: Status: RESOLVED | Noted: 2020-01-14 | Resolved: 2025-03-12

## 2025-03-12 PROBLEM — A49.1 INFECTION DUE TO ALPHA-HEMOLYTIC STREPTOCOCCUS: Status: RESOLVED | Noted: 2025-02-17 | Resolved: 2025-03-12

## 2025-03-12 PROBLEM — L97.425 DIABETIC ULCER OF LEFT MIDFOOT ASSOCIATED WITH TYPE 2 DIABETES MELLITUS, WITH MUSCLE INVOLVEMENT WITHOUT EVIDENCE OF NECROSIS: Status: RESOLVED | Noted: 2025-01-31 | Resolved: 2025-03-12

## 2025-03-12 PROBLEM — L97.509 DIABETIC FOOT ULCERS (HCC): Status: RESOLVED | Noted: 2020-03-12 | Resolved: 2025-03-12

## 2025-03-12 PROBLEM — E11.621 DIABETIC ULCER OF LEFT MIDFOOT ASSOCIATED WITH TYPE 2 DIABETES MELLITUS, WITH MUSCLE INVOLVEMENT WITHOUT EVIDENCE OF NECROSIS: Status: RESOLVED | Noted: 2025-01-31 | Resolved: 2025-03-12

## 2025-03-12 PROBLEM — E11.29 TYPE 2 DIABETES MELLITUS WITH KIDNEY COMPLICATION, WITHOUT LONG-TERM CURRENT USE OF INSULIN (HCC): Status: ACTIVE | Noted: 2024-02-02

## 2025-03-12 PROBLEM — I50.22 CHRONIC SYSTOLIC (CONGESTIVE) HEART FAILURE (HCC): Status: RESOLVED | Noted: 2024-08-02 | Resolved: 2025-03-12

## 2025-03-12 PROBLEM — E11.621 DIABETIC FOOT ULCERS (HCC): Status: RESOLVED | Noted: 2020-03-12 | Resolved: 2025-03-12

## 2025-03-12 PROBLEM — E11.9 TYPE 2 DIABETES MELLITUS WITHOUT COMPLICATION, WITHOUT LONG-TERM CURRENT USE OF INSULIN (HCC): Status: RESOLVED | Noted: 2023-10-10 | Resolved: 2025-03-12

## 2025-03-12 PROBLEM — E11.65 POORLY CONTROLLED DIABETES MELLITUS (HCC): Status: RESOLVED | Noted: 2025-02-14 | Resolved: 2025-03-12

## 2025-03-12 PROBLEM — L02.611 ABSCESS OF RIGHT FOOT: Status: RESOLVED | Noted: 2025-02-14 | Resolved: 2025-03-12

## 2025-03-12 PROBLEM — M79.89 NECROTIZING SOFT TISSUE INFECTION: Status: RESOLVED | Noted: 2025-02-14 | Resolved: 2025-03-12

## 2025-03-12 NOTE — PROGRESS NOTES
This is a Subsequent Medicare Annual Wellness Visit providing Personalized Prevention Plan Services (PPPS) (Performed 12 months after initial AWV and PPPS )    I have reviewed the patient's medical history in detail and updated the computerized patient record.  He presents today accompanied by his daughter-in-law for his Medicare subsequent annual wellness examination and screening questionnaire.    He is also here in follow-up of his multiple medical problems include hypertension, diabetes, hyperlipidemia, chronic diastolic CHF, paroxysmal atrial fibrillation, ASCVD, complete heart block status post pacemaker, chronic allergic rhinitis, recent problems with diabetic infection of left foot where he had to have surgery and extensive hospitalization now on home IV antibiotics for 6 to 8 weeks having completed 5 weeks of that now, DJD, prior elevated PSA, CKD stage III, BPH, vitamin D deficiency, history of iron deficiency anemia and other multiple medical problems.  He is taking his medications and continue his home IV infusion.  He is trying to follow his diet.  He has been more sedentary now with a foot infection.  Home health is changing the dressing 3 times a week and yesterday when the dressing was changed there was no significant erythema secondary infection noted at this time.  The tunnel that he had going through the bottom of his foot from 1 side the other seems to be healing adequately.  He notes no chest pain, shortness of breath, palpitations, PND, orthopnea or other cardiac or respiratory complaints.  He notes no current GI or  complaints.  He notes no headaches, dizziness or new neurologic complaints.  He has no change of his chronic arthritic complaints and there are no other complaints on complete review of systems.    History     Past Medical History:   Diagnosis Date    Allergic rhinitis 9/20/2017    Arthritis     ASCVD (arteriosclerotic cardiovascular disease) 9/20/2017    Story: Old ASMI by EKG

## 2025-03-13 ENCOUNTER — OFFICE VISIT (OUTPATIENT)
Facility: CLINIC | Age: 89
End: 2025-03-13

## 2025-03-13 VITALS
BODY MASS INDEX: 25.4 KG/M2 | OXYGEN SATURATION: 97 % | WEIGHT: 203.2 LBS | SYSTOLIC BLOOD PRESSURE: 113 MMHG | TEMPERATURE: 97.6 F | HEART RATE: 101 BPM | DIASTOLIC BLOOD PRESSURE: 76 MMHG

## 2025-03-13 DIAGNOSIS — E55.9 VITAMIN D DEFICIENCY: ICD-10-CM

## 2025-03-13 DIAGNOSIS — I48.0 PAROXYSMAL ATRIAL FIBRILLATION (HCC): ICD-10-CM

## 2025-03-13 DIAGNOSIS — I25.10 ASCVD (ARTERIOSCLEROTIC CARDIOVASCULAR DISEASE): ICD-10-CM

## 2025-03-13 DIAGNOSIS — I50.32 CHRONIC DIASTOLIC CONGESTIVE HEART FAILURE (HCC): ICD-10-CM

## 2025-03-13 DIAGNOSIS — M35.3 POLYMYALGIA RHEUMATICA: ICD-10-CM

## 2025-03-13 DIAGNOSIS — I44.2 COMPLETE HEART BLOCK (HCC): ICD-10-CM

## 2025-03-13 DIAGNOSIS — E11.628 DIABETIC INFECTION OF LEFT FOOT (HCC): ICD-10-CM

## 2025-03-13 DIAGNOSIS — E78.2 MIXED HYPERLIPIDEMIA: ICD-10-CM

## 2025-03-13 DIAGNOSIS — L08.9 DIABETIC INFECTION OF LEFT FOOT (HCC): ICD-10-CM

## 2025-03-13 DIAGNOSIS — I12.9 HYPERTENSION WITH RENAL DISEASE: Primary | ICD-10-CM

## 2025-03-13 DIAGNOSIS — N18.32 TYPE 2 DIABETES MELLITUS WITH STAGE 3B CHRONIC KIDNEY DISEASE, WITHOUT LONG-TERM CURRENT USE OF INSULIN (HCC): ICD-10-CM

## 2025-03-13 DIAGNOSIS — R97.20 ELEVATED PSA: ICD-10-CM

## 2025-03-13 DIAGNOSIS — Z00.00 MEDICARE ANNUAL WELLNESS VISIT, SUBSEQUENT: ICD-10-CM

## 2025-03-13 DIAGNOSIS — J30.89 CHRONIC NON-SEASONAL ALLERGIC RHINITIS: ICD-10-CM

## 2025-03-13 DIAGNOSIS — M15.0 PRIMARY OSTEOARTHRITIS INVOLVING MULTIPLE JOINTS: ICD-10-CM

## 2025-03-13 DIAGNOSIS — N18.31 STAGE 3A CHRONIC KIDNEY DISEASE (HCC): ICD-10-CM

## 2025-03-13 DIAGNOSIS — E11.22 TYPE 2 DIABETES MELLITUS WITH STAGE 3B CHRONIC KIDNEY DISEASE, WITHOUT LONG-TERM CURRENT USE OF INSULIN (HCC): ICD-10-CM

## 2025-03-13 DIAGNOSIS — N40.0 BENIGN PROSTATIC HYPERPLASIA WITHOUT LOWER URINARY TRACT SYMPTOMS: ICD-10-CM

## 2025-03-13 DIAGNOSIS — Z13.39 ALCOHOL SCREENING: ICD-10-CM

## 2025-03-13 ASSESSMENT — PATIENT HEALTH QUESTIONNAIRE - PHQ9
SUM OF ALL RESPONSES TO PHQ QUESTIONS 1-9: 0
2. FEELING DOWN, DEPRESSED OR HOPELESS: NOT AT ALL
1. LITTLE INTEREST OR PLEASURE IN DOING THINGS: NOT AT ALL
SUM OF ALL RESPONSES TO PHQ QUESTIONS 1-9: 0

## 2025-03-13 NOTE — PROGRESS NOTES
\"Have you been to the ER, urgent care clinic since your last visit?  Hospitalized since your last visit?\"    YES - When: approximately 14 days ago.  Where and Why: pt went to [facility]  for wound care .    “Have you seen or consulted any other health care providers outside our system since your last visit?”    NO

## 2025-03-14 RX ORDER — SIMVASTATIN 20 MG
20 TABLET ORAL DAILY
Qty: 90 TABLET | Refills: 3 | Status: SHIPPED | OUTPATIENT
Start: 2025-03-14

## 2025-03-14 NOTE — TELEPHONE ENCOUNTER
RX refill request from the patient/pharmacy. Patient last seen 03- with labs, and next appt. scheduled for 04-  Requested Prescriptions     Pending Prescriptions Disp Refills    simvastatin (ZOCOR) 20 MG tablet [Pharmacy Med Name: SIMVASTATIN 20 MG TABLET] 90 tablet 3     Sig: TAKE 1 TABLET BY MOUTH EVERY DAY    .

## 2025-03-17 ENCOUNTER — RESULTS FOLLOW-UP (OUTPATIENT)
Facility: CLINIC | Age: 89
End: 2025-03-17

## 2025-03-17 LAB
25(OH)D3 SERPL-MCNC: 51.8 NG/ML (ref 30–100)
ALBUMIN SERPL-MCNC: 3.3 G/DL (ref 3.5–5)
ALBUMIN/GLOB SERPL: 0.9 (ref 1.1–2.2)
ALP SERPL-CCNC: 74 U/L (ref 45–117)
ALT SERPL-CCNC: 35 U/L (ref 12–78)
ANION GAP SERPL CALC-SCNC: 4 MMOL/L (ref 2–12)
APPEARANCE UR: CLEAR
AST SERPL-CCNC: 22 U/L (ref 15–37)
BACTERIA URNS QL MICRO: NEGATIVE /HPF
BASOPHILS # BLD: 0.05 K/UL (ref 0–0.1)
BASOPHILS NFR BLD: 0.5 % (ref 0–1)
BILIRUB SERPL-MCNC: 0.5 MG/DL (ref 0.2–1)
BILIRUB UR QL: NEGATIVE
BUN SERPL-MCNC: 26 MG/DL (ref 6–20)
BUN/CREAT SERPL: 19 (ref 12–20)
CALCIUM SERPL-MCNC: 9.5 MG/DL (ref 8.5–10.1)
CAOX CRY URNS QL MICRO: ABNORMAL
CHLORIDE SERPL-SCNC: 105 MMOL/L (ref 97–108)
CHOLEST SERPL-MCNC: 149 MG/DL
CK SERPL-CCNC: 52 U/L (ref 39–308)
CO2 SERPL-SCNC: 29 MMOL/L (ref 21–32)
COLOR UR: ABNORMAL
CREAT SERPL-MCNC: 1.38 MG/DL (ref 0.7–1.3)
CREAT UR-MCNC: 124 MG/DL
DIFFERENTIAL METHOD BLD: ABNORMAL
DIGOXIN SERPL-MCNC: 1.3 NG/ML (ref 0.9–2)
EOSINOPHIL # BLD: 0.04 K/UL (ref 0–0.4)
EOSINOPHIL NFR BLD: 0.4 % (ref 0–7)
EPITH CASTS URNS QL MICRO: ABNORMAL /LPF
ERYTHROCYTE [DISTWIDTH] IN BLOOD BY AUTOMATED COUNT: 15.5 % (ref 11.5–14.5)
EST. AVERAGE GLUCOSE BLD GHB EST-MCNC: 194 MG/DL
GLOBULIN SER CALC-MCNC: 3.5 G/DL (ref 2–4)
GLUCOSE SERPL-MCNC: 224 MG/DL (ref 65–100)
GLUCOSE UR STRIP.AUTO-MCNC: NEGATIVE MG/DL
HBA1C MFR BLD: 8.4 % (ref 4–5.6)
HCT VFR BLD AUTO: 35.7 % (ref 36.6–50.3)
HDLC SERPL-MCNC: 39 MG/DL
HDLC SERPL: 3.8 (ref 0–5)
HGB BLD-MCNC: 11 G/DL (ref 12.1–17)
HGB UR QL STRIP: NEGATIVE
IMM GRANULOCYTES # BLD AUTO: 0.09 K/UL (ref 0–0.04)
IMM GRANULOCYTES NFR BLD AUTO: 0.8 % (ref 0–0.5)
INR PPP: 2.1 (ref 0.9–1.1)
KETONES UR QL STRIP.AUTO: ABNORMAL MG/DL
LDLC SERPL CALC-MCNC: 67.6 MG/DL (ref 0–100)
LEUKOCYTE ESTERASE UR QL STRIP.AUTO: NEGATIVE
LYMPHOCYTES # BLD: 1.07 K/UL (ref 0.8–3.5)
LYMPHOCYTES NFR BLD: 9.7 % (ref 12–49)
MCH RBC QN AUTO: 29 PG (ref 26–34)
MCHC RBC AUTO-ENTMCNC: 30.8 G/DL (ref 30–36.5)
MCV RBC AUTO: 94.2 FL (ref 80–99)
MICROALBUMIN UR-MCNC: 6.32 MG/DL
MICROALBUMIN/CREAT UR-RTO: 51 MG/G (ref 0–30)
MONOCYTES # BLD: 0.52 K/UL (ref 0–1)
MONOCYTES NFR BLD: 4.7 % (ref 5–13)
NEUTS SEG # BLD: 9.31 K/UL (ref 1.8–8)
NEUTS SEG NFR BLD: 83.9 % (ref 32–75)
NITRITE UR QL STRIP.AUTO: NEGATIVE
NRBC # BLD: 0 K/UL (ref 0–0.01)
NRBC BLD-RTO: 0 PER 100 WBC
PH UR STRIP: 5.5 (ref 5–8)
PLATELET # BLD AUTO: 131 K/UL (ref 150–400)
PMV BLD AUTO: 11.9 FL (ref 8.9–12.9)
POTASSIUM SERPL-SCNC: 4.3 MMOL/L (ref 3.5–5.1)
PROT SERPL-MCNC: 6.8 G/DL (ref 6.4–8.2)
PROT UR STRIP-MCNC: ABNORMAL MG/DL
PROTHROMBIN TIME: 21 SEC (ref 9.2–11.2)
PSA SERPL-MCNC: 4.3 NG/ML (ref 0.01–4)
RBC # BLD AUTO: 3.79 M/UL (ref 4.1–5.7)
RBC #/AREA URNS HPF: ABNORMAL /HPF (ref 0–5)
SODIUM SERPL-SCNC: 138 MMOL/L (ref 136–145)
SP GR UR REFRACTOMETRY: 1.02 (ref 1–1.03)
T4 FREE SERPL-MCNC: 1.4 NG/DL (ref 0.8–1.5)
TRIGL SERPL-MCNC: 212 MG/DL
TSH SERPL DL<=0.05 MIU/L-ACNC: 1.11 UIU/ML (ref 0.36–3.74)
UROBILINOGEN UR QL STRIP.AUTO: 0.2 EU/DL (ref 0.2–1)
VLDLC SERPL CALC-MCNC: 42.4 MG/DL
WBC # BLD AUTO: 11.1 K/UL (ref 4.1–11.1)
WBC URNS QL MICRO: ABNORMAL /HPF (ref 0–4)
YEAST BUDDING URNS QL: PRESENT

## 2025-03-17 RX ORDER — FLUCONAZOLE 150 MG/1
150 TABLET ORAL ONCE
Qty: 1 TABLET | Refills: 0 | Status: SHIPPED | OUTPATIENT
Start: 2025-03-17 | End: 2025-03-17

## 2025-03-18 DIAGNOSIS — E11.65 POORLY CONTROLLED DIABETES MELLITUS (HCC): ICD-10-CM

## 2025-03-18 RX ORDER — PREGABALIN 75 MG/1
75 CAPSULE ORAL DAILY
Qty: 90 CAPSULE | Refills: 1 | Status: SHIPPED | OUTPATIENT
Start: 2025-03-18 | End: 2025-09-14

## 2025-03-18 NOTE — TELEPHONE ENCOUNTER
RX refill request from the patient/pharmacy. Patient last seen 03/13/2025 with labs, and next appt. scheduled for 03/28/2025.    Requested Prescriptions     Pending Prescriptions Disp Refills    pregabalin (LYRICA) 75 MG capsule 90 capsule 1     Sig: Take 1 capsule by mouth daily for 180 days. Max Daily Amount: 75 mg

## 2025-03-18 NOTE — TELEPHONE ENCOUNTER
pregabalin (LYRICA) 75 MG capsule 90 capsule 1 11/25/2024 5/24/2025    Sig - Route: Take 1 capsule by mouth daily for 180 days. Max Daily Amount: 75 mg - Oral      Last visit 3/13/25  Future appt 4/15/25    Pharmacy [facility]

## 2025-03-19 ENCOUNTER — TELEPHONE (OUTPATIENT)
Facility: CLINIC | Age: 89
End: 2025-03-19

## 2025-03-19 NOTE — TELEPHONE ENCOUNTER
Daughter-in-law Hortencia called stating they received a call from the lab stating their was an issue with one of his labs and that he would need to come back in.  Hortencia thought they said it was his sed rate.  Please advise.

## 2025-03-21 ENCOUNTER — ANTI-COAG VISIT (OUTPATIENT)
Facility: CLINIC | Age: 89
End: 2025-03-21

## 2025-03-21 NOTE — TELEPHONE ENCOUNTER
Contacted Patient's daughter-in-law per Dr Powell and let her know that the Sed Rate which was canceled does not need to be reordered at this time, but that if the patient continues to have urinary issues, he will reorder it at his next visit. Patient wanted to know if he should take the Diflucan dose as prescribed and I shared that Dr Powell was giving it to him for the yeast found during his urinalysis, and therefore he should go ahead and take it.      Kristy thanked me for calling and stated she will relay all info to the patient.

## 2025-03-21 NOTE — PROGRESS NOTES
Called pt and informed him that his INR is 2.1.Advised no change in dosing which is 5mg Sat and Sunday  and 7.5 all other days.

## 2025-03-24 ENCOUNTER — TELEPHONE (OUTPATIENT)
Age: 89
End: 2025-03-24

## 2025-03-24 NOTE — TELEPHONE ENCOUNTER
Hortencia Wagner from emergency contacts states patient has no more medication left on on his prescription at [facility]. End date is 3/27. Let her know that his end date was 3/27 on original order. She was concerned that you would not see him prior to end date. Follow-up is 04/08/2025@ 1pm. She says please let her know if anything changes.

## 2025-03-24 NOTE — TELEPHONE ENCOUNTER
Hortencia Wagner (daughter in law) not on PHI  But Mr. Diallo was there    [facility] called stating that they were not going to be sending anymore medication Ampicillin - Sulvactam 12 gm 500 ml bag    They change the bag 1 x day and patient only has 3 bags left    Hortencia # [phone]

## 2025-03-25 ENCOUNTER — TELEPHONE (OUTPATIENT)
Age: 89
End: 2025-03-25

## 2025-03-25 NOTE — TELEPHONE ENCOUNTER
Patient see's foot doctor on Friday. Hortencia Wagner, patient's daughter in law states. Should we not pull line until after visit on friday or should we have line pulled after last dose on Thursday.

## 2025-03-25 NOTE — TELEPHONE ENCOUNTER
Placed labs on your desk. Notified Justice at [facility] to extend antibiotic through Friday.

## 2025-03-25 NOTE — TELEPHONE ENCOUNTER
Labs look great.  I also messaged the podiatrist regarding end of date for antibiotic therapy .  You may let the patient know.  Thanks

## 2025-03-25 NOTE — TELEPHONE ENCOUNTER
Pt needs to see his foot doctor more importantly than me. He will update me & notify me if extension needed. Please notify [facility]  of number of remaining antibiotic doses

## 2025-03-25 NOTE — TELEPHONE ENCOUNTER
Line needs to be pulled after visit on Friday with podiatry.  If podiatry needs need to extend antibiotic therapy he would message me.  Standard of care for IV antibiotic therapy is 6 weeks.  If he needs further antibiotic therapy we could do oral treatment.      Please also get me his most recent labs.  Thanks

## 2025-03-27 RX ORDER — WARFARIN SODIUM 5 MG/1
TABLET ORAL
Qty: 145 TABLET | Refills: 1 | Status: SHIPPED | OUTPATIENT
Start: 2025-03-27

## 2025-03-27 NOTE — TELEPHONE ENCOUNTER
PCP: Willem Powell MD    Last appt: 8/2/2024    Future Appointments   Date Time Provider Department Center   3/28/2025  2:00 PM Radha Carrasco DPM [facility]   4/8/2025  1:00 PM Brissa Carney MD [facility]   4/15/2025 10:50 AM Willem Powell MD [facility]       Requested Prescriptions     Pending Prescriptions Disp Refills    warfarin (COUMADIN) 5 MG tablet [Pharmacy Med Name: WARFARIN SODIUM 5 MG TABLET] 145 tablet 1     Sig: TAKE 5 MG ON SUNDAY, TUESDAY AND THURSDAY ONE AND A HALF TABLETS ALL OTHER DAYS.

## 2025-03-28 ENCOUNTER — HOSPITAL ENCOUNTER (OUTPATIENT)
Facility: HOSPITAL | Age: 89
Discharge: HOME | End: 2025-03-28
Attending: PODIATRIST
Payer: MEDICARE

## 2025-03-28 ENCOUNTER — HOSPITAL ENCOUNTER (OUTPATIENT)
Facility: HOSPITAL | Age: 89
Discharge: HOME | End: 2025-03-31
Payer: MEDICARE

## 2025-03-28 VITALS
SYSTOLIC BLOOD PRESSURE: 130 MMHG | RESPIRATION RATE: 16 BRPM | HEART RATE: 92 BPM | TEMPERATURE: 97.6 F | DIASTOLIC BLOOD PRESSURE: 71 MMHG

## 2025-03-28 DIAGNOSIS — E11.621 DIABETIC ULCER OF TOE OF LEFT FOOT ASSOCIATED WITH TYPE 2 DIABETES MELLITUS, LIMITED TO BREAKDOWN OF SKIN: ICD-10-CM

## 2025-03-28 DIAGNOSIS — L97.521 DIABETIC ULCER OF TOE OF LEFT FOOT ASSOCIATED WITH TYPE 2 DIABETES MELLITUS, LIMITED TO BREAKDOWN OF SKIN: ICD-10-CM

## 2025-03-28 DIAGNOSIS — L97.523 ULCER OF LEFT FOOT, WITH NECROSIS OF MUSCLE (HCC): Primary | ICD-10-CM

## 2025-03-28 PROCEDURE — 73630 X-RAY EXAM OF FOOT: CPT

## 2025-03-28 PROCEDURE — 99213 OFFICE O/P EST LOW 20 MIN: CPT

## 2025-03-28 PROCEDURE — 87205 SMEAR GRAM STAIN: CPT

## 2025-03-28 PROCEDURE — 87070 CULTURE OTHR SPECIMN AEROBIC: CPT

## 2025-03-28 RX ORDER — NEOMYCIN/BACITRACIN/POLYMYXINB 3.5-400-5K
OINTMENT (GRAM) TOPICAL PRN
OUTPATIENT
Start: 2025-03-28

## 2025-03-28 RX ORDER — LIDOCAINE 50 MG/G
OINTMENT TOPICAL PRN
OUTPATIENT
Start: 2025-03-28

## 2025-03-28 RX ORDER — GINSENG 100 MG
CAPSULE ORAL PRN
OUTPATIENT
Start: 2025-03-28

## 2025-03-28 RX ORDER — SILVER SULFADIAZINE 10 MG/G
CREAM TOPICAL PRN
OUTPATIENT
Start: 2025-03-28

## 2025-03-28 RX ORDER — BACITRACIN ZINC AND POLYMYXIN B SULFATE 500; 1000 [USP'U]/G; [USP'U]/G
OINTMENT TOPICAL PRN
OUTPATIENT
Start: 2025-03-28

## 2025-03-28 RX ORDER — SODIUM CHLOR/HYPOCHLOROUS ACID 0.033 %
SOLUTION, IRRIGATION IRRIGATION PRN
OUTPATIENT
Start: 2025-03-28

## 2025-03-28 RX ORDER — LIDOCAINE HYDROCHLORIDE 40 MG/ML
SOLUTION TOPICAL PRN
OUTPATIENT
Start: 2025-03-28

## 2025-03-28 RX ORDER — CLOBETASOL PROPIONATE 0.5 MG/G
OINTMENT TOPICAL PRN
OUTPATIENT
Start: 2025-03-28

## 2025-03-28 RX ORDER — GENTAMICIN SULFATE 1 MG/G
OINTMENT TOPICAL PRN
OUTPATIENT
Start: 2025-03-28

## 2025-03-28 RX ORDER — TRIAMCINOLONE ACETONIDE 1 MG/G
OINTMENT TOPICAL PRN
OUTPATIENT
Start: 2025-03-28

## 2025-03-28 RX ORDER — LIDOCAINE HYDROCHLORIDE 20 MG/ML
JELLY TOPICAL PRN
OUTPATIENT
Start: 2025-03-28

## 2025-03-28 RX ORDER — MUPIROCIN 20 MG/G
OINTMENT TOPICAL PRN
OUTPATIENT
Start: 2025-03-28

## 2025-03-28 RX ORDER — LIDOCAINE 40 MG/G
CREAM TOPICAL PRN
OUTPATIENT
Start: 2025-03-28

## 2025-03-28 RX ORDER — BETAMETHASONE DIPROPIONATE 0.5 MG/G
CREAM TOPICAL PRN
OUTPATIENT
Start: 2025-03-28

## 2025-03-28 NOTE — WOUND CARE
[facility]   Progress Note and Procedure Note   NO Procedure      Baron Wagner Sr.  MEDICAL RECORD NUMBER:  [id number]  AGE: 89 y.o. RACE White (non-)  GENDER: male  : 1933  EPISODE DATE:  3/28/2025    Subjective:     Chief Complaint   Patient presents with    Wound Check    Follow up wounds left foot     HISTORY of PRESENT ILLNESS HPI    Baron Wagner Sr. is a 89 y.o. male who presents today for wound/ulcer evaluation.   Wound/Ulcer Pain Timing/Severity: none          Ulcer Identification:  Ulcer Type: diabetic and non-healing surgical    Contributing Factors: diabetes        PAST MEDICAL HISTORY    Past Medical History:   Diagnosis Date    Allergic rhinitis 2017    Arthritis     ASCVD (arteriosclerotic cardiovascular disease) 2017    Story: Old ASMI by EKG    Back pain 2017    BPH (benign prostatic hyperplasia) 2017    CHF (congestive heart failure) (HCC) 2017    CKD (chronic kidney disease) 2017    COVID-19 2021    Diabetic acetonemia (HCC)     DM (diabetes mellitus) ([facility]) 2017    Story: Diet Controlled    ED (erectile dysfunction) 2017    Edema 2017    Elevated CPK 2017    Comments: History of    Elevated LFTs 2017    Comments: History of    Elevated PSA 2017    Hypercholesteremia     Hyperlipidemia 2017    Hypertension     Hypertension with renal disease 2017    Nodule of right lung 2017    Story: Right    Polymyalgia 2017    Prostate enlargement     Substance abuse (HCC)         PAST SURGICAL HISTORY    Past Surgical History:   Procedure Laterality Date    ARTHROCENTESIS  2023    CARDIAC PROCEDURE N/A 2023    Insert temporary pacemaker performed by Dominic Peace MD at [facility] CARDIAC CATH LAB    CENTRAL LINE  2023    EP DEVICE PROCEDURE N/A 2023    Insert or replace transcath PPM leadless performed by Jewel Call MD at [facility] CARDIAC CATH LAB    FOOT DEBRIDEMENT Left

## 2025-03-28 NOTE — FLOWSHEET NOTE
03/28/25 1400   LLE Neurovascular Assessment   Capillary Refill Less than/Equal to 3 seconds   Color Appropriate for Ethnicity   Temperature Warm   LLE Sensation  Decreased   L Pedal Pulse +2   Wound 02/10/25 Other (Comment) Left;Plantar   Date First Assessed/Time First Assessed: 02/10/25 1700   Primary Wound Type: Diabetic Ulcer  Location: (c) Other (Comment)  Wound Location Orientation: Left;Plantar   Wound Image    Wound Etiology Diabetic   Dressing Status Old drainage noted   Wound Cleansed Cleansed with saline   Offloading for Diabetic Foot Ulcers Post op shoe   Wound Length (cm) 0.4 cm   Wound Width (cm) 0.5 cm   Wound Depth (cm) 1 cm   Wound Surface Area (cm^2) 0.2 cm^2   Change in Wound Size % (l*w) 59.18   Wound Volume (cm^3) 0.2 cm^3   Wound Healing % 55   Wound Assessment [place]/red;Slough   Drainage Amount Moderate (25-50%)   Drainage Description Serosanguinous   Odor None   Lizbeth-wound Assessment Intact   Margins Defined edges   Wound Thickness Description not for Pressure Injury Full thickness   Wound 01/31/25 Foot Left;Lateral   Date First Assessed/Time First Assessed: 01/31/25 1423   Present on Original Admission: Yes  Wound Approximate Age at First Assessment (Weeks): 2 weeks  Location: Foot  Wound Location Orientation: Left;Lateral   Wound Image    Wound Etiology Diabetic   Dressing Status Old drainage noted   Wound Cleansed Cleansed with saline   Offloading for Diabetic Foot Ulcers Post op shoe   Wound Length (cm) 0.5 cm   Wound Width (cm) 0.3 cm   Wound Depth (cm) 0.9 cm   Wound Surface Area (cm^2) 0.15 cm^2   Change in Wound Size % (l*w) 81.48   Wound Volume (cm^3) 0.135 cm^3   Wound Healing % 76   Wound Assessment [place]/red;Slough   Drainage Amount Moderate (25-50%)   Drainage Description Serosanguinous   Odor None   Lizbeth-wound Assessment Intact   Margins Defined edges   Wound Thickness Description not for Pressure Injury Full thickness   Pain Assessment   Pain Assessment None - Denies Pain

## 2025-03-28 NOTE — PATIENT INSTRUCTIONS
Discharge Instructions/Wound Care Orders  [facility]   [address]  [address], [id number]  [place] [address]  Phone: [phone] Fax: [phone]    NAME:  Baron Wagner Sr.  YOB: 1933  MEDICAL RECORD NUMBER:  [id number]  DATE:  March 28, 2025    Wound Care Orders:  Left foot wound - Cleanse with normal saline and gauze. Packed collagen into wound especially toward arch of foot. Cover with border foam. Change dressing every other day. Home health to change dressing every other day.    Home health [facility]    Activity:  [x] Elevate leg(s) above the level of the heart when sitting and avoid prolonged standing in one place.    [x] Wear off-loading shoe/boot on the affected foot when walking.  [x] Do not get dressing wet.    Dietary:  [] Diet as tolerated      [x] Diabetic Diet            [] Increase Protein: examples (Meat, cheese, eggs, greek yogurt, fish, nuts)          [] Lawson Therapeutic Nutrition Powder  [] Other:      Follow-up:  [x] Return Appointment: With Dr. Radha Carrasco in 3 Weeks.  [x] Ordered tests: Xray to be done.      Electronically signed Chrissy Smith RN on 3/28/2025 at 10:45 AM     Wound Care Center Information: Should you experience any significant changes in your wound(s) or have questions about your wound care, please contact the [facility] Outpatient Wound Center at MONDAY - FRIDAY 8:00 am - 4:30.  If you need help with your wound outside these hours and cannot wait until we are again available, contact your PCP or go to the hospital emergency room.     PLEASE NOTE: IF YOU ARE UNABLE TO OBTAIN WOUND SUPPLIES, CONTINUE TO USE THE SUPPLIES YOU HAVE AVAILABLE UNTIL YOU ARE ABLE TO REACH US. IT IS MOST IMPORTANT TO KEEP THE WOUND COVERED AT ALL TIMES.     Physician Signature:_______________________    Date: ___________ Time:  ____________

## 2025-03-30 LAB
BACTERIA SPEC CULT: NORMAL
GRAM STN SPEC: NORMAL
GRAM STN SPEC: NORMAL
SERVICE CMNT-IMP: NORMAL

## 2025-03-31 LAB
BACTERIA SPEC CULT: NORMAL
GRAM STN SPEC: NORMAL
GRAM STN SPEC: NORMAL
SERVICE CMNT-IMP: NORMAL

## 2025-04-01 ENCOUNTER — TELEPHONE (OUTPATIENT)
Age: 89
End: 2025-04-01

## 2025-04-01 NOTE — TELEPHONE ENCOUNTER
nurse called to verify if patient's line needed to be pulled after visit with Dr. Carrasco. Nurse to pull line today. Called to verify if line needed to be pulled. Gave verbal order based on Dr. Carney's notes.

## 2025-04-01 NOTE — TELEPHONE ENCOUNTER
Coco from [facility] called to notify our office that the PICC line was being discontinued per Dr. Carney. Will update provider.

## 2025-04-08 ENCOUNTER — TELEPHONE (OUTPATIENT)
Age: 89
End: 2025-04-08

## 2025-04-08 ENCOUNTER — OFFICE VISIT (OUTPATIENT)
Age: 89
End: 2025-04-08
Payer: MEDICARE

## 2025-04-08 VITALS
DIASTOLIC BLOOD PRESSURE: 72 MMHG | BODY MASS INDEX: 24.12 KG/M2 | SYSTOLIC BLOOD PRESSURE: 112 MMHG | WEIGHT: 194 LBS | HEART RATE: 97 BPM | OXYGEN SATURATION: 97 % | HEIGHT: 75 IN | TEMPERATURE: 97.8 F | RESPIRATION RATE: 20 BRPM

## 2025-04-08 DIAGNOSIS — M86.172 ACUTE OSTEOMYELITIS OF LEFT FOOT (HCC): ICD-10-CM

## 2025-04-08 DIAGNOSIS — I48.0 PAROXYSMAL ATRIAL FIBRILLATION (HCC): ICD-10-CM

## 2025-04-08 DIAGNOSIS — A49.8 BACTEROIDES INFECTION: ICD-10-CM

## 2025-04-08 DIAGNOSIS — L02.619 ABSCESS OF PLANTAR ASPECT OF FOOT: ICD-10-CM

## 2025-04-08 DIAGNOSIS — R20.9 COLD LEFT FOOT: ICD-10-CM

## 2025-04-08 DIAGNOSIS — M35.3 POLYMYALGIA RHEUMATICA: ICD-10-CM

## 2025-04-08 DIAGNOSIS — R22.42 LOCALIZED SWELLING OF LEFT LOWER LEG: ICD-10-CM

## 2025-04-08 DIAGNOSIS — E11.628 DIABETIC INFECTION OF LEFT FOOT (HCC): Primary | ICD-10-CM

## 2025-04-08 DIAGNOSIS — G62.9 PERIPHERAL POLYNEUROPATHY: ICD-10-CM

## 2025-04-08 DIAGNOSIS — E78.5 HYPERLIPIDEMIA, UNSPECIFIED HYPERLIPIDEMIA TYPE: ICD-10-CM

## 2025-04-08 DIAGNOSIS — I50.30 DIASTOLIC CONGESTIVE HEART FAILURE, UNSPECIFIED HF CHRONICITY (HCC): ICD-10-CM

## 2025-04-08 DIAGNOSIS — L08.9 DIABETIC INFECTION OF LEFT FOOT (HCC): Primary | ICD-10-CM

## 2025-04-08 DIAGNOSIS — N18.31 STAGE 3A CHRONIC KIDNEY DISEASE (HCC): ICD-10-CM

## 2025-04-08 DIAGNOSIS — E11.65 POORLY CONTROLLED DIABETES MELLITUS (HCC): ICD-10-CM

## 2025-04-08 DIAGNOSIS — A49.8 E COLI INFECTION: ICD-10-CM

## 2025-04-08 DIAGNOSIS — M79.89 NECROTIZING SOFT TISSUE INFECTION: ICD-10-CM

## 2025-04-08 PROCEDURE — 1036F TOBACCO NON-USER: CPT | Performed by: INTERNAL MEDICINE

## 2025-04-08 PROCEDURE — 1125F AMNT PAIN NOTED PAIN PRSNT: CPT | Performed by: INTERNAL MEDICINE

## 2025-04-08 PROCEDURE — 99214 OFFICE O/P EST MOD 30 MIN: CPT | Performed by: INTERNAL MEDICINE

## 2025-04-08 PROCEDURE — G8427 DOCREV CUR MEDS BY ELIG CLIN: HCPCS | Performed by: INTERNAL MEDICINE

## 2025-04-08 PROCEDURE — G8420 CALC BMI NORM PARAMETERS: HCPCS | Performed by: INTERNAL MEDICINE

## 2025-04-08 PROCEDURE — 3052F HG A1C>EQUAL 8.0%<EQUAL 9.0%: CPT | Performed by: INTERNAL MEDICINE

## 2025-04-08 PROCEDURE — 1123F ACP DISCUSS/DSCN MKR DOCD: CPT | Performed by: INTERNAL MEDICINE

## 2025-04-08 PROCEDURE — 1159F MED LIST DOCD IN RCRD: CPT | Performed by: INTERNAL MEDICINE

## 2025-04-08 RX ORDER — AMOXICILLIN AND CLAVULANATE POTASSIUM 500; 125 MG/1; MG/1
1 TABLET, FILM COATED ORAL 2 TIMES DAILY
Qty: 40 TABLET | Refills: 0 | Status: SHIPPED | OUTPATIENT
Start: 2025-04-08 | End: 2025-04-28

## 2025-04-08 ASSESSMENT — PATIENT HEALTH QUESTIONNAIRE - PHQ9
SUM OF ALL RESPONSES TO PHQ QUESTIONS 1-9: 0
SUM OF ALL RESPONSES TO PHQ QUESTIONS 1-9: 0
1. LITTLE INTEREST OR PLEASURE IN DOING THINGS: NOT AT ALL
SUM OF ALL RESPONSES TO PHQ QUESTIONS 1-9: 0
2. FEELING DOWN, DEPRESSED OR HOPELESS: NOT AT ALL
SUM OF ALL RESPONSES TO PHQ QUESTIONS 1-9: 0

## 2025-04-08 NOTE — PROGRESS NOTES
Infectious Disease clinic        Impression    Diabetic left foot infection  Abscess on plantar aspect of foot, small ulcer on lateral foot  Concern for necrotizing soft tissue infection  CT of left foot 2/10+ for diffuse soft tissue swelling concerning for cellulitis.  Gas in the medial soft tissues concerning for necrotizing soft tissue infection.  Ulceration in the lateral proximal forefoot.  No CT evidence of abscess, acute OM  X-ray 2/10+ for findings concerning for necrotizing soft tissue infection, without  Radiographic evidence of acute osteomyelitis.  S/p doxycycline p.o. PTA  Wound culture 2/11+ light mixed anaerobic GNR, MSF  Blood cultures 2/10-no growth     S/p I&D of abscess x 2, left foot and bone biopsy 5th MT  Wound culture 2/13 + for few E. coli, alpha strep, light Bacteroides  Thetaiotaomicron beta-lactamase positive.  Bone biopsy+ osteomyelitis.   Patient has completed 6 weeks of IV antibiotic.  Plantar and lateral foot ulcers are healing.  No drainage.  Follow-up wound cultures on 2/28, 3/28-no growth.  X-ray left foot+ for subacute fracture of base of fifth MT, diffuse soft tissue swelling  Osteopenia, DJD  D/w podiatry Dr Carrasco    Left lower extremity/calf swelling  For venous duplex evaluate for DVT    Erythema of left lower leg  ?  Vascular changes    Left foot cool to touch  For vascular follow-up    Diabetes type 2  Poorly controlled  Diabetic neuropathy  A1c 7.5    CKD 3  Cr 1.27    Diastolic CHF  Stable      Paroxysmal atrial fibrillation  On digoxin, Coumadin  Pacemaker present.    S/p evaluation by vascular service  For circulatory adequacy    HLD  Continue statin    H/o polymyalgia  rheumatica    H/o admission 10/2023  Treated for cellulitis and infected wounds of both feet  Left hallux, right second toe.  Outpatient wound cultures positive for Klebsiella  aerogenes & MSSA.  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  IntraOp culture+ for  MSSA, MSF   Pathology was+ for acute OM with 
BLOOD SUGAR TWICE DAILY 2/17/25  Yes Willem Powell MD balsum peru-castor oil (VENELEX) OINT ointment Apply topically 2 times daily 1/14/25  Yes Willem Powell MD   Lactobacillus (PROBIOTIC ACIDOPHILUS) CAPS Take 1 daily 11/25/24  Yes Willem Powell MD   bumetanide (BUMEX) 2 MG tablet TAKE 1 TABLET BY MOUTH TWICE A DAY 11/6/24  Yes Willem Powell MD   terazosin (HYTRIN) 2 MG capsule TAKE 1 CAPSULE BY MOUTH EVERY DAY 10/21/24  Yes Willem Powell MD   Glucosamine-Chondroitin--400-167 MG TABS Take 1 tablet by mouth daily   Yes Vaishnavi Garcia MD   glimepiride (AMARYL) 4 MG tablet TAKE 1 TABLET BY MOUTH EVERY DAY BEFORE BREAKFAST 9/10/24  Yes Willem Powell MD   digoxin (LANOXIN) 125 MCG tablet TAKE 1 TABLET BY MOUTH EVERY DAY 6/24/24  Yes Willem Powell MD   vitamin C (ASCORBIC ACID) 500 MG tablet Take 2 tablets by mouth daily   Yes Vaishnavi Garcia MD   acetaminophen (TYLENOL) 500 MG tablet Take 2 tablets by mouth in the morning and at bedtime   Yes Vaishnavi Garcia MD   Omega-3 Fatty Acids (FISH OIL) 1200 MG CAPS Take 2,400 mg by mouth in the morning and at bedtime Taking 1200 mg   Yes Vaishnavi Garcia MD   vitamin B-12 (CYANOCOBALAMIN) 1000 MCG tablet Take 1 tablet by mouth daily   Yes Vaishnavi Garcia MD   Multiple Vitamins-Minerals (CENTRUM SILVER ULTRA MENS PO) Take 1 tablet by mouth daily   Yes Vaishnavi Garcia MD   Cholecalciferol 400 UNIT TABS tablet Take 1 tablet by mouth at bedtime   Yes Automatic Reconciliation, Ar   Glucosamine 500 MG CAPS 1 capsule with meals Orally Three times a day    Vaishnavi Garcia MD   glucosamine-chondroitin 500-400 MG CAPS Take 1 capsule by mouth daily    Vaishnavi Garcia MD     Vitals:    04/08/25 1258   BP: 112/72   BP Site: Left Upper Arm   Patient Position: Sitting   BP Cuff Size: Large Adult   Pulse: 97   Resp: 20   Temp: 97.8 °F (36.6 °C)   TempSrc: Oral   SpO2: 97%   Weight: 88 kg (194 lb)

## 2025-04-08 NOTE — TELEPHONE ENCOUNTER
Please let patient/son know that I messaged Dr. Thanh Van & informed him of his cold left foot.  He will see him in the office.  Please call and make an appointment ASAP

## 2025-04-09 ENCOUNTER — HOSPITAL ENCOUNTER (OUTPATIENT)
Facility: HOSPITAL | Age: 89
Discharge: HOME | End: 2025-04-12
Attending: INTERNAL MEDICINE
Payer: MEDICARE

## 2025-04-09 DIAGNOSIS — L08.9 DIABETIC INFECTION OF LEFT FOOT (HCC): ICD-10-CM

## 2025-04-09 DIAGNOSIS — E11.628 DIABETIC INFECTION OF LEFT FOOT (HCC): ICD-10-CM

## 2025-04-09 DIAGNOSIS — R22.42 LOCALIZED SWELLING OF LEFT LOWER LEG: ICD-10-CM

## 2025-04-09 PROCEDURE — 93971 EXTREMITY STUDY: CPT

## 2025-04-09 NOTE — TELEPHONE ENCOUNTER
Spoke to Hortencia Wagner and they are to call to make an appointment as soon as possible with Dr. Thanh Van.

## 2025-04-11 PROBLEM — Z00.00 MEDICARE ANNUAL WELLNESS VISIT, SUBSEQUENT: Status: RESOLVED | Noted: 2017-10-30 | Resolved: 2025-04-11

## 2025-04-15 ENCOUNTER — OFFICE VISIT (OUTPATIENT)
Facility: CLINIC | Age: 89
End: 2025-04-15
Payer: MEDICARE

## 2025-04-15 VITALS
TEMPERATURE: 97.6 F | HEART RATE: 83 BPM | BODY MASS INDEX: 23.66 KG/M2 | OXYGEN SATURATION: 98 % | WEIGHT: 189.3 LBS | SYSTOLIC BLOOD PRESSURE: 107 MMHG | DIASTOLIC BLOOD PRESSURE: 70 MMHG

## 2025-04-15 DIAGNOSIS — L08.9 DIABETIC INFECTION OF LEFT FOOT (HCC): Primary | ICD-10-CM

## 2025-04-15 DIAGNOSIS — N40.0 BENIGN PROSTATIC HYPERPLASIA WITHOUT LOWER URINARY TRACT SYMPTOMS: ICD-10-CM

## 2025-04-15 DIAGNOSIS — E11.22 TYPE 2 DIABETES MELLITUS WITH STAGE 3B CHRONIC KIDNEY DISEASE, WITHOUT LONG-TERM CURRENT USE OF INSULIN (HCC): ICD-10-CM

## 2025-04-15 DIAGNOSIS — I12.9 HYPERTENSION WITH RENAL DISEASE: ICD-10-CM

## 2025-04-15 DIAGNOSIS — N18.31 STAGE 3A CHRONIC KIDNEY DISEASE (HCC): ICD-10-CM

## 2025-04-15 DIAGNOSIS — E11.628 DIABETIC INFECTION OF LEFT FOOT (HCC): Primary | ICD-10-CM

## 2025-04-15 DIAGNOSIS — N18.32 TYPE 2 DIABETES MELLITUS WITH STAGE 3B CHRONIC KIDNEY DISEASE, WITHOUT LONG-TERM CURRENT USE OF INSULIN (HCC): ICD-10-CM

## 2025-04-15 DIAGNOSIS — I48.0 PAROXYSMAL ATRIAL FIBRILLATION (HCC): ICD-10-CM

## 2025-04-15 DIAGNOSIS — I50.32 CHRONIC DIASTOLIC CONGESTIVE HEART FAILURE (HCC): ICD-10-CM

## 2025-04-15 PROCEDURE — 99215 OFFICE O/P EST HI 40 MIN: CPT | Performed by: INTERNAL MEDICINE

## 2025-04-15 PROCEDURE — G8427 DOCREV CUR MEDS BY ELIG CLIN: HCPCS | Performed by: INTERNAL MEDICINE

## 2025-04-15 PROCEDURE — 1159F MED LIST DOCD IN RCRD: CPT | Performed by: INTERNAL MEDICINE

## 2025-04-15 PROCEDURE — 1160F RVW MEDS BY RX/DR IN RCRD: CPT | Performed by: INTERNAL MEDICINE

## 2025-04-15 PROCEDURE — 1036F TOBACCO NON-USER: CPT | Performed by: INTERNAL MEDICINE

## 2025-04-15 PROCEDURE — 1123F ACP DISCUSS/DSCN MKR DOCD: CPT | Performed by: INTERNAL MEDICINE

## 2025-04-15 PROCEDURE — 3052F HG A1C>EQUAL 8.0%<EQUAL 9.0%: CPT | Performed by: INTERNAL MEDICINE

## 2025-04-15 PROCEDURE — G8420 CALC BMI NORM PARAMETERS: HCPCS | Performed by: INTERNAL MEDICINE

## 2025-04-15 PROCEDURE — 1125F AMNT PAIN NOTED PAIN PRSNT: CPT | Performed by: INTERNAL MEDICINE

## 2025-04-15 RX ORDER — PREDNISOLONE 5 MG/1
5 TABLET ORAL DAILY
COMMUNITY

## 2025-04-16 LAB
ANION GAP SERPL CALC-SCNC: 6 MMOL/L (ref 2–12)
BASOPHILS # BLD: 0.06 K/UL (ref 0–0.1)
BASOPHILS NFR BLD: 0.6 % (ref 0–1)
BUN SERPL-MCNC: 32 MG/DL (ref 6–20)
BUN/CREAT SERPL: 21 (ref 12–20)
CALCIUM SERPL-MCNC: 10.3 MG/DL (ref 8.5–10.1)
CHLORIDE SERPL-SCNC: 102 MMOL/L (ref 97–108)
CO2 SERPL-SCNC: 30 MMOL/L (ref 21–32)
CREAT SERPL-MCNC: 1.53 MG/DL (ref 0.7–1.3)
DIFFERENTIAL METHOD BLD: ABNORMAL
EOSINOPHIL # BLD: 0.06 K/UL (ref 0–0.4)
EOSINOPHIL NFR BLD: 0.6 % (ref 0–7)
ERYTHROCYTE [DISTWIDTH] IN BLOOD BY AUTOMATED COUNT: 15.2 % (ref 11.5–14.5)
ERYTHROCYTE [SEDIMENTATION RATE] IN BLOOD: 106 MM/HR (ref 0–20)
GLUCOSE SERPL-MCNC: 222 MG/DL (ref 65–100)
HCT VFR BLD AUTO: 37.1 % (ref 36.6–50.3)
HGB BLD-MCNC: 11.1 G/DL (ref 12.1–17)
IMM GRANULOCYTES # BLD AUTO: 0.08 K/UL (ref 0–0.04)
IMM GRANULOCYTES NFR BLD AUTO: 0.9 % (ref 0–0.5)
INR PPP: 1.7 (ref 0.9–1.1)
LYMPHOCYTES # BLD: 1.08 K/UL (ref 0.8–3.5)
LYMPHOCYTES NFR BLD: 11.5 % (ref 12–49)
MCH RBC QN AUTO: 27.3 PG (ref 26–34)
MCHC RBC AUTO-ENTMCNC: 29.9 G/DL (ref 30–36.5)
MCV RBC AUTO: 91.4 FL (ref 80–99)
MONOCYTES # BLD: 0.6 K/UL (ref 0–1)
MONOCYTES NFR BLD: 6.4 % (ref 5–13)
NEUTS SEG # BLD: 7.5 K/UL (ref 1.8–8)
NEUTS SEG NFR BLD: 80 % (ref 32–75)
NRBC # BLD: 0 K/UL (ref 0–0.01)
NRBC BLD-RTO: 0 PER 100 WBC
PLATELET # BLD AUTO: 172 K/UL (ref 150–400)
PMV BLD AUTO: 11.9 FL (ref 8.9–12.9)
POTASSIUM SERPL-SCNC: 4.6 MMOL/L (ref 3.5–5.1)
PROTHROMBIN TIME: 17.2 SEC (ref 9.2–11.2)
RBC # BLD AUTO: 4.06 M/UL (ref 4.1–5.7)
SODIUM SERPL-SCNC: 138 MMOL/L (ref 136–145)
WBC # BLD AUTO: 9.4 K/UL (ref 4.1–11.1)

## 2025-04-17 ENCOUNTER — RESULTS FOLLOW-UP (OUTPATIENT)
Facility: CLINIC | Age: 89
End: 2025-04-17

## 2025-04-21 ENCOUNTER — TELEPHONE (OUTPATIENT)
Facility: CLINIC | Age: 89
End: 2025-04-21

## 2025-04-22 ENCOUNTER — TELEPHONE (OUTPATIENT)
Age: 89
End: 2025-04-22

## 2025-04-22 ENCOUNTER — HOSPITAL ENCOUNTER (INPATIENT)
Facility: HOSPITAL | Age: 89
LOS: 3 days | Discharge: HOME HEALTH | DRG: 638 | End: 2025-04-25
Attending: STUDENT IN AN ORGANIZED HEALTH CARE EDUCATION/TRAINING PROGRAM | Admitting: STUDENT IN AN ORGANIZED HEALTH CARE EDUCATION/TRAINING PROGRAM
Payer: MEDICARE

## 2025-04-22 ENCOUNTER — APPOINTMENT (OUTPATIENT)
Facility: HOSPITAL | Age: 89
DRG: 638 | End: 2025-04-22
Payer: MEDICARE

## 2025-04-22 DIAGNOSIS — R65.20 SEVERE SEPSIS (HCC): ICD-10-CM

## 2025-04-22 DIAGNOSIS — M86.072 ACUTE HEMATOGENOUS OSTEOMYELITIS OF LEFT FOOT (HCC): ICD-10-CM

## 2025-04-22 DIAGNOSIS — M86.9 OSTEOMYELITIS OF LEFT FOOT, UNSPECIFIED TYPE (HCC): Primary | ICD-10-CM

## 2025-04-22 DIAGNOSIS — A41.9 SEVERE SEPSIS (HCC): ICD-10-CM

## 2025-04-22 PROBLEM — E11.65 TYPE 2 DIABETES MELLITUS WITH HYPERGLYCEMIA, WITHOUT LONG-TERM CURRENT USE OF INSULIN (HCC): Status: ACTIVE | Noted: 2025-04-22

## 2025-04-22 PROBLEM — L08.9 SEPSIS DUE TO SKIN INFECTION (HCC): Status: ACTIVE | Noted: 2023-07-04

## 2025-04-22 LAB
ALBUMIN SERPL-MCNC: 3.3 G/DL (ref 3.5–5)
ALBUMIN/GLOB SERPL: 0.8 (ref 1.1–2.2)
ALP SERPL-CCNC: 65 U/L (ref 45–117)
ALT SERPL-CCNC: 35 U/L (ref 12–78)
ANION GAP SERPL CALC-SCNC: 8 MMOL/L (ref 2–12)
AST SERPL-CCNC: 26 U/L (ref 15–37)
BASOPHILS # BLD: 0.05 K/UL (ref 0–0.1)
BASOPHILS NFR BLD: 0.4 % (ref 0–1)
BILIRUB SERPL-MCNC: 0.5 MG/DL (ref 0.2–1)
BUN SERPL-MCNC: 31 MG/DL (ref 6–20)
BUN/CREAT SERPL: 18 (ref 12–20)
CALCIUM SERPL-MCNC: 9.7 MG/DL (ref 8.5–10.1)
CHLORIDE SERPL-SCNC: 102 MMOL/L (ref 97–108)
CO2 SERPL-SCNC: 26 MMOL/L (ref 21–32)
CREAT SERPL-MCNC: 1.69 MG/DL (ref 0.7–1.3)
DIFFERENTIAL METHOD BLD: ABNORMAL
DIGOXIN SERPL-MCNC: 1 NG/ML (ref 0.9–2)
EKG ATRIAL RATE: 93 BPM
EKG DIAGNOSIS: NORMAL
EKG P AXIS: 69 DEGREES
EKG P-R INTERVAL: 146 MS
EKG Q-T INTERVAL: 398 MS
EKG QRS DURATION: 154 MS
EKG QTC CALCULATION (BAZETT): 494 MS
EKG R AXIS: 107 DEGREES
EKG T AXIS: -66 DEGREES
EKG VENTRICULAR RATE: 93 BPM
EOSINOPHIL # BLD: 0.07 K/UL (ref 0–0.4)
EOSINOPHIL NFR BLD: 0.6 % (ref 0–7)
ERYTHROCYTE [DISTWIDTH] IN BLOOD BY AUTOMATED COUNT: 15.2 % (ref 11.5–14.5)
ERYTHROCYTE [SEDIMENTATION RATE] IN BLOOD: 39 MM/HR (ref 0–20)
GLOBULIN SER CALC-MCNC: 4 G/DL (ref 2–4)
GLUCOSE BLD STRIP.AUTO-MCNC: 243 MG/DL (ref 65–117)
GLUCOSE BLD STRIP.AUTO-MCNC: 313 MG/DL (ref 65–117)
GLUCOSE BLD STRIP.AUTO-MCNC: 350 MG/DL (ref 65–117)
GLUCOSE BLD STRIP.AUTO-MCNC: 358 MG/DL (ref 65–117)
GLUCOSE BLD STRIP.AUTO-MCNC: 444 MG/DL (ref 65–117)
GLUCOSE SERPL-MCNC: 244 MG/DL (ref 65–100)
HCT VFR BLD AUTO: 36.2 % (ref 36.6–50.3)
HGB BLD-MCNC: 11.1 G/DL (ref 12.1–17)
IMM GRANULOCYTES # BLD AUTO: 0.1 K/UL (ref 0–0.04)
IMM GRANULOCYTES NFR BLD AUTO: 0.8 % (ref 0–0.5)
INR PPP: 1.8 (ref 0.9–1.1)
LACTATE BLD-SCNC: 2 MMOL/L (ref 0.4–2)
LACTATE BLD-SCNC: 2.28 MMOL/L (ref 0.4–2)
LACTATE BLD-SCNC: 3.88 MMOL/L (ref 0.4–2)
LACTATE SERPL-SCNC: 1.8 MMOL/L (ref 0.4–2)
LACTATE SERPL-SCNC: 2.4 MMOL/L (ref 0.4–2)
LYMPHOCYTES # BLD: 1.23 K/UL (ref 0.8–3.5)
LYMPHOCYTES NFR BLD: 10.2 % (ref 12–49)
MCH RBC QN AUTO: 27.7 PG (ref 26–34)
MCHC RBC AUTO-ENTMCNC: 30.7 G/DL (ref 30–36.5)
MCV RBC AUTO: 90.3 FL (ref 80–99)
MONOCYTES # BLD: 0.73 K/UL (ref 0–1)
MONOCYTES NFR BLD: 6.1 % (ref 5–13)
NEUTS SEG # BLD: 9.83 K/UL (ref 1.8–8)
NEUTS SEG NFR BLD: 81.9 % (ref 32–75)
NRBC # BLD: 0 K/UL (ref 0–0.01)
NRBC BLD-RTO: 0 PER 100 WBC
PLATELET # BLD AUTO: 184 K/UL (ref 150–400)
PMV BLD AUTO: 11.2 FL (ref 8.9–12.9)
POTASSIUM SERPL-SCNC: 4.1 MMOL/L (ref 3.5–5.1)
PROT SERPL-MCNC: 7.3 G/DL (ref 6.4–8.2)
PROTHROMBIN TIME: 18.6 SEC (ref 9.2–11.2)
RBC # BLD AUTO: 4.01 M/UL (ref 4.1–5.7)
SERVICE CMNT-IMP: ABNORMAL
SODIUM SERPL-SCNC: 136 MMOL/L (ref 136–145)
WBC # BLD AUTO: 12 K/UL (ref 4.1–11.1)

## 2025-04-22 PROCEDURE — 73630 X-RAY EXAM OF FOOT: CPT

## 2025-04-22 PROCEDURE — 80053 COMPREHEN METABOLIC PANEL: CPT

## 2025-04-22 PROCEDURE — 2580000003 HC RX 258: Performed by: STUDENT IN AN ORGANIZED HEALTH CARE EDUCATION/TRAINING PROGRAM

## 2025-04-22 PROCEDURE — 36415 COLL VENOUS BLD VENIPUNCTURE: CPT

## 2025-04-22 PROCEDURE — 6360000002 HC RX W HCPCS: Performed by: STUDENT IN AN ORGANIZED HEALTH CARE EDUCATION/TRAINING PROGRAM

## 2025-04-22 PROCEDURE — 1100000003 HC PRIVATE W/ TELEMETRY

## 2025-04-22 PROCEDURE — 6370000000 HC RX 637 (ALT 250 FOR IP): Performed by: STUDENT IN AN ORGANIZED HEALTH CARE EDUCATION/TRAINING PROGRAM

## 2025-04-22 PROCEDURE — 96361 HYDRATE IV INFUSION ADD-ON: CPT

## 2025-04-22 PROCEDURE — 83605 ASSAY OF LACTIC ACID: CPT

## 2025-04-22 PROCEDURE — 87040 BLOOD CULTURE FOR BACTERIA: CPT

## 2025-04-22 PROCEDURE — 93005 ELECTROCARDIOGRAM TRACING: CPT

## 2025-04-22 PROCEDURE — 99285 EMERGENCY DEPT VISIT HI MDM: CPT

## 2025-04-22 PROCEDURE — 80162 ASSAY OF DIGOXIN TOTAL: CPT

## 2025-04-22 PROCEDURE — 82962 GLUCOSE BLOOD TEST: CPT

## 2025-04-22 PROCEDURE — 85025 COMPLETE CBC W/AUTO DIFF WBC: CPT

## 2025-04-22 PROCEDURE — 2500000003 HC RX 250 WO HCPCS: Performed by: STUDENT IN AN ORGANIZED HEALTH CARE EDUCATION/TRAINING PROGRAM

## 2025-04-22 PROCEDURE — 85652 RBC SED RATE AUTOMATED: CPT

## 2025-04-22 PROCEDURE — 96365 THER/PROPH/DIAG IV INF INIT: CPT

## 2025-04-22 PROCEDURE — 85610 PROTHROMBIN TIME: CPT

## 2025-04-22 RX ORDER — MULTIVITAMIN WITH IRON
1 TABLET ORAL DAILY
Status: DISCONTINUED | OUTPATIENT
Start: 2025-04-22 | End: 2025-04-25 | Stop reason: HOSPADM

## 2025-04-22 RX ORDER — ONDANSETRON 4 MG/1
4 TABLET, ORALLY DISINTEGRATING ORAL EVERY 8 HOURS PRN
Status: DISCONTINUED | OUTPATIENT
Start: 2025-04-22 | End: 2025-04-25 | Stop reason: HOSPADM

## 2025-04-22 RX ORDER — ONDANSETRON 2 MG/ML
2 INJECTION INTRAMUSCULAR; INTRAVENOUS EVERY 6 HOURS PRN
Status: DISCONTINUED | OUTPATIENT
Start: 2025-04-22 | End: 2025-04-25 | Stop reason: HOSPADM

## 2025-04-22 RX ORDER — DOXAZOSIN 2 MG/1
1 TABLET ORAL EVERY OTHER DAY
Status: DISCONTINUED | OUTPATIENT
Start: 2025-04-23 | End: 2025-04-25 | Stop reason: HOSPADM

## 2025-04-22 RX ORDER — SODIUM CHLORIDE 0.9 % (FLUSH) 0.9 %
5-40 SYRINGE (ML) INJECTION EVERY 12 HOURS SCHEDULED
Status: DISCONTINUED | OUTPATIENT
Start: 2025-04-22 | End: 2025-04-25 | Stop reason: HOSPADM

## 2025-04-22 RX ORDER — INSULIN LISPRO 100 [IU]/ML
0-4 INJECTION, SOLUTION INTRAVENOUS; SUBCUTANEOUS
Status: DISCONTINUED | OUTPATIENT
Start: 2025-04-22 | End: 2025-04-25 | Stop reason: HOSPADM

## 2025-04-22 RX ORDER — 0.9 % SODIUM CHLORIDE 0.9 %
500 INTRAVENOUS SOLUTION INTRAVENOUS ONCE
Status: COMPLETED | OUTPATIENT
Start: 2025-04-22 | End: 2025-04-22

## 2025-04-22 RX ORDER — SODIUM CHLORIDE 0.9 % (FLUSH) 0.9 %
5-40 SYRINGE (ML) INJECTION EVERY 12 HOURS SCHEDULED
Status: DISCONTINUED | OUTPATIENT
Start: 2025-04-22 | End: 2025-04-22 | Stop reason: SDUPTHER

## 2025-04-22 RX ORDER — POTASSIUM CHLORIDE 7.45 MG/ML
10 INJECTION INTRAVENOUS PRN
Status: DISCONTINUED | OUTPATIENT
Start: 2025-04-22 | End: 2025-04-25 | Stop reason: HOSPADM

## 2025-04-22 RX ORDER — ACETAMINOPHEN 325 MG/1
650 TABLET ORAL EVERY 6 HOURS PRN
Status: DISCONTINUED | OUTPATIENT
Start: 2025-04-22 | End: 2025-04-25 | Stop reason: HOSPADM

## 2025-04-22 RX ORDER — POLYETHYLENE GLYCOL 3350 17 G/17G
17 POWDER, FOR SOLUTION ORAL DAILY PRN
Status: DISCONTINUED | OUTPATIENT
Start: 2025-04-22 | End: 2025-04-25 | Stop reason: HOSPADM

## 2025-04-22 RX ORDER — GLUCAGON 1 MG/ML
1 KIT INJECTION PRN
Status: DISCONTINUED | OUTPATIENT
Start: 2025-04-22 | End: 2025-04-25 | Stop reason: HOSPADM

## 2025-04-22 RX ORDER — ACETAMINOPHEN 325 MG/1
650 TABLET ORAL EVERY 6 HOURS PRN
Status: DISCONTINUED | OUTPATIENT
Start: 2025-04-22 | End: 2025-04-22 | Stop reason: SDUPTHER

## 2025-04-22 RX ORDER — SODIUM CHLORIDE, SODIUM LACTATE, POTASSIUM CHLORIDE, CALCIUM CHLORIDE 600; 310; 30; 20 MG/100ML; MG/100ML; MG/100ML; MG/100ML
INJECTION, SOLUTION INTRAVENOUS CONTINUOUS
Status: ACTIVE | OUTPATIENT
Start: 2025-04-22 | End: 2025-04-23

## 2025-04-22 RX ORDER — VANCOMYCIN 2 GRAM/500 ML IN 0.9 % SODIUM CHLORIDE INTRAVENOUS
2000 ONCE
Status: COMPLETED | OUTPATIENT
Start: 2025-04-22 | End: 2025-04-22

## 2025-04-22 RX ORDER — ACETAMINOPHEN 650 MG/1
650 SUPPOSITORY RECTAL EVERY 6 HOURS PRN
Status: DISCONTINUED | OUTPATIENT
Start: 2025-04-22 | End: 2025-04-22 | Stop reason: SDUPTHER

## 2025-04-22 RX ORDER — SODIUM CHLORIDE 0.9 % (FLUSH) 0.9 %
5-40 SYRINGE (ML) INJECTION PRN
Status: DISCONTINUED | OUTPATIENT
Start: 2025-04-22 | End: 2025-04-25 | Stop reason: HOSPADM

## 2025-04-22 RX ORDER — SODIUM CHLORIDE 9 MG/ML
INJECTION, SOLUTION INTRAVENOUS PRN
Status: DISCONTINUED | OUTPATIENT
Start: 2025-04-22 | End: 2025-04-25 | Stop reason: HOSPADM

## 2025-04-22 RX ORDER — POTASSIUM CHLORIDE 1500 MG/1
40 TABLET, EXTENDED RELEASE ORAL PRN
Status: DISCONTINUED | OUTPATIENT
Start: 2025-04-22 | End: 2025-04-25 | Stop reason: HOSPADM

## 2025-04-22 RX ORDER — OXYCODONE HYDROCHLORIDE 5 MG/1
5 TABLET ORAL EVERY 4 HOURS PRN
Refills: 0 | Status: DISCONTINUED | OUTPATIENT
Start: 2025-04-22 | End: 2025-04-25 | Stop reason: HOSPADM

## 2025-04-22 RX ORDER — ATORVASTATIN CALCIUM 10 MG/1
10 TABLET, FILM COATED ORAL EVERY OTHER DAY
Status: DISCONTINUED | OUTPATIENT
Start: 2025-04-24 | End: 2025-04-25 | Stop reason: HOSPADM

## 2025-04-22 RX ORDER — BUMETANIDE 1 MG/1
2 TABLET ORAL DAILY
Status: DISCONTINUED | OUTPATIENT
Start: 2025-04-23 | End: 2025-04-24

## 2025-04-22 RX ORDER — PREGABALIN 75 MG/1
75 CAPSULE ORAL DAILY
Status: DISCONTINUED | OUTPATIENT
Start: 2025-04-23 | End: 2025-04-25 | Stop reason: HOSPADM

## 2025-04-22 RX ORDER — DIGOXIN 125 MCG
125 TABLET ORAL DAILY
Status: DISCONTINUED | OUTPATIENT
Start: 2025-04-23 | End: 2025-04-25 | Stop reason: HOSPADM

## 2025-04-22 RX ORDER — DEXTROSE MONOHYDRATE 100 MG/ML
INJECTION, SOLUTION INTRAVENOUS CONTINUOUS PRN
Status: DISCONTINUED | OUTPATIENT
Start: 2025-04-22 | End: 2025-04-25 | Stop reason: HOSPADM

## 2025-04-22 RX ORDER — ACETAMINOPHEN 650 MG/1
650 SUPPOSITORY RECTAL EVERY 6 HOURS PRN
Status: DISCONTINUED | OUTPATIENT
Start: 2025-04-22 | End: 2025-04-25 | Stop reason: HOSPADM

## 2025-04-22 RX ORDER — LACTOBACILLUS RHAMNOSUS GG 10B CELL
1 CAPSULE ORAL EVERY MORNING
Status: DISCONTINUED | OUTPATIENT
Start: 2025-04-23 | End: 2025-04-25 | Stop reason: HOSPADM

## 2025-04-22 RX ORDER — MAGNESIUM SULFATE IN WATER 40 MG/ML
2000 INJECTION, SOLUTION INTRAVENOUS PRN
Status: DISCONTINUED | OUTPATIENT
Start: 2025-04-22 | End: 2025-04-25 | Stop reason: HOSPADM

## 2025-04-22 RX ADMIN — CEFEPIME 1000 MG: 1 INJECTION, POWDER, FOR SOLUTION INTRAMUSCULAR; INTRAVENOUS at 12:22

## 2025-04-22 RX ADMIN — SODIUM CHLORIDE 500 ML: 0.9 INJECTION, SOLUTION INTRAVENOUS at 11:34

## 2025-04-22 RX ADMIN — SODIUM CHLORIDE, SODIUM LACTATE, POTASSIUM CHLORIDE, AND CALCIUM CHLORIDE: .6; .31; .03; .02 INJECTION, SOLUTION INTRAVENOUS at 15:38

## 2025-04-22 RX ADMIN — INSULIN LISPRO 4 UNITS: 100 INJECTION, SOLUTION INTRAVENOUS; SUBCUTANEOUS at 21:04

## 2025-04-22 RX ADMIN — INSULIN LISPRO 3 UNITS: 100 INJECTION, SOLUTION INTRAVENOUS; SUBCUTANEOUS at 19:04

## 2025-04-22 RX ADMIN — THERA TABS 1 TABLET: TAB at 19:04

## 2025-04-22 RX ADMIN — Medication 2000 MG: at 15:54

## 2025-04-22 RX ADMIN — CHOLECALCIFEROL TAB 10 MCG (400 UNIT) 400 UNITS: 10 TAB at 20:50

## 2025-04-22 RX ADMIN — CEFEPIME 2000 MG: 2 INJECTION, POWDER, FOR SOLUTION INTRAVENOUS at 20:50

## 2025-04-22 RX ADMIN — SODIUM CHLORIDE, PRESERVATIVE FREE 10 ML: 5 INJECTION INTRAVENOUS at 20:50

## 2025-04-22 RX ADMIN — WARFARIN SODIUM 7 MG: 5 TABLET ORAL at 18:45

## 2025-04-22 NOTE — PROGRESS NOTES
Pharmacist Daily Dosing of Warfarin    Indication & Goal INR: AFib, INR Goal 2-3    PTA Warfarin Dose: 7 mg on Mon, Tues, Wed, Thurs, Fri and 5 mg on Sat/Sun     Notable concurrent conditions and medications: cefepime    Labs:  Recent Labs     Units 04/22/25  1151 04/22/25  1058   INR   1.8*  --    HGB g/dL  --  11.1*   PLT K/uL  --  184       Impression/Plan:   Will order warfarin 7 mg PO x 1 dose.  Daily INR has been ordered  CBC w/o differential every other day has been ordered     Pharmacy will follow daily and adjust the dose as appropriate.    Thank you,  Arlette Shaikh [facility]

## 2025-04-22 NOTE — ED NOTES
Verbal (telephone) report given to Jennifer (oncoming nurse) by Komal (offgoing nurse) for transfer of patient care to inpatient unit. Report included the following information Nurse Handoff Report, ED Encounter Summary, ED SBAR, Adult Overview, Intake/Output, MAR, Recent Results, and Neuro Assessment. Opportunity for questions and clarification provided.

## 2025-04-22 NOTE — H&P
Hospitalist Admission Note     Demographics    Patient Name  Baron Wagner Sr.   Date of Birth 2/20/1933   Medical Record Number  [id number]    Age  89 y.o.   PCP Willem Powell MD   Admit date:  4/22/2025 10:36 AM   Room Number  ER14/14    @ [facility]     Admission Diagnosis:  Osteomyelitis of left foot (HCC)       Active Problem List:  Active Hospital Problems    Diagnosis Date Noted    Osteomyelitis of left foot (HCC) 04/22/2025     Priority: High    Diabetic infection of left foot (HCC) 02/10/2025     Priority: High    Sepsis due to skin infection (HCC) 07/04/2023     Priority: High    Type 2 diabetes mellitus with hyperglycemia, without long-term current use of insulin (HCC) 04/22/2025     Priority: Medium        Integrated HPI // Assessment & Plan:   ED Consult Reason for Admission: diabetic foot infection    ED Report // Course --> Previously admitted for osteomyelitis, declined surgical debridement. Went home with PICC and IV abx, has been following with podiatry and ID. Sent in today after he had wound cultures outpt growing pseudomonas. Has been tired/malaise. WBC slightly elevated, lactic elevated. Given IV cefepime, small fluid bolus. Fairly well appearing to the eye, but data supporting he may be developing sepsis. Unclear if he is going to be open to surgery or not this time. Dr. Carrasco sent an FYI by ED, will likely follow along.     In my initial discussion with patient/family (son Baron Underwood at bedside) --> they confirmed medical history there was conferred by the ED during handoff.  Expanded upon that by explaining patient has been undergoing outpatient home health wound care, as well as completion of his IV antibiotics within the past few weeks.  Wound has appeared to have healed, but his indicator of systemic infection tends to be hyperglycemia.  Normally, with just diet control his BG hovers around the 130s, but within the past couple of days has gotten as high  above.) I've reconciled meds against available data and reporting. I've ordered follow-up labs if appropriate. I have contacted any necessary consultants (see above) and appreciated their verbal recommendations. Based on the above listed documentation, the decision was made to hospitalize the patient. Discussed this case directly with the ED provider who requested consultation for admission and provided initial recommendations prior to seeing the patient, with any relevant additional follow up required prior to admitting patient to my service.    Relevant labs were reviewed:  Yes   Imaging report reviewed  Yes   EKG reviewed  Yes -- sinus tach   High risk/toxic drug monitoring  Yes IV vancomycin   Complexity  High   Care plan discussed with   Patient/Family , Nurse, and Consultant (ED provider)   Time Spent  80 minutes        *As is my typical documentation style, an integrative HPI has been provided above the impression & plan. The active problem list has been updated to reflect the problems being addressed, though this may all be integrated under the assessment and plan. For every admission, I take the time to update the PMH, active problem list, and medication list to the best of my ability. For the physical exam, I address the most pertinent systems findings: unless otherwise stated, other systems were either normal or did not require extensive evaluation/documentation. This document has been produced using \"Dragon\" computerized transcription. Therefore, although this document has been proofread to the best of my ability, errors/typos may persist.     Care plan and relevant information     CODE STATUS   Full Code     Functional Status  Lives at home with Son/DIL, fairly independent, minimal assist for ADLs   Surrogate decision maker:  Extended Emergency Contact Information  Primary Emergency Contact: Jr Baron Wagner  Address: [address] [place] [address] [place]

## 2025-04-22 NOTE — TELEPHONE ENCOUNTER
Patient is refusing to go to the ED at this time but does want an appointment to see you. Hortencia to try to talking him in to going to be seen.

## 2025-04-22 NOTE — ED PROVIDER NOTES
[facility] EMERGENCY DEPARTMENT  EMERGENCY DEPARTMENT ENCOUNTER       Pt Name: aBron Wagner Sr.  MRN: [id number]  Birthdate 2/20/1933  Date of evaluation: 4/22/2025  Provider: Rio Cuevas MD   PCP: Willem Powell MD  Note Started: 1:56 PM 4/22/25     CHIEF COMPLAINT       Chief Complaint   Patient presents with    Hyperglycemia     Patient ambulatory with cane, patient reports BG of 193, patient reports his normal is 118-120. Patient denies any CP or SOB.     Wound Infection     Patient had a culture done on 4/1 that \"shoes infection/bacteria,\" MD Carrasco sent patient here for possible cellulitis to left foot and leg.         HISTORY OF PRESENT ILLNESS: 1 or more elements      History From: Patient and patient's son  None     Baron Wagner Sr. is a 89 y.o. male who presents to the emergency department with concern for infection and left foot wound.  Some history is provided by patient's son who reports that his blood sugars have been running higher which happened previously when he had osteomyelitis.  Patient was previously admitted for this and underwent long course of IV antibiotics.  He follows with infectious disease and his podiatrist Dr. Carrasco.  Patient denies associated fevers, he reports he does not usually have pain in his feet due to neuropathy.       Nursing Notes were all reviewed and agreed with or any disagreements were addressed in the HPI.     REVIEW OF SYSTEMS      Review of Systems     Positives and Pertinent negatives as per HPI.    PAST HISTORY     Past Medical History:  Past Medical History:   Diagnosis Date    Allergic rhinitis 9/20/2017    Arthritis     ASCVD (arteriosclerotic cardiovascular disease) 9/20/2017    Story: Old ASMI by EKG    Back pain 9/20/2017    BPH (benign prostatic hyperplasia) 9/20/2017    CHF (congestive heart failure) (HCC) 9/20/2017    CKD (chronic kidney disease) 9/20/2017    COVID-19 11/2021    Diabetic acetonemia (HCC)     DM (diabetes mellitus)    glimepiride 4 MG tablet  Commonly known as: AMARYL  TAKE 1 TABLET BY MOUTH EVERY DAY BEFORE BREAKFAST     Glucosamine 500 MG Caps     glucosamine-chondroitin 500-400 MG Caps     Glucosamine-Chondroitin--400-167 MG Tabs     Januvia 100 MG tablet  Generic drug: SITagliptin  TAKE 1 TABLET BY MOUTH EVERY DAY     metFORMIN 500 MG tablet  Commonly known as: GLUCOPHAGE     prednisoLONE 5 MG Tabs     pregabalin 75 MG capsule  Commonly known as: LYRICA  Take 1 capsule by mouth daily for 180 days. Max Daily Amount: 75 mg     * Probiotic Acidophilus Caps  Take 1 daily     * Probiotic (Lactobacillus) Caps  Take 1 capsule by mouth daily     simvastatin 20 MG tablet  Commonly known as: ZOCOR  TAKE 1 TABLET BY MOUTH EVERY DAY     terazosin 2 MG capsule  Commonly known as: HYTRIN  TAKE 1 CAPSULE BY MOUTH EVERY DAY     vitamin B-12 1000 MCG tablet  Commonly known as: CYANOCOBALAMIN     vitamin C 500 MG tablet  Commonly known as: ASCORBIC ACID     warfarin 5 MG tablet  Commonly known as: COUMADIN  Take as directed. If you are unsure how to take this medication, talk to your nurse or doctor.  Original instructions: TAKE 5 MG ON SUNDAY, TUESDAY AND THURSDAY ONE AND A HALF TABLETS ALL OTHER DAYS.           * This list has 2 medication(s) that are the same as other medications prescribed for you. Read the directions carefully, and ask your doctor or other care provider to review them with you.                    DISCONTINUED MEDICATIONS:  Current Discharge Medication List          I am the Primary Clinician of Record.   Rio Cuevas MD (electronically signed)      (Please note that parts of this dictation were completed with voice recognition software. Quite often unanticipated grammatical, syntax, homophones, and other interpretive errors are inadvertently transcribed by the computer software. Please disregards these errors. Please excuse any errors that have escaped final proofreading.)         Rio Cuevas,

## 2025-04-22 NOTE — PROGRESS NOTES
Pharmacy Antimicrobial Kinetic Dosing    Indication for Antimicrobials: Osteo (outpatient cultures)     Current Regimen of Each Antimicrobial:  Vancomycin pharmacy to dose; Start Date ; Day #   Cefepime 2000mg q 12 (start: , day )    Previous Antimicrobial Therapy:     Goal Level: Vancomycin -600    Date Dose & Interval Measured (mcg/mL) Predicted AUC 24-48 Predicted AUC 24,ss                          Significant Cultures:   : Bcx: nftd, day 1 - prelim  Pt has positive outside cultures    Labs:  Recent Labs     Units 25  1058   CREATININE MG/DL 1.69*   BUN MG/DL 31*   WBC K/uL 12.0*     Temp (24hrs), Av.3 °F (36.3 °C), Min:97.3 °F (36.3 °C), Max:97.3 °F (36.3 °C)      Conditions for Dosing Consideration: None    Creatinine Clearance (mL/min): Estimated Creatinine Clearance: 33 mL/min (A) (based on SCr of 1.69 mg/dL (H)).       Impression/Plan:   Vancomycin 2000mg load, then 1000mg q 24h   Predicted RDH70-55 = 464, Predicted AUC24,ss = 578  Cefepime per above  Cmp for , cbc daily through   Procal pending  Antimicrobial stop date 14 days     Pharmacy will follow daily and adjust medications as appropriate for renal function and/or serum levels.    Thank you,  Domingo Mueller RPH

## 2025-04-22 NOTE — TELEPHONE ENCOUNTER
Spoke to Hortencia Wagner and she states patient is having blood sugar spikes. Was 303 in the morning before he ate yesterday. Wound culture done on the 16th by doctor Carrasco. They have not seen results yet. Willem Powell MD did bloodwork that showed infection per Hortencia. He is very lethargic, blood pressure very low. Was able to pull culture from wound on [facility].

## 2025-04-22 NOTE — TELEPHONE ENCOUNTER
Message       ----- Message -----   From: Baron Wagner Sr.   Sent: 4/22/2025   7:56 AM EDT   To: *   Subject: Appointment Request                                Appointment Request From: Baron Wagner Sr.      With Provider: Dr. Brissa Carney MD [[facility] - Infectious Disease]      Preferred Date Range: 4/23/2025 - 4/25/2025      Preferred Times: Any Time      Reason for visit: Request an Appointment      Health Maintenance Topic:      Comments:   Infection from foot is still active. Not sure where it is now. Need ASAP!     Appointment Request  (Newest Message First)  Kaity Adriana OSBALDO routed conversation to [employer] now (8:01 AM)     Baron Wagner Sr.  P [facility] Infectious Disease  Staff (supporting Brissa Carney MD)5 minutes ago (7:56 AM)     [facility]  Appointment Request From: Baron Wagner Sr.     With Provider: Dr. Brissa Carney MD [[facility] - Infectious Disease]     Preferred Date Range: 4/23/2025 - 4/25/2025     Preferred Times: Any Time     Reason for visit: Request an Appointment     Health Maintenance Topic:      Comments:  Infection from foot is still active. Not sure where it is now. Need ASAP!

## 2025-04-22 NOTE — TELEPHONE ENCOUNTER
If patient is not feeling well and wound is still draining he would need to go  to ED for evaluation.

## 2025-04-23 ENCOUNTER — APPOINTMENT (OUTPATIENT)
Facility: HOSPITAL | Age: 89
DRG: 638 | End: 2025-04-23
Payer: MEDICARE

## 2025-04-23 LAB
ALBUMIN SERPL-MCNC: 2.9 G/DL (ref 3.5–5)
ALBUMIN/GLOB SERPL: 0.8 (ref 1.1–2.2)
ALP SERPL-CCNC: 58 U/L (ref 45–117)
ALT SERPL-CCNC: 37 U/L (ref 12–78)
ANION GAP SERPL CALC-SCNC: 7 MMOL/L (ref 2–12)
AST SERPL-CCNC: 29 U/L (ref 15–37)
BASOPHILS # BLD: 0.05 K/UL (ref 0–0.1)
BASOPHILS NFR BLD: 0.5 % (ref 0–1)
BILIRUB SERPL-MCNC: 0.5 MG/DL (ref 0.2–1)
BUN SERPL-MCNC: 30 MG/DL (ref 6–20)
BUN/CREAT SERPL: 24 (ref 12–20)
CALCIUM SERPL-MCNC: 9.3 MG/DL (ref 8.5–10.1)
CHLORIDE SERPL-SCNC: 105 MMOL/L (ref 97–108)
CO2 SERPL-SCNC: 26 MMOL/L (ref 21–32)
CREAT SERPL-MCNC: 1.25 MG/DL (ref 0.7–1.3)
DIFFERENTIAL METHOD BLD: ABNORMAL
EOSINOPHIL # BLD: 0.09 K/UL (ref 0–0.4)
EOSINOPHIL NFR BLD: 0.9 % (ref 0–7)
ERYTHROCYTE [DISTWIDTH] IN BLOOD BY AUTOMATED COUNT: 15.2 % (ref 11.5–14.5)
GLOBULIN SER CALC-MCNC: 3.7 G/DL (ref 2–4)
GLUCOSE BLD STRIP.AUTO-MCNC: 144 MG/DL (ref 65–117)
GLUCOSE BLD STRIP.AUTO-MCNC: 216 MG/DL (ref 65–117)
GLUCOSE BLD STRIP.AUTO-MCNC: 222 MG/DL (ref 65–117)
GLUCOSE BLD STRIP.AUTO-MCNC: 240 MG/DL (ref 65–117)
GLUCOSE SERPL-MCNC: 138 MG/DL (ref 65–100)
HCT VFR BLD AUTO: 34.9 % (ref 36.6–50.3)
HGB BLD-MCNC: 10.5 G/DL (ref 12.1–17)
IMM GRANULOCYTES # BLD AUTO: 0.08 K/UL (ref 0–0.04)
IMM GRANULOCYTES NFR BLD AUTO: 0.8 % (ref 0–0.5)
INR PPP: 1.9 (ref 0.9–1.1)
LACTATE SERPL-SCNC: 1.5 MMOL/L (ref 0.4–2)
LYMPHOCYTES # BLD: 1.96 K/UL (ref 0.8–3.5)
LYMPHOCYTES NFR BLD: 19.6 % (ref 12–49)
MCH RBC QN AUTO: 27.1 PG (ref 26–34)
MCHC RBC AUTO-ENTMCNC: 30.1 G/DL (ref 30–36.5)
MCV RBC AUTO: 90.2 FL (ref 80–99)
MONOCYTES # BLD: 0.84 K/UL (ref 0–1)
MONOCYTES NFR BLD: 8.4 % (ref 5–13)
NEUTS SEG # BLD: 6.98 K/UL (ref 1.8–8)
NEUTS SEG NFR BLD: 69.8 % (ref 32–75)
NRBC # BLD: 0 K/UL (ref 0–0.01)
NRBC BLD-RTO: 0 PER 100 WBC
PLATELET # BLD AUTO: 163 K/UL (ref 150–400)
PMV BLD AUTO: 11.3 FL (ref 8.9–12.9)
POTASSIUM SERPL-SCNC: 3.9 MMOL/L (ref 3.5–5.1)
PROCALCITONIN SERPL-MCNC: <0.05 NG/ML
PROT SERPL-MCNC: 6.6 G/DL (ref 6.4–8.2)
PROTHROMBIN TIME: 18.8 SEC (ref 9.2–11.2)
RBC # BLD AUTO: 3.87 M/UL (ref 4.1–5.7)
SERVICE CMNT-IMP: ABNORMAL
SODIUM SERPL-SCNC: 138 MMOL/L (ref 136–145)
WBC # BLD AUTO: 10 K/UL (ref 4.1–11.1)

## 2025-04-23 PROCEDURE — 99223 1ST HOSP IP/OBS HIGH 75: CPT | Performed by: INTERNAL MEDICINE

## 2025-04-23 PROCEDURE — 36415 COLL VENOUS BLD VENIPUNCTURE: CPT

## 2025-04-23 PROCEDURE — 73700 CT LOWER EXTREMITY W/O DYE: CPT

## 2025-04-23 PROCEDURE — 6370000000 HC RX 637 (ALT 250 FOR IP): Performed by: STUDENT IN AN ORGANIZED HEALTH CARE EDUCATION/TRAINING PROGRAM

## 2025-04-23 PROCEDURE — 6360000002 HC RX W HCPCS: Performed by: STUDENT IN AN ORGANIZED HEALTH CARE EDUCATION/TRAINING PROGRAM

## 2025-04-23 PROCEDURE — 85610 PROTHROMBIN TIME: CPT

## 2025-04-23 PROCEDURE — 85025 COMPLETE CBC W/AUTO DIFF WBC: CPT

## 2025-04-23 PROCEDURE — 6360000002 HC RX W HCPCS: Performed by: INTERNAL MEDICINE

## 2025-04-23 PROCEDURE — 1100000003 HC PRIVATE W/ TELEMETRY

## 2025-04-23 PROCEDURE — 2580000003 HC RX 258: Performed by: INTERNAL MEDICINE

## 2025-04-23 PROCEDURE — 84145 PROCALCITONIN (PCT): CPT

## 2025-04-23 PROCEDURE — 2580000003 HC RX 258: Performed by: STUDENT IN AN ORGANIZED HEALTH CARE EDUCATION/TRAINING PROGRAM

## 2025-04-23 PROCEDURE — 2500000003 HC RX 250 WO HCPCS: Performed by: STUDENT IN AN ORGANIZED HEALTH CARE EDUCATION/TRAINING PROGRAM

## 2025-04-23 PROCEDURE — 83605 ASSAY OF LACTIC ACID: CPT

## 2025-04-23 PROCEDURE — 82962 GLUCOSE BLOOD TEST: CPT

## 2025-04-23 PROCEDURE — 80053 COMPREHEN METABOLIC PANEL: CPT

## 2025-04-23 RX ORDER — DOCUSATE SODIUM 100 MG/1
100 CAPSULE, LIQUID FILLED ORAL DAILY
COMMUNITY
End: 2025-04-28

## 2025-04-23 RX ADMIN — VANCOMYCIN HYDROCHLORIDE 1000 MG: 1 INJECTION, POWDER, LYOPHILIZED, FOR SOLUTION INTRAVENOUS at 04:58

## 2025-04-23 RX ADMIN — THERA TABS 1 TABLET: TAB at 08:12

## 2025-04-23 RX ADMIN — INSULIN LISPRO 1 UNITS: 100 INJECTION, SOLUTION INTRAVENOUS; SUBCUTANEOUS at 21:57

## 2025-04-23 RX ADMIN — WARFARIN SODIUM 7 MG: 5 TABLET ORAL at 17:55

## 2025-04-23 RX ADMIN — PIPERACILLIN AND TAZOBACTAM 3375 MG: 3; .375 INJECTION, POWDER, LYOPHILIZED, FOR SOLUTION INTRAVENOUS at 23:50

## 2025-04-23 RX ADMIN — SODIUM CHLORIDE, PRESERVATIVE FREE 10 ML: 5 INJECTION INTRAVENOUS at 08:16

## 2025-04-23 RX ADMIN — DIGOXIN 125 MCG: 125 TABLET ORAL at 08:15

## 2025-04-23 RX ADMIN — PREGABALIN 75 MG: 75 CAPSULE ORAL at 08:12

## 2025-04-23 RX ADMIN — Medication 1 CAPSULE: at 08:12

## 2025-04-23 RX ADMIN — CEFEPIME 2000 MG: 2 INJECTION, POWDER, FOR SOLUTION INTRAVENOUS at 08:11

## 2025-04-23 RX ADMIN — SODIUM CHLORIDE, PRESERVATIVE FREE 10 ML: 5 INJECTION INTRAVENOUS at 21:11

## 2025-04-23 RX ADMIN — INSULIN LISPRO 1 UNITS: 100 INJECTION, SOLUTION INTRAVENOUS; SUBCUTANEOUS at 12:44

## 2025-04-23 RX ADMIN — PIPERACILLIN AND TAZOBACTAM 4500 MG: 4; .5 INJECTION, POWDER, LYOPHILIZED, FOR SOLUTION INTRAVENOUS at 18:03

## 2025-04-23 RX ADMIN — INSULIN LISPRO 1 UNITS: 100 INJECTION, SOLUTION INTRAVENOUS; SUBCUTANEOUS at 17:54

## 2025-04-23 RX ADMIN — CHOLECALCIFEROL TAB 10 MCG (400 UNIT) 400 UNITS: 10 TAB at 21:11

## 2025-04-23 NOTE — CONSULTS
2.0 MMOL/L   POCT Glucose    Collection Time: 04/22/25  8:14 PM   Result Value Ref Range    POC Glucose 444 (H) 65 - 117 mg/dL    Performed by: Derrick Gallardo PCT    POCT Glucose    Collection Time: 04/22/25  8:56 PM   Result Value Ref Range    POC Glucose 358 (H) 65 - 117 mg/dL    Performed by: МАРИНА Coronel RN    POCT Glucose    Collection Time: 04/22/25  9:00 PM   Result Value Ref Range    POC Glucose 350 (H) 65 - 117 mg/dL    Performed by: МАРИНА Coronel RN    Lactate, Sepsis    Collection Time: 04/22/25 10:08 PM   Result Value Ref Range    Lactic Acid, Sepsis 1.8 0.4 - 2.0 MMOL/L   Lactate, Sepsis    Collection Time: 04/23/25 12:38 AM   Result Value Ref Range    Lactic Acid, Sepsis 1.5 0.4 - 2.0 MMOL/L   Procalcitonin    Collection Time: 04/23/25  5:02 AM   Result Value Ref Range    Procalcitonin <0.05 ng/mL   Protime-INR    Collection Time: 04/23/25  5:02 AM   Result Value Ref Range    INR 1.9 (H) 0.9 - 1.1      Protime 18.8 (H) 9.2 - 11.2 sec   CBC with Auto Differential    Collection Time: 04/23/25  5:02 AM   Result Value Ref Range    WBC 10.0 4.1 - 11.1 K/uL    RBC 3.87 (L) 4.10 - 5.70 M/uL    Hemoglobin 10.5 (L) 12.1 - 17.0 g/dL    Hematocrit 34.9 (L) 36.6 - 50.3 %    MCV 90.2 80.0 - 99.0 FL    MCH 27.1 26.0 - 34.0 PG    MCHC 30.1 30.0 - 36.5 g/dL    RDW 15.2 (H) 11.5 - 14.5 %    Platelets 163 150 - 400 K/uL    MPV 11.3 8.9 - 12.9 FL    Nucleated RBCs 0.0 0  WBC    nRBC 0.00 0.00 - 0.01 K/uL    Neutrophils % 69.8 32.0 - 75.0 %    Lymphocytes % 19.6 12.0 - 49.0 %    Monocytes % 8.4 5.0 - 13.0 %    Eosinophils % 0.9 0.0 - 7.0 %    Basophils % 0.5 0.0 - 1.0 %    Immature Granulocytes % 0.8 (H) 0.0 - 0.5 %    Neutrophils Absolute 6.98 1.80 - 8.00 K/UL    Lymphocytes Absolute 1.96 0.80 - 3.50 K/UL    Monocytes Absolute 0.84 0.00 - 1.00 K/UL    Eosinophils Absolute 0.09 0.00 - 0.40 K/UL    Basophils Absolute 0.05 0.00 - 0.10 K/UL    Immature Granulocytes Absolute 0.08 (H) 0.00 - 0.04 K/UL    Differential Type  with foot ulcer (HCC); Non-pressure chronic ulcer of other part of left foot limited to breakdown of skin (HCC). Type 2 diabetes mellitus with foot ulcer (HCC) / Rule out osteomyelitis 5th left met. COMPARISON: February 10, 2025. FINDINGS: Three views of the left foot demonstrate a fracture at the base of the fifth metatarsal.it Does appear subacute. There is diffuse soft tissue swelling, mild DJD. Large posterior calcaneal spur.     Subacute fracture of the base of the fifth metatarsal, diffuse soft tissue swelling and osteopenia. DJD. Electronically signed by MUKUND RODAS MD      Greater than 50% of the time was spent on the following:  Preparing for visit and chart review.  Obtaining and/or reviewing separately obtained history  Performing a medically appropriate exam and/or evaluation  Counseling and educating a patient/family/caregiver as noted above  Placing relevant orders  Referring and communicating with other professionals (not separately reported)  Independently interpreting results (not separately reported) and communicating results to the patient/family/caregiver  Care coordination (not separately reported) as noted above  Documenting clinical information in the electronic health records (e.g. problem list, visit note) on the day of the encounter       Brissa Carney MD FACP

## 2025-04-23 NOTE — CONSULTS
ID  Patient seen and examined  Full consult to follow  Drainage from lateral foot wound?  Superficial  Was positive for Pseudomonas.  Patient with increased lethargy, elevated blood sugars PTA  Change dose of vancomycin, Zosyn IV  CT of left foot( pt cannot do MRI )  MRSA nares culture

## 2025-04-23 NOTE — PROGRESS NOTES
Pharmacist Daily Dosing of Warfarin    Indication & Goal INR: AFib, INR Goal 2-3    PTA Warfarin Dose: 7 mg on Mon, Tues, Wed, Thurs, Fri and 5 mg on Sat/Sun     Notable concurrent conditions and medications: cefepime    Labs:  Recent Labs     Units 04/23/25  0502 04/22/25  1151 04/22/25  1058   INR   1.9* 1.8*  --    HGB g/dL 10.5*  --  11.1*   PLT K/uL 163  --  184       Impression/Plan:   INR slightly subtherapeutic; Hgb/Hct/Plt stable   Ordered warfarin 7 mg PO x 1 dose.  Daily INR has been ordered  CBC w/o differential every other day has been ordered     Pharmacy will follow daily and adjust the dose as appropriate.    Thank you,  RAFAELA HANSON RP

## 2025-04-23 NOTE — PROGRESS NOTES
Hospitalist Progress Note    NAME:   Baron Wagner Sr.   : 1933   MRN: [id number]     Date/Time: 2025 3:23 PM  Patient PCP: Willem Powell MD    Estimated discharge date:  Barriers:       Assessment / Plan:  Severe sepsis   recurrent diabetic foot infection left sidewith possible osteomyelitis  Continue with IV antibiotics  Podiatry on board  Coverage for Pseudomonas as reported wound culture grew Pseudomonas  Patient was following with Dr. Carrasco  Patient resume on diet, podiatry will see the patient this evening no plan for surgical intervention so far  Wound care  Continue cefepime and vancomycin  Will consult ID in a.m.    Non-insulin-dependent DM  Continue as a sliding scale    HTN  A-fib  Continue with doxazosin  Continue with digoxin  Pharmacy for warfarin dosing  Will switch to heparin drip if podiatry planning for surgery  Continue with statin    Medical Decision Making:   I personally reviewed labs: Yes CBC BMP  I personally reviewed imaging: Yes  I personally reviewed EKG: Yes  Toxic drug monitoring: Yes  Discussed case with: Patient, RN, medical supply around        Code Status: DNR  DVT Prophylaxis: Warfarin  GI Prophylaxis:    Subjective:     Chief Complaint / Reason for Physician Visit  \"Admitted for diabetic foot infection, with possible osteomyelitis\".  Discussed with RN events overnight.       Objective:     VITALS:   Last 24hrs VS reviewed since prior progress note. Most recent are:  Patient Vitals for the past 24 hrs:   BP Temp Temp src Pulse Resp SpO2 Weight   25 0811 103/76 98.2 °F (36.8 °C) Oral 92 20 99 % --   25 0456 113/78 -- -- 83 -- 94 % --   25 111/74 97.5 °F (36.4 °C) Oral 88 18 97 % --   25 1745 126/82 -- -- -- -- 95 % --   25 1730 132/88 -- -- -- -- 97 % --   25 1715 136/81 -- -- -- -- 97 % --   25 1645 126/85 -- -- -- -- 95 % --   25 1630 129/78 -- -- -- -- 96 % --   25 1615 124/83 -- -- -- -- 96 %  --   04/22/25 1600 128/84 -- -- -- -- 98 % --   04/22/25 1530 129/69 -- -- -- -- 93 % 86.8 kg (191 lb 5.8 oz)         Intake/Output Summary (Last 24 hours) at 4/23/2025 1523  Last data filed at 4/23/2025 1238  Gross per 24 hour   Intake 100 ml   Output --   Net 100 ml        I had a face to face encounter and independently examined this patient on 4/23/2025, as outlined below:  PHYSICAL EXAM:  General: Alert, cooperative  EENT:  EOMI. Anicteric sclerae.  Resp:  CTA bilaterally, no wheezing or rales.  No accessory muscle use  CV:  Regular  rhythm,  No edema  GI:  Soft, Non distended, Non tender.  +Bowel sounds  Neurologic:  Alert and oriented X 3, normal speech,   Psych:   Good insight. Not anxious nor agitated  Skin:  No rashes.  No jaundice  Left foot with stool wound 1 in the heel and 1 in the lateral aspect, review wound care noting pictures    Reviewed most current lab test results and cultures  YES  Reviewed most current radiology test results   YES  Review and summation of old records today    NO  Reviewed patient's current orders and MAR    YES  PMH/SH reviewed - no change compared to H&P    Procedures: see electronic medical records for all procedures/Xrays and details which were not copied into this note but were reviewed prior to creation of Plan.      LABS:  I reviewed today's most current labs and imaging studies.  Pertinent labs include:  Recent Labs     04/22/25  1058 04/23/25  0502   WBC 12.0* 10.0   HGB 11.1* 10.5*   HCT 36.2* 34.9*    163     Recent Labs     04/22/25  1058 04/22/25  1151 04/23/25  0502     --  138   K 4.1  --  3.9     --  105   CO2 26  --  26   GLUCOSE 244*  --  138*   BUN 31*  --  30*   CREATININE 1.69*  --  1.25   CALCIUM 9.7  --  9.3   BILITOT 0.5  --  0.5   AST 26  --  29   ALT 35  --  37   INR  --  1.8* 1.9*       Signed: Radha Patel MD

## 2025-04-23 NOTE — PROGRESS NOTES
Occupational Therapy    Orders acknowledged, chart reviewed. Noted pt with pending podiatry consult for L foot infection/cellulitis. Spoke with MD Patel who agrees to hold OT/PT until podiatry assesses and provides clear weight-bearing orders for LLE for OOB ADLs/mobility. Will defer and follow-up later as able and medically appropriate. Thanks.    Funmilayo El MS, OTR/L

## 2025-04-23 NOTE — PROGRESS NOTES
End of Shift Note    Bedside shift change report given to PETROS Conde (oncoming nurse) by AMANDA Thompson RN (offgoing nurse).  Report included the following information SBAR, Intake/Output, and MAR    Shift worked:  7pm-7am     Shift summary and any significant changes:     Pt is alert and oriented x4. Ambulating to the bathroom and voiding. BM x1 in this shift. IV antibiotic given as per MAR. NPO since midnight. Otherwise uneventful.      Concerns for physician to address:       Zone phone for oncoming shift:          AMANDA Thompson RN

## 2025-04-23 NOTE — WOUND CARE
Wound care nurse consult for POA left foot wounds.    89 y/o male admitted for osteomyelitis of the left foot. Patient is followed by Dr Radha Carrasco DPM for these wounds.    Past Medical History:   Diagnosis Date    Allergic rhinitis 9/20/2017    Arthritis     ASCVD (arteriosclerotic cardiovascular disease) 9/20/2017    Story: Old ASMI by EKG    Back pain 9/20/2017    BPH (benign prostatic hyperplasia) 9/20/2017    CHF (congestive heart failure) ([facility]) 9/20/2017    CKD (chronic kidney disease) 9/20/2017    COVID-19 11/2021    Diabetic acetonemia ([facility])     DM (diabetes mellitus) ([facility]) 9/20/2017    Story: Diet Controlled    ED (erectile dysfunction) 9/20/2017    Edema 9/20/2017    Elevated CPK 9/20/2017    Comments: History of    Elevated LFTs 9/20/2017    Comments: History of    Elevated PSA 9/20/2017    Hypercholesteremia     Hyperlipidemia 9/20/2017    Hypertension     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    Nodule of right lung 9/20/2017    Story: Right    Polymyalgia 9/20/2017    Prostate enlargement     Substance abuse      Past Surgical History:   Procedure Laterality Date    ARTHROCENTESIS  7/13/2023    CARDIAC PROCEDURE N/A 7/7/2023    Insert temporary pacemaker performed by Dominic Peace MD at \[facility]"" CARDIAC CATH LAB    CENTRAL LINE  7/4/2023    EP DEVICE PROCEDURE N/A 7/13/2023    Insert or replace transcath PPM leadless performed by Jewel Call MD at \[facility]\"" CARDIAC CATH LAB    FOOT DEBRIDEMENT Left 2/13/2025    LEFT FOOT ABSCESS  INCISION AND DRAINAGE and BONE BIOPSY performed by Radha Carrasco DPM at \[facility]\"" MAIN OR    HEENT  06/12/2018    Dr. Menezes, surgery to remove cancerous tissue from Left cheek    MALIGNANT SKIN LESION EXCISION  09/2018    2 lesions on head    WI UNLISTED PROCEDURE ABDOMEN PERITONEUM & OMENTUM      hernia repair    TOE AMPUTATION Right 9/30/2023    RIGHT SECOND TOE AMPUTATION performed by Odin Zavala DPM at \[facility]\"" MAIN OR     Chart review:  2/10/25- 2/18/25 -

## 2025-04-23 NOTE — PROGRESS NOTES
Foot and Ankle Progress Note    Admit Date: 4/22/2025  Hospital day 1    Subjective:     Patient was admitted secondary to cellulitis left leg.  He is also s/p resection of the base 5th left meyt.  No complaints of pain    No Known Allergies     History:     History reviewed. No pertinent family history.   Past Medical History:   Diagnosis Date    Allergic rhinitis 9/20/2017    Arthritis     ASCVD (arteriosclerotic cardiovascular disease) 9/20/2017    Story: Old ASMI by EKG    Back pain 9/20/2017    BPH (benign prostatic hyperplasia) 9/20/2017    CHF (congestive heart failure) ([facility]) 9/20/2017    CKD (chronic kidney disease) 9/20/2017    COVID-19 11/2021    Diabetic acetonemia (HCC)     DM (diabetes mellitus) ([facility]) 9/20/2017    Story: Diet Controlled    ED (erectile dysfunction) 9/20/2017    Edema 9/20/2017    Elevated CPK 9/20/2017    Comments: History of    Elevated LFTs 9/20/2017    Comments: History of    Elevated PSA 9/20/2017    Hypercholesteremia     Hyperlipidemia 9/20/2017    Hypertension     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    Nodule of right lung 9/20/2017    Story: Right    Polymyalgia 9/20/2017    Prostate enlargement     Substance abuse       Social History     Substance and Sexual Activity   Alcohol Use No      Social History     Substance and Sexual Activity   Drug Use No      Past Surgical History:   Procedure Laterality Date    ARTHROCENTESIS  7/13/2023    CARDIAC PROCEDURE N/A 7/7/2023    Insert temporary pacemaker performed by Dominic Peace MD at [facility] CARDIAC CATH LAB    CENTRAL LINE  7/4/2023    EP DEVICE PROCEDURE N/A 7/13/2023    Insert or replace transcath PPM leadless performed by Jewel Call MD at [facility] CARDIAC CATH LAB    FOOT DEBRIDEMENT Left 2/13/2025    LEFT FOOT ABSCESS  INCISION AND DRAINAGE and BONE BIOPSY performed by Radha Carrasco DPM at [facility] MAIN OR    HEENT  06/12/2018    Dr. 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

## 2025-04-23 NOTE — PROGRESS NOTES
Pharmacy Antimicrobial Kinetic Dosing    Indication for Antimicrobials: OM (followed by Dr. Carney outpatient)    Current Regimen of Each Antimicrobial:  Vancomycin Pharmacy to Dose; Start Date ; Day #   Cefepime 2g IV Q12H; Start Date ; Day #     Previous Antimicrobial Therapy:   Recently completed Unasyn IV x 6 weeks   Augmentin -    Goal Level: Vancomycin -600    Date Dose & Interval Measured (mcg/mL) Predicted AUC 24-48 Predicted AUC 24,ss                          Significant Cultures:    Blood, paired: ngtd, prelim    Labs:  Recent Labs     Units 25  0502 25  1058   CREATININE MG/DL 1.25 1.69*   BUN MG/DL 30* 31*   PROCAL ng/mL <0.05  --    WBC K/uL 10.0 12.0*     Temp (24hrs), Av.9 °F (36.6 °C), Min:97.5 °F (36.4 °C), Max:98.2 °F (36.8 °C)    Conditions for Dosing Consideration: None    Creatinine Clearance (mL/min): Estimated Creatinine Clearance: 45 mL/min (based on SCr of 1.25 mg/dL).     Impression/Plan:   Continue vancomycin 1000mg IV Q24H   Predicted ZQC18-20 = 444, Predicted AUC24,ss = 462  Level scheduled for  0400  BMP and CBC ordered per protocol   Antimicrobial stop date: 14 days     Pharmacy will follow daily and adjust medications as appropriate for renal function and/or serum levels.    Thank you,  RAFAELA HANSON [facility]

## 2025-04-24 ENCOUNTER — APPOINTMENT (OUTPATIENT)
Facility: HOSPITAL | Age: 89
DRG: 638 | End: 2025-04-24
Payer: MEDICARE

## 2025-04-24 PROBLEM — N17.9 AKI (ACUTE KIDNEY INJURY): Status: ACTIVE | Noted: 2025-04-24

## 2025-04-24 PROBLEM — E11.65 POORLY CONTROLLED DIABETES MELLITUS (HCC): Status: ACTIVE | Noted: 2025-04-24

## 2025-04-24 PROBLEM — A49.1 INFECTION DUE TO ALPHA-HEMOLYTIC STREPTOCOCCUS: Status: ACTIVE | Noted: 2025-04-24

## 2025-04-24 PROBLEM — I87.2 VENOUS STASIS DERMATITIS: Status: ACTIVE | Noted: 2025-04-24

## 2025-04-24 PROBLEM — A49.8 BACTEROIDES INFECTION: Status: ACTIVE | Noted: 2025-04-24

## 2025-04-24 PROBLEM — L97.521 CHRONIC ULCER OF LEFT FOOT LIMITED TO BREAKDOWN OF SKIN (HCC): Status: ACTIVE | Noted: 2025-01-31

## 2025-04-24 LAB
ANION GAP SERPL CALC-SCNC: 6 MMOL/L (ref 2–12)
BACTERIA SPEC CULT: NORMAL
BACTERIA SPEC CULT: NORMAL
BASOPHILS # BLD: 0.05 K/UL (ref 0–0.1)
BASOPHILS NFR BLD: 0.5 % (ref 0–1)
BUN SERPL-MCNC: 26 MG/DL (ref 6–20)
BUN/CREAT SERPL: 20 (ref 12–20)
CALCIUM SERPL-MCNC: 9.8 MG/DL (ref 8.5–10.1)
CHLORIDE SERPL-SCNC: 104 MMOL/L (ref 97–108)
CO2 SERPL-SCNC: 25 MMOL/L (ref 21–32)
CREAT SERPL-MCNC: 1.29 MG/DL (ref 0.7–1.3)
CRP SERPL-MCNC: 2.6 MG/DL (ref 0–0.3)
DIFFERENTIAL METHOD BLD: ABNORMAL
EOSINOPHIL # BLD: 0.04 K/UL (ref 0–0.4)
EOSINOPHIL NFR BLD: 0.4 % (ref 0–7)
ERYTHROCYTE [DISTWIDTH] IN BLOOD BY AUTOMATED COUNT: 15.5 % (ref 11.5–14.5)
ERYTHROCYTE [SEDIMENTATION RATE] IN BLOOD: 34 MM/HR (ref 0–20)
GLUCOSE BLD STRIP.AUTO-MCNC: 204 MG/DL (ref 65–117)
GLUCOSE BLD STRIP.AUTO-MCNC: 212 MG/DL (ref 65–117)
GLUCOSE BLD STRIP.AUTO-MCNC: 225 MG/DL (ref 65–117)
GLUCOSE BLD STRIP.AUTO-MCNC: 264 MG/DL (ref 65–117)
GLUCOSE BLD STRIP.AUTO-MCNC: 269 MG/DL (ref 65–117)
GLUCOSE SERPL-MCNC: 227 MG/DL (ref 65–100)
HCT VFR BLD AUTO: 39.7 % (ref 36.6–50.3)
HGB BLD-MCNC: 12.2 G/DL (ref 12.1–17)
IMM GRANULOCYTES # BLD AUTO: 0.09 K/UL (ref 0–0.04)
IMM GRANULOCYTES NFR BLD AUTO: 0.8 % (ref 0–0.5)
INR PPP: 1.8 (ref 0.9–1.1)
LYMPHOCYTES # BLD: 0.77 K/UL (ref 0.8–3.5)
LYMPHOCYTES NFR BLD: 7.1 % (ref 12–49)
MCH RBC QN AUTO: 27.7 PG (ref 26–34)
MCHC RBC AUTO-ENTMCNC: 30.7 G/DL (ref 30–36.5)
MCV RBC AUTO: 90 FL (ref 80–99)
MONOCYTES # BLD: 0.97 K/UL (ref 0–1)
MONOCYTES NFR BLD: 8.9 % (ref 5–13)
NEUTS SEG # BLD: 8.92 K/UL (ref 1.8–8)
NEUTS SEG NFR BLD: 82.3 % (ref 32–75)
NRBC # BLD: 0 K/UL (ref 0–0.01)
NRBC BLD-RTO: 0 PER 100 WBC
PLATELET # BLD AUTO: 196 K/UL (ref 150–400)
PMV BLD AUTO: 11.6 FL (ref 8.9–12.9)
POTASSIUM SERPL-SCNC: 4.7 MMOL/L (ref 3.5–5.1)
PROTHROMBIN TIME: 18.8 SEC (ref 9.2–11.2)
RBC # BLD AUTO: 4.41 M/UL (ref 4.1–5.7)
SERVICE CMNT-IMP: ABNORMAL
SERVICE CMNT-IMP: NORMAL
SODIUM SERPL-SCNC: 135 MMOL/L (ref 136–145)
VANCOMYCIN SERPL-MCNC: 12.3 UG/ML
WBC # BLD AUTO: 10.8 K/UL (ref 4.1–11.1)

## 2025-04-24 PROCEDURE — 6360000002 HC RX W HCPCS: Performed by: STUDENT IN AN ORGANIZED HEALTH CARE EDUCATION/TRAINING PROGRAM

## 2025-04-24 PROCEDURE — 99233 SBSQ HOSP IP/OBS HIGH 50: CPT | Performed by: INTERNAL MEDICINE

## 2025-04-24 PROCEDURE — 80048 BASIC METABOLIC PNL TOTAL CA: CPT

## 2025-04-24 PROCEDURE — 6370000000 HC RX 637 (ALT 250 FOR IP): Performed by: STUDENT IN AN ORGANIZED HEALTH CARE EDUCATION/TRAINING PROGRAM

## 2025-04-24 PROCEDURE — 97530 THERAPEUTIC ACTIVITIES: CPT | Performed by: PHYSICAL THERAPIST

## 2025-04-24 PROCEDURE — 85652 RBC SED RATE AUTOMATED: CPT

## 2025-04-24 PROCEDURE — 97116 GAIT TRAINING THERAPY: CPT | Performed by: PHYSICAL THERAPIST

## 2025-04-24 PROCEDURE — 2580000003 HC RX 258: Performed by: INTERNAL MEDICINE

## 2025-04-24 PROCEDURE — 97165 OT EVAL LOW COMPLEX 30 MIN: CPT

## 2025-04-24 PROCEDURE — 2580000003 HC RX 258: Performed by: STUDENT IN AN ORGANIZED HEALTH CARE EDUCATION/TRAINING PROGRAM

## 2025-04-24 PROCEDURE — 6360000002 HC RX W HCPCS: Performed by: INTERNAL MEDICINE

## 2025-04-24 PROCEDURE — 97535 SELF CARE MNGMENT TRAINING: CPT

## 2025-04-24 PROCEDURE — 2500000003 HC RX 250 WO HCPCS: Performed by: STUDENT IN AN ORGANIZED HEALTH CARE EDUCATION/TRAINING PROGRAM

## 2025-04-24 PROCEDURE — 97161 PT EVAL LOW COMPLEX 20 MIN: CPT | Performed by: PHYSICAL THERAPIST

## 2025-04-24 PROCEDURE — 85610 PROTHROMBIN TIME: CPT

## 2025-04-24 PROCEDURE — 86140 C-REACTIVE PROTEIN: CPT

## 2025-04-24 PROCEDURE — 80202 ASSAY OF VANCOMYCIN: CPT

## 2025-04-24 PROCEDURE — 85025 COMPLETE CBC W/AUTO DIFF WBC: CPT

## 2025-04-24 PROCEDURE — 71045 X-RAY EXAM CHEST 1 VIEW: CPT

## 2025-04-24 PROCEDURE — 1100000003 HC PRIVATE W/ TELEMETRY

## 2025-04-24 PROCEDURE — 82962 GLUCOSE BLOOD TEST: CPT

## 2025-04-24 PROCEDURE — 36415 COLL VENOUS BLD VENIPUNCTURE: CPT

## 2025-04-24 PROCEDURE — 97530 THERAPEUTIC ACTIVITIES: CPT

## 2025-04-24 RX ORDER — BUMETANIDE 1 MG/1
1 TABLET ORAL DAILY
Status: DISCONTINUED | OUTPATIENT
Start: 2025-04-25 | End: 2025-04-25 | Stop reason: HOSPADM

## 2025-04-24 RX ADMIN — DIGOXIN 125 MCG: 125 TABLET ORAL at 10:01

## 2025-04-24 RX ADMIN — WARFARIN SODIUM 9 MG: 5 TABLET ORAL at 18:00

## 2025-04-24 RX ADMIN — SODIUM CHLORIDE, PRESERVATIVE FREE 10 ML: 5 INJECTION INTRAVENOUS at 09:45

## 2025-04-24 RX ADMIN — ATORVASTATIN CALCIUM 10 MG: 10 TABLET, FILM COATED ORAL at 10:02

## 2025-04-24 RX ADMIN — VANCOMYCIN HYDROCHLORIDE 1000 MG: 1 INJECTION, POWDER, LYOPHILIZED, FOR SOLUTION INTRAVENOUS at 04:46

## 2025-04-24 RX ADMIN — INSULIN LISPRO 2 UNITS: 100 INJECTION, SOLUTION INTRAVENOUS; SUBCUTANEOUS at 18:00

## 2025-04-24 RX ADMIN — PIPERACILLIN AND TAZOBACTAM 3375 MG: 3; .375 INJECTION, POWDER, LYOPHILIZED, FOR SOLUTION INTRAVENOUS at 09:46

## 2025-04-24 RX ADMIN — Medication 1 CAPSULE: at 10:01

## 2025-04-24 RX ADMIN — PREGABALIN 75 MG: 75 CAPSULE ORAL at 10:01

## 2025-04-24 RX ADMIN — CHOLECALCIFEROL TAB 10 MCG (400 UNIT) 400 UNITS: 10 TAB at 21:57

## 2025-04-24 RX ADMIN — THERA TABS 1 TABLET: TAB at 10:01

## 2025-04-24 RX ADMIN — INSULIN LISPRO 2 UNITS: 100 INJECTION, SOLUTION INTRAVENOUS; SUBCUTANEOUS at 21:57

## 2025-04-24 RX ADMIN — PIPERACILLIN AND TAZOBACTAM 3375 MG: 3; .375 INJECTION, POWDER, LYOPHILIZED, FOR SOLUTION INTRAVENOUS at 16:47

## 2025-04-24 RX ADMIN — SODIUM CHLORIDE, PRESERVATIVE FREE 10 ML: 5 INJECTION INTRAVENOUS at 22:03

## 2025-04-24 RX ADMIN — INSULIN LISPRO 2 UNITS: 100 INJECTION, SOLUTION INTRAVENOUS; SUBCUTANEOUS at 13:35

## 2025-04-24 NOTE — PROGRESS NOTES
PODIATRY hospital follow-up transitional care appointment has been scheduled with MARIANA Carrasco on 5/9/25 1500. [facility] information on AVS for patient resource.   Pending patient discharge.

## 2025-04-24 NOTE — PROGRESS NOTES
Hospitalist Progress Note    NAME:   Baron Wagner Sr.   : 1933   MRN: [id number]     Date/Time: 2025 1:50 PM  Patient PCP: Willem Powell MD    Estimated discharge date:  Barriers:       Assessment / Plan:  Severe sepsis   recurrent diabetic foot infection left sidewith possible osteomyelitis  Continue with IV antibiotics  Podiatry on board  Coverage for Pseudomonas as reported wound culture grew Pseudomonas  Patient was following with Dr. Carrasco  Patient resume on diet, podiatry will see the patient this evening no plan for surgical intervention so far  Wound care  Continue cefepime and vancomycin  Will consult ID in a.m.   podiatry cleared patient for discharge no intervention needed for now  ID on board continue with vancomycin  Stop cefepime and started on Zosyn  CT scan of the foot showed: 1. Previously demonstrated medial forefoot soft tissue gas and cutaneous  ulceration overlying base of fifth metatarsal bone are no longer shown.  2. Interval fracture at base of fifth metatarsal bone with findings concerning  for osteomyelitis.  3. Areas of localized subcutaneous confluent opacification as above without  evidence for localized collection.  Called daughter-in-law, updated her with the plan      Non-insulin-dependent DM  Continue as a sliding scale    HTN  A-fib  Continue with doxazosin  Continue with digoxin  Pharmacy for warfarin dosing  Will switch to heparin drip if podiatry planning for surgery  Continue with statin    Check chest x-ray  Resume Bumex today but at lower dose of 1 Mg with blood pressure parameter    Medical Decision Making:   I personally reviewed labs: Yes CBC BMP  I personally reviewed imaging: Yes  I personally reviewed EKG: Yes  Toxic drug monitoring: Yes  Discussed case with: Patient, RN, medical supply around        Code Status: DNR  DVT Prophylaxis: Warfarin  GI Prophylaxis:    Subjective:     Chief Complaint / Reason for Physician Visit  \"Admitted

## 2025-04-24 NOTE — CONSULTS
Pulmonary, Critical Care, and Sleep Medicine~Consult Note    Name: Baron Wagner Sr. MRN: [id number]   : 1933 Hospital: [facility]   Date: 2025 2:05 PM Admission: 2025     Impression Plan   Dyspnea- resolved   Sepsis, POA   Left lateral foot cellutlis + pseudomonas   S/p L 5 metatarsal resection   DM type II   HTN  HLD   HFpEF  Afib on warfarin   ALBERTO on CKD stage III    polymyalgia rheumatica on chronic prednisone 5mg  Check BNP   Bumex has been on hold and receiving IVF fro sepsis and alberto   Noted bumex to be resumed at lower dose- agree   Home bumex 2mg BID   Abx per ID   Encourage IS, OOB     Thank you for this consult, will follow      Daily Progression:    Consult Note    89 yom with pmhx significant for DM type II, CKD stage III, HTN, HLD, HFpEF, afib on warfarin, polymyalgia rheumatica presented to the ED  with left foot wound drainage + pseudomonas.  Podiatry and ID following. Pt had shortness of breath this morning. Placed on 2L O2 for comfort. Satting 100% on RA. CXR no acute process.    Pt seen this afternoon lying in bed. States he woke up this morning with shortness of breath. Did not notice it during exertion with PT/OT. Not worsened by lying flat. States his breathing feels fine during my evaluation. No cough, chest pain, peripheral edema.     Never seen a pulmonologist. No home inhalers.     Social hx: never smoker   Family hx: no inheritable lung disease     I have reviewed the labs and previous day’s notes.    Pertinent items are noted in HPI.  Past Medical History:   Diagnosis Date    Allergic rhinitis 2017    Arthritis     ASCVD (arteriosclerotic cardiovascular disease) 2017    Story: Old ASMI by EKG    Back pain 2017    BPH (benign prostatic hyperplasia) 2017    CHF (congestive heart failure) (HCC) 2017    CKD (chronic kidney disease) 2017    COVID-19 2021    Diabetic acetonemia (HCC)     DM

## 2025-04-24 NOTE — CARE COORDINATION
Care Management Initial Assessment       RUR: 19% Moderate Risk  Readmission? No  1st IM letter given? Yes   1st  letter given: No       Initial Assessment: Chart reviewed. CM met with pt at the bedside to introduce self and role. Verified contact information and demographics. Pt resides with family in a three level home with 3 VIMAL. Pt PCP is Dr. Powell with last visit being last week. Preferred pharmacy is [facility] on [address]. Pt has no hx needing IPR/SNF services.Pt has used HH services in the past however, does not remember the name of agency. Pt states he is independent with ADL's and uses a walker when needed to ambulate. Pt was an active  prior to February. ?Pts family will transport for discharge.?He?states there are no concerns for him to return home.     CM acknowledges consult for possible abx orders and follow up appt in the wound clinic. CM will continue to follow.    Plan A: Home with follow up appts.  Plan B: Home with HH  Full assessment below:            04/24/25 1512   Service Assessment   Patient Orientation Alert and Oriented;Person;Place;Situation;Self   Cognition Alert   History Provided By Patient   Primary Caregiver Self   Support Systems Children   Patient's Healthcare Decision Maker is: Legal Next of Kin   PCP Verified by CM Yes   Last Visit to PCP Within last 3 months   Prior Functional Level Independent in ADLs/IADLs   Current Functional Level Independent in ADLs/IADLs   Can patient return to prior living arrangement Yes   Ability to make needs known: Good   Family able to assist with home care needs: Yes   Would you like for me to discuss the discharge plan with any other family members/significant others, and if so, who? Yes   Financial Resources Medicare   Social/Functional History   Lives With Family   Type of Home House   Home Equipment Walker - Rolling   Active  Yes   Discharge Planning   Type of Residence House   Current Services Prior To Admission None   Patient

## 2025-04-24 NOTE — PLAN OF CARE
Problem: Physical Therapy - Adult  Goal: By Discharge: Performs mobility at highest level of function for planned discharge setting.  See evaluation for individualized goals.  Description: FUNCTIONAL STATUS PRIOR TO ADMISSION: Patient was modified independent using a single point cane for functional mobility.    HOME SUPPORT PRIOR TO ADMISSION: The patient lived with family but did not require assist.    Physical Therapy Goals  Initiated 4/24/2025  1.  Patient will move from supine to sit and sit to supine , scoot up and down, and roll side to side in bed with independence within 7 day(s).    2.  Patient will transfer from bed to chair and chair to bed with modified independence using the least restrictive device within 7 day(s).  3.  Patient will perform sit to stand with modified independence within 7 day(s).  4.  Patient will ambulate with modified independence for 250 feet with the least restrictive device within 7 day(s).   5.  Patient will ascend/descend 4 stairs with 1 handrail(s) with supervision/set-up within 7 day(s).      PHYSICAL THERAPY EVALUATION    Patient: Baron Wagner  (89 y.o. male)  Date: 4/24/2025  Primary Diagnosis: Severe sepsis ([facility]) [A41.9, R65.20]  Osteomyelitis of left foot, unspecified type ([facility]) [M86.9]       Precautions:  falls; WBAT L LE            ASSESSMENT :   Patient seen for PT evaluation following admission for severe sepsis with osteomyelitis of L foot. Patient noted to have small scab over the lateral aspect of L foot at the proximal 5th metatarsal head with increased swelling also noted in this area.   Per MD note, patient to not wear tennis shoe and needs\"soft shoe\". Provided surgical shoe for ambulation.  Patient presents with generalized weakness and mildly impaired functional mobility with impaired gait stability and impaired balance. Patient is able to complete bed mobility with supervision. He demonstrated mild LOB anteriorly with initial stand requiring min A but

## 2025-04-24 NOTE — PROGRESS NOTES
Physical Therapy  Chart reviewed. CT of foot: IMPRESSION:  1. Previously demonstrated medial forefoot soft tissue gas and cutaneous  ulceration overlying base of fifth metatarsal bone are no longer shown.  2. Interval fracture at base of fifth metatarsal bone with findings concerning  for osteomyelitis.  3. Areas of localized subcutaneous confluent opacification as above without  evidence for localized collection.    Will defer therapy until weight bearing status is clarified.  Thank you,  Dayanara Barrios, PT

## 2025-04-24 NOTE — PROGRESS NOTES
Pharmacist Daily Dosing of Warfarin    Indication & Goal INR: AFib, INR Goal 2-3    PTA Warfarin Dose: 7 mg on Mon, Tues, Wed, Thurs, Fri and 5 mg on Sat/Sun     Notable concurrent conditions and medications: cefepime    Labs:  Recent Labs     Units 04/23/25  0502 04/22/25  1151 04/22/25  1058   INR   1.9* 1.8*  --    HGB g/dL 10.5*  --  11.1*   PLT K/uL 163  --  184     Impression/Plan:   INR slightly subtherapeutic; Hgb/Hct/Plt stable   Ordered warfarin 9 mg PO x 1 dose.  Daily INR has been ordered  CBC w/o differential every other day has been ordered     Pharmacy will follow daily and adjust the dose as appropriate.    Thank you,  RAFAELA HANSON RP

## 2025-04-24 NOTE — PROGRESS NOTES
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  Updated CT report + for interval fracture base of 5th MT concerning for OM  Previously demonstrated soft tissue gas and cutaneous ulceration no longer shown.  Areas of localized subcutaneous confluent opacification without evidence of localized collection.    Diabetes type 2  Most recent A1c 8.4  Persistently elevated blood sugars PTA  Fatigue.    ALBERTO on CKD 3  Improving  Cr 1.29     Diastolic CHF  Stable        Paroxysmal atrial fibrillation  On digoxin, Coumadin  Pacemaker present.     S/p evaluation by vascular service  For circulatory adequacy     HLD  Continue statin     H/o polymyalgia  rheumatica    Erythematous discoloration of LE  Venous stasis dermatitis        S/p evaluation by vascular service  For circulatory adequacy     HLD  Continue statin     H/o polymyalgia  rheumatica     ESR 34  CRP 2.6    S/p admission 2/2025  Treated for diabetic left foot infection, abscess on plantar aspect & lateral  Aspect of foot.  Concern for necrotizing soft tissue infection  Osteomyelitis   CT of left foot 2/10+ for diffuse soft tissue swelling concerning for cellulitis.  Gas in the medial soft tissues concerning for necrotizing soft tissue infection.  Ulceration in the lateral proximal forefoot.  No CT evidence of abscess, acute OM  X-ray 2/10+ for findings concerning for necrotizing soft tissue infection, without  Radiographic evidence of acute osteomyelitis.  S/p doxycycline p.o. PTA  Wound culture 2/11+ light mixed anaerobic GNR, MSF  Blood cultures 2/10-no growth     S/p I&D of abscess x 2, left foot and bone biopsy 5th MT  Wound culture 2/13 + for few E. coli, alpha strep,  Family at bedside.  Patient denies associated fevers or chills.  Left foot exam done.  Wound on lateral aspect of foot and plantar foot have closed off.  No drainage noted at this time.  X-ray of foot had been done.  Report as follows  FINDINGS: Three views of the left foot demonstrate indistinct cortex at the base  of the fifth metatarsal similar to the prior examination, with fragmentation of  bone which may represent chronic infection or old injury.. Soft tissue swelling  persists over the fifth metatarsal base. There is no obvious soft tissue gas..   IMPRESSION:  Indistinct cortical irregularity at the fifth metatarsal base,  similar to the prior examination, where osteomyelitis would be difficult to  exclude. There is overlying soft tissue swelling but no obvious soft tissue gas.  Case discussed at length with patient and family at bedside.  Plan is for CT of foot.  Patient unable to do MRI due to  Presence of pacemaker that is not compatible with MRI.  Updated CT report+ for fracture base of fifth MT, concern for OM.  Patient seen today lying in bed.  Complains of overnight shortness of breath.  Patient currently on O2.    Past Medical History:   Diagnosis Date    Allergic rhinitis 9/20/2017    Arthritis     ASCVD (arteriosclerotic cardiovascular disease) 9/20/2017    Story: Old ASMI by EKG    Back pain 9/20/2017    BPH (benign prostatic hyperplasia) 9/20/2017    CHF (congestive heart failure) ([facility]) 9/20/2017    CKD (chronic kidney disease) 9/20/2017    COVID-19 11/2021    Diabetic acetonemia (HCC)     DM (diabetes mellitus) ([facility]) 9/20/2017    Story: Diet Controlled    ED (erectile dysfunction) 9/20/2017    Edema 9/20/2017    Elevated CPK 9/20/2017    Comments: History of    Elevated LFTs 9/20/2017    Comments: History of    Elevated PSA 9/20/2017    Hypercholesteremia     Hyperlipidemia 9/20/2017    Hypertension     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    Nodule of right lung 9/20/2017    Story: Right

## 2025-04-24 NOTE — PROGRESS NOTES
Pharmacy Antimicrobial Kinetic Dosing    Indication for Antimicrobials: OM (followed by Dr. Carney outpatient)    Current Regimen of Each Antimicrobial:  Vancomycin Pharmacy to Dose; Start Date ; Day # 3  Zosyn 4.5g IV load, then 3.375g Q8H; Start Date ; Day # 2    Previous Antimicrobial Therapy:   Recently completed Unasyn IV x 6 weeks   Augmentin -  Cefepime -    Goal Level: Vancomycin -600    Date Dose & Interval Measured (mcg/mL) Predicted AUC 24-48 Predicted AUC 24,ss    1000mg Q24H 12.3 442 462                   Significant Cultures:    Blood, paired: ngtd, prelim   MRSA, nares: pending    Labs:  Recent Labs     Units 25  0307 25  0306 25  0502 25  1058   CREATININE MG/DL  --  1.29 1.25 1.69*   BUN MG/DL  --  26* 30* 31*   PROCAL ng/mL  --   --  <0.05  --    WBC K/uL 10.8  --  10.0 12.0*     Temp (24hrs), Av.2 °F (36.8 °C), Min:98.1 °F (36.7 °C), Max:98.4 °F (36.9 °C)    Conditions for Dosing Consideration: None    Creatinine Clearance (mL/min): Estimated Creatinine Clearance: 44 mL/min (based on SCr of 1.29 mg/dL).     Impression/Plan:   Continue vancomycin 1000mg IV Q24H   Predicted FGS33-99 = 442, Predicted AUC24,ss = 462  BMP and CBC ordered per protocol   Antimicrobial stop date: TBD (ID following)     Pharmacy will follow daily and adjust medications as appropriate for renal function and/or serum levels.    Thank you,  RAFAELA HANSON [facility]

## 2025-04-24 NOTE — PLAN OF CARE
EXAMINATION OF PERFORMANCE DEFICITS:    Cognitive/Behavioral Status:  Orientation  Orientation Level: Oriented X4       Vision/Perceptual:    Vision - Basic Assessment  Patient Visual Report: No visual complaint reported.                     Range of Motion:   AROM: Generally decreased, functional  PROM: Within functional limits      Strength:  Strength: Generally decreased, functional      Coordination:  Coordination: Generally decreased, functional     Coordination: Within functional limits        Functional Mobility and Transfers for ADLs:    Bed Mobility:     Bed Mobility Training  Bed Mobility Training: Yes  Supine to Sit: Supervision  Sit to Supine: Supervision  Scooting: Supervision    Transfers:      Transfer Training  Transfer Training: Yes  Sit to Stand: Contact guard assistance (mild LOB anteriorly with initial stand)  Stand to Sit: Contact guard assistance  Bed to Chair: Contact guard assistance;Minimal assistance                     Balance:      Balance  Sitting: Intact  Standing: Impaired  Standing - Static: Good;Constant support  Standing - Dynamic: Fair;Constant support (using SPC for support)      ADL Assessment:          Feeding: Independent       Grooming: Setup  Grooming Skilled Clinical Factors: seated in chair    UE Bathing: Setup       Product Used : Bath wipes    LE Bathing: Contact guard assistance;Minimal assistance       UE Dressing: Setup       LE Dressing: Minimal assistance;Contact guard assistance  LE Dressing Skilled Clinical Factors: able to don B tennis shoes in tailor sitting, had difficulty managing post op shoe and required assistance    Toileting: Minimal assistance            ADL Intervention and task modifications:                                                                                                                                                                                                                                       Barthel Index:    Barthel Index  = 75 years). Journal of American Geriatric Society 45(7), 832-836.   -GINO Macedo, KELLY Alexis., AMPARO Weiss., MARIBEL Watson. (1999). Measuring the change in disability after inpatient rehabilitation; comparison of the responsiveness of the Barthel Index and Functional [place] Measure. Journal of Neurology, Neurosurgery, and Psychiatry, 66(4), 480-484.  -SHERIF De La O, DAVINA Munoz, & GEOVANNA Navarro (2004) Assessment of post-stroke quality of life in cost-effectiveness studies: The usefulness of the Barthel Index and the EuroQoL-5D. Quality of Life Research, 13, 427-43                                                                                                                                                                                                                                 Pain Rating:  Pt reported no pain during session    Activity Tolerance:   Fair     After treatment:   Patient left in no apparent distress in bed, Call bell within reach, Bed/ chair alarm activated, and Side rails x3    COMMUNICATION/EDUCATION:   The patient's plan of care was discussed with: physical therapist and registered nurse         Thank you for this referral.  Debbie Parikh, KG  Minutes: 35    Occupational Therapy Evaluation Charge Determination   History Examination Decision-Making   LOW Complexity : Brief history review  MEDIUM Complexity: 3-5 Performance deficits relating to physical, cognitive, or psychosocial skills that result in activity limitations and/or participation restrictions LOW Complexity: No comorbidities that affect functional and  no verbal  or physical assist needed to complete eval tasks   Based on the above components, the patient evaluation is determined to be of the following complexity level: Low

## 2025-04-24 NOTE — PROGRESS NOTES
End of Shift Note    Bedside shift change report given to PETROS Zaman (oncoming nurse) by AMANDA Thompson RN (offgoing nurse).  Report included the following information SBAR, Intake/Output, and MAR    Shift worked:  7pm-7am     Shift summary and any significant changes:     No complain of pain. Diet well tolerated. Pt ambulating to the bathroom and back with a walker and x1 assist. IV antibiotic given see MAR. Am labs done and sent to the lab see results. HCG bath given.     Concerns for physician to address:       Zone phone for oncoming shift:   [phone]           AMANDA Thompson RN

## 2025-04-24 NOTE — PROGRESS NOTES
No additional surgical procedures planned at this time. Can be discharged pending Infectious Disease managing discharge antibiotics

## 2025-04-25 ENCOUNTER — TELEPHONE (OUTPATIENT)
Age: 89
End: 2025-04-25

## 2025-04-25 VITALS
SYSTOLIC BLOOD PRESSURE: 99 MMHG | BODY MASS INDEX: 24.12 KG/M2 | RESPIRATION RATE: 16 BRPM | TEMPERATURE: 98.4 F | HEIGHT: 75 IN | OXYGEN SATURATION: 95 % | HEART RATE: 52 BPM | WEIGHT: 194 LBS | DIASTOLIC BLOOD PRESSURE: 80 MMHG

## 2025-04-25 PROBLEM — S92.902A CLOSED FRACTURE OF BONE OF LEFT FOOT: Status: ACTIVE | Noted: 2025-04-25

## 2025-04-25 LAB
ANION GAP SERPL CALC-SCNC: 7 MMOL/L (ref 2–12)
BASOPHILS # BLD: 0.05 K/UL (ref 0–0.1)
BASOPHILS NFR BLD: 0.6 % (ref 0–1)
BUN SERPL-MCNC: 23 MG/DL (ref 6–20)
BUN/CREAT SERPL: 19 (ref 12–20)
CALCIUM SERPL-MCNC: 9.2 MG/DL (ref 8.5–10.1)
CHLORIDE SERPL-SCNC: 108 MMOL/L (ref 97–108)
CO2 SERPL-SCNC: 23 MMOL/L (ref 21–32)
CREAT SERPL-MCNC: 1.18 MG/DL (ref 0.7–1.3)
DIFFERENTIAL METHOD BLD: ABNORMAL
EOSINOPHIL # BLD: 0.16 K/UL (ref 0–0.4)
EOSINOPHIL NFR BLD: 2 % (ref 0–7)
ERYTHROCYTE [DISTWIDTH] IN BLOOD BY AUTOMATED COUNT: 15.6 % (ref 11.5–14.5)
GLUCOSE BLD STRIP.AUTO-MCNC: 167 MG/DL (ref 65–117)
GLUCOSE BLD STRIP.AUTO-MCNC: 226 MG/DL (ref 65–117)
GLUCOSE SERPL-MCNC: 165 MG/DL (ref 65–100)
HCT VFR BLD AUTO: 38.2 % (ref 36.6–50.3)
HGB BLD-MCNC: 11.4 G/DL (ref 12.1–17)
IMM GRANULOCYTES # BLD AUTO: 0.05 K/UL (ref 0–0.04)
IMM GRANULOCYTES NFR BLD AUTO: 0.6 % (ref 0–0.5)
INR PPP: 1.9 (ref 0.9–1.1)
LYMPHOCYTES # BLD: 0.75 K/UL (ref 0.8–3.5)
LYMPHOCYTES NFR BLD: 9.3 % (ref 12–49)
MCH RBC QN AUTO: 27.1 PG (ref 26–34)
MCHC RBC AUTO-ENTMCNC: 29.8 G/DL (ref 30–36.5)
MCV RBC AUTO: 90.7 FL (ref 80–99)
MONOCYTES # BLD: 0.92 K/UL (ref 0–1)
MONOCYTES NFR BLD: 11.4 % (ref 5–13)
NEUTS SEG # BLD: 6.13 K/UL (ref 1.8–8)
NEUTS SEG NFR BLD: 76.1 % (ref 32–75)
NRBC # BLD: 0 K/UL (ref 0–0.01)
NRBC BLD-RTO: 0 PER 100 WBC
NT PRO BNP: ABNORMAL PG/ML
PLATELET # BLD AUTO: 163 K/UL (ref 150–400)
PMV BLD AUTO: 11.7 FL (ref 8.9–12.9)
POTASSIUM SERPL-SCNC: 3.9 MMOL/L (ref 3.5–5.1)
PROCALCITONIN SERPL-MCNC: <0.05 NG/ML
PROTHROMBIN TIME: 19.1 SEC (ref 9.2–11.2)
RBC # BLD AUTO: 4.21 M/UL (ref 4.1–5.7)
SERVICE CMNT-IMP: ABNORMAL
SERVICE CMNT-IMP: ABNORMAL
SODIUM SERPL-SCNC: 138 MMOL/L (ref 136–145)
WBC # BLD AUTO: 8.1 K/UL (ref 4.1–11.1)

## 2025-04-25 PROCEDURE — 82962 GLUCOSE BLOOD TEST: CPT

## 2025-04-25 PROCEDURE — 85610 PROTHROMBIN TIME: CPT

## 2025-04-25 PROCEDURE — 6360000002 HC RX W HCPCS: Performed by: INTERNAL MEDICINE

## 2025-04-25 PROCEDURE — 36415 COLL VENOUS BLD VENIPUNCTURE: CPT

## 2025-04-25 PROCEDURE — 84145 PROCALCITONIN (PCT): CPT

## 2025-04-25 PROCEDURE — 2580000003 HC RX 258: Performed by: INTERNAL MEDICINE

## 2025-04-25 PROCEDURE — 83880 ASSAY OF NATRIURETIC PEPTIDE: CPT

## 2025-04-25 PROCEDURE — 85025 COMPLETE CBC W/AUTO DIFF WBC: CPT

## 2025-04-25 PROCEDURE — 2580000003 HC RX 258: Performed by: STUDENT IN AN ORGANIZED HEALTH CARE EDUCATION/TRAINING PROGRAM

## 2025-04-25 PROCEDURE — 99233 SBSQ HOSP IP/OBS HIGH 50: CPT | Performed by: INTERNAL MEDICINE

## 2025-04-25 PROCEDURE — 2700000000 HC OXYGEN THERAPY PER DAY

## 2025-04-25 PROCEDURE — 02HV33Z INSERTION OF INFUSION DEVICE INTO SUPERIOR VENA CAVA, PERCUTANEOUS APPROACH: ICD-10-PCS | Performed by: STUDENT IN AN ORGANIZED HEALTH CARE EDUCATION/TRAINING PROGRAM

## 2025-04-25 PROCEDURE — 6360000002 HC RX W HCPCS: Performed by: STUDENT IN AN ORGANIZED HEALTH CARE EDUCATION/TRAINING PROGRAM

## 2025-04-25 PROCEDURE — 6370000000 HC RX 637 (ALT 250 FOR IP): Performed by: STUDENT IN AN ORGANIZED HEALTH CARE EDUCATION/TRAINING PROGRAM

## 2025-04-25 PROCEDURE — 36569 INSJ PICC 5 YR+ W/O IMAGING: CPT

## 2025-04-25 PROCEDURE — 80048 BASIC METABOLIC PNL TOTAL CA: CPT

## 2025-04-25 RX ORDER — PREDNISONE 5 MG/1
5 TABLET ORAL DAILY
COMMUNITY
End: 2025-04-28 | Stop reason: SDUPTHER

## 2025-04-25 RX ORDER — OXYCODONE HYDROCHLORIDE 5 MG/1
5 TABLET ORAL EVERY 4 HOURS PRN
Qty: 12 TABLET | Refills: 0 | Status: SHIPPED | OUTPATIENT
Start: 2025-04-25 | End: 2025-04-28

## 2025-04-25 RX ADMIN — INSULIN LISPRO 1 UNITS: 100 INJECTION, SOLUTION INTRAVENOUS; SUBCUTANEOUS at 13:58

## 2025-04-25 RX ADMIN — THERA TABS 1 TABLET: TAB at 08:40

## 2025-04-25 RX ADMIN — PIPERACILLIN AND TAZOBACTAM 3375 MG: 3; .375 INJECTION, POWDER, LYOPHILIZED, FOR SOLUTION INTRAVENOUS at 08:40

## 2025-04-25 RX ADMIN — DIGOXIN 125 MCG: 125 TABLET ORAL at 08:41

## 2025-04-25 RX ADMIN — PIPERACILLIN AND TAZOBACTAM 3375 MG: 3; .375 INJECTION, POWDER, LYOPHILIZED, FOR SOLUTION INTRAVENOUS at 00:03

## 2025-04-25 RX ADMIN — VANCOMYCIN HYDROCHLORIDE 1000 MG: 1 INJECTION, POWDER, LYOPHILIZED, FOR SOLUTION INTRAVENOUS at 05:02

## 2025-04-25 RX ADMIN — DOXAZOSIN 1 MG: 2 TABLET ORAL at 08:41

## 2025-04-25 RX ADMIN — Medication 1 CAPSULE: at 08:40

## 2025-04-25 NOTE — PROGRESS NOTES
End of Shift Note    Bedside shift change report given to PETROS Zaman (oncoming nurse) by AMANDA Thompson RN (offgoing nurse).  Report included the following information SBAR, Intake/Output, and MAR    Shift worked:  7pm-7am     Shift summary and any significant changes:     No complain of pain in this shift. No BM in this shift.   Pt saturation were low at 87 back on O2 via n/c to keep saturation above 90s. IV antibiotic give as per MAR.   New iv inserted.   Concerns for physician to address:       Zone phone for oncoming shift:   [phone]           AMANDA Thompson RN

## 2025-04-25 NOTE — PROGRESS NOTES
PICC Line Insertion Procedure Note    Procedure: Insertion of #4 FR/18G PICC    Indications:  Long Term IV therapy, Home IV therapy    Procedure Details   Informed consent was obtained for the procedure.  Risks of hemorrhage, thrombus and adverse drug reaction were discussed. Vessel assessment revealed compressible vessels with no evidence of clot.     UE PICC placed without complication for patient.  2ml of Lidocaine 1% administered via infiltration prior to PICC insertion.  Time-Out performed at bedside with PETROS Zaman.      #4 FR/18G PICC inserted to the R Cephalic vein per hospital protocol.   Blood return:  yes    Catheter length 43 cm, with 1 cm exposed. Mid upper arm circumference is 28 cm.   Catheter was flushed with 20 cc NS. Patient did tolerate procedure well.    PICC tip confirmed in SVC with Sherlock 3CG technology. Positive p wave without negative deflection noted on ECG.  ECG attached below.      Upon completion of procedure, all PICC kit components accounted for and intact.  Post procedure hand-off given to Nurse.  PICC Brochure given to patient with teaching instruction. Bed returned to low locked position with call bell in reach.

## 2025-04-25 NOTE — CARE COORDINATION
Transition of Care Plan:    RUR: 18% Moderate Risk  Prior Level of Functioning: Independent with ADL's   Disposition: Home with HH  HELENA: 4/25  Follow up appointments: PC follow up  DME needed: N/A  Transportation at discharge: Son to transport  IM/IMM Medicare/ letter given: 2 IM Given 4/25  Is patient a  and connected with VA? N/A  Caregiver Contact:   Jr Baron Wagner (Child)  [phone] (Mobile)  Discharge Caregiver contacted prior to discharge? Yes  Care Conference needed? N/A  Barriers to discharge:  N/A    Pts teaching is completed. [place] to start services tomorrow. Son to transport.     Initial Note: Chart Reviewed: Pt pending IV ABX teaching for d/c. CM has sent referrals to [facility] for infusion services and [facility]. CM will continue to follow.       04/25/25 0460   Services At/After Discharge   Transition of Care Consult (CM Consult) Home Health   Services At/After Discharge Home Health   [place] Resource Information Provided? No   Confirm Follow Up Transport Family   Condition of Participation: Discharge Planning   The Plan for Transition of Care is related to the following treatment goals: Pt will discharge home with [facility] and IV ABX through [facility]   The Patient and/or Patient Representative was provided with a Choice of Provider? Patient   The Patient and/Or Patient Representative agree with the Discharge Plan? Yes   Freedom of Choice list was provided with basic dialogue that supports the patient's individualized plan of care/goals, treatment preferences, and shares the quality data associated with the providers?  Yes       HOOD Vargas,  [phone]

## 2025-04-25 NOTE — DISCHARGE SUMMARY
Discharge Summary    Name: Baron Wagner Sr.  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 Went home with PICC and IV abx, has been following with podiatry and ID. Sent in today after he had wound cultures outpt growing pseudomonas. Has been tired/malaise. WBC slightly elevated, lactic elevated. Given IV cefepime, small fluid bolus. Fairly well appearing to the eye, but data supporting he may be developing sepsis. Unclear if he is going to be open to surgery or not this time. Dr. Carrasco sent an FYI by ED, will likely follow along.      In my initial discussion with patient/family (son Baron Underwood at bedside) --> they confirmed medical history there was conferred by the ED during handoff.  Expanded upon that by explaining patient has been undergoing outpatient home health wound care, as well as completion of his IV antibiotics within the past few weeks.  Wound has appeared to have healed, but his indicator of systemic infection tends to be hyperglycemia.  Normally, with just diet control his BG hovers around the 130s, but within the past couple of days has gotten as high as 300s.  Does not use insulin at home.  Has had different providers weigh in,  who would have thought he would have gotten 8 weeks instead of 6 weeks of IV antibiotics.  He has also followed up with podiatry who has previously told him that it was about a 50-50 chance that IV antibiotic treatment would be successful.  At this point, family is concerned about his long-term wellbeing, as he is coming septic before and this is now at least the fourth hospitalization for left lower extremity wound infections.  Health and otherwise been stable with no significant recent changes.  Denies any systemic infectious symptoms.  Wound actually looks significantly improved from prior, has done a good job with wound care but again, now concerned that this infection has come back yet again.  Not fully opposed to the idea of surgery, but we discussed that they can have some more time to think and discuss amongst himself.  They would like to discuss further with Dr. Powell, patient's long-term PCP, which I encouraged them to do.     ROS unremarkable unless otherwise previously stated.     Brief Hospital Course by Main Problems:     Recurrent diabetic foot infection left sidewith possible osteomyelitis  CT scan of the foot showed: 1. Previously demonstrated medial forefoot soft tissue gas and cutaneous  ulceration overlying base of fifth metatarsal bone are no longer shown.  2. Interval fracture at base of fifth metatarsal bone with findings concerning  for osteomyelitis.  3. Areas of localized subcutaneous confluent opacification as above without  evidence for localized collection.    Podiatry cleared for OP follow up as scheduled with Dr. Carrasco     Antimicrobial orders for discharge  - Zosyn 3.375 g   IV every 8 hours end date 6/3  -May administer as continuous infusion  -Pull line at end of therapy.    -Weekly CBC, CMP-and ESR/CRP every other week  -Fax reports to [phone], call with critical labs  at [phone]  -Encourage adequate fluids, daily probiotic/yogurt  -If line malfunction occurs and home

## 2025-04-25 NOTE — PROGRESS NOTES
End of Shift Note    Bedside shift change report given to PETROS Coronel (oncoming nurse) by Junie Molina RN (offgoing nurse).  Report included the following information SBAR, Intake/Output, MAR, and Recent Results    Shift worked:  0962-6237     Shift summary and any significant changes:     Pt turned in bed intermittently, tolerated well. Worked with PT/OT today. No complaints of pain. Foot elevated on 3 pillows per order.      This AM, pt reported he \"felt funny,\" and stated he was having a hard time breathing, vitals signs obtained, and heart rate found to be labile on the machine, but a radial pulse taken by 2 different nurses was found to be between 88-96. MD notified, telemetry monitoring ordered. 2LNC placed on patient, weaned throughout shift back to RA. Pulmonology consult ordered, chest x-ray completed and this RN gave pt instructions on how to us IS. Best IS is 2000.  Shortness of breath has resolved towards end of shift.      Concerns for physician to address:  None     Zone phone for oncoming shift:   [phone]       Activity:  Level of Assistance: Moderate assist, patient does 50-74%  Number times ambulated in hallways past shift: 0  Number of times OOB to chair past shift: 0    Cardiac:   Cardiac Monitoring: Yes      Cardiac Rhythm: Sinus rhythm, Sinus tachy    Access:  Current line(s): PIV     Genitourinary:        Respiratory:   O2 Device: None (Room air)  Chronic home O2 use?: NO  Incentive spirometer at bedside: YES    GI:  Last BM (including prior to admit): 04/23/25  Current diet:  ADULT DIET; Regular; 3 carb choices (45 gm/meal)  Passing flatus: YES    Pain Management:   Patient states pain is manageable on current regimen: YES    Skin:  Adams Scale Score: 19  Interventions: Wound Offloading (Prevention Methods): Pillows, Repositioning, Turning    Patient Safety:  Fall Risk: Nursing Judgement-Fall Risk High(Add Comments): Yes  Fall Risk Interventions  Nursing Judgement-Fall Risk High(Add Comments):

## 2025-04-25 NOTE — PROGRESS NOTES
Pharmacy Antimicrobial Kinetic Dosing    Indication for Antimicrobials: OM (followed by Dr. Carney outpatient)    Current Regimen of Each Antimicrobial:  Vancomycin Pharmacy to Dose; Start Date ; Day # 4  Zosyn 4.5g IV load, then 3.375g Q8H; Start Date ; Day # 3    Previous Antimicrobial Therapy:   Recently completed Unasyn IV x 6 weeks -3/27  Augmentin -  Cefepime -    Goal Level: Vancomycin -600    Date Dose & Interval Measured (mcg/mL) Predicted AUC 24-48 Predicted AUC 24,ss    1000mg Q24H 12.3 442 462                   Significant Cultures:    Blood, paired: ngtd, prelim   MRSA, nares: negative    Labs:  Recent Labs     Units 25  0432 25  0307 25  0306 25  0502 25  1058   CREATININE MG/DL 1.18  --  1.29 1.25 1.69*   BUN MG/DL 23*  --  26* 30* 31*   PROCAL ng/mL <0.05  --   --  <0.05  --    WBC K/uL 8.1 10.8  --  10.0 12.0*     Temp (24hrs), Av.3 °F (36.8 °C), Min:97.5 °F (36.4 °C), Max:99 °F (37.2 °C)    Conditions for Dosing Consideration: None    Creatinine Clearance (mL/min): Estimated Creatinine Clearance: 48 mL/min (based on SCr of 1.18 mg/dL).     Impression/Plan:   Completed vancomycin therapy   Antimicrobial stop date:  (ID following)     Pharmacy will follow daily and adjust medications as appropriate for renal function and/or serum levels.    Thank you,  RAFAELA HANSON [facility]

## 2025-04-25 NOTE — PROGRESS NOTES
Occupational Therapy   04.25.2025    Chart reviewed in prep for OT treatment, patient received in bed and requesting to defer therapy until after breakfast. Will honor request and follow up as able and appropriate. Thank you.     Eliza Young MS, OTR/L

## 2025-04-25 NOTE — DISCHARGE INSTRUCTIONS
All of your medications from before your hospitalization are the same EXCEPT:  STOP taking the medications you told us you weren't already taking on admission. Your new prescription for you to start is zosyn for infection.  Please take all of your medications as prescribed. If prescribed any medications, please read all pharmacy instructions and inserts. Inform your doctor and pharmacist about all other medications and alternative therapies.  Please follow up with your PCP in 1-2 weeks to be reassessed after your hospital stay.  Please also follow up with pulmonology in 1-2 weeks to discuss sleep study.  If you start feeling any symptoms similar to what brought you into the hospital, please come back to the ED to be re-evaluated.

## 2025-04-25 NOTE — PLAN OF CARE
Problem: Discharge Planning  Goal: Discharge to home or other facility with appropriate resources  Outcome: Progressing     Problem: Safety - Adult  Goal: Free from fall injury  Outcome: Progressing     Problem: Physical Therapy - Adult  Goal: By Discharge: Performs mobility at highest level of function for planned discharge setting.  See evaluation for individualized goals.  Description: FUNCTIONAL STATUS PRIOR TO ADMISSION: Patient was modified independent using a single point cane for functional mobility.    HOME SUPPORT PRIOR TO ADMISSION: The patient lived with family but did not require assist.    Physical Therapy Goals  Initiated 4/24/2025  1.  Patient will move from supine to sit and sit to supine , scoot up and down, and roll side to side in bed with independence within 7 day(s).    2.  Patient will transfer from bed to chair and chair to bed with modified independence using the least restrictive device within 7 day(s).  3.  Patient will perform sit to stand with modified independence within 7 day(s).  4.  Patient will ambulate with modified independence for 250 feet with the least restrictive device within 7 day(s).   5.  Patient will ascend/descend 4 stairs with 1 handrail(s) with supervision/set-up within 7 day(s).   4/24/2025 1450 by Dayanara Barrios, PT  Outcome: Progressing     Problem: Occupational Therapy - Adult  Goal: By Discharge: Performs self-care activities at highest level of function for planned discharge setting.  See evaluation for individualized goals.  Description: FUNCTIONAL STATUS PRIOR TO ADMISSION: The patient was modified independent for ADLs and functional mobility using SPC at baseline. He also has a rollator. Pt reports prior to foot surgery in February pt was fully independent including IADLs, but since February he has not been driving and been more limited with mobility.    HOME SUPPORT: The patient lives with his son and daughter in law.    Occupational Therapy

## 2025-04-25 NOTE — PROGRESS NOTES
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  Updated CT report + for interval fracture base of 5th MT concerning for OM  Previously demonstrated soft tissue gas and cutaneous ulceration no longer shown.  Areas of localized subcutaneous confluent opacification without evidence of localized collection.    Diabetes type 2  Most recent A1c 8.4  Persistently elevated blood sugars PTA  Fatigue.    ALBERTO on CKD 3  Improving  Cr 1.29     Diastolic CHF  Stable        Paroxysmal atrial fibrillation  On digoxin, Coumadin  Pacemaker present.     S/p evaluation by vascular service  For circulatory adequacy     HLD  Continue statin     H/o polymyalgia  rheumatica    Erythematous discoloration of LE  Venous stasis dermatitis        S/p evaluation by vascular service  For circulatory adequacy     HLD  Continue statin     H/o polymyalgia  rheumatica     ESR 34  CRP 2.6    S/p admission 2/2025  Treated for diabetic left foot infection, abscess on plantar aspect & lateral  Aspect of foot.  Concern for necrotizing soft tissue infection  Osteomyelitis   CT of left foot 2/10+ for diffuse soft tissue swelling concerning for cellulitis.  Gas in the medial soft tissues concerning for necrotizing soft tissue infection.  Ulceration in the lateral proximal forefoot.  No CT evidence of abscess, acute OM  X-ray 2/10+ for findings concerning for necrotizing soft tissue infection, without  Radiographic evidence of acute osteomyelitis.  S/p doxycycline p.o. PTA  Wound culture 2/11+ light mixed anaerobic GNR, MSF  Blood cultures 2/10-no growth     S/p I&D of abscess x 2, left foot and bone biopsy 5th MT  Wound culture 2/13 + for few E. coli, alpha strep,

## 2025-04-25 NOTE — PROGRESS NOTES
Pharmacist Daily Dosing of Warfarin    Indication & Goal INR: AFib, INR Goal 2-3    PTA Warfarin Dose: 7 mg on Mon, Tues, Wed, Thurs, Fri and 5 mg on Sat/Sun     Notable concurrent conditions and medications: Zosyn    Labs:  Recent Labs     Units 04/25/25  0432 04/24/25  0307 04/24/25  0306 04/23/25  0502   INR   1.9*  --  1.8* 1.9*   HGB g/dL 11.4* 12.2  --  10.5*   PLT K/uL 163 196  --  163     Impression/Plan:   INR slightly subtherapeutic; Hgb/Hct/Plt stable   Ordered warfarin 7 mg PO x 1 dose.  Daily INR has been ordered  CBC w/o differential every other day has been ordered     Pharmacy will follow daily and adjust the dose as appropriate.    Thank you,  RAFAELA HANSON [facility]

## 2025-04-25 NOTE — PROGRESS NOTES
PCP hospital follow-up transitional care appointment has been scheduled with Dr. Willem Powell on 4/28/25 8923. [facility] information on AVS for patient resource.   Pending patient discharge.

## 2025-04-27 PROBLEM — E11.65 POORLY CONTROLLED DIABETES MELLITUS (HCC): Status: RESOLVED | Noted: 2025-04-24 | Resolved: 2025-04-27

## 2025-04-27 PROBLEM — N17.9 AKI (ACUTE KIDNEY INJURY): Status: RESOLVED | Noted: 2025-04-24 | Resolved: 2025-04-27

## 2025-04-27 PROBLEM — A49.8 PSEUDOMONAS INFECTION: Status: RESOLVED | Noted: 2023-07-17 | Resolved: 2025-04-27

## 2025-04-27 PROBLEM — R78.81 BACTEREMIA DUE TO PSEUDOMONAS: Status: RESOLVED | Noted: 2023-07-17 | Resolved: 2025-04-27

## 2025-04-27 PROBLEM — A41.9 SEPSIS DUE TO SKIN INFECTION (HCC): Status: RESOLVED | Noted: 2023-07-04 | Resolved: 2025-04-27

## 2025-04-27 PROBLEM — E11.65 TYPE 2 DIABETES MELLITUS WITH HYPERGLYCEMIA, WITHOUT LONG-TERM CURRENT USE OF INSULIN (HCC): Status: RESOLVED | Noted: 2025-04-22 | Resolved: 2025-04-27

## 2025-04-27 PROBLEM — J20.9 ACUTE BRONCHITIS: Status: RESOLVED | Noted: 2024-11-23 | Resolved: 2025-04-27

## 2025-04-27 PROBLEM — Z71.89 DNR (DO NOT RESUSCITATE) DISCUSSION: Status: RESOLVED | Noted: 2023-07-05 | Resolved: 2025-04-27

## 2025-04-27 PROBLEM — H25.9 AGE-RELATED CATARACT OF BOTH EYES: Status: RESOLVED | Noted: 2022-02-16 | Resolved: 2025-04-27

## 2025-04-27 PROBLEM — Z95.0 NORMALLY FUNCTIONING CARDIAC PACEMAKER PRESENT: Status: RESOLVED | Noted: 2025-02-14 | Resolved: 2025-04-27

## 2025-04-27 PROBLEM — M86.071 ACUTE HEMATOGENOUS OSTEOMYELITIS OF RIGHT FOOT (HCC): Status: RESOLVED | Noted: 2023-09-29 | Resolved: 2025-04-27

## 2025-04-27 PROBLEM — M86.072 ACUTE HEMATOGENOUS OSTEOMYELITIS OF LEFT FOOT (HCC): Status: RESOLVED | Noted: 2025-02-18 | Resolved: 2025-04-27

## 2025-04-27 PROBLEM — A49.8 E COLI INFECTION: Status: RESOLVED | Noted: 2025-04-24 | Resolved: 2025-04-27

## 2025-04-27 PROBLEM — L08.9 SEPSIS DUE TO SKIN INFECTION (HCC): Status: RESOLVED | Noted: 2023-07-04 | Resolved: 2025-04-27

## 2025-04-27 PROBLEM — B96.5 BACTEREMIA DUE TO PSEUDOMONAS: Status: RESOLVED | Noted: 2023-07-17 | Resolved: 2025-04-27

## 2025-04-27 PROBLEM — A49.1 INFECTION DUE TO ALPHA-HEMOLYTIC STREPTOCOCCUS: Status: RESOLVED | Noted: 2025-04-24 | Resolved: 2025-04-27

## 2025-04-28 ENCOUNTER — OFFICE VISIT (OUTPATIENT)
Facility: CLINIC | Age: 89
End: 2025-04-28

## 2025-04-28 VITALS
WEIGHT: 194.6 LBS | DIASTOLIC BLOOD PRESSURE: 86 MMHG | HEIGHT: 75 IN | HEART RATE: 94 BPM | TEMPERATURE: 97.6 F | SYSTOLIC BLOOD PRESSURE: 132 MMHG | OXYGEN SATURATION: 97 % | BODY MASS INDEX: 24.2 KG/M2 | RESPIRATION RATE: 13 BRPM

## 2025-04-28 DIAGNOSIS — L03.116 CELLULITIS OF LEFT FOOT: ICD-10-CM

## 2025-04-28 DIAGNOSIS — L97.421 DIABETIC ULCER OF LEFT MIDFOOT ASSOCIATED WITH TYPE 2 DIABETES MELLITUS, LIMITED TO BREAKDOWN OF SKIN (HCC): ICD-10-CM

## 2025-04-28 DIAGNOSIS — M86.9 OSTEOMYELITIS OF LEFT FOOT, UNSPECIFIED TYPE (HCC): Primary | ICD-10-CM

## 2025-04-28 DIAGNOSIS — I12.9 HYPERTENSION WITH RENAL DISEASE: ICD-10-CM

## 2025-04-28 DIAGNOSIS — Z09 HOSPITAL DISCHARGE FOLLOW-UP: ICD-10-CM

## 2025-04-28 DIAGNOSIS — I48.0 PAROXYSMAL ATRIAL FIBRILLATION (HCC): ICD-10-CM

## 2025-04-28 DIAGNOSIS — N18.32 STAGE 3B CHRONIC KIDNEY DISEASE (HCC): ICD-10-CM

## 2025-04-28 DIAGNOSIS — I50.32 CHRONIC DIASTOLIC CONGESTIVE HEART FAILURE (HCC): ICD-10-CM

## 2025-04-28 DIAGNOSIS — E11.621 DIABETIC ULCER OF LEFT MIDFOOT ASSOCIATED WITH TYPE 2 DIABETES MELLITUS, LIMITED TO BREAKDOWN OF SKIN (HCC): ICD-10-CM

## 2025-04-28 DIAGNOSIS — N18.32 TYPE 2 DIABETES MELLITUS WITH STAGE 3B CHRONIC KIDNEY DISEASE, WITHOUT LONG-TERM CURRENT USE OF INSULIN (HCC): ICD-10-CM

## 2025-04-28 DIAGNOSIS — E11.22 TYPE 2 DIABETES MELLITUS WITH STAGE 3B CHRONIC KIDNEY DISEASE, WITHOUT LONG-TERM CURRENT USE OF INSULIN (HCC): ICD-10-CM

## 2025-04-28 DIAGNOSIS — S30.810A EXCORIATION OF MULTIPLE SITES OF BUTTOCK, INITIAL ENCOUNTER: ICD-10-CM

## 2025-04-28 RX ORDER — PREDNISONE 10 MG/1
10 TABLET ORAL DAILY
Qty: 30 TABLET | Refills: 5 | Status: SHIPPED | OUTPATIENT
Start: 2025-04-28

## 2025-04-28 RX ORDER — MAGNESIUM GLUCONATE 27 MG(500)
500 TABLET ORAL DAILY
COMMUNITY

## 2025-04-28 RX ORDER — STANNOUS FLUORIDE 1.4 MG/G
PASTE, DENTIFRICE DENTAL
Qty: 30 CAPSULE | Refills: 1 | Status: SHIPPED | OUTPATIENT
Start: 2025-04-28

## 2025-04-28 RX ORDER — CASTOR OIL AND BALSAM, PERU 788; 87 MG/G; MG/G
OINTMENT TOPICAL 2 TIMES DAILY
Qty: 30 G | Refills: 1 | Status: SHIPPED | OUTPATIENT
Start: 2025-04-28

## 2025-04-28 RX ORDER — INSULIN GLARGINE 100 [IU]/ML
14 INJECTION, SOLUTION SUBCUTANEOUS NIGHTLY
Qty: 5 ADJUSTABLE DOSE PRE-FILLED PEN SYRINGE | Refills: 0 | Status: SHIPPED | OUTPATIENT
Start: 2025-04-28

## 2025-04-28 NOTE — PROGRESS NOTES
Baron Wagner  is a 89 y.o. male    Chief Complaint   Patient presents with    Follow-Up from Hospital     4/22/25 MRM severe sepsis     Vitals:    04/28/25 1429   BP: 132/86   BP Site: Left Upper Arm   Patient Position: Sitting   Pulse: 94   Resp: 13   Temp: 97.6 °F (36.4 °C)   SpO2: 97%   Weight: 88.3 kg (194 lb 9.6 oz)   Height: 1.905 m (6' 3\")         Health Maintenance Due   Topic Date Due    Pneumococcal 50+ years Vaccine (2 of 2 - PPSV23) 03/04/2005    Respiratory Syncytial Virus (RSV) Pregnant or age 60 yrs+ (1 - 1-dose 75+ series) Never done    COVID-19 Vaccine (1 - 2024-25 season) Never done         \"Have you been to the ER, urgent care clinic since your last visit?  Hospitalized since your last visit?\"    YES [facility] 4/22/25 Severe sepsis    “Have you seen or consulted any other health care providers outside of [facility] since your last visit?”    NO            
Panel     Standing Status:   Future     Number of Occurrences:   1     Expected Date:   4/28/2025     Expiration Date:   4/27/2026    Sedimentation Rate     Standing Status:   Future     Number of Occurrences:   1     Expected Date:   4/28/2025     Expiration Date:   4/27/2026    Protime-INR     Standing Status:   Future     Number of Occurrences:   1     Expected Date:   4/28/2025     Expiration Date:   4/28/2026     Daily Coumadin Dose?:   f    MI DISCHARGE MEDS RECONCILED W/ CURRENT OUTPATIENT MED LIST          No results found for any visits on 04/28/25.    I have reviewed the patient's medical history in detail and updated the computerized patient record.     We had a prolonged discussion about these complex clinical issues and went over the various important aspects to consider. All questions were answered.     Advised him to call back or return to office if symptoms do not improve, change in nature, or persist.    He was given an after visit summary or informed of [employer] which includes patient instructions, diagnoses, current medications, & vitals.  He expressed understanding with the diagnosis and plan.

## 2025-04-29 ENCOUNTER — RESULTS FOLLOW-UP (OUTPATIENT)
Facility: CLINIC | Age: 89
End: 2025-04-29

## 2025-04-29 ENCOUNTER — TELEPHONE (OUTPATIENT)
Facility: CLINIC | Age: 89
End: 2025-04-29

## 2025-04-29 LAB
ANION GAP SERPL CALC-SCNC: 9 MMOL/L (ref 2–12)
BASOPHILS # BLD: 0.03 K/UL (ref 0–0.1)
BASOPHILS NFR BLD: 0.4 % (ref 0–1)
BUN SERPL-MCNC: 27 MG/DL (ref 6–20)
BUN/CREAT SERPL: 16 (ref 12–20)
CALCIUM SERPL-MCNC: 9.9 MG/DL (ref 8.5–10.1)
CHLORIDE SERPL-SCNC: 99 MMOL/L (ref 97–108)
CO2 SERPL-SCNC: 26 MMOL/L (ref 21–32)
CREAT SERPL-MCNC: 1.66 MG/DL (ref 0.7–1.3)
DIFFERENTIAL METHOD BLD: ABNORMAL
EOSINOPHIL # BLD: 0.03 K/UL (ref 0–0.4)
EOSINOPHIL NFR BLD: 0.4 % (ref 0–7)
ERYTHROCYTE [DISTWIDTH] IN BLOOD BY AUTOMATED COUNT: 15.9 % (ref 11.5–14.5)
ERYTHROCYTE [SEDIMENTATION RATE] IN BLOOD: 43 MM/HR (ref 0–20)
GLUCOSE SERPL-MCNC: 420 MG/DL (ref 65–100)
HCT VFR BLD AUTO: 37.1 % (ref 36.6–50.3)
HGB BLD-MCNC: 11.2 G/DL (ref 12.1–17)
IMM GRANULOCYTES # BLD AUTO: 0.04 K/UL (ref 0–0.04)
IMM GRANULOCYTES NFR BLD AUTO: 0.5 % (ref 0–0.5)
INR PPP: 2.7 (ref 0.9–1.1)
LYMPHOCYTES # BLD: 0.8 K/UL (ref 0.8–3.5)
LYMPHOCYTES NFR BLD: 10.7 % (ref 12–49)
MCH RBC QN AUTO: 27.3 PG (ref 26–34)
MCHC RBC AUTO-ENTMCNC: 30.2 G/DL (ref 30–36.5)
MCV RBC AUTO: 90.5 FL (ref 80–99)
MONOCYTES # BLD: 0.39 K/UL (ref 0–1)
MONOCYTES NFR BLD: 5.2 % (ref 5–13)
NEUTS SEG # BLD: 6.2 K/UL (ref 1.8–8)
NEUTS SEG NFR BLD: 82.8 % (ref 32–75)
NRBC # BLD: 0 K/UL (ref 0–0.01)
NRBC BLD-RTO: 0 PER 100 WBC
PLATELET # BLD AUTO: 154 K/UL (ref 150–400)
PMV BLD AUTO: 12.5 FL (ref 8.9–12.9)
POTASSIUM SERPL-SCNC: 4.3 MMOL/L (ref 3.5–5.1)
PROTHROMBIN TIME: 26.9 SEC (ref 9.2–11.2)
RBC # BLD AUTO: 4.1 M/UL (ref 4.1–5.7)
SODIUM SERPL-SCNC: 134 MMOL/L (ref 136–145)
WBC # BLD AUTO: 7.5 K/UL (ref 4.1–11.1)

## 2025-04-29 NOTE — TELEPHONE ENCOUNTER
Patient advised insulin needles have been sent to the pharmacy.  Also they will check with the pharmacy about an OTC cream due to the venelex will cost $38.00.

## 2025-04-29 NOTE — TELEPHONE ENCOUNTER
Patients daughter in law states they need needles for the lantus and the wound cream that was talked about at yesterdays appointment. Please advise

## 2025-04-30 NOTE — PROGRESS NOTES
Physician Progress Note      PATIENT:               GEETA CUMMINGS  CSN #:                  [id number]  :                       1933  ADMIT DATE:       2025 10:36 AM  DISCH DATE:        2025 5:43 PM  RESPONDING  PROVIDER #:        Joanne Rico MD          QUERY TEXT:    Based on your medical judgment, please clarify these findings and document if   any of the following are being evaluated and/or treated:    The clinical indicators include:  - 89-year-old male w/ h/o HTN, DM, CHF, and Afib per  H&P  - pharmacy dose warfarin per MAR  Options provided:  -- Secondary hypercoagulable state in a patient with atrial fibrillation  -- Other - I will add my own diagnosis  -- Disagree - Not applicable / Not valid  -- Disagree - Clinically unable to determine / Unknown  -- Refer to Clinical Documentation Reviewer    PROVIDER RESPONSE TEXT:    This patient has secondary hypercoagulable state in a patient with atrial   fibrillation.    Query created by: Claudine Moran on 2025 9:08 AM      QUERY TEXT:    Sepsis is documented in the medical record 25 H&P and subsequent PNs.   Please provide additional clinical indicators supportive of your   documentation. Or please document if the diagnosis of sepsis has been ruled   out after study.    The clinical indicators include:  - admitted w/ sepsis and diabetic foot infection with high likelihood of OM   per  H&P  - WBC 12.0, 10.0, 10.8, and 8.1 during admission per lab results  - Temp 97.3- 99 during admission per VS summary  -  Podiatry notes, \"I do not feel tis patient has osteomyelitis but more   cellulitis that appears resolved upon treating the pseudomonas.\"  - ID consulted  - cefepime IV, Vanc IV, and Zosyn IV per MAR  Options provided:  -- Sepsis present as evidenced by, Please document evidence.  -- Sepsis was ruled out after study  -- Other - I will add my own diagnosis  -- Disagree - Not applicable / Not valid  -- Disagree -

## 2025-05-02 ENCOUNTER — APPOINTMENT (OUTPATIENT)
Facility: HOSPITAL | Age: 89
End: 2025-05-02
Payer: MEDICARE

## 2025-05-02 ENCOUNTER — TELEPHONE (OUTPATIENT)
Facility: CLINIC | Age: 89
End: 2025-05-02

## 2025-05-02 ENCOUNTER — HOSPITAL ENCOUNTER (EMERGENCY)
Facility: HOSPITAL | Age: 89
Discharge: HOME | End: 2025-05-02
Attending: EMERGENCY MEDICINE
Payer: MEDICARE

## 2025-05-02 VITALS
TEMPERATURE: 97.5 F | DIASTOLIC BLOOD PRESSURE: 84 MMHG | WEIGHT: 194.67 LBS | HEIGHT: 75 IN | OXYGEN SATURATION: 99 % | SYSTOLIC BLOOD PRESSURE: 119 MMHG | RESPIRATION RATE: 24 BRPM | HEART RATE: 86 BPM | BODY MASS INDEX: 24.2 KG/M2

## 2025-05-02 DIAGNOSIS — I51.7 CARDIOMEGALY: ICD-10-CM

## 2025-05-02 DIAGNOSIS — R06.02 SHORTNESS OF BREATH: Primary | ICD-10-CM

## 2025-05-02 DIAGNOSIS — J44.9 CHRONIC OBSTRUCTIVE PULMONARY DISEASE, UNSPECIFIED COPD TYPE (HCC): ICD-10-CM

## 2025-05-02 LAB
ALBUMIN SERPL-MCNC: 2.9 G/DL (ref 3.5–5)
ALBUMIN/GLOB SERPL: 0.7 (ref 1.1–2.2)
ALP SERPL-CCNC: 63 U/L (ref 45–117)
ALT SERPL-CCNC: 38 U/L (ref 12–78)
ANION GAP SERPL CALC-SCNC: 8 MMOL/L (ref 2–12)
AST SERPL-CCNC: 27 U/L (ref 15–37)
BASOPHILS # BLD: 0.07 K/UL (ref 0–0.1)
BASOPHILS NFR BLD: 0.7 % (ref 0–1)
BILIRUB SERPL-MCNC: 0.5 MG/DL (ref 0.2–1)
BUN SERPL-MCNC: 29 MG/DL (ref 6–20)
BUN/CREAT SERPL: 18 (ref 12–20)
CALCIUM SERPL-MCNC: 9.6 MG/DL (ref 8.5–10.1)
CHLORIDE SERPL-SCNC: 104 MMOL/L (ref 97–108)
CO2 SERPL-SCNC: 26 MMOL/L (ref 21–32)
CREAT SERPL-MCNC: 1.57 MG/DL (ref 0.7–1.3)
DIFFERENTIAL METHOD BLD: ABNORMAL
EKG ATRIAL RATE: 101 BPM
EKG DIAGNOSIS: NORMAL
EKG P-R INTERVAL: 152 MS
EKG Q-T INTERVAL: 398 MS
EKG QRS DURATION: 150 MS
EKG QTC CALCULATION (BAZETT): 516 MS
EKG R AXIS: 15 DEGREES
EKG T AXIS: 201 DEGREES
EKG VENTRICULAR RATE: 101 BPM
EOSINOPHIL # BLD: 0.11 K/UL (ref 0–0.4)
EOSINOPHIL NFR BLD: 1.1 % (ref 0–7)
ERYTHROCYTE [DISTWIDTH] IN BLOOD BY AUTOMATED COUNT: 16.1 % (ref 11.5–14.5)
GLOBULIN SER CALC-MCNC: 4 G/DL (ref 2–4)
GLUCOSE SERPL-MCNC: 257 MG/DL (ref 65–100)
HCT VFR BLD AUTO: 34.5 % (ref 36.6–50.3)
HGB BLD-MCNC: 10.4 G/DL (ref 12.1–17)
IMM GRANULOCYTES # BLD AUTO: 0.08 K/UL (ref 0–0.04)
IMM GRANULOCYTES NFR BLD AUTO: 0.8 % (ref 0–0.5)
LYMPHOCYTES # BLD: 0.75 K/UL (ref 0.8–3.5)
LYMPHOCYTES NFR BLD: 7.2 % (ref 12–49)
MAGNESIUM SERPL-MCNC: 2.1 MG/DL (ref 1.6–2.4)
MCH RBC QN AUTO: 27 PG (ref 26–34)
MCHC RBC AUTO-ENTMCNC: 30.1 G/DL (ref 30–36.5)
MCV RBC AUTO: 89.6 FL (ref 80–99)
MONOCYTES # BLD: 0.76 K/UL (ref 0–1)
MONOCYTES NFR BLD: 7.3 % (ref 5–13)
NEUTS SEG # BLD: 8.63 K/UL (ref 1.8–8)
NEUTS SEG NFR BLD: 82.9 % (ref 32–75)
NRBC # BLD: 0 K/UL (ref 0–0.01)
NRBC BLD-RTO: 0 PER 100 WBC
PLATELET # BLD AUTO: 154 K/UL (ref 150–400)
PMV BLD AUTO: 10.9 FL (ref 8.9–12.9)
POTASSIUM SERPL-SCNC: 4.2 MMOL/L (ref 3.5–5.1)
PROT SERPL-MCNC: 6.9 G/DL (ref 6.4–8.2)
RBC # BLD AUTO: 3.85 M/UL (ref 4.1–5.7)
RBC MORPH BLD: ABNORMAL
SODIUM SERPL-SCNC: 138 MMOL/L (ref 136–145)
TROPONIN I SERPL HS-MCNC: 51 NG/L (ref 0–76)
WBC # BLD AUTO: 10.4 K/UL (ref 4.1–11.1)

## 2025-05-02 PROCEDURE — 83735 ASSAY OF MAGNESIUM: CPT

## 2025-05-02 PROCEDURE — 6360000004 HC RX CONTRAST MEDICATION: Performed by: EMERGENCY MEDICINE

## 2025-05-02 PROCEDURE — 99285 EMERGENCY DEPT VISIT HI MDM: CPT

## 2025-05-02 PROCEDURE — 71046 X-RAY EXAM CHEST 2 VIEWS: CPT

## 2025-05-02 PROCEDURE — 93005 ELECTROCARDIOGRAM TRACING: CPT | Performed by: EMERGENCY MEDICINE

## 2025-05-02 PROCEDURE — 80053 COMPREHEN METABOLIC PANEL: CPT

## 2025-05-02 PROCEDURE — 71275 CT ANGIOGRAPHY CHEST: CPT

## 2025-05-02 PROCEDURE — 84484 ASSAY OF TROPONIN QUANT: CPT

## 2025-05-02 PROCEDURE — 36415 COLL VENOUS BLD VENIPUNCTURE: CPT

## 2025-05-02 PROCEDURE — 85025 COMPLETE CBC W/AUTO DIFF WBC: CPT

## 2025-05-02 RX ORDER — IOPAMIDOL 755 MG/ML
100 INJECTION, SOLUTION INTRAVASCULAR
Status: COMPLETED | OUTPATIENT
Start: 2025-05-02 | End: 2025-05-02

## 2025-05-02 RX ADMIN — IOPAMIDOL 80 ML: 755 INJECTION, SOLUTION INTRAVENOUS at 12:47

## 2025-05-02 ASSESSMENT — PAIN SCALES - GENERAL: PAINLEVEL_OUTOF10: 0

## 2025-05-02 ASSESSMENT — PAIN - FUNCTIONAL ASSESSMENT: PAIN_FUNCTIONAL_ASSESSMENT: NONE - DENIES PAIN

## 2025-05-02 NOTE — TELEPHONE ENCOUNTER
Call received from patient and son regarding patient experiencing increasing SOB.  Worse when he lays down.  Discussed with Dr. Powell and due to patient's history referred to the ER for further evaluation.

## 2025-05-02 NOTE — CARE COORDINATION
CM aware of CM Consult by Dr. Farfan for possible need for home oxygen. Chart reviewed. Pt was DNR and has ACP. Pt is presently followed by [facility] (4/30/2025). Admitted 4/22-30/2025 [facility] and discharged home without need of oxygen (weaned) prior to d/c.     Pt tolerated RA - CM spoke with Dr. Farfan r/t CM Consult - Dr. Farfan advised pt will not qualify for home oxygen due to sats remaining high 90's on RA (95-97% RA).         Brenda Throckmorton RN  [facility] Case Management  /[phone]  [phone]  [phone]

## 2025-05-02 NOTE — DISCHARGE INSTRUCTIONS
Thank You!    It was a pleasure taking care of you in our Emergency Department today. We know that when you come to our Emergency Department, you are entrusting us with your health, comfort, and safety. Our physicians and nurses honor that trust, and truly appreciate the opportunity to care for you and your loved ones.      We also value your feedback. If you receive a survey about your Emergency Department experience today, please fill it out.  We care about our patients' feedback, and we listen to what you have to say.  Thank you.    Eliazar Farfan MD  ________________________________________________________________________  I have included a copy of your lab results and/or radiologic studies from today's visit so you can have them easily available at your follow-up visit. We hope you feel better and please do not hesitate to contact the ED if you have any questions at all!    Recent Results (from the past 12 hours)   EKG 12 Lead    Collection Time: 05/02/25  9:50 AM   Result Value Ref Range    Ventricular Rate 101 BPM    Atrial Rate 101 BPM    P-R Interval 152 ms    QRS Duration 150 ms    Q-T Interval 398 ms    QTc Calculation (Bazett) 516 ms    R Axis 15 degrees    T Axis 201 degrees    Diagnosis       Atrial-sensed ventricular-paced rhythm with occasional premature ventricular   complexes  Abnormal ECG  When compared with ECG of 22-APR-2025 11:45,  Previous ECG has undetermined rhythm, needs review  Confirmed by MD Farfan William ([id number]) on 5/2/2025 1:33:44 PM     CBC with Auto Differential    Collection Time: 05/02/25 11:19 AM   Result Value Ref Range    WBC 10.4 4.1 - 11.1 K/uL    RBC 3.85 (L) 4.10 - 5.70 M/uL    Hemoglobin 10.4 (L) 12.1 - 17.0 g/dL    Hematocrit 34.5 (L) 36.6 - 50.3 %    MCV 89.6 80.0 - 99.0 FL    MCH 27.0 26.0 - 34.0 PG    MCHC 30.1 30.0 - 36.5 g/dL    RDW 16.1 (H) 11.5 - 14.5 %    Platelets 154 150 - 400 K/uL    MPV 10.9 8.9 - 12.9 FL    Nucleated RBCs 0.0 0  WBC    nRBC 0.00 0.00 -  0.01 K/uL    Neutrophils % 82.9 (H) 32.0 - 75.0 %    Lymphocytes % 7.2 (L) 12.0 - 49.0 %    Monocytes % 7.3 5.0 - 13.0 %    Eosinophils % 1.1 0.0 - 7.0 %    Basophils % 0.7 0.0 - 1.0 %    Immature Granulocytes % 0.8 (H) 0.0 - 0.5 %    Neutrophils Absolute 8.63 (H) 1.80 - 8.00 K/UL    Lymphocytes Absolute 0.75 (L) 0.80 - 3.50 K/UL    Monocytes Absolute 0.76 0.00 - 1.00 K/UL    Eosinophils Absolute 0.11 0.00 - 0.40 K/UL    Basophils Absolute 0.07 0.00 - 0.10 K/UL    Immature Granulocytes Absolute 0.08 (H) 0.00 - 0.04 K/UL    Differential Type SMEAR SCANNED      RBC Comment NORMOCYTIC, NORMOCHROMIC     Comprehensive Metabolic Panel    Collection Time: 05/02/25 11:19 AM   Result Value Ref Range    Sodium 138 136 - 145 mmol/L    Potassium 4.2 3.5 - 5.1 mmol/L    Chloride 104 97 - 108 mmol/L    CO2 26 21 - 32 mmol/L    Anion Gap 8 2 - 12 mmol/L    Glucose 257 (H) 65 - 100 mg/dL    BUN 29 (H) 6 - 20 MG/DL    Creatinine 1.57 (H) 0.70 - 1.30 MG/DL    BUN/Creatinine Ratio 18 12 - 20      Est, Glom Filt Rate 41 (L) >60 ml/min/1.73m2    Calcium 9.6 8.5 - 10.1 MG/DL    Total Bilirubin 0.5 0.2 - 1.0 MG/DL    ALT 38 12 - 78 U/L    AST 27 15 - 37 U/L    Alk Phosphatase 63 45 - 117 U/L    Total Protein 6.9 6.4 - 8.2 g/dL    Albumin 2.9 (L) 3.5 - 5.0 g/dL    Globulin 4.0 2.0 - 4.0 g/dL    Albumin/Globulin Ratio 0.7 (L) 1.1 - 2.2     Magnesium    Collection Time: 05/02/25 11:19 AM   Result Value Ref Range    Magnesium 2.1 1.6 - 2.4 mg/dL   Troponin    Collection Time: 05/02/25 11:19 AM   Result Value Ref Range    Troponin, High Sensitivity 51 0 - 76 ng/L     CTA CHEST W WO CONTRAST   Final Result   No pulmonary embolism   Cardiomegaly, coronary artery calcification, ectasia of the ascending thoracic   aorta, pulmonary arterial enlargement   Stable Central pulmonary arteries   Emphysema         Electronically signed by Shelia Felipe      XR CHEST (2 VW)   Final Result   1. No acute pneumonia or pulm edema   2. Mildly elevated left

## 2025-05-02 NOTE — ED PROVIDER NOTES
critical labs  at [phone]  -Encourage adequate fluids, daily probiotic/yogurt  -If line malfunction occurs and home health cannot reposition  please send patient to ED immediately  -ID follow-up -5/22 at 330 PM  - If persistent side effects occur stop antibiotic and call-ID/PCP     vitamin B-12 1000 MCG tablet  Commonly known as: CYANOCOBALAMIN     vitamin C 500 MG tablet  Commonly known as: ASCORBIC ACID     warfarin 5 MG tablet  Commonly known as: COUMADIN  Take as directed. If you are unsure how to take this medication, talk to your nurse or doctor.  Original instructions: TAKE 5 MG ON SUNDAY, TUESDAY AND THURSDAY ONE AND A HALF TABLETS ALL OTHER DAYS.           * This list has 2 medication(s) that are the same as other medications prescribed for you. Read the directions carefully, and ask your doctor or other care provider to review them with you.                    DISCONTINUED MEDICATIONS:  Current Discharge Medication List          I am the Primary Clinician of Record.   Eliazar Farfan MD (electronically signed)    (Please note that parts of this dictation were completed with voice recognition software. Quite often unanticipated grammatical, syntax, homophones, and other interpretive errors are inadvertently transcribed by the computer software. Please disregards these errors. Please excuse any errors that have escaped final proofreading.)         Eliazar Farfan MD  05/02/25 8740

## 2025-05-05 ENCOUNTER — FOLLOWUP TELEPHONE ENCOUNTER (OUTPATIENT)
Facility: HOSPITAL | Age: 89
End: 2025-05-05

## 2025-05-05 NOTE — TELEPHONE ENCOUNTER
Coco from [facility] called to notify clinic all wounds have healed. They will be discharging for wound care but still in home to manage PICC line care. Instructed Coco to contact Infectious Disease MD to verify how to manage PICC line from this point. She was in agreement with this plan and no further questions or concerns at this time. She will contact clinic should any arise.

## 2025-05-09 ENCOUNTER — HOSPITAL ENCOUNTER (OUTPATIENT)
Facility: HOSPITAL | Age: 89
Discharge: HOME | End: 2025-05-09
Attending: PODIATRIST
Payer: MEDICARE

## 2025-05-09 VITALS
TEMPERATURE: 98.1 F | HEART RATE: 90 BPM | DIASTOLIC BLOOD PRESSURE: 80 MMHG | RESPIRATION RATE: 18 BRPM | SYSTOLIC BLOOD PRESSURE: 125 MMHG

## 2025-05-09 DIAGNOSIS — S91.302S OPEN WOUND OF LEFT FOOT, SEQUELA: ICD-10-CM

## 2025-05-09 DIAGNOSIS — L97.521 CHRONIC ULCER OF LEFT FOOT LIMITED TO BREAKDOWN OF SKIN (HCC): Primary | ICD-10-CM

## 2025-05-09 PROBLEM — S91.302A OPEN WOUND OF LEFT FOOT: Status: ACTIVE | Noted: 2025-05-09

## 2025-05-09 PROCEDURE — 11042 DBRDMT SUBQ TIS 1ST 20SQCM/<: CPT

## 2025-05-09 RX ORDER — SILVER SULFADIAZINE 10 MG/G
CREAM TOPICAL PRN
OUTPATIENT
Start: 2025-05-09

## 2025-05-09 RX ORDER — BETAMETHASONE DIPROPIONATE 0.5 MG/G
CREAM TOPICAL PRN
OUTPATIENT
Start: 2025-05-09

## 2025-05-09 RX ORDER — GINSENG 100 MG
CAPSULE ORAL PRN
OUTPATIENT
Start: 2025-05-09

## 2025-05-09 RX ORDER — LIDOCAINE 40 MG/G
CREAM TOPICAL PRN
OUTPATIENT
Start: 2025-05-09

## 2025-05-09 RX ORDER — SODIUM CHLOR/HYPOCHLOROUS ACID 0.033 %
SOLUTION, IRRIGATION IRRIGATION PRN
OUTPATIENT
Start: 2025-05-09

## 2025-05-09 RX ORDER — MUPIROCIN 20 MG/G
OINTMENT TOPICAL PRN
OUTPATIENT
Start: 2025-05-09

## 2025-05-09 RX ORDER — LIDOCAINE HYDROCHLORIDE 20 MG/ML
JELLY TOPICAL PRN
OUTPATIENT
Start: 2025-05-09

## 2025-05-09 RX ORDER — TRIAMCINOLONE ACETONIDE 1 MG/G
OINTMENT TOPICAL PRN
OUTPATIENT
Start: 2025-05-09

## 2025-05-09 RX ORDER — LIDOCAINE 50 MG/G
OINTMENT TOPICAL PRN
OUTPATIENT
Start: 2025-05-09

## 2025-05-09 RX ORDER — NEOMYCIN/BACITRACIN/POLYMYXINB 3.5-400-5K
OINTMENT (GRAM) TOPICAL PRN
OUTPATIENT
Start: 2025-05-09

## 2025-05-09 RX ORDER — BACITRACIN ZINC AND POLYMYXIN B SULFATE 500; 1000 [USP'U]/G; [USP'U]/G
OINTMENT TOPICAL PRN
OUTPATIENT
Start: 2025-05-09

## 2025-05-09 RX ORDER — GENTAMICIN SULFATE 1 MG/G
OINTMENT TOPICAL PRN
OUTPATIENT
Start: 2025-05-09

## 2025-05-09 RX ORDER — LIDOCAINE HYDROCHLORIDE 40 MG/ML
SOLUTION TOPICAL PRN
OUTPATIENT
Start: 2025-05-09

## 2025-05-09 RX ORDER — CLOBETASOL PROPIONATE 0.5 MG/G
OINTMENT TOPICAL PRN
OUTPATIENT
Start: 2025-05-09

## 2025-05-09 NOTE — FLOWSHEET NOTE
05/09/25 1620   Wound 01/31/25 Foot Left;Lateral   Date First Assessed/Time First Assessed: 01/31/25 1423   Present on Original Admission: Yes  Wound Approximate Age at First Assessment (Weeks): 2 weeks  Location: Foot  Wound Location Orientation: Left;Lateral   Dressing Status New dressing applied   Dressing/Treatment   (medihoney, gauze, fabirc band aid)

## 2025-05-09 NOTE — WOUND CARE
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    Performed by: Radha Carrasco DPM    Consent obtained: Yes    Time out taken: Yes    Debridement: excisional    Using curette the wound(s)/ulcer(s) was/were sharply debrided down through and including the removal of    subcutaneous tissue    Devitalized Tissue Debrided: slough and necrotic/eschar    Pre Debridement Measurements:  Are located in the Wound/Ulcer Documentation Flow Sheet    Non-Pressure ulcer, fat layer exposed    Wound/Ulcer #: 1    Post Debridement Measurements:  Wound/Ulcer Descriptions are Pre Debridement except measurements:    Wound Care Documentation:  Puncture 07/13/23 Internal jugular (Active)   Number of days: 666       Puncture 07/13/23 Femoral (Active)   Number of days: 666       Wound 07/04/23 Toe (Comment  which one) Left Wound on Big Toe (Active)   Number of days: 675       Wound 07/04/23 Toe (Comment  which one) Right Wound on Toe (Active)   Number of days: 675       Wound 09/30/23 Buttocks Left MASD (Active)   Number of days: 586       Wound 01/31/25 Foot Left;Lateral (Active)   Wound Image   05/09/25 1451   Wound Etiology Diabetic 05/09/25 1451   Dressing Status Intact 05/09/25 1451   Wound Cleansed Cleansed with saline 05/09/25 1451   Dressing/Treatment Open to air 04/25/25 1515   Offloading for Diabetic Foot Ulcers Post op shoe 03/28/25 1400   Wound Length (cm) 0.1 cm 05/09/25 1451   Wound Width (cm) 0.1 cm 05/09/25 1451   Wound Depth (cm) 0.1 cm 05/09/25 1451   Wound Surface Area

## 2025-05-09 NOTE — FLOWSHEET NOTE
05/09/25 1451   Wound 01/31/25 Foot Left;Lateral   Date First Assessed/Time First Assessed: 01/31/25 1423   Present on Original Admission: Yes  Wound Approximate Age at First Assessment (Weeks): 2 weeks  Location: Foot  Wound Location Orientation: Left;Lateral   Wound Image    Wound Etiology Diabetic   Dressing Status Intact   Wound Cleansed Cleansed with saline   Wound Length (cm) 0.1 cm   Wound Width (cm) 0.1 cm   Wound Depth (cm) 0.1 cm   Wound Surface Area (cm^2) 0.01 cm^2   Change in Wound Size % (l*w) 98.77   Wound Volume (cm^3) 0.001 cm^3   Wound Healing % 100   Wound Assessment Epithelialization  (scabbed over)   Drainage Amount None (dry)   Odor None   Wound 02/10/25 Other (Comment) Left;Plantar   Date First Assessed/Time First Assessed: 02/10/25 1700   Primary Wound Type: Diabetic Ulcer  Location: (c) Other (Comment)  Wound Location Orientation: Left;Plantar   Wound Image    Wound Etiology Diabetic   Dressing Status Intact   Wound Cleansed Cleansed with saline   Wound Length (cm) 0.1 cm   Wound Width (cm) 0.1 cm   Wound Depth (cm) 0.1 cm   Wound Surface Area (cm^2) 0.01 cm^2   Change in Wound Size % (l*w) 97.96   Wound Volume (cm^3) 0.001 cm^3   Wound Healing % 100   Wound Assessment Epithelialization  (scabbed over)   Drainage Amount None (dry)   Odor None   Pain Assessment   Pain Assessment None - Denies Pain     /80   Pulse 90   Temp 98.1 °F (36.7 °C) (Temporal)   Resp 18

## 2025-05-09 NOTE — PATIENT INSTRUCTIONS
Discharge Instructions/Wound Care Orders  [facility]   [address]  [id number]  [place] [address]  Phone: [phone] Fax: [phone]    NAME:  Baron Wagner Sr.  YOB: 1933  MEDICAL RECORD NUMBER:  [id number]  DATE:  May 9, 2025    Wound Care Orders:  Left foot wound - Cleanse with normal saline and gauze. Apply to medihoney to wound. Cut small piece of gauze. Cover with bandaid. Change dressing 3 times per week.    [facility]: SN to do wound care 3 times per week.     Activity:  [x] Elevate leg(s) above the level of the heart when sitting and avoid prolonged standing in one place.    [x] Wear off-loading shoe/boot on the affected foot when walking.  [x] Do not get dressing wet.    Dietary:  [] Diet as tolerated      [x] Diabetic Diet            [] Increase Protein: examples (Meat, cheese, eggs, greek yogurt, fish, nuts)          [] [employer]rapeutic Nutrition Powder  [] Other:      Follow-up:  [x] Return Appointment: With Dr. Radha Carrasco in 3 Weeks.  [] Ordered tests:        Electronically signed Chrissy Smith RN on 5/9/2025 at 4:13 PM     Wound Care Center Information: Should you experience any significant changes in your wound(s) or have questions about your wound care, please contact the [facility] at MONDAY - FRIDAY 8:00 am - 4:30.  If you need help with your wound outside these hours and cannot wait until we are again available, contact your PCP or go to the hospital emergency room.     PLEASE NOTE: IF YOU ARE UNABLE TO OBTAIN WOUND SUPPLIES, CONTINUE TO USE THE SUPPLIES YOU HAVE AVAILABLE UNTIL YOU ARE ABLE TO REACH US. IT IS MOST IMPORTANT TO KEEP THE WOUND COVERED AT ALL TIMES.     Physician Signature:_______________________    Date: ___________ Time:  ____________

## 2025-05-14 NOTE — TELEPHONE ENCOUNTER
Debbie ortiz/[facility]     Reporting pt diarrhea on and off and rash on both arms     Pls advise       Best number to reach her is [phone]

## 2025-05-14 NOTE — PROGRESS NOTES
Chief Complaint   Patient presents with    Follow-up    Shortness of Breath       SUBJECTIVE:    Baron Wagner Sr. is a 89 y.o. male who returns in follow-up for his medical problems include osteomyelitis left foot, cellulitis left foot, diabetes mellitus, CKD, hypertension, congestive heart failure, CKD no other medical problems.  He does note that the wound in his foot is healing and he still being followed by podiatry.  He is still on the IV antibiotics which he is tolerating.  He notes no chest pain, shortness of breath, palpitations, PND, orthopnea or other cardiac or respiratory complaints.  He does note some dyspnea on exertion was recently seen by pulmonology Dr. Mitchell and told to take an extra fluid pill that day which seemed to work.  He is curious if he can do that periodically and I told his son who is with him that would be okay but not to take 2 pills on a every day basis.  He notes no GI or  complaints except some loose bowel since he has been on the antibiotic but he does take probiotic.  He notes no headaches, dizziness or neurologic complaints.  Gait is a little unsteady but he is using a cane.  He notes no change of his chronic arthritic complaints and there are no other complaints on complete review of systems.      Current Outpatient Medications   Medication Sig Dispense Refill    Insulin Pen Needle 32G X 4 MM MISC 1 each by Does not apply route daily 100 each 3    magnesium gluconate (MAGONATE) 500 MG tablet Take 1 tablet by mouth daily      insulin glargine (LANTUS SOLOSTAR) 100 UNIT/ML injection pen Inject 14 Units into the skin nightly 5 Adjustable Dose Pre-filled Pen Syringe 0    predniSONE (DELTASONE) 10 MG tablet Take 1 tablet by mouth daily 30 tablet 5    balsum peru-castor oil (VENELEX) OINT ointment Apply topically 2 times daily 30 g 1    Probiotic Product (ALIGN EXTRA STRENGTH) CAPS Take 1 capsule daily 30 capsule 1    UNABLE TO FIND Antimicrobial orders for discharge  - Zosyn

## 2025-05-14 NOTE — TELEPHONE ENCOUNTER
Spoke to patient's family and they say rash appeared within the last week on his wrist that its annette itchy but no too bad. Watery diarrhea on and off. Family says diarrhea is not new and happens sometimes. Saw Dr. Carrasco last week and she removed scab from foot and took a culture. They thought you might be interested in the results whenever they come out.

## 2025-05-15 ENCOUNTER — OFFICE VISIT (OUTPATIENT)
Facility: CLINIC | Age: 89
End: 2025-05-15
Payer: MEDICARE

## 2025-05-15 VITALS
BODY MASS INDEX: 24.73 KG/M2 | TEMPERATURE: 97.2 F | HEART RATE: 50 BPM | OXYGEN SATURATION: 99 % | SYSTOLIC BLOOD PRESSURE: 104 MMHG | RESPIRATION RATE: 18 BRPM | DIASTOLIC BLOOD PRESSURE: 69 MMHG | HEIGHT: 75 IN | WEIGHT: 198.9 LBS

## 2025-05-15 DIAGNOSIS — I50.32 CHRONIC DIASTOLIC CONGESTIVE HEART FAILURE (HCC): ICD-10-CM

## 2025-05-15 DIAGNOSIS — I12.9 HYPERTENSION WITH RENAL DISEASE: ICD-10-CM

## 2025-05-15 DIAGNOSIS — N18.32 TYPE 2 DIABETES MELLITUS WITH STAGE 3B CHRONIC KIDNEY DISEASE, WITHOUT LONG-TERM CURRENT USE OF INSULIN (HCC): ICD-10-CM

## 2025-05-15 DIAGNOSIS — M86.9 OSTEOMYELITIS OF LEFT FOOT, UNSPECIFIED TYPE (HCC): Primary | ICD-10-CM

## 2025-05-15 DIAGNOSIS — L03.116 CELLULITIS OF LEFT FOOT: ICD-10-CM

## 2025-05-15 DIAGNOSIS — N18.32 STAGE 3B CHRONIC KIDNEY DISEASE (HCC): ICD-10-CM

## 2025-05-15 DIAGNOSIS — E11.22 TYPE 2 DIABETES MELLITUS WITH STAGE 3B CHRONIC KIDNEY DISEASE, WITHOUT LONG-TERM CURRENT USE OF INSULIN (HCC): ICD-10-CM

## 2025-05-15 PROCEDURE — 1036F TOBACCO NON-USER: CPT | Performed by: INTERNAL MEDICINE

## 2025-05-15 PROCEDURE — 3052F HG A1C>EQUAL 8.0%<EQUAL 9.0%: CPT | Performed by: INTERNAL MEDICINE

## 2025-05-15 PROCEDURE — G8427 DOCREV CUR MEDS BY ELIG CLIN: HCPCS | Performed by: INTERNAL MEDICINE

## 2025-05-15 PROCEDURE — 1159F MED LIST DOCD IN RCRD: CPT | Performed by: INTERNAL MEDICINE

## 2025-05-15 PROCEDURE — 1111F DSCHRG MED/CURRENT MED MERGE: CPT | Performed by: INTERNAL MEDICINE

## 2025-05-15 PROCEDURE — 1160F RVW MEDS BY RX/DR IN RCRD: CPT | Performed by: INTERNAL MEDICINE

## 2025-05-15 PROCEDURE — G8420 CALC BMI NORM PARAMETERS: HCPCS | Performed by: INTERNAL MEDICINE

## 2025-05-15 PROCEDURE — 99214 OFFICE O/P EST MOD 30 MIN: CPT | Performed by: INTERNAL MEDICINE

## 2025-05-15 PROCEDURE — 1123F ACP DISCUSS/DSCN MKR DOCD: CPT | Performed by: INTERNAL MEDICINE

## 2025-05-15 NOTE — PROGRESS NOTES
No chief complaint on file.    \"Have you been to the ER, urgent care clinic since your last visit?  Hospitalized since your last visit?\"    YES - When: approximately 2  weeks ago.  Where and Why: [facility].    “Have you seen or consulted any other health care providers outside of [facility] since your last visit?”    NO

## 2025-05-16 LAB
ALBUMIN SERPL-MCNC: 3.6 G/DL (ref 3.5–5)
ALBUMIN/GLOB SERPL: 1 (ref 1.1–2.2)
ALP SERPL-CCNC: 64 U/L (ref 45–117)
ALT SERPL-CCNC: 29 U/L (ref 12–78)
ANION GAP SERPL CALC-SCNC: 7 MMOL/L (ref 2–12)
AST SERPL-CCNC: 21 U/L (ref 15–37)
BASOPHILS # BLD: 0.05 K/UL (ref 0–0.1)
BASOPHILS NFR BLD: 0.5 % (ref 0–1)
BILIRUB SERPL-MCNC: 0.7 MG/DL (ref 0.2–1)
BUN SERPL-MCNC: 31 MG/DL (ref 6–20)
BUN/CREAT SERPL: 21 (ref 12–20)
CALCIUM SERPL-MCNC: 10.2 MG/DL (ref 8.5–10.1)
CHLORIDE SERPL-SCNC: 103 MMOL/L (ref 97–108)
CO2 SERPL-SCNC: 28 MMOL/L (ref 21–32)
CREAT SERPL-MCNC: 1.47 MG/DL (ref 0.7–1.3)
CRP SERPL-MCNC: 1.12 MG/DL (ref 0–0.3)
DIFFERENTIAL METHOD BLD: ABNORMAL
EOSINOPHIL # BLD: 0.04 K/UL (ref 0–0.4)
EOSINOPHIL NFR BLD: 0.4 % (ref 0–7)
ERYTHROCYTE [DISTWIDTH] IN BLOOD BY AUTOMATED COUNT: 15.9 % (ref 11.5–14.5)
ERYTHROCYTE [SEDIMENTATION RATE] IN BLOOD: 18 MM/HR (ref 0–20)
GLOBULIN SER CALC-MCNC: 3.7 G/DL (ref 2–4)
GLUCOSE SERPL-MCNC: 223 MG/DL (ref 65–100)
HCT VFR BLD AUTO: 39.3 % (ref 36.6–50.3)
HGB BLD-MCNC: 11.7 G/DL (ref 12.1–17)
IMM GRANULOCYTES # BLD AUTO: 0.06 K/UL (ref 0–0.04)
IMM GRANULOCYTES NFR BLD AUTO: 0.6 % (ref 0–0.5)
LYMPHOCYTES # BLD: 0.99 K/UL (ref 0.8–3.5)
LYMPHOCYTES NFR BLD: 9.5 % (ref 12–49)
MCH RBC QN AUTO: 26.3 PG (ref 26–34)
MCHC RBC AUTO-ENTMCNC: 29.8 G/DL (ref 30–36.5)
MCV RBC AUTO: 88.3 FL (ref 80–99)
MONOCYTES # BLD: 0.69 K/UL (ref 0–1)
MONOCYTES NFR BLD: 6.6 % (ref 5–13)
NEUTS SEG # BLD: 8.59 K/UL (ref 1.8–8)
NEUTS SEG NFR BLD: 82.4 % (ref 32–75)
NRBC # BLD: 0 K/UL (ref 0–0.01)
NRBC BLD-RTO: 0 PER 100 WBC
PLATELET # BLD AUTO: 174 K/UL (ref 150–400)
PMV BLD AUTO: 11.7 FL (ref 8.9–12.9)
POTASSIUM SERPL-SCNC: 4.4 MMOL/L (ref 3.5–5.1)
PROT SERPL-MCNC: 7.3 G/DL (ref 6.4–8.2)
RBC # BLD AUTO: 4.45 M/UL (ref 4.1–5.7)
SODIUM SERPL-SCNC: 138 MMOL/L (ref 136–145)
WBC # BLD AUTO: 10.4 K/UL (ref 4.1–11.1)

## 2025-05-19 ENCOUNTER — TELEPHONE (OUTPATIENT)
Age: 89
End: 2025-05-19

## 2025-05-19 NOTE — TELEPHONE ENCOUNTER
ID  Had extensive conversation with patient's son and daughter-in-law  Wound has healed overall.  Patient is currently tolerating antibiotic well  Diarrhea has also resolved..  Patient has fluid overload.  He has seen PCP and pulmonary  Recommendation has been for increasing fluid pill.  But patient apparently declines taking extra fluid pill.  Patient tolerating antibiotic well and doing well overall.  Most recent labs discussed with them  WBC count, inflammatory markers, creatinine all doing better.  No guarantees that patient will not have a recurrent infection.  Advise close follow-up with podiatry, good footcare, well-fitting footwear, watch out for blisters and calluses.  Family voiced understanding.  Plan is for follow-up on Thursday as virtual visit.

## 2025-05-19 NOTE — TELEPHONE ENCOUNTER
Patient's daughter-in-law called to follow-up from her phone call last week about diarrhea and rash. She expressed concern about patient's breathing. Suggested taking patient to ER for SOB but she states patient has been to ER and they cannot help him. She stated that the concern is fluid related. She also states she is concerned patient will not make it through this. Explained that if patient is short of breath and they do not want to take him to ER then they have the option to discuss at appointment Thursday. She stated that if Dr. Carney wants to call her she wants to speak with her.

## 2025-05-22 ENCOUNTER — TELEPHONE (OUTPATIENT)
Facility: CLINIC | Age: 89
End: 2025-05-22

## 2025-05-22 ENCOUNTER — TELEMEDICINE (OUTPATIENT)
Age: 89
End: 2025-05-22
Payer: MEDICARE

## 2025-05-22 ENCOUNTER — LAB (OUTPATIENT)
Facility: CLINIC | Age: 89
End: 2025-05-22
Payer: MEDICARE

## 2025-05-22 DIAGNOSIS — L97.524 CHRONIC ULCER OF LEFT FOOT WITH NECROSIS OF BONE (HCC): ICD-10-CM

## 2025-05-22 DIAGNOSIS — N30.90 CYSTITIS: Primary | ICD-10-CM

## 2025-05-22 DIAGNOSIS — N17.9 AKI (ACUTE KIDNEY INJURY): ICD-10-CM

## 2025-05-22 DIAGNOSIS — E11.628 DIABETIC INFECTION OF LEFT FOOT (HCC): Primary | ICD-10-CM

## 2025-05-22 DIAGNOSIS — N18.31 STAGE 3A CHRONIC KIDNEY DISEASE (HCC): ICD-10-CM

## 2025-05-22 DIAGNOSIS — L08.9 DIABETIC INFECTION OF LEFT FOOT (HCC): Primary | ICD-10-CM

## 2025-05-22 DIAGNOSIS — I87.2 VENOUS STASIS DERMATITIS: ICD-10-CM

## 2025-05-22 DIAGNOSIS — M35.3 POLYMYALGIA RHEUMATICA: ICD-10-CM

## 2025-05-22 DIAGNOSIS — E11.8 CONTROLLED DIABETES MELLITUS TYPE 2 WITH COMPLICATIONS, UNSPECIFIED WHETHER LONG TERM INSULIN USE (HCC): ICD-10-CM

## 2025-05-22 DIAGNOSIS — M86.172 ACUTE OSTEOMYELITIS OF LEFT FOOT (HCC): ICD-10-CM

## 2025-05-22 DIAGNOSIS — E78.5 HYPERLIPIDEMIA, UNSPECIFIED HYPERLIPIDEMIA TYPE: ICD-10-CM

## 2025-05-22 DIAGNOSIS — A49.8 PSEUDOMONAS INFECTION: ICD-10-CM

## 2025-05-22 DIAGNOSIS — I48.0 PAROXYSMAL ATRIAL FIBRILLATION (HCC): ICD-10-CM

## 2025-05-22 DIAGNOSIS — I50.32 CHRONIC DIASTOLIC CONGESTIVE HEART FAILURE (HCC): ICD-10-CM

## 2025-05-22 PROCEDURE — 3052F HG A1C>EQUAL 8.0%<EQUAL 9.0%: CPT | Performed by: INTERNAL MEDICINE

## 2025-05-22 PROCEDURE — 1159F MED LIST DOCD IN RCRD: CPT | Performed by: INTERNAL MEDICINE

## 2025-05-22 PROCEDURE — 99214 OFFICE O/P EST MOD 30 MIN: CPT | Performed by: INTERNAL MEDICINE

## 2025-05-22 PROCEDURE — 1160F RVW MEDS BY RX/DR IN RCRD: CPT | Performed by: INTERNAL MEDICINE

## 2025-05-22 PROCEDURE — G8420 CALC BMI NORM PARAMETERS: HCPCS | Performed by: INTERNAL MEDICINE

## 2025-05-22 PROCEDURE — 1111F DSCHRG MED/CURRENT MED MERGE: CPT | Performed by: INTERNAL MEDICINE

## 2025-05-22 PROCEDURE — G8427 DOCREV CUR MEDS BY ELIG CLIN: HCPCS | Performed by: INTERNAL MEDICINE

## 2025-05-22 PROCEDURE — 1036F TOBACCO NON-USER: CPT | Performed by: INTERNAL MEDICINE

## 2025-05-22 PROCEDURE — 99211 OFF/OP EST MAY X REQ PHY/QHP: CPT | Performed by: INTERNAL MEDICINE

## 2025-05-22 PROCEDURE — 1123F ACP DISCUSS/DSCN MKR DOCD: CPT | Performed by: INTERNAL MEDICINE

## 2025-05-22 RX ORDER — GLUCOSAM/CHONDRO/HERB 149/HYAL 750-100 MG
1500 TABLET ORAL DAILY
COMMUNITY

## 2025-05-22 ASSESSMENT — PATIENT HEALTH QUESTIONNAIRE - PHQ9
SUM OF ALL RESPONSES TO PHQ QUESTIONS 1-9: 10
3. TROUBLE FALLING OR STAYING ASLEEP: SEVERAL DAYS
7. TROUBLE CONCENTRATING ON THINGS, SUCH AS READING THE NEWSPAPER OR WATCHING TELEVISION: SEVERAL DAYS
4. FEELING TIRED OR HAVING LITTLE ENERGY: SEVERAL DAYS
SUM OF ALL RESPONSES TO PHQ QUESTIONS 1-9: 10
10. IF YOU CHECKED OFF ANY PROBLEMS, HOW DIFFICULT HAVE THESE PROBLEMS MADE IT FOR YOU TO DO YOUR WORK, TAKE CARE OF THINGS AT HOME, OR GET ALONG WITH OTHER PEOPLE: VERY DIFFICULT
8. MOVING OR SPEAKING SO SLOWLY THAT OTHER PEOPLE COULD HAVE NOTICED. OR THE OPPOSITE, BEING SO FIGETY OR RESTLESS THAT YOU HAVE BEEN MOVING AROUND A LOT MORE THAN USUAL: SEVERAL DAYS
9. THOUGHTS THAT YOU WOULD BE BETTER OFF DEAD, OR OF HURTING YOURSELF: NOT AT ALL
5. POOR APPETITE OR OVEREATING: NOT AT ALL
1. LITTLE INTEREST OR PLEASURE IN DOING THINGS: NEARLY EVERY DAY
6. FEELING BAD ABOUT YOURSELF - OR THAT YOU ARE A FAILURE OR HAVE LET YOURSELF OR YOUR FAMILY DOWN: NOT AT ALL
2. FEELING DOWN, DEPRESSED OR HOPELESS: NEARLY EVERY DAY

## 2025-05-22 NOTE — PROGRESS NOTES
health cannot reposition  please send patient to ED immediately  -ID follow-up -5/22 at 330 PM  - If persistent side effects occur stop antibiotic and call-ID/PCP 4/25/25  Yes Joanne Rico MD   warfarin (COUMADIN) 5 MG tablet TAKE 5 MG ON SUNDAY, TUESDAY AND THURSDAY ONE AND A HALF TABLETS ALL OTHER DAYS. 3/27/25  Yes Willem Powell MD   pregabalin (LYRICA) 75 MG capsule Take 1 capsule by mouth daily for 180 days. Max Daily Amount: 75 mg 3/18/25 9/14/25 Yes Willem Powell MD   simvastatin (ZOCOR) 20 MG tablet TAKE 1 TABLET BY MOUTH EVERY DAY 3/14/25  Yes Willem Powell MD   metFORMIN (GLUCOPHAGE) 500 MG tablet Take 1 tablet by mouth 2 times daily (with meals)   Yes Vaishnavi Garcia MD   JANUVIA 100 MG tablet TAKE 1 TABLET BY MOUTH EVERY DAY 2/17/25  Yes Willem Powell MD   ACCU-CHEK LEE ANN PLUS strip USE TO TEST BLOOD SUGAR TWICE DAILY 2/17/25  Yes Willem Powell MD   Lactobacillus (PROBIOTIC ACIDOPHILUS) CAPS Take 1 daily 11/25/24  Yes Willem Powell MD   bumetanide (BUMEX) 2 MG tablet TAKE 1 TABLET BY MOUTH TWICE A DAY 11/6/24  Yes Willem Powell MD   terazosin (HYTRIN) 2 MG capsule TAKE 1 CAPSULE BY MOUTH EVERY DAY 10/21/24  Yes Willem Powell MD   glimepiride (AMARYL) 4 MG tablet TAKE 1 TABLET BY MOUTH EVERY DAY BEFORE BREAKFAST 9/10/24  Yes Willem Powell MD   digoxin (LANOXIN) 125 MCG tablet TAKE 1 TABLET BY MOUTH EVERY DAY 6/24/24  Yes Willem Powell MD   vitamin C (ASCORBIC ACID) 500 MG tablet Take 2 tablets by mouth daily   Yes Vaishnavi Garcia MD   acetaminophen (TYLENOL) 500 MG tablet Take 2 tablets by mouth in the morning and at bedtime  Patient taking differently: Take 650 mg by mouth in the morning and at bedtime   Yes Vaishnavi Garcia MD   Omega-3 Fatty Acids (FISH OIL) 1200 MG CAPS Take 2,400 mg by mouth in the morning and at bedtime Taking 1200 mg   Yes Vaishnavi Garcia MD   vitamin B-12 (CYANOCOBALAMIN) 1000 MCG tablet Take 
tingling,  nasal discharge, swelling, lumps in neck, sores on tongue   CV:  Negative for chest pain, dyspnea on exertion, leg edema   Lungs: Negative for shortness of breath, cough, wheezing   Abdomen: Negative for abdominal pain, nausea, vomiting, diarrhea, constipation   Genitourinary: Negative for genital pain or genital discharge     Neuro: Negative for headache, numbness, tingling, extremity weakness,  syncope, seizures    Skin: Negative for rash, sores/open wounds   Musculoskeletal: Negative for joint pain, joint swelling, joint erythema    Endocrine: Negative for high or low blood sugars, heat or cold intolerance    Psych: Negative for manic behavior     Patient-Reported Vitals  Patient-Reported Weight: 197lb         PHYSICAL EXAM:  [INSTRUCTIONS:  \"[x]\" Indicates a positive item  \"[]\" Indicates a negative item  -- DELETE ALL ITEMS NOT EXAMINED]    Constitutional: [x] Appears well-developed and well-nourished [x] No apparent distress      [] Abnormal -     Mental status: [x] Alert and awake  [x] Oriented to person/place/time [x] Able to follow commands    [] Abnormal -     Eyes:   EOM    [x]  Normal    [] Abnormal -   Sclera  [x]  Normal    [] Abnormal -          Discharge [x]  None visible   [] Abnormal -     HENT: [x] Normocephalic, atraumatic  [] Abnormal -   [x] Mouth/Throat: Mucous membranes are moist    External Ears [x] Normal  [] Abnormal -    Neck: [x] No visualized mass [] Abnormal -     Pulmonary/Chest: [x] Respiratory effort normal   [x] No visualized signs of difficulty breathing or respiratory distress        [] Abnormal -      Musculoskeletal:   [x] Normal gait with no signs of ataxia         [x] Normal range of motion of neck        [] Abnormal -     Neurological:        [x] No Facial Asymmetry (Cranial nerve 7 motor function) (limited exam due to video visit)          [x] No gaze palsy        [] Abnormal -          Skin:        [x] No significant exanthematous lesions or discoloration noted

## 2025-05-23 LAB
APPEARANCE UR: CLEAR
BACTERIA URNS QL MICRO: NEGATIVE /HPF
BILIRUB UR QL: NEGATIVE
COLOR UR: ABNORMAL
EPITH CASTS URNS QL MICRO: ABNORMAL /LPF
GLUCOSE UR STRIP.AUTO-MCNC: 500 MG/DL
HGB UR QL STRIP: ABNORMAL
HYALINE CASTS URNS QL MICRO: ABNORMAL /LPF (ref 0–5)
KETONES UR QL STRIP.AUTO: NEGATIVE MG/DL
LEUKOCYTE ESTERASE UR QL STRIP.AUTO: NEGATIVE
NITRITE UR QL STRIP.AUTO: NEGATIVE
PH UR STRIP: 5.5 (ref 5–8)
PROT UR STRIP-MCNC: NEGATIVE MG/DL
RBC #/AREA URNS HPF: >100 /HPF (ref 0–5)
SP GR UR REFRACTOMETRY: 1.01 (ref 1–1.03)
UROBILINOGEN UR QL STRIP.AUTO: 0.2 EU/DL (ref 0.2–1)
WBC URNS QL MICRO: ABNORMAL /HPF (ref 0–4)

## 2025-05-24 LAB
BACTERIA SPEC CULT: NORMAL
SERVICE CMNT-IMP: NORMAL

## 2025-05-30 ENCOUNTER — TELEPHONE (OUTPATIENT)
Facility: CLINIC | Age: 89
End: 2025-05-30

## 2025-05-30 ENCOUNTER — HOSPITAL ENCOUNTER (OUTPATIENT)
Facility: HOSPITAL | Age: 89
Discharge: HOME | End: 2025-05-30
Attending: PODIATRIST
Payer: MEDICARE

## 2025-05-30 VITALS
SYSTOLIC BLOOD PRESSURE: 128 MMHG | RESPIRATION RATE: 18 BRPM | DIASTOLIC BLOOD PRESSURE: 78 MMHG | TEMPERATURE: 97.8 F | HEART RATE: 85 BPM

## 2025-05-30 DIAGNOSIS — L97.521 CHRONIC ULCER OF LEFT FOOT LIMITED TO BREAKDOWN OF SKIN (HCC): Primary | ICD-10-CM

## 2025-05-30 PROCEDURE — 97598 DBRDMT OPN WND ADDL 20CM/<: CPT

## 2025-05-30 RX ORDER — GENTAMICIN SULFATE 1 MG/G
OINTMENT TOPICAL PRN
OUTPATIENT
Start: 2025-05-30

## 2025-05-30 RX ORDER — SILVER SULFADIAZINE 10 MG/G
CREAM TOPICAL PRN
OUTPATIENT
Start: 2025-05-30

## 2025-05-30 RX ORDER — GINSENG 100 MG
CAPSULE ORAL PRN
OUTPATIENT
Start: 2025-05-30

## 2025-05-30 RX ORDER — MUPIROCIN 20 MG/G
OINTMENT TOPICAL PRN
OUTPATIENT
Start: 2025-05-30

## 2025-05-30 RX ORDER — LIDOCAINE HYDROCHLORIDE 20 MG/ML
JELLY TOPICAL PRN
OUTPATIENT
Start: 2025-05-30

## 2025-05-30 RX ORDER — SODIUM CHLOR/HYPOCHLOROUS ACID 0.033 %
SOLUTION, IRRIGATION IRRIGATION PRN
OUTPATIENT
Start: 2025-05-30

## 2025-05-30 RX ORDER — LIDOCAINE 40 MG/G
CREAM TOPICAL PRN
OUTPATIENT
Start: 2025-05-30

## 2025-05-30 RX ORDER — BACITRACIN ZINC AND POLYMYXIN B SULFATE 500; 1000 [USP'U]/G; [USP'U]/G
OINTMENT TOPICAL PRN
OUTPATIENT
Start: 2025-05-30

## 2025-05-30 RX ORDER — LIDOCAINE 50 MG/G
OINTMENT TOPICAL PRN
OUTPATIENT
Start: 2025-05-30

## 2025-05-30 RX ORDER — NEOMYCIN/BACITRACIN/POLYMYXINB 3.5-400-5K
OINTMENT (GRAM) TOPICAL PRN
OUTPATIENT
Start: 2025-05-30

## 2025-05-30 RX ORDER — BETAMETHASONE DIPROPIONATE 0.5 MG/G
CREAM TOPICAL PRN
OUTPATIENT
Start: 2025-05-30

## 2025-05-30 RX ORDER — TRIAMCINOLONE ACETONIDE 1 MG/G
OINTMENT TOPICAL PRN
OUTPATIENT
Start: 2025-05-30

## 2025-05-30 RX ORDER — CLOBETASOL PROPIONATE 0.5 MG/G
OINTMENT TOPICAL PRN
OUTPATIENT
Start: 2025-05-30

## 2025-05-30 RX ORDER — LIDOCAINE HYDROCHLORIDE 40 MG/ML
SOLUTION TOPICAL PRN
OUTPATIENT
Start: 2025-05-30

## 2025-05-30 NOTE — FLOWSHEET NOTE
05/30/25 1315   LLE Neurovascular Assessment   Capillary Refill Less than/Equal to 3 seconds   Color Appropriate for Ethnicity   Temperature Warm   LLE Sensation  Decreased   L Pedal Pulse +2   Wound 01/31/25 Foot Left;Lateral   Date First Assessed/Time First Assessed: 01/31/25 1423   Present on Original Admission: Yes  Wound Approximate Age at First Assessment (Weeks): 2 weeks  Location: Foot  Wound Location Orientation: Left;Lateral   Wound Image    Wound Etiology Diabetic   Dressing Status Dry   Wound Length (cm) 0.1 cm   Wound Width (cm) 0.1 cm   Wound Depth (cm) 0.1 cm   Wound Surface Area (cm^2) 0.01 cm^2   Change in Wound Size % (l*w) 98.77   Wound Volume (cm^3) 0.001 cm^3   Wound Healing % 100   Wound Assessment Epithelialization   Drainage Amount None (dry)   Odor None   Pain Assessment   Pain Assessment None - Denies Pain         /78   Pulse 85   Temp 97.8 °F (36.6 °C) (Temporal)   Resp 18

## 2025-05-30 NOTE — PATIENT INSTRUCTIONS
Discharge Instructions [facility]  [address]   [address]  [place] [address]   Telephone: [phone]     FAX [phone]    NAME:  Baron Wagner Sr.  YOB: 1933  MEDICAL RECORD NUMBER:  [id number]  DATE:  5/30/2025    CPT code:Debridement selective (75558)    WOUND CARE ORDERS:  Left foot wound :Cleanse with soap and water , apply primary dressing Medihoney gel covered with secondary dressing Gauze and Fabric Band-Aid . Pt./pcg/HH nurse to change (freq) 3x Weekly  and as needed for compromise.Follow up with provider in 3 Week(s).   Home Health Agency: [facility]    TREATMENT ORDERS:    Elevate leg(s) above the level of the heart when sitting.   Avoid prolonged standing in one place.  Do no get dressing/wrap wet.  Follow Diet as prescribed:   [] Diet as tolerated: [] Calorie Diabetic Diet: Low carb and no Sugar [] No Added Salt:no more then 2,000 mg daily  [] Increase Protein: [] Limit the amount of liquid you are drinking and avoid drinking in between meals (limit to 2 quarts daily)     Return Appointment:  [x] Return Appointment: With Dr. Carrasco in  3 Week(s)  [] Nurse Visit :  days  [] Ordered tests:    Electronically signed Liana Herrera RN on 5/30/2025 at 1:23 PM     Wound Care Center Information: Should you experience any significant changes in your wound(s) or have questions about your wound care, please contact the [facility] at MONDAY - FRIDAY 8:00 am - 4:30.  If you need help with your wound outside these hours and cannot wait until we are again available, contact your PCP or go to the hospital emergency room.   PLEASE NOTE: IF YOU ARE UNABLE TO OBTAIN WOUND SUPPLIES, CONTINUE TO USE THE SUPPLIES YOU HAVE AVAILABLE UNTIL YOU ARE ABLE TO REACH US. IT IS MOST IMPORTANT TO KEEP THE WOUND COVERED AT ALL TIMES.     Physician Signature:_______________________    Date: ___________ Time:  ____________

## 2025-05-30 NOTE — TELEPHONE ENCOUNTER
Called pt and s/w jake, she stated that a nurse from [facility] has already called her with pts results and a nurse from [facility] is currently at his home evaluating him. She stated he is not doing well, his bp is 105/86, blood sugar is 262, and his oxygen is 87 which she says has never been in the 80s.   Jake stated you did not want him off of his abx but he sees his infectious disease dr today and wants to take him off of it on 6/3. Please advise.

## 2025-05-30 NOTE — TELEPHONE ENCOUNTER
Patient's daughter in law states they went to the wound doctor today and they want him to stop his antibiotic. He is not done until 6/3/25 and were told by you not to stop them. Please advise. Also patient is diagnosed with COPD and is having trouble breathing today. She states they have a call in to the pulmonologist,Dr Mitchell. He has an inhaler but is not able to use it correctly and it is not benefiting him. She states they will ask Dr Mitchell for a nebulizer.      [phone]

## 2025-06-03 ENCOUNTER — TELEPHONE (OUTPATIENT)
Age: 89
End: 2025-06-03

## 2025-06-03 ENCOUNTER — TELEPHONE (OUTPATIENT)
Facility: CLINIC | Age: 89
End: 2025-06-03

## 2025-06-03 NOTE — TELEPHONE ENCOUNTER
Debbie LEMA Nurse with [facility] ([phone]) wanted to let you know patient has completed antibiotic therapy. They are to pull line tomorrow.

## 2025-06-03 NOTE — TELEPHONE ENCOUNTER
[facility] states they are going to pull his pic line because he has completed his abx.

## 2025-06-04 ENCOUNTER — HOSPITAL ENCOUNTER (EMERGENCY)
Facility: HOSPITAL | Age: 89
Discharge: HOME | End: 2025-06-04
Attending: STUDENT IN AN ORGANIZED HEALTH CARE EDUCATION/TRAINING PROGRAM
Payer: MEDICARE

## 2025-06-04 ENCOUNTER — APPOINTMENT (OUTPATIENT)
Facility: HOSPITAL | Age: 89
End: 2025-06-04
Payer: MEDICARE

## 2025-06-04 VITALS
OXYGEN SATURATION: 98 % | HEART RATE: 88 BPM | DIASTOLIC BLOOD PRESSURE: 88 MMHG | RESPIRATION RATE: 27 BRPM | SYSTOLIC BLOOD PRESSURE: 131 MMHG

## 2025-06-04 DIAGNOSIS — R06.02 SHORTNESS OF BREATH: Primary | ICD-10-CM

## 2025-06-04 LAB
ALBUMIN SERPL-MCNC: 3.2 G/DL (ref 3.5–5)
ALBUMIN/GLOB SERPL: 0.8 (ref 1.1–2.2)
ALP SERPL-CCNC: 78 U/L (ref 45–117)
ALT SERPL-CCNC: 40 U/L (ref 12–78)
ANION GAP SERPL CALC-SCNC: 9 MMOL/L (ref 2–12)
ARTERIAL PATENCY WRIST A: POSITIVE
AST SERPL-CCNC: 31 U/L (ref 15–37)
BASE EXCESS BLD CALC-SCNC: 3 MMOL/L
BASOPHILS # BLD: 0.04 K/UL (ref 0–0.1)
BASOPHILS NFR BLD: 0.4 % (ref 0–1)
BDY SITE: ABNORMAL
BILIRUB SERPL-MCNC: 0.7 MG/DL (ref 0.2–1)
BUN SERPL-MCNC: 41 MG/DL (ref 6–20)
BUN/CREAT SERPL: 20 (ref 12–20)
CALCIUM SERPL-MCNC: 9.8 MG/DL (ref 8.5–10.1)
CHLORIDE SERPL-SCNC: 98 MMOL/L (ref 97–108)
CO2 SERPL-SCNC: 28 MMOL/L (ref 21–32)
CREAT SERPL-MCNC: 2.09 MG/DL (ref 0.7–1.3)
DIFFERENTIAL METHOD BLD: ABNORMAL
EKG ATRIAL RATE: 96 BPM
EKG DIAGNOSIS: NORMAL
EKG P-R INTERVAL: 136 MS
EKG Q-T INTERVAL: 406 MS
EKG QRS DURATION: 152 MS
EKG QTC CALCULATION (BAZETT): 512 MS
EKG R AXIS: 86 DEGREES
EKG T AXIS: -74 DEGREES
EKG VENTRICULAR RATE: 96 BPM
EOSINOPHIL # BLD: 0.03 K/UL (ref 0–0.4)
EOSINOPHIL NFR BLD: 0.3 % (ref 0–7)
ERYTHROCYTE [DISTWIDTH] IN BLOOD BY AUTOMATED COUNT: 17.2 % (ref 11.5–14.5)
GAS FLOW.O2 O2 DELIVERY SYS: ABNORMAL
GLOBULIN SER CALC-MCNC: 3.9 G/DL (ref 2–4)
GLUCOSE SERPL-MCNC: 304 MG/DL (ref 65–100)
HCO3 BLD-SCNC: 26 MMOL/L (ref 21–28)
HCT VFR BLD AUTO: 39.7 % (ref 36.6–50.3)
HGB BLD-MCNC: 11.6 G/DL (ref 12.1–17)
IMM GRANULOCYTES # BLD AUTO: 0.04 K/UL (ref 0–0.04)
IMM GRANULOCYTES NFR BLD AUTO: 0.4 % (ref 0–0.5)
LYMPHOCYTES # BLD: 1.12 K/UL (ref 0.8–3.5)
LYMPHOCYTES NFR BLD: 12.4 % (ref 12–49)
MAGNESIUM SERPL-MCNC: 2.2 MG/DL (ref 1.6–2.4)
MCH RBC QN AUTO: 25.1 PG (ref 26–34)
MCHC RBC AUTO-ENTMCNC: 29.2 G/DL (ref 30–36.5)
MCV RBC AUTO: 85.9 FL (ref 80–99)
MONOCYTES # BLD: 0.86 K/UL (ref 0–1)
MONOCYTES NFR BLD: 9.5 % (ref 5–13)
NEUTS SEG # BLD: 6.96 K/UL (ref 1.8–8)
NEUTS SEG NFR BLD: 77 % (ref 32–75)
NRBC # BLD: 0 K/UL (ref 0–0.01)
NRBC BLD-RTO: 0 PER 100 WBC
NT PRO BNP: 7904 PG/ML
PCO2 BLD: 34.1 MMHG (ref 35–48)
PH BLD: 7.49 (ref 7.35–7.45)
PLATELET # BLD AUTO: 211 K/UL (ref 150–400)
PMV BLD AUTO: 11.6 FL (ref 8.9–12.9)
PO2 BLD: 128 MMHG (ref 83–108)
POTASSIUM SERPL-SCNC: 4.2 MMOL/L (ref 3.5–5.1)
PROT SERPL-MCNC: 7.1 G/DL (ref 6.4–8.2)
RBC # BLD AUTO: 4.62 M/UL (ref 4.1–5.7)
SAO2 % BLD: 99.2 % (ref 92–97)
SODIUM SERPL-SCNC: 135 MMOL/L (ref 136–145)
SPECIMEN TYPE: ABNORMAL
TROPONIN I SERPL HS-MCNC: 70 NG/L (ref 0–76)
WBC # BLD AUTO: 9.1 K/UL (ref 4.1–11.1)

## 2025-06-04 PROCEDURE — 6370000000 HC RX 637 (ALT 250 FOR IP): Performed by: STUDENT IN AN ORGANIZED HEALTH CARE EDUCATION/TRAINING PROGRAM

## 2025-06-04 PROCEDURE — 80053 COMPREHEN METABOLIC PANEL: CPT

## 2025-06-04 PROCEDURE — 71045 X-RAY EXAM CHEST 1 VIEW: CPT

## 2025-06-04 PROCEDURE — 85025 COMPLETE CBC W/AUTO DIFF WBC: CPT

## 2025-06-04 PROCEDURE — 94640 AIRWAY INHALATION TREATMENT: CPT

## 2025-06-04 PROCEDURE — 83735 ASSAY OF MAGNESIUM: CPT

## 2025-06-04 PROCEDURE — 2700000000 HC OXYGEN THERAPY PER DAY

## 2025-06-04 PROCEDURE — 84484 ASSAY OF TROPONIN QUANT: CPT

## 2025-06-04 PROCEDURE — 83880 ASSAY OF NATRIURETIC PEPTIDE: CPT

## 2025-06-04 PROCEDURE — 99285 EMERGENCY DEPT VISIT HI MDM: CPT

## 2025-06-04 PROCEDURE — 36600 WITHDRAWAL OF ARTERIAL BLOOD: CPT

## 2025-06-04 PROCEDURE — 96372 THER/PROPH/DIAG INJ SC/IM: CPT

## 2025-06-04 PROCEDURE — 36415 COLL VENOUS BLD VENIPUNCTURE: CPT

## 2025-06-04 PROCEDURE — 93005 ELECTROCARDIOGRAM TRACING: CPT

## 2025-06-04 PROCEDURE — 82803 BLOOD GASES ANY COMBINATION: CPT

## 2025-06-04 RX ORDER — IPRATROPIUM BROMIDE AND ALBUTEROL SULFATE 2.5; .5 MG/3ML; MG/3ML
1 SOLUTION RESPIRATORY (INHALATION)
Status: COMPLETED | OUTPATIENT
Start: 2025-06-04 | End: 2025-06-04

## 2025-06-04 RX ORDER — INSULIN GLARGINE 100 [IU]/ML
14 INJECTION, SOLUTION SUBCUTANEOUS
Status: COMPLETED | OUTPATIENT
Start: 2025-06-04 | End: 2025-06-04

## 2025-06-04 RX ADMIN — INSULIN GLARGINE 14 UNITS: 100 INJECTION, SOLUTION SUBCUTANEOUS at 20:29

## 2025-06-04 RX ADMIN — IPRATROPIUM BROMIDE AND ALBUTEROL SULFATE 1 DOSE: .5; 3 SOLUTION RESPIRATORY (INHALATION) at 18:59

## 2025-06-04 ASSESSMENT — PAIN - FUNCTIONAL ASSESSMENT: PAIN_FUNCTIONAL_ASSESSMENT: 0-10

## 2025-06-04 ASSESSMENT — PAIN SCALES - GENERAL: PAINLEVEL_OUTOF10: 0

## 2025-06-05 NOTE — ED PROVIDER NOTES
[facility] EMERGENCY DEPARTMENT  EMERGENCY DEPARTMENT ENCOUNTER       Pt Name: Baron Wagner Sr.  MRN: [id number]  Birthdate 2/20/1933  Date of evaluation: 6/4/2025  Provider: Colton Lewis MD   PCP: Willem Powell MD  Note Started: 8:09 PM EDT 6/4/25     CHIEF COMPLAINT       Chief Complaint   Patient presents with    Shortness of Breath     Pt in WC in [place]. Pt had his PICC line removed this AM. Pt sent here by his PCP and pulmonologist for concern of \"fluid on lungs\". Pt has hx of COPD and does not currently wear oxygen at home. Pt c/o SOB         HISTORY OF PRESENT ILLNESS: 1 or more elements      History From: {SAHPI:71011}  HPI Limitations: {HPI Limitations (Optional):51953}     Baron Wagner Sr. is a 89 y.o. male who presents ***     Nursing Notes were all reviewed and agreed with or any disagreements were addressed in the HPI.     REVIEW OF SYSTEMS      Review of Systems     Positives and Pertinent negatives as per HPI.    PAST HISTORY     Past Medical History:  Past Medical History:   Diagnosis Date    Allergic rhinitis 9/20/2017    Arthritis     ASCVD (arteriosclerotic cardiovascular disease) 9/20/2017    Story: Old ASMI by EKG    Back pain 9/20/2017    BPH (benign prostatic hyperplasia) 9/20/2017    CHF (congestive heart failure) (formerly [facility]) 9/20/2017    CKD (chronic kidney disease) 9/20/2017    COVID-19 11/2021    Diabetic acetonemia (HCC)     DM (diabetes mellitus) (formerly [facility]) 9/20/2017    Story: Diet Controlled    ED (erectile dysfunction) 9/20/2017    Edema 9/20/2017    Elevated CPK 9/20/2017    Comments: History of    Elevated LFTs 9/20/2017    Comments: History of    Elevated PSA 9/20/2017    Hypercholesteremia     Hyperlipidemia 9/20/2017    Hypertension     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    Nodule of right lung 9/20/2017    Story: Right    Polymyalgia 9/20/2017    Prostate enlargement     Substance abuse (HCC)          Past Surgical History:  Past Surgical History:   Procedure  Interval 136 ms    QRS Duration 152 ms    Q-T Interval 406 ms    QTc Calculation (Bazett) 512 ms    R Axis 86 degrees    T Axis -74 degrees    Diagnosis       Sinus rhythm with marked sinus arrhythmia with occasional premature   ventricular complexes  Right bundle branch block  Inferior infarct , age undetermined  T wave abnormality, consider lateral ischemia  Abnormal ECG  When compared with ECG of 02-MAY-2025 09:50,  Sinus rhythm has replaced Electronic ventricular pacemaker     CBC with Auto Differential    Collection Time: 06/04/25  6:15 PM   Result Value Ref Range    WBC 9.1 4.1 - 11.1 K/uL    RBC 4.62 4.10 - 5.70 M/uL    Hemoglobin 11.6 (L) 12.1 - 17.0 g/dL    Hematocrit 39.7 36.6 - 50.3 %    MCV 85.9 80.0 - 99.0 FL    MCH 25.1 (L) 26.0 - 34.0 PG    MCHC 29.2 (L) 30.0 - 36.5 g/dL    RDW 17.2 (H) 11.5 - 14.5 %    Platelets 211 150 - 400 K/uL    MPV 11.6 8.9 - 12.9 FL    Nucleated RBCs 0.0 0  WBC    nRBC 0.00 0.00 - 0.01 K/uL    Neutrophils % 77.0 (H) 32.0 - 75.0 %    Lymphocytes % 12.4 12.0 - 49.0 %    Monocytes % 9.5 5.0 - 13.0 %    Eosinophils % 0.3 0.0 - 7.0 %    Basophils % 0.4 0.0 - 1.0 %    Immature Granulocytes % 0.4 0.0 - 0.5 %    Neutrophils Absolute 6.96 1.80 - 8.00 K/UL    Lymphocytes Absolute 1.12 0.80 - 3.50 K/UL    Monocytes Absolute 0.86 0.00 - 1.00 K/UL    Eosinophils Absolute 0.03 0.00 - 0.40 K/UL    Basophils Absolute 0.04 0.00 - 0.10 K/UL    Immature Granulocytes Absolute 0.04 0.00 - 0.04 K/UL    Differential Type AUTOMATED     Comprehensive Metabolic Panel    Collection Time: 06/04/25  6:15 PM   Result Value Ref Range    Sodium 135 (L) 136 - 145 mmol/L    Potassium 4.2 3.5 - 5.1 mmol/L    Chloride 98 97 - 108 mmol/L    CO2 28 21 - 32 mmol/L    Anion Gap 9 2 - 12 mmol/L    Glucose 304 (H) 65 - 100 mg/dL    BUN 41 (H) 6 - 20 MG/DL    Creatinine 2.09 (H) 0.70 - 1.30 MG/DL    BUN/Creatinine Ratio 20 12 - 20      Est, Glom Filt Rate 29 (L) >60 ml/min/1.73m2    Calcium 9.8 8.5 - 10.1 MG/DL

## 2025-06-06 ENCOUNTER — TELEPHONE (OUTPATIENT)
Facility: CLINIC | Age: 89
End: 2025-06-06

## 2025-06-09 ENCOUNTER — TELEPHONE (OUTPATIENT)
Facility: CLINIC | Age: 89
End: 2025-06-09

## 2025-06-09 NOTE — TELEPHONE ENCOUNTER
Ccoo from [facility] called to let Dr. Powell know that the family is interested in Hospice Care.  Per Coco the patient continues to decline.  He is taking three fluid pills a day and is still having extra swelling.  Also patient has an appointment to see a Urologist tomorrow 6/10/25 and the family wanted to know should he keep this appointment?  The patient is very weak.  If Dr. Powell is fine with Hospice he will need to issue a referral to [facility] and fax it to [phone].  Please advise.     Coco phone #[phone]

## 2025-06-11 NOTE — PROGRESS NOTES
Chief Complaint   Patient presents with    Hypertension     102/78    Chronic Kidney Disease    Diabetes         Atrial Fibrillation       SUBJECTIVE:    Baron Wagner Sr. is a 89 y.o. male who presents today accompanied by his son in follow-up of his medical problems include congestive heart failure with a recent ER visit secondary to fluid overload, hypertension, recent left foot diabetic infection now resolved, paroxysmal atrial fibrillation, complete heart block status post pacemaker, ASCVD, diabetes, hyperlipidemia, BPH, CKD stage III and other multiple medical problems.  He generally seems to be very weak.  He is to the point now way they are getting him more help and probably got to be heading toward hospice.  He currently notes that the infection in his left foot has resolved.  He does have a follow-up appointment with the podiatrist but suspect she will release him at that appointment.  He notes no chest pain, current shortness of breath but did have some recent shortness of breath and fluid overload over the weekend at which time they increase his fluid pill to 3 a day for couple of days which seemed to get the fluid off and now they are back to taking 2 fluid pills a day.  He notes no current GI or  complaints.  He notes no headaches, dizziness or new neurologic complaints other than just generalized nonfocal weakness.  He has lots of arthritic complaints but they are chronic and unchanged and no new arthritic complaints.  He has noted no recent infections no fevers chills or night sweats.  Appetite overall is down a bit but he still seems like he is eating okay most of the time.  The remainder of complete review of systems is negative.      Current Outpatient Medications   Medication Sig Dispense Refill    Misc Natural Products (GLUCOSAMINE CHOND CMP ADVANCED) TABS Take 1,500 mg by mouth daily      Insulin Pen Needle 32G X 4 MM MISC 1 each by Does not apply route daily 100 each 3

## 2025-06-12 ENCOUNTER — OFFICE VISIT (OUTPATIENT)
Facility: CLINIC | Age: 89
End: 2025-06-12
Payer: MEDICARE

## 2025-06-12 VITALS
DIASTOLIC BLOOD PRESSURE: 72 MMHG | OXYGEN SATURATION: 94 % | BODY MASS INDEX: 23.95 KG/M2 | HEIGHT: 75 IN | HEART RATE: 92 BPM | WEIGHT: 192.6 LBS | TEMPERATURE: 94 F | SYSTOLIC BLOOD PRESSURE: 102 MMHG

## 2025-06-12 DIAGNOSIS — I50.32 CHRONIC DIASTOLIC CONGESTIVE HEART FAILURE (HCC): Primary | ICD-10-CM

## 2025-06-12 DIAGNOSIS — E11.22 TYPE 2 DIABETES MELLITUS WITH STAGE 3B CHRONIC KIDNEY DISEASE, WITHOUT LONG-TERM CURRENT USE OF INSULIN (HCC): ICD-10-CM

## 2025-06-12 DIAGNOSIS — I48.91 UNSPECIFIED ATRIAL FIBRILLATION (HCC): ICD-10-CM

## 2025-06-12 DIAGNOSIS — N18.32 TYPE 2 DIABETES MELLITUS WITH STAGE 3B CHRONIC KIDNEY DISEASE, WITHOUT LONG-TERM CURRENT USE OF INSULIN (HCC): ICD-10-CM

## 2025-06-12 DIAGNOSIS — L08.9 DIABETIC INFECTION OF LEFT FOOT (HCC): ICD-10-CM

## 2025-06-12 DIAGNOSIS — E11.628 DIABETIC INFECTION OF LEFT FOOT (HCC): ICD-10-CM

## 2025-06-12 DIAGNOSIS — I48.0 PAROXYSMAL ATRIAL FIBRILLATION (HCC): ICD-10-CM

## 2025-06-12 DIAGNOSIS — I12.9 HYPERTENSION WITH RENAL DISEASE: ICD-10-CM

## 2025-06-12 DIAGNOSIS — N18.32 STAGE 3B CHRONIC KIDNEY DISEASE (HCC): ICD-10-CM

## 2025-06-12 DIAGNOSIS — I25.10 ASCVD (ARTERIOSCLEROTIC CARDIOVASCULAR DISEASE): ICD-10-CM

## 2025-06-12 PROCEDURE — 1125F AMNT PAIN NOTED PAIN PRSNT: CPT | Performed by: INTERNAL MEDICINE

## 2025-06-12 PROCEDURE — G8420 CALC BMI NORM PARAMETERS: HCPCS | Performed by: INTERNAL MEDICINE

## 2025-06-12 PROCEDURE — 3052F HG A1C>EQUAL 8.0%<EQUAL 9.0%: CPT | Performed by: INTERNAL MEDICINE

## 2025-06-12 PROCEDURE — 99215 OFFICE O/P EST HI 40 MIN: CPT | Performed by: INTERNAL MEDICINE

## 2025-06-12 PROCEDURE — 1159F MED LIST DOCD IN RCRD: CPT | Performed by: INTERNAL MEDICINE

## 2025-06-12 PROCEDURE — 1160F RVW MEDS BY RX/DR IN RCRD: CPT | Performed by: INTERNAL MEDICINE

## 2025-06-12 PROCEDURE — G8427 DOCREV CUR MEDS BY ELIG CLIN: HCPCS | Performed by: INTERNAL MEDICINE

## 2025-06-12 PROCEDURE — 1036F TOBACCO NON-USER: CPT | Performed by: INTERNAL MEDICINE

## 2025-06-12 PROCEDURE — 1123F ACP DISCUSS/DSCN MKR DOCD: CPT | Performed by: INTERNAL MEDICINE

## 2025-06-12 RX ORDER — DIGOXIN 125 MCG
125 TABLET ORAL DAILY
Qty: 90 TABLET | Refills: 1 | Status: SHIPPED | OUTPATIENT
Start: 2025-06-12

## 2025-06-12 RX ORDER — BUMETANIDE 2 MG/1
2 TABLET ORAL 2 TIMES DAILY
Qty: 180 TABLET | Refills: 1 | Status: SHIPPED | OUTPATIENT
Start: 2025-06-12

## 2025-06-12 RX ORDER — GLIMEPIRIDE 4 MG/1
4 TABLET ORAL
Qty: 90 TABLET | Refills: 1 | Status: SHIPPED | OUTPATIENT
Start: 2025-06-12

## 2025-06-12 ASSESSMENT — PATIENT HEALTH QUESTIONNAIRE - PHQ9
SUM OF ALL RESPONSES TO PHQ QUESTIONS 1-9: 0
1. LITTLE INTEREST OR PLEASURE IN DOING THINGS: NOT AT ALL
SUM OF ALL RESPONSES TO PHQ QUESTIONS 1-9: 0
2. FEELING DOWN, DEPRESSED OR HOPELESS: NOT AT ALL

## 2025-06-12 NOTE — TELEPHONE ENCOUNTER
PCP: Willem Powell MD    Last appt: 5/15/2025    Future Appointments   Date Time Provider Department Center   6/12/2025 10:50 AM Willem Powell MD [facility]   6/13/2025  2:00 PM Radha Carrasco DPM [facility]       Requested Prescriptions     Pending Prescriptions Disp Refills    digoxin (LANOXIN) 125 MCG tablet [Pharmacy Med Name: DIGOXIN 125 MCG TABLET] 90 tablet 1     Sig: TAKE 1 TABLET BY MOUTH EVERY DAY    glimepiride (AMARYL) 4 MG tablet [Pharmacy Med Name: GLIMEPIRIDE 4 MG TABLET] 90 tablet 1     Sig: TAKE 1 TABLET BY MOUTH EVERY DAY BEFORE BREAKFAST    bumetanide (BUMEX) 2 MG tablet [Pharmacy Med Name: BUMETANIDE 2 MG TABLET] 180 tablet 1     Sig: TAKE 1 TABLET BY MOUTH TWICE A DAY

## 2025-06-12 NOTE — PROGRESS NOTES
Baron Kristy Fuentes is a 89 y.o. male     Chief Complaint   Patient presents with    Hypertension     102/78    Chronic Kidney Disease    Diabetes         Atrial Fibrillation       \"Have you been to the ER, urgent care clinic since your last visit?  Hospitalized since your last visit?\"    NO    “Have you seen or consulted any other health care providers outside of [facility] since your last visit?”    Saw Dr. Rebolledo at [facility] for hematurnia

## 2025-06-13 ENCOUNTER — TELEPHONE (OUTPATIENT)
Facility: CLINIC | Age: 89
End: 2025-06-13

## 2025-06-13 ENCOUNTER — HOSPITAL ENCOUNTER (OUTPATIENT)
Facility: HOSPITAL | Age: 89
Discharge: HOME | End: 2025-06-13
Attending: PODIATRIST
Payer: MEDICARE

## 2025-06-13 VITALS — SYSTOLIC BLOOD PRESSURE: 103 MMHG | TEMPERATURE: 98.4 F | DIASTOLIC BLOOD PRESSURE: 69 MMHG | HEART RATE: 99 BPM

## 2025-06-13 DIAGNOSIS — L97.521 CHRONIC ULCER OF LEFT FOOT LIMITED TO BREAKDOWN OF SKIN (HCC): Primary | ICD-10-CM

## 2025-06-13 LAB
EKG ATRIAL RATE: 96 BPM
EKG DIAGNOSIS: NORMAL
EKG P-R INTERVAL: 136 MS
EKG Q-T INTERVAL: 406 MS
EKG QRS DURATION: 152 MS
EKG QTC CALCULATION (BAZETT): 512 MS
EKG R AXIS: 86 DEGREES
EKG T AXIS: -74 DEGREES
EKG VENTRICULAR RATE: 96 BPM

## 2025-06-13 PROCEDURE — 97597 DBRDMT OPN WND 1ST 20 CM/<: CPT

## 2025-06-13 RX ORDER — TRIAMCINOLONE ACETONIDE 1 MG/G
OINTMENT TOPICAL PRN
OUTPATIENT
Start: 2025-06-13

## 2025-06-13 RX ORDER — MUPIROCIN 2 %
OINTMENT (GRAM) TOPICAL PRN
OUTPATIENT
Start: 2025-06-13

## 2025-06-13 RX ORDER — SODIUM CHLOR/HYPOCHLOROUS ACID 0.033 %
SOLUTION, IRRIGATION IRRIGATION PRN
OUTPATIENT
Start: 2025-06-13

## 2025-06-13 RX ORDER — BETAMETHASONE DIPROPIONATE 0.5 MG/G
CREAM TOPICAL PRN
OUTPATIENT
Start: 2025-06-13

## 2025-06-13 RX ORDER — GINSENG 100 MG
CAPSULE ORAL PRN
OUTPATIENT
Start: 2025-06-13

## 2025-06-13 RX ORDER — BACITRACIN ZINC AND POLYMYXIN B SULFATE 500; 1000 [USP'U]/G; [USP'U]/G
OINTMENT TOPICAL PRN
OUTPATIENT
Start: 2025-06-13

## 2025-06-13 RX ORDER — GENTAMICIN SULFATE 1 MG/G
OINTMENT TOPICAL PRN
OUTPATIENT
Start: 2025-06-13

## 2025-06-13 RX ORDER — LIDOCAINE 50 MG/G
OINTMENT TOPICAL PRN
OUTPATIENT
Start: 2025-06-13

## 2025-06-13 RX ORDER — NEOMYCIN/BACITRACIN/POLYMYXINB 3.5-400-5K
OINTMENT (GRAM) TOPICAL PRN
OUTPATIENT
Start: 2025-06-13

## 2025-06-13 RX ORDER — LIDOCAINE HYDROCHLORIDE 20 MG/ML
JELLY TOPICAL PRN
OUTPATIENT
Start: 2025-06-13

## 2025-06-13 RX ORDER — SILVER SULFADIAZINE 10 MG/G
CREAM TOPICAL PRN
OUTPATIENT
Start: 2025-06-13

## 2025-06-13 RX ORDER — CLOBETASOL PROPIONATE 0.5 MG/G
OINTMENT TOPICAL PRN
OUTPATIENT
Start: 2025-06-13

## 2025-06-13 RX ORDER — LIDOCAINE 40 MG/G
CREAM TOPICAL PRN
OUTPATIENT
Start: 2025-06-13

## 2025-06-13 RX ORDER — LIDOCAINE HYDROCHLORIDE 40 MG/ML
SOLUTION TOPICAL PRN
OUTPATIENT
Start: 2025-06-13

## 2025-06-13 NOTE — TELEPHONE ENCOUNTER
[facility] provider Rox- called regarding Hospice order being completed for consult to be done.     Please follow up.    EC/PSR/FP

## 2025-06-13 NOTE — PATIENT INSTRUCTIONS
Discharge Instructions [facility]  [address]   [address]  [place] [address]   Telephone: [phone]     FAX [phone]    NAME:  Baron Wagner Sr.  YOB: 1933  MEDICAL RECORD NUMBER:  [id number]  DATE:  6/13/2025    CPT code: 95634    WOUND CARE ORDERS:  Left foot wound :Cleanse with soap and water. Apply collagen to wound. Cover with gauze and fabric bandaid. Change dressing every other day. You may shower every other day when changing dressings.     Home Health Agency: [facility]    TREATMENT ORDERS:    Elevate leg(s) above the level of the heart when sitting.   Avoid prolonged standing in one place.  Do no get dressing/wrap wet.    Follow Diet as prescribed:   [] Diet as tolerated: [] Calorie Diabetic Diet: Low carb and no Sugar [] No Added Salt:no more then 2,000 mg daily  [] Increase Protein: [] Limit the amount of liquid you are drinking and avoid drinking in between meals (limit to 2 quarts daily)     Return Appointment:  [x] Return Appointment: With Dr. Carrasco in 4 Week(s)  [] Nurse Visit :  days  [] Ordered tests:    Electronically signed Chrissy Smith RN on 6/13/2025 at 10:48 AM     Wound Care Center Information: Should you experience any significant changes in your wound(s) or have questions about your wound care, please contact the [facility] at MONDAY - FRIDAY 8:00 am - 4:30.  If you need help with your wound outside these hours and cannot wait until we are again available, contact your PCP or go to the hospital emergency room.   PLEASE NOTE: IF YOU ARE UNABLE TO OBTAIN WOUND SUPPLIES, CONTINUE TO USE THE SUPPLIES YOU HAVE AVAILABLE UNTIL YOU ARE ABLE TO REACH US. IT IS MOST IMPORTANT TO KEEP THE WOUND COVERED AT ALL TIMES.     Physician Signature:_______________________    Date: ___________ Time:  ____________

## 2025-06-13 NOTE — WOUND CARE
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    Performed by: Radha Carrasco DPM    Consent obtained: Yes    Time out taken: Yes    Debridement: excisional    Using curette the wound(s)/ulcer(s) was/were sharply debrided down through and including the removal of    subcutaneous tissue    Devitalized Tissue Debrided: necrotic/eschar    Pre Debridement Measurements:  Are located in the Wound/Ulcer Documentation Flow Sheet    Diabetic ulcer, fat layer exposed    Wound/Ulcer #: 1    Post Debridement Measurements:  Wound/Ulcer Descriptions are Pre Debridement except measurements:    Wound Care Documentation:  Puncture 07/13/23 Internal jugular (Active)   Number of days: 701       Puncture 07/13/23 Femoral (Active)   Number of days: 701       Wound 07/04/23 Toe (Comment  which one) Left Wound on Big Toe (Active)   Number of days: 710       Wound 07/04/23 Toe (Comment  which one) Right Wound on Toe (Active)   Number of days: 710       Wound 09/30/23 Buttocks Left MASD (Active)   Number of days: 621       Wound 01/31/25 Foot Left;Lateral (Active)   Wound Image   06/13/25 1400   Wound Etiology Diabetic 06/13/25 1400   Dressing Status Dry 06/13/25 1400   Wound Cleansed Cleansed with saline 06/13/25 1400   Dressing/Treatment Hydrating gel;Gauze dressing/dressing sponge;Adhesive bandage 05/30/25 1400   Offloading for Diabetic Foot Ulcers Post op shoe 03/28/25 1400   Wound Length (cm) 0.1 cm 06/13/25 1400   Wound Width (cm) 0.1 cm 06/13/25 1400   Wound Depth (cm) 0.1 cm 06/13/25 1400   Wound

## 2025-06-13 NOTE — FLOWSHEET NOTE
06/13/25 1400   LLE Neurovascular Assessment   Capillary Refill Less than/Equal to 3 seconds   Color Appropriate for Ethnicity   Temperature Warm   L Pedal Pulse +2   Wound 01/31/25 Foot Left;Lateral   Date First Assessed/Time First Assessed: 01/31/25 1423   Present on Original Admission: Yes  Wound Approximate Age at First Assessment (Weeks): 2 weeks  Location: Foot  Wound Location Orientation: Left;Lateral   Wound Image    Wound Etiology Diabetic   Dressing Status Dry   Wound Cleansed Cleansed with saline   Wound Length (cm) 0.1 cm   Wound Width (cm) 0.1 cm   Wound Depth (cm) 0.1 cm   Wound Surface Area (cm^2) 0.01 cm^2   Change in Wound Size % (l*w) 98.77   Wound Volume (cm^3) 0.001 cm^3   Wound Healing % 100   Wound Assessment Dry   Drainage Amount None (dry)   Odor None   Wound 05/30/25 Foot Plantar;Left   Date First Assessed/Time First Assessed: 05/30/25 1407   Primary Wound Type: Pressure Injury  Location: Foot  Wound Location Orientation: Plantar;Left   Wound Image    Wound Etiology Diabetic   Dressing Status Intact   Wound Cleansed Cleansed with saline   Wound Length (cm) 0.1 cm   Wound Width (cm) 0.1 cm   Wound Depth (cm) 0.1 cm   Wound Surface Area (cm^2) 0.01 cm^2   Change in Wound Size % (l*w) 0   Wound Volume (cm^3) 0.001 cm^3   Wound Healing % 0   Wound Assessment Dry   Drainage Amount None (dry)   Odor None   Pain Assessment   Pain Assessment None - Denies Pain     Blood pressure 103/69, pulse 99, temperature 98.4 °F (36.9 °C), temperature source Temporal.

## 2025-06-14 LAB
ANION GAP SERPL CALC-SCNC: 9 MMOL/L (ref 2–12)
BASOPHILS # BLD: 0.04 K/UL (ref 0–0.1)
BASOPHILS NFR BLD: 0.4 % (ref 0–1)
BUN SERPL-MCNC: 37 MG/DL (ref 6–20)
BUN/CREAT SERPL: 18 (ref 12–20)
CALCIUM SERPL-MCNC: 10 MG/DL (ref 8.5–10.1)
CHLORIDE SERPL-SCNC: 98 MMOL/L (ref 97–108)
CO2 SERPL-SCNC: 32 MMOL/L (ref 21–32)
CREAT SERPL-MCNC: 2.02 MG/DL (ref 0.7–1.3)
DIFFERENTIAL METHOD BLD: ABNORMAL
EOSINOPHIL # BLD: 0.03 K/UL (ref 0–0.4)
EOSINOPHIL NFR BLD: 0.3 % (ref 0–7)
ERYTHROCYTE [DISTWIDTH] IN BLOOD BY AUTOMATED COUNT: 17.1 % (ref 11.5–14.5)
GLUCOSE SERPL-MCNC: 239 MG/DL (ref 65–100)
HCT VFR BLD AUTO: 43.5 % (ref 36.6–50.3)
HGB BLD-MCNC: 11.8 G/DL (ref 12.1–17)
IMM GRANULOCYTES # BLD AUTO: 0.06 K/UL (ref 0–0.04)
IMM GRANULOCYTES NFR BLD AUTO: 0.5 % (ref 0–0.5)
LYMPHOCYTES # BLD: 0.79 K/UL (ref 0.8–3.5)
LYMPHOCYTES NFR BLD: 7.2 % (ref 12–49)
MCH RBC QN AUTO: 24.1 PG (ref 26–34)
MCHC RBC AUTO-ENTMCNC: 27.1 G/DL (ref 30–36.5)
MCV RBC AUTO: 89 FL (ref 80–99)
MONOCYTES # BLD: 0.58 K/UL (ref 0–1)
MONOCYTES NFR BLD: 5.3 % (ref 5–13)
NEUTS SEG # BLD: 9.49 K/UL (ref 1.8–8)
NEUTS SEG NFR BLD: 86.3 % (ref 32–75)
NRBC # BLD: 0 K/UL (ref 0–0.01)
NRBC BLD-RTO: 0 PER 100 WBC
PLATELET # BLD AUTO: 187 K/UL (ref 150–400)
PMV BLD AUTO: 12.3 FL (ref 8.9–12.9)
POTASSIUM SERPL-SCNC: 4.4 MMOL/L (ref 3.5–5.1)
RBC # BLD AUTO: 4.89 M/UL (ref 4.1–5.7)
RBC MORPH BLD: ABNORMAL
RBC MORPH BLD: ABNORMAL
SODIUM SERPL-SCNC: 139 MMOL/L (ref 136–145)
WBC # BLD AUTO: 11 K/UL (ref 4.1–11.1)

## 2025-06-16 ENCOUNTER — TELEPHONE (OUTPATIENT)
Facility: CLINIC | Age: 89
End: 2025-06-16

## 2025-06-16 RX ORDER — WARFARIN SODIUM 5 MG/1
TABLET ORAL
Qty: 90 TABLET | Refills: 3 | Status: SHIPPED | OUTPATIENT
Start: 2025-06-16

## 2025-06-16 NOTE — TELEPHONE ENCOUNTER
PCP: Willem Powell MD    Last appt: 6/12/2025    Future Appointments   Date Time Provider Department Center   6/27/2025  2:30 PM Radha Carrasco DPM [facility]   7/8/2025 10:50 AM Willem Powell MD [facility]   7/11/2025  2:00 PM Radha Carrasco DPM [facility]       Requested Prescriptions     Pending Prescriptions Disp Refills    warfarin (COUMADIN) 5 MG tablet [Pharmacy Med Name: WARFARIN SODIUM 5 MG TABLET] 90 tablet 3     Sig: TAKE 1 AND 1/2 TABLET ON MONDAY AND FRIDAYS AND TAKE 1 TABLET ON THE OTHER DAYS

## 2025-06-16 NOTE — TELEPHONE ENCOUNTER
Sanjuana from [facility] called requesting to see if Dr. Powell would like to be the patient's attending for his hospice care or defer to [facility]'s medical director?    Please advise.

## 2025-06-17 NOTE — PROGRESS NOTES
Anticoagulation Summary  As of 3/6/2023      INR goal:  2.0-3.0   TTR:  38.7 % (4.9 y)   INR used for dosin.1 (2023)   Warfarin maintenance plan:  7.5 mg (5 mg x 1.5) every day; Starting 3/6/2023   Weekly warfarin total:  52.5 mg   Plan last modified:  Shantell Alvarez LPN ()   Next INR check:  3/28/2023   Target end date:      Indications    Paroxysmal atrial fibrillation ([facility] [address] (Primary) [I48.0]                 Anticoagulation Episode Summary       INR check location:  Clinic Lab    Preferred lab:      Send INR reminders to:      Comments:            Anticoagulation Care Providers       Provider Role Specialty Phone number    Lary Wolf MD Responsible Internal Medicine Physician [phone]           Informed patient that PT/INR okay; continue the same dose of blood thinner which is 7.5 mg daily and f/up as scheduled. [facility]

## 2025-07-08 ENCOUNTER — OFFICE VISIT (OUTPATIENT)
Facility: CLINIC | Age: 89
End: 2025-07-08
Payer: MEDICARE

## 2025-07-08 VITALS
DIASTOLIC BLOOD PRESSURE: 72 MMHG | SYSTOLIC BLOOD PRESSURE: 110 MMHG | HEART RATE: 92 BPM | OXYGEN SATURATION: 98 % | HEIGHT: 75 IN | WEIGHT: 200 LBS | BODY MASS INDEX: 24.87 KG/M2

## 2025-07-08 DIAGNOSIS — I48.0 PAROXYSMAL ATRIAL FIBRILLATION (HCC): ICD-10-CM

## 2025-07-08 DIAGNOSIS — I12.9 HYPERTENSION WITH RENAL DISEASE: ICD-10-CM

## 2025-07-08 DIAGNOSIS — E11.22 TYPE 2 DIABETES MELLITUS WITH STAGE 3B CHRONIC KIDNEY DISEASE, WITHOUT LONG-TERM CURRENT USE OF INSULIN (HCC): ICD-10-CM

## 2025-07-08 DIAGNOSIS — I50.32 CHRONIC DIASTOLIC CONGESTIVE HEART FAILURE (HCC): ICD-10-CM

## 2025-07-08 DIAGNOSIS — L08.9 DIABETIC INFECTION OF LEFT FOOT (HCC): Primary | ICD-10-CM

## 2025-07-08 DIAGNOSIS — E11.628 DIABETIC INFECTION OF LEFT FOOT (HCC): Primary | ICD-10-CM

## 2025-07-08 DIAGNOSIS — N18.32 STAGE 3B CHRONIC KIDNEY DISEASE (HCC): ICD-10-CM

## 2025-07-08 DIAGNOSIS — N18.32 TYPE 2 DIABETES MELLITUS WITH STAGE 3B CHRONIC KIDNEY DISEASE, WITHOUT LONG-TERM CURRENT USE OF INSULIN (HCC): ICD-10-CM

## 2025-07-08 PROCEDURE — 1036F TOBACCO NON-USER: CPT | Performed by: INTERNAL MEDICINE

## 2025-07-08 PROCEDURE — 3052F HG A1C>EQUAL 8.0%<EQUAL 9.0%: CPT | Performed by: INTERNAL MEDICINE

## 2025-07-08 PROCEDURE — 1125F AMNT PAIN NOTED PAIN PRSNT: CPT | Performed by: INTERNAL MEDICINE

## 2025-07-08 PROCEDURE — G8419 CALC BMI OUT NRM PARAM NOF/U: HCPCS | Performed by: INTERNAL MEDICINE

## 2025-07-08 PROCEDURE — G8427 DOCREV CUR MEDS BY ELIG CLIN: HCPCS | Performed by: INTERNAL MEDICINE

## 2025-07-08 PROCEDURE — 1160F RVW MEDS BY RX/DR IN RCRD: CPT | Performed by: INTERNAL MEDICINE

## 2025-07-08 PROCEDURE — 1159F MED LIST DOCD IN RCRD: CPT | Performed by: INTERNAL MEDICINE

## 2025-07-08 PROCEDURE — 1123F ACP DISCUSS/DSCN MKR DOCD: CPT | Performed by: INTERNAL MEDICINE

## 2025-07-08 ASSESSMENT — PATIENT HEALTH QUESTIONNAIRE - PHQ9
SUM OF ALL RESPONSES TO PHQ QUESTIONS 1-9: 2
SUM OF ALL RESPONSES TO PHQ QUESTIONS 1-9: 2
1. LITTLE INTEREST OR PLEASURE IN DOING THINGS: SEVERAL DAYS
SUM OF ALL RESPONSES TO PHQ QUESTIONS 1-9: 2
2. FEELING DOWN, DEPRESSED OR HOPELESS: SEVERAL DAYS
SUM OF ALL RESPONSES TO PHQ QUESTIONS 1-9: 2

## 2025-07-08 NOTE — PROGRESS NOTES
Baron aWgner Sr. is a 89 y.o. male     Chief Complaint   Patient presents with    Congestive Heart Failure    Atrial Fibrillation    Hypertension    Chronic Kidney Disease    COPD       \"Have you been to the ER, urgent care clinic since your last visit?  Hospitalized since your last visit?\"    NO    “Have you seen or consulted any other health care providers outside of [facility] since your last visit?”    NO

## 2025-07-08 NOTE — PROGRESS NOTES
No chief complaint on file.      SUBJECTIVE:    aBron Wagner Sr. is a 89 y.o. male who returns in follow-up for his medical problems include hypertension, chronic diastolic CHF, CKD, atrial fibrillation, COPD, diabetes and other medical problems.  He recently had a diabetic foot infection that seems of cleared up now.  He notes no current chest pain, increase of his chronic shortness of breath now on chronic oxygen or other new cardiac or respiratory complaints.  There are no current GI or  complaints.  He notes no headaches, dizziness or new neurologic complaints.  There are no new arthritic complaints no other complaints on complete review of systems.      Current Outpatient Medications   Medication Sig Dispense Refill    warfarin (COUMADIN) 5 MG tablet TAKE 1 AND 1/2 TABLET ON MONDAY AND FRIDAYS AND TAKE 1 TABLET ON THE OTHER DAYS 90 tablet 3    digoxin (LANOXIN) 125 MCG tablet TAKE 1 TABLET BY MOUTH EVERY DAY 90 tablet 1    glimepiride (AMARYL) 4 MG tablet TAKE 1 TABLET BY MOUTH EVERY DAY BEFORE BREAKFAST 90 tablet 1    bumetanide (BUMEX) 2 MG tablet TAKE 1 TABLET BY MOUTH TWICE A  tablet 1    Misc Natural Products (GLUCOSAMINE CHOND CMP ADVANCED) TABS Take 1,500 mg by mouth daily      Insulin Pen Needle 32G X 4 MM MISC 1 each by Does not apply route daily 100 each 3    magnesium gluconate (MAGONATE) 500 MG tablet Take 1 tablet by mouth daily      insulin glargine (LANTUS SOLOSTAR) 100 UNIT/ML injection pen Inject 14 Units into the skin nightly 5 Adjustable Dose Pre-filled Pen Syringe 0    predniSONE (DELTASONE) 10 MG tablet Take 1 tablet by mouth daily 30 tablet 5    balsum peru-castor oil (VENELEX) OINT ointment Apply topically 2 times daily 30 g 1    Probiotic Product (ALIGN EXTRA STRENGTH) CAPS Take 1 capsule daily 30 capsule 1    UNABLE TO FIND Antimicrobial orders for discharge  - Zosyn 3.375 g   IV every 8 hours end date 6/3  -May administer as continuous infusion  -Pull line at end of

## 2025-07-09 ENCOUNTER — TELEPHONE (OUTPATIENT)
Facility: CLINIC | Age: 89
End: 2025-07-09

## 2025-07-09 LAB
ANION GAP SERPL CALC-SCNC: 8 MMOL/L (ref 2–12)
BASOPHILS # BLD: 0.04 K/UL (ref 0–0.1)
BASOPHILS NFR BLD: 0.3 % (ref 0–1)
BUN SERPL-MCNC: 46 MG/DL (ref 6–20)
BUN/CREAT SERPL: 23 (ref 12–20)
CALCIUM SERPL-MCNC: 9.9 MG/DL (ref 8.5–10.1)
CHLORIDE SERPL-SCNC: 98 MMOL/L (ref 97–108)
CO2 SERPL-SCNC: 26 MMOL/L (ref 21–32)
CREAT SERPL-MCNC: 2 MG/DL (ref 0.7–1.3)
DIFFERENTIAL METHOD BLD: ABNORMAL
EOSINOPHIL # BLD: 0.04 K/UL (ref 0–0.4)
EOSINOPHIL NFR BLD: 0.3 % (ref 0–7)
ERYTHROCYTE [DISTWIDTH] IN BLOOD BY AUTOMATED COUNT: 19.2 % (ref 11.5–14.5)
GLUCOSE SERPL-MCNC: 298 MG/DL (ref 65–100)
HCT VFR BLD AUTO: 43.4 % (ref 36.6–50.3)
HGB BLD-MCNC: 12 G/DL (ref 12.1–17)
IMM GRANULOCYTES # BLD AUTO: 0.07 K/UL (ref 0–0.04)
IMM GRANULOCYTES NFR BLD AUTO: 0.6 % (ref 0–0.5)
INR PPP: 2.3 (ref 0.9–1.1)
LYMPHOCYTES # BLD: 0.82 K/UL (ref 0.8–3.5)
LYMPHOCYTES NFR BLD: 6.7 % (ref 12–49)
MCH RBC QN AUTO: 24.3 PG (ref 26–34)
MCHC RBC AUTO-ENTMCNC: 27.6 G/DL (ref 30–36.5)
MCV RBC AUTO: 87.9 FL (ref 80–99)
MONOCYTES # BLD: 0.85 K/UL (ref 0–1)
MONOCYTES NFR BLD: 7 % (ref 5–13)
NEUTS SEG # BLD: 10.38 K/UL (ref 1.8–8)
NEUTS SEG NFR BLD: 85.1 % (ref 32–75)
NRBC # BLD: 0 K/UL (ref 0–0.01)
NRBC BLD-RTO: 0 PER 100 WBC
PLATELET # BLD AUTO: 213 K/UL (ref 150–400)
PMV BLD AUTO: 12.5 FL (ref 8.9–12.9)
POTASSIUM SERPL-SCNC: 4.8 MMOL/L (ref 3.5–5.1)
PROTHROMBIN TIME: 23.5 SEC (ref 9.2–11.2)
RBC # BLD AUTO: 4.94 M/UL (ref 4.1–5.7)
RBC MORPH BLD: ABNORMAL
SODIUM SERPL-SCNC: 132 MMOL/L (ref 136–145)
WBC # BLD AUTO: 12.2 K/UL (ref 4.1–11.1)

## 2025-07-09 NOTE — TELEPHONE ENCOUNTER
I have spoken with Mr Wagner's daughter in law and we will resubmit the claim as it was an AWV, and he was not yet in hospice at the time of the appt.

## 2025-07-09 NOTE — TELEPHONE ENCOUNTER
Patient mother in law called in [employer] advising the patient received a bill of $255.00, and that its being billed incorrectly it has to be coded as a infection follow up because the patient is on hospice, for DOS 3/13/2025.    PH:[phone]

## (undated) DEVICE — PAD,ABDOMINAL,5"X9",ST,LF,25/BX: Brand: MEDLINE INDUSTRIES, INC.

## (undated) DEVICE — BLADE,CARBON-STEEL,15,STRL,DISPOSABLE,TB: Brand: MEDLINE

## (undated) DEVICE — SWAB CULT LIQ STUART AGR AERB MOD IN BRK SGL RAYON TIP PLAS

## (undated) DEVICE — GUIDEWIRE VASC L40CM DIA0018IN NDL 21GA L7CM Z S STL MAK

## (undated) DEVICE — DRESSING HEMSTAT W12XL12IN 3 PLY QUIKCLOT CONTROL+

## (undated) DEVICE — FASCIAL DILATOR SET: Brand: COOK

## (undated) DEVICE — GLOVE SURG SZ 65 THK91MIL LTX FREE SYN POLYISOPRENE

## (undated) DEVICE — ZIMMER® STERILE DISPOSABLE TOURNIQUET CUFF WITH PROTECTIVE SLEEVE AND PLC, DUAL PORT, SINGLE BLADDER, 18 IN. (46 CM)

## (undated) DEVICE — 450 ML BOTTLE OF 0.05% CHLORHEXIDINE GLUCONATE IN 99.95% STERILE WATER FOR IRRIGATION, USP AND APPLICATOR.: Brand: IRRISEPT ANTIMICROBIAL WOUND LAVAGE

## (undated) DEVICE — HEART CATH-MRMC: Brand: MEDLINE INDUSTRIES, INC.

## (undated) DEVICE — PINNACLE INTRODUCER SHEATH: Brand: PINNACLE

## (undated) DEVICE — BOWL MED L 32OZ PLAS W/ MOLD GRAD EZ OPN PEEL PCH

## (undated) DEVICE — SOLUTION IRRIG 500ML 0.9% SOD CHLO USP POUR PLAS BTL

## (undated) DEVICE — SKIN PREP TRAY 4 COMPARTM TRAY: Brand: MEDLINE INDUSTRIES, INC.

## (undated) DEVICE — INTRODUCER SHTH W51XH73XL557MM D13MM DIA7.8MM HYDRPHLC

## (undated) DEVICE — SUTURE PERMAHAND SZ 0 L30IN NONABSORBABLE BLK L26MM SH 1/2 K834H

## (undated) DEVICE — KIT ACCS INTRO 4FR L10CM NDL 21GA L7CM GWIRE L40CM

## (undated) DEVICE — 3M™ TEGADERM™ TRANSPARENT FILM DRESSING FRAME STYLE, 1626W, 4 IN X 4-3/4 IN (10 CM X 12 CM), 50/CT 4CT/CASE: Brand: 3M™ TEGADERM™

## (undated) DEVICE — BANDAGE,GAUZE,BULKEE II,4.5"X4.1YD,STRL: Brand: MEDLINE

## (undated) DEVICE — PRESSURE MONITORING SET: Brand: TRUWAVE

## (undated) DEVICE — PREMIUM WET SKIN PREP TRAY: Brand: MEDLINE INDUSTRIES, INC.

## (undated) DEVICE — 4-PORT MANIFOLD: Brand: NEPTUNE 2

## (undated) DEVICE — PROVE COVER: Brand: UNBRANDED

## (undated) DEVICE — EXTREMITY-MRMC: Brand: MEDLINE INDUSTRIES, INC.

## (undated) DEVICE — SUTURE ETHLN SZ 2-0 L30IN NONABSORBABLE BLK L36MM PSLX 3/8 1697H

## (undated) DEVICE — GLOVE ORANGE PI 7   MSG9070

## (undated) DEVICE — SOLUTION IV 500ML 0.9% SOD BOTTLE CHL LTWT DURABLE SHATTERPROOF

## (undated) DEVICE — GUIDEWIRE VASC L180CM DIA0.035IN L7CM DIA3MM J TIP PTFE S

## (undated) DEVICE — DRESSING STERILE PETRO W3XL8IN N ADH OIL EMUL GZ CURAD